# Patient Record
Sex: MALE | Race: WHITE | NOT HISPANIC OR LATINO | Employment: OTHER | URBAN - METROPOLITAN AREA
[De-identification: names, ages, dates, MRNs, and addresses within clinical notes are randomized per-mention and may not be internally consistent; named-entity substitution may affect disease eponyms.]

---

## 2021-11-12 ENCOUNTER — TRANSFERRED RECORDS (OUTPATIENT)
Dept: HEALTH INFORMATION MANAGEMENT | Facility: CLINIC | Age: 66
End: 2021-11-12

## 2022-10-31 ENCOUNTER — TRANSFERRED RECORDS (OUTPATIENT)
Dept: HEALTH INFORMATION MANAGEMENT | Facility: CLINIC | Age: 67
End: 2022-10-31

## 2022-11-02 ENCOUNTER — TELEPHONE (OUTPATIENT)
Dept: CARDIOLOGY | Facility: CLINIC | Age: 67
End: 2022-11-02

## 2022-11-02 NOTE — TELEPHONE ENCOUNTER
[facility] Call Center    Phone Message    May a detailed message be left on voicemail: yes     Reason for Call: Other: patients son wants to know what to expect for this appointment. The patient and his wife will be there as well as some of the family (Not at the appointment) but they would like to know if there will be any follow up testing done after the appointment or if the pt benedict will need to come back for a follow up a few weeks later.  Please reach out to discuss., Thank you.    Action Taken: Other: Cardio    Travel Screening: Not Applicable

## 2022-11-02 NOTE — TELEPHONE ENCOUNTER
No consent to communicate on file.  Did speak with son and let him know that without provider ever having seen his father it is hard to say what testing will be ordered or indicated.  Son did verbalize understanding, no other questions at this time.  Vanesa Gibson RN on 11/2/2022 at 4:25 PM

## 2022-11-08 ENCOUNTER — OFFICE VISIT (OUTPATIENT)
Dept: CARDIOLOGY | Facility: CLINIC | Age: 67
End: 2022-11-08
Payer: MEDICARE

## 2022-11-08 ENCOUNTER — HOSPITAL ENCOUNTER (OUTPATIENT)
Dept: GENERAL RADIOLOGY | Facility: CLINIC | Age: 67
Discharge: HOME | DRG: 286 | End: 2022-11-08
Attending: INTERNAL MEDICINE | Admitting: INTERNAL MEDICINE
Payer: MEDICARE

## 2022-11-08 ENCOUNTER — LAB (OUTPATIENT)
Dept: LAB | Facility: CLINIC | Age: 67
End: 2022-11-08
Payer: MEDICARE

## 2022-11-08 ENCOUNTER — CARE COORDINATION (OUTPATIENT)
Dept: CARDIOLOGY | Facility: CLINIC | Age: 67
End: 2022-11-08

## 2022-11-08 VITALS
HEART RATE: 80 BPM | OXYGEN SATURATION: 98 % | DIASTOLIC BLOOD PRESSURE: 73 MMHG | SYSTOLIC BLOOD PRESSURE: 101 MMHG | HEIGHT: 74 IN | WEIGHT: 210.9 LBS | BODY MASS INDEX: 27.07 KG/M2

## 2022-11-08 DIAGNOSIS — I42.9 SECONDARY CARDIOMYOPATHY (H): ICD-10-CM

## 2022-11-08 DIAGNOSIS — R53.83 FATIGUE, UNSPECIFIED TYPE: ICD-10-CM

## 2022-11-08 DIAGNOSIS — R06.02 SHORTNESS OF BREATH: Primary | ICD-10-CM

## 2022-11-08 DIAGNOSIS — Z95.810 BIVENTRICULAR AUTOMATIC IMPLANTABLE CARDIOVERTER DEFIBRILLATOR IN SITU: ICD-10-CM

## 2022-11-08 DIAGNOSIS — R06.02 SHORTNESS OF BREATH: ICD-10-CM

## 2022-11-08 PROBLEM — I10 ESSENTIAL HYPERTENSION: Status: ACTIVE | Noted: 2022-11-08

## 2022-11-08 PROBLEM — J96.01 ACUTE RESPIRATORY FAILURE WITH HYPOXIA (H): Status: ACTIVE | Noted: 2022-11-08

## 2022-11-08 PROBLEM — I47.29 NSVT (NONSUSTAINED VENTRICULAR TACHYCARDIA) (H): Status: ACTIVE | Noted: 2022-11-08

## 2022-11-08 PROBLEM — I25.10 CORONARY ARTERY DISEASE INVOLVING NATIVE CORONARY ARTERY OF NATIVE HEART WITHOUT ANGINA PECTORIS: Status: ACTIVE | Noted: 2022-11-08

## 2022-11-08 LAB
ANION GAP SERPL CALCULATED.3IONS-SCNC: 9 MMOL/L (ref 3–14)
AST SERPL W P-5'-P-CCNC: 34 U/L (ref 0–45)
BUN SERPL-MCNC: 35 MG/DL (ref 7–30)
CALCIUM SERPL-MCNC: 9.4 MG/DL (ref 8.5–10.1)
CHLORIDE BLD-SCNC: 103 MMOL/L (ref 94–109)
CO2 SERPL-SCNC: 26 MMOL/L (ref 20–32)
CREAT SERPL-MCNC: 1.51 MG/DL (ref 0.66–1.25)
GFR SERPL CREATININE-BSD FRML MDRD: 50 ML/MIN/1.73M2
GLUCOSE BLD-MCNC: 111 MG/DL (ref 70–99)
NT-PROBNP SERPL-MCNC: 4423 PG/ML (ref 0–900)
POTASSIUM BLD-SCNC: 3.9 MMOL/L (ref 3.4–5.3)
SODIUM SERPL-SCNC: 138 MMOL/L (ref 133–144)
TSH SERPL DL<=0.005 MIU/L-ACNC: 3.75 MU/L (ref 0.4–4)

## 2022-11-08 PROCEDURE — 83880 ASSAY OF NATRIURETIC PEPTIDE: CPT | Performed by: INTERNAL MEDICINE

## 2022-11-08 PROCEDURE — 84450 TRANSFERASE (AST) (SGOT): CPT | Performed by: INTERNAL MEDICINE

## 2022-11-08 PROCEDURE — 99205 OFFICE O/P NEW HI 60 MIN: CPT | Performed by: INTERNAL MEDICINE

## 2022-11-08 PROCEDURE — 36415 COLL VENOUS BLD VENIPUNCTURE: CPT | Performed by: INTERNAL MEDICINE

## 2022-11-08 PROCEDURE — 93000 ELECTROCARDIOGRAM COMPLETE: CPT | Performed by: INTERNAL MEDICINE

## 2022-11-08 PROCEDURE — 84443 ASSAY THYROID STIM HORMONE: CPT | Performed by: INTERNAL MEDICINE

## 2022-11-08 PROCEDURE — 80048 BASIC METABOLIC PNL TOTAL CA: CPT | Performed by: INTERNAL MEDICINE

## 2022-11-08 PROCEDURE — 71046 X-RAY EXAM CHEST 2 VIEWS: CPT

## 2022-11-08 RX ORDER — ALBUTEROL SULFATE 90 UG/1
2 AEROSOL, METERED RESPIRATORY (INHALATION) EVERY 6 HOURS PRN
COMMUNITY
End: 2023-01-19

## 2022-11-08 RX ORDER — FUROSEMIDE 20 MG
20 TABLET ORAL 2 TIMES DAILY
Status: ON HOLD | COMMUNITY
Start: 2022-11-06 | End: 2023-01-13

## 2022-11-08 RX ORDER — MYCOPHENOLATE MOFETIL 500 MG/1
1000 TABLET ORAL EVERY MORNING
Status: ON HOLD | COMMUNITY
Start: 2022-07-22 | End: 2023-01-13

## 2022-11-08 RX ORDER — TRAZODONE HYDROCHLORIDE 50 MG/1
TABLET, FILM COATED ORAL
Status: ON HOLD | COMMUNITY
Start: 2022-11-06 | End: 2023-01-13

## 2022-11-08 RX ORDER — METOPROLOL SUCCINATE 25 MG/1
1 TABLET, EXTENDED RELEASE ORAL DAILY
Status: ON HOLD | COMMUNITY
Start: 2022-06-23 | End: 2022-11-20

## 2022-11-08 RX ORDER — IVABRADINE 7.5 MG/1
7.5 TABLET, FILM COATED ORAL 2 TIMES DAILY
COMMUNITY
Start: 2022-11-06 | End: 2022-11-10

## 2022-11-08 RX ORDER — PRAVASTATIN SODIUM 20 MG
TABLET ORAL
Status: ON HOLD | COMMUNITY
Start: 2022-09-08 | End: 2023-01-13

## 2022-11-08 RX ORDER — EMPAGLIFLOZIN 10 MG/1
10 TABLET, FILM COATED ORAL DAILY
Status: ON HOLD | COMMUNITY
Start: 2022-08-29 | End: 2022-11-10

## 2022-11-08 RX ORDER — AMIODARONE HYDROCHLORIDE 200 MG/1
200 TABLET ORAL 2 TIMES DAILY
Status: ON HOLD | COMMUNITY
Start: 2022-11-06 | End: 2023-01-13

## 2022-11-08 RX ORDER — METOCLOPRAMIDE 10 MG/1
TABLET ORAL
Status: ON HOLD | COMMUNITY
Start: 2022-11-06 | End: 2022-12-08

## 2022-11-08 NOTE — PROGRESS NOTES
HPI and Plan:   See dictation    Today's clinic visit entailed:  Review of external notes as documented elsewhere in note  75 minutes spent on the date of the encounter doing chart review, history and exam, documentation and further activities per the note  Provider  Link to [facility] Help Grid     The level of medical decision making during this visit was of high complexity.      Orders Placed This Encounter   Procedures     XR Chest 2 Views     Basic metabolic panel     N terminal pro BNP outpatient     AST     TSH with free T4 reflex     Follow-Up with Cardiology     Follow-Up with Cardiology EP     EKG 12-lead complete w/read - Clinics (performed today)     EKG 12-lead complete w/read - Clinics (performed today)       Orders Placed This Encounter   Medications     furosemide (LASIX) 20 MG tablet     Sig: Take 20 mg by mouth daily     amiodarone (PACERONE) 200 MG tablet     Si times daily     CORLANOR 7.5 MG tablet     Sig: Take 7.5 mg by mouth 2 times daily     metoclopramide (REGLAN) 10 MG tablet     Sig: TAKE 1 TABLET (10 MG TOTAL) BY MOUTH 4 (FOUR) TIMES A DAY AS NEEDED (NAUSEA).     traZODone (DESYREL) 50 MG tablet     Sig: TAKE 1 TABLET (50 MG TOTAL) BY MOUTH ONCE NIGHTLY AS NEEDED FOR SLEEP.     JARDIANCE 10 MG TABS tablet     Sig: Take 10 mg by mouth daily     mycophenolate (GENERIC EQUIVALENT) 500 MG tablet     Sig: Take 1,000 mg by mouth 2 times daily     metoprolol succinate ER (TOPROL XL) 25 MG 24 hr tablet     Sig: Take 1 tablet by mouth daily     pravastatin (PRAVACHOL) 20 MG tablet     Sig: TAKE 1 TABLET BY MOUTH DAILY/ DUE FOR FASTING LABWORK     albuterol (PROAIR HFA/PROVENTIL HFA/VENTOLIN HFA) 108 (90 Base) MCG/ACT inhaler     Sig: Inhale 2 puffs into the lungs every 6 hours as needed     aspirin 81 MG EC tablet     Sig: Take 81 mg by mouth daily       There are no discontinued medications.      Encounter Diagnoses   Name Primary?     Shortness of breath Yes     Biventricular automatic  implantable cardioverter defibrillator in situ      Secondary cardiomyopathy (H)      Fatigue, unspecified type        CURRENT MEDICATIONS:  Current Outpatient Medications   Medication Sig Dispense Refill     albuterol (PROAIR HFA/PROVENTIL HFA/VENTOLIN HFA) 108 (90 Base) MCG/ACT inhaler Inhale 2 puffs into the lungs every 6 hours as needed       amiodarone (PACERONE) 200 MG tablet 2 times daily       aspirin 81 MG EC tablet Take 81 mg by mouth daily       CORLANOR 7.5 MG tablet Take 7.5 mg by mouth 2 times daily       furosemide (LASIX) 20 MG tablet Take 20 mg by mouth daily       JARDIANCE 10 MG TABS tablet Take 10 mg by mouth daily       metoclopramide (REGLAN) 10 MG tablet TAKE 1 TABLET (10 MG TOTAL) BY MOUTH 4 (FOUR) TIMES A DAY AS NEEDED (NAUSEA).       metoprolol succinate ER (TOPROL XL) 25 MG 24 hr tablet Take 1 tablet by mouth daily       mycophenolate (GENERIC EQUIVALENT) 500 MG tablet Take 1,000 mg by mouth 2 times daily       pravastatin (PRAVACHOL) 20 MG tablet TAKE 1 TABLET BY MOUTH DAILY/ DUE FOR FASTING LABWORK       traZODone (DESYREL) 50 MG tablet TAKE 1 TABLET (50 MG TOTAL) BY MOUTH ONCE NIGHTLY AS NEEDED FOR SLEEP.         ALLERGIES   No Known Allergies    PAST MEDICAL HISTORY:  No past medical history on file.    PAST SURGICAL HISTORY:  No past surgical history on file.    FAMILY HISTORY:  Family History   Problem Relation Age of Onset     Atrial fibrillation Father      Lung Cancer Brother        SOCIAL HISTORY:  Social History     Socioeconomic History     Marital status:      Spouse name: None     Number of children: None     Years of education: None     Highest education level: None   Tobacco Use     Smoking status: Never     Smokeless tobacco: Never   Substance and Sexual Activity     Alcohol use: Not Currently       Review of Systems:  Skin:  Negative       Eyes:  Positive for glasses    ENT:         Respiratory:  Positive for dyspnea on exertion;shortness of breath O2 continuous  "2LPM, sleep study scheduled   Cardiovascular:  palpitations;chest pain;Negative;syncope or near-syncope;cyanosis exercise intolerance;lightheadedness;dizziness;Positive for hx syncope, mild edema last few days  Gastroenterology: Positive for nausea;vomiting;poor appetite    Genitourinary:  Negative      Musculoskeletal:         Neurologic:  Positive for stroke;memory problems;numbness or tingling of feet    Psychiatric:         Heme/Lymph/Imm:         Endocrine:           Physical Exam:  Vitals: /73 (BP Location: Right arm, Cuff Size: Adult Large)   Pulse 80   Ht 1.88 m (6' 2\")   Wt 95.7 kg (210 lb 14.4 oz)   SpO2 98%   BMI 27.08 kg/m      Constitutional:  cooperative;in no acute distress frail      Skin:  warm and dry to the touch          Head:  normocephalic        Eyes:  pupils equal and round        Lymph:      ENT:  no pallor or cyanosis        Neck:  no carotid bruit        Respiratory:       crackles in left base otherwise clear    Cardiac: regular rhythm   S3              pulses below the femoral arteries are diminished                                      GI:  abdomen soft        Extremities and Muscular Skeletal:  no deformities, clubbing, cyanosis, erythema observed;no edema              Neurological:  no gross motor deficits   emotional, tearful at times    Psych:  Alert and Oriented x 3        CC  No referring provider defined for this encounter.              "

## 2022-11-08 NOTE — LETTER
2022    Quentin Odonnell  [facility] [address] [place] [place] [address]    RE: Paco Stein       Dear Colleague,     I had the pleasure of seeing Paco Stein in the [facility].  HPI and Plan:   See dictation    Today's clinic visit entailed:  Review of external notes as documented elsewhere in note  75 minutes spent on the date of the encounter doing chart review, history and exam, documentation and further activities per the note  Provider  Link to [facility] Help Grid     The level of medical decision making during this visit was of high complexity.      Orders Placed This Encounter   Procedures     XR Chest 2 Views     Basic metabolic panel     N terminal pro BNP outpatient     AST     TSH with free T4 reflex     Follow-Up with Cardiology     Follow-Up with Cardiology EP     EKG 12-lead complete w/read - Clinics (performed today)     EKG 12-lead complete w/read - Clinics (performed today)       Orders Placed This Encounter   Medications     furosemide (LASIX) 20 MG tablet     Sig: Take 20 mg by mouth daily     amiodarone (PACERONE) 200 MG tablet     Si times daily     CORLANOR 7.5 MG tablet     Sig: Take 7.5 mg by mouth 2 times daily     metoclopramide (REGLAN) 10 MG tablet     Sig: TAKE 1 TABLET (10 MG TOTAL) BY MOUTH 4 (FOUR) TIMES A DAY AS NEEDED (NAUSEA).     traZODone (DESYREL) 50 MG tablet     Sig: TAKE 1 TABLET (50 MG TOTAL) BY MOUTH ONCE NIGHTLY AS NEEDED FOR SLEEP.     JARDIANCE 10 MG TABS tablet     Sig: Take 10 mg by mouth daily     mycophenolate (GENERIC EQUIVALENT) 500 MG tablet     Sig: Take 1,000 mg by mouth 2 times daily     metoprolol succinate ER (TOPROL XL) 25 MG 24 hr tablet     Sig: Take 1 tablet by mouth daily     pravastatin (PRAVACHOL) 20 MG tablet     Sig: TAKE 1 TABLET BY MOUTH DAILY/ DUE FOR FASTING LABWORK     albuterol (PROAIR HFA/PROVENTIL HFA/VENTOLIN HFA) 108 (90 Base) MCG/ACT inhaler     Sig: Inhale 2 puffs into the lungs every 6 hours as  needed     aspirin 81 MG EC tablet     Sig: Take 81 mg by mouth daily       There are no discontinued medications.      Encounter Diagnoses   Name Primary?     Shortness of breath Yes     Biventricular automatic implantable cardioverter defibrillator in situ      Secondary cardiomyopathy (H)      Fatigue, unspecified type        CURRENT MEDICATIONS:  Current Outpatient Medications   Medication Sig Dispense Refill     albuterol (PROAIR HFA/PROVENTIL HFA/VENTOLIN HFA) 108 (90 Base) MCG/ACT inhaler Inhale 2 puffs into the lungs every 6 hours as needed       amiodarone (PACERONE) 200 MG tablet 2 times daily       aspirin 81 MG EC tablet Take 81 mg by mouth daily       CORLANOR 7.5 MG tablet Take 7.5 mg by mouth 2 times daily       furosemide (LASIX) 20 MG tablet Take 20 mg by mouth daily       JARDIANCE 10 MG TABS tablet Take 10 mg by mouth daily       metoclopramide (REGLAN) 10 MG tablet TAKE 1 TABLET (10 MG TOTAL) BY MOUTH 4 (FOUR) TIMES A DAY AS NEEDED (NAUSEA).       metoprolol succinate ER (TOPROL XL) 25 MG 24 hr tablet Take 1 tablet by mouth daily       mycophenolate (GENERIC EQUIVALENT) 500 MG tablet Take 1,000 mg by mouth 2 times daily       pravastatin (PRAVACHOL) 20 MG tablet TAKE 1 TABLET BY MOUTH DAILY/ DUE FOR FASTING LABWORK       traZODone (DESYREL) 50 MG tablet TAKE 1 TABLET (50 MG TOTAL) BY MOUTH ONCE NIGHTLY AS NEEDED FOR SLEEP.         ALLERGIES   No Known Allergies    PAST MEDICAL HISTORY:  No past medical history on file.    PAST SURGICAL HISTORY:  No past surgical history on file.    FAMILY HISTORY:  Family History   Problem Relation Age of Onset     Atrial fibrillation Father      Lung Cancer Brother        SOCIAL HISTORY:  Social History     Socioeconomic History     Marital status:      Spouse name: None     Number of children: None     Years of education: None     Highest education level: None   Tobacco Use     Smoking status: Never     Smokeless tobacco: Never   Substance and Sexual  "Activity     Alcohol use: Not Currently       Review of Systems:  Skin:  Negative       Eyes:  Positive for glasses    ENT:         Respiratory:  Positive for dyspnea on exertion;shortness of breath O2 continuous 2LPM, sleep study scheduled   Cardiovascular:  palpitations;chest pain;Negative;syncope or near-syncope;cyanosis exercise intolerance;lightheadedness;dizziness;Positive for hx syncope, mild edema last few days  Gastroenterology: Positive for nausea;vomiting;poor appetite    Genitourinary:  Negative      Musculoskeletal:         Neurologic:  Positive for stroke;memory problems;numbness or tingling of feet    Psychiatric:         Heme/Lymph/Imm:         Endocrine:           Physical Exam:  Vitals: /73 (BP Location: Right arm, Cuff Size: Adult Large)   Pulse 80   Ht 1.88 m (6' 2\")   Wt 95.7 kg (210 lb 14.4 oz)   SpO2 98%   BMI 27.08 kg/m      Constitutional:  cooperative;in no acute distress frail      Skin:  warm and dry to the touch          Head:  normocephalic        Eyes:  pupils equal and round        Lymph:      ENT:  no pallor or cyanosis        Neck:  no carotid bruit        Respiratory:       crackles in left base otherwise clear    Cardiac: regular rhythm   S3              pulses below the femoral arteries are diminished                                      GI:  abdomen soft        Extremities and Muscular Skeletal:  no deformities, clubbing, cyanosis, erythema observed;no edema              Neurological:  no gross motor deficits   emotional, tearful at times    Psych:  Alert and Oriented x 3        CC  No referring provider defined for this encounter.                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  He is actually from [place], [place].  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  In , he had a stroke and had a JARED and loop recorder for further evaluation of that; I believe both of those were fairly unremarkable for etiology of stroke.    In 2021, he was seen by Cardiology for recurrent syncopal event.  In that consults, he is referred to have a mild ischemic cardiomyopathy at baseline with an EF of around 45%.  I was unable to find any documentation of his baseline ejection fraction, prior to 2021 except for this consult note.    The patient was seen for recurrent syncope.  He ended up having a Holter monitor, which showed nonsustained ventricular tachycardia.  Given this and a family history of one brother with ARVD diagnosis and a second brother who  from sudden cardiac death, thought related to a viral cardiomyopathy, he was referred for cardiac MRI.      That MRI was available and demonstrated severe LV dysfunction, ejection fraction around 22%.  Late gadolinium enhancement suggested significant fibrosis in a nonischemic pattern.      He also had an angiogram around that time, I believe it was in 10/2021.  He was found to have a significant mid LAD stenosis, which was stented.  The stent in his left circumflex appeared patent.  The degree of LV dysfunction was thought to be out of proportion to the degree of coronary disease noted.      He was started on heart failure medications including Entresto, but according to records, he has not tolerated optimizing heart failure medications because of hypotension.    In 2022, he underwent a biventricular ICD placement.  Apparently, there was a problem with the initial RV lead, so he did have a subsequent revision of this lead.      His course has been complicated by  continued difficulty optimizing medical therapy secondary to hypotension with progressive functional status loss.  In fact, his sons feel he was able to walk independently even 3 months ago and currently he is requiring full support for his activities of daily living.  He continues to have shortness of breath and symptoms suggestive of orthopnea as well as profound weakness.    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      He has been on immunosuppressant therapy.  Dating back, it looks like until 12/2016, initially on cyclophosphamide, now on CellCept.  His wife interjects that he has been coxsackievirus positive for about 2 years.      In 09/2022, he had 3 weeks of cough.  He was tested for COVID and negative, tested for influenza and negative.  He was admitted to the hospital in [place] on 10/27 for acute on chronic systolic congestive heart failure.  During that admission, he underwent bilateral thoracentesis with approximately 1 liter of fluid removed from the left lung and about 800 mL from the right.  It was transudative.  He was treated for pneumonia with a 7-day course of Augmentin and doxycycline.  He was given IV diuretics.  Again had difficulty due to hypotension.  He did not tolerate afterload reduction.  He is maintained on Toprol 25 mg daily, ivabradine 7.5 mg twice daily and he was started on amiodarone for evidence of nonsustained ventricular tachycardia during that admission.  This was started without a loading dose, either IV or oral, 200 mg twice daily.      His family and him are not looking to establish care here.  They are looking for a second opinion in regards to advanced heart failure therapies.    PHYSICAL EXAMINATION:  His blood pressure is 101/73, pulse of 80, weight 210.  This is up 8 pounds from his  discharge weight.  Cardiovascular tones are regular and fast with a gallop.  Lung fields do appear clear posteriorly, suggesting no significant recurrence of pleural fluid.  There are scattered crackles in the left base.  He has no significant peripheral edema.    His last chemistry profile from [place] suggested normal electrolytes, but creatinine of 1.52.  CBC on 11/03/2022, white count normal, hemoglobin 17.2, platelets 165,000.      IMPRESSION:   1.  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  He is status post PCI of his mid LAD in 10/2021.    He appears to demonstrate some decompensation on exam today with New York Heart classification class III heart failure with reduced ejection fraction.  His last estimated ejection fraction was by echo performed on that last hospitalization.  I do not see record of.  The history of the LV function being unchanged is from his family.  Dr. Stein has had a significant decline in his overall functional capacity, poor oral intake due in part to dysgeusia, possibly medication related.  Despite poor oral intake, he has had about an 8-pound weight gain from his discharge.    I will recommend repeating some lab work today including a basic metabolic panel and NT-proBNP and thyroid studies as I do not see thyroid studies have been done throughout this process.  I will order a chest x-ray to look for recurrent pleural effusions given his ongoing breathing difficulties.    He came to the [facility] looking for an advanced heart failure specialist and what options might be available to him.  I am going to refer him to [facility] at [facility], hopefully getting an appointment in the next  few days since they have traveled here from [place].    2.  Nonsustained ventricular tachycardia.  I also note that his QT is measured long, although this EKG performed today has a lot of artifact.  I am concerned i reviewing his medication list that he is on metoprolol, amiodarone and ivabradine.  I am not sure how much the ivabradine is helping him at this point.  The last documentation I have of his Bi-V device was that it was functioning at 90%, but this was back in August.  His sons tell me that they were told that was functioning at 60%, but I do not see a recent interrogation.    I am going to recommend stopping of ivabradine at this time, continuing both metoprolol and amiodarone and following up with Electrophysiology.      I was able to get him an appointment with Dr. Myrtle Dominguez on Thursday at 11:45 a.m. to discuss management of his heart failure and cardiomyopathy.      The etiology of his sudden decline in LV function is still unclear.  Given his underlying CNS vasculitis with multiple strokes on immunotherapy, I do not believe he would be a candidate for any kind of mechanical intervention, but I will leave that for them to discuss with Dr. Dominguez at their appointment.      I will follow up with the lab tests and chest x-ray I ordered.  Those should be also available for Dr. Dominguez for her appointment as well.    Please feel free to contact me with any questions you have in regards to his care.    cc:   MD Kimberlee Young DO        D: 2022   T: 2022   MT:     Name:     SLOAN LAZAR  MRN:      [id number]        Account:      [id number]   :      1955           Service Date: 2022       Document: [id number]      Thank you for allowing me to participate in the care of your patient.      Sincerely,     Kimberlee Angulo DO     [facility]  cc:   No referring provider defined  for this encounter.

## 2022-11-08 NOTE — PROGRESS NOTES
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  He is actually from [place], [place].  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  In , he had a stroke and had a JARED and loop recorder for further evaluation of that; I believe both of those were fairly unremarkable for etiology of stroke.    In 2021, he was seen by Cardiology for recurrent syncopal event.  In that consults, he is referred to have a mild ischemic cardiomyopathy at baseline with an EF of around 45%.  I was unable to find any documentation of his baseline ejection fraction, prior to 2021 except for this consult note.    The patient was seen for recurrent syncope.  He ended up having a Holter monitor, which showed nonsustained ventricular tachycardia.  Given this and a family history of one brother with ARVD diagnosis and a second brother who  from sudden cardiac death, thought related to a viral cardiomyopathy, he was referred for cardiac MRI.      That MRI was available and demonstrated severe LV dysfunction, ejection fraction around 22%.  Late gadolinium enhancement suggested significant fibrosis in a nonischemic pattern.      He also had an angiogram around that time, I believe it was in 10/2021.  He was found to have a significant mid LAD stenosis, which was stented.  The stent in his left circumflex appeared patent.  The degree of LV dysfunction was thought to be out of proportion to the degree of coronary disease noted.      He was started on heart failure medications including Entresto,  but according to records, he has not tolerated optimizing heart failure medications because of hypotension.    In 01/2022, he underwent a biventricular ICD placement.  Apparently, there was a problem with the initial RV lead, so he did have a subsequent revision of this lead.      His course has been complicated by continued difficulty optimizing medical therapy secondary to hypotension with progressive functional status loss.  In fact, his sons feel he was able to walk independently even 3 months ago and currently he is requiring full support for his activities of daily living.  He continues to have shortness of breath and symptoms suggestive of orthopnea as well as profound weakness.    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      He has been on immunosuppressant therapy.  Dating back, it looks like until 12/2016, initially on cyclophosphamide, now on CellCept.  His wife interjects that he has been coxsackievirus positive for about 2 years.      In 09/2022, he had 3 weeks of cough.  He was tested for COVID and negative, tested for influenza and negative.  He was admitted to the hospital in [place] on 10/27 for acute on chronic systolic congestive heart failure.  During that admission, he underwent bilateral thoracentesis with approximately 1 liter of fluid removed from the left lung and about 800 mL from the right.  It was transudative.  He was treated for pneumonia with a 7-day course of Augmentin and doxycycline.  He was given IV diuretics.  Again had difficulty due to hypotension.  He did not tolerate afterload reduction.  He is maintained on Toprol 25 mg daily, ivabradine 7.5 mg twice daily and he was started on amiodarone for evidence of nonsustained ventricular tachycardia during that admission.  This was started without a  loading dose, either IV or oral, 200 mg twice daily.      His family and him are not looking to establish care here.  They are looking for a second opinion in regards to advanced heart failure therapies.    PHYSICAL EXAMINATION:  His blood pressure is 101/73, pulse of 80, weight 210.  This is up 8 pounds from his discharge weight.  Cardiovascular tones are regular and fast with a gallop.  Lung fields do appear clear posteriorly, suggesting no significant recurrence of pleural fluid.  There are scattered crackles in the left base.  He has no significant peripheral edema.    His last chemistry profile from [place] suggested normal electrolytes, but creatinine of 1.52.  CBC on 11/03/2022, white count normal, hemoglobin 17.2, platelets 165,000.      IMPRESSION:   1.  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  He is status post PCI of his mid LAD in 10/2021.    He appears to demonstrate some decompensation on exam today with New York Heart classification class III heart failure with reduced ejection fraction.  His last estimated ejection fraction was by echo performed on that last hospitalization.  I do not see record of.  The history of the LV function being unchanged is from his family.  Dr. Stein has had a significant decline in his overall functional capacity, poor oral intake due in part to dysgeusia, possibly medication related.  Despite poor oral intake, he has had about an 8-pound weight gain from his discharge.    I will recommend repeating some lab work today including a basic metabolic panel and NT-proBNP and thyroid studies as I do not see thyroid studies have been done throughout this process.  I will order a chest x-ray to  look for recurrent pleural effusions given his ongoing breathing difficulties.    He came to the [facility] looking for an advanced heart failure specialist and what options might be available to him.  I am going to refer him to [facility] at [facility], hopefully getting an appointment in the next few days since they have traveled here from [place].    2.  Nonsustained ventricular tachycardia.  I also note that his QT is measured long, although this EKG performed today has a lot of artifact.  I am concerned i reviewing his medication list that he is on metoprolol, amiodarone and ivabradine.  I am not sure how much the ivabradine is helping him at this point.  The last documentation I have of his Bi-V device was that it was functioning at 90%, but this was back in August.  His sons tell me that they were told that was functioning at 60%, but I do not see a recent interrogation.    I am going to recommend stopping of ivabradine at this time, continuing both metoprolol and amiodarone and following up with Electrophysiology.      I was able to get him an appointment with Dr. Myrtle Dominguez on Thursday at 11:45 a.m. to discuss management of his heart failure and cardiomyopathy.      The etiology of his sudden decline in LV function is still unclear.  Given his underlying CNS vasculitis with multiple strokes on immunotherapy, I do not believe he would be a candidate for any kind of mechanical intervention, but I will leave that for them to discuss with Dr. Dominguez at their appointment.      I will follow up with the lab tests and chest x-ray I ordered.  Those should be also available for Dr. Dominguez for her appointment as well.    Please feel free to contact me with any questions you have in regards to his care.    cc:   MD Kimberlee Young DO        D: 11/08/2022   T: 11/08/2022   MT: AZAM    Name:     SLOAN LAZAR  MRN:      [id number]        Account:      [id number]    :      1955           Service Date: 2022       Document: [id number]

## 2022-11-08 NOTE — PROGRESS NOTES
New Heart Failure Referral     Sit: Patient is referred by Dr. 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 Request was made to transfer but no beds available at that time. Patient was discharged on 11/6/2022. Dr. Peterson wife called and made appt with Dr. Angulo for 11/8 who subsequently referred patient to Adv .      Patient and family are staying at a hotel in [facility] through Friday and was hoping to get an appointment with us. Patients family was also inquiring about a RHC and a device check. Patient has a [employer]tronic Biventricular ICD with pacer implanted.         Rec: Sending to HF RN to review.       Plan:     Offer [facility] appointment with Dr. Dominguez on 11/10 over lunch.     Message Device team to see if he can have device check same day.

## 2022-11-10 ENCOUNTER — OFFICE VISIT (OUTPATIENT)
Dept: CARDIOLOGY | Facility: CLINIC | Age: 67
DRG: 286 | End: 2022-11-10
Attending: INTERNAL MEDICINE
Payer: MEDICARE

## 2022-11-10 ENCOUNTER — HOSPITAL ENCOUNTER (INPATIENT)
Facility: CLINIC | Age: 67
LOS: 11 days | Discharge: HOME | DRG: 286 | End: 2022-11-21
Attending: INTERNAL MEDICINE | Admitting: INTERNAL MEDICINE
Payer: MEDICARE

## 2022-11-10 ENCOUNTER — APPOINTMENT (OUTPATIENT)
Dept: GENERAL RADIOLOGY | Facility: CLINIC | Age: 67
DRG: 286 | End: 2022-11-10
Attending: INTERNAL MEDICINE
Payer: MEDICARE

## 2022-11-10 VITALS
OXYGEN SATURATION: 98 % | DIASTOLIC BLOOD PRESSURE: 73 MMHG | BODY MASS INDEX: 30.57 KG/M2 | WEIGHT: 238.1 LBS | HEART RATE: 81 BPM | SYSTOLIC BLOOD PRESSURE: 97 MMHG

## 2022-11-10 DIAGNOSIS — I50.9 CHF (CONGESTIVE HEART FAILURE) (H): ICD-10-CM

## 2022-11-10 DIAGNOSIS — Z45.2 PICC (PERIPHERALLY INSERTED CENTRAL CATHETER) IN PLACE: ICD-10-CM

## 2022-11-10 DIAGNOSIS — I25.10 CORONARY ARTERY DISEASE INVOLVING NATIVE CORONARY ARTERY OF NATIVE HEART WITHOUT ANGINA PECTORIS: ICD-10-CM

## 2022-11-10 DIAGNOSIS — I82.452 ACUTE DEEP VEIN THROMBOSIS (DVT) OF LEFT PERONEAL VEIN (H): ICD-10-CM

## 2022-11-10 DIAGNOSIS — I50.22 CHRONIC SYSTOLIC CONGESTIVE HEART FAILURE (H): Primary | ICD-10-CM

## 2022-11-10 DIAGNOSIS — E87.6 HYPOKALEMIA: ICD-10-CM

## 2022-11-10 DIAGNOSIS — R06.02 SOB (SHORTNESS OF BREATH): Primary | ICD-10-CM

## 2022-11-10 DIAGNOSIS — I42.9 CARDIOMYOPATHY, UNSPECIFIED TYPE (H): ICD-10-CM

## 2022-11-10 DIAGNOSIS — I42.9 CARDIOMYOPATHY (H): ICD-10-CM

## 2022-11-10 DIAGNOSIS — I42.9 CARDIOMYOPATHY (H): Primary | ICD-10-CM

## 2022-11-10 DIAGNOSIS — R57.0 CARDIOGENIC SHOCK (H): ICD-10-CM

## 2022-11-10 LAB
ALBUMIN SERPL BCG-MCNC: 3.7 G/DL (ref 3.5–5.2)
ALP SERPL-CCNC: 62 U/L (ref 40–129)
ALT SERPL W P-5'-P-CCNC: 36 U/L (ref 10–50)
ANION GAP SERPL CALCULATED.3IONS-SCNC: 12 MMOL/L (ref 7–15)
AST SERPL W P-5'-P-CCNC: 30 U/L (ref 10–50)
BASE EXCESS BLDV CALC-SCNC: 3.3 MMOL/L (ref -7.7–1.9)
BASE EXCESS BLDV CALC-SCNC: 4.6 MMOL/L (ref -7.7–1.9)
BILIRUB SERPL-MCNC: 1.5 MG/DL
BUN SERPL-MCNC: 28.2 MG/DL (ref 8–23)
CALCIUM SERPL-MCNC: 8.9 MG/DL (ref 8.8–10.2)
CHLORIDE SERPL-SCNC: 101 MMOL/L (ref 98–107)
CREAT SERPL-MCNC: 1.51 MG/DL (ref 0.67–1.17)
DEPRECATED HCO3 PLAS-SCNC: 25 MMOL/L (ref 22–29)
ERYTHROCYTE [DISTWIDTH] IN BLOOD BY AUTOMATED COUNT: 14.7 % (ref 10–15)
GFR SERPL CREATININE-BSD FRML MDRD: 50 ML/MIN/1.73M2
GLUCOSE SERPL-MCNC: 105 MG/DL (ref 70–99)
HCO3 BLDV-SCNC: 28 MMOL/L (ref 21–28)
HCO3 BLDV-SCNC: 31 MMOL/L (ref 21–28)
HCT VFR BLD AUTO: 50.9 % (ref 40–53)
HGB BLD-MCNC: 16.7 G/DL (ref 13.3–17.7)
LACTATE SERPL-SCNC: 2.1 MMOL/L (ref 0.7–2)
MAGNESIUM SERPL-MCNC: 2.1 MG/DL (ref 1.7–2.3)
MCH RBC QN AUTO: 29.1 PG (ref 26.5–33)
MCHC RBC AUTO-ENTMCNC: 32.8 G/DL (ref 31.5–36.5)
MCV RBC AUTO: 89 FL (ref 78–100)
NT-PROBNP SERPL-MCNC: 3449 PG/ML (ref 0–900)
O2/TOTAL GAS SETTING VFR VENT: 1 %
O2/TOTAL GAS SETTING VFR VENT: 2 %
OXYHGB MFR BLDV: 18 % (ref 70–75)
OXYHGB MFR BLDV: 61 % (ref 70–75)
PCO2 BLDV: 41 MM HG (ref 40–50)
PCO2 BLDV: 48 MM HG (ref 40–50)
PH BLDV: 7.41 [PH] (ref 7.32–7.43)
PH BLDV: 7.44 [PH] (ref 7.32–7.43)
PLATELET # BLD AUTO: 106 10E3/UL (ref 150–450)
PO2 BLDV: 17 MM HG (ref 25–47)
PO2 BLDV: 34 MM HG (ref 25–47)
POTASSIUM SERPL-SCNC: 3.8 MMOL/L (ref 3.4–5.3)
PROT SERPL-MCNC: 5.8 G/DL (ref 6.4–8.3)
RBC # BLD AUTO: 5.73 10E6/UL (ref 4.4–5.9)
SARS-COV-2 RNA RESP QL NAA+PROBE: NEGATIVE
SODIUM SERPL-SCNC: 138 MMOL/L (ref 136–145)
TROPONIN T SERPL HS-MCNC: 31 NG/L
TROPONIN T SERPL HS-MCNC: 34 NG/L
WBC # BLD AUTO: 7.1 10E3/UL (ref 4–11)

## 2022-11-10 PROCEDURE — 84484 ASSAY OF TROPONIN QUANT: CPT

## 2022-11-10 PROCEDURE — 99215 OFFICE O/P EST HI 40 MIN: CPT | Mod: 25 | Performed by: INTERNAL MEDICINE

## 2022-11-10 PROCEDURE — 250N000012 HC RX MED GY IP 250 OP 636 PS 637: Performed by: INTERNAL MEDICINE

## 2022-11-10 PROCEDURE — 99223 1ST HOSP IP/OBS HIGH 75: CPT | Mod: AI | Performed by: INTERNAL MEDICINE

## 2022-11-10 PROCEDURE — 93295 DEV INTERROG REMOTE 1/2/MLT: CPT | Performed by: INTERNAL MEDICINE

## 2022-11-10 PROCEDURE — U0003 INFECTIOUS AGENT DETECTION BY NUCLEIC ACID (DNA OR RNA); SEVERE ACUTE RESPIRATORY SYNDROME CORONAVIRUS 2 (SARS-COV-2) (CORONAVIRUS DISEASE [COVID-19]), AMPLIFIED PROBE TECHNIQUE, MAKING USE OF HIGH THROUGHPUT TECHNOLOGIES AS DESCRIBED BY CMS-2020-01-R: HCPCS | Performed by: INTERNAL MEDICINE

## 2022-11-10 PROCEDURE — 71045 X-RAY EXAM CHEST 1 VIEW: CPT | Mod: 26 | Performed by: RADIOLOGY

## 2022-11-10 PROCEDURE — 36592 COLLECT BLOOD FROM PICC: CPT | Performed by: STUDENT IN AN ORGANIZED HEALTH CARE EDUCATION/TRAINING PROGRAM

## 2022-11-10 PROCEDURE — 93005 ELECTROCARDIOGRAM TRACING: CPT

## 2022-11-10 PROCEDURE — 82805 BLOOD GASES W/O2 SATURATION: CPT | Performed by: STUDENT IN AN ORGANIZED HEALTH CARE EDUCATION/TRAINING PROGRAM

## 2022-11-10 PROCEDURE — 36415 COLL VENOUS BLD VENIPUNCTURE: CPT

## 2022-11-10 PROCEDURE — 272N000473 HC KIT, VPS RHYTHM STYLET

## 2022-11-10 PROCEDURE — 214N000001 HC R&B CCU UMMC

## 2022-11-10 PROCEDURE — 83605 ASSAY OF LACTIC ACID: CPT

## 2022-11-10 PROCEDURE — 85014 HEMATOCRIT: CPT

## 2022-11-10 PROCEDURE — 250N000013 HC RX MED GY IP 250 OP 250 PS 637

## 2022-11-10 PROCEDURE — 250N000011 HC RX IP 250 OP 636

## 2022-11-10 PROCEDURE — 36569 INSJ PICC 5 YR+ W/O IMAGING: CPT

## 2022-11-10 PROCEDURE — 999N000065 XR CHEST PORT 1 VIEW

## 2022-11-10 PROCEDURE — 93296 REM INTERROG EVL PM/IDS: CPT

## 2022-11-10 PROCEDURE — 83880 ASSAY OF NATRIURETIC PEPTIDE: CPT

## 2022-11-10 PROCEDURE — 83735 ASSAY OF MAGNESIUM: CPT | Performed by: STUDENT IN AN ORGANIZED HEALTH CARE EDUCATION/TRAINING PROGRAM

## 2022-11-10 PROCEDURE — 80053 COMPREHEN METABOLIC PANEL: CPT

## 2022-11-10 PROCEDURE — 93010 ELECTROCARDIOGRAM REPORT: CPT | Mod: XU | Performed by: INTERNAL MEDICINE

## 2022-11-10 PROCEDURE — 82805 BLOOD GASES W/O2 SATURATION: CPT

## 2022-11-10 PROCEDURE — 250N000011 HC RX IP 250 OP 636: Performed by: STUDENT IN AN ORGANIZED HEALTH CARE EDUCATION/TRAINING PROGRAM

## 2022-11-10 PROCEDURE — G0463 HOSPITAL OUTPT CLINIC VISIT: HCPCS

## 2022-11-10 PROCEDURE — 272N000451 HC KIT SHRLOCK 5FR POWER PICC DOUBLE LUMEN

## 2022-11-10 RX ORDER — AMOXICILLIN 250 MG
1 CAPSULE ORAL 2 TIMES DAILY
Status: DISCONTINUED | OUTPATIENT
Start: 2022-11-10 | End: 2022-11-14

## 2022-11-10 RX ORDER — TRAZODONE HYDROCHLORIDE 50 MG/1
50 TABLET, FILM COATED ORAL AT BEDTIME
Status: DISCONTINUED | OUTPATIENT
Start: 2022-11-10 | End: 2022-11-21 | Stop reason: HOSPADM

## 2022-11-10 RX ORDER — CLOTRIMAZOLE 1 %
CREAM (GRAM) TOPICAL 2 TIMES DAILY
Status: ON HOLD | COMMUNITY
End: 2022-12-08

## 2022-11-10 RX ORDER — BISACODYL 10 MG
10 SUPPOSITORY, RECTAL RECTAL DAILY PRN
Status: DISCONTINUED | OUTPATIENT
Start: 2022-11-10 | End: 2022-11-21 | Stop reason: HOSPADM

## 2022-11-10 RX ORDER — ASPIRIN 81 MG/1
81 TABLET, CHEWABLE ORAL DAILY
Status: DISCONTINUED | OUTPATIENT
Start: 2022-11-11 | End: 2022-11-21 | Stop reason: HOSPADM

## 2022-11-10 RX ORDER — AMOXICILLIN 250 MG
2 CAPSULE ORAL 2 TIMES DAILY
Status: DISCONTINUED | OUTPATIENT
Start: 2022-11-10 | End: 2022-11-14

## 2022-11-10 RX ORDER — POLYETHYLENE GLYCOL 3350 17 G/17G
17 POWDER, FOR SOLUTION ORAL DAILY
Status: DISCONTINUED | OUTPATIENT
Start: 2022-11-10 | End: 2022-11-14

## 2022-11-10 RX ORDER — MYCOPHENOLATE MOFETIL 500 MG/1
500 TABLET ORAL
Status: DISCONTINUED | OUTPATIENT
Start: 2022-11-10 | End: 2022-11-21 | Stop reason: HOSPADM

## 2022-11-10 RX ORDER — HEPARIN SODIUM,PORCINE 10 UNIT/ML
5-20 VIAL (ML) INTRAVENOUS EVERY 24 HOURS
Status: DISCONTINUED | OUTPATIENT
Start: 2022-11-10 | End: 2022-11-14

## 2022-11-10 RX ORDER — ACETAMINOPHEN 325 MG/1
650 TABLET ORAL EVERY 4 HOURS PRN
Status: DISCONTINUED | OUTPATIENT
Start: 2022-11-10 | End: 2022-11-21 | Stop reason: HOSPADM

## 2022-11-10 RX ORDER — LIDOCAINE 40 MG/G
CREAM TOPICAL
Status: DISCONTINUED | OUTPATIENT
Start: 2022-11-10 | End: 2022-11-21 | Stop reason: HOSPADM

## 2022-11-10 RX ORDER — MYCOPHENOLATE MOFETIL 500 MG/1
500 TABLET ORAL EVERY EVENING
Status: ON HOLD | COMMUNITY
End: 2023-01-13

## 2022-11-10 RX ORDER — MYCOPHENOLATE MOFETIL 500 MG/1
1000 TABLET ORAL EVERY MORNING
Status: DISCONTINUED | OUTPATIENT
Start: 2022-11-11 | End: 2022-11-21 | Stop reason: HOSPADM

## 2022-11-10 RX ORDER — DOBUTAMINE HYDROCHLORIDE 200 MG/100ML
2.5 INJECTION INTRAVENOUS CONTINUOUS
Status: DISCONTINUED | OUTPATIENT
Start: 2022-11-10 | End: 2022-11-21 | Stop reason: HOSPADM

## 2022-11-10 RX ORDER — ACETAMINOPHEN 650 MG/1
650 SUPPOSITORY RECTAL EVERY 4 HOURS PRN
Status: DISCONTINUED | OUTPATIENT
Start: 2022-11-10 | End: 2022-11-21 | Stop reason: HOSPADM

## 2022-11-10 RX ORDER — PRAVASTATIN SODIUM 20 MG
20 TABLET ORAL DAILY
Status: DISCONTINUED | OUTPATIENT
Start: 2022-11-11 | End: 2022-11-21 | Stop reason: HOSPADM

## 2022-11-10 RX ORDER — AMIODARONE HYDROCHLORIDE 200 MG/1
200 TABLET ORAL 2 TIMES DAILY
Status: DISCONTINUED | OUTPATIENT
Start: 2022-11-10 | End: 2022-11-21 | Stop reason: HOSPADM

## 2022-11-10 RX ORDER — HEPARIN SODIUM,PORCINE 10 UNIT/ML
5-20 VIAL (ML) INTRAVENOUS
Status: DISCONTINUED | OUTPATIENT
Start: 2022-11-10 | End: 2022-11-14

## 2022-11-10 RX ORDER — BUMETANIDE 0.25 MG/ML
2 INJECTION INTRAMUSCULAR; INTRAVENOUS ONCE
Status: COMPLETED | OUTPATIENT
Start: 2022-11-10 | End: 2022-11-10

## 2022-11-10 RX ORDER — LIDOCAINE 40 MG/G
CREAM TOPICAL
Status: ACTIVE | OUTPATIENT
Start: 2022-11-10 | End: 2022-11-13

## 2022-11-10 RX ADMIN — TRAZODONE HYDROCHLORIDE 50 MG: 50 TABLET ORAL at 22:23

## 2022-11-10 RX ADMIN — SENNOSIDES AND DOCUSATE SODIUM 1 TABLET: 50; 8.6 TABLET ORAL at 20:30

## 2022-11-10 RX ADMIN — MYCOPHENOLATE MOFETIL 500 MG: 500 TABLET, FILM COATED ORAL at 20:28

## 2022-11-10 RX ADMIN — Medication 5 ML: at 20:28

## 2022-11-10 RX ADMIN — DOBUTAMINE IN DEXTROSE 2.5 MCG/KG/MIN: 200 INJECTION, SOLUTION INTRAVENOUS at 20:26

## 2022-11-10 RX ADMIN — AMIODARONE HYDROCHLORIDE 200 MG: 200 TABLET ORAL at 20:29

## 2022-11-10 RX ADMIN — BUMETANIDE 2 MG: 0.25 INJECTION, SOLUTION INTRAMUSCULAR; INTRAVENOUS at 20:41

## 2022-11-10 ASSESSMENT — ACTIVITIES OF DAILY LIVING (ADL)
ADLS_ACUITY_SCORE: 33
ADLS_ACUITY_SCORE: 35
ADLS_ACUITY_SCORE: 35
ADLS_ACUITY_SCORE: 33
ADLS_ACUITY_SCORE: 33

## 2022-11-10 ASSESSMENT — PAIN SCALES - GENERAL: PAINLEVEL: NO PAIN (0)

## 2022-11-10 ASSESSMENT — PATIENT HEALTH QUESTIONNAIRE - PHQ9: SUM OF ALL RESPONSES TO PHQ QUESTIONS 1-9: 15

## 2022-11-10 NOTE — Clinical Note
RCA Cine(s)  injected and visualized utilizing power injector system.Rate (mL/sec) 3 Total Volume (mL) 6  Multiple views

## 2022-11-10 NOTE — LETTER
11/10/2022      RE: aPco Stein   [address]  [place] [address]       Dear Colleague,    Thank you for the opportunity to participate in the care of your patient, Paco Stein, at the [facility]. Please see a copy of my visit note below.    See above     Service Date: 11/10/2022    Burt Andrews MD   [address] [place] [address]  Fax [phone]    RE: Paco Stein  MRN: [id number]  : 1955    Dear Dr. Andrews:      I had the pleasure of meeting Paco Stein in the [facility] on 11/10/2022 for first time visit.    As you know, he is a 67-year-old man with a strong family history of cardiomyopathy (brother with a transplant at age 69, another brother with sudden cardiac death at 49, both with fatty metaplasia on catheterization), who has had a diagnosis of heart failure at least for several years.  He first had an angiogram in  and had a PCI to the LAD.  He had several episodes of syncope in  leading to his low ejection fraction diagnosis.  He has a biventricular pacemaker/ICD with no further episodes of syncope after that in 2022.    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  He has residual left sided weakness and some speech fluency and emotional lability, but overall was living a full quality of life, even after this diagnosis in 2016.  He had been on Cytoxan for several years and then eventually transitioned to CellCept with no further neurologic insult and he has MRIs every 6 months and the CellCept as prescribed by Rheumatology in [place].    With respect to his heart failure, he has generally been intolerant to GDMT with worsening kidney function, hypotension and dizziness.  The family notes a big change in 2022.  Prior to that,  he was golfing and gardening and after a hospitalization for a potential virus with a negative workup in September, he has had 2 subsequent admissions.  He was able to walk even a week ago and now he is 2 assist.  He has lost 20 pounds over the last year, unintentionally.  He feels nauseous when he eats.  He is short of breath even at rest and cannot lay flat.  He reports dizziness and lightheadedness and generally feeling extremely fatigued.  He feels his whole body is weak as well.    He and his 2 sons and a daughter and wife are here today and wondering what options are available.  Again, he was walking over the summer.    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    FAMILY HISTORY:  One older brother has a defibrillator.  A younger brother  suddenly at the age of 49 with fatty metaplasia on pathology.  Another brother had a cardiac transplant with fatty metaplasia on pathology at the age of 69 last year in [place], [place] and is recovering well.  Both sons have had echos and EKGs, which show no evidence of disease.  Daughter has not yet had any imaging.  A son had Hodgkin disease.      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  He denies any alcohol use or tobacco use.    CURRENT MEDICATIONS:    1.  Jardiance 10 mg daily.  2.  Corlanor 7.5 mg b.i.d.  3.  Lasix 20 mg daily.  4.  Metoprolol 25 mg daily.  5.  CellCept 1000 mg b.i.d.  6.  Pravastatin 20 mg daily.  7.  Trazodone as needed for sleep.    REVIEW OF SYSTEMS:    CONSTITUTIONAL:  No fever, chills or sweats.  Weight has been declining 20 pounds last year.  Positive for fatigue.  ENT:  No visual  disturbance, earache, epistaxis, sore throat.  ALLERGIES:  Negative.  RESPIRATORY:  No cough or hemoptysis.  CARDIOVASCULAR:  As per HPI.  GASTROINTESTINAL:  Positive for nausea and some emesis, very poor appetite.  No melena, no hematochezia.  GENITOURINARY:  No urinary frequency, dysuria, hematuria.  SKIN:  Negative.  PSYCHIATRIC:  Feeling down and dejected about his prognosis.  NEUROLOGIC:  Does have some residual left sided weakness and speech fluency problems, but overall no new deficits.  ENDOCRINE:  Negative.  MUSCULOSKELETAL:  Generally feeling weak.    PHYSICAL EXAMINATION:    VITAL SIGNS:  Today, blood pressure 97/73, pulse 81, oxygen saturation 98%, BMI 30.5.  GENERAL:  The patient appears alert, comfortable and articulate.  The patient appears dyspneic at rest.  Breathing is uncomfortable, trying to formulate his sentences.  HEENT:  Moist mucous membranes.  Lips are somewhat cyanotic in color. He has temporal wasting.  NECK:  No thyromegaly.  HEART:  PMI is diffuse.  JVP is to the angle of the jaw.  Trace systolic murmur at the left lower sternal border.  Regular rate and rhythm.  No rub.  LUNGS:  Decreased at the bases bilaterally.  He is on oxygen just over the last week.  ABDOMEN:  Soft, nontender, nondistended.  EXTREMITIES:  Tense lower extremity edema to the knees bilaterally.  Extremities somewhat cool.  NEUROLOGIC:  Able to form full sentences.  Alert and oriented x4.  Unable to get up from the wheelchair without assistance.    DIAGNOSTIC DATA:  Angiogram performed 10/05/2021 significant lesion of the mid LAD, PCI of the mid LAD with a 7.0 x 16 Synergy drug-eluting stent.  Widely patent proximal left circumflex stent from 2013, indeterminate disease of right coronary artery.  Plan for medical management.      Cardiac MRI from 10/04/2021.  Severely decreased left ventricular systolic function.  Findings of remote MI involving the left circumflex without viable myocardium.  Left ventricular septal  late gadolinium enhancement consistent with myocardial fibrosis, nonspecific nonischemic pattern, RV ejection fraction 35%.  Moderate RV dilation, moderate mitral regurgitation.      Brain MRI 2021 showed 2 new areas of acute restricted diffusion and T2 hyperintensity in the right cerebellar hemisphere.      EKG normal sinus rhythm, biventricular pacing, occasional PVCs.    LABORATORY DATA:  Performed in clinic show a creatinine of 1.51, sodium 138, potassium 3.9.  NT-proBNP 4423.  Trend of creatinine in outside records shows significant increase over the last several weeks.      Genetic testing of his brother, Gopal, showed a c.3735 >G; pAsp 1245 Glu heterozygous variant of unknown significance.      Surgical pathology from his brother, Gopal, who had a heart transplant date 2021 showed cardiomegaly with biventricular dilation and fibrofatty replacement of the myocardium right greater than left ventricle with marked thinning of the right ventricular wall, moderate to severe myocyte hypertrophy, mild coronary atherosclerosis.    Autopsy report from his brother who  at the age of 48, who  playing basketball, the left ventricular septum and right ventricular are 1 and 1.2 and 0.4 cm thickness respectively.  There was yellow gray mottled area in the posterior surface of the left ventricle.  Also seen in the posterior superior aspect of the left ventricle, a 2.3 cm hemorrhagic mottled focus.  Interventricular septum showed yellow gray focus, which is ill defined in the middle posterior aspect.  On pathology, there was posterior superior left ventricle showed fatty ingrowth, interstitial fibrosis as well as hemorrhage scattered throughout hypertrophic myocytes.       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  He also has coronary disease, but his degree of heart failure is out of proportion to his coronary disease.     It is very concerning that he has had the amount of deterioration that he did over the last 2-3 months.  He is in a wheelchair on an oxygen tank with temporal wasting, unintentional weight loss, nausea, worsening kidney function, 2 hospitalizations and is dramatically volume overloaded on exam.  He is almost definitively low output, based on my exam.    The plan is to bring him to the hospital today to place a PICC line to do aggressive IV diuresis as well as IV inotrope infusion and determine whether we can improve his appetite and his functional status over the next coming days.  I have spent some time touching on the possibility of transplant, left ventricular assist device and possible potential palliative inotrope/hospice.  We will have to see how he looks within the next several days to know whether or not we are too late to recover him at this point.    I do not believe his prior strokes and CNS vasculitis diagnosis and CellCept requirement would be prohibitive in my mind, especially since he was working with this diagnosis and golfing and gardening over the summer.  We obviously will have to talk to her Neurology colleagues, but my bigger concern is the stage of his disease and how deconditioned he is.  It does sound as though he was walking a week ago, so we will do our best to try to optimize him and at minimum get him feeling better.  We will be stopping his Dang, beta blocker and his ivabradine given the face of his disease and the need for inotropes.    About 40 minutes was spent discussing with the sons, the wife, the daughter and the patient the stage of his disease and the pathway forward over the next couple of days and what to expect.  We did touch on the possible pathways and candidacy and that the next few  days will be telling on trajectory.    Sudden cardiac death prevention:  He has a BiV ICD.    Elevated creatinine.  This is cardiorenal syndrome, suspect this will improve on inotropes within the coming days.    We will be in touch with you after further stabilization in the hospital.  Please free to contact me if you any further questions.    Sincerely,    Myrtle Dominguez MD        D: 11/10/2022   T: 11/10/2022   MT: AZAM    Name:     SLOAN LAZRA  MRN:      [id number]        Account:      [id number]   :      1955           Service Date: 11/10/2022       Document: [id number]

## 2022-11-10 NOTE — PROGRESS NOTES
Shift: 1500 - 1930    67-year-old man with a strong family history of cardiomyopathy (brother with a transplant at age 69, another brother with sudden cardiac death at 49, both with fatty metaplasia on catheterization), who has had a diagnosis of heart failure at least for several years.  He first had an angiogram in 2013 and had a PCI to the LAD.  He had several episodes of syncope in 2021 leading to his low ejection fraction diagnosis.  He has a biventricular pacemaker/ICD with no further episodes of syncope after that in 01/20/2022.    VS: Temp: 97.4  F (36.3  C) Temp src: Oral BP: 114/88 Pulse: 85     SpO2: 97 %         Pain: Denies pain.   Neuro: A&Ox4. Calls appropriately. Able to make needs known. Cooperative with care.   Cardiac:   100% V-paced. PVC's in bigeminy and trigeminy 1-20/minute.   Respiratory: Lung sounds clear. MELLO, occasional SOB at rest. 3L NC. Aspiration precautions, family reports coughing after drinking fluids. Takes meds with pudding.    GI/Diet/Appetite: LBM 11/8. Commode at bedside.   :  Urinal at bedside.   LDA's: PICC placement this shift.   Skin: 2RN skin check completed. Bruising to lower abdomen d/t lovenox injections at home. Yeast/fungal infection in groin/perineum, improving.   Activity: Ax2 w/GB and walker/WC.   Tests/Procedures: PICC placement.   Pertinent Labs/Lab Collection:      Plan: Work up for inotropes, possible VAD placement. Contact Cards 2 for change in condition.

## 2022-11-10 NOTE — PROGRESS NOTES
Service Date: 11/10/2022    Burt Andrews MD   [address] [place] [address]  Fax [phone]    RE: Paco Stein  MRN: [id number]  : 1955    Dear Dr. Andrews:      I had the pleasure of meeting Paco Stein in the [facility] on 11/10/2022 for first time visit.    As you know, he is a 67-year-old man with a strong family history of cardiomyopathy (brother with a transplant at age 69, another brother with sudden cardiac death at 49, both with fatty metaplasia on catheterization), who has had a diagnosis of heart failure at least for several years.  He first had an angiogram in  and had a PCI to the LAD.  He had several episodes of syncope in  leading to his low ejection fraction diagnosis.  He has a biventricular pacemaker/ICD with no further episodes of syncope after that in 2022.    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  He has residual left sided weakness and some speech fluency and emotional lability, but overall was living a full quality of life, even after this diagnosis in 2016.  He had been on Cytoxan for several years and then eventually transitioned to CellCept with no further neurologic insult and he has MRIs every 6 months and the CellCept as prescribed by Rheumatology in [place].    With respect to his heart failure, he has generally been intolerant to GDMT with worsening kidney function, hypotension and dizziness.  The family notes a big change in 2022.  Prior to that, he was golfing and gardening and after a hospitalization for a potential virus with a negative workup in September, he has had 2 subsequent admissions.  He was able to walk even a week ago and now he is 2 assist.  He has lost 20 pounds over the last year, unintentionally.  He feels nauseous when he eats.  He is short of breath even at rest and cannot lay  flat.  He reports dizziness and lightheadedness and generally feeling extremely fatigued.  He feels his whole body is weak as well.    He and his 2 sons and a daughter and wife are here today and wondering what options are available.  Again, he was walking over the summer.    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    FAMILY HISTORY:  One older brother has a defibrillator.  A younger brother  suddenly at the age of 49 with fatty metaplasia on pathology.  Another brother had a cardiac transplant with fatty metaplasia on pathology at the age of 69 last year in [place], [place] and is recovering well.  Both sons have had echos and EKGs, which show no evidence of disease.  Daughter has not yet had any imaging.  A son had Hodgkin disease.      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  He denies any alcohol use or tobacco use.    CURRENT MEDICATIONS:    1.  Jardiance 10 mg daily.  2.  Corlanor 7.5 mg b.i.d.  3.  Lasix 20 mg daily.  4.  Metoprolol 25 mg daily.  5.  CellCept 1000 mg b.i.d.  6.  Pravastatin 20 mg daily.  7.  Trazodone as needed for sleep.    REVIEW OF SYSTEMS:    CONSTITUTIONAL:  No fever, chills or sweats.  Weight has been declining 20 pounds last year.  Positive for fatigue.  ENT:  No visual disturbance, earache, epistaxis, sore throat.  ALLERGIES:  Negative.  RESPIRATORY:  No cough or hemoptysis.  CARDIOVASCULAR:  As per HPI.  GASTROINTESTINAL:  Positive for nausea and some emesis, very poor appetite.  No melena, no hematochezia.  GENITOURINARY:  No urinary frequency, dysuria, hematuria.  SKIN:  Negative.  PSYCHIATRIC:  Feeling down and  dejected about his prognosis.  NEUROLOGIC:  Does have some residual left sided weakness and speech fluency problems, but overall no new deficits.  ENDOCRINE:  Negative.  MUSCULOSKELETAL:  Generally feeling weak.    PHYSICAL EXAMINATION:    VITAL SIGNS:  Today, blood pressure 97/73, pulse 81, oxygen saturation 98%, BMI 30.5.  GENERAL:  The patient appears alert, comfortable and articulate.  The patient appears dyspneic at rest.  Breathing is uncomfortable, trying to formulate his sentences.  HEENT:  Moist mucous membranes.  Lips are somewhat cyanotic in color. He has temporal wasting.  NECK:  No thyromegaly.  HEART:  PMI is diffuse.  JVP is to the angle of the jaw.  Trace systolic murmur at the left lower sternal border.  Regular rate and rhythm.  No rub.  LUNGS:  Decreased at the bases bilaterally.  He is on oxygen just over the last week.  ABDOMEN:  Soft, nontender, nondistended.  EXTREMITIES:  Tense lower extremity edema to the knees bilaterally.  Extremities somewhat cool.  NEUROLOGIC:  Able to form full sentences.  Alert and oriented x4.  Unable to get up from the wheelchair without assistance.    DIAGNOSTIC DATA:  Angiogram performed 10/05/2021 significant lesion of the mid LAD, PCI of the mid LAD with a 7.0 x 16 Synergy drug-eluting stent.  Widely patent proximal left circumflex stent from 2013, indeterminate disease of right coronary artery.  Plan for medical management.      Cardiac MRI from 10/04/2021.  Severely decreased left ventricular systolic function.  Findings of remote MI involving the left circumflex without viable myocardium.  Left ventricular septal late gadolinium enhancement consistent with myocardial fibrosis, nonspecific nonischemic pattern, RV ejection fraction 35%.  Moderate RV dilation, moderate mitral regurgitation.      Brain MRI 12/21/2021 showed 2 new areas of acute restricted diffusion and T2 hyperintensity in the right cerebellar hemisphere.      EKG normal sinus rhythm,  biventricular pacing, occasional PVCs.    LABORATORY DATA:  Performed in clinic show a creatinine of 1.51, sodium 138, potassium 3.9.  NT-proBNP 4423.  Trend of creatinine in outside records shows significant increase over the last several weeks.      Genetic testing of his brother, Gopal, showed a c.3735 >G; pAsp 1245 Glu heterozygous variant of unknown significance.      Surgical pathology from his brother, Gopal, who had a heart transplant date 2021 showed cardiomegaly with biventricular dilation and fibrofatty replacement of the myocardium right greater than left ventricle with marked thinning of the right ventricular wall, moderate to severe myocyte hypertrophy, mild coronary atherosclerosis.    Autopsy report from his brother who  at the age of 48, who  playing basketball, the left ventricular septum and right ventricular are 1 and 1.2 and 0.4 cm thickness respectively.  There was yellow gray mottled area in the posterior surface of the left ventricle.  Also seen in the posterior superior aspect of the left ventricle, a 2.3 cm hemorrhagic mottled focus.  Interventricular septum showed yellow gray focus, which is ill defined in the middle posterior aspect.  On pathology, there was posterior superior left ventricle showed fatty ingrowth, interstitial fibrosis as well as hemorrhage scattered throughout hypertrophic myocytes.       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  He also has coronary disease, but his degree of heart failure is out of proportion to his coronary disease.     It is very concerning that he has had the amount of deterioration that he did over the last 2-3 months.  He is in a wheelchair on an oxygen tank with  temporal wasting, unintentional weight loss, nausea, worsening kidney function, 2 hospitalizations and is dramatically volume overloaded on exam.  He is almost definitively low output, based on my exam.    The plan is to bring him to the hospital today to place a PICC line to do aggressive IV diuresis as well as IV inotrope infusion and determine whether we can improve his appetite and his functional status over the next coming days.  I have spent some time touching on the possibility of transplant, left ventricular assist device and possible potential palliative inotrope/hospice.  We will have to see how he looks within the next several days to know whether or not we are too late to recover him at this point.    I do not believe his prior strokes and CNS vasculitis diagnosis and CellCept requirement would be prohibitive in my mind, especially since he was working with this diagnosis and golfing and gardening over the summer.  We obviously will have to talk to her Neurology colleagues, but my bigger concern is the stage of his disease and how deconditioned he is.  It does sound as though he was walking a week ago, so we will do our best to try to optimize him and at minimum get him feeling better.  We will be stopping his Dang, beta blocker and his ivabradine given the face of his disease and the need for inotropes.    About 40 minutes was spent discussing with the sons, the wife, the daughter and the patient the stage of his disease and the pathway forward over the next couple of days and what to expect.  We did touch on the possible pathways and candidacy and that the next few days will be telling on trajectory.    Sudden cardiac death prevention:  He has a BiV ICD.    Elevated creatinine.  This is cardiorenal syndrome, suspect this will improve on inotropes within the coming days.    We will be in touch with you after further stabilization in the hospital.  Please free to contact me if you any further  questions.    Sincerely,    Myrtle Dominguez MD        D: 11/10/2022   T: 11/10/2022   MT: AZAM    Name:     SLOAN LAZAR  MRN:      [id number]        Account:      [id number]   :      1955           Service Date: 11/10/2022       Document: [id number]

## 2022-11-10 NOTE — H&P
[facility]    History and Physical - Cardiology Service     Date of Admission:  11/10/2022    Assessment & Plan      Paco Stein is a 67 year old male with PMH s/f HFrEF 20-25% secondary to arrhythmogenic cardiomyopathy, CAD s/p PCI on LCx and LAD, CNS vasculitis and CKD who presents for shortness of breath.    #HFrEF  #Dyspnea  #Arrhythmogenic Cardiomyopathy  #History of VTs s/p Biventricular PPM/ICD  Patient coming from clinic with concern for decompensated low output heart failure secondary to NICM. Patient denying chest pain, EKG w/o ischemic changes, appears mildly hypervolemic on physical exam but has been having severe activity limiting shortness of breath of predominantly cardiac etiology. We will start diuresis and assess need for inotropic support/evaluation for advanced treatments while inpatient. Patient with recent episode of non-shocked VT, denies any recent history of ICD delivered defibrillations. Currently on atrial paced rhythm, hemodynamically stable.  - Hold PTA metoprolol  - s/p bumex 2mg IV  - strict I/Os  - LA ordered  - NT pro BNP ordered  - mixed VBG ordered, to start dobutamine pending results   - Trend troponins  - Biventricular pacemaker/ICD in place  - Continue PTA amiodarone  - Hold PTA empagliflozin     #CAD  Patient with pmh s/f two-vessel CAD s/p TILA on mLAD in 2021 and pLCx on aspirin. Patient denies chest pain, EKG without ischemic changes. Troponin mildly elevated, would favor decompensated heart failure as most likely etiology.  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 No episodes since. Mild sensory deficit over the right foot, rest of neurological exam unremarkable.  - Continue PTA cellcept       Diet:  Cardiac   DVT Prophylaxis: Mechanical  Davis Catheter: Not present  Fluids: None  Central Lines:  "None  Cardiac Monitoring: ACTIVE order. Indication: Acute decompensated heart failure (48 hours)  Code Status: Full Code      Clinically Significant Risk Factors Present on Admission                      # Overweight: Estimated body mass index is 26.8 kg/m  as calculated from the following:    Height as of this encounter: 1.88 m (6' 2\").    Weight as of this encounter: 94.7 kg (208 lb 11.2 oz).           Disposition Plan      Expected Discharge Date: 11/12/2022                The patient's care was discussed with the Attending Physician, Dr. Dorman.    Leoncio Tyson MD  Internal Medicine, PGY-2  [facility]  [phone]    Securely message with the Vocera Web Console (learn more here)  Text page via [facility] Paging/Directory       ______________________________________________________________________    Chief Complaint   Shortness of breath    History is obtained from the patient    History of Present Illness   Paco Stein is a 67 year old male with PMH s/f HFrEF 20-25% secondary to arrhythmogenic cardiomyopathy, CAD s/p PCI on LCx and LAD, CNS vasculitis and CKD who presents for shortness of breath. The patient is baseline able to ambulate about 50 ft w/o getting sob, however this has recently changed over the past few weeks. He is now able to make less than two steps with assistance before getting short of breath. He endorses s/f PND and orthopnea with difficulty falling asleep at night and awaking feeling dyspneic. He denies chest pain. He noted an episode of palpitations about 1 week ago that was symptomatic and was secondary to a 23-beat run of VT that was not defibrillated. He denies current fevers, chills or night sweats. A month ago he had an upper respiratory tract infection that has since resolved. He denies any difficulty in his bowel movements but reports having very low appetite.     The patient has a family history s/f NICM with two family members having sudden cardiac death " (brother with sudden cardiac death at 49, both with fatty metaplasia on catheterization) and a diagnosis of heart failure over the last several years. He has a binventricular pacemaker/ICD that was placed for primary prevention. History of syncopies prior to diagnosis but no recurrence ever since.    Review of Systems    The 10 point Review of Systems is negative other than noted in the HPI or here.     Past Medical History    I have reviewed this patient's medical history and updated it with pertinent information if needed.   No past medical history on file.     Past Surgical History   I have reviewed this patient's surgical history and updated it with pertinent information if needed.  No past surgical history on file.     Social History   I have reviewed this patient's social history and updated it with pertinent information if needed. Paco Stein  reports that he has never smoked. He has never used smokeless tobacco. He reports that he does not currently use alcohol.    Family History   I have reviewed this patient's family history and updated it with pertinent information if needed.  Family History   Problem Relation Age of Onset     Atrial fibrillation Father      Lung Cancer Brother        Prior to Admission Medications   Prior to Admission Medications   Prescriptions Last Dose Informant Patient Reported? Taking?   JARDIANCE 10 MG TABS tablet   Yes No   Sig: Take 10 mg by mouth daily   albuterol (PROAIR HFA/PROVENTIL HFA/VENTOLIN HFA) 108 (90 Base) MCG/ACT inhaler   Yes No   Sig: Inhale 2 puffs into the lungs every 6 hours as needed   amiodarone (PACERONE) 200 MG tablet   Yes No   Si times daily   aspirin (ASA) 81 MG EC tablet   Yes No   Sig: Take 81 mg by mouth daily   furosemide (LASIX) 20 MG tablet   Yes No   Sig: Take 20 mg by mouth daily   metoclopramide (REGLAN) 10 MG tablet   Yes No   Sig: TAKE 1 TABLET (10 MG TOTAL) BY MOUTH 4 (FOUR) TIMES A DAY AS NEEDED (NAUSEA).   metoprolol succinate ER  (TOPROL XL) 25 MG 24 hr tablet   Yes No   Sig: Take 1 tablet by mouth daily   mycophenolate (GENERIC EQUIVALENT) 500 MG tablet   Yes No   Sig: Take 1,000 mg by mouth 2 times daily   pravastatin (PRAVACHOL) 20 MG tablet   Yes No   Sig: TAKE 1 TABLET BY MOUTH DAILY/ DUE FOR FASTING LABWORK   traZODone (DESYREL) 50 MG tablet   Yes No   Sig: TAKE 1 TABLET (50 MG TOTAL) BY MOUTH ONCE NIGHTLY AS NEEDED FOR SLEEP.      Facility-Administered Medications: None     Allergies   No Known Allergies    Physical Exam   Vital Signs: Temp: 97.4  F (36.3  C) Temp src: Oral BP: 114/88 Pulse: 85     SpO2: 97 %      Weight: 208 lbs 11.2 oz    General Appearance: AAOx4, lying in bed, appears comfortable  Eyes: PERRLA, EOMI   HEENT: NCAT, NP, OP mucosae moist and clear, nc oxygen in place  Respiratory: CTAB, no crackles, rales or wheezing   Cardiovascular: S1, S2, rrr, no mrg, 1+ JOANNA, JVP at the base of the neck  GI: soft, ntd, bs (+)  Skin: No rash appreciated over the exposed skin  Neurologic: AAOx3, moves freely all extremities, CN II-XII intact, chronic baseline numbness over the bottom of the right foot    Data   Data reviewed today: I reviewed all medications, new labs and imaging results over the last 24 hours. I personally reviewed no images or EKG's today.    Recent Labs   Lab 11/08/22  1020      POTASSIUM 3.9   CHLORIDE 103   CO2 26   BUN 35*   CR 1.51*   ANIONGAP 9   TIM 9.4   *   AST 34

## 2022-11-10 NOTE — Clinical Note
dry, intact, no bleeding and no hematoma. 7fr RIJ sheath removed. Manual pressure applied. Hemostasis achieved bandage placed  6fr sheath RRA removed. TR band placed with 12 ml of air

## 2022-11-10 NOTE — PROGRESS NOTES
Admission          11/10/2022  2:48 PM  -----------------------------------------------------------  Diagnosis: Heart failure    Admitted from: home to clinic to [facility]  Report given from: Chart  Via:   Accompanied by: family/spouse/children  Family Aware of Admission: yes  Belongings: glasses at the bedside, phone  Admission Profile: Complete  Teaching: Orientation to unit, call don't fall, use of call light, meal times, visiting hours,  when to call for the RN (angina/sob/dizzyness, etc.), and enforced importance of safety.  Access: no access at this time, PICC placement pending  Telemetry: Placed on pt  Ht./Wt.: Complete  2 RN skin assessment: completed by Jeny RN and Oriana RN.    Temp:  [97.4  F (36.3  C)] 97.4  F (36.3  C)  Pulse:  [81-85] 85  BP: ()/(73-88) 114/88  SpO2:  [97 %-98 %] 97 %

## 2022-11-10 NOTE — Clinical Note
LCA Cine(s)  injected utilizing power injector system.Rate (mL/sec) 4 Total Volume (mL) 10  Multiple views

## 2022-11-10 NOTE — NURSING NOTE
Chief Complaint   Patient presents with     New Patient     Chart updated for 11/8/22 Dr Angulo   Reason for Visit: establish for CHF    Pt transfering care from [facility] in [place], [place]       Vitals were taken and medications reconciled.    Darci Coffey, EMT  11:55 AM

## 2022-11-10 NOTE — NURSING NOTE
Patient transferred from clinic to the hospital, bed available on 6C. COVID test is still in process, the floor is ready to take him. Family is comfortable doing transport on their own. No further questions.  Mayte Barlow RN on 11/10/2022 at 2:18 PM

## 2022-11-11 ENCOUNTER — APPOINTMENT (OUTPATIENT)
Dept: ULTRASOUND IMAGING | Facility: CLINIC | Age: 67
DRG: 286 | End: 2022-11-11
Attending: STUDENT IN AN ORGANIZED HEALTH CARE EDUCATION/TRAINING PROGRAM
Payer: MEDICARE

## 2022-11-11 ENCOUNTER — APPOINTMENT (OUTPATIENT)
Dept: CT IMAGING | Facility: CLINIC | Age: 67
DRG: 286 | End: 2022-11-11
Attending: STUDENT IN AN ORGANIZED HEALTH CARE EDUCATION/TRAINING PROGRAM
Payer: MEDICARE

## 2022-11-11 ENCOUNTER — APPOINTMENT (OUTPATIENT)
Dept: GENERAL RADIOLOGY | Facility: CLINIC | Age: 67
DRG: 286 | End: 2022-11-11
Attending: STUDENT IN AN ORGANIZED HEALTH CARE EDUCATION/TRAINING PROGRAM
Payer: MEDICARE

## 2022-11-11 ENCOUNTER — APPOINTMENT (OUTPATIENT)
Dept: CARDIOLOGY | Facility: CLINIC | Age: 67
DRG: 286 | End: 2022-11-11
Attending: STUDENT IN AN ORGANIZED HEALTH CARE EDUCATION/TRAINING PROGRAM
Payer: MEDICARE

## 2022-11-11 LAB
ALBUMIN SERPL BCG-MCNC: 3.1 G/DL (ref 3.5–5.2)
ALBUMIN UR-MCNC: NEGATIVE MG/DL
ALP SERPL-CCNC: 52 U/L (ref 40–129)
ALT SERPL W P-5'-P-CCNC: 31 U/L (ref 10–50)
AMPHETAMINES UR QL SCN: ABNORMAL
ANION GAP SERPL CALCULATED.3IONS-SCNC: 11 MMOL/L (ref 7–15)
APPEARANCE UR: CLEAR
AST SERPL W P-5'-P-CCNC: 24 U/L (ref 10–50)
ATRIAL RATE - MUSE: 81 BPM
BARBITURATES UR QL SCN: ABNORMAL
BASE EXCESS BLDV CALC-SCNC: 7.5 MMOL/L (ref -7.7–1.9)
BASE EXCESS BLDV CALC-SCNC: 8 MMOL/L (ref -7.7–1.9)
BENZODIAZ UR QL SCN: ABNORMAL
BILIRUB SERPL-MCNC: 1.5 MG/DL
BILIRUB UR QL STRIP: NEGATIVE
BUN SERPL-MCNC: 24.1 MG/DL (ref 8–23)
BZE UR QL SCN: ABNORMAL
CALCIUM SERPL-MCNC: 8.5 MG/DL (ref 8.8–10.2)
CANNABINOIDS UR QL SCN: ABNORMAL
CHLORIDE SERPL-SCNC: 102 MMOL/L (ref 98–107)
COLOR UR AUTO: YELLOW
CREAT SERPL-MCNC: 1.1 MG/DL (ref 0.67–1.17)
DEPRECATED HCO3 PLAS-SCNC: 26 MMOL/L (ref 22–29)
DIASTOLIC BLOOD PRESSURE - MUSE: NORMAL MMHG
EJECTION FRACTION: NORMAL %
ERYTHROCYTE [DISTWIDTH] IN BLOOD BY AUTOMATED COUNT: 14.4 % (ref 10–15)
ETHANOL UR QL SCN: ABNORMAL
GFR SERPL CREATININE-BSD FRML MDRD: 73 ML/MIN/1.73M2
GLUCOSE SERPL-MCNC: 127 MG/DL (ref 70–99)
GLUCOSE UR STRIP-MCNC: >=1000 MG/DL
HCO3 BLDV-SCNC: 32 MMOL/L (ref 21–28)
HCO3 BLDV-SCNC: 32 MMOL/L (ref 21–28)
HCT VFR BLD AUTO: 46.6 % (ref 40–53)
HGB BLD-MCNC: 15.2 G/DL (ref 13.3–17.7)
HGB BLD-MCNC: 16.3 G/DL (ref 13.3–17.7)
HGB BLD-MCNC: 16.3 G/DL (ref 13.3–17.7)
HGB UR QL STRIP: NEGATIVE
HYALINE CASTS: 5 /LPF
INTERPRETATION ECG - MUSE: NORMAL
KETONES UR STRIP-MCNC: NEGATIVE MG/DL
LACTATE SERPL-SCNC: 1.3 MMOL/L (ref 0.7–2)
LEUKOCYTE ESTERASE UR QL STRIP: NEGATIVE
LVEF ECHO: NORMAL
MAGNESIUM SERPL-MCNC: 1.9 MG/DL (ref 1.7–2.3)
MAGNESIUM SERPL-MCNC: 2 MG/DL (ref 1.7–2.3)
MCH RBC QN AUTO: 28.4 PG (ref 26.5–33)
MCHC RBC AUTO-ENTMCNC: 32.6 G/DL (ref 31.5–36.5)
MCV RBC AUTO: 87 FL (ref 78–100)
MUCOUS THREADS #/AREA URNS LPF: PRESENT /LPF
NITRATE UR QL: NEGATIVE
O2/TOTAL GAS SETTING VFR VENT: 2 %
O2/TOTAL GAS SETTING VFR VENT: 2 %
OPIATES UR QL SCN: ABNORMAL
OXYHGB MFR BLDA: 66 % (ref 92–100)
OXYHGB MFR BLDV: 55 % (ref 70–75)
OXYHGB MFR BLDV: 70 % (ref 70–75)
OXYHGB MFR BLDV: 73 % (ref 92–100)
P AXIS - MUSE: 7 DEGREES
PCO2 BLDV: 42 MM HG (ref 40–50)
PCO2 BLDV: 45 MM HG (ref 40–50)
PCP QUAL URINE (ROCHE): ABNORMAL
PH BLDV: 7.47 [PH] (ref 7.32–7.43)
PH BLDV: 7.49 [PH] (ref 7.32–7.43)
PH UR STRIP: 5 [PH] (ref 5–7)
PLATELET # BLD AUTO: 98 10E3/UL (ref 150–450)
PO2 BLDV: 30 MM HG (ref 25–47)
PO2 BLDV: 39 MM HG (ref 25–47)
POTASSIUM SERPL-SCNC: 3 MMOL/L (ref 3.4–5.3)
POTASSIUM SERPL-SCNC: 3.1 MMOL/L (ref 3.4–5.3)
POTASSIUM SERPL-SCNC: 3.9 MMOL/L (ref 3.4–5.3)
PR INTERVAL - MUSE: 186 MS
PROT SERPL-MCNC: 4.9 G/DL (ref 6.4–8.3)
QRS DURATION - MUSE: 140 MS
QT - MUSE: 442 MS
QTC - MUSE: 513 MS
R AXIS - MUSE: -18 DEGREES
RBC # BLD AUTO: 5.35 10E6/UL (ref 4.4–5.9)
RBC URINE: 0 /HPF
SODIUM SERPL-SCNC: 139 MMOL/L (ref 136–145)
SP GR UR STRIP: 1.03 (ref 1–1.03)
SYSTOLIC BLOOD PRESSURE - MUSE: NORMAL MMHG
T AXIS - MUSE: 88 DEGREES
UROBILINOGEN UR STRIP-MCNC: NORMAL MG/DL
VENTRICULAR RATE- MUSE: 81 BPM
WBC # BLD AUTO: 6 10E3/UL (ref 4–11)
WBC URINE: 3 /HPF

## 2022-11-11 PROCEDURE — 250N000011 HC RX IP 250 OP 636

## 2022-11-11 PROCEDURE — 93306 TTE W/DOPPLER COMPLETE: CPT | Mod: 26 | Performed by: INTERNAL MEDICINE

## 2022-11-11 PROCEDURE — 99222 1ST HOSP IP/OBS MODERATE 55: CPT | Performed by: STUDENT IN AN ORGANIZED HEALTH CARE EDUCATION/TRAINING PROGRAM

## 2022-11-11 PROCEDURE — 83605 ASSAY OF LACTIC ACID: CPT | Performed by: STUDENT IN AN ORGANIZED HEALTH CARE EDUCATION/TRAINING PROGRAM

## 2022-11-11 PROCEDURE — 70450 CT HEAD/BRAIN W/O DYE: CPT | Mod: 26 | Performed by: RADIOLOGY

## 2022-11-11 PROCEDURE — 93970 EXTREMITY STUDY: CPT

## 2022-11-11 PROCEDURE — 250N000009 HC RX 250: Performed by: INTERNAL MEDICINE

## 2022-11-11 PROCEDURE — 250N000011 HC RX IP 250 OP 636: Performed by: INTERNAL MEDICINE

## 2022-11-11 PROCEDURE — 93970 EXTREMITY STUDY: CPT | Mod: 26 | Performed by: RADIOLOGY

## 2022-11-11 PROCEDURE — 74176 CT ABD & PELVIS W/O CONTRAST: CPT | Mod: 26 | Performed by: RADIOLOGY

## 2022-11-11 PROCEDURE — 76700 US EXAM ABDOM COMPLETE: CPT

## 2022-11-11 PROCEDURE — 99233 SBSQ HOSP IP/OBS HIGH 50: CPT | Mod: 25 | Performed by: INTERNAL MEDICINE

## 2022-11-11 PROCEDURE — 214N000001 HC R&B CCU UMMC

## 2022-11-11 PROCEDURE — C1894 INTRO/SHEATH, NON-LASER: HCPCS | Performed by: INTERNAL MEDICINE

## 2022-11-11 PROCEDURE — 71046 X-RAY EXAM CHEST 2 VIEWS: CPT | Mod: 26 | Performed by: RADIOLOGY

## 2022-11-11 PROCEDURE — 272N000001 HC OR GENERAL SUPPLY STERILE: Performed by: INTERNAL MEDICINE

## 2022-11-11 PROCEDURE — 80307 DRUG TEST PRSMV CHEM ANLYZR: CPT | Performed by: STUDENT IN AN ORGANIZED HEALTH CARE EDUCATION/TRAINING PROGRAM

## 2022-11-11 PROCEDURE — 82805 BLOOD GASES W/O2 SATURATION: CPT | Performed by: STUDENT IN AN ORGANIZED HEALTH CARE EDUCATION/TRAINING PROGRAM

## 2022-11-11 PROCEDURE — 76700 US EXAM ABDOM COMPLETE: CPT | Mod: 26 | Performed by: STUDENT IN AN ORGANIZED HEALTH CARE EDUCATION/TRAINING PROGRAM

## 2022-11-11 PROCEDURE — 83735 ASSAY OF MAGNESIUM: CPT

## 2022-11-11 PROCEDURE — 258N000003 HC RX IP 258 OP 636: Performed by: STUDENT IN AN ORGANIZED HEALTH CARE EDUCATION/TRAINING PROGRAM

## 2022-11-11 PROCEDURE — 80053 COMPREHEN METABOLIC PANEL: CPT

## 2022-11-11 PROCEDURE — 93930 UPPER EXTREMITY STUDY: CPT

## 2022-11-11 PROCEDURE — 999N000157 HC STATISTIC RCP TIME EA 10 MIN

## 2022-11-11 PROCEDURE — 71250 CT THORAX DX C-: CPT | Mod: MG

## 2022-11-11 PROCEDURE — G1010 CDSM STANSON: HCPCS | Mod: GC | Performed by: RADIOLOGY

## 2022-11-11 PROCEDURE — 93925 LOWER EXTREMITY STUDY: CPT

## 2022-11-11 PROCEDURE — 82810 BLOOD GASES O2 SAT ONLY: CPT

## 2022-11-11 PROCEDURE — 250N000012 HC RX MED GY IP 250 OP 636 PS 637

## 2022-11-11 PROCEDURE — 93970 EXTREMITY STUDY: CPT | Mod: XS

## 2022-11-11 PROCEDURE — 4A023N6 MEASUREMENT OF CARDIAC SAMPLING AND PRESSURE, RIGHT HEART, PERCUTANEOUS APPROACH: ICD-10-PCS | Performed by: INTERNAL MEDICINE

## 2022-11-11 PROCEDURE — 93930 UPPER EXTREMITY STUDY: CPT | Mod: 26 | Performed by: RADIOLOGY

## 2022-11-11 PROCEDURE — 85018 HEMOGLOBIN: CPT

## 2022-11-11 PROCEDURE — 250N000011 HC RX IP 250 OP 636: Performed by: STUDENT IN AN ORGANIZED HEALTH CARE EDUCATION/TRAINING PROGRAM

## 2022-11-11 PROCEDURE — 84132 ASSAY OF SERUM POTASSIUM: CPT | Performed by: INTERNAL MEDICINE

## 2022-11-11 PROCEDURE — 85027 COMPLETE CBC AUTOMATED: CPT

## 2022-11-11 PROCEDURE — 99152 MOD SED SAME PHYS/QHP 5/>YRS: CPT | Mod: GC | Performed by: INTERNAL MEDICINE

## 2022-11-11 PROCEDURE — 99152 MOD SED SAME PHYS/QHP 5/>YRS: CPT | Performed by: INTERNAL MEDICINE

## 2022-11-11 PROCEDURE — C1887 CATHETER, GUIDING: HCPCS | Performed by: INTERNAL MEDICINE

## 2022-11-11 PROCEDURE — 93456 R HRT CORONARY ARTERY ANGIO: CPT | Performed by: INTERNAL MEDICINE

## 2022-11-11 PROCEDURE — 83735 ASSAY OF MAGNESIUM: CPT | Performed by: INTERNAL MEDICINE

## 2022-11-11 PROCEDURE — 99153 MOD SED SAME PHYS/QHP EA: CPT | Performed by: INTERNAL MEDICINE

## 2022-11-11 PROCEDURE — 93306 TTE W/DOPPLER COMPLETE: CPT

## 2022-11-11 PROCEDURE — 71250 CT THORAX DX C-: CPT | Mod: 26 | Performed by: RADIOLOGY

## 2022-11-11 PROCEDURE — G1010 CDSM STANSON: HCPCS

## 2022-11-11 PROCEDURE — 70486 CT MAXILLOFACIAL W/O DYE: CPT | Mod: 26 | Performed by: RADIOLOGY

## 2022-11-11 PROCEDURE — 93880 EXTRACRANIAL BILAT STUDY: CPT

## 2022-11-11 PROCEDURE — 93880 EXTRACRANIAL BILAT STUDY: CPT | Mod: 26 | Performed by: RADIOLOGY

## 2022-11-11 PROCEDURE — 93925 LOWER EXTREMITY STUDY: CPT | Mod: 26 | Performed by: RADIOLOGY

## 2022-11-11 PROCEDURE — 93456 R HRT CORONARY ARTERY ANGIO: CPT | Mod: 26 | Performed by: INTERNAL MEDICINE

## 2022-11-11 PROCEDURE — B2111ZZ FLUOROSCOPY OF MULTIPLE CORONARY ARTERIES USING LOW OSMOLAR CONTRAST: ICD-10-PCS | Performed by: INTERNAL MEDICINE

## 2022-11-11 PROCEDURE — 81001 URINALYSIS AUTO W/SCOPE: CPT | Performed by: STUDENT IN AN ORGANIZED HEALTH CARE EDUCATION/TRAINING PROGRAM

## 2022-11-11 PROCEDURE — 250N000012 HC RX MED GY IP 250 OP 636 PS 637: Performed by: INTERNAL MEDICINE

## 2022-11-11 PROCEDURE — 999N000208 ECHOCARDIOGRAM COMPLETE

## 2022-11-11 PROCEDURE — 71046 X-RAY EXAM CHEST 2 VIEWS: CPT

## 2022-11-11 PROCEDURE — 250N000013 HC RX MED GY IP 250 OP 250 PS 637

## 2022-11-11 PROCEDURE — 255N000002 HC RX 255 OP 636: Performed by: INTERNAL MEDICINE

## 2022-11-11 RX ORDER — FENTANYL CITRATE 50 UG/ML
25 INJECTION, SOLUTION INTRAMUSCULAR; INTRAVENOUS
Status: DISCONTINUED | OUTPATIENT
Start: 2022-11-11 | End: 2022-11-13

## 2022-11-11 RX ORDER — OXYCODONE HYDROCHLORIDE 5 MG/1
5 TABLET ORAL EVERY 4 HOURS PRN
Status: DISCONTINUED | OUTPATIENT
Start: 2022-11-11 | End: 2022-11-21 | Stop reason: HOSPADM

## 2022-11-11 RX ORDER — POTASSIUM CHLORIDE 29.8 MG/ML
20 INJECTION INTRAVENOUS
Status: COMPLETED | OUTPATIENT
Start: 2022-11-11 | End: 2022-11-12

## 2022-11-11 RX ORDER — NICARDIPINE HYDROCHLORIDE 2.5 MG/ML
INJECTION INTRAVENOUS
Status: DISCONTINUED | OUTPATIENT
Start: 2022-11-11 | End: 2022-11-11 | Stop reason: HOSPADM

## 2022-11-11 RX ORDER — NALOXONE HYDROCHLORIDE 0.4 MG/ML
0.2 INJECTION, SOLUTION INTRAMUSCULAR; INTRAVENOUS; SUBCUTANEOUS
Status: ACTIVE | OUTPATIENT
Start: 2022-11-11 | End: 2022-11-12

## 2022-11-11 RX ORDER — FLUMAZENIL 0.1 MG/ML
0.2 INJECTION, SOLUTION INTRAVENOUS
Status: ACTIVE | OUTPATIENT
Start: 2022-11-11 | End: 2022-11-12

## 2022-11-11 RX ORDER — OXYCODONE HYDROCHLORIDE 10 MG/1
10 TABLET ORAL EVERY 4 HOURS PRN
Status: DISCONTINUED | OUTPATIENT
Start: 2022-11-11 | End: 2022-11-21 | Stop reason: HOSPADM

## 2022-11-11 RX ORDER — POTASSIUM CHLORIDE 29.8 MG/ML
20 INJECTION INTRAVENOUS
Status: COMPLETED | OUTPATIENT
Start: 2022-11-11 | End: 2022-11-11

## 2022-11-11 RX ORDER — MAGNESIUM SULFATE HEPTAHYDRATE 40 MG/ML
2 INJECTION, SOLUTION INTRAVENOUS ONCE
Status: COMPLETED | OUTPATIENT
Start: 2022-11-11 | End: 2022-11-12

## 2022-11-11 RX ORDER — SODIUM CHLORIDE 9 MG/ML
75 INJECTION, SOLUTION INTRAVENOUS CONTINUOUS
Status: ACTIVE | OUTPATIENT
Start: 2022-11-11 | End: 2022-11-11

## 2022-11-11 RX ORDER — MAGNESIUM SULFATE HEPTAHYDRATE 40 MG/ML
2 INJECTION, SOLUTION INTRAVENOUS ONCE
Status: COMPLETED | OUTPATIENT
Start: 2022-11-11 | End: 2022-11-11

## 2022-11-11 RX ORDER — BUMETANIDE 0.25 MG/ML
2 INJECTION INTRAMUSCULAR; INTRAVENOUS ONCE
Status: COMPLETED | OUTPATIENT
Start: 2022-11-11 | End: 2022-11-11

## 2022-11-11 RX ORDER — FENTANYL CITRATE 50 UG/ML
INJECTION, SOLUTION INTRAMUSCULAR; INTRAVENOUS
Status: DISCONTINUED | OUTPATIENT
Start: 2022-11-11 | End: 2022-11-11 | Stop reason: HOSPADM

## 2022-11-11 RX ORDER — NALOXONE HYDROCHLORIDE 0.4 MG/ML
0.4 INJECTION, SOLUTION INTRAMUSCULAR; INTRAVENOUS; SUBCUTANEOUS
Status: ACTIVE | OUTPATIENT
Start: 2022-11-11 | End: 2022-11-12

## 2022-11-11 RX ORDER — NITROGLYCERIN 5 MG/ML
VIAL (ML) INTRAVENOUS
Status: DISCONTINUED | OUTPATIENT
Start: 2022-11-11 | End: 2022-11-11 | Stop reason: HOSPADM

## 2022-11-11 RX ORDER — ATROPINE SULFATE 0.1 MG/ML
0.5 INJECTION INTRAVENOUS
Status: ACTIVE | OUTPATIENT
Start: 2022-11-11 | End: 2022-11-12

## 2022-11-11 RX ORDER — HEPARIN SODIUM 1000 [USP'U]/ML
INJECTION, SOLUTION INTRAVENOUS; SUBCUTANEOUS
Status: DISCONTINUED | OUTPATIENT
Start: 2022-11-11 | End: 2022-11-11 | Stop reason: HOSPADM

## 2022-11-11 RX ADMIN — POTASSIUM CHLORIDE 20 MEQ: 29.8 INJECTION, SOLUTION INTRAVENOUS at 06:04

## 2022-11-11 RX ADMIN — MYCOPHENOLATE MOFETIL 500 MG: 500 TABLET, FILM COATED ORAL at 19:23

## 2022-11-11 RX ADMIN — MYCOPHENOLATE MOFETIL 1000 MG: 500 TABLET, FILM COATED ORAL at 08:39

## 2022-11-11 RX ADMIN — POTASSIUM CHLORIDE 20 MEQ: 29.8 INJECTION, SOLUTION INTRAVENOUS at 23:59

## 2022-11-11 RX ADMIN — MAGNESIUM SULFATE IN WATER 2 G: 40 INJECTION, SOLUTION INTRAVENOUS at 04:50

## 2022-11-11 RX ADMIN — PRAVASTATIN SODIUM 20 MG: 20 TABLET ORAL at 08:38

## 2022-11-11 RX ADMIN — SENNOSIDES AND DOCUSATE SODIUM 2 TABLET: 50; 8.6 TABLET ORAL at 19:22

## 2022-11-11 RX ADMIN — SODIUM CHLORIDE 75 ML/HR: 9 INJECTION, SOLUTION INTRAVENOUS at 19:31

## 2022-11-11 RX ADMIN — Medication 5 ML: at 02:33

## 2022-11-11 RX ADMIN — BUMETANIDE 2 MG: 0.25 INJECTION, SOLUTION INTRAMUSCULAR; INTRAVENOUS at 14:10

## 2022-11-11 RX ADMIN — POTASSIUM CHLORIDE 20 MEQ: 29.8 INJECTION, SOLUTION INTRAVENOUS at 22:46

## 2022-11-11 RX ADMIN — HUMAN ALBUMIN MICROSPHERES AND PERFLUTREN 6 ML: 10; .22 INJECTION, SOLUTION INTRAVENOUS at 14:42

## 2022-11-11 RX ADMIN — AMIODARONE HYDROCHLORIDE 200 MG: 200 TABLET ORAL at 19:23

## 2022-11-11 RX ADMIN — ASPIRIN 81 MG CHEWABLE TABLET 81 MG: 81 TABLET CHEWABLE at 08:38

## 2022-11-11 RX ADMIN — POLYETHYLENE GLYCOL 3350 17 G: 17 POWDER, FOR SOLUTION ORAL at 19:28

## 2022-11-11 RX ADMIN — AMIODARONE HYDROCHLORIDE 200 MG: 200 TABLET ORAL at 08:38

## 2022-11-11 RX ADMIN — Medication 5 ML: at 00:20

## 2022-11-11 RX ADMIN — POTASSIUM CHLORIDE 20 MEQ: 29.8 INJECTION, SOLUTION INTRAVENOUS at 04:56

## 2022-11-11 RX ADMIN — POTASSIUM CHLORIDE 20 MEQ: 29.8 INJECTION, SOLUTION INTRAVENOUS at 21:41

## 2022-11-11 ASSESSMENT — ACTIVITIES OF DAILY LIVING (ADL)
ADLS_ACUITY_SCORE: 26
ADLS_ACUITY_SCORE: 33
ADLS_ACUITY_SCORE: 26
ADLS_ACUITY_SCORE: 33
ADLS_ACUITY_SCORE: 28
ADLS_ACUITY_SCORE: 33
ADLS_ACUITY_SCORE: 28
ADLS_ACUITY_SCORE: 33
ADLS_ACUITY_SCORE: 33
ADLS_ACUITY_SCORE: 26
ADLS_ACUITY_SCORE: 26
ADLS_ACUITY_SCORE: 33

## 2022-11-11 NOTE — PROGRESS NOTES
[facility]    Cardiology Progress Note- Cardiology 2        Date of Admission:  11/10/2022     Assessment & Plan: SHOWARD Stein is a 67 year old male with PMH s/f HFrEF 20-25% secondary to arrhythmogenic cardiomyopathy, CAD s/p PCI on LCx and LAD, CNS vasculitis and CKD who presents for shortness of breath.     #HFrEF  #Dyspnea  #Arrhythmogenic Cardiomyopathy  #History of VTs s/p Biventricular PPM/ICD  Patient coming from clinic with concern for decompensated low output heart failure secondary to NICM. Patient denying chest pain, EKG w/o ischemic changes, appears mildly hypervolemic on physical exam but has been having severe activity limiting shortness of breath of predominantly cardiac etiology. We will start diuresis and assess need for inotropic support/evaluation for advanced treatments while inpatient. Patient with recent episode of non-shocked VT, denies any recent history of ICD delivered defibrillations. Currently on atrial paced rhythm, hemodynamically stable.  - on dobutamine  - Hold PTA metoprolol  - s/p bumex 2mg IV x2  - strict I/Os  - Biventricular pacemaker/ICD in place  - Continue PTA amiodarone  - Hold PTA empagliflozin   - RHC as part of LVAD/transplant workup    LVAD/Transplant Evaluation Checklist  [ ] labs  [ ] CPX  [x ] RHC  [x ] CVTS consult  [ ] Social work consult  [ ] Palliative care consult  [ ] Neuropsych consult  [ ] Nutrition consult  [ ] Dental  [ ] Abd US  [ ] extremity US and GASTON  [ ] carotid US  [x ] PFTs  [ ] 6 minute walk  [ ] CT head  [ ] CT c/a/p  [ ] colonoscopy (>51 yo)  [ ] PSA  [ ] Utox/nicotine and cotinine/PeTH      #CAD  Patient with pmh s/f two-vessel CAD s/p TILA on mLAD in 2021 and pLCx on aspirin. Patient denies chest pain, EKG without ischemic changes. Troponin mildly elevated, would favor decompensated heart failure as most likely etiology.  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 No episodes since. Mild sensory deficit over the right foot, rest of neurological exam unremarkable.  - Continue PTA cellcept  - neurology consulted; recommended MRI with and without contrast    Diet: cardiac diet  DVT Prophylaxis: mechanical   Davis Catheter: Not present  Code Status:  full      Disposition Plan   Expected discharge: 2 - 3 days, recommended to prior living arrangement once advanced heart failure work-up completed.    The patient's care was discussed with the Attending Physician, Dr. Metcalf.    Danika Christianson MD  [facility]    ______________________________________________________________________    Interval History   Patient has no new complaints this morning. He states that he is feeling better since being admitted - he does not endorse shortness of breath, cough, chest pain, abdominal pain, nausea, vomiting, lightheadedness.    Data reviewed today: I reviewed all medications, new labs and imaging results over the last 24 hours.       Physical Exam   Vital Signs: Temp: 97.5  F (36.4  C) Temp src: Oral BP: 100/82 Pulse: 87   Resp: 16 SpO2: 97 % O2 Device: Nasal cannula Oxygen Delivery: 2 LPM  Weight: 208 lbs 11.2 oz  General: appears younger than stated age, in no acute distress, resting comfortably  HEENT: normocephalic, atraumatic, MMM, EOMI, NC O2 in place  CV: RRR, no murmurs/rubs/gallops, 1+ JOANNA, JVD ~13 cm  Resp: CTAB, no crackles/rales/wheezing  GI: soft, nontender, nondistended, normoactive bowel sounds  Integ: no wounds/rashes/lesions, no jaundice  Neuro: AAOx3, no focal neuro deficits    Data   Recent Labs   Lab 11/11/22  0847 11/11/22  0231 11/10/22  1738 11/08/22  1020   WBC  --  6.0 7.1  --    HGB  --  15.2 16.7  --    MCV  --  87 89  --    PLT  --  98* 106*  --    NA  --  139 138 138   POTASSIUM 3.9 3.1*  3.8 3.9   CHLORIDE  --  102 101 103   CO2  --  26 25 26   BUN  --  24.1* 28.2* 35*   CR  --  1.10 1.51* 1.51*   ANIONGAP  --  11 12 9   TIM  --  8.5* 8.9 9.4   GLC  --  127* 105* 111*   ALBUMIN  --  3.1* 3.7  --    PROTTOTAL  --  4.9* 5.8*  --    BILITOTAL  --  1.5* 1.5*  --    ALKPHOS  --  52 62  --    ALT  --  31 36  --    AST  --  24 30 34     Recent Results (from the past 24 hour(s))   XR Chest Port 1 View    Narrative    EXAM: XR CHEST PORT 1 VIEW  11/10/2022 7:23 PM      HISTORY: Acutely decompensated heart failure, also confirm PICC  placement    COMPARISON: Chest x-ray 11/8/2022    FINDINGS: Portable semiupright AP radiograph of the chest. Left chest  wall ICD with leads projecting over the right atrium, right ventricle,  and coronary sinus. Right upper extremity PICC tip projects over the  mid SVC. The trachea is midline. The cardiac silhouette is enlarged.  Small right pleural effusion. Skin fold vertically over the lateral  right thorax suspected. No pneumothorax. Right greater than left  basilar interstitial opacities, slightly increased compared to prior.  Atherosclerotic calcification of the aortic arch. The visualized upper  abdomen is unremarkable.       Impression    IMPRESSION:   1. Right greater than left bibasilar interstitial opacities, increased  compared to 11/8/2022.  2. Cardiomegaly.  3. Right upper extremity PICC tip projects over the mid SVC. No  definite pneumothorax however there appears to be a skinfold laterally  in the right thorax. Attention on follow-up.  4. Small right pleural effusion.    I have personally reviewed the examination and initial interpretation  and I agree with the findings.    COLT SWANSON MD         SYSTEM ID:  [id number]   Cardiac Device Check - Remote   Result Value    Implantable Pulse Generator  Medtronic    Implantable Pulse Generator Model [id number] [employer] MRI Quad CRT-D    Implantable Pulse Generator Serial Number [id number]    Type  Interrogation Session Remote    Clinic Name [facility]    Implantable Pulse Generator Type Cardiac Resynchronization Therapy - Defibrillator    Implantable Pulse Generator Implant Date [id number]    Flaco Setting Mode (NBG Code) DDDR    Flaco Setting Lower Rate Limit 60    Flaco Setting Maximum Tracking Rate 140    Flaco Setting Maximum Sensor Rate 140    Flaco Setting FANY Delay Low 140    Flaco Setting PAV Delay Low 170    Flaco Setting AT Mode Switch Rate 171    Lead Channel Setting Sensing Polarity Bipolar    Lead Channel Setting Sensing Sensitivity 0.3    Lead Channel Setting Sensing Polarity Bipolar    Lead Channel Setting Sensing Sensitivity 0.3    Lead Channel Setting Pacing Polarity Bipolar    Lead Channel Setting Pacing Pulse Width 0.4    Lead Channel Setting Pacing Amplitude 1.5    Lead Channel Setting Pacing Capture Mode Adaptive    Lead Channel Setting Pacing Polarity Bipolar    Lead Channel Setting Pacing Pulse Width 0.4    Lead Channel Setting Pacing Amplitude 2    Lead Channel Setting Pacing Capture Mode Adaptive    Lead Channel Setting Pacing Pulse Width 1    Lead Channel Setting Pacing Amplitude 2.25    Lead Channel Setting Pacing Capture Mode Adaptive    Zone Setting Type Category VF    Zone Setting Detection Interval 319.15    Zone Setting Detection Beats Numerator 30    Zone Setting Detection Beats Denominator 40    Zone Setting Type Category VT    Zone Setting Detection Interval 240    Zone Setting Type Category VT    Zone Setting Detection Interval 419.58    Zone Setting Type Category VT    Zone Setting Type Category ATRIAL_FIBRILLATION    Zone Setting Detection Interval 350.88    Zone Setting Type Category AT/AF    Zone Setting Type Category BRADYCARDIA    Lead Channel Impedance Value 475    Lead Channel Sensing Intrinsic Amplitude 3    Lead Channel Pacing Threshold Amplitude 0.75    Lead Channel Pacing Threshold Pulse Width 0.4    Lead Channel Impedance Value 418    Lead  Channel Sensing Intrinsic Amplitude 6.4    Lead Channel Pacing Threshold Amplitude 0.625    Lead Channel Pacing Threshold Pulse Width 0.4    Lead Channel Impedance Value 304    Lead Channel Pacing Threshold Amplitude 0.75    Lead Channel Pacing Threshold Pulse Width 1    Battery Remaining Longevity 66    Battery Voltage 2.98    Capacitor Charge Type Shock    Capacitor Charge Type Reformation    Capacitor Charge Type FULL_ENERGY    Capacitor Charge Time 4    Flaco Statistic RA Percent Paced 3.4    Flaco Statistic RV Percent Paced 86.3    CRT Statistic LV Percent Paced 79.7    Atrial Tachy Statistic AT/AF Ernul Percent 0.1    Episode Statistic Recent Count 2    Episode Statistic Type Category AT/AF    Episode Statistic Recent Count 2    Episode Statistic Type Category VT    Episode Statistic Recent Count 0    Episode Statistic Type Category VF    Date Time Interrogation Session [id number]    Narrative    Medtronic Carelink Express remote ICD transmission received from  and reviewed. Device transmission sent per MD orders.     Device: Medtronic [id number] Claria MRI Quad CRT-D  Normal Device Function  Mode: DDDR  bpm  AP: 3.4%  : 86.3%  BVP: 79.7%  VSR Pace: 11.8%  Presenting EGM: AS- @ 83 bpm  Thoracic Impedance: Below the reference line suggesting possible intrathoracic fluid accumulation.  Short V-V intervals: 0  Lead Trends Appear Stable: yes  Estimated battery longevity to RRT = 5.5 years  Atrial Arrhythmia: Total AT/AF time = 4 seconds  AF Ernul: <0.1%  Ventricular Arrhythmia: 2 NSVT episodes recorded on 11/1 and 11/3/22 - 2-5 sec, 192-195 bpm  PVC Runs = 38.1/hour  PVC Singles = 80/hour  RN Caring for Patient on  Notified of Transmission Results: Yes    DAVID Monique RN    Remote ICD Transmission    I have reviewed and interpreted the device interrogation, settings, programming and nurse's summary. The device is functioning within normal device parameters. I agree with the current findings,  assessment and plan.   US carotid bilateral    Narrative    BILATERAL CAROTID DUPLEX DOPPLER ULTRASOUND 11/11/2022 12:40 PM    CLINICAL HISTORY: Heart Failure pre Ventricular Assist Device (VAD)  planning    COMPARISON: None available.    REFERRING PROVIDER: MARA SANCHEZ    TECHNIQUE: Grayscale (B-mode) and duplex and spectral Doppler  ultrasound of the common carotid, extracranial internal carotid,  external carotid, and vertebral artery origins. Velocity measurements  obtained with angle correction at or less than 60 degrees.    FINDINGS: Arrythmia    RIGHT SIDE:     Plaque Morphology: Minimal plaque       Proximal CCA: 77/16 cm/s     Mid CCA: 79/17 cm/s     Distal CCA: 73/15 cm/s           External CA: 75/13 cm/s       Proximal ICA: 40/12 cm/s     Mid ICA: 39/12 cm/s     Distal ICA: 50/13 cm/s       Vertebral artery: Antegrade: 28/7 cm/s     ICA/CCA ratio: 0.68     LEFT SIDE:     Plaque Morphology: Minimal plaque        Proximal CCA: 92/14 cm/s     Mid CCA: 90/20 cm/s     Distal CCA: 88/18 cm/s           External CA: 74/7 cm/s       Proximal ICA: 39/10 cm/s     Mid ICA: 43/11 cm/s     Distal ICA: 52/15 cm/s       Vertebral artery: Antegrade: 23/5 cm/s     ICA/CCA ratio: 0.59       Impression    IMPRESSION:    1. RIGHT ICA: Less than 50% diameter narrowing by grayscale imaging  and sonographic velocity criteria.    2. LEFT ICA:  Less than 50% diameter narrowing by grayscale imaging  and sonographic velocity criteria.    3. Arrythmia.    Intersocietal Accreditation Commission Updated Recommendations for  Carotid Stenosis Interpretation Criteria - October 2021.  https://intersocietal.org/wp-content/uploads/2021/10/IAC-Vascular-Test  fl-Ohydymeisravi_Mugxloy-Fucgxtxuhzlsqqp-for-Carotid-Stenosis-Interpre  ation-Criteria.pdf    Greater than 70% diameter stenosis criteria per - Journal of Vascular  Surgery Vol. 75Issue 1Supplement [id number] Published online: June 18, 2021          Normal            ICA PSV: < 180  cm/s            Plaque Estimate: None            ICA/CCA PSV Ratio: < 2.0            ICA EDV: < 40 cm/s         < 50%            ICA PSV: < 180 cm/s            Plaque Estimate: < 50%            ICA/CCA PSV Ratio: < 2.0            ICA EDV: < 40 cm/s         50 - 69%            ICA PSV: 180 - 230 cm/s            Plaque Estimate: > 50%            ICA/CCA PSV Ratio: 2.0 - 4.0            ICA EDV: 40 - 100 cm/s         > 70% but less than near occlusion            ICA PSV: > 230 cm/s            Plaque Estimate: > 50%            AND either            ICA EDV: > 100 cm/s            or            ICA/CCA PSV Ratio: > 4.0         Near occlusion            ICA PSV: > 230 cm/s            Plaque Estimate: Visible            ICA/CCA PSV Ratio: Variable            ICA EDV: Variable         Total occlusion            ICA PSV: Undetectable            Plaque Estimate: Visible, no detectable lumen            ICA/CCA PSV Ratio: N/A            ICA EDV: N/A                                          Additional criteria from vascular surgery     > 80%       EDV > 120 cm/s    JOANN RAMÍREZ MD         SYSTEM ID:  [id number]   US Lower Extremity Arterial Duplex Bilateral    [facility]    ULTRASOUND LOWER EXTREMITY ARTERIAL DUPLEX BILATERAL 11/11/2022 12:49  PM    CLINICAL HISTORY: Heart Failure pre Ventricular Assist Device (VAD)  planning.     COMPARISONS: None available.    REFERRING PROVIDER: MARA SANCHEZ    TECHNIQUE: Bilateral leg arteries evaluated with color Doppler and  spectral pulsed wave Doppler ultrasound.    FINDINGS:   RIGHT:  Common femoral artery: 62/0 cm/s, triphasic  Profundus femoral artery: 44/0 cm/s, triphasic    Superficial femoral artery, origin: 64/0 cm/s, triphasic  Superficial femoral artery, mid thigh: 60/0 cm/s, triphasic  Superficial femoral artery, distal thigh: 71/0 cm/s, triphasic    Popliteal artery: 39/0 cm/s, triphasic    Posterior tibial artery, ankle: 102/30 cm/s, triphasic  Anterior tibial artery, ankle:  54/0 cm/s, triphasic    LEFT:  Common femoral artery: 51/0 cm/s, triphasic  Profundus femoral artery: 37/0 cm/s, triphasic    Superficial femoral artery, origin: 83/0 cm/s, triphasic  Superficial femoral artery, mid thigh: 61/0 cm/s, triphasic  Superficial femoral artery, distal thigh: 76/0 cm/s, triphasic    Popliteal artery: 43/0 cm/s, triphasic    Posterior tibial artery, ankle: 72/30 cm/s, triphasic  Anterior tibial artery, ankle: 58/0 cm/s, triphasic      Impression    IMPRESSION: Negative study.    JOANN RAMÍREZ MD         SYSTEM ID:  [id number]   US Lower Extremity Venous Duplex Bilateral    Narrative    ULTRASOUND LOWER EXTREMITY VENOUS DUPLEX BILATERAL 11/11/2022 12:49 PM    CLINICAL HISTORY: Heart transplant evaluation      COMPARISONS: None available.    REFERRING PROVIDER: MARA SANCHEZ    TECHNIQUE: Grayscale, color Doppler, Doppler waveform ultrasound  evaluation was performed through the bilateral common femoral,  femoral, and popliteal veins. Bilateral posterior tibial and peroneal  veins were evaluated with grayscale imaging and compression.    FINDINGS: Right common femoral, femoral, and popliteal veins are fully  compressible, patent, and demonstrate normal phasic Doppler waveforms.    Right posterior tibial veins are fully compressible to the ankle.    Right peroneal veins are fully compressible to the distal calf.    Left common femoral, femoral, and popliteal veins are fully  compressible, patent, and demonstrate normal phasic Doppler waveforms.    Left posterior tibial veins are fully compressible to the ankle.    Left peroneal veins are fully compressible to the distal calf.      Impression    IMPRESSION: No deep venous thrombosis demonstrated in either leg.    JOANN RAMÍREZ MD         SYSTEM ID:  [id number]   US Upper Ext Arterial Duplex Bilateral    Narrative    ULTRASOUND UPPER EXTREMITY ARTERIAL DUPLEX BILATERAL 11/11/2022 12:50  PM    CLINICAL HISTORY: Assess vascular access for possible  subclavian IABP.      COMPARISONS: None available.    REFERRING PROVIDER: MARA SANCHEZ    TECHNIQUE: Bilateral subclavian and axillary arteries evaluated with  grayscale, color Doppler, and spectral pulsed wave Doppler ultrasound.    FINDINGS:   RIGHT:  Subclavian artery, proximal: 60/0 cm/s, triphasic, 9.8 mm  Subclavian artery, mid: 51/0 cm/s, triphasic, 9.8 mm  Subclavian artery, distal: 50/0 cm/s, triphasic, 11.0 mm    Axillary artery: 36/0 cm/s, triphasic, 10.2 mm    LEFT :  Subclavian artery, proximal: 61/0 cm/s, triphasic, 9.2 mm  Subclavian artery, mid: 62/0 cm/s, triphasic, 8.6 mm  Subclavian artery, distal: obscured by pacemaker leads    Axillary artery: 48/0 cm/s, triphasic, 9.1 mm      Impression    IMPRESSION:  1. Left subclavian artery is obscured in its distal segment by the  pacemaker leads.    2. Otherwise patent subclavian and axillary arteries with measurements  as in the report.    JOANN RAMÍREZ MD         SYSTEM ID:  [id number]    Upper Extremity Venous Duplex Bilat    [facility]    ULTRASOUND UPPER EXTREMITY VENOUS DUPLEX BILATERAL 11/11/2022 12:50 PM    CLINICAL HISTORY: Heart transplant evaluation.     COMPARISONS: None available.    REFERRING PROVIDER: MARA SANCHEZ    TECHNIQUE: Bilateral internal jugular veins evaluated with grayscale,  color Doppler, and spectral pulsed wave Doppler ultrasound. Bilateral  innominate, subclavian, and axillary veins were evaluated color  Doppler, and spectral pulsed wave Doppler ultrasound.    FINDINGS: Right internal jugular vein is fully compressible with a  phasic waveform.    Right innominate, subclavian, and axillary veins fill [id number] in  color Doppler images with phasic waveforms.    Left internal jugular vein is fully compressible with a phasic  waveform.    Left innominate, subclavian, and axillary veins fill [id number] in  color Doppler images with phasic waveforms.      Impression    IMPRESSION: No central deep venous thrombosis  demonstrated in either  arm.    JOANN RAMÍREZ MD         SYSTEM ID:  [id number]   XR Chest 2 Views    Narrative    XR CHEST 2 VIEWS  11/11/2022 1:28 PM      HISTORY: Heart transplant evaluation and/or Ventricular Assist Device  (VAD) planning    COMPARISON: 11/10/2022    FINDINGS: PA and lateral views. Stable left chest wall ICD leads.  Cardiac silhouette is stable. Stable right arm PICC. Improved right  basilar hazy opacities. No acute airspace opacities. Slightly decrease  trace right pleural effusion. No pneumothorax.      Impression    IMPRESSION: Slightly improved trace right pleural effusion and right  basilar opacities, likely atelectasis.    I have personally reviewed the examination and initial interpretation  and I agree with the findings.    ELSY RAMIREZ MD         SYSTEM ID:  [id number]   CT Dental wo Contrast    Narrative    EXAM: CT HEAD W/O CONTRAST, CT DENTAL WO CONTRAST  11/11/2022 1:30 PM     HISTORY:  VAD evaluation       COMPARISON:  None    TECHNIQUE: Using multidetector thin collimation helical acquisition  technique, axial, coronal and sagittal CT images from the skull base  to the vertex were obtained without intravenous contrast.   (topogram) image(s) also obtained and reviewed.    FINDINGS:  No acute intracranial hemorrhage, mass effect, or midline shift. No  acute loss of gray-white matter differentiation in the cerebral  hemispheres. Ventricles are proportionate to the cerebral sulci. Clear  basal cisterns. There is a moderate to large amount of periventricular  white matter hypoattenuation throughout the cerebral white matter.  Bilaterally near the anterior horns of the lateral ventricles there  are rounded hypodensities, which may represent prior lacunar infarcts  as well as chronic small vessel ischemic disease.    As pertains to the dentition, there is cortical lucency surrounding  the lateral root of the left maxillary second molar this tooth also  demonstrates root canal changes..  The visualized dentition is  partially obscured due to artifact from the dental amalgam. Evaluation  of the paranasal sinuses demonstrates bilateral peripherally  predominant mucosal thickening of the maxillary sinuses along the  inferior aspects without evidence for bony erosion or abscess  communicating with the dentition. The remainder of the paranasal  sinuses are unremarkable.    The bony calvaria and the bones of the skull base are normal. The  mastoid air cells are unremarkable. Grossly normal orbits.      Impression    IMPRESSION:  1. No acute intracranial pathology.   2. Chronic white matter changes, suggestive of sequela of chronic  small vessel ischemic disease throughout the cerebral hemispheres.  3. Periapical lucency surrounding the left maxillary second molar.  Mild mucosal thickening within bilateral maxillary sinuses, without  evidence for odontogenic etiology. No definite evidence for soft  tissue abscess.    I have personally reviewed the examination and initial interpretation  and I agree with the findings.    AMENA MITCHELL MD         SYSTEM ID:  [id number]   CT Chest Abdomen Pelvis w/o Contrast    Narrative    EXAM: CT CHEST ABDOMEN PELVIS W/O CONTRAST, 11/11/2022 1:30 PM    HISTORY: VAD evaluation    COMPARISON: Outside hospital CTA chest 10/4/2022, 10/27/2022 (no  images available, only interpretation). Outside hospital CT  abdomen/pelvis 1/3/2017 (no images available, only interpretation).   Chest radiographs 11/11/2022, 11/10/2022, 11/8/2022.    TECHNIQUE: Helical CT images from the thoracic inlet through the  symphysis pubis were obtained without  IV contrast.    FINDINGS:  Limited evaluation secondary to lack of intravenous contrast.     image(s) are noncontributory.    LINES/TUBES: Left chest wall cardiac device with intact lead tips  terminating in the right atrium, right ventricle, and coronary sinus.  Right upper extremity peripherally inserted central catheter  terminates at  the superior cavoatrial junction.    CHEST:  LOWER NECK: Unremarkable thyroid.     PULMONARY: No pneumothorax. Right basilar bronchial wall thickening.  Right greater than left small pleural effusions with associated  atelectasis. Mild bibasilar predominant patchy groundglass opacities  and interlobular septal thickening. Left lower lobe calcified  granuloma.    CARDIAC: Cardiomegaly. Trace pericardial effusion. Coronary artery  stents and atherosclerosis.    MEDIASTINUM: Normal size/configuration of the great vessels. No  suspicious mediastinal, hilar, or axillary lymph nodes.     ESOPHAGUS: Unremarkable.    ABDOMEN/PELVIS:  HEPATIC: No suspicious focal hepatic lesion.    BILIARY: Cholecystectomy. No intrahepatic or extrahepatic biliary  ductal dilation.    PANCREAS: No focal pancreatic lesion. The main pancreatic duct is not  dilated.    SPLEEN: No splenomegaly. No suspicious lesions or masses.    ADRENALS: Unremarkable.    RENAL: No hydronephrosis, calculi, or mass.    URINARY BLADDER: Unremarkable.    REPRODUCTIVE: Unremarkable.    GASTROINTESTINAL: Unremarkable stomach and duodenum. Normal caliber  large and small bowel. No pneumatosis or portal venous gas. Moderate  colonic stool burden.    MESENTERY/PERITONEUM: No ascites or pneumoperitoneum.    VASCULAR: Nonaneurysmal abdominal aorta. Mild aortic atherosclerotic  vascular calcifications.    LYMPH NODES: No lymphadenopathy.    MUSCULOSKELETAL: Degenerative changes in keeping with the patient's  age. No acute osseous abnormality. No aggressive osseous lesions. Left  shoulder lipoma. Left anterior abdominal wall lipoma measuring 2.5 cm.      Impression    IMPRESSION:   1.  Left chest wall cardiac device with intact lead tips in the right  atrium, right ventricle, and coronary sinus.  2.  Cardiomegaly with right greater than left pleural effusions and  mild pulmonary edema suggestive of congestive heart failure.    I have personally reviewed the examination and  initial interpretation  and I agree with the findings.    REG WHITTAKER MD         SYSTEM ID:  [id number]   CT Head w/o Contrast    Narrative    EXAM: CT HEAD W/O CONTRAST, CT DENTAL WO CONTRAST  11/11/2022 1:30 PM     HISTORY:  VAD evaluation       COMPARISON:  None    TECHNIQUE: Using multidetector thin collimation helical acquisition  technique, axial, coronal and sagittal CT images from the skull base  to the vertex were obtained without intravenous contrast.   (topogram) image(s) also obtained and reviewed.    FINDINGS:  No acute intracranial hemorrhage, mass effect, or midline shift. No  acute loss of gray-white matter differentiation in the cerebral  hemispheres. Ventricles are proportionate to the cerebral sulci. Clear  basal cisterns. There is a moderate to large amount of periventricular  white matter hypoattenuation throughout the cerebral white matter.  Bilaterally near the anterior horns of the lateral ventricles there  are rounded hypodensities, which may represent prior lacunar infarcts  as well as chronic small vessel ischemic disease.    As pertains to the dentition, there is cortical lucency surrounding  the lateral root of the left maxillary second molar this tooth also  demonstrates root canal changes.. The visualized dentition is  partially obscured due to artifact from the dental amalgam. Evaluation  of the paranasal sinuses demonstrates bilateral peripherally  predominant mucosal thickening of the maxillary sinuses along the  inferior aspects without evidence for bony erosion or abscess  communicating with the dentition. The remainder of the paranasal  sinuses are unremarkable.    The bony calvaria and the bones of the skull base are normal. The  mastoid air cells are unremarkable. Grossly normal orbits.      Impression    IMPRESSION:  1. No acute intracranial pathology.   2. Chronic white matter changes, suggestive of sequela of chronic  small vessel ischemic disease throughout the  cerebral hemispheres.  3. Periapical lucency surrounding the left maxillary second molar.  Mild mucosal thickening within bilateral maxillary sinuses, without  evidence for odontogenic etiology. No definite evidence for soft  tissue abscess.    I have personally reviewed the examination and initial interpretation  and I agree with the findings.    AMENA MITCHELL MD         SYSTEM ID:  [id number]   Echocardiogram Complete   Result Value    LVEF  15-20% (severely reduced)    Narrative    [id number]  [id number]  [id number]  [id number]^LAURA^MARA^DEE DEE     [facility],[place]  Echocardiography Laboratory  [address] [place] [id number]     Name: SLOAN LAZAR  MRN: [id number]  : 1955  Study Date: 2022 01:29 PM  Age: 67 yrs  Gender: Male  Patient Location: [facility] [place]  Reason For Study: Pre Ventricular Assist Device  Ordering Physician: MARA SANCHEZ  Referring Physician: YAQUELIN MIRAMONTES  Performed By: Toña Linares     BSA: 2.2 m2  Height: 74 in  Weight: 208 lb  ______________________________________________________________________________  Procedure  Complete Portable Echo Adult. Contrast Optison. Optison (NDC #8673-3978-47)  given intravenously. Patient was given 6 ml mixture of 3 ml Optison and 6 ml  saline. 3 ml wasted.  ______________________________________________________________________________  Interpretation Summary  Moderate left ventricular dilation is present. Left ventricular function is  decreased. The ejection fraction is 15-20% (severely reduced). Severe diffuse  hypokinesis is present.  Mild right ventricular dilation is present. Global right ventricular function  is moderately reduced.  Moderate mitral insufficiency is present. The cause of the mitral  insufficiency appears to be altered left ventricular size and geometry.  Previous study not available for  comparison.  ______________________________________________________________________________  Left Ventricle  Left ventricular wall thickness is normal. Moderate left ventricular dilation  is present. Left ventricular function is decreased. The ejection fraction is  15-20% (severely reduced). Grade II or moderate diastolic dysfunction. Severe  diffuse hypokinesis is present.     Right Ventricle  Mild right ventricular dilation is present. Global right ventricular function  is moderately reduced. A pacemaker lead is noted in the right ventricle.     Atria  The right atria appears normal. Moderate left atrial enlargement is present.  The atrial septum is intact as assessed by agitated saline bubble study . A  pacemaker lead is noted in the right atrium.     Mitral Valve  Moderate mitral insufficiency is present. The cause of the mitral  insufficiency appears to be altered left ventricular size and geometry.     Aortic Valve  Trileaflet aortic sclerosis without stenosis.     Tricuspid Valve  The tricuspid valve is normal. Trace tricuspid insufficiency is present. The  right ventricular systolic pressure is approximated at 27.9 mmHg plus the  right atrial pressure.     Pulmonic Valve  The pulmonic valve is normal.     Vessels  The aorta root is normal. Dilation of the inferior vena cava is present with  abnormal respiratory variation in diameter. IVC diameter >2.1 cm collapsing  <50% with sniff suggests a high RA pressure estimated at 15 mmHg or greater.     Pericardium  No pericardial effusion is present.     Compared to Previous Study  Previous study not available for comparison.  ______________________________________________________________________________  MMode/2D Measurements & Calculations  IVSd: 1.1 cm  LVIDd: 6.7 cm  LVIDs: 6.3 cm  LVPWd: 1.1 cm  FS: 7.1 %  LV mass(C)d: 340.3 grams  LV mass(C)dI: 154.0 grams/m2  Ao root diam: 2.9 cm  RWT: 0.33     Doppler Measurements & Calculations  MV E max jorden: 54.2  cm/sec  MV A max jorden: 29.1 cm/sec  MV E/A: 1.9  MV dec time: 0.11 sec  TR max jorden: 264.0 cm/sec  TR max P.9 mmHg  E/E' av.2  Lateral E/e': 3.2  Medial E/e': 11.1     ______________________________________________________________________________  Report approved by: Gabe Soriano 2022 03:19 PM           I have reviewed today's vital signs, notes, medications, labs and imaging.  I have also seen and examined the patient and agree with the findings and plan as outlined above.  Pt with family at bedside.  Pt is 66 yo old with hx of CAD, CNS vasculitis and CKD who presents with ambulatory cardiogenic shock.  Now on Dbx 2.5 mcg/kg/min.  Pt with no complaints and VSS with JVP elevated at 16 cm with ongoing diuresis.  +S3 and 1+ bipedal edema.  Plan is for OHT/LVAD workup with RHC and LHC today.  Pt understands risks.      Joselito Metcalf MD, PhD  Professor, Heart Failure and Cardiac Transplantation  [facility]

## 2022-11-11 NOTE — PROCEDURES
[facility]    Double Lumen PICC Placement    Date/Time: 11/10/2022 6:25 PM  Performed by: Candy Shepherd RN  Authorized by: Leoncio Tyson MD   Indications: vascular access      UNIVERSAL PROTOCOL   Site Marked: Yes  Prior Images Obtained and Reviewed:  Yes  Required items: Required blood products, implants, devices and special equipment available    Patient identity confirmed:  Verbally with patient, arm band, provided demographic data, hospital-assigned identification number and anonymous protocol, patient vented/unresponsive  Patient was reevaluated immediately before administering moderate or deep sedation or anesthesia  Confirmation Checklist:  Patient's identity using two indicators, relevant allergies and procedure was appropriate and matched the consent or emergent situation  Time out: Immediately prior to the procedure a time out was called    Universal Protocol: the [facility] Universal Protocol was followed    Preparation: Patient was prepped and draped in usual sterile fashion       ANESTHESIA    Anesthesia: Local infiltration  Local Anesthetic:  Lidocaine 1% without epinephrine  Anesthetic Total (mL):  3.5      SEDATION    Patient Sedated: No        Preparation: skin prepped with ChloraPrep  Skin prep agent: skin prep agent completely dried prior to procedure  Sterile barriers: maximum sterile barriers were used: cap, mask, sterile gown, sterile gloves, and large sterile sheet  Hand hygiene: hand hygiene performed prior to central venous catheter insertion  Type of line used: PICC  Catheter type: double lumen  Lumen type: non-valved and power PICC  Lumen Identification: Purple and Red  Catheter size: 5 Fr  Brand: Bard  Lot number: [id number]  Placement method: venipuncture, ultrasound, MST and tip navigation system  Number of attempts: 1  Difficulty threading catheter: no  Successful placement: yes  Orientation: right    Location: basilic  vein (0.50 cm vein diameter)  Tip Location: SVC  Site rationale: Left pacemaker  Arm circumference: adults 10 cm  Extremity circumference: 31  Visible catheter length: 1  Total catheter length: 43  Dressing and securement: adhesive securement device, alcohol impregnated caps, blood cleaned with CHG, chlorhexidine disc applied, glue, site cleansed, statlock, sterile dressing applied and transparent dressing  Post procedure assessment: blood return through all ports, free fluid flow and placement verified by 3CG technology

## 2022-11-11 NOTE — PHARMACY-ADMISSION MEDICATION HISTORY
Admission Medication History Completed by Pharmacy    See [facility] Admission Navigator for allergy information, preferred outpatient pharmacy, prior to admission medications and immunization status.     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    He reports he has not been able to take his mycophenolate for the past week or so due to stomach upset. He takes this medication for cerebral vasculitis.     Prior to Admission medications    Medication Sig Last Dose Taking? Auth Provider Long Term End Date   albuterol (PROAIR HFA/PROVENTIL HFA/VENTOLIN HFA) 108 (90 Base) MCG/ACT inhaler Inhale 2 puffs into the lungs every 6 hours as needed More than a month Yes Reported, Patient     amiodarone (PACERONE) 200 MG tablet Take 200 mg by mouth 2 times daily 11/10/2022 at AM Yes Reported, Patient     aspirin (ASA) 81 MG EC tablet Take 81 mg by mouth daily 11/10/2022 at AM Yes Reported, Patient     clotrimazole (LOTRIMIN) 1 % external cream Apply topically 2 times daily for groin/jock itch 11/10/2022 at AM Yes Unknown, Entered By History     empagliflozin (JARDIANCE) 10 MG TABS tablet Take 10 mg by mouth daily 11/10/2022 at AM Yes Unknown, Entered By History     furosemide (LASIX) 20 MG tablet Take 20 mg by mouth 2 times daily 11/10/2022 at AM Yes Reported, Patient     metoclopramide (REGLAN) 10 MG tablet TAKE 1 TABLET (10 MG TOTAL) BY MOUTH 4 (FOUR) TIMES A DAY AS NEEDED (NAUSEA). 11/8/2022 at unknown time Yes Reported, Patient     metoprolol succinate ER (TOPROL XL) 25 MG 24 hr tablet Take  1 tablet by mouth daily in the evening 11/9/2022 at PM Yes Reported, Patient     mycophenolate (GENERIC EQUIVALENT) 500 MG tablet Take 500 mg by mouth every evening Past Month at Has not taken for over a week Yes Unknown, Entered By History     mycophenolate (GENERIC EQUIVALENT) 500 MG tablet Take 1,000 mg by mouth every morning Past Month at Has not taken for over a week Yes Reported, Patient     pravastatin (PRAVACHOL) 20 MG tablet TAKE 1 TABLET BY MOUTH DAILY/ DUE FOR FASTING LABWORK 11/10/2022 at AM Yes Reported, Patient     traZODone (DESYREL) 50 MG tablet TAKE 1 TABLET (50 MG TOTAL) BY MOUTH ONCE NIGHTLY AS NEEDED FOR SLEEP. 11/9/2022 at HS Yes Reported, Patient         Date completed: 11/10/22    Medication history completed by: [facility]

## 2022-11-11 NOTE — PROGRESS NOTES
Patient YOB: 1955    Patient age: 67    Patient gender: Male    Patient height: 188cm          Predicted values:     FEV1: 2.37    FVC: 2.82    PEF: 9.03    FEV1/FVC: 0.84    Patient values:    FEV1: 3.59    FVC: 4.70    PEF: 8.73    FEV1/FVC: 0.76          Patient effort:  Good

## 2022-11-11 NOTE — CONSULTS
"Dental Service Consultation      Paco Stein MRN# [id number]  YOB: 1955 Age: 67 year old  Date of Admission: 11/10/2022    Reason for consult: I was asked by Unit [address]C care team to evaluate this patient for dental clearance for heart failure pre VAD planning     Chief Complaint:  \"I do not have any tooth pain or facial pain\"     Assessment and Plan:  Assessment:   Radiographic exam: Dental CT reveals partially edentulous adult dentition. Condyles seated in fossa bilaterally, maxillary sinuses clear bilaterally. No coronal radiolucencies. There is however periapical radiolucencies consistent with periapical history of infection on teeth #3,4,14. However, patient claimed that he recently has had apicoectomy done on these teeth so this periapical radiolucencies might be signs of healing from a previous infection. No osseous pathology seen.      Extraoral exam: NC/AT, EOMI, PERRL, No submandibular lymphadenopathy, no induration or erythema, no abnormal skin lesions, inferior border of mandible is palpable bilaterally, DANIELA >45mm. No TMJ discomfort bilaterally. FOM soft and non tender. No clicking of TMJ on opening and lateral movements.      Intraoral exam: No active decay or loosening teeth. Patient asymptomatic to palpation and percussion. Mallampati II. DANIELA ~45mm. No active infection, fistula or erythema or edema at the area of #3,4,14 or other areas intraorally. #8 recently has had bone grafted planning for future implant placement.      Plan:  Patient is dentally cleared for future VAD procedures. At this moment, no dental work needs to be done prior to the cardiovascular procedure. From the dental point of view regarding #3,4,14 monitoring the healing infection is recommended unless symptoms arise. (imaging every 6 months). Oral hygiene with frequent brushing and flossing is recommended to patient and patient's family due to heavy plaque.       The patient was discussed with:    Kareen Doyle "   PGY1  Pager: [phone]

## 2022-11-11 NOTE — PROGRESS NOTES
CLINICAL NUTRITION SERVICES - ASSESSMENT NOTE     Nutrition Prescription    RECOMMENDATIONS FOR MDs/PROVIDERS TO ORDER:  None at this time    Malnutrition Status:    Unable to determine at this time    Recommendations already ordered by Registered Dietitian (RD):  Ordered chocolate Ensure 1x per day    Future/Additional Recommendations:  -Once able to adv diet post-procedures, rec a 2-3 g sodium diet order. Consider fluid restriction if needed.   -If oral intake is inadequate, consider adding another supplement  -Monitor potential need to adjust scheduled bowel meds  -Monitor BG control. BG was 111 on 11/8.   -Order multivitamin with minerals if inadequate nutrition intake.  -Monitor potential need for thiamine if becomes warranted.   -If pt has an LVAD placed and if pt needs TFs, would rec place feeding tube (FT) via radiology, order XR Feeding Tube Placement, and initiate TFs, Osmolite 1.5 (Concentrated TF formula). Initiate TFs at 20 mL/hr, advancing rate by 15 mL Q 8 hrs (or per provider discretion, pending hemodynamic stability) to goal rate of 65 mL/hr. Osmolite 1.5 Maury @ goal of  65ml/hr  (1560mL/day) will provide: 2340 kcals, 97 g PRO, 1188 ml free H20, 317 g CHO, and 0 g fiber daily.                          If begin tube feeds:                          - Flush FT with 30 mL water Q 4 hrs for patency. Rec provider adjust free water flushes as needed, pending fluid status.                        - Ensure K+/Mg++/Phos labs are ordered daily until TFs advance to goal infusion to evaluate for sx of refeeding with nutrition received. If lytes trend low, aggressively replace lytes.                            - If not already ordered, order a multivitamin/mineral (certavite 15 mL/day via FT) to help ensure micronutrient needs being met with suspected hypermetabolic demands and potential interruptions to TF infusions.                        - Monitor TF and possible need to adjust nutrition support regimen if  "necessary, pending medical course and nutrition status.       REASON FOR ASSESSMENT  Paco Stein is a/an 67 year old male assessed by the dietitian for Provider Order - Heart Failure pre Ventricular Assist Device (VAD) planning, Frailty assessment. Presents with shortness of breath. Hx of dyspnea, HFrEF 20-25% secondary to arrhythmogenic cardiomyopathy, CKDIII, and CAD s/p PCI on LCx and LAD.    NUTRITION HISTORY  Unable to see patient today - not feeling well after having multiple procedures. Nurse said to come back at another time.    Per H&P, no difficulty with bowel movements but reports having very low appetite. Per nursing note yesterday, he is under aspiration precautions as his family reports him coughing after drinking fluids. No previous RD notes on nutrition history.    CURRENT NUTRITION ORDERS  Diet: NPO d/t test procedures for LVAD work up (started today at 10am)  Was previously on low saturated fat/Na <2400 mg prior to procedure today    Intake/Tolerance:   -Per flowsheet, 100% intake of breakfast today 11/11    LABS  Ca 8.5, Alb 3.1, Glu 127    MEDICATIONS  Bumex, Jardiance, Magnesium sulfate, Mycophenolate, Miralax, Potassium Chloride, Pravachol, Senna, Desyrel,      ANTHROPOMETRICS  Height: 188 cm (6' 2\")  Most Recent Weight: 94.7 kg (208 lb 11.2 oz)    IBW: 86.4 kg  %IBW: 110%  BMI: Overweight BMI 25-29.9    Weight History:   Wt Readings from Last 10 Encounters:   11/10/22 94.7 kg (208 lb 11.2 oz)   11/10/22 108 kg (238 lb 1.6 oz)   11/08/22 95.7 kg (210 lb 14.4 oz)   8/23/22 99.3 kg (~3 months ago) - Pt has lost 4.6% of total body wt but unknown if fluid status changes/diuresis since this time.   5/6/22 100.7 kg (~6 months ago)  10/20/21 101.6 kg (~1 year ago)    Dosing Weight: 94.7 kg (based on only wt of 94.7 kg on 11/10)    ASSESSED NUTRITION NEEDS  Estimated Energy Needs: 3428-1319 kcals/day (25 - 30 kcals/kg)  Justification: Maintenance  Estimated Protein Needs:  grams protein/day " (1 - 1.2 grams of pro/kg)  Justification: CKD but increased needs with HF  Estimated Fluid Needs: per provider pending fluid status    PHYSICAL FINDINGS  Per resident note 11/10, fluid overloaded  No BMs noted since admission (last noted on 11/9, PTA)    MALNUTRITION  % Intake: Unable to assess d/t pt condition  % Weight Loss: Difficult to assess with fluid status changes, diuresis. Pt currently diuresing and fluid overloaded.   Subcutaneous Fat Loss: Unable to assess d/t pt condition  Muscle Loss: Unable to assess d/t pt condition  Fluid Accumulation/Edema: None noted  Malnutrition Diagnosis: Unable to determine due to p/t condition    NUTRITION DIAGNOSIS  No nutrition diagnosis at this time.    INTERVENTIONS  Implementation  Medical food supplement therapy     Goals  Patient to consume % of nutritionally adequate meal trays TID, or the equivalent with supplements/snacks.     Monitoring/Evaluation  Progress toward goals will be monitored and evaluated per protocol.    Michelle Ralph  Dietetic Intern    I have read and agree with the above nutrition assessment, eval, and recs.   Moriah Garcia, MS, RD, LD, [facility]   6C Pgr: [phone]

## 2022-11-11 NOTE — PROGRESS NOTES
Care Coordinator  D/I: Pt may need Inotrope therapy.  P: I sent referral to [facility] to check home coverage and per response from kailey;  [10:40 AM] Glenna Elam, pt Paco Stein currently does not meet Medicare criteria for coverage based on cardiology note from 11/8. Needs to be ACC/AHA stage D, or NYHA class IV HF to meet criteria for coverage. Please let me know if team updates this note. Thanks!    I have informed cards 2 fellow Marcial--he will document better.    Will follow.

## 2022-11-11 NOTE — PROGRESS NOTES
"Met with patient, wife Arlette, sons Hector and Deny, and daughter Jenna to present the HM3 as a possible treatment option.    Discussed patient and caregiver responsibilities before and after VAD implant. Clarified the need for a caregiver to be present for education and to assist patient for at least first 30 days after a patient returns home. Patient's designated caregiver is Arlette but Hector, Burt, and/or Jenna may also take on the 30 day caregiver role.    Discussed basic overview of VAD equipment, on going management, need for anticoagulation, regular dressing change, grounded three-pronged outlet and functioning telephone. Also discussed frequency of follow-up clinic appointments and the need to stay locally for at least 4 weeks.  Reviewed restrictions of having a VAD such as no swimming, bathing, boats, MRI's, or arc welding.     Provided and discussed the VAD educational brochure, information regarding the VAD and transplant support groups, and \"VAD What You Should Know\" with additional details. Patient, Arlette, Burt, Hector, and Jenna signed \"VAD What You Should Know\" document (or agreed to have VAD Coordinator sign on their behalf). VAD coordinator contact information provided.  Encouraged patient and the family to call with questions. Learners verbalized understanding of the above information.    Arlette said she will ask Vince's dentist to fax a dental clearance form to the VAD office. Vince is due for a followup colonoscopy now but was not able to get it done due to current illness.    "

## 2022-11-12 ENCOUNTER — APPOINTMENT (OUTPATIENT)
Dept: SPEECH THERAPY | Facility: CLINIC | Age: 67
DRG: 286 | End: 2022-11-12
Attending: INTERNAL MEDICINE
Payer: MEDICARE

## 2022-11-12 LAB
ABO/RH(D): NORMAL
ALBUMIN SERPL BCG-MCNC: 3.4 G/DL (ref 3.5–5.2)
ALP SERPL-CCNC: 61 U/L (ref 40–129)
ALT SERPL W P-5'-P-CCNC: 27 U/L (ref 10–50)
ANION GAP SERPL CALCULATED.3IONS-SCNC: 13 MMOL/L (ref 7–15)
ANTIBODY SCREEN: NEGATIVE
APTT PPP: 29 SECONDS (ref 22–38)
AST SERPL W P-5'-P-CCNC: 24 U/L (ref 10–50)
BASOPHILS # BLD AUTO: 0 10E3/UL (ref 0–0.2)
BASOPHILS NFR BLD AUTO: 1 %
BILIRUB SERPL-MCNC: 1.5 MG/DL
BUN SERPL-MCNC: 19.9 MG/DL (ref 8–23)
CALCIUM SERPL-MCNC: 8.5 MG/DL (ref 8.8–10.2)
CHLORIDE SERPL-SCNC: 101 MMOL/L (ref 98–107)
CHOLEST SERPL-MCNC: 116 MG/DL
CREAT SERPL-MCNC: 1.09 MG/DL (ref 0.67–1.17)
DEPRECATED HCO3 PLAS-SCNC: 26 MMOL/L (ref 22–29)
EOSINOPHIL # BLD AUTO: 0.1 10E3/UL (ref 0–0.7)
EOSINOPHIL NFR BLD AUTO: 2 %
ERYTHROCYTE [DISTWIDTH] IN BLOOD BY AUTOMATED COUNT: 14.4 % (ref 10–15)
FERRITIN SERPL-MCNC: 226 NG/ML (ref 31–409)
GFR SERPL CREATININE-BSD FRML MDRD: 73 ML/MIN/1.73M2
GLUCOSE SERPL-MCNC: 105 MG/DL (ref 70–99)
HBA1C MFR BLD: 6.1 %
HCT VFR BLD AUTO: 46.2 % (ref 40–53)
HDLC SERPL-MCNC: 48 MG/DL
HEMOCCULT STL QL IA: NEGATIVE
HGB BLD-MCNC: 15.1 G/DL (ref 13.3–17.7)
IMM GRANULOCYTES # BLD: 0 10E3/UL
IMM GRANULOCYTES NFR BLD: 0 %
INR PPP: 1.2 (ref 0.85–1.15)
IRON BINDING CAPACITY (ROCHE): 221 UG/DL (ref 240–430)
IRON SATN MFR SERPL: 23 % (ref 15–46)
IRON SERPL-MCNC: 50 UG/DL (ref 61–157)
LDLC SERPL CALC-MCNC: 53 MG/DL
LYMPHOCYTES # BLD AUTO: 1.2 10E3/UL (ref 0.8–5.3)
LYMPHOCYTES NFR BLD AUTO: 19 %
MAGNESIUM SERPL-MCNC: 2.6 MG/DL (ref 1.7–2.3)
MAGNESIUM SERPL-MCNC: 2.7 MG/DL (ref 1.7–2.3)
MCH RBC QN AUTO: 28.7 PG (ref 26.5–33)
MCHC RBC AUTO-ENTMCNC: 32.7 G/DL (ref 31.5–36.5)
MCV RBC AUTO: 88 FL (ref 78–100)
MDC_IDC_MSMT_BATTERY_REMAINING_LONGEVITY: 66 MO
MDC_IDC_MSMT_BATTERY_VOLTAGE: 2.98 V
MDC_IDC_MSMT_CAP_CHARGE_TIME: 4 S
MDC_IDC_MSMT_CAP_CHARGE_TYPE: NORMAL
MDC_IDC_MSMT_LEADCHNL_LV_IMPEDANCE_VALUE: 304 OHM
MDC_IDC_MSMT_LEADCHNL_LV_PACING_THRESHOLD_AMPLITUDE: 0.75 V
MDC_IDC_MSMT_LEADCHNL_LV_PACING_THRESHOLD_PULSEWIDTH: 1 MS
MDC_IDC_MSMT_LEADCHNL_RA_IMPEDANCE_VALUE: 475 OHM
MDC_IDC_MSMT_LEADCHNL_RA_PACING_THRESHOLD_AMPLITUDE: 0.75 V
MDC_IDC_MSMT_LEADCHNL_RA_PACING_THRESHOLD_PULSEWIDTH: 0.4 MS
MDC_IDC_MSMT_LEADCHNL_RA_SENSING_INTR_AMPL: 3 MV
MDC_IDC_MSMT_LEADCHNL_RV_IMPEDANCE_VALUE: 418 OHM
MDC_IDC_MSMT_LEADCHNL_RV_PACING_THRESHOLD_AMPLITUDE: 0.62 V
MDC_IDC_MSMT_LEADCHNL_RV_PACING_THRESHOLD_PULSEWIDTH: 0.4 MS
MDC_IDC_MSMT_LEADCHNL_RV_SENSING_INTR_AMPL: 6.4 MV
MDC_IDC_PG_IMPLANT_DTM: NORMAL
MDC_IDC_PG_MFG: NORMAL
MDC_IDC_PG_MODEL: NORMAL
MDC_IDC_PG_SERIAL: NORMAL
MDC_IDC_PG_TYPE: NORMAL
MDC_IDC_SESS_CLINIC_NAME: NORMAL
MDC_IDC_SESS_DTM: NORMAL
MDC_IDC_SESS_TYPE: NORMAL
MDC_IDC_SET_BRADY_AT_MODE_SWITCH_RATE: 171 {BEATS}/MIN
MDC_IDC_SET_BRADY_LOWRATE: 60 {BEATS}/MIN
MDC_IDC_SET_BRADY_MAX_SENSOR_RATE: 140 {BEATS}/MIN
MDC_IDC_SET_BRADY_MAX_TRACKING_RATE: 140 {BEATS}/MIN
MDC_IDC_SET_BRADY_MODE: NORMAL
MDC_IDC_SET_BRADY_PAV_DELAY_LOW: 170 MS
MDC_IDC_SET_BRADY_SAV_DELAY_LOW: 140 MS
MDC_IDC_SET_LEADCHNL_LV_PACING_AMPLITUDE: 2.25 V
MDC_IDC_SET_LEADCHNL_LV_PACING_CAPTURE_MODE: NORMAL
MDC_IDC_SET_LEADCHNL_LV_PACING_PULSEWIDTH: 1 MS
MDC_IDC_SET_LEADCHNL_RA_PACING_AMPLITUDE: 1.5 V
MDC_IDC_SET_LEADCHNL_RA_PACING_CAPTURE_MODE: NORMAL
MDC_IDC_SET_LEADCHNL_RA_PACING_POLARITY: NORMAL
MDC_IDC_SET_LEADCHNL_RA_PACING_PULSEWIDTH: 0.4 MS
MDC_IDC_SET_LEADCHNL_RA_SENSING_POLARITY: NORMAL
MDC_IDC_SET_LEADCHNL_RA_SENSING_SENSITIVITY: 0.3 MV
MDC_IDC_SET_LEADCHNL_RV_PACING_AMPLITUDE: 2 V
MDC_IDC_SET_LEADCHNL_RV_PACING_CAPTURE_MODE: NORMAL
MDC_IDC_SET_LEADCHNL_RV_PACING_POLARITY: NORMAL
MDC_IDC_SET_LEADCHNL_RV_PACING_PULSEWIDTH: 0.4 MS
MDC_IDC_SET_LEADCHNL_RV_SENSING_POLARITY: NORMAL
MDC_IDC_SET_LEADCHNL_RV_SENSING_SENSITIVITY: 0.3 MV
MDC_IDC_SET_ZONE_DETECTION_BEATS_DENOMINATOR: 40 {BEATS}
MDC_IDC_SET_ZONE_DETECTION_BEATS_NUMERATOR: 30 {BEATS}
MDC_IDC_SET_ZONE_DETECTION_INTERVAL: 240 MS
MDC_IDC_SET_ZONE_DETECTION_INTERVAL: 319.15 MS
MDC_IDC_SET_ZONE_DETECTION_INTERVAL: 350.88 MS
MDC_IDC_SET_ZONE_DETECTION_INTERVAL: 419.58 MS
MDC_IDC_SET_ZONE_TYPE: NORMAL
MDC_IDC_STAT_AT_BURDEN_PERCENT: 0.1 %
MDC_IDC_STAT_BRADY_RA_PERCENT_PACED: 3.4 %
MDC_IDC_STAT_BRADY_RV_PERCENT_PACED: 86.3 %
MDC_IDC_STAT_CRT_LV_PERCENT_PACED: 79.7 %
MDC_IDC_STAT_EPISODE_RECENT_COUNT: 0
MDC_IDC_STAT_EPISODE_RECENT_COUNT: 2
MDC_IDC_STAT_EPISODE_RECENT_COUNT: 2
MDC_IDC_STAT_EPISODE_TYPE: NORMAL
MONOCYTES # BLD AUTO: 0.5 10E3/UL (ref 0–1.3)
MONOCYTES NFR BLD AUTO: 8 %
NEUTROPHILS # BLD AUTO: 4.7 10E3/UL (ref 1.6–8.3)
NEUTROPHILS NFR BLD AUTO: 70 %
NONHDLC SERPL-MCNC: 68 MG/DL
NRBC # BLD AUTO: 0 10E3/UL
NRBC BLD AUTO-RTO: 0 /100
NT-PROBNP SERPL-MCNC: 2503 PG/ML (ref 0–900)
NT-PROBNP SERPL-MCNC: 2612 PG/ML (ref 0–900)
PHOSPHATE SERPL-MCNC: 3.2 MG/DL (ref 2.5–4.5)
PLATELET # BLD AUTO: 91 10E3/UL (ref 150–450)
POTASSIUM SERPL-SCNC: 3.7 MMOL/L (ref 3.4–5.3)
POTASSIUM SERPL-SCNC: 3.8 MMOL/L (ref 3.4–5.3)
PROT SERPL-MCNC: 5.4 G/DL (ref 6.4–8.3)
PSA SERPL-MCNC: 1.63 NG/ML (ref 0–4.5)
RBC # BLD AUTO: 5.27 10E6/UL (ref 4.4–5.9)
SODIUM SERPL-SCNC: 140 MMOL/L (ref 136–145)
SPECIMEN EXPIRATION DATE: NORMAL
T PALLIDUM AB SER QL: NONREACTIVE
TRANSFERRIN SERPL-MCNC: 189 MG/DL (ref 200–360)
TRIGL SERPL-MCNC: 75 MG/DL
TSH SERPL DL<=0.005 MIU/L-ACNC: 5.18 UIU/ML (ref 0.3–4.2)
WBC # BLD AUTO: 6.6 10E3/UL (ref 4–11)

## 2022-11-12 PROCEDURE — 82728 ASSAY OF FERRITIN: CPT | Performed by: STUDENT IN AN ORGANIZED HEALTH CARE EDUCATION/TRAINING PROGRAM

## 2022-11-12 PROCEDURE — 83880 ASSAY OF NATRIURETIC PEPTIDE: CPT | Performed by: STUDENT IN AN ORGANIZED HEALTH CARE EDUCATION/TRAINING PROGRAM

## 2022-11-12 PROCEDURE — 87389 HIV-1 AG W/HIV-1&-2 AB AG IA: CPT | Performed by: STUDENT IN AN ORGANIZED HEALTH CARE EDUCATION/TRAINING PROGRAM

## 2022-11-12 PROCEDURE — 86777 TOXOPLASMA ANTIBODY: CPT | Performed by: STUDENT IN AN ORGANIZED HEALTH CARE EDUCATION/TRAINING PROGRAM

## 2022-11-12 PROCEDURE — 86644 CMV ANTIBODY: CPT | Performed by: STUDENT IN AN ORGANIZED HEALTH CARE EDUCATION/TRAINING PROGRAM

## 2022-11-12 PROCEDURE — 86708 HEPATITIS A ANTIBODY: CPT | Performed by: STUDENT IN AN ORGANIZED HEALTH CARE EDUCATION/TRAINING PROGRAM

## 2022-11-12 PROCEDURE — 214N000001 HC R&B CCU UMMC

## 2022-11-12 PROCEDURE — 86803 HEPATITIS C AB TEST: CPT | Performed by: STUDENT IN AN ORGANIZED HEALTH CARE EDUCATION/TRAINING PROGRAM

## 2022-11-12 PROCEDURE — 92526 ORAL FUNCTION THERAPY: CPT | Mod: GN

## 2022-11-12 PROCEDURE — 250N000012 HC RX MED GY IP 250 OP 636 PS 637

## 2022-11-12 PROCEDURE — 80061 LIPID PANEL: CPT | Performed by: STUDENT IN AN ORGANIZED HEALTH CARE EDUCATION/TRAINING PROGRAM

## 2022-11-12 PROCEDURE — 83880 ASSAY OF NATRIURETIC PEPTIDE: CPT

## 2022-11-12 PROCEDURE — 80359 METHYLENEDIOXYAMPHETAMINES: CPT

## 2022-11-12 PROCEDURE — 86696 HERPES SIMPLEX TYPE 2 TEST: CPT | Performed by: STUDENT IN AN ORGANIZED HEALTH CARE EDUCATION/TRAINING PROGRAM

## 2022-11-12 PROCEDURE — 86832 HLA CLASS I HIGH DEFIN QUAL: CPT | Performed by: INTERNAL MEDICINE

## 2022-11-12 PROCEDURE — G0103 PSA SCREENING: HCPCS | Performed by: STUDENT IN AN ORGANIZED HEALTH CARE EDUCATION/TRAINING PROGRAM

## 2022-11-12 PROCEDURE — 84443 ASSAY THYROID STIM HORMONE: CPT | Performed by: STUDENT IN AN ORGANIZED HEALTH CARE EDUCATION/TRAINING PROGRAM

## 2022-11-12 PROCEDURE — 250N000012 HC RX MED GY IP 250 OP 636 PS 637: Performed by: INTERNAL MEDICINE

## 2022-11-12 PROCEDURE — 80321 ALCOHOLS BIOMARKERS 1OR 2: CPT | Performed by: STUDENT IN AN ORGANIZED HEALTH CARE EDUCATION/TRAINING PROGRAM

## 2022-11-12 PROCEDURE — 86787 VARICELLA-ZOSTER ANTIBODY: CPT | Performed by: STUDENT IN AN ORGANIZED HEALTH CARE EDUCATION/TRAINING PROGRAM

## 2022-11-12 PROCEDURE — 83036 HEMOGLOBIN GLYCOSYLATED A1C: CPT | Performed by: STUDENT IN AN ORGANIZED HEALTH CARE EDUCATION/TRAINING PROGRAM

## 2022-11-12 PROCEDURE — 85730 THROMBOPLASTIN TIME PARTIAL: CPT | Performed by: STUDENT IN AN ORGANIZED HEALTH CARE EDUCATION/TRAINING PROGRAM

## 2022-11-12 PROCEDURE — 250N000011 HC RX IP 250 OP 636: Performed by: STUDENT IN AN ORGANIZED HEALTH CARE EDUCATION/TRAINING PROGRAM

## 2022-11-12 PROCEDURE — 87340 HEPATITIS B SURFACE AG IA: CPT | Performed by: STUDENT IN AN ORGANIZED HEALTH CARE EDUCATION/TRAINING PROGRAM

## 2022-11-12 PROCEDURE — 83550 IRON BINDING TEST: CPT | Performed by: STUDENT IN AN ORGANIZED HEALTH CARE EDUCATION/TRAINING PROGRAM

## 2022-11-12 PROCEDURE — 86780 TREPONEMA PALLIDUM: CPT | Performed by: STUDENT IN AN ORGANIZED HEALTH CARE EDUCATION/TRAINING PROGRAM

## 2022-11-12 PROCEDURE — 250N000013 HC RX MED GY IP 250 OP 250 PS 637

## 2022-11-12 PROCEDURE — 83735 ASSAY OF MAGNESIUM: CPT | Performed by: INTERNAL MEDICINE

## 2022-11-12 PROCEDURE — 86706 HEP B SURFACE ANTIBODY: CPT | Performed by: STUDENT IN AN ORGANIZED HEALTH CARE EDUCATION/TRAINING PROGRAM

## 2022-11-12 PROCEDURE — 84100 ASSAY OF PHOSPHORUS: CPT | Performed by: STUDENT IN AN ORGANIZED HEALTH CARE EDUCATION/TRAINING PROGRAM

## 2022-11-12 PROCEDURE — 86704 HEP B CORE ANTIBODY TOTAL: CPT | Performed by: STUDENT IN AN ORGANIZED HEALTH CARE EDUCATION/TRAINING PROGRAM

## 2022-11-12 PROCEDURE — 82274 ASSAY TEST FOR BLOOD FECAL: CPT | Performed by: INTERNAL MEDICINE

## 2022-11-12 PROCEDURE — 84134 ASSAY OF PREALBUMIN: CPT | Performed by: STUDENT IN AN ORGANIZED HEALTH CARE EDUCATION/TRAINING PROGRAM

## 2022-11-12 PROCEDURE — 80053 COMPREHEN METABOLIC PANEL: CPT | Performed by: STUDENT IN AN ORGANIZED HEALTH CARE EDUCATION/TRAINING PROGRAM

## 2022-11-12 PROCEDURE — 81378 HLA I & II TYPING HR: CPT | Performed by: STUDENT IN AN ORGANIZED HEALTH CARE EDUCATION/TRAINING PROGRAM

## 2022-11-12 PROCEDURE — 86665 EPSTEIN-BARR CAPSID VCA: CPT | Performed by: STUDENT IN AN ORGANIZED HEALTH CARE EDUCATION/TRAINING PROGRAM

## 2022-11-12 PROCEDURE — 86833 HLA CLASS II HIGH DEFIN QUAL: CPT | Performed by: INTERNAL MEDICINE

## 2022-11-12 PROCEDURE — 86901 BLOOD TYPING SEROLOGIC RH(D): CPT | Performed by: STUDENT IN AN ORGANIZED HEALTH CARE EDUCATION/TRAINING PROGRAM

## 2022-11-12 PROCEDURE — 99233 SBSQ HOSP IP/OBS HIGH 50: CPT | Mod: GC | Performed by: INTERNAL MEDICINE

## 2022-11-12 PROCEDURE — 80323 ALKALOIDS NOS: CPT | Performed by: STUDENT IN AN ORGANIZED HEALTH CARE EDUCATION/TRAINING PROGRAM

## 2022-11-12 PROCEDURE — 86481 TB AG RESPONSE T-CELL SUSP: CPT | Performed by: STUDENT IN AN ORGANIZED HEALTH CARE EDUCATION/TRAINING PROGRAM

## 2022-11-12 PROCEDURE — 85025 COMPLETE CBC W/AUTO DIFF WBC: CPT | Performed by: STUDENT IN AN ORGANIZED HEALTH CARE EDUCATION/TRAINING PROGRAM

## 2022-11-12 PROCEDURE — 92610 EVALUATE SWALLOWING FUNCTION: CPT | Mod: GN

## 2022-11-12 PROCEDURE — 250N000011 HC RX IP 250 OP 636: Performed by: INTERNAL MEDICINE

## 2022-11-12 PROCEDURE — 86825 HLA X-MATH NON-CYTOTOXIC: CPT | Performed by: INTERNAL MEDICINE

## 2022-11-12 PROCEDURE — 84466 ASSAY OF TRANSFERRIN: CPT | Performed by: STUDENT IN AN ORGANIZED HEALTH CARE EDUCATION/TRAINING PROGRAM

## 2022-11-12 PROCEDURE — 84132 ASSAY OF SERUM POTASSIUM: CPT | Performed by: INTERNAL MEDICINE

## 2022-11-12 PROCEDURE — 85610 PROTHROMBIN TIME: CPT | Performed by: STUDENT IN AN ORGANIZED HEALTH CARE EDUCATION/TRAINING PROGRAM

## 2022-11-12 RX ORDER — POTASSIUM CHLORIDE 29.8 MG/ML
20 INJECTION INTRAVENOUS ONCE
Status: COMPLETED | OUTPATIENT
Start: 2022-11-12 | End: 2022-11-12

## 2022-11-12 RX ORDER — FUROSEMIDE 40 MG
40 TABLET ORAL DAILY
Status: DISCONTINUED | OUTPATIENT
Start: 2022-11-12 | End: 2022-11-21 | Stop reason: HOSPADM

## 2022-11-12 RX ORDER — ENOXAPARIN SODIUM 100 MG/ML
70 INJECTION SUBCUTANEOUS EVERY 12 HOURS
Status: DISCONTINUED | OUTPATIENT
Start: 2022-11-12 | End: 2022-11-12

## 2022-11-12 RX ORDER — IOPAMIDOL 755 MG/ML
INJECTION, SOLUTION INTRAVASCULAR
Status: DISCONTINUED | OUTPATIENT
Start: 2022-11-11 | End: 2022-11-13

## 2022-11-12 RX ADMIN — FUROSEMIDE 40 MG: 40 TABLET ORAL at 13:18

## 2022-11-12 RX ADMIN — MYCOPHENOLATE MOFETIL 1000 MG: 500 TABLET, FILM COATED ORAL at 08:08

## 2022-11-12 RX ADMIN — MAGNESIUM SULFATE IN WATER 2 G: 40 INJECTION, SOLUTION INTRAVENOUS at 02:01

## 2022-11-12 RX ADMIN — AMIODARONE HYDROCHLORIDE 200 MG: 200 TABLET ORAL at 19:59

## 2022-11-12 RX ADMIN — DOBUTAMINE IN DEXTROSE 2.5 MCG/KG/MIN: 200 INJECTION, SOLUTION INTRAVENOUS at 02:04

## 2022-11-12 RX ADMIN — POTASSIUM CHLORIDE 20 MEQ: 29.8 INJECTION, SOLUTION INTRAVENOUS at 06:47

## 2022-11-12 RX ADMIN — AMIODARONE HYDROCHLORIDE 200 MG: 200 TABLET ORAL at 08:07

## 2022-11-12 RX ADMIN — PRAVASTATIN SODIUM 20 MG: 20 TABLET ORAL at 08:07

## 2022-11-12 RX ADMIN — ASPIRIN 81 MG CHEWABLE TABLET 81 MG: 81 TABLET CHEWABLE at 08:07

## 2022-11-12 RX ADMIN — MYCOPHENOLATE MOFETIL 500 MG: 500 TABLET, FILM COATED ORAL at 19:59

## 2022-11-12 RX ADMIN — TRAZODONE HYDROCHLORIDE 50 MG: 50 TABLET ORAL at 19:57

## 2022-11-12 ASSESSMENT — ACTIVITIES OF DAILY LIVING (ADL)
ADLS_ACUITY_SCORE: 28
ADLS_ACUITY_SCORE: 32
ADLS_ACUITY_SCORE: 28
ADLS_ACUITY_SCORE: 32

## 2022-11-12 NOTE — CONSULTS
[facility]  Neurology Consultation    Patient Name:  Paco Stein  MRN:  [id number]    :  1955  Date of Service:  2022  Primary care provider:  Quentin Odonnell      Neurology consultation service was asked to see Paco Stein by Dr. Dorman to evaluate for CNS vasculitis.    Chief Complaint: None- history of CNS vasculitis with stable exam, now with consideration of LVAD and possible heart transplant.     History of Present Illness:   Paco Stein is a 67 year old male with history of CNS vasculitis who presents with decompensated low output heart failure.  The neurology team was asked to see Dr. Stein to comment on the stability/progression of CNS vasculitis as he is being worked up for possible LVAD and heart transplant.    The documents from care everywhere are somewhat limited, though I am able to see that he had his first stroke in  with some left-sided deficits.  This was a cryptogenic stroke and had a work-up including with a loop recorder that showed no atrial fibrillation, negative echocardiogram, negative esophageal echocardiogram.  It appears he had another cerebrovascular event between  and , which led to a cerebral angiogram performed at the [facility].  Other notes refer to this as a normal cerebral angiogram, though I am not able to find the report itself in care everywhere.  Serum testing and lumbar puncture results are also reported as normal.  The patient himself remembers the discussion of brain biopsy to more definitively answer the question of CNS vasculitis, though the neurology team also described they would favor empiric treatment for CNS vasculitis since he had had recurrent cryptogenic strokes.  With that decision already made, the patient opted not to have a brain biopsy.    According to the patient as well as his family, they describe he has been stable over the past several years, roughly since 2018.  He has  "not had any new strokes, has not had any decline in ability to perform ADLs aside from his shortness of breath with exertion that led to his recent cardiac work-up.  He does describe some difficulty manipulating buttons only over the past several months.  He has always been active, prior to his worsening cardiac function was walking 3 miles a day.    ROS  A comprehensive ROS was performed and pertinent findings were included in HPI.     PMH  No past medical history on file.  Past Surgical History:   Procedure Laterality Date     PICC DOUBLE LUMEN PLACEMENT Right 11/10/2022    Right basilic, 43 cm, 1 cm external length       Medications   I have personally reviewed the patient's medication list.     Allergies  I have personally reviewed the patient's allergy list.     Social History  He does not smoke, is a social drinker, does not drink excessively.  Family History    Family History   Problem Relation Age of Onset     Atrial fibrillation Father      Lung Cancer Brother          Physical Examination   Vitals: BP (!) 81/53   Pulse 80   Temp (!) 96.2  F (35.7  C) (Axillary)   Resp 18   Ht 1.88 m (6' 2\")   Wt 94.7 kg (208 lb 11.2 oz)   SpO2 95%   BMI 26.80 kg/m    General: Lying in bed, NAD  Head: NC/AT  Eyes: no icterus, op pink and moist  Cardiac: RRR. Extremities warm, no edema.   Respiratory: non-labored on RA  GI: S/NT/ND  Skin: No rash or lesion on exposed skin  Psych: Mood pleasant, affect congruent  Neuro:  Mental status: Awake, alert, attentive, oriented to self, time, place, and circumstance. Language is fluent and coherent with intact comprehension of complex commands, naming and repetition.  He is easily able to describe his work history, how he decided to move to [place], and the events leading to his current hospitalization including potential plans.  Cranial nerves: VFF, PERRL, conjugate gaze, EOMI, facial sensation intact, face symmetric, shoulder shrug strong, tongue/uvula midline, no " dysarthria.   Motor: Normal bulk and tone. No abnormal movements.  No pronator drift.  5/5 strength bilaterally in deltoids, biceps, triceps, hand , hip flexors, hip extensors, knee flexion, knee extension, plantarflexion, dorsiflexion.   Reflexes: Normo-reflexic and symmetric biceps, brachioradialis, patellae.  Achilles are not elicited.  Negative Kim, no clonus, toes down-going.  Sensory: Intact to light touch, pin, vibration, and proprioception to the fingertips in the upper extremities.  Though decreased pinprick sensation at the feet.  Mildly reduced sensation of the left leg compared to the right.  Coordination: Some impairment with finger-nose-finger when eyes are closed, coordination of the bilateral finger tapping is reduced, with lower amplitude and less than fully coordinated movements.  Gait: Unable to test now-patient has recently been deconditioned and requires assistance.    Investigations   I have personally reviewed pertinent labs, tests, and radiological imaging. Discussion of notable findings is included under Impression.     Was patient transferred from outside hospital?   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  He carries a diagnosis of CNS vasculitis established on clinical grounds by the [facility], and has remained stable since being treated with immunomodulating therapy, per patient's chart.  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 We will repeat brain MRI as well as angiogram here to assess for any evidence of progression/ CNS vasculitis and will comment once imaging is available.    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    Recommendations  -MRI brain with and without contrast  - MRA [place] [facility] [place] and carotids (ordered for you)   - Please push images from [facility] (the 3 most recent MRI brain as well as the earliest MRI brain)  - Continue mycophenolate 1000/500 as prescribed by outpatient rheumatologist  - Neurology will compare current MRI brain to those performed prior    Thank you for involving Neurology in the care of Paco Stein.  Please do not hesitate to call with questions/concerns (consult pager [phone]).      Kevin Person MD

## 2022-11-12 NOTE — PLAN OF CARE
Major Shift Events:      Intermittently requiring O2; denies SOB when in bed. Returned from Cath lab around 1830 with Terumo band on R radial for the coronary angiogram. R internal jugular access removed prior to arrival w/ no issues. Abd US started upon return to room. Await further decision on LVAD or transplant.    For vital signs and complete assessments, please see documentation flowsheets.     Problem: Gas Exchange Impaired  Goal: Optimal Gas Exchange  Intervention: Optimize Oxygenation and Ventilation  Recent Flowsheet Documentation  Taken 11/11/2022 1600 by Wei Alvarez, RN  Head of Bed (HOB) Positioning: HOB at 30 degrees

## 2022-11-12 NOTE — PROGRESS NOTES
[id number] 11/12/2022    Patient is a 67 year old male admitted for CHF with a PMH of HFrEF 20-25% secondary to arrhythmogenic cardiomyopathy, CAD s/p PCI on LCx and LAD, CNS vasculitis and CKD who presents for shortness of breath. Team is cards 2.    Neuro: A&Ox4  Cardiac: V paced; ICD; pulses palpable; sinus rhythm; Mg and K+ protocols  Respiratory: MELLO; 2L/min via nasal cannula for comfort; diminished lungs; no cough  GI/: WDL; very large BM this morning; uses bedside urinal; bowel sounds active  Endocrine: WDL  Diet/Appetite: Regular diet; thin liquids; swallow study done today  Skin: Right groin access site; right radial access site  LDA: Right double lumen PICC  Activity: Stand by assist  Pain: Patient has no complaints of pain  Plan: MRI on Monday (check list is complete); work up for LVAD/heart transplant

## 2022-11-12 NOTE — PROGRESS NOTES
[facility]    Progress Note - Cardiology Service     Date of Admission:  11/10/2022    Assessment & Plan   Paco Stein is a 67 year old male with PMH s/f HFrEF 20-25% secondary to arrhythmogenic cardiomyopathy, CAD s/p PCI on LCx and LAD, CNS vasculitis and CKD who presents for shortness of breath.     Changes and major things today:  - Switch to maintenance diuresis lasix 40 mg daily  - GI consulted, appreciate recs  - Amphetamine screening positive, ordered confirmatory testing  - Brain MRI scheduled for Monday due to the need for ep supervision during the procedure    #HFrEF  #Dyspnea  #Arrhythmogenic Cardiomyopathy  #History of VTs s/p Biventricular PPM/ICD  Patient coming from clinic with concern for decompensated low output heart failure secondary to NICM. Patient denying chest pain, EKG w/o ischemic changes, appears mildly hypervolemic on physical exam but has been having severe activity limiting shortness of breath of predominantly cardiac etiology. We will start diuresis and assess need for inotropic support/evaluation for advanced treatments while inpatient. Patient with recent episode of non-shocked VT, denies any recent history of ICD delivered defibrillations. Currently on atrial paced rhythm, hemodynamically stable.  - on dobutamine  - Hold PTA metoprolol  - switch to maintenance diuresis with lasix 40 mg daily  - strict I/Os  - Biventricular pacemaker/ICD in place  - Continue PTA amiodarone  - Hold PTA empagliflozin   - RHC as part of LVAD/transplant workup     LVAD/Transplant Evaluation Checklist  [x] labs  [ ] CPX  [x ] RHC  [x ] CVTS consult  [ ] Social work consult  [ ] Palliative care consult  [ ] Neuropsych consult  [ ] Nutrition consult  [x] Dental  [x] Abd US  [x] extremity US and GASTON  [x] carotid US  [x ] PFTs  [ ] 6 minute walk  [x] CT head  [x] CT c/a/p  [ ] colonoscopy (>51 yo), GI consulted, appreciate recs  [x] PSA  [x] Utox/nicotine  and cotinine/PeTH, amphetamine screening positive, confirmatory test ordered     #CAD  Patient with pmh s/f two-vessel CAD s/p TILA on mLAD in 2021 and pLCx on aspirin. Patient denies chest pain, EKG without ischemic changes. Troponin mildly elevated, would favor decompensated heart failure as most likely etiology. Patient had a LHC on 11/11/22 that demonstrated non-obstructive CAD and widely patent stents.  - troponins peaked; no longer trending  - Continue PTA aspirin  - Continue PTA pravastatin     #CNS vasculitis  Patient with history of autoimmune CNS vasculitis with hx of strokes in 2013. No episodes since. Mild sensory deficit over the right foot, rest of neurological exam unremarkable.  - Continue PTA cellcept  - neurology consulted; recommended MRI with and without contrast, test ordered. Due to the patient's implanted pacemaker/defibrillator, will need electrophysiology monitoring during the study, scheduled tentatively for Monday.  - Spoke with [facility], unable to transfer prior MRIs to PACS, CDs with the studies to be faxed on Monday        Diet: Snacks/Supplements Adult: Ensure Enlive; Between Meals  Regular Diet Adult    DVT Prophylaxis: Enoxaparin (Lovenox) SQ  Davis Catheter: Not present  Fluids: None  Central Lines: PRESENT  PICC 11/10/22 Double Lumen Right Basilic Sarahi Shepherd RN-Site Assessment: WDL  Cardiac Monitoring: ACTIVE order. Indication: Post- PCI/Angiogram (24 hours)  Code Status: Full Code      Disposition Plan     Expected Discharge Date: 11/14/2022        Discharge Comments: HT/LVAD workup        The patient's care was discussed with the Attending Physician, Dr. Metcalf.    Leoncio Tyson MD  Internal Medicine, PGY-2  [facility]  [phone]    Securely message with the Vocera Web Console (learn more here)  Text page via [employer] Paging/Directory         Clinically Significant Risk Factors        # Hypokalemia: Lowest K = 3 mmol/L (Ref range: 3.4-5.3)  "in last 2 days, will replace as needed       # Hypoalbuminemia: Lowest albumin = 3.1 g/dL (Ref range: 3.5-5.2) at 11/11/2022  2:31 AM, will monitor as appropriate   # Thrombocytopenia: Lowest platelets = 91 (Ref range: 150-450) in last 2 days, will monitor for bleeding        # Overweight: Estimated body mass index is 25.55 kg/m  as calculated from the following:    Height as of this encounter: 1.88 m (6' 2\").    Weight as of this encounter: 90.3 kg (199 lb)., PRESENT ON ADMISSION         ______________________________________________________________________    Interval History   No acute events overnight, yesterday's notes reviewed AM. Patient together with family in the room, cooperative and pleasant. Reported sleeping well, having improved appetite today.     Data reviewed today: I reviewed all medications, new labs and imaging results over the last 24 hours. I personally reviewed no images or EKG's today.    Physical Exam   Vital Signs: Temp: 97.3  F (36.3  C) Temp src: Oral BP: 104/82 Pulse: 89   Resp: 18 SpO2: 98 % O2 Device: Nasal cannula Oxygen Delivery: 2 LPM  Weight: 199 lbs 0 oz  General: appears younger than stated age, in no acute distress, resting comfortably  HEENT: normocephalic, atraumatic, MMM, EOMI, NC O2 in place  CV: RRR, no murmurs/rubs/gallops, no JOANNA, JVP at the base of the neck  Resp: CTAB, no crackles/rales/wheezing  GI: soft, nontender, nondistended, normoactive bowel sounds  Integ: no wounds/rashes/lesions, no jaundice  Neuro: AAOx3, known asymmetric numbness over the bottom of the right foot    Data   Recent Labs   Lab 11/12/22  0458 11/11/22  1940 11/11/22  1747 11/11/22  1746 11/11/22  0847 11/11/22  0231 11/10/22  1738   WBC 6.6  --   --   --   --  6.0 7.1   HGB 15.1  --  16.3 16.3  --  15.2 16.7   MCV 88  --   --   --   --  87 89   PLT 91*  --   --   --   --  98* 106*   INR 1.20*  --   --   --   --   --   --      --   --   --   --  139 138   POTASSIUM 3.8  3.7 3.0*  --   --  " 3.9 3.1* 3.8   CHLORIDE 101  --   --   --   --  102 101   CO2 26  --   --   --   --  26 25   BUN 19.9  --   --   --   --  24.1* 28.2*   CR 1.09  --   --   --   --  1.10 1.51*   ANIONGAP 13  --   --   --   --  11 12   TIM 8.5*  --   --   --   --  8.5* 8.9   *  --   --   --   --  127* 105*   ALBUMIN 3.4*  --   --   --   --  3.1* 3.7   PROTTOTAL 5.4*  --   --   --   --  4.9* 5.8*   BILITOTAL 1.5*  --   --   --   --  1.5* 1.5*   ALKPHOS 61  --   --   --   --  52 62   ALT 27  --   --   --   --  31 36   AST 24  --   --   --   --  24 30     I have reviewed today's vital signs, notes, medications, labs and imaging.  I have also seen and examined the patient and agree with the findings and plan as outlined above.  Pt with spouse and sons at bedside.  VSS with S3 on exam and JVP 10 cm.  Labs as above with Cr1.0.  Assessment: Endstage heart failure with HFrEF on Dbx 2.5 mcg/kg/min now with LVAD and OHT workup underway.     Joselito Metcalf MD, PhD  Professor, Heart Failure and Cardiac Transplantation  [facility]

## 2022-11-12 NOTE — PROGRESS NOTES
11/12/22 1015   Appointment Info   Signing Clinician's Name / Credentials (SLP) Jodie Shultz MA CCC-SLP   General Information   Onset of Illness/Injury or Date of Surgery 11/10/22   Referring Physician Danika Christianson MD   Patient/Family Therapy Goal Statement (SLP) None stated   Pertinent History of Current Problem Pt is a 67 year old male with PMH s/f HFrEF 20-25% secondary to arrhythmogenic cardiomyopathy, CAD s/p PCI on LCx and LAD, CNS vasculitis and CKD who presents for shortness of breath and advanced heart failure workup. Per pt's wife, pt occasionally demonstrates aspiration when eating/drinking 2/2 hx of stroke. He has not had any recent PNA. Clinical swallow eval completed per MD order.   General Observations Alert   Type of Evaluation   Type of Evaluation Swallow Evaluation   Oral Motor   Oral Musculature generally intact   Structural Abnormalities none present   Mucosal Quality dry   Dentition (Oral Motor)   Dentition (Oral Motor) natural dentition   Facial Symmetry (Oral Motor)   Facial Symmetry (Oral Motor) WNL   Lip Function (Oral Motor)   Lip Range of Motion (Oral Motor) WNL   Tongue Function (Oral Motor)   Tongue ROM (Oral Motor) WNL   Jaw Function (Oral Motor)   Jaw Function (Oral Motor) WNL   Cough/Swallow/Gag Reflex (Oral Motor)   Comment, Cough/Swallow/Gag Reflex (Oral Motor) WNL   Vocal Quality/Secretion Management (Oral Motor)   Vocal Quality (Oral Motor) WNL   General Swallowing Observations   Past History of Dysphagia Pt did not receive formal dysphagia eval after strokes in 2016. Pt's spouse reports the pt sometimes coughs when eating/drinking but does not modify his diet. He received SLP for dysarthria.   Respiratory Support (General Swallowing Observations) none   Current Diet/Method of Nutritional Intake (General Swallowing Observations, NIS) regular diet;thin liquids (level 0)   Swallowing Evaluation Clinical swallow evaluation   Clinical Swallow Evaluation   Feeding  Assistance no assistance needed   Clinical Swallow Evaluation Textures Trialed thin liquids;pureed;solid foods   Clinical Swallow Eval: Thin Liquid Texture Trial   Mode of Presentation, Thin Liquids straw;self-fed   Volume of Liquid or Food Presented 4oz thin water   Oral Phase of Swallow WFL   Pharyngeal Phase of Swallow intact   Diagnostic Statement No overt s/sx of aspiration   Clinical Swallow Evaluation: Puree Solid Texture Trial   Mode of Presentation, Puree self-fed   Volume of Puree Presented 4oz pudding   Oral Phase, Puree WFL   Pharyngeal Phase, Puree intact   Diagnostic Statement No overt s/sx of aspiration   Clinical Swallow Evaluation: Solid Food Texture Trial   Mode of Presentation self-fed   Volume Presented 1 cracker   Oral Phase WFL   Pharyngeal Phase intact   Diagnostic Statement No overt s/sx of aspiration   Esophageal Phase of Swallow   Patient reports or presents with symptoms of esophageal dysphagia No   Swallowing Recommendations   Diet Consistency Recommendations regular diet;thin liquids (level 0)   Supervision Level for Intake patient independent   Mode of Delivery Recommendations bolus size, small;food moistened;slow rate of intake   Monitoring/Assistance Required (Eating/Swallowing) stop eating activities when fatigue is present;monitor for cough or change in vocal quality with intake   Recommended Feeding/Eating Techniques (Swallow Eval) maintain upright sitting position for eating   Medication Administration Recommendations, Swallowing (SLP) as tolerated   Instrumental Assessment Recommendations instrumental evaluation not recommended at this time   Clinical Impression   Criteria for Skilled Therapeutic Interventions Met (SLP Eval) No problems identified which require skilled intervention   SLP Diagnosis Swallow functional at this time   Risks & Benefits of therapy have been explained evaluation/treatment results reviewed;care plan/treatment goals reviewed;risks/benefits  reviewed;current/potential barriers reviewed;participants voiced agreement with care plan;participants included;patient;spouse/significant other;son   Clinical Impression Comments   Clinical swallow eval completed per MD order. Pt presents with a functional oropharyngeal swallow mechanism. Oral mech exam unremarkable, voice/speech WFL, no facial weakness. Assessed with thin liquids, puree, and cracker. Oral phase WFL. Pharyngeal phase WFL, no overt s/sx of aspiration. Recommend the pt continue a regular diet/thin liquids. Ensure the pt is upright and alert for all PO. No additional SLP services indicated, but please reconsult SLP with any c/f dysphagia in the future.     SLP Total Evaluation Time   Eval: oral/pharyngeal swallow function, clinical swallow Minutes (57344) 16   Total Session Time   Total Session Time (sum of timed and untimed services) 22

## 2022-11-12 NOTE — PROGRESS NOTES
SHIFT [id number]    Neuro: A&Ox4  Cardiac: V paced; ICD; pulses palpable; sinus rhythm; Mg and K+ protocols  Respiratory: room air. diminished lung sounds; no cough  GI/: WDL; LBM reported earlier today; uses bedside urinal; bowel sounds active  Endocrine: WDL  Diet/Appetite: Regular diet; thin liquids  Skin: Right groin access site CDI; right radial access site CDI  LDA: Right double lumen PICC with dobutamine gtt infusing per orders.  Activity: Stand by assist  Pain: Patient has no complaints of pain  Plan: MRI on Monday (check list is complete); work up for LVAD/heart transplant. Update team with changes.

## 2022-11-13 ENCOUNTER — APPOINTMENT (OUTPATIENT)
Dept: PHYSICAL THERAPY | Facility: CLINIC | Age: 67
DRG: 286 | End: 2022-11-13
Attending: STUDENT IN AN ORGANIZED HEALTH CARE EDUCATION/TRAINING PROGRAM
Payer: MEDICARE

## 2022-11-13 ENCOUNTER — APPOINTMENT (OUTPATIENT)
Dept: OCCUPATIONAL THERAPY | Facility: CLINIC | Age: 67
DRG: 286 | End: 2022-11-13
Attending: INTERNAL MEDICINE
Payer: MEDICARE

## 2022-11-13 LAB
ABO/RH(D): NORMAL
ANION GAP SERPL CALCULATED.3IONS-SCNC: 14 MMOL/L (ref 7–15)
BUN SERPL-MCNC: 20.8 MG/DL (ref 8–23)
CALCIUM SERPL-MCNC: 8.4 MG/DL (ref 8.8–10.2)
CHLORIDE SERPL-SCNC: 102 MMOL/L (ref 98–107)
CREAT SERPL-MCNC: 1.2 MG/DL (ref 0.67–1.17)
DEPRECATED HCO3 PLAS-SCNC: 24 MMOL/L (ref 22–29)
GFR SERPL CREATININE-BSD FRML MDRD: 65 ML/MIN/1.73M2
GLUCOSE BLDC GLUCOMTR-MCNC: 110 MG/DL (ref 70–99)
GLUCOSE SERPL-MCNC: 85 MG/DL (ref 70–99)
MAGNESIUM SERPL-MCNC: 2.1 MG/DL (ref 1.7–2.3)
POTASSIUM SERPL-SCNC: 3.5 MMOL/L (ref 3.4–5.3)
POTASSIUM SERPL-SCNC: 3.7 MMOL/L (ref 3.4–5.3)
SODIUM SERPL-SCNC: 140 MMOL/L (ref 136–145)
SPECIMEN EXPIRATION DATE: NORMAL

## 2022-11-13 PROCEDURE — 214N000001 HC R&B CCU UMMC

## 2022-11-13 PROCEDURE — 97535 SELF CARE MNGMENT TRAINING: CPT | Mod: GO | Performed by: OCCUPATIONAL THERAPIST

## 2022-11-13 PROCEDURE — 250N000013 HC RX MED GY IP 250 OP 250 PS 637: Performed by: INTERNAL MEDICINE

## 2022-11-13 PROCEDURE — 97165 OT EVAL LOW COMPLEX 30 MIN: CPT | Mod: GO | Performed by: OCCUPATIONAL THERAPIST

## 2022-11-13 PROCEDURE — 99233 SBSQ HOSP IP/OBS HIGH 50: CPT | Mod: GC | Performed by: INTERNAL MEDICINE

## 2022-11-13 PROCEDURE — 250N000013 HC RX MED GY IP 250 OP 250 PS 637

## 2022-11-13 PROCEDURE — 250N000012 HC RX MED GY IP 250 OP 636 PS 637

## 2022-11-13 PROCEDURE — 97530 THERAPEUTIC ACTIVITIES: CPT | Mod: GP

## 2022-11-13 PROCEDURE — 999N000044 HC STATISTIC CVC DRESSING CHANGE

## 2022-11-13 PROCEDURE — 80048 BASIC METABOLIC PNL TOTAL CA: CPT

## 2022-11-13 PROCEDURE — 97116 GAIT TRAINING THERAPY: CPT | Mod: GP

## 2022-11-13 PROCEDURE — 97162 PT EVAL MOD COMPLEX 30 MIN: CPT | Mod: GP

## 2022-11-13 PROCEDURE — 250N000012 HC RX MED GY IP 250 OP 636 PS 637: Performed by: INTERNAL MEDICINE

## 2022-11-13 PROCEDURE — 86901 BLOOD TYPING SEROLOGIC RH(D): CPT | Performed by: STUDENT IN AN ORGANIZED HEALTH CARE EDUCATION/TRAINING PROGRAM

## 2022-11-13 PROCEDURE — 83735 ASSAY OF MAGNESIUM: CPT | Performed by: INTERNAL MEDICINE

## 2022-11-13 RX ORDER — POTASSIUM CHLORIDE 750 MG/1
20 TABLET, EXTENDED RELEASE ORAL ONCE
Status: COMPLETED | OUTPATIENT
Start: 2022-11-13 | End: 2022-11-13

## 2022-11-13 RX ORDER — MICONAZOLE NITRATE 20 MG/G
CREAM TOPICAL 2 TIMES DAILY
Status: DISCONTINUED | OUTPATIENT
Start: 2022-11-13 | End: 2022-11-21 | Stop reason: HOSPADM

## 2022-11-13 RX ADMIN — ASPIRIN 81 MG CHEWABLE TABLET 81 MG: 81 TABLET CHEWABLE at 07:52

## 2022-11-13 RX ADMIN — MYCOPHENOLATE MOFETIL 500 MG: 500 TABLET, FILM COATED ORAL at 19:53

## 2022-11-13 RX ADMIN — AMIODARONE HYDROCHLORIDE 200 MG: 200 TABLET ORAL at 19:52

## 2022-11-13 RX ADMIN — PRAVASTATIN SODIUM 20 MG: 20 TABLET ORAL at 07:52

## 2022-11-13 RX ADMIN — MICONAZOLE NITRATE: 20 CREAM TOPICAL at 19:53

## 2022-11-13 RX ADMIN — AMIODARONE HYDROCHLORIDE 200 MG: 200 TABLET ORAL at 07:52

## 2022-11-13 RX ADMIN — FUROSEMIDE 40 MG: 40 TABLET ORAL at 07:52

## 2022-11-13 RX ADMIN — MYCOPHENOLATE MOFETIL 1000 MG: 500 TABLET, FILM COATED ORAL at 07:53

## 2022-11-13 RX ADMIN — POTASSIUM CHLORIDE 20 MEQ: 750 TABLET, EXTENDED RELEASE ORAL at 07:52

## 2022-11-13 ASSESSMENT — ACTIVITIES OF DAILY LIVING (ADL)
ADLS_ACUITY_SCORE: 32

## 2022-11-13 NOTE — PLAN OF CARE
D: Pt admitted from clinic with CHF, hypervolemia, SOB 11/10/22.  start inotrope, transplant/LVAD evaluation   PMH: family history for sudden death and need for transplant for arrhythmogenic cardiomyopathy, end stage NICM, PPM/ICD, CHF, thrombocytopenia, CNS vasculitis with prior strokes, Coronary artery disease involving native coronary artery of native heart without angina pectoris    I: Monitored vitals and assessed pt status.     A:  Pt rhythm was SR with PVCS/PACs. Bigeminy/tri    Neuro: A&Ox4  Cardiac: V paced; ICD; pulses palpable; sinus rhythm; Mg and K+ protocols  Respiratory: room air- clear lung sounds; no cough  GI/: WDL; LBM reported; uses bedside urinal; bowel sounds active  Endocrine: WDL  Diet/Appetite: Regular diet; thin liquids  Skin: Right groin access site CDI; R internal jugular site, right radial access site CDI, groin rash  LDA: Right double lumen PICC with dobutamine gtt infusing per orders.  Activity: Stand by assist  Pain: Patient has no complaints of pain  Plan: MRI on Monday (check list is complete); work up for LVAD/heart transplant.           I/O this shift:  In: 240 [P.O.:240]  Out: 300 [Urine:300]    Temp:  [97.3  F (36.3  C)-98.2  F (36.8  C)] 98  F (36.7  C)  Pulse:  [80-90] 87  Resp:  [18-20] 18  BP: ()/(63-82) 96/75  SpO2:  [92 %-99 %] 92 %      P: Continue to monitor Pt status and report changes to treatment team.

## 2022-11-13 NOTE — PROGRESS NOTES
[id number] 11/13/2022     Patient is a 67 year old male admitted for CHF with a PMH of HFrEF 20-25% secondary to arrhythmogenic cardiomyopathy, CAD s/p PCI on LCx and LAD, CNS vasculitis and CKD who presents for shortness of breath. Team is cards 2.     Neuro: A&Ox4; patient has trouble regulating emotions at times r/t past stroke   Cardiac: V paced; ICD; pulses palpable; sinus rhythm; Mg and K+ protocols (potassium replaced); dobutamine drip  Respiratory: MELLO; on room air; diminished lungs; no cough; may need a little oxygen over night  GI/: WDL; large BM today; uses bedside urinal and commode; bowel sounds active  Endocrine: WDL  Diet/Appetite: Regular diet; clear liquid diet at midnight  Skin: Right groin access site; right radial access site; right internal jugular    - Groin area and inner thighs quite red and irritated; team paged; anti fungal powder added  LDA: Right double lumen PICC  Activity: Stand by assist with walker and gaitbelt   Pain: Patient has no complaints of pain  Plan: MRI on Monday (check list is complete); colonoscopy 11/15; work up for LVAD/heart transplant     - Patient showered with OT today

## 2022-11-13 NOTE — CONSULTS
GASTROENTEROLOGY CONSULTATION      Date of Admission:  11/10/2022           ASSESSMENT AND RECOMMENDATIONS:   Assessment:  Paco Stein is a 67 year old male with a history of HFrEF(EF 20-25%) s/p BiV PPM, CAD s/p PCI to LCx and LAD, CNS vasculitis and CKD who was admitted for AHF exacerbation. GI consulted for colonscopy as part of LVAD/Transplant work-up/ Eval.       #Hx of HFrEF undergoing LVAD/Transplant Evaluation   #Hx of Tubular Adenomas   -From a cardiopulmomary perspective, patient poses some increased risk with undergoing a sedating procedure. Continues to require IV Diuresis and inotropic support.   -Although last CScope was apparently in 2015 per documentation in EHR, hx of TAs w/ unclear # polyps removed, it is appropriate for this patient to undergoing further surveillance of CRC in anticipation for potential transplant and long term IS.  Recommendations:   -Given underlying co-morbidities, plan for Colonoscopy with Anesthesia on 11/15   -Colonoscopy Prep:   - Clear liquids, no red coloring starting 11/14   - Colonoscopy prep  - Give 2L Go-Lytely 11/14 at 1600  - Give 2L Go-Lytely 11/15 at 0300  - If not clear by 0500, please give an additional 2L Go-Lytely  - NPO at midnight 11/14 except medications.  - Hold all anti-coagulant medications pre-procedurally    Gastroenterology follow up recommendations: TBD       Patient care plan discussed with Dr. Nino, GI staff physician. Thank you for involving us in this patient's care. Please do not hesitate to contact the GI service with any questions or concerns.       Ria Nelson M.D  GI fellow  Department of Gastroenterology   -------------------------------------------------------------------------------------------------------------------   History is obtained from the patient and the medical record.          History of Present Illness:   Paco Stein is a 67 year old male with a history of HFrEF(EF 20-25%) s/p BiV PPM, CAD s/p PCI to LCx and  "LAD, CNS vasculitis and CKD who was admitted for AHF exacerbation. GI consulted for colonscopy as part of LVAD/Transplant work-up.    With regards to CRC hx, patient reports previous colonoscopies done in past, last done ~10-years prior where polyps were removed. Personally, I was able to see documentation of colonoscopies done in 2004, 2009, & 2015 along with hx of \"Tubular Adenomas\"; no detailed report found in care everywhere. He denies any unexplained wt loss, BRBPR, early satiety. He denies any FHx of CRC. Denies any tobacco use hx or EtOH use.                 Past Medical History:   Reviewed and edited as appropriate  No past medical history on file.         Past Surgical History:   Reviewed and edited as appropriate   Past Surgical History:   Procedure Laterality Date     PICC DOUBLE LUMEN PLACEMENT Right 11/10/2022    Right basilic, 43 cm, 1 cm external length            Previous Endoscopy:   No results found for this or any previous visit.         Social History:   Reviewed and edited as appropriate  Social History     Socioeconomic History     Marital status:      Spouse name: Not on file     Number of children: Not on file     Years of education: Not on file     Highest education level: Not on file   Occupational History     Not on file   Tobacco Use     Smoking status: Never     Smokeless tobacco: Never   Substance and Sexual Activity     Alcohol use: Not Currently     Drug use: Not on file     Sexual activity: Not on file   Other Topics Concern     Not on file   Social History Narrative     Not on file     Social Determinants of Health     Financial Resource Strain: Not on file   Food Insecurity: Not on file   Transportation Needs: Not on file   Physical Activity: Not on file   Stress: Not on file   Social Connections: Not on file   Intimate Partner Violence: Not on file   Housing Stability: Not on file            Family History:   Reviewed and edited as appropriate  Family History   Problem " Relation Age of Onset     Atrial fibrillation Father      Lung Cancer Brother            Allergies:   Reviewed and edited as appropriate   No Known Allergies         Medications:     Current Facility-Administered Medications   Medication     acetaminophen (TYLENOL) Suppository 650 mg     acetaminophen (TYLENOL) tablet 650 mg     amiodarone (PACERONE) tablet 200 mg     aspirin (ASA) chewable tablet 81 mg     bisacodyl (DULCOLAX) suppository 10 mg     DOBUTamine 500 mg in dextrose 5% 250 mL (adult std conc) premix     [Held by provider] empagliflozin (JARDIANCE) tablet 10 mg     furosemide (LASIX) tablet 40 mg     heparin lock flush 10 UNIT/ML injection 5-20 mL     heparin lock flush 10 UNIT/ML injection 5-20 mL     HOLD:  Metformin and metformin containing medications if patient received IV contrast with acute kidney injury or severe chronic kidney disease (stage IV or stage V; i.e., eGFR less than 30)     lidocaine (LMX4) cream     lidocaine 1 % 0.1-1 mL     medication instruction     miconazole (MICATIN) 2 % cream     mycophenolate (GENERIC EQUIVALENT) tablet 1,000 mg     mycophenolate (GENERIC EQUIVALENT) tablet 500 mg     oxyCODONE (ROXICODONE) tablet 5 mg    Or     oxyCODONE IR (ROXICODONE) tablet 10 mg     polyethylene glycol (MIRALAX) Packet 17 g     pravastatin (PRAVACHOL) tablet 20 mg     senna-docusate (SENOKOT-S/PERICOLACE) 8.6-50 MG per tablet 1 tablet    Or     senna-docusate (SENOKOT-S/PERICOLACE) 8.6-50 MG per tablet 2 tablet     sodium chloride (PF) 0.9% PF flush 10-20 mL     sodium chloride (PF) 0.9% PF flush 10-20 mL     sodium chloride (PF) 0.9% PF flush 10-40 mL     sodium chloride (PF) 0.9% PF flush 10-40 mL     sodium chloride (PF) 0.9% PF flush 3 mL     sodium chloride (PF) 0.9% PF flush 3 mL     traZODone (DESYREL) tablet 50 mg             Review of Systems:     A complete review of systems was performed and is negative except as noted in the HPI           Physical Exam:   /85 (BP  "Location: Left arm)   Pulse 94   Temp 97.4  F (36.3  C) (Oral)   Resp 18   Ht 1.88 m (6' 2\")   Wt 88.6 kg (195 lb 6.4 oz)   SpO2 96%   BMI 25.09 kg/m    Wt:   Wt Readings from Last 2 Encounters:   11/13/22 88.6 kg (195 lb 6.4 oz)   11/10/22 108 kg (238 lb 1.6 oz)      Constitutional: cooperative, pleasant  Eyes: Sclera anicteric/injected  Ears/nose/mouth/throat: Normal oropharynx without ulcers or exudate, mucus membranes moist, hearing intact  Neck: supple, thyroid normal size  CV: RRR   Respiratory: Unlabored breathing  Abd:  Nondistended, no organomegaly appreciated, nontender  Skin: warm, perfused, no jaundice  Neuro: AAOPsych: Normal affect       Data:   Labs and imaging below were independently reviewed and interpreted    BMP  Recent Labs   Lab 11/13/22  1547 11/13/22  0456 11/12/22 0458 11/11/22  1940 11/11/22  0847 11/11/22  0231 11/10/22  1738   NA  --  140 140  --   --  139 138   POTASSIUM  --  3.7  3.5 3.8  3.7 3.0* 3.9 3.1* 3.8   CHLORIDE  --  102 101  --   --  102 101   TIM  --  8.4* 8.5*  --   --  8.5* 8.9   CO2  --  24 26  --   --  26 25   BUN  --  20.8 19.9  --   --  24.1* 28.2*   CR  --  1.20* 1.09  --   --  1.10 1.51*   * 85 105*  --   --  127* 105*     CBC  Recent Labs   Lab 11/12/22 0458 11/11/22  1746 11/11/22  0231 11/10/22  1738   WBC 6.6  --  6.0 7.1   RBC 5.27  --  5.35 5.73   HGB 15.1   < > 15.2 16.7   HCT 46.2  --  46.6 50.9   MCV 88  --  87 89   MCH 28.7  --  28.4 29.1   MCHC 32.7  --  32.6 32.8   RDW 14.4  --  14.4 14.7   PLT 91*  --  98* 106*    < > = values in this interval not displayed.     INR  Recent Labs   Lab 11/12/22 0458   INR 1.20*     LFTs  Recent Labs   Lab 11/12/22  0458 11/11/22  0231 11/10/22  1738 11/08/22  1020   ALKPHOS 61 52 62  --    AST 24 24 30 34   ALT 27 31 36  --    BILITOTAL 1.5* 1.5* 1.5*  --    PROTTOTAL 5.4* 4.9* 5.8*  --    ALBUMIN 3.4* 3.1* 3.7  --       PANCNo lab results found in last 7 days.      "

## 2022-11-13 NOTE — PROGRESS NOTES
11/13/22 1427   Appointment Info   Signing Clinician's Name / Credentials (OT) Monique Mcdaniel OTR/L   Living Environment   People in Home spouse   Current Living Arrangements house   Transportation Anticipated car, drives self;family or friend will provide   Living Environment Comments Pt lives in a house with his wife.  Prior to illness in September was driving and fully IND with no AE.   Self-Care   Usual Activity Tolerance excellent   Current Activity Tolerance fair   Regular Exercise Yes   Activity/Exercise Type walking   Exercise Amount/Frequency daily  (3 miles a day prior to September)   Equipment Currently Used at Home none  (wife purchased shower chair and wheelchair this week)   Fall history within last six months no   Activity/Exercise/Self-Care Comment Pt was IND prior, since September his heart failure has decreased his activity tolerance up to last week   Instrumental Activities of Daily Living (IADL)   Previous Responsibilities meal prep;laundry;shopping;housekeeping;yardwork;medication management;finances;driving  (retired)   IADL Comments IND IADL prior, shares with wife   General Information   Onset of Illness/Injury or Date of Surgery 11/10/22   Referring Physician Dr Dorman, V   Patient/Family Therapy Goal Statement (OT) regain IND   Additional Occupational Profile Info/Pertinent History of Current Problem 67 year old male admitted for CHF with a PMH of HFrEF 20-25% secondary to arrhythmogenic cardiomyopathy, CAD s/p PCI on LCx and LAD, CNS vasculitis and CKD who presents for shortness of breath.   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   Existing Precautions/Restrictions cardiac;fall   Limitations/Impairments safety/cognitive   General Observations and Info activity ambulate   Cognitive Status Examination   Cognitive Status Comments pt with cognitive deficits, noted deficits with initiation during ADLs and per wife  he is well below his baseline. Much improved from admit but still not IND.   Visual Perception   Visual Impairment/Limitations WFL   Sensory   Sensory Comments WFL   Pain Assessment   Patient Currently in Pain No   Posture   Posture not impaired   Range of Motion Comprehensive   General Range of Motion no range of motion deficits identified   Strength Comprehensive (MMT)   Comment, General Manual Muscle Testing (MMT) Assessment generalized deconditioning, was much more active prior to virus 9/2022   Coordination   Gross Motor Coordination below baseline   Fine Motor Coordination below baseline   Bed Mobility   Comment (Bed Mobility) close CGA, per wife he was so unsteady up until today he would fall off the bed if not supported   Transfers   Transfer Comments Close CGA/Min A w walker, below baseline   Balance   Balance Comments below baseline, high fall risk   Activities of Daily Living   BADL Assessment/Intervention bathing;upper body dressing;lower body dressing;grooming;toileting   Bathing Assessment/Intervention   [place] Level (Bathing) moderate assist (50% patient effort)   Comment, (Bathing) per wife he was Ax2-3 for transfer and task two days ago,  much improved today although needs cues to initiate task and fatigues quickly   Assistive Devices (Bathing) tub bench   Upper Body Dressing Assessment/Training   Comment, (Upper Body Dressing) assist due to lines and ties while seated   Bath Level (Upper Body Dressing) minimum assist (75% patient effort)   Lower Body Dressing Assessment/Training   Comment, (Lower Body Dressing) pt SOB leaning down, off balance reaching for feet   Bath Level (Lower Body Dressing) maximum assist (25% patient effort)   Grooming Assessment/Training   Bath Level (Grooming) minimum assist (75% patient effort)   Comment, (Grooming) pt needing cues to initiate task, fatigues and unable to complete fully in standing   Toileting   Assistive Devices (Toileting)  commode chair   Comment, (Toileting) pt currently using bedside commode or urinal with assist   [place] Level (Toileting) moderate assist (50% patient effort)   Clinical Impression   Criteria for Skilled Therapeutic Interventions Met (OT) Yes, treatment indicated   OT Diagnosis deconditioning, AMS, increased heart failure   Influenced by the following impairments weakness, balance deficit   OT Problem List-Impairments impacting ADL problems related to;activity tolerance impaired;balance;cognition;mobility;motor control;strength   Assessment of Occupational Performance 5 or more Performance Deficits   Identified Performance Deficits all ADLs and IADLs are affected   Planned Therapy Interventions (OT) ADL retraining;IADL retraining;balance training;bed mobility training;strengthening;transfer training   Clinical Decision Making Complexity (OT) low complexity   Anticipated Equipment Needs Upon Discharge (OT) walker, rolling   Risk & Benefits of therapy have been explained patient;spouse/significant other;participants included;participants voiced agreement with care plan;current/potential barriers reviewed;risks/benefits reviewed;care plan/treatment goals reviewed;evaluation/treatment results reviewed   OT Total Evaluation Time   OT Eval, Low Complexity Minutes (76220) 6   OT Goals   Therapy Frequency (OT) Daily   OT Predicted Duration/Target Date for Goal Attainment 12/01/22   OT Goals Hygiene/Grooming;Lower Body Dressing;Transfers;Toilet Transfer/Toileting;Home Management;Cognition   OT: Hygiene/Grooming independent   OT: Lower Body Dressing Independent   OT: Transfer Modified independent;with assistive device  (shower transfer w bench PRN)   OT: Toilet Transfer/Toileting Independent   OT: Home Management Modified independent;using adaptive equipment  (w walker)   OT: Cognitive Patient/caregiver will verbalize understanding of cognitive assessment results/recommendations as needed for safe discharge planning

## 2022-11-13 NOTE — PROGRESS NOTES
[facility]    Progress Note - Cardiology Service     Date of Admission:  11/10/2022    Assessment & Plan   Paco Stein is a 67 year old male with PMH s/f HFrEF 20-25% secondary to arrhythmogenic cardiomyopathy, CAD s/p PCI on LCx and LAD, CNS vasculitis and CKD who presents for shortness of breath.     Changes and major things today:  - Continue dobutamine drip 2.5 mcg/kg/min  - Continue maintenance diuresis  - PT/OT consulted  - Pending GI consultation for inpatient colonoscopy, to be scheduled for tomorrow     #HFrEF  #Dyspnea  #Arrhythmogenic Cardiomyopathy  #History of VTs s/p Biventricular PPM/ICD  Patient coming from clinic with concern for decompensated low output heart failure secondary to NICM. Patient denying chest pain, EKG w/o ischemic changes, appears mildly hypervolemic on physical exam but has been having severe activity limiting shortness of breath of predominantly cardiac etiology. We will start diuresis and assess need for inotropic support/evaluation for advanced treatments while inpatient. Patient with recent episode of non-shocked VT, denies any recent history of ICD delivered defibrillations. Currently on atrial paced rhythm, hemodynamically stable.  - on dobutamine  - Hold PTA metoprolol  - switch to maintenance diuresis with lasix 40 mg daily  - strict I/Os  - Biventricular pacemaker/ICD in place  - Continue PTA amiodarone  - Hold PTA empagliflozin   - RHC as part of LVAD/transplant workup     LVAD/Transplant Evaluation Checklist  [x] labs  [ ] CPX  [x] RHC  [ ] CVTS consult  [ ] Social work consult  [ ] Palliative care consult  [ ] Neuropsych consult  [ ] Nutrition consult  [x] Dental  [x] Abd US  [x] extremity US and GASTON  [x] carotid US  [x ] PFTs  [ ] 6 minute walk  [x] CT head  [x] CT c/a/p  [x] colonoscopy (>49 yo), GI consulted, appreciate recs  [x] PSA  [x] Utox/nicotine and cotinine/PeTH, amphetamine screening positive,  confirmatory test ordered     #CAD  Patient with pmh s/f two-vessel CAD s/p TILA on mLAD in 2021 and pLCx on aspirin. Patient denies chest pain, EKG without ischemic changes. Troponin mildly elevated, would favor decompensated heart failure as most likely etiology. Patient had a LHC on 11/11/22 that demonstrated non-obstructive CAD and widely patent stents.  - troponins peaked; no longer trending  - Continue PTA aspirin  - Continue PTA pravastatin     #CNS vasculitis  Patient with history of autoimmune CNS vasculitis with hx of strokes in 2013. No episodes since. Mild sensory deficit over the right foot, rest of neurological exam unremarkable.  - Continue PTA cellcept  - neurology consulted; recommended MRI with and without contrast, test ordered. Due to the patient's implanted pacemaker/defibrillator, will need electrophysiology monitoring during the study, scheduled tentatively for Monday.  - Spoke with [facility], unable to transfer prior MRIs to PACS, CDs with the studies to be faxed on Monday     Diet: Snacks/Supplements Adult: Ensure Enlive; Between Meals  Regular Diet Adult    DVT Prophylaxis: Pneumatic Compression Devices  Davis Catheter: Not present  Fluids: None  Central Lines: PRESENT  PICC 11/10/22 Double Lumen Right Basilic Sarahi Shepherd RN-Site Assessment: WDL  Cardiac Monitoring: ACTIVE order. Indication: Post- PCI/Angiogram (24 hours)  Code Status: Full Code      Disposition Plan      Expected Discharge Date: 11/16/2022        Discharge Comments: HT/LVAD workup        The patient's care was discussed with the Attending Physician, Dr. Metcalf.    Leoncio Tyson MD  Internal Medicine, PGY-2  [facility]  [phone]  Securely message with the Vocera Web Console (learn more here)  Text page via [employer] Paging/Directory         Clinically Significant Risk Factors        # Hypokalemia: Lowest K = 3 mmol/L (Ref range: 3.4-5.3) in last 2 days, will replace as needed   #  "Hypocalcemia: Lowest Ca = 8.4 mg/dL (Ref range: 8.5 - 10.1 mg/dL) in last 2 days, will monitor and replace as appropriate     # Hypoalbuminemia: Lowest albumin = 3.1 g/dL (Ref range: 3.5-5.2) at 11/11/2022  2:31 AM, will monitor as appropriate   # Thrombocytopenia: Lowest platelets = 91 (Ref range: 150-450) in last 2 days, will monitor for bleeding        # Overweight: Estimated body mass index is 25.09 kg/m  as calculated from the following:    Height as of this encounter: 1.88 m (6' 2\").    Weight as of this encounter: 88.6 kg (195 lb 6.4 oz)., PRESENT ON ADMISSION         ______________________________________________________________________    Interval History   No acute events overnight, yesterday's notes reviewed AM. Patient eating and sleeping well, cooperative and pleasant.        Data reviewed today: I reviewed all medications, new labs and imaging results over the last 24 hours. I personally reviewed no images or EKG's today.    Physical Exam   Vital Signs: Temp: 98.3  F (36.8  C) Temp src: Oral BP: 99/63 Pulse: 85   Resp: 18 SpO2: 96 % O2 Device: None (Room air)    Weight: 195 lbs 6.4 oz  General: appears younger than stated age, in no acute distress, resting comfortably  HEENT: normocephalic, atraumatic, MMM, EOMI, NC O2 in place  CV: RRR, no murmurs/rubs/gallops, no JOANNA, JVP at the base of the neck  Resp: CTAB, no crackles/rales/wheezing  GI: soft, nontender, nondistended, normoactive bowel sounds  Integ: no wounds/rashes/lesions, no jaundice  Neuro: AAOx3, known asymmetric numbness over the bottom of the right foot       Data   Recent Labs   Lab 11/13/22  0456 11/12/22  0458 11/11/22  1940 11/11/22  1747 11/11/22  1746 11/11/22  0847 11/11/22  0231 11/10/22  1738   WBC  --  6.6  --   --   --   --  6.0 7.1   HGB  --  15.1  --  16.3 16.3  --  15.2 16.7   MCV  --  88  --   --   --   --  87 89   PLT  --  91*  --   --   --   --  98* 106*   INR  --  1.20*  --   --   --   --   --   --     140  --   --   " --   --  139 138   POTASSIUM 3.7  3.5 3.8  3.7 3.0*  --   --    < > 3.1* 3.8   CHLORIDE 102 101  --   --   --   --  102 101   CO2 24 26  --   --   --   --  26 25   BUN 20.8 19.9  --   --   --   --  24.1* 28.2*   CR 1.20* 1.09  --   --   --   --  1.10 1.51*   ANIONGAP 14 13  --   --   --   --  11 12   TIM 8.4* 8.5*  --   --   --   --  8.5* 8.9   GLC 85 105*  --   --   --   --  127* 105*   ALBUMIN  --  3.4*  --   --   --   --  3.1* 3.7   PROTTOTAL  --  5.4*  --   --   --   --  4.9* 5.8*   BILITOTAL  --  1.5*  --   --   --   --  1.5* 1.5*   ALKPHOS  --  61  --   --   --   --  52 62   ALT  --  27  --   --   --   --  31 36   AST  --  24  --   --   --   --  24 30    < > = values in this interval not displayed.     I have reviewed today's vital signs, notes, medications, labs and imaging.  I have also seen and examined the patient and agree with the findings and plan as outlined above.  Pt with family at bedside.  VSS with HR 80s and /82 with 1L UO.  Labs as above.  Assessment: Pt with endstage DCM with hx of CAD now stable on Dbx 2.5 mcg/kg/min and workup for LVAD/OHT in progress.  Full code.     Joselito Metcalf MD, PhD  Professor, Heart Failure and Cardiac Transplantation  [facility]

## 2022-11-13 NOTE — PROGRESS NOTES
11/13/22 1500   Appointment Info   Signing Clinician's Name / Credentials (PT) Vangie Caldwell, PT   Living Environment   People in Home spouse   Current Living Arrangements house   Home Accessibility stairs to enter home;stairs within home   Number of Stairs, Main Entrance 2   Stair Railings, Main Entrance none   Number of Stairs, Within Home, Primary other (see comments)  (15)   Stair Railings, Within Home, Primary railing on right side (ascending)   Transportation Anticipated car, drives self;family or friend will provide   Living Environment Comments Pt lives in a house with his wife.  Prior to illness in September was driving and fully IND with no AE. 15 stairs to bedroom/bathroom(walk in shower) but able to modify main floor and use bedroom/bath on main floor if needed   Self-Care   Usual Activity Tolerance good   Current Activity Tolerance fair   Regular Exercise Yes   Activity/Exercise Type walking   Exercise Amount/Frequency daily  (until illness was walking 3 miles per day)   Equipment Currently Used at Home none  (wife purchased shower chair and wheelchair this week)   Fall history within last six months no   Activity/Exercise/Self-Care Comment Pt was IND prior, since September his heart failure has decreased his activity tolerance up to last week, prior to admission was Ax2.   General Information   Onset of Illness/Injury or Date of Surgery 11/10/22   Referring Physician Leoncio Tyson MD   Patient/Family Therapy Goals Statement (PT) Improve walking   Pertinent History of Current Problem (include personal factors and/or comorbidities that impact the POC) 67 year old male with PMH s/f HFrEF 20-25% secondary to arrhythmogenic cardiomyopathy, CAD s/p PCI on LCx and LAD, CNS vasculitis and CKD who presents for shortness of breath   Existing Precautions/Restrictions cardiac;fall   Heart Disease Risk Factors Gender;Age;Medical history   Cognition   Affect/Mental Status (Cognition) WFL   Orientation Status  (Cognition) oriented x 3  (not oriented to specific day but oriented to month/year/day of the week)   Follows Commands (Cognition) over 90% accuracy   Pain Assessment   Patient Currently in Pain No   Posture    Posture Forward head position;Protracted shoulders   Range of Motion (ROM)   Range of Motion ROM is WFL   ROM Comment grossly WFL   Strength (Manual Muscle Testing)   Strength (Manual Muscle Testing) Deficits observed during functional mobility   Strength Comments Generalized deconditioning and impaired strength   Bed Mobility   Comment, (Bed Mobility) Supine > sit with min A, HOB raised, use of railing   Transfers   Comment, (Transfers) Sit > stand CGA, FWW   Gait/Stairs (Locomotion)   Comment, (Gait/Stairs) Amb x 10' with FWW and CGA, unsteady with minor shakiness, forward flexed posture   Balance   Balance Comments Good static sitting balance, impaired standing and dynamic balance with gait, requires FWW for balance   Clinical Impression   Criteria for Skilled Therapeutic Intervention Yes, treatment indicated   PT Diagnosis (PT) impaired functional mobility   Influenced by the following impairments strength, activity tolerance, balance, cardiovascular status, posture   Functional limitations due to impairments gait, stairs, transfers, bed  mobility, functional endurance   Clinical Presentation (PT Evaluation Complexity) Evolving/Changing   Clinical Presentation Rationale clinical reasoning, heart failure work up and medication management   Clinical Decision Making (Complexity) moderate complexity   Planned Therapy Interventions (PT) balance training;bed mobility training;gait training;home exercise program;neuromuscular re-education;patient/family education;ROM (range of motion);stair training;strengthening;transfer training;progressive activity/exercise;risk factor education;home program guidelines   Risk & Benefits of therapy have been explained evaluation/treatment results reviewed;care plan/treatment  goals reviewed;risks/benefits reviewed;patient   PT Total Evaluation Time   PT Eval, Moderate Complexity Minutes (54415) 10   Physical Therapy Goals   PT Frequency 6x/week   PT Predicted Duration/Target Date for Goal Attainment 11/27/22   PT Goals Bed Mobility;Transfers;Gait;Stairs;Cardiac Phase 1   PT: Bed Mobility Independent;Supine to/from sit;Within precautions   PT: Transfers Independent;Sit to/from stand;Within precautions   PT: Gait Modified independent;Rolling walker;Greater than 200 feet;Within precautions   PT: Stairs Supervision/stand-by assist;2 stairs;Within precautions  (no railings)   PT: Understanding of cardiac education to maximize quality of life, condition management, and health outcomes Patient;Verbalize   PT: Perform aerobic activity with stable cardiovascular response continuous;15 minutes;NuStep   PT Discharge Planning   PT Plan 6MWT, stairs, progress gait, trial Nustep   PT Discharge Recommendation (DC Rec) home with assist;home with outpatient physical therapy;Acute Rehab Center-Motivated patient will benefit from intensive, interdisciplinary therapy.  Anticipate will be able to tolerate 3 hours of therapy per day   PT Rationale for DC Rec Anticipate pt will be appropriate for home with OP CR pending progress with stairs and gait, pt has made significant progress since admission. At this time would benefit from inpatient rehab to improve strength, activity tolerance and balance.   PT Brief overview of current status Ax1 with FWW, ambulate in hallways 3-4x/day

## 2022-11-14 ENCOUNTER — APPOINTMENT (OUTPATIENT)
Dept: PHYSICAL THERAPY | Facility: CLINIC | Age: 67
DRG: 286 | End: 2022-11-14
Attending: INTERNAL MEDICINE
Payer: MEDICARE

## 2022-11-14 ENCOUNTER — APPOINTMENT (OUTPATIENT)
Dept: MRI IMAGING | Facility: CLINIC | Age: 67
DRG: 286 | End: 2022-11-14
Attending: STUDENT IN AN ORGANIZED HEALTH CARE EDUCATION/TRAINING PROGRAM
Payer: MEDICARE

## 2022-11-14 ENCOUNTER — APPOINTMENT (OUTPATIENT)
Dept: ULTRASOUND IMAGING | Facility: CLINIC | Age: 67
DRG: 286 | End: 2022-11-14
Attending: STUDENT IN AN ORGANIZED HEALTH CARE EDUCATION/TRAINING PROGRAM
Payer: MEDICARE

## 2022-11-14 ENCOUNTER — ANCILLARY PROCEDURE (OUTPATIENT)
Dept: CARDIOLOGY | Facility: CLINIC | Age: 67
DRG: 286 | End: 2022-11-14
Attending: INTERNAL MEDICINE
Payer: MEDICARE

## 2022-11-14 ENCOUNTER — APPOINTMENT (OUTPATIENT)
Dept: MRI IMAGING | Facility: CLINIC | Age: 67
DRG: 286 | End: 2022-11-14
Attending: INTERNAL MEDICINE
Payer: MEDICARE

## 2022-11-14 DIAGNOSIS — I42.9 CARDIOMYOPATHY, UNSPECIFIED TYPE (H): ICD-10-CM

## 2022-11-14 DIAGNOSIS — I42.9 CARDIOMYOPATHY, UNSPECIFIED TYPE (H): Primary | ICD-10-CM

## 2022-11-14 DIAGNOSIS — Z45.02 ENCOUNTER FOR ADJUSTMENT AND MANAGEMENT OF AUTOMATIC IMPLANTABLE CARDIAC DEFIBRILLATOR: ICD-10-CM

## 2022-11-14 LAB
ANION GAP SERPL CALCULATED.3IONS-SCNC: 11 MMOL/L (ref 7–15)
BUN SERPL-MCNC: 21.2 MG/DL (ref 8–23)
CALCIUM SERPL-MCNC: 8.5 MG/DL (ref 8.8–10.2)
CHLORIDE SERPL-SCNC: 102 MMOL/L (ref 98–107)
CMV IGG SERPL IA-ACNC: 1.3 U/ML
CMV IGG SERPL IA-ACNC: ABNORMAL
COTININE SERPL-MCNC: <5 NG/ML
CREAT SERPL-MCNC: 1.09 MG/DL (ref 0.67–1.17)
DEPRECATED HCO3 PLAS-SCNC: 25 MMOL/L (ref 22–29)
DRVVT SCREEN RATIO: 0.74
EBV VCA IGG SER IA-ACNC: 268 U/ML
EBV VCA IGG SER IA-ACNC: POSITIVE
GAMMA INTERFERON BACKGROUND BLD IA-ACNC: 0.01 IU/ML
GFR SERPL CREATININE-BSD FRML MDRD: 73 ML/MIN/1.73M2
GLUCOSE SERPL-MCNC: 91 MG/DL (ref 70–99)
HAV IGG SER QL IA: NONREACTIVE
HBV CORE AB SERPL QL IA: NONREACTIVE
HBV SURFACE AB SERPL IA-ACNC: 30.51 M[IU]/ML
HBV SURFACE AB SERPL IA-ACNC: REACTIVE M[IU]/ML
HBV SURFACE AG SERPL QL IA: NONREACTIVE
HCV AB SERPL QL IA: NONREACTIVE
HSV1 IGG SERPL QL IA: 51.5 INDEX
HSV1 IGG SERPL QL IA: ABNORMAL
HSV2 IGG SERPL QL IA: 0.34 INDEX
HSV2 IGG SERPL QL IA: ABNORMAL
LA PPP-IMP: NEGATIVE
LUPUS INTERPRETATION: NORMAL
M TB IFN-G BLD-IMP: NEGATIVE
M TB IFN-G CD4+ BCKGRND COR BLD-ACNC: 9.99 IU/ML
MAGNESIUM SERPL-MCNC: 2 MG/DL (ref 1.7–2.3)
MDC_IDC_EPISODE_DTM: NORMAL
MDC_IDC_EPISODE_DURATION: 13 S
MDC_IDC_EPISODE_DURATION: 8 S
MDC_IDC_EPISODE_DURATION: 8 S
MDC_IDC_EPISODE_DURATION: 9 S
MDC_IDC_EPISODE_ID: 1263
MDC_IDC_EPISODE_ID: 1264
MDC_IDC_EPISODE_ID: 1265
MDC_IDC_EPISODE_ID: 1266
MDC_IDC_EPISODE_TYPE: NORMAL
MDC_IDC_LEAD_IMPLANT_DT: NORMAL
MDC_IDC_LEAD_LOCATION: NORMAL
MDC_IDC_LEAD_LOCATION_DETAIL_1: NORMAL
MDC_IDC_LEAD_LOCATION_DETAIL_1: NORMAL
MDC_IDC_LEAD_MFG: NORMAL
MDC_IDC_LEAD_MODEL: NORMAL
MDC_IDC_LEAD_POLARITY_TYPE: NORMAL
MDC_IDC_LEAD_SERIAL: NORMAL
MDC_IDC_LEAD_SPECIAL_FUNCTION: NORMAL
MDC_IDC_MSMT_BATTERY_DTM: NORMAL
MDC_IDC_MSMT_BATTERY_REMAINING_LONGEVITY: 52 MO
MDC_IDC_MSMT_BATTERY_RRT_TRIGGER: 2.73
MDC_IDC_MSMT_BATTERY_STATUS: NORMAL
MDC_IDC_MSMT_BATTERY_VOLTAGE: 2.95 V
MDC_IDC_MSMT_CAP_CHARGE_DTM: NORMAL
MDC_IDC_MSMT_CAP_CHARGE_ENERGY: 18 J
MDC_IDC_MSMT_CAP_CHARGE_TIME: 3.95
MDC_IDC_MSMT_CAP_CHARGE_TYPE: NORMAL
MDC_IDC_MSMT_LEADCHNL_LV_IMPEDANCE_VALUE: 147.1
MDC_IDC_MSMT_LEADCHNL_LV_IMPEDANCE_VALUE: 152 OHM
MDC_IDC_MSMT_LEADCHNL_LV_IMPEDANCE_VALUE: 159.26
MDC_IDC_MSMT_LEADCHNL_LV_IMPEDANCE_VALUE: 165.03
MDC_IDC_MSMT_LEADCHNL_LV_IMPEDANCE_VALUE: 165.03
MDC_IDC_MSMT_LEADCHNL_LV_IMPEDANCE_VALUE: 285 OHM
MDC_IDC_MSMT_LEADCHNL_LV_IMPEDANCE_VALUE: 304 OHM
MDC_IDC_MSMT_LEADCHNL_LV_IMPEDANCE_VALUE: 304 OHM
MDC_IDC_MSMT_LEADCHNL_LV_IMPEDANCE_VALUE: 361 OHM
MDC_IDC_MSMT_LEADCHNL_LV_IMPEDANCE_VALUE: 399 OHM
MDC_IDC_MSMT_LEADCHNL_LV_IMPEDANCE_VALUE: 456 OHM
MDC_IDC_MSMT_LEADCHNL_LV_IMPEDANCE_VALUE: 475 OHM
MDC_IDC_MSMT_LEADCHNL_LV_IMPEDANCE_VALUE: 513 OHM
MDC_IDC_MSMT_LEADCHNL_LV_IMPEDANCE_VALUE: 532 OHM
MDC_IDC_MSMT_LEADCHNL_LV_IMPEDANCE_VALUE: 532 OHM
MDC_IDC_MSMT_LEADCHNL_LV_PACING_THRESHOLD_AMPLITUDE: 1.5 V
MDC_IDC_MSMT_LEADCHNL_LV_PACING_THRESHOLD_PULSEWIDTH: 1 MS
MDC_IDC_MSMT_LEADCHNL_RA_IMPEDANCE_VALUE: 513 OHM
MDC_IDC_MSMT_LEADCHNL_RA_PACING_THRESHOLD_AMPLITUDE: 0.75 V
MDC_IDC_MSMT_LEADCHNL_RA_PACING_THRESHOLD_PULSEWIDTH: 0.4 MS
MDC_IDC_MSMT_LEADCHNL_RA_SENSING_INTR_AMPL: 3.5 MV
MDC_IDC_MSMT_LEADCHNL_RV_IMPEDANCE_VALUE: 342 OHM
MDC_IDC_MSMT_LEADCHNL_RV_IMPEDANCE_VALUE: 418 OHM
MDC_IDC_MSMT_LEADCHNL_RV_PACING_THRESHOLD_AMPLITUDE: 0.75 V
MDC_IDC_MSMT_LEADCHNL_RV_PACING_THRESHOLD_PULSEWIDTH: 0.4 MS
MDC_IDC_MSMT_LEADCHNL_RV_SENSING_INTR_AMPL: 7 MV
MDC_IDC_PG_IMPLANT_DTM: NORMAL
MDC_IDC_PG_MFG: NORMAL
MDC_IDC_PG_MODEL: NORMAL
MDC_IDC_PG_SERIAL: NORMAL
MDC_IDC_PG_TYPE: NORMAL
MDC_IDC_SESS_CLINIC_NAME: NORMAL
MDC_IDC_SESS_DTM: NORMAL
MDC_IDC_SESS_TYPE: NORMAL
MDC_IDC_SET_BRADY_AT_MODE_SWITCH_RATE: 171 {BEATS}/MIN
MDC_IDC_SET_BRADY_LOWRATE: 60 {BEATS}/MIN
MDC_IDC_SET_BRADY_MAX_SENSOR_RATE: 140 {BEATS}/MIN
MDC_IDC_SET_BRADY_MAX_TRACKING_RATE: 140 {BEATS}/MIN
MDC_IDC_SET_BRADY_MODE: NORMAL
MDC_IDC_SET_BRADY_PAV_DELAY_LOW: 170 MS
MDC_IDC_SET_BRADY_SAV_DELAY_LOW: 140 MS
MDC_IDC_SET_CRT_PACED_CHAMBERS: NORMAL
MDC_IDC_SET_LEADCHNL_LV_PACING_AMPLITUDE: 2.75 V
MDC_IDC_SET_LEADCHNL_LV_PACING_ANODE_ELECTRODE_1: NORMAL
MDC_IDC_SET_LEADCHNL_LV_PACING_ANODE_LOCATION_1: NORMAL
MDC_IDC_SET_LEADCHNL_LV_PACING_CAPTURE_MODE: NORMAL
MDC_IDC_SET_LEADCHNL_LV_PACING_CATHODE_ELECTRODE_1: NORMAL
MDC_IDC_SET_LEADCHNL_LV_PACING_CATHODE_LOCATION_1: NORMAL
MDC_IDC_SET_LEADCHNL_LV_PACING_POLARITY: NORMAL
MDC_IDC_SET_LEADCHNL_LV_PACING_PULSEWIDTH: 1 MS
MDC_IDC_SET_LEADCHNL_RA_PACING_AMPLITUDE: 1.5 V
MDC_IDC_SET_LEADCHNL_RA_PACING_ANODE_ELECTRODE_1: NORMAL
MDC_IDC_SET_LEADCHNL_RA_PACING_ANODE_LOCATION_1: NORMAL
MDC_IDC_SET_LEADCHNL_RA_PACING_CAPTURE_MODE: NORMAL
MDC_IDC_SET_LEADCHNL_RA_PACING_CATHODE_ELECTRODE_1: NORMAL
MDC_IDC_SET_LEADCHNL_RA_PACING_CATHODE_LOCATION_1: NORMAL
MDC_IDC_SET_LEADCHNL_RA_PACING_POLARITY: NORMAL
MDC_IDC_SET_LEADCHNL_RA_PACING_PULSEWIDTH: 0.4 MS
MDC_IDC_SET_LEADCHNL_RA_SENSING_ANODE_ELECTRODE_1: NORMAL
MDC_IDC_SET_LEADCHNL_RA_SENSING_ANODE_LOCATION_1: NORMAL
MDC_IDC_SET_LEADCHNL_RA_SENSING_CATHODE_ELECTRODE_1: NORMAL
MDC_IDC_SET_LEADCHNL_RA_SENSING_CATHODE_LOCATION_1: NORMAL
MDC_IDC_SET_LEADCHNL_RA_SENSING_POLARITY: NORMAL
MDC_IDC_SET_LEADCHNL_RA_SENSING_SENSITIVITY: 0.3 MV
MDC_IDC_SET_LEADCHNL_RV_PACING_AMPLITUDE: 2 V
MDC_IDC_SET_LEADCHNL_RV_PACING_ANODE_ELECTRODE_1: NORMAL
MDC_IDC_SET_LEADCHNL_RV_PACING_ANODE_LOCATION_1: NORMAL
MDC_IDC_SET_LEADCHNL_RV_PACING_CAPTURE_MODE: NORMAL
MDC_IDC_SET_LEADCHNL_RV_PACING_CATHODE_ELECTRODE_1: NORMAL
MDC_IDC_SET_LEADCHNL_RV_PACING_CATHODE_LOCATION_1: NORMAL
MDC_IDC_SET_LEADCHNL_RV_PACING_POLARITY: NORMAL
MDC_IDC_SET_LEADCHNL_RV_PACING_PULSEWIDTH: 0.4 MS
MDC_IDC_SET_LEADCHNL_RV_SENSING_ANODE_ELECTRODE_1: NORMAL
MDC_IDC_SET_LEADCHNL_RV_SENSING_ANODE_LOCATION_1: NORMAL
MDC_IDC_SET_LEADCHNL_RV_SENSING_CATHODE_ELECTRODE_1: NORMAL
MDC_IDC_SET_LEADCHNL_RV_SENSING_CATHODE_LOCATION_1: NORMAL
MDC_IDC_SET_LEADCHNL_RV_SENSING_POLARITY: NORMAL
MDC_IDC_SET_LEADCHNL_RV_SENSING_SENSITIVITY: 0.3 MV
MDC_IDC_SET_ZONE_DETECTION_BEATS_DENOMINATOR: 40 {BEATS}
MDC_IDC_SET_ZONE_DETECTION_BEATS_NUMERATOR: 30 {BEATS}
MDC_IDC_SET_ZONE_DETECTION_INTERVAL: 240 MS
MDC_IDC_SET_ZONE_DETECTION_INTERVAL: 320 MS
MDC_IDC_SET_ZONE_DETECTION_INTERVAL: 350 MS
MDC_IDC_SET_ZONE_DETECTION_INTERVAL: 360 MS
MDC_IDC_SET_ZONE_DETECTION_INTERVAL: 420 MS
MDC_IDC_SET_ZONE_TYPE: NORMAL
MDC_IDC_STAT_AT_BURDEN_PERCENT: 0 %
MDC_IDC_STAT_AT_DTM_END: NORMAL
MDC_IDC_STAT_AT_DTM_START: NORMAL
MDC_IDC_STAT_BRADY_AP_VP_PERCENT: 1.1 %
MDC_IDC_STAT_BRADY_AP_VS_PERCENT: 0.06 %
MDC_IDC_STAT_BRADY_AS_VP_PERCENT: 89.39 %
MDC_IDC_STAT_BRADY_AS_VS_PERCENT: 9.44 %
MDC_IDC_STAT_BRADY_DTM_END: NORMAL
MDC_IDC_STAT_BRADY_DTM_START: NORMAL
MDC_IDC_STAT_BRADY_RA_PERCENT_PACED: 1.16 %
MDC_IDC_STAT_BRADY_RV_PERCENT_PACED: 0.48 %
MDC_IDC_STAT_CRT_DTM_END: NORMAL
MDC_IDC_STAT_CRT_DTM_START: NORMAL
MDC_IDC_STAT_CRT_LV_PERCENT_PACED: 89.39 %
MDC_IDC_STAT_CRT_PERCENT_PACED: 89.39 %
MDC_IDC_STAT_EPISODE_RECENT_COUNT: 0
MDC_IDC_STAT_EPISODE_RECENT_COUNT_DTM_END: NORMAL
MDC_IDC_STAT_EPISODE_RECENT_COUNT_DTM_START: NORMAL
MDC_IDC_STAT_EPISODE_TOTAL_COUNT: 0
MDC_IDC_STAT_EPISODE_TOTAL_COUNT: 1
MDC_IDC_STAT_EPISODE_TOTAL_COUNT: 38
MDC_IDC_STAT_EPISODE_TOTAL_COUNT_DTM_END: NORMAL
MDC_IDC_STAT_EPISODE_TOTAL_COUNT_DTM_START: NORMAL
MDC_IDC_STAT_EPISODE_TYPE: NORMAL
MDC_IDC_STAT_TACHYTHERAPY_ATP_DELIVERED_RECENT: 0
MDC_IDC_STAT_TACHYTHERAPY_ATP_DELIVERED_TOTAL: 0
MDC_IDC_STAT_TACHYTHERAPY_RECENT_DTM_END: NORMAL
MDC_IDC_STAT_TACHYTHERAPY_RECENT_DTM_START: NORMAL
MDC_IDC_STAT_TACHYTHERAPY_SHOCKS_ABORTED_RECENT: 0
MDC_IDC_STAT_TACHYTHERAPY_SHOCKS_ABORTED_TOTAL: 0
MDC_IDC_STAT_TACHYTHERAPY_SHOCKS_DELIVERED_RECENT: 0
MDC_IDC_STAT_TACHYTHERAPY_SHOCKS_DELIVERED_TOTAL: 0
MDC_IDC_STAT_TACHYTHERAPY_TOTAL_DTM_END: NORMAL
MDC_IDC_STAT_TACHYTHERAPY_TOTAL_DTM_START: NORMAL
MITOGEN IGNF BCKGRD COR BLD-ACNC: 0 IU/ML
MITOGEN IGNF BCKGRD COR BLD-ACNC: 0 IU/ML
NICOTINE SERPL-MCNC: <5 NG/ML
NT-PROBNP SERPL-MCNC: 1650 PG/ML (ref 0–900)
PLPETH BLD-MCNC: <10 NG/ML
POPETH BLD-MCNC: <10 NG/ML
POTASSIUM SERPL-SCNC: 3.7 MMOL/L (ref 3.4–5.3)
PREALB SERPL IA-MCNC: 18 MG/DL (ref 15–45)
PTT RATIO: 1.05
QUANTIFERON MITOGEN: 10 IU/ML
QUANTIFERON NIL TUBE: 0.01 IU/ML
QUANTIFERON TB1 TUBE: 0.01 IU/ML
QUANTIFERON TB2 TUBE: 0.01
SODIUM SERPL-SCNC: 138 MMOL/L (ref 136–145)
T GONDII IGG SER QL: <3 IU/ML (ref 0–7.1)
THROMBIN TIME: 16.2 SECONDS (ref 13–19)
VZV IGG SER QL IA: 3637 INDEX
VZV IGG SER QL IA: POSITIVE

## 2022-11-14 PROCEDURE — 250N000013 HC RX MED GY IP 250 OP 250 PS 637

## 2022-11-14 PROCEDURE — G1010 CDSM STANSON: HCPCS

## 2022-11-14 PROCEDURE — 83880 ASSAY OF NATRIURETIC PEPTIDE: CPT

## 2022-11-14 PROCEDURE — 214N000001 HC R&B CCU UMMC

## 2022-11-14 PROCEDURE — 97530 THERAPEUTIC ACTIVITIES: CPT | Mod: GP | Performed by: PHYSICAL THERAPIST

## 2022-11-14 PROCEDURE — 93287 PERI-PX DEVICE EVAL & PRGR: CPT | Mod: 26 | Performed by: INTERNAL MEDICINE

## 2022-11-14 PROCEDURE — 250N000011 HC RX IP 250 OP 636

## 2022-11-14 PROCEDURE — 85390 FIBRINOLYSINS SCREEN I&R: CPT | Mod: 26 | Performed by: PATHOLOGY

## 2022-11-14 PROCEDURE — G1010 CDSM STANSON: HCPCS | Mod: GC | Performed by: STUDENT IN AN ORGANIZED HEALTH CARE EDUCATION/TRAINING PROGRAM

## 2022-11-14 PROCEDURE — 86022 PLATELET ANTIBODIES: CPT | Performed by: INTERNAL MEDICINE

## 2022-11-14 PROCEDURE — 99221 1ST HOSP IP/OBS SF/LOW 40: CPT | Performed by: SURGERY

## 2022-11-14 PROCEDURE — 80048 BASIC METABOLIC PNL TOTAL CA: CPT

## 2022-11-14 PROCEDURE — 99233 SBSQ HOSP IP/OBS HIGH 50: CPT | Mod: 25 | Performed by: INTERNAL MEDICINE

## 2022-11-14 PROCEDURE — 83735 ASSAY OF MAGNESIUM: CPT | Performed by: INTERNAL MEDICINE

## 2022-11-14 PROCEDURE — 93922 UPR/L XTREMITY ART 2 LEVELS: CPT

## 2022-11-14 PROCEDURE — 99232 SBSQ HOSP IP/OBS MODERATE 35: CPT | Performed by: PHYSICIAN ASSISTANT

## 2022-11-14 PROCEDURE — 70553 MRI BRAIN STEM W/O & W/DYE: CPT | Mod: 26 | Performed by: STUDENT IN AN ORGANIZED HEALTH CARE EDUCATION/TRAINING PROGRAM

## 2022-11-14 PROCEDURE — 70549 MR ANGIOGRAPH NECK W/O&W/DYE: CPT | Mod: 26 | Performed by: STUDENT IN AN ORGANIZED HEALTH CARE EDUCATION/TRAINING PROGRAM

## 2022-11-14 PROCEDURE — 93287 PERI-PX DEVICE EVAL & PRGR: CPT

## 2022-11-14 PROCEDURE — 250N000013 HC RX MED GY IP 250 OP 250 PS 637: Performed by: INTERNAL MEDICINE

## 2022-11-14 PROCEDURE — A9585 GADOBUTROL INJECTION: HCPCS | Performed by: INTERNAL MEDICINE

## 2022-11-14 PROCEDURE — 250N000012 HC RX MED GY IP 250 OP 636 PS 637

## 2022-11-14 PROCEDURE — 250N000011 HC RX IP 250 OP 636: Performed by: INTERNAL MEDICINE

## 2022-11-14 PROCEDURE — 93922 UPR/L XTREMITY ART 2 LEVELS: CPT | Mod: 26 | Performed by: RADIOLOGY

## 2022-11-14 PROCEDURE — 255N000002 HC RX 255 OP 636: Performed by: INTERNAL MEDICINE

## 2022-11-14 PROCEDURE — 250N000012 HC RX MED GY IP 250 OP 636 PS 637: Performed by: INTERNAL MEDICINE

## 2022-11-14 PROCEDURE — 250N000011 HC RX IP 250 OP 636: Performed by: STUDENT IN AN ORGANIZED HEALTH CARE EDUCATION/TRAINING PROGRAM

## 2022-11-14 RX ORDER — GADOBUTROL 604.72 MG/ML
10 INJECTION INTRAVENOUS ONCE
Status: COMPLETED | OUTPATIENT
Start: 2022-11-14 | End: 2022-11-14

## 2022-11-14 RX ORDER — NALOXONE HYDROCHLORIDE 0.4 MG/ML
0.4 INJECTION, SOLUTION INTRAMUSCULAR; INTRAVENOUS; SUBCUTANEOUS
Status: DISCONTINUED | OUTPATIENT
Start: 2022-11-14 | End: 2022-11-21 | Stop reason: HOSPADM

## 2022-11-14 RX ORDER — AMOXICILLIN 250 MG
1 CAPSULE ORAL 2 TIMES DAILY PRN
Status: DISCONTINUED | OUTPATIENT
Start: 2022-11-14 | End: 2022-11-21 | Stop reason: HOSPADM

## 2022-11-14 RX ORDER — POLYETHYLENE GLYCOL 3350 17 G/17G
17 POWDER, FOR SOLUTION ORAL DAILY PRN
Status: DISCONTINUED | OUTPATIENT
Start: 2022-11-14 | End: 2022-11-21 | Stop reason: HOSPADM

## 2022-11-14 RX ORDER — GADOBUTROL 604.72 MG/ML
9 INJECTION INTRAVENOUS ONCE
Status: COMPLETED | OUTPATIENT
Start: 2022-11-14 | End: 2022-11-14

## 2022-11-14 RX ORDER — NALOXONE HYDROCHLORIDE 0.4 MG/ML
0.2 INJECTION, SOLUTION INTRAMUSCULAR; INTRAVENOUS; SUBCUTANEOUS
Status: DISCONTINUED | OUTPATIENT
Start: 2022-11-14 | End: 2022-11-21 | Stop reason: HOSPADM

## 2022-11-14 RX ORDER — POTASSIUM CHLORIDE 750 MG/1
20 TABLET, EXTENDED RELEASE ORAL ONCE
Status: COMPLETED | OUTPATIENT
Start: 2022-11-14 | End: 2022-11-14

## 2022-11-14 RX ORDER — MAGNESIUM SULFATE HEPTAHYDRATE 40 MG/ML
2 INJECTION, SOLUTION INTRAVENOUS ONCE
Status: COMPLETED | OUTPATIENT
Start: 2022-11-14 | End: 2022-11-14

## 2022-11-14 RX ORDER — AMOXICILLIN 250 MG
2 CAPSULE ORAL 2 TIMES DAILY PRN
Status: DISCONTINUED | OUTPATIENT
Start: 2022-11-14 | End: 2022-11-21 | Stop reason: HOSPADM

## 2022-11-14 RX ADMIN — GADOBUTROL 9 ML: 604.72 INJECTION INTRAVENOUS at 12:08

## 2022-11-14 RX ADMIN — DOBUTAMINE IN DEXTROSE 2.5 MCG/KG/MIN: 200 INJECTION, SOLUTION INTRAVENOUS at 14:10

## 2022-11-14 RX ADMIN — MICONAZOLE NITRATE: 20 CREAM TOPICAL at 09:18

## 2022-11-14 RX ADMIN — Medication 5 ML: at 05:11

## 2022-11-14 RX ADMIN — PRAVASTATIN SODIUM 20 MG: 20 TABLET ORAL at 08:57

## 2022-11-14 RX ADMIN — MAGNESIUM SULFATE IN WATER 2 G: 40 INJECTION, SOLUTION INTRAVENOUS at 08:57

## 2022-11-14 RX ADMIN — MYCOPHENOLATE MOFETIL 500 MG: 500 TABLET, FILM COATED ORAL at 20:01

## 2022-11-14 RX ADMIN — POTASSIUM CHLORIDE 20 MEQ: 750 TABLET, EXTENDED RELEASE ORAL at 08:56

## 2022-11-14 RX ADMIN — TRAZODONE HYDROCHLORIDE 50 MG: 50 TABLET ORAL at 00:10

## 2022-11-14 RX ADMIN — ASPIRIN 81 MG CHEWABLE TABLET 81 MG: 81 TABLET CHEWABLE at 08:57

## 2022-11-14 RX ADMIN — GADOBUTROL 9 ML: 604.72 INJECTION INTRAVENOUS at 13:31

## 2022-11-14 RX ADMIN — Medication 5 ML: at 00:10

## 2022-11-14 RX ADMIN — FUROSEMIDE 40 MG: 40 TABLET ORAL at 08:57

## 2022-11-14 RX ADMIN — POLYETHYLENE GLYCOL 3350, SODIUM SULFATE ANHYDROUS, SODIUM BICARBONATE, SODIUM CHLORIDE, POTASSIUM CHLORIDE 2000 ML: 236; 22.74; 6.74; 5.86; 2.97 POWDER, FOR SOLUTION ORAL at 16:30

## 2022-11-14 RX ADMIN — AMIODARONE HYDROCHLORIDE 200 MG: 200 TABLET ORAL at 20:01

## 2022-11-14 RX ADMIN — MYCOPHENOLATE MOFETIL 1000 MG: 500 TABLET, FILM COATED ORAL at 09:00

## 2022-11-14 RX ADMIN — AMIODARONE HYDROCHLORIDE 200 MG: 200 TABLET ORAL at 08:57

## 2022-11-14 ASSESSMENT — ACTIVITIES OF DAILY LIVING (ADL)
ADLS_ACUITY_SCORE: 32

## 2022-11-14 NOTE — PLAN OF CARE
Addendum to daily progress note:    2:26 PM  Reviewed thrombocytopenia trend this hospital stay. Appears new compared to outside hospital records in the past few weeks. 4T score of 3-4, low to intermediate risk of HIT. Will go ahead and send HIT panel. Was on Lovenox at outside hospital; however, here his only active heparin product was heparin flushes for PICC patency. Will transition him to alteplase PICC flushes and keep him on mechanical VTE prophylaxis pending HIT testing. Discussed w/ [facility] pharmacy.    5:13 PM  Contacted by radiology. MRI and MRA of the brain performed, unfortunately did not have correct sequence performed for CNS vasculitis. Will have to return to MRI tomorrow to complete this, no new imaging orders needed. Will touch base with MRI department again in the am to coordinate timing w/ EP team given device. MRI also showing small area of restricted diffusion in the cerebellum concerning for possible acute embolic stroke per radiology. Not on systemic anticoagulation, currently evaluating for possible HIT as above. Will update neurology regarding MR findings as well as repeat sequence tomorrow, will also discuss possibility of anticoagulation given possible embolic stroke. JARED and US doppler all extremities (does have PICC) ordered for tomorrow.    Elliot Dhaliwal MD  Internal Medicine PGY-2  Cardiology II Service  ASCOM #[id number]

## 2022-11-14 NOTE — PROGRESS NOTES
[facility]  Cardiology II Service / Advanced Heart Failure  Daily Progress Note    Patient: Paco Stein      : 1955      MRN: [id number]    Assessment/Plan:   67 year old male w/ pertinent PMHx of HFrEF (EF 15-20%) presumably secondary to arrhythmogenic cardiomyopathy, CAD (prior PCI LCx and LAD), CNS vasculitis, and CKD admitted 11/10/2022 for decompensated heart failure. Remains admitted requiring inotropic support and undergoing advanced therapy evaluation.    Today's changes:   -- MRI/A brain w/wo contrast w/ EP supervision given device   -- CLD w/ bowel prep tonight for colonoscopy w/ anesthesia tomorrow   -- Pending additional non-urgent LVAD/transplant eval checklist items as below    Acute on chronic systolic heart failure w/ ambulatory cardiogenic shock I/s/o advanced non-ischemic cardiomyopathy (suspected arrhythmogenic), Class IV, Stage D  Clinical picture on admission: sent in from heart failure clinic due to 2 weeks of progressive exercise intolerance, MELLO, orthopnea, PND w/ evidence of volume overload. Last echo 22 showing reduction of EF to 15-20% from previous of 20-25% w/ diffuse hypokinesis and mild RV dilation w/ reduced RV function. Last RHC 22 w/ mildly elevated filling pressures bilaterally w/ a CI of 1.9. Started on IV diuresis, now transitioned to PO maintenance dosing. Remains on low-dose inotropic support while undergoing advanced therapy planning.  Plan:  - Diuretic: 40mg PO Lasix qday (PTA maintenance dosing)  - Inotrope: Dobutamine 2.5mcg/kg/min  - Afterload Reduction: no additional agents currently  - BB: Holding PTA Metoprolol Succ 25mg qday  - SGLT2i: Holding PTA Empag 10mg qday  - MRA: None  - Statin: PTA statin  - Devices: biventricular PPM/ICD  - Electrolytes: Daily monitoring with diuresis, on RN protocols for K and Mag  - Strict I/O monitoring  - Daily weights    Advanced therapy planning  Checklist as per  below. Current action items include:  -- MRI brain w/wo contrast this morning w/ EP supervision given device, per neurology recommendations given CNS vasculitis  -- Colonoscopy: CLD today (no red coloring), bowel prep starting this afternoon/evening, NPO @ [facility], plan for colo w/ anesthesia per GI tomorrow morning (11/15)    LVAD/transplant evaluation checklist  [] labs (CBC, CMP, PT/INR, TSH, iron studies, UA + micro done, prealbumin remains pending)  [] Infectious (Treponema negative - Hep A/B/C, HIV, HSV 1/2 IgG, CMV IgG, Toxo IgG, EBV IgG, varicella IgG, Quant gold all pending)  [] Tox (Utox presumptive pos for amphetamines - confirmatory serum test pending, nicotine and cotinine pending, PeTH pending)   [] Immunocompatibility (ABO done, HLA tissue typing pending)  [] CPX - holding off for now given functional status  [x] TTE - 11/11/22  [x] 6MWT - 11/14/22  [x] EKG - 11/10/22  [x] RHC - 11/11/22  [] CVTS consult - ordered, not yet seen  [] Social work consult - ordered, not yet seen  [] Palliative care consult - ordered, not yet seen  [] Neuropsych consult - ordered, not yet seen  [x] Nutrition consult - 11/14/22, starting calorie counts  [x] CT Dental + exam - 11/11/22, no further needs  [x] Abd US + doppler - done 11/11/22, negative  [x] Extremity US and ABIs - 11/11/22, negative  [x] Carotid US (if DM or ICM or >49yo) - 11/11/22, negative  [x] PFTs - 11/11/22  [] Dexa - outpatient  [x] CT head non-contrast - 11/10/22, needs MRI  [x] CT CAP non-contrast - 11/10/22, no further needs  [] colonoscopy (>51 yo) - GI consulted, performing 11/15/22  [x] PSA - 1.63 on 11/12/22  [x] Neuro consult - 11/10/22, needs MRI/MRA   [] MRI/MRA brain - ordered, to be performed 11/14/22     Multivessel coronary artery disease s/p PCI LCx and LAD  In context of the above presentation, no particularly concerning anginal symptoms. Had updated coronary angiogram this admission 11/11/22 showing patent LAD and LCx stents as well as  mild-mod non-obstructive disease in the RCA. Overall reassuring in context of working towards advanced therapy planning.  Plan:   -- ASA: Continuing PTA 81mg qday dosing   -- BB: Holding as per above   -- Statin: Cont PTA Pravastatin   -- Antianginal: N/A    History of VT s/p biventricular PPM/ICD  Interro performed 11/10/22 w/ two short (<5s) runs of NSVT on 11/1/22 and 11/3/22, no recent shocks. A-paced.  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 No episodes since. Mild sensory deficit over the right foot, rest of neurological exam unremarkable.  - Continue PTA cellcept 1000mg qam and 500mg qpm  - As above, neurology consulted; recommended MRI with and without contrast, test ordered. Due to the patient's implanted pacemaker/defibrillator, will need electrophysiology monitoring during the study, scheduled tentatively for 11/14/22  - Team spoke with [facility] this past weekend, unable to transfer prior MRIs to PACS, CDs with the studies to be faxed 11/14/22    #CKD2 - Baseline Cr in 1-1.2 range  #Insomnia - Trazodone 50mg QHS    Hospital Checklist:  DVT Prophylaxis: Pneumatic Compression Devices  Code Status: Full Code  Diet/Nutrition: CLD, NPO @ MN for colo tomorrow  Lines: Right-sided PICC    Disposition Planning:  Anticipated discharge TBD, remains on inotropic support, undergoing advanced therapy evaluation/planning.    Staffed w/ Dr. Metcalf.    Elliot Dhaliwal MD  Internal Medicine PGY-2  Cardiology II Service  ASCOM #[id number]  11/14/2022  ==================================    Subjective:   Nursing notes reviewed. No significant interval events. This morning in good spirits. Appreciative of care team thus far. No new dyspnea, chest pain, cough, palpitations, abdominal pain, n/v. Has good appetite, feels  disheartened that his appetite is improving though now restricted due to colonoscopy preparations.    Objective:     Vitals:    11/12/22 0800 11/13/22 0700 11/14/22 0516   Weight: 90.3 kg (199 lb) 88.6 kg (195 lb 6.4 oz) 89.5 kg (197 lb 4.8 oz)     Vital Signs with Ranges  Temp:  [97.4  F (36.3  C)-98.7  F (37.1  C)] 98.4  F (36.9  C)  Pulse:  [83-94] 83  Resp:  [16-18] 16  BP: ()/(70-86) 104/76  SpO2:  [93 %-96 %] 93 %  I/O last 3 completed shifts:  In: 297.38 [P.O.:120; I.V.:177.38]  Out: 1100 [Urine:1100]    Exam:  General: No acute distress, pleasantly conversational  Neck: No significant JVP visualized at 45 degrees, estimate ~5cm  CV: RRR, no new, 2+ radial pulses present b/l radials  Lungs: On room air, CTA b/l w/o wheezes or crackles, no increased WOB  Abd: Soft, non-tender, non-distended  Ext: Warm and well-perfused, no appreciable lower extremity edema  Neuro: Awake, alert, conversational, moving all extremities spontaneously throughout examination    Medications     DOBUTamine 2.5 mcg/kg/min (11/14/22 0750)     - MEDICATION INSTRUCTIONS -         amiodarone  200 mg Oral BID     aspirin  81 mg Oral Daily     [Held by provider] empagliflozin  10 mg Oral Daily     furosemide  40 mg Oral Daily     heparin lock flush  5-20 mL Intracatheter Q24H     miconazole   Topical BID     mycophenolate  1,000 mg Oral QAM     mycophenolate  500 mg Oral QPM     polyethylene glycol  2,000 mL Oral Once     [START ON 11/15/2022] polyethylene glycol  2,000 mL Oral Once     pravastatin  20 mg Oral Daily     sodium chloride (PF)  10-40 mL Intracatheter Q8H     sodium chloride (PF)  10-40 mL Intracatheter Q8H     sodium chloride (PF)  3 mL Intracatheter Q8H     traZODone  50 mg Oral At Bedtime       Data   Recent Labs   Lab 11/14/22  0510 11/13/22  1547 11/13/22  0456 11/12/22  0458 11/11/22  1940 11/11/22  1747 11/11/22  1746 11/11/22  0847 11/11/22  0231 11/10/22  1738   WBC  --   --   --  6.6  --   --   --   --  6.0 7.1    HGB  --   --   --  15.1  --  16.3 16.3  --  15.2 16.7   MCV  --   --   --  88  --   --   --   --  87 89   PLT  --   --   --  91*  --   --   --   --  98* 106*   INR  --   --   --  1.20*  --   --   --   --   --   --      --  140 140  --   --   --   --  139 138   POTASSIUM 3.7  --  3.7  3.5 3.8  3.7   < >  --   --    < > 3.1* 3.8   CHLORIDE 102  --  102 101  --   --   --   --  102 101   CO2 25  --  24 26  --   --   --   --  26 25   BUN 21.2  --  20.8 19.9  --   --   --   --  24.1* 28.2*   CR 1.09  --  1.20* 1.09  --   --   --   --  1.10 1.51*   ANIONGAP 11  --  14 13  --   --   --   --  11 12   TIM 8.5*  --  8.4* 8.5*  --   --   --   --  8.5* 8.9   GLC 91 110* 85 105*  --   --   --   --  127* 105*   ALBUMIN  --   --   --  3.4*  --   --   --   --  3.1* 3.7   PROTTOTAL  --   --   --  5.4*  --   --   --   --  4.9* 5.8*   BILITOTAL  --   --   --  1.5*  --   --   --   --  1.5* 1.5*   ALKPHOS  --   --   --  61  --   --   --   --  52 62   ALT  --   --   --  27  --   --   --   --  31 36   AST  --   --   --  24  --   --   --   --  24 30    < > = values in this interval not displayed.       No results found for this or any previous visit (from the past 24 hour(s)).     I have reviewed today's vital signs, notes, medications, labs and imaging.  I have also seen and examined the patient and agree with the findings and plan as outlined above.  Pt with endstage heart failure now with improved symptoms on Dbx 2.5 mcg/kg/min.  Plan for CMR today and colonoscopy tomorrow.  Plan is to continue VAD/OHT workup.     Joselito Metcalf MD, PhD  Professor, Heart Failure and Cardiac Transplantation  [facility]

## 2022-11-14 NOTE — CONSULTS
History of Present Illness     I was requested to evaluate patient for advanced heart failure options. Paco Stein is a 67 year old male with PMH s/f HFrEF 20-25% secondary to arrhythmogenic cardiomyopathy, CAD s/p PCI on LCx and LAD, CNS vasculitis and CKD who presents for shortness of breath. The patient is baseline able to ambulate about 50 ft w/o getting sob, however this has recently changed over the past few weeks. He is now able to make less than two steps with assistance before getting short of breath. He endorses s/f PND and orthopnea with difficulty falling asleep at night and awaking feeling dyspneic. He denies chest pain. He noted an episode of palpitations about 1 week ago that was symptomatic and was secondary to a 23-beat run of VT that was not defibrillated. He denies current fevers, chills or night sweats. A month ago he had an upper respiratory tract infection that has since resolved. He denies any difficulty in his bowel movements but reports having very low appetite.      The patient has a family history s/f NICM with two family members having sudden cardiac death (brother with sudden cardiac death at 49, both with fatty metaplasia on catheterization) and a diagnosis of heart failure over the last several years. He has a binventricular pacemaker/ICD that was placed for primary prevention. History of syncopies prior to diagnosis but no recurrence ever since.           Review of Systems       The 10 point Review of Systems is negative other than noted in the HPI or here.            Past Medical History       I have reviewed this patient's medical history and updated it with pertinent information if needed.   No past medical history on file.            Past Surgical History      I have reviewed this patient's surgical history and updated it with pertinent information if needed.  No past surgical history on file.            Social History      I have reviewed this patient's social history and  updated it with pertinent information if needed. Paco Stein  reports that he has never smoked. He has never used smokeless tobacco. He reports that he does not currently use alcohol.           Family History      I have reviewed this patient's family history and updated it with pertinent information if needed.        Family History   Problem Relation Age of Onset     Atrial fibrillation Father       Lung Cancer Brother                 Prior to Admission Medications []Expand by Default     Prior to Admission Medications   Prescriptions Last Dose Informant Patient Reported? Taking?   JARDIANCE 10 MG TABS tablet     Yes No   Sig: Take 10 mg by mouth daily   albuterol (PROAIR HFA/PROVENTIL HFA/VENTOLIN HFA) 108 (90 Base) MCG/ACT inhaler     Yes No   Sig: Inhale 2 puffs into the lungs every 6 hours as needed   amiodarone (PACERONE) 200 MG tablet     Yes No   Si times daily   aspirin (ASA) 81 MG EC tablet     Yes No   Sig: Take 81 mg by mouth daily   furosemide (LASIX) 20 MG tablet     Yes No   Sig: Take 20 mg by mouth daily   metoclopramide (REGLAN) 10 MG tablet     Yes No   Sig: TAKE 1 TABLET (10 MG TOTAL) BY MOUTH 4 (FOUR) TIMES A DAY AS NEEDED (NAUSEA).   metoprolol succinate ER (TOPROL XL) 25 MG 24 hr tablet     Yes No   Sig: Take 1 tablet by mouth daily   mycophenolate (GENERIC EQUIVALENT) 500 MG tablet     Yes No   Sig: Take 1,000 mg by mouth 2 times daily   pravastatin (PRAVACHOL) 20 MG tablet     Yes No   Sig: TAKE 1 TABLET BY MOUTH DAILY/ DUE FOR FASTING LABWORK   traZODone (DESYREL) 50 MG tablet     Yes No   Sig: TAKE 1 TABLET (50 MG TOTAL) BY MOUTH ONCE NIGHTLY AS NEEDED FOR SLEEP.      Facility-Administered Medications: None            Allergies []Expand by Default     No Known Allergies           Physical Exam     Vital Signs: Temp: 97.4  F (36.3  C) Temp src: Oral BP: 114/88 Pulse: 85     SpO2: 97 %      Weight: 208 lbs 11.2 oz     General Appearance: AAOx4, lying in bed, appears comfortable  Eyes:  PERRLA, EOMI              HEENT: NCAT, NP, OP mucosae moist and clear, nc oxygen in place  Respiratory: CTAB, no crackles, rales or wheezing   Cardiovascular: S1, S2, rrr, no mrg, 1+ JOANNA, JVP at the base of the neck  GI: soft, ntd, bs (+)  Skin: No rash appreciated over the exposed skin  Neurologic: AAOx3, moves freely all extremities, CN II-XII intact, chronic baseline numbness over the bottom of the right foot     ECHO  Interpretation Summary  Moderate left ventricular dilation is present. Left ventricular function is  decreased. The ejection fraction is 15-20% (severely reduced). Severe diffuse  hypokinesis is present.  Mild right ventricular dilation is present. Global right ventricular function  is moderately reduced.  Moderate mitral insufficiency is present. The cause of the mitral  insufficiency appears to be altered left ventricular size and geometry.  Previous study not available for comparison.  ______________________________________________________________________________  Left Ventricle  Left ventricular wall thickness is normal. Moderate left ventricular dilation  is present. Left ventricular function is decreased. The ejection fraction is  15-20% (severely reduced). Grade II or moderate diastolic dysfunction. Severe  diffuse hypokinesis is present.     Right Ventricle  Mild right ventricular dilation is present. Global right ventricular function  is moderately reduced. A pacemaker lead is noted in the right ventricle.     Atria  The right atria appears normal. Moderate left atrial enlargement is present.  The atrial septum is intact as assessed by agitated saline bubble study . A  pacemaker lead is noted in the right atrium.     Mitral Valve  Moderate mitral insufficiency is present. The cause of the mitral  insufficiency appears to be altered left ventricular size and geometry.     Aortic Valve  Trileaflet aortic sclerosis without stenosis.     Tricuspid Valve  The tricuspid valve is normal. Trace  tricuspid insufficiency is present. The  right ventricular systolic pressure is approximated at 27.9 mmHg plus the  right atrial pressure.     Pulmonic Valve  The pulmonic valve is normal.     Vessels  The aorta root is normal. Dilation of the inferior vena cava is present with  abnormal respiratory variation in diameter. IVC diameter >2.1 cm collapsing  <50% with sniff suggests a high RA pressure estimated at 15 mmHg or greater.     Pericardium  No pericardial effusion is present.     Compared to Previous Study  Previous study not available for comparison.  ______________________________________________________________________________  MMode/2D Measurements & Calculations  IVSd: 1.1 cm  LVIDd: 6.7 cm  LVIDs: 6.3 cm  LVPWd: 1.1 cm  FS: 7.1 %  LV mass(C)d: 340.3 grams  LV mass(C)dI: 154.0 grams/m2  Ao root diam: 2.9 cm  RWT: 0.33     Doppler Measurements & Calculations  MV E max jorden: 54.2 cm/sec  MV A max jorden: 29.1 cm/sec  MV E/A: 1.9  MV dec time: 0.11 sec  TR max jorden: 264.0 cm/sec  TR max P.9 mmHg  E/E' av.2  Lateral E/e': 3.2  Medial E/e': 11.1     CATH Conclusion         Right sided filling pressures are mildly elevated.    Mild elevated pulmonary hypertension.    Left sided filling pressures are mildly elevated.    Reduced cardiac output level.    Hemodynamic data has been modified in Epic per physician review.     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  Patent prior LAD and LCx stents with mild to moderate non-obstructive disease in the RCA. Grossly unchanged when compared to OSH report in 10/2021.      Assessment & Plan     Paco Stein is a 67 year old male with PMH s/f HFrEF 20-25% secondary to arrhythmogenic cardiomyopathy, CAD s/p PCI on LCx and LAD, CNS vasculitis and CKD who presents for shortness of breath. Patient coming from clinic with concern for decompensated low output heart failure secondary to NICM. Patient denying chest pain, EKG w/o ischemic changes, appears mildly  hypervolemic on physical exam but has been having severe activity limiting shortness of breath of predominantly cardiac etiology.  Patient with recent episode of non-shocked VT, denies any recent history of ICD delivered defibrillations. Currently on atrial paced rhythm, hemodynamically stable.  Patient is currently undergoing work up for advanced heart failure options including heart transplantation and ventricular assist device therapy. I discussed the risks and benefits of surgery with patient and family including the risks of death, bleeding, stroke, infection, renal failure, RV failure and arrhythmias. They understand and are willing to consider this. Will discuss further plan of management with Heart Failure team.

## 2022-11-14 NOTE — PROGRESS NOTES
"SIX MINUTE WALK TEST  Physical Therapy  2022    Paco Stein MRN# [id number]   YOB: 1955 Age: 67 year old     Height: 6' 2\"  Weight (lbs): 197 lbs 4.8 oz Weight (kg): 94.7 kg (dosing weight)    Supplemental oxygen during the test: No,     Oxygen Appliance: None    Oximetry: Finger Probe    Gait Aid: Walker     Pre-test Post-test   Time 830 845   Blood Pressure (mm Hg) 99/74 109/71   Heart rate (bpm) 88 97   Rated Perceived Dyspnea (Hari Scale) 0 -- Nothing at all  0 -- Nothing at all    Rated Perceived Exertion (Hari Scale) 0 -- (Nothing at all) 0 -- (Nothing at all)   SpO2 (%) 98 98     Total distance walked in 6 minutes: 745 feet, 227 meters    Paused during test?No  Number of pauses: 0  Total Time stopped: 0    Stopped test before 6 minutes? No  Time completed: 6:00  Distance:   Reason:     Did the patient experience any pain or discomfort during the test? No  Pain Ratin/10  Pain Description:   Comments:     Oxygen Titration Required: No      Performing Staff: Ari Mike, PT        "

## 2022-11-14 NOTE — PROGRESS NOTES
CLINICAL NUTRITION SERVICES - BRIEF NOTE    -Awaiting decision on LVAD or transplant  -Per patient and wife, drinking and tolerating Ensure clear supplements (2x per day). Only drank 1 yesterday as there was one left over on his desk this morning.  -Appetite good per patient - he wishes he can eat today but he is on a clear liquid diet d/t colonoscopy procedure tomorrow.    If pt has an LVAD placed and if pt needs TFs, would rec place feeding tube (FT) via radiology, order XR Feeding Tube Placement, and initiate TFs, Osmolite 1.5 (Concentrated TF formula). Initiate TFs at 20 mL/hr, advancing rate by 15 mL Q 8 hrs (or per provider discretion, pending hemodynamic stability) to goal rate of 65 mL/hr. Osmolite 1.5 Maury @ goal of 65ml/hr  (1560mL/day) will provide: 2340 kcals, 97 g PRO, 1188 ml free H20, 317 g CHO, and 0 g fiber daily.                          If begin tube feeds:                          - Flush FT with 30 mL water Q 4 hrs for patency. Rec provider adjust free water flushes as needed, pending fluid status.                        - Ensure K+/Mg++/Phos labs are ordered daily until TFs advance to goal infusion to evaluate for sx of refeeding with nutrition received. If lytes trend low, aggressively replace lytes.                            - If not already ordered, order a multivitamin/mineral (certavite 15 mL/day via FT) to help ensure micronutrient needs being met with suspected hypermetabolic demands and potential interruptions to TF infusions.                        - Monitor TF and possible need to adjust nutrition support regimen if necessary, pending medical course and nutrition status.      INTERVENTIONS  Recommendations / Nutrition Prescription  -Initiate calorie counts   -Encourage PO intake  -Consider MVI if pt continues to have poor PO intake    Implementation  -Enteral Nutrition - Initiate per MD  -Nutrition education for nutrition relationship to health/disease - LVAD  -Spoke with patient and  his wife about possible LVAD procedure - gave nutrition education and patient showed understanding. Education included adequate nutrition intake and increased protein needs. Use of oral supplements and potential need for FT placement.    Michelle Ralph  Dietetic Intern      I have read and agree with the above nutrition note.     Moriah Garcia, MS, RD, LD, [facility]   6C Pgr: [phone]

## 2022-11-14 NOTE — PROGRESS NOTES
GASTROENTEROLOGY PROGRESS NOTE    Date of Admission: 11/10/2022  Reason for Admission: heart failure exacerbation      ASSESSMENT:  Paco Stein is a 67 year old male with a history of HFrEF(EF 20-25%) s/p BiV PPM, CAD s/p PCI to LCx and LAD, CNS vasculitis and CKD who was admitted for acute decompensated heart failure. GI consulted for colonscopy for LVAD/transplant workup.     #Hx of HFrEF undergoing LVAD/Transplant Evaluation   #Hx of Tubular Adenomas   - Vitals stable, electrolytes WNL   - Last colonoscopy in 2015 with hx of tubular adenomas with unclear number of polyps removed. Will plan for colonoscopy tomorrow for further surveillance of colorectal cancer in anticipation for potential transplant/LVAD    RECOMMENDATIONS:  - Colonoscopy with MAC tomorrow; prep already ordered   Colonoscopy prep:  - Give 2L Go-Lytely 11/14 at 1600  - Give 2L Go-Lytely 11/15 at 0300   - If not clear by 0500, please give an additional 2L Go-Lytely  - CLD today  - NPO midnight  - Please obtain morning labs tomorrow with CBC, BMP and correct any electrolyte, hgb, plt abnormalities   - Hold all anticoagulation prior to procedure    GI will continue to follow     Thank you for involving us in this patient's care. Please do not hesitate to contact the GI service with any questions or concerns.     Pt care plan discussed with Dr. Nino, GI staff physician.    Amelia Maurice PA-C  GI Service  [facility]  Text Page  _______________________________________________________________      Subjective: Nursing notes and 24hr events reviewed. Patient reports no abdominal pain, nausea, vomiting, diarrhea. No black or bloody stools. Has been tolerating CLD. Patient is on board for colon prep later today.     ROS:   4 pt ROS negative unless noted in subjective.     Medications:  Current Facility-Administered Medications   Medication     acetaminophen (TYLENOL) Suppository 650 mg     acetaminophen (TYLENOL) tablet  "650 mg     amiodarone (PACERONE) tablet 200 mg     aspirin (ASA) chewable tablet 81 mg     bisacodyl (DULCOLAX) suppository 10 mg     DOBUTamine 500 mg in dextrose 5% 250 mL (adult std conc) premix     [Held by provider] empagliflozin (JARDIANCE) tablet 10 mg     furosemide (LASIX) tablet 40 mg     heparin lock flush 10 UNIT/ML injection 5-20 mL     heparin lock flush 10 UNIT/ML injection 5-20 mL     lidocaine (LMX4) cream     lidocaine 1 % 0.1-1 mL     magnesium sulfate 2 g in water intermittent infusion     medication instruction     miconazole (MICATIN) 2 % cream     mycophenolate (GENERIC EQUIVALENT) tablet 1,000 mg     mycophenolate (GENERIC EQUIVALENT) tablet 500 mg     naloxone (NARCAN) injection 0.2 mg    Or     naloxone (NARCAN) injection 0.4 mg    Or     naloxone (NARCAN) injection 0.2 mg    Or     naloxone (NARCAN) injection 0.4 mg     oxyCODONE (ROXICODONE) tablet 5 mg    Or     oxyCODONE IR (ROXICODONE) tablet 10 mg     polyethylene glycol (GoLYTELY) suspension 2,000 mL     [START ON 11/15/2022] polyethylene glycol (GoLYTELY) suspension 2,000 mL     [START ON 11/15/2022] polyethylene glycol (GoLYTELY) suspension 2,000 mL     polyethylene glycol (MIRALAX) Packet 17 g     potassium chloride ER (KLOR-CON M) CR tablet 20 mEq     pravastatin (PRAVACHOL) tablet 20 mg     senna-docusate (SENOKOT-S/PERICOLACE) 8.6-50 MG per tablet 1 tablet    Or     senna-docusate (SENOKOT-S/PERICOLACE) 8.6-50 MG per tablet 2 tablet     sodium chloride (PF) 0.9% PF flush 10-20 mL     sodium chloride (PF) 0.9% PF flush 10-20 mL     sodium chloride (PF) 0.9% PF flush 10-40 mL     sodium chloride (PF) 0.9% PF flush 10-40 mL     sodium chloride (PF) 0.9% PF flush 3 mL     sodium chloride (PF) 0.9% PF flush 3 mL     traZODone (DESYREL) tablet 50 mg       Objective:  Blood pressure 105/77, pulse 83, temperature 97.5  F (36.4  C), temperature source Oral, resp. rate 16, height 1.88 m (6' 2\"), weight 89.5 kg (197 lb 4.8 oz), SpO2 96 " %.  Gen: Sitting up in chair. Appears comfortable  HEENT: NCAT. Conjunctiva clear. Sclera anicteric   CV: Regular rate  Resp: Non-labored breathing  Abd: Soft, non tender, non distended, no guarding or rebound, +BS  Msk: no gross deformity  Skin: No jaundice  Ext: warm, well perfused    Neuro: grossly normal  Mental status/Psych: A&O. Asks/answers questions appropriately     Date 11/14/22 0700 - 11/15/22 0659   Shift 6918-0875 9503-7633 3009-3103 24 Hour Total   INTAKE   Shift Total(mL/kg)       OUTPUT   Urine 200   200   Shift Total(mL/kg) 200(2.23)   200(2.23)   Weight (kg) 89.49 89.49 89.49 89.49         PROCEDURES:  Reviewed in EMR    LABS:  BMP  Recent Labs   Lab 11/14/22  0510 11/13/22  1547 11/13/22  0456 11/12/22 0458 11/11/22  1940 11/11/22  0847 11/11/22 0231     --  140 140  --   --  139   POTASSIUM 3.7  --  3.7  3.5 3.8  3.7 3.0*   < > 3.1*   CHLORIDE 102  --  102 101  --   --  102   TIM 8.5*  --  8.4* 8.5*  --   --  8.5*   CO2 25  --  24 26  --   --  26   BUN 21.2  --  20.8 19.9  --   --  24.1*   CR 1.09  --  1.20* 1.09  --   --  1.10   GLC 91 110* 85 105*  --   --  127*    < > = values in this interval not displayed.     CBC  Recent Labs   Lab 11/12/22 0458 11/11/22  1746 11/11/22  0231 11/10/22  1738   WBC 6.6  --  6.0 7.1   RBC 5.27  --  5.35 5.73   HGB 15.1   < > 15.2 16.7   HCT 46.2  --  46.6 50.9   MCV 88  --  87 89   MCH 28.7  --  28.4 29.1   MCHC 32.7  --  32.6 32.8   RDW 14.4  --  14.4 14.7   PLT 91*  --  98* 106*    < > = values in this interval not displayed.     INR  Recent Labs   Lab 11/12/22  0458   INR 1.20*     LFTs  Recent Labs   Lab 11/12/22  0458 11/11/22  0231 11/10/22  1738 11/08/22  1020   ALKPHOS 61 52 62  --    AST 24 24 30 34   ALT 27 31 36  --    BILITOTAL 1.5* 1.5* 1.5*  --    PROTTOTAL 5.4* 4.9* 5.8*  --    ALBUMIN 3.4* 3.1* 3.7  --       PANCNo lab results found in last 7 days.    IMAGING:  CT Chest / Abd / Pelvis on 11/11/22  IMPRESSION:   1.  Left chest wall  cardiac device with intact lead tips in the right  atrium, right ventricle, and coronary sinus.  2.  Cardiomegaly with right greater than left pleural effusions and  mild pulmonary edema suggestive of congestive heart failure.

## 2022-11-14 NOTE — IR NOTE
Cardiac device RN changed pt's pacemaker to MRI safe mode.  This RN monitored pt's HR and SPO2 during scan.

## 2022-11-14 NOTE — PROGRESS NOTES
Heart Transplant Referral  Date:  11/11/22    Type of Evaluation:  Hybrid  Lead Coordinator for Evaluation:  Maria Ines Genao    Patient referred for Advanced Therapies by Dr. Dorman , as patient meets criteria for heart transplant evaluation. Pt established care with Dr. Dominguez in clinic and was sent to hospital.   Cardiac Diagnosis: arrhythmogenic cardiomyopathy   Age: 67 year old  ABO: A POS     Chart reviewed and the following barriers for transplant were noted in the chart:   BMI: 25   Substance Use History: pt denies. Amphetamine positive on drug screen; confirmatory serum test in process.  Other Known Barriers to Transplant: N/A at this time.  COVID Vaccination Status: Received COVID vaccine on 2/2/21 and 3/4/21 per care everywhere.    Plan: Transplant evaluation inpatient and coordinate further education on heart transplant.  Welcome Packet for Heart transplant packet sent via [employer].     Heart Transplant Intake Call  Date:  11/14/22    Pt referred for Heart Transplant evaluation. Called patient and introduced the Heart Transplant Program.  Spoke with patient to discuss the evaluation process for transplant and to make plan for further follow up and education.     Discussed need for caregiver support during evaluation process, and post transplant and/or VAD implant.     Pt is undergoing eval as an inpatient.    Discussed importance of avoiding nicotine, illegal drugs - including cannabis -, and using alcohol only minimally. Discussed age and BMI requirements for transplant listing.    Heart Transplant Packet and information for [facility] was sent to the patient via [facility]. Patient was encouraged to review and sign this information further prior to the Heart Transplant Education Class.  They were encouraged to bring a caregiver to this class.    Plan: Patient verbalized understanding and in agreement with the plan for evaluation. Was engaged and forthcoming with information. Asked  appropriate questions in regard to this process. Patient denied any further transplant related questions and/or concerned.  Patient given the contact information to the transplant office (sent in email) and understands to contact coordinator with any further questions or concerns.

## 2022-11-14 NOTE — PLAN OF CARE
Goal Outcome Evaluation:      Plan of Care Reviewed With: patient    Overall Patient Progress: no changeOverall Patient Progress: no change    Outcome Evaluation: dobutamine gtt remains infusing, no c/o MELLO    Neuro: A&Ox4, flat affect   Cardiac: v-paced, PVCs, HR 80s-90s  Respiratory: RA, no c/o SOB  GI/: voiding adequate amounts in urinal, LBM 11/13/22  Diet/Appetite: clear liquids at 0000 11/14, pills in pudding  Skin: R groin, R radial, and R jugular sites CDI. Redness noted in groin area, antifungal cream applied.  LDA: PICC line infusing dobutamine at 2.5 mcg/kg/min , other lumen heparin locked  Activity: SBA, WW/GB  Pain: none     Plan:   -coloscopy 11/15, bowel prep today  -MRI today, checklist completed   -Pt learning (PICC/IV med) 11/15 at 1300  -LVAD/heart transplant w/u

## 2022-11-15 ENCOUNTER — APPOINTMENT (OUTPATIENT)
Dept: ULTRASOUND IMAGING | Facility: CLINIC | Age: 67
DRG: 286 | End: 2022-11-15
Attending: INTERNAL MEDICINE
Payer: MEDICARE

## 2022-11-15 DIAGNOSIS — I42.9 CARDIOMYOPATHY, UNSPECIFIED TYPE (H): Primary | ICD-10-CM

## 2022-11-15 DIAGNOSIS — Z45.02 ENCOUNTER FOR ADJUSTMENT AND MANAGEMENT OF AUTOMATIC IMPLANTABLE CARDIAC DEFIBRILLATOR: ICD-10-CM

## 2022-11-15 LAB
ALBUMIN SERPL BCG-MCNC: 3.3 G/DL (ref 3.5–5.2)
ALP SERPL-CCNC: 63 U/L (ref 40–129)
ALT SERPL W P-5'-P-CCNC: 27 U/L (ref 10–50)
AMPHET SERPL-MCNC: <20 NG/ML
ANION GAP SERPL CALCULATED.3IONS-SCNC: 12 MMOL/L (ref 7–15)
APTT PPP: 29 SECONDS (ref 22–38)
APTT PPP: 66 SECONDS (ref 22–38)
APTT PPP: 86 SECONDS (ref 22–38)
AST SERPL W P-5'-P-CCNC: 30 U/L (ref 10–50)
BILIRUB DIRECT SERPL-MCNC: 0.42 MG/DL (ref 0–0.3)
BILIRUB SERPL-MCNC: 1.7 MG/DL
BUN SERPL-MCNC: 13.9 MG/DL (ref 8–23)
CALCIUM SERPL-MCNC: 8.7 MG/DL (ref 8.8–10.2)
CELL TYPE AUTO: NORMAL
CHANNELSHIFTAUTOB1: -30
CHANNELSHIFTAUTOT1: -12
CHLORIDE SERPL-SCNC: 97 MMOL/L (ref 98–107)
CREAT SERPL-MCNC: 1.01 MG/DL (ref 0.67–1.17)
CROSSMATCHDATEAUTO: NORMAL
DEPRECATED HCO3 PLAS-SCNC: 25 MMOL/L (ref 22–29)
DONOR AUTO: NORMAL
DONORCELLDATE AUTO: NORMAL
ERYTHROCYTE [DISTWIDTH] IN BLOOD BY AUTOMATED COUNT: 14.3 % (ref 10–15)
ERYTHROCYTE [DISTWIDTH] IN BLOOD BY AUTOMATED COUNT: 14.4 % (ref 10–15)
GFR SERPL CREATININE-BSD FRML MDRD: 81 ML/MIN/1.73M2
GLUCOSE SERPL-MCNC: 95 MG/DL (ref 70–99)
HCT VFR BLD AUTO: 45 % (ref 40–53)
HCT VFR BLD AUTO: 47.3 % (ref 40–53)
HGB BLD-MCNC: 14.9 G/DL (ref 13.3–17.7)
HGB BLD-MCNC: 15.4 G/DL (ref 13.3–17.7)
HIV 1+2 AB+HIV1 P24 AG SERPL QL IA: NONREACTIVE
HOLD SPECIMEN HIT: NORMAL
MAGNESIUM SERPL-MCNC: 2.1 MG/DL (ref 1.7–2.3)
MCH RBC QN AUTO: 28.7 PG (ref 26.5–33)
MCH RBC QN AUTO: 28.9 PG (ref 26.5–33)
MCHC RBC AUTO-ENTMCNC: 32.6 G/DL (ref 31.5–36.5)
MCHC RBC AUTO-ENTMCNC: 33.1 G/DL (ref 31.5–36.5)
MCV RBC AUTO: 87 FL (ref 78–100)
MCV RBC AUTO: 88 FL (ref 78–100)
MDA SERPL-MCNC: <20 NG/ML
MDC_IDC_LEAD_IMPLANT_DT: NORMAL
MDC_IDC_LEAD_LOCATION: NORMAL
MDC_IDC_LEAD_LOCATION_DETAIL_1: NORMAL
MDC_IDC_LEAD_LOCATION_DETAIL_1: NORMAL
MDC_IDC_LEAD_MFG: NORMAL
MDC_IDC_LEAD_MODEL: NORMAL
MDC_IDC_LEAD_POLARITY_TYPE: NORMAL
MDC_IDC_LEAD_SERIAL: NORMAL
MDC_IDC_LEAD_SPECIAL_FUNCTION: NORMAL
MDC_IDC_MSMT_BATTERY_DTM: NORMAL
MDC_IDC_MSMT_BATTERY_REMAINING_LONGEVITY: 48 MO
MDC_IDC_MSMT_BATTERY_RRT_TRIGGER: 2.73
MDC_IDC_MSMT_BATTERY_STATUS: NORMAL
MDC_IDC_MSMT_BATTERY_VOLTAGE: 2.95 V
MDC_IDC_MSMT_CAP_CHARGE_TYPE: NORMAL
MDC_IDC_MSMT_LEADCHNL_LV_IMPEDANCE_VALUE: 304 OHM
MDC_IDC_MSMT_LEADCHNL_LV_PACING_THRESHOLD_AMPLITUDE: 1.25 V
MDC_IDC_MSMT_LEADCHNL_LV_PACING_THRESHOLD_PULSEWIDTH: 1 MS
MDC_IDC_MSMT_LEADCHNL_RA_IMPEDANCE_VALUE: 475 OHM
MDC_IDC_MSMT_LEADCHNL_RA_PACING_THRESHOLD_AMPLITUDE: 0.5 V
MDC_IDC_MSMT_LEADCHNL_RA_PACING_THRESHOLD_PULSEWIDTH: 0.4 MS
MDC_IDC_MSMT_LEADCHNL_RA_SENSING_INTR_AMPL: 3.4 MV
MDC_IDC_MSMT_LEADCHNL_RV_IMPEDANCE_VALUE: 399 OHM
MDC_IDC_MSMT_LEADCHNL_RV_PACING_THRESHOLD_AMPLITUDE: 0.5 V
MDC_IDC_MSMT_LEADCHNL_RV_PACING_THRESHOLD_PULSEWIDTH: 0.4 MS
MDC_IDC_MSMT_LEADCHNL_RV_SENSING_INTR_AMPL: 4.9 MV
MDC_IDC_PG_IMPLANT_DTM: NORMAL
MDC_IDC_PG_MFG: NORMAL
MDC_IDC_PG_MODEL: NORMAL
MDC_IDC_PG_SERIAL: NORMAL
MDC_IDC_PG_TYPE: NORMAL
MDC_IDC_SESS_CLINIC_NAME: NORMAL
MDC_IDC_SESS_DTM: NORMAL
MDC_IDC_SESS_TYPE: NORMAL
MDC_IDC_SET_BRADY_AT_MODE_SWITCH_RATE: 171 {BEATS}/MIN
MDC_IDC_SET_BRADY_LOWRATE: 60 {BEATS}/MIN
MDC_IDC_SET_BRADY_MAX_SENSOR_RATE: 140 {BEATS}/MIN
MDC_IDC_SET_BRADY_MAX_TRACKING_RATE: 140 {BEATS}/MIN
MDC_IDC_SET_BRADY_MODE: NORMAL
MDC_IDC_SET_BRADY_PAV_DELAY_LOW: 170 MS
MDC_IDC_SET_BRADY_SAV_DELAY_LOW: 140 MS
MDC_IDC_SET_CRT_PACED_CHAMBERS: NORMAL
MDC_IDC_SET_LEADCHNL_LV_PACING_AMPLITUDE: 2.75 V
MDC_IDC_SET_LEADCHNL_LV_PACING_ANODE_ELECTRODE_1: NORMAL
MDC_IDC_SET_LEADCHNL_LV_PACING_ANODE_LOCATION_1: NORMAL
MDC_IDC_SET_LEADCHNL_LV_PACING_CAPTURE_MODE: NORMAL
MDC_IDC_SET_LEADCHNL_LV_PACING_CATHODE_ELECTRODE_1: NORMAL
MDC_IDC_SET_LEADCHNL_LV_PACING_CATHODE_LOCATION_1: NORMAL
MDC_IDC_SET_LEADCHNL_LV_PACING_POLARITY: NORMAL
MDC_IDC_SET_LEADCHNL_LV_PACING_PULSEWIDTH: 1 MS
MDC_IDC_SET_LEADCHNL_RA_PACING_AMPLITUDE: 1.5 V
MDC_IDC_SET_LEADCHNL_RA_PACING_ANODE_ELECTRODE_1: NORMAL
MDC_IDC_SET_LEADCHNL_RA_PACING_ANODE_LOCATION_1: NORMAL
MDC_IDC_SET_LEADCHNL_RA_PACING_CAPTURE_MODE: NORMAL
MDC_IDC_SET_LEADCHNL_RA_PACING_CATHODE_ELECTRODE_1: NORMAL
MDC_IDC_SET_LEADCHNL_RA_PACING_CATHODE_LOCATION_1: NORMAL
MDC_IDC_SET_LEADCHNL_RA_PACING_POLARITY: NORMAL
MDC_IDC_SET_LEADCHNL_RA_PACING_PULSEWIDTH: 0.4 MS
MDC_IDC_SET_LEADCHNL_RA_SENSING_ANODE_ELECTRODE_1: NORMAL
MDC_IDC_SET_LEADCHNL_RA_SENSING_ANODE_LOCATION_1: NORMAL
MDC_IDC_SET_LEADCHNL_RA_SENSING_CATHODE_ELECTRODE_1: NORMAL
MDC_IDC_SET_LEADCHNL_RA_SENSING_CATHODE_LOCATION_1: NORMAL
MDC_IDC_SET_LEADCHNL_RA_SENSING_POLARITY: NORMAL
MDC_IDC_SET_LEADCHNL_RA_SENSING_SENSITIVITY: 0.3 MV
MDC_IDC_SET_LEADCHNL_RV_PACING_AMPLITUDE: 2 V
MDC_IDC_SET_LEADCHNL_RV_PACING_ANODE_ELECTRODE_1: NORMAL
MDC_IDC_SET_LEADCHNL_RV_PACING_ANODE_LOCATION_1: NORMAL
MDC_IDC_SET_LEADCHNL_RV_PACING_CAPTURE_MODE: NORMAL
MDC_IDC_SET_LEADCHNL_RV_PACING_CATHODE_ELECTRODE_1: NORMAL
MDC_IDC_SET_LEADCHNL_RV_PACING_CATHODE_LOCATION_1: NORMAL
MDC_IDC_SET_LEADCHNL_RV_PACING_POLARITY: NORMAL
MDC_IDC_SET_LEADCHNL_RV_PACING_PULSEWIDTH: 0.4 MS
MDC_IDC_SET_LEADCHNL_RV_SENSING_ANODE_ELECTRODE_1: NORMAL
MDC_IDC_SET_LEADCHNL_RV_SENSING_ANODE_LOCATION_1: NORMAL
MDC_IDC_SET_LEADCHNL_RV_SENSING_CATHODE_ELECTRODE_1: NORMAL
MDC_IDC_SET_LEADCHNL_RV_SENSING_CATHODE_LOCATION_1: NORMAL
MDC_IDC_SET_LEADCHNL_RV_SENSING_POLARITY: NORMAL
MDC_IDC_SET_LEADCHNL_RV_SENSING_SENSITIVITY: 0.3 MV
MDC_IDC_SET_ZONE_DETECTION_BEATS_DENOMINATOR: 40 {BEATS}
MDC_IDC_SET_ZONE_DETECTION_BEATS_NUMERATOR: 30 {BEATS}
MDC_IDC_SET_ZONE_DETECTION_INTERVAL: 240 MS
MDC_IDC_SET_ZONE_DETECTION_INTERVAL: 320 MS
MDC_IDC_SET_ZONE_DETECTION_INTERVAL: 350 MS
MDC_IDC_SET_ZONE_DETECTION_INTERVAL: 360 MS
MDC_IDC_SET_ZONE_DETECTION_INTERVAL: 420 MS
MDC_IDC_SET_ZONE_TYPE: NORMAL
MDC_IDC_STAT_AT_BURDEN_PERCENT: 0 %
MDC_IDC_STAT_BRADY_AP_VP_PERCENT: 1.1 %
MDC_IDC_STAT_BRADY_AP_VS_PERCENT: 0.1 %
MDC_IDC_STAT_BRADY_AS_VP_PERCENT: 89.4 %
MDC_IDC_STAT_BRADY_AS_VS_PERCENT: 9.4 %
MDC_IDC_STAT_BRADY_DTM_END: NORMAL
MDC_IDC_STAT_BRADY_DTM_START: NORMAL
MDC_IDC_STAT_BRADY_RA_PERCENT_PACED: 1 %
MDC_IDC_STAT_BRADY_RV_PERCENT_PACED: 88.9 %
MDC_IDC_STAT_CRT_LV_PERCENT_PACED: 89.4 %
MDC_IDC_STAT_CRT_PERCENT_PACED: 89.4 %
MDC_IDC_STAT_EPISODE_RECENT_COUNT: 0
MDC_IDC_STAT_EPISODE_RECENT_COUNT_DTM_END: NORMAL
MDC_IDC_STAT_EPISODE_RECENT_COUNT_DTM_START: NORMAL
MDC_IDC_STAT_EPISODE_TYPE: NORMAL
MDEA SERPL-MCNC: <20 NG/ML
MDMA SERPL-MCNC: <20 NG/ML
METHAMPHET SERPL-MCNC: <20 NG/ML
PF4 HEPARIN CMPLX AB SER QL: NEGATIVE
PLATELET # BLD AUTO: 106 10E3/UL (ref 150–450)
PLATELET # BLD AUTO: 98 10E3/UL (ref 150–450)
POS CUT OFF AUTO B: >93
POS CUT OFF AUTO T: >79
POTASSIUM SERPL-SCNC: 3.8 MMOL/L (ref 3.4–5.3)
PROT SERPL-MCNC: 5.5 G/DL (ref 6.4–8.3)
RADIOLOGIST FLAGS: ABNORMAL
RADIOLOGIST FLAGS: ABNORMAL
RBC # BLD AUTO: 5.16 10E6/UL (ref 4.4–5.9)
RBC # BLD AUTO: 5.36 10E6/UL (ref 4.4–5.9)
RESULT AUTO B1: NORMAL
RESULT AUTO T1: NORMAL
SA 1 CELL: NORMAL
SA 1 TEST METHOD: NORMAL
SA 2 CELL: NORMAL
SA 2 TEST METHOD: NORMAL
SA1 HI RISK ABY: NORMAL
SA1 MOD RISK ABY: NORMAL
SA2 HI RISK ABY: NORMAL
SA2 MOD RISK ABY: NORMAL
SERUM DATE AUTO B1: NORMAL
SERUM DATE AUTO T1: NORMAL
SODIUM SERPL-SCNC: 134 MMOL/L (ref 136–145)
TESTMETHODAUTO: NORMAL
TREATMENT AUTO B1: NORMAL
TREATMENT AUTO T1: NORMAL
UNACCEPTABLE ANTIGENS: NORMAL
UNOS CPRA: 0
WBC # BLD AUTO: 4.9 10E3/UL (ref 4–11)
WBC # BLD AUTO: 5.6 10E3/UL (ref 4–11)
ZZZSA 1  COMMENTS: NORMAL
ZZZSA 2 COMMENTS: NORMAL

## 2022-11-15 PROCEDURE — 85027 COMPLETE CBC AUTOMATED: CPT

## 2022-11-15 PROCEDURE — 99233 SBSQ HOSP IP/OBS HIGH 50: CPT | Mod: 25 | Performed by: INTERNAL MEDICINE

## 2022-11-15 PROCEDURE — 80053 COMPREHEN METABOLIC PANEL: CPT

## 2022-11-15 PROCEDURE — 250N000013 HC RX MED GY IP 250 OP 250 PS 637

## 2022-11-15 PROCEDURE — 93970 EXTREMITY STUDY: CPT | Mod: 26 | Performed by: RADIOLOGY

## 2022-11-15 PROCEDURE — 250N000011 HC RX IP 250 OP 636: Performed by: STUDENT IN AN ORGANIZED HEALTH CARE EDUCATION/TRAINING PROGRAM

## 2022-11-15 PROCEDURE — 250N000012 HC RX MED GY IP 250 OP 636 PS 637

## 2022-11-15 PROCEDURE — 214N000001 HC R&B CCU UMMC

## 2022-11-15 PROCEDURE — 93970 EXTREMITY STUDY: CPT | Mod: XS

## 2022-11-15 PROCEDURE — 85730 THROMBOPLASTIN TIME PARTIAL: CPT

## 2022-11-15 PROCEDURE — 99232 SBSQ HOSP IP/OBS MODERATE 35: CPT | Performed by: PHYSICIAN ASSISTANT

## 2022-11-15 PROCEDURE — 82248 BILIRUBIN DIRECT: CPT

## 2022-11-15 PROCEDURE — 250N000013 HC RX MED GY IP 250 OP 250 PS 637: Performed by: INTERNAL MEDICINE

## 2022-11-15 PROCEDURE — 250N000012 HC RX MED GY IP 250 OP 636 PS 637: Performed by: INTERNAL MEDICINE

## 2022-11-15 PROCEDURE — 85730 THROMBOPLASTIN TIME PARTIAL: CPT | Performed by: INTERNAL MEDICINE

## 2022-11-15 PROCEDURE — 250N000011 HC RX IP 250 OP 636

## 2022-11-15 PROCEDURE — 93970 EXTREMITY STUDY: CPT

## 2022-11-15 PROCEDURE — 83735 ASSAY OF MAGNESIUM: CPT

## 2022-11-15 RX ORDER — LANOLIN ALCOHOL/MO/W.PET/CERES
3 CREAM (GRAM) TOPICAL
Status: DISCONTINUED | OUTPATIENT
Start: 2022-11-15 | End: 2022-11-21 | Stop reason: HOSPADM

## 2022-11-15 RX ORDER — POTASSIUM CHLORIDE 1.5 G/1.58G
20 POWDER, FOR SOLUTION ORAL ONCE
Status: COMPLETED | OUTPATIENT
Start: 2022-11-15 | End: 2022-11-15

## 2022-11-15 RX ORDER — HYDROXYZINE HYDROCHLORIDE 50 MG/1
50 TABLET, FILM COATED ORAL EVERY 6 HOURS PRN
Status: DISCONTINUED | OUTPATIENT
Start: 2022-11-15 | End: 2022-11-21 | Stop reason: HOSPADM

## 2022-11-15 RX ORDER — POTASSIUM CHLORIDE 750 MG/1
20 TABLET, EXTENDED RELEASE ORAL ONCE
Status: DISCONTINUED | OUTPATIENT
Start: 2022-11-15 | End: 2022-11-15

## 2022-11-15 RX ORDER — HYDROXYZINE HYDROCHLORIDE 25 MG/1
25 TABLET, FILM COATED ORAL EVERY 6 HOURS PRN
Status: DISCONTINUED | OUTPATIENT
Start: 2022-11-15 | End: 2022-11-21 | Stop reason: HOSPADM

## 2022-11-15 RX ADMIN — ARGATROBAN 1 MCG/KG/MIN: 50 INJECTION INTRAVENOUS at 10:22

## 2022-11-15 RX ADMIN — AMIODARONE HYDROCHLORIDE 200 MG: 200 TABLET ORAL at 20:44

## 2022-11-15 RX ADMIN — DOBUTAMINE IN DEXTROSE 2.5 MCG/KG/MIN: 200 INJECTION, SOLUTION INTRAVENOUS at 17:23

## 2022-11-15 RX ADMIN — MICONAZOLE NITRATE: 20 CREAM TOPICAL at 20:47

## 2022-11-15 RX ADMIN — POTASSIUM CHLORIDE 20 MEQ: 1.5 POWDER, FOR SOLUTION ORAL at 06:43

## 2022-11-15 RX ADMIN — MYCOPHENOLATE MOFETIL 500 MG: 500 TABLET, FILM COATED ORAL at 20:44

## 2022-11-15 RX ADMIN — HYDROXYZINE HYDROCHLORIDE 25 MG: 25 TABLET, FILM COATED ORAL at 20:50

## 2022-11-15 RX ADMIN — AMIODARONE HYDROCHLORIDE 200 MG: 200 TABLET ORAL at 08:02

## 2022-11-15 RX ADMIN — MYCOPHENOLATE MOFETIL 1000 MG: 500 TABLET, FILM COATED ORAL at 09:19

## 2022-11-15 RX ADMIN — FUROSEMIDE 40 MG: 40 TABLET ORAL at 09:19

## 2022-11-15 RX ADMIN — POLYETHYLENE GLYCOL 3350, SODIUM SULFATE ANHYDROUS, SODIUM BICARBONATE, SODIUM CHLORIDE, POTASSIUM CHLORIDE 2000 ML: 236; 22.74; 6.74; 5.86; 2.97 POWDER, FOR SOLUTION ORAL at 03:19

## 2022-11-15 ASSESSMENT — ACTIVITIES OF DAILY LIVING (ADL)
ADLS_ACUITY_SCORE: 32

## 2022-11-15 NOTE — PLAN OF CARE
[phone] 11/15/2022     Patient is a 67 year old male admitted for CHF with a PMH of HFrEF 20-25% secondary to arrhythmogenic cardiomyopathy, CAD s/p PCI on LCx and LAD, CNS vasculitis and CKD who presents for shortness of breath. Team is cards 2. Patient is a unit lab collect.     Neuro: A&Ox4; patient has trouble regulating emotions at times; atarax PRN added to MAR  Cardiac: V paced; ICD; pulses palpable; sinus rhythm; Mg and K+ protocols; dobutamine drip; started on Argatroban drip r/t new DVTs and possible new stroke, will change to Heparin if HIT workup is negative; PCD leg pumps on when in bed   - Last 2 PTT labs were in goal range, next check tomorrow AM  Respiratory: MELLO; on room air; diminished lungs; no cough; may need a little oxygen over night  GI/: WDL; uses bedside urinal and commode; bowel sounds active  Endocrine: WDL  Diet/Appetite: Regular diet; thin liquids; calorie counts 11/16-11/18; NPO at midnight (morning meds okay)  Skin: Right groin access site; right radial access site; right internal jugular; jesica area rash (anti fungal cream)  LDA: Right double lumen PICC  Activity: Stand by assist with walker and gaitbelt   Pain: Patient has no complaints of pain  Plan: Continue work up for LVAD/heart transplant; JARED tomorrow at 10:00am    To do list: Another MRI scan (artifact on the first MRI) and Colonoscopy     - Patient learning center: Thursday the 17th 3:30pm in the patients  room, PICC education  - iPad neuro psych meetings tomorrow 11/16 in the patients room: 10:00am and 12:45pm

## 2022-11-15 NOTE — PROGRESS NOTES
GASTROENTEROLOGY PROGRESS NOTE    Date of Admission: 11/10/2022  Reason for Admission: ADHF      ASSESSMENT:  Paco Stein is a 67 year old male with a history of HFrEF(EF 20-25%) s/p BiV PPM, CAD s/p PCI to LCx and LAD, CNS vasculitis and CKD who was admitted for acute decompensated heart failure. GI consulted for colonscopy for LVAD/transplant workup.     # Hx of HFrEF undergoing LVAD/Transplant Evaluation   # Hx of Tubular Adenomas   # Acute DVT started on anticoagulation   - Continues to require inotropic support   - Last colonoscopy in 2015 with hx of tubular adenomas with unclear number of polyps removed.   - Initial plan was to proceed with a colonoscopy today for further surveillance of colorectal cancer in anticipation for potential transplant/LVAD, however we were notified that the patient has acute DVTs and MRI findings concerning for right lateral cerebellar infarct ?embolic and was started on argatroban gtt given concern for HIT.   - Will plan to hold off on colonoscopy at this time given these acute events and risks of sedation in the setting of possible acute ischemia     RECOMMENDATIONS:  - Ok for low residue diet from GI perspective   - Will defer colonoscopy at this time given acute DVTs on anticoagulation / workup of possible embolic stroke / HIT workup     GI will sign off - please reach back out to use when patient is optimized and can safely proceed with a colonoscopy     Thank you for involving us in this patient's care. Please do not hesitate to contact the GI service with any questions or concerns.     Pt care plan discussed with Dr. Nino, GI staff physician.    Amelia Maurice PA-C  GI Service  [facility]  Text Page  _______________________________________________________________      Subjective: Nursing notes and 24hr events reviewed. Patient reports no new GI symptoms. He reports eating a hamburger and chips after he was told the colonoscopy would not  be happening today in light of the acute events, and he tolerated the diet just fine. Discussed timing of colonoscopy would be at a later date when safe to complete, which he understood.     ROS:   4 pt ROS negative unless noted in subjective.     Medications:  Current Facility-Administered Medications   Medication     acetaminophen (TYLENOL) Suppository 650 mg     acetaminophen (TYLENOL) tablet 650 mg     amiodarone (PACERONE) tablet 200 mg     argatroban (ACOVA) 1 mg/mL ANTICOAGULANT infusion     aspirin (ASA) chewable tablet 81 mg     bisacodyl (DULCOLAX) suppository 10 mg     DOBUTamine 500 mg in dextrose 5% 250 mL (adult std conc) premix     [Held by provider] empagliflozin (JARDIANCE) tablet 10 mg     furosemide (LASIX) tablet 40 mg     lidocaine (LMX4) cream     lidocaine 1 % 0.1-1 mL     medication instruction     miconazole (MICATIN) 2 % cream     mycophenolate (GENERIC EQUIVALENT) tablet 1,000 mg     mycophenolate (GENERIC EQUIVALENT) tablet 500 mg     naloxone (NARCAN) injection 0.2 mg    Or     naloxone (NARCAN) injection 0.4 mg    Or     naloxone (NARCAN) injection 0.2 mg    Or     naloxone (NARCAN) injection 0.4 mg     oxyCODONE (ROXICODONE) tablet 5 mg    Or     oxyCODONE IR (ROXICODONE) tablet 10 mg     polyethylene glycol (GoLYTELY) suspension 2,000 mL     polyethylene glycol (GoLYTELY) suspension 2,000 mL     polyethylene glycol (MIRALAX) Packet 17 g     pravastatin (PRAVACHOL) tablet 20 mg     senna-docusate (SENOKOT-S/PERICOLACE) 8.6-50 MG per tablet 1 tablet    Or     senna-docusate (SENOKOT-S/PERICOLACE) 8.6-50 MG per tablet 2 tablet     sodium chloride (PF) 0.9% PF flush 10-20 mL     sodium chloride (PF) 0.9% PF flush 10-20 mL     sodium chloride (PF) 0.9% PF flush 10-40 mL     sodium chloride (PF) 0.9% PF flush 3 mL     sodium chloride (PF) 0.9% PF flush 3 mL     traZODone (DESYREL) tablet 50 mg       Objective:  Blood pressure 112/89, pulse 82, temperature 98  F (36.7  C), temperature source  "Axillary, resp. rate 18, height 1.88 m (6' 2\"), weight 89.9 kg (198 lb 3.2 oz), SpO2 98 %.  Gen: Lying in bed. Appears comfortable  HEENT: NCAT. Conjunctiva clear. Sclera anicteric   CV: Regular rate  Resp: Non-labored breathing  Abd: Soft, non distended, no guarding or rebound, +BS   Msk: no gross deformity  Skin: No jaundice   Ext: warm, well perfused   Neuro: grossly normal  Mental status/Psych: A&O     Date 11/15/22 0700 - 11/16/22 0659   Shift 4109-5647 6559-9433 6124-2440 24 Hour Total   INTAKE   I.V. 49.82   49.82   Shift Total(mL/kg) 49.82(0.55)   49.82(0.55)   OUTPUT   Shift Total(mL/kg)       Weight (kg) 89.9 89.9 89.9 89.9         PROCEDURES:  Reviewed in EMR     LABS:  BMP  Recent Labs   Lab 11/15/22  0444 11/14/22  0510 11/13/22  1547 11/13/22  0456 11/12/22  0458   * 138  --  140 140   POTASSIUM 3.8 3.7  --  3.7  3.5 3.8  3.7   CHLORIDE 97* 102  --  102 101   TIM 8.7* 8.5*  --  8.4* 8.5*   CO2 25 25  --  24 26   BUN 13.9 21.2  --  20.8 19.9   CR 1.01 1.09  --  1.20* 1.09   GLC 95 91 110* 85 105*     CBC  Recent Labs   Lab 11/15/22  0952 11/15/22  0444 11/12/22  0458 11/11/22  1746 11/11/22  0231   WBC 4.9 5.6 6.6  --  6.0   RBC 5.16 5.36 5.27  --  5.35   HGB 14.9 15.4 15.1   < > 15.2   HCT 45.0 47.3 46.2  --  46.6   MCV 87 88 88  --  87   MCH 28.9 28.7 28.7  --  28.4   MCHC 33.1 32.6 32.7  --  32.6   RDW 14.3 14.4 14.4  --  14.4   PLT 98* 106* 91*  --  98*    < > = values in this interval not displayed.     INR  Recent Labs   Lab 11/12/22  0458   INR 1.20*     LFTs  Recent Labs   Lab 11/15/22  0952 11/12/22  0458 11/11/22  0231 11/10/22  1738   ALKPHOS 63 61 52 62   AST 30 24 24 30   ALT 27 27 31 36   BILITOTAL 1.7* 1.5* 1.5* 1.5*   PROTTOTAL 5.5* 5.4* 4.9* 5.8*   ALBUMIN 3.3* 3.4* 3.1* 3.7      PANCNo lab results found in last 7 days.     IMAGING:  Reviewed in EMR  "

## 2022-11-15 NOTE — PLAN OF CARE
67 year old male w/ pertinent PMHx of HFrEF (EF 15-20%) presumably secondary to arrhythmogenic cardiomyopathy, CAD (prior PCI LCx and LAD), CNS vasculitis, and CKD admitted 11/10/2022 for decompensated heart failure. Remains admitted requiring inotropic support and undergoing advanced therapy evaluation.    Temp: 97.5  F (36.4  C) Temp src: Axillary BP: 101/86 Pulse: 84   Resp: 20 SpO2: 99 % O2 Device: None (Room air)        Neuro: AOx4  Cardiac: SR, V-paced with occasional pvc's  Resp: RA  GI/: Adequate urine output, no nausea   LDA's: Double lumen PICC, Red running Dobutamine@2.5 mcg/kg/min= 7.1 ml/hr, purple is SL  Activity: Assist x1  Skin: Nothing of note  Pain: Denies  Diet: NPO for scheduled colonoscopy today. Will transition to calorie counts after colonoscopy.    Morning lab results available, K+ was 3.8 so I ordered replacement.    Pt is undergoing workup for LVAD/transplant.Scheduled colonoscopy @10 and MRI at 12:30 today.

## 2022-11-15 NOTE — PROGRESS NOTES
"SPIRITUAL HEALTH SERVICES  SPIRITUAL ASSESSMENT Progress Note  [facility] ([place]) 6C    REFERRAL SOURCE: RN referral    Pt and wife were present at time of visit.  Wife reported that pt \"had a big setback today and wants to sleep,\" and declined SHS support at this time.   informed them that we would continue to be available for any needs.     PLAN: SHS will remain available     Myrtle Crow  Pager: [phone]    "

## 2022-11-15 NOTE — CONSULTS
Patient of Dr. Myrtle Dominguez seen in the hospital on patient care unit 6C for psychosocial assessment. 60 minutes spent with patient. 100% of visit consisted of counseling related to issues surrounding Heart Transplant and LVAD implant.    Psychosocial Assessment   Name: Paco Stein     MRN:  [id number]        Patient Name / Age / Race: Paco Stein 67 year old    Source of Information: Patient and Family (Wife and Wife's Sister)   : Sophia Butler MSW   Interview Date: November 14, 2022   Reason for Referral: Heart Transplant and Mechanical Circulatory Support     Current Living Situation   Location ([facility]State): [address] [address]   With Whom: Living arrangements - the patient lives with their spouse.   Type of Home: single family home with multiple levels. 11-14 steps to upper level where the bedroom is, but all needs can be met on the main level   Working Phone? Yes    [employer] Pr[employer]ed Outlet? Yes    Distance from Hospital: 6 hours   Need to Relocate Post Surgery? Yes    Discussed? 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 No   Income: SSD, pension, savings and spouse's group home   Insurance: Medicare   Name of Private Insurance: Blue Cross Blue Shield supplemental insurnce   Medication Coverage: Part D-not familiar with donor hole-never been in coverage gap    In current situation, pt. can afford medication and supply costs:  Yes    Other Financial Concerns/Issues: None     Family/Social Support   Marital Status:    Partner Name: Arlette   Length of Time : 42 years   Partner s Employment: Retired Physical Therapist   Relationship: stable/supportive   Children: 3 adult Children: Son(physican) in [place], Another Son (also a physician) in  [place] and a Dtr (ICU RN) in [place].   Parents:    Siblings: Youngest of 9 siblings. 4 of them have passed away. Older Brother got heart transplant 1yr. Ago. Other siblings in [facility], and [place]   Other Family or Friends Close by: Wife's Sister-also retired and can provide assistance   Support System: available, helpful   Primary Support Person: Wife   Issues: None     Activities/Functional Ability   Current Level: ambulatory and independent with ADL's   Daily Activities: Prior to 1 month ago, was walking 3 miles daily, able to mow grass, drives, gardens, etc...   Transportation: self -but utilizing help from family last 3-4 weeks     Medical Status   Patient s understanding of need for surgical intervention: Good   Advanced Directive? Yes -But at the [facility] in [place]    Details: Wife is [facility]   Adherence History: No issues with non-compliance   Ability to Adhere to Complex Medical Regime: Yes     Behavioral   Chemical Dependency Issues: No   Smoker? No   Psychiatric impairment: No-but has CNS Vasculitis making it very difficult to assess mental health stability. Patient with difficulty regulating emotions. Per wife his actual feelings are not reflective of his facial expressions (crying)   Coping Style/Strategies: Patient and wife describe good coping skills. Enjoys walking, spending time with family, gardening, cooking. Last 3-4 weeks has not been able to engage in these activities   Recent Legal History: No   Teaching Completed During Assessment Related To Transplant and Mechanical Circulatory Support: 1. Housing and relocation needs post surgery.  2. Caregiver needs post surgery.  3. Financial issues related to surgery.  4. Risks of alcohol use post surgery.  5. Common psychosocial stressors pre/post surgery.  6. Support group availability.   Psychosocial Risks Reviewed Related To Transplant and Mechanical Circulatory Support: 1. Increased stress related to your emotional,  family, social, employment, or financial situation.  2. Affect on work and/or disability benefits.  3. Affect on future health and life insurance.  4. Outcome expectations may not be met.  5. Mental Health risks: anxiety, depression, PTSD, guilt, grief and chronic fatigue.     Observed Behavior   Well informed? Yes  Angry? No   Process info well? Yes  Hostile? No   Evasive? No Oriented X3? Yes    Cautious/Suspicious? No Motivated? Yes    Appropriate Behavior? Yes  Depressed? No   Appropriate Affect? Yes -but with outward expression of emotional lability Anxious? No   Interview Observations: Engaged in conversation. Very supportive family. Does rely on wife to explain things on his behalf     Selection Criteria Met   Plan for Support Yes    Chemical Dependence Yes    Smoking Yes    Mental Health Yes    Adequate Finances Yes      Risk Issues:    Increased difficulty in ascertaining true emotional state    Final Evaluation/Assessment:     Patient hospitalized. Wife and Wife's Sister present and very supportive. Patient would have adequate caregiver support from wife and wife's Sister, along with help from their children post-VAD or Transplant. Would be able to relocate temporarily to the [facility]. He would have 24/7 support for 30 days or longer as needed.    There does not appear to be any history of mental illness or chemical dependency issues. He has adequate medical insurance and finances to proceed with either VAD or transplant. There doesn't appear to be any issues with medical non-adherence. Patient is a non-smoker.    Patient does have a history of strokes and is unable to regulate his emotions. He cries often and does rely on his wife to answer questions at times, but patient and family report that prior to 1 month ago, he was independent with all ADL's, IADL's, was able to drive, mow his grass, walk 3 miles a day, garden and cook. He manages his own medications and was doing well until 3-4 weeks ago. With  worsening heart failure, family currently reports need to obtain a W/C, walker, shower chair, oxygen, in order for the family to provide care.     At this point, writer could not identify any psychosocial barriers that would preclude him from moving forward with VAD or transplant.    Frailty Score = (For last 4 weeks is 5) but prior to one month ago, would say 3    Patient understands risks and benefits of Heart Transplant and Mechanical Circulatory Support: Yes     Recommendation:    Good    Signature: TARIK Juares     Title: Licensed Independent Clinical                Interview Date: November 14, 2022

## 2022-11-15 NOTE — PROGRESS NOTES
[facility]  Cardiology II Service / Advanced Heart Failure  Daily Progress Note    Patient: Paco Stein      : 1955      MRN: [id number]    Assessment/Plan:   67 year old male w/ pertinent PMHx of HFrEF (EF 15-20%) presumably secondary to arrhythmogenic cardiomyopathy, CAD (prior PCI LCx and LAD), CNS vasculitis, and CKD admitted 11/10/2022 for decompensated heart failure. Remains admitted requiring inotropic support and undergoing advanced therapy evaluation.    Today's changes:  - Discussion w/ neurology, diffusion restriction in cerebellum seen on MR yesterday is more than likely reflective of embolic stroke rather than motion artifat  - Found to have occlusive left peroneal DVT and non-occlusive right axillary DVT  - Started on Argatroban gtt  - HIT panel pending  - JARED ordered for tomorrow, NPO at midnight  - colonoscopy tentatively postponed, will discuss with neurology and GI appropriate timing and risk of anesthesia given new stroke    Acute on chronic systolic heart failure w/ ambulatory cardiogenic shock I/s/o advanced non-ischemic cardiomyopathy (suspected arrhythmogenic), Class IV, Stage D  Clinical picture on admission: sent in from heart failure clinic due to 2 weeks of progressive exercise intolerance, MELLO, orthopnea, PND w/ evidence of volume overload. Last echo 22 showing reduction of EF to 15-20% from previous of 20-25% w/ diffuse hypokinesis and mild RV dilation w/ reduced RV function. Last RHC 22 w/ mildly elevated filling pressures bilaterally w/ a CI of 1.9. Started on IV diuresis, now transitioned to PO maintenance dosing. Remains on low-dose inotropic support while undergoing advanced therapy planning.  Plan:  - Diuretic: 40mg PO Lasix qday (PTA maintenance dosing)  - Inotrope: Dobutamine 2.5mcg/kg/min  - Afterload Reduction: no additional agents currently  - BB: Holding PTA Metoprolol Succ 25mg qday  - SGLT2i: Holding PTA  Empag 10mg qday  - MRA: None  - Statin: PTA statin  - Devices: biventricular PPM/ICD  - Electrolytes: Daily monitoring with diuresis, on RN protocols for K and Mag  - Strict I/O monitoring  - Daily weights    Advanced therapy planning  Checklist as per below. Notable action items include:  - Colonoscopy pending - on hold for now given new embolic stroke and determining anesthesia risk, has diet today, will be NPO at midnight for a JARED tomorrow, can determine timing of next prep  - MRI of the brain needs to repeat, performed 11/14/22 but still need a particular sequencing/protocol for vasculitis evaluation per neuro    LVAD/transplant evaluation checklist  [x] labs (CBC, CMP, PT/INR, TSH, iron studies, UA + micro, prealb)  [x] Infectious (Treponema, Hep A/B/C, HIV, HSV 1/2 IgG, CMV IgG, Toxo IgG, EBV IgG, varicella IgG, Quant gold)  [] Tox (Utox presumptive pos for amphetamines - confirmatory serum test pending, nicotine and cotinine pending, PeTH pending)   [] Immunocompatibility (ABO done, HLA tissue typing pending)  [] CPX - holding off for now given functional status  [x] TTE - 11/11/22   [x] 6MWT - 11/14/22  [x] EKG - 11/10/22  [x] RHC - 11/11/22  [x] CVTS consult - ordered, not yet seen  [x] Social work consult - ordered, not yet seen  [] Palliative care consult - ordered, not yet seen  [] Neuropsych consult - ordered, not yet seen  [x] Nutrition consult - 11/14/22, starting calorie counts  [x] CT Dental + exam - 11/11/22, no further needs  [x] Abd US + doppler - done 11/11/22, negative  [x] Extremity US and ABIs - 11/11/22, negative  [x] Carotid US (if DM or ICM or >51yo) - 11/11/22, negative  [x] PFTs - 11/11/22  [] Dexa - outpatient  [x] CT head non-contrast - 11/10/22, needs MRI  [x] CT CAP non-contrast - 11/10/22, no further needs  [] colonoscopy (>51 yo) - GI consulted, on hold figuring out anesthesia risk given acute embolic stroke  [x] PSA - 1.63 on 11/12/22  [x] Neuro consult - 11/10/22, needs MRI/MRA    [] MRI/MRA brain - 11/14/22, needs resequencing for vasculitis monitoring, timing TBD    Possible small right lateral cerebellar infarct, embolic?  Read on MR imaging 11/14/22 without new neuro deficits. Per radiology, appears acute, likely embolic. Does have newly-identified DVT's in the right arm and left leg, no known arterial clots history. Reviewed telemetry last 48hrs, no afib or other tachycarrhythmias. Reassuring prior TTE this admission including bubble study. Unclear source.  Plan:  - Started Argatroban gtt this morning, will await HIT panel for possible transition to Heparin  - JARED ordered for tomorrow, NPO @ [place]  - Neuro stroke is consulted, will evaluate today for further recommendations    Acute DVT - occlusive left peroneal, non-occlusive right axillary  Identified on doppler 11/15/22. No associated pain/symptoms.  Plan:  - Argatroban gtt started 11/15/22, HIT panel pending to review if able to transition to Heparin    Acute to subacute thrombocytopenia  Concern for HIT  Present on admission, nml platelets a week prior to admission. Was at OSH, did receive heparin products starting 11/3 w/ onset of thrombocytopenia documented by 11/10. 4T score of 3-4, low to intermediate risk of HIT. Other counts look alright. No s/s bleeding.  Plan:  - HIT panel sent 11/14/22  - No heparin products for now - mechanical VTE ppx, tpa for PICC patency, on Argatroban gtt  - Daily CBC    ================================  Chronic Problems:    Multivessel coronary artery disease s/p PCI LCx and LAD  In context of the above presentation, no particularly concerning anginal symptoms. Had updated coronary angiogram this admission 11/11/22 showing patent LAD and LCx stents as well as mild-mod non-obstructive disease in the RCA. Overall reassuring in context of working towards advanced therapy planning.  Plan:   -- ASA: Continuing PTA 81mg qday dosing   -- BB: Holding as per above   -- Statin: Cont PTA Pravastatin   --  Antianginal: N/A    History of VT s/p biventricular PPM/ICD  Interro performed 11/10/22 w/ two short (<5s) runs of NSVT on 11/1/22 and 11/3/22, no recent shocks. A-paced.  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 No episodes since. Mild sensory deficit over the right foot, rest of neurological exam unremarkable.  - Continue PTA cellcept 1000mg qam and 500mg qpm  - As above, neurology consulted; recommended MRI with and without contrast, test ordered. Due to the patient's implanted pacemaker/defibrillator, will need electrophysiology monitoring during the study, scheduled tentatively for 11/14/22  - Team spoke with [facility] this past weekend, unable to transfer prior MRIs to PACS, CDs with the studies to be faxed 11/14/22    #CKD2 - Baseline Cr in 1-1.2 range  #Insomnia - Trazodone 50mg QHS    Hospital Checklist:  DVT Prophylaxis: Pneumatic Compression Devices  Code Status: Full Code  Diet/Nutrition: CLD, NPO @ [place] for colo tomorrow  Lines: Right-sided PICC    Disposition Planning:  Anticipated discharge TBD, remains on inotropic support, undergoing advanced therapy evaluation/planning.    Staffed w/ Dr. Metcalf.    Elliot Dhaliwal MD  Internal Medicine PGY-2  Cardiology II Service  ASCOM #[id number]  11/15/2022  ==================================    Subjective:   Nursing notes reviewed. No significant interval events. Rested poorly due to undergoing bowel prep for colonoscopy initially scheduled for this morning. Had a poor experience in MRI yesterday, voiced frustrations about this. Ambulatory. Has an appetite, looking forward to being able to eat again. No new chest pain, dyspnea, chest pain, abdominal pain, n/v.    Objective:     Vitals:    11/13/22 0700 11/14/22 0516 11/15/22 0500   Weight: 88.6 kg (195 lb 6.4 oz) 89.5 kg (197 lb 4.8 oz) 89.9 kg (198 lb 3.2 oz)      Vital Signs with Ranges  Temp:  [97.4  F (36.3  C)-98.4  F (36.9  C)] 98  F (36.7  C)  Pulse:  [80-95] 82  Resp:  [16-20] 18  BP: (101-113)/(76-89) 112/89  SpO2:  [93 %-99 %] 98 %  I/O last 3 completed shifts:  In: 2573.7 [P.O.:2400; I.V.:173.7]  Out: 600 [Urine:600]    Exam:  General: No acute distress, pleasantly conversational  Neck: No significant JVP visualized at 45 degrees  CV: RRR, no new, 2+ radial pulses present b/l radials  Lungs: On room air, CTA b/l w/o wheezes or crackles, no increased WOB  Ext: Warm and well-perfused, no appreciable lower extremity edema  Neuro: Awake, alert, conversational, moving all extremities spontaneously throughout examination    Medications     DOBUTamine 2.5 mcg/kg/min (11/15/22 0753)     - MEDICATION INSTRUCTIONS -         amiodarone  200 mg Oral BID     aspirin  81 mg Oral Daily     [Held by provider] empagliflozin  10 mg Oral Daily     furosemide  40 mg Oral Daily     miconazole   Topical BID     mycophenolate  1,000 mg Oral QAM     mycophenolate  500 mg Oral QPM     pravastatin  20 mg Oral Daily     sodium chloride (PF)  10-40 mL Intracatheter Q8H     sodium chloride (PF)  3 mL Intracatheter Q8H     traZODone  50 mg Oral At Bedtime       Data   Recent Labs   Lab 11/15/22  0444 11/14/22  0510 11/13/22  1547 11/13/22  0456 11/12/22  0458 11/11/22  1940 11/11/22  1747 11/11/22  0847 11/11/22  0231   WBC 5.6  --   --   --  6.6  --   --   --  6.0   HGB 15.4  --   --   --  15.1  --  16.3   < > 15.2   MCV 88  --   --   --  88  --   --   --  87   *  --   --   --  91*  --   --   --  98*   INR  --   --   --   --  1.20*  --   --   --   --    * 138  --  140 140  --   --   --  139   POTASSIUM 3.8 3.7  --  3.7  3.5 3.8  3.7   < >  --    < > 3.1*   CHLORIDE 97* 102  --  102 101  --   --   --  102   CO2 25 25  --  24 26  --   --   --  26   BUN 13.9 21.2  --  20.8 19.9  --   --   --  24.1*   CR 1.01 1.09  --  1.20* 1.09  --   --   --  1.10   ANIONGAP 12 11  --  14 13   --   --   --  11   TIM 8.7* 8.5*  --  8.4* 8.5*  --   --   --  8.5*   GLC 95 91 110* 85 105*  --   --   --  127*   ALBUMIN  --   --   --   --  3.4*  --   --   --  3.1*   PROTTOTAL  --   --   --   --  5.4*  --   --   --  4.9*   BILITOTAL  --   --   --   --  1.5*  --   --   --  1.5*   ALKPHOS  --   --   --   --  61  --   --   --  52   ALT  --   --   --   --  27  --   --   --  31   AST  --   --   --   --  24  --   --   --  24    < > = values in this interval not displayed.       Recent Results (from the past 24 hour(s))   US GASTON Doppler No Exercise    Narrative    RESTING GASTON 11/14/2022    CLINICAL HISTORY: Heart Failure pre Ventricular Assist Device (VAD)  planning .    COMPARISONS: None available.    REFERRING PROVIDER: Evelin Watts MD    TECHNIQUE: Bilateral GASTON obtained.    FINDINGS:    RIGHT:       Brachial: N/A mmHg       Ankle (PT): 127 mmHg       Ankle (DP): 110 mmHg              GASTON: 1.20    LEFT:       Brachial: 106 mmHg       Ankle (PT): 125 mmHg       Ankle (DP): 102 mmHg              GASTON: 1.18           Impression    IMPRESSION:    1. RIGHT:       A. Resting GASTON 1.20 (Normal)    2. LEFT:       A. Resting GASTON 1.18 (Normal)    Guidelines:    GASTON Diagnostic Criteria (Based on criteria published in Circulation  2011; 124: 5968-0035):    > 1.4: Non compressible    1.00 - 1.40: Normal    0.91 - 0.99: Borderline    At or below 0.90: Abnormal    GASTON Diagnostic Criteria (Based on ACC/AHA guideline 2008):    >/=1.3 - non compressible vessels    1.00  -1.29 - Normal    0.91 - 0.99 - Borderline    0.41 - 0.90 - Mild to moderate PAD    0.00 - 0.40 - Severe PAD    I have personally reviewed the examination and initial interpretation  and I agree with the findings.    FRED ESPINO MD         SYSTEM ID:  [id number]   MRA Brain ([place]) wo Contrast    Narrative    EXAM MRA BRAIN ([place]) W/O CONTRAST, MRA NECK (CAROTIDS)  W/O & W CONTRAST 11/14/2022 12:59 PM    HISTORY: history of CNS  vasculitis    COMPARISON:  MRA [facility] 9/28/2016    TECHNIQUE:   MRA COW: Using a 3D time-of-flight image acquisition technique, MRA of  the major arteries at the base of the brain was obtained without  intravenous contrast. Three-dimensional reconstructions of the head  MRA were created, which were reviewed by the radiologist.    Neck MRA: Limited non contrast 2DTOF images were obtained of the  mid-cervical region. Following intravenous gadolinium-based contrast  administration, a contrast enhanced MRA of the neck/cervical vessels  was performed.  Three-dimensional reconstructions of the neck and head MRA were  created, which were reviewed by the radiologist.    FINDINGS:   MRA COW:  Normal variant left vertebral artery terminating in the left PICA.  Minimal atherosclerotic calcifications of the cavernous portions of  both internal carotid arteries, without significant stenosis. Anterior  communicating artery is patent. The bilateral anterior, middle, and  posterior cerebral arteries are patent and normal in caliber.  Posterior communicating arteries are not seen. No aneurysm or  stenosis.    MRA Neck:  Patent major cervical vasculature. No significant stenosis or  irregularity carotid bifurcations. Minimal irregularity left carotid  bifurcation without significant stenosis. Bilateral patent vertebral  arteries patent origins. The normal distal right internal carotid  artery measures 5 mm. The normal distal left internal carotid artery  measures 5 mm.      Impression    IMPRESSION:  No aneurysm or significant stenosis of the major cervical or  intracranial arteries.   Cardiac Device Check - Inpatient   Result Value    Date Time Interrogation Session [id number]    Implantable Pulse Generator  [employer]    Implantable Pulse Generator Model [id number] Angie's List Quad CRT-D    Implantable Pulse Generator Serial Number [id number]    Type Interrogation Session In Clinic    Clinic Name [facility]  [facility]    Implantable Pulse Generator Type Cardiac Resynchronization Therapy - Defibrillator    Implantable Pulse Generator Implant Date 20220120    Implantable Lead  Medtronic    Implantable Lead Model 6947M Sprint Quattro Secure MRI SureScan    Implantable Lead Serial Number [id number]    Implantable Lead Implant Date 20220511    Implantable Lead Polarity Type Quadripolar Lead    Implantable Lead Location Detail 1 UNKNOWN    Implantable Lead Special Function 62CMRV/SVC    Implantable Lead Location Right Ventricle    Implantable Lead  Medtronic    Implantable Lead Model 4298 Attain Performa MRI SureScan    Implantable Lead Serial Number [id number]    Implantable Lead Implant Date 20220120    Implantable Lead Polarity Type Quadripolar Lead    Implantable Lead Special Function 88CM    Implantable Lead Location Unknown    Implantable Lead  Medtronic    Implantable Lead Model 5076 CapSureFix Novus MRI SureScan    Implantable Lead Serial Number [id number]    Implantable Lead Implant Date 20220120    Implantable Lead Polarity Type Bipolar Lead    Implantable Lead Location Detail 1 UNKNOWN    Implantable Lead Special Function 52 CM    Implantable Lead Location Right Atrium    Flaco Setting Mode (NBG Code) DDDR    Flaco Setting Lower Rate Limit 60    Flaco Setting Maximum Tracking Rate 140    Flaco Setting Maximum Sensor Rate 140    Flaco Setting FANY Delay Low 140    Flaco Setting PAV Delay Low 170    Flaco Setting AT Mode Switch Rate 171    Lead Channel Setting Sensing Polarity Bipolar    Lead Channel Setting Sensing Anode Location Right Atrium    Lead Channel Setting Sensing Anode Terminal Ring    Lead Channel Setting Sensing Cathode Location Right Atrium    Lead Channel Setting Sensing Cathode Terminal Tip    Lead Channel Setting Sensing Sensitivity 0.3    Lead Channel Setting Sensing Polarity Bipolar    Lead Channel Setting Sensing Anode Location Right Ventricle    Lead  Channel Setting Sensing Anode Terminal Ring    Lead Channel Setting Sensing Cathode Location Right Ventricle    Lead Channel Setting Sensing Cathode Terminal Tip    Lead Channel Setting Sensing Sensitivity 0.3    Ventricular chambers paced during CRT pacing. LVOnly    Lead Channel Setting Pacing Polarity Bipolar    Lead Channel Setting Pacing Anode Location Right Atrium    Lead Channel Setting Pacing Anode Terminal Ring    Lead Channel Setting Sensing Cathode Location Right Atrium    Lead Channel Setting Sensing Cathode Terminal Tip    Lead Channel Setting Pacing Pulse Width 0.4    Lead Channel Setting Pacing Amplitude 1.5    Lead Channel Setting Pacing Capture Mode Adaptive    Lead Channel Setting Pacing Polarity Bipolar    Lead Channel Setting Pacing Anode Location Right Ventricle    Lead Channel Setting Pacing Anode Terminal Ring    Lead Channel Setting Sensing Cathode Location Right Ventricle    Lead Channel Setting Sensing Cathode Terminal Tip    Lead Channel Setting Pacing Pulse Width 0.4    Lead Channel Setting Pacing Amplitude 2    Lead Channel Setting Pacing Capture Mode Adaptive    Lead Channel Setting Pacing Polarity Bipolar    Lead Channel Setting Pacing Anode Location Right Ventricle    Lead Channel Setting Pacing Anode Terminal Coil    Lead Channel Setting Sensing Cathode Location Left Ventricle    Lead Channel Setting Sensing Cathode Terminal Tip    Lead Channel Setting Pacing Pulse Width 1    Lead Channel Setting Pacing Amplitude 2.75    Lead Channel Setting Pacing Capture Mode Adaptive    Zone Setting Type Category VF    Zone Setting Detection Interval 320    Zone Setting Detection Beats Numerator 30    Zone Setting Detection Beats Denominator 40    Zone Setting Type Category VT    Zone Setting Detection Interval 240    Zone Setting Type Category VT    Zone Setting Detection Interval 360    Zone Setting Type Category VT    Zone Setting Detection Interval 420    Zone Setting Type Category  ATRIAL_FIBRILLATION    Zone Setting Type Category AT/AF    Zone Setting Detection Interval 350    Lead Channel Impedance Value 513    Lead Channel Sensing Intrinsic Amplitude 3.5    Lead Channel Pacing Threshold Amplitude 0.75    Lead Channel Pacing Threshold Pulse Width 0.4    Lead Channel Impedance Value 418    Lead Channel Impedance Value 342    Lead Channel Sensing Intrinsic Amplitude 7    Lead Channel Pacing Threshold Amplitude 0.75    Lead Channel Pacing Threshold Pulse Width 0.4    Lead Channel Impedance Value 456    Lead Channel Impedance Value 532    Lead Channel Impedance Value 513    Lead Channel Impedance Value 399    Lead Channel Impedance Value 475    Lead Channel Impedance Value 532    Lead Channel Impedance Value 361    Lead Channel Impedance Value 285    Lead Channel Impedance Value 304    Lead Channel Impedance Value 304    Lead Channel Impedance Value 159.265    Lead Channel Impedance Value 165.029    Lead Channel Impedance Value 165.029    Lead Channel Impedance Value 147.097    Lead Channel Impedance Value 152    Lead Channel Pacing Threshold Amplitude 1.5    Lead Channel Pacing Threshold Pulse Width 1    Battery Date Time of Measurements 20221114132035    Battery Status Middle of Service    Battery RRT Trigger 2.727    Battery Remaining Longevity 52    Battery Voltage 2.95    Capacitor Charge Type Reformation    Capacitor Last Charge Date Time [id number]    Capacitor Charge Time 3.953    Capacitor Charge Energy 18    Flaco Statistic Date Time Start 20221110233350    Flaco Statistic Date Time End 20221114131850    Flaco Statistic RA Percent Paced 1.16    Flaco Statistic RV Percent Paced 0.48    CRT Statistic LV Percent Paced 89.39    Flaco Statistic AP  Percent 1.1    Flaco Statistic AS  Percent 89.39    Flaco Statistic AP VS Percent 0.06    Flaco Statistic AS VS Percent 9.44    CRT Statistic Date Time Start 01440623767048    CRT Statistic Date Time End 20221114131850    CRT Statistic  CRT Percent Paced 89.39    Atrial Tachy Statistic Date Time Start 61884414776080    Atrial Tachy Statistic Date Time End 20221114131850    Atrial Tachy Statistic AT/AF [place] Percent 0    Therapy Statistic Recent Shocks Delivered 0    Therapy Statistic Recent Shocks Aborted 0    Therapy Statistic Recent ATP Delivered 0    Therapy Statistic Recent Date Time Start 20221110233350    Therapy Statistic Recent Date Time End [id number]    Therapy Statistic Total Shocks Delivered 0    Therapy Statistic Total Shocks Aborted 0    Therapy Statistic Total ATP Delivered 0    Therapy Statistic Total  Date Time Start 24889569158609    Therapy Statistic Total  Date Time End 83160505022463    Episode Statistic Recent Count 0    Episode Statistic Type Category AT/AF    Episode Statistic Recent Count 0    Episode Statistic Type Category SVT    Episode Statistic Recent Count 0    Episode Statistic Type Category VT    Episode Statistic Recent Count 0    Episode Statistic Type Category VF    Episode Statistic Recent Count 0    Episode Statistic Type Category VT    Episode Statistic Recent Count 0    Episode Statistic Type Category VT    Episode Statistic Recent Count 0    Episode Statistic Type Category VT    Episode Statistic Recent Date Time Start 84723170475461    Episode Statistic Recent Date Time End 37026392810519    Episode Statistic Recent Date Time Start 45919971646316    Episode Statistic Recent Date Time End 02008515476957    Episode Statistic Recent Date Time Start 55200800468972    Episode Statistic Recent Date Time End 03453228574479    Episode Statistic Recent Date Time Start 74199999503401    Episode Statistic Recent Date Time End 91852915623535    Episode Statistic Recent Date Time Start 92813585133565    Episode Statistic Recent Date Time End 08488283297426    Episode Statistic Recent Date Time Start 83497594704496    Episode Statistic Recent Date Time End 54683937227704    Episode Statistic Recent Date Time Start  74793943031330    Episode Statistic Recent Date Time End 43079784051022    Episode Statistic Total Count 1    Episode Statistic Type Category AT/AF    Episode Statistic Total Count 0    Episode Statistic Type Category SVT    Episode Statistic Total Count 38    Episode Statistic Type Category VT    Episode Statistic Total Count 0    Episode Statistic Type Category VF    Episode Statistic Total Count 0    Episode Statistic Type Category VT    Episode Statistic Total Count 0    Episode Statistic Type Category VT    Episode Statistic Total Count 0    Episode Statistic Type Category VT    Episode Statistic Total Date Time Start 20220120150432    Episode Statistic Total Date Time End 20221114131850    Episode Statistic Total Date Time Start 00878912772919    Episode Statistic Total Date Time End 74673786163253    Episode Statistic Total Date Time Start 02232935040340    Episode Statistic Total Date Time End 20221114131850    Episode Statistic Total Date Time Start 20220120150432    Episode Statistic Total Date Time End 20221114131850    Episode Statistic Total Date Time Start 67928719955273    Episode Statistic Total Date Time End 13178909767544    Episode Statistic Total Date Time Start 20220120150432    Episode Statistic Total Date Time End 30966962754829    Episode Statistic Total Date Time Start 69788150666561    Episode Statistic Total Date Time End 30550710317185    Episode Identifier 1,266    Episode Type Category VSE    Episode Date Time [id number]    Episode Duration 8    Episode Identifier 1,265    Episode Type Category VSE    Episode Date Time 20221112222843    Episode Duration 9    Episode Identifier 1,264    Episode Type Category VSE    Episode Date Time [id number]    Episode Duration 13    Episode Identifier 1,263    Episode Type Category VSE    Episode Date Time [id number]    Episode Duration 8    Narrative    PURPOSE OF VISIT: CIED programming and device evaluation PRE-MRI of the   brain, per  MD orders. Complete MRI conditional pacing system verified. MRI   checklists completed.    Device: Medtronic [id number] [place] MRI Quad CRT-D  Normal Device Function.   Intrinsic Rhythm: As-Vs 86 bpm  AP= 1%  = 0.5%  BVP=89%  Arrhythmias/Episodes: None  Lead Trends Appear Stable.  Short V-V Intervals Recorded: 0  MRI Protection Mode Programmed ON  Pacing Programmed From DDDR  bpm to DOO 95 bpm.  ICD Tachy Therapies Programmed OFF  Estimated Battey Longevity to RRT= 3.5-4.5 years  Battery voltage = 2.95   V.  Pt to be monitored by RN during MRI scan.    Device RN: Soumya Sargent RN    Multi lead Medtronic ICD    I have reviewed and interpreted the device interrogation, settings,   programming and nurse's summary. The device is functioning within normal   device parameters. I agree with the current findings, assessment and plan.   MR Brain w/o & w Contrast    Narrative    EXAM: MR BRAIN W/O & W CONTRAST  11/14/2022 2:03 PM     HISTORY:  Patient with history of CNS vasculitis. Obtain images to  compare to prior scans       COMPARISON:  MRI 7/18/2022, 6/14/2021, 9/28/2016    TECHNIQUE: Axial FLAIR,  T1-weighted, and susceptibility images were  obtained without intravenous contrast. Following intravenous  gadolinium-based contrast administration, axial T2-weighted,  diffusion, FLAIR, and axial and coronal T1-weighted images were  obtained    CONTRAST: 9 mls Gadavist.    FINDINGS:  Punctate focus of restricted diffusion in the right lateral cerebellar  hemisphere, with subtle hyperintensity on FLAIR images.    No intracranial hemorrhage, mass, or mass effect. No hydrocephalus.  Normal flow voids. Mild diffuse parenchymal atrophy similar to prior.  Old lacunar infarcts in the left corona radiata, right corona radiata,  right lentiform nucleus are stable since recent prior exams. Lacunar  infarct in the right corona radiata occurred between the 2016 and 2021  exams. Multifocal and confluent FLAIR hyperintensity  predominantly  within the periventricular white matter of both cerebral hemispheres,  not significantly changed since 6/14/2021 and mildly increased since  2016. No abnormal intracranial enhancement.    Orbits are unremarkable. Minimal scattered mucosal thickening in the  ethmoid air cells and maxillary sinuses. Trace right mastoid effusion.      Impression    IMPRESSION:  1. Punctate focus of restricted diffusion in the lateral right  cerebellar hemisphere, which could represent a tiny embolic infarct  versus MR artifact.  2. Multifocal and confluent FLAIR hyperintensity in the  periventricular white matter of both cerebral hemispheres and lacunar  infarcts in the basal ganglia and periventricular white matter, not  significantly changed since 6/14/2021 and mildly increased since 2016.  Findings suggestive of chronic small vessel ischemic disease.  3. No abnormal intracranial enhancement.  4. We will obtain dedicated vessel wall imaging on 11/15/2022 as part  of same image accession.    Findings discussed with Dr. Dhaliwal by Dr. Waite via telephone.    MRA Neck (Carotids) wo & w Contrast    Narrative    EXAM MRA BRAIN ([facility]) W/O CONTRAST, MRA NECK (CAROTIDS)  W/O & W CONTRAST 11/14/2022 12:59 PM    HISTORY: history of CNS vasculitis    COMPARISON:  [facility] 9/28/2016    TECHNIQUE:   MRA COW: Using a 3D time-of-flight image acquisition technique, MRA of  the major arteries at the base of the brain was obtained without  intravenous contrast. Three-dimensional reconstructions of the head  MRA were created, which were reviewed by the radiologist.    Neck MRA: Limited non contrast 2DTOF images were obtained of the  mid-cervical region. Following intravenous gadolinium-based contrast  administration, a contrast enhanced MRA of the neck/cervical vessels  was performed.  Three-dimensional reconstructions of the neck and head MRA were  created, which were reviewed by the radiologist.    FINDINGS:    MRA COW:  Normal variant left vertebral artery terminating in the left PICA.  Minimal atherosclerotic calcifications of the cavernous portions of  both internal carotid arteries, without significant stenosis. Anterior  communicating artery is patent. The bilateral anterior, middle, and  posterior cerebral arteries are patent and normal in caliber.  Posterior communicating arteries are not seen. No aneurysm or  stenosis.    MRA Neck:  Patent major cervical vasculature. No significant stenosis or  irregularity carotid bifurcations. Minimal irregularity left carotid  bifurcation without significant stenosis. Bilateral patent vertebral  arteries patent origins. The normal distal right internal carotid  artery measures 5 mm. The normal distal left internal carotid artery  measures 5 mm.      Impression    IMPRESSION:  No aneurysm or significant stenosis of the major cervical or  intracranial arteries.   Cardiac Device Check - Inpatient   Result Value    Implantable Pulse Generator  Medtronic    Implantable Pulse Generator Model [id number] [facility] Quad CRT-D    Implantable Pulse Generator Serial Number [id number]    Type Interrogation Session In Clinic    Clinic Name [facility]    Implantable Pulse Generator Type Cardiac Resynchronization Therapy - Defibrillator    Implantable Pulse Generator Implant Date 20220120    Implantable Lead  Medtronic    Implantable Lead Model 6947M [employer] Quattro Secure MRI SureScan    Implantable Lead Serial Number [id number]    Implantable Lead Implant Date 20220511    Implantable Lead Polarity Type Quadripolar Lead    Implantable Lead Location Detail 1 UNKNOWN    Implantable Lead Special Function 62CMRV/SVC    Implantable Lead Location Right Ventricle    Implantable Lead  Medtronic    Implantable Lead Model 4298 Attain Performa MRI SureScan    Implantable Lead Serial Number [id number]    Implantable Lead Implant Date 20220120     Implantable Lead Polarity Type Quadripolar Lead    Implantable Lead Special Function 88CM    Implantable Lead Location Unknown    Implantable Lead  Medtronic    Implantable Lead Model 5076 CapSureFix Novus MRI SureScan    Implantable Lead Serial Number [id number]    Implantable Lead Implant Date 20220120    Implantable Lead Polarity Type Bipolar Lead    Implantable Lead Location Detail 1 UNKNOWN    Implantable Lead Special Function 52 CM    Implantable Lead Location Right Atrium    Flaco Setting Mode (NBG Code) DDDR    Flaco Setting Lower Rate Limit 60    Flaco Setting Maximum Tracking Rate 140    Flaco Setting Maximum Sensor Rate 140    Flaco Setting FANY Delay Low 140    Flaco Setting PAV Delay Low 170    Flaco Setting AT Mode Switch Rate 171    Lead Channel Setting Sensing Polarity Bipolar    Lead Channel Setting Sensing Anode Location Right Atrium    Lead Channel Setting Sensing Anode Terminal Ring    Lead Channel Setting Sensing Cathode Location Right Atrium    Lead Channel Setting Sensing Cathode Terminal Tip    Lead Channel Setting Sensing Sensitivity 0.3    Lead Channel Setting Sensing Polarity Bipolar    Lead Channel Setting Sensing Anode Location Right Ventricle    Lead Channel Setting Sensing Anode Terminal Ring    Lead Channel Setting Sensing Cathode Location Right Ventricle    Lead Channel Setting Sensing Cathode Terminal Tip    Lead Channel Setting Sensing Sensitivity 0.3    Ventricular chambers paced during CRT pacing. LVOnly    Lead Channel Setting Pacing Polarity Bipolar    Lead Channel Setting Pacing Anode Location Right Atrium    Lead Channel Setting Pacing Anode Terminal Ring    Lead Channel Setting Sensing Cathode Location Right Atrium    Lead Channel Setting Sensing Cathode Terminal Tip    Lead Channel Setting Pacing Pulse Width 0.4    Lead Channel Setting Pacing Amplitude 1.5    Lead Channel Setting Pacing Capture Mode Adaptive    Lead Channel Setting Pacing Polarity Bipolar    Lead  Channel Setting Pacing Anode Location Right Ventricle    Lead Channel Setting Pacing Anode Terminal Ring    Lead Channel Setting Sensing Cathode Location Right Ventricle    Lead Channel Setting Sensing Cathode Terminal Tip    Lead Channel Setting Pacing Pulse Width 0.4    Lead Channel Setting Pacing Amplitude 2    Lead Channel Setting Pacing Capture Mode Adaptive    Lead Channel Setting Pacing Polarity Bipolar    Lead Channel Setting Pacing Anode Location Right Ventricle    Lead Channel Setting Pacing Anode Terminal Coil    Lead Channel Setting Sensing Cathode Location Left Ventricle    Lead Channel Setting Sensing Cathode Terminal Tip    Lead Channel Setting Pacing Pulse Width 1    Lead Channel Setting Pacing Amplitude 2.75    Lead Channel Setting Pacing Capture Mode Adaptive    Zone Setting Type Category VF    Zone Setting Detection Interval 320    Zone Setting Detection Beats Numerator 30    Zone Setting Detection Beats Denominator 40    Zone Setting Type Category VT    Zone Setting Detection Interval 240    Zone Setting Type Category VT    Zone Setting Detection Interval 360    Zone Setting Type Category VT    Zone Setting Detection Interval 420    Zone Setting Type Category ATRIAL_FIBRILLATION    Zone Setting Type Category AT/AF    Zone Setting Detection Interval 350    Lead Channel Impedance Value 475    Lead Channel Sensing Intrinsic Amplitude 3.4    Lead Channel Pacing Threshold Amplitude 0.5    Lead Channel Pacing Threshold Pulse Width 0.4    Lead Channel Impedance Value 399    Lead Channel Sensing Intrinsic Amplitude 4.9    Lead Channel Pacing Threshold Amplitude 0.5    Lead Channel Pacing Threshold Pulse Width 0.4    Lead Channel Impedance Value 304    Lead Channel Pacing Threshold Amplitude 1.25    Lead Channel Pacing Threshold Pulse Width 1.00    Battery Date Time of Measurements [id number]    Battery Status Middle of Service    Battery RRT Trigger 2.73    Battery Remaining Longevity 48    Battery  Voltage 2.95    Capacitor Charge Type Shock    Capacitor Charge Type Reformation    Capacitor Charge Type FULL_ENERGY    Flaco Statistic Date Time Start 20221005000000    Flaco Statistic Date Time End 20221114140649    Flaco Statistic RA Percent Paced 1    Flaco Statistic RV Percent Paced 88.9    CRT Statistic LV Percent Paced 89.4    Flaco Statistic AP  Percent 1.1    Flaco Statistic AS  Percent 89.4    Flaco Statistic AP VS Percent 0.1    Flaco Statistic AS VS Percent 9.4    CRT Statistic CRT Percent Paced 89.4    Atrial Tachy Statistic AT/AF [place] Percent 0    Episode Statistic Recent Count 0    Episode Statistic Type Category VT    Episode Statistic Recent Count 0    Episode Statistic Type Category VF    Episode Statistic Recent Count 0    Episode Statistic Type Category VT    Episode Statistic Recent Count 0    Episode Statistic Type Category VT    Episode Statistic Recent Count 0    Episode Statistic Type Category VT    Episode Statistic Recent Date Time Start 20221005000000    Episode Statistic Recent Date Time End 20221114140649    Episode Statistic Recent Date Time Start 20221005000000    Episode Statistic Recent Date Time End 20221114140649    Episode Statistic Recent Date Time Start 20221005000000    Episode Statistic Recent Date Time End 20221114140649    Episode Statistic Recent Date Time Start 20221005000000    Episode Statistic Recent Date Time End 20221114140649    Episode Statistic Recent Date Time Start 20221005000000    Episode Statistic Recent Date Time End 20221114140649    Date Time Interrogation Session 20221114140649    Narrative    PURPOSE OF VISIT: CIED programming and device evaluation POST MRI, per MD orders.     Full Medtronic ICD interrogation performed after MRI complete.  Normal Device Function.   Intrinsic Rhythm: As-Vs 87 bpm  MRI Protection Mode Programmed OFF.  Pacing Programmed From DOO 95 bpm to DDDR  bpm.  ICD Tachy Therapies Programmed ON  Estimated Battery  Longevity to RRT= 3.5-4.5 years  Battery voltage = 2.95 V.  Permanent Programming Changes: None    Device RN: Soumya Sargent, RN    Multi lead Medtronic ICD    I have reviewed and interpreted the device interrogation, settings, programming and nurse's summary. The device is functioning within normal device parameters. I agree with the current findings, assessment and plan.      I have reviewed today's vital signs, notes, medications, labs and imaging.  I have also seen and examined the patient and agree with the findings and plan as outlined above.  Pt and family upset regarding communication issues and inability to stop MRI after more than a one hour period.  VSS with pt sitting in chair and upset regarding lack of communication with radiology department.  Pt with unchanged physical exam and labs stable.  Plan as follows:   1) Cont Dbx therapy   2) Cont TX/LVAD workup   3) Continue argatroban for DVTs  4) HIT assay pending   5) Awaiting Neurology input regarding possible acute cerebellar CVA  6) Encouraged pt to talk with patient relations    Joselito Metcalf MD, PhD  Professor, Heart Failure and Cardiac Transplantation  [facility]

## 2022-11-15 NOTE — PLAN OF CARE
Hours cared for: [id number]    Neuro: A&Ox4.   Cardiac: Afebrile, VSS. SR with intermittent Vpacing.   Respiratory: RA  GI/: Voiding spontaneously. Started Golytely prep this afternoon. Patient continent of bowel and bladder. By 2100 stools loose but still green/opaque.   Diet/appetite: Clear this shift. Denies nausea.  Activity: Up with A1-2, GB/Walker  Pain: Denies   Skin: No new deficits noted. Trace BLE edema.  Lines: R DL PICC. Dobutamine@2.5mcg/kg/min infusing in red port. Purple port saline locked.  Drains: None.  Replacements: Labs replaced on day shift. Unit draw in AM.  Plan: Continue with bowel prep until stools are clear. Plan for colonoscopy 11/15.    Goal Outcome Evaluation:      Plan of Care Reviewed With: patient, spouse

## 2022-11-16 ENCOUNTER — APPOINTMENT (OUTPATIENT)
Dept: CARDIOLOGY | Facility: CLINIC | Age: 67
DRG: 286 | End: 2022-11-16
Attending: STUDENT IN AN ORGANIZED HEALTH CARE EDUCATION/TRAINING PROGRAM
Payer: MEDICARE

## 2022-11-16 LAB
ANION GAP SERPL CALCULATED.3IONS-SCNC: 11 MMOL/L (ref 7–15)
APTT PPP: 51 SECONDS (ref 22–38)
BUN SERPL-MCNC: 15.4 MG/DL (ref 8–23)
CALCIUM SERPL-MCNC: 8.4 MG/DL (ref 8.8–10.2)
CHLORIDE SERPL-SCNC: 104 MMOL/L (ref 98–107)
CREAT SERPL-MCNC: 0.99 MG/DL (ref 0.67–1.17)
DEPRECATED HCO3 PLAS-SCNC: 24 MMOL/L (ref 22–29)
ERYTHROCYTE [DISTWIDTH] IN BLOOD BY AUTOMATED COUNT: 14.4 % (ref 10–15)
GFR SERPL CREATININE-BSD FRML MDRD: 82 ML/MIN/1.73M2
GLUCOSE SERPL-MCNC: 86 MG/DL (ref 70–99)
HCT VFR BLD AUTO: 43.3 % (ref 40–53)
HGB BLD-MCNC: 14.3 G/DL (ref 13.3–17.7)
HOLD SPECIMEN: NORMAL
LVEF ECHO: NORMAL
MAGNESIUM SERPL-MCNC: 2 MG/DL (ref 1.7–2.3)
MCH RBC QN AUTO: 28.8 PG (ref 26.5–33)
MCHC RBC AUTO-ENTMCNC: 33 G/DL (ref 31.5–36.5)
MCV RBC AUTO: 87 FL (ref 78–100)
NT-PROBNP SERPL-MCNC: 1825 PG/ML (ref 0–900)
PLATELET # BLD AUTO: 113 10E3/UL (ref 150–450)
POTASSIUM SERPL-SCNC: 3.5 MMOL/L (ref 3.4–5.3)
RBC # BLD AUTO: 4.97 10E6/UL (ref 4.4–5.9)
SODIUM SERPL-SCNC: 139 MMOL/L (ref 136–145)
UFH PPP CHRO-ACNC: 0.75 IU/ML
WBC # BLD AUTO: 5.7 10E3/UL (ref 4–11)

## 2022-11-16 PROCEDURE — 999N000007 HC SITE CHECK

## 2022-11-16 PROCEDURE — 250N000009 HC RX 250: Performed by: INTERNAL MEDICINE

## 2022-11-16 PROCEDURE — 93320 DOPPLER ECHO COMPLETE: CPT | Mod: 26 | Performed by: INTERNAL MEDICINE

## 2022-11-16 PROCEDURE — 999N000128 HC STATISTIC PERIPHERAL IV START W/O US GUIDANCE

## 2022-11-16 PROCEDURE — 93325 DOPPLER ECHO COLOR FLOW MAPG: CPT

## 2022-11-16 PROCEDURE — 258N000001 HC RX 258: Performed by: INTERNAL MEDICINE

## 2022-11-16 PROCEDURE — 36415 COLL VENOUS BLD VENIPUNCTURE: CPT

## 2022-11-16 PROCEDURE — 250N000012 HC RX MED GY IP 250 OP 636 PS 637: Performed by: INTERNAL MEDICINE

## 2022-11-16 PROCEDURE — 250N000011 HC RX IP 250 OP 636

## 2022-11-16 PROCEDURE — 93312 ECHO TRANSESOPHAGEAL: CPT | Mod: 26 | Performed by: INTERNAL MEDICINE

## 2022-11-16 PROCEDURE — 250N000013 HC RX MED GY IP 250 OP 250 PS 637

## 2022-11-16 PROCEDURE — 99233 SBSQ HOSP IP/OBS HIGH 50: CPT | Mod: 25 | Performed by: INTERNAL MEDICINE

## 2022-11-16 PROCEDURE — 83880 ASSAY OF NATRIURETIC PEPTIDE: CPT

## 2022-11-16 PROCEDURE — 76376 3D RENDER W/INTRP POSTPROCES: CPT | Mod: 26 | Performed by: INTERNAL MEDICINE

## 2022-11-16 PROCEDURE — 83735 ASSAY OF MAGNESIUM: CPT | Performed by: INTERNAL MEDICINE

## 2022-11-16 PROCEDURE — 85730 THROMBOPLASTIN TIME PARTIAL: CPT

## 2022-11-16 PROCEDURE — 250N000011 HC RX IP 250 OP 636: Performed by: INTERNAL MEDICINE

## 2022-11-16 PROCEDURE — 85027 COMPLETE CBC AUTOMATED: CPT

## 2022-11-16 PROCEDURE — 80048 BASIC METABOLIC PNL TOTAL CA: CPT | Performed by: INTERNAL MEDICINE

## 2022-11-16 PROCEDURE — 85520 HEPARIN ASSAY: CPT | Performed by: INTERNAL MEDICINE

## 2022-11-16 PROCEDURE — 76376 3D RENDER W/INTRP POSTPROCES: CPT

## 2022-11-16 PROCEDURE — 214N000001 HC R&B CCU UMMC

## 2022-11-16 PROCEDURE — 250N000013 HC RX MED GY IP 250 OP 250 PS 637: Performed by: INTERNAL MEDICINE

## 2022-11-16 PROCEDURE — 93325 DOPPLER ECHO COLOR FLOW MAPG: CPT | Mod: 26 | Performed by: INTERNAL MEDICINE

## 2022-11-16 PROCEDURE — 99152 MOD SED SAME PHYS/QHP 5/>YRS: CPT | Performed by: INTERNAL MEDICINE

## 2022-11-16 PROCEDURE — 250N000012 HC RX MED GY IP 250 OP 636 PS 637

## 2022-11-16 RX ORDER — PROCHLORPERAZINE 25 MG
12.5 SUPPOSITORY, RECTAL RECTAL EVERY 12 HOURS PRN
Status: DISCONTINUED | OUTPATIENT
Start: 2022-11-16 | End: 2022-11-21 | Stop reason: HOSPADM

## 2022-11-16 RX ORDER — HEPARIN SODIUM 10000 [USP'U]/100ML
0-5000 INJECTION, SOLUTION INTRAVENOUS CONTINUOUS
Status: DISCONTINUED | OUTPATIENT
Start: 2022-11-16 | End: 2022-11-20

## 2022-11-16 RX ORDER — PROCHLORPERAZINE MALEATE 5 MG
5 TABLET ORAL EVERY 6 HOURS PRN
Status: DISCONTINUED | OUTPATIENT
Start: 2022-11-16 | End: 2022-11-21 | Stop reason: HOSPADM

## 2022-11-16 RX ORDER — LIDOCAINE HYDROCHLORIDE 20 MG/ML
15 SOLUTION OROPHARYNGEAL ONCE
Status: COMPLETED | OUTPATIENT
Start: 2022-11-16 | End: 2022-11-16

## 2022-11-16 RX ORDER — LIDOCAINE 40 MG/G
CREAM TOPICAL
Status: DISCONTINUED | OUTPATIENT
Start: 2022-11-16 | End: 2022-11-17 | Stop reason: HOSPADM

## 2022-11-16 RX ORDER — ACYCLOVIR 200 MG/1
9.5 CAPSULE ORAL
Status: DISCONTINUED | OUTPATIENT
Start: 2022-11-16 | End: 2022-11-17 | Stop reason: HOSPADM

## 2022-11-16 RX ORDER — ONDANSETRON 2 MG/ML
4 INJECTION INTRAMUSCULAR; INTRAVENOUS EVERY 6 HOURS PRN
Status: DISCONTINUED | OUTPATIENT
Start: 2022-11-16 | End: 2022-11-21 | Stop reason: HOSPADM

## 2022-11-16 RX ORDER — NALOXONE HYDROCHLORIDE 0.4 MG/ML
0.4 INJECTION, SOLUTION INTRAMUSCULAR; INTRAVENOUS; SUBCUTANEOUS
Status: DISCONTINUED | OUTPATIENT
Start: 2022-11-16 | End: 2022-11-16

## 2022-11-16 RX ORDER — FENTANYL CITRATE 50 UG/ML
25 INJECTION, SOLUTION INTRAMUSCULAR; INTRAVENOUS
Status: DISCONTINUED | OUTPATIENT
Start: 2022-11-16 | End: 2022-11-17 | Stop reason: HOSPADM

## 2022-11-16 RX ORDER — POTASSIUM CHLORIDE 750 MG/1
20 TABLET, EXTENDED RELEASE ORAL ONCE
Status: COMPLETED | OUTPATIENT
Start: 2022-11-16 | End: 2022-11-16

## 2022-11-16 RX ORDER — FLUMAZENIL 0.1 MG/ML
0.2 INJECTION, SOLUTION INTRAVENOUS
Status: DISCONTINUED | OUTPATIENT
Start: 2022-11-16 | End: 2022-11-17 | Stop reason: HOSPADM

## 2022-11-16 RX ORDER — NALOXONE HYDROCHLORIDE 0.4 MG/ML
0.2 INJECTION, SOLUTION INTRAMUSCULAR; INTRAVENOUS; SUBCUTANEOUS
Status: DISCONTINUED | OUTPATIENT
Start: 2022-11-16 | End: 2022-11-16

## 2022-11-16 RX ORDER — ONDANSETRON 4 MG/1
4 TABLET, ORALLY DISINTEGRATING ORAL EVERY 6 HOURS PRN
Status: DISCONTINUED | OUTPATIENT
Start: 2022-11-16 | End: 2022-11-21 | Stop reason: HOSPADM

## 2022-11-16 RX ORDER — SODIUM CHLORIDE 9 MG/ML
INJECTION, SOLUTION INTRAVENOUS CONTINUOUS PRN
Status: DISCONTINUED | OUTPATIENT
Start: 2022-11-16 | End: 2022-11-16 | Stop reason: CLARIF

## 2022-11-16 RX ADMIN — ASPIRIN 81 MG CHEWABLE TABLET 81 MG: 81 TABLET CHEWABLE at 10:37

## 2022-11-16 RX ADMIN — AMIODARONE HYDROCHLORIDE 200 MG: 200 TABLET ORAL at 10:38

## 2022-11-16 RX ADMIN — SODIUM CHLORIDE 9.5 ML: 9 INJECTION, SOLUTION INTRAMUSCULAR; INTRAVENOUS; SUBCUTANEOUS at 09:06

## 2022-11-16 RX ADMIN — POLYETHYLENE GLYCOL 3350, SODIUM SULFATE ANHYDROUS, SODIUM BICARBONATE, SODIUM CHLORIDE, POTASSIUM CHLORIDE 4000 ML: 236; 22.74; 6.74; 5.86; 2.97 POWDER, FOR SOLUTION ORAL at 16:20

## 2022-11-16 RX ADMIN — TRAZODONE HYDROCHLORIDE 50 MG: 50 TABLET ORAL at 02:16

## 2022-11-16 RX ADMIN — FENTANYL CITRATE 25 MCG: 50 INJECTION INTRAMUSCULAR; INTRAVENOUS at 08:54

## 2022-11-16 RX ADMIN — PRAVASTATIN SODIUM 20 MG: 20 TABLET ORAL at 10:38

## 2022-11-16 RX ADMIN — MYCOPHENOLATE MOFETIL 1000 MG: 500 TABLET, FILM COATED ORAL at 10:37

## 2022-11-16 RX ADMIN — LIDOCAINE HYDROCHLORIDE 15 ML: 20 SOLUTION ORAL; TOPICAL at 08:45

## 2022-11-16 RX ADMIN — MIDAZOLAM HYDROCHLORIDE 1 MG: 1 INJECTION, SOLUTION INTRAMUSCULAR; INTRAVENOUS at 08:54

## 2022-11-16 RX ADMIN — POTASSIUM CHLORIDE 20 MEQ: 750 TABLET, EXTENDED RELEASE ORAL at 22:35

## 2022-11-16 RX ADMIN — AMIODARONE HYDROCHLORIDE 200 MG: 200 TABLET ORAL at 20:27

## 2022-11-16 RX ADMIN — FUROSEMIDE 40 MG: 40 TABLET ORAL at 10:37

## 2022-11-16 RX ADMIN — HEPARIN SODIUM 1600 UNITS/HR: 10000 INJECTION, SOLUTION INTRAVENOUS at 11:16

## 2022-11-16 RX ADMIN — ARGATROBAN 1 MCG/KG/MIN: 50 INJECTION INTRAVENOUS at 02:34

## 2022-11-16 RX ADMIN — MYCOPHENOLATE MOFETIL 500 MG: 500 TABLET, FILM COATED ORAL at 20:27

## 2022-11-16 RX ADMIN — MIDAZOLAM HYDROCHLORIDE 1 MG: 1 INJECTION, SOLUTION INTRAMUSCULAR; INTRAVENOUS at 08:51

## 2022-11-16 RX ADMIN — MICONAZOLE NITRATE: 20 CREAM TOPICAL at 10:38

## 2022-11-16 RX ADMIN — FENTANYL CITRATE 25 MCG: 50 INJECTION INTRAMUSCULAR; INTRAVENOUS at 08:51

## 2022-11-16 RX ADMIN — TOPICAL ANESTHETIC 0.5 ML: 200 SPRAY DENTAL; PERIODONTAL at 08:45

## 2022-11-16 ASSESSMENT — ACTIVITIES OF DAILY LIVING (ADL)
ADLS_ACUITY_SCORE: 32
ADLS_ACUITY_SCORE: 30
ADLS_ACUITY_SCORE: 32
ADLS_ACUITY_SCORE: 30
ADLS_ACUITY_SCORE: 32
ADLS_ACUITY_SCORE: 32
ADLS_ACUITY_SCORE: 30
ADLS_ACUITY_SCORE: 30

## 2022-11-16 NOTE — PROGRESS NOTES
Palliative provider attempted to complete advanced care planning consult for LVAD/Cardiac transplant work-up. Spent 20 minutes with wife Arlette and Dr. Stein who stated part way through that he did NOT want an LVAD or heart transplant but implied he is going through this evaluation for his family. Wife Arlette notes that he does not have full mental capacity due to recovering from his cardiogenic shock and having conscious sedation from his JARED earlier today. Will try and stop by tomorrow instead with hopes he has more mental clarity to complete consult.    Reached out to primary cardiology team to notify them of plan to see Dr. Stein tomorrow.    This is a non-billable note.    Deny Schulz DO / Palliative Medicine / Text me via [facility].     Team Consult Pager [phone] (answered 8am-430pm M-F) - ok to text page via [facility] / After-Hours Answering Service [phone] / Palliative Clinic in the [facility] [place] [facility] [place] - [phone] (scheduling); [phone] (triage).

## 2022-11-16 NOTE — PROGRESS NOTES
Calorie Count  Intake recorded for: 11/15  Total Kcals: 0 Total Protein: 0g  Kcals from Hospital Food: 0   Protein: 0g  Kcals from Outside Food (average):0 Protein: 0g  # Meals Ordered from Kitchen: 2 meals   # Meals Recorded: no intake recorded.   # Supplements Recorded: no intake recorded.

## 2022-11-16 NOTE — PROGRESS NOTES
Pt arrived in ECHO department  for JARED.   Procedure explained, questions answered and consent signed.   Pt's throat sprayed at 0845, therefore pt will not be able to eat or drink until 2 hours after at 1045.  Informed pt of this time and encouraged to start with warm fluids and soft foods.    Pt tolerated procedure well, and was given a total of 50 mcg IV fentanyl and 2 mg IV versed for conscious sedation.  Pt denied throat or chest pain after JARED complete.   JARED probe 64 used for procedure.  Patient monitored closely until fully awake and VSS. Patient transported back to  in [facility] via transport.   Report given to RADHA Prescott RN.

## 2022-11-16 NOTE — PROGRESS NOTES
BRIEF GI CHART REVIEW NOTE:    11/16/22    Note: patient not seen today, this note is the product of chart review    Update: GI asked to consider colonoscopy tomorrow, cleared by neurology, with plans to be presented at transplant conference on Friday     Assessment:  Paco Stein is a 67 year old male with a history of HFrEF(EF 20-25%) s/p BiV PPM, CAD s/p PCI to LCx and LAD, CNS vasculitis and CKD who was admitted for acute decompensated heart failure. GI consulted for colonscopy for LVAD/transplant workup.    Colonoscopy was planned for 11/15 for further surveillance of colorectal cancer in anticipation for potential transplant/LVAD, however we were notified that the patient has acute DVTs and MRI findings concerning for right lateral cerebellar infarct ?embolic and was started on argatroban gtt given concern for HIT. General neurology and stroke neurology consulted, have cleared patient for anesthesia from an ischemia/stroke standpoint. HIT panel negative so switched to heparin gtt for anticoagulation. Had been NPO since midnight for JARED, now on CLD.     Change in Recommendations:  - Plan for colonoscopy tomorrow, to prepare please ensure the following:   -CLD now until midnight   -4 L Golytely at 1600 over 4-5 hours  -If stools not clear liquid (like Gatorade/mountain Dew) by 0400, administer another 2 L Golytely over 2 hours  -Please ensure multiple antiemetic options are available during prep  - NPO at midnight besides prep  - Hold heparin gtt 6 hours prior to procedure (procedure is scheduled for 10am)        Patient discussed with on call GI staff Dr. Trung Maurice, PA-C  GI Service  [facility]  Text Page

## 2022-11-16 NOTE — PROGRESS NOTES
[facility]  Cardiology II Service / Advanced Heart Failure  Daily Progress Note    Patient: Paco Stein      : 1955      MRN: [id number]    Assessment/Plan:   67 year old male w/ pertinent PMHx of HFrEF (EF 15-20%) presumably secondary to arrhythmogenic cardiomyopathy, CAD (prior PCI LCx and LAD), CNS vasculitis, and CKD admitted 11/10/2022 for decompensated heart failure. Remains admitted requiring inotropic support and undergoing advanced therapy evaluation. Course complicated by small incidental embolic cerebellar stroke as well as acute DVT.    Today's changes:  - JARED this morning, reassuring bubble study, no thrombus  - Will see palliative and neuropsych today  - Planning for colonoscopy - tomorrow vs Friday pending GI availability    Acute on chronic systolic heart failure w/ ambulatory cardiogenic shock I/s/o advanced non-ischemic cardiomyopathy (suspected arrhythmogenic), Class IV, Stage D  Clinical picture on admission: sent in from heart failure clinic due to 2 weeks of progressive exercise intolerance, MELLO, orthopnea, PND w/ evidence of volume overload. Last echo 22 showing reduction of EF to 15-20% from previous of 20-25% w/ diffuse hypokinesis and mild RV dilation w/ reduced RV function. Last RHC 22 w/ mildly elevated filling pressures bilaterally w/ a CI of 1.9. Started on IV diuresis, now transitioned to PO maintenance dosing. Remains on low-dose inotropic support while undergoing advanced therapy planning.  Plan:  - Diuretic: 40mg PO Lasix qday (PTA maintenance dosing)  - Inotrope: Dobutamine 2.5mcg/kg/min  - Afterload Reduction: no additional agents currently  - BB: Holding PTA Metoprolol Succ 25mg qday  - SGLT2i: Holding PTA Empag 10mg qday  - MRA: None  - Statin: PTA statin  - Devices: biventricular PPM/ICD  - Electrolytes: Daily monitoring with diuresis, on RN protocols for K and Mag  - Strict I/O monitoring  - Daily  weights    Advanced therapy planning  Checklist as per below. Notable action items include:  - Colonoscopy pending - was postponed earlier this week while determining possibility of embolic stroke - given very small incidental nature, ok to proceed w/ anesthesia, will reach out to GI to coordinate colonoscopy timing (ideally tomorrow)  - Palliative to see, held off earlier this week due to evolving clinical picture  - Neuropsych scheduled for today    LVAD/transplant evaluation checklist  [x] labs (CBC, CMP, PT/INR, TSH, iron studies, UA + micro, prealb)  [x] Infectious (Treponema, Hep A/B/C, HIV, HSV 1/2 IgG, CMV IgG, Toxo IgG, EBV IgG, varicella IgG, Quant gold)   [x] Tox (Utox, nicotine and cotinine, PeTH)  [x] Immunocompatibility (ABO done, HLA)  [] CPX - holding off for now given functional status  [x] TTE - 11/11/22   [x] 6MWT - 11/14/22  [x] EKG - 11/10/22  [x] RHC - 11/11/22  [x] CVTS consult - ordered, not yet seen  [x] Social work consult - ordered, not yet seen  [] Palliative care consult - ordered, not yet seen, will alert to readiness today  [] Neuropsych consult - ordered, scheduled for today  [x] Nutrition consult - 11/14/22, starting calorie counts  [x] CT Dental + exam - 11/11/22, no further needs  [x] Abd US + doppler - done 11/11/22, negative  [x] Extremity US and ABIs - 11/11/22, negative  [x] Carotid US (if DM or ICM or >51yo) - 11/11/22, negative  [x] PFTs - 11/11/22  [] Dexa - outpatient  [x] CT head non-contrast - 11/10/22, needs MRI  [x] CT CAP non-contrast - 11/10/22, no further needs  [] Colonoscopy (>49 yo) - GI consulted, previously postponed, will look into rescheduling  [x] PSA - 1.63 on 11/12/22  [x] Neuro consult - 11/10/22, needs MRI/MRA   [x] MRI/MRA brain - 11/14/22    Incidental punctate cerebellar infarct, likely embolic  Read on MR imaging 11/14/22 without new neuro deficits. Per radiology, appears acute, likely embolic. Does have newly-identified DVT's in the right arm and  left leg, no known arterial clots history. Reviewed telemetry over admission, no afib or other tachycarrhythmias. Reassuring prior TTE this admission including bubble study, repeat JARED w/o septal defects or thrombus. Unclear source.  Plan:  - On Heparin gtt  - JARED today    - Neuro stroke has evaluated, will leave note with formal recs    Acute DVT - occlusive left peroneal, non-occlusive right axillary  Identified on doppler 11/15/22. No associated pain/symptoms.  Plan:  - Heparin gtt, high-intensity    Acute to subacute thrombocytopenia  Concern for HIT  Present on admission, nml platelets a week prior to admission. Was at OSH, did receive heparin products starting 11/3 w/ onset of thrombocytopenia documented by 11/10. 4T score of 3-4, low to intermediate risk of HIT - sent HIT panel, negative fortunately. Other counts look alright. No s/s bleeding. Platelets coming back up as of 11/15.  Plan:  - Daily CBC  - Ok for Heparin products (previously on Argatroban, switched back to Heparin gtt)    ================================  Chronic Problems:    Multivessel coronary artery disease s/p PCI LCx and LAD  In context of the above presentation, no particularly concerning anginal symptoms. Had updated coronary angiogram this admission 11/11/22 showing patent LAD and LCx stents as well as mild-mod non-obstructive disease in the RCA. Overall reassuring in context of working towards advanced therapy planning.  Plan:   -- ASA: Continuing PTA 81mg qday dosing   -- BB: Holding as per above   -- Statin: Cont PTA Pravastatin   -- Antianginal: N/A    History of VT s/p biventricular PPM/ICD  Interro performed 11/10/22 w/ two short (<5s) runs of NSVT on 11/1/22 and 11/3/22, no recent shocks. A-paced.  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 No episodes since. Mild sensory deficit over the  right foot, rest of neurological exam unremarkable. Neurology consulted as part of vad/txp eval. MRI/MRA brain performed this admission w/o concerning progression of suspected vasculitic disease.  - Continue PTA cellcept 1000mg qam and 500mg qpm  - Was some concern initially about MRI/MRA performed 11/14/22 missing a sequence/protocol pertinent for vasculitis per radiology, spoke w/ neurology on 11/15, imaging is appropriate and no further mri needed    #CKD2 - Baseline Cr in 1-1.2 range    #Insomnia - Trazodone 50mg QHS    Hospital Checklist:  DVT Prophylaxis: Pneumatic Compression Devices  Code Status: Full Code  Diet/Nutrition: CLD for now pending colonoscopy timing  Lines: Right-sided PICC    Disposition Planning:  Anticipated discharge TBD, remains on inotropic support, undergoing advanced therapy evaluation/planning.    Staffed w/ Dr. Metcalf.    Elliot Dhaliwal MD  Internal Medicine PGY-2  Cardiology II Service  ASCOM #[id number]  11/16/2022  ==================================    Subjective:   Nursing notes reviewed. No significant overnight events. Reports he slept hte best that he has in a few nights. Feels good after having a meal yesterday. No new symptoms of concern.    Objective:     Vitals:    11/14/22 0516 11/15/22 0500 11/16/22 0337   Weight: 89.5 kg (197 lb 4.8 oz) 89.9 kg (198 lb 3.2 oz) 90.1 kg (198 lb 9.6 oz)     Vital Signs with Ranges  Temp:  [97.6  F (36.4  C)-98.2  F (36.8  C)] 97.7  F (36.5  C)  Pulse:  [64-87] 78  Resp:  [14-20] 18  BP: ()/(63-93) 98/70  Cuff Mean (mmHg):  [94] 94  SpO2:  [92 %-99 %] 97 %  I/O last 3 completed shifts:  In: 247.75 [I.V.:247.75]  Out: 1050 [Urine:1050]    Exam:  General: No acute distress, pleasantly conversational  Lungs: No increased WOB  Ext: Warm and well-perfused, no appreciable lower extremity edema  Neuro: Awake, alert, conversational, moving all extremities spontaneously throughout examination    Medications     DOBUTamine 2.5 mcg/kg/min (11/16/22  0805)     heparin 1,600 Units/hr (11/16/22 1116)     - MEDICATION INSTRUCTIONS -       sodium chloride         amiodarone  200 mg Oral BID     aspirin  81 mg Oral Daily     [Held by provider] empagliflozin  10 mg Oral Daily     furosemide  40 mg Oral Daily     miconazole   Topical BID     mycophenolate  1,000 mg Oral QAM     mycophenolate  500 mg Oral QPM     polyethylene glycol  2,000 mL Oral Once     pravastatin  20 mg Oral Daily     sodium chloride (PF)  10-40 mL Intracatheter Q8H     sodium chloride (PF)  3 mL Intracatheter Q8H     sodium chloride (PF)  3 mL Intracatheter Q8H     traZODone  50 mg Oral At Bedtime       Data   Recent Labs   Lab 11/16/22  0706 11/16/22  0538 11/15/22  0952 11/15/22  0444 11/14/22  0510 11/13/22  0456 11/12/22 0458   WBC  --  5.7 4.9 5.6  --   --  6.6   HGB  --  14.3 14.9 15.4  --   --  15.1   MCV  --  87 87 88  --   --  88   PLT  --  113* 98* 106*  --   --  91*   INR  --   --   --   --   --   --  1.20*     --   --  134* 138   < > 140   POTASSIUM 3.5  --   --  3.8 3.7   < > 3.8  3.7   CHLORIDE 104  --   --  97* 102   < > 101   CO2 24  --   --  25 25   < > 26   BUN 15.4  --   --  13.9 21.2   < > 19.9   CR 0.99  --   --  1.01 1.09   < > 1.09   ANIONGAP 11  --   --  12 11   < > 13   TIM 8.4*  --   --  8.7* 8.5*   < > 8.5*   GLC 86  --   --  95 91   < > 105*   ALBUMIN  --   --  3.3*  --   --   --  3.4*   PROTTOTAL  --   --  5.5*  --   --   --  5.4*   BILITOTAL  --   --  1.7*  --   --   --  1.5*   ALKPHOS  --   --  63  --   --   --  61   ALT  --   --  27  --   --   --  27   AST  --   --  30  --   --   --  24    < > = values in this interval not displayed.       Recent Results (from the past 24 hour(s))   Transesophageal Echocardiogram   Result Value    LVEF  20-25% (severely reduced)    [facility]    [id number]  [id number]  [id number]  [id number]^LAURA^MARA^DEE DEE                                                                       Version 2     [facility]  [facility]  Echocardiography Laboratory  [address] [place] [id number]     Name: SLOAN LAZAR  MRN: [id number]  : 1955  Study Date: 2022 08:50 AM  Age: 67 yrs  Gender: Male  [place] [facility] [place] For Study: CVA  Ordering Physician: MARA SANCHEZ  Referring Physician: YAQUELIN MIRAMONTES  Performed By: Fior Ibqal MD     HR: 83  BP: 113/83 mmHg  ______________________________________________________________________________  Interpretation Summary     Mild to moderate left ventricular dilation is present. Left ventricular  function is decreased. The ejection fraction is 20-25% (severely reduced).  Severe diffuse hypokinesis is present.     Global right ventricular function is mildly reduced.     Moderate to severe functional mitral insufficiency is present.     The left atrial appendage is free of thrombus.     The atrial septum is intact as assessed by color Doppler and agitated saline  bubble study .     No pericardial effusion present.     ______________________________________________________________________________  Procedure  Transesophageal Echocardiogram with color and spectral Doppler performed. 3D  image acquisition, reconstruction, and real-time interpretation was performed.  Procedure location Echo Lab. Informed consent for Transesophegeal echo  obtained. JARED Probe #64 was used during the procedure. Patient was sedated  using Fentanyl 50 mcg. Patient was sedated using Versed 2 mg. The heart rate,  respiratory rate, oxygen saturations, blood pressure, and response to care  were monitored throughout the procedure with the assistance of the nurse. I  determined this patient to be an appropriate candidate for the planned  sedation and procedure and have reassessed the patient immediately prior to  sedation and procedure. Total sedation time: 23 minutes of continuous bedside  1:1 monitoring. The Transducer was inserted without difficulty . The  patient  tolerated the procedure well. Complications None.     Left Ventricle  Mild to moderate left ventricular dilation is present. Left ventricular  function is decreased. The ejection fraction is 20-25% (severely reduced).  Severe diffuse hypokinesis is present. There is no LV apical thrombus.     Right Ventricle  The right ventricle is normal size. Global right ventricular function is  mildly reduced.     Atria  The left atrial appendage is normal. It is free of spontaneous echo contrast  and thrombus. The right atria appears normal. Mild left atrial enlargement is  present. The atrial septum is intact as assessed by color Doppler and agitated  saline bubble study .     Mitral Valve  Moderate to severe mitral insufficiency is present. EROA = 0.5 cm2. There is  no flow reversal in the pulmonary veins. The cause of the mitral insufficiency  appears to be altered left ventricular size and geometry.     Aortic Valve  Aortic valve is normal in structure and function.     Tricuspid Valve  Mild tricuspid insufficiency is present.     Pulmonic Valve  The pulmonic valve is normal.     Vessels  The aorta root is normal. Normal pulmonary vein velocity.     Pericardium  No pericardial effusion is present.     ______________________________________________________________________________  Report approved by: Gabe Azul 11/16/2022 11:58 AM     ______________________________________________________________________________           I have reviewed today's vital signs, notes, medications, labs and imaging.  I have also seen and examined the patient and agree with the findings and plan as outlined above.  Pt with no new complaints. JARED results noted and appreciate input of Neurology Service.  Plan for colonoscopy tomorrow.  Continue LVAD/OHT eval.      Joselito Metcalf MD, PhD  Professor, Heart Failure and Cardiac Transplantation  [facility]

## 2022-11-16 NOTE — PROGRESS NOTES
Please see consult note dated 11/11 for further details    The neurology team was consulted in the case of Dr. Stein to assess the stability of his CNS vasculitis in preparation for LVAD and cardiac transplantation.  We were able to obtain his prior MRIs, most recently from 07/2022, and those from 2016 to compare those to an MRI obtained this week.  There is no significant change in the bilateral white matter hyperintensities noted since 2016, which demonstrates stability of his presumed CNS vasculitis.  Additionally, there was 1 tiny focus of restricted diffusion in the lateral right cerebellum.  This finding was shared with the stroke neurology team and they agreed that this was more likely a punctate stroke rather than an imaging artifact.  However, this is small, incidental, and unlikely to represent progression of his presumed CNS vasculitis.  Further, small strokes in the cerebellum can often be asymptomatic.    I again discussed with Dr. Stein and his wife, Arlette, the CNS vasculitis diagnosis was presumed, and not confirmed by a tissue sample.  In discussion with the stroke neurology team, we have offered to revisit this diagnosis at outpatient which would probably require another angiogram, and could, ultimately result in brain biopsy.  While less likely, a genetic disorder may be responsible for white matter disease and early strokes therefore a genetic consultation may also be indicated to assess for diseases such as CADASIL or CARASIL.  This is thought to be less likely due to the absence of white matter change in the anterior temporal horns, and the stability of the MRI brain since being treated on immunomodulating agents.    Impression:  #CNS vasculitis, prior diagnosis without tissue diagnosis, radiographically stable  - Continue mycophenolate per outpatient rheumatologist  - Continue care with outpatient neurologist  - Continue care per cardiology team  - No current contraindications for further  cardiac investigation from a neurologic perspective    Kevin Person MD

## 2022-11-16 NOTE — PLAN OF CARE
67 year old male w/ pertinent PMHx of HFrEF (EF 15-20%) presumably secondary to arrhythmogenic cardiomyopathy, CAD (prior PCI LCx and LAD), CNS vasculitis, and CKD admitted 11/10/2022 for decompensated heart failure. Remains admitted requiring inotropic support and undergoing advanced therapy evaluation.    Neuro: AOx4  Cardiac: regular  Rhythm, V-paced with occasional PVC's  Resp: RA  GI/: Adequate output, no bm this shift  Diet: Currently NPO for JARED procedure today  LDA's: Double lumen PICC in right arm   Skin: Nothing to note   Mobility: SBA  Pain: Denies  Running: Argatraban@ 1mcg/kg/min=5.68mlhr and Dobutamine @ 7.1ml/hr        Continue to monitor and contact Cards 2 with any changes/concerns

## 2022-11-17 ENCOUNTER — APPOINTMENT (OUTPATIENT)
Dept: EDUCATION SERVICES | Facility: CLINIC | Age: 67
DRG: 286 | End: 2022-11-17
Attending: INTERNAL MEDICINE
Payer: MEDICARE

## 2022-11-17 LAB
A*: NORMAL
A*LOCUS SEROLOGIC EQUIVALENT: 3
A*LOCUS: NORMAL
A*SEROLOGIC EQUIVALENT: 68
ABTEST METHOD: NORMAL
ANION GAP SERPL CALCULATED.3IONS-SCNC: 9 MMOL/L (ref 7–15)
B*: NORMAL
B*LOCUS SEROLOGIC EQUIVALENT: 65
B*LOCUS: NORMAL
B*SEROLOGIC EQUIVALENT: 35
BUN SERPL-MCNC: 11 MG/DL (ref 8–23)
BW-1: NORMAL
C*: NORMAL
C*LOCUS SEROLOGIC EQUIVALENT: 4
C*LOCUS: NORMAL
C*SEROLOGIC EQUIVALENT: 8
CALCIUM SERPL-MCNC: 8.5 MG/DL (ref 8.8–10.2)
CHLORIDE SERPL-SCNC: 103 MMOL/L (ref 98–107)
CREAT SERPL-MCNC: 0.82 MG/DL (ref 0.67–1.17)
DEPRECATED HCO3 PLAS-SCNC: 26 MMOL/L (ref 22–29)
DPA1*: NORMAL
DPB1*: NORMAL
DPB1*LOCUS: NORMAL
DQA1*LOCUS: NORMAL
DQB1*LOCUS NMDP: NORMAL
DQB1*LOCUS SEROLOGIC EQUIVALENT: 7
DQB1*LOCUS: NORMAL
DRB1*: NORMAL
DRB1*LOCUS SEROLOGIC EQUIVALENT: 11
DRB1*LOCUS: NORMAL
DRB1*SEROLOGIC EQUIVALENT: 13
DRB3*: NORMAL
DRB3*LOCUS NMDP: NORMAL
DRB3*LOCUS SEROLOGIC EQUIVALENT: 52
DRB3*LOCUS: NORMAL
DRB3*NMDP: NORMAL
DRB3*SEROLOGIC EQUIVALENT: 52
DRSSO TEST METHOD: NORMAL
ERYTHROCYTE [DISTWIDTH] IN BLOOD BY AUTOMATED COUNT: 14.4 % (ref 10–15)
GFR SERPL CREATININE-BSD FRML MDRD: >90 ML/MIN/1.73M2
GLUCOSE SERPL-MCNC: 140 MG/DL (ref 70–99)
HCT VFR BLD AUTO: 44.7 % (ref 40–53)
HGB BLD-MCNC: 14.3 G/DL (ref 13.3–17.7)
MAGNESIUM SERPL-MCNC: 1.9 MG/DL (ref 1.7–2.3)
MCH RBC QN AUTO: 28.6 PG (ref 26.5–33)
MCHC RBC AUTO-ENTMCNC: 32 G/DL (ref 31.5–36.5)
MCV RBC AUTO: 89 FL (ref 78–100)
PLATELET # BLD AUTO: 119 10E3/UL (ref 150–450)
POTASSIUM SERPL-SCNC: 3.4 MMOL/L (ref 3.4–5.3)
POTASSIUM SERPL-SCNC: 3.7 MMOL/L (ref 3.4–5.3)
RBC # BLD AUTO: 5 10E6/UL (ref 4.4–5.9)
SODIUM SERPL-SCNC: 138 MMOL/L (ref 136–145)
UFH PPP CHRO-ACNC: 0.74 IU/ML
UFH PPP CHRO-ACNC: 0.85 IU/ML
UFH PPP CHRO-ACNC: <0.1 IU/ML
WBC # BLD AUTO: 4.5 10E3/UL (ref 4–11)

## 2022-11-17 PROCEDURE — 45378 DIAGNOSTIC COLONOSCOPY: CPT | Performed by: INTERNAL MEDICINE

## 2022-11-17 PROCEDURE — 85520 HEPARIN ASSAY: CPT | Performed by: INTERNAL MEDICINE

## 2022-11-17 PROCEDURE — 83735 ASSAY OF MAGNESIUM: CPT

## 2022-11-17 PROCEDURE — 0DJD8ZZ INSPECTION OF LOWER INTESTINAL TRACT, VIA NATURAL OR ARTIFICIAL OPENING ENDOSCOPIC: ICD-10-PCS | Performed by: INTERNAL MEDICINE

## 2022-11-17 PROCEDURE — 999N000044 HC STATISTIC CVC DRESSING CHANGE

## 2022-11-17 PROCEDURE — 84132 ASSAY OF SERUM POTASSIUM: CPT | Performed by: INTERNAL MEDICINE

## 2022-11-17 PROCEDURE — 99222 1ST HOSP IP/OBS MODERATE 55: CPT | Performed by: STUDENT IN AN ORGANIZED HEALTH CARE EDUCATION/TRAINING PROGRAM

## 2022-11-17 PROCEDURE — 250N000011 HC RX IP 250 OP 636

## 2022-11-17 PROCEDURE — 82374 ASSAY BLOOD CARBON DIOXIDE: CPT

## 2022-11-17 PROCEDURE — 250N000012 HC RX MED GY IP 250 OP 636 PS 637

## 2022-11-17 PROCEDURE — 99233 SBSQ HOSP IP/OBS HIGH 50: CPT | Mod: GC | Performed by: INTERNAL MEDICINE

## 2022-11-17 PROCEDURE — 250N000011 HC RX IP 250 OP 636: Performed by: STUDENT IN AN ORGANIZED HEALTH CARE EDUCATION/TRAINING PROGRAM

## 2022-11-17 PROCEDURE — 85027 COMPLETE CBC AUTOMATED: CPT

## 2022-11-17 PROCEDURE — 250N000013 HC RX MED GY IP 250 OP 250 PS 637

## 2022-11-17 PROCEDURE — 250N000011 HC RX IP 250 OP 636: Performed by: INTERNAL MEDICINE

## 2022-11-17 PROCEDURE — 214N000001 HC R&B CCU UMMC

## 2022-11-17 PROCEDURE — G0500 MOD SEDAT ENDO SERVICE >5YRS: HCPCS | Performed by: INTERNAL MEDICINE

## 2022-11-17 PROCEDURE — 82310 ASSAY OF CALCIUM: CPT

## 2022-11-17 PROCEDURE — 250N000012 HC RX MED GY IP 250 OP 636 PS 637: Performed by: INTERNAL MEDICINE

## 2022-11-17 PROCEDURE — 250N000013 HC RX MED GY IP 250 OP 250 PS 637: Performed by: INTERNAL MEDICINE

## 2022-11-17 PROCEDURE — 36415 COLL VENOUS BLD VENIPUNCTURE: CPT | Performed by: INTERNAL MEDICINE

## 2022-11-17 RX ORDER — POTASSIUM CHLORIDE 750 MG/1
40 TABLET, EXTENDED RELEASE ORAL ONCE
Status: COMPLETED | OUTPATIENT
Start: 2022-11-17 | End: 2022-11-17

## 2022-11-17 RX ORDER — POTASSIUM CHLORIDE 750 MG/1
20 TABLET, EXTENDED RELEASE ORAL ONCE
Status: COMPLETED | OUTPATIENT
Start: 2022-11-17 | End: 2022-11-17

## 2022-11-17 RX ORDER — MAGNESIUM OXIDE 400 MG/1
400 TABLET ORAL EVERY 4 HOURS
Status: COMPLETED | OUTPATIENT
Start: 2022-11-17 | End: 2022-11-17

## 2022-11-17 RX ORDER — FENTANYL CITRATE 50 UG/ML
INJECTION, SOLUTION INTRAMUSCULAR; INTRAVENOUS PRN
Status: DISCONTINUED | OUTPATIENT
Start: 2022-11-17 | End: 2022-11-21 | Stop reason: HOSPADM

## 2022-11-17 RX ADMIN — HYDROXYZINE HYDROCHLORIDE 25 MG: 25 TABLET, FILM COATED ORAL at 02:39

## 2022-11-17 RX ADMIN — POTASSIUM CHLORIDE 40 MEQ: 750 TABLET, EXTENDED RELEASE ORAL at 09:01

## 2022-11-17 RX ADMIN — ASPIRIN 81 MG CHEWABLE TABLET 81 MG: 81 TABLET CHEWABLE at 09:01

## 2022-11-17 RX ADMIN — AMIODARONE HYDROCHLORIDE 200 MG: 200 TABLET ORAL at 09:01

## 2022-11-17 RX ADMIN — MAGNESIUM OXIDE TAB 400 MG (241.3 MG ELEMENTAL MG) 400 MG: 400 (241.3 MG) TAB at 14:25

## 2022-11-17 RX ADMIN — MAGNESIUM OXIDE TAB 400 MG (241.3 MG ELEMENTAL MG) 400 MG: 400 (241.3 MG) TAB at 09:01

## 2022-11-17 RX ADMIN — MYCOPHENOLATE MOFETIL 1000 MG: 500 TABLET, FILM COATED ORAL at 09:01

## 2022-11-17 RX ADMIN — HEPARIN SODIUM 1400 UNITS/HR: 10000 INJECTION, SOLUTION INTRAVENOUS at 03:57

## 2022-11-17 RX ADMIN — POLYETHYLENE GLYCOL 3350, SODIUM SULFATE ANHYDROUS, SODIUM BICARBONATE, SODIUM CHLORIDE, POTASSIUM CHLORIDE 2000 ML: 236; 22.74; 6.74; 5.86; 2.97 POWDER, FOR SOLUTION ORAL at 00:20

## 2022-11-17 RX ADMIN — MYCOPHENOLATE MOFETIL 500 MG: 500 TABLET, FILM COATED ORAL at 19:55

## 2022-11-17 RX ADMIN — DOBUTAMINE IN DEXTROSE 2.5 MCG/KG/MIN: 200 INJECTION, SOLUTION INTRAVENOUS at 04:51

## 2022-11-17 RX ADMIN — FUROSEMIDE 40 MG: 40 TABLET ORAL at 09:01

## 2022-11-17 RX ADMIN — AMIODARONE HYDROCHLORIDE 200 MG: 200 TABLET ORAL at 19:56

## 2022-11-17 RX ADMIN — MICONAZOLE NITRATE: 20 CREAM TOPICAL at 19:53

## 2022-11-17 RX ADMIN — TRAZODONE HYDROCHLORIDE 50 MG: 50 TABLET ORAL at 22:17

## 2022-11-17 RX ADMIN — POTASSIUM CHLORIDE 20 MEQ: 750 TABLET, EXTENDED RELEASE ORAL at 14:25

## 2022-11-17 RX ADMIN — MICONAZOLE NITRATE: 20 CREAM TOPICAL at 09:01

## 2022-11-17 RX ADMIN — POTASSIUM CHLORIDE 20 MEQ: 750 TABLET, EXTENDED RELEASE ORAL at 18:30

## 2022-11-17 RX ADMIN — PRAVASTATIN SODIUM 20 MG: 20 TABLET ORAL at 09:01

## 2022-11-17 ASSESSMENT — ACTIVITIES OF DAILY LIVING (ADL)
ADLS_ACUITY_SCORE: 30

## 2022-11-17 NOTE — PROGRESS NOTES
Shift summary: Colonoscopy done. Heparin restarted back at 1400 units/hr. Back on regular diet. Good UOP. 1BM today. Fall precautions in place. Vitals stable, afebrile. Will continue to monitor.    Ruddy Hadley RN on 11/17/2022 at 2:08 PM

## 2022-11-17 NOTE — PROGRESS NOTES
[facility]  Cardiology II Service / Advanced Heart Failure  Daily Progress Note    Patient: Paco Stein      : 1955      MRN: [id number]    Assessment/Plan:   67 year old male w/ pertinent PMHx of HFrEF (EF 15-20%) presumably secondary to arrhythmogenic cardiomyopathy, CAD (prior PCI LCx and LAD), CNS vasculitis, and CKD admitted 11/10/2022 for decompensated heart failure. Remains admitted requiring inotropic support and undergoing advanced therapy evaluation. Course complicated by small incidental embolic cerebellar stroke as well as acute DVT.    Today's changes:  - Colonoscopy today, NPO  - Clarify desire for advanced therapies. Will reengage patient and family.     Acute on chronic systolic heart failure w/ ambulatory cardiogenic shock I/s/o advanced non-ischemic cardiomyopathy (suspected arrhythmogenic), Class IV, Stage D  Clinical picture on admission: sent in from heart failure clinic due to 2 weeks of progressive exercise intolerance, MELLO, orthopnea, PND w/ evidence of volume overload. Last echo 22 showing reduction of EF to 15-20% from previous of 20-25% w/ diffuse hypokinesis and mild RV dilation w/ reduced RV function. Last RHC 22 w/ mildly elevated filling pressures bilaterally w/ a CI of 1.9. Started on IV diuresis, now transitioned to PO maintenance dosing. Remains on low-dose inotropic support while undergoing advanced therapy planning.  Plan:  - Diuretic: 40mg PO Lasix qday (PTA maintenance dosing)  - Inotrope: Dobutamine 2.5mcg/kg/min  - Afterload Reduction: no additional agents currently  - BB: Holding PTA Metoprolol Succ 25mg qday  - SGLT2i: Holding PTA Empag 10mg qday  - MRA: None  - Statin: PTA statin  - Devices: biventricular PPM/ICD  - Electrolytes: Daily monitoring with diuresis, on RN protocols for K and Mag  - Strict I/O monitoring  - Daily weights    Advanced therapy planning  Checklist as per below. Notable action items  include:  - Colonoscopy pending - was postponed earlier this week while determining possibility of embolic stroke - given very small incidental nature, ok to proceed w/ anesthesia, will reach out to GI to coordinate colonoscopy timing (ideally tomorrow)  - Palliative to see, held off earlier this week due to evolving clinical picture  - Neuropsych scheduled for today    LVAD/transplant evaluation checklist  [x] labs (CBC, CMP, PT/INR, TSH, iron studies, UA + micro, prealb)  [x] Infectious (Treponema, Hep A/B/C, HIV, HSV 1/2 IgG, CMV IgG, Toxo IgG, EBV IgG, varicella IgG, Quant gold)   [x] Tox (Utox, nicotine and cotinine, PeTH)  [x] Immunocompatibility (ABO done, HLA)  [] CPX - holding off for now given functional status  [x] TTE - 11/11/22   [x] 6MWT - 11/14/22  [x] EKG - 11/10/22  [x] RHC - 11/11/22  [x] CVTS consult - ordered, not yet seen  [x] Social work consult - ordered, not yet seen  [] Palliative care consult - ordered, not yet seen, will alert to readiness today  [] Neuropsych consult - ordered, scheduled for today  [x] Nutrition consult - 11/14/22, starting calorie counts  [x] CT Dental + exam - 11/11/22, no further needs  [x] Abd US + doppler - done 11/11/22, negative  [x] Extremity US and ABIs - 11/11/22, negative  [x] Carotid US (if DM or ICM or >49yo) - 11/11/22, negative  [x] PFTs - 11/11/22  [] Dexa - outpatient  [x] CT head non-contrast - 11/10/22, needs MRI  [x] CT CAP non-contrast - 11/10/22, no further needs  [] Colonoscopy (>51 yo) - GI consulted, previously postponed, will look into rescheduling  [x] PSA - 1.63 on 11/12/22  [x] Neuro consult - 11/10/22, needs MRI/MRA   [x] MRI/MRA brain - 11/14/22    Incidental punctate cerebellar infarct, likely embolic  Read on MR imaging 11/14/22 without new neuro deficits. Per radiology, appears acute, likely embolic. Does have newly-identified DVT's in the right arm and left leg, no known arterial clots history. Reviewed telemetry over admission, no afib  or other tachycarrhythmias. Reassuring prior TTE this admission including bubble study, repeat JARED w/o septal defects or thrombus.   Plan:  - On Heparin gtt  - Neuro stroke has evaluated, pending final recommendations    Acute DVT - occlusive left peroneal, non-occlusive right axillary  Identified on doppler 11/15/22. No associated pain/symptoms.  Plan:  - Heparin gtt, high-intensity     Acute to subacute thrombocytopenia  Concern for HIT - screen negative  Present on admission, nml platelets a week prior to admission. Was at OSH, did receive heparin products starting 11/3 w/ onset of thrombocytopenia documented by 11/10. 4T score of 3-4, low to intermediate risk of HIT - sent HIT panel, negative fortunately. Other counts look alright. No s/s bleeding. Platelets coming back up as of 11/15.  Plan:  - Daily CBC  - Continue hep gtt    ================================  Chronic Problems:    Multivessel coronary artery disease s/p PCI LCx and LAD  In context of the above presentation, no particularly concerning anginal symptoms. Had updated coronary angiogram this admission 11/11/22 showing patent LAD and LCx stents as well as mild-mod non-obstructive disease in the RCA. Overall reassuring in context of working towards advanced therapy planning.  Plan:   -- ASA: Continuing PTA 81mg qday dosing   -- BB: Holding as per above   -- Statin: Cont PTA Pravastatin   -- Antianginal: N/A    History of VT s/p biventricular PPM/ICD  Interro performed 11/10/22 w/ two short (<5s) runs of NSVT on 11/1/22 and 11/3/22, no recent shocks. A-paced.  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 No episodes since. Mild sensory deficit over the right foot, rest of neurological exam unremarkable. Neurology consulted as part of vad/txp eval. MRI/MRA brain performed this admission w/o concerning progression of  suspected vasculitic disease.  - Continue PTA cellcept 1000mg qam and 500mg qpm  - Was some concern initially about MRI/MRA performed 11/14/22 missing a sequence/protocol pertinent for vasculitis per radiology, spoke w/ neurology on 11/15, imaging is appropriate and no further mri needed    #CKD2 - Baseline Cr in 1-1.2 range    #Insomnia - Trazodone 50mg QHS    Hospital Checklist:  DVT Prophylaxis: Pneumatic Compression Devices  Code Status: Full Code  Diet/Nutrition: ADAT  Lines: Right-sided PICC    Disposition Planning:  Anticipated discharge TBD, remains on inotropic support, undergoing advanced therapy evaluation/planning.    Staffed w/ Dr. Metcalf.    Marcial Reyes, PGY-4  Cardiovascular Disease Fellow  11/17/2022  ==================================    Subjective:   No acute events overnight. Denies any cardiopulmonary complaints.    Objective:     Vitals:    11/15/22 0500 11/16/22 0337 11/17/22 0600   Weight: 89.9 kg (198 lb 3.2 oz) 90.1 kg (198 lb 9.6 oz) 90.2 kg (198 lb 12.8 oz)     Vital Signs with Ranges  Temp:  [97.4  F (36.3  C)-97.7  F (36.5  C)] 97.7  F (36.5  C)  Pulse:  [64-87] 87  Resp:  [14-22] 20  BP: ()/(63-93) 115/82  Cuff Mean (mmHg):  [94] 94  SpO2:  [92 %-98 %] 97 %  I/O last 3 completed shifts:  In: 504.8 [I.V.:504.8]  Out: -     Exam:  General: No acute distress, pleasantly conversational  Lungs: No increased WOB  Ext: Warm and well-perfused, no appreciable lower extremity edema  Neuro: Awake, alert, conversational, moving all extremities spontaneously throughout examination    Medications     DOBUTamine 2.5 mcg/kg/min (11/17/22 0451)     heparin Stopped (11/17/22 0401)     - MEDICATION INSTRUCTIONS -         amiodarone  200 mg Oral BID     aspirin  81 mg Oral Daily     [Held by provider] empagliflozin  10 mg Oral Daily     furosemide  40 mg Oral Daily     miconazole   Topical BID     mycophenolate  1,000 mg Oral QAM     mycophenolate  500 mg Oral QPM     pravastatin  20 mg Oral Daily      sodium chloride (PF)  10-40 mL Intracatheter Q8H     sodium chloride (PF)  3 mL Intracatheter Q8H     sodium chloride (PF)  3 mL Intracatheter Q8H     traZODone  50 mg Oral At Bedtime       Data   Recent Labs   Lab 22  0510 22  0706 22  0538 11/15/22  0952 11/15/22  0444 22  0456 22  0458   WBC 4.5  --  5.7 4.9 5.6  --  6.6   HGB 14.3  --  14.3 14.9 15.4  --  15.1   MCV 89  --  87 87 88  --  88   *  --  113* 98* 106*  --  91*   INR  --   --   --   --   --   --  1.20*    139  --   --  134*   < > 140   POTASSIUM 3.4 3.5  --   --  3.8   < > 3.8  3.7   CHLORIDE 103 104  --   --  97*   < > 101   CO2 26 24  --   --  25   < > 26   BUN 11.0 15.4  --   --  13.9   < > 19.9   CR 0.82 0.99  --   --  1.01   < > 1.09   ANIONGAP 9 11  --   --  12   < > 13   TIM 8.5* 8.4*  --   --  8.7*   < > 8.5*   * 86  --   --  95   < > 105*   ALBUMIN  --   --   --  3.3*  --   --  3.4*   PROTTOTAL  --   --   --  5.5*  --   --  5.4*   BILITOTAL  --   --   --  1.7*  --   --  1.5*   ALKPHOS  --   --   --  63  --   --  61   ALT  --   --   --  27  --   --  27   AST  --   --   --  30  --   --  24    < > = values in this interval not displayed.       Recent Results (from the past 24 hour(s))   Transesophageal Echocardiogram   Result Value    LVEF  20-25% (severely reduced)    [facility]    [id number]  [id number]  [id number]  [id number]^LAURA^MARA^DEE DEE                                                                       Version 2     [facility],[place]  Echocardiography Laboratory  [address] [place] [id number]     Name: SLOAN LAZAR  MRN: [id number]  : 1955  Study Date: 2022 08:50 AM  Age: 67 yrs  Gender: Male  [place] [facility] [place] For Study: CVA  Ordering Physician: MARA SANCHEZ  Referring Physician: YAQUELIN MIRAMONTES  Performed By: Fior Iqbal MD     HR: 83  BP: 113/83  mmHg  ______________________________________________________________________________  Interpretation Summary     Mild to moderate left ventricular dilation is present. Left ventricular  function is decreased. The ejection fraction is 20-25% (severely reduced).  Severe diffuse hypokinesis is present.     Global right ventricular function is mildly reduced.     Moderate to severe functional mitral insufficiency is present.     The left atrial appendage is free of thrombus.     The atrial septum is intact as assessed by color Doppler and agitated saline  bubble study .     No pericardial effusion present.     ______________________________________________________________________________  Procedure  Transesophageal Echocardiogram with color and spectral Doppler performed. 3D  image acquisition, reconstruction, and real-time interpretation was performed.  Procedure location Echo Lab. Informed consent for Transesophegeal echo  obtained. JARED Probe #64 was used during the procedure. Patient was sedated  using Fentanyl 50 mcg. Patient was sedated using Versed 2 mg. The heart rate,  respiratory rate, oxygen saturations, blood pressure, and response to care  were monitored throughout the procedure with the assistance of the nurse. I  determined this patient to be an appropriate candidate for the planned  sedation and procedure and have reassessed the patient immediately prior to  sedation and procedure. Total sedation time: 23 minutes of continuous bedside  1:1 monitoring. The Transducer was inserted without difficulty . The patient  tolerated the procedure well. Complications None.     Left Ventricle  Mild to moderate left ventricular dilation is present. Left ventricular  function is decreased. The ejection fraction is 20-25% (severely reduced).  Severe diffuse hypokinesis is present. There is no LV apical thrombus.     Right Ventricle  The right ventricle is normal size. Global right ventricular function is  mildly  reduced.     Atria  The left atrial appendage is normal. It is free of spontaneous echo contrast  and thrombus. The right atria appears normal. Mild left atrial enlargement is  present. The atrial septum is intact as assessed by color Doppler and agitated  saline bubble study .     Mitral Valve  Moderate to severe mitral insufficiency is present. EROA = 0.5 cm2. There is  no flow reversal in the pulmonary veins. The cause of the mitral insufficiency  appears to be altered left ventricular size and geometry.     Aortic Valve  Aortic valve is normal in structure and function.     Tricuspid Valve  Mild tricuspid insufficiency is present.     Pulmonic Valve  The pulmonic valve is normal.     Vessels  The aorta root is normal. Normal pulmonary vein velocity.     Pericardium  No pericardial effusion is present.     ______________________________________________________________________________  Report approved by: Gabe Azul 11/16/2022 11:58 AM     ______________________________________________________________________________             I have reviewed today's vital signs, notes, medications, labs and imaging.  I have also seen and examined the patient and agree with the findings and plan as outlined above.  Pt with colonoscopy.  VSS with exam as above.  Labs as above.  Assessment: Pt with endstage heart failure and with LVAD/OHT workup.  Will continue Dbx therapy and discuss pt tomorrow at conference.     Joselito Metcalf MD, PhD  Professor, Heart Failure and Cardiac Transplantation  [facility]

## 2022-11-17 NOTE — PLAN OF CARE
67 year old male w/ pertinent PMHx of HFrEF (EF 15-20%) presumably secondary to arrhythmogenic cardiomyopathy, CAD (prior PCI LCx and LAD), CNS vasculitis, and CKD admitted 11/10/2022 for decompensated heart failure. Remains admitted requiring inotropic support and undergoing advanced therapy evaluation.    Temp: 97.7  F (36.5  C) Temp src: Oral BP: 115/82 Pulse: 87   Resp: 20 SpO2: 97 % O2 Device: None (Room air) Oxygen Delivery: 2 LPM     Neuro: AOx4  Cardiac: V paced  Resp: RA  GI/: adequate urine output, Bowel prep for colonoscopy  Diet: NPO for colonoscopy today and then Calorie count  LDA's: Double lumen PICC, R PIV  Skin: No new deficits  Mobility: SBA  Pain: Denies    PT has scheduled colonoscopy at 10 am    PICC is bloody and dressing was changed Infusing well    Running: Dobutamine @7.1 ml/hr and resart Heparin @ 1400 unit(s)/hr after colonoscopy    Continue to monitor and contact Cards 2 with any changes concerns

## 2022-11-17 NOTE — CONSULTS
Met with patient and spouse, Arlette at the bedside for PICC education and IV med administration teaching. Discussed basic PICC cares/safety. Talked about when to call the care team, when to call 911.     Arlette correctly taught back prepping syringe, flushing demo PICC line with heparin after demonstration by writer. Talked about reason for only flushing lumen not running dobutamine and never flushing dobutamine line. Taught Arlette how to do an end cap change, if needed at home.    Showed Arlette how to change the dobutamine bag and operate the CADD pump to start the infusion. Arlette practiced a bag change on demo PICC line and did well with sterile technique and remembering the sequence of steps.     Literature given: Handwashing and Skin Care, Caring for Your PICC at Home, Changing the End Cap, Flushing the Line with Heparin, Saline or Citrate, How to Change Your Medicine Bag.

## 2022-11-17 NOTE — BRIEF OP NOTE
[facility]    Brief Operative Note    Pre-operative diagnosis: Screening for colon cancer [Z12.11]  Post-operative diagnosis Normal colonoscopy     Procedure: Procedure(s):  COLONOSCOPY  Surgeon: Surgeon(s) and Role:     * Roverto Nino MD - Primary  Anesthesia: Moderate Sedation   Estimated Blood Loss: None    Drains: None  Specimens: * No specimens in log *  Findings:   Negative for polyps, masses, inflammation.  Complications: None.  Implants: * No implants in log *  Recommendations:  - Ok to continue previous diet  - Colonoscopy in 10 years for colon cancer screening   - GI will sign off       Omar Mclaughlin MD  GI fellow

## 2022-11-17 NOTE — CONSULTS
Paco Stein is a 67-year-old patient who is being evaluated via a billable video visit. Telemedicine services are necessary because of the COVID-19 pandemic.   Patient has given verbal consent for Video visit? Yes   Will anyone else be joining your video visit? No  Interview:  Start Time: 4:00 PM 22  End Time: 4:24 PM 22  Formal Testing:    Start Time: 8:27 AM 22    End Time: 9:33 AM 22  Originating Location (pt. 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 He is receiving inpatient care related to arrhythmogenic cardiomyopathy, heart failure with reduced ejection fraction, coronary artery disease, chronic kidney disease, and CNS vasculitis with recurrent cryptogenic stroke. Family history is notable for a brother s sudden cardiac death at age 49 and another brother who received a heart transplant at age 69. He is being considered for treatment here with LVAD or transplant. His care has been overseen by Dr. Cherelle Dorman, who ordered this neuropsychological evaluation of brain functioning as part of the standard pre-procedure protocol, to better understand cognitive and psychosocial factors that affect LVAD/transplant candidacy.    In the interview, Dr. Stein demonstrates an appropriate understanding of his medical history and current reasons for hospitalization. He is not yet sure of the urgency of obtaining advanced therapies. With dobutamine, he feels much better now than he has for the last month. His hope for treatment outcome is  to remain at the same cognitive level and to have improved physical functioning. He is aware of risks including death, bleeding problems, and organ rejection. He is aware of the need for immunosuppressant medications to manage rejection.     His wife, Arlette, is with him in the hospital. They have been  42 years. She is a physical therapist. They have three children, two are physicians and one is an RN. They live in [place], [place], and their sons are nearby. A sister-in-law has flown here from [place] to help out, and they have received offers of support from numerous other family and friends. His wife will serve as his primary caregiver. A sister-in-law will be secondary.     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 There have been cryptogenic strokes observed on surveillance imaging over the years (e.g., reports from 12/10/2020 & 12/21/2021 viewable in Care Everywhere), also affecting the right hemisphere. Imaging in this admission has revealed a new cerebellar infarct and generalized increase in chronic infarcts compared to scans from 2016.     There is no history of academic delays or early cognitive issues. He does not feel there have been serious cognitive changes since the first stroke event. He does see a little bit of a change. Subtle issues with organizing his thoughts and figuring out what he wants to say are observable in conversation, along with slight droop on the left side of his mouth. His wife took over managing most of the household finances since heart failure was diagnosed between 1-2 years ago. Prior to this admission, he was independent with medications and did not think he had any problems. He has limited his driving at various times since the heart failure diagnosis. He was told by one provider to avoid situations that require quick decision-making and reaction times.  He reports no change in his ability to do general household tasks and no needs for assistance in self-care like bathing and dressing.     He reports no history of TBI, seizure, migraine, tremor, or balance/gait/coordination troubles. A recent exam with Dr. Colindres showed reduced sensation in the feet and some reduced fine-motor coordination. He says that his senses of taste and smell have been low for about 5 years and essentially absent for the last month, but they apparently normalized with the treatments received here. He has not had any COVID-19 infections. He reports no abnormal visual changes.     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 He denies any history of primary mood or anxiety disorders. He says he has never been treated with psychiatric medications, had a psychiatric hospitalization, engaged in psychotherapy, or had suicidal ideation. He reports no experiences with hallucinations.     Prior to the last two months, his typical alcohol habit was 1-2 drinks a couple of times per month and never 6+ in a single sitting (AUDIT-C = 2, lower risk). PEth testing in this admission was negative. He reports no tobacco use, and nicotine and cotinine testing were negative. He had a positive result for amphetamine screening, but confirmation testing was negative. Screens for other drugs of abuse were negative. He reports no history of legal troubles, gambling problems, or other addictive/compulsive behaviors.     BEHAVIORAL OBSERVATIONS    Dr. Stein was polite and cooperative with the evaluation. In interview, he demonstrated occasionally scattered thoughts or difficulty figuring out what he wanted to say. There was subtle speech hesitancy. There was subtle left-sided facial droop. He occasionally received help from his wife to clarify what he wanted to say. He was open and candid and appeared to be an appropriate historian. Strong affective lability was not observed. He was  good-humored in the interview. He was appropriately attentive and engaged in the testing session. His effort and persistence were appropriate. The test results may provide reasonable reflections of his cognitive status. However, the conditions of the assessment are not standard as the visit was conducted by videoconference instead of in person, which may render inaccurate any comparisons to standard normative data.    MEASURES ADMINISTERED    The following measures were administered by a trained psychometrist, under my supervision:    Orientation: Time, Place, Basic Personal Information, Recent [place] Presidents; Wide Range Achievement Test 4: Word Reading; Wechsler Adult Intelligence Scales-IV: Similarities, Matrix Reasoning; [place] Naming Test; Controlled Oral Word Association Test; Maximo-Osterrieth Complex Figure Test; Clock Drawing; Test of Sustained Attention & Tracking; Recio Verbal Learning Test-Revised; ILS Health and Safety Questionnaire; PHQ-9; SUSANNA-7.    RESULTS AND INTERPRETATION    Orientation was normal for time, place, basic personal information, and basic cultural information. Performance on a reading and pronunciation test that is validated for estimating premorbid intelligence was high average. Abstract verbal analogical reasoning was average. Practical reasoning for a variety of health and safety scenarios was normal. Visual reasoning through pattern identification was average. Clock drawing was normal. Copying a complex geometric figure was exceptionally low. Performances were average across a variety of brief verbal tasks requiring executive management of attention and concentration. Confrontation naming was exceptionally high. Letter-based verbal fluency was below average. Learning a word list over repeated readings was low average. Delayed free recall of the list was average, and delayed recognition of the list was perfect.     On brief self-report inventories, he endorsed moderately to severely  elevated symptoms related to depression (PHQ-9 = 19) and moderately elevated symptoms related to anxiety (SUSANNA-7 = 12). Some of his endorsements can be explained by his currently ill state, such as severe problems with fatigue and sleep disruption. However, he also endorsed issues with feeling nervous, trouble controlling worry about various things, trouble relaxing, feeling restless, being more irritable, feeling afraid as if something awful might happen, reduced interest and pleasure in things, feeling down, appetite changes, and concentration problems.    IMPRESSIONS AND RECOMMENDATIONS    In the context of nonstandard and limited assessment via video conference instead of in person (as necessitated by the current COVID-19 situation), the cognitive test results are abnormal. The findings are consistent with Dr. Stein s history of right-hemisphere strokes from CNS vasculitis, with primary deficits in complex visuoconstructional abilities and mild weakness in certain aspects of executive functioning.     He is not in a state of dementia. I am not concerned about his ability to make medical decisions.     I would not expect him to require direct assistance in all daily activities, but certainly at times when treatment plans are changing, he should have oversight in medication adherence and other critical aspects of daily care. He would likely be able to maintain a well-practiced routine.     Aside from considerations of LVAD/transplant choices, his cognitive profile does raise concern about driving safety, and prior recommendations that he should be avoiding situations requiring quick decision-making and reaction times is appropriate.     There are no indications of substance use issues. In interview, he denies clinically significant emotional concerns, but on brief questionnaires he endorses moderately elevated depression and anxiety symptoms. This may represent an adjustment disorder relative to his current ill  health and uncertain prognosis. While post-stroke pseudobulbar affect has been an issue, it does not explain his current symptom endorsements. I wonder if there has been a misattribution of primary mood and anxiety issues as PBA.     Leaving these matters untreated or unexamined would be a risk for postsurgical maladjustment.     I encourage placing a consult with Psychiatry about medication options, whether aimed at treating PBA (e.g., Nuedexta or TCAs), depression/anxiety (e.g., SSRI/SNRI), or a combination.     Consultation with Health Psychology would also be appropriate.     Overall, there are cognitive and psychological matters to keep attending to, but there are not direct contraindications to LVAD/transplant from a neuropsychological perspective.     Roverto Lantigua, PhD, LP, ABPP-CN  Board Certified in Clinical Neuropsychology  Licensed Psychologist [id number]      Time spent: One unit psychiatric evaluation including records review, interview, and clinical assessment licensed and board-certified neuropsychologist (CPT 74145). 94 minutes neuropsychological testing evaluation by licensed and board-certified neuropsychologist, including integration of patient data, interpretation of standardized test results and clinical data, clinical decision-making, treatment planning, report, and interactive feedback to the patient (CPT 38356, 21777). 105 minutes of psychological and neuropsychological test administration and scoring by technician (CPT 12787, 22772). Diagnoses: I69.312, I69.314, F48.2, F43.23

## 2022-11-17 NOTE — PROGRESS NOTES
Calorie Count  Intake recorded for: 11/16  Total Kcals: 0 Total Protein: 0g  Kcals from Hospital Food: 0   Protein: 0g  Kcals from Outside Food (average):0 Protein: 0g  # Meals Ordered from Kitchen: 0 meals  # Meals Recorded: No food intake recorded  # Supplements Recorded: No food intake recorded

## 2022-11-17 NOTE — OR NURSING
Colonoscopy completed and pt tolerated well with conscious sedation and on 2-4 L NC oxygen.  Transport here to take pt back to dept.  Pt awake and conversing post procedure.

## 2022-11-17 NOTE — CONSULTS
"[facility]  Palliative Care Consultation Note    Patient: Paco Stein  Date of Admission:  11/10/2022    Requesting Clinician / Team: Evelin Watts MD  Reason for consult: Goals of care > \"Ventricular Assist Device (VAD) Assessment; Heart Failure pre Ventricular Assist Device (VAD) planning\"    Recommendations:  Pre- LVAD/Heart transplantation preparedness plan    Patient s legally designated health care agent:     Arlette (wife), alternate: Hector (son)    Patient s description of how they make decisions (by themselves, in consultation with certain close loved ones, based on advice from medical professionals, etc):      By self with support/discussion with loved ones after weighing medical advice from teams    Patient s personal goals/hopes for receiving the transplant:     Would ideally like to be back to normal where he could go about doing his ADLs without much issue. He knows someone who had a heart transplant and though this person had to wait 2 years on a milrinone drip prior to cardiac transplant, he is doing well and traveling.    What does a typical day look like?:     Wakes up, cooks breakfast, enjoys grocery shopping and cooking especially types of meat (Steak, chicken, pork chops, meatloaf, etc). Enjoys spending time with grandchildren (all girls - 6 yo, 3 yo, 2 yo)    Patient s worries/concerns when considering receiving the transplant:     Associated complications with procedure    How does the patient envision or anticipate his/her future with the transplant?:     Hopeful that it will be a process without many complications    Patient s thoughts about what constitutes a  good  quality of life:     Values being able to interact with family verbally     Patient s thoughts about what health conditions would be unacceptable (situations the patient would want her/his doctors and loved ones to know that she/he would not want life prolonged)?      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 He is okay with being physically bed bound as long as he is able to do the aforementioned activities.    Heart transplantation carries a small risk of perioperative stroke/brain injury, which however can be devastating. After a stroke, what sort of functional outcomes would be acceptable vs unacceptable to the patient?     As above. When asked about if he was physically fine but aphasic, he states he would have to think about what he would want in that type of situation.     Chronic mechanical ventilation via a tracheostomy tube is a risk. What are the circumstances the patient would want her/his physicians and family to keep them alive with long-term mechanical ventilation vs allow them to die?      He states he has to ponder this situation and speak with his family more about this matter. He does reiterate that he values verbal communication. So, if he on the vent for a prolonged time period and only able to physically communicate via writing or texting, he would likely not want that.    There are risks for kidney failure in patients with advancing heart disease who need LVAD support.  The places/locations that offer dialysis and accept patients with LVADs may be limited in your community.  What do you know about dialysis? How would your possible need for dialysis influence your quality of life?     He states he has to ponder this situation and speak with his family more about this matter.     An LVAD carries a risk of stroke which, for some people, may limit their ability to communicate their wishes to others.  After a stroke, what sort of outcomes would be unacceptable to you (ie needing to live in nursing facility, loss of independence.. Etc.)     He states he has to ponder this situation and speak with his  "family more about this matter. Again, he re-emphasized his value for verbal communication and mental clarity.     LVAD s are often placed with future heart transplant consideration. Some of our patients are determined not to be heart transplant candidates, or choose to forego heart transplant surgery for personal reasons.  If you had an LVAD placed inside of you for the remainder of your life, what would be your concerns?    He states he has to ponder this situation and speak with his family more about this matter.     What circumstances or events would lead you to decide or ask your health care agent to decide that you would want the LVAD turned off and be allowed to die a natural death?    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    Spoke with Dr. Stein and wife Arlette and described his likely options including going home now without dobutamine, going home with dobutamine (Arlette reports he was told he would have less than 6 month with this option), LVAD for bridging to heart transplant, or LVAD for destination therapy.    Dr. Stein and Arlette would like to set up a care conference/call when cardiac LVAD/transplant evaluation is completed. Dr. Stein and wife Arlette would like to include their children so that they can get a \"lay of the land\" and then discuss amongst themselves how they would like to proceed    Primary team updated. To speak with unit social work to coordinate care conference once they have a more definitive timeline of when they would speak with Dr. Dickson.    Palliative will follow peripherally and would be happy to participate with any future care conferences    Total time spent was >55 minutes (15 minutes on chart review, 35 minutes with patient and family, and 10 minutes " updating primary team),  >50% of time was spent counseling and/or coordination of care regarding goals of care and decisional support.    Deny Schulz DO / Palliative Medicine / Text me via [employer].     Team Consult Pager [phone] (answered 8am-430pm M-F) - ok to text page via [facility] / After-Hours Answering Service [phone] / Palliative Clinic in the [facility] at the [facility] [place] - [phone] (scheduling); [phone] (triage).        Assessments:  Paco Stein is a 67 year old male w/ a medical history significant for HFrEF (EF 15-20%) 2/2 arrhythmogenic cardiomyopathy, CAD (s/p PCI to LCx and LAD), CNS vasculitis, and CKD who was admitted 11/10/2022 for decompensated heart failure. He is requiring inotropic support with dobutamine drip and is undergoing advanced therapy evaluation. His hospital course has been complicated by a small incidental embolic cerebellar stroke as well as acute DVT for which he is on a heparin drip. Palliative medicine was consulted for LVAD/heart transplant planning.    Today, the patient was seen for:  Goals of care  Support  Encounter for palliative care    Prognosis, Goals, & Planning:      Functional Status just prior to hospitalization: 1 (Restricted in physically strenuous activity but ambulatory and able to carry out work of a light or sedentary nature)      Prognosis, Goals, and/or Advance Care Planning were addressed today: Yes        Summary/Comments: as above      Patient's decision making preferences: independently also with support from loved ones          Patient has decision-making capacity today for complex decisions: Yes            I have concerns about the patient/family's health literacy today: No           Patient has a completed Health Care Directive: Reportedly yes, but not available to us currently.      Code status: Full Code    Coping, Meaning, & Spirituality:   Mood, coping, and/or meaning in the context of serious illness were addressed today:  "Yes  Summary/Comments: Yazidi crissy, states that if it comes down to it, he \"is ready to meet the Lord\"    Social:     Living situation: with wife Arlette    [place] family / caregivers: Arlette (wife, worked as PT), sons x2 one of which is [employer] (both physicians), daughter (RN)    Occupational history: Physician    History of Present Illness:  History gathered today from: patient, family/loved ones, medical chart    Paco Stein is a 67 year old male w/ a medical history significant for HFrEF (EF 15-20%) 2/2 arrhythmogenic cardiomyopathy, CAD (s/p PCI to LCx and LAD), CNS vasculitis, and CKD who was admitted 11/10/2022 for decompensated heart failure. He is requiring inotropic support with dobutamine drip and is undergoing advanced therapy evaluation. His hospital course has been complicated by a small incidental embolic cerebellar stroke as well as acute DVT for which he is on a heparin drip. Palliative medicine was consulted for LVAD/heart transplant planning.    Seen and examined at bedside. Conversations and plans detailed above. Symptoms noted include fatigue/dyspnea on exertion.    Key Palliative Symptom Data:  We are not managing pain in this patient  We are not managing dyspnea in this patient  We are not managing nausea in this patient  We are not managing anxiety in this patient  We are not helping to manage these symptoms currently in this patient.    Patient is on opioids: assessed and bowels ok/no needed changes to plan of care today.    ROS:  A complete 14 point ROS was negative unless stated otherwise above in HPI     Past Medical History:  HFrEF (EF 15-20%) 2/2 arrhythmogenic cardiomyopathy  CAD (s/p PCI to LCx and LAD)  CNS vasculitis  CKD      Past Surgical History:  Past Surgical History:   Procedure Laterality Date     CV CORONARY ANGIOGRAM N/A 11/11/2022    Procedure: Coronary Angiogram;  Surgeon: Justo Bush MD;  Location:  HEART CARDIAC CATH LAB     CV RIGHT HEART CATH " MEASUREMENTS RECORDED N/A 11/11/2022    Procedure: Right Heart Catheterization;  Surgeon: Justo Bush MD;  Location:  HEART CARDIAC CATH LAB     PICC DOUBLE LUMEN PLACEMENT Right 11/10/2022    Right basilic, 43 cm, 1 cm external length         Family History:  Family History   Problem Relation Age of Onset     Atrial fibrillation Father      Lung Cancer Brother          Allergies:  No Known Allergies     Medications:  I have reviewed this patient's medication profile and medications from this hospitalization.   Noted:  Lasix 40 mg po daily  Mycophenolate 1000 mg/500 mg po qAM/PM  Trazodone 50 mg po at bedtime  Dobutamine drip  Heparin drip  APAP 650 mg po /supp q4h prn  Dulcolax IA daily prn  Hydroxyzine 25-50 mg po q6h prn  Melatonin 3 mg po at bedtime prn  Zofran prn  Oxycodone 5-10 mg po q4h prn  Miralax po daily prn  Compazine prn  Senna-docusate 1-2 tabs po BID prn      Physical Exam:  Vital Signs: Temp: 97.7  F (36.5  C) Temp src: Oral BP: (!) 115/94 Pulse: 86   Resp: 20 SpO2: 95 % O2 Device: None (Room air)    Weight: 198 lbs 12.8 oz  General: Not in acute distress.  Head: Atraumatic. Normocephalic.   Eyes: Anicteric without injection. Eyes conjugate. Extraocular movements intact.  Ear, Nose, and Throat: Mouth pink and moist without lesions. Neck without overt masses.  Pulmonary: Unlabored. Speaking in full sentences  Cardiovascular: No overt jugular venous distension. Non-edematous.  Abdomen: Non-distended. Tolerating po intake  Skin: Warm. Dry. No bruises or rashes noted.  Musculoskeletal: Joints of hand normal. Muscle bulk somewhat decreased but tone normal in UE and LE.  Neuro: Speech fluent. Face symmetric. No focal neurologic deficit noted. Alert and oriented x4.  Psych: Normal mood and affect. For the most part seems to have capacity though at times mentally does waver a bit      Data reviewed:  Recent imaging reviewed, my comments on pertinents:   Transesophageal Echocardiogram  [[id number]] Collected: 11/16/22 0850   Interpretation Summary   -Mild to moderate left ventricular dilation is present. Left ventricular   function is decreased. The ejection fraction is 20-25% (severely reduced).   Severe diffuse hypokinesis is present.   -Global right ventricular function is mildly reduced.   -Moderate to severe functional mitral insufficiency is present.   -The left atrial appendage is free of thrombus.   -The atrial septum is intact as assessed by color Doppler and agitated saline bubble study .   -No pericardial effusion present.     MR Brain w/o & w Contrast [[id number]] Resulted: 11/15/22 1415   IMPRESSION:   1. Punctate focus of restricted diffusion in the lateral right   cerebellar hemisphere, which could represent a tiny embolic infarct   versus MR artifact.   2. Multifocal and confluent FLAIR hyperintensity in the   periventricular white matter of both cerebral hemispheres and lacunar   infarcts in the basal ganglia and periventricular white matter, not   significantly changed since 6/14/2021 and mildly increased since 2016.   Findings suggestive of chronic small vessel ischemic disease.   3. No abnormal intracranial enhancement.     MRA Brain ([place]) wo Contrast [[id number]] Resulted: 11/15/22 1415   IMPRESSION:   No aneurysm or significant stenosis of the major cervical or   intracranial arteries. No evidence of vasculitis.     MRA Neck ([place]) wo & w Contrast [[id number]] Resulted: 11/15/22 1415   IMPRESSION:   No aneurysm or significant stenosis of the major cervical or   intracranial arteries. No evidence of vasculitis.     CT Chest Abdomen Pelvis w/o Contrast [[id number]] Resulted: 11/11/22 1607   IMPRESSION:   1.  Left chest wall cardiac device with intact lead tips in the right   atrium, right ventricle, and coronary sinus.   2.  Cardiomegaly with right greater than left pleural effusions and   mild pulmonary edema suggestive of congestive heart failure.         Recent  lab data reviewed, my comments on pertinents:   [facility]OUTINE ICU LABS ([facility] four results)  [facility]   [facility] 11/17/22  0510 11/16/22  0706 11/15/22  0952 11/15/22  0444 11/14/22  0510 11/13/22  0456 11/12/22  0458 11/11/22  0847 11/11/22  0231 11/10/22  1738    139  --  134* 138   < > 140  --  139 138   POTASSIUM 3.4 3.5  --  3.8 3.7   < > 3.8  3.7   < > 3.1* 3.8   CHLORIDE 103 104  --  97* 102   < > 101  --  102 101   CO2 26 24  --  25 25   < > 26  --  26 25   ANIONGAP 9 11  --  12 11   < > 13  --  11 12   * 86  --  95 91   < > 105*  --  127* 105*   BUN 11.0 15.4  --  13.9 21.2   < > 19.9  --  24.1* 28.2*   CR 0.82 0.99  --  1.01 1.09   < > 1.09  --  1.10 1.51*   GFRESTIMATED >90 82  --  81 73   < > 73  --  73 50*   TIM 8.5* 8.4*  --  8.7* 8.5*   < > 8.5*  --  8.5* 8.9   MAG 1.9 2.0  --  2.1 2.0   < > 2.6*  2.7*   < > 1.9 2.1   PHOS  --   --   --   --   --   --  3.2  --   --   --    PROTTOTAL  --   --  5.5*  --   --   --  5.4*  --  4.9* 5.8*   ALBUMIN  --   --  3.3*  --   --   --  3.4*  --  3.1* 3.7   BILITOTAL  --   --  1.7*  --   --   --  1.5*  --  1.5* 1.5*   ALKPHOS  --   --  63  --   --   --  61  --  52 62   AST  --   --  30  --   --   --  24  --  24 30   ALT  --   --  27  --   --   --  27  --  31 36    < > = values in this interval not displayed.     CBC  Recent Labs   Lab 11/17/22  0510 11/16/22  0538 11/15/22  0952 11/15/22  0444   WBC 4.5 5.7 4.9 5.6   RBC 5.00 4.97 5.16 5.36   HGB 14.3 14.3 14.9 15.4   HCT 44.7 43.3 45.0 47.3   MCV 89 87 87 88   MCH 28.6 28.8 28.9 28.7   MCHC 32.0 33.0 33.1 32.6   RDW 14.4 14.4 14.3 14.4   * 113* 98* 106*     INR  Recent Labs   Lab 11/12/22  0458   INR 1.20*     Arterial Blood Gas  Recent Labs   Lab 11/11/22  0231 11/11/22  0043 11/10/22  1928 11/10/22  1738   O2PER 2 2 1 2

## 2022-11-18 ENCOUNTER — APPOINTMENT (OUTPATIENT)
Dept: PHYSICAL THERAPY | Facility: CLINIC | Age: 67
DRG: 286 | End: 2022-11-18
Attending: INTERNAL MEDICINE
Payer: MEDICARE

## 2022-11-18 ENCOUNTER — HOSPITAL (OUTPATIENT)
Dept: NEUROLOGY | Facility: CLINIC | Age: 67
End: 2022-11-18

## 2022-11-18 LAB
ANION GAP SERPL CALCULATED.3IONS-SCNC: 11 MMOL/L (ref 7–15)
BUN SERPL-MCNC: 16.2 MG/DL (ref 8–23)
CALCIUM SERPL-MCNC: 8.7 MG/DL (ref 8.8–10.2)
CHLORIDE SERPL-SCNC: 106 MMOL/L (ref 98–107)
CREAT SERPL-MCNC: 1.12 MG/DL (ref 0.67–1.17)
DEPRECATED HCO3 PLAS-SCNC: 21 MMOL/L (ref 22–29)
ERYTHROCYTE [DISTWIDTH] IN BLOOD BY AUTOMATED COUNT: 14.5 % (ref 10–15)
GFR SERPL CREATININE-BSD FRML MDRD: 71 ML/MIN/1.73M2
GLUCOSE SERPL-MCNC: 108 MG/DL (ref 70–99)
HCT VFR BLD AUTO: 45.4 % (ref 40–53)
HGB BLD-MCNC: 14.5 G/DL (ref 13.3–17.7)
INR PPP: 1.13 (ref 0.85–1.15)
MAGNESIUM SERPL-MCNC: 2 MG/DL (ref 1.7–2.3)
MCH RBC QN AUTO: 28.6 PG (ref 26.5–33)
MCHC RBC AUTO-ENTMCNC: 31.9 G/DL (ref 31.5–36.5)
MCV RBC AUTO: 90 FL (ref 78–100)
NT-PROBNP SERPL-MCNC: 2169 PG/ML (ref 0–900)
PLATELET # BLD AUTO: 117 10E3/UL (ref 150–450)
POTASSIUM SERPL-SCNC: 4.2 MMOL/L (ref 3.4–5.3)
RBC # BLD AUTO: 5.07 10E6/UL (ref 4.4–5.9)
SODIUM SERPL-SCNC: 138 MMOL/L (ref 136–145)
UFH PPP CHRO-ACNC: 0.6 IU/ML
UFH PPP CHRO-ACNC: 0.69 IU/ML
WBC # BLD AUTO: 6.1 10E3/UL (ref 4–11)

## 2022-11-18 PROCEDURE — 97116 GAIT TRAINING THERAPY: CPT | Mod: GP | Performed by: PHYSICAL THERAPIST

## 2022-11-18 PROCEDURE — 96133 NRPSYC TST EVAL PHYS/QHP EA: CPT | Mod: 95 | Performed by: CLINICAL NEUROPSYCHOLOGIST

## 2022-11-18 PROCEDURE — 96132 NRPSYC TST EVAL PHYS/QHP 1ST: CPT | Mod: 95 | Performed by: CLINICAL NEUROPSYCHOLOGIST

## 2022-11-18 PROCEDURE — 85520 HEPARIN ASSAY: CPT

## 2022-11-18 PROCEDURE — 97530 THERAPEUTIC ACTIVITIES: CPT | Mod: GP | Performed by: PHYSICAL THERAPIST

## 2022-11-18 PROCEDURE — 83735 ASSAY OF MAGNESIUM: CPT

## 2022-11-18 PROCEDURE — 99232 SBSQ HOSP IP/OBS MODERATE 35: CPT | Mod: GC | Performed by: INTERNAL MEDICINE

## 2022-11-18 PROCEDURE — 97110 THERAPEUTIC EXERCISES: CPT | Mod: GP | Performed by: PHYSICAL THERAPIST

## 2022-11-18 PROCEDURE — 250N000012 HC RX MED GY IP 250 OP 636 PS 637: Performed by: INTERNAL MEDICINE

## 2022-11-18 PROCEDURE — 250N000011 HC RX IP 250 OP 636

## 2022-11-18 PROCEDURE — 90791 PSYCH DIAGNOSTIC EVALUATION: CPT | Mod: 95 | Performed by: CLINICAL NEUROPSYCHOLOGIST

## 2022-11-18 PROCEDURE — 250N000013 HC RX MED GY IP 250 OP 250 PS 637

## 2022-11-18 PROCEDURE — 999N000007 HC SITE CHECK

## 2022-11-18 PROCEDURE — 80048 BASIC METABOLIC PNL TOTAL CA: CPT

## 2022-11-18 PROCEDURE — 85027 COMPLETE CBC AUTOMATED: CPT

## 2022-11-18 PROCEDURE — 250N000011 HC RX IP 250 OP 636: Performed by: STUDENT IN AN ORGANIZED HEALTH CARE EDUCATION/TRAINING PROGRAM

## 2022-11-18 PROCEDURE — 999N000128 HC STATISTIC PERIPHERAL IV START W/O US GUIDANCE

## 2022-11-18 PROCEDURE — 83880 ASSAY OF NATRIURETIC PEPTIDE: CPT

## 2022-11-18 PROCEDURE — 214N000001 HC R&B CCU UMMC

## 2022-11-18 PROCEDURE — 85610 PROTHROMBIN TIME: CPT | Performed by: INTERNAL MEDICINE

## 2022-11-18 PROCEDURE — 250N000012 HC RX MED GY IP 250 OP 636 PS 637

## 2022-11-18 PROCEDURE — 250N000013 HC RX MED GY IP 250 OP 250 PS 637: Performed by: INTERNAL MEDICINE

## 2022-11-18 RX ORDER — WARFARIN SODIUM 5 MG/1
5 TABLET ORAL
Status: COMPLETED | OUTPATIENT
Start: 2022-11-18 | End: 2022-11-18

## 2022-11-18 RX ADMIN — HEPARIN SODIUM 1300 UNITS/HR: 10000 INJECTION, SOLUTION INTRAVENOUS at 04:50

## 2022-11-18 RX ADMIN — WARFARIN SODIUM 5 MG: 5 TABLET ORAL at 20:33

## 2022-11-18 RX ADMIN — AMIODARONE HYDROCHLORIDE 200 MG: 200 TABLET ORAL at 20:33

## 2022-11-18 RX ADMIN — PRAVASTATIN SODIUM 20 MG: 20 TABLET ORAL at 07:57

## 2022-11-18 RX ADMIN — MYCOPHENOLATE MOFETIL 1000 MG: 500 TABLET, FILM COATED ORAL at 07:57

## 2022-11-18 RX ADMIN — MYCOPHENOLATE MOFETIL 500 MG: 500 TABLET, FILM COATED ORAL at 20:33

## 2022-11-18 RX ADMIN — TRAZODONE HYDROCHLORIDE 50 MG: 50 TABLET ORAL at 23:49

## 2022-11-18 RX ADMIN — MICONAZOLE NITRATE: 20 CREAM TOPICAL at 20:35

## 2022-11-18 RX ADMIN — HEPARIN SODIUM 1300 UNITS/HR: 10000 INJECTION, SOLUTION INTRAVENOUS at 21:56

## 2022-11-18 RX ADMIN — FUROSEMIDE 40 MG: 40 TABLET ORAL at 07:57

## 2022-11-18 RX ADMIN — DOBUTAMINE IN DEXTROSE 2.5 MCG/KG/MIN: 200 INJECTION, SOLUTION INTRAVENOUS at 14:41

## 2022-11-18 RX ADMIN — ASPIRIN 81 MG CHEWABLE TABLET 81 MG: 81 TABLET CHEWABLE at 07:57

## 2022-11-18 RX ADMIN — HYDROXYZINE HYDROCHLORIDE 25 MG: 25 TABLET, FILM COATED ORAL at 01:09

## 2022-11-18 RX ADMIN — MICONAZOLE NITRATE: 20 CREAM TOPICAL at 10:30

## 2022-11-18 RX ADMIN — AMIODARONE HYDROCHLORIDE 200 MG: 200 TABLET ORAL at 07:57

## 2022-11-18 ASSESSMENT — ACTIVITIES OF DAILY LIVING (ADL)
ADLS_ACUITY_SCORE: 30

## 2022-11-18 NOTE — PHARMACY-ANTICOAGULATION SERVICE
Clinical Pharmacy - Warfarin Dosing Consult     Pharmacy has been consulted to manage this patient s warfarin therapy.  Indication: DVT/ PE Treatment  Therapy Goal: INR 2-3  Provider/Team: Dr. Dominique Dorman  Warfarin Prior to Admission: No  Significant drug interactions: aspirin, amiodarone increase risk of bleeds  Recent documented change in oral intake/nutrition: No    INR   Date Value Ref Range Status   11/18/2022 1.13 0.85 - 1.15 Final   11/12/2022 1.20 (H) 0.85 - 1.15 Final       Recommend warfarin 5 mg today.  Pharmacy will monitor Tahoma Roller daily and order warfarin doses to achieve specified goal.      Please contact pharmacy as soon as possible if the warfarin needs to be held for a procedure or if the warfarin goals change.      Tata aT, FionaD

## 2022-11-18 NOTE — PROGRESS NOTES
General neuology was contacted to provide a 5 and 10 year prognosis for CNS vasculitis for this patient.  I have discussed this matter with the stroke neurology attending Fred Lopez who is familiar with his case.  We are unable to provide the requested information as the diagnosis is unknown.  CNS vasculitis was the presumed diagnosis based upon recurrent cryptogenic strokes but cerebral angiogram, serum testing, and lumbar puncture were reportedly normal.  His disease has been stable since about 2018.  Questions of prognosis must be directed to his treating neurologist and rheumatologist.  As previously stated, we would need another angiogram and possibly brain biopsy to be able to provide any further information.

## 2022-11-18 NOTE — PROGRESS NOTES
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 Due to functional decline, plan is to discharge him home with PT/OT (patient would not have wanted to go to facility anyway and cannot with Dobutamine drip) for therapy for a few weeks prior to formal listing. To ensure home services are set up, will likely stay until Monday 11/21/2022.    As goals are clear and Dr. Stein has minimal symptom burden which can be addressed via medications from palliative point of view, we will sign-off. Thank you for this consult.    This is a non-billable note    Deny Schulz DO / Palliative Medicine / Text me via [facility].     Team Consult Pager [phone] (answered 8am-430pm M-F) - ok to text page via [employer] / After-Hours Answering Service [phone] / Palliative Clinic in the [facility] [place] [facility] [phone] (scheduling); [phone] (triage).

## 2022-11-18 NOTE — PROGRESS NOTES
[facility]  Cardiology II Service / Advanced Heart Failure  Daily Progress Note    Patient: Paco Stein      : 1955      MRN: [id number]      Faculty Attestation  Oneal Garcia M.D.    I personally saw and examined this patient, reviewed recent laboratories and imaging studies, discussed the case with the housestaff, and conveyed plan to the patient.  I answered all questions from patient and/or family. I agree with the examination, assessment and plan outlined here.  I held a 30 minute discussion with patient and his wife regarding going home on dobutamine     General:      Assessment/Plan:   67 year old male w/ pertinent PMHx of HFrEF (EF 15-20%) presumably secondary to arrhythmogenic cardiomyopathy, CAD (prior PCI LCx and LAD), CNS vasculitis, and CKD admitted 11/10/2022 for decompensated heart failure. Remains admitted requiring inotropic support and undergoing advanced therapy evaluation. Course complicated by small incidental embolic cerebellar stroke as well as acute DVT.    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 Last echo 22 showing reduction of EF to 15-20% from previous of 20-25% w/ diffuse hypokinesis and mild RV dilation w/ reduced RV function. Last RHC 22 w/ mildly elevated filling pressures bilaterally w/ a CI of 1.9. Started on IV diuresis, now  transitioned to PO maintenance dosing. Remains on low-dose inotropic support while undergoing advanced therapy planning.  Plan:  - Diuretic: 40mg PO Lasix qday (PTA maintenance dosing)  - Inotrope: Dobutamine 2.5mcg/kg/min, completed home PICC education 11/17/22  - Afterload Reduction: no additional agents currently  - BB: Holding PTA Metoprolol Succ 25mg qday  - SGLT2i: Holding PTA Empag 10mg qday  - MRA: None  - Statin: PTA statin  - Devices: biventricular PPM/ICD  - Electrolytes: Daily monitoring with diuresis, on RN protocols for K and Mag  - Strict I/O monitoring  - Daily weights    LVAD/transplant evaluation checklist  [x] labs (CBC, CMP, PT/INR, TSH, iron studies, UA + micro, prealb)  [x] Infectious (Treponema, Hep A/B/C, HIV, HSV 1/2 IgG, CMV IgG, Toxo IgG, EBV IgG, varicella IgG, Quant gold)   [x] Tox (Utox, nicotine and cotinine, PeTH)  [x] Immunocompatibility (ABO done, HLA)  [] CPX - holding off for now given functional status  [x] TTE - 11/11/22   [x] 6MWT - 11/14/22  [x] EKG - 11/10/22  [x] RHC - 11/11/22  [x] CVTS consult  [x] Social work consult  [x] Palliative care consult  [x] Neuropsych consult - 11/18/22  [x] Nutrition consult - 11/14/22, starting calorie counts  [x] CT Dental + exam - 11/11/22, no further needs  [x] Abd US + doppler - done 11/11/22, negative  [x] Extremity US and ABIs - 11/11/22, negative  [x] Carotid US (if DM or ICM or >49yo) - 11/11/22, negative  [x] PFTs - 11/11/22  [] Dexa - outpatient  [x] CT head non-contrast - 11/10/22, needs MRI  [x] CT CAP non-contrast - 11/10/22, no further needs  [x] Colonoscopy (>51 yo) - 11/17/22  [x] PSA - 1.63 on 11/12/22  [x] Neuro consult - 11/10/22, needs MRI/MRA   [x] MRI/MRA brain - 11/14/22    Incidental punctate cerebellar infarct, likely embolic  Read on MR imaging 11/14/22 without new neuro deficits. Per radiology, appears acute, likely embolic. Does have newly-identified DVT's in the right arm and left leg, no known arterial clots  history. Reviewed telemetry over admission, no afib or other tachycarrhythmias. Reassuring prior TTE this admission including bubble study, repeat JARED w/o septal defects or thrombus.   Plan:  - On Heparin gtt, will transition to alternate agent prior to discharge  - Neuro stroke has evaluated, pending final recommendations    Acute DVT - occlusive left peroneal, non-occlusive right axillary  Identified on doppler 11/15/22. No associated pain/symptoms.  Plan:  - Heparin gtt, high-intensity , will transition to alternate agent prior to discharge    Acute to subacute thrombocytopenia  Concern for HIT - screen negative  Present on admission, nml platelets a week prior to admission. Was at OSH, did receive heparin products starting 11/3 w/ onset of thrombocytopenia documented by 11/10. 4T score of 3-4, low to intermediate risk of HIT - sent HIT panel, negative fortunately. Other counts look alright. No s/s bleeding. Platelets coming back up as of 11/15.  Plan:  - Daily CBC    ================================  Chronic Problems:    Multivessel coronary artery disease s/p PCI LCx and LAD  In context of the above presentation, no particularly concerning anginal symptoms. Had updated coronary angiogram this admission 11/11/22 showing patent LAD and LCx stents as well as mild-mod non-obstructive disease in the RCA. Overall reassuring in context of working towards advanced therapy planning.  Plan:   -- ASA: Continuing PTA 81mg qday dosing   -- BB: Holding as per above   -- Statin: Cont PTA Pravastatin   -- Antianginal: N/A    History of VT s/p biventricular PPM/ICD  Interro performed 11/10/22 w/ two short (<5s) runs of NSVT on 11/1/22 and 11/3/22, no recent shocks. A-paced.  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 No episodes since. Mild sensory deficit over the right foot, rest of  neurological exam unremarkable. Neurology consulted as part of vad/txp eval. MRI/MRA brain performed this admission w/o concerning progression of suspected vasculitic disease.  - Continue PTA cellcept 1000mg qam and 500mg qpm  - Was some concern initially about MRI/MRA performed 11/14/22 missing a sequence/protocol pertinent for vasculitis per radiology, spoke w/ neurology on 11/15, imaging is appropriate and no further mri needed    #CKD2 - Baseline Cr in 1-1.2 range    #Insomnia - Trazodone 50mg QHS    Hospital Checklist:  DVT Prophylaxis: Heparin gtt  Code Status: Full Code  Diet/Nutrition: ADAT  Lines: Right-sided PICC    Disposition Planning:  Anticipated discharge on Monday 11/21/22 with inotropes. To do cardiac rehab in [place] for two weeks before coordination to return for RHC and admit for transplant listing.    Staffed w/ Dr. Garcia.    Elliot Dhaliwal MD  Internal Medicine PGY-2  Cardiology II Service  ASCOM #[id number]  11/18/2022  ==================================    Subjective:   Nursing notes reviewed. No significant interval events. This morning in good spirits, awaiting transplant conference to review next steps for listing. No new symptoms of concern.    Objective:     Vitals:    11/16/22 0337 11/17/22 0600 11/18/22 0314   Weight: 90.1 kg (198 lb 9.6 oz) 90.2 kg (198 lb 12.8 oz) 91.4 kg (201 lb 9.6 oz)     Vital Signs with Ranges  Temp:  [98  F (36.7  C)-98.8  F (37.1  C)] 98.1  F (36.7  C)  Pulse:  [87-93] 87  Resp:  [14-18] 18  BP: ()/(62-95) 95/62  Cuff Mean (mmHg):  [79] 79  SpO2:  [88 %-98 %] 95 %  I/O last 3 completed shifts:  In: 501.45 [I.V.:501.45]  Out: 900 [Urine:900]    Exam:  General: No acute distress, pleasantly conversational  CV: RRR, no new murmurs, no significant JVD  Lungs: No increased WOB  Ext: Warm and well-perfused, no appreciable lower extremity edema  Neuro: Awake, alert, conversational, moving all extremities spontaneously throughout examination    Medications      DOBUTamine 2.5 mcg/kg/min (11/18/22 0920)     heparin 1,300 Units/hr (11/18/22 1048)     - MEDICATION INSTRUCTIONS -         amiodarone  200 mg Oral BID     aspirin  81 mg Oral Daily     [Held by provider] empagliflozin  10 mg Oral Daily     furosemide  40 mg Oral Daily     miconazole   Topical BID     mycophenolate  1,000 mg Oral QAM     mycophenolate  500 mg Oral QPM     pravastatin  20 mg Oral Daily     sodium chloride (PF)  10-40 mL Intracatheter Q8H     sodium chloride (PF)  3 mL Intracatheter Q8H     traZODone  50 mg Oral At Bedtime       Data   Recent Labs   Lab 11/18/22  0338 11/17/22  1209 11/17/22  0510 11/16/22  0706 11/16/22  0538 11/15/22  0952 11/13/22  0456 11/12/22  0458   WBC 6.1  --  4.5  --  5.7 4.9   < > 6.6   HGB 14.5  --  14.3  --  14.3 14.9   < > 15.1   MCV 90  --  89  --  87 87   < > 88   *  --  119*  --  113* 98*   < > 91*   INR  --   --   --   --   --   --   --  1.20*     --  138 139  --   --    < > 140   POTASSIUM 4.2 3.7 3.4 3.5  --   --    < > 3.8  3.7   CHLORIDE 106  --  103 104  --   --    < > 101   CO2 21*  --  26 24  --   --    < > 26   BUN 16.2  --  11.0 15.4  --   --    < > 19.9   CR 1.12  --  0.82 0.99  --   --    < > 1.09   ANIONGAP 11  --  9 11  --   --    < > 13   TIM 8.7*  --  8.5* 8.4*  --   --    < > 8.5*   *  --  140* 86  --   --    < > 105*   ALBUMIN  --   --   --   --   --  3.3*  --  3.4*   PROTTOTAL  --   --   --   --   --  5.5*  --  5.4*   BILITOTAL  --   --   --   --   --  1.7*  --  1.5*   ALKPHOS  --   --   --   --   --  63  --  61   ALT  --   --   --   --   --  27  --  27   AST  --   --   --   --   --  30  --  24    < > = values in this interval not displayed.       No results found for this or any previous visit (from the past 24 hour(s)).

## 2022-11-18 NOTE — PLAN OF CARE
"/88 (BP Location: Left arm)   Pulse 93   Temp 98.8  F (37.1  C) (Oral)   Resp 18   Ht 1.88 m (6' 2\")   Wt 91.4 kg (201 lb 9.6 oz)   SpO2 95%   BMI 25.88 kg/m    Pt A&O x 4, AVSS with normal saturations on room air. Pt denies SOB reports pain around PICC site 4/10. Accepted warm pack with relief of pain. Pt denies SOB, chest pain, Pt is on tele with a sinus rhythm w/ BBB. New PIV placed on left lower arm. Pt is a one person assist with a walker and gait belt.   Pt is on a continuous dobutamine drip running at 7.1ml/ hr and Heparin drip running at 1300 units/ hr. Xa values are drawn and are within range. This is first therapeautic levels obtained.   Continue current plan of care and notify provider for any changes.   Goal Outcome Evaluation:      Plan of Care Reviewed With: patient    Overall Patient Progress: no changeOverall Patient Progress: no change           "

## 2022-11-18 NOTE — PROGRESS NOTES
D-Admitted on 11/10/22 for decompensated heart failure, undergoing advanced therapy evaluation. PMH includes CNS vasculitis and CKD. Coarse complicated by embolic cerebellar stroke and DVT.  I-Heparin infusing at 1300u/hr. Dobutamine infusing at 2.5mcg/kg/min. Bedside picc teaching this afternoon with pt and spouse by [facility] staff.  Potassium replaced per protocol.  A-Telemetry shows paced rhythm.  P-Set up IPAD in pt's room at 0830 tomorrow for neuropsychology evaluation. Next heparin Xa at 0315. Continue with current poc. Notify Cards 2 provider of any concerns/changes.

## 2022-11-18 NOTE — PROGRESS NOTES
Calorie Count  Intake recorded for: 11/17  Total Kcals: 433 Total Protein: 14g  Kcals from Hospital Food: 433   Protein: 14g  Kcals from Outside Food (average):0 Protein: 0g  # Meals Ordered from Kitchen: 1 meal  # Meals Recorded: 1 meal (100% sausage nerissa, 2 pudding)  # Supplements Recorded: 0    Per RN, Pt also consumed a sandwich and milkshake from [employer] and pan fried noodles w/ steak from a Japanese restaurant. Could not calculate calories/protein.

## 2022-11-18 NOTE — PROGRESS NOTES
Pt declines vs and assessments at this time. Pt and spouse just received a phone call from their daughter with devastating news about their unborn grandchild. Requesting a  and communion. Consult placed.

## 2022-11-18 NOTE — PLAN OF CARE
Finished transplant/VAD evaluation this morning after neuropsych ipad meeting. Up in chair; worked well with therapy and tolerated having a shower. Denies pain but MELLO.  Continues on dobutamine @ 2.5mcg/kg/min & heparin @ 1300u/hr. Next 10a recheck in a.m. Awaiting plan from Cards 2 regarding any transition to oral anticoagulation.  Continues with oral lasix with good urine output. Cr up to 1.12/K 4.2.  Plan for likely discharge to home in [place] on Monday 11/21. Care coordinator setting up home care and home IV infusion.

## 2022-11-18 NOTE — PROGRESS NOTES
The patient was seen for a video neuropsychological evaluation at the request of Dr. Marcial Reyes, for the purposes of diagnostic clarification and treatment planning. 105 minutes of test administration and scoring were provided by this writer, Stephen Vieira. Please see Dr. Roverto Lantigua's report for a full interpretation of the findings.

## 2022-11-18 NOTE — PROGRESS NOTES
Name  Paco Stein    MRN  [id number]      [birth year]     Age  67     Sex  Male     Education  20     MELLO  22     Provider  ALLEGRA     Tech  KB     Station  6C     Visit Type  IP     Platform  Dimitry            ORIENTATION      Time  4     Personal Information     Place      Presidents             WRAT-4 READING      Raw  67     SS  117     %ile  87     Grade Equivalence >12.9            WAIS-IV         Raw SS RDS   Matrix Reasoning 16 11    Similarities  26 11           BOSTON NAMING TEST      Raw 59      %ile 98      MAS 16      Total Stim. Correct 0     Total Phon. Correct 1            COWAT       Form CFL      Raw 22      MAS 6      %ile 6-10             ALMA-O COMPLEX FIGURE       Raw %ile T   Copy  26.5 <1    Time to Copy 416 6-10 %    Immediate  7.5 5 34                 CLOCK DRAWING      Command  Normal            TSAT         Raw z    Total Time  116 -0.59    Total Errors  1 0.52           HVLT         Raw T    Trial 1  5     Trial 2  6     Trial 3  8     Learning  3     Total Recall  19 41    Delayed Recall 7 44    Percent Retention 88 50    True Positives 12     False Positives 0     Discrimination Index 12 59           ILS HEALTH & SAFETY QUESTIONNAIRE    Total  36     Classification High            PHQ-9       Total  19     Interpretation Moderate            SUSANNA-7       Total  12     Interpretation Moderate

## 2022-11-18 NOTE — PROGRESS NOTES
Pre-Transplant Social Work Services Progress Note      Date of Initial Social Work Evaluation: 11/14/2022  Collaborated with: Cards II and patient's wife-Arlette    Data: Vince has been undergoing Transplant/VAD workup all week. Probably going home over the weekend/early next week with inotropes. The medical team is considering listing for transplant, but patient would still go home. Due to patient's significant physical decline, the medical team talked about options for patient to get rehab prior to heart transplant. He and family live in [place]. Patient/family aware of rehab options in their hometown. With intotropes, this rules out any kind of inpatient rehab option. Therefore, he would need either home PT/OT or outpatient cardiac rehab of some sort.  Intervention: Met with wife while patient slept. Offered support and inquired into questions/concerns pertaining to workup this week. Talked about rehab options and differences along with barriers when on inotropes.  Assessment: Wife very familiar with rehab options. Capable of helping patient at home. Understands staffing shortage with home care and verbalized understanding toward any kind of inpatient rehab option with IV Inotropes. States patient wouldn't have wanted inpatient ARU/TCU anyway. Aware of how to reach this writer if she has any further questions about the caregiver requirements or lodging options pertaining to VAD or transplant. They are also aware of VAD/Tx support group and have th information.  Education provided by NEAL: Availability of VAD/TX SWer  Plan:    Discharge Plans in Progress: Home    Barriers to d/c plan: Medical condition    Follow up Plan: SW will continue to remain available for ongoing psychosocial support in preparation for VAD or transplant.

## 2022-11-18 NOTE — PROGRESS NOTES
Addendum  11/21/22  Paco  is discharging today and going home on continuous dobutamine therapy.   He requires a hook up of home medication and pump prior to discharge..  Met with Paco and his wife Arlette at bedside once supplies arrived.  Provided assistance with hook up of home dobutamine after reviewing pump settings and verifying with orders.  Arlette did most of the hands on and did very well.  Reminded her to never flush dobutamine lumen.   Reminded them about plan for SNV, supplies and ongoing supply deliveries, storage of medication, back up pumps,  [facility] 24/7 support and how to contact as needed while on home IV therapy.    Arlette verbalized understanding of information given.   Pt will be seen at home tomorrow by [facility]  for first bag change.  They feel comfortable discharging and managing the IV therapy at home once teaching is completed.    Paco's home dobutamine infusion is running and he is ready for discharge from [facility] perspective.    Home Infusion  Paco is expected to discharge on Mon and will be going home on continuous IV dobutamine.   He has never done home IV therapy before however he and his wife Bibi have had some initial teaching by a [facility] nurse.   Benefits have been checked and pt will have 100% coverage between his medicare and secondary BCBS supplement.    Met with Arlette at bedside (Paco was sleeping) to relay benefits and offer choice of agency.  Arlette stated they would like to keep services within /Done[facility]ealth so will use [facility].  Provided her with information about inotrope therapy with [facility] services.  Explained about administration method, the pump, medication bag and korina pack used for mobility.    I informed her that I will assist with hook up of his home medication here at the hospital on day of discharge (M-F or another [facility] nurse if discharge on the w/e) and they will have a nurse visit at home the following day to continue teaching the daily bag changes.  Informed her about  supplies and delivery of supplies, storage of medication, continuous infusion requirements,back up pump, plan for SNV and 24/7 availability of [facility] staff while on IV therapy.   Paco will receive home nursing through [facility] .  Arlette verbalized understanding of all information given.   She is willing and able to learn and manage home IV therapy.  Questions answered.  Will continue to follow and update pt as needed.    Humaira Garcia RN CRNI  Nurse Liaison  [email or web address]  [address] [place] [address]  [email or web address]  [phone] M-F I [facility] main: [phone]

## 2022-11-18 NOTE — PROGRESS NOTES
CLINICAL NUTRITION SERVICES     Checking kcal counts    Diet: Regular. Has an order for chocolate Ensure Enlive at 14:00.  Kcal counts:  11/15   Two meals ordered, no data recorded - NPO in the morning  11/16   No meals ordered, no meals recorded - NPO or clear liquids  11/17   Total Kcals: 433       Total Protein: 14g (one meal recorded). Per RN, Pt also consumed a sandwich and milkshake from [email or web address] and pan fried noodles w/ steak from a Japanese restaurant. Could not calculate calories/protein.  - Clear liquids for half the day  * Difficult to assess kcal counts due to lack of data and often NPO/clear liquids for tests/procedures.     INTERVENTIONS:  Implementation:  Medical food supplement therapy: Attempted to check in regarding oral intake and kcal counts but pt/wife were preoccupied on a phone call    Follow up/Monitoring:  Will continue to follow pt.    Moriah Garcia, MS, RD, LD, [facility]   6C Pgr: [phone]

## 2022-11-18 NOTE — PROGRESS NOTES
SPIRITUAL HEALTH SERVICES Progress Note  [facility] 4E    Spoke with pt's nurse Samia about consult for communion from a / emotional support regarding the situation with their daughter/granddaughter.     Made referral to , Father Cecilia.     Plan: Father Cecilia will come tonight to administer communion, per their request.    Sophia Simpson, [facility]  Chaplain Resident  Pager [phone]      * [facility] remains available 24/7 for emergent requests/referrals, either by having the switchboard page the on-call  or by entering an ASAP/STAT consult in Epic (this will also page the on-call ). Routine Epic consults receive an initial response within 24 hours.*

## 2022-11-19 ENCOUNTER — APPOINTMENT (OUTPATIENT)
Dept: OCCUPATIONAL THERAPY | Facility: CLINIC | Age: 67
DRG: 286 | End: 2022-11-19
Attending: INTERNAL MEDICINE
Payer: MEDICARE

## 2022-11-19 LAB
ANION GAP SERPL CALCULATED.3IONS-SCNC: 11 MMOL/L (ref 7–15)
BUN SERPL-MCNC: 16.7 MG/DL (ref 8–23)
CALCIUM SERPL-MCNC: 8.8 MG/DL (ref 8.8–10.2)
CHLORIDE SERPL-SCNC: 105 MMOL/L (ref 98–107)
CREAT SERPL-MCNC: 1.12 MG/DL (ref 0.67–1.17)
DEPRECATED HCO3 PLAS-SCNC: 23 MMOL/L (ref 22–29)
ERYTHROCYTE [DISTWIDTH] IN BLOOD BY AUTOMATED COUNT: 14.3 % (ref 10–15)
GFR SERPL CREATININE-BSD FRML MDRD: 71 ML/MIN/1.73M2
GLUCOSE SERPL-MCNC: 89 MG/DL (ref 70–99)
HCT VFR BLD AUTO: 43.8 % (ref 40–53)
HGB BLD-MCNC: 14.5 G/DL (ref 13.3–17.7)
INR PPP: 1.1 (ref 0.85–1.15)
MAGNESIUM SERPL-MCNC: 1.9 MG/DL (ref 1.7–2.3)
MCH RBC QN AUTO: 29.2 PG (ref 26.5–33)
MCHC RBC AUTO-ENTMCNC: 33.1 G/DL (ref 31.5–36.5)
MCV RBC AUTO: 88 FL (ref 78–100)
PLATELET # BLD AUTO: 129 10E3/UL (ref 150–450)
POTASSIUM SERPL-SCNC: 3.8 MMOL/L (ref 3.4–5.3)
RBC # BLD AUTO: 4.96 10E6/UL (ref 4.4–5.9)
SODIUM SERPL-SCNC: 139 MMOL/L (ref 136–145)
UFH PPP CHRO-ACNC: 0.62 IU/ML
WBC # BLD AUTO: 5.2 10E3/UL (ref 4–11)

## 2022-11-19 PROCEDURE — 214N000001 HC R&B CCU UMMC

## 2022-11-19 PROCEDURE — 250N000011 HC RX IP 250 OP 636: Performed by: STUDENT IN AN ORGANIZED HEALTH CARE EDUCATION/TRAINING PROGRAM

## 2022-11-19 PROCEDURE — 250N000011 HC RX IP 250 OP 636

## 2022-11-19 PROCEDURE — 250N000012 HC RX MED GY IP 250 OP 636 PS 637

## 2022-11-19 PROCEDURE — 250N000013 HC RX MED GY IP 250 OP 250 PS 637

## 2022-11-19 PROCEDURE — 85520 HEPARIN ASSAY: CPT | Performed by: INTERNAL MEDICINE

## 2022-11-19 PROCEDURE — 83735 ASSAY OF MAGNESIUM: CPT

## 2022-11-19 PROCEDURE — 80048 BASIC METABOLIC PNL TOTAL CA: CPT

## 2022-11-19 PROCEDURE — 97110 THERAPEUTIC EXERCISES: CPT | Mod: GO | Performed by: OCCUPATIONAL THERAPIST

## 2022-11-19 PROCEDURE — 250N000011 HC RX IP 250 OP 636: Performed by: INTERNAL MEDICINE

## 2022-11-19 PROCEDURE — 250N000012 HC RX MED GY IP 250 OP 636 PS 637: Performed by: INTERNAL MEDICINE

## 2022-11-19 PROCEDURE — 250N000013 HC RX MED GY IP 250 OP 250 PS 637: Performed by: INTERNAL MEDICINE

## 2022-11-19 PROCEDURE — 85610 PROTHROMBIN TIME: CPT

## 2022-11-19 PROCEDURE — 85027 COMPLETE CBC AUTOMATED: CPT

## 2022-11-19 PROCEDURE — 99233 SBSQ HOSP IP/OBS HIGH 50: CPT | Mod: GC | Performed by: INTERNAL MEDICINE

## 2022-11-19 RX ORDER — WARFARIN SODIUM 5 MG/1
5 TABLET ORAL
Status: DISCONTINUED | OUTPATIENT
Start: 2022-11-19 | End: 2022-11-19

## 2022-11-19 RX ORDER — MAGNESIUM SULFATE HEPTAHYDRATE 40 MG/ML
2 INJECTION, SOLUTION INTRAVENOUS ONCE
Status: COMPLETED | OUTPATIENT
Start: 2022-11-19 | End: 2022-11-19

## 2022-11-19 RX ORDER — POTASSIUM CHLORIDE 750 MG/1
20 TABLET, EXTENDED RELEASE ORAL ONCE
Status: COMPLETED | OUTPATIENT
Start: 2022-11-19 | End: 2022-11-19

## 2022-11-19 RX ADMIN — FUROSEMIDE 40 MG: 40 TABLET ORAL at 07:54

## 2022-11-19 RX ADMIN — POTASSIUM CHLORIDE 20 MEQ: 750 TABLET, EXTENDED RELEASE ORAL at 09:33

## 2022-11-19 RX ADMIN — DOBUTAMINE IN DEXTROSE 2.5 MCG/KG/MIN: 200 INJECTION, SOLUTION INTRAVENOUS at 19:02

## 2022-11-19 RX ADMIN — AMIODARONE HYDROCHLORIDE 200 MG: 200 TABLET ORAL at 07:54

## 2022-11-19 RX ADMIN — MAGNESIUM SULFATE HEPTAHYDRATE 2 G: 40 INJECTION, SOLUTION INTRAVENOUS at 10:04

## 2022-11-19 RX ADMIN — ASPIRIN 81 MG CHEWABLE TABLET 81 MG: 81 TABLET CHEWABLE at 07:54

## 2022-11-19 RX ADMIN — PRAVASTATIN SODIUM 20 MG: 20 TABLET ORAL at 07:54

## 2022-11-19 RX ADMIN — AMIODARONE HYDROCHLORIDE 200 MG: 200 TABLET ORAL at 20:08

## 2022-11-19 RX ADMIN — HEPARIN SODIUM 1300 UNITS/HR: 10000 INJECTION, SOLUTION INTRAVENOUS at 15:34

## 2022-11-19 RX ADMIN — MYCOPHENOLATE MOFETIL 1000 MG: 500 TABLET, FILM COATED ORAL at 07:54

## 2022-11-19 RX ADMIN — MYCOPHENOLATE MOFETIL 500 MG: 500 TABLET, FILM COATED ORAL at 20:08

## 2022-11-19 RX ADMIN — TRAZODONE HYDROCHLORIDE 50 MG: 50 TABLET ORAL at 23:52

## 2022-11-19 ASSESSMENT — ACTIVITIES OF DAILY LIVING (ADL)
ADLS_ACUITY_SCORE: 30

## 2022-11-19 NOTE — PLAN OF CARE
No significant change in status today. Continues on dobutamine and heparin gtts. Next 10a check in a.m. Was initially started on coumadin but plan now to use xarelto or eliquis instead.   Up in chair often; walked halls with wife. Denies pain. Good appetite.  Replaced K/Mg via protocols.   Planning for discharge back to [place] Monday after [facility] connects pt to home dobutamine CADD pump that day.

## 2022-11-19 NOTE — PROGRESS NOTES
Pt declining coumadin until he discusses with cardiologist. Team is aware and will come to discuss with pt.

## 2022-11-19 NOTE — PROGRESS NOTES
D-Admitted on 11/10/22 with decompensated HF and evaluation for advanced therapies work up. Spouse at bedside, supportive. INR 1.13.  I-Heparin infusing at 1300u/hr. Dobutamine infusing at 2.5mcg/kg/min. Coumadin initiated after pt was able to get his questions answered by provider.  A-Telemetry shows paced rhythm.  P-Continue with current poc. Notify provider of any concerns/changes. Anticipate discharge to home with outpatient cardiac rehab on Monday.

## 2022-11-19 NOTE — PLAN OF CARE
D: PMH s/f HFrEF 20-25% secondary to arrhythmogenic cardiomyopathy, CAD s/p PCI on LCx and LAD, CNS vasculitis and CKD who presents for shortness of breath - CHF exacerbation. On the heart transplant list - cat 3. MRI showed embolic infarct in brain on 11/14 stroke during his current stay here as well as a current DVTs in R arm and L leg      A: Neuro: A/O x4.  Call light appropriate.  Able to make needs known.  Respiratory:  On room air sating in 90s. Denies shortness of breath at rest.  Cardiac: VSS. 100% V-paced.  HR 80-90s  GI: No report of nausea or vomiting.  : Voiding smaller amounts of clear lisset urine.  Skin: small groin rash - scheduled miconazole cream  LDA:  LPIV - Heparin gtt @ 1300 units/hr & RDL PICC - dobutamine @ 2.5mcg/kg/min in red port. Purple is SL  Pain: Denies  Diet: Regular - no more calorie count  Mobility: x1     P: Dx Monday on Dobutamine & go to outpatient rehab Monday as well. Continue to monitor Pt status and report changes to Cards 2.      Goal Outcome Evaluation:      Plan of Care Reviewed With: patient    Overall Patient Progress: no change    Outcome Evaluation: Dobutamine & heparin gtt infusing. Pt slept most of shift.

## 2022-11-19 NOTE — PLAN OF CARE
Anticoagulation plan reviewed in terms of timeline for hopeful transplant listing in a couple of weeks.    Will transition from Heparin gtt to a DOAC prior to discharge. Pharmacy liaison order placed for insurance approval for which agent. No need for bridging at that time. No longer planning towards Warfarin bridge. Discontinued pharmacy consult for Warfarin adjustments.      Elliot Dhaliwal MD  Internal Medicine PGY-2  Cards 2 - [facility] [id number]

## 2022-11-19 NOTE — PROGRESS NOTES
SPIRITUAL HEALTH SERVICES Progress Note  [facility] 6C    Per Father Fehn:    Visited pt [place] and wife per consult for emotional support. Provided spiritual and emotional support. Prayed with pt and wife and administered Holy Communion to both.    Patient also requested  visits for communion for the duration of his stay.    Plan: Will defer to unit  and Father Jovany for follow up.        Sophia Simpson, [facility]  Chaplain Resident  Pager [phone]      * [facility] remains available 24/7 for emergent requests/referrals, either by having the switchboard page the on-call  or by entering an ASAP/STAT consult in Epic (this will also page the on-call ). Routine Epic consults receive an initial response within 24 hours.*

## 2022-11-19 NOTE — PROGRESS NOTES
[facility]  Cardiology II Service / Advanced Heart Failure  Daily Progress Note    Patient: Paco Stein      : 1955      MRN: [id number]      Faculty Attestation  Oneal Garcia M.D.    I personally saw and examined this patient, reviewed recent laboratories and imaging studies, discussed the case with the housestaff, and conveyed plan to the patient.  I answered all questions from patient and/or family. I agree with the examination, assessment and plan outlined here.          Assessment/Plan:   67 year old male w/ pertinent PMHx of HFrEF (EF 15-20%) presumably secondary to arrhythmogenic cardiomyopathy, CAD (prior PCI LCx and LAD), CNS vasculitis, and CKD admitted 11/10/2022 for decompensated heart failure. Remains admitted requiring inotropic support and undergoing advanced therapy evaluation. Course complicated by small incidental embolic cerebellar stroke as well as acute DVT.    Today's changes:  - Bridging to Warfarin this weekend  - Dispo plan: to return home for cardiac rehab ([facility]) on low-dose Dobutamine, will stay likely until 22 in order to have this set up and bridge to Warfarin    Acute on chronic systolic heart failure w/ ambulatory cardiogenic shock I/s/o advanced non-ischemic cardiomyopathy (suspected arrhythmogenic), Class IV, Stage D  Clinical picture on admission: sent in from heart failure clinic due to 2 weeks of progressive exercise intolerance, MELLO, orthopnea, PND w/ evidence of volume overload. Last echo 22 showing reduction of EF to 15-20% from previous of 20-25% w/ diffuse hypokinesis and mild RV dilation w/ reduced RV function. Last RHC 22 w/ mildly elevated filling pressures bilaterally w/ a CI of 1.9. Started on IV diuresis, now transitioned to PO maintenance dosing. Remains on low-dose inotropic support while undergoing advanced therapy planning.  Plan:  - Diuretic: 40mg PO Lasix qday (PTA  maintenance dosing)  - Inotrope: Dobutamine 2.5mcg/kg/min, completed home PICC education 11/17/22  - Afterload Reduction: no additional agents currently  - BB: Holding PTA Metoprolol Succ 25mg qday  - SGLT2i: Holding PTA Empag 10mg qday  - MRA: None  - Statin: PTA statin  - Devices: biventricular PPM/ICD  - Electrolytes: Daily monitoring with diuresis, on RN protocols for K and Mag  - Strict I/O monitoring  - Daily weights    LVAD/transplant evaluation checklist  Presented at transplant conference 11/18. Disposition plan to return home to [place] for two weeks of cardiac rehab and functional optimization. Will coordinate likely repeat RHC to follow and likely admission for formal listing subsequently.    [x] labs (CBC, CMP, PT/INR, TSH, iron studies, UA + micro, prealb)  [x] Infectious (Treponema, Hep A/B/C, HIV, HSV 1/2 IgG, CMV IgG, Toxo IgG, EBV IgG, varicella IgG, Quant gold)   [x] Tox (Utox, nicotine and cotinine, PeTH)  [x] Immunocompatibility (ABO done, HLA)  [] CPX - holding off for now given functional status  [x] TTE - 11/11/22   [x] 6MWT - 11/14/22  [x] EKG - 11/10/22  [x] RHC - 11/11/22  [x] CVTS consult  [x] Social work consult  [x] Palliative care consult  [x] Neuropsych consult - 11/18/22  [x] Nutrition consult - 11/14/22, starting calorie counts  [x] CT Dental + exam - 11/11/22, no further needs  [x] Abd US + doppler - done 11/11/22, negative  [x] Extremity US and ABIs - 11/11/22, negative  [x] Carotid US (if DM or ICM or >51yo) - 11/11/22, negative  [x] PFTs - 11/11/22  [] Dexa - outpatient  [x] CT head non-contrast - 11/10/22, needs MRI  [x] CT CAP non-contrast - 11/10/22, no further needs  [x] Colonoscopy (>51 yo) - 11/17/22  [x] PSA - 1.63 on 11/12/22  [x] Neuro consult - 11/10/22, needs MRI/MRA   [x] MRI/MRA brain - 11/14/22    Incidental punctate cerebellar infarct, likely embolic  Read on MR imaging 11/14/22 without new neuro deficits. Per radiology, appears acute, likely embolic. Does have  newly-identified DVT's in the right arm and left leg, no known arterial clots history. Reviewed telemetry over admission, no afib or other tachycarrhythmias. Reassuring prior TTE this admission including bubble study, repeat JARED w/o septal defects or thrombus.   Plan:  - On Heparin gtt, bridging to Warfarin  - Neuro stroke has evaluated, pending final recommendations    Acute DVT - occlusive left peroneal, non-occlusive right axillary  Identified on doppler 11/15/22. No associated pain/symptoms.  Plan:  - Heparin gtt, high-intensity, bridging to Warfarin    Acute to subacute thrombocytopenia  Concern for HIT - screen negative  Present on admission, nml platelets a week prior to admission. Was at OSH, did receive heparin products starting 11/3 w/ onset of thrombocytopenia documented by 11/10. 4T score of 3-4, low to intermediate risk of HIT - sent HIT panel, negative fortunately. Other counts look alright. No s/s bleeding. Platelets coming back up as of 11/15.  Plan:  - Daily CBC    ================================  Chronic Problems:    Multivessel coronary artery disease s/p PCI LCx and LAD  In context of the above presentation, no particularly concerning anginal symptoms. Had updated coronary angiogram this admission 11/11/22 showing patent LAD and LCx stents as well as mild-mod non-obstructive disease in the RCA. Overall reassuring in context of working towards advanced therapy planning.  Plan:   -- ASA: Continuing PTA 81mg qday dosing   -- BB: Holding as per above   -- Statin: Cont PTA Pravastatin   -- Antianginal: N/A    History of VT s/p biventricular PPM/ICD  Interro performed 11/10/22 w/ two short (<5s) runs of NSVT on 11/1/22 and 11/3/22, no recent shocks. A-paced.  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 No episodes since. Mild sensory deficit over the right foot,  rest of neurological exam unremarkable. Neurology consulted as part of vad/txp eval. MRI/MRA brain performed this admission w/o concerning progression of suspected vasculitic disease.  - Continue PTA cellcept 1000mg qam and 500mg qpm  - Was some concern initially about MRI/MRA performed 11/14/22 missing a sequence/protocol pertinent for vasculitis per radiology, spoke w/ neurology on 11/15, imaging is appropriate and no further mri needed    #CKD2 - Baseline Cr in 1-1.2 range    #Insomnia - Trazodone 50mg QHS    Hospital Checklist:  DVT Prophylaxis: Heparin gtt  Code Status: Full Code  Diet/Nutrition: ADAT  Lines: Right-sided PICC    Disposition Planning:  Anticipated discharge on Monday 11/21/22 with inotropes. To do cardiac rehab in [place] for two weeks before coordination to return for RHC and admit for transplant listing.    Staffed w/ Dr. Garcia.    Elliot Dhaliwal MD  Internal Medicine PGY-2  Cardiology II Service  ASCOM #[id number]  11/19/2022  ==================================    Subjective:   Nursing notes reviewed. No significant interval events. Slept well last night. No new symptoms of concern. Seen later ambulating about the unit with spouse.    Objective:     Vitals:    11/17/22 0600 11/18/22 0314 11/19/22 0500   Weight: 90.2 kg (198 lb 12.8 oz) 91.4 kg (201 lb 9.6 oz) 91.4 kg (201 lb 9.6 oz)     Vital Signs with Ranges  Temp:  [97.5  F (36.4  C)-98.6  F (37  C)] 97.5  F (36.4  C)  Pulse:  [88-92] 91  Resp:  [16-18] 18  BP: ()/(70-86) 110/85  SpO2:  [91 %-97 %] 96 %  I/O last 3 completed shifts:  In: 1101.26 [P.O.:780; I.V.:321.26]  Out: 1775 [Urine:1775]    Exam:  General: No acute distress, pleasantly conversational  Lungs: No increased WOB  Ext: Warm and well-perfused, no appreciable lower extremity edema  Neuro: Awake, alert, conversational, ambulatory w/ assistance    Medications     DOBUTamine 2.5 mcg/kg/min (11/19/22 0900)     heparin 1,300 Units/hr (11/19/22 0900)     - MEDICATION  INSTRUCTIONS -         alteplase  2 mg Intravenous Q2H     amiodarone  200 mg Oral BID     aspirin  81 mg Oral Daily     furosemide  40 mg Oral Daily     miconazole   Topical BID     mycophenolate  1,000 mg Oral QAM     mycophenolate  500 mg Oral QPM     pravastatin  20 mg Oral Daily     sodium chloride (PF)  10-40 mL Intracatheter Q8H     sodium chloride (PF)  3 mL Intracatheter Q8H     traZODone  50 mg Oral At Bedtime     warfarin ANTICOAGULANT  5 mg Oral ONCE at 18:00     Warfarin Therapy Reminder  1 each Oral See Admin Instructions       Data   Recent Labs   Lab 11/19/22  0529 11/18/22  1014 11/18/22  0338 11/17/22  1209 11/17/22  0510 11/16/22  0538 11/15/22  0952   WBC 5.2  --  6.1  --  4.5   < > 4.9   HGB 14.5  --  14.5  --  14.3   < > 14.9   MCV 88  --  90  --  89   < > 87   *  --  117*  --  119*   < > 98*   INR 1.10 1.13  --   --   --   --   --      --  138  --  138   < >  --    POTASSIUM 3.8  --  4.2 3.7 3.4   < >  --    CHLORIDE 105  --  106  --  103   < >  --    CO2 23  --  21*  --  26   < >  --    BUN 16.7  --  16.2  --  11.0   < >  --    CR 1.12  --  1.12  --  0.82   < >  --    ANIONGAP 11  --  11  --  9   < >  --    TIM 8.8  --  8.7*  --  8.5*   < >  --    GLC 89  --  108*  --  140*   < >  --    ALBUMIN  --   --   --   --   --   --  3.3*   PROTTOTAL  --   --   --   --   --   --  5.5*   BILITOTAL  --   --   --   --   --   --  1.7*   ALKPHOS  --   --   --   --   --   --  63   ALT  --   --   --   --   --   --  27   AST  --   --   --   --   --   --  30    < > = values in this interval not displayed.       No results found for this or any previous visit (from the past 24 hour(s)).

## 2022-11-19 NOTE — PROGRESS NOTES
Calorie Count  Intake recorded for: 11/18  Total Kcals: 1167 Total Protein: 49g  Kcals from Hospital Food: 1167   Protein: 49g  Kcals from Outside Food (average): 0  Protein: 0g  # Meals Ordered from Kitchen: 2 meals  # Meals Recorded: 1 meal (1 - 100% hamburger w/ white bun & vegetables, hard boiled egg, vanilla pudding, chocolate ice cream, toasted bagel w/ jelly, 8oz whole milk)  # Supplements Recorded: No intake recorded

## 2022-11-20 ENCOUNTER — APPOINTMENT (OUTPATIENT)
Dept: PHYSICAL THERAPY | Facility: CLINIC | Age: 67
DRG: 286 | End: 2022-11-20
Attending: INTERNAL MEDICINE
Payer: MEDICARE

## 2022-11-20 ENCOUNTER — APPOINTMENT (OUTPATIENT)
Dept: OCCUPATIONAL THERAPY | Facility: CLINIC | Age: 67
DRG: 286 | End: 2022-11-20
Attending: INTERNAL MEDICINE
Payer: MEDICARE

## 2022-11-20 LAB
ANION GAP SERPL CALCULATED.3IONS-SCNC: 9 MMOL/L (ref 7–15)
BUN SERPL-MCNC: 15.8 MG/DL (ref 8–23)
CALCIUM SERPL-MCNC: 8.7 MG/DL (ref 8.8–10.2)
CHLORIDE SERPL-SCNC: 104 MMOL/L (ref 98–107)
CREAT SERPL-MCNC: 1.07 MG/DL (ref 0.67–1.17)
DEPRECATED HCO3 PLAS-SCNC: 23 MMOL/L (ref 22–29)
ERYTHROCYTE [DISTWIDTH] IN BLOOD BY AUTOMATED COUNT: 14.2 % (ref 10–15)
GFR SERPL CREATININE-BSD FRML MDRD: 75 ML/MIN/1.73M2
GLUCOSE SERPL-MCNC: 99 MG/DL (ref 70–99)
HCT VFR BLD AUTO: 41.7 % (ref 40–53)
HGB BLD-MCNC: 13.6 G/DL (ref 13.3–17.7)
INR PPP: 1.12 (ref 0.85–1.15)
MAGNESIUM SERPL-MCNC: 2.1 MG/DL (ref 1.7–2.3)
MCH RBC QN AUTO: 28.8 PG (ref 26.5–33)
MCHC RBC AUTO-ENTMCNC: 32.6 G/DL (ref 31.5–36.5)
MCV RBC AUTO: 88 FL (ref 78–100)
NT-PROBNP SERPL-MCNC: 1947 PG/ML (ref 0–900)
PLATELET # BLD AUTO: 126 10E3/UL (ref 150–450)
POTASSIUM SERPL-SCNC: 3.8 MMOL/L (ref 3.4–5.3)
RBC # BLD AUTO: 4.72 10E6/UL (ref 4.4–5.9)
SODIUM SERPL-SCNC: 136 MMOL/L (ref 136–145)
UFH PPP CHRO-ACNC: 0.53 IU/ML
WBC # BLD AUTO: 4.6 10E3/UL (ref 4–11)

## 2022-11-20 PROCEDURE — 85610 PROTHROMBIN TIME: CPT

## 2022-11-20 PROCEDURE — 85027 COMPLETE CBC AUTOMATED: CPT

## 2022-11-20 PROCEDURE — 97130 THER IVNTJ EA ADDL 15 MIN: CPT | Mod: GO | Performed by: OCCUPATIONAL THERAPIST

## 2022-11-20 PROCEDURE — 250N000012 HC RX MED GY IP 250 OP 636 PS 637: Performed by: INTERNAL MEDICINE

## 2022-11-20 PROCEDURE — 250N000013 HC RX MED GY IP 250 OP 250 PS 637

## 2022-11-20 PROCEDURE — 85520 HEPARIN ASSAY: CPT | Performed by: INTERNAL MEDICINE

## 2022-11-20 PROCEDURE — 97530 THERAPEUTIC ACTIVITIES: CPT | Mod: GP

## 2022-11-20 PROCEDURE — 250N000012 HC RX MED GY IP 250 OP 636 PS 637

## 2022-11-20 PROCEDURE — 83735 ASSAY OF MAGNESIUM: CPT

## 2022-11-20 PROCEDURE — 214N000001 HC R&B CCU UMMC

## 2022-11-20 PROCEDURE — 97116 GAIT TRAINING THERAPY: CPT | Mod: GP

## 2022-11-20 PROCEDURE — 83880 ASSAY OF NATRIURETIC PEPTIDE: CPT

## 2022-11-20 PROCEDURE — 250N000013 HC RX MED GY IP 250 OP 250 PS 637: Performed by: INTERNAL MEDICINE

## 2022-11-20 PROCEDURE — 80048 BASIC METABOLIC PNL TOTAL CA: CPT

## 2022-11-20 PROCEDURE — 97129 THER IVNTJ 1ST 15 MIN: CPT | Mod: GO | Performed by: OCCUPATIONAL THERAPIST

## 2022-11-20 PROCEDURE — 99233 SBSQ HOSP IP/OBS HIGH 50: CPT | Mod: GC | Performed by: INTERNAL MEDICINE

## 2022-11-20 PROCEDURE — 97110 THERAPEUTIC EXERCISES: CPT | Mod: GP

## 2022-11-20 RX ORDER — DOBUTAMINE HYDROCHLORIDE 200 MG/100ML
2.5 INJECTION INTRAVENOUS CONTINUOUS
Qty: 500 ML | Refills: 6 | Status: ON HOLD | OUTPATIENT
Start: 2022-11-20 | End: 2023-01-13

## 2022-11-20 RX ORDER — POTASSIUM CHLORIDE 1500 MG/1
20 TABLET, EXTENDED RELEASE ORAL DAILY
Qty: 90 TABLET | Refills: 3 | Status: ON HOLD | OUTPATIENT
Start: 2022-11-21 | End: 2023-01-13

## 2022-11-20 RX ORDER — POTASSIUM CHLORIDE 750 MG/1
20 TABLET, EXTENDED RELEASE ORAL DAILY
Status: DISCONTINUED | OUTPATIENT
Start: 2022-11-21 | End: 2022-11-21 | Stop reason: HOSPADM

## 2022-11-20 RX ORDER — POTASSIUM CHLORIDE 750 MG/1
20 TABLET, EXTENDED RELEASE ORAL ONCE
Status: COMPLETED | OUTPATIENT
Start: 2022-11-20 | End: 2022-11-20

## 2022-11-20 RX ADMIN — AMIODARONE HYDROCHLORIDE 200 MG: 200 TABLET ORAL at 19:36

## 2022-11-20 RX ADMIN — AMIODARONE HYDROCHLORIDE 200 MG: 200 TABLET ORAL at 08:01

## 2022-11-20 RX ADMIN — APIXABAN 10 MG: 5 TABLET, FILM COATED ORAL at 10:35

## 2022-11-20 RX ADMIN — ASPIRIN 81 MG CHEWABLE TABLET 81 MG: 81 TABLET CHEWABLE at 08:00

## 2022-11-20 RX ADMIN — FUROSEMIDE 40 MG: 40 TABLET ORAL at 08:00

## 2022-11-20 RX ADMIN — APIXABAN 10 MG: 5 TABLET, FILM COATED ORAL at 19:35

## 2022-11-20 RX ADMIN — POTASSIUM CHLORIDE 20 MEQ: 1500 TABLET, EXTENDED RELEASE ORAL at 08:00

## 2022-11-20 RX ADMIN — PRAVASTATIN SODIUM 20 MG: 20 TABLET ORAL at 08:01

## 2022-11-20 RX ADMIN — MYCOPHENOLATE MOFETIL 500 MG: 500 TABLET, FILM COATED ORAL at 19:36

## 2022-11-20 RX ADMIN — MYCOPHENOLATE MOFETIL 1000 MG: 500 TABLET, FILM COATED ORAL at 08:00

## 2022-11-20 RX ADMIN — TRAZODONE HYDROCHLORIDE 50 MG: 50 TABLET ORAL at 23:15

## 2022-11-20 ASSESSMENT — ACTIVITIES OF DAILY LIVING (ADL)
ADLS_ACUITY_SCORE: 30
ADLS_ACUITY_SCORE: 32
ADLS_ACUITY_SCORE: 30
ADLS_ACUITY_SCORE: 32
ADLS_ACUITY_SCORE: 30
ADLS_ACUITY_SCORE: 30
ADLS_ACUITY_SCORE: 32
ADLS_ACUITY_SCORE: 30

## 2022-11-20 NOTE — PLAN OF CARE
Pt started on eliquis today and heparin gtt discontinued. Continues on dobutamine @ 2.5mcg.  Increase in activity-tolerated shower and has been walking in halls with family; often up in chair. Denies pain. Groin rash significantly improved.  Good appetite. Continues on po lasix. Swelling in feet improved since last night after keeping them elevated more often. K 3.8-replaced per protocol. Cr 1.07. BM today.  Planning for discharge to home in [place] tomorrow.

## 2022-11-20 NOTE — PROGRESS NOTES
[facility]  Cardiology II Service / Advanced Heart Failure  Daily Progress Note    Patient: Paco Stein      : 1955      MRN: [id number]      Faculty Attestation  Oneal Garcia M.D.    I personally saw and examined this patient with fellow, reviewed recent laboratories and imaging studies, discussed the case with the housestaff, and conveyed plan to the patient.  I answered all questions from patient and/or family. I agree with the examination, assessment and plan outlined here.        Assessment/Plan:   67 year old male w/ pertinent PMHx of HFrEF (EF 15-20%) presumably secondary to arrhythmogenic cardiomyopathy, CAD (prior PCI LCx and LAD), CNS vasculitis, and CKD admitted 11/10/2022 for decompensated heart failure. Remains admitted requiring inotropic support and undergoing advanced therapy evaluation. Course complicated by small incidental embolic cerebellar stroke as well as acute DVT.    Today's changes:  - Stopped Heparin gtt, starting Eliquis  - Dobutamine prescription sent to [facility] home infusion pharmacy  - Dispo plan: to return home for cardiac rehab ([facility]) on low-dose Dobutamine, will stay likely until 22 when family able to coordinate transportation together back home, family flying in today    Acute on chronic systolic heart failure w/ ambulatory cardiogenic shock I/s/o advanced non-ischemic cardiomyopathy (suspected arrhythmogenic), Class IV, Stage D  Clinical picture on admission: sent in from heart failure clinic due to 2 weeks of progressive exercise intolerance, MELLO, orthopnea, PND w/ evidence of volume overload. Last echo 22 showing reduction of EF to 15-20% from previous of 20-25% w/ diffuse hypokinesis and mild RV dilation w/ reduced RV function. Last RHC 22 w/ mildly elevated filling pressures bilaterally w/ a CI of 1.9. Started on IV diuresis, now transitioned to PO maintenance dosing. Remains on  low-dose inotropic support. Advance therapy evaluation completed. See listing plan as per below.  Plan:  - Diuretic: 40mg PO Lasix qday (PTA maintenance dosing)  - Inotrope: Dobutamine 2.5mcg/kg/min, completed home PICC education 11/17/22, script sent to infusion pharmacy today 11/20/22  - Afterload Reduction: no additional agents currently  - BB: Holding PTA Metoprolol Succ 25mg qday  - SGLT2i: Holding PTA Empag 10mg qday  - MRA: None  - Statin: PTA statin  - Devices: biventricular PPM/ICD  - Electrolytes: Daily monitoring with diuresis, on RN protocols for K and Mag  - Strict I/O monitoring  - Daily weights    LVAD/transplant evaluation checklist  Presented at transplant conference 11/18. Disposition plan to return home to [place] for two weeks of cardiac rehab and functional optimization. Will coordinate likely repeat RHC to follow and likely admission for formal listing subsequently.    [x] labs (CBC, CMP, PT/INR, TSH, iron studies, UA + micro, prealb)  [x] Infectious (Treponema, Hep A/B/C, HIV, HSV 1/2 IgG, CMV IgG, Toxo IgG, EBV IgG, varicella IgG, Quant gold)   [x] Tox (Utox, nicotine and cotinine, PeTH)  [x] Immunocompatibility (ABO done, HLA)  [] CPX - holding off for now given functional status  [x] TTE - 11/11/22   [x] 6MWT - 11/14/22  [x] EKG - 11/10/22  [x] RHC - 11/11/22  [x] CVTS consult  [x] Social work consult  [x] Palliative care consult  [x] Neuropsych consult - 11/18/22  [x] Nutrition consult - 11/14/22, starting calorie counts  [x] CT Dental + exam - 11/11/22, no further needs  [x] Abd US + doppler - done 11/11/22, negative  [x] Extremity US and ABIs - 11/11/22, negative  [x] Carotid US (if DM or ICM or >49yo) - 11/11/22, negative  [x] PFTs - 11/11/22  [] Dexa - outpatient  [x] CT head non-contrast - 11/10/22, needs MRI  [x] CT CAP non-contrast - 11/10/22, no further needs  [x] Colonoscopy (>49 yo) - 11/17/22  [x] PSA - 1.63 on 11/12/22  [x] Neuro consult - 11/10/22, needs MRI/MRA   [x] MRI/MRA  brain - 11/14/22    Incidental punctate cerebellar infarct, likely embolic  Read on MR imaging 11/14/22 without new neuro deficits. Per radiology, appears acute, likely embolic. Does have newly-identified DVT's in the right arm and left leg, no known arterial clots history. Reviewed telemetry over admission, no afib or other tachycarrhythmias. Reassuring prior TTE this admission including bubble study, repeat JARED w/o septal defects or thrombus.   Plan:  - On Heparin gtt, will transition to Apixaban (best insurance coverage, free voucher for first month and then $100/month following but hopefully will be admitted on active transplant listing by that time), no bridging needed    Acute DVT - occlusive left peroneal, non-occlusive right axillary  Identified on doppler 11/15/22. No associated pain/symptoms.  Plan:  - Heparin gtt, high-intensity, transitioning to DOAC (as above)    Acute to subacute thrombocytopenia  Concern for HIT - screen negative  Present on admission, nml platelets a week prior to admission. Was at OSH, did receive heparin products starting 11/3 w/ onset of thrombocytopenia documented by 11/10. 4T score of 3-4, low to intermediate risk of HIT - sent HIT panel, negative fortunately. Other counts look alright. No s/s bleeding. Platelets coming back up as of 11/15.    ================================  Chronic Problems:    Multivessel coronary artery disease s/p PCI LCx and LAD  In context of the above presentation, no particularly concerning anginal symptoms. Had updated coronary angiogram this admission 11/11/22 showing patent LAD and LCx stents as well as mild-mod non-obstructive disease in the RCA. Overall reassuring in context of working towards advanced therapy planning.  Plan:   -- ASA: Continuing PTA 81mg qday dosing   -- BB: Holding as per above   -- Statin: Cont PTA Pravastatin   -- Antianginal: N/A    History of VT s/p biventricular PPM/ICD  Interro performed 11/10/22 w/ two short (<5s) runs  of NSVT on 11/1/22 and 11/3/22, no recent shocks. A-paced.  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 No episodes since. Mild sensory deficit over the right foot, rest of neurological exam unremarkable. Neurology consulted as part of vad/txp eval. MRI/MRA brain performed this admission w/o concerning progression of suspected vasculitic disease.  - Continue PTA cellcept 1000mg qam and 500mg qpm  - Was some concern initially about MRI/MRA performed 11/14/22 missing a sequence/protocol pertinent for vasculitis per radiology, spoke w/ neurology on 11/15, imaging is appropriate and no further mri needed. Did discuss this is a presumptive diagnosis without a tissue biopsy, which has been deferred per discussion w/ pt and neuro.    #CKD2 - Baseline Cr in 0.8-1.2 range    #Insomnia - Trazodone 50mg QHS    Hospital Checklist:  DVT Prophylaxis: Heparin gtt  Code Status: Full Code  Diet/Nutrition: ADAT  Lines: Right-sided PICC    Disposition Planning:  Anticipated discharge on Monday 11/21/22 with inotropes. To do cardiac rehab in [place] for two weeks before coordination to return for RHC and admit for transplant listing. PICC teaching completed 11/17. Dobutamine home infusion script sent 11/20.    Staffed w/ Dr. Garcia.    Elliot Dhaliwal MD  Internal Medicine PGY-2  Cardiology II Service  ASCOM #[id number]  11/20/2022  ==================================    Subjective:   Nursing notes reviewed. No significant interval events. Received some unfortunate news about a family member yesterday, still processing that. Does note some swelling to his feet that started last night, mild, no associated pain. Thinks it is because he spent much more time in the chair and walking around yesterday than he had previously while preparing for discharge tomorrow. No new dyspnea.    Objective:     Vitals:     11/18/22 0314 11/19/22 0500 11/20/22 0400   Weight: 91.4 kg (201 lb 9.6 oz) 91.4 kg (201 lb 9.6 oz) 92 kg (202 lb 12.8 oz)     Vital Signs with Ranges  Temp:  [97.6  F (36.4  C)-98.2  F (36.8  C)] 97.7  F (36.5  C)  Pulse:  [] 85  Resp:  [18-20] 18  BP: (108-132)/() 108/78  SpO2:  [92 %-99 %] 97 %  I/O last 3 completed shifts:  In: 2182.4 [P.O.:1650; I.V.:532.4]  Out: 925 [Urine:925]    Exam:  General: No acute distress, pleasantly conversational  Lungs: No increased WOB  Ext: Warm and well-perfused, Trace pitting lower extremity edema most notable to bilateral feet (moreso than prior examinations)  Neuro: Awake, alert, conversational, ambulatory w/ assistance    Medications     DOBUTamine 2.5 mcg/kg/min (11/20/22 0836)     - MEDICATION INSTRUCTIONS -         amiodarone  200 mg Oral BID     apixaban ANTICOAGULANT  10 mg Oral BID    Followed by     [START ON 11/27/2022] apixaban ANTICOAGULANT  5 mg Oral BID     aspirin  81 mg Oral Daily     furosemide  40 mg Oral Daily     miconazole   Topical BID     mycophenolate  1,000 mg Oral QAM     mycophenolate  500 mg Oral QPM     [START ON 11/21/2022] potassium chloride  20 mEq Oral Daily     pravastatin  20 mg Oral Daily     sodium chloride (PF)  10-40 mL Intracatheter Q8H     sodium chloride (PF)  3 mL Intracatheter Q8H     traZODone  50 mg Oral At Bedtime       Data   Recent Labs   Lab 11/20/22  0448 11/19/22  0529 11/18/22  1014 11/18/22  0338 11/16/22  0538 11/15/22  0952   WBC 4.6 5.2  --  6.1   < > 4.9   HGB 13.6 14.5  --  14.5   < > 14.9   MCV 88 88  --  90   < > 87   * 129*  --  117*   < > 98*   INR 1.12 1.10 1.13  --   --   --     139  --  138   < >  --    POTASSIUM 3.8 3.8  --  4.2   < >  --    CHLORIDE 104 105  --  106   < >  --    CO2 23 23  --  21*   < >  --    BUN 15.8 16.7  --  16.2   < >  --    CR 1.07 1.12  --  1.12   < >  --    ANIONGAP 9 11  --  11   < >  --    TIM 8.7* 8.8  --  8.7*   < >  --    GLC 99 89  --  108*   < >  --     ALBUMIN  --   --   --   --   --  3.3*   PROTTOTAL  --   --   --   --   --  5.5*   BILITOTAL  --   --   --   --   --  1.7*   ALKPHOS  --   --   --   --   --  63   ALT  --   --   --   --   --  27   AST  --   --   --   --   --  30    < > = values in this interval not displayed.       No results found for this or any previous visit (from the past 24 hour(s)).

## 2022-11-20 NOTE — PLAN OF CARE
D: PMH s/f HFrEF 20-25% secondary to arrhythmogenic cardiomyopathy, CAD s/p PCI on LCx and LAD, CNS vasculitis and CKD who presents for shortness of breath - CHF exacerbation. On the heart transplant list - cat 3. 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  Call light appropriate.  Able to make needs known.  Respiratory:  On room air sating in 90s. Denies shortness of breath at rest.  Cardiac: VSS. 100% V-paced.  HR 80-90s  GI: No report of nausea or vomiting.  : Voiding clear lisset/yellow urine.  Skin: small groin rash - refused cream  LDA:  LPIV - Heparin gtt @ 1300 units/hr & RDL PICC - dobutamine @ 2.5mcg/kg/min in red port. Purple is SL  Pain: Denies  Diet: Regula  Mobility: x1    Changes: Had 16-18 beat run of SVT - vitals okay, sleeping/not symptomatic. Reporting more edematous feet and ankles last night.     P: Dx Monday on Dobutamine & go to outpatient rehab Monday as well. Continue to monitor Pt status and report changes to Cards 2.       Goal Outcome Evaluation:      Plan of Care Reviewed With: patient    Overall Patient Progress: no change

## 2022-11-20 NOTE — PROGRESS NOTES
Care Management Follow Up    Length of Stay (days): 10    Expected Discharge Date: 11/21/2022     Concerns to be Addressed:  Discharge planning    Anticipated Discharge Disposition: Home     Anticipated Discharge Services:  Home infusion  Anticipated Discharge DME:      Additional Information:  Asked by Dahiana Beverly, RNCC, to follow-up with  Home Infusion on Sunday, 11-20.   Spoke with Dr Dhaliwal and confirmed plan is discharge tomorrow; he already entered the Dobutamine script. Called AL Beaver at [facility] and notified him plan is discharge tomorrow. [facility] will process the IV Dobutamine order.       Marielos Hall RNCC  Weekend RNCC coverage   Units: 6C & 6D   Pager: [phone]  _____________________________________    Social Work and Care Management Department       SE[facility]ABLE in [facility] - search CARE COORDINATOR       [place] ([phone]) Saturday & Sunday; ([phone])  Recognized Holidays     Units: 4A, 4C, 4E, 5A & 5B   Pager: [phone]    Units: 6A & 6B    Pager: [phone]    Units: 6C & 6D   Pager: [phone]    Units: 7A, 7B, 7C, 7D & 5C    Pager: [phone]    Units: [facility] ED, 5 Ortho, 5 Med/Surg, 6 Med/Surg, 8A, 10 ICU, & [facility]    Pager: [phone]

## 2022-11-20 NOTE — DISCHARGE SUMMARY
I personally formulated discharge plan, conveyed to patient and wife, and call his local cardiologist  Patient will continue to evaluate his response to dobutamine as well as options for transplant or LVAD.       [facility]  Cardiology II Service / Advanced Heart Failure  Discharge Summary       Date of Admission:  11/10/2022   Date of Discharge:  2022  Discharging Provider: Oneal Garcia MD  Discharge Service: Cardiology 2 Inpatient Service    Discharge Diagnoses:   Primary:  Acute on chronic systolic heart failure w/ ambulatory cardiogenic shock  Advanced non-ischemic cardiomyopathy (suspected arrhythmogenic), Class IV, Stage D    Secondary:  Incidental punctate cerebellar infarct, likely embolic  Acute DVT - occlusive left peroneal, non-occlusive right axillary  Acute to subacute thrombocytopenia  Multivessel coronary artery disease s/p PCI LCx and LAD, present on admission  History of VT s/p biventricular PPM/ICD, present on admission  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 Medication changes:   a. Stopped PTA Metoprolol Succinate and Empagliflozin  b. Going home on 2.5mcg/kg/min Dobutamine IV via PICC  c. Started Apixaban pack (10mg BID x7 d then 5mg BID), one month script provided per free voucher  d. Started 20meq oral potassium daily  2. Follow-up appointments:   a. Staff message sent to advanced heart failure pool for post-discharge care coordination. Follows w/ both [facility] cardiology as well as has a cardiologist in [place] through [facility]. Will need further coordination regarding timeline of eventual transplant listing in coming weeks.  3. Pending diagnostics: N/A    Follow-up Appointments     Follow Up and recommended labs and tests      As noted elsewhere, follow-up to be coordinated through cardiology            Unresulted Labs  Ordered in the Past 30 Days of this Admission     No orders found from 10/11/2022 to 11/11/2022.        Hospital Course   Paco Stein was admitted for decompensated heart failure and ambulatory cardiogenic shock.  Please see H&P from 11/10/2022 for full details of presentation. The following problems were addressed during hospitalization:    Wt Readings from Last 24 Encounters:   11/21/22 90.9 kg (200 lb 6.4 oz)   11/10/22 108 kg (238 lb 1.6 oz)   11/08/22 95.7 kg (210 lb 14.4 oz)       Acute on chronic systolic heart failure w/ ambulatory cardiogenic shock I/s/o advanced non-ischemic cardiomyopathy (suspected arrhythmogenic), Class IV, Stage D  Clinical picture on admission: sent in from heart failure clinic due to 2 weeks of progressive exercise intolerance, MELLO, orthopnea, PND w/ evidence of volume overload. Last echo 11/11/22 showing reduction of EF to 15-20% from previous of 20-25% w/ diffuse hypokinesis and mild RV dilation w/ reduced RV function. Last RHC 11/11/22 w/ mildly elevated filling pressures bilaterally w/ a CI of 1.9. Started on IV diuresis, now transitioned to PO maintenance dosing. Remains on low-dose inotropic support. Advance therapy evaluation completed. See listing plan as per below.  Discharge plan:  - Diuretic: 40mg PO Lasix qday (PTA maintenance dosing)  - Inotrope: Dobutamine 2.5mcg/kg/min, completed home PICC education 11/17/22, script sent to infusion pharmacy 11/20/22  - Afterload Reduction: None  - BB: Holding PTA Metoprolol Succ 25mg qday  - SGLT2i: Holding PTA Empag 10mg qday  - MRA: None  - Statin: PTA statin  - Electrolytes: Started on 20meq PO potassium supplementation  - Transplant evaluation: See committee note dated 11/18/22. Completed checklist while inpatient. Disposition plan to return home to [place] for two weeks of cardiac rehab and functional/nutritional optimization. Will coordinate likely admission to follow for formal listing subsequently.    Incidental punctate  cerebellar infarct, likely embolic  Read on MR imaging 11/14/22 without new neuro deficits. Per radiology, appears acute, likely embolic. Does have newly-identified DVT's in the right arm and left leg, no known arterial clots history. Reviewed telemetry over admission, no afib or other tachycarrhythmias. Reassuring prior TTE this admission including bubble study, repeat JARED w/o septal defects or thrombus. Treated w/ heparin gtt and transitioned to Eliquis.     Acute DVT - occlusive left peroneal, non-occlusive right axillary  Identified on doppler 11/15/22. No associated pain/symptoms. As above, treated w/ Heparin gtt and transitioned to DOAC as above.     Acute to subacute thrombocytopenia  Concern for HIT - screen negative  Present on admission, nml platelets a week prior to admission. Was at OSH, did receive heparin products starting 11/3 w/ onset of thrombocytopenia documented by 11/10. 4T score of 3-4, low to intermediate risk of HIT - sent HIT panel, negative fortunately. Other counts look alright. No s/s bleeding. Platelets trending back up as of 11/15.    ================================  Chronic Problems:     Multivessel coronary artery disease s/p PCI LCx and LAD  In context of the above presentation, no particularly concerning anginal symptoms. Had updated coronary angiogram this admission 11/11/22 showing patent LAD and LCx stents as well as mild-mod non-obstructive disease in the RCA. Overall reassuring in context of working towards advanced therapy planning.  Plan:   -- ASA: Continuing PTA 81mg qday dosing   -- BB: Holding as per above   -- Statin: Cont PTA Pravastatin   -- Antianginal: N/A     History of VT s/p biventricular PPM/ICD  Interro performed 11/10/22 w/ two short (<5s) runs of NSVT on 11/1/22 and 11/3/22, no recent shocks. A-paced.  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 No episodes since. Mild sensory deficit over the right foot, rest of neurological exam unremarkable. Neurology consulted as part of vad/txp eval. MRI/MRA brain performed this admission w/o concerning progression of suspected vasculitic disease.  - Continue PTA cellcept 1000mg qam and 500mg qpm  - Was some concern initially about MRI/MRA performed 11/14/22 missing a sequence/protocol pertinent for vasculitis per radiology, spoke w/ neurology on 11/15, imaging is appropriate and no further mri needed. Did discuss this is a presumptive diagnosis without a tissue biopsy, which has been deferred per discussion w/ pt and neuro.     #CKD2 - Baseline Cr in 0.8-1.2 range throughout admission including on discharge     #Insomnia - Trazodone 50mg QHS    Discharge Disposition   Discharged to home  Condition at discharge: Stable    Code Status on Discharge   Full Code      The patient was discussed on day of discharge w/ Dr. Garcia.    Elliot Dhaliwal MD  Internal Medicine PGY-2  Cardiology II Service  ASCOM #[id number]  ==================================    Discharge Physical Exam   Vital Signs: /76 (BP Location: Left arm)   Pulse 92   Temp 98  F (36.7  C) (Oral)   Resp 18   Ht 1.88 m (6' 2\")   Wt 90.9 kg (200 lb 6.4 oz)   SpO2 98%   BMI 25.73 kg/m    Weight: 200 lbs 6.4 oz  General: No acute distress, pleasantly conversational  Lungs: No increased WOB  Ext: Warm and well-perfused, Trace pitting lower extremity edema most notable to bilateral feet (moreso than prior examinations)  Neuro: Awake, alert, conversational, ambulatory w/ assistance    Significant Results and Procedures     Results for orders placed or performed during the hospital encounter of 11/10/22   XR Chest Port 1 View    Narrative    EXAM: XR CHEST PORT 1 VIEW  11/10/2022 7:23 PM      HISTORY: Acutely decompensated heart failure, also confirm PICC  placement    COMPARISON: Chest x-ray 11/8/2022    FINDINGS: Portable semiupright " AP radiograph of the chest. Left chest  wall ICD with leads projecting over the right atrium, right ventricle,  and coronary sinus. Right upper extremity PICC tip projects over the  mid SVC. The trachea is midline. The cardiac silhouette is enlarged.  Small right pleural effusion. Skin fold vertically over the lateral  right thorax suspected. No pneumothorax. Right greater than left  basilar interstitial opacities, slightly increased compared to prior.  Atherosclerotic calcification of the aortic arch. The visualized upper  abdomen is unremarkable.       Impression    IMPRESSION:   1. Right greater than left bibasilar interstitial opacities, increased  compared to 11/8/2022.  2. Cardiomegaly.  3. Right upper extremity PICC tip projects over the mid SVC. No  definite pneumothorax however there appears to be a skinfold laterally  in the right thorax. Attention on follow-up.  4. Small right pleural effusion.    I have personally reviewed the examination and initial interpretation  and I agree with the findings.    COLT SWANSON MD         SYSTEM ID:  [id number]   US GASTON Doppler No Exercise    Narrative    RESTING GASTON 11/14/2022    CLINICAL HISTORY: Heart Failure pre Ventricular Assist Device (VAD)  planning .    COMPARISONS: None available.    REFERRING PROVIDER: Evelin Watts MD    TECHNIQUE: Bilateral GASTON obtained.    FINDINGS:    RIGHT:       Brachial: N/A mmHg       Ankle (PT): 127 mmHg       Ankle (DP): 110 mmHg              GASTON: 1.20    LEFT:       Brachial: 106 mmHg       Ankle (PT): 125 mmHg       Ankle (DP): 102 mmHg              GASTON: 1.18           Impression    IMPRESSION:    1. RIGHT:       A. Resting GASTON 1.20 (Normal)    2. LEFT:       A. Resting GASTON 1.18 (Normal)    Guidelines:    GASTON Diagnostic Criteria (Based on criteria published in Circulation  2011; 124: 8077-8694):    > 1.4: Non compressible    1.00 - 1.40: Normal    0.91 - 0.99: Borderline    At or below 0.90: Abnormal    GASTON Diagnostic  Criteria (Based on ACC/AHA guideline 2008):    >/=1.3 - non compressible vessels    1.00  -1.29 - Normal    0.91 - 0.99 - Borderline    0.41 - 0.90 - Mild to moderate PAD    0.00 - 0.40 - Severe PAD    I have personally reviewed the examination and initial interpretation  and I agree with the findings.    FRED ESPINO MD         SYSTEM ID:  [id number]   US Lower Extremity Arterial Duplex Bilateral    Narrative    ULTRASOUND LOWER EXTREMITY ARTERIAL DUPLEX BILATERAL 11/11/2022 12:49  PM    CLINICAL HISTORY: Heart Failure pre Ventricular Assist Device (VAD)  planning.     COMPARISONS: None available.    REFERRING PROVIDER: MARA SANCHEZ    TECHNIQUE: Bilateral leg arteries evaluated with color Doppler and  spectral pulsed wave Doppler ultrasound.    FINDINGS:   RIGHT:  Common femoral artery: 62/0 cm/s, triphasic  Profundus femoral artery: 44/0 cm/s, triphasic    Superficial femoral artery, origin: 64/0 cm/s, triphasic  Superficial femoral artery, mid thigh: 60/0 cm/s, triphasic  Superficial femoral artery, distal thigh: 71/0 cm/s, triphasic    Popliteal artery: 39/0 cm/s, triphasic    Posterior tibial artery, ankle: 102/30 cm/s, triphasic  Anterior tibial artery, ankle: 54/0 cm/s, triphasic    LEFT:  Common femoral artery: 51/0 cm/s, triphasic  Profundus femoral artery: 37/0 cm/s, triphasic    Superficial femoral artery, origin: 83/0 cm/s, triphasic  Superficial femoral artery, mid thigh: 61/0 cm/s, triphasic  Superficial femoral artery, distal thigh: 76/0 cm/s, triphasic    Popliteal artery: 43/0 cm/s, triphasic    Posterior tibial artery, ankle: 72/30 cm/s, triphasic  Anterior tibial artery, ankle: 58/0 cm/s, triphasic      Impression    IMPRESSION: Negative study.    JOANN RAMÍREZ MD         SYSTEM ID:  [id number]   CT Head w/o Contrast    Narrative    EXAM: CT HEAD W/O CONTRAST, CT DENTAL WO CONTRAST  11/11/2022 1:30 PM     HISTORY:  VAD evaluation       COMPARISON:  None    TECHNIQUE: Using multidetector thin  collimation helical acquisition  technique, axial, coronal and sagittal CT images from the skull base  to the vertex were obtained without intravenous contrast.   (topogram) image(s) also obtained and reviewed.    FINDINGS:  No acute intracranial hemorrhage, mass effect, or midline shift. No  acute loss of gray-white matter differentiation in the cerebral  hemispheres. Ventricles are proportionate to the cerebral sulci. Clear  basal cisterns. There is a moderate to large amount of periventricular  white matter hypoattenuation throughout the cerebral white matter.  Bilaterally near the anterior horns of the lateral ventricles there  are rounded hypodensities, which may represent prior lacunar infarcts  as well as chronic small vessel ischemic disease.    As pertains to the dentition, there is cortical lucency surrounding  the lateral root of the left maxillary second molar this tooth also  demonstrates root canal changes.. The visualized dentition is  partially obscured due to artifact from the dental amalgam. Evaluation  of the paranasal sinuses demonstrates bilateral peripherally  predominant mucosal thickening of the maxillary sinuses along the  inferior aspects without evidence for bony erosion or abscess  communicating with the dentition. The remainder of the paranasal  sinuses are unremarkable.    The bony calvaria and the bones of the skull base are normal. The  mastoid air cells are unremarkable. Grossly normal orbits.      Impression    IMPRESSION:  1. No acute intracranial pathology.   2. Chronic white matter changes, suggestive of sequela of chronic  small vessel ischemic disease throughout the cerebral hemispheres.  3. Periapical lucency surrounding the left maxillary second molar.  Mild mucosal thickening within bilateral maxillary sinuses, without  evidence for odontogenic etiology. No definite evidence for soft  tissue abscess.    I have personally reviewed the examination and initial  interpretation  and I agree with the findings.    AMENA MITCHELL MD         SYSTEM ID:  [id number]   CT Chest Abdomen Pelvis w/o Contrast    Narrative    EXAM: CT CHEST ABDOMEN PELVIS W/O CONTRAST, 11/11/2022 1:30 PM    HISTORY: VAD evaluation    COMPARISON: Outside hospital CTA chest 10/4/2022, 10/27/2022 (no  images available, only interpretation). Outside hospital CT  abdomen/pelvis 1/3/2017 (no images available, only interpretation).   Chest radiographs 11/11/2022, 11/10/2022, 11/8/2022.    TECHNIQUE: Helical CT images from the thoracic inlet through the  symphysis pubis were obtained without  IV contrast.    FINDINGS:  Limited evaluation secondary to lack of intravenous contrast.     image(s) are noncontributory.    LINES/TUBES: Left chest wall cardiac device with intact lead tips  terminating in the right atrium, right ventricle, and coronary sinus.  Right upper extremity peripherally inserted central catheter  terminates at the superior cavoatrial junction.    CHEST:  LOWER NECK: Unremarkable thyroid.     PULMONARY: No pneumothorax. Right basilar bronchial wall thickening.  Right greater than left small pleural effusions with associated  atelectasis. Mild bibasilar predominant patchy groundglass opacities  and interlobular septal thickening. Left lower lobe calcified  granuloma.    CARDIAC: Cardiomegaly. Trace pericardial effusion. Coronary artery  stents and atherosclerosis.    MEDIASTINUM: Normal size/configuration of the great vessels. No  suspicious mediastinal, hilar, or axillary lymph nodes.     ESOPHAGUS: Unremarkable.    ABDOMEN/PELVIS:  HEPATIC: No suspicious focal hepatic lesion.    BILIARY: Cholecystectomy. No intrahepatic or extrahepatic biliary  ductal dilation.    PANCREAS: No focal pancreatic lesion. The main pancreatic duct is not  dilated.    SPLEEN: No splenomegaly. No suspicious lesions or masses.    ADRENALS: Unremarkable.    RENAL: No hydronephrosis, calculi, or mass.    URINARY  BLADDER: Unremarkable.    REPRODUCTIVE: Unremarkable.    GASTROINTESTINAL: Unremarkable stomach and duodenum. Normal caliber  large and small bowel. No pneumatosis or portal venous gas. Moderate  colonic stool burden.    MESENTERY/PERITONEUM: No ascites or pneumoperitoneum.    VASCULAR: Nonaneurysmal abdominal aorta. Mild aortic atherosclerotic  vascular calcifications.    LYMPH NODES: No lymphadenopathy.    MUSCULOSKELETAL: Degenerative changes in keeping with the patient's  age. No acute osseous abnormality. No aggressive osseous lesions. Left  shoulder lipoma. Left anterior abdominal wall lipoma measuring 2.5 cm.      Impression    IMPRESSION:   1.  Left chest wall cardiac device with intact lead tips in the right  atrium, right ventricle, and coronary sinus.  2.  Cardiomegaly with right greater than left pleural effusions and  mild pulmonary edema suggestive of congestive heart failure.    I have personally reviewed the examination and initial interpretation  and I agree with the findings.    REG WHITTAKER MD         SYSTEM ID:  [id number]   US carotid bilateral    Narrative    BILATERAL CAROTID DUPLEX DOPPLER ULTRASOUND 11/11/2022 12:40 PM    CLINICAL HISTORY: Heart Failure pre Ventricular Assist Device (VAD)  planning    COMPARISON: None available.    REFERRING PROVIDER: MARA SANCHEZ    TECHNIQUE: Grayscale (B-mode) and duplex and spectral Doppler  ultrasound of the common carotid, extracranial internal carotid,  external carotid, and vertebral artery origins. Velocity measurements  obtained with angle correction at or less than 60 degrees.    FINDINGS: Arrythmia    RIGHT SIDE:     Plaque Morphology: Minimal plaque       Proximal CCA: 77/16 cm/s     Mid CCA: 79/17 cm/s     Distal CCA: 73/15 cm/s           External CA: 75/13 cm/s       Proximal ICA: 40/12 cm/s     Mid ICA: 39/12 cm/s     Distal ICA: 50/13 cm/s       Vertebral artery: Antegrade: 28/7 cm/s     ICA/CCA ratio: 0.68     LEFT SIDE:     Plaque  Morphology: Minimal plaque        Proximal CCA: 92/14 cm/s     Mid CCA: 90/20 cm/s     Distal CCA: 88/18 cm/s           External CA: 74/7 cm/s       Proximal ICA: 39/10 cm/s     Mid ICA: 43/11 cm/s     Distal ICA: 52/15 cm/s       Vertebral artery: Antegrade: 23/5 cm/s     ICA/CCA ratio: 0.59       Impression    IMPRESSION:    1. RIGHT ICA: Less than 50% diameter narrowing by grayscale imaging  and sonographic velocity criteria.    2. LEFT ICA:  Less than 50% diameter narrowing by grayscale imaging  and sonographic velocity criteria.    3. Arrythmia.    Intersocietal Accreditation Commission Updated Recommendations for  Carotid Stenosis Interpretation Criteria - October 2021.  https://intersocietal.org/wp-content/uploads/2021/10/IAC-Vascular-Test  cs-Vulhduwwympoq_Qvtgfvk-Xupaqespsxxpryr-for-Carotid-Stenosis-Interpre  ation-Criteria.pdf    Greater than 70% diameter stenosis criteria per - Journal of Vascular  Surgery Vol. 75Issue 1Supplement [id number] Published online: June 18, 2021          Normal            ICA PSV: < 180 cm/s            Plaque Estimate: None            ICA/CCA PSV Ratio: < 2.0            ICA EDV: < 40 cm/s         < 50%            ICA PSV: < 180 cm/s            Plaque Estimate: < 50%            ICA/CCA PSV Ratio: < 2.0            ICA EDV: < 40 cm/s         50 - 69%            ICA PSV: 180 - 230 cm/s            Plaque Estimate: > 50%            ICA/CCA PSV Ratio: 2.0 - 4.0            ICA EDV: 40 - 100 cm/s         > 70% but less than near occlusion            ICA PSV: > 230 cm/s            Plaque Estimate: > 50%            AND either            ICA EDV: > 100 cm/s            or            ICA/CCA PSV Ratio: > 4.0         Near occlusion            ICA PSV: > 230 cm/s            Plaque Estimate: Visible            ICA/CCA PSV Ratio: Variable            ICA EDV: Variable         Total occlusion            ICA PSV: Undetectable            Plaque Estimate: Visible, no detectable lumen             ICA/CCA PSV Ratio: N/A            ICA EDV: N/A                                          Additional criteria from vascular surgery     > 80%       EDV > 120 cm/s    JOANN RAMÍREZ MD         SYSTEM ID:  [id number]   XR Chest 2 Views    Narrative    XR CHEST 2 VIEWS  11/11/2022 1:28 PM      HISTORY: Heart transplant evaluation and/or Ventricular Assist Device  (VAD) planning    COMPARISON: 11/10/2022    FINDINGS: PA and lateral views. Stable left chest wall ICD leads.  Cardiac silhouette is stable. Stable right arm PICC. Improved right  basilar hazy opacities. No acute airspace opacities. Slightly decrease  trace right pleural effusion. No pneumothorax.      Impression    IMPRESSION: Slightly improved trace right pleural effusion and right  basilar opacities, likely atelectasis.    I have personally reviewed the examination and initial interpretation  and I agree with the findings.    ELSY RAMIREZ MD         SYSTEM ID:  [id number]   US Upper Ext Arterial Duplex Bilateral    Narrative    ULTRASOUND UPPER EXTREMITY ARTERIAL DUPLEX BILATERAL 11/11/2022 12:50  PM    CLINICAL HISTORY: Assess vascular access for possible subclavian IABP.      COMPARISONS: None available.    REFERRING PROVIDER: MARA SANCHEZ    TECHNIQUE: Bilateral subclavian and axillary arteries evaluated with  grayscale, color Doppler, and spectral pulsed wave Doppler ultrasound.    FINDINGS:   RIGHT:  Subclavian artery, proximal: 60/0 cm/s, triphasic, 9.8 mm  Subclavian artery, mid: 51/0 cm/s, triphasic, 9.8 mm  Subclavian artery, distal: 50/0 cm/s, triphasic, 11.0 mm    Axillary artery: 36/0 cm/s, triphasic, 10.2 mm    LEFT :  Subclavian artery, proximal: 61/0 cm/s, triphasic, 9.2 mm  Subclavian artery, mid: 62/0 cm/s, triphasic, 8.6 mm  Subclavian artery, distal: obscured by pacemaker leads    Axillary artery: 48/0 cm/s, triphasic, 9.1 mm      Impression    IMPRESSION:  1. Left subclavian artery is obscured in its distal segment by the  pacemaker  leads.    2. Otherwise patent subclavian and axillary arteries with measurements  as in the report.    JOANN RAMÍREZ MD         SYSTEM ID:  [id number]   US Upper Extremity Venous Duplex Bilat    Narrative    ULTRASOUND UPPER EXTREMITY VENOUS DUPLEX BILATERAL 11/11/2022 12:50 PM    CLINICAL HISTORY: Heart transplant evaluation.     COMPARISONS: None available.    REFERRING PROVIDER: MARA SANCHEZ    TECHNIQUE: Bilateral internal jugular veins evaluated with grayscale,  color Doppler, and spectral pulsed wave Doppler ultrasound. Bilateral  innominate, subclavian, and axillary veins were evaluated color  Doppler, and spectral pulsed wave Doppler ultrasound.    FINDINGS: Right internal jugular vein is fully compressible with a  phasic waveform.    Right innominate, subclavian, and axillary veins fill [id number] in  color Doppler images with phasic waveforms.    Left internal jugular vein is fully compressible with a phasic  waveform.    Left innominate, subclavian, and axillary veins fill [id number] in  color Doppler images with phasic waveforms.      Impression    IMPRESSION: No central deep venous thrombosis demonstrated in either  arm.    JOANN RAMÍREZ MD         SYSTEM ID:  [id number]   US Lower Extremity Venous Duplex Bilateral    Narrative    ULTRASOUND LOWER EXTREMITY VENOUS DUPLEX BILATERAL 11/11/2022 12:49 PM    CLINICAL HISTORY: Heart transplant evaluation      COMPARISONS: None available.    REFERRING PROVIDER: MARA SANCHEZ    TECHNIQUE: Grayscale, color Doppler, Doppler waveform ultrasound  evaluation was performed through the bilateral common femoral,  femoral, and popliteal veins. Bilateral posterior tibial and peroneal  veins were evaluated with grayscale imaging and compression.    FINDINGS: Right common femoral, femoral, and popliteal veins are fully  compressible, patent, and demonstrate normal phasic Doppler waveforms.    Right posterior tibial veins are fully compressible to the  ankle.    Right peroneal veins are fully compressible to the distal calf.    Left common femoral, femoral, and popliteal veins are fully  compressible, patent, and demonstrate normal phasic Doppler waveforms.    Left posterior tibial veins are fully compressible to the ankle.    Left peroneal veins are fully compressible to the distal calf.      Impression    IMPRESSION: No deep venous thrombosis demonstrated in either leg.    JOANN RAMÍREZ MD         SYSTEM ID:  [id number]   CT Dental wo Contrast    Narrative    EXAM: CT HEAD W/O CONTRAST, CT DENTAL WO CONTRAST  11/11/2022 1:30 PM     HISTORY:  VAD evaluation       COMPARISON:  None    TECHNIQUE: Using multidetector thin collimation helical acquisition  technique, axial, coronal and sagittal CT images from the skull base  to the vertex were obtained without intravenous contrast.   (topogram) image(s) also obtained and reviewed.    FINDINGS:  No acute intracranial hemorrhage, mass effect, or midline shift. No  acute loss of gray-white matter differentiation in the cerebral  hemispheres. Ventricles are proportionate to the cerebral sulci. Clear  basal cisterns. There is a moderate to large amount of periventricular  white matter hypoattenuation throughout the cerebral white matter.  Bilaterally near the anterior horns of the lateral ventricles there  are rounded hypodensities, which may represent prior lacunar infarcts  as well as chronic small vessel ischemic disease.    As pertains to the dentition, there is cortical lucency surrounding  the lateral root of the left maxillary second molar this tooth also  demonstrates root canal changes.. The visualized dentition is  partially obscured due to artifact from the dental amalgam. Evaluation  of the paranasal sinuses demonstrates bilateral peripherally  predominant mucosal thickening of the maxillary sinuses along the  inferior aspects without evidence for bony erosion or abscess  communicating with the dentition. The  remainder of the paranasal  sinuses are unremarkable.    The bony calvaria and the bones of the skull base are normal. The  mastoid air cells are unremarkable. Grossly normal orbits.      Impression    IMPRESSION:  1. No acute intracranial pathology.   2. Chronic white matter changes, suggestive of sequela of chronic  small vessel ischemic disease throughout the cerebral hemispheres.  3. Periapical lucency surrounding the left maxillary second molar.  Mild mucosal thickening within bilateral maxillary sinuses, without  evidence for odontogenic etiology. No definite evidence for soft  tissue abscess.    I have personally reviewed the examination and initial interpretation  and I agree with the findings.    AMENA MITCHELL MD         SYSTEM ID:  [id number]   US Abdomen Complete    Narrative    EXAMINATION: US ABDOMEN COMPLETE,  11/11/2022 6:57 PM     COMPARISON: 11/11/2022 CT was available for correlation    HISTORY: Heart failure, ventricular assist device planning    TECHNIQUE: The abdomen was scanned in standard fashion with  specialized ultrasound transducer(s) using both gray-scale and limited  color Doppler techniques.    FINDINGS:  Liver: The liver demonstrates normal homogeneous echotexture measuring  14.8 cm in craniocaudal dimension. No evidence of a focal hepatic  mass. The main portal vein is patent with antegrade flow.    Gallbladder: Surgically absent.    Bile Ducts: Both the intra- and extrahepatic biliary system are of  normal caliber.  The common bile duct measures 4 mm in diameter.    Pancreas: Visualized portions of the head and body of the pancreas are  unremarkable.     Kidneys: Both kidneys are of normal echotexture, without mass or  hydronephrosis.   The craniocaudal dimensions are: right- 11.7 cm,  left- 12.5 cm.    Spleen: The spleen is normal in size,  measuring 10.8 cm in sagittal  dimension.    Aorta and IVC: The visualized portions of the aorta and IVC are  unremarkable. The proximal  aorta measures 2.5 cm in diameter and the  IVC measures 2.7 cm in diameter.    Fluid: No ascites. Partially visualized bilateral pleural effusions.      Impression    IMPRESSION:   1.  Cholecystectomy and partially visualized bilateral pleural  effusions.  2.  Otherwise negative abdominal ultrasound.    AYO HYATT MD         SYSTEM ID:  [id number]   MR Brain w/o & w Contrast    Narrative    EXAM: MR BRAIN W/O & W CONTRAST  11/14/2022 2:03 PM     HISTORY:  Patient with history of CNS vasculitis. Obtain images to  compare to prior scans       COMPARISON:  MRI 7/18/2022, 6/14/2021, 9/28/2016    TECHNIQUE: Axial FLAIR,  T1-weighted, and susceptibility images were  obtained without intravenous contrast. Following intravenous  gadolinium-based contrast administration, axial T2-weighted,  diffusion, FLAIR, and axial and coronal T1-weighted images were  obtained    CONTRAST: 9 mls Gadavist.    FINDINGS:  Punctate focus of restricted diffusion in the right lateral cerebellar  hemisphere, with subtle hyperintensity on FLAIR images.    No intracranial hemorrhage, mass, or mass effect. No hydrocephalus.  Normal flow voids. Mild diffuse parenchymal atrophy similar to prior.  Old lacunar infarcts in the left corona radiata, right corona radiata,  right lentiform nucleus are stable since recent prior exams. Lacunar  infarct in the right corona radiata occurred between the 2016 and 2021  exams. Multifocal and confluent FLAIR hyperintensity predominantly  within the periventricular white matter of both cerebral hemispheres,  not significantly changed since 6/14/2021 and mildly increased since  2016. No abnormal intracranial enhancement.    Orbits are unremarkable. Minimal scattered mucosal thickening in the  ethmoid air cells and maxillary sinuses. Trace right mastoid effusion.      Impression    IMPRESSION:  1. Punctate focus of restricted diffusion in the lateral right  cerebellar hemisphere, which could represent a tiny embolic  infarct  versus MR artifact.  2. Multifocal and confluent FLAIR hyperintensity in the  periventricular white matter of both cerebral hemispheres and lacunar  infarcts in the basal ganglia and periventricular white matter, not  significantly changed since 6/14/2021 and mildly increased since 2016.  Findings suggestive of chronic small vessel ischemic disease.  3. No abnormal intracranial enhancement.    Findings discussed with Dr. Dhaliwal by Dr. Waite via telephone.     I have personally reviewed the examination and initial interpretation  and I agree with the findings.    LIV PORTER MD         SYSTEM ID:  [id number]   MRA Brain ([place] [facility] [place]) wo Contrast    Narrative    EXAM MRA BRAIN ([place] [facility]) W/O CONTRAST, MRA NECK (CAROTIDS)  W/O & W CONTRAST 11/14/2022 12:59 PM    HISTORY: history of CNS vasculitis    COMPARISON:  MRA [place] [facility] [place] 9/28/2016    TECHNIQUE:   MRA COW: Using a 3D time-of-flight image acquisition technique, MRA of  the major arteries at the base of the brain was obtained without  intravenous contrast. Three-dimensional reconstructions of the head  MRA were created, which were reviewed by the radiologist.    Neck MRA: Limited non contrast 2DTOF images were obtained of the  mid-cervical region. Following intravenous gadolinium-based contrast  administration, a contrast enhanced MRA of the neck/cervical vessels  was performed.  Three-dimensional reconstructions of the neck and head MRA were  created, which were reviewed by the radiologist.    FINDINGS:   MRA COW:  Normal variant left vertebral artery terminating in the left PICA.  Minimal atherosclerotic calcifications of the cavernous portions of  both internal carotid arteries, without significant stenosis. Anterior  communicating artery is patent. The bilateral anterior, middle, and  posterior cerebral arteries are patent and normal in caliber.  Posterior communicating arteries are not seen. No aneurysm or  stenosis.    MRA  Neck:  Patent major cervical vasculature. No significant stenosis or  irregularity carotid bifurcations. Minimal irregularity left carotid  bifurcation without significant stenosis. Bilateral patent vertebral  arteries patent origins. The normal distal right internal carotid  artery measures 5 mm. The normal distal left internal carotid artery  measures 5 mm.      Impression    IMPRESSION:  No aneurysm or significant stenosis of the major cervical or  intracranial arteries. No evidence of vasculitis.    I have personally reviewed the examination and initial interpretation  and I agree with the findings.    LIV PORTER MD         SYSTEM ID:  [id number]   MRA Neck (Carotids) wo & w Contrast    Narrative    EXAM MRA BRAIN ([place] [facility]) W/O CONTRAST, MRA NECK (CAROTIDS)  W/O & W CONTRAST 11/14/2022 12:59 PM    HISTORY: history of CNS vasculitis    COMPARISON:  MRA [place] [facility] [place] 9/28/2016    TECHNIQUE:   MRA COW: Using a 3D time-of-flight image acquisition technique, MRA of  the major arteries at the base of the brain was obtained without  intravenous contrast. Three-dimensional reconstructions of the head  MRA were created, which were reviewed by the radiologist.    Neck MRA: Limited non contrast 2DTOF images were obtained of the  mid-cervical region. Following intravenous gadolinium-based contrast  administration, a contrast enhanced MRA of the neck/cervical vessels  was performed.  Three-dimensional reconstructions of the neck and head MRA were  created, which were reviewed by the radiologist.    FINDINGS:   MRA COW:  Normal variant left vertebral artery terminating in the left PICA.  Minimal atherosclerotic calcifications of the cavernous portions of  both internal carotid arteries, without significant stenosis. Anterior  communicating artery is patent. The bilateral anterior, middle, and  posterior cerebral arteries are patent and normal in caliber.  Posterior communicating arteries are not seen. No aneurysm  or  stenosis.    MRA Neck:  Patent major cervical vasculature. No significant stenosis or  irregularity carotid bifurcations. Minimal irregularity left carotid  bifurcation without significant stenosis. Bilateral patent vertebral  arteries patent origins. The normal distal right internal carotid  artery measures 5 mm. The normal distal left internal carotid artery  measures 5 mm.      Impression    IMPRESSION:  No aneurysm or significant stenosis of the major cervical or  intracranial arteries. No evidence of vasculitis.    I have personally reviewed the examination and initial interpretation  and I agree with the findings.    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  Velocity measurements obtained with angle correct at or less than 60  degrees.     Findings:     Right upper extremity:    Internal jugular vein: Thrombus: No, Phasic: Yes    Innominate vein: Thrombus: No, Phasic: Yes    Subclavian vein proximal: Thrombus: No, Phasic: Yes  Subclavian vein mid: Thrombus: No, Phasic: Yes    Axillary vein: Thrombus: Yes, nonocclusive and partially compressible     Brachial vein: Thrombus: No    Cephalic vein: Thrombus: No  Basilic vein: Thrombus: No    Right upper extremity veins demonstrate normal compressibility,  phasicity, and pulsatility.    Left upper extremity:    Internal jugular vein: Thrombus: No, Phasic: Yes    Innominate vein: Thrombus: No, Phasic: Yes    Subclavian vein proximal: Thrombus: No, Phasic:  Yes  Subclavian vein mid: Thrombus: No, Phasic: Yes    Axillary vein: Thrombus: No, Phasic: Yes    Brachial vein: Thrombus: No    Cephalic vein: Thrombus: No  Basilic vein: Thrombus: No    Left upper extremity veins demonstrate normal compressibility,  phasicity, and pulsatility.      Impression    Impression:    1. Right upper extremity: Nonocclusive thrombus in the right axillary  vein.    2. Left upper extremity: No evidence of deep venous thrombosis.      [Urgent Result: Deep venous thromboses]    Finding was identified on 11/15/2022 8:56 AM.     Dr. Dhaliwal was contacted by Dr. Sid Andrews at 11/15/2022 9:07 AM  and verbalized understanding of the urgent finding.     I have personally reviewed the examination and initial interpretation  and I agree with the findings.    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 Color flow images  obtained as needed. Gray-scale images with compression of the great  saphenous vein obtained as needed.    Findings:    Right leg:    CFV: Thrombus: No, Phasic: Yes  Femoral vein, proximal: Thrombus: No, Phasic: Yes  Femoral vein, mid: Thrombus: No, Phasic: Yes  Femoral vein, distal: Thrombus: No, Phasic: Yes  Popliteal vein: Thrombus: No, Phasic: Yes  PTV: Thrombus: No  Peroneal vein: Thrombus: No    Left leg:    CFV: Thrombus: No, Phasic: Yes  Femoral vein, proximal: Thrombus: No, Phasic: Yes  Femoral vein, mid: Thrombus: No, Phasic: Yes  Femoral vein,  "distal: Thrombus: No, Phasic: Yes  Popliteal vein: Thrombus: No, Phasic: Yes  PTV: Thrombus: Thrombus: No  Peroneal vein: Thrombus: Yes, occlusive.      Impression    Impression:    Right leg: No deep venous thrombosis.     Left leg: Occlusive thrombus in the peroneal vein.    Reference: \"Duplex Ultrasound in the Diagnosis of Lower-Extremity Deep  Venous Thrombosis\"- Manjula Ahuja MD, S; Darrick Zhao MD  (Circulation. 2014;129:917-921. http://circ.ahajournals.org )    [Urgent Result: Deep venous thrombosis]    Finding was identified on 11/15/2022 9:14 AM.     Dr. Dhaliwal was contacted by Dr. Sid Andrews at 11/15/2022 9:17 AM  and verbalized understanding of the urgent finding.     I have personally reviewed the examination and initial interpretation  and I agree with the findings.    JOANN RAMÍREZ MD         SYSTEM ID:  [id number]   Echocardiogram Complete     Value    LVEF  15-20% (severely reduced)    Narrative    [id number]  [facility]  [id number]  [id number]^LAURA^MARA^DEE DEE     [facility]  Echocardiography Laboratory  [address] [place] [address]     Name: SLOAN LAZAR  MRN: [id number]  : 1955  Study Date: 2022 01:29 PM  Age: 67 yrs  Gender: Male  Patient Location: [facility]  Reason For Study: Pre Ventricular Assist Device  Ordering Physician: MARA SANCHEZ  Referring Physician: YAQUELIN MIRAMONTES  Performed By: Toña Linares     BSA: 2.2 m2  Height: 74 in  Weight: 208 lb  ______________________________________________________________________________  Procedure  Complete Portable Echo Adult. Contrast Optison. Optison (NDC #9160-3501-64)  given intravenously. Patient was given 6 ml mixture of 3 ml Optison and 6 ml  saline. 3 ml wasted.  ______________________________________________________________________________  Interpretation Summary  Moderate left ventricular dilation is present. Left ventricular function is  decreased. The ejection " fraction is 15-20% (severely reduced). Severe diffuse  hypokinesis is present.  Mild right ventricular dilation is present. Global right ventricular function  is moderately reduced.  Moderate mitral insufficiency is present. The cause of the mitral  insufficiency appears to be altered left ventricular size and geometry.  Previous study not available for comparison.  ______________________________________________________________________________  Left Ventricle  Left ventricular wall thickness is normal. Moderate left ventricular dilation  is present. Left ventricular function is decreased. The ejection fraction is  15-20% (severely reduced). Grade II or moderate diastolic dysfunction. Severe  diffuse hypokinesis is present.     Right Ventricle  Mild right ventricular dilation is present. Global right ventricular function  is moderately reduced. A pacemaker lead is noted in the right ventricle.     Atria  The right atria appears normal. Moderate left atrial enlargement is present.  The atrial septum is intact as assessed by agitated saline bubble study . A  pacemaker lead is noted in the right atrium.     Mitral Valve  Moderate mitral insufficiency is present. The cause of the mitral  insufficiency appears to be altered left ventricular size and geometry.     Aortic Valve  Trileaflet aortic sclerosis without stenosis.     Tricuspid Valve  The tricuspid valve is normal. Trace tricuspid insufficiency is present. The  right ventricular systolic pressure is approximated at 27.9 mmHg plus the  right atrial pressure.     Pulmonic Valve  The pulmonic valve is normal.     Vessels  The aorta root is normal. Dilation of the inferior vena cava is present with  abnormal respiratory variation in diameter. IVC diameter >2.1 cm collapsing  <50% with sniff suggests a high RA pressure estimated at 15 mmHg or greater.     Pericardium  No pericardial effusion is present.     Compared to Previous Study  Previous study not available for  comparison.  ______________________________________________________________________________  MMode/2D Measurements & Calculations  IVSd: 1.1 cm  LVIDd: 6.7 cm  LVIDs: 6.3 cm  LVPWd: 1.1 cm  FS: 7.1 %  LV mass(C)d: 340.3 grams  LV mass(C)dI: 154.0 grams/m2  Ao root diam: 2.9 cm  RWT: 0.33     Doppler Measurements & Calculations  MV E max jorden: 54.2 cm/sec  MV A max jorden: 29.1 cm/sec  MV E/A: 1.9  MV dec time: 0.11 sec  TR max jorden: 264.0 cm/sec  TR max P.9 mmHg  E/E' av.2  Lateral E/e': 3.2  Medial E/e': 11.1     ______________________________________________________________________________  Report approved by: Gabe Soriano 2022 03:19 PM         Transesophageal Echocardiogram     Value    LVEF  20-25% (severely reduced)    [facility]    [id number]  [id number]  [id number]  [id number]^LAURA^MARA^DEE DEE                                                                       Version 2     [facility]  Echocardiography Laboratory  [address] [place] [id number]     Name: SLOAN LAZAR  MRN: [id number]  : 1955  Study Date: 2022 08:50 AM  Age: 67 yrs  Gender: Male  [place] [facility] For Study: CVA  Ordering Physician: MARA SANCHEZ  Referring Physician: YAQUELIN MIRAMONTES  Performed By: Fior Iqbal MD     HR: 83  BP: 113/83 mmHg  ______________________________________________________________________________  Interpretation Summary     Mild to moderate left ventricular dilation is present. Left ventricular  function is decreased. The ejection fraction is 20-25% (severely reduced).  Severe diffuse hypokinesis is present.     Global right ventricular function is mildly reduced.     Moderate to severe functional mitral insufficiency is present.     The left atrial appendage is free of thrombus.     The atrial septum is intact as assessed by color Doppler and agitated saline  bubble study .     No pericardial effusion present.      ______________________________________________________________________________  Procedure  Transesophageal Echocardiogram with color and spectral Doppler performed. 3D  image acquisition, reconstruction, and real-time interpretation was performed.  Procedure location Echo Lab. Informed consent for Transesophegeal echo  obtained. JARED Probe #64 was used during the procedure. Patient was sedated  using Fentanyl 50 mcg. Patient was sedated using Versed 2 mg. The heart rate,  respiratory rate, oxygen saturations, blood pressure, and response to care  were monitored throughout the procedure with the assistance of the nurse. I  determined this patient to be an appropriate candidate for the planned  sedation and procedure and have reassessed the patient immediately prior to  sedation and procedure. Total sedation time: 23 minutes of continuous bedside  1:1 monitoring. The Transducer was inserted without difficulty . The patient  tolerated the procedure well. Complications None.     Left Ventricle  Mild to moderate left ventricular dilation is present. Left ventricular  function is decreased. The ejection fraction is 20-25% (severely reduced).  Severe diffuse hypokinesis is present. There is no LV apical thrombus.     Right Ventricle  The right ventricle is normal size. Global right ventricular function is  mildly reduced.     Atria  The left atrial appendage is normal. It is free of spontaneous echo contrast  and thrombus. The right atria appears normal. Mild left atrial enlargement is  present. The atrial septum is intact as assessed by color Doppler and agitated  saline bubble study .     Mitral Valve  Moderate to severe mitral insufficiency is present. EROA = 0.5 cm2. There is  no flow reversal in the pulmonary veins. The cause of the mitral insufficiency  appears to be altered left ventricular size and geometry.     Aortic Valve  Aortic valve is normal in structure and function.     Tricuspid Valve  Mild tricuspid  insufficiency is present.     Pulmonic Valve  The pulmonic valve is normal.     Vessels  The aorta root is normal. Normal pulmonary vein velocity.     Pericardium  No pericardial effusion is present.     ______________________________________________________________________________  Report approved by: Gabe Azul 11/16/2022 11:58 AM     ______________________________________________________________________________      Cardiac Catheterization    Narrative      Right sided filling pressures are mildly elevated.    Mild elevated pulmonary hypertension.    Left sided filling pressures are mildly elevated.    Reduced cardiac output level.    Hemodynamic data has been modified in Epic per physician review.     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  Patent prior LAD and LCx stents with mild to moderate non-obstructive   disease in the RCA. Grossly unchanged when compared to OSH report in   10/2021.           Consultations this Admission   VASCULAR ACCESS FOR PICC PLACEMENT ADULT IP CONSULT  VASCULAR ACCESS FOR PICC PLACEMENT ADULT IP CONSULT  CARDIOTHORACIC SURGERY ADULT IP CONSULT  PALLIATIVE CARE ADULT IP CONSULT  NEUROPSYCHOLOGY IP CONSULT  NUTRITION SERVICES ADULT IP CONSULT  CORE CLINIC EVALUATION IP CONSULT  SOCIAL WORK IP CONSULT  DENTISTRY ADULT IP CONSULT  NEUROLOGY GENERAL ADULT IP CONSULT  SPEECH LANGUAGE PATH ADULT IP CONSULT  PHARMACY IP CONSULT  PHARMACY IP CONSULT  GI LUMINAL ADULT IP CONSULT  NURSING TO CONSULT FOR VASCULAR ACCESS CARE IP CONSULT  PHYSICAL THERAPY ADULT IP CONSULT  OCCUPATIONAL THERAPY ADULT IP CONSULT  NURSING TO CONSULT FOR VASCULAR ACCESS CARE IP CONSULT  PHARMACY IP CONSULT  PHARMACY IP CONSULT  CARE MANAGEMENT / SOCIAL WORK IP CONSULT  NEUROLOGY STROKE ADULT IP CONSULT  NEUROLOGY STROKE ADULT IP CONSULT  PHARMACY IP CONSULT  PHARMACY IP CONSULT  NEUROPSYCHOLOGY IP CONSULT  NURSING TO CONSULT FOR VASCULAR ACCESS CARE IP CONSULT  NEUROLOGY STROKE ADULT IP  CONSULT  NURSING TO CONSULT FOR VASCULAR ACCESS CARE IP CONSULT  PHARMACY TO DOSE WARFARIN  SPIRITUAL HEALTH SERVICES IP CONSULT  PHARMACY LIAISON FOR MEDICATION COVERAGE CONSULT  SMOKING CESSATION PROGRAM IP CONSULT  SMOKING CESSATION PROGRAM IP CONSULT    Discharge Orders        Follow-Up with Cardiology MARGARET      Home Care Referral      Home Infusion Referral      Medication Instructions - Anticoagulants    Do NOT stop your aspirin or platelet inhibitor unless directed by your Cardiologist.  These medications help to prevent platelets in your blood from sticking together and forming a clot.  Examples of these medications are:  Ticagrelor (Brilinta), Clopidigrel (Plavix), Prasugrel (Effient)     When to call - Contact the Heart Clinic    You may experience symptoms that require follow-up before your scheduled appointment. Contact the Heart Clinic if you develop: Fever over 100.4o Fahrenheit, that lasts more than one day; Redness, heat, or pus at the puncture site; Change in color or temperature in your hand or arm.     When to call - Reasons to Call 911    If your wrist puncture site starts bleeding after discharge, sit down and apply firm pressure with your thumb against the puncture site and fingers against the back of the wrist for 10 minutes. If the bleeding stops, continue to rest, keeping your wrist still for 2 hours. Notify your doctor as soon as possible.  IF BLEEDING DOES NOT STOP OR THERE IS A LARGER AMOUNT OF BLEEDING OR SPURTING CALL 9-1-1 immediately.DO NOT drive yourself to the hospital.     Precautions - Lifting    DO NOT lift more than 5 pounds with affected arm for 48 hours     Precautions - Household Activities    Avoid excessive bending or movement of your wrist for 72 hours.  Do not subject hand/arm to any forceful movements for 24 hours, such as supporting weight when rising from a chair or bed.     Remove the band-aid on the puncture site after 24 hours and leave open to air. If minor oozing,  you may apply a band-aid and remove after 12 hours.     Precautions - Active Sports Activities    DO NOT engage in vigorous exercise using your affected arm for 3 days after discharge.  This includes golf, tennis or swimming.     Precautions - Operating yard equipment or vehicles    Do not operate a chainsaw, lawnmower, motorcycle, or all-terrain vehicle for 48 hours after the procedure.     Precautions - Elective Dental Work    NO elective dental work for 6 weeks after receiving a stent.     Comfort and Pain Management - Bruising after Surgery    Expect mild tingling of hand and tenderness at the wrist puncture site for up to 3 days. You may take Tylenol or a pain medicine recommended by your doctor.     Activity - Cardiac Rehab    You are encouraged to enroll in an Outpatient Cardiac Rehab program after discharge from the hospital.  Our Cardiac Rehab staff may visit briefly with you while you're in the hospital.  If they miss you, someone will contact you after you are home.     Return to Driving    Driving is NOT permitted for 24 hours after surgery     Return to work    You may return to work after 72 hours if you are feeling well and your job does not involve heavy lifting.     Shower / Bathing    You may shower on the day after your procedure.  DO NOT soak of wrist with the puncture site in water for 3 days to prevent infection. DO NOT take a tub bath or wash dishes for 3 days after the procedure     Dressing Removal    Remove the band-aid on the puncture site after 24 hours and leave open to air. If minor oozing, you may apply a band-aid and remove after 12 hours     Reason for your hospital stay    You were admitted to the hospital for your heart failure. While here, we started you on an inotrope including Dobutamine. We also had you on IV diuretics before transitioning you back to your home diuretic dosing. We completed your evaluation for transplant listing. Now all that is left is to return home to  strengthen up for a couple of weeks prior to your formal listing. You have been provided with quite a bit of instructions and resources here in your discharge paperwork from the multidisciplinary transplant/cardiology team. Below are some additional highlights:    MEDICATIONS:  1. STOP your previous Jardiance and Toprol. You have not been receiving these in the hospital.  2. CONTINUE your Dobutamine through your PICC as you were in-hospital and as shown in teaching.  3. START your new oral blood thinner. The final choice of which one it is (eg. Xarelto, Eliquis, etc) will depend on your insurance coverage, which will be determined just before you leave the hospital.    FOLLOW-UP:  1. The cardiology/transplant team will reach out to you to schedule next steps once you leave the hospital in terms of follow-up appointments and/or admission in a couple of weeks after completing physical rehabilitation.    If you experience any new or worsening chest pain, abnormal heart beat, trouble breathing, swelling, or passing out, you should return to an ER for further reevaluation and give your cardiology team a call with an update.     Activity    Your activity upon discharge: activity as tolerated with restrictions noted elsewhere     Follow Up and recommended labs and tests    As noted elsewhere, follow-up to be coordinated through cardiology     Diet    Follow this diet upon discharge: Orders Placed This Encounter      Snacks/Supplements Adult: Ensure Enlive; Between Meals      Calorie Counts      Regular Diet Adult       Discharge Medications     Current Discharge Medication List      START taking these medications    Details   apixaban ANTICOAGULANT (ELIQUIS) 5 MG tablet Take 2 tablets (10 mg) by mouth 2 times daily for 6 days, THEN 1 tablet (5 mg) 2 times daily for 24 days. Take 2 tablets (total of 10mg) twice daily up through Saturday 11/26. Starting Sunday 11/27, start taking 1 tablet (5mg) twice daily from then  on.  Qty: 72 tablet, Refills: 0    Associated Diagnoses: Acute deep vein thrombosis (DVT) of left peroneal vein (H)      DOBUTamine 500 mg in dextrose 5% 250 mL (adult std conc) premix Inject 236.75 mcg/min into the vein continuous  Qty: 500 mL, Refills: 6    Associated Diagnoses: Chronic systolic congestive heart failure (H); Cardiogenic shock (H)      potassium chloride ER (KLOR-CON M) 20 MEQ CR tablet Take 1 tablet (20 mEq) by mouth daily  Qty: 90 tablet, Refills: 3    Associated Diagnoses: Hypokalemia         CONTINUE these medications which have NOT CHANGED    Details   albuterol (PROAIR HFA/PROVENTIL HFA/VENTOLIN HFA) 108 (90 Base) MCG/ACT inhaler Inhale 2 puffs into the lungs every 6 hours as needed      amiodarone (PACERONE) 200 MG tablet Take 200 mg by mouth 2 times daily      aspirin (ASA) 81 MG EC tablet Take 81 mg by mouth daily      clotrimazole (LOTRIMIN) 1 % external cream Apply topically 2 times daily for groin/jock itch      furosemide (LASIX) 20 MG tablet Take 20 mg by mouth 2 times daily      metoclopramide (REGLAN) 10 MG tablet TAKE 1 TABLET (10 MG TOTAL) BY MOUTH 4 (FOUR) TIMES A DAY AS NEEDED (NAUSEA).      !! mycophenolate (GENERIC EQUIVALENT) 500 MG tablet Take 500 mg by mouth every evening      !! mycophenolate (GENERIC EQUIVALENT) 500 MG tablet Take 1,000 mg by mouth every morning      pravastatin (PRAVACHOL) 20 MG tablet TAKE 1 TABLET BY MOUTH DAILY/ DUE FOR FASTING LABWORK      traZODone (DESYREL) 50 MG tablet TAKE 1 TABLET (50 MG TOTAL) BY MOUTH ONCE NIGHTLY AS NEEDED FOR SLEEP.       !! - Potential duplicate medications found. Please discuss with provider.      STOP taking these medications       empagliflozin (JARDIANCE) 10 MG TABS tablet Comments:   Reason for Stopping:         metoprolol succinate ER (TOPROL XL) 25 MG 24 hr tablet Comments:   Reason for Stopping:                Primary Care Physician   Quentin Odonnell

## 2022-11-21 VITALS
TEMPERATURE: 98 F | BODY MASS INDEX: 25.72 KG/M2 | OXYGEN SATURATION: 98 % | SYSTOLIC BLOOD PRESSURE: 108 MMHG | HEART RATE: 92 BPM | WEIGHT: 200.4 LBS | RESPIRATION RATE: 18 BRPM | HEIGHT: 74 IN | DIASTOLIC BLOOD PRESSURE: 76 MMHG

## 2022-11-21 LAB
ANION GAP SERPL CALCULATED.3IONS-SCNC: 11 MMOL/L (ref 7–15)
BUN SERPL-MCNC: 16.4 MG/DL (ref 8–23)
CALCIUM SERPL-MCNC: 9 MG/DL (ref 8.8–10.2)
CHLORIDE SERPL-SCNC: 104 MMOL/L (ref 98–107)
CREAT SERPL-MCNC: 1.14 MG/DL (ref 0.67–1.17)
DEPRECATED HCO3 PLAS-SCNC: 23 MMOL/L (ref 22–29)
ERYTHROCYTE [DISTWIDTH] IN BLOOD BY AUTOMATED COUNT: 14.3 % (ref 10–15)
GFR SERPL CREATININE-BSD FRML MDRD: 70 ML/MIN/1.73M2
GLUCOSE SERPL-MCNC: 102 MG/DL (ref 70–99)
HCT VFR BLD AUTO: 43.9 % (ref 40–53)
HGB BLD-MCNC: 14.1 G/DL (ref 13.3–17.7)
INR PPP: 1.3 (ref 0.85–1.15)
MAGNESIUM SERPL-MCNC: 1.9 MG/DL (ref 1.7–2.3)
MCH RBC QN AUTO: 28.4 PG (ref 26.5–33)
MCHC RBC AUTO-ENTMCNC: 32.1 G/DL (ref 31.5–36.5)
MCV RBC AUTO: 89 FL (ref 78–100)
PLATELET # BLD AUTO: 132 10E3/UL (ref 150–450)
POTASSIUM SERPL-SCNC: 3.9 MMOL/L (ref 3.4–5.3)
RBC # BLD AUTO: 4.96 10E6/UL (ref 4.4–5.9)
SODIUM SERPL-SCNC: 138 MMOL/L (ref 136–145)
UFH PPP CHRO-ACNC: >1.1 IU/ML
WBC # BLD AUTO: 4.7 10E3/UL (ref 4–11)

## 2022-11-21 PROCEDURE — 250N000013 HC RX MED GY IP 250 OP 250 PS 637

## 2022-11-21 PROCEDURE — 85610 PROTHROMBIN TIME: CPT

## 2022-11-21 PROCEDURE — 80048 BASIC METABOLIC PNL TOTAL CA: CPT

## 2022-11-21 PROCEDURE — 83735 ASSAY OF MAGNESIUM: CPT

## 2022-11-21 PROCEDURE — 85014 HEMATOCRIT: CPT

## 2022-11-21 PROCEDURE — 85520 HEPARIN ASSAY: CPT | Performed by: INTERNAL MEDICINE

## 2022-11-21 PROCEDURE — 250N000012 HC RX MED GY IP 250 OP 636 PS 637

## 2022-11-21 RX ADMIN — APIXABAN 10 MG: 5 TABLET, FILM COATED ORAL at 08:30

## 2022-11-21 RX ADMIN — ASPIRIN 81 MG CHEWABLE TABLET 81 MG: 81 TABLET CHEWABLE at 08:30

## 2022-11-21 RX ADMIN — MYCOPHENOLATE MOFETIL 1000 MG: 500 TABLET, FILM COATED ORAL at 08:28

## 2022-11-21 RX ADMIN — PRAVASTATIN SODIUM 20 MG: 20 TABLET ORAL at 08:29

## 2022-11-21 RX ADMIN — AMIODARONE HYDROCHLORIDE 200 MG: 200 TABLET ORAL at 08:30

## 2022-11-21 RX ADMIN — HYDROXYZINE HYDROCHLORIDE 25 MG: 25 TABLET, FILM COATED ORAL at 04:23

## 2022-11-21 RX ADMIN — FUROSEMIDE 40 MG: 40 TABLET ORAL at 08:29

## 2022-11-21 RX ADMIN — POTASSIUM CHLORIDE 20 MEQ: 750 TABLET, EXTENDED RELEASE ORAL at 08:30

## 2022-11-21 ASSESSMENT — ACTIVITIES OF DAILY LIVING (ADL)
ADLS_ACUITY_SCORE: 32

## 2022-11-21 NOTE — CONSULTS
Discharge Pharmacy Test Claim    Eliquis and xarelto are covered through patient's [facility] Medicare Part D plan with monthly copays of $145.12 and $141.73 respectively. Eliquis starter pack is currently ready at the discharge pharmacy with a free trial voucher applied.    Test Claim Copay   eliquis 145.12   xarelto 141.73       Kim Fuentes  [facility] Pharmacy Liaison  Ph: [phone] Pager: [phone]   Securely message with the Vocera Web Console (learn more here)

## 2022-11-21 NOTE — PROGRESS NOTES
Care Management Discharge Note    Discharge Date: 11/21/2022     Discharge Disposition: Home    Discharge Services: Home Infusion, OP CR    Discharge DME: 4WW    Discharge Transportation: family or friend will provide    Private pay costs discussed: Not applicable    Education Provided on the Discharge Plan: Yes  Persons Notified of Discharge Plans: Pt, wife, MD, RN  Patient/Family in Agreement with the Plan: Yes    Additional Information:  Per MD, pt medically ready for discharge home today. [facility] was updated yesterday on plan for discharge today. Confirmed with [facility] liason that dobutamine is mixed and will be delivered to the hospital this morning for hookup to home pump.     This writer spoke with pt's wife, Arlette who gave the following contact information to send orders:    [facility]  Fax: [phone]  Faxed referral and clinicals.     [facility]  Ph: [phone]  Fax: [phone]  Faxed script for 4WW and facesheet    The following discharge home services were previously arranged:  [facility] (Home IV dobutamine)  Ph: [phone]    [facility]  For PICC line cares.     Licha Montes RN BSN  6C Unit Care Coordinator  Phone number: [phone]  Pager: [phone]

## 2022-11-21 NOTE — PLAN OF CARE
Physical Therapy Discharge Summary    Reason for therapy discharge:    Discharged to home with outpatient therapy.    Progress towards therapy goal(s). See goals on Care Plan in [facility] electronic health record for goal details.  Goals partially met.  Barriers to achieving goals:   discharge from facility.    Therapy recommendation(s):    Continued therapy is recommended.  Rationale/Recommendations:  for continued strengthening and mobility training.

## 2022-11-21 NOTE — PLAN OF CARE
Occupational Therapy Discharge Summary    Reason for therapy discharge:    Discharged to home with outpatient therapy.    Progress towards therapy goal(s). See goals on Care Plan in [facility] electronic health record for goal details.  Goals partially met.  Barriers to achieving goals:   discharge from facility.    Therapy recommendation(s):    Continued therapy is recommended.  Rationale/Recommendations:  Pt would benefit from OP CR to continue to address progress with cardiopulmonary health.

## 2022-11-21 NOTE — PLAN OF CARE
D: PMH s/f HFrEF 20-25% secondary to arrhythmogenic cardiomyopathy, CAD s/p PCI on LCx and LAD, CNS vasculitis and CKD who presents for shortness of breath - CHF exacerbation. On the heart transplant list - cat 3. 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  Call light appropriate.  Able to make needs known.  Respiratory:  On room air sating in 90s. Said he was a little SOB this morning and would like the pulse ox on - sating in 90s.  Cardiac: VSS. 100% V-paced.  HR 80-90s  GI: No report of nausea or vomiting.  : Voiding clear lisset/yellow urine.  Skin: small groin rash - refused cream  LDA:  LPIV - SL. RDL PICC - dobutamine @ 2.5mcg/kg/min in red port. Purple is SL  Pain: Denies  Diet: Regular  Mobility: x1    Anti Xa was >1.1 this morning. Provider was notified, but heparin gtt already stopped.     P: Dx Monday on Dobutamine & go to outpatient rehab Monday as well. Continue to monitor Pt status and report changes to Cards 2.     Goal Outcome Evaluation:      Plan of Care Reviewed With: patient    Overall Patient Progress: no change

## 2022-11-21 NOTE — PLAN OF CARE
DISCHARGE                        November 21, 2022  ----------------------------------------------------------------------------  Discharged to: Home  Via: private transportation  Accompanied by: Family (wife)  Discharge Instructions: diet, activity, medications, follow up appointments, when to call the MD, aftercare instructions were discussed. Pt and wife verbalize understanding of discharge instructions  Prescriptions: To be filled by discharge pharmacy; medication list reviewed & sent with pt  Follow Up Appointments: arranged; information given  Belongings: All sent with pt  IV: d/c'd  Telemetry: d/c'd  Pt exhibits understanding of above discharge instructions; all questions answered.    Discharge Paperwork: Signed, copied, and sent home with patient.

## 2022-11-22 ENCOUNTER — TELEPHONE (OUTPATIENT)
Dept: CARDIOLOGY | Facility: CLINIC | Age: 67
End: 2022-11-22

## 2022-11-22 ENCOUNTER — CARE COORDINATION (OUTPATIENT)
Dept: TRANSPLANT | Facility: CLINIC | Age: 67
End: 2022-11-22

## 2022-11-22 DIAGNOSIS — I42.9 CARDIOMYOPATHY (H): Primary | ICD-10-CM

## 2022-11-22 NOTE — TELEPHONE ENCOUNTER
Health Call Center    Phone Message    May a detailed message be left on voicemail: yes     Reason for Call: Appointment Intake    Referring Provider Name: Dr Justa Macias  Diagnosis and/or Symptoms: Patient needs to be seen for Heart Failure, not sure who patient should see and no MARGARET listed under Heart Failure. Please review and reach out to patient to coordinate. Per patient, waiting to hear from the clinic to get re-admitted to the hospital and doesn't want to come for a follow up and coming back to the hospital again due to too far of a drive.     Action Taken: Message routed to:  [facility] Cardiology    Travel Screening: Not Applicable

## 2022-11-22 NOTE — PROGRESS NOTES
Transplant Update 11/22/22:  Topic:  Follow up    Spoke to Dr. Dominguez and made a plan for follow up.  Discussed follow up plan with patient's wife as follows:    RTC in 2 weeks (appointment made for 12-7-22) with labs, Dexa and transplant teaching.    Labs next week with home health care, discussed with HF team as they will manage labs.    Wife verbalized understanding of the plan and agrees to call Transplant Coordinator with any further questions or concerns.

## 2022-11-22 NOTE — PLAN OF CARE
Further discharge needs:  Pt requires a four-wheel walker to ensure he is able to safely participate in his physical/cardiac rehabilitation as part of transplant evaluation. Equipment order placed on discharge.    Elliot Dhaliwal MD

## 2022-11-23 DIAGNOSIS — I50.22 CHRONIC SYSTOLIC HEART FAILURE (H): Primary | ICD-10-CM

## 2022-11-23 LAB — COLONOSCOPY: NORMAL

## 2022-11-23 NOTE — TELEPHONE ENCOUNTER
Per discussion with pretransplant group sent lab orders to home care for checking on 11/29.  Spoke with HC RN as well who is aware and will fax results

## 2022-11-23 NOTE — PROGRESS NOTES
Date: 11/23/2022    Time of Call: 9:59 AM     Diagnosis:  Heart failure     [ VORB ] Ordering provider: Dr Dominguez    Order: BMP 11/29     Order received by: Arlene Sanderson RN       Follow-up/additional notes: Spoke with [facility] (health care)  Will fax orders.  Left direct line for HC RN.  Per communication, HC RN is scheduled to see patient on 11/29.

## 2022-11-25 ENCOUNTER — TELEPHONE (OUTPATIENT)
Dept: TRANSPLANT | Facility: CLINIC | Age: 67
End: 2022-11-25

## 2022-11-25 NOTE — TELEPHONE ENCOUNTER
Arlette reached Dr Garcia directly who was able to help with Ativan prescription.      Reviewed plan with Arlette.     1.  11/28 will see local cardiologist and get labs  2.  Home Care RN to work with dobutamine on 11/29- possibly draw labs  3.  Return visit on 12/7

## 2022-11-25 NOTE — TELEPHONE ENCOUNTER
Arlette Patients wife calling to inform RNCC that Paco is having very bad anxiety, very  Anxious, and agitated wife will call Dr. Garcia to see if can give him something for it. But wanted to keep you in the loop

## 2022-11-28 ENCOUNTER — TRANSFERRED RECORDS (OUTPATIENT)
Dept: HEALTH INFORMATION MANAGEMENT | Facility: CLINIC | Age: 67
End: 2022-11-28

## 2022-12-02 NOTE — PROGRESS NOTES
Post Discharge: CA[facility] Coordinator  D/I: pt discharged 11/21/22  P: There is a form for a folding wheeled walker on my desk signed by the MD. [facility]  Ph: [phone]  Fax: [phone]

## 2022-12-05 ENCOUNTER — TELEPHONE (OUTPATIENT)
Dept: TRANSPLANT | Facility: CLINIC | Age: 67
End: 2022-12-05

## 2022-12-05 DIAGNOSIS — I42.9 CARDIOMYOPATHY (H): Primary | ICD-10-CM

## 2022-12-05 NOTE — TELEPHONE ENCOUNTER
Transplant coordinator called wife back. Wife stated that they are in town now and wanted to make sure there wasn't anything that pt should be doing for transplant/VAD eval. Pt's eval is complete, other than txp teaching which is scheduled for 12/7/22 and cystatin C lab which has been ordered. Pt has appt with Dr. Dominguez on 12/7/22. Encouraged to call transplant coordinator with any further questions.

## 2022-12-05 NOTE — CONFIDENTIAL NOTE
Transplant coordinator called wife back. Wife stated that they are in town now and wanted to make sure there wasn't anything that pt should be doing for transplant eval. Pt's eval is complete, other than txp teaching which is scheduled for 12/7/22. Pt has appt with Dr. Dominguez on 12/7/22.

## 2022-12-06 ENCOUNTER — HOSPITAL ENCOUNTER (INPATIENT)
Facility: CLINIC | Age: 67
LOS: 38 days | Discharge: HOME | DRG: 001 | End: 2023-01-13
Attending: EMERGENCY MEDICINE | Admitting: INTERNAL MEDICINE
Payer: MEDICARE

## 2022-12-06 ENCOUNTER — ANCILLARY PROCEDURE (OUTPATIENT)
Dept: CARDIOLOGY | Facility: CLINIC | Age: 67
DRG: 001 | End: 2022-12-06
Attending: EMERGENCY MEDICINE
Payer: MEDICARE

## 2022-12-06 ENCOUNTER — APPOINTMENT (OUTPATIENT)
Dept: GENERAL RADIOLOGY | Facility: CLINIC | Age: 67
DRG: 001 | End: 2022-12-06
Attending: EMERGENCY MEDICINE
Payer: MEDICARE

## 2022-12-06 DIAGNOSIS — R78.81 STAPHYLOCOCCUS EPIDERMIDIS BACTEREMIA: ICD-10-CM

## 2022-12-06 DIAGNOSIS — I50.43 ACUTE ON CHRONIC COMBINED SYSTOLIC (CONGESTIVE) AND DIASTOLIC (CONGESTIVE) HEART FAILURE (H): ICD-10-CM

## 2022-12-06 DIAGNOSIS — I20.89 ANGINAL EQUIVALENT (H): ICD-10-CM

## 2022-12-06 DIAGNOSIS — Z20.822 LAB TEST NEGATIVE FOR COVID-19 VIRUS: ICD-10-CM

## 2022-12-06 DIAGNOSIS — I50.20 SYSTOLIC CONGESTIVE HEART FAILURE, UNSPECIFIED HF CHRONICITY (H): ICD-10-CM

## 2022-12-06 DIAGNOSIS — B95.7 STAPHYLOCOCCUS EPIDERMIDIS BACTEREMIA: ICD-10-CM

## 2022-12-06 DIAGNOSIS — I50.22 CHRONIC SYSTOLIC CONGESTIVE HEART FAILURE (H): ICD-10-CM

## 2022-12-06 DIAGNOSIS — I50.9 HEART FAILURE, UNSPECIFIED HF CHRONICITY, UNSPECIFIED HEART FAILURE TYPE (H): ICD-10-CM

## 2022-12-06 DIAGNOSIS — R79.89 TROPONIN LEVEL ELEVATED: ICD-10-CM

## 2022-12-06 DIAGNOSIS — Z94.1 HEART REPLACED BY TRANSPLANT (H): Primary | ICD-10-CM

## 2022-12-06 LAB
ALBUMIN SERPL BCG-MCNC: 3.5 G/DL (ref 3.5–5.2)
ALP SERPL-CCNC: 60 U/L (ref 40–129)
ALT SERPL W P-5'-P-CCNC: 15 U/L (ref 10–50)
ANION GAP SERPL CALCULATED.3IONS-SCNC: 13 MMOL/L (ref 7–15)
AST SERPL W P-5'-P-CCNC: 20 U/L (ref 10–50)
ATRIAL RATE - MUSE: 84 BPM
BASOPHILS # BLD AUTO: 0.1 10E3/UL (ref 0–0.2)
BASOPHILS NFR BLD AUTO: 1 %
BILIRUB SERPL-MCNC: 1.4 MG/DL
BUN SERPL-MCNC: 16.7 MG/DL (ref 8–23)
CALCIUM SERPL-MCNC: 8.9 MG/DL (ref 8.8–10.2)
CHLORIDE SERPL-SCNC: 105 MMOL/L (ref 98–107)
CREAT SERPL-MCNC: 1.03 MG/DL (ref 0.67–1.17)
DEPRECATED HCO3 PLAS-SCNC: 23 MMOL/L (ref 22–29)
DIASTOLIC BLOOD PRESSURE - MUSE: NORMAL MMHG
EOSINOPHIL # BLD AUTO: 0.1 10E3/UL (ref 0–0.7)
EOSINOPHIL NFR BLD AUTO: 2 %
ERYTHROCYTE [DISTWIDTH] IN BLOOD BY AUTOMATED COUNT: 14.3 % (ref 10–15)
FLUAV RNA SPEC QL NAA+PROBE: NEGATIVE
FLUBV RNA RESP QL NAA+PROBE: NEGATIVE
GFR SERPL CREATININE-BSD FRML MDRD: 79 ML/MIN/1.73M2
GLUCOSE SERPL-MCNC: 113 MG/DL (ref 70–99)
HCT VFR BLD AUTO: 42.7 % (ref 40–53)
HGB BLD-MCNC: 14.1 G/DL (ref 13.3–17.7)
HOLD SPECIMEN: NORMAL
IMM GRANULOCYTES # BLD: 0 10E3/UL
IMM GRANULOCYTES NFR BLD: 0 %
INTERPRETATION ECG - MUSE: NORMAL
LYMPHOCYTES # BLD AUTO: 1.1 10E3/UL (ref 0.8–5.3)
LYMPHOCYTES NFR BLD AUTO: 18 %
MAGNESIUM SERPL-MCNC: 1.8 MG/DL (ref 1.7–2.3)
MCH RBC QN AUTO: 29 PG (ref 26.5–33)
MCHC RBC AUTO-ENTMCNC: 33 G/DL (ref 31.5–36.5)
MCV RBC AUTO: 88 FL (ref 78–100)
MONOCYTES # BLD AUTO: 0.5 10E3/UL (ref 0–1.3)
MONOCYTES NFR BLD AUTO: 8 %
NEUTROPHILS # BLD AUTO: 4.4 10E3/UL (ref 1.6–8.3)
NEUTROPHILS NFR BLD AUTO: 71 %
NRBC # BLD AUTO: 0 10E3/UL
NRBC BLD AUTO-RTO: 0 /100
NT-PROBNP SERPL-MCNC: 2750 PG/ML (ref 0–900)
P AXIS - MUSE: 23 DEGREES
PLATELET # BLD AUTO: 164 10E3/UL (ref 150–450)
POTASSIUM SERPL-SCNC: 3.5 MMOL/L (ref 3.4–5.3)
PR INTERVAL - MUSE: 176 MS
PROT SERPL-MCNC: 5.8 G/DL (ref 6.4–8.3)
QRS DURATION - MUSE: 144 MS
QT - MUSE: 484 MS
QTC - MUSE: 571 MS
R AXIS - MUSE: -64 DEGREES
RBC # BLD AUTO: 4.86 10E6/UL (ref 4.4–5.9)
RSV RNA SPEC NAA+PROBE: NEGATIVE
SARS-COV-2 RNA RESP QL NAA+PROBE: NEGATIVE
SODIUM SERPL-SCNC: 141 MMOL/L (ref 136–145)
SYSTOLIC BLOOD PRESSURE - MUSE: NORMAL MMHG
T AXIS - MUSE: 75 DEGREES
TROPONIN T SERPL HS-MCNC: 36 NG/L
TROPONIN T SERPL HS-MCNC: 46 NG/L
VENTRICULAR RATE- MUSE: 84 BPM
WBC # BLD AUTO: 6.1 10E3/UL (ref 4–11)

## 2022-12-06 PROCEDURE — 250N000011 HC RX IP 250 OP 636: Performed by: EMERGENCY MEDICINE

## 2022-12-06 PROCEDURE — 99222 1ST HOSP IP/OBS MODERATE 55: CPT | Mod: 25 | Performed by: INTERNAL MEDICINE

## 2022-12-06 PROCEDURE — 84484 ASSAY OF TROPONIN QUANT: CPT | Performed by: EMERGENCY MEDICINE

## 2022-12-06 PROCEDURE — 80053 COMPREHEN METABOLIC PANEL: CPT | Performed by: EMERGENCY MEDICINE

## 2022-12-06 PROCEDURE — 93005 ELECTROCARDIOGRAM TRACING: CPT | Performed by: EMERGENCY MEDICINE

## 2022-12-06 PROCEDURE — 250N000012 HC RX MED GY IP 250 OP 636 PS 637: Performed by: STUDENT IN AN ORGANIZED HEALTH CARE EDUCATION/TRAINING PROGRAM

## 2022-12-06 PROCEDURE — 250N000013 HC RX MED GY IP 250 OP 250 PS 637: Performed by: STUDENT IN AN ORGANIZED HEALTH CARE EDUCATION/TRAINING PROGRAM

## 2022-12-06 PROCEDURE — 96366 THER/PROPH/DIAG IV INF ADDON: CPT | Performed by: EMERGENCY MEDICINE

## 2022-12-06 PROCEDURE — 93284 PRGRMG EVAL IMPLANTABLE DFB: CPT | Mod: 26 | Performed by: INTERNAL MEDICINE

## 2022-12-06 PROCEDURE — 83735 ASSAY OF MAGNESIUM: CPT | Performed by: EMERGENCY MEDICINE

## 2022-12-06 PROCEDURE — 83880 ASSAY OF NATRIURETIC PEPTIDE: CPT | Performed by: EMERGENCY MEDICINE

## 2022-12-06 PROCEDURE — 99285 EMERGENCY DEPT VISIT HI MDM: CPT | Mod: 25 | Performed by: EMERGENCY MEDICINE

## 2022-12-06 PROCEDURE — 214N000001 HC R&B CCU UMMC

## 2022-12-06 PROCEDURE — 85025 COMPLETE CBC W/AUTO DIFF WBC: CPT | Performed by: EMERGENCY MEDICINE

## 2022-12-06 PROCEDURE — 96365 THER/PROPH/DIAG IV INF INIT: CPT | Performed by: EMERGENCY MEDICINE

## 2022-12-06 PROCEDURE — 250N000013 HC RX MED GY IP 250 OP 250 PS 637: Performed by: EMERGENCY MEDICINE

## 2022-12-06 PROCEDURE — 87637 SARSCOV2&INF A&B&RSV AMP PRB: CPT | Performed by: EMERGENCY MEDICINE

## 2022-12-06 PROCEDURE — 71045 X-RAY EXAM CHEST 1 VIEW: CPT

## 2022-12-06 PROCEDURE — 93284 PRGRMG EVAL IMPLANTABLE DFB: CPT

## 2022-12-06 PROCEDURE — C9803 HOPD COVID-19 SPEC COLLECT: HCPCS | Performed by: EMERGENCY MEDICINE

## 2022-12-06 PROCEDURE — 36415 COLL VENOUS BLD VENIPUNCTURE: CPT | Performed by: EMERGENCY MEDICINE

## 2022-12-06 PROCEDURE — 93010 ELECTROCARDIOGRAM REPORT: CPT | Performed by: EMERGENCY MEDICINE

## 2022-12-06 PROCEDURE — 71045 X-RAY EXAM CHEST 1 VIEW: CPT | Mod: 26 | Performed by: RADIOLOGY

## 2022-12-06 RX ORDER — MYCOPHENOLATE MOFETIL 500 MG/1
1000 TABLET ORAL EVERY MORNING
Status: DISCONTINUED | OUTPATIENT
Start: 2022-12-06 | End: 2022-12-25

## 2022-12-06 RX ORDER — TRAZODONE HYDROCHLORIDE 50 MG/1
50 TABLET, FILM COATED ORAL
Status: DISCONTINUED | OUTPATIENT
Start: 2022-12-06 | End: 2022-12-07

## 2022-12-06 RX ORDER — AMIODARONE HYDROCHLORIDE 200 MG/1
200 TABLET ORAL 2 TIMES DAILY
Status: DISCONTINUED | OUTPATIENT
Start: 2022-12-06 | End: 2022-12-16

## 2022-12-06 RX ORDER — PRAVASTATIN SODIUM 10 MG
20 TABLET ORAL DAILY
Status: DISCONTINUED | OUTPATIENT
Start: 2022-12-07 | End: 2022-12-26

## 2022-12-06 RX ORDER — ACETAMINOPHEN 650 MG/1
650 SUPPOSITORY RECTAL EVERY 4 HOURS PRN
Status: DISCONTINUED | OUTPATIENT
Start: 2022-12-06 | End: 2022-12-26

## 2022-12-06 RX ORDER — ACETAMINOPHEN 325 MG/1
650 TABLET ORAL EVERY 4 HOURS PRN
Status: DISCONTINUED | OUTPATIENT
Start: 2022-12-06 | End: 2022-12-26

## 2022-12-06 RX ORDER — LORAZEPAM 0.5 MG/1
0.25 TABLET ORAL 3 TIMES DAILY PRN
Status: ON HOLD | COMMUNITY
End: 2023-01-13

## 2022-12-06 RX ORDER — FUROSEMIDE 20 MG
20 TABLET ORAL 2 TIMES DAILY
Status: DISCONTINUED | OUTPATIENT
Start: 2022-12-06 | End: 2022-12-08

## 2022-12-06 RX ORDER — DOBUTAMINE HYDROCHLORIDE 200 MG/100ML
5 INJECTION INTRAVENOUS CONTINUOUS
Status: DISCONTINUED | OUTPATIENT
Start: 2022-12-06 | End: 2022-12-25

## 2022-12-06 RX ORDER — POTASSIUM CHLORIDE 750 MG/1
20 TABLET, EXTENDED RELEASE ORAL DAILY
Status: DISCONTINUED | OUTPATIENT
Start: 2022-12-07 | End: 2022-12-20

## 2022-12-06 RX ORDER — ASPIRIN 81 MG/1
81 TABLET ORAL DAILY
Status: DISCONTINUED | OUTPATIENT
Start: 2022-12-07 | End: 2022-12-26

## 2022-12-06 RX ORDER — LORAZEPAM 0.5 MG/1
0.25 TABLET ORAL 3 TIMES DAILY PRN
Status: DISCONTINUED | OUTPATIENT
Start: 2022-12-06 | End: 2022-12-07

## 2022-12-06 RX ORDER — LIDOCAINE 40 MG/G
CREAM TOPICAL
Status: DISCONTINUED | OUTPATIENT
Start: 2022-12-06 | End: 2022-12-25

## 2022-12-06 RX ORDER — MYCOPHENOLATE MOFETIL 500 MG/1
500 TABLET ORAL EVERY EVENING
Status: DISCONTINUED | OUTPATIENT
Start: 2022-12-06 | End: 2022-12-25

## 2022-12-06 RX ADMIN — APIXABAN 5 MG: 5 TABLET, FILM COATED ORAL at 19:45

## 2022-12-06 RX ADMIN — TRAZODONE HYDROCHLORIDE 50 MG: 50 TABLET ORAL at 18:09

## 2022-12-06 RX ADMIN — Medication 0.25 MG: at 23:02

## 2022-12-06 RX ADMIN — AMIODARONE HYDROCHLORIDE 200 MG: 200 TABLET ORAL at 19:45

## 2022-12-06 RX ADMIN — Medication 0.25 MG: at 17:58

## 2022-12-06 RX ADMIN — MYCOPHENOLATE MOFETIL 500 MG: 500 TABLET, FILM COATED ORAL at 19:46

## 2022-12-06 RX ADMIN — DOBUTAMINE HYDROCHLORIDE 2.5 MCG/KG/MIN: 200 INJECTION INTRAVENOUS at 09:30

## 2022-12-06 ASSESSMENT — ENCOUNTER SYMPTOMS
EYE REDNESS: 0
VOMITING: 0
SORE THROAT: 0
HEADACHES: 0
DYSPHORIC MOOD: 0
COUGH: 1
WEAKNESS: 0
ABDOMINAL PAIN: 0
NECK PAIN: 0
BACK PAIN: 0
DIFFICULTY URINATING: 0
SHORTNESS OF BREATH: 1
NAUSEA: 0
SLEEP DISTURBANCE: 0
FEVER: 0

## 2022-12-06 ASSESSMENT — ACTIVITIES OF DAILY LIVING (ADL)
ADLS_ACUITY_SCORE: 35
ADLS_ACUITY_SCORE: 34
ADLS_ACUITY_SCORE: 35
ADLS_ACUITY_SCORE: 34
ADLS_ACUITY_SCORE: 35

## 2022-12-06 NOTE — Clinical Note
dry, intact, no bleeding and no hematoma. 7 Fr sheath removed from the right internal jugular vein. Manual pressure held. Hemostasis obtained. Band aid applied.

## 2022-12-06 NOTE — Clinical Note
Potential access sites were evaluated for patency using ultrasound.   The left jugular vein was selected. Access was obtained under with Sonosite guidance using a standard 18 guage needle with direct visualization of needle entry.

## 2022-12-06 NOTE — ED PROVIDER NOTES
ED Provider Note  [facility]      History     Chief Complaint   Patient presents with     Chest Pain     HPI  Paco Stein is a 67 year old male with history of heart failure with reduced EF secondary to arrhythmogenic cardiomyopathy, coronary disease status post PCI, CNS vasculitis, and chronic kidney disease who presents to the emergency department from his hotel room for evaluation of bilateral arm tightness.  His symptoms began around 630 this morning and lasted for approximately 30 minutes.  During a period of time, he also felt mildly dyspneic.  He has had 1 similar episode previously during cardiac rehab and was in [place].  The patient states his symptoms have now resolved.  He did not have any chest pain.  He apparently told his wife that he also had some pain between his shoulder blades.  Patient denies any abdominal pain.  No nausea or vomiting.  No diaphoresis.  Patient is on Eliquis and has not missed any doses.  He denies any leg pain or swelling.  His weight has been stable.  He has undergone evaluation for heart transplant/VAD.  Patient also endorses a nonproductive cough for the past 4 days.  He denies any fever.  No sore throat.  No rhinorrhea.    Past Medical History  Past Medical History:   Diagnosis Date     Congestive heart failure (H)      Past Surgical History:   Procedure Laterality Date     COLONOSCOPY N/A 11/17/2022    Procedure: COLONOSCOPY;  Surgeon: Roverto Nino MD;  Location:  GI     CV CORONARY ANGIOGRAM N/A 11/11/2022    Procedure: Coronary Angiogram;  Surgeon: Justo Bush MD;  Location:  HEART CARDIAC CATH LAB     CV RIGHT HEART CATH MEASUREMENTS RECORDED N/A 11/11/2022    Procedure: Right Heart Catheterization;  Surgeon: Justo Bush MD;  Location:  HEART CARDIAC CATH LAB     PICC DOUBLE LUMEN PLACEMENT Right 11/10/2022    Right basilic, 43 cm, 1 cm external length     albuterol (PROAIR HFA/PROVENTIL  "HFA/VENTOLIN HFA) 108 (90 Base) MCG/ACT inhaler  amiodarone (PACERONE) 200 MG tablet  apixaban ANTICOAGULANT (ELIQUIS) 5 MG tablet  aspirin (ASA) 81 MG EC tablet  clotrimazole (LOTRIMIN) 1 % external cream  DOBUTamine 500 mg in dextrose 5% 250 mL (adult std conc) premix  furosemide (LASIX) 20 MG tablet  metoclopramide (REGLAN) 10 MG tablet  mycophenolate (GENERIC EQUIVALENT) 500 MG tablet  mycophenolate (GENERIC EQUIVALENT) 500 MG tablet  potassium chloride ER (KLOR-CON M) 20 MEQ CR tablet  pravastatin (PRAVACHOL) 20 MG tablet  traZODone (DESYREL) 50 MG tablet      No Known Allergies  Family History  Family History   Problem Relation Age of Onset     Atrial fibrillation Father      Lung Cancer Brother      Social History   Social History     Tobacco Use     Smoking status: Never     Smokeless tobacco: Never   Substance Use Topics     Alcohol use: Not Currently      Past medical history, past surgical history, medications, allergies, family history, and social history were reviewed with the patient. No additional pertinent items.       Review of Systems   Constitutional: Negative for fever.   HENT: Negative for congestion and sore throat.    Eyes: Negative for redness.   Respiratory: Positive for cough and shortness of breath.    Cardiovascular: Negative for chest pain.   Gastrointestinal: Negative for abdominal pain, nausea and vomiting.   Genitourinary: Negative for difficulty urinating.   Musculoskeletal: Negative for back pain and neck pain.        See HPI   Skin: Negative for rash.   Neurological: Negative for weakness and headaches.   Psychiatric/Behavioral: Negative for dysphoric mood, sleep disturbance and suicidal ideas.   All other systems reviewed and are negative.    A complete review of systems was performed with pertinent positives and negatives noted in the HPI, and all other systems negative.    Physical Exam   BP: 113/85  Pulse: 84  Temp: 98.7  F (37.1  C)  Resp: 18  Height: 188 cm (6' 2\")  Weight: " 90.7 kg (200 lb)  SpO2: 95 %  Physical Exam  Vitals and nursing note reviewed.   Constitutional:       General: He is not in acute distress.     Appearance: He is well-developed. He is not diaphoretic.   HENT:      Head: Atraumatic.      Mouth/Throat:      Mouth: Oropharynx is clear and moist.   Eyes:      General: No scleral icterus.     Pupils: Pupils are equal, round, and reactive to light.   Cardiovascular:      Rate and Rhythm: Normal rate and regular rhythm.      Pulses: Intact distal pulses.      Heart sounds: Murmur heard.    Systolic murmur is present.  Pulmonary:      Effort: Pulmonary effort is normal. No respiratory distress.      Breath sounds: Normal breath sounds.   Abdominal:      General: Bowel sounds are normal.      Palpations: Abdomen is soft.      Tenderness: There is no abdominal tenderness.   Musculoskeletal:         General: No tenderness or edema.      Cervical back: Normal range of motion.   Skin:     General: Skin is warm and dry.      Findings: No rash.   Neurological:      General: No focal deficit present.      Mental Status: He is alert.      Cranial Nerves: No cranial nerve deficit.      Motor: No weakness.         ED Course      Procedures            EKG Interpretation:      Interpreted by SIMEON ARNETT MD, MD  Time reviewed: 0731  Symptoms at time of EKG: None   Rhythm: paced  Rate: 84  Axis: Left Axis Deviation  Ectopy: none  Conduction: normal  ST Segments/ T Waves: No acute ischemic changes  Q Waves: none  Comparison to prior: Unchanged    Clinical Impression: paced rhythm    Results for orders placed or performed during the hospital encounter of 12/06/22   XR Chest Port 1 View     Status: None    Narrative    XR CHEST PORT 1 VIEW  12/6/2022 8:02 AM      HISTORY: cough    COMPARISON: Chest x-ray 11/11/2022.    FINDINGS:   Portable AP 60 degree upright chest x-ray. Left chest wall ICD with  leads terminating over the right atrium, and bilateral ventricles.  Right arm PICC terminates  over the lower SVC.    Trachea is midline. Cardiac silhouette is enlarged. Pulmonary  vasculature is distinct. Increased perihilar interstitial opacities.  No pleural effusion or pneumothorax.    Osseous structures are intact. Visualized soft tissues and upper  abdomen are unremarkable.      Impression    IMPRESSION:   Cardiomegaly with increased perihilar interstitial opacities as seen  with pulmonary edema.    I have personally reviewed the examination and initial interpretation  and I agree with the findings.    ANUEL MOSLEY MD         SYSTEM ID:  [id number]   [place] Draw     Status: None    Narrative    The following orders were created for panel order [place] Draw.  Procedure                               Abnormality         Status                     ---------                               -----------         ------                     Extra Blue Top Tube[[id number]]                              Final result               Extra Red Top Tube[[id number]]                               Final result               Extra Green Top (Lithium...[[id number]]                      Final result               Extra Purple Top Tube[[id number]]                            Final result                 Please view results for these tests on the individual orders.   Extra Blue Top Tube     Status: None   Result Value Ref Range    Hold Specimen JIC    Extra Red Top Tube     Status: None   Result Value Ref Range    Hold Specimen JIC    Extra Green Top (Lithium Heparin) Tube     Status: None   Result Value Ref Range    Hold Specimen JIC    Extra Purple Top Tube     Status: None   Result Value Ref Range    Hold Specimen JIC    Comprehensive metabolic panel     Status: Abnormal   Result Value Ref Range    Sodium 141 136 - 145 mmol/L    Potassium 3.5 3.4 - 5.3 mmol/L    Chloride 105 98 - 107 mmol/L    Carbon Dioxide (CO2) 23 22 - 29 mmol/L    Anion Gap 13 7 - 15 mmol/L    Urea Nitrogen 16.7 8.0 - 23.0 mg/dL    Creatinine 1.03 0.67 - 1.17  mg/dL    Calcium 8.9 8.8 - 10.2 mg/dL    Glucose 113 (H) 70 - 99 mg/dL    Alkaline Phosphatase 60 40 - 129 U/L    AST 20 10 - 50 U/L    ALT 15 10 - 50 U/L    Protein Total 5.8 (L) 6.4 - 8.3 g/dL    Albumin 3.5 3.5 - 5.2 g/dL    Bilirubin Total 1.4 (H) <=1.2 mg/dL    GFR Estimate 79 >60 mL/min/1.73m2   Magnesium     Status: Normal   Result Value Ref Range    Magnesium 1.8 1.7 - 2.3 mg/dL   Troponin T, High Sensitivity     Status: Abnormal   Result Value Ref Range    Troponin T, High Sensitivity 46 (H) <=22 ng/L   Nt probnp inpatient (BNP)     Status: Abnormal   Result Value Ref Range    N terminal Pro BNP Inpatient 2,750 (H) 0 - 900 pg/mL   Symptomatic Influenza A/B & SARS-CoV2 (COVID-19) Virus PCR Multiplex Nasopharyngeal     Status: Normal    Specimen: Nasopharyngeal; Swab   Result Value Ref Range    Influenza A PCR Negative Negative    Influenza B PCR Negative Negative    RSV PCR Negative Negative    SARS CoV2 PCR Negative Negative    Narrative    Testing was performed using the Xpert Xpress CoV2/Flu/RSV Assay on the [employer] GeneXpert Instrument. This test should be ordered for the detection of SARS-CoV-2 and influenza viruses in individuals who meet clinical and/or epidemiological criteria. Test performance is unknown in asymptomatic patients. This test is for in vitro diagnostic use under the FDA EUA for laboratories certified under CLIA to perform high or moderate complexity testing. This test has not been FDA cleared or approved. A negative result does not rule out the presence of PCR inhibitors in the specimen or target RNA in concentration below the limit of detection for the assay. If only one viral target is positive but coinfection with multiple targets is suspected, the sample should be re-tested with another FDA cleared, approved, or authorized test, if coinfection would change clinical management. This test was validated by the [facility] [employer]. These laboratories are certified under the  Clinical Laboratory Improvement Amendments of 1988 (CLIA-88) as qualified to perform high complexity laboratory testing.   CBC with platelets and differential     Status: None   Result Value Ref Range    WBC Count 6.1 4.0 - 11.0 10e3/uL    RBC Count 4.86 4.40 - 5.90 10e6/uL    Hemoglobin 14.1 13.3 - 17.7 g/dL    Hematocrit 42.7 40.0 - 53.0 %    MCV 88 78 - 100 fL    MCH 29.0 26.5 - 33.0 pg    MCHC 33.0 31.5 - 36.5 g/dL    RDW 14.3 10.0 - 15.0 %    Platelet Count 164 150 - 450 10e3/uL    % Neutrophils 71 %    % Lymphocytes 18 %    % Monocytes 8 %    % Eosinophils 2 %    % Basophils 1 %    % Immature Granulocytes 0 %    NRBCs per 100 WBC 0 <1 /100    Absolute Neutrophils 4.4 1.6 - 8.3 10e3/uL    Absolute Lymphocytes 1.1 0.8 - 5.3 10e3/uL    Absolute Monocytes 0.5 0.0 - 1.3 10e3/uL    Absolute Eosinophils 0.1 0.0 - 0.7 10e3/uL    Absolute Basophils 0.1 0.0 - 0.2 10e3/uL    Absolute Immature Granulocytes 0.0 <=0.4 10e3/uL    Absolute NRBCs 0.0 10e3/uL   Troponin T, High Sensitivity     Status: Abnormal   Result Value Ref Range    Troponin T, High Sensitivity 36 (H) <=22 ng/L   EKG 12-lead, tracing only     Status: None (Preliminary result)   Result Value Ref Range    Systolic Blood Pressure  mmHg    Diastolic Blood Pressure  mmHg    Ventricular Rate 84 BPM    Atrial Rate 84 BPM    DE Interval 176 ms    QRS Duration 144 ms     ms    QTc 571 ms    P Axis 23 degrees    R AXIS -64 degrees    T Axis 75 degrees    Interpretation ECG       Atrial-sensed ventricular-paced rhythm  Abnormal ECG     Cardiac Device Check - Inpatient     Status: None (In process)   Result Value Ref Range    Date Time Interrogation Session [id number]     Implantable Pulse Generator  Medtronic     Implantable Pulse Generator Model [id number] Miyaobabeiia MRI Quad CRT-D     Implantable Pulse Generator Serial Number [id number]     Type Interrogation Session In Clinic     Clinic Name [facility]     Implantable Pulse  Generator Type Cardiac Resynchronization Therapy - Defibrillator     Implantable Pulse Generator Implant Date 20220120     Implantable Lead  Medtronic     Implantable Lead Model 6947M Sprint Quattro Secure MRI SureScan     Implantable Lead Serial Number [id number]     Implantable Lead Implant Date 20220511     Implantable Lead Polarity Type Quadripolar Lead     Implantable Lead Location Detail 1 UNKNOWN     Implantable Lead Special Function 62CMRV/SVC     Implantable Lead Location Right Ventricle     Implantable Lead  Medtronic     Implantable Lead Model 4298 Attain Performa MRI SureScan     Implantable Lead Serial Number [id number]     Implantable Lead Implant Date 20220120     Implantable Lead Polarity Type Quadripolar Lead     Implantable Lead Location Detail 1 UNKNOWN     Implantable Lead Special Function 88CM     Implantable Lead Location Left Ventricle     Implantable Lead  Medtronic     Implantable Lead Model 5076 CapSureFix Novus MRI SureScan     Implantable Lead Serial Number [id number]     Implantable Lead Implant Date 20220120     Implantable Lead Polarity Type Bipolar Lead     Implantable Lead Location Detail 1 UNKNOWN     Implantable Lead Special Function 52 CM     Implantable Lead Location Right Atrium     Flaco Setting Mode (NBG Code) DDDR     Flaco Setting Lower Rate Limit 60 [beats]/min    Flaco Setting Maximum Tracking Rate 140 [beats]/min    Flaco Setting Maximum Sensor Rate 140 [beats]/min    Flaco Setting FANY Delay Low 140 ms    Flaco Setting PAV Delay Low 170 ms    Flaco Setting AT Mode Switch Rate 171 [beats]/min    Lead Channel Setting Sensing Polarity Bipolar     Lead Channel Setting Sensing Anode Location Right Atrium     Lead Channel Setting Sensing Anode Terminal Ring     Lead Channel Setting Sensing Cathode Location Right Atrium     Lead Channel Setting Sensing Cathode Terminal Tip     Lead Channel Setting Sensing Sensitivity 0.3 mV    Lead Channel  Setting Sensing Polarity Bipolar     Lead Channel Setting Sensing Anode Location Right Ventricle     Lead Channel Setting Sensing Anode Terminal Ring     Lead Channel Setting Sensing Cathode Location Right Ventricle     Lead Channel Setting Sensing Cathode Terminal Tip     Lead Channel Setting Sensing Sensitivity 0.3 mV    Ventricular chambers paced during CRT pacing. LVOnly     Lead Channel Setting Pacing Polarity Bipolar     Lead Channel Setting Pacing Anode Location Right Atrium     Lead Channel Setting Pacing Anode Terminal Ring     Lead Channel Setting Sensing Cathode Location Right Atrium     Lead Channel Setting Sensing Cathode Terminal Tip     Lead Channel Setting Pacing Pulse Width 0.4 ms    Lead Channel Setting Pacing Amplitude 1.5 V    Lead Channel Setting Pacing Capture Mode Adaptive     Lead Channel Setting Pacing Polarity Bipolar     Lead Channel Setting Pacing Anode Location Right Ventricle     Lead Channel Setting Pacing Anode Terminal Ring     Lead Channel Setting Sensing Cathode Location Right Ventricle     Lead Channel Setting Sensing Cathode Terminal Tip     Lead Channel Setting Pacing Pulse Width 0.4 ms    Lead Channel Setting Pacing Amplitude 2 V    Lead Channel Setting Pacing Capture Mode Adaptive     Lead Channel Setting Pacing Polarity Bipolar     Lead Channel Setting Pacing Anode Location Right Ventricle     Lead Channel Setting Pacing Anode Terminal Coil     Lead Channel Setting Sensing Cathode Location Left Ventricle     Lead Channel Setting Sensing Cathode Terminal Tip     Lead Channel Setting Pacing Pulse Width 1 ms    Lead Channel Setting Pacing Amplitude 2 V    Lead Channel Setting Pacing Capture Mode Adaptive     Zone Setting Type Category VF     Zone Setting Detection Interval 320 ms    Zone Setting Detection Beats Numerator 30 [beats]    Zone Setting Detection Beats Denominator 40 [beats]    Zone Setting Type Category [place]     Zone Setting Detection Interval 240 ms    Zone Setting  Type Category [place]     Zone Setting Detection Interval 360 ms    Zone Setting Type Category [place]     Zone Setting Detection Interval 420 ms    Zone Setting Type Category ATRIAL_FIBRILLATION     Zone Setting Type Category AT/AF     Zone Setting Detection Interval 350 ms    Lead Channel Impedance Value 418 ohm    Lead Channel Sensing Intrinsic Amplitude 2.3 mV    Lead Channel Pacing Threshold Amplitude 0.5 V    Lead Channel Pacing Threshold Pulse Width 0.4 ms    Lead Channel Impedance Value 399 ohm    Lead Channel Sensing Intrinsic Amplitude 6.0 mV    Lead Channel Pacing Threshold Amplitude 0.75 V    Lead Channel Pacing Threshold Pulse Width 0.4 ms    Lead Channel Impedance Value 475 ohm    Lead Channel Impedance Value 513 ohm    Lead Channel Impedance Value 475 ohm    Lead Channel Impedance Value 399 ohm    Lead Channel Impedance Value 475 ohm    Lead Channel Impedance Value 513 ohm    Lead Channel Impedance Value 285 ohm    Lead Channel Impedance Value 285 ohm    Lead Channel Impedance Value 304 ohm    Lead Channel Impedance Value 285 ohm    Lead Channel Impedance Value 142.5 ohm    Lead Channel Impedance Value 147.097     Lead Channel Impedance Value 142.5 ohm    Lead Channel Impedance Value 142.5 ohm    Lead Channel Impedance Value 147.097     Lead Channel Pacing Threshold Amplitude 0.75 V    Lead Channel Pacing Threshold Pulse Width 1.0 ms    Battery Date Time of Measurements [id number]     Battery Status Middle of Service     Battery RRT Trigger 2.727     Battery Remaining Longevity 68 mo    Battery Voltage 2.97 V    Capacitor Charge Type Reformation     Capacitor Last Charge Date Time [id number]     Capacitor Charge Time 3.953     Capacitor Charge Energy 18 J    Flaco Statistic Date Time Start 79075880994037     Flaco Statistic Date Time End 85101032673850     Flaco Statistic RA Percent Paced 1.33 %    Flaco Statistic RV Percent Paced 5.56 %    CRT Statistic LV Percent Paced 89.92 %    Flaco Statistic AP   Percent 1.29 %    Flaco Statistic AS  Percent 89.34 %    Flaco Statistic AP VS Percent 0.07 %    Flaco Statistic AS VS Percent 9.31 %    CRT Statistic Date Time Start 20221114143133     CRT Statistic Date Time End 20221206105909     CRT Statistic CRT Percent Paced 89.92 %    Atrial Tachy Statistic Date Time Start 20221114143133     Atrial Tachy Statistic Date Time End 20221206105909     Atrial Tachy Statistic AT/AF [place] Percent 4.1 %    Therapy Statistic Recent Shocks Delivered 0     Therapy Statistic Recent Shocks Aborted 0     Therapy Statistic Recent ATP Delivered 0     Therapy Statistic Recent Date Time Start 20221114143133     Therapy Statistic Recent Date Time End 20221206105909     Therapy Statistic Total Shocks Delivered 0     Therapy Statistic Total Shocks Aborted 0     Therapy Statistic Total ATP Delivered 0     Therapy Statistic Total  Date Time Start 68785835640013     Therapy Statistic Total  Date Time End 20221206105909     Episode Statistic Recent Count 1     Episode Statistic Type Category AT/AF     Episode Statistic Recent Count 0     Episode Statistic Type Category SVT     Episode Statistic Recent Count 0     Episode Statistic Type Category [place]     Episode Statistic Recent Count 0     Episode Statistic Type Category VF     Episode Statistic Recent Count 0     Episode Statistic Type Category [place]     Episode Statistic Recent Count 0     Episode Statistic Type Category VT     Episode Statistic Recent Count 0     Episode Statistic Type Category [place]     Episode Statistic Recent Date Time Start 66603982250935     Episode Statistic Recent Date Time End 13059973181400     Episode Statistic Recent Date Time Start 20221114143133     Episode Statistic Recent Date Time End 32278974488259     Episode Statistic Recent Date Time Start 32026893679079     Episode Statistic Recent Date Time End 26804619050349     Episode Statistic Recent Date Time Start 83645867157486     Episode Statistic Recent Date Time End  74779001952738     Episode Statistic Recent Date Time Start 78742880814659     Episode Statistic Recent Date Time End 48699977260828     Episode Statistic Recent Date Time Start 67766981762843     Episode Statistic Recent Date Time End 51890727623087     Episode Statistic Recent Date Time Start 69594676208445     Episode Statistic Recent Date Time End 46951764028856     Episode Statistic Total Count 2     Episode Statistic Type Category AT/AF     Episode Statistic Total Count 0     Episode Statistic Type Category SVT     Episode Statistic Total Count 38     Episode Statistic Type Category VT     Episode Statistic Total Count 0     Episode Statistic Type Category VF     Episode Statistic Total Count 0     Episode Statistic Type Category VT     Episode Statistic Total Count 0     Episode Statistic Type Category VT     Episode Statistic Total Count 0     Episode Statistic Type Category [place]     Episode Statistic Total Date Time Start 99446613663800     Episode Statistic Total Date Time End 05438588904427     Episode Statistic Total Date Time Start 93723361486374     Episode Statistic Total Date Time End 16612188119325     Episode Statistic Total Date Time Start 63772001982963     Episode Statistic Total Date Time End 61001827983528     Episode Statistic Total Date Time Start 77029387743233     Episode Statistic Total Date Time End 41368778644486     Episode Statistic Total Date Time Start 00229528501463     Episode Statistic Total Date Time End 05061054269991     Episode Statistic Total Date Time Start 07263161769158     Episode Statistic Total Date Time End 57496489686105     Episode Statistic Total Date Time Start 00300191201188     Episode Statistic Total Date Time End 85924077932071     Episode Identifier 42     Episode Type Category SVT     Episode Date Time [id number]     Episode Duration 28 s    Episode Identifier 41     Episode Type Category Monitor     Episode Date Time [id number]     Episode Duration  77,133 s    Episode Identifier 1,491     Episode Type Category VSE     Episode Date Time 80125825169985     Episode Duration 5 s    Episode Identifier 1,490     Episode Type Category VSE     Episode Date Time [id number]     Episode Duration 18 s    Episode Identifier 1,489     Episode Type Category VSE     Episode Date Time [id number]     Episode Duration 5 s    Episode Identifier 1,488     Episode Type Category VSE     Episode Date Time 32106369510419     Episode Duration 6 s    Episode Identifier 1,487     Episode Type Category VSE     Episode Date Time [id number]     Episode Duration 12 s    Episode Identifier 1,486     Episode Type Category VSE     Episode Date Time [id number]     Episode Duration 4 s    Episode Identifier 1,485     Episode Type Category VSE     Episode Date Time 20221125224013     Episode Duration 17 s    Narrative    Patient seen in ED-[facility] for evaluation and iterative programming of their ICD per MD orders.     Device: Medtronic [id number] [place] MRI Quad CRT-D  Normal device function   Mode: DDDR  bpm  AP: 1.3%  BVP: 90%  VSR Pace: 8.1%  Intrinsic rhythm: SR @ 80 bpm  Thoracic Impedance: Below reference line, suggesting possible intrathoracic fluid accumulation.  Short V-V intervals: 0  Lead Trends Appear Stable.   Estimated battery longevity to RRT = 5.9 years. Battery voltage = 2.98 V.   Atrial Arrhythmia: Device records 1 AT/AF episode occurring on 11/25/22. Per HR Histograms, total time in AT/AF = 21 hrs. Stored marker channels reveal AT/AF with vent rates @ 90-110s bpm.    AF [place]: 4.1%  Anticoagulant: apixaban  Ventricular Arrhythmia: Device detected 225 ventricular sensing episodes. Available EGMs of 7 most recently stored episodes EGMs reveal slow VT-NS @  bpm, runs lasting ~5 sec.    Setting Changes: None   Plan: See as needed while inpatient status. Routine follow-up with primary device clinic upon DANIELA Oliveira RN    Multi lead ICD    I have  reviewed and interpreted the device interrogation, settings, programming and nurse's summary. The device is functioning within normal device parameters. I agree with the current findings, assessment and plan.   CBC with platelets differential     Status: None    Narrative    The following orders were created for panel order CBC with platelets differential.  Procedure                               Abnormality         Status                     ---------                               -----------         ------                     CBC with platelets and d...[[id number]]                      Final result                 Please view results for these tests on the individual orders.      Medications   DOBUTamine (DOBUTREX) 500 mg in D5W 250 mL infusion (adult std conc) (2.5 mcg/kg/min × 94.7 kg (Dosing Weight) Intravenous Rate/Dose Verify 12/6/22 1246)   amiodarone (PACERONE) tablet 200 mg (200 mg Oral Not Given 12/6/22 1117)   furosemide (LASIX) tablet 20 mg (20 mg Oral Not Given 12/6/22 1118)   mycophenolate (GENERIC EQUIVALENT) tablet 1,000 mg (1,000 mg Oral Not Given 12/6/22 1118)        Assessments & Plan (with Medical Decision Making)   67 year old male with dobutamine dependent heart failure to the emergency department after episode of upper arm and perhaps upper back tightness today that lasted 30 minutes associate with some mild dyspnea.  He has had similar symptoms once before when he was in [place].  The patient's EKG is paced.  His initial troponin resulted at 46 with a delta troponin of 36.  His COVID and influenza testing are negative.  Chest radiograph was unrevealing.  Patient does have a mild BNP elevation above his baseline but no ellie pulmonary edema on his chest radiograph.  His weight is also been stable.  He had no ongoing symptoms here in the emergency department.  Discussed with cardiology to staff.  Would like the patient to be admitted for further diagnostic evaluation and treatment.    I have  reviewed the nursing notes. I have reviewed the findings, diagnosis, plan and need for follow up with the patient.    New Prescriptions    No medications on file       Final diagnoses:   Anginal equivalent (H)   Troponin level elevated   Heart failure, unspecified HF chronicity, unspecified heart failure type (H)     Chart documentation was completed with Dragon voice-recognition software. Even though reviewed, this chart may still contain some grammatical, spelling, and word errors.     --  Jeffrey Grande Md  [facility] EMERGENCY DEPARTMENT  12/6/2022     Jeffrey Grande MD  12/06/22 0537

## 2022-12-06 NOTE — ED TRIAGE NOTES
Pt BIBA for cardiac workup. 0630 started having bilateral arm pain but no chest pressure. Pt has been being worked up for CHF and transplant list. On continuous dobutamine drip and has pacemaker. 324 mg of aspirin given by EMS.

## 2022-12-06 NOTE — Clinical Note
Called to Giselle MELENDEZ on 6C. All questions answered. All belongings sent with patient. Patient transported to 6C via wheelchair with EMEKA RN.

## 2022-12-06 NOTE — H&P
Cardiology Inpatient H&P  December 6, 2022    Assessment and Plan:     Paco Stein is a 67 year old male with a history of chronic systolic heart failure (suspected predominantly nonischemic cardiomyopathy, possibly arrhythmogenic), coronary artery disease (s/p PCI with TILA to LCx 4/2013 and to the LAD in 10/2021), hyperlipidemia, CNS vasculitis and chronic kidney disease who presents with transient bilateral hand tightness with unrevealing workup, now admitted for continued advanced therapies evaluation.      # Bilateral hand tightness, resolved  Experienced two episodes prior to admission, once while performing PT exercises and again when he woke up the following morning. Each episode lasted for about 15 minutes and had no other associated symptoms. Etiology not clear at this time. No focal motor or sensory deficits on exam. Electrolytes within normal limits. Troponin only mildly elevated at 46 and down-trending. EKG with A sensed V paced rhythm with no concerning ST/T changes so low suspicion for ischemia. ICD interrogation detected 225 ventricular sensed episodes, with the most recently stored episodes showing slow VT-NS at  bpm, runs lasting about 5 seconds. Unclear if these short runs would explain his 15-minute episodes. He is on dobutamine which has known arrhythmogenic effects so certainly worth monitoring closely, especially given his history of NSVT.   - Monitor for recurrence while on telemetry   - Trend electrolytes and replete as needed     # Chronic systolic heart failure, EF 15-20%, NYHA class IV, ACC/AHA Stage D  # Ambulatory cardiogenic shock, SCAI stage C  Recently admitted from 11/10/2022 - 11/21/2022 for acute on chronic heart failure, treated with IV diuresis and initiated on dobutamine infusion. Patient completed advanced therapy evaluation during that admission and and is now re-admitted for continued evaluation and possible transplant listing. No signs of acute heart failure  decompensation at this time. He reports improvement in his activity tolerance and appetite since starting dobutamine.   - Etiology: Predominantly non-ischemic cardiomyopathy, suspected arrhythmogenic   - Volume: 20mg PO Lasix BID (home dose)  - Inotrope: Dobutamine 2.5 mcg/kg/min  - BB: held in the setting of shock  - ACE/ARB/ARNi: previous intolerance due to hypotension and lightheadedness   - SGLT2i: Holding PTA Empagliflozin 10mg qday  - MRA: None  - Statin: PTA pravastatin  - Electrolytes: PTA 20 meq KCl daily   - Transplant evaluation: See committee note dated 11/18/22. Completed checklist during previous admission. Now admitted for possible transplant listing  - NPO at midnight for [facility] 12/07    Multivessel coronary artery disease (s/p PCI with TILA to LCx 4/2013 and to the LAD in 10/2021)  As discussed above, low suspicion for ACS with bilateral arm tightness. 11/11/22 LHC showed patent LAD and LCx stents as well as mild-mod non-obstructive disease in the RCA.    - Continue PTA 81mg qday dosing  - BB: Holding as per above  - Statin: Cont PTA pravastatin  - Antianginal: N/A    History of NSVT s/p biventricular PPM/ICD  Interrogation performed 12/10/22 w/ two short (<5s) runs of NSVT on 11/1/22 and 11/3/22, no recent shocks. A-paced.  - Cont PTA PO Amiodarone 200mg BID    # Occlusive left peroneal DVT  # Non-occlusive right axillary DVT  Identified on doppler 11/15/22 during previous admission.   - PTA apixaban     # Incidental punctate cerebellar infarct, likely embolic  Read on MR imaging 11/14/22 without new neuro deficits. Per radiology, appears acute, likely embolic. DNo known arterial clot history. Reviewed telemetry over admission, no afib or other tachycarrhythmias. Reassuring prior TTE this admission including bubble study, repeat JARED w/o septal defects or thrombus. Treated w/ heparin gtt and transitioned to Eliquis.    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 No episodes since. Neurological exam unremarkable. Neurology consulted during recent admission as part of VAD/transplant evaluation. MRI/MRA brain was also performed which showed no concern for progression of disease  - Continue PTA cellcept 1000mg qam and 500mg qpm     # CKD stage 2  Creatinine at baseline.   - trend BMP     # Insomnia/anxiety  - PTA Trazodone 50mg at bedtime PRN   - PTA lorazepam TID PRN       FEN: Low salt diet, replete lytes as needed   DVT PPx: Apixaban   Code: Full code     Disposition: Inpatient admission    Discussed with Dr. Ruslan Poole MD  Cardiology Fellow   [phone]     CC:   Bilateral arm tightness      HPI:   Paco Stein is a 67 year old male with a history of chronic systolic heart failure (suspected predominantly nonischemic cardiomyopathy, possibly arrhythmogenic), coronary artery disease (s/p PCI with Elijah to LCx 4/2013 and to the LAD in 10/2021), hyperlipidemia, CNS vasculitis and chronic kidney disease who presents with bilateral hand tightness.    For context, patient was recently admitted here from 11/10/2022 to 11/21/2022 for acute on chronic heart failure, treated with IV diuresis and initiated on dobutamine infusion which she was discharged on.  Patient also completed advanced therapy evaluation and had an upcoming appointment tomorrow (12/7/2022) with Dr. Dominguez, with plan for possible admission for formal transplant listing.  He was seen by his local cardiologist (Dr. Andrews [place], [place]) for routine follow up on 11/28/2022 and appeared to be doing well in terms of appetite, energy level and activity tolerance. Patient continued to do well until yesterday evening when he experienced sudden onset bilateral arm tightness while doing PT exercises. No substernal chest pain, palpitations or dizziness. Symptoms lasted for about 15-20 minutes. He had a similar episode early this morning of bilateral arm tightness with no additional  symptoms, also lasting about 15 minutes. He proceeded to call EMS and presented to ED for evaluation. He's had a cough for the last 2 weeks or so, no fever or chills. He reports improvement in his breathing, activity tolerance and appetite since discharging on dobutamine.     Review of Systems:    Complete review of systems was performed and negative except per HPI.      Past Medical History:     Past Medical History:   Diagnosis Date     Congestive heart failure (H)         Past Surgical History:     Past Surgical History:   Procedure Laterality Date     COLONOSCOPY N/A 11/17/2022    Procedure: COLONOSCOPY;  Surgeon: Roverto Nino MD;  Location:  GI     CV CORONARY ANGIOGRAM N/A 11/11/2022    Procedure: Coronary Angiogram;  Surgeon: Justo Bush MD;  Location:  HEART CARDIAC CATH LAB     CV RIGHT HEART CATH MEASUREMENTS RECORDED N/A 11/11/2022    Procedure: Right Heart Catheterization;  Surgeon: Justo Bush MD;  Location:  HEART CARDIAC CATH LAB     PICC DOUBLE LUMEN PLACEMENT Right 11/10/2022    Right basilic, 43 cm, 1 cm external length        Social History:     Social History     Tobacco Use     Smoking status: Never     Smokeless tobacco: Never   Substance Use Topics     Alcohol use: Not Currently         Family History:     Family History   Problem Relation Age of Onset     Atrial fibrillation Father      Lung Cancer Brother      Family history of NICM with two family members having sudden cardiac death (brother with sudden cardiac death at 49, both with fatty metaplasia on catheterization) and a diagnosis of heart failure over the last several years.      Medications:       amiodarone  200 mg Oral BID     furosemide  20 mg Oral BID     mycophenolate  1,000 mg Oral QAM            Allergies:   No Known Allergies       Physical Exam:   Temp:  [98.7  F (37.1  C)] 98.7  F (37.1  C)  Pulse:  [76-93] 93  Resp:  [18] 18  BP: ()/() 100/78  SpO2:  [91 %-97 %] 96  %  GEN: pleasant, no acute distress  HEENT: no icterus, EOMI  CV: RRR, normal s1/s2, soft systolic murmur best heard at LLSB, JVP 8.   CHEST: clear to ausculation bilaterally, no rales or wheezing  ABD: soft, non-tender, normal active bowel sounds  EXTR: pulses 2+. No LE edema.   NEURO: alert oriented, speech fluent/appropriate, Non-focal motor exam      Data:   CBC  Recent Labs   Lab 12/06/22  0734   WBC 6.1   RBC 4.86   HGB 14.1   HCT 42.7   MCV 88   MCH 29.0   MCHC 33.0   RDW 14.3        CMP  Recent Labs   Lab 12/06/22  0734      POTASSIUM 3.5   CHLORIDE 105   CO2 23   ANIONGAP 13   *   BUN 16.7   CR 1.03   GFRESTIMATED 79   TIM 8.9   MAG 1.8   PROTTOTAL 5.8*   ALBUMIN 3.5   BILITOTAL 1.4*   ALKPHOS 60   AST 20   ALT 15     Troponin 46-->36    12/6/2022 EKG: A sensed, V paced. No acute ST/T changes       12/06/2022 ICD interrogation:   Device: [employer] [id number] Claria MRI Quad CRT-D  Normal device function   Mode: DDDR  bpm  AP: 1.3%  BVP: 90%  VSR Pace: 8.1%  Intrinsic rhythm: SR @ 80 bpm  Thoracic Impedance: Below reference line, suggesting possible intrathoracic fluid accumulation.  Short V-V intervals: 0  Lead Trends Appear Stable.   Estimated battery longevity to RRT = 5.9 years. Battery voltage = 2.98 V.   Atrial Arrhythmia: Device records 1 AT/AF episode occurring on 11/25/22. Per HR Histograms, total time in AT/AF = 21 hrs. Stored marker channels reveal AT/AF with vent rates @ 90-110s bpm.    AF Erwin: 4.1%  Anticoagulant: apixaban  Ventricular Arrhythmia: Device detected 225 ventricular sensing episodes. Available EGMs of 7 most recently stored episodes EGMs reveal slow VT-NS @  bpm, runs lasting ~5 sec.    Setting Changes: None     11/16/2022 Transesophageal echocardiogram:  Interpretation Summary  Mild to moderate left ventricular dilation is present. Left ventricular  function is decreased. The ejection fraction is 20-25% (severely reduced).  Severe diffuse hypokinesis  is present.  Global right ventricular function is mildly reduced.  Moderate to severe functional mitral insufficiency is present.  The left atrial appendage is free of thrombus.  The atrial septum is intact as assessed by color Doppler and agitated saline  bubble study .  No pericardial effusion present.    11/11/2022 Transthoracic echocardiogram:   Interpretation Summary  Moderate left ventricular dilation is present. Left ventricular function is  decreased. The ejection fraction is 15-20% (severely reduced). Severe diffuse  hypokinesis is present.  Mild right ventricular dilation is present. Global right ventricular function  is moderately reduced.  Moderate mitral insufficiency is present. The cause of the mitral  insufficiency appears to be altered left ventricular size and geometry.  Previous study not available for comparison.    11/11/2022 LHC/RHC:  RA --/-12/6  RV 32/9  PA 32/15/22  PCWP --/--/15  PA Sat 66%  Aisha CO/CI 4.3/1.9  PVR 1.6  UREÑA      Right sided filling pressures are mildly elevated.    Mild elevated pulmonary hypertension.    Left sided filling pressures are mildly elevated.    Reduced cardiac output level.    Hemodynamic data has been modified in Epic per physician review.    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    Patent prior LAD and LCx stents with mild to moderate non-obstructive disease in the RCA. Grossly unchanged when compared to OSH report in 10/2021.

## 2022-12-06 NOTE — Clinical Note
Potential access sites were evaluated for patency using ultrasound.   The right femoral vein was selected. Access was obtained under with Sonosite guidance using a standard 18 guage needle with direct visualization of needle entry.

## 2022-12-06 NOTE — ED NOTES
Bed: ED14  Expected date: 12/6/22  Expected time: 7:15 AM  Means of arrival: Ambulance  Comments:  68y/o male - cardiac workup

## 2022-12-06 NOTE — LETTER
2022    Flushinglinda Stein  [address]  [place] [address]      Dear Mr. Stein,    This letter is sent to confirm that you have completed your transplant work-up and you are a candidate in the heart transplant program at the [facility].  You were placed on the heart active waitlist on 22.      When you are active on the waitlist and an organ becomes available, a coordinator will need to speak to you immediately.  You could be contacted at any time during the day or night as an organ could become available at any time.  Please make certain our office always has your current telephone numbers and address.      Items we will need from you:      We have received approval from your insurance company for the transplant procedure.  It is critical that you notify us if there is any change in your insurance.  It is also important that you familiarize yourself with the details of your specific insurance policy.  Our patient  is available to assist you if you should have any questions regarding your coverage.      An ALA or PRA blood sample may need to be sent here every 3 to 6 months to match you with  donors or any potential living donors.  If you need this testing, special mailing boxes (called mailers) will be sent to you directly from the Outreach Department.  You should take the physician order form and the  to your home laboratory when it is time to for this testing to be done.  Additional mailers can be obtained by calling the Transplant Office and asking to speak to a heart .      During this waiting period, we may request additional periodic laboratory tests with your primary physician.  It will be your responsibility to remind your physician to forward your results to the Transplant Office.      We need to be kept informed of any changes in your medical condition such as:    o changes  in your medications,   o significant changes in your health  o significant infections (such as pneumonia or abscesses)  o blood transfusions  o any condition which requires hospitalization  o any surgery      Remember to complete any routine cancer screening tests required before your transplant.  This includes colonoscopy; prostate screening for men, and mammogram and gynecologic testing for women, as well as dental work.  Your primary care clinic can assist you with arranging for these exams.  Remind your caregivers to forward copies of the records and final reports.      We want you to know that our program has physician and surgeon coverage 24 hours a day, 365 days a year. In addition, our transplant surgeons and physicians will not be on call for two or more transplant programs more than 30 miles apart unless the circumstances have been reviewed and approved by the [employer] (UNOS) Membership and Professional Standards Committee (MPSC). Finally, our primary physician and primary surgeons are not designated as the primary surgeon or primary physician at more than 1 transplant hospital. If this coverage changes or there are substantial program changes, you will be notified in writing by letter.     Attached is a letter from UN[employer] that describes the services and information offered to patients by UNOS and the Organ Procurement and Transplantation Network (OPTN).    We appreciate having had the opportunity to participate in your care.  If you have questions, please feel free to call the Transplant Office at [phone] or [phone].      Sincerely,      Solid Organ Transplant  [facility], [facility]      Enclosures: OS Letter  cc: Care Team                          The Organ Procurement and Transplantation Network  Toll-free patient services line:     Your resource for organ transplant  information    If you have a question regarding your own medical care, you always should call your transplant hospital first. However, for general organ transplant-related information, you can call the Organ Procurement and Transplantation Network (OPTN) toll-free patient services line at [phone]. Anyone, including potential transplant candidates, candidates, recipients, family members, friends, living donors, and donor family members, can call this number to:          Talk about organ donation, living donation, the transplant process, the donation process, and transplant policies.    Get a free patient information kit with helpful booklets, waiting list and transplant information, and a list of all transplant hospitals.    Ask questions about the OPTN website (https://optn.transplant.hrsa.gov/), the [employer] for Organ [employer]aring s (UNOS) website (https://unos.org/), or the UNOS website for living donors and transplant recipients. (https://www.transplantliving.org/).    Learn how the OPTN can help you.    Talk about any concerns that you may have with a transplant hospital.    The [facility] transplant system, the OPTN, is managed under federal contract by the [employer]aring (UNOS), which is a non-profit charitable organization. The OPTN helps create and define organ sharing policies that make the best use of donated organs. This process continuously evaluating new advances and discoveries so policies can be adapted to best serve patients waiting for transplants. To do so, the OPTN works closely with transplant professionals, transplant patients, transplant candidates, donor families, living donors, and the public. All transplant programs and organ procurement organizations throughout the country are OPTN members and are obligated to follow the policies the OPTN creates for allocating organs.    The OPTN also is responsible for:      Providing educational material for patients, the public,  and professionals.    Raising awareness of the need for donated organs and tissue.    Coordinating organ procurement, matching, and placement.    Collecting information about every organ transplant and donation that occurs in the [place].    Remember, you should contact your transplant hospital directly if you have questions or concerns about your own medical care including medical records, work-up progress, and test results.    We are not your transplant hospital, and our staff will not be able to answer questions about your case, so please keep your transplant hospital s phone number handy.    However, while you research your transplant needs and learn as much as you can about transplantation and donation, we welcome your call to our toll-free patient services line at [phone].          Updated 4/1/2019

## 2022-12-07 ENCOUNTER — APPOINTMENT (OUTPATIENT)
Dept: GENERAL RADIOLOGY | Facility: CLINIC | Age: 67
DRG: 001 | End: 2022-12-07
Attending: STUDENT IN AN ORGANIZED HEALTH CARE EDUCATION/TRAINING PROGRAM
Payer: MEDICARE

## 2022-12-07 LAB
ALBUMIN SERPL BCG-MCNC: 3.6 G/DL (ref 3.5–5.2)
ALP SERPL-CCNC: 64 U/L (ref 40–129)
ALT SERPL W P-5'-P-CCNC: 15 U/L (ref 10–50)
ANION GAP SERPL CALCULATED.3IONS-SCNC: 13 MMOL/L (ref 7–15)
AST SERPL W P-5'-P-CCNC: 18 U/L (ref 10–50)
BILIRUB SERPL-MCNC: 1.4 MG/DL
BUN SERPL-MCNC: 15.6 MG/DL (ref 8–23)
CALCIUM SERPL-MCNC: 8.8 MG/DL (ref 8.8–10.2)
CHLORIDE SERPL-SCNC: 103 MMOL/L (ref 98–107)
CREAT SERPL-MCNC: 1.08 MG/DL (ref 0.67–1.17)
CYSTATIN C (ROCHE): 1.2 MG/L (ref 0.6–1)
DEPRECATED HCO3 PLAS-SCNC: 23 MMOL/L (ref 22–29)
ERYTHROCYTE [DISTWIDTH] IN BLOOD BY AUTOMATED COUNT: 14.3 % (ref 10–15)
GFR SERPL CREATININE-BSD FRML MDRD: 59 ML/MIN/1.73M2
GFR SERPL CREATININE-BSD FRML MDRD: 74 ML/MIN/1.73M2
GLUCOSE SERPL-MCNC: 112 MG/DL (ref 70–99)
HCT VFR BLD AUTO: 42.5 % (ref 40–53)
HGB BLD-MCNC: 13.9 G/DL (ref 13.3–17.7)
LACTATE SERPL-SCNC: 1.4 MMOL/L (ref 0.7–2)
MAGNESIUM SERPL-MCNC: 1.8 MG/DL (ref 1.7–2.3)
MCH RBC QN AUTO: 28.5 PG (ref 26.5–33)
MCHC RBC AUTO-ENTMCNC: 32.7 G/DL (ref 31.5–36.5)
MCV RBC AUTO: 87 FL (ref 78–100)
PLATELET # BLD AUTO: 169 10E3/UL (ref 150–450)
POTASSIUM SERPL-SCNC: 3.3 MMOL/L (ref 3.4–5.3)
PROT SERPL-MCNC: 5.8 G/DL (ref 6.4–8.3)
RBC # BLD AUTO: 4.87 10E6/UL (ref 4.4–5.9)
SODIUM SERPL-SCNC: 139 MMOL/L (ref 136–145)
WBC # BLD AUTO: 7.6 10E3/UL (ref 4–11)

## 2022-12-07 PROCEDURE — 82610 CYSTATIN C: CPT | Performed by: INTERNAL MEDICINE

## 2022-12-07 PROCEDURE — 250N000013 HC RX MED GY IP 250 OP 250 PS 637: Performed by: STUDENT IN AN ORGANIZED HEALTH CARE EDUCATION/TRAINING PROGRAM

## 2022-12-07 PROCEDURE — 80053 COMPREHEN METABOLIC PANEL: CPT | Performed by: STUDENT IN AN ORGANIZED HEALTH CARE EDUCATION/TRAINING PROGRAM

## 2022-12-07 PROCEDURE — 71045 X-RAY EXAM CHEST 1 VIEW: CPT | Mod: 26 | Performed by: RADIOLOGY

## 2022-12-07 PROCEDURE — 83735 ASSAY OF MAGNESIUM: CPT | Performed by: STUDENT IN AN ORGANIZED HEALTH CARE EDUCATION/TRAINING PROGRAM

## 2022-12-07 PROCEDURE — 250N000013 HC RX MED GY IP 250 OP 250 PS 637: Performed by: EMERGENCY MEDICINE

## 2022-12-07 PROCEDURE — 99232 SBSQ HOSP IP/OBS MODERATE 35: CPT | Mod: GC | Performed by: INTERNAL MEDICINE

## 2022-12-07 PROCEDURE — 214N000001 HC R&B CCU UMMC

## 2022-12-07 PROCEDURE — 999N000044 HC STATISTIC CVC DRESSING CHANGE

## 2022-12-07 PROCEDURE — 85014 HEMATOCRIT: CPT | Performed by: STUDENT IN AN ORGANIZED HEALTH CARE EDUCATION/TRAINING PROGRAM

## 2022-12-07 PROCEDURE — 250N000012 HC RX MED GY IP 250 OP 636 PS 637: Performed by: EMERGENCY MEDICINE

## 2022-12-07 PROCEDURE — 83605 ASSAY OF LACTIC ACID: CPT | Performed by: STUDENT IN AN ORGANIZED HEALTH CARE EDUCATION/TRAINING PROGRAM

## 2022-12-07 PROCEDURE — 250N000011 HC RX IP 250 OP 636: Performed by: EMERGENCY MEDICINE

## 2022-12-07 PROCEDURE — 250N000012 HC RX MED GY IP 250 OP 636 PS 637: Performed by: STUDENT IN AN ORGANIZED HEALTH CARE EDUCATION/TRAINING PROGRAM

## 2022-12-07 PROCEDURE — 71045 X-RAY EXAM CHEST 1 VIEW: CPT

## 2022-12-07 RX ORDER — POTASSIUM CHLORIDE 1500 MG/1
60 TABLET, EXTENDED RELEASE ORAL ONCE
Status: COMPLETED | OUTPATIENT
Start: 2022-12-07 | End: 2022-12-07

## 2022-12-07 RX ORDER — TRAZODONE HYDROCHLORIDE 50 MG/1
50 TABLET, FILM COATED ORAL 2 TIMES DAILY PRN
Status: DISCONTINUED | OUTPATIENT
Start: 2022-12-07 | End: 2022-12-26

## 2022-12-07 RX ORDER — LORAZEPAM 0.5 MG/1
0.5 TABLET ORAL 3 TIMES DAILY PRN
Status: DISCONTINUED | OUTPATIENT
Start: 2022-12-07 | End: 2022-12-26

## 2022-12-07 RX ADMIN — POTASSIUM CHLORIDE 20 MEQ: 750 TABLET, EXTENDED RELEASE ORAL at 08:12

## 2022-12-07 RX ADMIN — ASPIRIN 81 MG: 81 TABLET, COATED ORAL at 08:12

## 2022-12-07 RX ADMIN — MYCOPHENOLATE MOFETIL 1000 MG: 500 TABLET, FILM COATED ORAL at 08:12

## 2022-12-07 RX ADMIN — LORAZEPAM 0.5 MG: 0.5 TABLET ORAL at 12:19

## 2022-12-07 RX ADMIN — AMIODARONE HYDROCHLORIDE 200 MG: 200 TABLET ORAL at 08:12

## 2022-12-07 RX ADMIN — LORAZEPAM 0.5 MG: 0.5 TABLET ORAL at 21:10

## 2022-12-07 RX ADMIN — FUROSEMIDE 20 MG: 20 TABLET ORAL at 08:12

## 2022-12-07 RX ADMIN — PRAVASTATIN SODIUM 20 MG: 20 TABLET ORAL at 08:12

## 2022-12-07 RX ADMIN — FUROSEMIDE 20 MG: 20 TABLET ORAL at 15:24

## 2022-12-07 RX ADMIN — DOBUTAMINE HYDROCHLORIDE 2.5 MCG/KG/MIN: 200 INJECTION INTRAVENOUS at 20:18

## 2022-12-07 RX ADMIN — AMIODARONE HYDROCHLORIDE 200 MG: 200 TABLET ORAL at 20:05

## 2022-12-07 RX ADMIN — MYCOPHENOLATE MOFETIL 500 MG: 500 TABLET, FILM COATED ORAL at 20:05

## 2022-12-07 RX ADMIN — POTASSIUM CHLORIDE 60 MEQ: 1500 TABLET, EXTENDED RELEASE ORAL at 10:51

## 2022-12-07 ASSESSMENT — ACTIVITIES OF DAILY LIVING (ADL)
ADLS_ACUITY_SCORE: 36
ADLS_ACUITY_SCORE: 34
ADLS_ACUITY_SCORE: 36
ADLS_ACUITY_SCORE: 42
ADLS_ACUITY_SCORE: 34
ADLS_ACUITY_SCORE: 34
ADLS_ACUITY_SCORE: 42
ADLS_ACUITY_SCORE: 42
ADLS_ACUITY_SCORE: 34
ADLS_ACUITY_SCORE: 42
ADLS_ACUITY_SCORE: 34
ADLS_ACUITY_SCORE: 42

## 2022-12-07 NOTE — PROGRESS NOTES
ADMISSION NOTE:    Admitted From: ED  Via: bed  Report Received From: AL Gupta on 6C. Pt transported right at shift change.  Family:  Wife & son present  Belongings: Clothes, glasses, phone, , ear plugs, 2 pillows, blanket.   Orientation to Unit: yes.  2 RN Skin Assessment: Candy BARNEY RN & Hola DEMARCO RN.

## 2022-12-07 NOTE — PROGRESS NOTES
[facility] [place]    Cards II Daily Progress Note    Assessment and Plan:     Paco Stein is a 67 year old male with a history of chronic systolic heart failure (suspected predominantly nonischemic cardiomyopathy, possibly arrhythmogenic), coronary artery disease (s/p PCI with TILA to LCx 4/2013 and to the LAD in 10/2021), hyperlipidemia, CNS vasculitis and chronic kidney disease who presents with transient bilateral hand tightness with unrevealing workup, now admitted for continued advanced therapies evaluation.      Interval History:  Slept poorly last night, mainly because of how his PRN lorazepam and trazodone. No other acute events overnight. This morning, denies fever, chills chest pain or shortness of breath.     Plan Today:  - NPO at midnight for RHC tomorrow. Holding apixaban   - Will complete 6-minute walk test today    =======================================================================  # Bilateral hand tightness, resolved  Experienced two episodes prior to admission, once while performing PT exercises and again when he woke up the following morning. Each episode lasted for about 15 minutes and had no other associated symptoms. Etiology not clear at this time. No focal motor or sensory deficits on exam. Electrolytes within normal limits. Troponin only mildly elevated at 46 and down-trending. EKG with A sensed V paced rhythm with no concerning ST/T changes so low suspicion for ischemia. ICD interrogation detected 225 ventricular sensed episodes, with the most recently stored episodes showing slow VT-NS at  bpm, runs lasting about 5 seconds. Unclear if these short runs would explain his 15-minute episodes. He is on dobutamine which has known arrhythmogenic effects so certainly worth monitoring closely, especially given his history of NSVT.   - Monitoring for recurrence while on telemetry   - Trend electrolytes and replete as needed      # Advanced chronic  systolic heart failure, EF 15-20%, NYHA class IV, ACC/AHA Stage D  # Ambulatory cardiogenic shock, on dobutamine drip as bride to candidacy   Recently admitted from 11/10/2022 - 11/21/2022 for acute on chronic heart failure, treated with IV diuresis and initiated on dobutamine infusion. Patient completed advanced therapy evaluation during that admission and and is now re-admitted for continued evaluation and possible transplant listing. No signs of acute heart failure decompensation at this time. He reports improvement in his activity tolerance and appetite since starting dobutamine.   - Etiology: Predominantly non-ischemic cardiomyopathy, suspected arrhythmogenic   - Volume: 20mg PO Lasix BID (PTA dose)  - Inotrope: Dobutamine 2.5 mcg/kg/min  - BB: held in the setting of shock  - ACE/ARB/ARNi: previous intolerance due to hypotension and lightheadedness   - SGLT2i: Holding PTA Empagliflozin 10mg qday  - MRA: None  - Statin: PTA pravastatin  - Electrolytes: PTA 20 meq KCl daily   - Transplant evaluation: Completed checklist during previous admission. Now admitted for possible transplant listing  - NPO at midnight for RHC 12/08. Holding PTA apixaban      # Multivessel coronary artery disease (s/p PCI with TILA to LCx 4/2013 and to the LAD in 10/2021)  As discussed above, low suspicion for ACS with bilateral arm tightness. 11/11/22 LHC showed patent LAD and LCx stents as well as mild-mod non-obstructive disease in the RCA.   - Continue PTA 81mg qday dosing  - BB: Holding as per above  - Statin: Cont PTA pravastatin  - Antianginal: N/A     # History of NSVT s/p biventricular PPM/ICD  Interrogation performed 12/10/22 w/ two short (<5s) runs of NSVT on 11/1/22 and 11/3/22, no recent shocks. A-paced.  - Cont PTA PO Amiodarone 200mg BID     # Occlusive left peroneal DVT  # Non-occlusive right axillary DVT  Identified on doppler 11/15/22 during previous admission.   - Holding PTA apixaban prior to RHC      # Incidental  "punctate cerebellar infarct, likely embolic  Read on MR imaging 22 without new neuro deficits. Per radiology, appears acute, likely embolic. DNo known arterial clot history. Reviewed telemetry over admission, no afib or other tachycarrhythmias. Reassuring prior TTE this admission including bubble study, repeat JARED w/o septal defects or thrombus. Treated w/ heparin gtt and transitioned to Eliquis.     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 No episodes since. Neurological exam unremarkable. Neurology consulted during recent admission as part of VAD/transplant evaluation. MRI/MRA brain was also performed which showed no concern for progression of disease  - Continue PTA cellcept 1000mg qam and 500mg qpm     # CKD stage 2  Creatinine at baseline.   - trend BMP     # Insomnia/anxiety  - PRN trazodone and lorazepam        FEN: Low salt diet, replete lytes as needed   DVT PPx: Apixaban   Code: Full code      Disposition: Inpatient admission, pending transplant evaluation/listing      Discussed with Dr. Ruslan Poole MD  Cardiology Fellow   [phone]     Objective:   /87   Pulse 93   Temp 97.6  F (36.4  C) (Oral)   Resp 18   Ht 1.88 m (6' 2\")   Wt 89 kg (196 lb 3.2 oz)   SpO2 96%   BMI 25.19 kg/m    Temp (24hrs), Av  F (36.7  C), Min:97.6  F (36.4  C), Max:98.6  F (37  C)    Wt:   Wt Readings from Last 5 Encounters:   22 89 kg (196 lb 3.2 oz)   22 90.9 kg (200 lb 6.4 oz)   11/10/22 108 kg (238 lb 1.6 oz)   22 95.7 kg (210 lb 14.4 oz)     Physical Exam   GEN: pleasant, no acute distress  HEENT: no icterus, EOMI  CV: RRR, normal s1/s2, soft systolic murmur best heard at LLSB, JVP 8  CHEST: clear to ausculation bilaterally, no rales or wheezing  ABD: soft, non-tender, normal active bowel sounds  EXTR: pulses 2+. No LE edema  NEURO: alert oriented, speech fluent/appropriate, Non-focal motor exam     DATA: "   Labs, EKGs and imaging reviewed. Pertinent results discussed in assessment and plan above.
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
## 2022-12-07 NOTE — PLAN OF CARE
D: Paco Stein is a 67 year old male with a history of chronic systolic heart failure (suspected predominantly nonischemic cardiomyopathy, possibly arrhythmogenic), coronary artery disease (s/p PCI with TILA to LCx 4/2013 and to the LAD in 10/2021), hyperlipidemia, CNS vasculitis and chronic kidney disease who presents with dyspnea & shoulder pain with transient bilateral hand tightness with unrevealing workup, now admitted for continued advanced therapies evaluation.      I: Monitored vitals and assessed pt status.      PRN: Ativan  Tele: Paced  O2: RA  Mobility: x1     A: Neuro: A/O x4.  Call light appropriate.  Able to make needs known.  Respiratory:  On room air.  Lung sounds clear.  Denies shortness of breath at rest.  Cardiac: VSS. Good Bps. V paced  GI: No report of nausea or vomiting.  : Voiding adequate clear, yellow urine using urinal  Skin:  No skin deficits noted  LDA:  RDL PICC  Pain: Denies  Diet: NPO since midnight otherwise - Regular  Drips: Dobutamine gtt 2.5mcg/kg/min    Did not sleep hardly at all last night.     P: Plan for R heart cath this morning. Continue to monitor Pt status and report changes to Cards 2.      Goal Outcome Evaluation:      Plan of Care Reviewed With: patient

## 2022-12-07 NOTE — PROGRESS NOTES
Transplant Update 12/07/22:  Topic:  Transplant Teaching    Called wife this am to confirm session for teaching at 930am.  Wife requested to reschedule session as patient did not sleep well last night and they were both fatigued from being in the ER all day yesterday.  Made a plan to meet tomorrow with patient and wife.  Will call in the am around 0800 and make a plan, tentatively will meet at 1000.    Of note, patient and wife have received transplant education materials and have signed the Receipt of Information for Transplant.  Patient verbalized understanding of the plan and agrees to call Transplant Coordinator with any further questions or concerns.

## 2022-12-07 NOTE — PLAN OF CARE
D: On 12/6 pt presented with transient bilateral hand tightness with unrevealing workup, now admitted for continued advanced therapies evaluation.  PMH: chronic systolic heart failure (suspected predominantly nonischemic cardiomyopathy, possibly arrhythmogenic), coronary artery disease (s/p PCI with TILA to LCx 4/2013 and to the LAD in 10/2021), hyperlipidemia, CNS vasculitis and chronic kidney disease.     I: Monitored vitals and assessed pt status.   Changed: [facility] moved to tomorrow (12/8) due to pt receiving dose of Eliquis last night. Ativan dose increased to 0.5 mg.    Running: Dobutamine @2.5 mcg/kg/min  PRN: Ativan x1  Tele: V Paced, HR 80-90's    Mobility: SBA    A: AOx4. VSS. Afebrile. Urinating adequately. Denies any pain. Ambulated in [place] with family many times today. NPO at midnight for [facility] tomorrow.      P: Continue to monitor Pt status and report changes to Cards 2.

## 2022-12-07 NOTE — PROGRESS NOTES
SPIRITUAL HEALTH SERVICES Progress Note  [facility] ([place]) 6c     spoke with Pt and Pt's wife and son who were in the room.    Pt is requesting communion from a , son and pt's wife would also partake of communion if able.      has triaged the need to the      Da Colón MDiv  Chaplain Resident  Pager [phone]    * [facility] remains available 24/7 for emergent requests/referrals, either by having the switchboard page the on-call  or by entering an ASAP/STAT consult in Epic (this will also page the on-call ). Routine Epic consults receive an initial response within 24 hours.*

## 2022-12-08 ENCOUNTER — APPOINTMENT (OUTPATIENT)
Dept: PHYSICAL THERAPY | Facility: CLINIC | Age: 67
DRG: 001 | End: 2022-12-08
Attending: STUDENT IN AN ORGANIZED HEALTH CARE EDUCATION/TRAINING PROGRAM
Payer: MEDICARE

## 2022-12-08 LAB
ALBUMIN SERPL BCG-MCNC: 3.5 G/DL (ref 3.5–5.2)
ALP SERPL-CCNC: 68 U/L (ref 40–129)
ALT SERPL W P-5'-P-CCNC: 19 U/L (ref 10–50)
ANION GAP SERPL CALCULATED.3IONS-SCNC: 11 MMOL/L (ref 7–15)
AST SERPL W P-5'-P-CCNC: 19 U/L (ref 10–50)
BILIRUB SERPL-MCNC: 1.4 MG/DL
BUN SERPL-MCNC: 14.8 MG/DL (ref 8–23)
CALCIUM SERPL-MCNC: 8.9 MG/DL (ref 8.8–10.2)
CHLORIDE SERPL-SCNC: 104 MMOL/L (ref 98–107)
CREAT SERPL-MCNC: 1.07 MG/DL (ref 0.67–1.17)
DEPRECATED HCO3 PLAS-SCNC: 24 MMOL/L (ref 22–29)
GFR SERPL CREATININE-BSD FRML MDRD: 75 ML/MIN/1.73M2
GLUCOSE SERPL-MCNC: 100 MG/DL (ref 70–99)
HGB BLD-MCNC: 14.4 G/DL (ref 13.3–17.7)
LACTATE SERPL-SCNC: 1.2 MMOL/L (ref 0.7–2)
MAGNESIUM SERPL-MCNC: 1.9 MG/DL (ref 1.7–2.3)
OXYHGB MFR BLDV: 52 % (ref 92–100)
POTASSIUM SERPL-SCNC: 3.6 MMOL/L (ref 3.4–5.3)
PROT SERPL-MCNC: 5.8 G/DL (ref 6.4–8.3)
SODIUM SERPL-SCNC: 139 MMOL/L (ref 136–145)

## 2022-12-08 PROCEDURE — 84132 ASSAY OF SERUM POTASSIUM: CPT

## 2022-12-08 PROCEDURE — 250N000009 HC RX 250: Performed by: STUDENT IN AN ORGANIZED HEALTH CARE EDUCATION/TRAINING PROGRAM

## 2022-12-08 PROCEDURE — 83735 ASSAY OF MAGNESIUM: CPT | Performed by: STUDENT IN AN ORGANIZED HEALTH CARE EDUCATION/TRAINING PROGRAM

## 2022-12-08 PROCEDURE — 4A023N6 MEASUREMENT OF CARDIAC SAMPLING AND PRESSURE, RIGHT HEART, PERCUTANEOUS APPROACH: ICD-10-PCS | Performed by: INTERNAL MEDICINE

## 2022-12-08 PROCEDURE — 82810 BLOOD GASES O2 SAT ONLY: CPT

## 2022-12-08 PROCEDURE — 250N000012 HC RX MED GY IP 250 OP 636 PS 637: Performed by: EMERGENCY MEDICINE

## 2022-12-08 PROCEDURE — 250N000009 HC RX 250: Performed by: INTERNAL MEDICINE

## 2022-12-08 PROCEDURE — 272N000001 HC OR GENERAL SUPPLY STERILE: Performed by: INTERNAL MEDICINE

## 2022-12-08 PROCEDURE — 99232 SBSQ HOSP IP/OBS MODERATE 35: CPT | Mod: 25 | Performed by: INTERNAL MEDICINE

## 2022-12-08 PROCEDURE — 250N000013 HC RX MED GY IP 250 OP 250 PS 637: Performed by: STUDENT IN AN ORGANIZED HEALTH CARE EDUCATION/TRAINING PROGRAM

## 2022-12-08 PROCEDURE — 93451 RIGHT HEART CATH: CPT | Performed by: INTERNAL MEDICINE

## 2022-12-08 PROCEDURE — 250N000012 HC RX MED GY IP 250 OP 636 PS 637: Performed by: STUDENT IN AN ORGANIZED HEALTH CARE EDUCATION/TRAINING PROGRAM

## 2022-12-08 PROCEDURE — 83735 ASSAY OF MAGNESIUM: CPT

## 2022-12-08 PROCEDURE — 83605 ASSAY OF LACTIC ACID: CPT | Performed by: STUDENT IN AN ORGANIZED HEALTH CARE EDUCATION/TRAINING PROGRAM

## 2022-12-08 PROCEDURE — 214N000001 HC R&B CCU UMMC

## 2022-12-08 PROCEDURE — 80053 COMPREHEN METABOLIC PANEL: CPT | Performed by: STUDENT IN AN ORGANIZED HEALTH CARE EDUCATION/TRAINING PROGRAM

## 2022-12-08 PROCEDURE — 97750 PHYSICAL PERFORMANCE TEST: CPT | Mod: GP | Performed by: PHYSICAL THERAPIST

## 2022-12-08 PROCEDURE — 250N000013 HC RX MED GY IP 250 OP 250 PS 637: Performed by: EMERGENCY MEDICINE

## 2022-12-08 PROCEDURE — 85018 HEMOGLOBIN: CPT

## 2022-12-08 PROCEDURE — 93451 RIGHT HEART CATH: CPT | Mod: 26 | Performed by: INTERNAL MEDICINE

## 2022-12-08 PROCEDURE — 250N000011 HC RX IP 250 OP 636: Performed by: STUDENT IN AN ORGANIZED HEALTH CARE EDUCATION/TRAINING PROGRAM

## 2022-12-08 RX ORDER — FUROSEMIDE 10 MG/ML
40 INJECTION INTRAMUSCULAR; INTRAVENOUS ONCE
Status: DISCONTINUED | OUTPATIENT
Start: 2022-12-08 | End: 2022-12-08

## 2022-12-08 RX ORDER — FUROSEMIDE 10 MG/ML
40 INJECTION INTRAMUSCULAR; INTRAVENOUS ONCE
Status: COMPLETED | OUTPATIENT
Start: 2022-12-08 | End: 2022-12-08

## 2022-12-08 RX ADMIN — TRAZODONE HYDROCHLORIDE 50 MG: 50 TABLET ORAL at 02:19

## 2022-12-08 RX ADMIN — AMIODARONE HYDROCHLORIDE 200 MG: 200 TABLET ORAL at 21:07

## 2022-12-08 RX ADMIN — AMIODARONE HYDROCHLORIDE 200 MG: 200 TABLET ORAL at 10:01

## 2022-12-08 RX ADMIN — ASPIRIN 81 MG: 81 TABLET, COATED ORAL at 10:01

## 2022-12-08 RX ADMIN — POTASSIUM CHLORIDE 20 MEQ: 750 TABLET, EXTENDED RELEASE ORAL at 10:01

## 2022-12-08 RX ADMIN — LORAZEPAM 0.5 MG: 0.5 TABLET ORAL at 21:07

## 2022-12-08 RX ADMIN — MYCOPHENOLATE MOFETIL 1000 MG: 500 TABLET, FILM COATED ORAL at 10:01

## 2022-12-08 RX ADMIN — FUROSEMIDE 20 MG: 20 TABLET ORAL at 10:01

## 2022-12-08 RX ADMIN — FUROSEMIDE 40 MG: 10 INJECTION, SOLUTION INTRAVENOUS at 14:07

## 2022-12-08 RX ADMIN — FUROSEMIDE 10 MG/HR: 10 INJECTION, SOLUTION INTRAVENOUS at 16:17

## 2022-12-08 RX ADMIN — MYCOPHENOLATE MOFETIL 500 MG: 500 TABLET, FILM COATED ORAL at 21:07

## 2022-12-08 RX ADMIN — PRAVASTATIN SODIUM 20 MG: 20 TABLET ORAL at 10:01

## 2022-12-08 ASSESSMENT — ACTIVITIES OF DAILY LIVING (ADL)
ADLS_ACUITY_SCORE: 42

## 2022-12-08 NOTE — PROGRESS NOTES
Transplant Teaching 12/08/2022    Writer met with patient and wife in-person to complete heart transplant teaching. We reviewed the candidate selection process, insurance prior authorization, UNOS priority statuses, the waiting period, donor issues, and the pre-, jesica-, and post-op periods. We also reviewed the major risks of transplantation including rejection, infection and transplant vasculopathy. We  discussed patient and caregiver responsibilities before and after transplant including the need for patient to identify a caregiver to be present for education and to assist patient post discharge. The patient was informed of the weight and chemical cessation policy and agrees to these requirements.      Throughout the session, the patient and wife were attentive, eager to learn, and asked appropriate questions.  Patient  was given a copy of the UNOS brochure on Multiple Waitlisting, the  Heart Transplant brochure and current program statistics via docusign/email.  Patient was also encouraged to visit the informational transplant education website for further information and video classes ([email or web address]).      Pt/wife confirmed they have transplant office number and encouraged to call with future questions or concerns.    In follow up, the patient was instructed on completing the following items for his/her transplant evaluation:    Pt's case will be re-presented at tomorrow's meeting.

## 2022-12-08 NOTE — PROGRESS NOTES
[facility] [place]    Cards II Daily Progress Note    Assessment and Plan:     Paco Stein is a 67 year old male with a history of chronic systolic heart failure (suspected predominantly nonischemic cardiomyopathy, possibly arrhythmogenic), coronary artery disease (s/p PCI with TILA to LCx 4/2013 and to the LAD in 10/2021), hyperlipidemia, CNS vasculitis and chronic kidney disease who presents with transient bilateral hand tightness with unrevealing workup, now admitted for continued advanced therapies evaluation.      Interval History:  Nursing notes reviewed, no acute events overnight. Slept much better last night with changes to bedtime medications. Denies shortness of breath at rest. No chest pain.   [facility] completed this AM; RA 16, PA 50/26/35, PCWP 26, Aisha CO/CI 3.1/1.44.     Plan Today:  - Given IV lasix 40 mg x 1 this afternoon and started on lasix drip at 10 mg/hr. Will trend I/Os and adjust dose as appropriate   - Increase dose of dobutamine from 2.5 mcg/kg/min to 5 mcg/kg/min  - Will complete 6-minute walk test later this afternoon    =======================================================================  # Bilateral hand tightness, resolved  Experienced two episodes prior to admission, once while performing PT exercises and again when he woke up the following morning. Each episode lasted for about 15 minutes and had no other associated symptoms. Etiology not clear at this time. No focal motor or sensory deficits on exam. Electrolytes within normal limits. Troponin only mildly elevated at 46 and down-trending. EKG with A sensed V paced rhythm with no concerning ST/T changes so low suspicion for ischemia. ICD interrogation detected 225 ventricular sensed episodes, with the most recently stored episodes showing slow VT-NS at  bpm, runs lasting about 5 seconds. Unclear if these short runs would explain his 15-minute episodes. He is on dobutamine which has known  arrhythmogenic effects so certainly worth monitoring closely, especially given his history of NSVT.   - Monitoring for recurrence while on telemetry   - Trend electrolytes and replete as needed      # Advanced chronic systolic heart failure, EF 15-20%, NYHA class IV, ACC/AHA Stage D  # Ambulatory cardiogenic shock, on dobutamine drip as bride to candidacy   Recently admitted from 11/10/2022 - 11/21/2022 for acute on chronic heart failure, treated with IV diuresis and initiated on dobutamine infusion. Patient completed advanced therapy evaluation during that admission and and is now re-admitted for continued evaluation and possible transplant listing. No signs of acute decompensation on adimssion. He reports improvement in his activity tolerance and appetite since starting dobutamine, although he seemed to have regressed a bit in the days prior to admission.   - Etiology: Predominantly non-ischemic cardiomyopathy, suspected arrhythmogenic   - Volume: Lasix drip 10 mg/hr, trend I/Os and adjust dose as appropriate   - Inotrope: Increased dose of dobutamine from 2.5 mcg/kg/min to 5 mcg/kg/min  - BB: held in the setting of shock  - ACE/ARB/ARNi: previous intolerance due to hypotension and lightheadedness   - SGLT2i: Holding PTA Empagliflozin 10mg qday  - MRA: None  - Statin: PTA pravastatin  - Electrolytes: PTA 20 meq KCl daily   - Transplant evaluation: Completed checklist during previous admission. Now admitted for possible transplant listing     # Multivessel coronary artery disease (s/p PCI with TILA to LCx 4/2013 and to the LAD in 10/2021)  As discussed above, low suspicion for ACS with bilateral arm tightness. 11/11/22 LHC showed patent LAD and LCx stents as well as mild-mod non-obstructive disease in the RCA.   - Continue PTA ASA 81mg qday  - BB: Holding as per above  - Statin: Cont PTA pravastatin     # History of NSVT s/p biventricular PPM/ICD  Interrogation performed 12/10/22 w/ two short (<5s) runs of NSVT on  "22 and 11/3/22, no recent shocks. A-paced.  - Cont PTA PO Amiodarone 200mg BID     # Occlusive left peroneal DVT  # Non-occlusive right axillary DVT  Identified on doppler 11/15/22 during previous admission.   - Holding PTA apixaban pending transplant discussion on      # Incidental punctate cerebellar infarct, likely embolic  Read on MR imaging 22 without new neuro deficits. Per radiology, appears acute, likely embolic. DNo known arterial clot history. Reviewed telemetry over admission, no afib or other tachycarrhythmias. Reassuring prior TTE this admission including bubble study, repeat JARED w/o septal defects or thrombus. Treated w/ heparin gtt and transitioned to Eliquis.     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 No episodes since. Neurological exam unremarkable. Neurology consulted during recent admission as part of VAD/transplant evaluation. MRI/MRA brain was also performed which showed no concern for progression of disease  - Continue PTA cellcept 1000mg qam and 500mg qpm     # CKD stage 2  Creatinine at baseline.   - trend BMP     # Insomnia/anxiety  - PRN trazodone and lorazepam        FEN: Low salt diet, replete lytes as needed   DVT PPx: Holding PTA apixaban pending transplant discussion on    Code: Full code      Disposition: Inpatient admission, pending transplant evaluation/listing      Discussed with Dr. Ruslan Poole MD  Cardiology Fellow   [phone]     Objective:   /84 (BP Location: Left arm)   Pulse 85   Temp 97.2  F (36.2  C) (Oral)   Resp 18   Ht 1.88 m (6' 2\")   Wt 89 kg (196 lb 4.8 oz)   SpO2 99%   BMI 25.20 kg/m    Temp (24hrs), Av  F (36.7  C), Min:97.6  F (36.4  C), Max:98.6  F (37  C)    Wt:   Wt Readings from Last 5 Encounters:   22 89 kg (196 lb 4.8 oz)   22 90.9 kg (200 lb 6.4 oz)   11/10/22 108 kg (238 lb 1.6 oz)   22 95.7 kg (210 lb 14.4 oz) "     Physical Exam   GEN: pleasant, no acute distress  HEENT: no icterus, EOMI  CV: RRR, normal s1/s2, soft systolic murmur best heard at LLSB, JVP 14  CHEST: clear to ausculation bilaterally, no rales or wheezing  ABD: soft, non-tender, normal active bowel sounds  EXTR: pulses 2+. No LE edema  NEURO: alert oriented, speech fluent/appropriate, Non-focal motor exam     DATA:   Labs, EKGs and imaging reviewed. Pertinent results discussed in assessment and plan above.

## 2022-12-08 NOTE — PROGRESS NOTES
"SIX MINUTE WALK TEST  Physical Therapy  2022    Paco Stein MRN# [id number]   YOB: 1955 Age: 67 year old     Height: 6' 2\"  Weight (lbs): 196 lbs 4.8 oz Weight (kg): 94.7 kg (dosing weight)    Supplemental oxygen during the test: No    Oxygen Appliance: None    Oximetry: Finger Probe    Gait Aid: None     Pre-test Post-test   Time 1515 1530   Blood Pressure (mm Hg) 106/76 117/89   Heart rate (bpm) 87 98   Rated Perceived Dyspnea (Hari Scale) 0 -- Nothing at all  0 -- Nothing at all    Rated Perceived Exertion (Hari Scale) 0 -- (Nothing at all) 2 -- Weak (light)    SpO2 (%) 95 95     Total distance walked in 6 minutes: 1055 feet, 321.5 meters    Paused during test?No    Stopped test before 6 minutes? No    Did the patient experience any pain or discomfort during the test? No  Pain Ratin/10    Oxygen Titration Required: No    Performing Staff: Juan Antonio Lake, PT        "

## 2022-12-08 NOTE — PLAN OF CARE
D: Paco Stein is a 67 year old male with a history of chronic systolic heart failure (suspected predominantly nonischemic cardiomyopathy, possibly arrhythmogenic), coronary artery disease (s/p PCI with TILA to LCx 4/2013 and to the LAD in 10/2021), hyperlipidemia, CNS vasculitis and chronic kidney disease who presents with dyspnea & shoulder pain with transient bilateral hand tightness with unrevealing workup, now admitted for continued advanced therapies evaluation.       I: Monitored vitals and assessed pt status.      PRN: Ativan x1 & Trazadone x1  Tele: Paced  O2: RA  Mobility: x1     A: Neuro: A/O x4.  Call light appropriate.  Able to make needs known.  Respiratory:  On room air.  Lung sounds clear.  Denies shortness of breath at rest.  Cardiac: VSS. Good Bps. V paced  GI: No report of nausea or vomiting.  : Voiding adequate clear, yellow urine using urinal  Skin:  No skin deficits noted  LDA:  RDL PICC  Pain: Denies  Diet: NPO since midnight otherwise - Regular  Drips: Dobutamine gtt 2.5mcg/kg/min     P: Plan for R heart cath this morning. Continue to monitor Pt status and report changes to Cards 2.        Goal Outcome Evaluation:      Plan of Care Reviewed With: patient    Overall Patient Progress: no change

## 2022-12-08 NOTE — SIGNIFICANT EVENT
SPIRITUAL HEALTH SERVICES Significant Event  [employer] Sac[employer] of ANOINTING  [facility] ([place]) 6c    Pt anointed by Father Jovany Borrero   Pager [phone]

## 2022-12-09 ENCOUNTER — COMMITTEE REVIEW (OUTPATIENT)
Dept: TRANSPLANT | Facility: CLINIC | Age: 67
End: 2022-12-09

## 2022-12-09 ENCOUNTER — DOCUMENTATION ONLY (OUTPATIENT)
Dept: OTHER | Facility: CLINIC | Age: 67
End: 2022-12-09

## 2022-12-09 LAB
ALBUMIN SERPL BCG-MCNC: 4 G/DL (ref 3.5–5.2)
ALP SERPL-CCNC: 77 U/L (ref 40–129)
ALT SERPL W P-5'-P-CCNC: 19 U/L (ref 10–50)
ANION GAP SERPL CALCULATED.3IONS-SCNC: 13 MMOL/L (ref 7–15)
ANION GAP SERPL CALCULATED.3IONS-SCNC: 15 MMOL/L (ref 7–15)
AST SERPL W P-5'-P-CCNC: 20 U/L (ref 10–50)
BASE EXCESS BLDV CALC-SCNC: 8.7 MMOL/L (ref -7.7–1.9)
BILIRUB DIRECT SERPL-MCNC: <0.2 MG/DL (ref 0–0.3)
BILIRUB SERPL-MCNC: 1.7 MG/DL
BUN SERPL-MCNC: 12.8 MG/DL (ref 8–23)
BUN SERPL-MCNC: 15.5 MG/DL (ref 8–23)
CALCIUM SERPL-MCNC: 9.2 MG/DL (ref 8.8–10.2)
CALCIUM SERPL-MCNC: 9.4 MG/DL (ref 8.8–10.2)
CHLORIDE SERPL-SCNC: 100 MMOL/L (ref 98–107)
CHLORIDE SERPL-SCNC: 98 MMOL/L (ref 98–107)
CREAT SERPL-MCNC: 1.09 MG/DL (ref 0.67–1.17)
CREAT SERPL-MCNC: 1.14 MG/DL (ref 0.67–1.17)
DEPRECATED HCO3 PLAS-SCNC: 26 MMOL/L (ref 22–29)
DEPRECATED HCO3 PLAS-SCNC: 27 MMOL/L (ref 22–29)
GFR SERPL CREATININE-BSD FRML MDRD: 70 ML/MIN/1.73M2
GFR SERPL CREATININE-BSD FRML MDRD: 73 ML/MIN/1.73M2
GLUCOSE BLDC GLUCOMTR-MCNC: 102 MG/DL (ref 70–99)
GLUCOSE BLDC GLUCOMTR-MCNC: 108 MG/DL (ref 70–99)
GLUCOSE SERPL-MCNC: 92 MG/DL (ref 70–99)
GLUCOSE SERPL-MCNC: 98 MG/DL (ref 70–99)
HCO3 BLDV-SCNC: 33 MMOL/L (ref 21–28)
HGB BLD-MCNC: 15.3 G/DL (ref 13.3–17.7)
LACTATE SERPL-SCNC: 1 MMOL/L (ref 0.7–2)
MAGNESIUM SERPL-MCNC: 2 MG/DL (ref 1.7–2.3)
MAGNESIUM SERPL-MCNC: 2 MG/DL (ref 1.7–2.3)
O2/TOTAL GAS SETTING VFR VENT: 21 %
OXYHGB MFR BLDV: 53 % (ref 92–100)
OXYHGB MFR BLDV: 61 % (ref 70–75)
PCO2 BLDV: 43 MM HG (ref 40–50)
PH BLDV: 7.5 [PH] (ref 7.32–7.43)
PO2 BLDV: 33 MM HG (ref 25–47)
POTASSIUM SERPL-SCNC: 3.1 MMOL/L (ref 3.4–5.3)
POTASSIUM SERPL-SCNC: 3.4 MMOL/L (ref 3.4–5.3)
POTASSIUM SERPL-SCNC: 3.6 MMOL/L (ref 3.4–5.3)
POTASSIUM SERPL-SCNC: 3.6 MMOL/L (ref 3.4–5.3)
PROT SERPL-MCNC: 6.7 G/DL (ref 6.4–8.3)
SODIUM SERPL-SCNC: 138 MMOL/L (ref 136–145)
SODIUM SERPL-SCNC: 141 MMOL/L (ref 136–145)

## 2022-12-09 PROCEDURE — 4A023N6 MEASUREMENT OF CARDIAC SAMPLING AND PRESSURE, RIGHT HEART, PERCUTANEOUS APPROACH: ICD-10-PCS | Performed by: INTERNAL MEDICINE

## 2022-12-09 PROCEDURE — 250N000011 HC RX IP 250 OP 636: Performed by: STUDENT IN AN ORGANIZED HEALTH CARE EDUCATION/TRAINING PROGRAM

## 2022-12-09 PROCEDURE — 80053 COMPREHEN METABOLIC PANEL: CPT

## 2022-12-09 PROCEDURE — 250N000013 HC RX MED GY IP 250 OP 250 PS 637: Performed by: EMERGENCY MEDICINE

## 2022-12-09 PROCEDURE — 84132 ASSAY OF SERUM POTASSIUM: CPT

## 2022-12-09 PROCEDURE — 82805 BLOOD GASES W/O2 SATURATION: CPT | Performed by: STUDENT IN AN ORGANIZED HEALTH CARE EDUCATION/TRAINING PROGRAM

## 2022-12-09 PROCEDURE — 4A1239Z MONITORING OF CARDIAC OUTPUT, PERCUTANEOUS APPROACH: ICD-10-PCS | Performed by: INTERNAL MEDICINE

## 2022-12-09 PROCEDURE — 250N000013 HC RX MED GY IP 250 OP 250 PS 637: Performed by: INTERNAL MEDICINE

## 2022-12-09 PROCEDURE — 250N000009 HC RX 250: Performed by: STUDENT IN AN ORGANIZED HEALTH CARE EDUCATION/TRAINING PROGRAM

## 2022-12-09 PROCEDURE — 93451 RIGHT HEART CATH: CPT | Performed by: INTERNAL MEDICINE

## 2022-12-09 PROCEDURE — 82810 BLOOD GASES O2 SAT ONLY: CPT

## 2022-12-09 PROCEDURE — 99152 MOD SED SAME PHYS/QHP 5/>YRS: CPT | Performed by: INTERNAL MEDICINE

## 2022-12-09 PROCEDURE — 250N000011 HC RX IP 250 OP 636: Performed by: INTERNAL MEDICINE

## 2022-12-09 PROCEDURE — 99152 MOD SED SAME PHYS/QHP 5/>YRS: CPT | Mod: GC | Performed by: INTERNAL MEDICINE

## 2022-12-09 PROCEDURE — 4A133B3 MONITORING OF ARTERIAL PRESSURE, PULMONARY, PERCUTANEOUS APPROACH: ICD-10-PCS | Performed by: INTERNAL MEDICINE

## 2022-12-09 PROCEDURE — 272N000001 HC OR GENERAL SUPPLY STERILE: Performed by: INTERNAL MEDICINE

## 2022-12-09 PROCEDURE — 250N000009 HC RX 250: Performed by: INTERNAL MEDICINE

## 2022-12-09 PROCEDURE — 250N000013 HC RX MED GY IP 250 OP 250 PS 637

## 2022-12-09 PROCEDURE — 250N000013 HC RX MED GY IP 250 OP 250 PS 637: Performed by: STUDENT IN AN ORGANIZED HEALTH CARE EDUCATION/TRAINING PROGRAM

## 2022-12-09 PROCEDURE — 93451 RIGHT HEART CATH: CPT | Mod: 26 | Performed by: INTERNAL MEDICINE

## 2022-12-09 PROCEDURE — 02HP32Z INSERTION OF MONITORING DEVICE INTO PULMONARY TRUNK, PERCUTANEOUS APPROACH: ICD-10-PCS | Performed by: INTERNAL MEDICINE

## 2022-12-09 PROCEDURE — 99233 SBSQ HOSP IP/OBS HIGH 50: CPT | Mod: 25 | Performed by: INTERNAL MEDICINE

## 2022-12-09 PROCEDURE — 250N000012 HC RX MED GY IP 250 OP 636 PS 637: Performed by: STUDENT IN AN ORGANIZED HEALTH CARE EDUCATION/TRAINING PROGRAM

## 2022-12-09 PROCEDURE — 200N000002 HC R&B ICU UMMC

## 2022-12-09 PROCEDURE — 85018 HEMOGLOBIN: CPT

## 2022-12-09 PROCEDURE — 83605 ASSAY OF LACTIC ACID: CPT | Performed by: STUDENT IN AN ORGANIZED HEALTH CARE EDUCATION/TRAINING PROGRAM

## 2022-12-09 PROCEDURE — 82248 BILIRUBIN DIRECT: CPT

## 2022-12-09 PROCEDURE — 83735 ASSAY OF MAGNESIUM: CPT | Performed by: STUDENT IN AN ORGANIZED HEALTH CARE EDUCATION/TRAINING PROGRAM

## 2022-12-09 RX ORDER — POTASSIUM CHLORIDE 1500 MG/1
60 TABLET, EXTENDED RELEASE ORAL ONCE
Status: COMPLETED | OUTPATIENT
Start: 2022-12-09 | End: 2022-12-09

## 2022-12-09 RX ORDER — POTASSIUM CHLORIDE 750 MG/1
40 TABLET, EXTENDED RELEASE ORAL ONCE
Status: COMPLETED | OUTPATIENT
Start: 2022-12-09 | End: 2022-12-09

## 2022-12-09 RX ORDER — MAGNESIUM OXIDE 400 MG/1
400 TABLET ORAL EVERY 4 HOURS
Status: COMPLETED | OUTPATIENT
Start: 2022-12-09 | End: 2022-12-09

## 2022-12-09 RX ADMIN — DOBUTAMINE HYDROCHLORIDE 5 MCG/KG/MIN: 200 INJECTION INTRAVENOUS at 19:42

## 2022-12-09 RX ADMIN — TRAZODONE HYDROCHLORIDE 50 MG: 50 TABLET ORAL at 00:50

## 2022-12-09 RX ADMIN — Medication 400 MG: at 07:00

## 2022-12-09 RX ADMIN — POTASSIUM CHLORIDE 40 MEQ: 750 TABLET, EXTENDED RELEASE ORAL at 07:00

## 2022-12-09 RX ADMIN — AMIODARONE HYDROCHLORIDE 200 MG: 200 TABLET ORAL at 19:55

## 2022-12-09 RX ADMIN — ACETAMINOPHEN 650 MG: 325 TABLET, FILM COATED ORAL at 19:55

## 2022-12-09 RX ADMIN — AMIODARONE HYDROCHLORIDE 200 MG: 200 TABLET ORAL at 11:15

## 2022-12-09 RX ADMIN — Medication 400 MG: at 02:30

## 2022-12-09 RX ADMIN — POTASSIUM CHLORIDE 60 MEQ: 1500 TABLET, EXTENDED RELEASE ORAL at 02:30

## 2022-12-09 RX ADMIN — ASPIRIN 81 MG: 81 TABLET, COATED ORAL at 11:16

## 2022-12-09 RX ADMIN — DOBUTAMINE HYDROCHLORIDE 5 MCG/KG/MIN: 200 INJECTION INTRAVENOUS at 00:11

## 2022-12-09 RX ADMIN — FUROSEMIDE 10 MG/HR: 10 INJECTION, SOLUTION INTRAVENOUS at 19:42

## 2022-12-09 RX ADMIN — MYCOPHENOLATE MOFETIL 500 MG: 500 TABLET, FILM COATED ORAL at 19:55

## 2022-12-09 ASSESSMENT — ACTIVITIES OF DAILY LIVING (ADL)
ADLS_ACUITY_SCORE: 42

## 2022-12-09 NOTE — CONSULTS
I was unable to complete the consult since he was called to the cath lab, but I did have enough time to make an assessment.   I see that his PHQ-9 was 19 and SUSANNA-7 of 12, but he doesn't feel depressed, is not interested in an antidepressant (his wife also not interested).   I don't see a problem of continuing Ativan PRN, but at home he was taking 0.25 mg rather than 0.5 mg that he is receiving here.   If you are concerned about the QTc you could change from PRN trazodone to Seroquel 25-50 mg HS (less effect on QTc).     I will check on him next week if he is still here to complete the consult.     Jorge Spring M.D.   Consult liaison psychiatry \  [facility]   Contact information available via [facility] Paging/Directory

## 2022-12-09 NOTE — PLAN OF CARE
Goal Outcome Evaluation:      Plan of Care Reviewed With: patient, spouse    Overall Patient Progress: improvingOverall Patient Progress: improving    Outcome Evaluation: Encourage high protein/kcal options and intake of oral supplements. Pt with severe malnutrition.

## 2022-12-09 NOTE — PROGRESS NOTES
CLINICAL NUTRITION SERVICES - ASSESSMENT NOTE     Nutrition Prescription    RECOMMENDATIONS FOR MDs/PROVIDERS TO ORDER:  1. Pt with severe malnutrition. Rec liberalize to a 3 g sodium diet, if able. Pt/family are aware of need to limit sodium.   2. MVI/minerals due to inadequate oral intake and to help meet micronutrient needs with heart failure    Malnutrition Status:    Severe malnutrition in the context of acute illness/injury on chronic illness    Recommendations already ordered by Registered Dietitian (RD):  Oral supplements    Future/Additional Recommendations:  1. Encourage high protein options at meals and intake of oral supplements.   2. Monitor fluid status and potential need for a fluid restriction, if warranted.   3. Consider biotene prn.   4. Monitor BG. Hgb A1c of 6.1 on 11/12. BG of 98 on 12/9.   5. Monitor potential need for iron supplementation.   6. Monitor potential need for thiamine supplementation.   7. Monitor lytes (Phos, Mg++, and K+) for refeeding syndrome. If lytes trend low, aggressively replace.    8. If TF becomes nutrition plan of care, would rec place feeding tube (FT) and initiate TFs, Osmolite 1.5 (concentrated, maintenance TF formula) and order 1 pkt Prosource TF modular BID. Initiate TFs at 15 mL/hr, advancing rate by 10 mL Q 8 hrs (or per provider discretion, pending hemodynamic stability) to goal rate of 70 mL/hr. Osmolite 1.5 Maury @ goal of  70ml/hr (1680ml/day) + 1 pkt Prosource TF modular BID  will provide: 2680 kcals, 145 g PRO, 1280 ml free H20, 342 g CHO, and 0 g fiber daily.     If begin tube feeds:    - Flush FT with 30 mL water Q 4 hrs for patency. Rec provider adjust free water flushes as needed, pending fluid status.   - Ensure K+/Mg++/Phos labs are ordered daily until TFs advance to goal infusion to evaluate for sx of refeeding with nutrition received. If lytes trend low, aggressively replace lytes. Do not advance TF greater than 30 mL/hr unless K+ is = or > 3, Mg++ is  "= or > 1.5, and Phos is = or > 1.9.   - If not already ordered, order a multivitamin/mineral (certavite or liquid multivitamin/minerals 15 mL/day via FT) to help ensure micronutrient needs being met with suspected hypermetabolic demands and potential interruptions to TF infusions.   - Monitor TF and possible need to adjust nutrition support regimen if necessary, pending medical course and nutrition status.       - Monitor need to check a metabolic cart study.    - Send a nutrition consult for \"Registered Dietitian to Order TF per Medical Nutrition Therapy Guidelines\" if desire RD to order TFs.       REASON FOR ASSESSMENT  Paco Stein is a/an 67 year old male assessed by the dietitian for Admission Nutrition Risk Screen for malnutrition score of two or greater [Have you recently lost weight without trying?: yes. If yes, how much weight have you lost?: lost 14 - 23 pounds.Have you been eating poorly because of a decreased appetite?: yes].     NUTRITION/ADDITIONAL HISTORY  Pt known from prior nutrition visits as received consult for Heart Failure pre Ventricular Assist Device (VAD) planning, Frailty assessment (assessment done 11/11/2022 and education/additional recs 11/14/2022). Per nutrition note 11/14/2022, \"Per patient and wife, drinking and tolerating Ensure clear supplements (2x per day). Only drank 1 yesterday as there was one left over on his desk this morning. Appetite good per patient - he wishes he can eat today but he is on a clear liquid diet d/t colonoscopy procedure tomorrow.\" Nutrition education provided.    Per H & P, \"History of chronic systolic heart failure (suspected predominantly nonischemic cardiomyopathy, possibly arrhythmogenic), coronary artery disease (s/p PCI with TILA to LCx 4/2013 and to the LAD in 10/2021), hyperlipidemia, CNS vasculitis and chronic kidney disease who presents with transient bilateral hand tightness with unrevealing workup, now admitted for continued advanced therapies " "evaluation.\" Pt was ordered to take, noting, amiodarone, dobutamine, lasix, and potassium PTA    Per discussion with pt and wife (Arlette) at bedside, decreased appetite PTA. 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 Pt having nausea and was fluid up PTA which decreased his appetite. Per pt, he especially was eating less while he was in the hospital around late Oct-early Nov 2022 in [place], [place] (due to receiving two antibiotics which upset his stomach and caused GI side effects). Has tried and likes Ensure, especially chocolate.     CURRENT NUTRITION ORDERS  Diet: NPO for possible tests/procedures but now just advanced to cardiac. Pt was admitted on 12/6 and has been on both cardiac and 2 g sodium diet as well as NPO.   Intake/Tolerance: Tolerating diet. Per nursing flowsheets (% intake), pt consuming 100% on 12/7 and 100% with a good appetite 12/8. Previous diet orders have been limiting the amount of food he is allowed to order and eat. Pt with increasing appetite now that he has been diuresing.     LABS  Labs reviewed    MEDICATIONS  Medications reviewed    ANTHROPOMETRICS  Height: 188 cm (6' 2\")  Most Recent Weight: 82.6 kg (182 lb 1.6 oz)    IBW: 86.4 kg   BMI: Normal BMI  Weight History: 102.3 kg (12/21/2021), 99.9 kg (5/11/2022), 98.7 kg (7/18/2022), 99.3 kg (8/23/2022), 95.7 kg (11/8/2022), 108 kg (11/10/2022) - Pt has lost at least 14% of his body wt over the last month. Suspect actual and fluid loss.   Dosing Weight: 83 kg (based on lowest wt so far this admission of 82.6 kg on 12/9)    ASSESSED NUTRITION NEEDS (for inpatient hospital stay)  Estimated Energy Needs: 3279-1391 kcals/day (25 - 30 kcals/kg)  Justification: Maintenance needs although rec the highest end of this range  Estimated Protein Needs:  grams protein/day (1.1 - 1.4 grams of pro/kg)  Justification: Increased needs pending renal function  Estimated Fluid Needs: ~4623-9875 mL/day (20-25 mL/kg)   Justification: " Estimated needs with heart failure or per provider, pending fluid status    PHYSICAL FINDINGS/OTHER FINDINGS  See malnutrition section below.  General: Mouth appears dry  GI: No stools noted so far this admission. Last stool 12/7.      MALNUTRITION  % Intake: </= 50% for >/= 5 days (severe)  % Weight Loss: > 7.5% in 3 months (severe)  Subcutaneous Fat Loss: Facial region:  Mild and Upper arm:  Mild-Moderate  Muscle Loss: Temporal:  Moderate, Thoracic region (clavicle, acromium bone, deltoid, trapezius, pectoral):  Moderate, Interosseous:  Moderate and Posterior calf: Moderate - Arlette notices muscle loss everywhere, pt noted in his calves.  Fluid Accumulation/Edema: None noted  Malnutrition Diagnosis: Severe malnutrition in the context of acute illness/injury on chronic illness    NUTRITION DIAGNOSIS  Inadequate oral intake related to nausea PTA and decreased appetite as evidenced by pt has lost 14% of his body wt over the last month and pt/wife report of inadequate intake.     INTERVENTIONS  Implementation  Nutrition Education: Explained room service allocations. Gave sodium/saturated fat room service menu. Family may consider bringing in some low-sodium food options.   Medical food supplement therapy: Discussed oral supplement options and gave handout. Ordered chocolate Ensure Enlive at breakfast and at 14:00 as per discussion with pt.   Collaboration with other providers: Paged team regarding rec #1 for MDs/providers to order.     Goals  Patient to consume % of nutritionally adequate meal trays TID, or the equivalent with supplements/snacks.     Monitoring/Evaluation  Progress toward goals will be monitored and evaluated per protocol.       Nutrition will continue to follow.      Moriah Garcia, MS, RD, LD, [facility]   6C Pgr: [phone]

## 2022-12-09 NOTE — PROGRESS NOTES
Faculty Attestation  Oneal Garcia M.D.    I personally saw and examined this patient, reviewed recent laboratories and imaging studies, discussed the case with the housestaff, and conveyed plan to the patient.  I answered all questions from patient and/or family. I agree with the examination, assessment and plan outlined here.  Admitted ambulatory for continuing evaluation of acute and chronic congestive heart failure.  Need psychiatry evaluation for depression/anxiety.  Patient not ideas VAD candidate.  Hemodynamics as reflected by recent numbers are still poor with elevated PCP and low CI.          [facility]    Cards II Daily Progress Note    Assessment and Plan:     Paco Stein is a 67 year old male with a history of chronic systolic heart failure (suspected predominantly nonischemic cardiomyopathy, possibly arrhythmogenic), coronary artery disease (s/p PCI with TILA to LCx 4/2013 and to the LAD in 10/2021), hyperlipidemia, CNS vasculitis and chronic kidney disease who presents with transient bilateral hand tightness with unrevealing workup, now admitted for continued advanced therapies evaluation.      Interval History:  Nursing notes reviewed, no acute events overnight. Slept much better last night with changes to bedtime medications. Denies shortness of breath at rest. No chest pain.       Plan Today:  - Plan to go to the cath lab for  leave-in [place] and transfer to ICU, pending bed availability   - Continue dobutamine 5 mcg/kg/min  - Continue lasix drip at 10 mg/hr. Will follow up I/Os and adjust dose as appropriate     =======================================================================  # Bilateral hand tightness, resolved  Experienced two episodes prior to admission, once while performing PT exercises and again when he woke up the following morning. Each episode lasted for about 15 minutes and had no other associated symptoms. Etiology not clear at this  time. No focal motor or sensory deficits on exam. Electrolytes within normal limits. Troponin only mildly elevated at 46 and down-trending. EKG with A sensed V paced rhythm with no concerning ST/T changes so low suspicion for ischemia. ICD interrogation detected 225 ventricular sensed episodes, with the most recently stored episodes showing slow VT-NS at  bpm, runs lasting about 5 seconds. Unclear if these short runs would explain his 15-minute episodes. He is on dobutamine which has known arrhythmogenic effects so certainly worth monitoring closely, especially given his history of NSVT.   - Monitoring for recurrence while on telemetry   - Trend electrolytes and replete as needed      # Advanced chronic systolic heart failure, EF 15-20%, NYHA class IV, ACC/AHA Stage D  # Ambulatory cardiogenic shock, on dobutamine drip as bride to candidacy   Recently admitted from 11/10/2022 - 11/21/2022 for acute on chronic heart failure, treated with IV diuresis and initiated on dobutamine infusion. Patient completed advanced therapy evaluation during that admission and and is now re-admitted for continued evaluation and possible transplant listing. No signs of acute decompensation on adimssion. He reports improvement in his activity tolerance and appetite since starting dobutamine, although he seemed to have regressed a bit in the days prior to admission. [facility] completed this AM; RA 16, PA 50/26/35, PCWP 26, Aisha CO/CI 3.1/1.44.   - Etiology: Predominantly non-ischemic cardiomyopathy, suspected arrhythmogenic   - Volume: Lasix drip 10 mg/hr, trend I/Os and adjust dose as appropriate   - Inotrope: dobutamine to 5 mcg/kg/min  - BB: held in the setting of shock  - ACE/ARB/ARNi: previous intolerance due to hypotension and lightheadedness   - SGLT2i: Holding PTA Empagliflozin 10mg qday  - MRA: None  - Statin: PTA pravastatin  - Electrolytes: PTA 20 meq KCl daily   - Transplant evaluation: Completed checklist during previous  admission. Now pending transplant listing.     # Severe Malnutrition  - Appreciate assistance from RD, Will follow up note and recs      # Multivessel coronary artery disease (s/p PCI with TILA to LCx 4/2013 and to the LAD in 10/2021)  As discussed above, low suspicion for ACS with bilateral arm tightness. 11/11/22 LHC showed patent LAD and LCx stents as well as mild-mod non-obstructive disease in the RCA.   - Continue PTA ASA 81mg qday  - BB: Holding as per above  - Statin: Cont PTA pravastatin     # History of NSVT s/p biventricular PPM/ICD  Interrogation performed 12/10/22 w/ two short (<5s) runs of NSVT on 11/1/22 and 11/3/22, no recent shocks. A-paced.  - Cont PTA PO Amiodarone 200mg BID     # Occlusive left peroneal DVT  # Non-occlusive right axillary DVT  Identified on doppler 11/15/22 during previous admission.   - Holding PTA apixaban pending swan placement      # Incidental punctate cerebellar infarct, likely embolic  Read on MR imaging 11/14/22 without new neuro deficits. Per radiology, appears acute, likely embolic. DNo known arterial clot history. Reviewed telemetry over admission, no afib or other tachycarrhythmias. Reassuring prior TTE this admission including bubble study, repeat JARED w/o septal defects or thrombus. Treated w/ heparin gtt and transitioned to Eliquis.     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 No episodes since. Neurological exam unremarkable. Neurology consulted during recent admission as part of VAD/transplant evaluation. MRI/MRA brain was also performed which showed no concern for progression of disease  - Continue PTA cellcept 1000mg qam and 500mg qpm     # CKD stage 2  Creatinine at baseline.   - trend BMP     # Insomnia/anxiety  - PRN trazodone and lorazepam        FEN: Low salt diet, replete lytes as needed   DVT PPx: Holding PTA apixaban pending swan placement   Code: Full code      Disposition: Inpatient  "admission, pending transplant evaluation/listing      Discussed with Dr. Jose Poole MD  Cardiology Fellow   [phone]     Objective:   BP 99/81 (BP Location: Left arm)   Pulse 96   Temp 97.5  F (36.4  C) (Axillary)   Resp 16   Ht 1.88 m (6' 2\")   Wt 82.6 kg (182 lb 1.6 oz)   SpO2 98%   BMI 23.38 kg/m    Temp (24hrs), Av  F (36.7  C), Min:97.6  F (36.4  C), Max:98.6  F (37  C)    Wt:   Wt Readings from Last 5 Encounters:   22 82.6 kg (182 lb 1.6 oz)   22 90.9 kg (200 lb 6.4 oz)   11/10/22 108 kg (238 lb 1.6 oz)   22 95.7 kg (210 lb 14.4 oz)     Physical Exam   GEN: pleasant, no acute distress  HEENT: no icterus, EOMI  CV: RRR, normal s1/s2, soft systolic murmur best heard at LLSB, JVP 12  CHEST: Non-labored breathing, much improved compared to day prior. Normal O2 sats on room air   ABD: soft, non-tender, non-distended   EXTR: warm extremities. No LE edema  NEURO: awake, alert, non-focal motor exam     DATA:   Labs, EKGs and imaging reviewed. Pertinent results discussed in assessment and plan above.   "

## 2022-12-09 NOTE — CONSULTS
Care Management Initial Consult    General Information  Assessment completed with: Patient, and Spouse-Arlette. Transplant evaluation completed with patient/wife 11/14/2022.       Primary Care Provider verified and updated as needed: Yes   Quentin Odonnell [phone] [id number] [facility] [place] [place] [address]       Readmission within the last 30 days: Patient discharged home to [place], [place] a couple of weeks ago with IV inotropes in order to work on rehabilitation and strengthening so he can better prepare for heart transplant. Readmit for worsening heart failure-getting RHC today and then transfer to ICU.       Advance Care Planning: Advance Care Planning Reviewed: verified with patient and wife (Brought copy in-scanned into EMR today)  Wife listed as HCA        Communication Assessment  Patient's communication style: spoken language (English or Bilingual)    Hearing Difficulty or Deaf: no   Wear Glasses or Blind: yes    Cognitive  Cognitive/Neuro/Behavioral: WDL                      Living Environment:   People in home: Lives with Wife  Current living Arrangements:  Single family home with multiple levels. 11-14 steps to upper level where the bedroom is, but all needs can be met on the main level     Able to return to prior arrangements: yes-but may remain hospitalized if found appropriate to list for heart transplant.       Family/Social Support:  Care provided by: Spouse  Provides care for: no one, unable/limited ability to care for self  Marital Status:       Description of Support System: Supportive, Involved-Great family support from wife and 3 adult children, siblings, and friends  Support Assessment: Adequate family and caregiver support, Adequate social supports    Current Resources:   Patient receiving home care services: Yes  Skilled Home Care Services: Skilled Nursing  Community Resources: Home Infusion-IV inotropes  Equipment currently used at home: walker, standard  Supplies currently  used at home:      Employment/Financial:  Employment Status: disabled        Financial Concerns:  No           Lifestyle & Psychosocial Needs:  Social Determinants of Health     Tobacco Use: Low Risk      Smoking Tobacco Use: Never     Smokeless Tobacco Use: Never     Passive Exposure: Not on file   Alcohol Use: Not on file   Financial Resource Strain: Not on file   Food Insecurity: Not on file   Transportation Needs: Not on file   Physical Activity: Not on file   Stress: Not on file   Social Connections: Not on file   Intimate Partner Violence: Not on file   Depression: At risk     PHQ-2 Score: 3   Housing Stability: Not on file       Functional Status:  Prior to admission patient needed assistance: 3 months ago, patient was walking 3 miles daily, driving, managing all of his medications, mowing the grass, gardening, etc... In October/November, has needed a lot more assistance with all ADLs. Wife has been providing most of this assistance at home.     Mental Health Status:  Mental Health Status:   Diagnosed with CNS vasculitis around 2013, leading to strokes. Since his stroke event, he has dealt with a pseudobulbar affect and often cries or becomes emotional when talking about his situation. Patient and wife deny any history of mental health issues prior to his CNS vasculitis diagnosis. Denies hospitalizations or need for medications for mood.    Chemical Dependency Status:  Chemical Dependency Status: No Current Concerns             Values/Beliefs:  Spiritual, Cultural Beliefs, Buddhist Practices, Values that affect care:    Description of Beliefs that Will Affect Care: Roman Catholic            Additional Information:  Writer saw patient and wife 11/14 for VAD/heart transplant eval. Found to be good psychosocial candidate. Re-admitted for worsening heart failure-getting RHC today and will transfer to ICU. Team evaluating whether patient will be listed for heart transplant soon. Will most likely stay inpatient. SW will  continue to follow for ongoing psychosocial support during this hospital stay.    Sophia Butler, Dorothe[facility] Dix Psychiatric CenterSW

## 2022-12-09 NOTE — COMMITTEE REVIEW
Thoracic Committee Review Note     Evaluation Date: 11/12/2022  Committee Review Date: 12/9/2022    Organ being evaluated for: Heart    Transplant Phase: Evaluation  Transplant Status: Active    Transplant Coordinator: Maria Ines Genao  Transplant MD:  Dr. Dominguez    Primary Diagnosis: NICM, familial    Committee Review Members:  Cardiovascular Disease Andrea Sheets, RN, KALLI KHAN, RN, Chance Nelson, RN, Edyta Gonzales, RN, Fay Newberry, RN, Gwen Sanderson, RN   NURSE PRACTITIONER - ACUTE CARE Mae Larson, APRN CNP   Neuropsychology Roverto Lantigua, PhD   Nurse Practitioner - [facility] Sherin Prasad PA-C   Nutrition Kirsty Fitch, CASIMIRO   Pharmacist Teddy Colon, [facility]    - Clinical Sophia Butler, [facility], Arabella Batista, [facility]   Transplant Dilcia Hayden RN, Andrea Mckee MD, Maricruz Mercer MD, Jessica Ibanez MD, Paulette Ko, [place], Oneal Garcia MD, Mary Lou Luke, [place], Asa Dallas MD, Myrtle Dominguez MD, Cyrus Bailey MD, Khari Mcneill MD, Ana Perez MD, Dominique Dorman MD       Transplant Eligibility: Disabling heart disease on maximal medical therapy    Committee Review Decision: Approved    Relative Contraindications: None    Absolute Contraindications: None    Committee Chair Myrtle Dominguez MD verbally attested to the committee's decision.    Committee Discussion Details:      66 YO, A, BMI 25.5, cPRA 0%. Familial CMP. Brother transplanted last year. Muscle mass loss. Discharged on inotrope a couple of weeks ago to get stronger. Presented with CP and admitted a couple of days ago. History of neurologic vasculitis, followed by Juan on low dose cellcept, stable without deficits that limit ADLs. RHC repeated RA 16, wedge 26, CI 1.44, PVR 2.91 on 2.5 dobutamine, weight stable. Meets status 2 criteria on RHC but no SBP < 90. Rehabing while at home walking 10 laps in the home, 6MWT increased from  750ft to 1050 ft this admission. Creatinine in normal range. Has anxiety, can he handle LVAD care? Status 2E meets hemodynamic parameters but not SBP. The team feels IABP would limit his ambulation and mobility.     Neuropsych: CNS vasculitis, other stroke history found in 2020, 2021, and 2022. Has been stable for years despite stroke history, don t limit him functionally. History of anxiety that can be limiting. Recommending psychiatry referral to avoid rescue medications like Ativan, could he take something regularly to avoid severe episodes?     Plan:  Place swan with increased diuresis and monitor SBP. The team would prefer transplant given his neuro disorder. Dr. Dominguez to circulate 2E statement to the team if hemodynamics do not qualify patient for status 2.

## 2022-12-09 NOTE — PLAN OF CARE
D: On 12/6 pt presented with transient bilateral hand tightness with unrevealing workup, now admitted for continued advanced therapies evaluation.  PMH: chronic systolic heart failure (suspected predominantly nonischemic cardiomyopathy, possibly arrhythmogenic), coronary artery disease (s/p PCI with TILA to LCx 4/2013 and to the LAD in 10/2021), hyperlipidemia, CNS vasculitis and chronic kidney disease.     I: Monitored vitals and assessed pt status.   Running: Dobutamine @5 mcg/kg/min, Lasix @10 mg/hr  PRN: Ativan   Tele: V paced, HR 80-90's   Mobility: SBA    A: AOx4. VSS. Afebrile. Urinating adequately, on new lasix gtt. Denies any pain. RHC completed today. 6 minute walk test completed by PT. Transplant coordinator met with pt and wife today for education.       P: Continue to monitor Pt status and report changes to Cards 2.

## 2022-12-09 NOTE — PHARMACY-ADMISSION MEDICATION HISTORY
Admission Medication History Completed by Pharmacy    See [facility] Admission Navigator for allergy information, preferred outpatient pharmacy, prior to admission medications and immunization status.     Medication History Sources:     Patient's wife    Dispense report     Changes made to PTA medication list (reason):    Added: None    Deleted:    o Clotrimazole 1% external cream- not taking per patient's wife   o Metoclopramide 10 mg tablet- not taking per patient's wife     Changed: None    Additional Information:    Prior to admission, patient was on 1 tablet of apixaban 5 mg BID    Prior to Admission medications    Medication Sig Last Dose Taking? Auth Provider Long Term End Date   albuterol (PROAIR HFA/PROVENTIL HFA/VENTOLIN HFA) 108 (90 Base) MCG/ACT inhaler Inhale 2 puffs into the lungs every 6 hours as needed Past Month Yes Reported, Patient     amiodarone (PACERONE) 200 MG tablet Take 200 mg by mouth 2 times daily Past Week Yes Reported, Patient     apixaban ANTICOAGULANT (ELIQUIS) 5 MG tablet Take 2 tablets (10 mg) by mouth 2 times daily for 6 days, THEN 1 tablet (5 mg) 2 times daily for 24 days. Take 2 tablets (total of 10mg) twice daily up through Saturday 11/26. Starting Sunday 11/27, start taking 1 tablet (5mg) twice daily from then on. Past Week Yes Elliot Dhaliwal MD  12/20/22   aspirin (ASA) 81 MG EC tablet Take 81 mg by mouth daily Past Week Yes Reported, Patient     DOBUTamine 500 mg in dextrose 5% 250 mL (adult std conc) premix Inject 236.75 mcg/min into the vein continuous Unknown Yes Elliot Dhaliwal MD     furosemide (LASIX) 20 MG tablet Take 20 mg by mouth 2 times daily Past Week Yes Reported, Patient     LORazepam (ATIVAN) 0.25 mg TABS half-tab Take 0.25 mg by mouth 3 times daily as needed Past Week Yes Reported, Patient     mycophenolate (GENERIC EQUIVALENT) 500 MG tablet Take 500 mg by mouth every evening Past Week Yes Unknown, Entered By History     mycophenolate (GENERIC EQUIVALENT) 500 MG  tablet Take 1,000 mg by mouth every morning Past Week Yes Reported, Patient     potassium chloride ER (KLOR-CON M) 20 MEQ CR tablet Take 1 tablet (20 mEq) by mouth daily Past Week Yes Elliot Dhaliwal MD     pravastatin (PRAVACHOL) 20 MG tablet TAKE 1 TABLET BY MOUTH DAILY/ DUE FOR FASTING LABWORK Past Week Yes Reported, Patient     traZODone (DESYREL) 50 MG tablet TAKE 1 TABLET (50 MG TOTAL) BY MOUTH ONCE NIGHTLY AS NEEDED FOR SLEEP. Past Week Yes Reported, Patient           Date completed: 12/08/22    Medication history completed by: Marina WARD Chi, PD3 Student Pharmacist

## 2022-12-09 NOTE — PLAN OF CARE
"Status [id number]:    D: Admitted 12/6/22 with bilateral hand tightness now being worked up for advanced therapies.     /87 (BP Location: Left arm)   Pulse 87   Temp 98.1  F (36.7  C) (Oral)   Resp 16   Ht 1.88 m (6' 2\")   Wt 82.6 kg (182 lb 1.6 oz)   SpO2 96%   BMI 23.38 kg/m      A/I: A&O x4, denies pain. Continues on dobutamine at 5mcg/kg/min and lasix at 10mg/hr. Voiding large amounts of clear, straw urine. Rhythm is V-paced. Paged Cards resident regarding electrolytes as pt was not ordered any protocols despite being on lasix. K+ and mag protocols ordered, labs drawn. K+ replaced per Cards order and mag being replaced per protocol. VSS. Trazodone given x1 per pt request. Rested quietly in bed between nursing cares.     P: Continue plan of care. Report any changes to Cards 2.   "

## 2022-12-10 LAB
ALBUMIN SERPL BCG-MCNC: 4 G/DL (ref 3.5–5.2)
ALP SERPL-CCNC: 82 U/L (ref 40–129)
ALT SERPL W P-5'-P-CCNC: 19 U/L (ref 10–50)
ANION GAP SERPL CALCULATED.3IONS-SCNC: 13 MMOL/L (ref 7–15)
ANION GAP SERPL CALCULATED.3IONS-SCNC: 15 MMOL/L (ref 7–15)
AST SERPL W P-5'-P-CCNC: 23 U/L (ref 10–50)
BASE EXCESS BLDV CALC-SCNC: 6 MMOL/L (ref -7.7–1.9)
BASE EXCESS BLDV CALC-SCNC: 6.6 MMOL/L (ref -7.7–1.9)
BASE EXCESS BLDV CALC-SCNC: 8.2 MMOL/L (ref -7.7–1.9)
BASE EXCESS BLDV CALC-SCNC: 9 MMOL/L (ref -7.7–1.9)
BILIRUB DIRECT SERPL-MCNC: 0.37 MG/DL (ref 0–0.3)
BILIRUB SERPL-MCNC: 1.7 MG/DL
BUN SERPL-MCNC: 16 MG/DL (ref 8–23)
BUN SERPL-MCNC: 26.8 MG/DL (ref 8–23)
CALCIUM SERPL-MCNC: 9.3 MG/DL (ref 8.8–10.2)
CALCIUM SERPL-MCNC: 9.5 MG/DL (ref 8.8–10.2)
CHLORIDE SERPL-SCNC: 97 MMOL/L (ref 98–107)
CHLORIDE SERPL-SCNC: 99 MMOL/L (ref 98–107)
CREAT SERPL-MCNC: 1.07 MG/DL (ref 0.67–1.17)
CREAT SERPL-MCNC: 1.14 MG/DL (ref 0.67–1.17)
DEPRECATED HCO3 PLAS-SCNC: 26 MMOL/L (ref 22–29)
DEPRECATED HCO3 PLAS-SCNC: 28 MMOL/L (ref 22–29)
GFR SERPL CREATININE-BSD FRML MDRD: 70 ML/MIN/1.73M2
GFR SERPL CREATININE-BSD FRML MDRD: 75 ML/MIN/1.73M2
GLUCOSE BLDC GLUCOMTR-MCNC: 101 MG/DL (ref 70–99)
GLUCOSE BLDC GLUCOMTR-MCNC: 127 MG/DL (ref 70–99)
GLUCOSE BLDC GLUCOMTR-MCNC: 140 MG/DL (ref 70–99)
GLUCOSE BLDC GLUCOMTR-MCNC: 146 MG/DL (ref 70–99)
GLUCOSE SERPL-MCNC: 106 MG/DL (ref 70–99)
GLUCOSE SERPL-MCNC: 144 MG/DL (ref 70–99)
HCO3 BLDV-SCNC: 30 MMOL/L (ref 21–28)
HCO3 BLDV-SCNC: 30 MMOL/L (ref 21–28)
HCO3 BLDV-SCNC: 33 MMOL/L (ref 21–28)
HCO3 BLDV-SCNC: 34 MMOL/L (ref 21–28)
LACTATE SERPL-SCNC: 1.4 MMOL/L (ref 0.7–2)
MAGNESIUM SERPL-MCNC: 2.1 MG/DL (ref 1.7–2.3)
O2/TOTAL GAS SETTING VFR VENT: 0 %
O2/TOTAL GAS SETTING VFR VENT: 21 %
OXYHGB MFR BLDV: 58 % (ref 70–75)
OXYHGB MFR BLDV: 62 % (ref 70–75)
OXYHGB MFR BLDV: 63 % (ref 70–75)
OXYHGB MFR BLDV: 68 % (ref 70–75)
PCO2 BLDV: 39 MM HG (ref 40–50)
PCO2 BLDV: 41 MM HG (ref 40–50)
PCO2 BLDV: 43 MM HG (ref 40–50)
PCO2 BLDV: 44 MM HG (ref 40–50)
PH BLDV: 7.48 [PH] (ref 7.32–7.43)
PH BLDV: 7.48 [PH] (ref 7.32–7.43)
PH BLDV: 7.5 [PH] (ref 7.32–7.43)
PH BLDV: 7.5 [PH] (ref 7.32–7.43)
PHOSPHATE SERPL-MCNC: 3.8 MG/DL (ref 2.5–4.5)
PO2 BLDV: 31 MM HG (ref 25–47)
PO2 BLDV: 33 MM HG (ref 25–47)
PO2 BLDV: 34 MM HG (ref 25–47)
PO2 BLDV: 37 MM HG (ref 25–47)
POTASSIUM SERPL-SCNC: 3.3 MMOL/L (ref 3.4–5.3)
POTASSIUM SERPL-SCNC: 3.9 MMOL/L (ref 3.4–5.3)
POTASSIUM SERPL-SCNC: 4.2 MMOL/L (ref 3.4–5.3)
PROT SERPL-MCNC: 6.8 G/DL (ref 6.4–8.3)
SODIUM SERPL-SCNC: 138 MMOL/L (ref 136–145)
SODIUM SERPL-SCNC: 140 MMOL/L (ref 136–145)

## 2022-12-10 PROCEDURE — 250N000011 HC RX IP 250 OP 636: Performed by: STUDENT IN AN ORGANIZED HEALTH CARE EDUCATION/TRAINING PROGRAM

## 2022-12-10 PROCEDURE — 250N000013 HC RX MED GY IP 250 OP 250 PS 637: Performed by: INTERNAL MEDICINE

## 2022-12-10 PROCEDURE — 250N000013 HC RX MED GY IP 250 OP 250 PS 637: Performed by: STUDENT IN AN ORGANIZED HEALTH CARE EDUCATION/TRAINING PROGRAM

## 2022-12-10 PROCEDURE — 83735 ASSAY OF MAGNESIUM: CPT | Performed by: INTERNAL MEDICINE

## 2022-12-10 PROCEDURE — 82805 BLOOD GASES W/O2 SATURATION: CPT | Performed by: STUDENT IN AN ORGANIZED HEALTH CARE EDUCATION/TRAINING PROGRAM

## 2022-12-10 PROCEDURE — 84132 ASSAY OF SERUM POTASSIUM: CPT | Performed by: INTERNAL MEDICINE

## 2022-12-10 PROCEDURE — 82248 BILIRUBIN DIRECT: CPT

## 2022-12-10 PROCEDURE — 200N000002 HC R&B ICU UMMC

## 2022-12-10 PROCEDURE — 250N000012 HC RX MED GY IP 250 OP 636 PS 637: Performed by: EMERGENCY MEDICINE

## 2022-12-10 PROCEDURE — 84132 ASSAY OF SERUM POTASSIUM: CPT

## 2022-12-10 PROCEDURE — 250N000013 HC RX MED GY IP 250 OP 250 PS 637: Performed by: EMERGENCY MEDICINE

## 2022-12-10 PROCEDURE — 250N000012 HC RX MED GY IP 250 OP 636 PS 637: Performed by: STUDENT IN AN ORGANIZED HEALTH CARE EDUCATION/TRAINING PROGRAM

## 2022-12-10 PROCEDURE — 99291 CRITICAL CARE FIRST HOUR: CPT | Mod: GC | Performed by: INTERNAL MEDICINE

## 2022-12-10 PROCEDURE — 250N000011 HC RX IP 250 OP 636: Performed by: INTERNAL MEDICINE

## 2022-12-10 PROCEDURE — 84100 ASSAY OF PHOSPHORUS: CPT | Performed by: INTERNAL MEDICINE

## 2022-12-10 PROCEDURE — 83605 ASSAY OF LACTIC ACID: CPT | Performed by: STUDENT IN AN ORGANIZED HEALTH CARE EDUCATION/TRAINING PROGRAM

## 2022-12-10 RX ORDER — POTASSIUM CHLORIDE 7.45 MG/ML
10 INJECTION INTRAVENOUS
Status: COMPLETED | OUTPATIENT
Start: 2022-12-10 | End: 2022-12-10

## 2022-12-10 RX ORDER — SENNOSIDES 8.6 MG
8.6 TABLET ORAL 2 TIMES DAILY PRN
Status: DISCONTINUED | OUTPATIENT
Start: 2022-12-10 | End: 2022-12-26

## 2022-12-10 RX ORDER — POLYETHYLENE GLYCOL 3350 17 G/17G
17 POWDER, FOR SOLUTION ORAL 2 TIMES DAILY PRN
Status: DISCONTINUED | OUTPATIENT
Start: 2022-12-10 | End: 2022-12-26

## 2022-12-10 RX ORDER — POTASSIUM CHLORIDE 750 MG/1
40 TABLET, EXTENDED RELEASE ORAL ONCE
Status: COMPLETED | OUTPATIENT
Start: 2022-12-10 | End: 2022-12-10

## 2022-12-10 RX ORDER — POLYETHYLENE GLYCOL 3350 17 G/17G
17 POWDER, FOR SOLUTION ORAL DAILY PRN
Status: DISCONTINUED | OUTPATIENT
Start: 2022-12-10 | End: 2022-12-10

## 2022-12-10 RX ORDER — FUROSEMIDE 10 MG/ML
40 INJECTION INTRAMUSCULAR; INTRAVENOUS
Status: DISCONTINUED | OUTPATIENT
Start: 2022-12-10 | End: 2022-12-11

## 2022-12-10 RX ORDER — ENOXAPARIN SODIUM 100 MG/ML
40 INJECTION SUBCUTANEOUS EVERY 24 HOURS
Status: DISCONTINUED | OUTPATIENT
Start: 2022-12-10 | End: 2022-12-15

## 2022-12-10 RX ADMIN — SENNOSIDES 8.6 MG: 8.6 TABLET, FILM COATED ORAL at 19:47

## 2022-12-10 RX ADMIN — ACETAMINOPHEN 650 MG: 325 TABLET, FILM COATED ORAL at 19:47

## 2022-12-10 RX ADMIN — PRAVASTATIN SODIUM 20 MG: 20 TABLET ORAL at 08:08

## 2022-12-10 RX ADMIN — POTASSIUM CHLORIDE 10 MEQ: 7.46 INJECTION, SOLUTION INTRAVENOUS at 00:29

## 2022-12-10 RX ADMIN — ASPIRIN 81 MG: 81 TABLET, COATED ORAL at 08:07

## 2022-12-10 RX ADMIN — AMIODARONE HYDROCHLORIDE 200 MG: 200 TABLET ORAL at 08:08

## 2022-12-10 RX ADMIN — FUROSEMIDE 40 MG: 10 INJECTION, SOLUTION INTRAVENOUS at 17:42

## 2022-12-10 RX ADMIN — MYCOPHENOLATE MOFETIL 1000 MG: 500 TABLET, FILM COATED ORAL at 08:08

## 2022-12-10 RX ADMIN — AMIODARONE HYDROCHLORIDE 200 MG: 200 TABLET ORAL at 19:48

## 2022-12-10 RX ADMIN — ENOXAPARIN SODIUM 40 MG: 40 INJECTION SUBCUTANEOUS at 14:37

## 2022-12-10 RX ADMIN — POTASSIUM CHLORIDE 10 MEQ: 7.46 INJECTION, SOLUTION INTRAVENOUS at 02:13

## 2022-12-10 RX ADMIN — DOBUTAMINE HYDROCHLORIDE 5 MCG/KG/MIN: 200 INJECTION INTRAVENOUS at 12:58

## 2022-12-10 RX ADMIN — POLYETHYLENE GLYCOL 3350 17 G: 17 POWDER, FOR SOLUTION ORAL at 19:47

## 2022-12-10 RX ADMIN — MYCOPHENOLATE MOFETIL 500 MG: 500 TABLET, FILM COATED ORAL at 19:48

## 2022-12-10 RX ADMIN — POTASSIUM CHLORIDE 40 MEQ: 750 TABLET, EXTENDED RELEASE ORAL at 08:07

## 2022-12-10 RX ADMIN — POLYETHYLENE GLYCOL 3350 17 G: 17 POWDER, FOR SOLUTION ORAL at 09:03

## 2022-12-10 ASSESSMENT — ACTIVITIES OF DAILY LIVING (ADL)
ADLS_ACUITY_SCORE: 42
ADLS_ACUITY_SCORE: 39
ADLS_ACUITY_SCORE: 42
ADLS_ACUITY_SCORE: 38
ADLS_ACUITY_SCORE: 42
ADLS_ACUITY_SCORE: 38
ADLS_ACUITY_SCORE: 39
ADLS_ACUITY_SCORE: 38
ADLS_ACUITY_SCORE: 38
ADLS_ACUITY_SCORE: 39
ADLS_ACUITY_SCORE: 38
ADLS_ACUITY_SCORE: 39

## 2022-12-10 NOTE — PROGRESS NOTES
Cardiology Brief Progress Note    PM [place] numbers:    RA: 10 <--4  PA: 35/15/22 <--26/10/16  CWP: 14 <--10  MAP: 86    - Lasix 40 mg IV this evening, then continue 40 IV BID. Will continue to trend swan numbers and adjust dose as appropriate      Nancy Poole MD  Cardiology Fellow

## 2022-12-10 NOTE — PLAN OF CARE
Time: 1900 - 0730    Goal Outcome Evaluation:      Plan of Care Reviewed With: patient, spouse    Overall Patient Progress: improving    Major Shift Events: Vitally stable on RA overnight, afebrile. [place] numbers improved overnight; CO 4.5, CI 2.2 at 0400. Permanent pacemaker V-paced, intermittent PVC's. Lasix gtt at 10 mg/hr, dobutamine gtt at 5 mcg/kg/min. No BM, but good clear yellow UOP via [place]. Pt reports that he slept well.     Plan: Continue to monitor [place] numbers, electrolytes. Continue heart transplant workup. Encourage activity and PO intake as tolerated. Follow plan of care.     For vital signs and complete assessments, please see documentation flowsheets.

## 2022-12-10 NOTE — PROGRESS NOTES
[facility]    Advanced Heart Failure Progress Note   Critical Care x 45 minutes    Critical care was necessary to continue to stabilize, treat, and maintain critical illnesses including heart failure with cardiogenic shock,pharmacologic methods.  My time was personally spent with review of patients previous history and outside charts (minutes)  development and or modification of treatment plans, discussion with patient and family, coordination of care and discussion with consultants, ordering and review of imaging and laboratory studies, supervision of trainees including residents and fellows, and conveyance of care plan to bedside ICU nurse.  I have reviewed their notes, confirmed their observations by my own personal chart review and performance of my own physical examination and agree with their notes of this day.  I spent a total of  minutes carrying out these activities.      The patient remains in the ICU requiring 1-2 parenteral cardiac medications to stabilize blood pressure and cardiac output.  The patient still has high SVR.  Functional status improving with walking.    Plan as outlined below to include addition of sodium nitroprusside.      Assessment and Plan:     Paco Stein is a 67 year old male with a history of chronic systolic heart failure (suspected predominantly nonischemic cardiomyopathy, possibly arrhythmogenic), coronary artery disease (s/p PCI with TILA to LCx 4/2013 and to the LAD in 10/2021), hyperlipidemia, CNS vasculitis and chronic kidney disease who presents with transient bilateral hand tightness with unrevealing workup, now admitted for continued advanced therapies evaluation.       Interval History:  Went to the cath lab yesterday for RHC with leave-in [place], then transferred to the ICU. No acute events overnight. Denies shortness of breath or chest pain this morning. [place] numbers as follows: CVP 4, PA 26/10/16, PCWP 10, CO 3.7, CI 1.8.       Plan Today:  Discontinue lasix drip given low CVP. Will repeat hemodynamic measurements this afternoon, CVP goal 8. Continue dobutamine drip at 5 mcg/kg/min. 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 No episodes since. Neurological exam unremarkable. Neurology consulted during recent admission as part of VAD/transplant evaluation. MRI/MRA brain was also performed which showed no concern for progression of disease  - Continue PTA cellcept 1000mg qam and 500mg qpm    # Incidental punctate cerebellar infarct, likely embolic  Read on MR imaging 11/14/22 without new neuro deficits. Per radiology, appears acute, likely embolic. DNo known arterial clot history. Reviewed telemetry over admission, no afib or other tachycarrhythmias. Reassuring prior TTE this admission including bubble study, repeat JARED w/o septal defects or thrombus. Treated w/ heparin gtt and transitioned to Eliquis.    # Insomnia/anxiety  - PRN trazodone and lorazepam      ===CARDIOVASCULAR===  # Advanced chronic systolic heart failure, EF 15-20%, NYHA class IV, ACC/AHA Stage D  # Ambulatory cardiogenic shock, on dobutamine drip as bride to candidacy   Recently admitted from 11/10/2022 - 11/21/2022 for acute on chronic heart failure, treated with IV diuresis and initiated on dobutamine infusion. Patient completed advanced therapy evaluation during that admission and and is now re-admitted for continued evaluation and possible transplant listing. No signs of acute decompensation on adimssion. He reports improvement in his activity tolerance and appetite since starting dobutamine, although he seemed to have regressed a bit in the days prior to admission  - 12/08/2022 RHC: RA 16, PA 50/26/35, PCWP 26, Aisha CO/CI 3.1/1.44.   - Etiology: Predominantly  non-ischemic cardiomyopathy, suspected arrhythmogenic   - Volume: Discontinued lasix drip 12/10 given low CVP  - Inotrope: dobutamine to 5 mcg/kg/min  - BB: held in the setting of shock  - ACE/ARB/ARNi: previous intolerance due to hypotension and lightheadedness   - SGLT2i: Holding PTA Empagliflozin 10mg qday  - MRA: None  - Statin: PTA pravastatin  - Electrolytes: PTA 20 meq KCl daily, trending and replacing as needed as well   - Transplant evaluation: Completed checklist during previous admission. Now pending transplant listing.   - [place] Aniceto hemodynamics:   Date RA PA PCWP SvO2  CO/CI SVR PVR Therapy   12/9 (cath lab) 5 32/10/19 15 53 2.8/1.3 2228 1.4 Dobutamine 5 mcg/kg/min  Furosemide 10 mg/hr   12/10 AM 4 26/10/16 10 62 3.7/1.8 1600 1.6 Dobutamine 5 mcg/kg/min  Furosemide 10 mg/hr     # Multivessel coronary artery disease (s/p PCI with TILA to LCx 4/2013 and to the LAD in 10/2021)  As discussed above, low suspicion for ACS with bilateral arm tightness. 11/11/22 LHC showed patent LAD and LCx stents as well as mild-mod non-obstructive disease in the RCA.   - Continue PTA ASA 81 mg qday  - BB: Holding as per above  - Statin: Cont PTA pravastatin     # History of NSVT s/p biventricular PPM/ICD  Interrogation performed 12/10/22 w/ two short (<5s) runs of NSVT on 11/1/22 and 11/3/22, no recent shocks. A-paced.  - Cont PTA PO Amiodarone 200mg BID      ===PULMONARY===  # Non-productive cough, acute on chronic  - PRN cough lozenges       ===GI===  # Severe malnutrition in the context of acute illness/injury on chronic illness  - Appreciate assistance from RD        ===RENAL===  # CKD stage 2  Creatinine at baseline.   - trend BMP      ===HEME/ONC===  # Occlusive left peroneal DVT  # Non-occlusive right axillary DVT  Identified on doppler 11/15/22 during previous admission.   - Holding PTA apixaban      ===ENDOCRINE===  No active concerns       INFECTIOUS DISEASE  No active concerns       ===SKIN/MSK===  # Bilateral hand  "tightness, resolved  Experienced two episodes prior to admission, once while performing PT exercises and again when he woke up the following morning. Each episode lasted for about 15 minutes and had no other associated symptoms. Etiology not clear at this time. No focal motor or sensory deficits on exam. Electrolytes within normal limits. Troponin only mildly elevated at 46 and down-trending. EKG with A sensed V paced rhythm with no concerning ST/T changes so low suspicion for ischemia. ICD interrogation detected 225 ventricular sensed episodes, with the most recently stored episodes showing slow VT-NS at  bpm, runs lasting about 5 seconds. Unclear if these short runs would explain his 15-minute episodes. He is on dobutamine which has known arrhythmogenic effects so certainly worth monitoring closely, especially given his history of NSVT.   - Monitoring for recurrence while on telemetry   - Trend electrolytes and replete as needed       FEN: Low salt diet, replete lytes as needed   DVT PPx: Holding PTA apixaban   Code: Full code      Disposition: Inpatient admission, pending transplant evaluation/listing      Discussed with Dr. Jose Poole MD  Cardiology Fellow   [phone]     Objective:   /82   Pulse 86   Temp 98  F (36.7  C) (Oral)   Resp 16   Ht 1.88 m (6' 2\")   Wt 79.6 kg (175 lb 7.8 oz)   SpO2 96%   BMI 22.53 kg/m    Temp (24hrs), Av.8  F (36.6  C), Min:97.5  F (36.4  C), Max:98  F (36.7  C)    Wt:   Wt Readings from Last 5 Encounters:   12/10/22 79.6 kg (175 lb 7.8 oz)   22 90.9 kg (200 lb 6.4 oz)   11/10/22 108 kg (238 lb 1.6 oz)   22 95.7 kg (210 lb 14.4 oz)     Physical Exam   GEN: pleasant, no acute distress  HEENT: no icterus, EOMI  CV: RRR, normal s1/s2, soft systolic murmur best heard at LLSB, JVP 6  CHEST: Non-labored breathing, much improved compared to day prior. Normal O2 sats on room air   ABD: soft, non-tender, non-distended   EXTR: warm " extremities. No LE edema  NEURO: awake, alert, non-focal motor exam     DATA:   Labs, EKGs and imaging reviewed. Pertinent results discussed in assessment and plan above.

## 2022-12-11 ENCOUNTER — APPOINTMENT (OUTPATIENT)
Dept: GENERAL RADIOLOGY | Facility: CLINIC | Age: 67
DRG: 001 | End: 2022-12-11
Attending: STUDENT IN AN ORGANIZED HEALTH CARE EDUCATION/TRAINING PROGRAM
Payer: MEDICARE

## 2022-12-11 LAB
ALBUMIN SERPL BCG-MCNC: 3.8 G/DL (ref 3.5–5.2)
ALP SERPL-CCNC: 83 U/L (ref 40–129)
ALT SERPL W P-5'-P-CCNC: 20 U/L (ref 10–50)
ANION GAP SERPL CALCULATED.3IONS-SCNC: 12 MMOL/L (ref 7–15)
ANION GAP SERPL CALCULATED.3IONS-SCNC: 12 MMOL/L (ref 7–15)
ANION GAP SERPL CALCULATED.3IONS-SCNC: 9 MMOL/L (ref 7–15)
AST SERPL W P-5'-P-CCNC: 23 U/L (ref 10–50)
BASE EXCESS BLDV CALC-SCNC: 4.6 MMOL/L (ref -7.7–1.9)
BASE EXCESS BLDV CALC-SCNC: 6.4 MMOL/L (ref -7.7–1.9)
BASE EXCESS BLDV CALC-SCNC: 7.3 MMOL/L (ref -7.7–1.9)
BASE EXCESS BLDV CALC-SCNC: 9 MMOL/L (ref -7.7–1.9)
BILIRUB DIRECT SERPL-MCNC: 0.31 MG/DL (ref 0–0.3)
BILIRUB SERPL-MCNC: 1.3 MG/DL
BUN SERPL-MCNC: 28.5 MG/DL (ref 8–23)
BUN SERPL-MCNC: 28.5 MG/DL (ref 8–23)
BUN SERPL-MCNC: 28.8 MG/DL (ref 8–23)
CALCIUM SERPL-MCNC: 9.3 MG/DL (ref 8.8–10.2)
CHLORIDE SERPL-SCNC: 99 MMOL/L (ref 98–107)
CREAT SERPL-MCNC: 1.05 MG/DL (ref 0.67–1.17)
CREAT SERPL-MCNC: 1.07 MG/DL (ref 0.67–1.17)
CREAT SERPL-MCNC: 1.07 MG/DL (ref 0.67–1.17)
DEPRECATED HCO3 PLAS-SCNC: 26 MMOL/L (ref 22–29)
DEPRECATED HCO3 PLAS-SCNC: 27 MMOL/L (ref 22–29)
DEPRECATED HCO3 PLAS-SCNC: 27 MMOL/L (ref 22–29)
ERYTHROCYTE [DISTWIDTH] IN BLOOD BY AUTOMATED COUNT: 13.8 % (ref 10–15)
GFR SERPL CREATININE-BSD FRML MDRD: 75 ML/MIN/1.73M2
GFR SERPL CREATININE-BSD FRML MDRD: 75 ML/MIN/1.73M2
GFR SERPL CREATININE-BSD FRML MDRD: 77 ML/MIN/1.73M2
GLUCOSE SERPL-MCNC: 102 MG/DL (ref 70–99)
GLUCOSE SERPL-MCNC: 102 MG/DL (ref 70–99)
GLUCOSE SERPL-MCNC: 129 MG/DL (ref 70–99)
HCO3 BLDV-SCNC: 29 MMOL/L (ref 21–28)
HCO3 BLDV-SCNC: 31 MMOL/L (ref 21–28)
HCO3 BLDV-SCNC: 32 MMOL/L (ref 21–28)
HCO3 BLDV-SCNC: 34 MMOL/L (ref 21–28)
HCT VFR BLD AUTO: 48.4 % (ref 40–53)
HGB BLD-MCNC: 15.8 G/DL (ref 13.3–17.7)
LACTATE SERPL-SCNC: 0.9 MMOL/L (ref 0.7–2)
MAGNESIUM SERPL-MCNC: 2.3 MG/DL (ref 1.7–2.3)
MCH RBC QN AUTO: 28.2 PG (ref 26.5–33)
MCHC RBC AUTO-ENTMCNC: 32.6 G/DL (ref 31.5–36.5)
MCV RBC AUTO: 86 FL (ref 78–100)
O2/TOTAL GAS SETTING VFR VENT: 0 %
O2/TOTAL GAS SETTING VFR VENT: 21 %
OXYHGB MFR BLDV: 61 % (ref 70–75)
OXYHGB MFR BLDV: 62 % (ref 70–75)
OXYHGB MFR BLDV: 64 % (ref 70–75)
OXYHGB MFR BLDV: 67 % (ref 70–75)
PCO2 BLDV: 40 MM HG (ref 40–50)
PCO2 BLDV: 43 MM HG (ref 40–50)
PCO2 BLDV: 44 MM HG (ref 40–50)
PCO2 BLDV: 45 MM HG (ref 40–50)
PH BLDV: 7.46 [PH] (ref 7.32–7.43)
PH BLDV: 7.47 [PH] (ref 7.32–7.43)
PH BLDV: 7.48 [PH] (ref 7.32–7.43)
PH BLDV: 7.49 [PH] (ref 7.32–7.43)
PLATELET # BLD AUTO: 168 10E3/UL (ref 150–450)
PO2 BLDV: 33 MM HG (ref 25–47)
PO2 BLDV: 34 MM HG (ref 25–47)
PO2 BLDV: 34 MM HG (ref 25–47)
PO2 BLDV: 36 MM HG (ref 25–47)
POTASSIUM SERPL-SCNC: 3.6 MMOL/L (ref 3.4–5.3)
POTASSIUM SERPL-SCNC: 3.6 MMOL/L (ref 3.4–5.3)
POTASSIUM SERPL-SCNC: 4.2 MMOL/L (ref 3.4–5.3)
PROT SERPL-MCNC: 6.6 G/DL (ref 6.4–8.3)
RBC # BLD AUTO: 5.61 10E6/UL (ref 4.4–5.9)
SODIUM SERPL-SCNC: 134 MMOL/L (ref 136–145)
SODIUM SERPL-SCNC: 138 MMOL/L (ref 136–145)
SODIUM SERPL-SCNC: 138 MMOL/L (ref 136–145)
WBC # BLD AUTO: 9.1 10E3/UL (ref 4–11)

## 2022-12-11 PROCEDURE — 82248 BILIRUBIN DIRECT: CPT

## 2022-12-11 PROCEDURE — 250N000013 HC RX MED GY IP 250 OP 250 PS 637: Performed by: EMERGENCY MEDICINE

## 2022-12-11 PROCEDURE — 258N000003 HC RX IP 258 OP 636: Performed by: STUDENT IN AN ORGANIZED HEALTH CARE EDUCATION/TRAINING PROGRAM

## 2022-12-11 PROCEDURE — 83605 ASSAY OF LACTIC ACID: CPT | Performed by: STUDENT IN AN ORGANIZED HEALTH CARE EDUCATION/TRAINING PROGRAM

## 2022-12-11 PROCEDURE — 250N000012 HC RX MED GY IP 250 OP 636 PS 637: Performed by: INTERNAL MEDICINE

## 2022-12-11 PROCEDURE — 83735 ASSAY OF MAGNESIUM: CPT | Performed by: STUDENT IN AN ORGANIZED HEALTH CARE EDUCATION/TRAINING PROGRAM

## 2022-12-11 PROCEDURE — 71045 X-RAY EXAM CHEST 1 VIEW: CPT | Mod: 26 | Performed by: RADIOLOGY

## 2022-12-11 PROCEDURE — 82805 BLOOD GASES W/O2 SATURATION: CPT | Performed by: STUDENT IN AN ORGANIZED HEALTH CARE EDUCATION/TRAINING PROGRAM

## 2022-12-11 PROCEDURE — 250N000011 HC RX IP 250 OP 636: Performed by: STUDENT IN AN ORGANIZED HEALTH CARE EDUCATION/TRAINING PROGRAM

## 2022-12-11 PROCEDURE — 80053 COMPREHEN METABOLIC PANEL: CPT

## 2022-12-11 PROCEDURE — 99233 SBSQ HOSP IP/OBS HIGH 50: CPT | Mod: GC | Performed by: INTERNAL MEDICINE

## 2022-12-11 PROCEDURE — 250N000013 HC RX MED GY IP 250 OP 250 PS 637: Performed by: STUDENT IN AN ORGANIZED HEALTH CARE EDUCATION/TRAINING PROGRAM

## 2022-12-11 PROCEDURE — 80048 BASIC METABOLIC PNL TOTAL CA: CPT

## 2022-12-11 PROCEDURE — 85014 HEMATOCRIT: CPT | Performed by: INTERNAL MEDICINE

## 2022-12-11 PROCEDURE — 82805 BLOOD GASES W/O2 SATURATION: CPT | Performed by: INTERNAL MEDICINE

## 2022-12-11 PROCEDURE — 200N000002 HC R&B ICU UMMC

## 2022-12-11 PROCEDURE — 250N000011 HC RX IP 250 OP 636: Performed by: INTERNAL MEDICINE

## 2022-12-11 PROCEDURE — 250N000009 HC RX 250: Performed by: STUDENT IN AN ORGANIZED HEALTH CARE EDUCATION/TRAINING PROGRAM

## 2022-12-11 PROCEDURE — 999N000065 XR CHEST PORT 1 VIEW

## 2022-12-11 RX ORDER — MYCOPHENOLATE MOFETIL 200 MG/ML
500 POWDER, FOR SUSPENSION ORAL EVERY EVENING
Status: DISCONTINUED | OUTPATIENT
Start: 2022-12-11 | End: 2022-12-12

## 2022-12-11 RX ORDER — MYCOPHENOLATE MOFETIL 200 MG/ML
1000 POWDER, FOR SUSPENSION ORAL DAILY
Status: DISCONTINUED | OUTPATIENT
Start: 2022-12-11 | End: 2022-12-12

## 2022-12-11 RX ORDER — POTASSIUM CHLORIDE 7.45 MG/ML
10 INJECTION INTRAVENOUS
Status: COMPLETED | OUTPATIENT
Start: 2022-12-11 | End: 2022-12-11

## 2022-12-11 RX ADMIN — SODIUM NITROPRUSSIDE 0.25 MCG/KG/MIN: 25 INJECTION, SOLUTION, CONCENTRATE INTRAVENOUS at 11:16

## 2022-12-11 RX ADMIN — POTASSIUM CHLORIDE 10 MEQ: 7.46 INJECTION, SOLUTION INTRAVENOUS at 08:18

## 2022-12-11 RX ADMIN — FUROSEMIDE 40 MG: 10 INJECTION, SOLUTION INTRAVENOUS at 08:19

## 2022-12-11 RX ADMIN — TRAZODONE HYDROCHLORIDE 50 MG: 50 TABLET ORAL at 22:44

## 2022-12-11 RX ADMIN — DOBUTAMINE HYDROCHLORIDE 5 MCG/KG/MIN: 200 INJECTION INTRAVENOUS at 05:59

## 2022-12-11 RX ADMIN — SODIUM NITROPRUSSIDE 0.25 MCG/KG/MIN: 25 INJECTION, SOLUTION, CONCENTRATE INTRAVENOUS at 16:34

## 2022-12-11 RX ADMIN — ENOXAPARIN SODIUM 40 MG: 40 INJECTION SUBCUTANEOUS at 15:05

## 2022-12-11 RX ADMIN — AMIODARONE HYDROCHLORIDE 200 MG: 200 TABLET ORAL at 20:10

## 2022-12-11 RX ADMIN — ASPIRIN 81 MG: 81 TABLET, COATED ORAL at 08:19

## 2022-12-11 RX ADMIN — MYCOPHENOLATE MOFETIL 500 MG: 200 POWDER, FOR SUSPENSION ORAL at 20:10

## 2022-12-11 RX ADMIN — PRAVASTATIN SODIUM 20 MG: 20 TABLET ORAL at 08:19

## 2022-12-11 RX ADMIN — MYCOPHENOLATE MOFETIL 1000 MG: 200 POWDER, FOR SUSPENSION ORAL at 09:15

## 2022-12-11 RX ADMIN — DOBUTAMINE HYDROCHLORIDE 5 MCG/KG/MIN: 200 INJECTION INTRAVENOUS at 23:23

## 2022-12-11 RX ADMIN — AMIODARONE HYDROCHLORIDE 200 MG: 200 TABLET ORAL at 08:19

## 2022-12-11 RX ADMIN — POTASSIUM CHLORIDE 10 MEQ: 7.46 INJECTION, SOLUTION INTRAVENOUS at 06:41

## 2022-12-11 RX ADMIN — POTASSIUM CHLORIDE 20 MEQ: 750 TABLET, EXTENDED RELEASE ORAL at 08:19

## 2022-12-11 ASSESSMENT — ACTIVITIES OF DAILY LIVING (ADL)
ADLS_ACUITY_SCORE: 38

## 2022-12-11 NOTE — PROGRESS NOTES
[facility]    Advanced Heart Failure Progress Note     Critical care was necessary to continue to stabilize, treat, and maintain critical illnesses including heart failure with cardiogenic shock, rand cardiac support with mechanical and/or pharmacologic methods.  My time was personally spent with review of patients previous history  development and or modification of treatment plans, discussion with patient and family, coordination of care and discussion with consultants, ordering and review of imaging and laboratory studies, supervision of trainees including residents and fellows, and conveyance of care plan to bedside ICU nurse.  I have reviewed their notes, confirmed their observations by my own personal chart review and performance of my own physical examination and agree with their notes of this day.  I spent a total of  minutes carrying out these activities. The patient continues to demonstrate critically low cardiac index and elevated SVR.  I would suggest addition of low dose nitroprusside to facilitate SVR reduction and     Assessment and Plan:     Paco Stein is a 67 year old male with a history of chronic systolic heart failure (suspected predominantly nonischemic cardiomyopathy, possibly arrhythmogenic), coronary artery disease (s/p PCI with TILA to LCx 4/2013 and to the LAD in 10/2021), hyperlipidemia, CNS vasculitis and chronic kidney disease who presents with transient bilateral hand tightness with unrevealing workup, now admitted for continued advanced therapies evaluation.       Interval History:  Lasix drip discontinued yesterday morning due to CVP of 4. CVP increased to 10 yesterday PM so given lasix 40 mg IV x1. He was net negative 640 ml in the last 24 hours. No acute events overnight. Denies shortness of breath, chest pain or abdominal pain.     [place] numbers: CVP 6, PA 40/15/23, PCWP 13, SVO2 64%, MAP 97, CO 3.6, CI 1.8, SVR 1960, PVR 2.7.        Plan Today:  - Already received lasix 40 mg IV this AM. 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 No episodes since. Neurological exam unremarkable. Neurology consulted during recent admission as part of VAD/transplant evaluation. MRI/MRA brain was also performed which showed no concern for progression of disease  - Continue PTA cellcept 1000mg qam and 500mg qpm     # Incidental punctate cerebellar infarct, likely embolic  Read on MR imaging 11/14/22 without new neuro deficits. Per radiology, appears acute, likely embolic. DNo known arterial clot history. Reviewed telemetry over admission, no afib or other tachycarrhythmias. Reassuring prior TTE this admission including bubble study, repeat JARED w/o septal defects or thrombus. Treated w/ heparin gtt and transitioned to Eliquis.    # Insomnia/anxiety  - PRN trazodone and lorazepam      ===CARDIOVASCULAR===  # Advanced chronic systolic heart failure, EF 15-20%, NYHA class IV, ACC/AHA Stage D  # Ambulatory cardiogenic shock, on dobutamine drip as bride to candidacy   Recently admitted from 11/10/2022 - 11/21/2022 for acute on chronic heart failure, treated with IV diuresis and initiated on dobutamine infusion. Patient completed advanced therapy evaluation during that admission and and is now re-admitted for continued evaluation and possible transplant listing. No signs of acute decompensation on adimssion. He reports improvement in his activity tolerance and appetite since starting dobutamine, although he seemed to have regressed a bit in the days prior to admission. 12/08/2022 RHC: RA 16, PA 50/26/35, PCWP 26, Aisha CO/CI  3.1/1.44.   - Etiology: Predominantly non-ischemic cardiomyopathy, suspected arrhythmogenic   - Volume: Intermittent IV lasix, guided by [place] numbers   - Inotrope: dobutamine to 5 mcg/kg/min  - Afterload: Start nitroprusside drip on 12/11 AM   - BB: held in the setting of shock  - ACE/ARB/ARNi: previous intolerance due to hypotension and lightheadedness   - SGLT2i: Holding PTA Empagliflozin 10mg qday  - MRA: None  - Statin: PTA pravastatin  - Electrolytes: PTA 20 meq KCl daily, trending and replacing as needed as well   - Transplant evaluation: Completed checklist during previous admission. Now pending transplant listing.   - [place] hemodynamics:  Date RA PA PCWP SvO2  CO/CI SVR PVR Therapy   12/9 (cath lab) 5 32/10/19 15 53 2.8/1.3 2228 1.4 Dobutamine 5 mcg/kg/min  Furosemide 10 mg/hr   12/10 AM 4 26/10/16 10 62 3.7/1.8 1600 1.6 Dobutamine 5 mcg/kg/min  Furosemide 10 mg/hr   12/10 PM   10 35/15/22 14     Dobutamine 5 mcg/kg/min   12/11 AM 6 40/15/23 13 64 3.7/1.8 1960 2.7 Dobutamine 5 mcg/kg/min     # Multivessel coronary artery disease (s/p PCI with TILA to LCx 4/2013 and to the LAD in 10/2021)  As discussed above, low suspicion for ACS with bilateral arm tightness. 11/11/22 LHC showed patent LAD and LCx stents as well as mild-mod non-obstructive disease in the RCA.   - Continue PTA ASA 81 mg qday  - BB: Holding as per above  - Statin: Cont PTA pravastatin     # History of NSVT s/p biventricular PPM/ICD  Interrogation performed 12/10/22 w/ two short (<5s) runs of NSVT on 11/1/22 and 11/3/22, no recent shocks. A-paced.  - Cont PTA PO Amiodarone 200mg BID      ===PULMONARY===  # Non-productive cough, acute on chronic, improved   - PRN cough lozenges       ===GI===  # Severe malnutrition in the context of acute illness/injury on chronic illness  - Appreciate assistance from RD        ===RENAL===  # CKD stage 2  Creatinine at baseline.   - trend BMP      ===HEME/ONC===  # Occlusive left peroneal DVT  # Non-occlusive  "right axillary DVT  Identified on doppler 11/15/22 during previous admission.   - Holding PTA apixaban       ===ENDOCRINE===  No active concerns       INFECTIOUS DISEASE  No active concerns       ===SKIN/MSK===  # Bilateral hand tightness, resolved  Experienced two episodes prior to admission, once while performing PT exercises and again when he woke up the following morning. Each episode lasted for about 15 minutes and had no other associated symptoms. Etiology not clear at this time. No focal motor or sensory deficits on exam. Electrolytes within normal limits. Troponin only mildly elevated at 46 and down-trending. EKG with A sensed V paced rhythm with no concerning ST/T changes so low suspicion for ischemia. ICD interrogation detected 225 ventricular sensed episodes, with the most recently stored episodes showing slow VT-NS at  bpm, runs lasting about 5 seconds. Unclear if these short runs would explain his 15-minute episodes. He is on dobutamine which has known arrhythmogenic effects so certainly worth monitoring closely, especially given his history of NSVT.   - Monitoring for recurrence while on telemetry   - Trend electrolytes and replete as needed       FEN: Low salt diet, replete lytes as needed   DVT PPx: subcutaneous enoxaparin   Code: Full code      Disposition: Inpatient admission, pending transplant evaluation/listing      Discussed with Dr. Jose Poole MD  Cardiology Fellow   [phone]     Objective:   /80   Pulse 81   Temp 98  F (36.7  C) (Oral)   Resp 18   Ht 1.88 m (6' 2\")   Wt 79.6 kg (175 lb 7.8 oz)   SpO2 97%   BMI 22.53 kg/m    Temp (24hrs), Av.8  F (36.6  C), Min:97.5  F (36.4  C), Max:98  F (36.7  C)    Wt:   Wt Readings from Last 5 Encounters:   12/10/22 79.6 kg (175 lb 7.8 oz)   22 90.9 kg (200 lb 6.4 oz)   11/10/22 108 kg (238 lb 1.6 oz)   22 95.7 kg (210 lb 14.4 oz)     Physical Exam   GEN: pleasant, no acute distress  HEENT: no " icterus, EOMI  CV: RRR, normal s1/s2, soft systolic murmur best heard at LLSB, JVP 8  CHEST: Non-labored breathing, good air movement bilaterally, no wheezes or crackles   ABD: soft, non-tender, non-distended   EXTR: warm extremities. No LE edema  NEURO: awake, alert, non-focal motor exam     DATA:   Labs, EKGs and imaging reviewed. Pertinent results discussed in assessment and plan above.

## 2022-12-11 NOTE — PROGRESS NOTES
Overnight events:    On Dobutamine 5 mcg/kg/min.    Net negative 640 ml in the last 24 hours.    SVO2: 64    Lactic acid: 0.9    BP stable.

## 2022-12-11 NOTE — PLAN OF CARE
"  Problem: Plan of Care - These are the overarching goals to be used throughout the patient stay.    Goal: Plan of Care Review  Description: The Plan of Care Review/Shift note should be completed every shift.  The Outcome Evaluation is a brief statement about your assessment that the patient is improving, declining, or no change.  This information will be displayed automatically on your shift note.  Outcome: Not Progressing  Goal: Patient-Specific Goal (Individualized)  Description: You can add care plan individualizations to a care plan. Examples of Individualization might be:  \"Parent requests to be called daily at 9am for status\", \"I have a hard time hearing out of my right ear\", or \"Do not touch me to wake me up as it startles me\".  Outcome: Not Progressing  Flowsheets (Taken 12/10/2022 0800)  Anxieties, Fears or Concerns: heart transplant workup, new lifestyle  Goal: Absence of Hospital-Acquired Illness or Injury  Outcome: Not Progressing  Intervention: Identify and Manage Fall Risk  Recent Flowsheet Documentation  Taken 12/10/2022 1600 by Mikaela Sloan RN  Safety Promotion/Fall Prevention:    activity supervised    fall prevention program maintained    increased rounding and observation    increase visualization of patient    lighting adjusted    mobility aid in reach    nonskid shoes/slippers when out of bed    patient and family education    room door open    room near nurse's station    room organization consistent    safety round/check completed    supervised activity    treat reversible contributory factors    treat underlying cause    clutter free environment maintained  Taken 12/10/2022 1200 by Mikaela Sloan RN  Safety Promotion/Fall Prevention:    activity supervised    fall prevention program maintained    increased rounding and observation    increase visualization of patient    lighting adjusted    mobility aid in reach    nonskid shoes/slippers when out of bed    patient and " family education    room door open    room near nurse's station    room organization consistent    safety round/check completed    supervised activity    treat reversible contributory factors    treat underlying cause    clutter free environment maintained  Taken 12/10/2022 0800 by Mikaela Sloan RN  Safety Promotion/Fall Prevention:    activity supervised    fall prevention program maintained    increased rounding and observation    increase visualization of patient    lighting adjusted    mobility aid in reach    nonskid shoes/slippers when out of bed    patient and family education    room door open    room near nurse's station    room organization consistent    safety round/check completed    supervised activity    treat reversible contributory factors    treat underlying cause    clutter free environment maintained  Intervention: Prevent Skin Injury  Recent Flowsheet Documentation  Taken 12/10/2022 1800 by Mikaela Sloan RN  Body Position: position changed independently  Taken 12/10/2022 1600 by Mikaela Sloan RN  Body Position: position changed independently  Taken 12/10/2022 1200 by Mikaela Sloan RN  Body Position:    side-lying    right  Taken 12/10/2022 1000 by Mikaela Sloan RN  Body Position:    position changed independently    weight shifting  Taken 12/10/2022 0800 by Mikaela Sloan RN  Body Position:    position changed independently    weight shifting  Intervention: Prevent and Manage VTE (Venous Thromboembolism) Risk  Recent Flowsheet Documentation  Taken 12/10/2022 1600 by Mikaela Sloan RN  VTE Prevention/Management: SCDs (sequential compression devices) off  Taken 12/10/2022 1200 by Mikaela Sloan RN  VTE Prevention/Management: SCDs (sequential compression devices) off  Taken 12/10/2022 0800 by Mikaela Sloan RN  VTE Prevention/Management: SCDs (sequential compression devices) off  Goal: Optimal Comfort and  Wellbeing  Outcome: Not Progressing  Intervention: Provide Person-Centered Care  Recent Flowsheet Documentation  Taken 12/10/2022 1600 by Mikaela Sloan RN  Trust Relationship/Rapport:    care explained    choices provided    emotional support provided    empathic listening provided    questions answered    questions encouraged    reassurance provided    thoughts/feelings acknowledged  Taken 12/10/2022 1200 by Mikaela Sloan RN  Trust Relationship/Rapport:    care explained    choices provided    emotional support provided    empathic listening provided    questions answered    questions encouraged    reassurance provided    thoughts/feelings acknowledged  Taken 12/10/2022 0800 by Mikaela Sloan RN  Trust Relationship/Rapport:    care explained    choices provided    emotional support provided    empathic listening provided    questions answered    questions encouraged    reassurance provided    thoughts/feelings acknowledged  Goal: Readiness for Transition of Care  Outcome: Not Progressing     Problem: Dysrhythmia  Goal: Normalized Cardiac Rhythm  Outcome: Not Progressing  Intervention: Monitor and Manage Cardiac Rhythm Effect  Recent Flowsheet Documentation  Taken 12/10/2022 1600 by Mikaela Sloan RN  VTE Prevention/Management: SCDs (sequential compression devices) off  Taken 12/10/2022 1200 by Mikaela Sloan RN  VTE Prevention/Management: SCDs (sequential compression devices) off  Taken 12/10/2022 0800 by Mikaela Sloan RN  VTE Prevention/Management: SCDs (sequential compression devices) off     Problem: Anxiety Signs/Symptoms  Goal: Optimized Energy Level (Anxiety Signs/Symptoms)  Outcome: Not Progressing  Goal: Optimized Cognitive Function (Anxiety Signs/Symptoms)  Outcome: Not Progressing  Goal: Improved Mood Symptoms (Anxiety Signs/Symptoms)  Outcome: Not Progressing  Goal: Improved Sleep (Anxiety Signs/Symptoms)  Outcome: Not Progressing  Goal: Enhanced  Social, Occupational or Functional Skills (Anxiety Signs/Symptoms)  Outcome: Not Progressing  Intervention: Promote Social, Occupational and Functional Ability  Recent Flowsheet Documentation  Taken 12/10/2022 1600 by Mikaela Sloan RN  Trust Relationship/Rapport:    care explained    choices provided    emotional support provided    empathic listening provided    questions answered    questions encouraged    reassurance provided    thoughts/feelings acknowledged  Taken 12/10/2022 1200 by Mikaela Sloan RN  Trust Relationship/Rapport:    care explained    choices provided    emotional support provided    empathic listening provided    questions answered    questions encouraged    reassurance provided    thoughts/feelings acknowledged  Taken 12/10/2022 0800 by Mikaela Sloan RN  Trust Relationship/Rapport:    care explained    choices provided    emotional support provided    empathic listening provided    questions answered    questions encouraged    reassurance provided    thoughts/feelings acknowledged  Goal: Improved Somatic Symptoms (Anxiety Signs/Symptoms)  Outcome: Not Progressing     Problem: Cardiac Rhythm Management Device  Goal: Optimal Adjustment to Device  Outcome: Not Progressing  Intervention: Optimize Psychosocial Response to Device Implantation  Recent Flowsheet Documentation  Taken 12/10/2022 1600 by Mikaela Sloan RN  Family/Support System Care:    caregiver stress acknowledged    involvement promoted    presence promoted    self-care encouraged    support provided  Taken 12/10/2022 1200 by Mikaela Sloan RN  Family/Support System Care:    caregiver stress acknowledged    involvement promoted    presence promoted    self-care encouraged    support provided  Taken 12/10/2022 0800 by Mikaela Sloan RN  Family/Support System Care:    caregiver stress acknowledged    involvement promoted    presence promoted    self-care encouraged    support  provided  Goal: Absence of Bleeding  Outcome: Not Progressing  Goal: Effective Device Function  Outcome: Not Progressing  Intervention: Optimize Device Care and Function  Recent Flowsheet Documentation  Taken 12/10/2022 1600 by Mikaela Sloan RN  Pacer Function:    capturing    sensing  Taken 12/10/2022 1200 by Mikaela Sloan RN  Pacer Function:    capturing    sensing  Taken 12/10/2022 0800 by Mikaela Sloan RN  Pacer Function:    capturing    sensing  Goal: Absence of Infection Signs and Symptoms  Outcome: Not Progressing  Goal: Acceptable Pain Level  Outcome: Not Progressing  Goal: Effective Oxygenation and Ventilation  Outcome: Not Progressing  Intervention: Optimize Oxygenation and Ventilation  Recent Flowsheet Documentation  Taken 12/10/2022 1600 by Mikaela Sloan RN  Cough And Deep Breathing: done independently per patient  Taken 12/10/2022 1200 by Mikaela Sloan RN  Cough And Deep Breathing: done independently per patient  Taken 12/10/2022 0800 by Mikaela Sloan RN  Cough And Deep Breathing: done independently per patient     Problem: Oral Intake Inadequate  Goal: Improved Oral Intake  Outcome: Not Progressing     Problem: Risk for Delirium  Goal: Optimal Coping  Outcome: Not Progressing  Intervention: Optimize Psychosocial Adjustment to Delirium  Recent Flowsheet Documentation  Taken 12/10/2022 1600 by Mikaela Sloan RN  Family/Support System Care:    caregiver stress acknowledged    involvement promoted    presence promoted    self-care encouraged    support provided  Taken 12/10/2022 1200 by Mikaela Sloan RN  Family/Support System Care:    caregiver stress acknowledged    involvement promoted    presence promoted    self-care encouraged    support provided  Taken 12/10/2022 0800 by Mikaela Sloan RN  Family/Support System Care:    caregiver stress acknowledged    involvement promoted    presence promoted    self-care  encouraged    support provided  Goal: Improved Behavioral Control  Outcome: Not Progressing  Intervention: Minimize Safety Risk  Recent Flowsheet Documentation  Taken 12/10/2022 1600 by Mikaela Sloan RN  Enhanced Safety Measures: room near unit station  Trust Relationship/Rapport:    care explained    choices provided    emotional support provided    empathic listening provided    questions answered    questions encouraged    reassurance provided    thoughts/feelings acknowledged  Taken 12/10/2022 1200 by Mikaela Sloan RN  Enhanced Safety Measures: room near unit station  Trust Relationship/Rapport:    care explained    choices provided    emotional support provided    empathic listening provided    questions answered    questions encouraged    reassurance provided    thoughts/feelings acknowledged  Taken 12/10/2022 0800 by Mikaela Sloan RN  Enhanced Safety Measures: room near unit station  Trust Relationship/Rapport:    care explained    choices provided    emotional support provided    empathic listening provided    questions answered    questions encouraged    reassurance provided    thoughts/feelings acknowledged  Goal: Improved Attention and Thought Clarity  Outcome: Not Progressing  Goal: Improved Sleep  Outcome: Not Progressing   Goal Outcome Evaluation:      ICU End of Shift Summary. See flowsheets for vital signs and detailed assessment.    Changes this shift:     Cardiac: Hemodynamically stable on 5 of dobutamine.  FICKS  6H see epic for results.   Resp: R.A. MELLO.  GI: No BM. Recent order to give more bowel meds.   : Net -520.15 today. I dose of furosemide given after drip discontinued today.   MSK: Walked the hallway x3. Up to the chair x2.   Neuro: Short term memory issues at times.  Nutrition: Patient eating all meals and drinking supplemental shakes between meals.   Family: Wife is a PT and encourages walking in the hallway, very helpful and supportive.     Plan:       Continue to encourage nutrition and activity.

## 2022-12-11 NOTE — PLAN OF CARE
ICU End of Shift Summary. See flowsheets for vital signs and detailed assessment.    Changes this shift:  0400 CAROLYN, CVP-6, CO-3.8, CI-1.9. Occasional cough with PO intake, wife/patient confirm passing speech eval in past.     Plan: Encourage ambulation and oral intake. Evaluate need for further constipation intervention.     Goal Outcome Evaluation:      Plan of Care Reviewed With: patient, spouse    Overall Patient Progress: no changeOverall Patient Progress: no change    Outcome Evaluation: good sleep, maintained dobutamine at 5

## 2022-12-12 PROBLEM — I77.6: Status: ACTIVE | Noted: 2017-11-30

## 2022-12-12 PROBLEM — I82.452: Status: ACTIVE | Noted: 2022-11-22

## 2022-12-12 PROBLEM — R55 SYNCOPE AND COLLAPSE: Status: ACTIVE | Noted: 2021-10-07

## 2022-12-12 PROBLEM — I50.43 ACUTE ON CHRONIC COMBINED SYSTOLIC (CONGESTIVE) AND DIASTOLIC (CONGESTIVE) HEART FAILURE (H): Status: ACTIVE | Noted: 2022-11-14

## 2022-12-12 PROBLEM — D12.6 BENIGN NEOPLASM OF COLON: Status: ACTIVE | Noted: 2022-12-12

## 2022-12-12 LAB
ALBUMIN SERPL BCG-MCNC: 3.7 G/DL (ref 3.5–5.2)
ALP SERPL-CCNC: 85 U/L (ref 40–129)
ALT SERPL W P-5'-P-CCNC: 18 U/L (ref 10–50)
ANION GAP SERPL CALCULATED.3IONS-SCNC: 12 MMOL/L (ref 7–15)
ANION GAP SERPL CALCULATED.3IONS-SCNC: 12 MMOL/L (ref 7–15)
AST SERPL W P-5'-P-CCNC: 19 U/L (ref 10–50)
BASE EXCESS BLDV CALC-SCNC: 6 MMOL/L (ref -7.7–1.9)
BASE EXCESS BLDV CALC-SCNC: 6 MMOL/L (ref -7.7–1.9)
BASE EXCESS BLDV CALC-SCNC: 6.1 MMOL/L (ref -7.7–1.9)
BASE EXCESS BLDV CALC-SCNC: 7.2 MMOL/L (ref -7.7–1.9)
BILIRUB DIRECT SERPL-MCNC: 0.29 MG/DL (ref 0–0.3)
BILIRUB SERPL-MCNC: 1.1 MG/DL
BUN SERPL-MCNC: 23.7 MG/DL (ref 8–23)
BUN SERPL-MCNC: 24.1 MG/DL (ref 8–23)
CALCIUM SERPL-MCNC: 8.4 MG/DL (ref 8.8–10.2)
CALCIUM SERPL-MCNC: 9.2 MG/DL (ref 8.8–10.2)
CHLORIDE SERPL-SCNC: 103 MMOL/L (ref 98–107)
CHLORIDE SERPL-SCNC: 99 MMOL/L (ref 98–107)
CREAT SERPL-MCNC: 0.95 MG/DL (ref 0.67–1.17)
CREAT SERPL-MCNC: 0.96 MG/DL (ref 0.67–1.17)
DEPRECATED HCO3 PLAS-SCNC: 23 MMOL/L (ref 22–29)
DEPRECATED HCO3 PLAS-SCNC: 25 MMOL/L (ref 22–29)
ERYTHROCYTE [DISTWIDTH] IN BLOOD BY AUTOMATED COUNT: 13.7 % (ref 10–15)
GFR SERPL CREATININE-BSD FRML MDRD: 86 ML/MIN/1.73M2
GFR SERPL CREATININE-BSD FRML MDRD: 87 ML/MIN/1.73M2
GLUCOSE SERPL-MCNC: 108 MG/DL (ref 70–99)
GLUCOSE SERPL-MCNC: 127 MG/DL (ref 70–99)
HCO3 BLDV-SCNC: 30 MMOL/L (ref 21–28)
HCO3 BLDV-SCNC: 31 MMOL/L (ref 21–28)
HCO3 BLDV-SCNC: 31 MMOL/L (ref 21–28)
HCO3 BLDV-SCNC: 32 MMOL/L (ref 21–28)
HCT VFR BLD AUTO: 44.7 % (ref 40–53)
HGB BLD-MCNC: 14.6 G/DL (ref 13.3–17.7)
LACTATE SERPL-SCNC: 1.5 MMOL/L (ref 0.7–2)
MAGNESIUM SERPL-MCNC: 2.2 MG/DL (ref 1.7–2.3)
MCH RBC QN AUTO: 28.6 PG (ref 26.5–33)
MCHC RBC AUTO-ENTMCNC: 32.7 G/DL (ref 31.5–36.5)
MCV RBC AUTO: 88 FL (ref 78–100)
O2/TOTAL GAS SETTING VFR VENT: 21 %
OXYHGB MFR BLDV: 51 % (ref 70–75)
OXYHGB MFR BLDV: 52 % (ref 70–75)
OXYHGB MFR BLDV: 71 % (ref 70–75)
OXYHGB MFR BLDV: 74 % (ref 70–75)
PCO2 BLDV: 42 MM HG (ref 40–50)
PCO2 BLDV: 42 MM HG (ref 40–50)
PCO2 BLDV: 45 MM HG (ref 40–50)
PCO2 BLDV: 46 MM HG (ref 40–50)
PH BLDV: 7.44 [PH] (ref 7.32–7.43)
PH BLDV: 7.45 [PH] (ref 7.32–7.43)
PH BLDV: 7.47 [PH] (ref 7.32–7.43)
PH BLDV: 7.48 [PH] (ref 7.32–7.43)
PLATELET # BLD AUTO: 156 10E3/UL (ref 150–450)
PO2 BLDV: 29 MM HG (ref 25–47)
PO2 BLDV: 30 MM HG (ref 25–47)
PO2 BLDV: 38 MM HG (ref 25–47)
PO2 BLDV: 39 MM HG (ref 25–47)
POTASSIUM SERPL-SCNC: 3.6 MMOL/L (ref 3.4–5.3)
POTASSIUM SERPL-SCNC: 3.8 MMOL/L (ref 3.4–5.3)
PROT SERPL-MCNC: 6.1 G/DL (ref 6.4–8.3)
RBC # BLD AUTO: 5.11 10E6/UL (ref 4.4–5.9)
SODIUM SERPL-SCNC: 136 MMOL/L (ref 136–145)
SODIUM SERPL-SCNC: 138 MMOL/L (ref 136–145)
WBC # BLD AUTO: 7.9 10E3/UL (ref 4–11)

## 2022-12-12 PROCEDURE — 82805 BLOOD GASES W/O2 SATURATION: CPT | Performed by: STUDENT IN AN ORGANIZED HEALTH CARE EDUCATION/TRAINING PROGRAM

## 2022-12-12 PROCEDURE — 250N000011 HC RX IP 250 OP 636: Performed by: INTERNAL MEDICINE

## 2022-12-12 PROCEDURE — 250N000012 HC RX MED GY IP 250 OP 636 PS 637: Performed by: STUDENT IN AN ORGANIZED HEALTH CARE EDUCATION/TRAINING PROGRAM

## 2022-12-12 PROCEDURE — 82248 BILIRUBIN DIRECT: CPT

## 2022-12-12 PROCEDURE — 250N000013 HC RX MED GY IP 250 OP 250 PS 637: Performed by: STUDENT IN AN ORGANIZED HEALTH CARE EDUCATION/TRAINING PROGRAM

## 2022-12-12 PROCEDURE — 250N000011 HC RX IP 250 OP 636: Performed by: STUDENT IN AN ORGANIZED HEALTH CARE EDUCATION/TRAINING PROGRAM

## 2022-12-12 PROCEDURE — 85027 COMPLETE CBC AUTOMATED: CPT | Performed by: STUDENT IN AN ORGANIZED HEALTH CARE EDUCATION/TRAINING PROGRAM

## 2022-12-12 PROCEDURE — 250N000013 HC RX MED GY IP 250 OP 250 PS 637: Performed by: EMERGENCY MEDICINE

## 2022-12-12 PROCEDURE — 250N000012 HC RX MED GY IP 250 OP 636 PS 637: Performed by: INTERNAL MEDICINE

## 2022-12-12 PROCEDURE — 83605 ASSAY OF LACTIC ACID: CPT | Performed by: STUDENT IN AN ORGANIZED HEALTH CARE EDUCATION/TRAINING PROGRAM

## 2022-12-12 PROCEDURE — 85041 AUTOMATED RBC COUNT: CPT | Performed by: STUDENT IN AN ORGANIZED HEALTH CARE EDUCATION/TRAINING PROGRAM

## 2022-12-12 PROCEDURE — 200N000002 HC R&B ICU UMMC

## 2022-12-12 PROCEDURE — 80053 COMPREHEN METABOLIC PANEL: CPT

## 2022-12-12 PROCEDURE — 83735 ASSAY OF MAGNESIUM: CPT | Performed by: INTERNAL MEDICINE

## 2022-12-12 PROCEDURE — 99291 CRITICAL CARE FIRST HOUR: CPT | Mod: GC | Performed by: INTERNAL MEDICINE

## 2022-12-12 RX ORDER — POTASSIUM CHLORIDE 7.45 MG/ML
10 INJECTION INTRAVENOUS
Status: DISPENSED | OUTPATIENT
Start: 2022-12-12 | End: 2022-12-12

## 2022-12-12 RX ORDER — POTASSIUM CHLORIDE 7.45 MG/ML
10 INJECTION INTRAVENOUS ONCE
Status: COMPLETED | OUTPATIENT
Start: 2022-12-12 | End: 2022-12-12

## 2022-12-12 RX ORDER — HYDRALAZINE HYDROCHLORIDE 25 MG/1
25 TABLET, FILM COATED ORAL EVERY 8 HOURS SCHEDULED
Status: DISCONTINUED | OUTPATIENT
Start: 2022-12-12 | End: 2022-12-16

## 2022-12-12 RX ADMIN — DOBUTAMINE HYDROCHLORIDE 5 MCG/KG/MIN: 200 INJECTION INTRAVENOUS at 17:25

## 2022-12-12 RX ADMIN — ASPIRIN 81 MG: 81 TABLET, COATED ORAL at 09:06

## 2022-12-12 RX ADMIN — HYDRALAZINE HYDROCHLORIDE 25 MG: 25 TABLET, FILM COATED ORAL at 15:11

## 2022-12-12 RX ADMIN — POTASSIUM CHLORIDE 10 MEQ: 7.46 INJECTION, SOLUTION INTRAVENOUS at 06:25

## 2022-12-12 RX ADMIN — POTASSIUM CHLORIDE 20 MEQ: 750 TABLET, EXTENDED RELEASE ORAL at 09:06

## 2022-12-12 RX ADMIN — AMIODARONE HYDROCHLORIDE 200 MG: 200 TABLET ORAL at 20:12

## 2022-12-12 RX ADMIN — POTASSIUM CHLORIDE 10 MEQ: 7.46 INJECTION, SOLUTION INTRAVENOUS at 10:36

## 2022-12-12 RX ADMIN — ENOXAPARIN SODIUM 40 MG: 40 INJECTION SUBCUTANEOUS at 15:11

## 2022-12-12 RX ADMIN — MYCOPHENOLATE MOFETIL 500 MG: 500 TABLET, FILM COATED ORAL at 20:12

## 2022-12-12 RX ADMIN — POLYETHYLENE GLYCOL 3350 17 G: 17 POWDER, FOR SOLUTION ORAL at 09:43

## 2022-12-12 RX ADMIN — TRAZODONE HYDROCHLORIDE 50 MG: 50 TABLET ORAL at 21:24

## 2022-12-12 RX ADMIN — HYDRALAZINE HYDROCHLORIDE 25 MG: 25 TABLET, FILM COATED ORAL at 21:24

## 2022-12-12 RX ADMIN — AMIODARONE HYDROCHLORIDE 200 MG: 200 TABLET ORAL at 09:07

## 2022-12-12 RX ADMIN — MYCOPHENOLATE MOFETIL 1000 MG: 200 POWDER, FOR SUSPENSION ORAL at 09:08

## 2022-12-12 RX ADMIN — PRAVASTATIN SODIUM 20 MG: 20 TABLET ORAL at 09:07

## 2022-12-12 ASSESSMENT — ACTIVITIES OF DAILY LIVING (ADL)
ADLS_ACUITY_SCORE: 42
ADLS_ACUITY_SCORE: 42
ADLS_ACUITY_SCORE: 39
ADLS_ACUITY_SCORE: 47
ADLS_ACUITY_SCORE: 38
ADLS_ACUITY_SCORE: 38
ADLS_ACUITY_SCORE: 39
ADLS_ACUITY_SCORE: 42
ADLS_ACUITY_SCORE: 42
ADLS_ACUITY_SCORE: 47
ADLS_ACUITY_SCORE: 42
ADLS_ACUITY_SCORE: 40

## 2022-12-12 NOTE — PLAN OF CARE
ICU End of Shift Summary. See flowsheets for vital signs and detailed assessment.    Changes this shift: A+O, no pain. MAP goal now 65-75 per team, nipride up titrated. K+ replaced this AM.    Plan:   Goal Outcome Evaluation:                 Outcome Evaluation: Nipride titrated for new MAP goal <75, CO/CI improved

## 2022-12-12 NOTE — PROGRESS NOTES
Overnight events:    Patient continues to be on Dobutamine 5 mcg/kg/min and Nitroprusside.  MAP goal was decreased from 90 mmhg to <75 mmHg, with significant improvement of SVR.    Plan:  -Continue Dobutamine 5 mcg/kg/min.  -Consider transitioning to oral afterload agents.

## 2022-12-12 NOTE — PROGRESS NOTES
Status 2 Heart Listing 12/12/2022    Paco Stein was approved for activation on the heart transplant waitlist by the Heart Transplant Committee.      This patient qualifies for Status 2 Listing based on the following criteria: Jenae Stein was approved for activation on the heart transplant waitlist by the Heart Transplant Committee.      Dr. Dominguez and Dr. Mcneill approved the following statement prior to the submission of Status 2 Exception form in [facility]:    Our patient is a 68 y/o male whom we are requesting Status 2 urgency listing by exception. While he currently meets hemodynamic criteria, given the need for immunosuppression, frailty, and vascular/infection risks associated with IABP, as well as our goal of maintaining maximum mobility until transplant, we hope to hold off on instrumentation as long as possible.  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 After optimization in the ICU with leave-in [place], he continues to have the following hemodynamics on 12/12/2022: BP: 77/52mmHg, RA 10, PCWP 18mmHg, cardiac index 1.4 L/min/m2 on 5 mcg/kg/min of dobutamine, all within a 24 hours period. He had significant frailty on first presentation to our group with limited ability to ambulate while undergoing successful rehabilitation prior to the present admission. 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 However, the need for immunosuppressive therapy makes LVAD as well as instrumentation with temporary support devices in the hospital high risk.  For these reasons (goal to maintain mobility, immunosuppression need, frailty) we ask for an exception to IABP use and status 2 given level of hemodynamic compromise on inotropes.      Coordinator reviewed listing criteria with Dr. Mcneill prior to submission of Status 2 form in [facility]OS.     Insurance Approval:  YES (verify prior to listing  with PFR)  ABO verification complete:  YES  2nd ABO verified by:  Itzel Fleming    On-call transplant coordinators, transplant LPN, immunology lab and PFR notified of listing.  Patient's primary cardiologist and/or attending physician is in agreement with this plan.      Status 2 Extension due 12/27/2022

## 2022-12-12 NOTE — PROGRESS NOTES
[facility]    Advanced Heart Failure Progress Note     I personally saw and examined this patient with the fellow.  I personally reviewed the patient's hemodynamics and medications to optimize hemodynamics for waiting for heart transplant advanced status.  The patient was noted to meet transplant criteria for advanced status and letter was witting by advanced heart failure leadership.  I also discussed with the patient the potential for expanding donor pool through DCD and Hepatitis + donors.  I discussed experience with DCD, risks and benefits with patient and his wife.  At this time they wish to do DCD but not hepatitis.       I spent 45 minutes critical care time adjusting hemodynamics and 60 minutes coordinating care for alternative donor choices, discussing transplant risks with patient   Oneal Garcia    Assessment and Plan:     Paco Stein is a 67 year old male with a history of chronic systolic heart failure (suspected predominantly nonischemic cardiomyopathy, possibly arrhythmogenic), coronary artery disease (s/p PCI with TILA to LCx 4/2013 and to the LAD in 10/2021), hyperlipidemia, CNS vasculitis and chronic kidney disease who presents with transient bilateral hand tightness with unrevealing workup, now admitted for continued advanced therapies evaluation.       Interval History:  Started on nitroprusside drip yesterday for afterload reduction. Dose increased overnight from 0.25 mcg/kg/min to 0.75 mcg/kg/min. Also continued dobutamine drip. Cough is slightly worse this morning. Otherwise denies any change in his level of his activity tolerance. No chest pain, lightheadedness, nausea, vomiting or abdominal pain.     AM [place] numbers: CVP 6, PA 26/10/15, PCWP 10, SVO2 71%, MAP 78, CO 5.3, CI 2.6, SVR 1100, PVR 0.9.       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 No episodes since. Neurological exam unremarkable. Neurology consulted during recent admission as part of VAD/transplant evaluation. MRI/MRA brain was also performed which showed no concern for progression of disease  - Continue PTA cellcept 1000mg qam and 500mg qpm     # Incidental punctate cerebellar infarct, likely embolic  Read on MR imaging 11/14/22 without new neuro deficits. Per radiology, appears acute, likely embolic. DNo known arterial clot history. Reviewed telemetry over admission, no afib or other tachycarrhythmias. Reassuring prior TTE this admission including bubble study, repeat JARED w/o septal defects or thrombus. Treated w/ heparin gtt and transitioned to Eliquis.    # Insomnia/anxiety  - PRN trazodone and lorazepam      ===CARDIOVASCULAR===  # Advanced chronic systolic heart failure, EF 15-20%, NYHA class IV, ACC/AHA Stage D  # Ambulatory cardiogenic shock, on dobutamine drip as bride to candidacy   Recently admitted from 11/10/2022 - 11/21/2022 for acute on chronic heart failure, treated with IV diuresis and initiated on dobutamine infusion. Patient completed advanced therapy evaluation during that admission and and is now re-admitted for continued evaluation and possible transplant listing. No signs of acute decompensation on adimssion. He reports improvement in his activity tolerance and appetite since starting dobutamine, although he seemed to have regressed a bit in the days prior to admission. 12/08/2022 RHC: RA 16, PA 50/26/35, PCWP 26, Aisha CO/CI 3.1/1.44.   - Etiology: Predominantly non-ischemic cardiomyopathy, suspected arrhythmogenic   - Volume: Intermittent IV lasix,  guided by swan numbers  - Inotrope: dobutamine to 5 mcg/kg/min  - Afterload: Discontinued nitroprusside drip 12/12 AM, with repeat swan numbers as follows; CVP 10, PA 45/20/28, PCWP 18. Started PO hydralazine 25 mg every 8 hours    - BB: held in the setting of shock  - ACE/ARB/ARNi: previous intolerance due to hypotension and lightheadedness   - SGLT2i: Holding PTA Empagliflozin 10mg qday  - MRA: None  - Statin: PTA pravastatin  - Electrolytes: PTA 20 meq KCl daily, trending and replacing as needed as well   - Transplant evaluation: Completed checklist during previous admission. Now pending transplant listing.   - [place] hemodynamics:  Date RA PA PCWP MAP SvO2  CO/CI SVR PVR Therapy   12/9 (cath lab) 5 32/10/19 15 102 53 2.8/1.3 2228 1.4 Dobutamine 5 mcg/kg/min  Furosemide 10 mg/hr   12/10 AM 4 26/10/16 10 86 62 3.7/1.8 1600 1.6 Dobutamine 5 mcg/kg/min  Furosemide 10 mg/hr   12/10 PM   10 35/15/22 14      Dobutamine 5 mcg/kg/min   12/11 AM 6 40/15/23 13 97 64 3.7/1.8 1960 2.7 Dobutamine 5 mcg/kg/min   12/11 PM 6 30/15/20 10 83 61 3.4/1.7 1900  Dobutamine 5 mcg/kg/min  Nitroprusside 0.25 mcg/kg/min   12/12 AM 6 26/10/15 10 78 71 5.3/2.6 1100 0.9 Dobutamine 5 mcg/kg/min  Nitroprusside 0.75 mcg/kg/min   12/12 PM 6 45/20/28 18 83 51 2.9/1.4 2100 3.4 Dobutamine 5 mcg/kg/min     # Multivessel coronary artery disease (s/p PCI with TILA to LCx 4/2013 and to the LAD in 10/2021)  As discussed above, low suspicion for ACS with bilateral arm tightness. 11/11/22 LHC showed patent LAD and LCx stents as well as mild-mod non-obstructive disease in the RCA.   - Continue PTA ASA 81 mg qday  - BB: Holding as per above  - Statin: Cont PTA pravastatin     # History of NSVT s/p biventricular PPM/ICD  Interrogation performed 12/10/22 w/ two short (<5s) runs of NSVT on 11/1/22 and 11/3/22, no recent shocks. A-paced.  - Cont PTA PO Amiodarone 200mg BID      ===PULMONARY===  # Non-productive cough, acute on chronic, improved   - PRN cough  "lozenges      ===GI===  # Severe malnutrition in the context of acute illness/injury on chronic illness  - Appreciate assistance from RD        ===RENAL===  # CKD stage 2  Creatinine at baseline  - trend BMP      ===HEME/ONC===  # Occlusive left peroneal DVT  # Non-occlusive right axillary DVT  Identified on doppler 11/15/22 during previous admission.   - Holding PTA apixaban      ===ENDOCRINE===  No active concerns       INFECTIOUS DISEASE  No active concerns       ===SKIN/MSK===  # Bilateral hand tightness, resolved  Experienced two episodes prior to admission, once while performing PT exercises and again when he woke up the following morning. Each episode lasted for about 15 minutes and had no other associated symptoms. Etiology not clear at this time. No focal motor or sensory deficits on exam. Electrolytes within normal limits. Troponin only mildly elevated at 46 and down-trending. EKG with A sensed V paced rhythm with no concerning ST/T changes so low suspicion for ischemia. ICD interrogation detected 225 ventricular sensed episodes, with the most recently stored episodes showing slow VT-NS at  bpm, runs lasting about 5 seconds. Unclear if these short runs would explain his 15-minute episodes. He is on dobutamine which has known arrhythmogenic effects so certainly worth monitoring closely, especially given his history of NSVT.   - Monitoring for recurrence while on telemetry   - Trend electrolytes and replete as needed       FEN: Low salt diet, replete lytes as needed   DVT PPx: subcutaneous enoxaparin   Code: Full code      Disposition: Inpatient admission, pending transplant evaluation/listing      Discussed with Dr. Jose Poole MD  Cardiology Fellow   [phone]     Objective:   BP 94/65   Pulse 82   Temp 98  F (36.7  C) (Oral)   Resp 18   Ht 1.88 m (6' 2\")   Wt 79.6 kg (175 lb 7.8 oz)   SpO2 96%   BMI 22.53 kg/m    Temp (24hrs), Av.8  F (36.6  C), Min:97.5  F (36.4  C), " Max:98  F (36.7  C)    Wt:   Wt Readings from Last 5 Encounters:   12/10/22 79.6 kg (175 lb 7.8 oz)   11/21/22 90.9 kg (200 lb 6.4 oz)   11/10/22 108 kg (238 lb 1.6 oz)   11/08/22 95.7 kg (210 lb 14.4 oz)     Physical Exam   GEN: pleasant, no acute distress  HEENT: no icterus, EOMI  CV: RRR, normal s1/s2, soft systolic murmur best heard at LLSB, JVP 6-8  CHEST: Non-labored breathing, good air movement bilaterally, no wheezes or crackles   ABD: soft, non-tender, non-distended  EXTR: warm extremities. No LE edema  NEURO: awake, alert, non-focal motor exam    DATA:   Labs, EKGs and imaging reviewed. Pertinent results discussed in assessment and plan above.

## 2022-12-12 NOTE — PLAN OF CARE
"  Problem: Plan of Care - These are the overarching goals to be used throughout the patient stay.    Goal: Plan of Care Review  Description: The Plan of Care Review/Shift note should be completed every shift.  The Outcome Evaluation is a brief statement about your assessment that the patient is improving, declining, or no change.  This information will be displayed automatically on your shift note.  Outcome: Not Progressing  Goal: Patient-Specific Goal (Individualized)  Description: You can add care plan individualizations to a care plan. Examples of Individualization might be:  \"Parent requests to be called daily at 9am for status\", \"I have a hard time hearing out of my right ear\", or \"Do not touch me to wake me up as it startles me\".  Outcome: Not Progressing  Goal: Absence of Hospital-Acquired Illness or Injury  Outcome: Not Progressing  Intervention: Identify and Manage Fall Risk  Recent Flowsheet Documentation  Taken 12/11/2022 1200 by Mikaela Sloan RN  Safety Promotion/Fall Prevention:   activity supervised   fall prevention program maintained   increased rounding and observation   increase visualization of patient   lighting adjusted   mobility aid in reach   nonskid shoes/slippers when out of bed   patient and family education   room door open   room near nurse's station   room organization consistent   safety round/check completed   supervised activity   treat reversible contributory factors   treat underlying cause   clutter free environment maintained  Taken 12/11/2022 0800 by Mikaela Sloan, RN  Safety Promotion/Fall Prevention:   activity supervised   fall prevention program maintained   increased rounding and observation   increase visualization of patient   lighting adjusted   mobility aid in reach   nonskid shoes/slippers when out of bed   patient and family education   room door open   room near nurse's station   room organization consistent   safety round/check completed   " Order Request  Lab:  Blood and Urine    Message / reason: Every 6 month follow up                Preferred Delivery Method   Call when ready for pickup - phone number to notify: [phone]     Caller Information       Type Contact Phone    12/07/2018 10:14 AM Phone (Incoming) Neftaly King (Self) [phone] (W)          Alternative phone number: [phone]    Turnaround time given to caller:   \"This message will be sent to [state Provider's name]. The clinical team will fulfill your request as soon as they review your message.\"   supervised activity   treat reversible contributory factors   treat underlying cause   clutter free environment maintained  Intervention: Prevent Skin Injury  Recent Flowsheet Documentation  Taken 12/11/2022 1800 by Mikaela Sloan RN  Body Position: position changed independently  Taken 12/11/2022 1600 by Mikaela Sloan RN  Body Position:   position changed independently   side-lying   right  Taken 12/11/2022 1200 by Mikaela Sloan RN  Body Position:   position changed independently   side-lying   right  Intervention: Prevent and Manage VTE (Venous Thromboembolism) Risk  Recent Flowsheet Documentation  Taken 12/11/2022 1200 by Mikaela Sloan RN  VTE Prevention/Management: SCDs (sequential compression devices) off  Taken 12/11/2022 0800 by Mikaela Sloan RN  VTE Prevention/Management: SCDs (sequential compression devices) off  Goal: Optimal Comfort and Wellbeing  Outcome: Not Progressing  Intervention: Provide Person-Centered Care  Recent Flowsheet Documentation  Taken 12/11/2022 1200 by Mikaela Sloan RN  Trust Relationship/Rapport:   care explained   choices provided   emotional support provided   empathic listening provided   questions answered   questions encouraged   reassurance provided   thoughts/feelings acknowledged  Taken 12/11/2022 0800 by Mikaela Sloan RN  Trust Relationship/Rapport:   care explained   choices provided   emotional support provided   empathic listening provided   questions answered   questions encouraged   reassurance provided   thoughts/feelings acknowledged  Goal: Readiness for Transition of Care  Outcome: Not Progressing     Problem: Anxiety Signs/Symptoms  Goal: Optimized Energy Level (Anxiety Signs/Symptoms)  Outcome: Not Progressing  Goal: Optimized Cognitive Function (Anxiety Signs/Symptoms)  Outcome: Not Progressing  Goal: Improved Mood Symptoms (Anxiety Signs/Symptoms)  Outcome: Not Progressing  Goal: Improved Sleep  (Anxiety Signs/Symptoms)  Outcome: Not Progressing  Goal: Enhanced Social, Occupational or Functional Skills (Anxiety Signs/Symptoms)  Outcome: Not Progressing  Intervention: Promote Social, Occupational and Functional Ability  Recent Flowsheet Documentation  Taken 12/11/2022 1200 by Mikaela Sloan RN  Trust Relationship/Rapport:   care explained   choices provided   emotional support provided   empathic listening provided   questions answered   questions encouraged   reassurance provided   thoughts/feelings acknowledged  Taken 12/11/2022 0800 by Mikaela Sloan RN  Trust Relationship/Rapport:   care explained   choices provided   emotional support provided   empathic listening provided   questions answered   questions encouraged   reassurance provided   thoughts/feelings acknowledged  Goal: Improved Somatic Symptoms (Anxiety Signs/Symptoms)  Outcome: Not Progressing     Problem: Oral Intake Inadequate  Goal: Improved Oral Intake  Outcome: Not Progressing   Goal Outcome Evaluation:             ICU End of Shift Summary. See flowsheets for vital signs and detailed assessment.    Changes this shift:   Cardiac: Nipride started today. CAROLYN scores in Epic.   GI: Large BM today. Eating well. 3 supplemental drinks today.   : +1748 for the day.  MSK: Walked the hallway x3 today.     Plan:    Continue to encourage nutrition and ambulation.           negative

## 2022-12-13 LAB
ALBUMIN SERPL BCG-MCNC: 3.5 G/DL (ref 3.5–5.2)
ALP SERPL-CCNC: 77 U/L (ref 40–129)
ALT SERPL W P-5'-P-CCNC: 17 U/L (ref 10–50)
ANION GAP SERPL CALCULATED.3IONS-SCNC: 11 MMOL/L (ref 7–15)
ANION GAP SERPL CALCULATED.3IONS-SCNC: 13 MMOL/L (ref 7–15)
AST SERPL W P-5'-P-CCNC: 23 U/L (ref 10–50)
BASE EXCESS BLDV CALC-SCNC: 3.8 MMOL/L (ref -7.7–1.9)
BASE EXCESS BLDV CALC-SCNC: 4.1 MMOL/L (ref -7.7–1.9)
BASE EXCESS BLDV CALC-SCNC: 4.6 MMOL/L (ref -7.7–1.9)
BILIRUB DIRECT SERPL-MCNC: 0.29 MG/DL (ref 0–0.3)
BILIRUB SERPL-MCNC: 1 MG/DL
BUN SERPL-MCNC: 19.9 MG/DL (ref 8–23)
BUN SERPL-MCNC: 23.4 MG/DL (ref 8–23)
CALCIUM SERPL-MCNC: 9 MG/DL (ref 8.8–10.2)
CALCIUM SERPL-MCNC: 9.1 MG/DL (ref 8.8–10.2)
CHLORIDE SERPL-SCNC: 102 MMOL/L (ref 98–107)
CHLORIDE SERPL-SCNC: 102 MMOL/L (ref 98–107)
CREAT SERPL-MCNC: 0.97 MG/DL (ref 0.67–1.17)
CREAT SERPL-MCNC: 1.01 MG/DL (ref 0.67–1.17)
DEPRECATED HCO3 PLAS-SCNC: 22 MMOL/L (ref 22–29)
DEPRECATED HCO3 PLAS-SCNC: 25 MMOL/L (ref 22–29)
ERYTHROCYTE [DISTWIDTH] IN BLOOD BY AUTOMATED COUNT: 13.9 % (ref 10–15)
GFR SERPL CREATININE-BSD FRML MDRD: 81 ML/MIN/1.73M2
GFR SERPL CREATININE-BSD FRML MDRD: 85 ML/MIN/1.73M2
GLUCOSE SERPL-MCNC: 114 MG/DL (ref 70–99)
GLUCOSE SERPL-MCNC: 95 MG/DL (ref 70–99)
HCO3 BLDV-SCNC: 28 MMOL/L (ref 21–28)
HCO3 BLDV-SCNC: 29 MMOL/L (ref 21–28)
HCO3 BLDV-SCNC: 29 MMOL/L (ref 21–28)
HCT VFR BLD AUTO: 44.6 % (ref 40–53)
HGB BLD-MCNC: 14.4 G/DL (ref 13.3–17.7)
LACTATE SERPL-SCNC: 0.9 MMOL/L (ref 0.7–2)
MAGNESIUM SERPL-MCNC: 2.2 MG/DL (ref 1.7–2.3)
MCH RBC QN AUTO: 28.5 PG (ref 26.5–33)
MCHC RBC AUTO-ENTMCNC: 32.3 G/DL (ref 31.5–36.5)
MCV RBC AUTO: 88 FL (ref 78–100)
O2/TOTAL GAS SETTING VFR VENT: 21 %
OXYHGB MFR BLDV: 61 % (ref 70–75)
OXYHGB MFR BLDV: 63 % (ref 70–75)
OXYHGB MFR BLDV: 68 % (ref 70–75)
PCO2 BLDV: 40 MM HG (ref 40–50)
PCO2 BLDV: 41 MM HG (ref 40–50)
PCO2 BLDV: 41 MM HG (ref 40–50)
PH BLDV: 7.44 [PH] (ref 7.32–7.43)
PH BLDV: 7.45 [PH] (ref 7.32–7.43)
PH BLDV: 7.47 [PH] (ref 7.32–7.43)
PHOSPHATE SERPL-MCNC: 2.6 MG/DL (ref 2.5–4.5)
PLATELET # BLD AUTO: 132 10E3/UL (ref 150–450)
PO2 BLDV: 32 MM HG (ref 25–47)
PO2 BLDV: 34 MM HG (ref 25–47)
PO2 BLDV: 36 MM HG (ref 25–47)
POTASSIUM SERPL-SCNC: 3.9 MMOL/L (ref 3.4–5.3)
POTASSIUM SERPL-SCNC: 4.2 MMOL/L (ref 3.4–5.3)
PROT SERPL-MCNC: 6.2 G/DL (ref 6.4–8.3)
RBC # BLD AUTO: 5.06 10E6/UL (ref 4.4–5.9)
SODIUM SERPL-SCNC: 137 MMOL/L (ref 136–145)
SODIUM SERPL-SCNC: 138 MMOL/L (ref 136–145)
WBC # BLD AUTO: 7.8 10E3/UL (ref 4–11)

## 2022-12-13 PROCEDURE — 250N000013 HC RX MED GY IP 250 OP 250 PS 637: Performed by: INTERNAL MEDICINE

## 2022-12-13 PROCEDURE — 85027 COMPLETE CBC AUTOMATED: CPT | Performed by: STUDENT IN AN ORGANIZED HEALTH CARE EDUCATION/TRAINING PROGRAM

## 2022-12-13 PROCEDURE — 250N000013 HC RX MED GY IP 250 OP 250 PS 637: Performed by: EMERGENCY MEDICINE

## 2022-12-13 PROCEDURE — 250N000012 HC RX MED GY IP 250 OP 636 PS 637: Performed by: EMERGENCY MEDICINE

## 2022-12-13 PROCEDURE — 250N000011 HC RX IP 250 OP 636: Performed by: STUDENT IN AN ORGANIZED HEALTH CARE EDUCATION/TRAINING PROGRAM

## 2022-12-13 PROCEDURE — 82805 BLOOD GASES W/O2 SATURATION: CPT | Performed by: STUDENT IN AN ORGANIZED HEALTH CARE EDUCATION/TRAINING PROGRAM

## 2022-12-13 PROCEDURE — 80053 COMPREHEN METABOLIC PANEL: CPT

## 2022-12-13 PROCEDURE — 99232 SBSQ HOSP IP/OBS MODERATE 35: CPT | Mod: GC | Performed by: INTERNAL MEDICINE

## 2022-12-13 PROCEDURE — 83605 ASSAY OF LACTIC ACID: CPT | Performed by: STUDENT IN AN ORGANIZED HEALTH CARE EDUCATION/TRAINING PROGRAM

## 2022-12-13 PROCEDURE — 200N000002 HC R&B ICU UMMC

## 2022-12-13 PROCEDURE — 250N000011 HC RX IP 250 OP 636: Performed by: INTERNAL MEDICINE

## 2022-12-13 PROCEDURE — 84100 ASSAY OF PHOSPHORUS: CPT | Performed by: INTERNAL MEDICINE

## 2022-12-13 PROCEDURE — 250N000012 HC RX MED GY IP 250 OP 636 PS 637: Performed by: STUDENT IN AN ORGANIZED HEALTH CARE EDUCATION/TRAINING PROGRAM

## 2022-12-13 PROCEDURE — 83735 ASSAY OF MAGNESIUM: CPT | Performed by: INTERNAL MEDICINE

## 2022-12-13 PROCEDURE — 82248 BILIRUBIN DIRECT: CPT

## 2022-12-13 PROCEDURE — 250N000013 HC RX MED GY IP 250 OP 250 PS 637: Performed by: STUDENT IN AN ORGANIZED HEALTH CARE EDUCATION/TRAINING PROGRAM

## 2022-12-13 RX ADMIN — POLYETHYLENE GLYCOL 3350 17 G: 17 POWDER, FOR SOLUTION ORAL at 09:45

## 2022-12-13 RX ADMIN — HYDRALAZINE HYDROCHLORIDE 25 MG: 25 TABLET, FILM COATED ORAL at 21:01

## 2022-12-13 RX ADMIN — ENOXAPARIN SODIUM 40 MG: 40 INJECTION SUBCUTANEOUS at 15:31

## 2022-12-13 RX ADMIN — DOBUTAMINE HYDROCHLORIDE 5 MCG/KG/MIN: 200 INJECTION INTRAVENOUS at 10:51

## 2022-12-13 RX ADMIN — HYDRALAZINE HYDROCHLORIDE 25 MG: 25 TABLET, FILM COATED ORAL at 06:03

## 2022-12-13 RX ADMIN — HYDRALAZINE HYDROCHLORIDE 25 MG: 25 TABLET, FILM COATED ORAL at 15:32

## 2022-12-13 RX ADMIN — PRAVASTATIN SODIUM 20 MG: 20 TABLET ORAL at 09:37

## 2022-12-13 RX ADMIN — POTASSIUM CHLORIDE 20 MEQ: 750 TABLET, EXTENDED RELEASE ORAL at 09:44

## 2022-12-13 RX ADMIN — MYCOPHENOLATE MOFETIL 1000 MG: 500 TABLET, FILM COATED ORAL at 09:39

## 2022-12-13 RX ADMIN — MYCOPHENOLATE MOFETIL 500 MG: 500 TABLET, FILM COATED ORAL at 19:22

## 2022-12-13 RX ADMIN — ALTEPLASE 2 MG: 2.2 INJECTION, POWDER, LYOPHILIZED, FOR SOLUTION INTRAVENOUS at 22:16

## 2022-12-13 RX ADMIN — POTASSIUM & SODIUM PHOSPHATES POWDER PACK 280-160-250 MG 1 PACKET: 280-160-250 PACK at 09:37

## 2022-12-13 RX ADMIN — AMIODARONE HYDROCHLORIDE 200 MG: 200 TABLET ORAL at 19:22

## 2022-12-13 RX ADMIN — TRAZODONE HYDROCHLORIDE 50 MG: 50 TABLET ORAL at 21:02

## 2022-12-13 RX ADMIN — ASPIRIN 81 MG: 81 TABLET, COATED ORAL at 09:39

## 2022-12-13 RX ADMIN — AMIODARONE HYDROCHLORIDE 200 MG: 200 TABLET ORAL at 09:39

## 2022-12-13 RX ADMIN — POTASSIUM & SODIUM PHOSPHATES POWDER PACK 280-160-250 MG 1 PACKET: 280-160-250 PACK at 11:12

## 2022-12-13 ASSESSMENT — ACTIVITIES OF DAILY LIVING (ADL)
ADLS_ACUITY_SCORE: 43
ADLS_ACUITY_SCORE: 43
ADLS_ACUITY_SCORE: 42
ADLS_ACUITY_SCORE: 43
ADLS_ACUITY_SCORE: 42
ADLS_ACUITY_SCORE: 42
ADLS_ACUITY_SCORE: 39
ADLS_ACUITY_SCORE: 42

## 2022-12-13 NOTE — PROGRESS NOTES
ICU End of Shift Summary. See flowsheets for vital signs and detailed assessment.    Changes this shift: Alert and oriented x4.  OOB and walking in hallway x2.  In the chair majority of shift and tolerated well.  See charting for CAROLYN numbers.  Team aware that CI falling from previous numbers.      Plan: Monitor CAROLYN numbers. Continue dobut @5. Replete lytes as ordered. Continue to get OOB to chair and walk.

## 2022-12-13 NOTE — PLAN OF CARE
ICU End of Shift Summary. See flowsheets for vital signs and detailed assessment.    Changes this shift: A&Ox4, RASS of 0, Pupils are equal. 100% Paced. Maintaining MAP >65 mmHg. Dobutamine drip at 5 mcg/kg/min. Maintaining sats >90% on RA. Adequate UO via urinal. No BM this shift. On Regular diet. Ambulated and walked around the floor with gait belt and walker. Both PICC lumens have no blood return, but able to flush with NS - CSI Team updated thru call with no further orders. Patient's spouse is at bedside at start of shift and is up to date on plan of care.     Plan: Continue with POC and Notify MD with changes.    Goal Outcome Evaluation:    Plan of Care Reviewed With: patient, spouse    Overall Patient Progress: no change    Overall Patient Progress: no change

## 2022-12-13 NOTE — PLAN OF CARE
Goal Outcome Evaluation:      Plan of Care Reviewed With: patient, spouse    Overall Patient Progress: no changeOverall Patient Progress: no change    Outcome Evaluation: Nipride off. Oral hydralazine started.    ICU End of Shift Summary. See flowsheets for vital signs and detailed assessment.    Changes this shift: Potassium replaced after morning labs. Two walks around the unit with stable vital signs. Nipride turned off in favor of oral hydralazine. Patient's oxygen saturation is normal, however due to a persistent cough, humidified nasal canula was started after approval from cardiologist. Appetite remains poor with limited intake after breakfast.     Plan: One last walk around the unit. Continue to assess BP and Aisha numbers on blood pressure regiment.

## 2022-12-13 NOTE — PROGRESS NOTES
[facility]    Advanced Heart Failure Progress Note     Faculty Attestation  Oneal Garcia M.D.    I personally saw and examined this patien with fellow t, reviewed recent laboratories and imaging studies, discussed the case with the housestaff, and conveyed plan to the patient.  I answered all questions from patient and/or family. I agree with the examination, assessment and plan outlined here.          Assessment and Plan:     Paco Stein is a 67 year old male with a history of chronic systolic heart failure (suspected predominantly nonischemic cardiomyopathy, possibly arrhythmogenic), coronary artery disease (s/p PCI with TILA to LCx 4/2013 and to the LAD in 10/2021), hyperlipidemia, CNS vasculitis and chronic kidney disease who presents with transient bilateral hand tightness with unrevealing workup, now admitted for continued advanced therapies evaluation.       Interval History:  Approved for activation on the heart transplant waitlist (status 2), which he's quit pleased about. His cough is improved today. Continues to walk around the unit every few hours, no significant shortness of breath.      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 No episodes since. Neurological exam unremarkable. Neurology consulted during recent admission as part of VAD/transplant evaluation. MRI/MRA brain was also performed which showed no concern for progression of disease  - Continue PTA cellcept 1000mg qam and 500mg qpm     # Incidental punctate cerebellar infarct, likely embolic  Read on MR imaging 11/14/22 without new neuro deficits.  Per radiology, appears acute, likely embolic. DNo known arterial clot history. Reviewed telemetry over admission, no afib or other tachycarrhythmias. Reassuring prior TTE this admission including bubble study, repeat JARED w/o septal defects or thrombus. Treated w/ heparin gtt and transitioned to Eliquis.    # Insomnia/anxiety  - PRN trazodone and lorazepam      ===CARDIOVASCULAR===  # Advanced chronic systolic heart failure, EF 15-20%, NYHA class IV, ACC/AHA Stage D  # Ambulatory cardiogenic shock, on dobutamine drip as bride to candidacy   Recently admitted from 11/10/2022 - 11/21/2022 for acute on chronic heart failure, treated with IV diuresis and initiated on dobutamine infusion. Patient completed advanced therapy evaluation during that admission and and is now re-admitted for continued evaluation and possible transplant listing. No signs of acute decompensation on adimssion. He reports improvement in his activity tolerance and appetite since starting dobutamine, although he seemed to have regressed a bit in the days prior to admission. 12/08/2022 RHC: RA 16, PA 50/26/35, PCWP 26, Aisha CO/CI 3.1/1.44.   - Etiology: Predominantly non-ischemic cardiomyopathy, suspected arrhythmogenic   - Volume: Intermittent IV lasix, guided by swan numbers  - Inotrope: dobutamine to 5 mcg/kg/min  - Afterload: Continue PO hydralazine 25 mg every 8 hours    - BB: held in the setting of shock  - ACE/ARB/ARNi: Previous intolerance due to hypotension and lightheadedness   - SGLT2i: Holding PTA Empagliflozin 10mg qday  - MRA: None  - Statin: PTA pravastatin  - Electrolytes: PTA 20 meq KCl daily, trending and replacing as needed as well   - Transplant evaluation: Completed checklist during previous admission. Now pending transplant listing.   - [place] [facility] hemodynamics:  Date RA PA PCWP MAP SvO2  CO/CI SVR PVR Therapy   12/9 (cath lab) 5 32/10/19 15 102 53 2.8/1.3 2228 1.4 Dobutamine 5 mcg/kg/min  Furosemide 10 mg/hr   12/10 AM 4 26/10/16  10 86 62 3.7/1.8 1600 1.6 Dobutamine 5 mcg/kg/min  Furosemide 10 mg/hr   12/10 PM   10 35/15/22 14      Dobutamine 5 mcg/kg/min   12/11 AM 6 40/15/23 13 97 64 3.7/1.8 1960 2.7 Dobutamine 5 mcg/kg/min   12/11 PM 6 30/15/20 10 83 61 3.4/1.7 1900  Dobutamine 5 mcg/kg/min  Nitroprusside 0.25 mcg/kg/min   12/12 AM 6 26/10/15 10 78 71 5.3/2.6 1100 0.9 Dobutamine 5 mcg/kg/min  Nitroprusside 0.75 mcg/kg/min   12/12 PM 10 45/20/28 18 83 51 2.9/1.4 2100 3.4 Dobutamine 5 mcg/kg/min   12/13 PM 8 40/16/24 15 92 61 3.5/1.7 1860 2.6 Dobutamine 5 mcg/kg/min  Hydralazine 25 mg Q8H     # Multivessel coronary artery disease (s/p PCI with TILA to LCx 4/2013 and to the LAD in 10/2021)  As discussed above, low suspicion for ACS with bilateral arm tightness. 11/11/22 LHC showed patent LAD and LCx stents as well as mild-mod non-obstructive disease in the RCA.   - Continue PTA ASA 81 mg qday  - BB: Holding as per above  - Statin: Cont PTA pravastatin     # History of NSVT s/p biventricular PPM/ICD  Interrogation performed 12/10/22 w/ two short (<5s) runs of NSVT on 11/1/22 and 11/3/22, no recent shocks. A-paced.  - Cont PTA PO Amiodarone 200mg BID      ===PULMONARY===  # Non-productive cough, acute on chronic, improved   - PRN cough lozenges      ===GI===  # Severe malnutrition in the context of acute illness/injury on chronic illness  - Appreciate assistance from RD        ===RENAL===  # CKD stage 2  Creatinine at baseline  - trend BMP      ===HEME/ONC===  # Occlusive left peroneal DVT  # Non-occlusive right axillary DVT  Identified on doppler 11/15/22 during previous admission.   - Holding PTA apixaban      ===ENDOCRINE===  No active concerns       INFECTIOUS DISEASE  No active concerns       ===SKIN/MSK===  # Bilateral hand tightness, resolved  Experienced two episodes prior to admission, once while performing PT exercises and again when he woke up the following morning. Each episode lasted for about 15 minutes and had no other associated  "symptoms. Etiology not clear at this time. No focal motor or sensory deficits on exam. Electrolytes within normal limits. Troponin only mildly elevated at 46 and down-trending. EKG with A sensed V paced rhythm with no concerning ST/T changes so low suspicion for ischemia. ICD interrogation detected 225 ventricular sensed episodes, with the most recently stored episodes showing slow VT-NS at  bpm, runs lasting about 5 seconds. Unclear if these short runs would explain his 15-minute episodes. He is on dobutamine which has known arrhythmogenic effects so certainly worth monitoring closely, especially given his history of NSVT.   - Monitoring for recurrence while on telemetry   - Trend electrolytes and replete as needed       FEN: Low salt diet, replete lytes as needed   DVT PPx: subcutaneous enoxaparin   Code: Full code      Disposition: Inpatient admission, listed for transplant on      Discussed with Dr. Jose Poole MD  Cardiology Fellow   [phone]     Objective:   /64   Pulse 78   Temp 97.7  F (36.5  C) (Axillary)   Resp 17   Ht 1.88 m (6' 2\")   Wt 84.8 kg (187 lb)   SpO2 96%   BMI 24.01 kg/m    Temp (24hrs), Av.8  F (36.6  C), Min:97.5  F (36.4  C), Max:98  F (36.7  C)    Wt:   Wt Readings from Last 5 Encounters:   22 84.8 kg (187 lb)   22 90.9 kg (200 lb 6.4 oz)   11/10/22 108 kg (238 lb 1.6 oz)   22 95.7 kg (210 lb 14.4 oz)     Physical Exam   GEN: pleasant, no acute distress  HEENT: no icterus, EOMI  CV: RRR, normal s1/s2, soft systolic murmur best heard at LLSB, JVP 8  CHEST: Non-labored breathing, good air movement bilaterally, no wheezes or crackles   ABD: soft, non-tender, non-distended   EXTR: warm extremities. No LE edema  NEURO: awake, alert, non-focal motor exam    DATA:   Labs, EKGs and imaging reviewed. Pertinent results discussed in assessment and plan above.   "

## 2022-12-14 LAB
ALBUMIN SERPL BCG-MCNC: 3.3 G/DL (ref 3.5–5.2)
ALP SERPL-CCNC: 73 U/L (ref 40–129)
ALT SERPL W P-5'-P-CCNC: 24 U/L (ref 10–50)
ANION GAP SERPL CALCULATED.3IONS-SCNC: 8 MMOL/L (ref 7–15)
AST SERPL W P-5'-P-CCNC: 28 U/L (ref 10–50)
BASE EXCESS BLDV CALC-SCNC: -0.3 MMOL/L (ref -7.7–1.9)
BASE EXCESS BLDV CALC-SCNC: 2.9 MMOL/L (ref -7.7–1.9)
BASE EXCESS BLDV CALC-SCNC: 3.1 MMOL/L (ref -7.7–1.9)
BILIRUB DIRECT SERPL-MCNC: 0.26 MG/DL (ref 0–0.3)
BILIRUB SERPL-MCNC: 0.8 MG/DL
BUN SERPL-MCNC: 22.2 MG/DL (ref 8–23)
CALCIUM SERPL-MCNC: 8.5 MG/DL (ref 8.8–10.2)
CHLORIDE SERPL-SCNC: 104 MMOL/L (ref 98–107)
CREAT SERPL-MCNC: 0.97 MG/DL (ref 0.67–1.17)
DEPRECATED HCO3 PLAS-SCNC: 25 MMOL/L (ref 22–29)
ERYTHROCYTE [DISTWIDTH] IN BLOOD BY AUTOMATED COUNT: 13.7 % (ref 10–15)
GFR SERPL CREATININE-BSD FRML MDRD: 85 ML/MIN/1.73M2
GLUCOSE BLDC GLUCOMTR-MCNC: 112 MG/DL (ref 70–99)
GLUCOSE SERPL-MCNC: 111 MG/DL (ref 70–99)
HCO3 BLDV-SCNC: 24 MMOL/L (ref 21–28)
HCO3 BLDV-SCNC: 27 MMOL/L (ref 21–28)
HCO3 BLDV-SCNC: 27 MMOL/L (ref 21–28)
HCT VFR BLD AUTO: 42.8 % (ref 40–53)
HGB BLD-MCNC: 14.1 G/DL (ref 13.3–17.7)
LACTATE SERPL-SCNC: 1.3 MMOL/L (ref 0.7–2)
MAGNESIUM SERPL-MCNC: 2 MG/DL (ref 1.7–2.3)
MCH RBC QN AUTO: 28.8 PG (ref 26.5–33)
MCHC RBC AUTO-ENTMCNC: 32.9 G/DL (ref 31.5–36.5)
MCV RBC AUTO: 88 FL (ref 78–100)
O2/TOTAL GAS SETTING VFR VENT: 0 %
O2/TOTAL GAS SETTING VFR VENT: 21 %
O2/TOTAL GAS SETTING VFR VENT: 21 %
OXYHGB MFR BLDV: 68 % (ref 70–75)
OXYHGB MFR BLDV: 71 % (ref 70–75)
OXYHGB MFR BLDV: 75 % (ref 70–75)
PCO2 BLDV: 37 MM HG (ref 40–50)
PCO2 BLDV: 38 MM HG (ref 40–50)
PCO2 BLDV: 39 MM HG (ref 40–50)
PH BLDV: 7.42 [PH] (ref 7.32–7.43)
PH BLDV: 7.45 [PH] (ref 7.32–7.43)
PH BLDV: 7.46 [PH] (ref 7.32–7.43)
PHOSPHATE SERPL-MCNC: 2.7 MG/DL (ref 2.5–4.5)
PLATELET # BLD AUTO: 129 10E3/UL (ref 150–450)
PO2 BLDV: 36 MM HG (ref 25–47)
PO2 BLDV: 38 MM HG (ref 25–47)
PO2 BLDV: 42 MM HG (ref 25–47)
POTASSIUM SERPL-SCNC: 3.6 MMOL/L (ref 3.4–5.3)
PROT SERPL-MCNC: 5.7 G/DL (ref 6.4–8.3)
RBC # BLD AUTO: 4.89 10E6/UL (ref 4.4–5.9)
SODIUM SERPL-SCNC: 137 MMOL/L (ref 136–145)
WBC # BLD AUTO: 6.3 10E3/UL (ref 4–11)

## 2022-12-14 PROCEDURE — 85027 COMPLETE CBC AUTOMATED: CPT | Performed by: STUDENT IN AN ORGANIZED HEALTH CARE EDUCATION/TRAINING PROGRAM

## 2022-12-14 PROCEDURE — 250N000011 HC RX IP 250 OP 636: Performed by: INTERNAL MEDICINE

## 2022-12-14 PROCEDURE — 200N000002 HC R&B ICU UMMC

## 2022-12-14 PROCEDURE — 250N000013 HC RX MED GY IP 250 OP 250 PS 637: Performed by: EMERGENCY MEDICINE

## 2022-12-14 PROCEDURE — 250N000011 HC RX IP 250 OP 636: Performed by: STUDENT IN AN ORGANIZED HEALTH CARE EDUCATION/TRAINING PROGRAM

## 2022-12-14 PROCEDURE — 250N000013 HC RX MED GY IP 250 OP 250 PS 637: Performed by: INTERNAL MEDICINE

## 2022-12-14 PROCEDURE — 84100 ASSAY OF PHOSPHORUS: CPT | Performed by: INTERNAL MEDICINE

## 2022-12-14 PROCEDURE — 82310 ASSAY OF CALCIUM: CPT

## 2022-12-14 PROCEDURE — 99232 SBSQ HOSP IP/OBS MODERATE 35: CPT | Mod: GC | Performed by: INTERNAL MEDICINE

## 2022-12-14 PROCEDURE — 83605 ASSAY OF LACTIC ACID: CPT | Performed by: STUDENT IN AN ORGANIZED HEALTH CARE EDUCATION/TRAINING PROGRAM

## 2022-12-14 PROCEDURE — 83735 ASSAY OF MAGNESIUM: CPT | Performed by: INTERNAL MEDICINE

## 2022-12-14 PROCEDURE — 82248 BILIRUBIN DIRECT: CPT

## 2022-12-14 PROCEDURE — 82805 BLOOD GASES W/O2 SATURATION: CPT | Performed by: STUDENT IN AN ORGANIZED HEALTH CARE EDUCATION/TRAINING PROGRAM

## 2022-12-14 PROCEDURE — 250N000012 HC RX MED GY IP 250 OP 636 PS 637: Performed by: STUDENT IN AN ORGANIZED HEALTH CARE EDUCATION/TRAINING PROGRAM

## 2022-12-14 PROCEDURE — 250N000013 HC RX MED GY IP 250 OP 250 PS 637: Performed by: STUDENT IN AN ORGANIZED HEALTH CARE EDUCATION/TRAINING PROGRAM

## 2022-12-14 PROCEDURE — 250N000012 HC RX MED GY IP 250 OP 636 PS 637: Performed by: EMERGENCY MEDICINE

## 2022-12-14 RX ORDER — POTASSIUM CHLORIDE 1.5 G/1.58G
20 POWDER, FOR SOLUTION ORAL ONCE
Status: COMPLETED | OUTPATIENT
Start: 2022-12-14 | End: 2022-12-14

## 2022-12-14 RX ORDER — MAGNESIUM OXIDE 400 MG/1
400 TABLET ORAL EVERY 4 HOURS
Status: COMPLETED | OUTPATIENT
Start: 2022-12-14 | End: 2022-12-14

## 2022-12-14 RX ADMIN — POTASSIUM CHLORIDE 20 MEQ: 750 TABLET, EXTENDED RELEASE ORAL at 09:05

## 2022-12-14 RX ADMIN — MYCOPHENOLATE MOFETIL 500 MG: 500 TABLET, FILM COATED ORAL at 20:08

## 2022-12-14 RX ADMIN — ALTEPLASE 2 MG: 2.2 INJECTION, POWDER, LYOPHILIZED, FOR SOLUTION INTRAVENOUS at 05:32

## 2022-12-14 RX ADMIN — ENOXAPARIN SODIUM 40 MG: 40 INJECTION SUBCUTANEOUS at 15:20

## 2022-12-14 RX ADMIN — AMIODARONE HYDROCHLORIDE 200 MG: 200 TABLET ORAL at 09:06

## 2022-12-14 RX ADMIN — MYCOPHENOLATE MOFETIL 1000 MG: 500 TABLET, FILM COATED ORAL at 09:07

## 2022-12-14 RX ADMIN — POTASSIUM & SODIUM PHOSPHATES POWDER PACK 280-160-250 MG 1 PACKET: 280-160-250 PACK at 09:06

## 2022-12-14 RX ADMIN — MAGNESIUM OXIDE TAB 400 MG (241.3 MG ELEMENTAL MG) 400 MG: 400 (241.3 MG) TAB at 09:09

## 2022-12-14 RX ADMIN — DOBUTAMINE HYDROCHLORIDE 5 MCG/KG/MIN: 200 INJECTION INTRAVENOUS at 22:19

## 2022-12-14 RX ADMIN — ASPIRIN 81 MG: 81 TABLET, COATED ORAL at 09:05

## 2022-12-14 RX ADMIN — HYDRALAZINE HYDROCHLORIDE 25 MG: 25 TABLET, FILM COATED ORAL at 05:30

## 2022-12-14 RX ADMIN — MAGNESIUM OXIDE TAB 400 MG (241.3 MG ELEMENTAL MG) 400 MG: 400 (241.3 MG) TAB at 11:53

## 2022-12-14 RX ADMIN — HYDRALAZINE HYDROCHLORIDE 25 MG: 25 TABLET, FILM COATED ORAL at 15:20

## 2022-12-14 RX ADMIN — HYDRALAZINE HYDROCHLORIDE 25 MG: 25 TABLET, FILM COATED ORAL at 21:40

## 2022-12-14 RX ADMIN — POTASSIUM & SODIUM PHOSPHATES POWDER PACK 280-160-250 MG 1 PACKET: 280-160-250 PACK at 11:52

## 2022-12-14 RX ADMIN — POTASSIUM CHLORIDE 20 MEQ: 1.5 POWDER, FOR SOLUTION ORAL at 09:06

## 2022-12-14 RX ADMIN — AMIODARONE HYDROCHLORIDE 200 MG: 200 TABLET ORAL at 20:08

## 2022-12-14 RX ADMIN — PRAVASTATIN SODIUM 20 MG: 20 TABLET ORAL at 09:06

## 2022-12-14 RX ADMIN — DOBUTAMINE HYDROCHLORIDE 5 MCG/KG/MIN: 200 INJECTION INTRAVENOUS at 04:38

## 2022-12-14 ASSESSMENT — ACTIVITIES OF DAILY LIVING (ADL)
ADLS_ACUITY_SCORE: 42
ADLS_ACUITY_SCORE: 43
ADLS_ACUITY_SCORE: 42

## 2022-12-14 NOTE — PLAN OF CARE
ICU End of Shift Summary. See flowsheets for vital signs and detailed assessment.    Changes this shift: Patient remains on straight rate dobutamine drip @5. [place] @52. Q12h FICKs, last C.I. at 0800 was 2.4, C.O. 5, CVP 8. Vitally stable on room air. Urine output adequate. Appetite improved today per patient. 1 BM today. Persistent dry cough present, Cards 2 aware. Up to chair and ambulated in [place] today with standby assist twice today. Potassium, Mag, and Phos replaced. Patient and wife updated on plan of care.     Plan: Listed as status 2 for heart transplant. Continue ambulating and getting up to chair daily. Q12h FICKs. Please use pediatric lab tubes.           Goal Outcome Evaluation:      Plan of Care Reviewed With: patient, spouse    Overall Patient Progress: no changeOverall Patient Progress: no change    Outcome Evaluation: Remains on Dobutamine straight rate. Last C.I. 2.4.

## 2022-12-14 NOTE — PLAN OF CARE
ICU End of Shift Summary. See flowsheets for vital signs and detailed assessment.    Changes this shift: A&Ox4, Pupils are equal. 100% V-Paced. Maintaining MAP >65 mmHg. Dobutamine drip at 5 mcg/kg/min. Maintaining sats >90% on RA. Refer to flowsheets for CAROLYN numbers. Adequate UO via urinal. No BM this shift. On Regular diet with 3 gm NA. Patient's spouse is at bedside at start of shift and is up to date on plan of care.    Plan: Continue with POC and Notify MD with changes.     Goal Outcome Evaluation:

## 2022-12-14 NOTE — PROGRESS NOTES
[facility]    Advanced Heart Failure Progress Note      Attending;    Patient Is clinically stable in hospital in awaiting cardiac donor.  I saw patient with fellow agree with plan outlined here.    Assessment and Plan:     Paco Stein is a 67 year old male with a history of chronic systolic heart failure (suspected predominantly nonischemic cardiomyopathy, possibly arrhythmogenic), coronary artery disease (s/p PCI with TILA to LCx 4/2013 and to the LAD in 10/2021), hyperlipidemia, CNS vasculitis and chronic kidney disease who presents with transient bilateral hand tightness with unrevealing workup, now admitted for continued advanced therapies evaluation.      Interval History:   No acute events overnight. Keeping up with regular walks around the unit. No new concerns today.      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 No episodes since. Neurological exam unremarkable. Neurology consulted during recent admission as part of VAD/transplant evaluation. MRI/MRA brain was also performed which showed no concern for progression of disease  - Continue PTA cellcept 1000mg qam and 500mg qpm    # Incidental punctate cerebellar infarct, likely embolic  Read on MR imaging 11/14/22 without new neuro deficits. Per radiology, appears acute, likely embolic. DNo known arterial clot history. Reviewed telemetry over admission, no afib or other tachycarrhythmias. Reassuring prior TTE this admission including bubble study, repeat JARED w/o septal defects or thrombus. Treated w/ heparin gtt and transitioned to Eliquis.    # Insomnia/anxiety  - PRN trazodone and lorazepam       ===CARDIOVASCULAR===  # Advanced chronic systolic heart  failure, EF 15-20%, NYHA class IV, ACC/AHA Stage D  # Ambulatory cardiogenic shock, on dobutamine drip as bride to candidacy   Recently admitted from 11/10/2022 - 11/21/2022 for acute on chronic heart failure, treated with IV diuresis and initiated on dobutamine infusion. Patient completed advanced therapy evaluation during that admission and and is now re-admitted for continued evaluation and possible transplant listing. No signs of acute decompensation on adimssion. He reports improvement in his activity tolerance and appetite since starting dobutamine, although he seemed to have regressed a bit in the days prior to admission. 12/08/2022 RHC: RA 16, PA 50/26/35, PCWP 26, Aisha CO/CI 3.1/1.44.   - Etiology: Predominantly non-ischemic cardiomyopathy, suspected arrhythmogenic   - Volume: Intermittent IV lasix, guided by swan numbers    - Inotrope: dobutamine to 5 mcg/kg/min   - Afterload: Continue PO hydralazine 25 mg every 8 hours   - BB: held in the setting of shock   - ACE/ARB/ARNi: Previous intolerance due to hypotension and lightheadedness   - SGLT2i: Holding PTA Empagliflozin 10mg qday  - MRA: None  - Statin: PTA pravastatin  - Electrolytes: PTA 20 meq KCl daily, trending and replacing as needed as well   - Transplant evaluation: Completed checklist during previous admission. Now pending transplant listing.   - [place] [facility] hemodynamics:  Date RA PA PCWP MAP SvO2  CO/CI SVR PVR Therapy   12/9 (cath lab) 5 32/10/19 15 102 53 2.8/1.3 2228 1.4 Dobutamine 5 mcg/kg/min  Furosemide 10 mg/hr   12/10 AM 4 26/10/16 10 86 62 3.7/1.8 1600 1.6 Dobutamine 5 mcg/kg/min  Furosemide 10 mg/hr   12/10 PM   10 35/15/22 14      Dobutamine 5 mcg/kg/min   12/11 AM 6 40/15/23 13 97 64 3.7/1.8 1960 2.7 Dobutamine 5 mcg/kg/min   12/11 PM 6 30/15/20 10 83 61 3.4/1.7 1900  Dobutamine 5 mcg/kg/min  Nitroprusside 0.25 mcg/kg/min   12/12 AM 6 26/10/15 10 78 71 5.3/2.6 1100 0.9 Dobutamine 5 mcg/kg/min  Nitroprusside 0.75 mcg/kg/min   12/12 PM 10  45/20/28 18 83 51 2.9/1.4 2100 3.4 Dobutamine 5 mcg/kg/min   12/13 PM 8 40/16/24 15 92 61 3.5/1.7 1860 2.6 Dobutamine 5 mcg/kg/min  Hydralazine 25 mg Q8H   12/14 AM 8 40/18/25 16 90 68 5.1/2.4 1300 1.8 Dobutamine 5 mcg/kg/min  Hydralazine 25 mg Q8H     # Multivessel coronary artery disease (s/p PCI with TILA to LCx 4/2013 and to the LAD in 10/2021)  As discussed above, low suspicion for ACS with bilateral arm tightness. 11/11/22 LHC showed patent LAD and LCx stents as well as mild-mod non-obstructive disease in the RCA.   - Continue PTA ASA 81 mg qday  - BB: Holding as per above  - Statin: Cont PTA pravastatin     # History of NSVT s/p biventricular PPM/ICD  Interrogation performed 12/10/22 w/ two short (<5s) runs of NSVT on 11/1/22 and 11/3/22, no recent shocks. A-paced.  - Cont PTA PO Amiodarone 200mg BID      ===PULMONARY===  # Non-productive cough, acute on chronic, improved   - PRN cough lozenges      ===GI===  # Severe malnutrition in the context of acute illness/injury on chronic illness  - Appreciate assistance from RD        ===RENAL===  # CKD stage 2  Creatinine at baseline  - trend BMP      ===HEME/ONC===  # Occlusive left peroneal DVT  # Non-occlusive right axillary DVT  Identified on doppler 11/15/22 during previous admission.   - Holding PTA apixaban      ===ENDOCRINE===  No active concerns       INFECTIOUS DISEASE  No active concerns       ===SKIN/MSK===  # Bilateral hand tightness, resolved  Experienced two episodes prior to admission, once while performing PT exercises and again when he woke up the following morning. Each episode lasted for about 15 minutes and had no other associated symptoms. Etiology not clear at this time. No focal motor or sensory deficits on exam. Electrolytes within normal limits. Troponin only mildly elevated at 46 and down-trending. EKG with A sensed V paced rhythm with no concerning ST/T changes so low suspicion for ischemia. ICD interrogation detected 225 ventricular  "sensed episodes, with the most recently stored episodes showing slow VT-NS at  bpm, runs lasting about 5 seconds. Unclear if these short runs would explain his 15-minute episodes. He is on dobutamine which has known arrhythmogenic effects so certainly worth monitoring closely, especially given his history of NSVT.   - Monitoring for recurrence while on telemetry   - Trend electrolytes and replete as needed       FEN: Low salt diet, replete lytes as needed   DVT PPx: subcutaneous enoxaparin   Code: Full code      Disposition: Inpatient admission, listed for transplant on      Discussed with Dr. Jose Poole MD  Cardiology Fellow   [phone]     Objective:   /63   Pulse 77   Temp 97.5  F (36.4  C) (Axillary)   Resp 16   Ht 1.88 m (6' 2\")   Wt 84.8 kg (186 lb 14.4 oz)   SpO2 94%   BMI 24.00 kg/m    Temp (24hrs), Av.8  F (36.6  C), Min:97.5  F (36.4  C), Max:98  F (36.7  C)    Wt:   Wt Readings from Last 5 Encounters:   22 84.8 kg (186 lb 14.4 oz)   22 90.9 kg (200 lb 6.4 oz)   11/10/22 108 kg (238 lb 1.6 oz)   22 95.7 kg (210 lb 14.4 oz)     Physical Exam   GEN: pleasant, no acute distress  HEENT: no icterus, EOMI  CV: RRR, normal s1/s2, soft systolic murmur best heard at LLSB, JVP 8  CHEST: Non-labored breathing, good air movement bilaterally, no wheezes or crackles   ABD: soft, non-tender, non-distended   EXTR: warm extremities. No LE edema  NEURO: awake, alert, non-focal motor exam    DATA:   Labs, EKGs and imaging reviewed. Pertinent results discussed in assessment and plan above.   "

## 2022-12-14 NOTE — PROGRESS NOTES
Pre-Transplant Social Work Services Progress Note      Date of Initial Social Work Evaluation: 11/14/2022 with admission assessment completed 12/9/2022  Collaborated with: Patient and Wife-Arlette    Data: Patient admitted in November for transplant education. Discharged home, but readmitted 12/6 for worsening heart failure. Listed for heart transplant 12/12/2022. In ICU waiting for heart transplant. Wife here for ongoing support. [facility]. Does not need a furnished apartment. Patient/wife aware of virtual support group on Thursdays and aware of transplant patient volunteers for peer support. Verbalized interest in both support group and volunteer visits.  Intervention: Met with patient and wife. Offered support and explained role of SW while inpatient. Reminded them of availability of support group and volunteers. Assessed coping and answered questions about inpatient stay while inpatient waiting for transplant.  Assessment: Patient and wife display good coping skills. Seems aware of what the inpatient wait will entail. Wife happy with hotel arrangements at the [facility].   Education provided by SW: Nothing new today  Plan:    Discharge Plans in Progress: TBD    Barriers to d/c plan: Unknown transplant date    Follow up Plan: SW will continue to follow for ongoing psychosocial support pre and post-heart transplant.

## 2022-12-15 LAB
ALBUMIN SERPL BCG-MCNC: 3.2 G/DL (ref 3.5–5.2)
ALP SERPL-CCNC: 65 U/L (ref 40–129)
ALT SERPL W P-5'-P-CCNC: 31 U/L (ref 10–50)
ANION GAP SERPL CALCULATED.3IONS-SCNC: 11 MMOL/L (ref 7–15)
AST SERPL W P-5'-P-CCNC: 29 U/L (ref 10–50)
BASE EXCESS BLDV CALC-SCNC: 1.5 MMOL/L (ref -7.7–1.9)
BASE EXCESS BLDV CALC-SCNC: 1.8 MMOL/L (ref -7.7–1.9)
BASE EXCESS BLDV CALC-SCNC: 2.4 MMOL/L (ref -7.7–1.9)
BILIRUB DIRECT SERPL-MCNC: <0.2 MG/DL (ref 0–0.3)
BILIRUB SERPL-MCNC: 0.6 MG/DL
BUN SERPL-MCNC: 20.2 MG/DL (ref 8–23)
CALCIUM SERPL-MCNC: 8.6 MG/DL (ref 8.8–10.2)
CHLORIDE SERPL-SCNC: 106 MMOL/L (ref 98–107)
CREAT SERPL-MCNC: 0.9 MG/DL (ref 0.67–1.17)
DEPRECATED HCO3 PLAS-SCNC: 22 MMOL/L (ref 22–29)
ERYTHROCYTE [DISTWIDTH] IN BLOOD BY AUTOMATED COUNT: 13.7 % (ref 10–15)
GFR SERPL CREATININE-BSD FRML MDRD: >90 ML/MIN/1.73M2
GLUCOSE SERPL-MCNC: 86 MG/DL (ref 70–99)
HCO3 BLDV-SCNC: 26 MMOL/L (ref 21–28)
HCT VFR BLD AUTO: 42.1 % (ref 40–53)
HGB BLD-MCNC: 13.5 G/DL (ref 13.3–17.7)
LACTATE SERPL-SCNC: 1.2 MMOL/L (ref 0.7–2)
MAGNESIUM SERPL-MCNC: 2 MG/DL (ref 1.7–2.3)
MCH RBC QN AUTO: 28.5 PG (ref 26.5–33)
MCHC RBC AUTO-ENTMCNC: 32.1 G/DL (ref 31.5–36.5)
MCV RBC AUTO: 89 FL (ref 78–100)
O2/TOTAL GAS SETTING VFR VENT: 0 %
O2/TOTAL GAS SETTING VFR VENT: 21 %
O2/TOTAL GAS SETTING VFR VENT: 21 %
OXYHGB MFR BLDV: 66 % (ref 70–75)
OXYHGB MFR BLDV: 67 % (ref 70–75)
OXYHGB MFR BLDV: 70 % (ref 70–75)
PCO2 BLDV: 38 MM HG (ref 40–50)
PCO2 BLDV: 38 MM HG (ref 40–50)
PCO2 BLDV: 39 MM HG (ref 40–50)
PH BLDV: 7.44 [PH] (ref 7.32–7.43)
PH BLDV: 7.44 [PH] (ref 7.32–7.43)
PH BLDV: 7.45 [PH] (ref 7.32–7.43)
PHOSPHATE SERPL-MCNC: 2.8 MG/DL (ref 2.5–4.5)
PLATELET # BLD AUTO: 127 10E3/UL (ref 150–450)
PO2 BLDV: 36 MM HG (ref 25–47)
PO2 BLDV: 36 MM HG (ref 25–47)
PO2 BLDV: 37 MM HG (ref 25–47)
POTASSIUM SERPL-SCNC: 4.1 MMOL/L (ref 3.4–5.3)
PROT SERPL-MCNC: 5.5 G/DL (ref 6.4–8.3)
RBC # BLD AUTO: 4.73 10E6/UL (ref 4.4–5.9)
SODIUM SERPL-SCNC: 139 MMOL/L (ref 136–145)
UFH PPP CHRO-ACNC: 1.03 IU/ML
WBC # BLD AUTO: 6.3 10E3/UL (ref 4–11)

## 2022-12-15 PROCEDURE — 250N000013 HC RX MED GY IP 250 OP 250 PS 637: Performed by: EMERGENCY MEDICINE

## 2022-12-15 PROCEDURE — 200N000002 HC R&B ICU UMMC

## 2022-12-15 PROCEDURE — 85520 HEPARIN ASSAY: CPT | Performed by: STUDENT IN AN ORGANIZED HEALTH CARE EDUCATION/TRAINING PROGRAM

## 2022-12-15 PROCEDURE — 82805 BLOOD GASES W/O2 SATURATION: CPT | Performed by: STUDENT IN AN ORGANIZED HEALTH CARE EDUCATION/TRAINING PROGRAM

## 2022-12-15 PROCEDURE — 250N000013 HC RX MED GY IP 250 OP 250 PS 637: Performed by: INTERNAL MEDICINE

## 2022-12-15 PROCEDURE — 83605 ASSAY OF LACTIC ACID: CPT | Performed by: STUDENT IN AN ORGANIZED HEALTH CARE EDUCATION/TRAINING PROGRAM

## 2022-12-15 PROCEDURE — 99232 SBSQ HOSP IP/OBS MODERATE 35: CPT | Mod: GC | Performed by: INTERNAL MEDICINE

## 2022-12-15 PROCEDURE — 85027 COMPLETE CBC AUTOMATED: CPT | Performed by: STUDENT IN AN ORGANIZED HEALTH CARE EDUCATION/TRAINING PROGRAM

## 2022-12-15 PROCEDURE — 250N000011 HC RX IP 250 OP 636: Performed by: STUDENT IN AN ORGANIZED HEALTH CARE EDUCATION/TRAINING PROGRAM

## 2022-12-15 PROCEDURE — 84100 ASSAY OF PHOSPHORUS: CPT | Performed by: INTERNAL MEDICINE

## 2022-12-15 PROCEDURE — 250N000012 HC RX MED GY IP 250 OP 636 PS 637: Performed by: STUDENT IN AN ORGANIZED HEALTH CARE EDUCATION/TRAINING PROGRAM

## 2022-12-15 PROCEDURE — 82248 BILIRUBIN DIRECT: CPT

## 2022-12-15 PROCEDURE — 250N000013 HC RX MED GY IP 250 OP 250 PS 637: Performed by: STUDENT IN AN ORGANIZED HEALTH CARE EDUCATION/TRAINING PROGRAM

## 2022-12-15 PROCEDURE — 250N000012 HC RX MED GY IP 250 OP 636 PS 637: Performed by: EMERGENCY MEDICINE

## 2022-12-15 PROCEDURE — 83735 ASSAY OF MAGNESIUM: CPT | Performed by: INTERNAL MEDICINE

## 2022-12-15 RX ORDER — HEPARIN SODIUM 10000 [USP'U]/100ML
0-5000 INJECTION, SOLUTION INTRAVENOUS CONTINUOUS
Status: DISPENSED | OUTPATIENT
Start: 2022-12-15 | End: 2022-12-25

## 2022-12-15 RX ORDER — MAGNESIUM OXIDE 400 MG/1
400 TABLET ORAL EVERY 4 HOURS
Status: COMPLETED | OUTPATIENT
Start: 2022-12-15 | End: 2022-12-15

## 2022-12-15 RX ORDER — FUROSEMIDE 20 MG
40 TABLET ORAL DAILY
Status: DISCONTINUED | OUTPATIENT
Start: 2022-12-15 | End: 2022-12-17

## 2022-12-15 RX ADMIN — HYDRALAZINE HYDROCHLORIDE 25 MG: 25 TABLET, FILM COATED ORAL at 06:26

## 2022-12-15 RX ADMIN — MAGNESIUM OXIDE TAB 400 MG (241.3 MG ELEMENTAL MG) 400 MG: 400 (241.3 MG) TAB at 08:46

## 2022-12-15 RX ADMIN — MAGNESIUM OXIDE TAB 400 MG (241.3 MG ELEMENTAL MG) 400 MG: 400 (241.3 MG) TAB at 12:06

## 2022-12-15 RX ADMIN — EMPAGLIFLOZIN 10 MG: 10 TABLET, FILM COATED ORAL at 14:30

## 2022-12-15 RX ADMIN — MYCOPHENOLATE MOFETIL 1000 MG: 500 TABLET, FILM COATED ORAL at 08:46

## 2022-12-15 RX ADMIN — AMIODARONE HYDROCHLORIDE 200 MG: 200 TABLET ORAL at 20:29

## 2022-12-15 RX ADMIN — HEPARIN SODIUM 1600 UNITS/HR: 10000 INJECTION, SOLUTION INTRAVENOUS at 12:49

## 2022-12-15 RX ADMIN — HYDRALAZINE HYDROCHLORIDE 25 MG: 25 TABLET, FILM COATED ORAL at 22:08

## 2022-12-15 RX ADMIN — POTASSIUM CHLORIDE 20 MEQ: 750 TABLET, EXTENDED RELEASE ORAL at 08:45

## 2022-12-15 RX ADMIN — FUROSEMIDE 40 MG: 20 TABLET ORAL at 08:46

## 2022-12-15 RX ADMIN — PRAVASTATIN SODIUM 20 MG: 20 TABLET ORAL at 08:46

## 2022-12-15 RX ADMIN — DOBUTAMINE HYDROCHLORIDE 5 MCG/KG/MIN: 200 INJECTION INTRAVENOUS at 16:10

## 2022-12-15 RX ADMIN — AMIODARONE HYDROCHLORIDE 200 MG: 200 TABLET ORAL at 08:46

## 2022-12-15 RX ADMIN — HYDRALAZINE HYDROCHLORIDE 25 MG: 25 TABLET, FILM COATED ORAL at 14:29

## 2022-12-15 RX ADMIN — POLYETHYLENE GLYCOL 3350 17 G: 17 POWDER, FOR SOLUTION ORAL at 12:05

## 2022-12-15 RX ADMIN — MYCOPHENOLATE MOFETIL 500 MG: 500 TABLET, FILM COATED ORAL at 20:29

## 2022-12-15 RX ADMIN — ASPIRIN 81 MG: 81 TABLET, COATED ORAL at 08:46

## 2022-12-15 ASSESSMENT — ACTIVITIES OF DAILY LIVING (ADL)
ADLS_ACUITY_SCORE: 43
ADLS_ACUITY_SCORE: 42
ADLS_ACUITY_SCORE: 43
ADLS_ACUITY_SCORE: 42
ADLS_ACUITY_SCORE: 43
ADLS_ACUITY_SCORE: 43
ADLS_ACUITY_SCORE: 42

## 2022-12-15 NOTE — PROGRESS NOTES
[facility]    Advanced Heart Failure Progress Note    I personally saw and examined this patient who remains hospitalized in ICU per transplant status requirements.  The patient is hemodynamically stable on parental medications.  Patient has history of DVT and will add protective heparinization in lieu of chronic NWOAC.   I saw patient with resident.  I agree with observations and plan    Assessment and Plan:     Paco Stein is a 67 year old male with a history of chronic systolic heart failure (suspected predominantly nonischemic cardiomyopathy, possibly arrhythmogenic), coronary artery disease (s/p PCI with TILA to LCx 4/2013 and to the LAD in 10/2021), hyperlipidemia, CNS vasculitis and chronic kidney disease who presents with transient bilateral hand tightness with unrevealing workup, now admitted for continued advanced therapies evaluation.      Interval History:  Awaiting heart transplant. I/O net positive 600 ml in the last 24 hours.  Weight is also up to 198 lbs, from 186 lbs yesterday. No new symptoms today; no change work of breathing or activity tolerance. Appetite has improved significantly.     Plan:   - Start lasix PO 40 mg daily today to keep net even. Trending swan numbers.   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 No episodes since. Neurological exam unremarkable. Neurology consulted during recent admission as part of VAD/transplant evaluation. MRI/MRA brain was also performed which showed no concern for progression of disease  - Continue PTA cellcept 1000mg qam and 500mg qpm    # Incidental punctate cerebellar infarct,  likely embolic  Read on MR imaging 11/14/22 without new neuro deficits. Per radiology, appears acute, likely embolic. DNo known arterial clot history. Reviewed telemetry over admission, no afib or other tachycarrhythmias. Reassuring prior TTE this admission including bubble study, repeat JARED w/o septal defects or thrombus. Treated w/ heparin gtt and transitioned to Eliquis.    # Insomnia/anxiety  - PRN trazodone and lorazepam       ===CARDIOVASCULAR===  # Advanced chronic systolic heart failure, EF 15-20%, NYHA class IV, ACC/AHA Stage D  # Ambulatory cardiogenic shock, on dobutamine drip as bride to candidacy   Recently admitted from 11/10/2022 - 11/21/2022 for acute on chronic heart failure, treated with IV diuresis and initiated on dobutamine infusion. Patient completed advanced therapy evaluation during that admission and and is now re-admitted for continued evaluation and possible transplant listing. No signs of acute decompensation on adimssion. He reports improvement in his activity tolerance and appetite since starting dobutamine, although he seemed to have regressed a bit in the days prior to admission. 12/08/2022 RHC: RA 16, PA 50/26/35, PCWP 26, Aisha CO/CI 3.1/1.44.   - Etiology: Predominantly non-ischemic cardiomyopathy, suspected arrhythmogenic   - Volume: Intermittent PO/IV lasix, guided by swan numbers    - Inotrope: dobutamine to 5 mcg/kg/min   - Afterload: Continue PO hydralazine 25 mg every 8 hours   - BB: held in the setting of shock  - ACE/ARB/ARNi: Previous intolerance due to hypotension and lightheadedness   - SGLT2i: Resume PTA Empagliflozin 10 mg qday  - MRA: None  - Statin: PTA pravastatin  - Electrolytes: PTA 20 meq KCl daily, trending and replacing as needed as well   - Transplant evaluation: Completed checklist during previous admission. Now pending transplant listing.   - [place] Anic[facility] hemodynamics:  Date RA PA PCWP MAP SvO2  CO/CI SVR PVR Therapy   12/9 (cath lab) 5 32/10/19 15 102 53  2.8/1.3 2228 1.4 Dobutamine 5 mcg/kg/min  Furosemide 10 mg/hr   12/10 AM 4 26/10/16 10 86 62 3.7/1.8 1600 1.6 Dobutamine 5 mcg/kg/min  Furosemide 10 mg/hr   12/10 PM   10 35/15/22 14      Dobutamine 5 mcg/kg/min   12/11 AM 6 40/15/23 13 97 64 3.7/1.8 1960 2.7 Dobutamine 5 mcg/kg/min   12/11 PM 6 30/15/20 10 83 61 3.4/1.7 1900  Dobutamine 5 mcg/kg/min  Nitroprusside 0.25 mcg/kg/min   12/12 AM 6 26/10/15 10 78 71 5.3/2.6 1100 0.9 Dobutamine 5 mcg/kg/min  Nitroprusside 0.75 mcg/kg/min   12/12 PM 10 45/20/28 18 83 51 2.9/1.4 2100 3.4 Dobutamine 5 mcg/kg/min   12/13 PM 8 40/16/24 15 92 61 3.5/1.7 1860 2.6 Dobutamine 5 mcg/kg/min  Hydralazine 25 mg Q8H   12/14 AM 8 40/18/25 16 90 68 5.2/2.4 1300 1.8 Dobutamine 5 mcg/kg/min  Hydralazine 25 mg Q8H   12/15/ AM 10 46/18/27 18 90 67 5.1/2.3 1300 1.8 Dobutamine 5 mcg/kg/min  Hydralazine 25 mg Q8H     # Multivessel coronary artery disease (s/p PCI with TILA to LCx 4/2013 and to the LAD in 10/2021)  As discussed above, low suspicion for ACS with bilateral arm tightness. 11/11/22 LHC showed patent LAD and LCx stents as well as mild-mod non-obstructive disease in the RCA.   - Continue PTA ASA 81 mg qday  - BB: Holding as per above  - Statin: Cont PTA pravastatin     # History of NSVT s/p biventricular PPM/ICD  Interrogation performed 12/10/22 w/ two short (<5s) runs of NSVT on 11/1/22 and 11/3/22, no recent shocks. A-paced.  - Cont PTA PO Amiodarone 200mg BID      ===PULMONARY===  # Non-productive cough, acute on chronic, improved   - PRN cough lozenges      ===GI===  # Severe malnutrition in the context of acute illness/injury on chronic illness  - Appreciate assistance from RD        ===RENAL===  # CKD stage 2  Creatinine at baseline  - trend BMP      ===HEME/ONC===  # Occlusive left peroneal DVT  # Non-occlusive right axillary DVT  Identified on doppler 11/15/22 during previous admission.   - Start heparin drip in place of PTA apixaban       ===ENDOCRINE===  No active concerns  "      INFECTIOUS DISEASE  No active concerns       ===SKIN/MSK===  # Bilateral hand tightness, resolved  Experienced two episodes prior to admission, once while performing PT exercises and again when he woke up the following morning. Each episode lasted for about 15 minutes and had no other associated symptoms. Etiology not clear at this time. No focal motor or sensory deficits on exam. Electrolytes within normal limits. Troponin only mildly elevated at 46 and down-trending. EKG with A sensed V paced rhythm with no concerning ST/T changes so low suspicion for ischemia. ICD interrogation detected 225 ventricular sensed episodes, with the most recently stored episodes showing slow VT-NS at  bpm, runs lasting about 5 seconds. Unclear if these short runs would explain his 15-minute episodes. He is on dobutamine which has known arrhythmogenic effects so certainly worth monitoring closely, especially given his history of NSVT.   - Monitoring for recurrence while on telemetry   - Trend electrolytes and replete as needed       FEN: Low salt diet, replete lytes as needed   DVT PPx: heparin drip   Code: Full code      Disposition: Inpatient admission, listed for transplant on      Discussed with Dr. Jose Poole MD  Cardiology Fellow   [phone]     Objective:   BP (!) 123/92   Pulse 78   Temp 97.5  F (36.4  C) (Axillary)   Resp 18   Ht 1.88 m (6' 2\")   Wt 89.9 kg (198 lb 3.2 oz)   SpO2 95%   BMI 25.45 kg/m    Temp (24hrs), Av.8  F (36.6  C), Min:97.5  F (36.4  C), Max:98  F (36.7  C)    Wt:   Wt Readings from Last 5 Encounters:   12/15/22 89.9 kg (198 lb 3.2 oz)   22 90.9 kg (200 lb 6.4 oz)   11/10/22 108 kg (238 lb 1.6 oz)   22 95.7 kg (210 lb 14.4 oz)     Physical Exam   GEN: pleasant, no acute distress  HEENT: no icterus, EOMI  CV: RRR, normal s1/s2, soft systolic murmur best heard at LLSB, JVP 8-10  CHEST: Non-labored breathing, good air movement bilaterally, no wheezes " or crackles   ABD: soft, non-tender, non-distended   EXTR: warm extremities. No LE edema  NEURO: awake, alert, non-focal motor exam    DATA:   Labs, EKGs and imaging reviewed. Pertinent results discussed in assessment and plan above.

## 2022-12-15 NOTE — PROGRESS NOTES
ICU End of Shift Summary. See flowsheets for vital signs and detailed assessment.    Changes this shift: Remains A&Ox4. Follows commands. Remains 100% V-paced with rate in 70s. BP WNL. 2000  CAROLYN: CVP 8, PA 38/14, CO 7.2, CI 3.4. See flowsheet for remaining values. Afebrile.O2 sats appropriate on RA. Adequate UOP with one incontinent episode. Dobutamine @ 5. K+ 4.1. Phos 2.8. Mag 2 - replacement ordered per protocol. Wife updated at bedside.     Plan: Continue FICKs q12h. Continue plan of care.

## 2022-12-16 ENCOUNTER — APPOINTMENT (OUTPATIENT)
Dept: PHYSICAL THERAPY | Facility: CLINIC | Age: 67
DRG: 001 | End: 2022-12-16
Attending: STUDENT IN AN ORGANIZED HEALTH CARE EDUCATION/TRAINING PROGRAM
Payer: MEDICARE

## 2022-12-16 ENCOUNTER — APPOINTMENT (OUTPATIENT)
Dept: GENERAL RADIOLOGY | Facility: CLINIC | Age: 67
DRG: 001 | End: 2022-12-16
Attending: STUDENT IN AN ORGANIZED HEALTH CARE EDUCATION/TRAINING PROGRAM
Payer: MEDICARE

## 2022-12-16 LAB
ALBUMIN SERPL BCG-MCNC: 3.3 G/DL (ref 3.5–5.2)
ALP SERPL-CCNC: 70 U/L (ref 40–129)
ALT SERPL W P-5'-P-CCNC: 34 U/L (ref 10–50)
ANION GAP SERPL CALCULATED.3IONS-SCNC: 7 MMOL/L (ref 7–15)
AST SERPL W P-5'-P-CCNC: 32 U/L (ref 10–50)
BASE EXCESS BLDV CALC-SCNC: 1.5 MMOL/L (ref -7.7–1.9)
BASE EXCESS BLDV CALC-SCNC: 3.7 MMOL/L (ref -7.7–1.9)
BILIRUB DIRECT SERPL-MCNC: <0.2 MG/DL (ref 0–0.3)
BILIRUB SERPL-MCNC: 0.6 MG/DL
BUN SERPL-MCNC: 19.3 MG/DL (ref 8–23)
CALCIUM SERPL-MCNC: 8.3 MG/DL (ref 8.8–10.2)
CHLORIDE SERPL-SCNC: 107 MMOL/L (ref 98–107)
CREAT SERPL-MCNC: 0.89 MG/DL (ref 0.67–1.17)
DEPRECATED HCO3 PLAS-SCNC: 25 MMOL/L (ref 22–29)
ERYTHROCYTE [DISTWIDTH] IN BLOOD BY AUTOMATED COUNT: 13.8 % (ref 10–15)
GFR SERPL CREATININE-BSD FRML MDRD: >90 ML/MIN/1.73M2
GLUCOSE SERPL-MCNC: 90 MG/DL (ref 70–99)
HCO3 BLDV-SCNC: 26 MMOL/L (ref 21–28)
HCO3 BLDV-SCNC: 28 MMOL/L (ref 21–28)
HCT VFR BLD AUTO: 43.4 % (ref 40–53)
HGB BLD-MCNC: 14.1 G/DL (ref 13.3–17.7)
LACTATE SERPL-SCNC: 0.9 MMOL/L (ref 0.7–2)
MAGNESIUM SERPL-MCNC: 2 MG/DL (ref 1.7–2.3)
MCH RBC QN AUTO: 28.7 PG (ref 26.5–33)
MCHC RBC AUTO-ENTMCNC: 32.5 G/DL (ref 31.5–36.5)
MCV RBC AUTO: 88 FL (ref 78–100)
O2/TOTAL GAS SETTING VFR VENT: 21 %
O2/TOTAL GAS SETTING VFR VENT: 21 %
OXYHGB MFR BLDV: 54 % (ref 70–75)
OXYHGB MFR BLDV: 59 % (ref 70–75)
PCO2 BLDV: 39 MM HG (ref 40–50)
PCO2 BLDV: 41 MM HG (ref 40–50)
PH BLDV: 7.43 [PH] (ref 7.32–7.43)
PH BLDV: 7.45 [PH] (ref 7.32–7.43)
PLATELET # BLD AUTO: 134 10E3/UL (ref 150–450)
PO2 BLDV: 30 MM HG (ref 25–47)
PO2 BLDV: 32 MM HG (ref 25–47)
POTASSIUM SERPL-SCNC: 4 MMOL/L (ref 3.4–5.3)
PROT SERPL-MCNC: 5.7 G/DL (ref 6.4–8.3)
RBC # BLD AUTO: 4.92 10E6/UL (ref 4.4–5.9)
SODIUM SERPL-SCNC: 139 MMOL/L (ref 136–145)
UFH PPP CHRO-ACNC: 0.48 IU/ML
UFH PPP CHRO-ACNC: 0.55 IU/ML
UFH PPP CHRO-ACNC: 0.72 IU/ML
WBC # BLD AUTO: 6.4 10E3/UL (ref 4–11)

## 2022-12-16 PROCEDURE — 200N000002 HC R&B ICU UMMC

## 2022-12-16 PROCEDURE — 85027 COMPLETE CBC AUTOMATED: CPT | Performed by: STUDENT IN AN ORGANIZED HEALTH CARE EDUCATION/TRAINING PROGRAM

## 2022-12-16 PROCEDURE — 250N000012 HC RX MED GY IP 250 OP 636 PS 637: Performed by: STUDENT IN AN ORGANIZED HEALTH CARE EDUCATION/TRAINING PROGRAM

## 2022-12-16 PROCEDURE — 97110 THERAPEUTIC EXERCISES: CPT | Mod: GP

## 2022-12-16 PROCEDURE — 83605 ASSAY OF LACTIC ACID: CPT | Performed by: STUDENT IN AN ORGANIZED HEALTH CARE EDUCATION/TRAINING PROGRAM

## 2022-12-16 PROCEDURE — 71045 X-RAY EXAM CHEST 1 VIEW: CPT | Mod: 26 | Performed by: RADIOLOGY

## 2022-12-16 PROCEDURE — 85520 HEPARIN ASSAY: CPT | Performed by: INTERNAL MEDICINE

## 2022-12-16 PROCEDURE — 97162 PT EVAL MOD COMPLEX 30 MIN: CPT | Mod: GP

## 2022-12-16 PROCEDURE — 80053 COMPREHEN METABOLIC PANEL: CPT | Performed by: STUDENT IN AN ORGANIZED HEALTH CARE EDUCATION/TRAINING PROGRAM

## 2022-12-16 PROCEDURE — 250N000011 HC RX IP 250 OP 636: Performed by: INTERNAL MEDICINE

## 2022-12-16 PROCEDURE — 82805 BLOOD GASES W/O2 SATURATION: CPT | Performed by: STUDENT IN AN ORGANIZED HEALTH CARE EDUCATION/TRAINING PROGRAM

## 2022-12-16 PROCEDURE — 250N000013 HC RX MED GY IP 250 OP 250 PS 637: Performed by: EMERGENCY MEDICINE

## 2022-12-16 PROCEDURE — 250N000012 HC RX MED GY IP 250 OP 636 PS 637: Performed by: EMERGENCY MEDICINE

## 2022-12-16 PROCEDURE — 250N000011 HC RX IP 250 OP 636: Performed by: STUDENT IN AN ORGANIZED HEALTH CARE EDUCATION/TRAINING PROGRAM

## 2022-12-16 PROCEDURE — 83735 ASSAY OF MAGNESIUM: CPT | Performed by: INTERNAL MEDICINE

## 2022-12-16 PROCEDURE — 250N000013 HC RX MED GY IP 250 OP 250 PS 637: Performed by: STUDENT IN AN ORGANIZED HEALTH CARE EDUCATION/TRAINING PROGRAM

## 2022-12-16 PROCEDURE — 999N000111 HC STATISTIC OT IP EVAL DEFER: Performed by: OCCUPATIONAL THERAPIST

## 2022-12-16 PROCEDURE — 97530 THERAPEUTIC ACTIVITIES: CPT | Mod: GP

## 2022-12-16 PROCEDURE — 99291 CRITICAL CARE FIRST HOUR: CPT | Mod: GC | Performed by: STUDENT IN AN ORGANIZED HEALTH CARE EDUCATION/TRAINING PROGRAM

## 2022-12-16 PROCEDURE — 71045 X-RAY EXAM CHEST 1 VIEW: CPT

## 2022-12-16 PROCEDURE — 82248 BILIRUBIN DIRECT: CPT

## 2022-12-16 RX ORDER — ISOSORBIDE DINITRATE 10 MG/1
10 TABLET ORAL
Status: DISCONTINUED | OUTPATIENT
Start: 2022-12-16 | End: 2022-12-17

## 2022-12-16 RX ORDER — MAGNESIUM SULFATE HEPTAHYDRATE 40 MG/ML
2 INJECTION, SOLUTION INTRAVENOUS ONCE
Status: COMPLETED | OUTPATIENT
Start: 2022-12-16 | End: 2022-12-16

## 2022-12-16 RX ORDER — FUROSEMIDE 10 MG/ML
20 INJECTION INTRAMUSCULAR; INTRAVENOUS ONCE
Status: COMPLETED | OUTPATIENT
Start: 2022-12-16 | End: 2022-12-16

## 2022-12-16 RX ORDER — HYDRALAZINE HYDROCHLORIDE 50 MG/1
50 TABLET, FILM COATED ORAL EVERY 8 HOURS SCHEDULED
Status: DISCONTINUED | OUTPATIENT
Start: 2022-12-16 | End: 2022-12-17

## 2022-12-16 RX ORDER — AMIODARONE HYDROCHLORIDE 200 MG/1
200 TABLET ORAL DAILY
Status: DISCONTINUED | OUTPATIENT
Start: 2022-12-17 | End: 2022-12-26

## 2022-12-16 RX ADMIN — HEPARIN SODIUM 1400 UNITS/HR: 10000 INJECTION, SOLUTION INTRAVENOUS at 00:24

## 2022-12-16 RX ADMIN — FUROSEMIDE 40 MG: 20 TABLET ORAL at 08:17

## 2022-12-16 RX ADMIN — EMPAGLIFLOZIN 10 MG: 10 TABLET, FILM COATED ORAL at 08:20

## 2022-12-16 RX ADMIN — ISOSORBIDE DINITRATE 10 MG: 10 TABLET ORAL at 20:41

## 2022-12-16 RX ADMIN — FUROSEMIDE 20 MG: 10 INJECTION, SOLUTION INTRAVENOUS at 15:32

## 2022-12-16 RX ADMIN — AMIODARONE HYDROCHLORIDE 200 MG: 200 TABLET ORAL at 08:16

## 2022-12-16 RX ADMIN — PRAVASTATIN SODIUM 20 MG: 20 TABLET ORAL at 08:25

## 2022-12-16 RX ADMIN — POTASSIUM CHLORIDE 20 MEQ: 750 TABLET, EXTENDED RELEASE ORAL at 08:17

## 2022-12-16 RX ADMIN — MAGNESIUM SULFATE IN WATER 2 G: 40 INJECTION, SOLUTION INTRAVENOUS at 06:46

## 2022-12-16 RX ADMIN — HYDRALAZINE HYDROCHLORIDE 50 MG: 50 TABLET, FILM COATED ORAL at 22:05

## 2022-12-16 RX ADMIN — ASPIRIN 81 MG: 81 TABLET, COATED ORAL at 08:16

## 2022-12-16 RX ADMIN — MYCOPHENOLATE MOFETIL 1000 MG: 500 TABLET, FILM COATED ORAL at 08:17

## 2022-12-16 RX ADMIN — MYCOPHENOLATE MOFETIL 500 MG: 500 TABLET, FILM COATED ORAL at 20:42

## 2022-12-16 RX ADMIN — DOBUTAMINE HYDROCHLORIDE 5 MCG/KG/MIN: 200 INJECTION INTRAVENOUS at 09:28

## 2022-12-16 RX ADMIN — HYDRALAZINE HYDROCHLORIDE 25 MG: 25 TABLET, FILM COATED ORAL at 05:19

## 2022-12-16 RX ADMIN — HEPARIN SODIUM 1300 UNITS/HR: 10000 INJECTION, SOLUTION INTRAVENOUS at 20:44

## 2022-12-16 RX ADMIN — TRAZODONE HYDROCHLORIDE 50 MG: 50 TABLET ORAL at 22:05

## 2022-12-16 RX ADMIN — HYDRALAZINE HYDROCHLORIDE 25 MG: 25 TABLET, FILM COATED ORAL at 15:25

## 2022-12-16 ASSESSMENT — ACTIVITIES OF DAILY LIVING (ADL)
ADLS_ACUITY_SCORE: 43
ADLS_ACUITY_SCORE: 42
ADLS_ACUITY_SCORE: 43

## 2022-12-16 NOTE — PLAN OF CARE
ICU End of Shift Summary. See flowsheets for vital signs and detailed assessment.    Changes this shift: 40mg P.O. lasix administered and Heparin gtt initiated today. Remains on Dobutamine drip straight rate at 5. Q12h FICKs. Last C.O. was 5.1 and C.I. 2.4 @ 0800. Appetite improving. Up to chair and ambulated in ugalde x3 with standby assist and walker. Vitally stable on room air.     Plan: Q12h FICKs. Status 2 heart tx.         Goal Outcome Evaluation:      Plan of Care Reviewed With: patient, spouse    Overall Patient Progress: no changeOverall Patient Progress: no change    Outcome Evaluation: Remains on Dobutamine drip at 5. Cardiac index 2.3.

## 2022-12-16 NOTE — PLAN OF CARE
OT: per chart review and discussion with interdisciplinary team, pt./pt.s S.O. Pt. is near baseline with BADLs and amb. 3X/day with nursing.  No acute IP OT needs identified at this time.  OT orders completed.

## 2022-12-16 NOTE — PROGRESS NOTES
[facility]    Advanced Heart Failure Progress Note    Assessment and Plan:     Paco Stein is a 67 year old male with a history of chronic systolic heart failure (suspected predominantly nonischemic cardiomyopathy, possibly arrhythmogenic), coronary artery disease (s/p PCI with TILA to LCx 4/2013 and to the LAD in 10/2021), hyperlipidemia, CNS vasculitis and chronic kidney disease who presents with transient bilateral hand tightness with unrevealing workup, now admitted for continued advanced therapies evaluation. Patient was listed for heart transplant (status 2) on 12/12/2022.       Interval History:  No acute events overnight. Patient has no concerns. He reports increased activity tolerance, able to walk longer distances around the unit.     AM [place] numbers: CVP 12, PA 40/18/25, PCWP 16, CO 3.6, CI 1.7, SVR 1715, PVR 2.5.     Plan:   - Continue lasix PO 40 mg daily. 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 No episodes since. Neurological exam unremarkable. Neurology consulted during recent admission as part of VAD/transplant evaluation. MRI/MRA brain was also performed which showed no concern for progression of disease  - Continue PTA cellcept 1000mg qam and 500mg qpm    # Incidental punctate cerebellar infarct, likely embolic  Read on MR imaging 11/14/22 without new neuro deficits. Per radiology, appears acute, likely embolic. DNo known arterial clot history. Reviewed telemetry over admission, no afib  or other tachycarrhythmias. Reassuring prior TTE this admission including bubble study, repeat JARED w/o septal defects or thrombus. Treated w/ heparin gtt and transitioned to Eliquis.  - Continue heparin drip while awaiting transplant      # Insomnia/anxiety  - PRN trazodone and lorazepam       ===CARDIOVASCULAR===  # Advanced chronic systolic heart failure, EF 15-20%, NYHA class IV, ACC/AHA Stage D  # Ambulatory cardiogenic shock, on dobutamine drip as bride to transplant  Recently admitted from 11/10/2022 - 11/21/2022 for acute on chronic heart failure, treated with IV diuresis and initiated on dobutamine infusion. Patient completed advanced therapy evaluation during that admission and and is now re-admitted for continued evaluation and possible transplant listing. No signs of acute decompensation on adimssion. He reports improvement in his activity tolerance and appetite since starting dobutamine, although he seemed to have regressed a bit in the days prior to admission. 12/08/2022 RHC: RA 16, PA 50/26/35, PCWP 26, Aisha CO/CI 3.1/1.44.   - Etiology: Predominantly non-ischemic cardiomyopathy, suspected arrhythmogenic   - Volume: Intermittent PO/IV lasix, guided by swan numbers    - Inotrope: dobutamine to 5 mcg/kg/min   - Afterload: Continue PO hydralazine 25 mg every 8 hours   - BB: held in the setting of shock  - ACE/ARB/ARNi: Previous intolerance due to hypotension and lightheadedness   - SGLT2i: PTA Empagliflozin 10 mg qday  - MRA: None  - Statin: PTA pravastatin  - Electrolytes: PTA 20 meq KCl daily, trending and replacing as needed as well   - Transplant evaluation: listed for transplant on 12/12 (status 2)  - [place] Aniceto hemodynamics:  Date RA PA PCWP MAP SvO2  CO/CI SVR PVR Therapy   12/9 (cath lab) 5 32/10/19 15 102 53 2.8/1.3 2228 1.4 Dobutamine 5 mcg/kg/min  Furosemide 10 mg/hr   12/10 AM 4 26/10/16 10 86 62 3.7/1.8 1600 1.6 Dobutamine 5 mcg/kg/min  Furosemide 10 mg/hr   12/10 PM   10 35/15/22 14       Dobutamine 5 mcg/kg/min   12/11 AM 6 40/15/23 13 97 64 3.7/1.8 1960 2.7 Dobutamine 5 mcg/kg/min   12/11 PM 6 30/15/20 10 83 61 3.4/1.7 1900  Dobutamine 5 mcg/kg/min  Nitroprusside 0.25 mcg/kg/min   12/12 AM 6 26/10/15 10 78 71 5.3/2.6 1100 0.9 Dobutamine 5 mcg/kg/min  Nitroprusside 0.75 mcg/kg/min   12/12 PM 10 45/20/28 18 83 51 2.9/1.4 2100 3.4 Dobutamine 5 mcg/kg/min   12/13 PM 8 40/16/24 15 92 61 3.5/1.7 1860 2.6 Dobutamine 5 mcg/kg/min  Hydralazine 25 mg Q8H   12/14 AM 8 40/18/25 16 90 68 5.2/2.4 1300 1.8 Dobutamine 5 mcg/kg/min  Hydralazine 25 mg Q8H   12/15/ AM 10 46/18/27 18 90 67 5.1/2.3 1300 1.8 Dobutamine 5 mcg/kg/min  Hydralazine 25 mg Q8H   12/16 AM 12 40/18/25 16 93 59 3.6/1.7 1700 2.5 Dobutamine 5 mcg/kg/min  Hydralazine 25 mg Q8H     # Multivessel coronary artery disease (s/p PCI with TILA to LCx 4/2013 and to the LAD in 10/2021)  As discussed above, low suspicion for ACS with bilateral arm tightness. 11/11/22 [facility] showed patent LAD and LCx stents as well as mild-mod non-obstructive disease in the RCA.   - Continue PTA ASA 81 mg qday  - BB: Holding as per above  - Statin: Cont PTA pravastatin     # History of NSVT s/p biventricular PPM/ICD  Interrogation performed 12/10/22 w/ two short (<5s) runs of NSVT on 11/1/22 and 11/3/22, no recent shocks. A-paced.   - Decrease dose of amiodarone from 200 mg BID to 200 mg daily on 12/16 as pre-transplant amiodarone use is associated with increased risk of graft dysfunction      ===PULMONARY===  # Non-productive cough, acute on chronic, improved   - PRN cough lozenges      ===GI===  # Severe malnutrition in the context of acute illness/injury on chronic illness  - Appreciate assistance from RD        ===RENAL===  # CKD stage 2  Creatinine at baseline  - trend BMP      ===HEME/ONC===  # Occlusive left peroneal DVT  # Non-occlusive right axillary DVT  Identified on doppler 11/15/22 during previous admission.   - Continue heparin drip in place of PTA apixaban while  "awaiting transplant       ===ENDOCRINE===  No active concerns       INFECTIOUS DISEASE  No active concerns       ===SKIN/MSK===  # Bilateral hand tightness, resolved  Experienced two episodes prior to admission, once while performing PT exercises and again when he woke up the following morning. Each episode lasted for about 15 minutes and had no other associated symptoms. Etiology not clear at this time. No focal motor or sensory deficits on exam. Electrolytes within normal limits. Troponin only mildly elevated at 46 and down-trending. EKG with A sensed V paced rhythm with no concerning ST/T changes so low suspicion for ischemia. ICD interrogation detected 225 ventricular sensed episodes, with the most recently stored episodes showing slow VT-NS at  bpm, runs lasting about 5 seconds. Unclear if these short runs would explain his 15-minute episodes. He is on dobutamine which has known arrhythmogenic effects so certainly worth monitoring closely, especially given his history of NSVT.   - Monitoring for recurrence while on telemetry   - Trend electrolytes and replete as needed       FEN: Low salt diet, replete lytes as needed   DVT PPx: heparin drip   Code: Full code      Disposition: Inpatient admission, listed for transplant on      Discussed with Dr. Marcelle Poole MD  Cardiology Fellow   [phone]     Objective:   /83 (BP Location: Left arm)   Pulse 80   Temp 98  F (36.7  C) (Oral)   Resp 21   Ht 1.88 m (6' 2\")   Wt 86.4 kg (190 lb 6.4 oz)   SpO2 97%   BMI 24.45 kg/m    Temp (24hrs), Av.8  F (36.6  C), Min:97.5  F (36.4  C), Max:98  F (36.7  C)    Wt:   Wt Readings from Last 5 Encounters:   22 86.4 kg (190 lb 6.4 oz)   22 90.9 kg (200 lb 6.4 oz)   11/10/22 108 kg (238 lb 1.6 oz)   22 95.7 kg (210 lb 14.4 oz)     Physical Exam   GEN: pleasant, no acute distress  HEENT: no icterus, EOMI  CV: RRR, normal s1/s2, soft systolic murmur best heard at LLSB, " 10  CHEST: Non-labored breathing, good air movement bilaterally, no wheezes or crackles   ABD: soft, non-tender, non-distended   EXTR: warm extremities. No LE edema  NEURO: awake, alert, non-focal motor exam    DATA:   Labs, EKGs and imaging reviewed. Pertinent results discussed in assessment and plan above.

## 2022-12-16 NOTE — PLAN OF CARE
Goal Outcome Evaluation:      Plan of Care Reviewed With: patient, spouse    Overall Patient Progress: improvingOverall Patient Progress: improving    Outcome Evaluation: Pt's appetite greatly improved. Now eating % meals. Continuing Ensure chocolate drink per pt preference.

## 2022-12-16 NOTE — PROGRESS NOTES
ICU End of Shift Summary. See flowsheets for vital signs and detailed assessment.    Changes this shift: Remains A&O x4 and following commands. 100% V-paced with rate 70s-80s and occasional PVCs. VSS on RA. Continuing q12h FICKs. CO 4.8, CI 2.2, CVP 9, PAP 42/16 @ 2000. Dobutamine @ 5. Heparin @ 1400. Adequate urine output. Mag 2.0 - replaced per protocol.    Plan: Continue FICKs q12h. Hep Xa re-check @ 1230. Trend lytes.

## 2022-12-16 NOTE — PROGRESS NOTES
PM [place] numbers: CVP 7, PA 34/14/21, PCWP 13, , SVO2 54, CO 3.2, CI 1.5, SVR 2400, PVR 2.5.     - Increase dose of hydralazine from 25 to 50 mg Q8H  - Start isordil 10 mg TID  - Trend Aisha numbers overnight    Nancy Poole MD  Cardiology Fellow

## 2022-12-16 NOTE — PROGRESS NOTES
CLINICAL NUTRITION SERVICES - REASSESSMENT NOTE   Nutrition Prescription    RECOMMENDATIONS FOR MDs/PROVIDERS TO ORDER:  Consider 100 mg thiamine daily given EF <30% to support cardiac contractility.      Malnutrition Status:    Moderate malnutrition in the context of acute on chronic illness    Recommendations already ordered by Registered Dietitian (RD):  Supplements: Continue as ordered (chocolate Ensure Enlive @ breakfast & 2pm)    Future/Additional Recommendations:  Monitor PO adequacy and wt trends.     EVALUATION OF THE PROGRESS TOWARD GOALS   Diet: Regular + chocolate Ensure Enlive @ breakfast & 2pm    Oral Intake: Consuming % meals per RN flowsheets. Pt and pt's spouse report his appetite has greatly improved this admit, especially since diet liberalized to regular - pt's wife shares that the low sodium diet was too restrictive and preventing him from ordering enough kcals. Pt still enjoys Ensure chocolate drinks and would like to continue receiving BID. Pt enjoying an omelet with sides during writer's visit.      NEW FINDINGS   -Wt trends: Wt fluctuations this admit likely d/t fluid shifts  12/16/22 0600 86.4 kg (190 lb 6.4 oz) Standing scale   12/15/22 0600 89.9 kg (198 lb 3.2 oz) Standing scale   12/13/22 1400 84.8 kg (186 lb 14.4 oz) Standing scale   12/12/22 1200 84.8 kg (187 lb) Standing scale   12/10/22 0400 79.6 kg (175 lb 7.8 oz) - lowest wt this admit Bed scale   12/09/22 1600 82 kg (180 lb 12.4 oz) Bed scale   12/09/22 0700 82.6 kg (182 lb 1.6 oz) Standing scale   12/08/22 0510 89 kg (196 lb 4.8 oz) Standing scale   12/07/22 0300 89 kg (196 lb 3.2 oz) Standing scale   12/06/22 0724 90.7 kg (200 lb) --     -Labs: Reviewed    -Cardio: Advanced chronic systolic heart failure, EF 15-20%, NYHA class IV, ACC/AHA Stage D - awaiting heart transplant    -GI: 0-1 BMs daily over past week per I/Os, last BM 12/14    -Meds & Vitamin/Mineral Supplementation: Reviewed, notable for:      Lasix    Jardiance    KCl    MALNUTRITION  % Intake: Decreased intake does not meet criteria  % Weight Loss: > 7.5% in 3 months (severe) - from PTA  Subcutaneous Fat Loss: Facial region:  Mild and Upper arm:  Mild  Muscle Loss: Temporal:  Mild, Thoracic region (clavicle, acromium bone, deltoid, trapezius, pectoral):  Moderate, and Posterior calf:  Moderate  Fluid Accumulation/Edema: None noted  Malnutrition Diagnosis: Moderate malnutrition in the context of acute on chronic illness    Previous Goals   Patient to consume % of nutritionally adequate meal trays TID, or the equivalent with supplements/snacks.  Evaluation: Met    Previous Nutrition Diagnosis  Inadequate oral intake related to nausea PTA and decreased appetite as evidenced by pt has lost 14% of his body wt over the last month and pt/wife report of inadequate intake.   Evaluation: Improving    CURRENT NUTRITION DIAGNOSIS  Predicted inadequate nutrient intake (pro/kcal) related to pt now eating % meals per RN flowsheets, appetite greatly improved but potential for appetite/PO to decline pending medical course.       INTERVENTIONS  Implementation  Medical food supplement therapy    Goals  Patient to consume % of nutritionally adequate meal trays TID, or the equivalent with supplements/snacks.    Monitoring/Evaluation  Progress toward goals will be monitored and evaluated per protocol.     Claritza Masterson RD, LD  Pager: [phone]

## 2022-12-17 LAB
ALBUMIN SERPL BCG-MCNC: 3.3 G/DL (ref 3.5–5.2)
ALP SERPL-CCNC: 72 U/L (ref 40–129)
ALT SERPL W P-5'-P-CCNC: 35 U/L (ref 10–50)
ANION GAP SERPL CALCULATED.3IONS-SCNC: 10 MMOL/L (ref 7–15)
AST SERPL W P-5'-P-CCNC: 32 U/L (ref 10–50)
BASE EXCESS BLDV CALC-SCNC: 3.2 MMOL/L (ref -7.7–1.9)
BASE EXCESS BLDV CALC-SCNC: 4.5 MMOL/L (ref -7.7–1.9)
BASE EXCESS BLDV CALC-SCNC: 4.7 MMOL/L (ref -7.7–1.9)
BILIRUB DIRECT SERPL-MCNC: <0.2 MG/DL (ref 0–0.3)
BILIRUB SERPL-MCNC: 0.6 MG/DL
BUN SERPL-MCNC: 20.5 MG/DL (ref 8–23)
CALCIUM SERPL-MCNC: 8.9 MG/DL (ref 8.8–10.2)
CHLORIDE SERPL-SCNC: 105 MMOL/L (ref 98–107)
CREAT SERPL-MCNC: 0.9 MG/DL (ref 0.67–1.17)
DEPRECATED HCO3 PLAS-SCNC: 25 MMOL/L (ref 22–29)
ERYTHROCYTE [DISTWIDTH] IN BLOOD BY AUTOMATED COUNT: 13.6 % (ref 10–15)
GFR SERPL CREATININE-BSD FRML MDRD: >90 ML/MIN/1.73M2
GLUCOSE SERPL-MCNC: 100 MG/DL (ref 70–99)
HCO3 BLDV-SCNC: 27 MMOL/L (ref 21–28)
HCO3 BLDV-SCNC: 29 MMOL/L (ref 21–28)
HCO3 BLDV-SCNC: 29 MMOL/L (ref 21–28)
HCT VFR BLD AUTO: 42.4 % (ref 40–53)
HGB BLD-MCNC: 13.5 G/DL (ref 13.3–17.7)
LACTATE SERPL-SCNC: 0.9 MMOL/L (ref 0.7–2)
MAGNESIUM SERPL-MCNC: 2.1 MG/DL (ref 1.7–2.3)
MCH RBC QN AUTO: 28.4 PG (ref 26.5–33)
MCHC RBC AUTO-ENTMCNC: 31.8 G/DL (ref 31.5–36.5)
MCV RBC AUTO: 89 FL (ref 78–100)
O2/TOTAL GAS SETTING VFR VENT: 21 %
OXYHGB MFR BLDV: 59 % (ref 70–75)
OXYHGB MFR BLDV: 62 % (ref 70–75)
OXYHGB MFR BLDV: 63 % (ref 70–75)
PCO2 BLDV: 38 MM HG (ref 40–50)
PCO2 BLDV: 39 MM HG (ref 40–50)
PCO2 BLDV: 41 MM HG (ref 40–50)
PH BLDV: 7.46 [PH] (ref 7.32–7.43)
PH BLDV: 7.46 [PH] (ref 7.32–7.43)
PH BLDV: 7.47 [PH] (ref 7.32–7.43)
PLATELET # BLD AUTO: 143 10E3/UL (ref 150–450)
PO2 BLDV: 32 MM HG (ref 25–47)
PO2 BLDV: 33 MM HG (ref 25–47)
PO2 BLDV: 34 MM HG (ref 25–47)
POTASSIUM SERPL-SCNC: 3.7 MMOL/L (ref 3.4–5.3)
PROT SERPL-MCNC: 5.8 G/DL (ref 6.4–8.3)
RBC # BLD AUTO: 4.75 10E6/UL (ref 4.4–5.9)
SODIUM SERPL-SCNC: 140 MMOL/L (ref 136–145)
UFH PPP CHRO-ACNC: 0.52 IU/ML
WBC # BLD AUTO: 5.9 10E3/UL (ref 4–11)

## 2022-12-17 PROCEDURE — 85027 COMPLETE CBC AUTOMATED: CPT | Performed by: STUDENT IN AN ORGANIZED HEALTH CARE EDUCATION/TRAINING PROGRAM

## 2022-12-17 PROCEDURE — 99291 CRITICAL CARE FIRST HOUR: CPT | Mod: GC | Performed by: STUDENT IN AN ORGANIZED HEALTH CARE EDUCATION/TRAINING PROGRAM

## 2022-12-17 PROCEDURE — 83735 ASSAY OF MAGNESIUM: CPT | Performed by: INTERNAL MEDICINE

## 2022-12-17 PROCEDURE — 82310 ASSAY OF CALCIUM: CPT | Performed by: STUDENT IN AN ORGANIZED HEALTH CARE EDUCATION/TRAINING PROGRAM

## 2022-12-17 PROCEDURE — 82248 BILIRUBIN DIRECT: CPT

## 2022-12-17 PROCEDURE — 250N000011 HC RX IP 250 OP 636: Performed by: STUDENT IN AN ORGANIZED HEALTH CARE EDUCATION/TRAINING PROGRAM

## 2022-12-17 PROCEDURE — 250N000012 HC RX MED GY IP 250 OP 636 PS 637: Performed by: EMERGENCY MEDICINE

## 2022-12-17 PROCEDURE — 85520 HEPARIN ASSAY: CPT | Performed by: INTERNAL MEDICINE

## 2022-12-17 PROCEDURE — 82805 BLOOD GASES W/O2 SATURATION: CPT | Performed by: STUDENT IN AN ORGANIZED HEALTH CARE EDUCATION/TRAINING PROGRAM

## 2022-12-17 PROCEDURE — 83605 ASSAY OF LACTIC ACID: CPT | Performed by: STUDENT IN AN ORGANIZED HEALTH CARE EDUCATION/TRAINING PROGRAM

## 2022-12-17 PROCEDURE — 250N000012 HC RX MED GY IP 250 OP 636 PS 637: Performed by: STUDENT IN AN ORGANIZED HEALTH CARE EDUCATION/TRAINING PROGRAM

## 2022-12-17 PROCEDURE — 250N000013 HC RX MED GY IP 250 OP 250 PS 637: Performed by: STUDENT IN AN ORGANIZED HEALTH CARE EDUCATION/TRAINING PROGRAM

## 2022-12-17 PROCEDURE — 200N000002 HC R&B ICU UMMC

## 2022-12-17 RX ORDER — BENZONATATE 100 MG/1
100 CAPSULE ORAL 3 TIMES DAILY PRN
Status: DISCONTINUED | OUTPATIENT
Start: 2022-12-17 | End: 2022-12-26

## 2022-12-17 RX ORDER — FUROSEMIDE 20 MG
20 TABLET ORAL DAILY
Status: DISCONTINUED | OUTPATIENT
Start: 2022-12-18 | End: 2022-12-20

## 2022-12-17 RX ORDER — ISOSORBIDE DINITRATE 20 MG/1
20 TABLET ORAL
Status: DISCONTINUED | OUTPATIENT
Start: 2022-12-17 | End: 2022-12-26

## 2022-12-17 RX ADMIN — TRAZODONE HYDROCHLORIDE 50 MG: 50 TABLET ORAL at 22:18

## 2022-12-17 RX ADMIN — ACETAMINOPHEN 650 MG: 325 TABLET, FILM COATED ORAL at 18:01

## 2022-12-17 RX ADMIN — POTASSIUM CHLORIDE 20 MEQ: 750 TABLET, EXTENDED RELEASE ORAL at 08:58

## 2022-12-17 RX ADMIN — HYDRALAZINE HYDROCHLORIDE 75 MG: 50 TABLET, FILM COATED ORAL at 22:18

## 2022-12-17 RX ADMIN — ISOSORBIDE DINITRATE 20 MG: 20 TABLET ORAL at 17:54

## 2022-12-17 RX ADMIN — ISOSORBIDE DINITRATE 20 MG: 20 TABLET ORAL at 13:09

## 2022-12-17 RX ADMIN — ISOSORBIDE DINITRATE 10 MG: 10 TABLET ORAL at 08:57

## 2022-12-17 RX ADMIN — BENZONATATE 100 MG: 100 CAPSULE ORAL at 15:57

## 2022-12-17 RX ADMIN — HEPARIN SODIUM 1300 UNITS/HR: 10000 INJECTION, SOLUTION INTRAVENOUS at 17:55

## 2022-12-17 RX ADMIN — MYCOPHENOLATE MOFETIL 500 MG: 500 TABLET, FILM COATED ORAL at 20:37

## 2022-12-17 RX ADMIN — DOBUTAMINE HYDROCHLORIDE 5 MCG/KG/MIN: 200 INJECTION INTRAVENOUS at 02:54

## 2022-12-17 RX ADMIN — DOBUTAMINE HYDROCHLORIDE 5 MCG/KG/MIN: 200 INJECTION INTRAVENOUS at 17:51

## 2022-12-17 RX ADMIN — HYDRALAZINE HYDROCHLORIDE 50 MG: 50 TABLET, FILM COATED ORAL at 05:14

## 2022-12-17 RX ADMIN — MYCOPHENOLATE MOFETIL 1000 MG: 500 TABLET, FILM COATED ORAL at 08:58

## 2022-12-17 RX ADMIN — ASPIRIN 81 MG: 81 TABLET, COATED ORAL at 08:57

## 2022-12-17 RX ADMIN — HYDRALAZINE HYDROCHLORIDE 75 MG: 50 TABLET, FILM COATED ORAL at 15:09

## 2022-12-17 RX ADMIN — PRAVASTATIN SODIUM 20 MG: 20 TABLET ORAL at 08:58

## 2022-12-17 RX ADMIN — AMIODARONE HYDROCHLORIDE 200 MG: 200 TABLET ORAL at 08:57

## 2022-12-17 ASSESSMENT — ACTIVITIES OF DAILY LIVING (ADL)
ADLS_ACUITY_SCORE: 42

## 2022-12-17 NOTE — PLAN OF CARE
ICU End of Shift Summary. See flowsheets for vital signs and detailed assessment.    Changes this shift: No acute events overnight. Ambulated halls x1 with gaitbelt and walker. Therapeutic Xa x2.     Plan: CAROLYN BID.   Goal Outcome Evaluation: Progresing    Overall Patient Progress: improving

## 2022-12-17 NOTE — PROGRESS NOTES
"SPIRITUAL HEALTH SERVICES Progress Note  [facility] EB, 4C MICU  On-Call    Provided chaplaincy care with Vince and his wife (Arlette) per consult order concerning desire for holy communion.    Patient/Family Understanding of Illness and Goals of Care - Vince and Arlette offered a brief overview of the circumstances leading to his admission and need for a heart transplant. Arlette offered, \"There are no more therapies left. We're ultimately aware that he has two outcomes, death or this surgery.\"     Distress and Loss - They affirmed the stressors experienced with Vince's precarious situation and how it makes them ever more mindful of their lives.    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 They also mentioned a daughter,  as a young girl due to cancer and that they are ever mindful of her.  As health care professionals themselves (MD Vince; Arlette, PT), they bring a depth to their personal integration of Vince's/their situation.    Meaning, Beliefs, and Spirituality -  The importance of their Druze crissy as a positive source of coping was affirmed. They also indicated, per the chaplaincy consult order, that a friend of the family, Fr. Everett Blair, was coming to the hospital today to share holy communion/Eucharist with them. Prayer was shared.    Plan of Care - I have no plan for follow up today.    Jeffrey Philip, MPH, MDiv, [facility]  Staff   Pager: [phone]    "

## 2022-12-17 NOTE — PROGRESS NOTES
[facility]    Advanced Heart Failure Progress Note    Assessment and Plan:     Paco Stein is a 67 year old male with a history of chronic systolic heart failure (suspected predominantly nonischemic cardiomyopathy, possibly arrhythmogenic), coronary artery disease (s/p PCI with TILA to LCx 4/2013 and to the LAD in 10/2021), hyperlipidemia, CNS vasculitis and chronic kidney disease who presents with transient bilateral hand tightness with unrevealing workup, now admitted for continued advanced therapies evaluation. Patient was listed for heart transplant (status 2) on 12/12/2022.       Interval History:  Patient doing well this morning. Was able to sleep all night. No chest pain, palpitations or SOB. Has some intermittent cough.     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 No episodes since. Neurological exam unremarkable. Neurology consulted during recent admission as part of VAD/transplant evaluation. MRI/MRA brain was also performed which showed no concern for progression of disease  - Continue PTA cellcept 1000mg qam and 500mg qpm    # Incidental punctate cerebellar infarct, likely embolic  Read on MR imaging 11/14/22 without new neuro deficits. Per radiology, appears acute, likely embolic. No known arterial clot history. Reviewed telemetry over admission, no afib or other tachycarrhythmias. Reassuring prior TTE this admission including bubble study, repeat JARED w/o septal defects or thrombus.  Treated w/ heparin gtt and transitioned to Eliquis.  - Continue heparin drip while awaiting transplant      # Insomnia/anxiety  - PRN trazodone and lorazepam       ===CARDIOVASCULAR===  # Advanced chronic systolic heart failure, EF 15-20%, NYHA class IV, ACC/AHA Stage D  # Ambulatory cardiogenic shock, on dobutamine drip as bride to transplant  Recently admitted from 11/10/2022 - 11/21/2022 for acute on chronic heart failure, treated with IV diuresis and initiated on dobutamine infusion. Patient completed advanced therapy evaluation during that admission and and is now re-admitted for continued evaluation. Patient was listed for heart transplant (status 2) on 12/12/2022. 12/08/2022 RHC: RA 16, PA 50/26/35, PCWP 26, Aisha CO/CI 3.1/1.44.   - Etiology: Predominantly non-ischemic cardiomyopathy, suspected arrhythmogenic   - Volume: Intermittent PO/IV lasix, guided by swan numbers    - Inotrope: dobutamine to 5 mcg/kg/min   - Afterload: Increase PO hydralazine from 50 to 75 mg and isordil 10 to 20 mg every 8 hours   - BB: held in the setting of shock  - ACE/ARB/ARNi: Previous intolerance due to hypotension and lightheadedness   - SGLT2i: stop PTA Empagliflozin 10 mg qday (to minimize UTI risk while waiting for transplant)  - MRA: None  - Statin: PTA pravastatin  - Electrolytes: PTA 20 meq KCl daily, trending and replacing as needed as well   - Transplant evaluation: listed for transplant on 12/12 (status 2)  - [place] Aniceto hemodynamics:  Date RA PA PCWP MAP SvO2  CO/CI SVR PVR Therapy   12/9 (cath lab) 5 32/10/19 15 102 53 2.8/1.3 2228 1.4 Dobutamine 5 mcg/kg/min  Furosemide 10 mg/hr   12/10 AM 4 26/10/16 10 86 62 3.7/1.8 1600 1.6 Dobutamine 5 mcg/kg/min  Furosemide 10 mg/hr   12/10 PM   10 35/15/22 14      Dobutamine 5 mcg/kg/min   12/11 AM 6 40/15/23 13 97 64 3.7/1.8 1960 2.7 Dobutamine 5 mcg/kg/min   12/11 PM 6 30/15/20 10 83 61 3.4/1.7 1900  Dobutamine 5 mcg/kg/min  Nitroprusside 0.25 mcg/kg/min   12/12 AM 6 26/10/15 10  78 71 5.3/2.6 1100 0.9 Dobutamine 5 mcg/kg/min  Nitroprusside 0.75 mcg/kg/min   12/12 PM 10 45/20/28 18 83 51 2.9/1.4 2100 3.4 Dobutamine 5 mcg/kg/min   12/13 PM 8 40/16/24 15 92 61 3.5/1.7 1860 2.6 Dobutamine 5 mcg/kg/min  Hydralazine 25 mg Q8H   12/14 AM 8 40/18/25 16 90 68 5.2/2.4 1300 1.8 Dobutamine 5 mcg/kg/min  Hydralazine 25 mg Q8H   12/15/ AM 10 46/18/27 18 90 67 5.1/2.3 1300 1.8 Dobutamine 5 mcg/kg/min  Hydralazine 25 mg Q8H               12/16 AM 12 40/18/25 16 93 59 3.6/1.7 1700 2.5 Dobutamine 5 mcg/kg/min  Hydralazine 25 mg Q8H   12/17 4 28/16/14 12 92 62 4.1/1.9 1610 2.4 Dobutamine 5 mcg/kg/min  Hydralazine 50 mg Q8H       # Multivessel coronary artery disease (s/p PCI with TILA to LCx 4/2013 and to the LAD in 10/2021)  11/11/22 [facility] showed patent LAD and LCx stents as well as mild-mod non-obstructive disease in the RCA.   - Continue PTA ASA 81 mg qday  - BB: Holding as per above  - Statin: Cont PTA pravastatin     # History of NSVT s/p biventricular PPM/ICD  Interrogation performed 12/10/22 w/ two short (<5s) runs of NSVT on 11/1/22 and 11/3/22, no recent shocks. A-paced.   - Decrease dose of amiodarone from 200 mg BID to 200 mg daily on 12/16 as pre-transplant amiodarone use is associated with increased risk of graft dysfunction      ===PULMONARY===  # Non-productive cough, acute on chronic, improved   - PRN cough lozenges  - add tessalon pearls      ===GI===  # Severe malnutrition in the context of acute illness/injury on chronic illness  - Appreciate assistance from RD        ===RENAL===  # CKD stage 2  Creatinine at baseline  - trend BMP      ===HEME/ONC===  # Occlusive left peroneal DVT  # Non-occlusive right axillary DVT  Identified on doppler 11/15/22 during previous admission.   - Continue heparin drip in place of PTA apixaban while awaiting transplant       ===ENDOCRINE===  No active concerns       INFECTIOUS DISEASE  No active concerns       ===SKIN/MSK===  # Bilateral hand tightness,  "resolved  Experienced two episodes prior to admission, once while performing PT exercises and again when he woke up the following morning. Each episode lasted for about 15 minutes and had no other associated symptoms. Etiology not clear at this time. No focal motor or sensory deficits on exam. Electrolytes within normal limits. Troponin only mildly elevated at 46 and down-trending. EKG with A sensed V paced rhythm with no concerning ST/T changes so low suspicion for ischemia. ICD interrogation detected 225 ventricular sensed episodes, with the most recently stored episodes showing slow VT-NS at  bpm, runs lasting about 5 seconds. Unclear if these short runs would explain his 15-minute episodes. He is on dobutamine which has known arrhythmogenic effects so certainly worth monitoring closely, especially given his history of NSVT.   - Monitoring for recurrence while on telemetry   - Trend electrolytes and replete as needed       FEN: Low salt diet, replete lytes as needed   DVT PPx: heparin drip   Code: Full code      Disposition: Inpatient admission, listed for transplant on      Discussed with Dr. Schultz Jorge Reyes Castro, MD, MS  Cardiovascular disease fellow     Objective:   /65   Pulse 71   Temp 97.6  F (36.4  C) (Oral)   Resp 22   Ht 1.88 m (6' 2\")   Wt 86 kg (189 lb 9.6 oz)   SpO2 95%   BMI 24.34 kg/m    Temp (24hrs), Av.8  F (36.6  C), Min:97.5  F (36.4  C), Max:98  F (36.7  C)    Wt:   Wt Readings from Last 5 Encounters:   22 86 kg (189 lb 9.6 oz)   22 90.9 kg (200 lb 6.4 oz)   11/10/22 108 kg (238 lb 1.6 oz)   22 95.7 kg (210 lb 14.4 oz)     Physical Exam   GEN: pleasant, no acute distress, in good spirits  HEENT: no icterus, EOMI  CV: RRR, normal s1/s2, soft systolic murmur best heard at LLSB. CVP below the clavicle at 45 degrees. Oozing at the site of swan elsie catheter insertion.   CHEST: Non-labored breathing, good air movement bilaterally, no wheezes " or crackles   ABD: soft, non-tender, non-distended   EXTR: warm extremities. No LE edema  NEURO: awake, alert, non-focal motor exam    DATA:   Labs, EKGs and imaging reviewed. Pertinent results discussed in assessment and plan above.

## 2022-12-17 NOTE — PROGRESS NOTES
12/16/22 1600   Appointment Info   Signing Clinician's Name / Credentials (PT) Anusha Jean, PT   Rehab Comments (PT) Luis Alfredo, admission likely until transplant. Pt's wife is retired PT and walks regularly with pt and RN 3+x/day.   Living Environment   People in Home spouse   Current Living Arrangements house   Home Accessibility stairs to enter home;stairs within home   Number of Stairs, Main Entrance 2   Stair Railings, Main Entrance none   Number of Stairs, Within Home, Primary other (see comments)   Stair Railings, Within Home, Primary railing on right side (ascending)   Transportation Anticipated car, drives self;family or friend will provide   Living Environment Comments Pt lives in a house with his wife.  Prior to illness in September was driving and fully IND with no AE, during recent return home needed increased time with decreased activity tolerance.   Self-Care   Usual Activity Tolerance good   Current Activity Tolerance fair   Regular Exercise Yes   Activity/Exercise Type walking   Equipment Currently Used at Home walker, rolling   General Information   Onset of Illness/Injury or Date of Surgery 12/06/22   Referring Physician Maricruz Mercer MD   Pertinent History of Current Problem (include personal factors and/or comorbidities that impact the POC) 67 year old male with a history of chronic systolic heart failure (suspected predominantly nonischemic cardiomyopathy, possibly arrhythmogenic), coronary artery disease (s/p PCI with TILA to LCx 4/2013 and to the LAD in 10/2021), hyperlipidemia, CNS vasculitis and chronic kidney disease who presents with transient bilateral hand tightness with unrevealing workup, now admitted for continued advanced therapies evaluation. Patient was listed for heart transplant (status 2) on 12/12/2022.   Existing Precautions/Restrictions cardiac;fall   Heart Disease Risk Factors Medical history;Age   Cognition   Affect/Mental Status (Cognition) flat/blunted affect    Orientation Status (Cognition) oriented x 3   Follows Commands (Cognition) 75-90% accuracy;increased processing time needed;physical/tactile prompts required;repetition of directions required   Posture    Posture Forward head position;Protracted shoulders   Range of Motion (ROM)   ROM Comment WFL   Strength (Manual Muscle Testing)   Strength Comments Grossly intact with decreased functional activity tolerance.   Bed Mobility   Comment, (Bed Mobility) Bed mobility CGA, no OOB as MD needed to take numbers with pt flat supine and meal arrived after.   Gait/Stairs (Locomotion)   Comment, (Gait/Stairs) PT observed pt ambulating 150'+ earlier in day with wife CGA, wh walker and RN managing monitoring.   Clinical Impression   Criteria for Skilled Therapeutic Intervention Yes, treatment indicated   PT Diagnosis (PT) impaired functional mobility   Influenced by the following impairments strength, activity tolerance, balance, cardiovascular status, posture   Functional limitations due to impairments decreased I gait, stairs, transfers, bed  mobility, functional endurance   Clinical Presentation (PT Evaluation Complexity) Evolving/Changing   Clinical Presentation Rationale clinical judgement   Clinical Decision Making (Complexity) moderate complexity   Planned Therapy Interventions (PT) balance training;bed mobility training;gait training;home exercise program;neuromuscular re-education;patient/family education;ROM (range of motion);stair training;strengthening;transfer training;progressive activity/exercise;risk factor education;home program guidelines   Risk & Benefits of therapy have been explained evaluation/treatment results reviewed;care plan/treatment goals reviewed;risks/benefits reviewed;current/potential barriers reviewed;participants voiced agreement with care plan;participants included;patient;spouse/significant other   PT Total Evaluation Time   PT Eval, Moderate Complexity Minutes (60933) 8   Physical Therapy  Goals   PT Frequency 4x/week   PT Predicted Duration/Target Date for Goal Attainment 01/12/23   PT Goals Bed Mobility;Transfers;Gait;Stairs;Cardiac Phase 1   PT: Bed Mobility Independent;Supine to/from sit;Within precautions   PT: Transfers Independent;Sit to/from stand;Within precautions   PT: Gait Rolling walker;Greater than 200 feet;Within precautions;Supervision/stand-by assist   PT: Stairs Supervision/stand-by assist;2 stairs;Within precautions   PT: Understanding of cardiac education to maximize quality of life, condition management, and health outcomes Patient;Verbalize   Total Session Time   Total Session Time (sum of timed and untimed services) 8

## 2022-12-17 NOTE — PLAN OF CARE
ICU End of Shift Summary. See flowsheets for vital signs and detailed assessment.    Changes this shift: Ambulated 4 times in hallway today, tolerated well. Dobutamine gtt, straight rate. Heparin gtt, rate decreased. Next Hep 10A at 22:30. Lasix 60 mg given today. Pt voided 2200 ml, and is currently net negative 950 ml for the day. Appetite improving, but pt only ate 2 meals (no lunch). Protein supplements between meals.  Medium stool x 1 today.    Plan:  Continue current plan of care.         Problem: Plan of Care - These are the overarching goals to be used throughout the patient stay.    Goal: Absence of Hospital-Acquired Illness or Injury  Intervention: Identify and Manage Fall Risk  Recent Flowsheet Documentation  Taken 12/16/2022 1600 by Nemo Santos RN  Safety Promotion/Fall Prevention: activity supervised  Taken 12/16/2022 1200 by Nemo Santos RN  Safety Promotion/Fall Prevention: activity supervised  Taken 12/16/2022 0800 by Nemo Santos RN  Safety Promotion/Fall Prevention: activity supervised  Intervention: Prevent Skin Injury  Recent Flowsheet Documentation  Taken 12/16/2022 1800 by Nemo Santos RN  Body Position: position changed independently  Taken 12/16/2022 1600 by Nemo Santos RN  Body Position: position changed independently  Taken 12/16/2022 1400 by Nemo Santos RN  Body Position: position changed independently  Taken 12/16/2022 1200 by Nemo Santos RN  Body Position: position changed independently  Taken 12/16/2022 1000 by Nemo Santos RN  Body Position: position changed independently  Taken 12/16/2022 0800 by Nemo Santos RN  Body Position: position changed independently  Intervention: Prevent and Manage VTE (Venous Thromboembolism) Risk  Recent Flowsheet Documentation  Taken 12/16/2022 1600 by Nemo Santos RN  VTE Prevention/Management: SCDs (sequential compression devices) off  Taken 12/16/2022 1200 by Nemo Santos  RN  VTE Prevention/Management: SCDs (sequential compression devices) off  Taken 12/16/2022 0800 by Nemo Santos RN  VTE Prevention/Management: SCDs (sequential compression devices) off  Goal: Optimal Comfort and Wellbeing  Intervention: Provide Person-Centered Care  Recent Flowsheet Documentation  Taken 12/16/2022 1600 by Nemo Santos RN  Trust Relationship/Rapport:   care explained   choices provided   emotional support provided   empathic listening provided   questions answered   questions encouraged   reassurance provided   thoughts/feelings acknowledged  Taken 12/16/2022 1200 by Nemo Santos RN  Trust Relationship/Rapport:   care explained   choices provided   emotional support provided   empathic listening provided   questions answered   questions encouraged   reassurance provided   thoughts/feelings acknowledged  Taken 12/16/2022 0800 by Nemo Santos RN  Trust Relationship/Rapport:   care explained   choices provided   emotional support provided   empathic listening provided   questions answered   questions encouraged   reassurance provided   thoughts/feelings acknowledged   Goal Outcome Evaluation:

## 2022-12-18 ENCOUNTER — APPOINTMENT (OUTPATIENT)
Dept: GENERAL RADIOLOGY | Facility: CLINIC | Age: 67
DRG: 001 | End: 2022-12-18
Attending: STUDENT IN AN ORGANIZED HEALTH CARE EDUCATION/TRAINING PROGRAM
Payer: MEDICARE

## 2022-12-18 LAB
ALBUMIN SERPL BCG-MCNC: 3.3 G/DL (ref 3.5–5.2)
ALP SERPL-CCNC: 68 U/L (ref 40–129)
ALT SERPL W P-5'-P-CCNC: 33 U/L (ref 10–50)
ANION GAP SERPL CALCULATED.3IONS-SCNC: 11 MMOL/L (ref 7–15)
AST SERPL W P-5'-P-CCNC: 29 U/L (ref 10–50)
BASE EXCESS BLDV CALC-SCNC: -0.5 MMOL/L (ref -7.7–1.9)
BASE EXCESS BLDV CALC-SCNC: 0.9 MMOL/L (ref -7.7–1.9)
BASE EXCESS BLDV CALC-SCNC: 2.1 MMOL/L (ref -7.7–1.9)
BILIRUB DIRECT SERPL-MCNC: <0.2 MG/DL (ref 0–0.3)
BILIRUB SERPL-MCNC: 0.5 MG/DL
BUN SERPL-MCNC: 17.9 MG/DL (ref 8–23)
CALCIUM SERPL-MCNC: 8.9 MG/DL (ref 8.8–10.2)
CHLORIDE SERPL-SCNC: 106 MMOL/L (ref 98–107)
CREAT SERPL-MCNC: 0.92 MG/DL (ref 0.67–1.17)
DEPRECATED HCO3 PLAS-SCNC: 22 MMOL/L (ref 22–29)
ERYTHROCYTE [DISTWIDTH] IN BLOOD BY AUTOMATED COUNT: 13.7 % (ref 10–15)
GFR SERPL CREATININE-BSD FRML MDRD: 90 ML/MIN/1.73M2
GLUCOSE SERPL-MCNC: 97 MG/DL (ref 70–99)
HCO3 BLDV-SCNC: 23 MMOL/L (ref 21–28)
HCO3 BLDV-SCNC: 25 MMOL/L (ref 21–28)
HCO3 BLDV-SCNC: 26 MMOL/L (ref 21–28)
HCT VFR BLD AUTO: 41.6 % (ref 40–53)
HGB BLD-MCNC: 13.3 G/DL (ref 13.3–17.7)
LACTATE SERPL-SCNC: 1 MMOL/L (ref 0.7–2)
MAGNESIUM SERPL-MCNC: 2 MG/DL (ref 1.7–2.3)
MCH RBC QN AUTO: 28.8 PG (ref 26.5–33)
MCHC RBC AUTO-ENTMCNC: 32 G/DL (ref 31.5–36.5)
MCV RBC AUTO: 90 FL (ref 78–100)
O2/TOTAL GAS SETTING VFR VENT: 21 %
OXYHGB MFR BLDV: 68 % (ref 70–75)
OXYHGB MFR BLDV: 70 % (ref 70–75)
OXYHGB MFR BLDV: 74 % (ref 70–75)
PCO2 BLDV: 35 MM HG (ref 40–50)
PCO2 BLDV: 37 MM HG (ref 40–50)
PCO2 BLDV: 39 MM HG (ref 40–50)
PH BLDV: 7.42 [PH] (ref 7.32–7.43)
PH BLDV: 7.44 [PH] (ref 7.32–7.43)
PH BLDV: 7.45 [PH] (ref 7.32–7.43)
PHOSPHATE SERPL-MCNC: 3.3 MG/DL (ref 2.5–4.5)
PLATELET # BLD AUTO: 140 10E3/UL (ref 150–450)
PO2 BLDV: 36 MM HG (ref 25–47)
PO2 BLDV: 37 MM HG (ref 25–47)
PO2 BLDV: 40 MM HG (ref 25–47)
POTASSIUM SERPL-SCNC: 3.9 MMOL/L (ref 3.4–5.3)
PROT SERPL-MCNC: 5.7 G/DL (ref 6.4–8.3)
RBC # BLD AUTO: 4.62 10E6/UL (ref 4.4–5.9)
SODIUM SERPL-SCNC: 139 MMOL/L (ref 136–145)
UFH PPP CHRO-ACNC: 0.55 IU/ML
WBC # BLD AUTO: 5.1 10E3/UL (ref 4–11)

## 2022-12-18 PROCEDURE — 71045 X-RAY EXAM CHEST 1 VIEW: CPT

## 2022-12-18 PROCEDURE — 83605 ASSAY OF LACTIC ACID: CPT | Performed by: STUDENT IN AN ORGANIZED HEALTH CARE EDUCATION/TRAINING PROGRAM

## 2022-12-18 PROCEDURE — 82310 ASSAY OF CALCIUM: CPT | Performed by: STUDENT IN AN ORGANIZED HEALTH CARE EDUCATION/TRAINING PROGRAM

## 2022-12-18 PROCEDURE — 250N000013 HC RX MED GY IP 250 OP 250 PS 637: Performed by: STUDENT IN AN ORGANIZED HEALTH CARE EDUCATION/TRAINING PROGRAM

## 2022-12-18 PROCEDURE — 84100 ASSAY OF PHOSPHORUS: CPT | Performed by: INTERNAL MEDICINE

## 2022-12-18 PROCEDURE — 82248 BILIRUBIN DIRECT: CPT

## 2022-12-18 PROCEDURE — 99291 CRITICAL CARE FIRST HOUR: CPT | Mod: GC | Performed by: STUDENT IN AN ORGANIZED HEALTH CARE EDUCATION/TRAINING PROGRAM

## 2022-12-18 PROCEDURE — 250N000011 HC RX IP 250 OP 636: Performed by: STUDENT IN AN ORGANIZED HEALTH CARE EDUCATION/TRAINING PROGRAM

## 2022-12-18 PROCEDURE — 250N000012 HC RX MED GY IP 250 OP 636 PS 637: Performed by: STUDENT IN AN ORGANIZED HEALTH CARE EDUCATION/TRAINING PROGRAM

## 2022-12-18 PROCEDURE — 85027 COMPLETE CBC AUTOMATED: CPT | Performed by: STUDENT IN AN ORGANIZED HEALTH CARE EDUCATION/TRAINING PROGRAM

## 2022-12-18 PROCEDURE — 200N000002 HC R&B ICU UMMC

## 2022-12-18 PROCEDURE — 82805 BLOOD GASES W/O2 SATURATION: CPT | Performed by: STUDENT IN AN ORGANIZED HEALTH CARE EDUCATION/TRAINING PROGRAM

## 2022-12-18 PROCEDURE — 250N000012 HC RX MED GY IP 250 OP 636 PS 637: Performed by: EMERGENCY MEDICINE

## 2022-12-18 PROCEDURE — 999N000226 HC STATISTIC SLP IP EVAL DEFER

## 2022-12-18 PROCEDURE — 71045 X-RAY EXAM CHEST 1 VIEW: CPT | Mod: 26 | Performed by: RADIOLOGY

## 2022-12-18 PROCEDURE — 83735 ASSAY OF MAGNESIUM: CPT | Performed by: STUDENT IN AN ORGANIZED HEALTH CARE EDUCATION/TRAINING PROGRAM

## 2022-12-18 PROCEDURE — 250N000011 HC RX IP 250 OP 636: Performed by: INTERNAL MEDICINE

## 2022-12-18 PROCEDURE — 85520 HEPARIN ASSAY: CPT | Performed by: INTERNAL MEDICINE

## 2022-12-18 RX ORDER — MAGNESIUM SULFATE HEPTAHYDRATE 40 MG/ML
2 INJECTION, SOLUTION INTRAVENOUS ONCE
Status: COMPLETED | OUTPATIENT
Start: 2022-12-18 | End: 2022-12-18

## 2022-12-18 RX ADMIN — MYCOPHENOLATE MOFETIL 500 MG: 500 TABLET, FILM COATED ORAL at 19:51

## 2022-12-18 RX ADMIN — ISOSORBIDE DINITRATE 20 MG: 20 TABLET ORAL at 17:44

## 2022-12-18 RX ADMIN — MAGNESIUM SULFATE IN WATER 2 G: 40 INJECTION, SOLUTION INTRAVENOUS at 05:56

## 2022-12-18 RX ADMIN — ASPIRIN 81 MG: 81 TABLET, COATED ORAL at 08:05

## 2022-12-18 RX ADMIN — ISOSORBIDE DINITRATE 20 MG: 20 TABLET ORAL at 12:17

## 2022-12-18 RX ADMIN — POTASSIUM CHLORIDE 20 MEQ: 750 TABLET, EXTENDED RELEASE ORAL at 08:06

## 2022-12-18 RX ADMIN — HYDRALAZINE HYDROCHLORIDE 75 MG: 50 TABLET, FILM COATED ORAL at 21:41

## 2022-12-18 RX ADMIN — HYDRALAZINE HYDROCHLORIDE 75 MG: 50 TABLET, FILM COATED ORAL at 05:53

## 2022-12-18 RX ADMIN — HEPARIN SODIUM 1300 UNITS/HR: 10000 INJECTION, SOLUTION INTRAVENOUS at 12:56

## 2022-12-18 RX ADMIN — PRAVASTATIN SODIUM 20 MG: 20 TABLET ORAL at 08:06

## 2022-12-18 RX ADMIN — AMIODARONE HYDROCHLORIDE 200 MG: 200 TABLET ORAL at 08:06

## 2022-12-18 RX ADMIN — FUROSEMIDE 20 MG: 20 TABLET ORAL at 08:05

## 2022-12-18 RX ADMIN — MYCOPHENOLATE MOFETIL 1000 MG: 500 TABLET, FILM COATED ORAL at 08:06

## 2022-12-18 RX ADMIN — DOBUTAMINE HYDROCHLORIDE 5 MCG/KG/MIN: 200 INJECTION INTRAVENOUS at 11:36

## 2022-12-18 RX ADMIN — ISOSORBIDE DINITRATE 20 MG: 20 TABLET ORAL at 08:06

## 2022-12-18 RX ADMIN — TRAZODONE HYDROCHLORIDE 50 MG: 50 TABLET ORAL at 22:12

## 2022-12-18 RX ADMIN — DOBUTAMINE HYDROCHLORIDE 5 MCG/KG/MIN: 200 INJECTION INTRAVENOUS at 06:01

## 2022-12-18 RX ADMIN — HYDRALAZINE HYDROCHLORIDE 75 MG: 50 TABLET, FILM COATED ORAL at 14:13

## 2022-12-18 RX ADMIN — POLYETHYLENE GLYCOL 3350 17 G: 17 POWDER, FOR SOLUTION ORAL at 08:07

## 2022-12-18 ASSESSMENT — ACTIVITIES OF DAILY LIVING (ADL)
ADLS_ACUITY_SCORE: 37
ADLS_ACUITY_SCORE: 42
ADLS_ACUITY_SCORE: 37
ADLS_ACUITY_SCORE: 37
ADLS_ACUITY_SCORE: 39
ADLS_ACUITY_SCORE: 39
ADLS_ACUITY_SCORE: 42
ADLS_ACUITY_SCORE: 39

## 2022-12-18 NOTE — PLAN OF CARE
Deferral - SLP orders received for swallow eval; pt has been demonstrating excessive coughing, questioning if related to swallow function. Chart reviewed and discussed with RN, pt, and pt's spouse. Pt is familiar to this service, seen for evaluation in November. Per pt and his spouse, swallow function is c/w previous evaluation, they feel coughing is unrelated to swallowing. Per RN, pt is tolerating regular diet/thin liquids, cough appears more c/w cardiac cough vs cough 2/2 aspiration. Pt observed sipping water; no immediate s/sx of aspiration. SLP will defer evaluation at this time and complete orders, will page MD. Please reconsult SLP with changes in swallow function or communication skills.

## 2022-12-18 NOTE — PLAN OF CARE
ICU End of Shift Summary. See flowsheets for vital signs and detailed assessment.    Changes this shift:  Dobutamine gtt at 5 mg/kg/min, and Heparin gtt at 1300 units/hr. Pt continues to have strong, dry, non productive cough with increasing frequency. Tesslon PRN was given x 1 today with no relief. Right internal jugular CVC was oozing blood, applied new dressing with Quick Clot guaze. Good appetite, also has protein supplements. Voiding to urinal. Last stool was 12/16/22. PRN Miralax given today.   Pt was up to the chair x 3 today, and ambulated in hallway x 3 today. Tolerated well, except for frequent coughing.    Plan:  Monitor CAROLYN calculations every 12 hours, per Cards Team.  Monitor I/O.       Problem: Plan of Care - These are the overarching goals to be used throughout the patient stay.    Goal: Absence of Hospital-Acquired Illness or Injury  Intervention: Identify and Manage Fall Risk  Recent Flowsheet Documentation  Taken 12/17/2022 1600 by Nemo Santos RN  Safety Promotion/Fall Prevention:   activity supervised   clutter free environment maintained   lighting adjusted   patient and family education   room near nurse's station   room organization consistent  Taken 12/17/2022 1200 by Nemo Santos RN  Safety Promotion/Fall Prevention:   activity supervised   clutter free environment maintained   lighting adjusted   patient and family education   room near nurse's station   room organization consistent  Taken 12/17/2022 0800 by Nemo Santos RN  Safety Promotion/Fall Prevention:   activity supervised   clutter free environment maintained   lighting adjusted   patient and family education   room near nurse's station   room organization consistent  Intervention: Prevent Skin Injury  Recent Flowsheet Documentation  Taken 12/17/2022 1600 by Nemo Santos RN  Body Position: position changed independently  Taken 12/17/2022 1200 by Nemo Santos RN  Body Position: position changed  independently  Taken 12/17/2022 1130 by Nemo Santos RN  Body Position: position changed independently  Taken 12/17/2022 0800 by Nemo Santos RN  Body Position: position changed independently  Intervention: Prevent and Manage VTE (Venous Thromboembolism) Risk  Recent Flowsheet Documentation  Taken 12/17/2022 1600 by Nemo Santos RN  VTE Prevention/Management:   SCDs (sequential compression devices) off   patient refused intervention  Taken 12/17/2022 1200 by Nemo Santos RN  VTE Prevention/Management:   SCDs (sequential compression devices) off   patient refused intervention  Taken 12/17/2022 0800 by Nemo Santos RN  VTE Prevention/Management:   SCDs (sequential compression devices) off   patient refused intervention  Goal: Optimal Comfort and Wellbeing  Intervention: Provide Person-Centered Care  Recent Flowsheet Documentation  Taken 12/17/2022 1600 by eNmo Santos RN  Trust Relationship/Rapport:   care explained   choices provided   emotional support provided   empathic listening provided   questions answered   questions encouraged   reassurance provided   thoughts/feelings acknowledged  Taken 12/17/2022 1200 by Nemo Santos RN  Trust Relationship/Rapport:   care explained   choices provided   emotional support provided   empathic listening provided   questions answered   questions encouraged   reassurance provided   thoughts/feelings acknowledged  Taken 12/17/2022 0800 by Nemo Santos RN  Trust Relationship/Rapport:   care explained   choices provided   emotional support provided   empathic listening provided   questions answered   questions encouraged   reassurance provided   thoughts/feelings acknowledged   Goal Outcome Evaluation:

## 2022-12-18 NOTE — CONSULTS
SPIRITUAL HEALTH SERVICES Progress Note  [facility] 4C    I met with Paco and Jenna per consult request for communion. I informed them that I have notified [facility] of that request. They shared briefly about Paco's condition and his waiting for a heart transplant. Paco requested prayer which I provided along with supportive presence and normative conversation.    Joaquin Richardson  Chaplain Resident  Pager [phone]    * [facility] remains available 24/7 for emergent requests/referrals, either by having the switchboard page the on-call  or by entering an ASAP/STAT consult in Epic (this will also page the on-call ). Routine Epic consults receive an initial response within 24 hours.*

## 2022-12-18 NOTE — PROGRESS NOTES
[facility]    Advanced Heart Failure Progress Note    Assessment and Plan:     Paco Stein is a 67 year old male with a history of chronic systolic heart failure (suspected predominantly nonischemic cardiomyopathy, possibly arrhythmogenic), coronary artery disease (s/p PCI with TILA to LCx 4/2013 and to the LAD in 10/2021), hyperlipidemia, CNS vasculitis and chronic kidney disease who presents with transient bilateral hand tightness with unrevealing workup, now admitted for continued advanced therapies evaluation. Patient was listed for heart transplant (status 2) on 12/12/2022.       Interval History:  - No acute events overnight  - Continues to have intermittent cough. Patient says that is getting better.  - No chest pain, palpitations or SOB.     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 No episodes since. Neurological exam unremarkable. Neurology consulted during recent admission as part of VAD/transplant evaluation. MRI/MRA brain was also performed which showed no concern for progression of disease  - Continue PTA cellcept 1000mg qam and 500mg qpm    # Incidental punctate cerebellar infarct, likely embolic  Read on MR imaging 11/14/22 without new neuro deficits. Per radiology, appears acute, likely embolic. No known arterial clot history. Reviewed telemetry over admission, no afib or other tachycarrhythmias. Reassuring prior TTE this admission including bubble study, repeat JARED w/o  septal defects or thrombus. Treated w/ heparin gtt and transitioned to Eliquis.  - Continue heparin drip while awaiting transplant      # Insomnia/anxiety  - PRN trazodone and lorazepam       ===CARDIOVASCULAR===  # Advanced chronic systolic heart failure, EF 15-20%, NYHA class IV, ACC/AHA Stage D  # Ambulatory cardiogenic shock, on dobutamine drip as bride to transplant  Recently admitted from 11/10/2022 - 11/21/2022 for acute on chronic heart failure, treated with IV diuresis and initiated on dobutamine infusion. Patient completed advanced therapy evaluation during that admission and and is now re-admitted for continued evaluation. Patient was listed for heart transplant (status 2) on 12/12/2022. 12/08/2022 RHC: RA 16, PA 50/26/35, PCWP 26, Aisha CO/CI 3.1/1.44.     - Etiology: Predominantly non-ischemic cardiomyopathy, suspected arrhythmogenic   - Volume: Intermittent PO/IV lasix, guided by swan numbers    - Inotrope: dobutamine to 5 mcg/kg/min   - Afterload: continue PO hydralazine from 50 to 75 mg and isordil 10 to 20 mg every 8 hours   - BB: held in the setting of shock  - ACE/ARB/ARNi: Previous intolerance due to hypotension and lightheadedness   - SGLT2i: stop PTA Empagliflozin 10 mg qday (12/17) (to minimize UTI risk while waiting for transplant)  - MRA: None  - Statin: PTA pravastatin  - Electrolytes: PTA 20 meq KCl daily, trending and replacing as needed as well   - Transplant evaluation: listed for transplant on 12/12 (status 2)  - Ensign Aniceto hemodynamics:  Date RA PA PCWP MAP SvO2  CO/CI SVR PVR Therapy   12/9 (cath lab) 5 32/10/19 15 102 53 2.8/1.3 2228 1.4 Dobutamine 5 mcg/kg/min  Furosemide 10 mg/hr   12/10 AM 4 26/10/16 10 86 62 3.7/1.8 1600 1.6 Dobutamine 5 mcg/kg/min  Furosemide 10 mg/hr   12/10 PM   10 35/15/22 14      Dobutamine 5 mcg/kg/min   12/11 AM 6 40/15/23 13 97 64 3.7/1.8 1960 2.7 Dobutamine 5 mcg/kg/min   12/11 PM 6 30/15/20 10 83 61 3.4/1.7 1900  Dobutamine 5 mcg/kg/min  Nitroprusside  0.25 mcg/kg/min   12/12 AM 6 26/10/15 10 78 71 5.3/2.6 1100 0.9 Dobutamine 5 mcg/kg/min  Nitroprusside 0.75 mcg/kg/min   12/12 PM 10 45/20/28 18 83 51 2.9/1.4 2100 3.4 Dobutamine 5 mcg/kg/min   12/13 PM 8 40/16/24 15 92 61 3.5/1.7 1860 2.6 Dobutamine 5 mcg/kg/min  Hydralazine 25 mg Q8H   12/14 AM 8 40/18/25 16 90 68 5.2/2.4 1300 1.8 Dobutamine 5 mcg/kg/min  Hydralazine 25 mg Q8H   12/15/ AM 10 46/18/27 18 90 67 5.1/2.3 1300 1.8 Dobutamine 5 mcg/kg/min  Hydralazine 25 mg Q8H               12/16 AM 12 40/18/25 16 93 59 3.6/1.7 1700 2.5 Dobutamine 5 mcg/kg/min  Hydralazine 25 mg Q8H   12/17 4 28/16/14 12 92 62 4.1/1.9 1610 2.4 Dobutamine 5 mcg/kg/min  Hydralazine 50 mg Q8H   12/18 5 34/16/23 14 86 68 5.3/2.5 1339 1.7 Dobutamine 5 5 mcg/kg/min  Hydralazine 75 mg Q8H       # Multivessel coronary artery disease (s/p PCI with TILA to LCx 4/2013 and to the LAD in 10/2021)  11/11/22 [facility] showed patent LAD and LCx stents as well as mild-mod non-obstructive disease in the RCA.   - Continue PTA: ASA 81 mg qday  - BB: Holding as per above  - Statin: Cont PTA pravastatin     # History of NSVT s/p biventricular PPM/ICD  Interrogation performed 12/10/22 w/ two short (<5s) runs of NSVT on 11/1/22 and 11/3/22, no recent shocks. A-paced.   - Decrease dose of amiodarone from 200 mg BID to 200 mg daily on 12/16 as pre-transplant amiodarone use is associated with increased risk of graft dysfunction    ===PULMONARY===  # Non-productive cough, acute on chronic, improved   - PRN cough lozenges  - continue tessalon pearls  - consult SLP to evaluate dysphagia     ===GI===  # Severe malnutrition in the context of acute illness/injury on chronic illness  - Appreciate assistance from RD        ===RENAL===  # CKD stage 2  Creatinine at baseline  - trend BMP      ===HEME/ONC===  # Occlusive left peroneal DVT  # Non-occlusive right axillary DVT  Identified on doppler 11/15/22 during previous admission.   - Continue heparin drip in place of PTA  "apixaban while awaiting transplant       ===ENDOCRINE===  No active concerns       INFECTIOUS DISEASE  No active concerns       ===SKIN/MSK===  # Bilateral hand tightness, resolved  Experienced two episodes prior to admission, once while performing PT exercises and again when he woke up the following morning. Each episode lasted for about 15 minutes and had no other associated symptoms. Etiology not clear at this time. No focal motor or sensory deficits on exam. Electrolytes within normal limits. Troponin only mildly elevated at 46 and down-trending. EKG with A sensed V paced rhythm with no concerning ST/T changes so low suspicion for ischemia. ICD interrogation detected 225 ventricular sensed episodes, with the most recently stored episodes showing slow VT-NS at  bpm, runs lasting about 5 seconds. Unclear if these short runs would explain his 15-minute episodes. He is on dobutamine which has known arrhythmogenic effects so certainly worth monitoring closely, especially given his history of NSVT.   - Monitoring for recurrence while on telemetry   - Trend electrolytes and replete as needed       FEN: Low salt diet, replete lytes as needed   DVT PPx: heparin drip   Code: Full code      Disposition: Inpatient admission, listed for transplant on 12/12     Discussed with Dr. Schultz Jorge Reyes Castro, MD, MS  Cardiovascular disease fellow     Objective:   /79 (BP Location: Left arm, Cuff Size: Adult Regular)   Pulse 80   Temp 98  F (36.7  C) (Oral)   Resp 17   Ht 1.88 m (6' 2\")   Wt 86.5 kg (190 lb 9.6 oz)   SpO2 96%   BMI 24.47 kg/m        Wt:   Wt Readings from Last 5 Encounters:   12/18/22 86.5 kg (190 lb 9.6 oz)   11/21/22 90.9 kg (200 lb 6.4 oz)   11/10/22 108 kg (238 lb 1.6 oz)   11/08/22 95.7 kg (210 lb 14.4 oz)     Physical Exam   GEN: Patient laying in bed. Looks well.   HEENT: no icterus, EOMI  CV: RRR, normal s1/s2, soft systolic murmur best heard at LLSB. CVP below the clavicle at 45 " degrees. No longer oozing at the site of swan elsie catheter insertion.   CHEST: Non-labored breathing, good air movement bilaterally, no wheezes or crackles   ABD: soft, non-tender, non-distended   EXTR: warm extremities. No LE edema  NEURO: awake, alert, non-focal motor exam    DATA:   Labs, EKGs and imaging reviewed. Pertinent results discussed in assessment and plan above.

## 2022-12-19 ENCOUNTER — APPOINTMENT (OUTPATIENT)
Dept: GENERAL RADIOLOGY | Facility: CLINIC | Age: 67
DRG: 001 | End: 2022-12-19
Attending: STUDENT IN AN ORGANIZED HEALTH CARE EDUCATION/TRAINING PROGRAM
Payer: MEDICARE

## 2022-12-19 LAB
ALBUMIN SERPL BCG-MCNC: 3.4 G/DL (ref 3.5–5.2)
ALP SERPL-CCNC: 68 U/L (ref 40–129)
ALT SERPL W P-5'-P-CCNC: 35 U/L (ref 10–50)
ANION GAP SERPL CALCULATED.3IONS-SCNC: 10 MMOL/L (ref 7–15)
AST SERPL W P-5'-P-CCNC: 32 U/L (ref 10–50)
BASE EXCESS BLDV CALC-SCNC: -0.3 MMOL/L (ref -7.7–1.9)
BASE EXCESS BLDV CALC-SCNC: 0.6 MMOL/L (ref -7.7–1.9)
BASE EXCESS BLDV CALC-SCNC: 1.1 MMOL/L (ref -7.7–1.9)
BILIRUB DIRECT SERPL-MCNC: <0.2 MG/DL (ref 0–0.3)
BILIRUB SERPL-MCNC: 0.5 MG/DL
BUN SERPL-MCNC: 17.4 MG/DL (ref 8–23)
CALCIUM SERPL-MCNC: 9 MG/DL (ref 8.8–10.2)
CHLORIDE SERPL-SCNC: 103 MMOL/L (ref 98–107)
CREAT SERPL-MCNC: 0.87 MG/DL (ref 0.67–1.17)
DEPRECATED HCO3 PLAS-SCNC: 23 MMOL/L (ref 22–29)
ERYTHROCYTE [DISTWIDTH] IN BLOOD BY AUTOMATED COUNT: 13.9 % (ref 10–15)
GFR SERPL CREATININE-BSD FRML MDRD: >90 ML/MIN/1.73M2
GLUCOSE SERPL-MCNC: 90 MG/DL (ref 70–99)
HCO3 BLDV-SCNC: 23 MMOL/L (ref 21–28)
HCO3 BLDV-SCNC: 24 MMOL/L (ref 21–28)
HCO3 BLDV-SCNC: 25 MMOL/L (ref 21–28)
HCT VFR BLD AUTO: 41.9 % (ref 40–53)
HGB BLD-MCNC: 13.5 G/DL (ref 13.3–17.7)
LACTATE SERPL-SCNC: 1 MMOL/L (ref 0.7–2)
MAGNESIUM SERPL-MCNC: 2 MG/DL (ref 1.7–2.3)
MCH RBC QN AUTO: 28.8 PG (ref 26.5–33)
MCHC RBC AUTO-ENTMCNC: 32.2 G/DL (ref 31.5–36.5)
MCV RBC AUTO: 89 FL (ref 78–100)
MDC_IDC_EPISODE_DTM: NORMAL
MDC_IDC_EPISODE_DURATION: 12 S
MDC_IDC_EPISODE_DURATION: 17 S
MDC_IDC_EPISODE_DURATION: 18 S
MDC_IDC_EPISODE_DURATION: 28 S
MDC_IDC_EPISODE_DURATION: 4 S
MDC_IDC_EPISODE_DURATION: 5 S
MDC_IDC_EPISODE_DURATION: 5 S
MDC_IDC_EPISODE_DURATION: 6 S
MDC_IDC_EPISODE_DURATION: NORMAL S
MDC_IDC_EPISODE_ID: 1485
MDC_IDC_EPISODE_ID: 1486
MDC_IDC_EPISODE_ID: 1487
MDC_IDC_EPISODE_ID: 1488
MDC_IDC_EPISODE_ID: 1489
MDC_IDC_EPISODE_ID: 1490
MDC_IDC_EPISODE_ID: 1491
MDC_IDC_EPISODE_ID: 41
MDC_IDC_EPISODE_ID: 42
MDC_IDC_EPISODE_TYPE: NORMAL
MDC_IDC_LEAD_IMPLANT_DT: NORMAL
MDC_IDC_LEAD_LOCATION: NORMAL
MDC_IDC_LEAD_LOCATION_DETAIL_1: NORMAL
MDC_IDC_LEAD_MFG: NORMAL
MDC_IDC_LEAD_MODEL: NORMAL
MDC_IDC_LEAD_POLARITY_TYPE: NORMAL
MDC_IDC_LEAD_SERIAL: NORMAL
MDC_IDC_LEAD_SPECIAL_FUNCTION: NORMAL
MDC_IDC_MSMT_BATTERY_DTM: NORMAL
MDC_IDC_MSMT_BATTERY_REMAINING_LONGEVITY: 68 MO
MDC_IDC_MSMT_BATTERY_RRT_TRIGGER: 2.73
MDC_IDC_MSMT_BATTERY_STATUS: NORMAL
MDC_IDC_MSMT_BATTERY_VOLTAGE: 2.97 V
MDC_IDC_MSMT_CAP_CHARGE_DTM: NORMAL
MDC_IDC_MSMT_CAP_CHARGE_ENERGY: 18 J
MDC_IDC_MSMT_CAP_CHARGE_TIME: 3.95
MDC_IDC_MSMT_CAP_CHARGE_TYPE: NORMAL
MDC_IDC_MSMT_LEADCHNL_LV_IMPEDANCE_VALUE: 142.5 OHM
MDC_IDC_MSMT_LEADCHNL_LV_IMPEDANCE_VALUE: 147.1
MDC_IDC_MSMT_LEADCHNL_LV_IMPEDANCE_VALUE: 147.1
MDC_IDC_MSMT_LEADCHNL_LV_IMPEDANCE_VALUE: 285 OHM
MDC_IDC_MSMT_LEADCHNL_LV_IMPEDANCE_VALUE: 304 OHM
MDC_IDC_MSMT_LEADCHNL_LV_IMPEDANCE_VALUE: 399 OHM
MDC_IDC_MSMT_LEADCHNL_LV_IMPEDANCE_VALUE: 475 OHM
MDC_IDC_MSMT_LEADCHNL_LV_IMPEDANCE_VALUE: 513 OHM
MDC_IDC_MSMT_LEADCHNL_LV_IMPEDANCE_VALUE: 513 OHM
MDC_IDC_MSMT_LEADCHNL_LV_PACING_THRESHOLD_AMPLITUDE: 0.75 V
MDC_IDC_MSMT_LEADCHNL_LV_PACING_THRESHOLD_PULSEWIDTH: 1 MS
MDC_IDC_MSMT_LEADCHNL_RA_IMPEDANCE_VALUE: 418 OHM
MDC_IDC_MSMT_LEADCHNL_RA_PACING_THRESHOLD_AMPLITUDE: 0.5 V
MDC_IDC_MSMT_LEADCHNL_RA_PACING_THRESHOLD_PULSEWIDTH: 0.4 MS
MDC_IDC_MSMT_LEADCHNL_RA_SENSING_INTR_AMPL: 2.3 MV
MDC_IDC_MSMT_LEADCHNL_RV_IMPEDANCE_VALUE: 399 OHM
MDC_IDC_MSMT_LEADCHNL_RV_PACING_THRESHOLD_AMPLITUDE: 0.75 V
MDC_IDC_MSMT_LEADCHNL_RV_PACING_THRESHOLD_PULSEWIDTH: 0.4 MS
MDC_IDC_MSMT_LEADCHNL_RV_SENSING_INTR_AMPL: 6 MV
MDC_IDC_PG_IMPLANT_DTM: NORMAL
MDC_IDC_PG_MFG: NORMAL
MDC_IDC_PG_MODEL: NORMAL
MDC_IDC_PG_SERIAL: NORMAL
MDC_IDC_PG_TYPE: NORMAL
MDC_IDC_SESS_CLINIC_NAME: NORMAL
MDC_IDC_SESS_DTM: NORMAL
MDC_IDC_SESS_TYPE: NORMAL
MDC_IDC_SET_BRADY_AT_MODE_SWITCH_RATE: 171 {BEATS}/MIN
MDC_IDC_SET_BRADY_LOWRATE: 60 {BEATS}/MIN
MDC_IDC_SET_BRADY_MAX_SENSOR_RATE: 140 {BEATS}/MIN
MDC_IDC_SET_BRADY_MAX_TRACKING_RATE: 140 {BEATS}/MIN
MDC_IDC_SET_BRADY_MODE: NORMAL
MDC_IDC_SET_BRADY_PAV_DELAY_LOW: 170 MS
MDC_IDC_SET_BRADY_SAV_DELAY_LOW: 140 MS
MDC_IDC_SET_CRT_PACED_CHAMBERS: NORMAL
MDC_IDC_SET_LEADCHNL_LV_PACING_AMPLITUDE: 2 V
MDC_IDC_SET_LEADCHNL_LV_PACING_ANODE_ELECTRODE_1: NORMAL
MDC_IDC_SET_LEADCHNL_LV_PACING_ANODE_LOCATION_1: NORMAL
MDC_IDC_SET_LEADCHNL_LV_PACING_CAPTURE_MODE: NORMAL
MDC_IDC_SET_LEADCHNL_LV_PACING_CATHODE_ELECTRODE_1: NORMAL
MDC_IDC_SET_LEADCHNL_LV_PACING_CATHODE_LOCATION_1: NORMAL
MDC_IDC_SET_LEADCHNL_LV_PACING_POLARITY: NORMAL
MDC_IDC_SET_LEADCHNL_LV_PACING_PULSEWIDTH: 1 MS
MDC_IDC_SET_LEADCHNL_RA_PACING_AMPLITUDE: 1.5 V
MDC_IDC_SET_LEADCHNL_RA_PACING_ANODE_ELECTRODE_1: NORMAL
MDC_IDC_SET_LEADCHNL_RA_PACING_ANODE_LOCATION_1: NORMAL
MDC_IDC_SET_LEADCHNL_RA_PACING_CAPTURE_MODE: NORMAL
MDC_IDC_SET_LEADCHNL_RA_PACING_CATHODE_ELECTRODE_1: NORMAL
MDC_IDC_SET_LEADCHNL_RA_PACING_CATHODE_LOCATION_1: NORMAL
MDC_IDC_SET_LEADCHNL_RA_PACING_POLARITY: NORMAL
MDC_IDC_SET_LEADCHNL_RA_PACING_PULSEWIDTH: 0.4 MS
MDC_IDC_SET_LEADCHNL_RA_SENSING_ANODE_ELECTRODE_1: NORMAL
MDC_IDC_SET_LEADCHNL_RA_SENSING_ANODE_LOCATION_1: NORMAL
MDC_IDC_SET_LEADCHNL_RA_SENSING_CATHODE_ELECTRODE_1: NORMAL
MDC_IDC_SET_LEADCHNL_RA_SENSING_CATHODE_LOCATION_1: NORMAL
MDC_IDC_SET_LEADCHNL_RA_SENSING_POLARITY: NORMAL
MDC_IDC_SET_LEADCHNL_RA_SENSING_SENSITIVITY: 0.3 MV
MDC_IDC_SET_LEADCHNL_RV_PACING_AMPLITUDE: 2 V
MDC_IDC_SET_LEADCHNL_RV_PACING_ANODE_ELECTRODE_1: NORMAL
MDC_IDC_SET_LEADCHNL_RV_PACING_ANODE_LOCATION_1: NORMAL
MDC_IDC_SET_LEADCHNL_RV_PACING_CAPTURE_MODE: NORMAL
MDC_IDC_SET_LEADCHNL_RV_PACING_CATHODE_ELECTRODE_1: NORMAL
MDC_IDC_SET_LEADCHNL_RV_PACING_CATHODE_LOCATION_1: NORMAL
MDC_IDC_SET_LEADCHNL_RV_PACING_POLARITY: NORMAL
MDC_IDC_SET_LEADCHNL_RV_PACING_PULSEWIDTH: 0.4 MS
MDC_IDC_SET_LEADCHNL_RV_SENSING_ANODE_ELECTRODE_1: NORMAL
MDC_IDC_SET_LEADCHNL_RV_SENSING_ANODE_LOCATION_1: NORMAL
MDC_IDC_SET_LEADCHNL_RV_SENSING_CATHODE_ELECTRODE_1: NORMAL
MDC_IDC_SET_LEADCHNL_RV_SENSING_CATHODE_LOCATION_1: NORMAL
MDC_IDC_SET_LEADCHNL_RV_SENSING_POLARITY: NORMAL
MDC_IDC_SET_LEADCHNL_RV_SENSING_SENSITIVITY: 0.3 MV
MDC_IDC_SET_ZONE_DETECTION_BEATS_DENOMINATOR: 40 {BEATS}
MDC_IDC_SET_ZONE_DETECTION_BEATS_NUMERATOR: 30 {BEATS}
MDC_IDC_SET_ZONE_DETECTION_INTERVAL: 240 MS
MDC_IDC_SET_ZONE_DETECTION_INTERVAL: 320 MS
MDC_IDC_SET_ZONE_DETECTION_INTERVAL: 350 MS
MDC_IDC_SET_ZONE_DETECTION_INTERVAL: 360 MS
MDC_IDC_SET_ZONE_DETECTION_INTERVAL: 420 MS
MDC_IDC_SET_ZONE_TYPE: NORMAL
MDC_IDC_STAT_AT_BURDEN_PERCENT: 4.1 %
MDC_IDC_STAT_AT_DTM_END: NORMAL
MDC_IDC_STAT_AT_DTM_START: NORMAL
MDC_IDC_STAT_BRADY_AP_VP_PERCENT: 1.29 %
MDC_IDC_STAT_BRADY_AP_VS_PERCENT: 0.07 %
MDC_IDC_STAT_BRADY_AS_VP_PERCENT: 89.34 %
MDC_IDC_STAT_BRADY_AS_VS_PERCENT: 9.31 %
MDC_IDC_STAT_BRADY_DTM_END: NORMAL
MDC_IDC_STAT_BRADY_DTM_START: NORMAL
MDC_IDC_STAT_BRADY_RA_PERCENT_PACED: 1.33 %
MDC_IDC_STAT_BRADY_RV_PERCENT_PACED: 5.56 %
MDC_IDC_STAT_CRT_DTM_END: NORMAL
MDC_IDC_STAT_CRT_DTM_START: NORMAL
MDC_IDC_STAT_CRT_LV_PERCENT_PACED: 89.92 %
MDC_IDC_STAT_CRT_PERCENT_PACED: 89.92 %
MDC_IDC_STAT_EPISODE_RECENT_COUNT: 0
MDC_IDC_STAT_EPISODE_RECENT_COUNT: 1
MDC_IDC_STAT_EPISODE_RECENT_COUNT_DTM_END: NORMAL
MDC_IDC_STAT_EPISODE_RECENT_COUNT_DTM_START: NORMAL
MDC_IDC_STAT_EPISODE_TOTAL_COUNT: 0
MDC_IDC_STAT_EPISODE_TOTAL_COUNT: 2
MDC_IDC_STAT_EPISODE_TOTAL_COUNT: 38
MDC_IDC_STAT_EPISODE_TOTAL_COUNT_DTM_END: NORMAL
MDC_IDC_STAT_EPISODE_TOTAL_COUNT_DTM_START: NORMAL
MDC_IDC_STAT_EPISODE_TYPE: NORMAL
MDC_IDC_STAT_TACHYTHERAPY_ATP_DELIVERED_RECENT: 0
MDC_IDC_STAT_TACHYTHERAPY_ATP_DELIVERED_TOTAL: 0
MDC_IDC_STAT_TACHYTHERAPY_RECENT_DTM_END: NORMAL
MDC_IDC_STAT_TACHYTHERAPY_RECENT_DTM_START: NORMAL
MDC_IDC_STAT_TACHYTHERAPY_SHOCKS_ABORTED_RECENT: 0
MDC_IDC_STAT_TACHYTHERAPY_SHOCKS_ABORTED_TOTAL: 0
MDC_IDC_STAT_TACHYTHERAPY_SHOCKS_DELIVERED_RECENT: 0
MDC_IDC_STAT_TACHYTHERAPY_SHOCKS_DELIVERED_TOTAL: 0
MDC_IDC_STAT_TACHYTHERAPY_TOTAL_DTM_END: NORMAL
MDC_IDC_STAT_TACHYTHERAPY_TOTAL_DTM_START: NORMAL
O2/TOTAL GAS SETTING VFR VENT: 21 %
OXYHGB MFR BLDV: 69 % (ref 70–75)
OXYHGB MFR BLDV: 72 % (ref 70–75)
OXYHGB MFR BLDV: 72 % (ref 70–75)
PCO2 BLDV: 34 MM HG (ref 40–50)
PCO2 BLDV: 34 MM HG (ref 40–50)
PCO2 BLDV: 37 MM HG (ref 40–50)
PH BLDV: 7.44 [PH] (ref 7.32–7.43)
PH BLDV: 7.44 [PH] (ref 7.32–7.43)
PH BLDV: 7.46 [PH] (ref 7.32–7.43)
PLATELET # BLD AUTO: 160 10E3/UL (ref 150–450)
PO2 BLDV: 36 MM HG (ref 25–47)
PO2 BLDV: 37 MM HG (ref 25–47)
PO2 BLDV: 38 MM HG (ref 25–47)
POTASSIUM SERPL-SCNC: 4.1 MMOL/L (ref 3.4–5.3)
PROT SERPL-MCNC: 5.9 G/DL (ref 6.4–8.3)
RBC # BLD AUTO: 4.69 10E6/UL (ref 4.4–5.9)
SODIUM SERPL-SCNC: 136 MMOL/L (ref 136–145)
UFH PPP CHRO-ACNC: 0.41 IU/ML
WBC # BLD AUTO: 5.6 10E3/UL (ref 4–11)

## 2022-12-19 PROCEDURE — 71045 X-RAY EXAM CHEST 1 VIEW: CPT

## 2022-12-19 PROCEDURE — 80053 COMPREHEN METABOLIC PANEL: CPT | Performed by: STUDENT IN AN ORGANIZED HEALTH CARE EDUCATION/TRAINING PROGRAM

## 2022-12-19 PROCEDURE — 85027 COMPLETE CBC AUTOMATED: CPT | Performed by: STUDENT IN AN ORGANIZED HEALTH CARE EDUCATION/TRAINING PROGRAM

## 2022-12-19 PROCEDURE — 250N000013 HC RX MED GY IP 250 OP 250 PS 637

## 2022-12-19 PROCEDURE — 250N000012 HC RX MED GY IP 250 OP 636 PS 637: Performed by: STUDENT IN AN ORGANIZED HEALTH CARE EDUCATION/TRAINING PROGRAM

## 2022-12-19 PROCEDURE — 83605 ASSAY OF LACTIC ACID: CPT | Performed by: STUDENT IN AN ORGANIZED HEALTH CARE EDUCATION/TRAINING PROGRAM

## 2022-12-19 PROCEDURE — 250N000011 HC RX IP 250 OP 636: Performed by: STUDENT IN AN ORGANIZED HEALTH CARE EDUCATION/TRAINING PROGRAM

## 2022-12-19 PROCEDURE — 250N000013 HC RX MED GY IP 250 OP 250 PS 637: Performed by: STUDENT IN AN ORGANIZED HEALTH CARE EDUCATION/TRAINING PROGRAM

## 2022-12-19 PROCEDURE — 82248 BILIRUBIN DIRECT: CPT

## 2022-12-19 PROCEDURE — 83735 ASSAY OF MAGNESIUM: CPT | Performed by: INTERNAL MEDICINE

## 2022-12-19 PROCEDURE — 250N000012 HC RX MED GY IP 250 OP 636 PS 637: Performed by: EMERGENCY MEDICINE

## 2022-12-19 PROCEDURE — 85520 HEPARIN ASSAY: CPT | Performed by: INTERNAL MEDICINE

## 2022-12-19 PROCEDURE — 99291 CRITICAL CARE FIRST HOUR: CPT | Mod: GC | Performed by: STUDENT IN AN ORGANIZED HEALTH CARE EDUCATION/TRAINING PROGRAM

## 2022-12-19 PROCEDURE — 82805 BLOOD GASES W/O2 SATURATION: CPT | Performed by: STUDENT IN AN ORGANIZED HEALTH CARE EDUCATION/TRAINING PROGRAM

## 2022-12-19 PROCEDURE — 200N000002 HC R&B ICU UMMC

## 2022-12-19 PROCEDURE — 71045 X-RAY EXAM CHEST 1 VIEW: CPT | Mod: 26

## 2022-12-19 RX ORDER — MAGNESIUM OXIDE 400 MG/1
400 TABLET ORAL EVERY 4 HOURS
Status: COMPLETED | OUTPATIENT
Start: 2022-12-19 | End: 2022-12-19

## 2022-12-19 RX ORDER — HYDROMORPHONE HYDROCHLORIDE 1 MG/ML
0.3 INJECTION, SOLUTION INTRAMUSCULAR; INTRAVENOUS; SUBCUTANEOUS ONCE
Status: COMPLETED | OUTPATIENT
Start: 2022-12-19 | End: 2022-12-19

## 2022-12-19 RX ADMIN — MYCOPHENOLATE MOFETIL 500 MG: 500 TABLET, FILM COATED ORAL at 19:56

## 2022-12-19 RX ADMIN — DOBUTAMINE HYDROCHLORIDE 5 MCG/KG/MIN: 200 INJECTION INTRAVENOUS at 15:44

## 2022-12-19 RX ADMIN — FUROSEMIDE 20 MG: 20 TABLET ORAL at 07:55

## 2022-12-19 RX ADMIN — HYDRALAZINE HYDROCHLORIDE 75 MG: 50 TABLET, FILM COATED ORAL at 05:04

## 2022-12-19 RX ADMIN — HYDROMORPHONE HYDROCHLORIDE 0.3 MG: 1 INJECTION, SOLUTION INTRAMUSCULAR; INTRAVENOUS; SUBCUTANEOUS at 09:05

## 2022-12-19 RX ADMIN — ISOSORBIDE DINITRATE 20 MG: 20 TABLET ORAL at 18:06

## 2022-12-19 RX ADMIN — DOBUTAMINE HYDROCHLORIDE 5 MCG/KG/MIN: 200 INJECTION INTRAVENOUS at 01:12

## 2022-12-19 RX ADMIN — HEPARIN SODIUM 1300 UNITS/HR: 10000 INJECTION, SOLUTION INTRAVENOUS at 08:34

## 2022-12-19 RX ADMIN — AMIODARONE HYDROCHLORIDE 200 MG: 200 TABLET ORAL at 07:55

## 2022-12-19 RX ADMIN — ISOSORBIDE DINITRATE 20 MG: 20 TABLET ORAL at 11:07

## 2022-12-19 RX ADMIN — MAGNESIUM OXIDE TAB 400 MG (241.3 MG ELEMENTAL MG) 400 MG: 400 (241.3 MG) TAB at 11:08

## 2022-12-19 RX ADMIN — ASPIRIN 81 MG: 81 TABLET, COATED ORAL at 07:55

## 2022-12-19 RX ADMIN — MAGNESIUM OXIDE TAB 400 MG (241.3 MG ELEMENTAL MG) 400 MG: 400 (241.3 MG) TAB at 14:51

## 2022-12-19 RX ADMIN — ISOSORBIDE DINITRATE 20 MG: 20 TABLET ORAL at 07:55

## 2022-12-19 RX ADMIN — HYDRALAZINE HYDROCHLORIDE 75 MG: 50 TABLET, FILM COATED ORAL at 21:56

## 2022-12-19 RX ADMIN — MYCOPHENOLATE MOFETIL 1000 MG: 500 TABLET, FILM COATED ORAL at 07:55

## 2022-12-19 RX ADMIN — POTASSIUM CHLORIDE 20 MEQ: 750 TABLET, EXTENDED RELEASE ORAL at 07:55

## 2022-12-19 RX ADMIN — POLYETHYLENE GLYCOL 3350 17 G: 17 POWDER, FOR SOLUTION ORAL at 15:44

## 2022-12-19 RX ADMIN — PRAVASTATIN SODIUM 20 MG: 20 TABLET ORAL at 07:55

## 2022-12-19 RX ADMIN — HYDRALAZINE HYDROCHLORIDE 75 MG: 50 TABLET, FILM COATED ORAL at 14:51

## 2022-12-19 RX ADMIN — TRAZODONE HYDROCHLORIDE 50 MG: 50 TABLET ORAL at 21:56

## 2022-12-19 ASSESSMENT — ACTIVITIES OF DAILY LIVING (ADL)
ADLS_ACUITY_SCORE: 42
ADLS_ACUITY_SCORE: 39
ADLS_ACUITY_SCORE: 42
ADLS_ACUITY_SCORE: 39
ADLS_ACUITY_SCORE: 42

## 2022-12-19 NOTE — PROGRESS NOTES
[facility]    Advanced Heart Failure Progress Note    Assessment and Plan:     Pcao Stein is a 67 year old male with a history of chronic systolic heart failure (suspected predominantly nonischemic cardiomyopathy, possibly arrhythmogenic), coronary artery disease (s/p PCI with TILA to LCx 4/2013 and to the LAD in 10/2021), hyperlipidemia, CNS vasculitis and chronic kidney disease who presents with transient bilateral hand tightness with unrevealing workup, now admitted for continued advanced therapies evaluation. Patient was listed for heart transplant (status 2) on 12/12/2022.       Interval History:  - No acute events overnight  - Intermittent cough has improved.   - No chest pain, palpitations or SOB.     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 No episodes since. Neurological exam unremarkable. Neurology consulted during recent admission as part of VAD/transplant evaluation. MRI/MRA brain was also performed which showed no concern for progression of disease  - Continue PTA cellcept 1000mg qam and 500mg qpm    # Incidental punctate cerebellar infarct, likely embolic  Read on MR imaging 11/14/22 without new neuro deficits. Per radiology, appears acute, likely embolic. No known arterial clot history. Reviewed telemetry over admission, no afib or other tachycarrhythmias. Reassuring prior TTE this admission including bubble study, repeat JARED w/o septal defects or thrombus. Treated w/ heparin gtt and transitioned to Eliquis.  - Continue heparin drip while awaiting transplant      # Insomnia/anxiety  - PRN trazodone and lorazepam        ===CARDIOVASCULAR===  # Advanced chronic systolic heart failure, EF 15-20%, NYHA class IV, ACC/AHA Stage D  # Ambulatory cardiogenic shock, on dobutamine drip as bride to transplant  Recently admitted from 11/10/2022 - 11/21/2022 for acute on chronic heart failure, treated with IV diuresis and initiated on dobutamine infusion. Patient completed advanced therapy evaluation during that admission and and is now re-admitted for continued evaluation. Patient was listed for heart transplant (status 2) on 12/12/2022. 12/08/2022 RHC: RA 16, PA 50/26/35, PCWP 26, Aisha CO/CI 3.1/1.44.     - Etiology: Predominantly non-ischemic cardiomyopathy, suspected arrhythmogenic   - Volume: Intermittent PO/IV lasix, guided by swan numbers    - Inotrope: dobutamine to 5 mcg/kg/min   - Afterload: continue PO hydralazine from 50 to 75 mg and isordil 10 to 20 mg every 8 hours   - BB: held in the setting of shock  - ACE/ARB/ARNi: Previous intolerance due to hypotension and lightheadedness   - SGLT2i: stop PTA Empagliflozin 10 mg qday (12/17) (to minimize UTI risk while waiting for transplant)  - MRA: None  - Statin: PTA pravastatin  - Electrolytes: PTA 20 meq KCl daily, trending and replacing as needed as well   - Transplant evaluation: listed for transplant on 12/12 (status 2)  - [place] Aniceto hemodynamics:  Date RA PA PCWP MAP SvO2  CO/CI SVR PVR Therapy   12/9 (cath lab) 5 32/10/19 15 102 53 2.8/1.3 2228 1.4 Dobutamine 5 mcg/kg/min  Furosemide 10 mg/hr   12/10 AM 4 26/10/16 10 86 62 3.7/1.8 1600 1.6 Dobutamine 5 mcg/kg/min  Furosemide 10 mg/hr   12/10 PM   10 35/15/22 14      Dobutamine 5 mcg/kg/min   12/11 AM 6 40/15/23 13 97 64 3.7/1.8 1960 2.7 Dobutamine 5 mcg/kg/min   12/11 PM 6 30/15/20 10 83 61 3.4/1.7 1900  Dobutamine 5 mcg/kg/min  Nitroprusside 0.25 mcg/kg/min   12/12 AM 6 26/10/15 10 78 71 5.3/2.6 1100 0.9 Dobutamine 5 mcg/kg/min  Nitroprusside 0.75 mcg/kg/min   12/12 PM 10 45/20/28 18 83 51 2.9/1.4 2100 3.4 Dobutamine 5  mcg/kg/min   12/13 PM 8 40/16/24 15 92 61 3.5/1.7 1860 2.6 Dobutamine 5 mcg/kg/min  Hydralazine 25 mg Q8H   12/14 AM 8 40/18/25 16 90 68 5.2/2.4 1300 1.8 Dobutamine 5 mcg/kg/min  Hydralazine 25 mg Q8H   12/15/ AM 10 46/18/27 18 90 67 5.1/2.3 1300 1.8 Dobutamine 5 mcg/kg/min  Hydralazine 25 mg Q8H               12/16 AM 12 40/18/25 16 93 59 3.6/1.7 1700 2.5 Dobutamine 5 mcg/kg/min  Hydralazine 25 mg Q8H   12/17 4 28/16/14 12 92 62 4.1/1.9 1610 2.4 Dobutamine 5 mcg/kg/min  Hydralazine 50 mg Q8H   12/18 5 34/16/23 14 86 68 5.3/2.5 1339 1.7 Dobutamine 5 5 mcg/kg/min  Hydralazine 75 mg Q8H   12/19 4 38/20/26 18 72 72 5.8/2.7 1317 1.4 Dobutamine 5 5 mcg/kg/min  Hydralazine 75 mg Q8H       # Multivessel coronary artery disease (s/p PCI with TILA to LCx 4/2013 and to the LAD in 10/2021)  11/11/22 [facility] showed patent LAD and LCx stents as well as mild-mod non-obstructive disease in the RCA.   - Continue PTA: ASA 81 mg qday  - BB: Holding as per above  - Statin: Cont PTA pravastatin     # History of NSVT s/p biventricular PPM/ICD  Interrogation performed 12/10/22 w/ two short (<5s) runs of NSVT on 11/1/22 and 11/3/22, no recent shocks. A-paced.   - Decrease dose of amiodarone from 200 mg BID to 200 mg daily on 12/16 as pre-transplant amiodarone use is associated with increased risk of graft dysfunction    ===PULMONARY===  # Non-productive cough, acute on chronic, improved   - PRN cough lozenges  - continue tessalon pearls  - consult SLP; no signs of aspiration    ===GI===  # Severe malnutrition in the context of acute illness/injury on chronic illness  - Appreciate assistance from RD        ===RENAL===  # CKD stage 2  Creatinine at baseline  - trend BMP      ===HEME/ONC===  # Occlusive left peroneal DVT  # Non-occlusive right axillary DVT  Identified on doppler 11/15/22 during previous admission.   - Continue heparin drip in place of PTA apixaban while awaiting transplant       ===ENDOCRINE===  No active concerns  "      INFECTIOUS DISEASE  No active concerns       ===SKIN/MSK===  # Bilateral hand tightness, resolved  Experienced two episodes prior to admission, once while performing PT exercises and again when he woke up the following morning. Each episode lasted for about 15 minutes and had no other associated symptoms. Etiology not clear at this time. No focal motor or sensory deficits on exam. Electrolytes within normal limits. Troponin only mildly elevated at 46 and down-trending. EKG with A sensed V paced rhythm with no concerning ST/T changes so low suspicion for ischemia. ICD interrogation detected 225 ventricular sensed episodes, with the most recently stored episodes showing slow VT-NS at  bpm, runs lasting about 5 seconds. Unclear if these short runs would explain his 15-minute episodes. He is on dobutamine which has known arrhythmogenic effects so certainly worth monitoring closely, especially given his history of NSVT.   - Monitoring for recurrence while on telemetry   - Trend electrolytes and replete as needed       FEN: Low salt diet, replete lytes as needed   DVT PPx: heparin drip   Code: Full code      Disposition: Inpatient admission, listed for transplant on 12/12     Discussed with Dr. Schultz Jorge Reyes Castro, MD, MS  Cardiovascular disease fellow     Objective:   /63   Pulse 79   Temp 98.2  F (36.8  C) (Axillary)   Resp 18   Ht 1.88 m (6' 2\")   Wt 87.4 kg (192 lb 11.2 oz)   SpO2 95%   BMI 24.74 kg/m        Wt:   Wt Readings from Last 5 Encounters:   12/19/22 87.4 kg (192 lb 11.2 oz)   11/21/22 90.9 kg (200 lb 6.4 oz)   11/10/22 108 kg (238 lb 1.6 oz)   11/08/22 95.7 kg (210 lb 14.4 oz)     Physical Exam   GEN: Patient laying in bed. Eating breakfast comfortably. Looks well rested  HEENT: no icterus, EOMI  CV: RRR, normal s1/s2, soft systolic murmur best heard at LLSB. CVP below the clavicle at 45 degrees. No longer oozing at the site of swan elsie catheter insertion.   CHEST: " Non-labored breathing, good air movement bilaterally, no wheezes or crackles   ABD: soft, non-tender, non-distended   EXTR: warm extremities. No LE edema  NEURO: awake, alert, non-focal motor exam    DATA:   Labs, EKGs and imaging reviewed. Pertinent results discussed in assessment and plan above.

## 2022-12-19 NOTE — PLAN OF CARE
Major Shift Events:  Remains neurologically intact with no deficits. Offers no complaints of pain. Ventricular paced 100% w/ rate 70-80s; pacer settings DDDR . Met MAP goal >65 w/o complication. Afebrile. CAROLYN numbers at 0400 stable- CO 5.8 and CI 2.7 w/ PAP 36/8 (22) and CVP 10. RA w/ clear LS. Continues to have dry cough, but improving. Utilizing urinal w/ good UOP. No BM overnight. Dobutamine @5mcg/kg/min and heparin @1300units.      Plan: Continue to monitor CAROLYN numbers and hemodynamic status. Awaiting heart transplant. Continue with current POC and notify team of changes.     For vital signs and complete assessments, please see documentation flowsheets.

## 2022-12-19 NOTE — PLAN OF CARE
ICU End of Shift Summary. See flowsheets for vital signs and detailed assessment.    Changes this shift: VSS on room air.  Trending CAROLYN numbers.  CO at 1600 5.2 and CI at 1600 is 2.4.  Alert and oriented. Went on multiple walks in the hallway today.  Good appetite, ate all 3 meals.  Dobutamine remains at straight rate of 5.  Heparin gtt therapeutic at 1300.    Plan: continue to trend CAROLYN numbers. Patient is on heart transplant list.       Goal Outcome Evaluation: ongoing

## 2022-12-19 NOTE — PLAN OF CARE
ICU End of Shift Summary. See flowsheets for vital signs and detailed assessment.    Changes this shift: Hemodynamic numbers improved today. Dobutamine at 5 (straight rate) and Heparin gtt at 1300 units/hr. Next Heparin 10A in the morning. Dry cough has spontaneously resolved. VS stable, pt on room air. Denies any pain.  Pt received lasix 20 mg today, urine output 975 ml since midnight. However, pt is net positive 1 liter for the day. Ambulated in the hallway x 3 today, tolerated well. Appetite seems to be improved as well. [facility] consult per pt's request to see clergy regularly for Communion.  Pt's wife at bedside and is involved with pt's cares.    Plan:  Continue current plan of care. Listed for heart transplant.    Goal Outcome Evaluation:    Problem: Plan of Care - These are the overarching goals to be used throughout the patient stay.    Goal: Absence of Hospital-Acquired Illness or Injury  Intervention: Identify and Manage Fall Risk  Recent Flowsheet Documentation  Taken 12/18/2022 1600 by Nemo Santos RN  Safety Promotion/Fall Prevention:   activity supervised   clutter free environment maintained   lighting adjusted   patient and family education   room near nurse's station   room organization consistent  Taken 12/18/2022 1200 by Nemo Santos RN  Safety Promotion/Fall Prevention:   activity supervised   clutter free environment maintained   lighting adjusted   patient and family education   room near nurse's station   room organization consistent  Taken 12/18/2022 0800 by Nemo Santos RN  Safety Promotion/Fall Prevention:   activity supervised   clutter free environment maintained   lighting adjusted   patient and family education   room near nurse's station   room organization consistent  Intervention: Prevent Skin Injury  Recent Flowsheet Documentation  Taken 12/18/2022 1800 by Nemo Santos RN  Body Position:   position changed independently   foot of bed  elevated   heels elevated   legs elevated   lower extremity elevated   upper extremity elevated   weight shifting  Taken 12/18/2022 1700 by Nemo Santos RN  Body Position:   position changed independently   foot of bed elevated   heels elevated   legs elevated   lower extremity elevated   upper extremity elevated   weight shifting  Taken 12/18/2022 1600 by Nemo Santos RN  Body Position:   position changed independently   foot of bed elevated   heels elevated   legs elevated   lower extremity elevated   upper extremity elevated   weight shifting  Taken 12/18/2022 1200 by Nemo Santos RN  Body Position:   position changed independently   foot of bed elevated   heels elevated   legs elevated   lower extremity elevated   upper extremity elevated   weight shifting  Taken 12/18/2022 0800 by Nemo Santos RN  Body Position:   position changed independently   foot of bed elevated   heels elevated   legs elevated   lower extremity elevated   upper extremity elevated   weight shifting  Intervention: Prevent and Manage VTE (Venous Thromboembolism) Risk  Recent Flowsheet Documentation  Taken 12/18/2022 1600 by Nemo Santos RN  VTE Prevention/Management:   SCDs (sequential compression devices) off   patient refused intervention  Taken 12/18/2022 1200 by Nemo Santos RN  VTE Prevention/Management:   SCDs (sequential compression devices) off   patient refused intervention  Taken 12/18/2022 0800 by Nemo Santos RN  VTE Prevention/Management:   SCDs (sequential compression devices) off   patient refused intervention  Goal: Optimal Comfort and Wellbeing  Intervention: Provide Person-Centered Care  Recent Flowsheet Documentation  Taken 12/18/2022 1600 by Nemo Santos RN  Trust Relationship/Rapport:   care explained   choices provided   emotional support provided   empathic listening provided   questions answered   questions encouraged   reassurance provided   thoughts/feelings  acknowledged  Taken 12/18/2022 1200 by Nemo Santos, RN  Trust Relationship/Rapport:   care explained   choices provided   emotional support provided   empathic listening provided   questions answered   questions encouraged   reassurance provided   thoughts/feelings acknowledged  Taken 12/18/2022 0800 by Nemo Santos, RN  Trust Relationship/Rapport:   care explained   choices provided   emotional support provided   empathic listening provided   questions answered   questions encouraged   reassurance provided   thoughts/feelings acknowledged

## 2022-12-20 ENCOUNTER — APPOINTMENT (OUTPATIENT)
Dept: GENERAL RADIOLOGY | Facility: CLINIC | Age: 67
DRG: 001 | End: 2022-12-20
Attending: STUDENT IN AN ORGANIZED HEALTH CARE EDUCATION/TRAINING PROGRAM
Payer: MEDICARE

## 2022-12-20 ENCOUNTER — APPOINTMENT (OUTPATIENT)
Dept: PHYSICAL THERAPY | Facility: CLINIC | Age: 67
DRG: 001 | End: 2022-12-20
Attending: STUDENT IN AN ORGANIZED HEALTH CARE EDUCATION/TRAINING PROGRAM
Payer: MEDICARE

## 2022-12-20 LAB
ALBUMIN SERPL BCG-MCNC: 3.4 G/DL (ref 3.5–5.2)
ALP SERPL-CCNC: 69 U/L (ref 40–129)
ALT SERPL W P-5'-P-CCNC: 46 U/L (ref 10–50)
ANION GAP SERPL CALCULATED.3IONS-SCNC: 12 MMOL/L (ref 7–15)
AST SERPL W P-5'-P-CCNC: 47 U/L (ref 10–50)
BASE EXCESS BLDV CALC-SCNC: -3.9 MMOL/L (ref -7.7–1.9)
BASE EXCESS BLDV CALC-SCNC: 0.6 MMOL/L (ref -7.7–1.9)
BASE EXCESS BLDV CALC-SCNC: 2.6 MMOL/L (ref -7.7–1.9)
BILIRUB DIRECT SERPL-MCNC: <0.2 MG/DL (ref 0–0.3)
BILIRUB SERPL-MCNC: 0.4 MG/DL
BUN SERPL-MCNC: 15.7 MG/DL (ref 8–23)
CALCIUM SERPL-MCNC: 9 MG/DL (ref 8.8–10.2)
CHLORIDE SERPL-SCNC: 104 MMOL/L (ref 98–107)
CREAT SERPL-MCNC: 0.8 MG/DL (ref 0.67–1.17)
DEPRECATED HCO3 PLAS-SCNC: 22 MMOL/L (ref 22–29)
ERYTHROCYTE [DISTWIDTH] IN BLOOD BY AUTOMATED COUNT: 14 % (ref 10–15)
GFR SERPL CREATININE-BSD FRML MDRD: >90 ML/MIN/1.73M2
GLUCOSE SERPL-MCNC: 88 MG/DL (ref 70–99)
HCO3 BLDV-SCNC: 20 MMOL/L (ref 21–28)
HCO3 BLDV-SCNC: 25 MMOL/L (ref 21–28)
HCO3 BLDV-SCNC: 27 MMOL/L (ref 21–28)
HCT VFR BLD AUTO: 41.6 % (ref 40–53)
HGB BLD-MCNC: 13.4 G/DL (ref 13.3–17.7)
LACTATE SERPL-SCNC: 1 MMOL/L (ref 0.7–2)
MAGNESIUM SERPL-MCNC: 1.9 MG/DL (ref 1.7–2.3)
MCH RBC QN AUTO: 28.4 PG (ref 26.5–33)
MCHC RBC AUTO-ENTMCNC: 32.2 G/DL (ref 31.5–36.5)
MCV RBC AUTO: 88 FL (ref 78–100)
O2/TOTAL GAS SETTING VFR VENT: 21 %
OXYHGB MFR BLDV: 68 % (ref 70–75)
OXYHGB MFR BLDV: 70 % (ref 70–75)
OXYHGB MFR BLDV: 71 % (ref 70–75)
PCO2 BLDV: 30 MM HG (ref 40–50)
PCO2 BLDV: 38 MM HG (ref 40–50)
PCO2 BLDV: 39 MM HG (ref 40–50)
PH BLDV: 7.42 [PH] (ref 7.32–7.43)
PH BLDV: 7.42 [PH] (ref 7.32–7.43)
PH BLDV: 7.45 [PH] (ref 7.32–7.43)
PHOSPHATE SERPL-MCNC: 3.2 MG/DL (ref 2.5–4.5)
PLATELET # BLD AUTO: 155 10E3/UL (ref 150–450)
PO2 BLDV: 36 MM HG (ref 25–47)
PO2 BLDV: 37 MM HG (ref 25–47)
PO2 BLDV: 38 MM HG (ref 25–47)
POTASSIUM SERPL-SCNC: 4.1 MMOL/L (ref 3.4–5.3)
PROT SERPL-MCNC: 5.9 G/DL (ref 6.4–8.3)
RBC # BLD AUTO: 4.72 10E6/UL (ref 4.4–5.9)
SODIUM SERPL-SCNC: 138 MMOL/L (ref 136–145)
UFH PPP CHRO-ACNC: 0.45 IU/ML
WBC # BLD AUTO: 5.6 10E3/UL (ref 4–11)

## 2022-12-20 PROCEDURE — 250N000011 HC RX IP 250 OP 636: Performed by: STUDENT IN AN ORGANIZED HEALTH CARE EDUCATION/TRAINING PROGRAM

## 2022-12-20 PROCEDURE — 250N000012 HC RX MED GY IP 250 OP 636 PS 637: Performed by: EMERGENCY MEDICINE

## 2022-12-20 PROCEDURE — 71045 X-RAY EXAM CHEST 1 VIEW: CPT | Mod: 26 | Performed by: RADIOLOGY

## 2022-12-20 PROCEDURE — 83735 ASSAY OF MAGNESIUM: CPT

## 2022-12-20 PROCEDURE — 85027 COMPLETE CBC AUTOMATED: CPT | Performed by: STUDENT IN AN ORGANIZED HEALTH CARE EDUCATION/TRAINING PROGRAM

## 2022-12-20 PROCEDURE — 200N000002 HC R&B ICU UMMC

## 2022-12-20 PROCEDURE — 97530 THERAPEUTIC ACTIVITIES: CPT | Mod: GP

## 2022-12-20 PROCEDURE — 85520 HEPARIN ASSAY: CPT | Performed by: INTERNAL MEDICINE

## 2022-12-20 PROCEDURE — 250N000012 HC RX MED GY IP 250 OP 636 PS 637: Performed by: STUDENT IN AN ORGANIZED HEALTH CARE EDUCATION/TRAINING PROGRAM

## 2022-12-20 PROCEDURE — 99291 CRITICAL CARE FIRST HOUR: CPT | Mod: GC | Performed by: STUDENT IN AN ORGANIZED HEALTH CARE EDUCATION/TRAINING PROGRAM

## 2022-12-20 PROCEDURE — 80053 COMPREHEN METABOLIC PANEL: CPT | Performed by: STUDENT IN AN ORGANIZED HEALTH CARE EDUCATION/TRAINING PROGRAM

## 2022-12-20 PROCEDURE — 97110 THERAPEUTIC EXERCISES: CPT | Mod: GP

## 2022-12-20 PROCEDURE — 84100 ASSAY OF PHOSPHORUS: CPT | Performed by: INTERNAL MEDICINE

## 2022-12-20 PROCEDURE — 83605 ASSAY OF LACTIC ACID: CPT | Performed by: STUDENT IN AN ORGANIZED HEALTH CARE EDUCATION/TRAINING PROGRAM

## 2022-12-20 PROCEDURE — 71045 X-RAY EXAM CHEST 1 VIEW: CPT

## 2022-12-20 PROCEDURE — 250N000013 HC RX MED GY IP 250 OP 250 PS 637: Performed by: STUDENT IN AN ORGANIZED HEALTH CARE EDUCATION/TRAINING PROGRAM

## 2022-12-20 PROCEDURE — 82805 BLOOD GASES W/O2 SATURATION: CPT | Performed by: STUDENT IN AN ORGANIZED HEALTH CARE EDUCATION/TRAINING PROGRAM

## 2022-12-20 PROCEDURE — 82248 BILIRUBIN DIRECT: CPT

## 2022-12-20 PROCEDURE — 999N000065 XR CHEST PORT 1 VIEW

## 2022-12-20 PROCEDURE — 250N000013 HC RX MED GY IP 250 OP 250 PS 637: Performed by: INTERNAL MEDICINE

## 2022-12-20 RX ORDER — HYDROMORPHONE HYDROCHLORIDE 1 MG/ML
0.5 INJECTION, SOLUTION INTRAMUSCULAR; INTRAVENOUS; SUBCUTANEOUS
Status: COMPLETED | OUTPATIENT
Start: 2022-12-20 | End: 2022-12-20

## 2022-12-20 RX ORDER — MAGNESIUM OXIDE 400 MG/1
400 TABLET ORAL EVERY 4 HOURS
Status: COMPLETED | OUTPATIENT
Start: 2022-12-20 | End: 2022-12-20

## 2022-12-20 RX ADMIN — PRAVASTATIN SODIUM 20 MG: 20 TABLET ORAL at 08:01

## 2022-12-20 RX ADMIN — ASPIRIN 81 MG: 81 TABLET, COATED ORAL at 08:01

## 2022-12-20 RX ADMIN — MAGNESIUM OXIDE TAB 400 MG (241.3 MG ELEMENTAL MG) 400 MG: 400 (241.3 MG) TAB at 06:00

## 2022-12-20 RX ADMIN — HYDRALAZINE HYDROCHLORIDE 75 MG: 50 TABLET, FILM COATED ORAL at 22:34

## 2022-12-20 RX ADMIN — MYCOPHENOLATE MOFETIL 500 MG: 500 TABLET, FILM COATED ORAL at 19:43

## 2022-12-20 RX ADMIN — ISOSORBIDE DINITRATE 20 MG: 20 TABLET ORAL at 11:56

## 2022-12-20 RX ADMIN — MYCOPHENOLATE MOFETIL 1000 MG: 500 TABLET, FILM COATED ORAL at 08:01

## 2022-12-20 RX ADMIN — ISOSORBIDE DINITRATE 20 MG: 20 TABLET ORAL at 08:01

## 2022-12-20 RX ADMIN — AMIODARONE HYDROCHLORIDE 200 MG: 200 TABLET ORAL at 08:01

## 2022-12-20 RX ADMIN — ISOSORBIDE DINITRATE 20 MG: 20 TABLET ORAL at 17:33

## 2022-12-20 RX ADMIN — HEPARIN SODIUM 1300 UNITS/HR: 10000 INJECTION, SOLUTION INTRAVENOUS at 22:32

## 2022-12-20 RX ADMIN — DOBUTAMINE HYDROCHLORIDE 5 MCG/KG/MIN: 200 INJECTION INTRAVENOUS at 09:10

## 2022-12-20 RX ADMIN — MAGNESIUM OXIDE TAB 400 MG (241.3 MG ELEMENTAL MG) 400 MG: 400 (241.3 MG) TAB at 09:11

## 2022-12-20 RX ADMIN — HEPARIN SODIUM 1300 UNITS/HR: 10000 INJECTION, SOLUTION INTRAVENOUS at 03:16

## 2022-12-20 RX ADMIN — POTASSIUM CHLORIDE 20 MEQ: 750 TABLET, EXTENDED RELEASE ORAL at 08:01

## 2022-12-20 RX ADMIN — HYDRALAZINE HYDROCHLORIDE 75 MG: 50 TABLET, FILM COATED ORAL at 06:00

## 2022-12-20 RX ADMIN — HYDROMORPHONE HYDROCHLORIDE 0.5 MG: 1 INJECTION, SOLUTION INTRAMUSCULAR; INTRAVENOUS; SUBCUTANEOUS at 09:29

## 2022-12-20 RX ADMIN — HYDRALAZINE HYDROCHLORIDE 75 MG: 50 TABLET, FILM COATED ORAL at 15:15

## 2022-12-20 ASSESSMENT — ACTIVITIES OF DAILY LIVING (ADL)
ADLS_ACUITY_SCORE: 39

## 2022-12-20 NOTE — PROGRESS NOTES
[facility]    Advanced Heart Failure Progress Note    Assessment and Plan:     Paco Stein is a 67 year old male with a history of chronic systolic heart failure (suspected predominantly nonischemic cardiomyopathy, possibly arrhythmogenic), coronary artery disease (s/p PCI with TILA to LCx 4/2013 and to the LAD in 10/2021), hyperlipidemia, CNS vasculitis and chronic kidney disease who presents with transient bilateral hand tightness with unrevealing workup, now admitted for continued advanced therapies evaluation. Patient was listed for heart transplant (status 2) on 12/12/2022.       Interval History:  - No acute events overnight  - cough has resolved  - No chest pain, palpitation or SOB. Ambulating in the mornings around the unit.     AM [place] numbers: CVP 4, PA 38/20/26, CO/CI: 5.8/2.7, Mix venous gas 72    Plan:   - continue holding diuretics.   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 No episodes since. Neurological exam unremarkable. Neurology consulted during recent admission as part of VAD/transplant evaluation. MRI/MRA brain was also performed which showed no concern for progression of disease  - Continue PTA cellcept 1000mg qam and 500mg qpm    # Incidental punctate cerebellar infarct, likely embolic  Read on MR imaging 11/14/22 without new neuro deficits. Per radiology, appears acute, likely embolic. No known arterial clot history. Reviewed telemetry over admission, no afib or other tachycarrhythmias. Reassuring prior TTE this admission including bubble study, repeat JARED w/o septal defects or thrombus. Treated w/ heparin gtt and transitioned to Eliquis.  -  Continue heparin drip while awaiting transplant      # Insomnia/anxiety  - PRN trazodone and lorazepam       ===CARDIOVASCULAR===  # Advanced chronic systolic heart failure, EF 15-20%, NYHA class IV, ACC/AHA Stage D  # Ambulatory cardiogenic shock, on dobutamine drip as bride to transplant  Recently admitted from 11/10/2022 - 11/21/2022 for acute on chronic heart failure, treated with IV diuresis and initiated on dobutamine infusion. Patient completed advanced therapy evaluation during that admission and and is now re-admitted for continued evaluation. Patient was listed for heart transplant (status 2) on 12/12/2022. 12/08/2022 RHC: RA 16, PA 50/26/35, PCWP 26, Aisha CO/CI 3.1/1.44.     - Etiology: Predominantly non-ischemic cardiomyopathy, suspected arrhythmogenic   - Volume: Intermittent PO/IV lasix, guided by swan numbers    - Inotrope: continue dobutamine to 5 mcg/kg/min   - Afterload: continue PO hydralazine from 50 to 75 mg and isordil 10 to 20 mg every 8 hours   - BB: held in the setting of shock  - ACE/ARB/ARNi: Previous intolerance due to hypotension and lightheadedness   - SGLT2i: stop PTA Empagliflozin 10 mg qday (12/17) (to minimize UTI risk while waiting for transplant)  - MRA: None  - Statin: PTA pravastatin  - Electrolytes: PTA 20 meq KCl daily, trending and replacing as needed as well   - Transplant evaluation: listed for transplant on 12/12 (status 2)  - [place] Aniceto hemodynamics:  Date RA PA PCWP MAP SvO2  CO/CI SVR PVR Therapy   12/9 (cath lab) 5 32/10/19 15 102 53 2.8/1.3 2228 1.4 Dobutamine 5 mcg/kg/min  Furosemide 10 mg/hr   12/10 AM 4 26/10/16 10 86 62 3.7/1.8 1600 1.6 Dobutamine 5 mcg/kg/min  Furosemide 10 mg/hr   12/10 PM   10 35/15/22 14      Dobutamine 5 mcg/kg/min   12/11 AM 6 40/15/23 13 97 64 3.7/1.8 1960 2.7 Dobutamine 5 mcg/kg/min   12/11 PM 6 30/15/20 10 83 61 3.4/1.7 1900  Dobutamine 5 mcg/kg/min  Nitroprusside 0.25 mcg/kg/min   12/12 AM 6 26/10/15 10 78 71 5.3/2.6 1100 0.9 Dobutamine 5  mcg/kg/min  Nitroprusside 0.75 mcg/kg/min   12/12 PM 10 45/20/28 18 83 51 2.9/1.4 2100 3.4 Dobutamine 5 mcg/kg/min   12/13 PM 8 40/16/24 15 92 61 3.5/1.7 1860 2.6 Dobutamine 5 mcg/kg/min  Hydralazine 25 mg Q8H   12/14 AM 8 40/18/25 16 90 68 5.2/2.4 1300 1.8 Dobutamine 5 mcg/kg/min  Hydralazine 25 mg Q8H   12/15/ AM 10 46/18/27 18 90 67 5.1/2.3 1300 1.8 Dobutamine 5 mcg/kg/min  Hydralazine 25 mg Q8H               12/16 AM 12 40/18/25 16 93 59 3.6/1.7 1700 2.5 Dobutamine 5 mcg/kg/min  Hydralazine 25 mg Q8H   12/17 4 28/16/14 12 92 62 4.1/1.9 1610 2.4 Dobutamine 5 mcg/kg/min  Hydralazine 50 mg Q8H   12/18 5 34/16/23 14 86 68 5.3/2.5 1339 1.7 Dobutamine 5 5 mcg/kg/min  Hydralazine 75 mg Q8H   12/19 4 38/20/26 18 72 72 5.8/2.7 1317 1.4 Dobutamine 5 5 mcg/kg/min  Hydralazine 75 mg Q8H   12/20 5 40/20/26 18 80 68 4.3/2.3 1347 1.8 Dobutamine 5 5 mcg/kg/min  Hydralazine 75 mg Q8H       # Multivessel coronary artery disease (s/p PCI with TILA to LCx 4/2013 and to the LAD in 10/2021)  11/11/22 [facility] showed patent LAD and LCx stents as well as mild-mod non-obstructive disease in the RCA.   - Continue PTA: ASA 81 mg qday  - BB: Holding as per above  - Statin: Cont PTA pravastatin     # History of NSVT s/p biventricular PPM/ICD  Interrogation performed 12/10/22 w/ two short (<5s) runs of NSVT on 11/1/22 and 11/3/22, no recent shocks. A-paced.   - Decrease dose of amiodarone from 200 mg BID to 200 mg daily on 12/16 as pre-transplant amiodarone use is associated with increased risk of graft dysfunction    ===PULMONARY===  # Non-productive cough, acute on chronic, resolved  - PRN cough lozenges  - continue tessalon pearls  - consult SLP; no signs of aspiration    ===GI===  # Severe malnutrition in the context of acute illness/injury on chronic illness  - Appreciate assistance from RD        ===RENAL===  # CKD stage 2  Creatinine at baseline  - trend BMP      ===HEME/ONC===  # Occlusive left peroneal DVT  # Non-occlusive right axillary  "DVT  Identified on doppler 11/15/22 during previous admission.   - Continue heparin drip in place of PTA apixaban while awaiting transplant     ===ENDOCRINE===  No active concerns     INFECTIOUS DISEASE  No active concerns       ===SKIN/MSK===  # Bilateral hand tightness, resolved  Experienced two episodes prior to admission, once while performing PT exercises and again when he woke up the following morning. Each episode lasted for about 15 minutes and had no other associated symptoms. Etiology not clear at this time. No focal motor or sensory deficits on exam. Electrolytes within normal limits. Troponin only mildly elevated at 46 and down-trending. EKG with A sensed V paced rhythm with no concerning ST/T changes so low suspicion for ischemia. ICD interrogation detected 225 ventricular sensed episodes, with the most recently stored episodes showing slow VT-NS at  bpm, runs lasting about 5 seconds. Unclear if these short runs would explain his 15-minute episodes. He is on dobutamine which has known arrhythmogenic effects so certainly worth monitoring closely, especially given his history of NSVT.   - Monitoring for recurrence while on telemetry   - Trend electrolytes and replete as needed       FEN: Low salt diet, replete lytes as needed   DVT PPx: heparin drip   Code: Full code      Disposition: Inpatient admission, listed for transplant on 12/12     Discussed with Dr. Schultz Jorge Reyes Castro, MD, MS  Cardiovascular disease fellow     Objective:   /79   Pulse 86   Temp 97.9  F (36.6  C) (Oral)   Resp 20   Ht 1.88 m (6' 2\")   Wt 87.2 kg (192 lb 3.2 oz)   SpO2 97%   BMI 24.68 kg/m        Wt:   Wt Readings from Last 5 Encounters:   12/20/22 87.2 kg (192 lb 3.2 oz)   11/21/22 90.9 kg (200 lb 6.4 oz)   11/10/22 108 kg (238 lb 1.6 oz)   11/08/22 95.7 kg (210 lb 14.4 oz)     Physical Exam   GEN: Patient sitting in bed. Unchanged physical exam   HEENT: no icterus, EOMI  CV: RRR, normal s1/s2, soft " systolic murmur best heard at LLSB. CVP below the clavicle at 45 degrees. No longer oozing at the site of swan elsie catheter insertion.   CHEST: Non-labored breathing, good air movement bilaterally, no wheezes or crackles   ABD: soft, non-tender, non-distended   EXTR: warm extremities. No LE edema  NEURO: awake, alert, non-focal motor exam    DATA:   Labs, EKGs and imaging reviewed. Pertinent results discussed in assessment and plan above.

## 2022-12-20 NOTE — PLAN OF CARE
Neuro: A+Ox4. Denies pain. Calls appropriately, makes needs known. Ambulates w/ gait belt and walker.  CV: Afebrile. DDDR permanent pacer- % ventriclar paced w/ sinus rhythm (70s). @0400: CVP 10, PAP 35/10, CO 5.2, CI 2.4. Maintained MAP and SBP goals w/o interventions. Heparin@1300.   Pulm: RA. LS clear. No secretions. Dry cough.  GI/: Voids spontaneously w/ adequate UOP using urinal. Regular diet with good appetite. No BM.   Skin: No overt concerns  Lines: R [place] @52.   Gtts: Dobutamine @5. Heparin @1300 units (therapeutic Xa 0.45).   Labs: Electrolyte protocol run: Mag 1.9 (R).  Plan: Continue to hemodynamically monitor cardiac function.     For complete assessments and vital signs, please refer to flowsheets.     Goal Outcome Evaluation:      Plan of Care Reviewed With: patient, spouse    Overall Patient Progress: improvingOverall Patient Progress: improving    Parisa CAO RN

## 2022-12-21 ENCOUNTER — APPOINTMENT (OUTPATIENT)
Dept: GENERAL RADIOLOGY | Facility: CLINIC | Age: 67
DRG: 001 | End: 2022-12-21
Attending: STUDENT IN AN ORGANIZED HEALTH CARE EDUCATION/TRAINING PROGRAM
Payer: MEDICARE

## 2022-12-21 LAB
ALBUMIN SERPL BCG-MCNC: 3.4 G/DL (ref 3.5–5.2)
ALP SERPL-CCNC: 72 U/L (ref 40–129)
ALT SERPL W P-5'-P-CCNC: 60 U/L (ref 10–50)
ANION GAP SERPL CALCULATED.3IONS-SCNC: 11 MMOL/L (ref 7–15)
AST SERPL W P-5'-P-CCNC: 51 U/L (ref 10–50)
BASE EXCESS BLDV CALC-SCNC: 0.5 MMOL/L (ref -7.7–1.9)
BASE EXCESS BLDV CALC-SCNC: 0.9 MMOL/L (ref -7.7–1.9)
BILIRUB DIRECT SERPL-MCNC: <0.2 MG/DL (ref 0–0.3)
BILIRUB SERPL-MCNC: 0.4 MG/DL
BUN SERPL-MCNC: 17.8 MG/DL (ref 8–23)
CALCIUM SERPL-MCNC: 9.2 MG/DL (ref 8.8–10.2)
CHLORIDE SERPL-SCNC: 102 MMOL/L (ref 98–107)
CREAT SERPL-MCNC: 0.69 MG/DL (ref 0.67–1.17)
DEPRECATED HCO3 PLAS-SCNC: 23 MMOL/L (ref 22–29)
ERYTHROCYTE [DISTWIDTH] IN BLOOD BY AUTOMATED COUNT: 14 % (ref 10–15)
GFR SERPL CREATININE-BSD FRML MDRD: >90 ML/MIN/1.73M2
GLUCOSE SERPL-MCNC: 129 MG/DL (ref 70–99)
HCO3 BLDV-SCNC: 25 MMOL/L (ref 21–28)
HCO3 BLDV-SCNC: 25 MMOL/L (ref 21–28)
HCT VFR BLD AUTO: 40.6 % (ref 40–53)
HGB BLD-MCNC: 13 G/DL (ref 13.3–17.7)
LACTATE SERPL-SCNC: 1.2 MMOL/L (ref 0.7–2)
MAGNESIUM SERPL-MCNC: 1.9 MG/DL (ref 1.7–2.3)
MCH RBC QN AUTO: 28.4 PG (ref 26.5–33)
MCHC RBC AUTO-ENTMCNC: 32 G/DL (ref 31.5–36.5)
MCV RBC AUTO: 89 FL (ref 78–100)
O2/TOTAL GAS SETTING VFR VENT: 21 %
O2/TOTAL GAS SETTING VFR VENT: 21 %
OXYHGB MFR BLDV: 68 % (ref 70–75)
OXYHGB MFR BLDV: 73 % (ref 70–75)
PCO2 BLDV: 35 MM HG (ref 40–50)
PCO2 BLDV: 37 MM HG (ref 40–50)
PH BLDV: 7.43 [PH] (ref 7.32–7.43)
PH BLDV: 7.45 [PH] (ref 7.32–7.43)
PHOSPHATE SERPL-MCNC: 3.2 MG/DL (ref 2.5–4.5)
PLATELET # BLD AUTO: 180 10E3/UL (ref 150–450)
PO2 BLDV: 36 MM HG (ref 25–47)
PO2 BLDV: 38 MM HG (ref 25–47)
POTASSIUM SERPL-SCNC: 4.2 MMOL/L (ref 3.4–5.3)
PROT SERPL-MCNC: 5.9 G/DL (ref 6.4–8.3)
RBC # BLD AUTO: 4.57 10E6/UL (ref 4.4–5.9)
SODIUM SERPL-SCNC: 136 MMOL/L (ref 136–145)
UFH PPP CHRO-ACNC: 0.33 IU/ML
WBC # BLD AUTO: 6.9 10E3/UL (ref 4–11)

## 2022-12-21 PROCEDURE — 71045 X-RAY EXAM CHEST 1 VIEW: CPT | Mod: 26 | Performed by: RADIOLOGY

## 2022-12-21 PROCEDURE — 250N000012 HC RX MED GY IP 250 OP 636 PS 637: Performed by: EMERGENCY MEDICINE

## 2022-12-21 PROCEDURE — 82248 BILIRUBIN DIRECT: CPT

## 2022-12-21 PROCEDURE — 99291 CRITICAL CARE FIRST HOUR: CPT | Mod: GC | Performed by: STUDENT IN AN ORGANIZED HEALTH CARE EDUCATION/TRAINING PROGRAM

## 2022-12-21 PROCEDURE — 85520 HEPARIN ASSAY: CPT | Performed by: INTERNAL MEDICINE

## 2022-12-21 PROCEDURE — 250N000013 HC RX MED GY IP 250 OP 250 PS 637: Performed by: STUDENT IN AN ORGANIZED HEALTH CARE EDUCATION/TRAINING PROGRAM

## 2022-12-21 PROCEDURE — 250N000011 HC RX IP 250 OP 636: Performed by: STUDENT IN AN ORGANIZED HEALTH CARE EDUCATION/TRAINING PROGRAM

## 2022-12-21 PROCEDURE — 250N000011 HC RX IP 250 OP 636: Performed by: INTERNAL MEDICINE

## 2022-12-21 PROCEDURE — 82310 ASSAY OF CALCIUM: CPT | Performed by: STUDENT IN AN ORGANIZED HEALTH CARE EDUCATION/TRAINING PROGRAM

## 2022-12-21 PROCEDURE — 250N000012 HC RX MED GY IP 250 OP 636 PS 637: Performed by: STUDENT IN AN ORGANIZED HEALTH CARE EDUCATION/TRAINING PROGRAM

## 2022-12-21 PROCEDURE — 200N000002 HC R&B ICU UMMC

## 2022-12-21 PROCEDURE — 84100 ASSAY OF PHOSPHORUS: CPT

## 2022-12-21 PROCEDURE — 83735 ASSAY OF MAGNESIUM: CPT | Performed by: INTERNAL MEDICINE

## 2022-12-21 PROCEDURE — 71045 X-RAY EXAM CHEST 1 VIEW: CPT

## 2022-12-21 PROCEDURE — 85027 COMPLETE CBC AUTOMATED: CPT | Performed by: STUDENT IN AN ORGANIZED HEALTH CARE EDUCATION/TRAINING PROGRAM

## 2022-12-21 PROCEDURE — 83605 ASSAY OF LACTIC ACID: CPT | Performed by: STUDENT IN AN ORGANIZED HEALTH CARE EDUCATION/TRAINING PROGRAM

## 2022-12-21 PROCEDURE — 82805 BLOOD GASES W/O2 SATURATION: CPT | Performed by: STUDENT IN AN ORGANIZED HEALTH CARE EDUCATION/TRAINING PROGRAM

## 2022-12-21 RX ORDER — MAGNESIUM SULFATE HEPTAHYDRATE 40 MG/ML
2 INJECTION, SOLUTION INTRAVENOUS ONCE
Status: COMPLETED | OUTPATIENT
Start: 2022-12-21 | End: 2022-12-21

## 2022-12-21 RX ADMIN — ASPIRIN 81 MG: 81 TABLET, COATED ORAL at 08:22

## 2022-12-21 RX ADMIN — ISOSORBIDE DINITRATE 20 MG: 20 TABLET ORAL at 08:22

## 2022-12-21 RX ADMIN — HEPARIN SODIUM 1300 UNITS/HR: 10000 INJECTION, SOLUTION INTRAVENOUS at 16:00

## 2022-12-21 RX ADMIN — AMIODARONE HYDROCHLORIDE 200 MG: 200 TABLET ORAL at 08:22

## 2022-12-21 RX ADMIN — MYCOPHENOLATE MOFETIL 1000 MG: 500 TABLET, FILM COATED ORAL at 08:22

## 2022-12-21 RX ADMIN — POLYETHYLENE GLYCOL 3350 17 G: 17 POWDER, FOR SOLUTION ORAL at 11:33

## 2022-12-21 RX ADMIN — HYDRALAZINE HYDROCHLORIDE 75 MG: 50 TABLET, FILM COATED ORAL at 14:17

## 2022-12-21 RX ADMIN — MAGNESIUM SULFATE IN WATER 2 G: 40 INJECTION, SOLUTION INTRAVENOUS at 05:04

## 2022-12-21 RX ADMIN — TRAZODONE HYDROCHLORIDE 50 MG: 50 TABLET ORAL at 21:57

## 2022-12-21 RX ADMIN — ISOSORBIDE DINITRATE 20 MG: 20 TABLET ORAL at 11:33

## 2022-12-21 RX ADMIN — HYDRALAZINE HYDROCHLORIDE 75 MG: 50 TABLET, FILM COATED ORAL at 21:57

## 2022-12-21 RX ADMIN — ISOSORBIDE DINITRATE 20 MG: 20 TABLET ORAL at 17:58

## 2022-12-21 RX ADMIN — HYDRALAZINE HYDROCHLORIDE 75 MG: 50 TABLET, FILM COATED ORAL at 06:07

## 2022-12-21 RX ADMIN — PRAVASTATIN SODIUM 20 MG: 20 TABLET ORAL at 08:22

## 2022-12-21 RX ADMIN — DOBUTAMINE HYDROCHLORIDE 5 MCG/KG/MIN: 200 INJECTION INTRAVENOUS at 02:02

## 2022-12-21 RX ADMIN — MYCOPHENOLATE MOFETIL 500 MG: 500 TABLET, FILM COATED ORAL at 20:30

## 2022-12-21 RX ADMIN — DOBUTAMINE HYDROCHLORIDE 5 MCG/KG/MIN: 200 INJECTION INTRAVENOUS at 20:30

## 2022-12-21 ASSESSMENT — ACTIVITIES OF DAILY LIVING (ADL)
ADLS_ACUITY_SCORE: 40
ADLS_ACUITY_SCORE: 40
ADLS_ACUITY_SCORE: 39
ADLS_ACUITY_SCORE: 39
ADLS_ACUITY_SCORE: 40
ADLS_ACUITY_SCORE: 39
ADLS_ACUITY_SCORE: 40
ADLS_ACUITY_SCORE: 39
ADLS_ACUITY_SCORE: 39

## 2022-12-21 NOTE — PLAN OF CARE
ICU End of Shift Summary. See flowsheets for vital signs and detailed assessment.    Changes this shift: VSS on room air. Remains frequently V paced, occasionally A and V paced.  Multiple walks through the hallway and tolerated well. CAROLYN numbers done at 1000. C.O. is 4.9 and C.I. is 2.3.  [place] dressing changed.  0.5 of dilaudid given for dressing change (now added as a PRN for when dressing change is needed). Eating 3 meals a day.  Dobutamine gtt remains at straight rate. [place] to remain per cards team.     Plan: on heart transplant list      Goal Outcome Evaluation: ongoing      Plan of Care Reviewed With: patient, spouse

## 2022-12-21 NOTE — PLAN OF CARE
ICU End of Shift Summary. See flowsheets for vital signs and detailed assessment.    Changes this shift: A&Ox4, VSS. Continues on heparin and dobutamine gtts. [place] at 54cm. Ambulated x3 in hallway. Voiding well. Miralax given.     Plan: Continue with plan of care.

## 2022-12-21 NOTE — PROGRESS NOTES
[facility]    Advanced Heart Failure Progress Note    Assessment and Plan:     Paco Stein is a 67 year old male with a history of chronic systolic heart failure (suspected predominantly nonischemic cardiomyopathy, possibly arrhythmogenic), coronary artery disease (s/p PCI with TILA to LCx 4/2013 and to the LAD in 10/2021), hyperlipidemia, CNS vasculitis and chronic kidney disease who presents with transient bilateral hand tightness with unrevealing workup, now admitted for continued advanced therapies evaluation. Patient was listed for heart transplant (status 2) on 12/12/2022.       Interval History:  - Patient doing well this morning.  - No chest pain, palpitations or SOB.     Plan:   -No medications changes  -Hemodynamics stable.     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 No episodes since. Neurological exam unremarkable. Neurology consulted during recent admission as part of VAD/transplant evaluation. MRI/MRA brain was also performed which showed no concern for progression of disease  - Continue PTA cellcept 1000mg qam and 500mg qpm    # Incidental punctate cerebellar infarct, likely embolic  Read on MR imaging 11/14/22 without new neuro deficits. Per radiology, appears acute, likely embolic. No known arterial clot history. Reviewed telemetry over admission, no afib or other tachycarrhythmias. Reassuring prior TTE this admission including bubble study, repeat JARED w/o septal defects or thrombus. Treated w/ heparin gtt and transitioned to Eliquis.  - Continue heparin drip while awaiting transplant      # Insomnia/anxiety  - PRN trazodone and lorazepam       ===CARDIOVASCULAR===  # Advanced chronic systolic heart failure, EF 15-20%, NYHA class IV, ACC/AHA Stage D  # Ambulatory cardiogenic shock, on dobutamine  drip as bride to transplant  Recently admitted from 11/10/2022 - 11/21/2022 for acute on chronic heart failure, treated with IV diuresis and initiated on dobutamine infusion. Patient completed advanced therapy evaluation during that admission and and is now re-admitted for continued evaluation. Patient was listed for heart transplant (status 2) on 12/12/2022. 12/08/2022 RHC: RA 16, PA 50/26/35, PCWP 26, Aisha CO/CI 3.1/1.44.     - Etiology: Predominantly non-ischemic cardiomyopathy, suspected arrhythmogenic   - Volume: Intermittent PO/IV lasix, guided by swan numbers    - Inotrope: continue dobutamine to 5 mcg/kg/min   - Afterload: continue PO hydralazine from 50 to 75 mg and isordil 10 to 20 mg every 8 hours   - BB: held in the setting of shock  - ACE/ARB/ARNi: Previous intolerance due to hypotension and lightheadedness   - SGLT2i: stop PTA Empagliflozin 10 mg qday (12/17) (to minimize UTI risk while waiting for transplant)  - MRA: None  - Statin: PTA pravastatin  - Electrolytes: PTA 20 meq KCl daily, trending and replacing as needed as well   - Transplant evaluation: listed for transplant on 12/12 (status 2)  - [place] [facility] hemodynamics:  Date RA PA PCWP MAP SvO2  CO/CI SVR PVR Therapy   12/9 (cath lab) 5 32/10/19 15 102 53 2.8/1.3 2228 1.4 Dobutamine 5 mcg/kg/min  Furosemide 10 mg/hr   12/10 AM 4 26/10/16 10 86 62 3.7/1.8 1600 1.6 Dobutamine 5 mcg/kg/min  Furosemide 10 mg/hr   12/10 PM   10 35/15/22 14      Dobutamine 5 mcg/kg/min   12/11 AM 6 40/15/23 13 97 64 3.7/1.8 1960 2.7 Dobutamine 5 mcg/kg/min   12/11 PM 6 30/15/20 10 83 61 3.4/1.7 1900  Dobutamine 5 mcg/kg/min  Nitroprusside 0.25 mcg/kg/min   12/12 AM 6 26/10/15 10 78 71 5.3/2.6 1100 0.9 Dobutamine 5 mcg/kg/min  Nitroprusside 0.75 mcg/kg/min   12/12 PM 10 45/20/28 18 83 51 2.9/1.4 2100 3.4 Dobutamine 5 mcg/kg/min   12/13 PM 8 40/16/24 15 92 61 3.5/1.7 1860 2.6 Dobutamine 5 mcg/kg/min  Hydralazine 25 mg Q8H   12/14 AM 8 40/18/25 16 90 68 5.2/2.4 1300 1.8  Dobutamine 5 mcg/kg/min  Hydralazine 25 mg Q8H   12/15/ AM 10 46/18/27 18 90 67 5.1/2.3 1300 1.8 Dobutamine 5 mcg/kg/min  Hydralazine 25 mg Q8H               12/16 AM 12 40/18/25 16 93 59 3.6/1.7 1700 2.5 Dobutamine 5 mcg/kg/min  Hydralazine 25 mg Q8H   12/17 4 28/16/14 12 92 62 4.1/1.9 1610 2.4 Dobutamine 5 mcg/kg/min  Hydralazine 50 mg Q8H   12/18 5 34/16/23 14 86 68 5.3/2.5 1339 1.7 Dobutamine 5 5 mcg/kg/min  Hydralazine 75 mg Q8H   12/19 4 38/20/26 18 72 72 5.8/2.7 1317 1.4 Dobutamine 5 5 mcg/kg/min  Hydralazine 75 mg Q8H   12/20 5 40/20/26 18 80 68 4.3/2.3 1347 1.8 Dobutamine 5 5 mcg/kg/min  Hydralazine 75 mg Q8H   12/21 7 36/16/23 14 70 73 6.3/2.9 1236 1.42 Dobutamine 5 5 mcg/kg/min  Hydralazine 75 mg Q8H       # Multivessel coronary artery disease (s/p PCI with TILA to LCx 4/2013 and to the LAD in 10/2021)  11/11/22 [facility] showed patent LAD and LCx stents as well as mild-mod non-obstructive disease in the RCA.   - Continue PTA: ASA 81 mg qday  - BB: Holding as per above  - Statin: Cont PTA pravastatin     # History of NSVT s/p biventricular PPM/ICD  Interrogation performed 12/10/22 w/ two short (<5s) runs of NSVT on 11/1/22 and 11/3/22, no recent shocks. A-paced.   - Decrease dose of amiodarone from 200 mg BID to 200 mg daily on 12/16 as pre-transplant amiodarone use is associated with increased risk of graft dysfunction    ===PULMONARY===  # Non-productive cough, acute on chronic, resolved  - PRN cough lozenges  - continue tessalon pearls  - consult SLP; no signs of aspiration    ===GI===  # Severe malnutrition in the context of acute illness/injury on chronic illness  - Appreciate assistance from RD        ===RENAL===  # CKD stage 2  Creatinine at baseline  - trend BMP      ===HEME/ONC===  # Occlusive left peroneal DVT  # Non-occlusive right axillary DVT  Identified on doppler 11/15/22 during previous admission.   - Continue heparin drip in place of PTA apixaban while awaiting transplant     ===ENDOCRINE===  No  "active concerns     INFECTIOUS DISEASE  No active concerns       ===SKIN/MSK===  # Bilateral hand tightness, resolved  Experienced two episodes prior to admission, once while performing PT exercises and again when he woke up the following morning. Each episode lasted for about 15 minutes and had no other associated symptoms. Etiology not clear at this time. No focal motor or sensory deficits on exam. Electrolytes within normal limits. Troponin only mildly elevated at 46 and down-trending. EKG with A sensed V paced rhythm with no concerning ST/T changes so low suspicion for ischemia. ICD interrogation detected 225 ventricular sensed episodes, with the most recently stored episodes showing slow VT-NS at  bpm, runs lasting about 5 seconds. Unclear if these short runs would explain his 15-minute episodes. He is on dobutamine which has known arrhythmogenic effects so certainly worth monitoring closely, especially given his history of NSVT.   - Monitoring for recurrence while on telemetry   - Trend electrolytes and replete as needed       FEN: Low salt diet, replete lytes as needed   DVT PPx: heparin drip   Code: Full code      Disposition: Inpatient admission, listed for transplant on 12/12     Discussed with Dr. Schultz Jorge Reyes Castro, MD, MS  Cardiovascular disease fellow     Objective:   BP (!) 126/92 (BP Location: Left arm)   Pulse 87   Temp 98.2  F (36.8  C) (Oral)   Resp 18   Ht 1.88 m (6' 2\")   Wt 87 kg (191 lb 11.2 oz)   SpO2 97%   BMI 24.61 kg/m        Wt:   Wt Readings from Last 5 Encounters:   12/21/22 87 kg (191 lb 11.2 oz)   11/21/22 90.9 kg (200 lb 6.4 oz)   11/10/22 108 kg (238 lb 1.6 oz)   11/08/22 95.7 kg (210 lb 14.4 oz)     Physical Exam   GEN: Patient sitting in bed. Accompanied by wife. Unchanged physical exam   HEENT: no icterus, EOMI  CV: RRR, normal s1/s2, soft systolic murmur best heard at LLSB. CVP below the clavicle at 45 degrees. No longer oozing at the site of swan elsie " catheter insertion.   CHEST: Non-labored breathing, good air movement bilaterally, no wheezes or crackles   ABD: soft, non-tender, non-distended   EXTR: warm extremities. No LE edema  NEURO: awake, alert, non-focal motor exam    DATA:   Labs, EKGs and imaging reviewed. Pertinent results discussed in assessment and plan above.

## 2022-12-21 NOTE — PLAN OF CARE
Shift Events: A&Ox4, follows commands, Ax1 with walker and gait belt. Permanent pacemaker 75% paced, MAP > maintained without intervention, SWAN @ 54, CAROLYN number done once overnight, afebrile. RA, lung sounds clear, nonproductive frequent cough. No BM overnight, low sodium diet, takes meds with pudding. Mag replaced this AM. Heparin AM draw therapeutic.    Plan: Continue monitoring CAROLYN numbers and increase activity as tolerated.    For complete assessments and vital signs, please refer to flowsheets.      Goal Outcome Evaluation:    Plan of Care Reviewed With: patient    Overall Patient Progress: improving

## 2022-12-21 NOTE — PROGRESS NOTES
SPIRITUAL HEALTH SERVICES   [facility] Sacrament of Holy Communion  [facility] 4c    Pt received Holy Communion from Father Jovany per request of Patient.    Fr. Jovany Borrero   Pager [phone]

## 2022-12-22 ENCOUNTER — APPOINTMENT (OUTPATIENT)
Dept: GENERAL RADIOLOGY | Facility: CLINIC | Age: 67
DRG: 001 | End: 2022-12-22
Attending: STUDENT IN AN ORGANIZED HEALTH CARE EDUCATION/TRAINING PROGRAM
Payer: MEDICARE

## 2022-12-22 LAB
ALBUMIN SERPL BCG-MCNC: 3.1 G/DL (ref 3.5–5.2)
ALP SERPL-CCNC: 64 U/L (ref 40–129)
ALT SERPL W P-5'-P-CCNC: 55 U/L (ref 10–50)
ANION GAP SERPL CALCULATED.3IONS-SCNC: 13 MMOL/L (ref 7–15)
AST SERPL W P-5'-P-CCNC: 42 U/L (ref 10–50)
BASE EXCESS BLDV CALC-SCNC: 0.3 MMOL/L (ref -7.7–1.9)
BILIRUB DIRECT SERPL-MCNC: <0.2 MG/DL (ref 0–0.3)
BILIRUB SERPL-MCNC: 0.4 MG/DL
BUN SERPL-MCNC: 11.7 MG/DL (ref 8–23)
CALCIUM SERPL-MCNC: 8.4 MG/DL (ref 8.8–10.2)
CHLORIDE SERPL-SCNC: 109 MMOL/L (ref 98–107)
CREAT SERPL-MCNC: 0.81 MG/DL (ref 0.67–1.17)
DEPRECATED HCO3 PLAS-SCNC: 18 MMOL/L (ref 22–29)
ERYTHROCYTE [DISTWIDTH] IN BLOOD BY AUTOMATED COUNT: 14.3 % (ref 10–15)
GFR SERPL CREATININE-BSD FRML MDRD: >90 ML/MIN/1.73M2
GLUCOSE SERPL-MCNC: 85 MG/DL (ref 70–99)
HCO3 BLDV-SCNC: 24 MMOL/L (ref 21–28)
HCT VFR BLD AUTO: 41.7 % (ref 40–53)
HGB BLD-MCNC: 13.2 G/DL (ref 13.3–17.7)
LACTATE SERPL-SCNC: 1.5 MMOL/L (ref 0.7–2)
MAGNESIUM SERPL-MCNC: 1.8 MG/DL (ref 1.7–2.3)
MCH RBC QN AUTO: 28.4 PG (ref 26.5–33)
MCHC RBC AUTO-ENTMCNC: 31.7 G/DL (ref 31.5–36.5)
MCV RBC AUTO: 90 FL (ref 78–100)
O2/TOTAL GAS SETTING VFR VENT: 21 %
OXYHGB MFR BLDV: 74 % (ref 70–75)
PCO2 BLDV: 37 MM HG (ref 40–50)
PH BLDV: 7.43 [PH] (ref 7.32–7.43)
PHOSPHATE SERPL-MCNC: 2.8 MG/DL (ref 2.5–4.5)
PLATELET # BLD AUTO: 197 10E3/UL (ref 150–450)
PO2 BLDV: 40 MM HG (ref 25–47)
POTASSIUM SERPL-SCNC: 3.7 MMOL/L (ref 3.4–5.3)
PROT SERPL-MCNC: 5.5 G/DL (ref 6.4–8.3)
RBC # BLD AUTO: 4.65 10E6/UL (ref 4.4–5.9)
SODIUM SERPL-SCNC: 140 MMOL/L (ref 136–145)
UFH PPP CHRO-ACNC: 0.44 IU/ML
WBC # BLD AUTO: 5.6 10E3/UL (ref 4–11)

## 2022-12-22 PROCEDURE — 99291 CRITICAL CARE FIRST HOUR: CPT | Mod: GC | Performed by: STUDENT IN AN ORGANIZED HEALTH CARE EDUCATION/TRAINING PROGRAM

## 2022-12-22 PROCEDURE — 85520 HEPARIN ASSAY: CPT | Performed by: INTERNAL MEDICINE

## 2022-12-22 PROCEDURE — 200N000002 HC R&B ICU UMMC

## 2022-12-22 PROCEDURE — 250N000011 HC RX IP 250 OP 636: Performed by: INTERNAL MEDICINE

## 2022-12-22 PROCEDURE — 82805 BLOOD GASES W/O2 SATURATION: CPT | Performed by: STUDENT IN AN ORGANIZED HEALTH CARE EDUCATION/TRAINING PROGRAM

## 2022-12-22 PROCEDURE — 82248 BILIRUBIN DIRECT: CPT

## 2022-12-22 PROCEDURE — 83605 ASSAY OF LACTIC ACID: CPT | Performed by: STUDENT IN AN ORGANIZED HEALTH CARE EDUCATION/TRAINING PROGRAM

## 2022-12-22 PROCEDURE — 250N000013 HC RX MED GY IP 250 OP 250 PS 637: Performed by: STUDENT IN AN ORGANIZED HEALTH CARE EDUCATION/TRAINING PROGRAM

## 2022-12-22 PROCEDURE — 71045 X-RAY EXAM CHEST 1 VIEW: CPT

## 2022-12-22 PROCEDURE — 83735 ASSAY OF MAGNESIUM: CPT | Performed by: INTERNAL MEDICINE

## 2022-12-22 PROCEDURE — 85027 COMPLETE CBC AUTOMATED: CPT | Performed by: STUDENT IN AN ORGANIZED HEALTH CARE EDUCATION/TRAINING PROGRAM

## 2022-12-22 PROCEDURE — 84100 ASSAY OF PHOSPHORUS: CPT | Performed by: INTERNAL MEDICINE

## 2022-12-22 PROCEDURE — 250N000011 HC RX IP 250 OP 636: Performed by: STUDENT IN AN ORGANIZED HEALTH CARE EDUCATION/TRAINING PROGRAM

## 2022-12-22 PROCEDURE — 250N000012 HC RX MED GY IP 250 OP 636 PS 637: Performed by: STUDENT IN AN ORGANIZED HEALTH CARE EDUCATION/TRAINING PROGRAM

## 2022-12-22 PROCEDURE — 71045 X-RAY EXAM CHEST 1 VIEW: CPT | Mod: 26 | Performed by: RADIOLOGY

## 2022-12-22 PROCEDURE — 250N000012 HC RX MED GY IP 250 OP 636 PS 637: Performed by: EMERGENCY MEDICINE

## 2022-12-22 RX ORDER — MAGNESIUM SULFATE HEPTAHYDRATE 40 MG/ML
2 INJECTION, SOLUTION INTRAVENOUS ONCE
Status: COMPLETED | OUTPATIENT
Start: 2022-12-22 | End: 2022-12-22

## 2022-12-22 RX ORDER — POTASSIUM CHLORIDE 29.8 MG/ML
20 INJECTION INTRAVENOUS ONCE
Status: COMPLETED | OUTPATIENT
Start: 2022-12-22 | End: 2022-12-22

## 2022-12-22 RX ADMIN — HYDRALAZINE HYDROCHLORIDE 75 MG: 50 TABLET, FILM COATED ORAL at 06:02

## 2022-12-22 RX ADMIN — ISOSORBIDE DINITRATE 20 MG: 20 TABLET ORAL at 07:32

## 2022-12-22 RX ADMIN — POLYETHYLENE GLYCOL 3350 17 G: 17 POWDER, FOR SOLUTION ORAL at 07:32

## 2022-12-22 RX ADMIN — AMIODARONE HYDROCHLORIDE 200 MG: 200 TABLET ORAL at 07:32

## 2022-12-22 RX ADMIN — ISOSORBIDE DINITRATE 20 MG: 20 TABLET ORAL at 17:07

## 2022-12-22 RX ADMIN — MYCOPHENOLATE MOFETIL 500 MG: 500 TABLET, FILM COATED ORAL at 20:04

## 2022-12-22 RX ADMIN — POTASSIUM CHLORIDE 20 MEQ: 29.8 INJECTION, SOLUTION INTRAVENOUS at 07:02

## 2022-12-22 RX ADMIN — MYCOPHENOLATE MOFETIL 1000 MG: 500 TABLET, FILM COATED ORAL at 07:32

## 2022-12-22 RX ADMIN — ASPIRIN 81 MG: 81 TABLET, COATED ORAL at 07:32

## 2022-12-22 RX ADMIN — MAGNESIUM SULFATE IN WATER 2 G: 40 INJECTION, SOLUTION INTRAVENOUS at 06:02

## 2022-12-22 RX ADMIN — TRAZODONE HYDROCHLORIDE 50 MG: 50 TABLET ORAL at 21:34

## 2022-12-22 RX ADMIN — HEPARIN SODIUM 1300 UNITS/HR: 10000 INJECTION, SOLUTION INTRAVENOUS at 10:37

## 2022-12-22 RX ADMIN — DOBUTAMINE HYDROCHLORIDE 5 MCG/KG/MIN: 200 INJECTION INTRAVENOUS at 14:22

## 2022-12-22 RX ADMIN — HYDRALAZINE HYDROCHLORIDE 75 MG: 50 TABLET, FILM COATED ORAL at 13:05

## 2022-12-22 RX ADMIN — ISOSORBIDE DINITRATE 20 MG: 20 TABLET ORAL at 13:05

## 2022-12-22 RX ADMIN — PRAVASTATIN SODIUM 20 MG: 20 TABLET ORAL at 07:32

## 2022-12-22 RX ADMIN — HYDRALAZINE HYDROCHLORIDE 75 MG: 50 TABLET, FILM COATED ORAL at 21:34

## 2022-12-22 ASSESSMENT — ACTIVITIES OF DAILY LIVING (ADL)
ADLS_ACUITY_SCORE: 42
ADLS_ACUITY_SCORE: 40
ADLS_ACUITY_SCORE: 42
ADLS_ACUITY_SCORE: 40
ADLS_ACUITY_SCORE: 42

## 2022-12-22 NOTE — PLAN OF CARE
ICU End of Shift Summary. See flowsheets for vital signs and detailed assessment.    Changes this shift: Pt ambulated in [place] x4.  Good appetite, voiding adequately, BM x1.    Plan: Continue to monitor per POC.      Goal Outcome Evaluation:                 Outcome Evaluation: VSS, A/Ox4, up ad ivan with SBAx1

## 2022-12-22 NOTE — PROGRESS NOTES
[facility]    Advanced Heart Failure Progress Note    Assessment and Plan:     Paco Stein is a 67 year old male with a history of chronic systolic heart failure (suspected predominantly nonischemic cardiomyopathy, possibly arrhythmogenic), coronary artery disease (s/p PCI with TILA to LCx 4/2013 and to the LAD in 10/2021), hyperlipidemia, CNS vasculitis and chronic kidney disease who presents with transient bilateral hand tightness with unrevealing workup, now admitted for continued advanced therapies evaluation. Patient was listed for heart transplant (status 2) on 12/12/2022.       Interval History:  -No acute events overnight    Plan:   -No medications changes  -Hemodynamics stable  - Awaiting for heart transplant.     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 No episodes since. Neurological exam unremarkable. Neurology consulted during recent admission as part of VAD/transplant evaluation. MRI/MRA brain was also performed which showed no concern for progression of disease  - Continue PTA cellcept 1000mg qam and 500mg qpm    # Incidental punctate cerebellar infarct, likely embolic  Read on MR imaging 11/14/22 without new neuro deficits. Per radiology, appears acute, likely embolic. No known arterial clot history. Reviewed telemetry over admission, no afib or other tachycarrhythmias. Reassuring prior TTE this admission including bubble study, repeat JARED w/o septal defects or thrombus. Treated w/ heparin gtt and transitioned to Eliquis.  - Continue heparin drip while awaiting transplant      # Insomnia/anxiety  - PRN trazodone and lorazepam       ===CARDIOVASCULAR===  # Advanced chronic systolic heart failure, EF 15-20%, NYHA class IV, ACC/AHA Stage D  # Ambulatory cardiogenic shock, on dobutamine drip as bride  to transplant  Recently admitted from 11/10/2022 - 11/21/2022 for acute on chronic heart failure, treated with IV diuresis and initiated on dobutamine infusion. Patient completed advanced therapy evaluation during that admission and and is now re-admitted for continued evaluation. Patient was listed for heart transplant (status 2) on 12/12/2022. 12/08/2022 RHC: RA 16, PA 50/26/35, PCWP 26, Aisha CO/CI 3.1/1.44.     - Etiology: Predominantly non-ischemic cardiomyopathy, suspected arrhythmogenic   - Volume: Intermittent PO/IV lasix, guided by swan numbers    - Inotrope: continue dobutamine to 5 mcg/kg/min   - Afterload: continue PO hydralazine 75 mg and isordil 20 mg every 8 hours   - BB: held in the setting of shock  - ACE/ARB/ARNi: Previous intolerance due to hypotension and lightheadedness   - SGLT2i: stop PTA Empagliflozin 10 mg qday (12/17) (to minimize UTI risk while waiting for transplant)  - MRA: None  - Statin: PTA pravastatin  - Electrolytes: PTA 20 meq KCl daily, trending and replacing as needed as well   - Transplant evaluation: listed for transplant on 12/12 (status 2)  - [place] [facility] hemodynamics:  Date RA PA PCWP MAP SvO2  CO/CI SVR PVR Therapy   12/9 (cath lab) 5 32/10/19 15 102 53 2.8/1.3 2228 1.4 Dobutamine 5 mcg/kg/min  Furosemide 10 mg/hr   12/10 AM 4 26/10/16 10 86 62 3.7/1.8 1600 1.6 Dobutamine 5 mcg/kg/min  Furosemide 10 mg/hr   12/10 PM   10 35/15/22 14      Dobutamine 5 mcg/kg/min   12/11 AM 6 40/15/23 13 97 64 3.7/1.8 1960 2.7 Dobutamine 5 mcg/kg/min   12/11 PM 6 30/15/20 10 83 61 3.4/1.7 1900  Dobutamine 5 mcg/kg/min  Nitroprusside 0.25 mcg/kg/min   12/12 AM 6 26/10/15 10 78 71 5.3/2.6 1100 0.9 Dobutamine 5 mcg/kg/min  Nitroprusside 0.75 mcg/kg/min   12/12 PM 10 45/20/28 18 83 51 2.9/1.4 2100 3.4 Dobutamine 5 mcg/kg/min   12/13 PM 8 40/16/24 15 92 61 3.5/1.7 1860 2.6 Dobutamine 5 mcg/kg/min  Hydralazine 25 mg Q8H   12/14 AM 8 40/18/25 16 90 68 5.2/2.4 1300 1.8 Dobutamine 5  mcg/kg/min  Hydralazine 25 mg Q8H   12/15/ AM 10 46/18/27 18 90 67 5.1/2.3 1300 1.8 Dobutamine 5 mcg/kg/min  Hydralazine 25 mg Q8H               12/16 AM 12 40/18/25 16 93 59 3.6/1.7 1700 2.5 Dobutamine 5 mcg/kg/min  Hydralazine 25 mg Q8H   12/17 4 28/16/14 12 92 62 4.1/1.9 1610 2.4 Dobutamine 5 mcg/kg/min  Hydralazine 50 mg Q8H   12/18 5 34/16/23 14 86 68 5.3/2.5 1339 1.7 Dobutamine 5 5 mcg/kg/min  Hydralazine 75 mg Q8H   12/19 4 38/20/26 18 72 72 5.8/2.7 1317 1.4 Dobutamine 5 5 mcg/kg/min  Hydralazine 75 mg Q8H   12/20 5 40/20/26 18 80 68 4.3/2.3 1347 1.8 Dobutamine 5 5 mcg/kg/min  Hydralazine 75 mg Q8H   12/21 7 36/16/23 14 70 73 6.3/2.9 1236 1.42 Dobutamine 5 5 mcg/kg/min  Hydralazine 75 mg Q8H   12/22 6 28/12/17 10 76 74 3.7/3.2 834 1 Dobutamine 5 5 mcg/kg/min  Hydralazine 75 mg Q8H       # Multivessel coronary artery disease (s/p PCI with TILA to LCx 4/2013 and to the LAD in 10/2021)  11/11/22 [facility] showed patent LAD and LCx stents as well as mild-mod non-obstructive disease in the RCA.   - Continue PTA: ASA 81 mg qday  - BB: Holding as per above  - Statin: Cont PTA pravastatin     # History of NSVT s/p biventricular PPM/ICD  Interrogation performed 12/10/22 w/ two short (<5s) runs of NSVT on 11/1/22 and 11/3/22, no recent shocks. A-paced.   - Decrease dose of amiodarone from 200 mg BID to 200 mg daily on 12/16 as pre-transplant amiodarone use is associated with increased risk of graft dysfunction    ===PULMONARY===  # Non-productive cough, acute on chronic, resolved  - PRN cough lozenges  - continue tessalon pearls  - consult SLP; no signs of aspiration    ===GI===  # Severe malnutrition in the context of acute illness/injury on chronic illness  - Appreciate assistance from RD      ===RENAL===  # CKD stage 2  Creatinine at baseline  - trend BMP    ===HEME/ONC===  # Occlusive left peroneal DVT  # Non-occlusive right axillary DVT  Identified on doppler 11/15/22 during previous admission.   - Continue heparin drip  "in place of PTA apixaban while awaiting transplant     ===ENDOCRINE===  No active concerns     INFECTIOUS DISEASE  No active concerns       ===SKIN/MSK===  # Bilateral hand tightness, resolved  Experienced two episodes prior to admission, once while performing PT exercises and again when he woke up the following morning. Each episode lasted for about 15 minutes and had no other associated symptoms. Etiology not clear at this time. No focal motor or sensory deficits on exam. Electrolytes within normal limits. Troponin only mildly elevated at 46 and down-trending. EKG with A sensed V paced rhythm with no concerning ST/T changes so low suspicion for ischemia. ICD interrogation detected 225 ventricular sensed episodes, with the most recently stored episodes showing slow VT-NS at  bpm, runs lasting about 5 seconds. Unclear if these short runs would explain his 15-minute episodes. He is on dobutamine which has known arrhythmogenic effects so certainly worth monitoring closely, especially given his history of NSVT.   - Monitoring for recurrence while on telemetry   - Trend electrolytes and replete as needed       FEN: Low salt diet, replete lytes as needed   DVT PPx: heparin drip   Code: Full code      Disposition: Inpatient admission, listed for transplant on 12/12     Discussed with Dr. Schultz Jorge Reyes Castro, MD, MS  Cardiovascular disease fellow     Objective:   /63 (BP Location: Left arm)   Pulse 82   Temp 97.7  F (36.5  C) (Axillary)   Resp 16   Ht 1.88 m (6' 2\")   Wt 86.4 kg (190 lb 8 oz)   SpO2 97%   BMI 24.46 kg/m        Wt:   Wt Readings from Last 5 Encounters:   12/22/22 86.4 kg (190 lb 8 oz)   11/21/22 90.9 kg (200 lb 6.4 oz)   11/10/22 108 kg (238 lb 1.6 oz)   11/08/22 95.7 kg (210 lb 14.4 oz)     Physical Exam   GEN: Patient sitting in bed. In good spirits this morning  HEENT: no icterus, EOMI  CV: RRR, normal s1/s2, soft systolic murmur best heard at LLSB. CVP below the clavicle " at 45 degrees. Clean dressing covering swan-elsie catheter  CHEST: Non-labored breathing, good air movement bilaterally, no wheezes or crackles   ABD: soft, non-tender, non-distended   EXTR: warm extremities. No LE edema  NEURO: awake, alert, non-focal motor exam    DATA:   Labs, EKGs and imaging reviewed. Pertinent results discussed in assessment and plan above.

## 2022-12-22 NOTE — PROGRESS NOTES
ICU End of Shift Summary. See flowsheets for vital signs and detailed assessment.    Changes this shift: Remains A&O x4 and following commands. Requested PRN trazodone and reports effectiveness. Remains in SR and appx 75% V-paced. [place] @ 54 cm. Dobutamine @ 5. Heparin @ 1300. CO 5.4, CI 2.5 with 2000 CAROLYN. VSS on RA. Adequate UOP with one large incontinent episode. Mag and K+ replaced per protocol.     Plan: Continue q12h FICKs. Continue plan of care.

## 2022-12-23 ENCOUNTER — APPOINTMENT (OUTPATIENT)
Dept: GENERAL RADIOLOGY | Facility: CLINIC | Age: 67
DRG: 001 | End: 2022-12-23
Attending: STUDENT IN AN ORGANIZED HEALTH CARE EDUCATION/TRAINING PROGRAM
Payer: MEDICARE

## 2022-12-23 LAB
ALBUMIN SERPL BCG-MCNC: 3.5 G/DL (ref 3.5–5.2)
ALP SERPL-CCNC: 71 U/L (ref 40–129)
ALT SERPL W P-5'-P-CCNC: 62 U/L (ref 10–50)
ANION GAP SERPL CALCULATED.3IONS-SCNC: 12 MMOL/L (ref 7–15)
AST SERPL W P-5'-P-CCNC: 40 U/L (ref 10–50)
BASE EXCESS BLDV CALC-SCNC: 3.1 MMOL/L (ref -7.7–1.9)
BILIRUB DIRECT SERPL-MCNC: <0.2 MG/DL (ref 0–0.3)
BILIRUB SERPL-MCNC: 0.4 MG/DL
BUN SERPL-MCNC: 16.4 MG/DL (ref 8–23)
CALCIUM SERPL-MCNC: 9.4 MG/DL (ref 8.8–10.2)
CHLORIDE SERPL-SCNC: 106 MMOL/L (ref 98–107)
CREAT SERPL-MCNC: 0.86 MG/DL (ref 0.67–1.17)
DEPRECATED HCO3 PLAS-SCNC: 22 MMOL/L (ref 22–29)
ERYTHROCYTE [DISTWIDTH] IN BLOOD BY AUTOMATED COUNT: 14.2 % (ref 10–15)
GFR SERPL CREATININE-BSD FRML MDRD: >90 ML/MIN/1.73M2
GLUCOSE SERPL-MCNC: 90 MG/DL (ref 70–99)
HCO3 BLDV-SCNC: 27 MMOL/L (ref 21–28)
HCT VFR BLD AUTO: 41.1 % (ref 40–53)
HGB BLD-MCNC: 13.3 G/DL (ref 13.3–17.7)
LACTATE SERPL-SCNC: 1.2 MMOL/L (ref 0.7–2)
MAGNESIUM SERPL-MCNC: 1.9 MG/DL (ref 1.7–2.3)
MCH RBC QN AUTO: 28.7 PG (ref 26.5–33)
MCHC RBC AUTO-ENTMCNC: 32.4 G/DL (ref 31.5–36.5)
MCV RBC AUTO: 89 FL (ref 78–100)
O2/TOTAL GAS SETTING VFR VENT: 21 %
OXYHGB MFR BLDV: 70 % (ref 70–75)
PCO2 BLDV: 40 MM HG (ref 40–50)
PH BLDV: 7.44 [PH] (ref 7.32–7.43)
PLATELET # BLD AUTO: 203 10E3/UL (ref 150–450)
PO2 BLDV: 37 MM HG (ref 25–47)
POTASSIUM SERPL-SCNC: 4.1 MMOL/L (ref 3.4–5.3)
PROT SERPL-MCNC: 6 G/DL (ref 6.4–8.3)
RBC # BLD AUTO: 4.64 10E6/UL (ref 4.4–5.9)
SODIUM SERPL-SCNC: 140 MMOL/L (ref 136–145)
UFH PPP CHRO-ACNC: 0.42 IU/ML
WBC # BLD AUTO: 5.8 10E3/UL (ref 4–11)

## 2022-12-23 PROCEDURE — 250N000011 HC RX IP 250 OP 636: Performed by: STUDENT IN AN ORGANIZED HEALTH CARE EDUCATION/TRAINING PROGRAM

## 2022-12-23 PROCEDURE — 250N000011 HC RX IP 250 OP 636: Performed by: INTERNAL MEDICINE

## 2022-12-23 PROCEDURE — 250N000012 HC RX MED GY IP 250 OP 636 PS 637: Performed by: EMERGENCY MEDICINE

## 2022-12-23 PROCEDURE — 71045 X-RAY EXAM CHEST 1 VIEW: CPT

## 2022-12-23 PROCEDURE — 99291 CRITICAL CARE FIRST HOUR: CPT | Mod: GC | Performed by: INTERNAL MEDICINE

## 2022-12-23 PROCEDURE — 71045 X-RAY EXAM CHEST 1 VIEW: CPT | Mod: 26 | Performed by: RADIOLOGY

## 2022-12-23 PROCEDURE — 85520 HEPARIN ASSAY: CPT | Performed by: INTERNAL MEDICINE

## 2022-12-23 PROCEDURE — 82248 BILIRUBIN DIRECT: CPT

## 2022-12-23 PROCEDURE — 80053 COMPREHEN METABOLIC PANEL: CPT | Performed by: STUDENT IN AN ORGANIZED HEALTH CARE EDUCATION/TRAINING PROGRAM

## 2022-12-23 PROCEDURE — 250N000012 HC RX MED GY IP 250 OP 636 PS 637: Performed by: STUDENT IN AN ORGANIZED HEALTH CARE EDUCATION/TRAINING PROGRAM

## 2022-12-23 PROCEDURE — 85027 COMPLETE CBC AUTOMATED: CPT | Performed by: STUDENT IN AN ORGANIZED HEALTH CARE EDUCATION/TRAINING PROGRAM

## 2022-12-23 PROCEDURE — 83605 ASSAY OF LACTIC ACID: CPT | Performed by: STUDENT IN AN ORGANIZED HEALTH CARE EDUCATION/TRAINING PROGRAM

## 2022-12-23 PROCEDURE — 200N000002 HC R&B ICU UMMC

## 2022-12-23 PROCEDURE — 250N000013 HC RX MED GY IP 250 OP 250 PS 637: Performed by: STUDENT IN AN ORGANIZED HEALTH CARE EDUCATION/TRAINING PROGRAM

## 2022-12-23 PROCEDURE — 82805 BLOOD GASES W/O2 SATURATION: CPT | Performed by: STUDENT IN AN ORGANIZED HEALTH CARE EDUCATION/TRAINING PROGRAM

## 2022-12-23 PROCEDURE — 83735 ASSAY OF MAGNESIUM: CPT | Performed by: INTERNAL MEDICINE

## 2022-12-23 RX ORDER — MAGNESIUM SULFATE HEPTAHYDRATE 40 MG/ML
2 INJECTION, SOLUTION INTRAVENOUS ONCE
Status: COMPLETED | OUTPATIENT
Start: 2022-12-23 | End: 2022-12-23

## 2022-12-23 RX ADMIN — PRAVASTATIN SODIUM 20 MG: 20 TABLET ORAL at 08:55

## 2022-12-23 RX ADMIN — ISOSORBIDE DINITRATE 20 MG: 20 TABLET ORAL at 08:55

## 2022-12-23 RX ADMIN — HYDRALAZINE HYDROCHLORIDE 75 MG: 50 TABLET, FILM COATED ORAL at 21:35

## 2022-12-23 RX ADMIN — MYCOPHENOLATE MOFETIL 500 MG: 500 TABLET, FILM COATED ORAL at 20:27

## 2022-12-23 RX ADMIN — HEPARIN SODIUM 1300 UNITS/HR: 10000 INJECTION, SOLUTION INTRAVENOUS at 23:26

## 2022-12-23 RX ADMIN — DOBUTAMINE HYDROCHLORIDE 5 MCG/KG/MIN: 200 INJECTION INTRAVENOUS at 04:50

## 2022-12-23 RX ADMIN — MAGNESIUM SULFATE IN WATER 2 G: 40 INJECTION, SOLUTION INTRAVENOUS at 13:36

## 2022-12-23 RX ADMIN — MYCOPHENOLATE MOFETIL 1000 MG: 500 TABLET, FILM COATED ORAL at 08:55

## 2022-12-23 RX ADMIN — AMIODARONE HYDROCHLORIDE 200 MG: 200 TABLET ORAL at 08:55

## 2022-12-23 RX ADMIN — ISOSORBIDE DINITRATE 20 MG: 20 TABLET ORAL at 17:16

## 2022-12-23 RX ADMIN — HYDRALAZINE HYDROCHLORIDE 75 MG: 50 TABLET, FILM COATED ORAL at 05:39

## 2022-12-23 RX ADMIN — DOBUTAMINE HYDROCHLORIDE 5 MCG/KG/MIN: 200 INJECTION INTRAVENOUS at 21:37

## 2022-12-23 RX ADMIN — ASPIRIN 81 MG: 81 TABLET, COATED ORAL at 08:55

## 2022-12-23 RX ADMIN — HEPARIN SODIUM 1300 UNITS/HR: 10000 INJECTION, SOLUTION INTRAVENOUS at 04:50

## 2022-12-23 RX ADMIN — ISOSORBIDE DINITRATE 20 MG: 20 TABLET ORAL at 11:22

## 2022-12-23 RX ADMIN — HYDRALAZINE HYDROCHLORIDE 75 MG: 50 TABLET, FILM COATED ORAL at 13:36

## 2022-12-23 ASSESSMENT — ACTIVITIES OF DAILY LIVING (ADL)
ADLS_ACUITY_SCORE: 39
ADLS_ACUITY_SCORE: 42
ADLS_ACUITY_SCORE: 39
ADLS_ACUITY_SCORE: 42

## 2022-12-23 NOTE — PROGRESS NOTES
[facility]    Advanced Heart Failure Progress Note    Assessment and Plan:     Paco Stein is a 67 year old male with a history of chronic systolic heart failure (suspected predominantly nonischemic cardiomyopathy, possibly arrhythmogenic), coronary artery disease (s/p PCI with TILA to LCx 4/2013 and to the LAD in 10/2021), hyperlipidemia, CNS vasculitis and chronic kidney disease who presents with transient bilateral hand tightness with unrevealing workup, now admitted for continued advanced therapies evaluation. Patient was listed for heart transplant (status 2) on 12/12/2022.       Interval History:  -No acute events overnight    Plan:   -No medications changes  -Hemodynamics stable  -Awaiting for heart transplant.     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 No episodes since. Neurological exam unremarkable. Neurology consulted during recent admission as part of VAD/transplant evaluation. MRI/MRA brain was also performed which showed no concern for progression of disease  - Continue PTA cellcept 1000mg qam and 500mg qpm    # Incidental punctate cerebellar infarct, likely embolic  Read on MR imaging 11/14/22 without new neuro deficits. Per radiology, appears acute, likely embolic. No known arterial clot history. Reviewed telemetry over admission, no afib or other tachycarrhythmias. Reassuring prior TTE this admission including bubble study, repeat JARED w/o septal defects or thrombus. Treated w/ heparin gtt and transitioned to Eliquis.  - Continue heparin drip while awaiting transplant      # Insomnia/anxiety  - PRN trazodone and lorazepam     ===CARDIOVASCULAR===  # Advanced chronic systolic heart failure, EF 15-20%, NYHA class IV, ACC/AHA Stage D  # Ambulatory cardiogenic shock, on dobutamine drip as bride to  transplant  Recently admitted from 11/10/2022 - 11/21/2022 for acute on chronic heart failure, treated with IV diuresis and initiated on dobutamine infusion. Patient completed advanced therapy evaluation during that admission and and is now re-admitted for continued evaluation. Patient was listed for heart transplant (status 2) on 12/12/2022. 12/08/2022 RHC: RA 16, PA 50/26/35, PCWP 26, Aisha CO/CI 3.1/1.44.     - Etiology: Predominantly non-ischemic cardiomyopathy, suspected arrhythmogenic   - Volume: Intermittent PO/IV lasix, guided by swan numbers    - Inotrope: continue dobutamine to 5 mcg/kg/min   - Afterload: continue PO hydralazine 75 mg and isordil 20 mg every 8 hours   - BB: held in the setting of shock  - ACE/ARB/ARNi: Previous intolerance due to hypotension and lightheadedness   - SGLT2i: stop PTA Empagliflozin 10 mg qday (12/17) (to minimize UTI risk while waiting for transplant)  - MRA: None  - Statin: PTA pravastatin  - Electrolytes: PTA 20 meq KCl daily, trending and replacing as needed as well   - Transplant evaluation: listed for transplant on 12/12 (status 2)  - [place] [facility] hemodynamics:  Date RA PA PCWP MAP SvO2  CO/CI SVR PVR Therapy   12/9 (cath lab) 5 32/10/19 15 102 53 2.8/1.3 2228 1.4 Dobutamine 5 mcg/kg/min  Furosemide 10 mg/hr   12/10 AM 4 26/10/16 10 86 62 3.7/1.8 1600 1.6 Dobutamine 5 mcg/kg/min  Furosemide 10 mg/hr   12/10 PM   10 35/15/22 14      Dobutamine 5 mcg/kg/min   12/11 AM 6 40/15/23 13 97 64 3.7/1.8 1960 2.7 Dobutamine 5 mcg/kg/min   12/11 PM 6 30/15/20 10 83 61 3.4/1.7 1900  Dobutamine 5 mcg/kg/min  Nitroprusside 0.25 mcg/kg/min   12/12 AM 6 26/10/15 10 78 71 5.3/2.6 1100 0.9 Dobutamine 5 mcg/kg/min  Nitroprusside 0.75 mcg/kg/min   12/12 PM 10 45/20/28 18 83 51 2.9/1.4 2100 3.4 Dobutamine 5 mcg/kg/min   12/13 PM 8 40/16/24 15 92 61 3.5/1.7 1860 2.6 Dobutamine 5 mcg/kg/min  Hydralazine 25 mg Q8H   12/14 AM 8 40/18/25 16 90 68 5.2/2.4 1300 1.8 Dobutamine 5 mcg/kg/min  Hydralazine  25 mg Q8H   12/15/ AM 10 46/18/27 18 90 67 5.1/2.3 1300 1.8 Dobutamine 5 mcg/kg/min  Hydralazine 25 mg Q8H               12/16 AM 12 40/18/25 16 93 59 3.6/1.7 1700 2.5 Dobutamine 5 mcg/kg/min  Hydralazine 25 mg Q8H   12/17 4 28/16/14 12 92 62 4.1/1.9 1610 2.4 Dobutamine 5 mcg/kg/min  Hydralazine 50 mg Q8H   12/18 5 34/16/23 14 86 68 5.3/2.5 1339 1.7 Dobutamine 5 5 mcg/kg/min  Hydralazine 75 mg Q8H   12/19 4 38/20/26 18 72 72 5.8/2.7 1317 1.4 Dobutamine 5 5 mcg/kg/min  Hydralazine 75 mg Q8H   12/20 5 40/20/26 18 80 68 4.3/2.3 1347 1.8 Dobutamine 5 5 mcg/kg/min  Hydralazine 75 mg Q8H   12/21 7 36/16/23 14 70 73 6.3/2.9 1236 1.42 Dobutamine 5 5 mcg/kg/min  Hydralazine 75 mg Q8H   12/22 6 28/12/17 10 76 74 3.7/3.2 834 1 Dobutamine 5 5 mcg/kg/min  Hydralazine 75 mg Q8H   12/23 3 30/20/23 20 99 70 5.5/2.6 1149 0.6 V=Dobutamine5 mcg/kg/min  Hydralazine 75 mg Q8H       # Multivessel coronary artery disease (s/p PCI with TILA to LCx 4/2013 and to the LAD in 10/2021)  11/11/22 [facility] showed patent LAD and LCx stents as well as mild-mod non-obstructive disease in the RCA.   - Continue PTA: ASA 81 mg qday  - BB: Holding as per above  - Statin: Cont PTA pravastatin     # History of NSVT s/p biventricular PPM/ICD  Interrogation performed 12/10/22 w/ two short (<5s) runs of NSVT on 11/1/22 and 11/3/22, no recent shocks. A-paced.   - Decrease dose of amiodarone from 200 mg BID to 200 mg daily on 12/16 as pre-transplant amiodarone use is associated with increased risk of graft dysfunction    ===PULMONARY===  # Non-productive cough, acute on chronic, resolved  - PRN cough lozenges  - continue tessalon pearls  - consult SLP; no signs of aspiration    ===GI===  # Severe malnutrition in the context of acute illness/injury on chronic illness  - Appreciate assistance from RD      ===RENAL===  # CKD stage 2  Creatinine at baseline  - trend BMP    ===HEME/ONC===  # Occlusive left peroneal DVT  # Non-occlusive right axillary DVT  Identified on  "doppler 11/15/22 during previous admission.   - Continue heparin drip in place of PTA apixaban while awaiting transplant     ===ENDOCRINE===  No active concerns     INFECTIOUS DISEASE  No active concerns       ===SKIN/MSK===  # Bilateral hand tightness, resolved  Experienced two episodes prior to admission, once while performing PT exercises and again when he woke up the following morning. Each episode lasted for about 15 minutes and had no other associated symptoms. Etiology not clear at this time. No focal motor or sensory deficits on exam. Electrolytes within normal limits. Troponin only mildly elevated at 46 and down-trending. EKG with A sensed V paced rhythm with no concerning ST/T changes so low suspicion for ischemia. ICD interrogation detected 225 ventricular sensed episodes, with the most recently stored episodes showing slow VT-NS at  bpm, runs lasting about 5 seconds. Unclear if these short runs would explain his 15-minute episodes. He is on dobutamine which has known arrhythmogenic effects so certainly worth monitoring closely, especially given his history of NSVT.   - Monitoring for recurrence while on telemetry   - Trend electrolytes and replete as needed       FEN: Low salt diet, replete lytes as needed   DVT PPx: heparin drip   Code: Full code      Disposition: Inpatient admission, listed for transplant on 12/12   joni   Discussed with Dr. Thenappan Jorge Reyes Castro, MD, MS  Cardiovascular disease fellow     Objective:   /69 (BP Location: Left arm, Cuff Size: Adult Regular)   Pulse 81   Temp 97.1  F (36.2  C) (Oral)   Resp 14   Ht 1.88 m (6' 2\")   Wt 87.5 kg (192 lb 12.8 oz)   SpO2 94%   BMI 24.75 kg/m        Wt:   Wt Readings from Last 5 Encounters:   12/23/22 87.5 kg (192 lb 12.8 oz)   11/21/22 90.9 kg (200 lb 6.4 oz)   11/10/22 108 kg (238 lb 1.6 oz)   11/08/22 95.7 kg (210 lb 14.4 oz)     Physical Exam   GEN: Patient sitting in bed. Conversant and in good mood. "   HEENT: no icterus, EOMI  CV: RRR, normal s1/s2, soft systolic murmur best heard at LLSB. CVP below the clavicle at 45 degrees. Clean dressing covering swan-elsie catheter  CHEST: Non-labored breathing, good air movement bilaterally, no wheezes or crackles   ABD: soft, non-tender, non-distended   EXTR: warm extremities. No LE edema  NEURO: awake, alert, non-focal motor exam    DATA:   Labs, EKGs and imaging reviewed. Pertinent results discussed in assessment and plan above.

## 2022-12-23 NOTE — PROGRESS NOTES
ICU End of Shift Summary. See flowsheets for vital signs and detailed assessment.    Changes this shift: Patient vitally stable. Neurologically intact. Heparin @1300, Dobutamine @5.  See hemodynamics flowsheets for details. No changes to meds or plan per Cards 2. Up walking x3, up to the chair x3.  Good urine output. BMx1.  Good appetite today. Mag replaced. Recheck in am.     Plan: Awaiting heart transplant. Continue to follow hemodynamics.

## 2022-12-23 NOTE — PROGRESS NOTES
[id number]    ICU End of Shift Summary. See flowsheets for vital signs and detailed assessment.     Changes this shift: Heparin remains therapeutic @ 1300u/hr, dobutamine parked @ 5. UOP 1050cc. V-paced via PPM, lots of PVC's.  Denies chest pain. Lactic WNL.      Plan: Continue with POC. Notify primary team with any changes.

## 2022-12-23 NOTE — PROGRESS NOTES
Pre-Transplant Social Work Services Progress Note      Date of Initial Social Work Evaluation: 11/14/2022 with admission assessment completed 12/9/2022  Collaborated with: Pt., Wife and Dtr.    Data: Vince remains in ICU status 2 awaiting heart transplant. Wife and Dtr present today. Waiting for Son to arrive as well. They plan to spend [place] together in patient's room if all can be present with each other for the Holiday. Vince was eating and had just been up walking. Family reports 4 laps. Doing well.  Intervention: Supportive visit. Inquired into questions and concerns. Verified stable lodging in place. Assessed coping.  Assessment: Everyone seems to display good coping skills. No concerns or questions identified. Good family support. Wife still secure at the [employer]. They are aware that writer will not be here next week.  Education provided by SW: Ongoing SW availability  Plan:    Discharge Plans in Progress: TBD    Barriers to d/c plan: Unknown transplant date    Follow up Plan: SW will continue to follow for ongoing psychosocial support pre and post-heart transplant

## 2022-12-24 ENCOUNTER — APPOINTMENT (OUTPATIENT)
Dept: GENERAL RADIOLOGY | Facility: CLINIC | Age: 67
DRG: 001 | End: 2022-12-24
Attending: STUDENT IN AN ORGANIZED HEALTH CARE EDUCATION/TRAINING PROGRAM
Payer: MEDICARE

## 2022-12-24 LAB
ALBUMIN SERPL BCG-MCNC: 3.5 G/DL (ref 3.5–5.2)
ALP SERPL-CCNC: 68 U/L (ref 40–129)
ALT SERPL W P-5'-P-CCNC: 55 U/L (ref 10–50)
ANION GAP SERPL CALCULATED.3IONS-SCNC: 11 MMOL/L (ref 7–15)
AST SERPL W P-5'-P-CCNC: 33 U/L (ref 10–50)
BASE EXCESS BLDV CALC-SCNC: 0.9 MMOL/L (ref -7.7–1.9)
BILIRUB DIRECT SERPL-MCNC: <0.2 MG/DL (ref 0–0.3)
BILIRUB SERPL-MCNC: 0.4 MG/DL
BUN SERPL-MCNC: 14.5 MG/DL (ref 8–23)
CALCIUM SERPL-MCNC: 9.3 MG/DL (ref 8.8–10.2)
CHLORIDE SERPL-SCNC: 104 MMOL/L (ref 98–107)
CREAT SERPL-MCNC: 0.85 MG/DL (ref 0.67–1.17)
DEPRECATED HCO3 PLAS-SCNC: 22 MMOL/L (ref 22–29)
ERYTHROCYTE [DISTWIDTH] IN BLOOD BY AUTOMATED COUNT: 14.3 % (ref 10–15)
GFR SERPL CREATININE-BSD FRML MDRD: >90 ML/MIN/1.73M2
GLUCOSE SERPL-MCNC: 95 MG/DL (ref 70–99)
HCO3 BLDV-SCNC: 26 MMOL/L (ref 21–28)
HCT VFR BLD AUTO: 41.4 % (ref 40–53)
HGB BLD-MCNC: 13.5 G/DL (ref 13.3–17.7)
LACTATE SERPL-SCNC: 1.6 MMOL/L (ref 0.7–2)
MAGNESIUM SERPL-MCNC: 1.9 MG/DL (ref 1.7–2.3)
MCH RBC QN AUTO: 29 PG (ref 26.5–33)
MCHC RBC AUTO-ENTMCNC: 32.6 G/DL (ref 31.5–36.5)
MCV RBC AUTO: 89 FL (ref 78–100)
O2/TOTAL GAS SETTING VFR VENT: 21 %
OXYHGB MFR BLDV: 74 % (ref 70–75)
PCO2 BLDV: 40 MM HG (ref 40–50)
PH BLDV: 7.42 [PH] (ref 7.32–7.43)
PHOSPHATE SERPL-MCNC: 3.5 MG/DL (ref 2.5–4.5)
PLATELET # BLD AUTO: 202 10E3/UL (ref 150–450)
PO2 BLDV: 41 MM HG (ref 25–47)
POTASSIUM SERPL-SCNC: 3.9 MMOL/L (ref 3.4–5.3)
PROT SERPL-MCNC: 6 G/DL (ref 6.4–8.3)
RBC # BLD AUTO: 4.66 10E6/UL (ref 4.4–5.9)
SODIUM SERPL-SCNC: 137 MMOL/L (ref 136–145)
UFH PPP CHRO-ACNC: 0.46 IU/ML
WBC # BLD AUTO: 5.9 10E3/UL (ref 4–11)

## 2022-12-24 PROCEDURE — 250N000013 HC RX MED GY IP 250 OP 250 PS 637: Performed by: STUDENT IN AN ORGANIZED HEALTH CARE EDUCATION/TRAINING PROGRAM

## 2022-12-24 PROCEDURE — 83735 ASSAY OF MAGNESIUM: CPT | Performed by: INTERNAL MEDICINE

## 2022-12-24 PROCEDURE — 82248 BILIRUBIN DIRECT: CPT

## 2022-12-24 PROCEDURE — 84100 ASSAY OF PHOSPHORUS: CPT | Performed by: INTERNAL MEDICINE

## 2022-12-24 PROCEDURE — 85520 HEPARIN ASSAY: CPT | Performed by: INTERNAL MEDICINE

## 2022-12-24 PROCEDURE — 250N000011 HC RX IP 250 OP 636: Performed by: STUDENT IN AN ORGANIZED HEALTH CARE EDUCATION/TRAINING PROGRAM

## 2022-12-24 PROCEDURE — 250N000012 HC RX MED GY IP 250 OP 636 PS 637: Performed by: STUDENT IN AN ORGANIZED HEALTH CARE EDUCATION/TRAINING PROGRAM

## 2022-12-24 PROCEDURE — 250N000011 HC RX IP 250 OP 636: Performed by: INTERNAL MEDICINE

## 2022-12-24 PROCEDURE — 200N000002 HC R&B ICU UMMC

## 2022-12-24 PROCEDURE — 82805 BLOOD GASES W/O2 SATURATION: CPT | Performed by: STUDENT IN AN ORGANIZED HEALTH CARE EDUCATION/TRAINING PROGRAM

## 2022-12-24 PROCEDURE — 71045 X-RAY EXAM CHEST 1 VIEW: CPT | Mod: 26 | Performed by: RADIOLOGY

## 2022-12-24 PROCEDURE — 82310 ASSAY OF CALCIUM: CPT | Performed by: STUDENT IN AN ORGANIZED HEALTH CARE EDUCATION/TRAINING PROGRAM

## 2022-12-24 PROCEDURE — 71045 X-RAY EXAM CHEST 1 VIEW: CPT

## 2022-12-24 PROCEDURE — 83605 ASSAY OF LACTIC ACID: CPT | Performed by: STUDENT IN AN ORGANIZED HEALTH CARE EDUCATION/TRAINING PROGRAM

## 2022-12-24 PROCEDURE — 999N000065 XR CHEST PORT 1 VIEW

## 2022-12-24 PROCEDURE — 250N000012 HC RX MED GY IP 250 OP 636 PS 637: Performed by: EMERGENCY MEDICINE

## 2022-12-24 PROCEDURE — 99291 CRITICAL CARE FIRST HOUR: CPT | Mod: GC | Performed by: INTERNAL MEDICINE

## 2022-12-24 PROCEDURE — 85027 COMPLETE CBC AUTOMATED: CPT | Performed by: STUDENT IN AN ORGANIZED HEALTH CARE EDUCATION/TRAINING PROGRAM

## 2022-12-24 RX ORDER — MAGNESIUM SULFATE HEPTAHYDRATE 40 MG/ML
2 INJECTION, SOLUTION INTRAVENOUS ONCE
Status: COMPLETED | OUTPATIENT
Start: 2022-12-24 | End: 2022-12-24

## 2022-12-24 RX ORDER — ACYCLOVIR 50 MG/G
OINTMENT TOPICAL
Status: DISCONTINUED | OUTPATIENT
Start: 2022-12-24 | End: 2022-12-26

## 2022-12-24 RX ADMIN — MYCOPHENOLATE MOFETIL 500 MG: 500 TABLET, FILM COATED ORAL at 21:00

## 2022-12-24 RX ADMIN — HYDRALAZINE HYDROCHLORIDE 75 MG: 50 TABLET, FILM COATED ORAL at 21:02

## 2022-12-24 RX ADMIN — POLYETHYLENE GLYCOL 3350 17 G: 17 POWDER, FOR SOLUTION ORAL at 16:07

## 2022-12-24 RX ADMIN — ISOSORBIDE DINITRATE 20 MG: 20 TABLET ORAL at 12:40

## 2022-12-24 RX ADMIN — ACYCLOVIR: 50 OINTMENT TOPICAL at 21:02

## 2022-12-24 RX ADMIN — MYCOPHENOLATE MOFETIL 1000 MG: 500 TABLET, FILM COATED ORAL at 08:11

## 2022-12-24 RX ADMIN — ISOSORBIDE DINITRATE 20 MG: 20 TABLET ORAL at 17:40

## 2022-12-24 RX ADMIN — HEPARIN SODIUM 1300 UNITS/HR: 10000 INJECTION, SOLUTION INTRAVENOUS at 17:39

## 2022-12-24 RX ADMIN — ACYCLOVIR: 50 OINTMENT TOPICAL at 17:42

## 2022-12-24 RX ADMIN — ACYCLOVIR: 50 OINTMENT TOPICAL at 12:40

## 2022-12-24 RX ADMIN — PRAVASTATIN SODIUM 20 MG: 20 TABLET ORAL at 08:11

## 2022-12-24 RX ADMIN — ISOSORBIDE DINITRATE 20 MG: 20 TABLET ORAL at 08:11

## 2022-12-24 RX ADMIN — HYDRALAZINE HYDROCHLORIDE 75 MG: 50 TABLET, FILM COATED ORAL at 06:41

## 2022-12-24 RX ADMIN — DOBUTAMINE HYDROCHLORIDE 5 MCG/KG/MIN: 200 INJECTION INTRAVENOUS at 14:45

## 2022-12-24 RX ADMIN — MAGNESIUM SULFATE IN WATER 2 G: 40 INJECTION, SOLUTION INTRAVENOUS at 08:10

## 2022-12-24 RX ADMIN — ASPIRIN 81 MG: 81 TABLET, COATED ORAL at 08:11

## 2022-12-24 RX ADMIN — AMIODARONE HYDROCHLORIDE 200 MG: 200 TABLET ORAL at 08:11

## 2022-12-24 RX ADMIN — HYDRALAZINE HYDROCHLORIDE 75 MG: 50 TABLET, FILM COATED ORAL at 14:44

## 2022-12-24 ASSESSMENT — ACTIVITIES OF DAILY LIVING (ADL)
ADLS_ACUITY_SCORE: 39

## 2022-12-24 NOTE — PROGRESS NOTES
ICU End of Shift Summary. See flowsheets for vital signs and detailed assessment.    Changes this shift: Patient with no acute events.  Vitally stable. See flowsheets for swan numbers.Fer advanced this morning with cardiac fellow to 56 and placement confirmed with CXR. Weight stable. Dobutamine @5, heparin @ 1300.  Mag replaced x1- redraw in the am. Walk with RN x3.  Up to the chair x3. Patient visiting with family much of the shift.     Plan: Awaiting heart transplant. Monitor fer numbers. Replace lytc.

## 2022-12-24 NOTE — PROGRESS NOTES
[facility]    Advanced Heart Failure Progress Note    Assessment and Plan:     Paco Stein is a 67 year old male with a history of chronic systolic heart failure (suspected predominantly nonischemic cardiomyopathy, possibly arrhythmogenic), coronary artery disease (s/p PCI with TILA to LCx 4/2013 and to the LAD in 10/2021), hyperlipidemia, CNS vasculitis and chronic kidney disease who presents with transient bilateral hand tightness with unrevealing workup, now admitted for continued advanced therapies evaluation. Patient was listed for heart transplant (status 2) on 12/12/2022.       Interval History:  -No acute events overnight    Plan:   -No medications changes  -Hemodynamics stable  -Awaiting for heart transplant.     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 No episodes since. Neurological exam unremarkable. Neurology consulted during recent admission as part of VAD/transplant evaluation. MRI/MRA brain was also performed which showed no concern for progression of disease  - Continue PTA cellcept 1000mg qam and 500mg qpm    # Incidental punctate cerebellar infarct, likely embolic  Read on MR imaging 11/14/22 without new neuro deficits. Per radiology, appears acute, likely embolic. No known arterial clot history. Reviewed telemetry over admission, no afib or other tachycarrhythmias. Reassuring prior TTE this admission including bubble study, repeat JARED w/o septal defects or thrombus. Treated w/ heparin gtt and transitioned to Eliquis.  - Continue heparin drip while awaiting transplant      # Insomnia/anxiety  - PRN trazodone and lorazepam     ===CARDIOVASCULAR===  # Advanced chronic systolic heart failure, EF 15-20%, NYHA class IV, ACC/AHA Stage D  # Ambulatory cardiogenic shock, on dobutamine drip as bride to  transplant  Recently admitted from 11/10/2022 - 11/21/2022 for acute on chronic heart failure, treated with IV diuresis and initiated on dobutamine infusion. Patient completed advanced therapy evaluation during that admission and and is now re-admitted for continued evaluation. Patient was listed for heart transplant (status 2) on 12/12/2022. 12/08/2022 RHC: RA 16, PA 50/26/35, PCWP 26, Aisha CO/CI 3.1/1.44.     - Etiology: Predominantly non-ischemic cardiomyopathy, suspected arrhythmogenic   - Volume: Intermittent PO/IV lasix, guided by swan numbers    - Inotrope: continue dobutamine to 5 mcg/kg/min   - Afterload: continue PO hydralazine 75 mg and isordil 20 mg every 8 hours   - BB: held in the setting of shock  - ACE/ARB/ARNi: Previous intolerance due to hypotension and lightheadedness   - SGLT2i: stop PTA Empagliflozin 10 mg qday (12/17) (to minimize UTI risk while waiting for transplant)  - MRA: None  - Statin: PTA pravastatin  - Electrolytes: PTA 20 meq KCl daily, trending and replacing as needed as well   - Transplant evaluation: listed for transplant on 12/12 (status 2)  - [place] [facility] hemodynamics:  Date RA PA PCWP MAP SvO2  CO/CI SVR PVR Therapy   12/9 (cath lab) 5 32/10/19 15 102 53 2.8/1.3 2228 1.4 Dobutamine 5 mcg/kg/min  Furosemide 10 mg/hr   12/10 AM 4 26/10/16 10 86 62 3.7/1.8 1600 1.6 Dobutamine 5 mcg/kg/min  Furosemide 10 mg/hr   12/10 PM   10 35/15/22 14      Dobutamine 5 mcg/kg/min   12/11 AM 6 40/15/23 13 97 64 3.7/1.8 1960 2.7 Dobutamine 5 mcg/kg/min   12/11 PM 6 30/15/20 10 83 61 3.4/1.7 1900  Dobutamine 5 mcg/kg/min  Nitroprusside 0.25 mcg/kg/min   12/12 AM 6 26/10/15 10 78 71 5.3/2.6 1100 0.9 Dobutamine 5 mcg/kg/min  Nitroprusside 0.75 mcg/kg/min   12/12 PM 10 45/20/28 18 83 51 2.9/1.4 2100 3.4 Dobutamine 5 mcg/kg/min   12/13 PM 8 40/16/24 15 92 61 3.5/1.7 1860 2.6 Dobutamine 5 mcg/kg/min  Hydralazine 25 mg Q8H   12/14 AM 8 40/18/25 16 90 68 5.2/2.4 1300 1.8 Dobutamine 5 mcg/kg/min  Hydralazine  25 mg Q8H   12/15/ AM 10 46/18/27 18 90 67 5.1/2.3 1300 1.8 Dobutamine 5 mcg/kg/min  Hydralazine 25 mg Q8H   12/16 AM 12 40/18/25 16 93 59 3.6/1.7 1700 2.5 Dobutamine 5 mcg/kg/min  Hydralazine 25 mg Q8H   12/17 4 28/16/14 12 92 62 4.1/1.9 1610 2.4 Dobutamine 5 mcg/kg/min  Hydralazine 50 mg Q8H   12/18 5 34/16/23 14 86 68 5.3/2.5 1339 1.7 Dobutamine 5 5 mcg/kg/min  Hydralazine 75 mg Q8H   12/19 4 38/20/26 18 72 72 5.8/2.7 1317 1.4 Dobutamine 5 5 mcg/kg/min  Hydralazine 75 mg Q8H   12/20 5 40/20/26 18 80 68 4.3/2.3 1347 1.8 Dobutamine 5 5 mcg/kg/min  Hydralazine 75 mg Q8H   12/21 7 36/16/23 14 70 73 6.3/2.9 1236 1.42 Dobutamine 5 5 mcg/kg/min  Hydralazine 75 mg Q8H   12/22 6 28/12/17 10 76 74 3.7/3.2 834 1 Dobutamine 5 5 mcg/kg/min  Hydralazine 75 mg Q8H   12/23 3 30/20/23 20 99 70 5.5/2.6 1149 0.6 V=Dobutamine5 mcg/kg/min  Hydralazine 75 mg Q8H     12/24 7 26/17/20 16 65 74 5.5/2.7 846 0.72 V=Dobutamine5 mcg/kg/min  Hydralazine 75 mg Q8H       # Multivessel coronary artery disease (s/p PCI with TILA to LCx 4/2013 and to the LAD in 10/2021)  11/11/22 [facility] showed patent LAD and LCx stents as well as mild-mod non-obstructive disease in the RCA.   - Continue PTA: ASA 81 mg qday  - BB: Holding as per above  - Statin: Cont PTA pravastatin     # History of NSVT s/p biventricular PPM/ICD  Interrogation performed 12/10/22 w/ two short (<5s) runs of NSVT on 11/1/22 and 11/3/22, no recent shocks. A-paced.   - Decrease dose of amiodarone from 200 mg BID to 200 mg daily on 12/16 as pre-transplant amiodarone use is associated with increased risk of graft dysfunction    ===PULMONARY===  # Non-productive cough, acute on chronic, resolved  - PRN cough lozenges  - continue tessalon pearls  - consult SLP; no signs of aspiration    ===GI===  # Severe malnutrition in the context of acute illness/injury on chronic illness  - Appreciate assistance from RD      ===RENAL===  # CKD stage 2  Creatinine at baseline  - trend  "BMP    ===HEME/ONC===  # Occlusive left peroneal DVT  # Non-occlusive right axillary DVT  Identified on doppler 11/15/22 during previous admission.   - Continue heparin drip in place of PTA apixaban while awaiting transplant     ===ENDOCRINE===  No active concerns     INFECTIOUS DISEASE  No active concerns       ===SKIN/MSK===  # Bilateral hand tightness, resolved  Experienced two episodes prior to admission, once while performing PT exercises and again when he woke up the following morning. Each episode lasted for about 15 minutes and had no other associated symptoms. Etiology not clear at this time. No focal motor or sensory deficits on exam. Electrolytes within normal limits. Troponin only mildly elevated at 46 and down-trending. EKG with A sensed V paced rhythm with no concerning ST/T changes so low suspicion for ischemia. ICD interrogation detected 225 ventricular sensed episodes, with the most recently stored episodes showing slow VT-NS at  bpm, runs lasting about 5 seconds. Unclear if these short runs would explain his 15-minute episodes. He is on dobutamine which has known arrhythmogenic effects so certainly worth monitoring closely, especially given his history of NSVT.   - Monitoring for recurrence while on telemetry   - Trend electrolytes and replete as needed     FEN: Low salt diet, replete lytes as needed   DVT PPx: heparin drip   Code: Full code      Disposition: Inpatient admission, listed for transplant on 12/12   joni   Discussed with Dr. Ana Jackson   Cardiology Fellow  This note was created using Dragon dictation software, so please excuse any mistakes and incorrect syntax and semantics.       Objective:   /83   Pulse 86   Temp 98  F (36.7  C) (Oral)   Resp 16   Ht 1.88 m (6' 2\")   Wt 87.9 kg (193 lb 11.2 oz)   SpO2 97%   BMI 24.87 kg/m        Wt:   Wt Readings from Last 5 Encounters:   12/24/22 87.9 kg (193 lb 11.2 oz)   11/21/22 90.9 kg (200 lb 6.4 oz) "   11/10/22 108 kg (238 lb 1.6 oz)   11/08/22 95.7 kg (210 lb 14.4 oz)     Physical Exam   GEN: Patient sitting in bed. Conversant and in good mood.   HEENT: no icterus, EOMI  CV: RRR, normal s1/s2, soft systolic murmur best heard at LLSB. CVP below the clavicle at 45 degrees. Clean dressing covering swan-elsie catheter  CHEST: Non-labored breathing, good air movement bilaterally, no wheezes or crackles   ABD: soft, non-tender, non-distended   EXTR: warm extremities. No LE edema  NEURO: awake, alert, non-focal motor exam    DATA:   Labs, EKGs and imaging reviewed. Pertinent results discussed in assessment and plan above.

## 2022-12-24 NOTE — PLAN OF CARE
ICU End of Shift Summary. See flowsheets for vital signs and detailed assessment.    Changes this shift: Pt A&Ox4, follows commands, makes needs known. Up w SBA. Afebrile and normotensive. V paced via premanent pacemaker. On RA. x1 BM overnight. Adequate urine output, uses bedside urinal independently. Goldvein in place. Provider called to bedside to assess swam waveforms, no problems identified. Mag needing replacement.     Plan: Continue with plan of care.     Goal Outcome Evaluation:      Plan of Care Reviewed With: patient    Overall Patient Progress: no changeOverall Patient Progress: no change    Outcome Evaluation: VSS, up ad ivan, Do not disturb orders [id number]. Pt reported sleeping well overnight

## 2022-12-25 ENCOUNTER — ORGAN (OUTPATIENT)
Dept: TRANSPLANT | Facility: CLINIC | Age: 67
End: 2022-12-25

## 2022-12-25 ENCOUNTER — ANESTHESIA EVENT (OUTPATIENT)
Dept: SURGERY | Facility: CLINIC | Age: 67
DRG: 001 | End: 2022-12-25
Payer: MEDICARE

## 2022-12-25 ENCOUNTER — APPOINTMENT (OUTPATIENT)
Dept: GENERAL RADIOLOGY | Facility: CLINIC | Age: 67
DRG: 001 | End: 2022-12-25
Attending: STUDENT IN AN ORGANIZED HEALTH CARE EDUCATION/TRAINING PROGRAM
Payer: MEDICARE

## 2022-12-25 ENCOUNTER — ANESTHESIA (OUTPATIENT)
Dept: SURGERY | Facility: CLINIC | Age: 67
DRG: 001 | End: 2022-12-25
Payer: MEDICARE

## 2022-12-25 ENCOUNTER — APPOINTMENT (OUTPATIENT)
Dept: GENERAL RADIOLOGY | Facility: CLINIC | Age: 67
DRG: 001 | End: 2022-12-25
Attending: SURGERY
Payer: MEDICARE

## 2022-12-25 DIAGNOSIS — Z76.82 AWAITING ORGAN TRANSPLANT: Primary | ICD-10-CM

## 2022-12-25 LAB
ABO/RH(D): NORMAL
ALBUMIN SERPL BCG-MCNC: 3.6 G/DL (ref 3.5–5.2)
ALBUMIN UR-MCNC: NEGATIVE MG/DL
ALLEN'S TEST: ABNORMAL
ALLEN'S TEST: ABNORMAL
ALP SERPL-CCNC: 68 U/L (ref 40–129)
ALT SERPL W P-5'-P-CCNC: 54 U/L (ref 10–50)
AMYLASE SERPL-CCNC: 77 U/L (ref 28–100)
ANION GAP SERPL CALCULATED.3IONS-SCNC: 11 MMOL/L (ref 7–15)
ANTIBODY SCREEN: NEGATIVE
APPEARANCE UR: CLEAR
APTT PPP: 30 SECONDS (ref 22–38)
APTT PPP: 46 SECONDS (ref 22–38)
APTT PPP: 84 SECONDS (ref 22–38)
AST SERPL W P-5'-P-CCNC: 33 U/L (ref 10–50)
BASE EXCESS BLDA CALC-SCNC: -1.3 MMOL/L (ref -9.6–2)
BASE EXCESS BLDA CALC-SCNC: -1.5 MMOL/L (ref -9.6–2)
BASE EXCESS BLDA CALC-SCNC: -1.7 MMOL/L (ref -9.6–2)
BASE EXCESS BLDA CALC-SCNC: -1.8 MMOL/L (ref -9.6–2)
BASE EXCESS BLDA CALC-SCNC: -2.1 MMOL/L (ref -9.6–2)
BASE EXCESS BLDA CALC-SCNC: -4.8 MMOL/L (ref -9.6–2)
BASE EXCESS BLDA CALC-SCNC: -6.5 MMOL/L (ref -9.6–2)
BASE EXCESS BLDA CALC-SCNC: -9.7 MMOL/L (ref -9–1.8)
BASE EXCESS BLDA CALC-SCNC: -9.7 MMOL/L (ref -9–1.8)
BASE EXCESS BLDV CALC-SCNC: -0.4 MMOL/L (ref -8.1–1.9)
BASE EXCESS BLDV CALC-SCNC: -9.2 MMOL/L (ref -7.7–1.9)
BASE EXCESS BLDV CALC-SCNC: 1.6 MMOL/L (ref -7.7–1.9)
BASOPHILS # BLD AUTO: 0 10E3/UL (ref 0–0.2)
BASOPHILS NFR BLD AUTO: 1 %
BILIRUB DIRECT SERPL-MCNC: <0.2 MG/DL (ref 0–0.3)
BILIRUB SERPL-MCNC: 0.5 MG/DL
BILIRUB UR QL STRIP: NEGATIVE
BLD PROD TYP BPU: NORMAL
BLOOD COMPONENT TYPE: NORMAL
BUN SERPL-MCNC: 13.1 MG/DL (ref 8–23)
CA-I BLD-MCNC: 4.4 MG/DL (ref 4.4–5.2)
CA-I BLD-MCNC: 4.4 MG/DL (ref 4.4–5.2)
CA-I BLD-MCNC: 4.5 MG/DL (ref 4.4–5.2)
CA-I BLD-MCNC: 4.6 MG/DL (ref 4.4–5.2)
CA-I BLD-MCNC: 5.1 MG/DL (ref 4.4–5.2)
CA-I BLD-MCNC: 5.4 MG/DL (ref 4.4–5.2)
CA-I BLD-MCNC: 5.8 MG/DL (ref 4.4–5.2)
CA-I BLD-MCNC: 5.8 MG/DL (ref 4.4–5.2)
CALCIUM SERPL-MCNC: 9.4 MG/DL (ref 8.8–10.2)
CHLORIDE SERPL-SCNC: 105 MMOL/L (ref 98–107)
CLOT INIT KAOL IND TO POST HEP NEUT TRTO: 1.1 {RATIO}
CLOT INIT KAOL IND TO POST HEP NEUT TRTO: 1.1 {RATIO}
CLOT INIT KAOL IND TO POST HEP NEUT TRTO: NORMAL {RATIO}
CLOT INIT KAOLIN IND BLD US: 121 SEC (ref 113–166)
CLOT INIT KAOLIN IND BLD US: 137 SEC (ref 113–166)
CLOT INIT KAOLIN IND BLD US: NORMAL S
CLOT INIT KAOLIN IND P HEP NEUT BLD US: 110 SEC (ref 103–153)
CLOT INIT KAOLIN IND P HEP NEUT BLD US: 122 SEC (ref 103–153)
CLOT INIT KAOLIN IND P HEP NEUT BLD US: 142 SEC (ref 103–153)
CLOT STIFF PLT CONT BLD CALC: 20.1 HPA (ref 11.9–29.8)
CLOT STIFF PLT CONT BLD CALC: 21.5 HPA (ref 11.9–29.8)
CLOT STIFF PLT CONT BLD CALC: 22.7 HPA (ref 11.9–29.8)
CLOT STIFF TF IND P HEP NEUT BLD US: 23.1 HPA (ref 13–33.2)
CLOT STIFF TF IND P HEP NEUT BLD US: 25 HPA (ref 13–33.2)
CLOT STIFF TF IND P HEP NEUT BLD US: 26.3 HPA (ref 13–33.2)
CLOT STIFF TF IND+IIB-IIIA INH P HEP NEU: 3 HPA (ref 1–3.7)
CLOT STIFF TF IND+IIB-IIIA INH P HEP NEU: 3.5 HPA (ref 1–3.7)
CLOT STIFF TF IND+IIB-IIIA INH P HEP NEU: 3.6 HPA (ref 1–3.7)
CODING SYSTEM: NORMAL
COLOR UR AUTO: ABNORMAL
CREAT SERPL-MCNC: 0.84 MG/DL (ref 0.67–1.17)
CROSSMATCH: NORMAL
DEPRECATED HCO3 PLAS-SCNC: 22 MMOL/L (ref 22–29)
EOSINOPHIL # BLD AUTO: 0.1 10E3/UL (ref 0–0.7)
EOSINOPHIL NFR BLD AUTO: 2 %
ERYTHROCYTE [DISTWIDTH] IN BLOOD BY AUTOMATED COUNT: 14.2 % (ref 10–15)
ERYTHROCYTE [DISTWIDTH] IN BLOOD BY AUTOMATED COUNT: 14.4 % (ref 10–15)
ERYTHROCYTE [DISTWIDTH] IN BLOOD BY AUTOMATED COUNT: 14.5 % (ref 10–15)
FIBRINOGEN PPP-MCNC: 345 MG/DL (ref 170–490)
GFR SERPL CREATININE-BSD FRML MDRD: >90 ML/MIN/1.73M2
GLUCOSE BLD-MCNC: 151 MG/DL (ref 70–99)
GLUCOSE BLD-MCNC: 165 MG/DL (ref 70–99)
GLUCOSE BLD-MCNC: 167 MG/DL (ref 70–99)
GLUCOSE BLD-MCNC: 193 MG/DL (ref 70–99)
GLUCOSE BLD-MCNC: 199 MG/DL (ref 70–99)
GLUCOSE BLD-MCNC: 202 MG/DL (ref 70–99)
GLUCOSE BLD-MCNC: 207 MG/DL (ref 70–99)
GLUCOSE BLD-MCNC: 211 MG/DL (ref 70–99)
GLUCOSE BLDC GLUCOMTR-MCNC: 222 MG/DL (ref 70–99)
GLUCOSE SERPL-MCNC: 104 MG/DL (ref 70–99)
GLUCOSE UR STRIP-MCNC: NEGATIVE MG/DL
HBA1C MFR BLD: 6.2 %
HCO3 BLD-SCNC: 17 MMOL/L (ref 21–28)
HCO3 BLD-SCNC: 17 MMOL/L (ref 21–28)
HCO3 BLDA-SCNC: 21 MMOL/L (ref 21–28)
HCO3 BLDA-SCNC: 22 MMOL/L (ref 21–28)
HCO3 BLDA-SCNC: 23 MMOL/L (ref 21–28)
HCO3 BLDA-SCNC: 24 MMOL/L (ref 21–28)
HCO3 BLDA-SCNC: 24 MMOL/L (ref 21–28)
HCO3 BLDV-SCNC: 19 MMOL/L (ref 21–28)
HCO3 BLDV-SCNC: 25 MMOL/L (ref 21–28)
HCO3 BLDV-SCNC: 26 MMOL/L (ref 21–28)
HCT VFR BLD AUTO: 42.6 % (ref 40–53)
HCT VFR BLD AUTO: 43 % (ref 40–53)
HCT VFR BLD AUTO: 46.9 % (ref 40–53)
HGB BLD-MCNC: 12.5 G/DL (ref 13.3–17.7)
HGB BLD-MCNC: 12.6 G/DL (ref 13.3–17.7)
HGB BLD-MCNC: 12.9 G/DL (ref 13.3–17.7)
HGB BLD-MCNC: 13.1 G/DL (ref 13.3–17.7)
HGB BLD-MCNC: 13.6 G/DL (ref 13.3–17.7)
HGB BLD-MCNC: 13.7 G/DL (ref 13.3–17.7)
HGB BLD-MCNC: 13.9 G/DL (ref 13.3–17.7)
HGB BLD-MCNC: 14.9 G/DL (ref 13.3–17.7)
HGB BLD-MCNC: 15.2 G/DL (ref 13.3–17.7)
HGB UR QL STRIP: NEGATIVE
IMM GRANULOCYTES # BLD: 0 10E3/UL
IMM GRANULOCYTES NFR BLD: 0 %
INR PPP: 1.04 (ref 0.85–1.15)
INR PPP: 1.27 (ref 0.85–1.15)
INR PPP: 1.55 (ref 0.85–1.15)
ISSUE DATE AND TIME: NORMAL
KETONES UR STRIP-MCNC: NEGATIVE MG/DL
LACTATE BLD-SCNC: 1.2 MMOL/L
LACTATE BLD-SCNC: 1.5 MMOL/L
LACTATE BLD-SCNC: 1.5 MMOL/L
LACTATE BLD-SCNC: 2 MMOL/L
LACTATE BLD-SCNC: 2.2 MMOL/L
LACTATE BLD-SCNC: 3 MMOL/L
LACTATE BLD-SCNC: 4.2 MMOL/L
LACTATE BLD-SCNC: 4.4 MMOL/L
LACTATE SERPL-SCNC: 1.2 MMOL/L (ref 0.7–2)
LACTATE SERPL-SCNC: 7 MMOL/L (ref 0.7–2)
LEUKOCYTE ESTERASE UR QL STRIP: NEGATIVE
LYMPHOCYTES # BLD AUTO: 1.1 10E3/UL (ref 0.8–5.3)
LYMPHOCYTES NFR BLD AUTO: 15 %
MAGNESIUM SERPL-MCNC: 1.8 MG/DL (ref 1.7–2.3)
MCH RBC QN AUTO: 28.3 PG (ref 26.5–33)
MCH RBC QN AUTO: 28.7 PG (ref 26.5–33)
MCH RBC QN AUTO: 29 PG (ref 26.5–33)
MCHC RBC AUTO-ENTMCNC: 30.8 G/DL (ref 31.5–36.5)
MCHC RBC AUTO-ENTMCNC: 32.3 G/DL (ref 31.5–36.5)
MCHC RBC AUTO-ENTMCNC: 32.4 G/DL (ref 31.5–36.5)
MCV RBC AUTO: 89 FL (ref 78–100)
MCV RBC AUTO: 89 FL (ref 78–100)
MCV RBC AUTO: 92 FL (ref 78–100)
MONOCYTES # BLD AUTO: 0.5 10E3/UL (ref 0–1.3)
MONOCYTES NFR BLD AUTO: 8 %
MUCOUS THREADS #/AREA URNS LPF: PRESENT /LPF
NEUTROPHILS # BLD AUTO: 5.2 10E3/UL (ref 1.6–8.3)
NEUTROPHILS NFR BLD AUTO: 74 %
NITRATE UR QL: NEGATIVE
NRBC # BLD AUTO: 0 10E3/UL
NRBC BLD AUTO-RTO: 0 /100
O2/TOTAL GAS SETTING VFR VENT: 21 %
O2/TOTAL GAS SETTING VFR VENT: 50 %
O2/TOTAL GAS SETTING VFR VENT: 80 %
O2/TOTAL GAS SETTING VFR VENT: 85 %
O2/TOTAL GAS SETTING VFR VENT: 90 %
OXYHGB MFR BLD: 98 % (ref 92–100)
OXYHGB MFR BLDA: 98 % (ref 92–100)
OXYHGB MFR BLDA: 99 % (ref 92–100)
OXYHGB MFR BLDV: 75 % (ref 70–75)
OXYHGB MFR BLDV: 81 % (ref 92–100)
OXYHGB MFR BLDV: 83 % (ref 70–75)
PCO2 BLD: 41 MM HG (ref 35–45)
PCO2 BLD: 41 MM HG (ref 35–45)
PCO2 BLDA: 36 MM HG (ref 35–45)
PCO2 BLDA: 37 MM HG (ref 35–45)
PCO2 BLDA: 38 MM HG (ref 35–45)
PCO2 BLDA: 44 MM HG (ref 35–45)
PCO2 BLDA: 44 MM HG (ref 35–45)
PCO2 BLDA: 46 MM HG (ref 35–45)
PCO2 BLDA: 50 MM HG (ref 35–45)
PCO2 BLDV: 35 MM HG (ref 40–50)
PCO2 BLDV: 49 MM HG (ref 40–50)
PCO2 BLDV: 50 MM HG (ref 40–50)
PH BLD: 7.24 [PH] (ref 7.35–7.45)
PH BLD: 7.24 [PH] (ref 7.35–7.45)
PH BLDA: 7.26 [PH] (ref 7.35–7.45)
PH BLDA: 7.27 [PH] (ref 7.35–7.45)
PH BLDA: 7.35 [PH] (ref 7.35–7.45)
PH BLDA: 7.35 [PH] (ref 7.35–7.45)
PH BLDA: 7.39 [PH] (ref 7.35–7.45)
PH BLDA: 7.4 [PH] (ref 7.35–7.45)
PH BLDA: 7.4 [PH] (ref 7.35–7.45)
PH BLDV: 7.2 [PH] (ref 7.32–7.43)
PH BLDV: 7.33 [PH] (ref 7.32–7.43)
PH BLDV: 7.46 [PH] (ref 7.32–7.43)
PH UR STRIP: 7 [PH] (ref 5–7)
PHOSPHATE SERPL-MCNC: 3.5 MG/DL (ref 2.5–4.5)
PLATELET # BLD AUTO: 201 10E3/UL (ref 150–450)
PLATELET # BLD AUTO: 205 10E3/UL (ref 150–450)
PLATELET # BLD AUTO: 229 10E3/UL (ref 150–450)
PO2 BLD: 222 MM HG (ref 80–105)
PO2 BLD: 222 MM HG (ref 80–105)
PO2 BLDA: 343 MM HG (ref 80–105)
PO2 BLDA: 383 MM HG (ref 80–105)
PO2 BLDA: 389 MM HG (ref 80–105)
PO2 BLDA: 391 MM HG (ref 80–105)
PO2 BLDA: 394 MM HG (ref 80–105)
PO2 BLDA: 402 MM HG (ref 80–105)
PO2 BLDA: 485 MM HG (ref 80–105)
PO2 BLDV: 40 MM HG (ref 25–47)
PO2 BLDV: 52 MM HG (ref 25–47)
PO2 BLDV: 61 MM HG (ref 25–47)
POTASSIUM BLD-SCNC: 3.7 MMOL/L (ref 3.5–5)
POTASSIUM BLD-SCNC: 4.3 MMOL/L (ref 3.5–5)
POTASSIUM BLD-SCNC: 4.5 MMOL/L (ref 3.5–5)
POTASSIUM BLD-SCNC: 4.6 MMOL/L (ref 3.5–5)
POTASSIUM BLD-SCNC: 4.7 MMOL/L (ref 3.5–5)
POTASSIUM BLD-SCNC: 5 MMOL/L (ref 3.5–5)
POTASSIUM BLD-SCNC: 5.1 MMOL/L (ref 3.5–5)
POTASSIUM BLD-SCNC: 5.3 MMOL/L (ref 3.5–5)
POTASSIUM SERPL-SCNC: 4 MMOL/L (ref 3.4–5.3)
PROT SERPL-MCNC: 6.1 G/DL (ref 6.4–8.3)
RBC # BLD AUTO: 4.63 10E6/UL (ref 4.4–5.9)
RBC # BLD AUTO: 4.84 10E6/UL (ref 4.4–5.9)
RBC # BLD AUTO: 5.25 10E6/UL (ref 4.4–5.9)
RBC URINE: <1 /HPF
SODIUM BLD-SCNC: 137 MMOL/L (ref 133–144)
SODIUM BLD-SCNC: 138 MMOL/L (ref 133–144)
SODIUM BLD-SCNC: 139 MMOL/L (ref 133–144)
SODIUM BLD-SCNC: 139 MMOL/L (ref 133–144)
SODIUM BLD-SCNC: 141 MMOL/L (ref 133–144)
SODIUM BLD-SCNC: 141 MMOL/L (ref 133–144)
SODIUM SERPL-SCNC: 138 MMOL/L (ref 136–145)
SP GR UR STRIP: 1.01 (ref 1–1.03)
SPECIMEN EXPIRATION DATE: NORMAL
UFH PPP CHRO-ACNC: 0.21 IU/ML
UFH PPP CHRO-ACNC: 0.5 IU/ML
UNIT ABO/RH: NORMAL
UNIT NUMBER: NORMAL
UNIT STATUS: NORMAL
UNIT TYPE ISBT: 6200
UROBILINOGEN UR STRIP-MCNC: NORMAL MG/DL
WBC # BLD AUTO: 26.1 10E3/UL (ref 4–11)
WBC # BLD AUTO: 38.9 10E3/UL (ref 4–11)
WBC # BLD AUTO: 7 10E3/UL (ref 4–11)
WBC URINE: 0 /HPF

## 2022-12-25 PROCEDURE — 84100 ASSAY OF PHOSPHORUS: CPT | Performed by: STUDENT IN AN ORGANIZED HEALTH CARE EDUCATION/TRAINING PROGRAM

## 2022-12-25 PROCEDURE — 410N000004: Performed by: SURGERY

## 2022-12-25 PROCEDURE — 85384 FIBRINOGEN ACTIVITY: CPT | Performed by: INTERNAL MEDICINE

## 2022-12-25 PROCEDURE — 86923 COMPATIBILITY TEST ELECTRIC: CPT | Performed by: STUDENT IN AN ORGANIZED HEALTH CARE EDUCATION/TRAINING PROGRAM

## 2022-12-25 PROCEDURE — 250N000013 HC RX MED GY IP 250 OP 250 PS 637: Performed by: STUDENT IN AN ORGANIZED HEALTH CARE EDUCATION/TRAINING PROGRAM

## 2022-12-25 PROCEDURE — 84295 ASSAY OF SERUM SODIUM: CPT

## 2022-12-25 PROCEDURE — 83036 HEMOGLOBIN GLYCOSYLATED A1C: CPT | Performed by: STUDENT IN AN ORGANIZED HEALTH CARE EDUCATION/TRAINING PROGRAM

## 2022-12-25 PROCEDURE — 87389 HIV-1 AG W/HIV-1&-2 AB AG IA: CPT | Performed by: STUDENT IN AN ORGANIZED HEALTH CARE EDUCATION/TRAINING PROGRAM

## 2022-12-25 PROCEDURE — 94002 VENT MGMT INPAT INIT DAY: CPT

## 2022-12-25 PROCEDURE — 82803 BLOOD GASES ANY COMBINATION: CPT

## 2022-12-25 PROCEDURE — 71045 X-RAY EXAM CHEST 1 VIEW: CPT | Mod: 26 | Performed by: RADIOLOGY

## 2022-12-25 PROCEDURE — 84450 TRANSFERASE (AST) (SGOT): CPT | Performed by: STUDENT IN AN ORGANIZED HEALTH CARE EDUCATION/TRAINING PROGRAM

## 2022-12-25 PROCEDURE — 99291 CRITICAL CARE FIRST HOUR: CPT | Mod: GC | Performed by: INTERNAL MEDICINE

## 2022-12-25 PROCEDURE — 85730 THROMBOPLASTIN TIME PARTIAL: CPT | Performed by: INTERNAL MEDICINE

## 2022-12-25 PROCEDURE — 5A1221Z PERFORMANCE OF CARDIAC OUTPUT, CONTINUOUS: ICD-10-PCS | Performed by: SURGERY

## 2022-12-25 PROCEDURE — 74018 RADEX ABDOMEN 1 VIEW: CPT | Mod: 26 | Performed by: RADIOLOGY

## 2022-12-25 PROCEDURE — 02YA0Z0 TRANSPLANTATION OF HEART, ALLOGENEIC, OPEN APPROACH: ICD-10-PCS | Performed by: SURGERY

## 2022-12-25 PROCEDURE — 84295 ASSAY OF SERUM SODIUM: CPT | Performed by: SURGERY

## 2022-12-25 PROCEDURE — 85025 COMPLETE CBC W/AUTO DIFF WBC: CPT | Performed by: STUDENT IN AN ORGANIZED HEALTH CARE EDUCATION/TRAINING PROGRAM

## 2022-12-25 PROCEDURE — 812N000010 HC ACQUISITION HEART CADAVER

## 2022-12-25 PROCEDURE — 82435 ASSAY OF BLOOD CHLORIDE: CPT | Performed by: STUDENT IN AN ORGANIZED HEALTH CARE EDUCATION/TRAINING PROGRAM

## 2022-12-25 PROCEDURE — 86832 HLA CLASS I HIGH DEFIN QUAL: CPT | Performed by: STUDENT IN AN ORGANIZED HEALTH CARE EDUCATION/TRAINING PROGRAM

## 2022-12-25 PROCEDURE — 82805 BLOOD GASES W/O2 SATURATION: CPT | Performed by: SURGERY

## 2022-12-25 PROCEDURE — 86825 HLA X-MATH NON-CYTOTOXIC: CPT | Performed by: STUDENT IN AN ORGANIZED HEALTH CARE EDUCATION/TRAINING PROGRAM

## 2022-12-25 PROCEDURE — 85396 CLOTTING ASSAY WHOLE BLOOD: CPT

## 2022-12-25 PROCEDURE — 83735 ASSAY OF MAGNESIUM: CPT | Performed by: STUDENT IN AN ORGANIZED HEALTH CARE EDUCATION/TRAINING PROGRAM

## 2022-12-25 PROCEDURE — 82805 BLOOD GASES W/O2 SATURATION: CPT

## 2022-12-25 PROCEDURE — 250N000024 HC ISOFLURANE, PER MIN: Performed by: SURGERY

## 2022-12-25 PROCEDURE — 86665 EPSTEIN-BARR CAPSID VCA: CPT | Performed by: STUDENT IN AN ORGANIZED HEALTH CARE EDUCATION/TRAINING PROGRAM

## 2022-12-25 PROCEDURE — 250N000011 HC RX IP 250 OP 636: Performed by: STUDENT IN AN ORGANIZED HEALTH CARE EDUCATION/TRAINING PROGRAM

## 2022-12-25 PROCEDURE — 86833 HLA CLASS II HIGH DEFIN QUAL: CPT | Performed by: STUDENT IN AN ORGANIZED HEALTH CARE EDUCATION/TRAINING PROGRAM

## 2022-12-25 PROCEDURE — 3E033GR INTRODUCTION OF OTHER THERAPEUTIC MONOCLONAL ANTIBODY INTO PERIPHERAL VEIN, PERCUTANEOUS APPROACH: ICD-10-PCS | Performed by: INTERNAL MEDICINE

## 2022-12-25 PROCEDURE — 82330 ASSAY OF CALCIUM: CPT

## 2022-12-25 PROCEDURE — 85027 COMPLETE CBC AUTOMATED: CPT | Performed by: SURGERY

## 2022-12-25 PROCEDURE — 86803 HEPATITIS C AB TEST: CPT | Performed by: STUDENT IN AN ORGANIZED HEALTH CARE EDUCATION/TRAINING PROGRAM

## 2022-12-25 PROCEDURE — 999N000157 HC STATISTIC RCP TIME EA 10 MIN

## 2022-12-25 PROCEDURE — 999N000065 XR CHEST PORT 1 VIEW

## 2022-12-25 PROCEDURE — 0JPT0PZ REMOVAL OF CARDIAC RHYTHM RELATED DEVICE FROM TRUNK SUBCUTANEOUS TISSUE AND FASCIA, OPEN APPROACH: ICD-10-PCS | Performed by: SURGERY

## 2022-12-25 PROCEDURE — 88311 DECALCIFY TISSUE: CPT | Mod: TC | Performed by: SURGERY

## 2022-12-25 PROCEDURE — 258N000003 HC RX IP 258 OP 636: Performed by: STUDENT IN AN ORGANIZED HEALTH CARE EDUCATION/TRAINING PROGRAM

## 2022-12-25 PROCEDURE — 71045 X-RAY EXAM CHEST 1 VIEW: CPT

## 2022-12-25 PROCEDURE — 85610 PROTHROMBIN TIME: CPT | Performed by: STUDENT IN AN ORGANIZED HEALTH CARE EDUCATION/TRAINING PROGRAM

## 2022-12-25 PROCEDURE — 02PA3MZ REMOVAL OF CARDIAC LEAD FROM HEART, PERCUTANEOUS APPROACH: ICD-10-PCS | Performed by: SURGERY

## 2022-12-25 PROCEDURE — 410N000003 HC PER-PERFUSION 1ST 30 MIN: Performed by: SURGERY

## 2022-12-25 PROCEDURE — 82805 BLOOD GASES W/O2 SATURATION: CPT | Performed by: STUDENT IN AN ORGANIZED HEALTH CARE EDUCATION/TRAINING PROGRAM

## 2022-12-25 PROCEDURE — 250N000009 HC RX 250: Performed by: STUDENT IN AN ORGANIZED HEALTH CARE EDUCATION/TRAINING PROGRAM

## 2022-12-25 PROCEDURE — 82803 BLOOD GASES ANY COMBINATION: CPT | Performed by: STUDENT IN AN ORGANIZED HEALTH CARE EDUCATION/TRAINING PROGRAM

## 2022-12-25 PROCEDURE — 33945 TRANSPLANTATION OF HEART: CPT | Mod: GC | Performed by: SURGERY

## 2022-12-25 PROCEDURE — 85730 THROMBOPLASTIN TIME PARTIAL: CPT | Performed by: STUDENT IN AN ORGANIZED HEALTH CARE EDUCATION/TRAINING PROGRAM

## 2022-12-25 PROCEDURE — 82810 BLOOD GASES O2 SAT ONLY: CPT

## 2022-12-25 PROCEDURE — 86901 BLOOD TYPING SEROLOGIC RH(D): CPT | Performed by: STUDENT IN AN ORGANIZED HEALTH CARE EDUCATION/TRAINING PROGRAM

## 2022-12-25 PROCEDURE — 250N000009 HC RX 250: Performed by: SURGERY

## 2022-12-25 PROCEDURE — 85610 PROTHROMBIN TIME: CPT | Performed by: SURGERY

## 2022-12-25 PROCEDURE — 71045 X-RAY EXAM CHEST 1 VIEW: CPT | Mod: 26 | Performed by: STUDENT IN AN ORGANIZED HEALTH CARE EDUCATION/TRAINING PROGRAM

## 2022-12-25 PROCEDURE — 87521 HEPATITIS C PROBE&RVRS TRNSC: CPT | Performed by: STUDENT IN AN ORGANIZED HEALTH CARE EDUCATION/TRAINING PROGRAM

## 2022-12-25 PROCEDURE — 272N000085 HC PACK CELL SAVER CSP: Performed by: SURGERY

## 2022-12-25 PROCEDURE — 250N000011 HC RX IP 250 OP 636: Performed by: SURGERY

## 2022-12-25 PROCEDURE — 85520 HEPARIN ASSAY: CPT | Performed by: INTERNAL MEDICINE

## 2022-12-25 PROCEDURE — 999N000065 XR ABDOMEN PORT 1 VIEW

## 2022-12-25 PROCEDURE — 272N000088 HC PUMP APP ADULT PERFUSION: Performed by: SURGERY

## 2022-12-25 PROCEDURE — 250N000012 HC RX MED GY IP 250 OP 636 PS 637: Performed by: STUDENT IN AN ORGANIZED HEALTH CARE EDUCATION/TRAINING PROGRAM

## 2022-12-25 PROCEDURE — 87340 HEPATITIS B SURFACE AG IA: CPT | Performed by: STUDENT IN AN ORGANIZED HEALTH CARE EDUCATION/TRAINING PROGRAM

## 2022-12-25 PROCEDURE — 82150 ASSAY OF AMYLASE: CPT | Performed by: STUDENT IN AN ORGANIZED HEALTH CARE EDUCATION/TRAINING PROGRAM

## 2022-12-25 PROCEDURE — 83605 ASSAY OF LACTIC ACID: CPT | Performed by: STUDENT IN AN ORGANIZED HEALTH CARE EDUCATION/TRAINING PROGRAM

## 2022-12-25 PROCEDURE — 84132 ASSAY OF SERUM POTASSIUM: CPT

## 2022-12-25 PROCEDURE — 3E043XZ INTRODUCTION OF VASOPRESSOR INTO CENTRAL VEIN, PERCUTANEOUS APPROACH: ICD-10-PCS | Performed by: SURGERY

## 2022-12-25 PROCEDURE — 85027 COMPLETE CBC AUTOMATED: CPT

## 2022-12-25 PROCEDURE — 86923 COMPATIBILITY TEST ELECTRIC: CPT | Performed by: ANESTHESIOLOGY

## 2022-12-25 PROCEDURE — 86850 RBC ANTIBODY SCREEN: CPT | Performed by: STUDENT IN AN ORGANIZED HEALTH CARE EDUCATION/TRAINING PROGRAM

## 2022-12-25 PROCEDURE — 370N000017 HC ANESTHESIA TECHNICAL FEE, PER MIN: Performed by: SURGERY

## 2022-12-25 PROCEDURE — 86644 CMV ANTIBODY: CPT | Performed by: STUDENT IN AN ORGANIZED HEALTH CARE EDUCATION/TRAINING PROGRAM

## 2022-12-25 PROCEDURE — 33244 REMOVE ELCTRD TRANSVENOUSLY: CPT | Mod: GC | Performed by: SURGERY

## 2022-12-25 PROCEDURE — 83605 ASSAY OF LACTIC ACID: CPT

## 2022-12-25 PROCEDURE — 93010 ELECTROCARDIOGRAM REPORT: CPT | Mod: 76 | Performed by: INTERNAL MEDICINE

## 2022-12-25 PROCEDURE — 272N000001 HC OR GENERAL SUPPLY STERILE: Performed by: SURGERY

## 2022-12-25 PROCEDURE — 33241 REMOVE PULSE GENERATOR: CPT | Mod: GC | Performed by: SURGERY

## 2022-12-25 PROCEDURE — 200N000002 HC R&B ICU UMMC

## 2022-12-25 PROCEDURE — 360N000079 HC SURGERY LEVEL 6, PER MIN: Performed by: SURGERY

## 2022-12-25 PROCEDURE — 87641 MR-STAPH DNA AMP PROBE: CPT | Performed by: STUDENT IN AN ORGANIZED HEALTH CARE EDUCATION/TRAINING PROGRAM

## 2022-12-25 PROCEDURE — 84155 ASSAY OF PROTEIN SERUM: CPT | Performed by: STUDENT IN AN ORGANIZED HEALTH CARE EDUCATION/TRAINING PROGRAM

## 2022-12-25 PROCEDURE — C1898 LEAD, PMKR, OTHER THAN TRANS: HCPCS | Performed by: SURGERY

## 2022-12-25 PROCEDURE — 86706 HEP B SURFACE ANTIBODY: CPT | Performed by: STUDENT IN AN ORGANIZED HEALTH CARE EDUCATION/TRAINING PROGRAM

## 2022-12-25 PROCEDURE — 85610 PROTHROMBIN TIME: CPT | Performed by: INTERNAL MEDICINE

## 2022-12-25 PROCEDURE — 86704 HEP B CORE ANTIBODY TOTAL: CPT | Performed by: STUDENT IN AN ORGANIZED HEALTH CARE EDUCATION/TRAINING PROGRAM

## 2022-12-25 PROCEDURE — 85018 HEMOGLOBIN: CPT | Performed by: INTERNAL MEDICINE

## 2022-12-25 PROCEDURE — 82248 BILIRUBIN DIRECT: CPT

## 2022-12-25 PROCEDURE — 81001 URINALYSIS AUTO W/SCOPE: CPT | Performed by: STUDENT IN AN ORGANIZED HEALTH CARE EDUCATION/TRAINING PROGRAM

## 2022-12-25 RX ORDER — LIDOCAINE HYDROCHLORIDE 20 MG/ML
INJECTION, SOLUTION INFILTRATION; PERINEURAL PRN
Status: DISCONTINUED | OUTPATIENT
Start: 2022-12-25 | End: 2022-12-25

## 2022-12-25 RX ORDER — VALGANCICLOVIR 450 MG/1
900 TABLET, FILM COATED ORAL DAILY
Status: CANCELLED | OUTPATIENT
Start: 2022-12-30

## 2022-12-25 RX ORDER — OXYCODONE HYDROCHLORIDE 5 MG/1
5 TABLET ORAL EVERY 4 HOURS PRN
Status: DISCONTINUED | OUTPATIENT
Start: 2022-12-25 | End: 2022-12-31

## 2022-12-25 RX ORDER — ACETAMINOPHEN 325 MG/1
975 TABLET ORAL EVERY 8 HOURS
Status: COMPLETED | OUTPATIENT
Start: 2022-12-25 | End: 2022-12-28

## 2022-12-25 RX ORDER — CEFTRIAXONE 2 G/1
2 INJECTION, POWDER, FOR SOLUTION INTRAMUSCULAR; INTRAVENOUS
Status: COMPLETED | OUTPATIENT
Start: 2022-12-25 | End: 2022-12-25

## 2022-12-25 RX ORDER — SULFAMETHOXAZOLE AND TRIMETHOPRIM 400; 80 MG/1; MG/1
1 TABLET ORAL DAILY
Status: CANCELLED | OUTPATIENT
Start: 2022-12-30

## 2022-12-25 RX ORDER — METHOCARBAMOL 500 MG/1
500 TABLET, FILM COATED ORAL EVERY 6 HOURS PRN
Status: DISCONTINUED | OUTPATIENT
Start: 2022-12-25 | End: 2023-01-13 | Stop reason: HOSPADM

## 2022-12-25 RX ORDER — LIDOCAINE 40 MG/G
CREAM TOPICAL
Status: CANCELLED | OUTPATIENT
Start: 2022-12-25

## 2022-12-25 RX ORDER — NICOTINE POLACRILEX 4 MG
15-30 LOZENGE BUCCAL
Status: DISCONTINUED | OUTPATIENT
Start: 2022-12-25 | End: 2023-01-04

## 2022-12-25 RX ORDER — SULFAMETHOXAZOLE AND TRIMETHOPRIM 200; 40 MG/5ML; MG/5ML
10 SUSPENSION ORAL DAILY
Status: DISCONTINUED | OUTPATIENT
Start: 2022-12-30 | End: 2022-12-29

## 2022-12-25 RX ORDER — NALOXONE HYDROCHLORIDE 0.4 MG/ML
0.2 INJECTION, SOLUTION INTRAMUSCULAR; INTRAVENOUS; SUBCUTANEOUS
Status: DISCONTINUED | OUTPATIENT
Start: 2022-12-25 | End: 2023-01-13 | Stop reason: HOSPADM

## 2022-12-25 RX ORDER — ONDANSETRON 4 MG/1
4 TABLET, ORALLY DISINTEGRATING ORAL EVERY 6 HOURS PRN
Status: DISCONTINUED | OUTPATIENT
Start: 2022-12-25 | End: 2023-01-13 | Stop reason: HOSPADM

## 2022-12-25 RX ORDER — BISACODYL 10 MG
10 SUPPOSITORY, RECTAL RECTAL DAILY PRN
Status: DISCONTINUED | OUTPATIENT
Start: 2022-12-25 | End: 2023-01-13 | Stop reason: HOSPADM

## 2022-12-25 RX ORDER — NALOXONE HYDROCHLORIDE 0.4 MG/ML
0.4 INJECTION, SOLUTION INTRAMUSCULAR; INTRAVENOUS; SUBCUTANEOUS
Status: DISCONTINUED | OUTPATIENT
Start: 2022-12-25 | End: 2023-01-13 | Stop reason: HOSPADM

## 2022-12-25 RX ORDER — CALCIUM CHLORIDE 100 MG/ML
INJECTION INTRAVENOUS; INTRAVENTRICULAR PRN
Status: DISCONTINUED | OUTPATIENT
Start: 2022-12-25 | End: 2022-12-25

## 2022-12-25 RX ORDER — VALGANCICLOVIR HYDROCHLORIDE 50 MG/ML
900 POWDER, FOR SOLUTION ORAL DAILY
Status: DISCONTINUED | OUTPATIENT
Start: 2022-12-30 | End: 2023-01-02

## 2022-12-25 RX ORDER — MYCOPHENOLATE MOFETIL 500 MG/1
1500 TABLET ORAL EVERY 12 HOURS
Status: DISCONTINUED | OUTPATIENT
Start: 2022-12-25 | End: 2022-12-25 | Stop reason: HOSPADM

## 2022-12-25 RX ORDER — DEXTROSE MONOHYDRATE 100 MG/ML
INJECTION, SOLUTION INTRAVENOUS CONTINUOUS PRN
Status: DISCONTINUED | OUTPATIENT
Start: 2022-12-25 | End: 2022-12-30

## 2022-12-25 RX ORDER — ACETAMINOPHEN 325 MG/1
650 TABLET ORAL EVERY 4 HOURS PRN
Status: DISCONTINUED | OUTPATIENT
Start: 2022-12-28 | End: 2022-12-26

## 2022-12-25 RX ORDER — METHYLPREDNISOLONE SODIUM SUCCINATE 125 MG/2ML
125 INJECTION, POWDER, LYOPHILIZED, FOR SOLUTION INTRAMUSCULAR; INTRAVENOUS EVERY 8 HOURS
Status: CANCELLED | OUTPATIENT
Start: 2022-12-25 | End: 2022-12-26

## 2022-12-25 RX ORDER — NYSTATIN 100000/ML
1000000 SUSPENSION, ORAL (FINAL DOSE FORM) ORAL 4 TIMES DAILY
Status: DISCONTINUED | OUTPATIENT
Start: 2022-12-30 | End: 2023-01-09

## 2022-12-25 RX ORDER — PROPOFOL 10 MG/ML
5-75 INJECTION, EMULSION INTRAVENOUS CONTINUOUS
Status: DISCONTINUED | OUTPATIENT
Start: 2022-12-25 | End: 2022-12-28

## 2022-12-25 RX ORDER — OXYCODONE HYDROCHLORIDE 10 MG/1
10 TABLET ORAL EVERY 4 HOURS PRN
Status: DISCONTINUED | OUTPATIENT
Start: 2022-12-25 | End: 2022-12-31

## 2022-12-25 RX ORDER — PROCHLORPERAZINE MALEATE 5 MG
5 TABLET ORAL EVERY 6 HOURS PRN
Status: DISCONTINUED | OUTPATIENT
Start: 2022-12-25 | End: 2023-01-13 | Stop reason: HOSPADM

## 2022-12-25 RX ORDER — SULFAMETHOXAZOLE AND TRIMETHOPRIM 200; 40 MG/5ML; MG/5ML
10 SUSPENSION ORAL DAILY
Status: CANCELLED | OUTPATIENT
Start: 2022-12-30

## 2022-12-25 RX ORDER — PROPOFOL 10 MG/ML
INJECTION, EMULSION INTRAVENOUS CONTINUOUS PRN
Status: DISCONTINUED | OUTPATIENT
Start: 2022-12-25 | End: 2022-12-25

## 2022-12-25 RX ORDER — NOREPINEPHRINE BITARTRATE 0.06 MG/ML
.03-.1 INJECTION, SOLUTION INTRAVENOUS CONTINUOUS
Status: DISCONTINUED | OUTPATIENT
Start: 2022-12-25 | End: 2022-12-29

## 2022-12-25 RX ORDER — MAGNESIUM SULFATE HEPTAHYDRATE 500 MG/ML
INJECTION, SOLUTION INTRAMUSCULAR; INTRAVENOUS PRN
Status: DISCONTINUED | OUTPATIENT
Start: 2022-12-25 | End: 2022-12-25

## 2022-12-25 RX ORDER — GABAPENTIN 100 MG/1
100 CAPSULE ORAL AT BEDTIME
Status: DISCONTINUED | OUTPATIENT
Start: 2022-12-25 | End: 2022-12-28

## 2022-12-25 RX ORDER — VALGANCICLOVIR HYDROCHLORIDE 50 MG/ML
900 POWDER, FOR SOLUTION ORAL DAILY
Status: CANCELLED | OUTPATIENT
Start: 2022-12-30

## 2022-12-25 RX ORDER — DEXMEDETOMIDINE HYDROCHLORIDE 4 UG/ML
.1-1.2 INJECTION, SOLUTION INTRAVENOUS CONTINUOUS
Status: DISCONTINUED | OUTPATIENT
Start: 2022-12-25 | End: 2022-12-26

## 2022-12-25 RX ORDER — POLYETHYLENE GLYCOL 3350 17 G/17G
17 POWDER, FOR SOLUTION ORAL DAILY
Status: DISCONTINUED | OUTPATIENT
Start: 2022-12-26 | End: 2022-12-28

## 2022-12-25 RX ORDER — FENTANYL CITRATE 50 UG/ML
INJECTION, SOLUTION INTRAMUSCULAR; INTRAVENOUS PRN
Status: DISCONTINUED | OUTPATIENT
Start: 2022-12-25 | End: 2022-12-25

## 2022-12-25 RX ORDER — VALGANCICLOVIR 450 MG/1
900 TABLET, FILM COATED ORAL DAILY
Status: DISCONTINUED | OUTPATIENT
Start: 2022-12-30 | End: 2023-01-02

## 2022-12-25 RX ORDER — MYCOPHENOLATE MOFETIL 250 MG/1
1500 CAPSULE ORAL EVERY 12 HOURS
Status: DISCONTINUED | OUTPATIENT
Start: 2022-12-25 | End: 2022-12-25

## 2022-12-25 RX ORDER — ONDANSETRON 2 MG/ML
4 INJECTION INTRAMUSCULAR; INTRAVENOUS EVERY 6 HOURS PRN
Status: DISCONTINUED | OUTPATIENT
Start: 2022-12-25 | End: 2023-01-13 | Stop reason: HOSPADM

## 2022-12-25 RX ORDER — LIDOCAINE HYDROCHLORIDE 10 MG/ML
INJECTION, SOLUTION INFILTRATION; PERINEURAL
Status: COMPLETED | OUTPATIENT
Start: 2022-12-25 | End: 2022-12-25

## 2022-12-25 RX ORDER — HYDROMORPHONE HCL IN WATER/PF 6 MG/30 ML
0.4 PATIENT CONTROLLED ANALGESIA SYRINGE INTRAVENOUS
Status: DISCONTINUED | OUTPATIENT
Start: 2022-12-25 | End: 2022-12-28

## 2022-12-25 RX ORDER — DEXTROSE MONOHYDRATE 25 G/50ML
25-50 INJECTION, SOLUTION INTRAVENOUS
Status: DISCONTINUED | OUTPATIENT
Start: 2022-12-25 | End: 2023-01-04

## 2022-12-25 RX ORDER — HYDROMORPHONE HCL IN WATER/PF 6 MG/30 ML
0.2 PATIENT CONTROLLED ANALGESIA SYRINGE INTRAVENOUS
Status: DISCONTINUED | OUTPATIENT
Start: 2022-12-25 | End: 2022-12-28

## 2022-12-25 RX ORDER — METHYLPREDNISOLONE SODIUM SUCCINATE 125 MG/2ML
125 INJECTION, POWDER, LYOPHILIZED, FOR SOLUTION INTRAMUSCULAR; INTRAVENOUS EVERY 8 HOURS
Status: COMPLETED | OUTPATIENT
Start: 2022-12-25 | End: 2022-12-26

## 2022-12-25 RX ORDER — SODIUM CHLORIDE, SODIUM GLUCONATE, SODIUM ACETATE, POTASSIUM CHLORIDE AND MAGNESIUM CHLORIDE 526; 502; 368; 37; 30 MG/100ML; MG/100ML; MG/100ML; MG/100ML; MG/100ML
INJECTION, SOLUTION INTRAVENOUS CONTINUOUS PRN
Status: DISCONTINUED | OUTPATIENT
Start: 2022-12-25 | End: 2022-12-25

## 2022-12-25 RX ORDER — HEPARIN SODIUM 1000 [USP'U]/ML
INJECTION, SOLUTION INTRAVENOUS; SUBCUTANEOUS PRN
Status: DISCONTINUED | OUTPATIENT
Start: 2022-12-25 | End: 2022-12-25

## 2022-12-25 RX ORDER — NYSTATIN 100000/ML
1000000 SUSPENSION, ORAL (FINAL DOSE FORM) ORAL 4 TIMES DAILY
Status: CANCELLED | OUTPATIENT
Start: 2022-12-30

## 2022-12-25 RX ORDER — SODIUM CHLORIDE, SODIUM LACTATE, POTASSIUM CHLORIDE, CALCIUM CHLORIDE 600; 310; 30; 20 MG/100ML; MG/100ML; MG/100ML; MG/100ML
INJECTION, SOLUTION INTRAVENOUS CONTINUOUS
Status: CANCELLED | OUTPATIENT
Start: 2022-12-25

## 2022-12-25 RX ORDER — PROTAMINE SULFATE 10 MG/ML
INJECTION, SOLUTION INTRAVENOUS PRN
Status: DISCONTINUED | OUTPATIENT
Start: 2022-12-25 | End: 2022-12-25

## 2022-12-25 RX ORDER — PROPOFOL 10 MG/ML
INJECTION, EMULSION INTRAVENOUS PRN
Status: DISCONTINUED | OUTPATIENT
Start: 2022-12-25 | End: 2022-12-25

## 2022-12-25 RX ORDER — LIDOCAINE 40 MG/G
CREAM TOPICAL
Status: DISCONTINUED | OUTPATIENT
Start: 2022-12-25 | End: 2022-12-25 | Stop reason: HOSPADM

## 2022-12-25 RX ORDER — LIDOCAINE 40 MG/G
CREAM TOPICAL
Status: DISCONTINUED | OUTPATIENT
Start: 2022-12-25 | End: 2023-01-08

## 2022-12-25 RX ORDER — SULFAMETHOXAZOLE AND TRIMETHOPRIM 400; 80 MG/1; MG/1
1 TABLET ORAL DAILY
Status: DISCONTINUED | OUTPATIENT
Start: 2022-12-30 | End: 2022-12-29

## 2022-12-25 RX ORDER — AMOXICILLIN 250 MG
1 CAPSULE ORAL 2 TIMES DAILY
Status: DISCONTINUED | OUTPATIENT
Start: 2022-12-25 | End: 2022-12-28

## 2022-12-25 RX ADMIN — SODIUM CHLORIDE, SODIUM GLUCONATE, SODIUM ACETATE, POTASSIUM CHLORIDE AND MAGNESIUM CHLORIDE: 526; 502; 368; 37; 30 INJECTION, SOLUTION INTRAVENOUS at 17:51

## 2022-12-25 RX ADMIN — DOBUTAMINE HYDROCHLORIDE 5 MCG/KG/MIN: 200 INJECTION INTRAVENOUS at 04:01

## 2022-12-25 RX ADMIN — AMINOCAPROIC ACID 1 G/HR: 250 INJECTION, SOLUTION INTRAVENOUS at 19:49

## 2022-12-25 RX ADMIN — VANCOMYCIN HYDROCHLORIDE 1500 MG: 10 INJECTION, POWDER, LYOPHILIZED, FOR SOLUTION INTRAVENOUS at 18:32

## 2022-12-25 RX ADMIN — LIDOCAINE HYDROCHLORIDE 2 ML: 10 INJECTION, SOLUTION INFILTRATION; PERINEURAL at 18:15

## 2022-12-25 RX ADMIN — LIDOCAINE HYDROCHLORIDE 200 MG: 20 INJECTION, SOLUTION INFILTRATION; PERINEURAL at 21:11

## 2022-12-25 RX ADMIN — SODIUM BICARBONATE 50 MEQ: 84 INJECTION INTRAVENOUS at 23:49

## 2022-12-25 RX ADMIN — PROTAMINE SULFATE 250 MG: 10 INJECTION, SOLUTION INTRAVENOUS at 21:36

## 2022-12-25 RX ADMIN — Medication 50 MG: at 19:13

## 2022-12-25 RX ADMIN — SUGAMMADEX 200 MG: 100 INJECTION, SOLUTION INTRAVENOUS at 22:42

## 2022-12-25 RX ADMIN — SODIUM CHLORIDE 20 MG: 9 INJECTION, SOLUTION INTRAVENOUS at 19:21

## 2022-12-25 RX ADMIN — FENTANYL CITRATE 500 MCG: 50 INJECTION, SOLUTION INTRAMUSCULAR; INTRAVENOUS at 18:18

## 2022-12-25 RX ADMIN — MYCOPHENOLATE MOFETIL 1500 MG: 500 TABLET, FILM COATED ORAL at 06:46

## 2022-12-25 RX ADMIN — ISOSORBIDE DINITRATE 20 MG: 20 TABLET ORAL at 07:57

## 2022-12-25 RX ADMIN — SODIUM CHLORIDE, SODIUM GLUCONATE, SODIUM ACETATE, POTASSIUM CHLORIDE AND MAGNESIUM CHLORIDE: 526; 502; 368; 37; 30 INJECTION, SOLUTION INTRAVENOUS at 18:50

## 2022-12-25 RX ADMIN — HEPARIN SODIUM 50000 UNITS: 1000 INJECTION INTRAVENOUS; SUBCUTANEOUS at 19:21

## 2022-12-25 RX ADMIN — CALCIUM CHLORIDE 1 G: 100 INJECTION INTRAVENOUS; INTRAVENTRICULAR at 21:11

## 2022-12-25 RX ADMIN — AMINOCAPROIC ACID 5 G/HR: 250 INJECTION, SOLUTION INTRAVENOUS at 18:50

## 2022-12-25 RX ADMIN — NOREPINEPHRINE BITARTRATE 0.03 MCG/KG/MIN: 1 INJECTION, SOLUTION, CONCENTRATE INTRAVENOUS at 19:51

## 2022-12-25 RX ADMIN — CEFTRIAXONE SODIUM 2 G: 2 INJECTION, POWDER, FOR SOLUTION INTRAMUSCULAR; INTRAVENOUS at 18:37

## 2022-12-25 RX ADMIN — ACYCLOVIR: 50 OINTMENT TOPICAL at 04:01

## 2022-12-25 RX ADMIN — LIDOCAINE HYDROCHLORIDE 100 MG: 20 INJECTION, SOLUTION INFILTRATION; PERINEURAL at 18:18

## 2022-12-25 RX ADMIN — MAGNESIUM SULFATE HEPTAHYDRATE 1 G: 500 INJECTION, SOLUTION INTRAMUSCULAR; INTRAVENOUS at 21:14

## 2022-12-25 RX ADMIN — PROPOFOL 30 MG: 10 INJECTION, EMULSION INTRAVENOUS at 18:18

## 2022-12-25 RX ADMIN — ACYCLOVIR: 50 OINTMENT TOPICAL at 15:59

## 2022-12-25 RX ADMIN — ASPIRIN 81 MG: 81 TABLET, COATED ORAL at 07:57

## 2022-12-25 RX ADMIN — PROPOFOL 30 MCG/KG/MIN: 10 INJECTION, EMULSION INTRAVENOUS at 22:19

## 2022-12-25 RX ADMIN — FENTANYL CITRATE 500 MCG: 50 INJECTION, SOLUTION INTRAMUSCULAR; INTRAVENOUS at 19:12

## 2022-12-25 RX ADMIN — Medication 50 MG: at 20:44

## 2022-12-25 RX ADMIN — EPINEPHRINE 0.06 MCG/KG/MIN: 1 INJECTION INTRAMUSCULAR; INTRAVENOUS; SUBCUTANEOUS at 21:10

## 2022-12-25 RX ADMIN — HUMAN INSULIN 1 UNITS/HR: 100 INJECTION, SOLUTION SUBCUTANEOUS at 20:32

## 2022-12-25 RX ADMIN — Medication 100 MG: at 18:18

## 2022-12-25 RX ADMIN — ACYCLOVIR: 50 OINTMENT TOPICAL at 12:09

## 2022-12-25 RX ADMIN — SODIUM CHLORIDE 1000 MG: 9 INJECTION, SOLUTION INTRAVENOUS at 06:35

## 2022-12-25 RX ADMIN — HYDRALAZINE HYDROCHLORIDE 75 MG: 50 TABLET, FILM COATED ORAL at 05:57

## 2022-12-25 RX ADMIN — FENTANYL CITRATE 250 MCG: 50 INJECTION, SOLUTION INTRAMUSCULAR; INTRAVENOUS at 21:56

## 2022-12-25 RX ADMIN — PRAVASTATIN SODIUM 20 MG: 20 TABLET ORAL at 07:57

## 2022-12-25 ASSESSMENT — ACTIVITIES OF DAILY LIVING (ADL)
ADLS_ACUITY_SCORE: 39

## 2022-12-25 ASSESSMENT — ENCOUNTER SYMPTOMS: DYSRHYTHMIAS: 1

## 2022-12-25 ASSESSMENT — NEW YORK HEART ASSOCIATION (NYHA) CLASSIFICATION: NYHA FUNCTIONAL CLASS: IV

## 2022-12-25 NOTE — PLAN OF CARE
Major Shift Events:  VSS on RA, V-paced, and afebrile. Dobutamine and heparin infusing. Pt to receive heart transplant this afternoon. AM labs sent, solumedrol and mycophenolate given. Pt NPO since 03:00.  Plan: Transplant this afternoon, monitor labs and hemodynamic status.    For vital signs and complete assessments, please see documentation flowsheets.     Goal Outcome Evaluation:      Plan of Care Reviewed With: patient    Overall Patient Progress: no changeOverall Patient Progress: no change

## 2022-12-25 NOTE — TELEPHONE ENCOUNTER
HEART donor was identified by Coy Hahn and reviewed with Dr. Mcneill and Dr. Rodriguez. The organ was accepted and pt was called in for transplant.    Donor UNOS ID [id number] and Match ID [id number] confirmed with Dr. Mcneill and Dr. Rodriguez.   Donor and recipient blood type reviewed and found to be IDENTICAL.  Recipient with HLA antibodies: YES  Crossmatch required   Prospective: NO   Virtual: YES  Crossmatch reviewed with Dr. Alva, immunology staff on call and deemed negative based on organ specific protocol.     Donor specific antibodies absent, notified Dr. Mcneill and Dr. Rodriguez.  Pt was contacted IN UNIT 4C..  Verified pt has not had any blood transfusions since their last PRA sample on 11/12/2022   Donor DOES meet criteria to be classified as PHS INCREASED RISK.    Pt instructed to remain NPO for pre-op prep.  Instructed to bring medications, oxygen, or equipment: N/A  Pt instructed to come to the [facility] ER: N/A   Pt on Coumadin: NO, but he is on Heparin   Intervention: Per floor staff  Pt has had a positive COVID test: NO              Date of last positive COVID test: N/A  HEART RECIPIENT: renal function reviewed, patient IS preselected for CNI delaying protocol. Dr Mcneill notified. Creatinine: 0.85 on 12/24/2022 at 0511.     ABO/CMV/EBV status note:   UNOS donor ID [id number]  Donor blood type is A1: Verified by donor records   Recipient blood type is A: Verified by blood bank [facility].   Donor CMV status is negative. Verified by donor records.   Recipient CMV status is positive. Verified in [facility] lab results.   Donor EBV status is PENDING. Verified by donor records.   Recipient EBV status is positive. Verified in [facility] lab results.   Recipient VZV status is positive. Verified in [facility] lab results.   Donor Toxoplasma status is PENDING. Verified by donor records.  Recipient Toxoplasma status is negative. Verified in [facility] lab results.

## 2022-12-25 NOTE — ANESTHESIA PREPROCEDURE EVALUATION
Anesthesia Pre-Procedure Evaluation    Patient: Paco Stein   MRN: [id number] : 1955        Procedure : Procedure(s):  TRANSPLANT, HEART, RECIPIENT          Past Medical History:   Diagnosis Date     Congestive heart failure (H)       Past Surgical History:   Procedure Laterality Date     COLONOSCOPY N/A 2022    Procedure: COLONOSCOPY;  Surgeon: Roverto Nino MD;  Location: [facility]     CV CORONARY ANGIOGRAM N/A 2022    Procedure: Coronary Angiogram;  Surgeon: Justo Bush MD;  Location: [facility]     CV RIGHT HEART CATH MEASUREMENTS RECORDED N/A 2022    Procedure: Right Heart Catheterization;  Surgeon: Justo Bush MD;  Location: [facility]     CV RIGHT HEART CATH MEASUREMENTS RECORDED N/A 2022    Procedure: Right Heart Catheterization;  Surgeon: Wayne Xavier MD;  Location: [facility]     CV RIGHT HEART CATH MEASUREMENTS RECORDED N/A 2022    Procedure: Right Heart Catheterization with leave-in [place];  Surgeon: Khari Mcneill MD;  [facility]     PICC DOUBLE LUMEN PLACEMENT Right 11/10/2022    Right basilic, 43 cm, 1 cm external length      No Known Allergies   Social History     Tobacco Use     Smoking status: Never     Smokeless tobacco: Never   Substance Use Topics     Alcohol use: Not Currently      Wt Readings from Last 1 Encounters:   22 87.1 kg (192 lb 1.6 oz)        Anesthesia Evaluation   Pt has had prior anesthetic.     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 CHF etiology: arrythmogenic HFrEF Last EF: 15-20 NYHA classification: IV. pacemaker, ICD dysrhythmias (NSVT), Other,  Previous cardiac testing   Echo: Date: 11/16/22 Results:  Left Ventricle  Mild to moderate left ventricular dilation is present. Left ventricular  function is decreased. The ejection fraction is 20-25% (severely reduced).  Severe diffuse hypokinesis is present. There is no LV apical thrombus.     Right Ventricle  The right ventricle is normal size. Global right ventricular function is  mildly reduced.     Atria  The left atrial appendage is normal. It is free of spontaneous echo contrast  and thrombus. The right atria appears normal. Mild left atrial enlargement is  present. The atrial septum is intact as assessed by color Doppler and agitated  saline bubble study .     Mitral Valve  Moderate to severe mitral insufficiency is present. EROA = 0.5 cm2. There is  no flow reversal in the pulmonary veins. The cause of the mitral insufficiency  appears to be altered left ventricular size and geometry.     Aortic Valve  Aortic valve is normal in structure and function.     Tricuspid Valve  Mild tricuspid insufficiency is present.     Pulmonic Valve  The pulmonic valve is normal.     Vessels  The aorta root is normal. Normal pulmonary vein velocity.     Pericardium  No pericardial effusion is present.     Stress Test: Date: Results:    ECG Reviewed: Date: 12/25/22 Results:  Atrial-sensed ventricular-paced rhythm with occasional Premature ventricular complexes   Cath:  Date: 12/9/22 Results:  RHC (12/9/22)  ? Right sided filling pressures are normal.  ? Mild pulmonary hypertension.  ? Left sided filling pressures are moderately elevated.  ? Reduced cardiac output level.  ? Hemodynamic data has been modified in Epic per physician review.     Normal right-sided filling pressures and pulmonary pressures  Mild to moderately elevated left-sided filling pressures  Reduced cardiac output while on 5mg/kg/min dobutamine consistent with cardiogenic shock despite inotrope use    RA 5  RV 35/5  PA 32/17/22  PCWP 16  CO/CI 3.9/1.8 by TD;  2.8/1.3 by Aisha  PVR 1.53 chamberlain by TD and 2.14 chamberlain by Aisha    METS/Exercise Tolerance:     Hematologic:  - neg hematologic  ROS   (+) History of blood clots (Rigth axillary DVT, left peroneal DVT), pt is anticoagulated,  (-) anemia   Musculoskeletal:   (+) arthritis,     GI/Hepatic: Comment: Severe malnutrition in context of chronic illness      Renal/Genitourinary:     (+) renal disease, type: CRI, Pt does not require dialysis,     Endo:  - neg endo ROS     Psychiatric/Substance Use:     (+) psychiatric history anxiety     Infectious Disease:  - neg infectious disease ROS     Malignancy:       Other:            Physical Exam    Airway        Mallampati: II   TM distance: > 3 FB   Neck ROM: full   Mouth opening: > 3 cm    Respiratory Devices and Support         Dental  no notable dental history         Cardiovascular          Rhythm and rate: regular and normal   (+) murmur       Pulmonary   pulmonary exam normal        breath sounds clear to auscultation           OUTSIDE LABS:  CBC:   Lab Results   Component Value Date    WBC 7.0 12/25/2022    WBC 5.9 12/24/2022    HGB 13.9 12/25/2022    HGB 13.5 12/24/2022    HCT 43.0 12/25/2022    HCT 41.4 12/24/2022     12/25/2022     12/24/2022     BMP:   Lab Results   Component Value Date     12/25/2022     12/24/2022    POTASSIUM 4.0 12/25/2022    POTASSIUM 3.9 12/24/2022    CHLORIDE 105 12/25/2022    CHLORIDE 104 12/24/2022    CO2 22 12/25/2022    CO2 22 12/24/2022    BUN 13.1 12/25/2022    BUN 14.5 12/24/2022    CR 0.84 12/25/2022    CR 0.85 12/24/2022     (H) 12/25/2022    GLC 95 12/24/2022     COAGS:   Lab Results   Component Value Date    PTT 84 (H) 12/25/2022    INR 1.04 12/25/2022     POC: No results found for: BGM, HCG, HCGS  HEPATIC:   Lab Results   Component Value Date    ALBUMIN 3.6 12/25/2022    PROTTOTAL 6.1 (L) 12/25/2022    ALT 54 (H) 12/25/2022    AST 33 12/25/2022    ALKPHOS 68 12/25/2022    BILITOTAL 0.5 12/25/2022     OTHER:    Lab Results   Component Value Date    LACT 1.2 12/25/2022    A1C 6.2 (H) 12/25/2022    TIM 9.4 12/25/2022    PHOS 3.5 12/25/2022    MAG 1.8 12/25/2022    AMYLASE 77 12/25/2022    TSH 5.18 (H) 11/12/2022       Anesthesia Plan    ASA Status:  4   NPO Status:  Will be NPO Appropriate at ...    Anesthesia Type: General.     - Airway: ETT   Induction: Intravenous.   Maintenance: Balanced.   Techniques and Equipment:     - Lines/Monitors: Arterial Line, Central Line, PAC, CVP, BIS, NIRS, JARED            JARED Absolute Contra-indication: NONE            JARED Relative Contra-indication: NONE     - Blood: PRBC, T&S     - Drips/Meds: Norepi, Epinephrine, Vasopressin     Consents    Anesthesia Plan(s) and associated risks, benefits, and realistic alternatives discussed. Questions answered and patient/representative(s) expressed understanding.    - Discussed:     - Discussed with:  Patient      - Extended Intubation/Ventilatory Support Discussed: Yes.      - Patient is DNR/DNI Status: No    Use of blood products discussed: Yes.     - Discussed with: Patient.     - Consented: consented to blood products            Reason for refusal: other.     Postoperative Care    Pain management: Multi-modal analgesia.   PONV prophylaxis: Ondansetron (or other 5HT-3), Dexamethasone or Solumedrol     Comments:    Other Comments:  67 year old male with a history of chronic systolic heart failure (suspected predominantly nonischemic cardiomyopathy, possibly arrhythmogenic), coronary artery disease (s/p PCI with TILA to LCx 4/2013 and to the LAD in 10/2021), hyperlipidemia, CNS vasculitis and chronic kidney disease presents to the OR for OHT.    Plan for GETA w/ arterial line, JARED, CVC and PAC.            Da Perdomo MD

## 2022-12-25 NOTE — PROGRESS NOTES
[facility]    Advanced Heart Failure Progress Note    Assessment and Plan:     Paco Stein is a 67 year old male with a history of chronic systolic heart failure (suspected predominantly nonischemic cardiomyopathy, possibly arrhythmogenic), coronary artery disease (s/p PCI with TILA to LCx 4/2013 and to the LAD in 10/2021), hyperlipidemia, CNS vasculitis and chronic kidney disease who presents with transient bilateral hand tightness with unrevealing workup, now admitted for continued advanced therapies evaluation. Patient was listed for heart transplant (status 2) on 12/12/2022.       Interval History:  heart accepted overnight.  Hemodynamic stable this morning    Plan:   -OR this afternoon  -Amio held    ---------------------------------------------------------------------------  ===NEURO/PSYCH===  # History of CNS vasculitis  Patient with history of autoimmune CNS vasculitis with hx of strokes in 2013. No episodes since. Neurological exam unremarkable. Neurology consulted during recent admission as part of VAD/transplant evaluation. MRI/MRA brain was also performed which showed no concern for progression of disease  - Continue PTA cellcept 1000mg qam and 500mg qpm    # Incidental punctate cerebellar infarct, likely embolic  Read on MR imaging 11/14/22 without new neuro deficits. Per radiology, appears acute, likely embolic. No known arterial clot history. Reviewed telemetry over admission, no afib or other tachycarrhythmias. Reassuring prior TTE this admission including bubble study, repeat JARED w/o septal defects or thrombus. Treated w/ heparin gtt and transitioned to Eliquis.  - Continue heparin drip while awaiting transplant      # Insomnia/anxiety  - PRN trazodone and lorazepam     ===CARDIOVASCULAR===  # Advanced chronic systolic heart failure, EF 15-20%, NYHA class IV, ACC/AHA Stage D  # Ambulatory cardiogenic shock, on dobutamine drip as bride to  transplant  Recently admitted from 11/10/2022 - 11/21/2022 for acute on chronic heart failure, treated with IV diuresis and initiated on dobutamine infusion. Patient completed advanced therapy evaluation during that admission and and is now re-admitted for continued evaluation. Patient was listed for heart transplant (status 2) on 12/12/2022. 12/08/2022 RHC: RA 16, PA 50/26/35, PCWP 26, Aisha CO/CI 3.1/1.44.     - Etiology: Predominantly non-ischemic cardiomyopathy, suspected arrhythmogenic   - Volume: Intermittent PO/IV lasix, guided by swan numbers    - Inotrope: continue dobutamine to 5 mcg/kg/min   - Afterload: HOLD afternoon PO hydralazine 75 mg and isordil 20 mg every 8 hours   - BB: held in the setting of shock  - ACE/ARB/ARNi: Previous intolerance due to hypotension and lightheadedness   - SGLT2i: stop PTA Empagliflozin 10 mg qday (12/17) (to minimize UTI risk while waiting for transplant)  - MRA: None  - Statin: PTA pravastatin  - Electrolytes: PTA 20 meq KCl daily, trending and replacing as needed as well   - Transplant evaluation: listed for transplant on 12/12 (status 2)  - [place] [place] [employer] hemodynamics:  Date RA PA PCWP MAP SvO2  CO/CI SVR PVR Therapy   12/9 (cath lab) 5 32/10/19 15 102 53 2.8/1.3 2228 1.4 Dobutamine 5 mcg/kg/min  Furosemide 10 mg/hr   12/10 AM 4 26/10/16 10 86 62 3.7/1.8 1600 1.6 Dobutamine 5 mcg/kg/min  Furosemide 10 mg/hr   12/10 PM   10 35/15/22 14      Dobutamine 5 mcg/kg/min   12/11 AM 6 40/15/23 13 97 64 3.7/1.8 1960 2.7 Dobutamine 5 mcg/kg/min   12/11 PM 6 30/15/20 10 83 61 3.4/1.7 1900  Dobutamine 5 mcg/kg/min  Nitroprusside 0.25 mcg/kg/min   12/12 AM 6 26/10/15 10 78 71 5.3/2.6 1100 0.9 Dobutamine 5 mcg/kg/min  Nitroprusside 0.75 mcg/kg/min   12/12 PM 10 45/20/28 18 83 51 2.9/1.4 2100 3.4 Dobutamine 5 mcg/kg/min   12/13 PM 8 40/16/24 15 92 61 3.5/1.7 1860 2.6 Dobutamine 5 mcg/kg/min  Hydralazine 25 mg Q8H   12/14 AM 8 40/18/25 16 90 68 5.2/2.4 1300 1.8 Dobutamine 5  mcg/kg/min  Hydralazine 25 mg Q8H   12/15/ AM 10 46/18/27 18 90 67 5.1/2.3 1300 1.8 Dobutamine 5 mcg/kg/min  Hydralazine 25 mg Q8H   12/16 AM 12 40/18/25 16 93 59 3.6/1.7 1700 2.5 Dobutamine 5 mcg/kg/min  Hydralazine 25 mg Q8H   12/17 4 28/16/14 12 92 62 4.1/1.9 1610 2.4 Dobutamine 5 mcg/kg/min  Hydralazine 50 mg Q8H   12/18 5 34/16/23 14 86 68 5.3/2.5 1339 1.7 Dobutamine 5 5 mcg/kg/min  Hydralazine 75 mg Q8H   12/19 4 38/20/26 18 72 72 5.8/2.7 1317 1.4 Dobutamine 5 5 mcg/kg/min  Hydralazine 75 mg Q8H   12/20 5 40/20/26 18 80 68 4.3/2.3 1347 1.8 Dobutamine 5 5 mcg/kg/min  Hydralazine 75 mg Q8H   12/21 7 36/16/23 14 70 73 6.3/2.9 1236 1.42 Dobutamine 5 5 mcg/kg/min  Hydralazine 75 mg Q8H   12/22 6 28/12/17 10 76 74 3.7/3.2 834 1 Dobutamine 5 5 mcg/kg/min  Hydralazine 75 mg Q8H   12/23 3 30/20/23 20 99 70 5.5/2.6 1149 0.6 V=Dobutamine5 mcg/kg/min  Hydralazine 75 mg Q8H     12/24 7 26/17/20 16 65 74 5.5/2.7 846 0.72 V=Dobutamine5 mcg/kg/min  Hydralazine 75 mg Q8H   12/25 4 36/14/21 14 87 75 6/2.8 973 1.2 V=Dobutamine5 mcg/kg/min  Hydralazine 75 mg Q8H       # Multivessel coronary artery disease (s/p PCI with TILA to LCx 4/2013 and to the LAD in 10/2021)  11/11/22 C showed patent LAD and LCx stents as well as mild-mod non-obstructive disease in the RCA.   - Continue PTA: ASA 81 mg qday  - BB: Holding as per above  - Statin: Cont PTA pravastatin     # History of NSVT s/p biventricular PPM/ICD  Interrogation performed 12/10/22 w/ two short (<5s) runs of NSVT on 11/1/22 and 11/3/22, no recent shocks. A-paced.   - Decrease dose of amiodarone from 200 mg BID to 200 mg daily on 12/16 as pre-transplant amiodarone use is associated with increased risk of graft dysfunction. Hold future amio.     ===PULMONARY===  # Non-productive cough, acute on chronic, resolved  - PRN cough lozenges  - continue tessalon pearls  - consult SLP; no signs of aspiration    ===GI===  # Severe malnutrition in the context of acute illness/injury on  "chronic illness  - Appreciate assistance from RD      ===RENAL===  # CKD stage 2  Creatinine at baseline  - trend BMP    ===HEME/ONC===  # Occlusive left peroneal DVT  # Non-occlusive right axillary DVT  Identified on doppler 11/15/22 during previous admission.   - Continue heparin drip in place of PTA apixaban while awaiting transplant     ===ENDOCRINE===  No active concerns     INFECTIOUS DISEASE  No active concerns       ===SKIN/MSK===  # Bilateral hand tightness, resolved  Experienced two episodes prior to admission, once while performing PT exercises and again when he woke up the following morning. Each episode lasted for about 15 minutes and had no other associated symptoms. Etiology not clear at this time. No focal motor or sensory deficits on exam. Electrolytes within normal limits. Troponin only mildly elevated at 46 and down-trending. EKG with A sensed V paced rhythm with no concerning ST/T changes so low suspicion for ischemia. ICD interrogation detected 225 ventricular sensed episodes, with the most recently stored episodes showing slow VT-NS at  bpm, runs lasting about 5 seconds. Unclear if these short runs would explain his 15-minute episodes. He is on dobutamine which has known arrhythmogenic effects so certainly worth monitoring closely, especially given his history of NSVT.   - Monitoring for recurrence while on telemetry   - Trend electrolytes and replete as needed     FEN: Low salt diet, replete lytes as needed   DVT PPx: heparin drip   Code: Full code      Disposition: Inpatient admission, listed for transplant on 12/12  Discussed with Dr. Ana Jackson   Cardiology Fellow  This note was created using Dragon dictation software, so please excuse any mistakes and incorrect syntax and semantics.       Objective:   /87   Pulse 82   Temp 98.8  F (37.1  C) (Oral)   Resp 16   Ht 1.88 m (6' 2\")   Wt 87.1 kg (192 lb 1.6 oz)   SpO2 99%   BMI 24.66 kg/m        Wt:   Wt Readings " from Last 5 Encounters:   12/25/22 87.1 kg (192 lb 1.6 oz)   11/21/22 90.9 kg (200 lb 6.4 oz)   11/10/22 108 kg (238 lb 1.6 oz)   11/08/22 95.7 kg (210 lb 14.4 oz)     Physical Exam   GEN: Patient sitting in bed. Conversant and in good mood.   HEENT: no icterus, EOMI  CV: RRR, normal s1/s2, soft systolic murmur best heard at LLSB. CVP below the clavicle at 45 degrees. Clean dressing covering swan-elsie catheter  CHEST: Non-labored breathing, good air movement bilaterally, no wheezes or crackles   ABD: soft, non-tender, non-distended   EXTR: warm extremities. No LE edema  NEURO: awake, alert, non-focal motor exam    DATA:   Labs, EKGs and imaging reviewed. Pertinent results discussed in assessment and plan above.

## 2022-12-25 NOTE — SIGNIFICANT EVENT
SPIRITUAL HEALTH SERVICES Significant Event  [employer] of [employer]OINTING  [facility] 4c    Pt anointed by his Parish      Fr. Jovany Borrero   Pager [phone]

## 2022-12-26 DIAGNOSIS — Z94.1 HEART TRANSPLANT RECIPIENT (H): Primary | ICD-10-CM

## 2022-12-26 DIAGNOSIS — Z76.82 ORGAN TRANSPLANT CANDIDATE: ICD-10-CM

## 2022-12-26 LAB
ALBUMIN SERPL BCG-MCNC: 3.2 G/DL (ref 3.5–5.2)
ALBUMIN SERPL BCG-MCNC: 3.2 G/DL (ref 3.5–5.2)
ALBUMIN SERPL BCG-MCNC: 3.8 G/DL (ref 3.5–5.2)
ALLEN'S TEST: ABNORMAL
ALLEN'S TEST: NORMAL
ALLEN'S TEST: YES
ALP SERPL-CCNC: 45 U/L (ref 40–129)
ALP SERPL-CCNC: 60 U/L (ref 40–129)
ALP SERPL-CCNC: 71 U/L (ref 40–129)
ALT SERPL W P-5'-P-CCNC: 49 U/L (ref 10–50)
ALT SERPL W P-5'-P-CCNC: 63 U/L (ref 10–50)
ALT SERPL W P-5'-P-CCNC: 68 U/L (ref 10–50)
ANION GAP SERPL CALCULATED.3IONS-SCNC: 14 MMOL/L (ref 7–15)
ANION GAP SERPL CALCULATED.3IONS-SCNC: 15 MMOL/L (ref 7–15)
ANION GAP SERPL CALCULATED.3IONS-SCNC: 15 MMOL/L (ref 7–15)
ANION GAP SERPL CALCULATED.3IONS-SCNC: 25 MMOL/L (ref 7–15)
ANION GAP SERPL CALCULATED.3IONS-SCNC: 25 MMOL/L (ref 7–15)
ANION GAP SERPL CALCULATED.3IONS-SCNC: 27 MMOL/L (ref 7–15)
AST SERPL W P-5'-P-CCNC: 127 U/L (ref 10–50)
AST SERPL W P-5'-P-CCNC: 137 U/L (ref 10–50)
AST SERPL W P-5'-P-CCNC: 88 U/L (ref 10–50)
ATRIAL RATE - MUSE: 100 BPM
ATRIAL RATE - MUSE: 84 BPM
BASE EXCESS BLDA CALC-SCNC: -12.9 MMOL/L (ref -9–1.8)
BASE EXCESS BLDA CALC-SCNC: -4.1 MMOL/L (ref -9–1.8)
BASE EXCESS BLDA CALC-SCNC: -9.8 MMOL/L (ref -9–1.8)
BASE EXCESS BLDA CALC-SCNC: 0.4 MMOL/L (ref -9–1.8)
BASE EXCESS BLDA CALC-SCNC: 1.6 MMOL/L (ref -9–1.8)
BASE EXCESS BLDA CALC-SCNC: 1.8 MMOL/L (ref -9–1.8)
BASE EXCESS BLDV CALC-SCNC: -12.3 MMOL/L (ref -7.7–1.9)
BASE EXCESS BLDV CALC-SCNC: -9.1 MMOL/L (ref -7.7–1.9)
BASE EXCESS BLDV CALC-SCNC: 1.1 MMOL/L (ref -7.7–1.9)
BASE EXCESS BLDV CALC-SCNC: 1.7 MMOL/L (ref -7.7–1.9)
BASE EXCESS BLDV CALC-SCNC: 2 MMOL/L (ref -7.7–1.9)
BASE EXCESS BLDV CALC-SCNC: 2.9 MMOL/L (ref -7.7–1.9)
BILIRUB DIRECT SERPL-MCNC: 0.22 MG/DL (ref 0–0.3)
BILIRUB DIRECT SERPL-MCNC: 0.44 MG/DL (ref 0–0.3)
BILIRUB SERPL-MCNC: 0.6 MG/DL
BILIRUB SERPL-MCNC: 0.9 MG/DL
BILIRUB SERPL-MCNC: 1.1 MG/DL
BUN SERPL-MCNC: 20.5 MG/DL (ref 8–23)
BUN SERPL-MCNC: 20.5 MG/DL (ref 8–23)
BUN SERPL-MCNC: 25.7 MG/DL (ref 8–23)
BUN SERPL-MCNC: 32.1 MG/DL (ref 8–23)
BUN SERPL-MCNC: 38.6 MG/DL (ref 8–23)
BUN SERPL-MCNC: 46.9 MG/DL (ref 8–23)
CA-I BLD-MCNC: 4.7 MG/DL (ref 4.4–5.2)
CA-I BLD-MCNC: 4.9 MG/DL (ref 4.4–5.2)
CA-I BLD-MCNC: 5.1 MG/DL (ref 4.4–5.2)
CALCIUM SERPL-MCNC: 10.1 MG/DL (ref 8.8–10.2)
CALCIUM SERPL-MCNC: 10.1 MG/DL (ref 8.8–10.2)
CALCIUM SERPL-MCNC: 8.8 MG/DL (ref 8.8–10.2)
CALCIUM SERPL-MCNC: 9.1 MG/DL (ref 8.8–10.2)
CALCIUM SERPL-MCNC: 9.2 MG/DL (ref 8.8–10.2)
CALCIUM SERPL-MCNC: 9.6 MG/DL (ref 8.8–10.2)
CHLORIDE SERPL-SCNC: 102 MMOL/L (ref 98–107)
CHLORIDE SERPL-SCNC: 107 MMOL/L (ref 98–107)
CHLORIDE SERPL-SCNC: 108 MMOL/L (ref 98–107)
CHLORIDE SERPL-SCNC: 109 MMOL/L (ref 98–107)
CMV IGG SERPL IA-ACNC: 1.5 U/ML
CMV IGG SERPL IA-ACNC: ABNORMAL
CREAT SERPL-MCNC: 1.24 MG/DL (ref 0.67–1.17)
CREAT SERPL-MCNC: 1.24 MG/DL (ref 0.67–1.17)
CREAT SERPL-MCNC: 1.56 MG/DL (ref 0.67–1.17)
CREAT SERPL-MCNC: 1.67 MG/DL (ref 0.67–1.17)
CREAT SERPL-MCNC: 1.89 MG/DL (ref 0.67–1.17)
CREAT SERPL-MCNC: 2.15 MG/DL (ref 0.67–1.17)
DEPRECATED HCO3 PLAS-SCNC: 13 MMOL/L (ref 22–29)
DEPRECATED HCO3 PLAS-SCNC: 14 MMOL/L (ref 22–29)
DEPRECATED HCO3 PLAS-SCNC: 14 MMOL/L (ref 22–29)
DEPRECATED HCO3 PLAS-SCNC: 21 MMOL/L (ref 22–29)
DEPRECATED HCO3 PLAS-SCNC: 21 MMOL/L (ref 22–29)
DEPRECATED HCO3 PLAS-SCNC: 22 MMOL/L (ref 22–29)
DIASTOLIC BLOOD PRESSURE - MUSE: NORMAL MMHG
DIASTOLIC BLOOD PRESSURE - MUSE: NORMAL MMHG
EBV VCA IGG SER IA-ACNC: 328 U/ML
EBV VCA IGG SER IA-ACNC: POSITIVE
ERYTHROCYTE [DISTWIDTH] IN BLOOD BY AUTOMATED COUNT: 14.6 % (ref 10–15)
GFR SERPL CREATININE-BSD FRML MDRD: 33 ML/MIN/1.73M2
GFR SERPL CREATININE-BSD FRML MDRD: 38 ML/MIN/1.73M2
GFR SERPL CREATININE-BSD FRML MDRD: 44 ML/MIN/1.73M2
GFR SERPL CREATININE-BSD FRML MDRD: 48 ML/MIN/1.73M2
GFR SERPL CREATININE-BSD FRML MDRD: 63 ML/MIN/1.73M2
GFR SERPL CREATININE-BSD FRML MDRD: 63 ML/MIN/1.73M2
GLUCOSE BLDC GLUCOMTR-MCNC: 113 MG/DL (ref 70–99)
GLUCOSE BLDC GLUCOMTR-MCNC: 113 MG/DL (ref 70–99)
GLUCOSE BLDC GLUCOMTR-MCNC: 122 MG/DL (ref 70–99)
GLUCOSE BLDC GLUCOMTR-MCNC: 129 MG/DL (ref 70–99)
GLUCOSE BLDC GLUCOMTR-MCNC: 132 MG/DL (ref 70–99)
GLUCOSE BLDC GLUCOMTR-MCNC: 145 MG/DL (ref 70–99)
GLUCOSE BLDC GLUCOMTR-MCNC: 146 MG/DL (ref 70–99)
GLUCOSE BLDC GLUCOMTR-MCNC: 153 MG/DL (ref 70–99)
GLUCOSE BLDC GLUCOMTR-MCNC: 160 MG/DL (ref 70–99)
GLUCOSE BLDC GLUCOMTR-MCNC: 163 MG/DL (ref 70–99)
GLUCOSE BLDC GLUCOMTR-MCNC: 164 MG/DL (ref 70–99)
GLUCOSE BLDC GLUCOMTR-MCNC: 167 MG/DL (ref 70–99)
GLUCOSE BLDC GLUCOMTR-MCNC: 176 MG/DL (ref 70–99)
GLUCOSE BLDC GLUCOMTR-MCNC: 183 MG/DL (ref 70–99)
GLUCOSE BLDC GLUCOMTR-MCNC: 186 MG/DL (ref 70–99)
GLUCOSE BLDC GLUCOMTR-MCNC: 187 MG/DL (ref 70–99)
GLUCOSE BLDC GLUCOMTR-MCNC: 204 MG/DL (ref 70–99)
GLUCOSE BLDC GLUCOMTR-MCNC: 205 MG/DL (ref 70–99)
GLUCOSE BLDC GLUCOMTR-MCNC: 217 MG/DL (ref 70–99)
GLUCOSE BLDC GLUCOMTR-MCNC: 221 MG/DL (ref 70–99)
GLUCOSE BLDC GLUCOMTR-MCNC: 252 MG/DL (ref 70–99)
GLUCOSE BLDC GLUCOMTR-MCNC: 253 MG/DL (ref 70–99)
GLUCOSE BLDC GLUCOMTR-MCNC: 260 MG/DL (ref 70–99)
GLUCOSE BLDC GLUCOMTR-MCNC: 264 MG/DL (ref 70–99)
GLUCOSE BLDC GLUCOMTR-MCNC: 278 MG/DL (ref 70–99)
GLUCOSE SERPL-MCNC: 127 MG/DL (ref 70–99)
GLUCOSE SERPL-MCNC: 149 MG/DL (ref 70–99)
GLUCOSE SERPL-MCNC: 176 MG/DL (ref 70–99)
GLUCOSE SERPL-MCNC: 235 MG/DL (ref 70–99)
GLUCOSE SERPL-MCNC: 235 MG/DL (ref 70–99)
GLUCOSE SERPL-MCNC: 305 MG/DL (ref 70–99)
HBV CORE AB SERPL QL IA: NONREACTIVE
HBV SURFACE AB SERPL IA-ACNC: 37.74 M[IU]/ML
HBV SURFACE AB SERPL IA-ACNC: REACTIVE M[IU]/ML
HBV SURFACE AG SERPL QL IA: NONREACTIVE
HCO3 BLD-SCNC: 14 MMOL/L (ref 21–28)
HCO3 BLD-SCNC: 16 MMOL/L (ref 21–28)
HCO3 BLD-SCNC: 20 MMOL/L (ref 21–28)
HCO3 BLD-SCNC: 25 MMOL/L (ref 21–28)
HCO3 BLD-SCNC: 25 MMOL/L (ref 21–28)
HCO3 BLD-SCNC: 26 MMOL/L (ref 21–28)
HCO3 BLDV-SCNC: 16 MMOL/L (ref 21–28)
HCO3 BLDV-SCNC: 18 MMOL/L (ref 21–28)
HCO3 BLDV-SCNC: 27 MMOL/L (ref 21–28)
HCO3 BLDV-SCNC: 28 MMOL/L (ref 21–28)
HCT VFR BLD AUTO: 24.7 % (ref 40–53)
HCT VFR BLD AUTO: 29.6 % (ref 40–53)
HCT VFR BLD AUTO: 32.5 % (ref 40–53)
HCT VFR BLD AUTO: 41.1 % (ref 40–53)
HCV AB SERPL QL IA: NONREACTIVE
HGB BLD-MCNC: 10.6 G/DL (ref 13.3–17.7)
HGB BLD-MCNC: 12.9 G/DL (ref 13.3–17.7)
HGB BLD-MCNC: 8.1 G/DL (ref 13.3–17.7)
HGB BLD-MCNC: 9.6 G/DL (ref 13.3–17.7)
HIV 1+2 AB+HIV1 P24 AG SERPL QL IA: NONREACTIVE
INTERPRETATION ECG - MUSE: NORMAL
INTERPRETATION ECG - MUSE: NORMAL
LACTATE SERPL-SCNC: 1.6 MMOL/L (ref 0.7–2)
LACTATE SERPL-SCNC: 1.9 MMOL/L (ref 0.7–2)
LACTATE SERPL-SCNC: 11.2 MMOL/L (ref 0.7–2)
LACTATE SERPL-SCNC: 11.4 MMOL/L (ref 0.7–2)
LACTATE SERPL-SCNC: 11.7 MMOL/L (ref 0.7–2)
LACTATE SERPL-SCNC: 2.2 MMOL/L (ref 0.7–2)
LACTATE SERPL-SCNC: 2.6 MMOL/L (ref 0.7–2)
LACTATE SERPL-SCNC: 2.8 MMOL/L (ref 0.7–2)
LACTATE SERPL-SCNC: 3.7 MMOL/L (ref 0.7–2)
LACTATE SERPL-SCNC: 7.4 MMOL/L (ref 0.7–2)
MAGNESIUM SERPL-MCNC: 2.3 MG/DL (ref 1.7–2.3)
MAGNESIUM SERPL-MCNC: 2.6 MG/DL (ref 1.7–2.3)
MCH RBC QN AUTO: 28.8 PG (ref 26.5–33)
MCH RBC QN AUTO: 28.9 PG (ref 26.5–33)
MCH RBC QN AUTO: 29 PG (ref 26.5–33)
MCH RBC QN AUTO: 29.2 PG (ref 26.5–33)
MCHC RBC AUTO-ENTMCNC: 31.4 G/DL (ref 31.5–36.5)
MCHC RBC AUTO-ENTMCNC: 32.4 G/DL (ref 31.5–36.5)
MCHC RBC AUTO-ENTMCNC: 32.6 G/DL (ref 31.5–36.5)
MCHC RBC AUTO-ENTMCNC: 32.8 G/DL (ref 31.5–36.5)
MCV RBC AUTO: 88 FL (ref 78–100)
MCV RBC AUTO: 89 FL (ref 78–100)
MCV RBC AUTO: 90 FL (ref 78–100)
MCV RBC AUTO: 92 FL (ref 78–100)
MRSA DNA SPEC QL NAA+PROBE: NEGATIVE
O2/TOTAL GAS SETTING VFR VENT: 30 %
O2/TOTAL GAS SETTING VFR VENT: 40 %
OXYHGB MFR BLD: 97 % (ref 92–100)
OXYHGB MFR BLD: 98 % (ref 92–100)
OXYHGB MFR BLDV: 52 % (ref 70–75)
OXYHGB MFR BLDV: 53 % (ref 70–75)
OXYHGB MFR BLDV: 63 % (ref 70–75)
OXYHGB MFR BLDV: 63 % (ref 70–75)
OXYHGB MFR BLDV: 75 % (ref 70–75)
OXYHGB MFR BLDV: 81 % (ref 70–75)
P AXIS - MUSE: 14 DEGREES
P AXIS - MUSE: 87 DEGREES
PCO2 BLD: 33 MM HG (ref 35–45)
PCO2 BLD: 34 MM HG (ref 35–45)
PCO2 BLD: 35 MM HG (ref 35–45)
PCO2 BLD: 37 MM HG (ref 35–45)
PCO2 BLD: 41 MM HG (ref 35–45)
PCO2 BLD: 42 MM HG (ref 35–45)
PCO2 BLDV: 41 MM HG (ref 40–50)
PCO2 BLDV: 43 MM HG (ref 40–50)
PCO2 BLDV: 44 MM HG (ref 40–50)
PCO2 BLDV: 46 MM HG (ref 40–50)
PCO2 BLDV: 47 MM HG (ref 40–50)
PCO2 BLDV: 50 MM HG (ref 40–50)
PH BLD: 7.2 [PH] (ref 7.35–7.45)
PH BLD: 7.28 [PH] (ref 7.35–7.45)
PH BLD: 7.39 [PH] (ref 7.35–7.45)
PH BLD: 7.39 [PH] (ref 7.35–7.45)
PH BLD: 7.42 [PH] (ref 7.35–7.45)
PH BLD: 7.48 [PH] (ref 7.35–7.45)
PH BLDV: 7.17 [PH] (ref 7.32–7.43)
PH BLDV: 7.25 [PH] (ref 7.32–7.43)
PH BLDV: 7.35 [PH] (ref 7.32–7.43)
PH BLDV: 7.37 [PH] (ref 7.32–7.43)
PH BLDV: 7.38 [PH] (ref 7.32–7.43)
PH BLDV: 7.42 [PH] (ref 7.32–7.43)
PHOSPHATE SERPL-MCNC: 5.3 MG/DL (ref 2.5–4.5)
PHOSPHATE SERPL-MCNC: 6.1 MG/DL (ref 2.5–4.5)
PHOSPHATE SERPL-MCNC: 6.3 MG/DL (ref 2.5–4.5)
PLATELET # BLD AUTO: 134 10E3/UL (ref 150–450)
PLATELET # BLD AUTO: 151 10E3/UL (ref 150–450)
PLATELET # BLD AUTO: 171 10E3/UL (ref 150–450)
PLATELET # BLD AUTO: 209 10E3/UL (ref 150–450)
PO2 BLD: 125 MM HG (ref 80–105)
PO2 BLD: 131 MM HG (ref 80–105)
PO2 BLD: 174 MM HG (ref 80–105)
PO2 BLD: 81 MM HG (ref 80–105)
PO2 BLD: 89 MM HG (ref 80–105)
PO2 BLD: 95 MM HG (ref 80–105)
PO2 BLDV: 29 MM HG (ref 25–47)
PO2 BLDV: 32 MM HG (ref 25–47)
PO2 BLDV: 35 MM HG (ref 25–47)
PO2 BLDV: 36 MM HG (ref 25–47)
PO2 BLDV: 48 MM HG (ref 25–47)
PO2 BLDV: 58 MM HG (ref 25–47)
POTASSIUM SERPL-SCNC: 3.6 MMOL/L (ref 3.4–5.3)
POTASSIUM SERPL-SCNC: 3.8 MMOL/L (ref 3.4–5.3)
POTASSIUM SERPL-SCNC: 3.8 MMOL/L (ref 3.4–5.3)
POTASSIUM SERPL-SCNC: 3.9 MMOL/L (ref 3.4–5.3)
POTASSIUM SERPL-SCNC: 4 MMOL/L (ref 3.4–5.3)
POTASSIUM SERPL-SCNC: 4.9 MMOL/L (ref 3.4–5.3)
PR INTERVAL - MUSE: 128 MS
PR INTERVAL - MUSE: 194 MS
PROT SERPL-MCNC: 4.7 G/DL (ref 6.4–8.3)
PROT SERPL-MCNC: 5.3 G/DL (ref 6.4–8.3)
PROT SERPL-MCNC: 6.2 G/DL (ref 6.4–8.3)
QRS DURATION - MUSE: 180 MS
QRS DURATION - MUSE: 94 MS
QT - MUSE: 362 MS
QT - MUSE: 470 MS
QTC - MUSE: 466 MS
QTC - MUSE: 555 MS
R AXIS - MUSE: 111 DEGREES
R AXIS - MUSE: 73 DEGREES
RBC # BLD AUTO: 2.81 10E6/UL (ref 4.4–5.9)
RBC # BLD AUTO: 3.29 10E6/UL (ref 4.4–5.9)
RBC # BLD AUTO: 3.65 10E6/UL (ref 4.4–5.9)
RBC # BLD AUTO: 4.47 10E6/UL (ref 4.4–5.9)
SA TARGET DNA: NEGATIVE
SODIUM SERPL-SCNC: 141 MMOL/L (ref 136–145)
SODIUM SERPL-SCNC: 141 MMOL/L (ref 136–145)
SODIUM SERPL-SCNC: 142 MMOL/L (ref 136–145)
SODIUM SERPL-SCNC: 142 MMOL/L (ref 136–145)
SODIUM SERPL-SCNC: 145 MMOL/L (ref 136–145)
SODIUM SERPL-SCNC: 145 MMOL/L (ref 136–145)
SYSTOLIC BLOOD PRESSURE - MUSE: NORMAL MMHG
SYSTOLIC BLOOD PRESSURE - MUSE: NORMAL MMHG
T AXIS - MUSE: 56 DEGREES
T AXIS - MUSE: 67 DEGREES
VENTRICULAR RATE- MUSE: 100 BPM
VENTRICULAR RATE- MUSE: 84 BPM
WBC # BLD AUTO: 16.8 10E3/UL (ref 4–11)
WBC # BLD AUTO: 18.9 10E3/UL (ref 4–11)
WBC # BLD AUTO: 22.1 10E3/UL (ref 4–11)
WBC # BLD AUTO: 27.4 10E3/UL (ref 4–11)

## 2022-12-26 PROCEDURE — 999N000015 HC STATISTIC ARTERIAL MONITORING DAILY

## 2022-12-26 PROCEDURE — 82330 ASSAY OF CALCIUM: CPT | Performed by: SURGERY

## 2022-12-26 PROCEDURE — 99291 CRITICAL CARE FIRST HOUR: CPT | Mod: GC | Performed by: INTERNAL MEDICINE

## 2022-12-26 PROCEDURE — 85027 COMPLETE CBC AUTOMATED: CPT | Performed by: SURGERY

## 2022-12-26 PROCEDURE — 258N000003 HC RX IP 258 OP 636: Performed by: STUDENT IN AN ORGANIZED HEALTH CARE EDUCATION/TRAINING PROGRAM

## 2022-12-26 PROCEDURE — 93005 ELECTROCARDIOGRAM TRACING: CPT

## 2022-12-26 PROCEDURE — 250N000012 HC RX MED GY IP 250 OP 636 PS 637: Performed by: STUDENT IN AN ORGANIZED HEALTH CARE EDUCATION/TRAINING PROGRAM

## 2022-12-26 PROCEDURE — 250N000011 HC RX IP 250 OP 636: Performed by: STUDENT IN AN ORGANIZED HEALTH CARE EDUCATION/TRAINING PROGRAM

## 2022-12-26 PROCEDURE — 82330 ASSAY OF CALCIUM: CPT | Performed by: STUDENT IN AN ORGANIZED HEALTH CARE EDUCATION/TRAINING PROGRAM

## 2022-12-26 PROCEDURE — 83605 ASSAY OF LACTIC ACID: CPT | Performed by: SURGERY

## 2022-12-26 PROCEDURE — 84450 TRANSFERASE (AST) (SGOT): CPT | Performed by: STUDENT IN AN ORGANIZED HEALTH CARE EDUCATION/TRAINING PROGRAM

## 2022-12-26 PROCEDURE — 94003 VENT MGMT INPAT SUBQ DAY: CPT

## 2022-12-26 PROCEDURE — 258N000003 HC RX IP 258 OP 636

## 2022-12-26 PROCEDURE — 258N000003 HC RX IP 258 OP 636: Performed by: SURGERY

## 2022-12-26 PROCEDURE — 82805 BLOOD GASES W/O2 SATURATION: CPT | Performed by: SURGERY

## 2022-12-26 PROCEDURE — 200N000002 HC R&B ICU UMMC

## 2022-12-26 PROCEDURE — 250N000009 HC RX 250: Performed by: SURGERY

## 2022-12-26 PROCEDURE — 83735 ASSAY OF MAGNESIUM: CPT | Performed by: STUDENT IN AN ORGANIZED HEALTH CARE EDUCATION/TRAINING PROGRAM

## 2022-12-26 PROCEDURE — 250N000009 HC RX 250: Performed by: STUDENT IN AN ORGANIZED HEALTH CARE EDUCATION/TRAINING PROGRAM

## 2022-12-26 PROCEDURE — 250N000013 HC RX MED GY IP 250 OP 250 PS 637: Performed by: STUDENT IN AN ORGANIZED HEALTH CARE EDUCATION/TRAINING PROGRAM

## 2022-12-26 PROCEDURE — P9045 ALBUMIN (HUMAN), 5%, 250 ML: HCPCS | Performed by: STUDENT IN AN ORGANIZED HEALTH CARE EDUCATION/TRAINING PROGRAM

## 2022-12-26 PROCEDURE — 250N000011 HC RX IP 250 OP 636: Performed by: SURGERY

## 2022-12-26 PROCEDURE — 82248 BILIRUBIN DIRECT: CPT

## 2022-12-26 PROCEDURE — 82805 BLOOD GASES W/O2 SATURATION: CPT | Performed by: STUDENT IN AN ORGANIZED HEALTH CARE EDUCATION/TRAINING PROGRAM

## 2022-12-26 PROCEDURE — 83605 ASSAY OF LACTIC ACID: CPT | Performed by: STUDENT IN AN ORGANIZED HEALTH CARE EDUCATION/TRAINING PROGRAM

## 2022-12-26 PROCEDURE — C9113 INJ PANTOPRAZOLE SODIUM, VIA: HCPCS | Performed by: STUDENT IN AN ORGANIZED HEALTH CARE EDUCATION/TRAINING PROGRAM

## 2022-12-26 PROCEDURE — 84100 ASSAY OF PHOSPHORUS: CPT | Performed by: SURGERY

## 2022-12-26 PROCEDURE — 84100 ASSAY OF PHOSPHORUS: CPT | Performed by: STUDENT IN AN ORGANIZED HEALTH CARE EDUCATION/TRAINING PROGRAM

## 2022-12-26 PROCEDURE — 999N000155 HC STATISTIC RAPCV CVP MONITORING

## 2022-12-26 PROCEDURE — 250N000009 HC RX 250

## 2022-12-26 PROCEDURE — 999N000157 HC STATISTIC RCP TIME EA 10 MIN

## 2022-12-26 PROCEDURE — 999N000248 HC STATISTIC IV INSERT WITH US BY RN

## 2022-12-26 PROCEDURE — 82803 BLOOD GASES ANY COMBINATION: CPT

## 2022-12-26 PROCEDURE — 80048 BASIC METABOLIC PNL TOTAL CA: CPT | Performed by: SURGERY

## 2022-12-26 PROCEDURE — 93010 ELECTROCARDIOGRAM REPORT: CPT | Performed by: INTERNAL MEDICINE

## 2022-12-26 PROCEDURE — 84155 ASSAY OF PROTEIN SERUM: CPT | Performed by: STUDENT IN AN ORGANIZED HEALTH CARE EDUCATION/TRAINING PROGRAM

## 2022-12-26 RX ORDER — POTASSIUM CHLORIDE 29.8 MG/ML
20 INJECTION INTRAVENOUS ONCE
Status: COMPLETED | OUTPATIENT
Start: 2022-12-26 | End: 2022-12-26

## 2022-12-26 RX ORDER — ACYCLOVIR 50 MG/G
OINTMENT TOPICAL
Status: DISPENSED | OUTPATIENT
Start: 2022-12-26 | End: 2023-01-01

## 2022-12-26 RX ORDER — PREDNISONE 20 MG/1
40 TABLET ORAL 2 TIMES DAILY
Status: COMPLETED | OUTPATIENT
Start: 2022-12-27 | End: 2022-12-29

## 2022-12-26 RX ORDER — OXYCODONE HYDROCHLORIDE 5 MG/1
5 TABLET ORAL ONCE
Status: COMPLETED | OUTPATIENT
Start: 2022-12-27 | End: 2022-12-27

## 2022-12-26 RX ORDER — PREDNISONE 20 MG/1
20 TABLET ORAL 2 TIMES DAILY
Status: DISCONTINUED | OUTPATIENT
Start: 2023-01-04 | End: 2023-01-10

## 2022-12-26 RX ORDER — HEPARIN SODIUM 5000 [USP'U]/.5ML
5000 INJECTION, SOLUTION INTRAVENOUS; SUBCUTANEOUS EVERY 8 HOURS SCHEDULED
Status: DISCONTINUED | OUTPATIENT
Start: 2022-12-26 | End: 2022-12-28

## 2022-12-26 RX ORDER — POTASSIUM CHLORIDE 29.8 MG/ML
20 INJECTION INTRAVENOUS ONCE
Status: DISCONTINUED | OUTPATIENT
Start: 2022-12-26 | End: 2022-12-26

## 2022-12-26 RX ADMIN — SODIUM CHLORIDE, POTASSIUM CHLORIDE, SODIUM LACTATE AND CALCIUM CHLORIDE 500 ML: 600; 310; 30; 20 INJECTION, SOLUTION INTRAVENOUS at 12:28

## 2022-12-26 RX ADMIN — SODIUM CHLORIDE, POTASSIUM CHLORIDE, SODIUM LACTATE AND CALCIUM CHLORIDE 1000 ML: 600; 310; 30; 20 INJECTION, SOLUTION INTRAVENOUS at 00:08

## 2022-12-26 RX ADMIN — ACETAMINOPHEN 975 MG: 325 TABLET, FILM COATED ORAL at 15:35

## 2022-12-26 RX ADMIN — GABAPENTIN 100 MG: 100 CAPSULE ORAL at 00:50

## 2022-12-26 RX ADMIN — OXYCODONE HYDROCHLORIDE 10 MG: 10 TABLET ORAL at 11:12

## 2022-12-26 RX ADMIN — ACETAMINOPHEN 975 MG: 325 TABLET, FILM COATED ORAL at 23:11

## 2022-12-26 RX ADMIN — MYCOPHENOLATE MOFETIL 1500 MG: 500 INJECTION, POWDER, LYOPHILIZED, FOR SOLUTION INTRAVENOUS at 23:08

## 2022-12-26 RX ADMIN — HUMAN INSULIN 3 UNITS/HR: 100 INJECTION, SOLUTION SUBCUTANEOUS at 10:58

## 2022-12-26 RX ADMIN — ACYCLOVIR: 50 OINTMENT TOPICAL at 08:15

## 2022-12-26 RX ADMIN — ACYCLOVIR: 50 OINTMENT TOPICAL at 11:55

## 2022-12-26 RX ADMIN — SODIUM CHLORIDE, POTASSIUM CHLORIDE, SODIUM LACTATE AND CALCIUM CHLORIDE 500 ML: 600; 310; 30; 20 INJECTION, SOLUTION INTRAVENOUS at 09:51

## 2022-12-26 RX ADMIN — SODIUM BICARBONATE 50 MEQ: 84 INJECTION INTRAVENOUS at 02:42

## 2022-12-26 RX ADMIN — SENNOSIDES AND DOCUSATE SODIUM 1 TABLET: 8.6; 5 TABLET ORAL at 07:43

## 2022-12-26 RX ADMIN — SODIUM CHLORIDE, POTASSIUM CHLORIDE, SODIUM LACTATE AND CALCIUM CHLORIDE 1000 ML: 600; 310; 30; 20 INJECTION, SOLUTION INTRAVENOUS at 02:49

## 2022-12-26 RX ADMIN — EPINEPHRINE 0.03 MCG/KG/MIN: 1 INJECTION INTRAMUSCULAR; INTRAVENOUS; SUBCUTANEOUS at 13:03

## 2022-12-26 RX ADMIN — ACYCLOVIR: 50 OINTMENT TOPICAL at 20:04

## 2022-12-26 RX ADMIN — POLYETHYLENE GLYCOL 3350 17 G: 17 POWDER, FOR SOLUTION ORAL at 07:43

## 2022-12-26 RX ADMIN — SODIUM CHLORIDE, POTASSIUM CHLORIDE, SODIUM LACTATE AND CALCIUM CHLORIDE 500 ML: 600; 310; 30; 20 INJECTION, SOLUTION INTRAVENOUS at 10:53

## 2022-12-26 RX ADMIN — ACYCLOVIR: 50 OINTMENT TOPICAL at 01:46

## 2022-12-26 RX ADMIN — CEFEPIME HYDROCHLORIDE 2 G: 2 INJECTION, POWDER, FOR SOLUTION INTRAVENOUS at 14:05

## 2022-12-26 RX ADMIN — ALBUMIN HUMAN 25 G: 0.05 INJECTION, SOLUTION INTRAVENOUS at 05:11

## 2022-12-26 RX ADMIN — HUMAN INSULIN 2 UNITS/HR: 100 INJECTION, SOLUTION SUBCUTANEOUS at 00:00

## 2022-12-26 RX ADMIN — PROPOFOL 30 MCG/KG/MIN: 10 INJECTION, EMULSION INTRAVENOUS at 00:04

## 2022-12-26 RX ADMIN — VANCOMYCIN HYDROCHLORIDE 1500 MG: 10 INJECTION, POWDER, LYOPHILIZED, FOR SOLUTION INTRAVENOUS at 17:15

## 2022-12-26 RX ADMIN — SODIUM CHLORIDE, POTASSIUM CHLORIDE, SODIUM LACTATE AND CALCIUM CHLORIDE 1000 ML: 600; 310; 30; 20 INJECTION, SOLUTION INTRAVENOUS at 02:48

## 2022-12-26 RX ADMIN — SODIUM CHLORIDE, POTASSIUM CHLORIDE, SODIUM LACTATE AND CALCIUM CHLORIDE 500 ML: 600; 310; 30; 20 INJECTION, SOLUTION INTRAVENOUS at 17:39

## 2022-12-26 RX ADMIN — ACETAMINOPHEN 975 MG: 325 TABLET, FILM COATED ORAL at 07:43

## 2022-12-26 RX ADMIN — METHYLPREDNISOLONE SODIUM SUCCINATE 125 MG: 125 INJECTION, POWDER, FOR SOLUTION INTRAMUSCULAR; INTRAVENOUS at 00:49

## 2022-12-26 RX ADMIN — SODIUM CHLORIDE, POTASSIUM CHLORIDE, SODIUM LACTATE AND CALCIUM CHLORIDE 500 ML: 600; 310; 30; 20 INJECTION, SOLUTION INTRAVENOUS at 16:44

## 2022-12-26 RX ADMIN — Medication 0.06 MCG/KG/MIN: at 00:05

## 2022-12-26 RX ADMIN — ISOPROTERENOL HYDROCHLORIDE 0.03 MCG/KG/MIN: 0.2 INJECTION, SOLUTION INTRAMUSCULAR; INTRAVENOUS at 00:19

## 2022-12-26 RX ADMIN — SODIUM CHLORIDE, POTASSIUM CHLORIDE, SODIUM LACTATE AND CALCIUM CHLORIDE 500 ML: 600; 310; 30; 20 INJECTION, SOLUTION INTRAVENOUS at 05:05

## 2022-12-26 RX ADMIN — Medication 4 UNITS/HR: at 06:06

## 2022-12-26 RX ADMIN — PANTOPRAZOLE SODIUM 40 MG: 40 INJECTION, POWDER, FOR SOLUTION INTRAVENOUS at 07:46

## 2022-12-26 RX ADMIN — ISOPROTERENOL HYDROCHLORIDE 0.01 MCG/KG/MIN: 0.2 INJECTION, SOLUTION INTRAMUSCULAR; INTRAVENOUS at 18:35

## 2022-12-26 RX ADMIN — GABAPENTIN 100 MG: 100 CAPSULE ORAL at 21:46

## 2022-12-26 RX ADMIN — HEPARIN SODIUM 5000 UNITS: 5000 INJECTION, SOLUTION INTRAVENOUS; SUBCUTANEOUS at 13:06

## 2022-12-26 RX ADMIN — METHYLPREDNISOLONE SODIUM SUCCINATE 125 MG: 125 INJECTION, POWDER, FOR SOLUTION INTRAMUSCULAR; INTRAVENOUS at 07:43

## 2022-12-26 RX ADMIN — Medication 50 MCG/HR: at 00:02

## 2022-12-26 RX ADMIN — ACYCLOVIR: 50 OINTMENT TOPICAL at 23:12

## 2022-12-26 RX ADMIN — CEFEPIME HYDROCHLORIDE 2 G: 2 INJECTION, POWDER, FOR SOLUTION INTRAVENOUS at 00:47

## 2022-12-26 RX ADMIN — SENNOSIDES AND DOCUSATE SODIUM 1 TABLET: 8.6; 5 TABLET ORAL at 19:44

## 2022-12-26 RX ADMIN — EPINEPHRINE 0.06 MCG/KG/MIN: 1 INJECTION INTRAMUSCULAR; INTRAVENOUS; SUBCUTANEOUS at 00:07

## 2022-12-26 RX ADMIN — ACYCLOVIR: 50 OINTMENT TOPICAL at 17:39

## 2022-12-26 RX ADMIN — ONDANSETRON 4 MG: 2 INJECTION INTRAMUSCULAR; INTRAVENOUS at 15:54

## 2022-12-26 RX ADMIN — MYCOPHENOLATE MOFETIL 1500 MG: 500 INJECTION, POWDER, LYOPHILIZED, FOR SOLUTION INTRAVENOUS at 11:15

## 2022-12-26 RX ADMIN — ACETAMINOPHEN 975 MG: 325 TABLET, FILM COATED ORAL at 00:50

## 2022-12-26 RX ADMIN — Medication 2 UNITS/HR: at 03:45

## 2022-12-26 RX ADMIN — SENNOSIDES AND DOCUSATE SODIUM 1 TABLET: 8.6; 5 TABLET ORAL at 00:50

## 2022-12-26 RX ADMIN — SODIUM BICARBONATE 50 MEQ: 84 INJECTION INTRAVENOUS at 05:19

## 2022-12-26 RX ADMIN — ACYCLOVIR: 50 OINTMENT TOPICAL at 14:39

## 2022-12-26 RX ADMIN — POTASSIUM CHLORIDE 20 MEQ: 29.8 INJECTION, SOLUTION INTRAVENOUS at 02:47

## 2022-12-26 RX ADMIN — SODIUM CHLORIDE, POTASSIUM CHLORIDE, SODIUM LACTATE AND CALCIUM CHLORIDE 500 ML: 600; 310; 30; 20 INJECTION, SOLUTION INTRAVENOUS at 23:58

## 2022-12-26 RX ADMIN — METHYLPREDNISOLONE SODIUM SUCCINATE 125 MG: 125 INJECTION, POWDER, FOR SOLUTION INTRAMUSCULAR; INTRAVENOUS at 15:35

## 2022-12-26 RX ADMIN — HEPARIN SODIUM 5000 UNITS: 5000 INJECTION, SOLUTION INTRAVENOUS; SUBCUTANEOUS at 21:47

## 2022-12-26 RX ADMIN — ACYCLOVIR: 50 OINTMENT TOPICAL at 04:08

## 2022-12-26 RX ADMIN — PROPOFOL 5 MCG/KG/MIN: 10 INJECTION, EMULSION INTRAVENOUS at 14:39

## 2022-12-26 RX ADMIN — PROPOFOL 30 MCG/KG/MIN: 10 INJECTION, EMULSION INTRAVENOUS at 02:20

## 2022-12-26 RX ADMIN — PREDNISONE 45 MG: 20 TABLET ORAL at 21:46

## 2022-12-26 RX ADMIN — Medication 50 MEQ: at 02:42

## 2022-12-26 RX ADMIN — POTASSIUM CHLORIDE 20 MEQ: 29.8 INJECTION, SOLUTION INTRAVENOUS at 05:08

## 2022-12-26 RX ADMIN — SODIUM CHLORIDE, POTASSIUM CHLORIDE, SODIUM LACTATE AND CALCIUM CHLORIDE 500 ML: 600; 310; 30; 20 INJECTION, SOLUTION INTRAVENOUS at 20:21

## 2022-12-26 RX ADMIN — MYCOPHENOLATE MOFETIL 1500 MG: 500 INJECTION, POWDER, LYOPHILIZED, FOR SOLUTION INTRAVENOUS at 01:30

## 2022-12-26 RX ADMIN — ACYCLOVIR: 50 OINTMENT TOPICAL at 05:40

## 2022-12-26 ASSESSMENT — ACTIVITIES OF DAILY LIVING (ADL)
ADLS_ACUITY_SCORE: 42
ADLS_ACUITY_SCORE: 39

## 2022-12-26 NOTE — PROGRESS NOTES
CLINICAL NUTRITION SERVICES - REASSESSMENT NOTE     Nutrition Prescription    RECOMMENDATIONS FOR MDs/PROVIDERS TO ORDER:  If unable to extubate within 48 hours, recommend initiating EN    Malnutrition Status:    Moderate malnutrition in the context of acute on chronic illness    Recommendations already ordered by Registered Dietitian (RD):  None at this time     Future/Additional Recommendations:  If unable to extubate pt within 48 hours of intubation, recommend initiating EN:  GOAL TF: Osmolite 1.5 Maury @ goal of 70ml/hr (1680ml/day) + 1 pkt Prosource TF modular BID  will provide: 2680 kcals, 145 g PRO, 1280 ml free H20, 342 g CHO, and 0 g fiber daily.  - Initiate @ 20 ml/hr and advance by 15 ml q8hr as tolerated  - Do not start or advance until lytes (Mg++,K+) WNL and phos>2.0  - Recommend 30 ml q4hr fluid flushes for tube patency. Additional fluids and/or adjustments per MD.    - Order multivitamin/mineral (15 ml/day via FT) to help ensure micronutrient needs being met with suspected hypermetabolic demands and potential interruptions to TF infusions.  - Elevated HOB with gastric feeds      EVALUATION OF THE PROGRESS TOWARD GOALS   Diet: NPO    Intake: Pt currently intubated. Pt was eating well prior to surgery; 100% PO intake per RN documentation.        NEW FINDINGS   Pt s/p heart transplant on 12/25. Remains intubated, however pt pressure supporting with possible extubation today.     GI: LBM 12/24    Labs: Reviewed - hyperphosphatemia, elevated BUN/Cr, elevated lactic acid (trendingn down)    Medications: Reviewed    Weights: No weight loss over the past week.   12/25/22 0400 87.1 kg (192 lb 1.6 oz)   12/24/22 0400 87.9 kg (193 lb 11.2 oz)   12/23/22 0600 87.5 kg (192 lb 12.8 oz)   12/22/22 0600 86.4 kg (190 lb 8 oz)   12/21/22 0600 87 kg (191 lb 11.2 oz)   12/20/22 0600 87.2 kg (192 lb 3.2 oz)   12/19/22 0600 87.4 kg (192 lb 11.2 oz)   12/18/22 0700 86.5 kg (190 lb 9.6 oz)     MALNUTRITION  % Intake: No  decreased intake noted  % Weight Loss: > 7.5% in 3 months (severe)  Subcutaneous Fat Loss: Facial region:  mild and Upper arm:  mild  Muscle Loss: Temporal:  mild, Thoracic region (clavicle, acromium bone, deltoid, trapezius, pectoral):  moderate and Posterior calf:  moderate  Fluid Accumulation/Edema: None noted  Malnutrition Diagnosis: Moderate malnutrition in the context of acute on chronic illness    Previous Goals   Patient to consume % of nutritionally adequate meal trays TID, or the equivalent with supplements/snacks.  Evaluation: Met    Previous Nutrition Diagnosis  Predicted inadequate nutrient intake (pro/kcal) related to pt now eating % meals per RN flowsheets, appetite greatly improved but potential for appetite/PO to decline pending medical course.  Evaluation: No longer applicable, nutrition diagnosis changed below    CURRENT NUTRITION DIAGNOSIS  Inadequate oral intake related to intubation as evidenced by NPO status.       INTERVENTIONS  Implementation  Enteral Nutrition - Initiate if unable to extubate    Goals  Diet adv v nutrition support within 1-2 days if unable to extubate.     Monitoring/Evaluation  Progress toward goals will be monitored and evaluated per protocol.    Felicita Sanderson, MS, RD, LD  4E (CVICU) RD pager: [phone]  Ascom: [phone]  Weekend/Holiday RD pager: [phone]

## 2022-12-26 NOTE — PLAN OF CARE
Major Shift Events:    Neuro: Intact. A&Ox4. PERRLA. Denies pain. 5/5 strength in all extremities and ambulated in halls via SBA.   CV: V paced via permanent PM. VS stable. Dobutamine running at straight rate of 5. Heparin stopped at noon per CVTS.   RR: sating well on RA. LS clear.   GI/: voiding spontaneously. NPO for procedure this evening. No BM.   LDA: R internal jugular with swan. R PICC. No drains.     Plan: Heart transplant this evening and transfer to  for hemodynamic monitoring and stability.   For vital signs and complete assessments, please see documentation flowsheets.

## 2022-12-26 NOTE — PHARMACY-TRANSPLANT NOTE
Adult Heart Transplant Post Operative Note    67 year old male s/p heart transplant on 12/25/2022 for mixed nonischemic & ischemic cardiomyopathy.      Planned immunosuppression regimen to include basiliximab POD#0 & #4, tacrolimus (goal 10-12 mcg/L), mycophenolate and prednisone.      Opportunistic pathogen prophylaxis includes: nystatin, trimethoprim/sulfamethoxazole and valganciclovir.  Patient is not enrolled in medication study.    Patient with planned immunosuppression and prophylaxis as above.  Pharmacy will monitor for medication interactions and immunosuppression levels in conjunction with the team. Medication therapy needs for discharge planning will continue to be addressed throughout the current admission via multidisciplinary rounds and order review. Pharmacy will make recommendations as appropriate.    Paulette Sanderson, Jin  CVICU and Advanced Heart Failure Pharmacist  Pager [phone]

## 2022-12-26 NOTE — OP NOTE
Procedure Date: 12/25/2022    REFERRING CARDIOLOGIST:  Khari Mcneill MD, Myrtle Dominguez MD and Ana Perez MD    PREOPERATIVE DIAGNOSIS:  End-stage heart failure.    POSTOPERATIVE DIAGNOSIS:  End-stage heart failure.    SURGEON:  Brendan Rodriguez MD    ASSISTANT:  Lloyd Cornejo MD and Landon Mariscal MD    NAME OF OPERATION:  Orthotopic heart transplantation, removal of AICD generator and leads, intraoperative JARED.    ANESTHESIA:  General endotracheal.    INDICATIONS FOR PROCEDURE:  Mr. Stein is a very pleasant 67-year-old gentleman with end-stage heart failure, who was on the heart transplant list.  A suitable donor became available and after informed consent was obtained, the patient was consented for orthotopic heart transplantation.    DESCRIPTION OF PROCEDURE:  After informed consent was obtained, the patient was brought down to the operating room and was placed in the OR table in a supine position.  Intravenous and intra-arterial lines were begun.  While monitoring his blood pressure and EKG tracing, he was anesthetized and intubated using a single lumen endotracheal tube.  His entire chest, abdomen, both groins and legs were prepped down to the knees using multiple layers of DuraPrep.  He was draped in a sterile field.  A median sternotomy was performed and the sternal edges were retracted laterally.  The pericardium was opened to suspend the heart in a pericardial cradle.  The patient was fully heparinized.  The ascending aorta and the SVC and IVC were cannulated. After appropriate ACT level was achieved, cardiopulmonary bypass was established and the patient was kept normothermic during the entire operation.  The recipient cardiectomy was then performed after placing the aortic crossclamp and transecting the aorta, the PA, SVC, IVC, and creating a left atrial cuff.  The donor heart arrived and we inspected it briefly.  There was no PFO.  There was a small injury to the back wall of the SVC that  had already been repaired with a pericardial patch and it looked good. Prior to sewing the donor heart and we confirmed the ABO type, UNOS number and it was of good match.  We began by performing the left atrial anastomosis using a running 3-0 Prolene.  Following this, the IVC anastomosis was performed using running 4-0 Prolene.  The PA anastomosis was performed next using running 5-0 Prolene.  The aortic anastomosis was subsequently performed using running 4-0 Prolene.  Finally, the SVC anastomosis was performed using running 4-0 Prolene.  The left ventricle was deaired, and the aortic crossclamp was removed.  Of note, a root vent was placed in ascending aorta for de-airing purposes.  The heart was reperfused and the patient was started on epinephrine, Levophed and also isoproterenol drips.  The patient was eventually weaned off cardiopulmonary bypass without difficulty.  There was good RV and LV function.  A 32-British angled tube was placed in each pleural space and another 32-British angled tube in the posterior pericardial space and a 32-British straight chest tube in the anterior mediastinum.  These were all brought out percutaneously below the sternotomy incision and secured to the skin using 2-0 Ethibond.  Once the patient remained stable off bypass, the venous cannulae were removed and protamine was given.  The aortic cannula was subsequently removed as well.  Both pleural spaces were slightly opened.  Mediastinal hemostasis was confirmed after irrigated it with antibiotic saline and the sternum was reapproximated using multiple interrupted single and double wires.  The incision was closed in layers of running Vicryl suture.  The skin was closed using 3-0 Vicryl and was sealed using Dermabond. An incision was made right on top of the previous AICD generator, and the generator was removed as well as the generator leads in its entirety.  The generator incision was irrigated out and was closed in layers of  running Vicryl suture. Cold ischemia time was 228 minutes and total cardiopulmonary bypass time was 99 minutes.    There were no intraoperative complications and the patient tolerated the operation well.  No blood products were given intraoperatively.  All sponge counts, needle counts, instrument counts were correct x 2 at the end of the operation.    ESTIMATED BLOOD LOSS:  Unknown.    SPECIMEN REMOVED: Recipient heart and AICD leads and generator.    The patient was brought to the ICU in hemodynamically stable condition and remained intubated.    Brendan Rodriguez MD        D: 2022   T: 2022   MT: [email or web address]    Name:     SLOAN LAZAR  MRN:      [id number]        Account:        [id number]   :      1955           Procedure Date: 2022     Document: [id number]

## 2022-12-26 NOTE — ANESTHESIA PROCEDURE NOTES
Airway       Patient location during procedure: OR       Procedure Start/Stop Times: 12/25/2022 6:22 PM  Staff -        Anesthesiologist:  Patric Howard MD       Resident/Fellow: Da Perdomo MD       Performed By: resident  Consent for Airway        Urgency: elective  Indications and Patient Condition       Indications for airway management: jesica-procedural       Induction type:intravenous       Mask difficulty assessment: 1 - vent by mask    Final Airway Details       Final airway type: endotracheal airway       Successful airway: ETT - single and Oral  Endotracheal Airway Details        ETT size (mm): 8.0       Cuffed: yes       Successful intubation technique: direct laryngoscopy       DL Blade Type: MAC 4       Grade View of Cords: 1       Adjucts: stylet       Position: Right       Measured from: gums/teeth       Secured at (cm): 23       Bite block used: None    Post intubation assessment        Placement verified by: capnometry, equal breath sounds and chest rise        Number of attempts at approach: 1       Number of other approaches attempted: 0       Secured with: pink tape       Ease of procedure: easy       Dentition: Intact and Unchanged    Medication(s) Administered   Medication Administration Time: 12/25/2022 6:22 PM

## 2022-12-26 NOTE — ANESTHESIA CARE TRANSFER NOTE
Patient: Paco Stein    Procedure: Procedure(s):  TRANSPLANT, HEART, RECIPIENT; Median Sternotomy, Cardiopulmonary Bypass, Removal of Pacemaker       Diagnosis: Dilated cardiomyopathy (H) [I42.0]  Diagnosis Additional Information: No value filed.    Anesthesia Type:   General     Note:    Oropharynx: endotracheal tube in place  Level of Consciousness: iatrogenic sedation    Level of Supplemental Oxygen (L/min / FiO2): 4  Independent Airway: airway patency not satisfactory and stable  Dentition: dentition unchanged  Vital Signs Stable: post-procedure vital signs reviewed and stable  Report to RN Given: handoff report given  Patient transferred to: ICU    ICU Handoff: Call for PAUSE to initiate/utilize ICU HANDOFF, Identified Patient, Identified Responsible Provider, Reviewed the Pertinent Medical History, Discussed Surgical Course, Reviewed Intra-OP Anesthesia Management and Issues during Anesthesia, Set Expectations for Post Procedure Period and Allowed Opportunity for Questions and Acknowledgement of Understanding      Vitals:  Vitals Value Taken Time   BP     Temp 36.9  C (98.42  F) 12/25/22 2335   Pulse 100 12/25/22 2335   Resp 19 12/25/22 2335   SpO2 97 % 12/25/22 2335   Vitals shown include unvalidated device data.    Electronically Signed By: Da Perdomo MD  December 25, 2022  11:37 PM

## 2022-12-26 NOTE — PLAN OF CARE
Major Shift Events:      Pt admitted @ 2300 post heart transplant.     2300 Lactic 7.0 & ABG 7.24/41/222/17. CVTS notified. 1 amp bicarb given & 1L of LR.   0130 - Cvp 5. Urine output 30mL for 2 hours. 1L of LR given per CVTS  0200 - Lactic 11.7. ABG 7.20/37/171/14. CVP 7 PA 44/14. 1 L LR given @ 2 amps bicarb per CVTS  0400- Lactic 11.4 ABG 7.28/34/131/16. CVP 7 PA 41/16. 500mL LR, 500 albumin & 1 amp of bicarb given per CVTS. Levo & vaso titrated up to wean down on epi down to a rate of 0.03 in effort reduce lactic.   0600 - Lactic 7.4 . ABG  7.39/33/95/20. CVP 7. CVTS updated    -Total of 3.5L LR 500mL Albumin given  - potassium replaced x2.      Neuro/Pain: Neuro not assessed. Pt on high does pressors & vagal to MAPS in the low 50's. Fent & prop infusing per order.  Cardiac: -110's. V wires disconnected shortly after arrival to unit. pts intrinsic rhythm 100's. Ppm set to 80bpm & was pacing on T waves.  Iso titrated to HR goal of 100. Levo, Epi & vaso titrated to MAP goal of 65. CVP 5-9. PA 44-46/14-16. Svo2 75-87. CI 3.1-4.5. -750.  Respiratory: vent settings CMV/30%/14/540/5. thin creamy secretions from ETT. Lung sounds clear/dim. 2 meds & 2 pleural chest tubes. Output 80-90/hr. Did dump 280 in the first hour. CVTS updated.   GI: Hypoactive bowel sounds. No Bm. OG in place for meds.   : urine output 10-30mL/hr see fluid administration above.   Endocrine/Diet: insulin gtt infusing per MAR    Plan: wean pressors as able. Trend lactic acid  For vital signs and complete assessments, please see documentation flowsheets.

## 2022-12-26 NOTE — ANESTHESIA PROCEDURE NOTES
Perioperative JARED Procedure Note    Staff -        Anesthesiologist:  Patric Howard MD       Performed By: anesthesiologist  Preanesthesia Checklist:  Patient identified, IV assessed, risks and benefits discussed, monitors and equipment assessed, procedure being performed at surgeon's request and anesthesia consent obtained.    JARED Probe Insertion    Probe Status PRE Insertion: NO obvious damage  Probe type:  Adult 3D  Bite block used:   Oral Airway  Insertion Technique: Jaw Lift  Insertion complications: None obvious  Billing Report:JARED report by Anesthesiologist (See Separate Report note)  Probe Status POST Removal: NO obvious damage    JARED Report  General Procedure Information  Images for this study have been archived.  Modalities: 2D, 3D, CW Doppler, PW Doppler and Color flow mapping  Diagnostic indications for JARED:   Heart Transplant  Echocardiographic and Doppler Measurements  Right Ventricle:  Cavity size dilated.    Hypertrophy not present.   Thrombus not present.    Global function mildly impaired.     Left Ventricle:  Cavity size dilated.    Hypertrophy present.   Thrombus not present.   Global Function severely impaired.   Ejection Fraction 15%.      Ventricular Regional Function:  1- Basal Anteroseptal:  hypokinetic  2- Basal Anterior:  hypokinetic  3- Basal Anterolateral:  akinetic  4- Basal Inferolateral:  akinetic  5- Basal Inferior:  akinetic  6- Basal Inferoseptal:  akinetic  7- Mid Anteroseptal:  hypokinetic  8- Mid Anterior:  hypokinetic  9- Mid Anterolateral:  akinetic  10- Mid Inferolateral:  akinetic  11- Mid Inferior:  akinetic  12- Mid Inferoseptal:  akinetic  13- Apical Anterior:  akinetic  14- Apical Lateral:  akinetic  15- Apical Inferior:  akinetic  16- Apical Septal:  akinetic  17- McClure:  akinetic    Valves  Aortic Valve: Annulus calcified.  Stenosis not present.  Area: 2.97 cm .  Regurgitation absent.  Leaflets normal.  Leaflet motions normal.    Mitral Valve: Annulus normal.   Stenosis not present.  Regurgitation +4.  Leaflets normal.  Leaflet motions normal.    Tricuspid Valve: Regurgitation +1.  Leaflets normal.  Leaflet motions normal.    Pulmonic Valve: Annulus normal.  Stenosis not present.  Regurgitation +1.      Aorta: Ascending Aorta: Size normal.  Diameter 3.43 cm.  Dissection not present.  Plaque thickness less than 3 mm.  Mobile plaque not present.    Aortic Arch: Size normal.    Dissection not present.   Plaque thickness less than 3 mm.   Mobile plaque not present.    Descending Aorta: Size normal.    Dissection not present.   Plaque thickness less than 3 mm.   Mobile plaque not present.      Right Atrium:  Size normal.   Spontaneous echo contrast not present.   Thrombus not present.   Tumor not present.   Device present.   Left Atrium: Size dilated.  Spontaneous echo contrast not present.  Thrombus not present.  Tumor not present.  Device not present.    Left atrial appendage normal.   Other Atria Findings:  TERA velocity > 40 cm/s      Other Findings:   Pericardium:  pericardial effusion. Pleural Effusion:  none. Pulmonary Arteries:  normal. Pulmonary Venous Flow:  systolic flow reversal. No coronary sinus catheter present.    Post Intervention Findings  Procedure(s) performed:  Heart or Lung Transplant. Global function:  Improved. Regional wall motion: Improved. Surgeon(s) notified of all postintervention findings: Yes.       Heart Transplant:  Transplant heart function normal.          Echocardiogram Comments  Echocardiogram comments:   PRE-CPB:  Severely reduced LV systolic function w/ LVEF 15%. Severe hypokinesis of the anterior and anteroseptal walls. All other walls akinetic. Mildly reduced RV systolic function. No AS or AI. Severe MR w/ PVF reversal. Small pericardial effusion. No aortic dissection. All findings communicated with surgical team.    POST-CPB:  S/p OHT. Global mild myocardial stunning w/ LVEF 45%. No RWMAs. RV systolic function is normal. Diastology  indeterminate 2/2 tachycardia. No AS or AI. Trace MR. Mild TR. Trace UT. Pericardial effusion no longer present. No aortic dissection. All findings communicated with surgical team. .

## 2022-12-26 NOTE — ANESTHESIA POSTPROCEDURE EVALUATION
Patient: Paco Stein    Procedure: Procedure(s):  TRANSPLANT, HEART, RECIPIENT; Median Sternotomy, Cardiopulmonary Bypass, Removal of Pacemaker       Anesthesia Type:  General    Note:  Disposition: ICU            ICU Sign Out: Anesthesiologist/ICU physician sign out WAS performed   Postop Pain Control: Uneventful            Sign Out: Well controlled pain   PONV: No   Neuro/Psych: Uneventful            Sign Out: PLANNED postop sedation   Airway/Respiratory: Uneventful            Sign Out: AIRWAY IN SITU/Resp. Support               Airway in situ/Resp. Support: ETT                 Reason: Planned Pre-op   CV/Hemodynamics: Uneventful            Sign Out: Acceptable CV status; No obvious hypovolemia; No obvious fluid overload   Other NRE: NONE   DID A NON-ROUTINE EVENT OCCUR? No    Event details/Postop Comments:  Patient transported from OR to ICU w/ O2, ambu, infusions, emergency medications and emergency airway equipment. VSS throughout transport and handoff.           Last vitals:  Vitals:    12/25/22 1500 12/25/22 1600 12/25/22 1700   BP: 120/79 (!) 115/95 (!) 105/94   Pulse: 86 98 98   Resp:  16    Temp:  36.6  C (97.9  F)    SpO2: 97% 96% 96%       Electronically Signed By: Patric Howard MD  December 25, 2022  11:09 PM

## 2022-12-26 NOTE — PLAN OF CARE
Major Shift Events: Pt wakes up, follows commands, nods appropriately. ST on telemetry. MAP >65, weaning pressors per MAR. Titrated off vaso today. Epi at straight rate, Isuprel for HR >100. CAROLYN q4h with CVP more frequently today. CVP trending 5-7, PA 30/10. CI 2.2-2.7. 2.5L LR given total for low CVP and high lactic acid. Lactic now 2.2. CMV vent settings. Pt on PS for about 4 hours today. Tolerated fairly well, low rates and high tidal volumes. Ctx4 with minimal output. Davis in place, UOP dropping throughout shift. OG to LIS, pt had one episode of emesis this afternoon. No BM today. Family at bedside throughout shift.     Plan: Monitor hemodynamics. Wean pressors as able. PS and extubate in AM.     For vital signs and complete assessments, please see documentation flowsheets.
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
## 2022-12-26 NOTE — PROGRESS NOTES
CV ICU Progress Note  12/26/2022        CO-MORBIDITIES:   Patient Active Problem List   Diagnosis    Acute respiratory failure with hypoxia (H)    Cardiomyopathy (H)    Stroke (H)    Coronary artery disease involving native coronary artery of native heart without angina pectoris    Essential hypertension    NSVT (nonsustained ventricular tachycardia)    SOB (shortness of breath)    CHF (congestive heart failure) (H)    Troponin level elevated    Anginal equivalent (H)    Heart failure, unspecified HF chronicity, unspecified heart failure type (H)    Benign neoplasm of colon    Acute on chronic combined systolic (congestive) and diastolic (congestive) heart failure (H)    Hyperlipidemia    Syncope and collapse    Primary central nervous system vasculitis (H)    Vasculitis, CNS (H)    Acute embolism and thrombosis of left peroneal vein (H)       ASSESSMENT: Paco Stein is a 67 year old male with a history of chronic systolic heart failure (suspected predominantly nonischemic cardiomyopathy, possibly arrhythmogenic), coronary artery disease (s/p PCI with TILA to LCx 4/2013 and to the LAD in 10/2021), hyperlipidemia, CNS vasculitis and chronic kidney disease who presents with transient bilateral hand tightness with unrevealing workup. He underwent DDHT on 12/25/22 with Dr. Rodriguez.    Attending Addendum Start:    Neuro - pain in afternoon, will keep sedated and increase opiates to keep comfortable overnight. Sedation stop at 6 am .  Pulm - plan for extubation at 8:30 AM  tenatively if hemodynamics continue to improve.   CV - Pt has been fluid responsive the entire day as judged by pressor requirement, arterial lactate, MV, CVP, UOP, CI. Continue to give fluid if fluid responsive.   Renal - MILENA. Cre 1.56  G - watch LFTs, mild transaminitis  E - prednisone wean  H - dilutional hgb drop  ID - cefapime/ledy Stein    Attending Addendum Finish:     PLAN:  Neuro/ pain/ sedation:  Acute Postoperative  pain  Primary central nervous system vasculitis  - Monitor neurological status. Notify the MD for any acute changes in exam.  - Pain: fentanyl gtt. Scheduled tylenol. PRN tylenol, oxycodone, dilaudid.  - Sedation: propofol gtt  - PTA ativan and trazodone, hold. Have not been using much  - wean for PST    Pulmonary care:   Postoperative ventilation management  - Intubated, ventilated  - Titrate supplemental oxygen to maintain saturation above 92%.  - PTA albuterol   - PST today but will not extubate this AM    Cardiovascular:    S/p heart tx with Dr. Rodriguez on 12/25/22  History of CAD  HFrEF  ICD  Dyslipidemia  HTN  Elevated troponin  Recent echo on 11/16 with LVEF of 20-25%  - Monitor hemodynamic status.   - Goal MAP 65, SBP<120  - Statin start POD5  - BB hold  - ASA: start POD5  - Norepi, vaso gtt; wean as tolerated  - Isopro if HR drops below 100  - PTA amio 200 BID, ASA 81, lasix 20 BID PO, pravastatin 20 daily, hold  -  ml bolus and reassess for another bolus   - leave epi at 0.03 and isoproterenol 0.005    GI care/ Nutrition:   malnutrition  - NPO   - PPI  - Continue bowel regimen: miralax, senna  - NJ and TF if not extubated  - CTM LFT    Renal/ Fluid Balance/ Electrolytes:  CRI not on dialysis  BL creat appears to be ~ 0.8  - Strict I/O, daily weights  - Avoid/limit nephrotoxins as able  - Replete lytes PRN per protocol  - delay tacro     Endocrine:    Stress induced hyperglycemia  Preop A1c 6.2  - Insulin gtt  - Goal BG <180 for optimal healing  - on methylpred, mycophnolate     ID/ Antibiotics:  Stress induced leukocytosis  - To complete perioperative regimen  - Continue to monitor fever curve, WBC and inflammatory markers as appropriate  - WBC trending down  - on vanc and cefepime   - valcyte and bactrim on POD5  - topical acyclovir     Heme:     Stress induced leukocytosis  Acute blood loss anemia  Acute blood loss thrombocytopenia  H/o R axillary and L peroneal DVT  No s/sx active bleeding  -  Continue to monitor  - CBC   - PTA apixaban, hold    MSK/ Skin:  Sternotomy  Surgical Incision  - Sternal precautions  - Postoperative incision management per protocol  - PT/OT/CR    Prophylaxis:    - Mechanical prophylaxis for DVT  - Chemical DVT prophylaxis - SQH 5000 q8  - PPI    Lines/ tubes/ drains:  - Arterial Line, ETT, CTs, R MAC line, R PICC, donovan, NJ    Disposition:  - CVICU    ICU Checklist  F - Feeding:   A - Analgesia:   S - Sedation:   T - Thromboembolic prophylaxis:      H - Head of bed elevated  U - Ulcer prophylaxis:  G - Glycemic control  S - SBT     B - Bowel regimen  I - Indwelling catheter  D - De-escalation of antibiotics    Patient seen, findings and plan discussed CVICU attending    Patricia Currie MD  Anesthesiology, [facility]      ====================================    Interval history:  Received 3L fluid and 500 ml 5% albumin for increased lactate.     PAST MEDICAL HISTORY:   Past Medical History:   Diagnosis Date    Congestive heart failure (H)        PAST SURGICAL HISTORY:   Past Surgical History:   Procedure Laterality Date    COLONOSCOPY N/A 11/17/2022    Procedure: COLONOSCOPY;  Surgeon: Roverto Nino MD;  Location: [facility]    CV CORONARY ANGIOGRAM N/A 11/11/2022    Procedure: Coronary Angiogram;  Surgeon: Justo Bush MD;  Location:  HEART CARDIAC CATH LAB    CV RIGHT HEART CATH MEASUREMENTS RECORDED N/A 11/11/2022    Procedure: Right Heart Catheterization;  Surgeon: Justo Bush MD;  Location:  HEART CARDIAC CATH LAB    CV RIGHT HEART CATH MEASUREMENTS RECORDED N/A 12/8/2022    Procedure: Right Heart Catheterization;  Surgeon: Wayne Xavier MD;  Location:  HEART CARDIAC CATH LAB    CV RIGHT HEART CATH MEASUREMENTS RECORDED N/A 12/9/2022    Procedure: Right Heart Catheterization with [place]in [place];  Surgeon: Khari Mcneill MD;  Location:  HEART CARDIAC CATH LAB    PICC DOUBLE LUMEN PLACEMENT Right 11/10/2022    Right basilic, 43 cm, 1 cm  "external length       FAMILY HISTORY:   Family History   Problem Relation Age of Onset    Atrial fibrillation Father     Lung Cancer Brother        SOCIAL HISTORY:   Social History     Tobacco Use    Smoking status: Never    Smokeless tobacco: Never   Substance Use Topics    Alcohol use: Not Currently         OBJECTIVE:   1. VITAL SIGNS:    Vital signs:  Temp: 98.8  F (37.1  C) Temp src: Bladder BP: (!) 105/94 Pulse: 103   Resp: 14 SpO2: 97 % O2 Device: Mechanical Ventilator Oxygen Delivery: 1 LPM Height: 188 cm (6' 2\") Weight: 87.1 kg (192 lb 1.6 oz)  Estimated body mass index is 24.66 kg/m  as calculated from the following:    Height as of this encounter: 1.88 m (6' 2\").    Weight as of this encounter: 87.1 kg (192 lb 1.6 oz).          2. INTAKE/ OUTPUT:    I/O last 3 completed shifts:  In: 9193.47 [I.V.:3570.47; Other:1200; NG/GT:423; IV Piggyback:3500]  Out: 2520 [Urine:1500; Emesis/NG output:300; Chest Tube:720]      3. PHYSICAL EXAMINATION:     General: sedated  Neuro: sedated  Resp: intubated, ventilated  CV: S1, S2, sinus tach, no m/r/g   Abdomen: Soft, non-distended, non-tender  Incisions: c/d/i  Extremities: warm and well perfused, no edema  CT: To suction, serosang output, no airleak, crepitus    4. INVESTIGATIONS:   Arterial Blood Gases   Recent Labs   Lab 12/26/22  0558 12/26/22  0359 12/26/22  0152 12/25/22  2302   PH 7.39 7.28* 7.20* 7.24*  7.24*   PCO2 33* 34* 37 41  41   PO2 95 131* 174* 222*  222*   HCO3 20* 16* 14* 17*  17*     Complete Blood Count   Recent Labs   Lab 12/26/22  0359 12/25/22  2303 12/25/22  2150 12/25/22  2147 12/25/22  1856 12/25/22  0604   WBC 27.4* 38.9* 26.1*  --   --  7.0   HGB 12.9* 15.2 13.1* 12.9*   < > 13.9    205 201  --   --  229    < > = values in this interval not displayed.     Basic Metabolic Panel  Recent Labs   Lab 12/26/22  0653 12/26/22  0601 12/26/22  0516 12/26/22  0407 12/26/22  0359 12/26/22  0001 12/25/22  2303 12/25/22  2302 12/25/22  2147 " 12/25/22 2143 12/25/22  1856 12/25/22  0604 12/24/22  0511   NA  --   --   --   --  142  --  141  141  --  141 141   < > 138 137   POTASSIUM  --   --   --   --  3.6  --  3.8  3.8  --  3.7 4.5   < > 4.0 3.9   CHLORIDE  --   --   --   --  102  --  102  102  --   --   --   --  105 104   CO2  --   --   --   --  13*  --  14*  14*  --   --   --   --  22 22   BUN  --   --   --   --  25.7*  --  20.5  20.5  --   --   --   --  13.1 14.5   CR  --   --   --   --  1.56*  --  1.24*  1.24*  --   --   --   --  0.84 0.85   * 253* 252* 264* 305*   < > 235*  235*   < > 202* 211*   < > 104* 95    < > = values in this interval not displayed.     Liver Function Tests  Recent Labs   Lab 12/26/22  0359 12/25/22  2303 12/25/22  2150 12/25/22  0604 12/24/22  0511   * 137*  --  33 33   ALT 63* 68*  --  54* 55*   ALKPHOS 60 71  --  68 68   BILITOTAL 1.1 0.9  --  0.5 0.4   ALBUMIN 3.2* 3.8  --  3.6 3.5   INR  --  1.27* 1.55* 1.04  --      Pancreatic Enzymes  Recent Labs   Lab 12/25/22  0604   AMYLASE 77     Coagulation Profile  Recent Labs   Lab 12/25/22  2303 12/25/22  2150 12/25/22  0604   INR 1.27* 1.55* 1.04   PTT 46* 30 84*         5. RADIOLOGY:   Recent Results (from the past 24 hour(s))   XR Chest Port 1 View    Impression    RESIDENT PRELIMINARY INTERPRETATION  IMPRESSION:  1. Post surgical changes of heart transplant with mediastinal surgical  drains, right IJ [place] catheter, and intact-appearing median  sternotomy wires.  2. Mild perihilar/retrocardiac opacification, likely relating to  postsurgical edema/atelectasis. Continued follow-up to exclude  infectious component. Otherwise, no airspace consolidation.  3. No significant pleural effusion is incompletely visualized  costophrenic angles. No pneumothorax.   XR Abdomen Port 1 View    Narrative    EXAM: XR ABDOMEN PORT 1 VIEW  12/25/2022 11:37 PM     HISTORY:  ngt placement       TECHNIQUE: Single frontal radiograph of the abdomen    COMPARISON:  CT chest  abdomen and pelvis 11/11/2022 and chest x-ray  12/25/2022 at 0607.    FINDINGS:   AP supine portable abdomen. Enteric tube course inferior to the  diaphragm with tip sidehole projecting over the fundus of the stomach.  Prior cholecystectomy. Diffuse gaseous distention of multiple loops of  bowel. Felt to be mild distention of the colon. No evidence of free  intraperitoneal air on this supine radiograph. Lung bases are  unremarkable. Well-defined heart border. Lower visualized sternotomy  wires are intact.      Impression    IMPRESSION:  1. Enteric tube course inferior to the diaphragm with tip and sidehole  overlying the proximal stomach fundus.  2. Mild gaseous distention of the colon. Ileus in the differential.  Recommend serial radiographs if there are abdominal symptoms.    I have personally reviewed the examination and initial interpretation  and I agree with the findings.    TANISHA MIRELES MD         SYSTEM ID:  [id number]       =========================================

## 2022-12-26 NOTE — OR NURSING
Internal Cardiac Device explanted 12/25/2022 at 2155 for gross. Chain of Custody form filled out and sent with device.

## 2022-12-26 NOTE — ANESTHESIA PROCEDURE NOTES
Arterial Line Procedure Note    Pre-Procedure   Staff -        Anesthesiologist:  Patric Howard MD       Resident/Fellow: Geovani Henderson MD       Performed By: resident       Location: OR       Pre-Anesthestic Checklist: patient identified, IV checked, risks and benefits discussed, informed consent, monitors and equipment checked, pre-op evaluation and at physician/surgeon's request  Timeout:       Correct Patient: Yes        Correct Procedure: Yes        Correct Site: Yes        Correct Position: Yes   Line Placement:   This line was placed Pre Induction starting at 12/25/2022 6:16 PM and ending at 12/25/2022 6:20 PM  Procedure   Procedure: arterial line       Laterality: right       Insertion Site: radial.  Sterile Prep        Standard elements of sterile barrier followed       Skin prep: Chloraprep  Insertion/Injection        Technique: Seldinger Technique        Catheter Type/Size: 20 G, 12 cm  Narrative         Secured by: other       Tegaderm dressing used.       Complications: None apparent,        Arterial waveform: Yes        IBP within 10% of NIBP: Yes

## 2022-12-26 NOTE — PROGRESS NOTES
Pt received Heart transplant on 12.25.22, donor ID [id number], removed from [facility] transplant waitlist.

## 2022-12-26 NOTE — PLAN OF CARE
Goal Outcome Evaluation:      Plan of Care Reviewed With: other (see comments) (unable to discuss wtih pt at this time)    Overall Patient Progress: no changeOverall Patient Progress: no change    Outcome Evaluation: Will monitor for extubation and initiation of PO diet

## 2022-12-26 NOTE — PROGRESS NOTES
Care Management Follow Up    Length of Stay (days): 20    Expected Discharge Date:  TBD         Patient plan of care discussed at interdisciplinary rounds: Yes    Anticipated Discharge Disposition: TBD     Anticipated Discharge Services:  TBD  Anticipated Discharge DME:  TBD    Additional Information:  Pt had Heart transplant on 12/25/22 and transferred to 4E ICU.  Pt is vented and sedated.  RNCC will cont to follow plan of care.    Kimmy Renteria RN, PHN, BSN  4A and 4E/ ICU  Care Coordinator  Phone: [phone]  Pager: [phone]    To contact the weekend RNCC  [place] (0800 - 1630) Saturday and Sunday   Units: 4A, 4C, 4E, 5A and 5B- Pager 1: [phone]   Units: 6A, 6B, 6C, 6D- Pager 2: [phone]   Units: 7A, 7B, 7C, 7D, and 5C-Pager 3: [phone]

## 2022-12-26 NOTE — PROGRESS NOTES
[facility]    Advanced Heart Failure Consult Progress Note    Assessment and Plan:     Paco Stein is a 67 year old male with a history of chronic systolic heart failure (suspected predominantly nonischemic cardiomyopathy, possibly arrhythmogenic), coronary artery disease (s/p PCI with TILA to LCx 4/2013 and to the LAD in 10/2021), hyperlipidemia, CNS vasculitis and chronic kidney disease who presents with transient bilateral hand tightness with unrevealing workup, who was admitted for cardiogenic shock and evaluation of advance heart therapies on 12/7. Underwent heart transplant on 12/25 without complication so far.     Interval History:    We evaluated patient this morning, and he seems recovering well from his post operative state. Minimal mechanical ventilatory settings and able to do PST.     From cardiology perspective, overall he continues to have stable MAP above 65 and we have been able to come down on pressors, now on low dose of both epinephrine and NE. Hemodynamics with CVP 4, PA 33/12, CO/CI: 4.8/2.8, SVR 1113. Given low CVP we recommended given a bolus of IV fluids and this has helped to further reduce pressors requirement, most recent CVP 5. Will continue CVP monitoring goal 6-8. Will try to come off pressors except for epinephrine at  0.03 in the first 24 hours. If his hemodynamics continue to be stable. Will plan to stop epinephrine tomorrow.     Along the lines, he is on a low dose of isoproterenol, HR in the . No signs of arrhythmia. Temporary pacer in place at a back up rate of 80 bmp. Will continue for now isoproterenol. Plan to initiate terbutaline tomorrow am.     As far as immunosuppression. Tonight we will initiate PO steroids taper as detailed below. Will hold tacro given MILENA.     Plan:   -attempt to wean off pressors; NE and vasopressin. Continue epinephrine at same dose 0.03 until tomorrow. Will touch base on rounds  -continue  isoproterenol  -CVP goal 6-8. Intermittent IV bolus fluids if needed  -will start steroids taper tonight (ordered for you)  -hemodynamics Q4h--> q6h tomorrow morning.     ---------------------------------------------------------------------------  ===CARDIOVASCULAR===  Mr. Stein is a 67 y.o male with hx of Advanced chronic systolic heart failure, EF 15-20%, NYHA class IV, ACC/AHA Stage D who was admitted for ambulatory cardiogenic shock and evaluation of advance therapies. Underwent heart transplant on 12/25 without complications.     Immediate post operative course without complications so far    Graft Function:     Hemodynamics:     Date & Time CVP PA WP PA Sat CO/CI (Indirect Aisha) PVR Current Therapies (Vasoactive meds/MCS)   12/26/22 4 32/13/19 12 63 4.6/2.2 1.5 Epinephrine 0.03, NE 0.06, Vasopressin 4                                               Volume status: CVP at 4 this morning; Hypovolemic   o Diuresis: LR--> 1 litter     Cardiac/Graft function:   o Pressors: Going down on pressors, now on epinephrine and NE  o Inotrope: Will continue epinephrine at fixed dose 0.03 (mainly inotrope at this rate)  o     Rejection surveillance:   o Final crossmatch: pending   o Week 1 biopsy, RHC, TTE, and DSAs due: pending  o Echo, DSAs week 1 and week 4, RHC + EMBx done weeks 1-4,      Immunosuppression:    Induction: Yes, CNI sparing protocol with Simulect given POD 0 intraop (given), 2nd dose 20mg IV due POD #4 (on 12/29):    Maintenance:  o Steroids:   - 1000mg IV preop + 500mg IV if still awaiting transplant 24h after initial dose  - IntraOp: 500mg IV at release of cross clamp  - PostOp: 125mg IV q8hrs x 3 doses  - Then, prednisone 1mg/kg, given in 2 divided doses (started at 10 pm on 12/26). Taper by a total of 5mg/day until reach total daily dose of 0.5mg/kg/day dosed BID or 20mg bid.    - Plan for taper by 5mg after each normal biopsy to 5mg daily.  - If unable to give pills need to convert to IV    df  o Cell  Cycle Inhibitor: MMF  - Preop: MMF 1500mg po q12h preoperatiely   - Postop: Continue MMF 1500mg po q12h (1000mg po q12h for heart-kidney). Will transition to oral when able  o CNI: Tacrolimus (goal 10-12) started/to start POD#  pending normal renal function.  Obtain daily troughs.     Prophylaxis:  CMV D-/R+; EBV D pending /R + ; Toxo D pending /R negative     Post-op antibiotics:  Cefepime  , Vancomycin   o (**if admitted from home, 72h post transplant, if hospitalized Vanc for 72h post transplant, Cefepime for 5 days post transplant.  If chest left open postop: Vanc/Cef + Micafungin for 72h post chest closure with Cefepime continued for total 5 days. If hx of prior infections, consult with ID. If H/K TP Vanc for 5 days post kidney, Cefepime + Micafungin for 7 days post kidney**)     CMV: (D- /R+).   o CMV prophylaxis: with Valgancylovir, 6 months for D+/R-, 3 months for D+/R+ and D-/R-, no prophylaxis if D-/R-.    Fungal:  Fluconazole/ voriconazole, Nysatin swish and swallow while on steroids.     PJP: bactrim while on steroids    Toxo: (D pending/R negative):  If toxo IgG seropositive for either D or R, lifelong Bactrim    Stress ulcer:  IV pantoprazole (Protonix while on steroids)     VTE prophylaxis:  subQ heparin per surgeon approval on 12/26    (HCV: If donor was HCV+/INES+. 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 No episodes since. Neurological exam unremarkable. Neurology consulted during recent admission as part of  VAD/transplant evaluation. MRI/MRA brain was also performed which showed no concern for progression of disease  -Hold PTA cellcept 1000mg qam and 500mg qpm for now    # Incidental punctate cerebellar infarct, likely embolic  Read on MR imaging 11/14/22 without new neuro deficits. Per radiology, appears acute, likely embolic. No known arterial clot history. Reviewed telemetry over admission, no afib or other tachycarrhythmias. Reassuring prior TTE this admission including bubble study, repeat JARED w/o septal defects or thrombus. Treated w/ heparin gtt and transitioned to Eliquis.  - Hold heparin drip post heart transplant    # Insomnia/anxiety  - Hold PTA trazodone and lorazepam       ===PULMONARY===  #Intubated   Vent Mode: CMV/AC  (Continuous Mandatory Ventilation/ Assist Control)  FiO2 (%): 30 %  Resp Rate (Set): 14 breaths/min  Tidal Volume (Set, mL): 540 mL  PEEP (cm H2O): 5 cmH2O  Resp: 14    -doing PS, plan to extubate tomorrow if hemodynamics are stable.     # Non-productive cough, acute on chronic, resolved  - PRN cough lozenges  - PRN  tessalon pearls    ===GI===  # Shock liver, improving  -daily LFT    # Severe malnutrition in the context of acute illness/injury on chronic illness  - Appreciate assistance from RD      ===RENAL===  # Acute kidney injury  Creatinine at baseline 0.8. Now 1.56 post surgery  - Good urine output  - trend BMP    ===HEME/ONC===  # Occlusive left peroneal DVT  # Non-occlusive right axillary DVT  Identified on doppler 11/15/22 during previous admission.   -Will need plan for anticoagulation in the future    ===ENDOCRINE===  # Hyperglycemia  No active concerns     INFECTIOUS DISEASE  No active concerns     ===SKIN/MSK===  # Bilateral hand tightness, resolved    FEN: NPO, intubated  DVT PPx: SQH  Code: Full code      Disposition: Pending   Discussed with Dr. Thenappan Jorge Reyes Castro, MD, MS   Cardiovascular disease fellow       Objective:   BP (!) 105/94   Pulse 103   Temp  "98.1  F (36.7  C)   Resp 13   Ht 1.88 m (6' 2\")   Wt 87.1 kg (192 lb 1.6 oz)   SpO2 96%   BMI 24.66 kg/m        Wt:   Wt Readings from Last 5 Encounters:   12/25/22 87.1 kg (192 lb 1.6 oz)   11/21/22 90.9 kg (200 lb 6.4 oz)   11/10/22 108 kg (238 lb 1.6 oz)   11/08/22 95.7 kg (210 lb 14.4 oz)     Physical Exam   GEN: Patient intubated, opening eyes, squeezing finger and wiggling toes.   HEENT: no icterus, EOMI  CV: heart sounds regular and rhythmic. No evident murmur. 2 mediastinal tubes and 2 chest tubes (L and R). Clean dressing covering sternotomy site.   CHEST: Passive movement of air.  ABD: soft, non-tender, non-distended   EXTR: warm extremities. No LE edema      DATA:   Labs, EKGs and imaging reviewed. Pertinent results discussed in assessment and plan above.   "

## 2022-12-26 NOTE — PROGRESS NOTES
Interval summary    Patient arrived on two pressors and initial lactic acid level was 7. Given an amp of bicarb and 1 liter of fluid. Subsequently, a third pressor was added, UOP marginal, CVP 4, and repeat lactic was 11. Aisha w  and SvO2 80. Two additional liters of fluid and two amps of bicarb were ordered. Discussed with cards who agreed with fluid resuscitation. Recheck lactic 11, gave 500 ml LR and 500 ml colloid, one amp bicarb, and reduced epi gtt rate and preferentially increased vasopressin. Hemoglobin 12's throughout and chest tubes with thin serosanguinous output. CVP still on the low side after these interventions. Repeat lactic at 0600. Suspect there is ongoing myocardial stunning and/or vasoplegia contributing in addition to lower volume status.     The above was discussed with the fellow.    Jonah Perez MD  [facility]

## 2022-12-26 NOTE — H&P
CV ICU H&P  12/25/2022      CO-MORBIDITIES:   Patient Active Problem List   Diagnosis     Acute respiratory failure with hypoxia (H)     Cardiomyopathy (H)     Stroke (H)     Coronary artery disease involving native coronary artery of native heart without angina pectoris     Essential hypertension     NSVT (nonsustained ventricular tachycardia)     SOB (shortness of breath)     CHF (congestive heart failure) (H)     Troponin level elevated     Anginal equivalent (H)     Heart failure, unspecified HF chronicity, unspecified heart failure type (H)     Benign neoplasm of colon     Acute on chronic combined systolic (congestive) and diastolic (congestive) heart failure (H)     Hyperlipidemia     Syncope and collapse     Primary central nervous system vasculitis (H)     Vasculitis, CNS (H)     Acute embolism and thrombosis of left peroneal vein (H)       ASSESSMENT: Paco Stein is a 67 year old male with a history of chronic systolic heart failure (suspected predominantly nonischemic cardiomyopathy, possibly arrhythmogenic), coronary artery disease (s/p PCI with TILA to LCx 4/2013 and to the LAD in 10/2021), hyperlipidemia, CNS vasculitis and chronic kidney disease who presents with transient bilateral hand tightness with unrevealing workup. He underwent DDHT on 12/25/22 with Dr. Rodriguez.    PLAN:  Neuro/ pain/ sedation:  Acute Postoperative pain  Primary central nervous system vasculitis  - Monitor neurological status. Notify the MD for any acute changes in exam.  - Pain: fentanyl gtt. Scheduled tylenol. PRN tylenol, oxycodone, dilaudid.  - Sedation: propofol gtt  - PTA ativan and trazodone    Pulmonary care:   Postoperative ventilation management  - Intubated, ventilated  - Titrate supplemental oxygen to maintain saturation above 92%.  - PTA albuterol     Cardiovascular:    S/p heart tx with Dr. Rodriguez on 12/25/22  History of CAD  HFrEF  ICD  Dyslipidemia  HTN  Elevated troponin  Recent echo on 11/16 with LVEF of  20-25%  - Monitor hemodynamic status.   - Goal MAP 65, SBP<120  - Statin hold  - BB hold  - ASA: start tomorrow  - Epi, norepi, vaso gtt; wean as tolerated  - Isuprel if HR drops below 100  - PTA amio 200 BID, ASA 81, lasix 20 BID PO, pravastatin 20 daily    GI care/ Nutrition:   malnutrition  - NPO   - PPI  - Continue bowel regimen: miralax, senna    Renal/ Fluid Balance/ Electrolytes:  CRI not on dialysis  BL creat appears to be ~ 0.8  - Strict I/O, daily weights  - Avoid/limit nephrotoxins as able  - Replete lytes PRN per protocol    Endocrine:    Stress induced hyperglycemia  Preop A1c 6.2  - Insulin gtt  - Goal BG <180 for optimal healing    ID/ Antibiotics:  Stress induced leukocytosis  - To complete perioperative regimen  - Continue to monitor fever curve, WBC and inflammatory markers as appropriate    Heme:     Stress induced leukocytosis  Acute blood loss anemia  Acute blood loss thrombocytopenia  H/o R axillary and L peroneal DVT  No s/sx active bleeding  - Continue to monitor  - CBC   - PTA apixaban, hold    MSK/ Skin:  Sternotomy  Surgical Incision  - Sternal precautions  - Postoperative incision management per protocol  - PT/OT/CR    Prophylaxis:    - Mechanical prophylaxis for DVT  - Chemical DVT prophylaxis - start subcutaneous heparin tomorrow  - PPI    Lines/ tubes/ drains:  - Arterial Line, ETT, CTs, R MAC line, R PICC, venus, [place]    Disposition:  - CVICU    ICU Checklist  F - Feeding:   A - Analgesia:   S - Sedation:   T - Thromboembolic prophylaxis:      H - Head of bed elevated  U - Ulcer prophylaxis:  G - Glycemic control  S - SBT     B - Bowel regimen  I - Indwelling catheter  D - De-escalation of antibiotics    Patient seen, findings and plan discussed CV fellow    Jonah Perez MD    ====================================    HPI:   Paco Stein is a 67 year old male with a history of chronic systolic heart failure (suspected predominantly nonischemic cardiomyopathy, possibly  "arrhythmogenic), coronary artery disease (s/p PCI with TILA to LCx 4/2013 and to the LAD in 10/2021), hyperlipidemia, CNS vasculitis and chronic kidney disease who presents with transient bilateral hand tightness with unrevealing workup. He underwent DDHT on 12/25/22 with Dr. Rodriguez    PAST MEDICAL HISTORY:   Past Medical History:   Diagnosis Date     Congestive heart failure (H)        PAST SURGICAL HISTORY:   Past Surgical History:   Procedure Laterality Date     COLONOSCOPY N/A 11/17/2022    Procedure: COLONOSCOPY;  Surgeon: Roverto Nino MD;  Location:  GI     CV CORONARY ANGIOGRAM N/A 11/11/2022    Procedure: Coronary Angiogram;  Surgeon: Justo Bush MD;  [facility]     CV RIGHT HEART CATH MEASUREMENTS RECORDED N/A 11/11/2022    Procedure: Right Heart Catheterization;  Surgeon: Justo Bush MD;  [facility]     CV RIGHT HEART CATH MEASUREMENTS RECORDED N/A 12/8/2022    Procedure: Right Heart Catheterization;  Surgeon: Wayne Xavier MD;  Location:  [facility] CARDIAC CATH LAB     CV RIGHT HEART CATH MEASUREMENTS RECORDED N/A 12/9/2022    Procedure: Right Heart Catheterization with leave-in Cubero;  Surgeon: Khari Mcneill MD;  Location:  HEART CARDIAC CATH LAB     PICC DOUBLE LUMEN PLACEMENT Right 11/10/2022    Right basilic, 43 cm, 1 cm external length       FAMILY HISTORY:   Family History   Problem Relation Age of Onset     Atrial fibrillation Father      Lung Cancer Brother        SOCIAL HISTORY:   Social History     Tobacco Use     Smoking status: Never     Smokeless tobacco: Never   Substance Use Topics     Alcohol use: Not Currently         OBJECTIVE:   1. VITAL SIGNS:    Vital signs:  Temp: 97.9  F (36.6  C) Temp src: Axillary BP: (!) 105/94 Pulse: 98   Resp: 16 SpO2: 96 %   Oxygen Delivery: 1 LPM Height: 188 cm (6' 2\") Weight: 87.1 kg (192 lb 1.6 oz)  Estimated body mass index is 24.66 kg/m  as calculated from " "the following:    Height as of this encounter: 1.88 m (6' 2\").    Weight as of this encounter: 87.1 kg (192 lb 1.6 oz).          2. INTAKE/ OUTPUT:    I/O last 3 completed shifts:  In: 4106.6 [I.V.:2906.6; Other:1200]  Out: 2675 [Urine:2675]      3. PHYSICAL EXAMINATION:     General: sedated  Neuro: sedated  Resp: intubated, ventilated  CV: S1, S2, sinus tach, no m/r/g   Abdomen: Soft, non-distended, non-tender  Incisions: c/d/i  Extremities: warm and well perfused, no edema  CT: To suction, serosang output, no airleak, crepitus    4. INVESTIGATIONS:   Arterial Blood Gases   Recent Labs   Lab 12/25/22 2302 12/25/22 2147 12/25/22 2143 12/25/22 2115   PH 7.24*  7.24* 7.26* 7.27* 7.40   PCO2 41  41 46* 50* 37   PO2 222*  222* 391* 394* 383*   HCO3 17*  17* 21 23 23     Complete Blood Count   Recent Labs   Lab 12/25/22 2303 12/25/22 2150 12/25/22 2147 12/25/22 2143 12/25/22  1856 12/25/22  0604 12/24/22  0511   WBC 38.9* 26.1*  --   --   --  7.0 5.9   HGB 15.2 13.1* 12.9* 13.6   < > 13.9 13.5    201  --   --   --  229 202    < > = values in this interval not displayed.     Basic Metabolic Panel  Recent Labs   Lab 12/25/22 2302 12/25/22 2147 12/25/22 2143 12/25/22  2115 12/25/22  2058 12/25/22  1856 12/25/22  0604 12/24/22  0511 12/23/22  0435 12/22/22  0322   NA  --  141 141 139 138   < > 138 137 140 140   POTASSIUM  --  3.7 4.5 5.1* 5.3*   < > 4.0 3.9 4.1 3.7   CHLORIDE  --   --   --   --   --   --  105 104 106 109*   CO2  --   --   --   --   --   --  22 22 22 18*   BUN  --   --   --   --   --   --  13.1 14.5 16.4 11.7   CR  --   --   --   --   --   --  0.84 0.85 0.86 0.81   * 202* 211* 199* 207*   < > 104* 95 90 85    < > = values in this interval not displayed.     Liver Function Tests  Recent Labs   Lab 12/25/22  2303 12/25/22  2150 12/25/22  0604 12/24/22  0511 12/23/22  0435 12/22/22  0322   AST  --   --  33 33 40 42   ALT  --   --  54* 55* 62* 55*   ALKPHOS  --   --  68 68 71 64 "   BILITOTAL  --   --  0.5 0.4 0.4 0.4   ALBUMIN  --   --  3.6 3.5 3.5 3.1*   INR 1.27* 1.55* 1.04  --   --   --      Pancreatic Enzymes  Recent Labs   Lab 12/25/22  0604   AMYLASE 77     Coagulation Profile  Recent Labs   Lab 12/25/22  2303 12/25/22  2150 12/25/22  0604   INR 1.27* 1.55* 1.04   PTT 46* 30 84*         5. RADIOLOGY:   Recent Results (from the past 24 hour(s))   XR Chest Port 1 View    Narrative    EXAM: XR CHEST PORT 1 VIEW  12/25/2022 6:15 AM     HISTORY:  Ravenden positioning       COMPARISON:  12/24/2022    FINDINGS:   The left chest wall ICD with intracardiac leads, right IJ Ravenden-Aniceto  catheter, and right upper extremity PICC are stable in position. The  cardiac silhouette is enlarged. No pneumothorax. No pleural effusion.  Decreased basilar predominant mixed interstitial and airspace  opacities.      Impression    IMPRESSION:   1. Stable support devices.  2. Decreased basilar predominant mixed interstitial and airspace  opacities.    I have personally reviewed the examination and initial interpretation  and I agree with the findings.    MARVIN CAUSEY MD         SYSTEM ID:  [id number]       =========================================

## 2022-12-26 NOTE — ANESTHESIA PROCEDURE NOTES
Central Line/PA Catheter Placement    Pre-Procedure   Staff -        Anesthesiologist:  Patric Howard MD       Resident/Fellow: Da Perdomo MD       Performed By: resident       Location: OR       Pre-Anesthestic Checklist: patient identified, IV checked, site marked, risks and benefits discussed, informed consent, monitors and equipment checked, pre-op evaluation and at physician/surgeon's request  Timeout:       Correct Patient: Yes        Correct Procedure: Yes        Correct Site: Yes        Correct Position: Yes        Correct Laterality: Yes   Line Placement:   This line was placed Post Induction    Procedure   Procedure: central line       Laterality: right       Insertion Site: internal jugular.       Patient Position: Trendelenburg  Sterile Prep        All elements of maximal sterile barrier technique followed       Patient Prep/Sterile Barriers: draped, hand hygiene, gloves , hat , mask , draped, gown, sterile gel and probe cover       Skin prep: Chloraprep  Insertion/Injection        Technique: ultrasound guided and Seldinger Technique        1. Ultrasound was used to evaluate the access site.       2. Vein evaluated via ultrasound for patency/adequacy.       3. Using real-time ultrasound the needle/catheter was observed entering the artery/vein.       5. The visualized structures were anatomically normal.       6. There were no apparent abnormal pathologic findings.       Introducer Type: 9 Fr, 2-lumen MAC        Type: PA/CVC with Introducer       Number of Lumens: double lumen       PA Catheter Type: CCO         Appropriate RV, RA and PA waveforms noted:  Yes            Balloon down at end of the procedure:   Narrative         Secured by: suture       Tegaderm and Biopatch dressing used.       Complications: None apparent,        blood aspirated from all lumens,        All lumens flushed: Yes       Verification method: JARED

## 2022-12-27 ENCOUNTER — APPOINTMENT (OUTPATIENT)
Dept: GENERAL RADIOLOGY | Facility: CLINIC | Age: 67
DRG: 001 | End: 2022-12-27
Attending: STUDENT IN AN ORGANIZED HEALTH CARE EDUCATION/TRAINING PROGRAM
Payer: MEDICARE

## 2022-12-27 ENCOUNTER — APPOINTMENT (OUTPATIENT)
Dept: GENERAL RADIOLOGY | Facility: CLINIC | Age: 67
DRG: 001 | End: 2022-12-27
Attending: SURGERY
Payer: MEDICARE

## 2022-12-27 ENCOUNTER — APPOINTMENT (OUTPATIENT)
Dept: CARDIOLOGY | Facility: CLINIC | Age: 67
DRG: 001 | End: 2022-12-27
Attending: SURGERY
Payer: MEDICARE

## 2022-12-27 LAB
ALBUMIN SERPL BCG-MCNC: 3 G/DL (ref 3.5–5.2)
ALBUMIN SERPL BCG-MCNC: 3.1 G/DL (ref 3.5–5.2)
ALLEN'S TEST: ABNORMAL
ALLEN'S TEST: NORMAL
ALP SERPL-CCNC: 52 U/L (ref 40–129)
ALP SERPL-CCNC: 58 U/L (ref 40–129)
ALT SERPL W P-5'-P-CCNC: 42 U/L (ref 10–50)
ALT SERPL W P-5'-P-CCNC: 45 U/L (ref 10–50)
ANION GAP SERPL CALCULATED.3IONS-SCNC: 14 MMOL/L (ref 7–15)
ANION GAP SERPL CALCULATED.3IONS-SCNC: 14 MMOL/L (ref 7–15)
ANION GAP SERPL CALCULATED.3IONS-SCNC: 15 MMOL/L (ref 7–15)
ANION GAP SERPL CALCULATED.3IONS-SCNC: 22 MMOL/L (ref 7–15)
ANION GAP SERPL CALCULATED.3IONS-SCNC: 23 MMOL/L (ref 7–15)
AST SERPL W P-5'-P-CCNC: 48 U/L (ref 10–50)
AST SERPL W P-5'-P-CCNC: 66 U/L (ref 10–50)
ATRIAL RATE - MUSE: 104 BPM
ATRIAL RATE - MUSE: 111 BPM
BASE EXCESS BLDA CALC-SCNC: -0.1 MMOL/L (ref -9–1.8)
BASE EXCESS BLDA CALC-SCNC: -1 MMOL/L (ref -9–1.8)
BASE EXCESS BLDA CALC-SCNC: -5.8 MMOL/L (ref -9–1.8)
BASE EXCESS BLDA CALC-SCNC: -5.8 MMOL/L (ref -9–1.8)
BASE EXCESS BLDA CALC-SCNC: -6.9 MMOL/L (ref -9–1.8)
BASE EXCESS BLDA CALC-SCNC: 0.5 MMOL/L (ref -9–1.8)
BASE EXCESS BLDA CALC-SCNC: 1.1 MMOL/L (ref -9–1.8)
BASE EXCESS BLDV CALC-SCNC: -0.3 MMOL/L (ref -7.7–1.9)
BASE EXCESS BLDV CALC-SCNC: -0.8 MMOL/L (ref -7.7–1.9)
BASE EXCESS BLDV CALC-SCNC: -1.8 MMOL/L (ref -7.7–1.9)
BASE EXCESS BLDV CALC-SCNC: -7.6 MMOL/L (ref -7.7–1.9)
BASE EXCESS BLDV CALC-SCNC: 1 MMOL/L (ref -7.7–1.9)
BASE EXCESS BLDV CALC-SCNC: 2.1 MMOL/L (ref -7.7–1.9)
BASOPHILS # BLD AUTO: 0 10E3/UL (ref 0–0.2)
BASOPHILS # BLD AUTO: 0 10E3/UL (ref 0–0.2)
BASOPHILS NFR BLD AUTO: 0 %
BASOPHILS NFR BLD AUTO: 0 %
BILIRUB DIRECT SERPL-MCNC: <0.2 MG/DL (ref 0–0.3)
BILIRUB SERPL-MCNC: 0.4 MG/DL
BILIRUB SERPL-MCNC: 0.4 MG/DL
BLD PROD TYP BPU: NORMAL
BLOOD COMPONENT TYPE: NORMAL
BUN SERPL-MCNC: 52.7 MG/DL (ref 8–23)
BUN SERPL-MCNC: 59.7 MG/DL (ref 8–23)
BUN SERPL-MCNC: 67.7 MG/DL (ref 8–23)
BUN SERPL-MCNC: 69.2 MG/DL (ref 8–23)
BUN SERPL-MCNC: 78.8 MG/DL (ref 8–23)
CA-I BLD-MCNC: 4.6 MG/DL (ref 4.4–5.2)
CA-I BLD-MCNC: 4.8 MG/DL (ref 4.4–5.2)
CALCIUM SERPL-MCNC: 8.4 MG/DL (ref 8.8–10.2)
CALCIUM SERPL-MCNC: 8.5 MG/DL (ref 8.8–10.2)
CALCIUM SERPL-MCNC: 8.5 MG/DL (ref 8.8–10.2)
CALCIUM SERPL-MCNC: 8.9 MG/DL (ref 8.8–10.2)
CALCIUM SERPL-MCNC: 9.2 MG/DL (ref 8.8–10.2)
CHLORIDE SERPL-SCNC: 102 MMOL/L (ref 98–107)
CHLORIDE SERPL-SCNC: 105 MMOL/L (ref 98–107)
CHLORIDE SERPL-SCNC: 106 MMOL/L (ref 98–107)
CHLORIDE SERPL-SCNC: 107 MMOL/L (ref 98–107)
CHLORIDE SERPL-SCNC: 107 MMOL/L (ref 98–107)
CODING SYSTEM: NORMAL
CREAT SERPL-MCNC: 2.64 MG/DL (ref 0.67–1.17)
CREAT SERPL-MCNC: 3.02 MG/DL (ref 0.67–1.17)
CREAT SERPL-MCNC: 3.02 MG/DL (ref 0.67–1.17)
CREAT SERPL-MCNC: 3.23 MG/DL (ref 0.67–1.17)
CREAT SERPL-MCNC: 3.38 MG/DL (ref 0.67–1.17)
CREAT UR-MCNC: 179 MG/DL
CROSSMATCH: NORMAL
DEPRECATED HCO3 PLAS-SCNC: 15 MMOL/L (ref 22–29)
DEPRECATED HCO3 PLAS-SCNC: 15 MMOL/L (ref 22–29)
DEPRECATED HCO3 PLAS-SCNC: 20 MMOL/L (ref 22–29)
DEPRECATED HCO3 PLAS-SCNC: 21 MMOL/L (ref 22–29)
DEPRECATED HCO3 PLAS-SCNC: 21 MMOL/L (ref 22–29)
DIASTOLIC BLOOD PRESSURE - MUSE: NORMAL MMHG
DIASTOLIC BLOOD PRESSURE - MUSE: NORMAL MMHG
EOSINOPHIL # BLD AUTO: 0 10E3/UL (ref 0–0.7)
EOSINOPHIL # BLD AUTO: 0 10E3/UL (ref 0–0.7)
EOSINOPHIL NFR BLD AUTO: 0 %
EOSINOPHIL NFR BLD AUTO: 0 %
ERYTHROCYTE [DISTWIDTH] IN BLOOD BY AUTOMATED COUNT: 14.8 % (ref 10–15)
ERYTHROCYTE [DISTWIDTH] IN BLOOD BY AUTOMATED COUNT: 15 % (ref 10–15)
ERYTHROCYTE [DISTWIDTH] IN BLOOD BY AUTOMATED COUNT: 15.1 % (ref 10–15)
ERYTHROCYTE [DISTWIDTH] IN BLOOD BY AUTOMATED COUNT: 15.3 % (ref 10–15)
FRACT EXCRET NA UR+SERPL-RTO: NORMAL %
GFR SERPL CREATININE-BSD FRML MDRD: 19 ML/MIN/1.73M2
GFR SERPL CREATININE-BSD FRML MDRD: 20 ML/MIN/1.73M2
GFR SERPL CREATININE-BSD FRML MDRD: 22 ML/MIN/1.73M2
GFR SERPL CREATININE-BSD FRML MDRD: 22 ML/MIN/1.73M2
GFR SERPL CREATININE-BSD FRML MDRD: 25 ML/MIN/1.73M2
GLUCOSE BLDC GLUCOMTR-MCNC: 103 MG/DL (ref 70–99)
GLUCOSE BLDC GLUCOMTR-MCNC: 114 MG/DL (ref 70–99)
GLUCOSE BLDC GLUCOMTR-MCNC: 115 MG/DL (ref 70–99)
GLUCOSE BLDC GLUCOMTR-MCNC: 119 MG/DL (ref 70–99)
GLUCOSE BLDC GLUCOMTR-MCNC: 120 MG/DL (ref 70–99)
GLUCOSE BLDC GLUCOMTR-MCNC: 121 MG/DL (ref 70–99)
GLUCOSE BLDC GLUCOMTR-MCNC: 121 MG/DL (ref 70–99)
GLUCOSE BLDC GLUCOMTR-MCNC: 122 MG/DL (ref 70–99)
GLUCOSE BLDC GLUCOMTR-MCNC: 126 MG/DL (ref 70–99)
GLUCOSE BLDC GLUCOMTR-MCNC: 128 MG/DL (ref 70–99)
GLUCOSE BLDC GLUCOMTR-MCNC: 135 MG/DL (ref 70–99)
GLUCOSE BLDC GLUCOMTR-MCNC: 148 MG/DL (ref 70–99)
GLUCOSE BLDC GLUCOMTR-MCNC: 162 MG/DL (ref 70–99)
GLUCOSE BLDC GLUCOMTR-MCNC: 175 MG/DL (ref 70–99)
GLUCOSE BLDC GLUCOMTR-MCNC: 180 MG/DL (ref 70–99)
GLUCOSE SERPL-MCNC: 103 MG/DL (ref 70–99)
GLUCOSE SERPL-MCNC: 119 MG/DL (ref 70–99)
GLUCOSE SERPL-MCNC: 122 MG/DL (ref 70–99)
GLUCOSE SERPL-MCNC: 179 MG/DL (ref 70–99)
GLUCOSE SERPL-MCNC: 185 MG/DL (ref 70–99)
HCO3 BLD-SCNC: 18 MMOL/L (ref 21–28)
HCO3 BLD-SCNC: 19 MMOL/L (ref 21–28)
HCO3 BLD-SCNC: 19 MMOL/L (ref 21–28)
HCO3 BLD-SCNC: 23 MMOL/L (ref 21–28)
HCO3 BLD-SCNC: 24 MMOL/L (ref 21–28)
HCO3 BLD-SCNC: 25 MMOL/L (ref 21–28)
HCO3 BLD-SCNC: 25 MMOL/L (ref 21–28)
HCO3 BLDV-SCNC: 18 MMOL/L (ref 21–28)
HCO3 BLDV-SCNC: 23 MMOL/L (ref 21–28)
HCO3 BLDV-SCNC: 24 MMOL/L (ref 21–28)
HCO3 BLDV-SCNC: 25 MMOL/L (ref 21–28)
HCO3 BLDV-SCNC: 26 MMOL/L (ref 21–28)
HCO3 BLDV-SCNC: 27 MMOL/L (ref 21–28)
HCT VFR BLD AUTO: 21.6 % (ref 40–53)
HCT VFR BLD AUTO: 22 % (ref 40–53)
HCT VFR BLD AUTO: 24.5 % (ref 40–53)
HCT VFR BLD AUTO: 25.9 % (ref 40–53)
HGB BLD-MCNC: 6.9 G/DL (ref 13.3–17.7)
HGB BLD-MCNC: 7.1 G/DL (ref 13.3–17.7)
HGB BLD-MCNC: 8.1 G/DL (ref 13.3–17.7)
HGB BLD-MCNC: 8.5 G/DL (ref 13.3–17.7)
HGB BLD-MCNC: 8.7 G/DL (ref 13.3–17.7)
IMM GRANULOCYTES # BLD: 0.1 10E3/UL
IMM GRANULOCYTES # BLD: 0.2 10E3/UL
IMM GRANULOCYTES NFR BLD: 1 %
IMM GRANULOCYTES NFR BLD: 1 %
INTERPRETATION ECG - MUSE: NORMAL
INTERPRETATION ECG - MUSE: NORMAL
ISSUE DATE AND TIME: NORMAL
LACTATE SERPL-SCNC: 1.3 MMOL/L (ref 0.7–2)
LACTATE SERPL-SCNC: 1.5 MMOL/L (ref 0.7–2)
LACTATE SERPL-SCNC: 3.2 MMOL/L (ref 0.7–2)
LACTATE SERPL-SCNC: 4.7 MMOL/L (ref 0.7–2)
LACTATE SERPL-SCNC: 5 MMOL/L (ref 0.7–2)
LACTATE SERPL-SCNC: 5.3 MMOL/L (ref 0.7–2)
LACTATE SERPL-SCNC: 5.6 MMOL/L (ref 0.7–2)
LVEF ECHO: NORMAL
LYMPHOCYTES # BLD AUTO: 0.8 10E3/UL (ref 0.8–5.3)
LYMPHOCYTES # BLD AUTO: 0.9 10E3/UL (ref 0.8–5.3)
LYMPHOCYTES NFR BLD AUTO: 3 %
LYMPHOCYTES NFR BLD AUTO: 4 %
MAGNESIUM SERPL-MCNC: 2.1 MG/DL (ref 1.7–2.3)
MCH RBC QN AUTO: 29 PG (ref 26.5–33)
MCH RBC QN AUTO: 29.1 PG (ref 26.5–33)
MCH RBC QN AUTO: 29.2 PG (ref 26.5–33)
MCH RBC QN AUTO: 29.5 PG (ref 26.5–33)
MCHC RBC AUTO-ENTMCNC: 31.9 G/DL (ref 31.5–36.5)
MCHC RBC AUTO-ENTMCNC: 32.3 G/DL (ref 31.5–36.5)
MCHC RBC AUTO-ENTMCNC: 33.1 G/DL (ref 31.5–36.5)
MCHC RBC AUTO-ENTMCNC: 33.6 G/DL (ref 31.5–36.5)
MCV RBC AUTO: 87 FL (ref 78–100)
MCV RBC AUTO: 89 FL (ref 78–100)
MCV RBC AUTO: 91 FL (ref 78–100)
MCV RBC AUTO: 91 FL (ref 78–100)
MONOCYTES # BLD AUTO: 1.7 10E3/UL (ref 0–1.3)
MONOCYTES # BLD AUTO: 1.9 10E3/UL (ref 0–1.3)
MONOCYTES NFR BLD AUTO: 7 %
MONOCYTES NFR BLD AUTO: 9 %
NEUTROPHILS # BLD AUTO: 19 10E3/UL (ref 1.6–8.3)
NEUTROPHILS # BLD AUTO: 23.5 10E3/UL (ref 1.6–8.3)
NEUTROPHILS NFR BLD AUTO: 86 %
NEUTROPHILS NFR BLD AUTO: 89 %
NRBC # BLD AUTO: 0 10E3/UL
NRBC # BLD AUTO: 0 10E3/UL
NRBC BLD AUTO-RTO: 0 /100
NRBC BLD AUTO-RTO: 0 /100
O2/TOTAL GAS SETTING VFR VENT: 3 %
O2/TOTAL GAS SETTING VFR VENT: 3 %
O2/TOTAL GAS SETTING VFR VENT: 30 %
O2/TOTAL GAS SETTING VFR VENT: 4 %
O2/TOTAL GAS SETTING VFR VENT: 4 %
OXYHGB MFR BLD: 96 % (ref 92–100)
OXYHGB MFR BLD: 97 % (ref 92–100)
OXYHGB MFR BLD: 99 % (ref 92–100)
OXYHGB MFR BLDV: 50 % (ref 70–75)
OXYHGB MFR BLDV: 53 % (ref 70–75)
OXYHGB MFR BLDV: 54 % (ref 70–75)
OXYHGB MFR BLDV: 57 % (ref 70–75)
OXYHGB MFR BLDV: 63 % (ref 70–75)
OXYHGB MFR BLDV: 64 % (ref 70–75)
P AXIS - MUSE: 59 DEGREES
P AXIS - MUSE: 61 DEGREES
PCO2 BLD: 31 MM HG (ref 35–45)
PCO2 BLD: 33 MM HG (ref 35–45)
PCO2 BLD: 33 MM HG (ref 35–45)
PCO2 BLD: 34 MM HG (ref 35–45)
PCO2 BLD: 35 MM HG (ref 35–45)
PCO2 BLD: 37 MM HG (ref 35–45)
PCO2 BLD: 38 MM HG (ref 35–45)
PCO2 BLDV: 34 MM HG (ref 40–50)
PCO2 BLDV: 38 MM HG (ref 40–50)
PCO2 BLDV: 40 MM HG (ref 40–50)
PCO2 BLDV: 41 MM HG (ref 40–50)
PCO2 BLDV: 43 MM HG (ref 40–50)
PCO2 BLDV: 43 MM HG (ref 40–50)
PH BLD: 7.35 [PH] (ref 7.35–7.45)
PH BLD: 7.37 [PH] (ref 7.35–7.45)
PH BLD: 7.37 [PH] (ref 7.35–7.45)
PH BLD: 7.41 [PH] (ref 7.35–7.45)
PH BLD: 7.43 [PH] (ref 7.35–7.45)
PH BLD: 7.45 [PH] (ref 7.35–7.45)
PH BLD: 7.47 [PH] (ref 7.35–7.45)
PH BLDV: 7.33 [PH] (ref 7.32–7.43)
PH BLDV: 7.39 [PH] (ref 7.32–7.43)
PH BLDV: 7.4 [PH] (ref 7.32–7.43)
PH BLDV: 7.41 [PH] (ref 7.32–7.43)
PHOSPHATE SERPL-MCNC: 6.5 MG/DL (ref 2.5–4.5)
PHOSPHATE SERPL-MCNC: 6.7 MG/DL (ref 2.5–4.5)
PLATELET # BLD AUTO: 105 10E3/UL (ref 150–450)
PLATELET # BLD AUTO: 134 10E3/UL (ref 150–450)
PLATELET # BLD AUTO: 137 10E3/UL (ref 150–450)
PLATELET # BLD AUTO: 145 10E3/UL (ref 150–450)
PO2 BLD: 109 MM HG (ref 80–105)
PO2 BLD: 147 MM HG (ref 80–105)
PO2 BLD: 68 MM HG (ref 80–105)
PO2 BLD: 83 MM HG (ref 80–105)
PO2 BLD: 90 MM HG (ref 80–105)
PO2 BLD: 94 MM HG (ref 80–105)
PO2 BLD: 95 MM HG (ref 80–105)
PO2 BLDV: 30 MM HG (ref 25–47)
PO2 BLDV: 32 MM HG (ref 25–47)
PO2 BLDV: 32 MM HG (ref 25–47)
PO2 BLDV: 33 MM HG (ref 25–47)
PO2 BLDV: 34 MM HG (ref 25–47)
PO2 BLDV: 39 MM HG (ref 25–47)
POTASSIUM SERPL-SCNC: 3.3 MMOL/L (ref 3.4–5.3)
POTASSIUM SERPL-SCNC: 3.7 MMOL/L (ref 3.4–5.3)
POTASSIUM SERPL-SCNC: 3.8 MMOL/L (ref 3.4–5.3)
POTASSIUM SERPL-SCNC: 4.8 MMOL/L (ref 3.4–5.3)
POTASSIUM SERPL-SCNC: 5.2 MMOL/L (ref 3.4–5.3)
PR INTERVAL - MUSE: 122 MS
PR INTERVAL - MUSE: 132 MS
PROT SERPL-MCNC: 4.6 G/DL (ref 6.4–8.3)
PROT SERPL-MCNC: 4.8 G/DL (ref 6.4–8.3)
QRS DURATION - MUSE: 92 MS
QRS DURATION - MUSE: 96 MS
QT - MUSE: 322 MS
QT - MUSE: 336 MS
QTC - MUSE: 423 MS
QTC - MUSE: 456 MS
R AXIS - MUSE: 60 DEGREES
R AXIS - MUSE: 63 DEGREES
RBC # BLD AUTO: 2.38 10E6/UL (ref 4.4–5.9)
RBC # BLD AUTO: 2.43 10E6/UL (ref 4.4–5.9)
RBC # BLD AUTO: 2.75 10E6/UL (ref 4.4–5.9)
RBC # BLD AUTO: 2.99 10E6/UL (ref 4.4–5.9)
SODIUM SERPL-SCNC: 140 MMOL/L (ref 136–145)
SODIUM SERPL-SCNC: 141 MMOL/L (ref 136–145)
SODIUM SERPL-SCNC: 142 MMOL/L (ref 136–145)
SODIUM UR-SCNC: <20 MMOL/L
SYSTOLIC BLOOD PRESSURE - MUSE: NORMAL MMHG
SYSTOLIC BLOOD PRESSURE - MUSE: NORMAL MMHG
T AXIS - MUSE: 36 DEGREES
T AXIS - MUSE: 42 DEGREES
UNIT ABO/RH: NORMAL
UNIT NUMBER: NORMAL
UNIT STATUS: NORMAL
UNIT TYPE ISBT: 600
UNIT TYPE ISBT: 6200
VANCOMYCIN SERPL-MCNC: 17.8 UG/ML
VENTRICULAR RATE- MUSE: 104 BPM
VENTRICULAR RATE- MUSE: 111 BPM
WBC # BLD AUTO: 21.9 10E3/UL (ref 4–11)
WBC # BLD AUTO: 24.5 10E3/UL (ref 4–11)
WBC # BLD AUTO: 25.7 10E3/UL (ref 4–11)
WBC # BLD AUTO: 26.2 10E3/UL (ref 4–11)

## 2022-12-27 PROCEDURE — 71045 X-RAY EXAM CHEST 1 VIEW: CPT | Mod: 26 | Performed by: RADIOLOGY

## 2022-12-27 PROCEDURE — 83735 ASSAY OF MAGNESIUM: CPT | Performed by: SURGERY

## 2022-12-27 PROCEDURE — 82805 BLOOD GASES W/O2 SATURATION: CPT | Performed by: STUDENT IN AN ORGANIZED HEALTH CARE EDUCATION/TRAINING PROGRAM

## 2022-12-27 PROCEDURE — 250N000012 HC RX MED GY IP 250 OP 636 PS 637: Performed by: STUDENT IN AN ORGANIZED HEALTH CARE EDUCATION/TRAINING PROGRAM

## 2022-12-27 PROCEDURE — 258N000003 HC RX IP 258 OP 636: Performed by: SURGERY

## 2022-12-27 PROCEDURE — 93325 DOPPLER ECHO COLOR FLOW MAPG: CPT | Mod: 26 | Performed by: STUDENT IN AN ORGANIZED HEALTH CARE EDUCATION/TRAINING PROGRAM

## 2022-12-27 PROCEDURE — 93308 TTE F-UP OR LMTD: CPT | Mod: 26 | Performed by: STUDENT IN AN ORGANIZED HEALTH CARE EDUCATION/TRAINING PROGRAM

## 2022-12-27 PROCEDURE — 250N000013 HC RX MED GY IP 250 OP 250 PS 637: Performed by: STUDENT IN AN ORGANIZED HEALTH CARE EDUCATION/TRAINING PROGRAM

## 2022-12-27 PROCEDURE — 999N000015 HC STATISTIC ARTERIAL MONITORING DAILY

## 2022-12-27 PROCEDURE — P9016 RBC LEUKOCYTES REDUCED: HCPCS | Performed by: STUDENT IN AN ORGANIZED HEALTH CARE EDUCATION/TRAINING PROGRAM

## 2022-12-27 PROCEDURE — 999N000155 HC STATISTIC RAPCV CVP MONITORING

## 2022-12-27 PROCEDURE — 258N000003 HC RX IP 258 OP 636: Performed by: STUDENT IN AN ORGANIZED HEALTH CARE EDUCATION/TRAINING PROGRAM

## 2022-12-27 PROCEDURE — 84155 ASSAY OF PROTEIN SERUM: CPT | Performed by: SURGERY

## 2022-12-27 PROCEDURE — 84300 ASSAY OF URINE SODIUM: CPT

## 2022-12-27 PROCEDURE — 250N000011 HC RX IP 250 OP 636: Performed by: SURGERY

## 2022-12-27 PROCEDURE — 999N000065 XR CHEST PORT 1 VIEW

## 2022-12-27 PROCEDURE — 250N000013 HC RX MED GY IP 250 OP 250 PS 637: Performed by: SURGERY

## 2022-12-27 PROCEDURE — 82803 BLOOD GASES ANY COMBINATION: CPT

## 2022-12-27 PROCEDURE — 93325 DOPPLER ECHO COLOR FLOW MAPG: CPT

## 2022-12-27 PROCEDURE — 80053 COMPREHEN METABOLIC PANEL: CPT | Performed by: SURGERY

## 2022-12-27 PROCEDURE — 93010 ELECTROCARDIOGRAM REPORT: CPT | Performed by: INTERNAL MEDICINE

## 2022-12-27 PROCEDURE — 84450 TRANSFERASE (AST) (SGOT): CPT | Performed by: SURGERY

## 2022-12-27 PROCEDURE — 250N000009 HC RX 250: Performed by: SURGERY

## 2022-12-27 PROCEDURE — 84100 ASSAY OF PHOSPHORUS: CPT | Performed by: SURGERY

## 2022-12-27 PROCEDURE — 250N000011 HC RX IP 250 OP 636: Performed by: STUDENT IN AN ORGANIZED HEALTH CARE EDUCATION/TRAINING PROGRAM

## 2022-12-27 PROCEDURE — 999N000157 HC STATISTIC RCP TIME EA 10 MIN

## 2022-12-27 PROCEDURE — 82310 ASSAY OF CALCIUM: CPT | Performed by: SURGERY

## 2022-12-27 PROCEDURE — 0W9B30Z DRAINAGE OF LEFT PLEURAL CAVITY WITH DRAINAGE DEVICE, PERCUTANEOUS APPROACH: ICD-10-PCS | Performed by: INTERNAL MEDICINE

## 2022-12-27 PROCEDURE — 71045 X-RAY EXAM CHEST 1 VIEW: CPT | Mod: 77

## 2022-12-27 PROCEDURE — 93005 ELECTROCARDIOGRAM TRACING: CPT

## 2022-12-27 PROCEDURE — 999N000045 HC STATISTIC DAILY SWAN MONITORING

## 2022-12-27 PROCEDURE — 250N000009 HC RX 250: Performed by: STUDENT IN AN ORGANIZED HEALTH CARE EDUCATION/TRAINING PROGRAM

## 2022-12-27 PROCEDURE — 83605 ASSAY OF LACTIC ACID: CPT | Performed by: STUDENT IN AN ORGANIZED HEALTH CARE EDUCATION/TRAINING PROGRAM

## 2022-12-27 PROCEDURE — 83605 ASSAY OF LACTIC ACID: CPT

## 2022-12-27 PROCEDURE — 83605 ASSAY OF LACTIC ACID: CPT | Performed by: SURGERY

## 2022-12-27 PROCEDURE — 80202 ASSAY OF VANCOMYCIN: CPT | Performed by: SURGERY

## 2022-12-27 PROCEDURE — 82248 BILIRUBIN DIRECT: CPT | Performed by: STUDENT IN AN ORGANIZED HEALTH CARE EDUCATION/TRAINING PROGRAM

## 2022-12-27 PROCEDURE — 200N000002 HC R&B ICU UMMC

## 2022-12-27 PROCEDURE — 82330 ASSAY OF CALCIUM: CPT | Performed by: STUDENT IN AN ORGANIZED HEALTH CARE EDUCATION/TRAINING PROGRAM

## 2022-12-27 PROCEDURE — 93321 DOPPLER ECHO F-UP/LMTD STD: CPT | Mod: 26 | Performed by: STUDENT IN AN ORGANIZED HEALTH CARE EDUCATION/TRAINING PROGRAM

## 2022-12-27 PROCEDURE — C9113 INJ PANTOPRAZOLE SODIUM, VIA: HCPCS | Performed by: STUDENT IN AN ORGANIZED HEALTH CARE EDUCATION/TRAINING PROGRAM

## 2022-12-27 PROCEDURE — 71045 X-RAY EXAM CHEST 1 VIEW: CPT

## 2022-12-27 PROCEDURE — 250N000009 HC RX 250

## 2022-12-27 PROCEDURE — 84100 ASSAY OF PHOSPHORUS: CPT | Performed by: STUDENT IN AN ORGANIZED HEALTH CARE EDUCATION/TRAINING PROGRAM

## 2022-12-27 PROCEDURE — 83735 ASSAY OF MAGNESIUM: CPT | Performed by: STUDENT IN AN ORGANIZED HEALTH CARE EDUCATION/TRAINING PROGRAM

## 2022-12-27 PROCEDURE — 85027 COMPLETE CBC AUTOMATED: CPT | Performed by: SURGERY

## 2022-12-27 PROCEDURE — 85027 COMPLETE CBC AUTOMATED: CPT | Performed by: STUDENT IN AN ORGANIZED HEALTH CARE EDUCATION/TRAINING PROGRAM

## 2022-12-27 PROCEDURE — 94003 VENT MGMT INPAT SUBQ DAY: CPT

## 2022-12-27 PROCEDURE — 99223 1ST HOSP IP/OBS HIGH 75: CPT | Mod: FS | Performed by: CLINICAL NURSE SPECIALIST

## 2022-12-27 PROCEDURE — 99291 CRITICAL CARE FIRST HOUR: CPT | Mod: 25 | Performed by: INTERNAL MEDICINE

## 2022-12-27 PROCEDURE — 85018 HEMOGLOBIN: CPT | Performed by: STUDENT IN AN ORGANIZED HEALTH CARE EDUCATION/TRAINING PROGRAM

## 2022-12-27 PROCEDURE — 93308 TTE F-UP OR LMTD: CPT

## 2022-12-27 PROCEDURE — 999N000253 HC STATISTIC WEANING TRIALS

## 2022-12-27 RX ORDER — FUROSEMIDE 10 MG/ML
80 INJECTION INTRAMUSCULAR; INTRAVENOUS ONCE
Status: COMPLETED | OUTPATIENT
Start: 2022-12-27 | End: 2022-12-27

## 2022-12-27 RX ORDER — TERBUTALINE SULFATE 5 MG/1
10 TABLET ORAL EVERY 8 HOURS SCHEDULED
Status: DISCONTINUED | OUTPATIENT
Start: 2022-12-27 | End: 2022-12-27

## 2022-12-27 RX ORDER — LIDOCAINE HYDROCHLORIDE 10 MG/ML
INJECTION, SOLUTION EPIDURAL; INFILTRATION; INTRACAUDAL; PERINEURAL
Status: COMPLETED
Start: 2022-12-27 | End: 2022-12-27

## 2022-12-27 RX ORDER — TERBUTALINE SULFATE 5 MG/1
10 TABLET ORAL EVERY 8 HOURS SCHEDULED
Status: DISCONTINUED | OUTPATIENT
Start: 2022-12-27 | End: 2022-12-31

## 2022-12-27 RX ORDER — TERBUTALINE SULFATE 5 MG/1
10 TABLET ORAL EVERY 8 HOURS
Status: DISCONTINUED | OUTPATIENT
Start: 2022-12-27 | End: 2022-12-27

## 2022-12-27 RX ORDER — POTASSIUM CHLORIDE 29.8 MG/ML
20 INJECTION INTRAVENOUS ONCE
Status: COMPLETED | OUTPATIENT
Start: 2022-12-27 | End: 2022-12-28

## 2022-12-27 RX ORDER — BUMETANIDE 0.25 MG/ML
1 INJECTION INTRAMUSCULAR; INTRAVENOUS ONCE
Status: COMPLETED | OUTPATIENT
Start: 2022-12-27 | End: 2022-12-27

## 2022-12-27 RX ADMIN — HUMAN INSULIN 2.5 UNITS/HR: 100 INJECTION, SOLUTION SUBCUTANEOUS at 03:54

## 2022-12-27 RX ADMIN — OXYCODONE HYDROCHLORIDE 5 MG: 5 TABLET ORAL at 05:19

## 2022-12-27 RX ADMIN — OXYCODONE HYDROCHLORIDE 10 MG: 10 TABLET ORAL at 08:14

## 2022-12-27 RX ADMIN — CEFEPIME HYDROCHLORIDE 2 G: 2 INJECTION, POWDER, FOR SOLUTION INTRAVENOUS at 14:36

## 2022-12-27 RX ADMIN — PROPOFOL 10 MCG/KG/MIN: 10 INJECTION, EMULSION INTRAVENOUS at 03:54

## 2022-12-27 RX ADMIN — ACYCLOVIR: 50 OINTMENT TOPICAL at 08:20

## 2022-12-27 RX ADMIN — TERBUTALINE SULFATE 10 MG: 5 TABLET ORAL at 12:20

## 2022-12-27 RX ADMIN — ACYCLOVIR: 50 OINTMENT TOPICAL at 02:02

## 2022-12-27 RX ADMIN — METHOCARBAMOL 500 MG: 500 TABLET ORAL at 20:19

## 2022-12-27 RX ADMIN — BUMETANIDE 1 MG/HR: 0.25 INJECTION INTRAMUSCULAR; INTRAVENOUS at 15:07

## 2022-12-27 RX ADMIN — ACETAMINOPHEN 975 MG: 325 TABLET, FILM COATED ORAL at 07:57

## 2022-12-27 RX ADMIN — MYCOPHENOLATE MOFETIL 1500 MG: 500 INJECTION, POWDER, LYOPHILIZED, FOR SOLUTION INTRAVENOUS at 12:18

## 2022-12-27 RX ADMIN — PREDNISONE 45 MG: 20 TABLET ORAL at 07:58

## 2022-12-27 RX ADMIN — SENNOSIDES AND DOCUSATE SODIUM 1 TABLET: 8.6; 5 TABLET ORAL at 07:57

## 2022-12-27 RX ADMIN — HEPARIN SODIUM 5000 UNITS: 5000 INJECTION, SOLUTION INTRAVENOUS; SUBCUTANEOUS at 05:19

## 2022-12-27 RX ADMIN — ACYCLOVIR: 50 OINTMENT TOPICAL at 12:21

## 2022-12-27 RX ADMIN — ACETAMINOPHEN 975 MG: 325 TABLET, FILM COATED ORAL at 15:26

## 2022-12-27 RX ADMIN — EPINEPHRINE 0.03 MCG/KG/MIN: 1 INJECTION INTRAMUSCULAR; INTRAVENOUS; SUBCUTANEOUS at 17:15

## 2022-12-27 RX ADMIN — PANTOPRAZOLE SODIUM 40 MG: 40 INJECTION, POWDER, FOR SOLUTION INTRAVENOUS at 08:17

## 2022-12-27 RX ADMIN — MYCOPHENOLATE MOFETIL 1500 MG: 500 INJECTION, POWDER, LYOPHILIZED, FOR SOLUTION INTRAVENOUS at 22:53

## 2022-12-27 RX ADMIN — CEFEPIME HYDROCHLORIDE 2 G: 2 INJECTION, POWDER, FOR SOLUTION INTRAVENOUS at 02:00

## 2022-12-27 RX ADMIN — HEPARIN SODIUM 5000 UNITS: 5000 INJECTION, SOLUTION INTRAVENOUS; SUBCUTANEOUS at 14:09

## 2022-12-27 RX ADMIN — SENNOSIDES AND DOCUSATE SODIUM 1 TABLET: 8.6; 5 TABLET ORAL at 20:19

## 2022-12-27 RX ADMIN — OXYCODONE HYDROCHLORIDE 5 MG: 5 TABLET ORAL at 20:18

## 2022-12-27 RX ADMIN — PREDNISONE 40 MG: 20 TABLET ORAL at 20:19

## 2022-12-27 RX ADMIN — HEPARIN SODIUM 5000 UNITS: 5000 INJECTION, SOLUTION INTRAVENOUS; SUBCUTANEOUS at 22:08

## 2022-12-27 RX ADMIN — VANCOMYCIN HYDROCHLORIDE 1250 MG: 10 INJECTION, POWDER, LYOPHILIZED, FOR SOLUTION INTRAVENOUS at 18:16

## 2022-12-27 RX ADMIN — HYDROMORPHONE HYDROCHLORIDE 0.2 MG: 0.2 INJECTION, SOLUTION INTRAMUSCULAR; INTRAVENOUS; SUBCUTANEOUS at 18:36

## 2022-12-27 RX ADMIN — HUMAN INSULIN 2 UNITS/HR: 100 INJECTION, SOLUTION SUBCUTANEOUS at 20:05

## 2022-12-27 RX ADMIN — ACYCLOVIR: 50 OINTMENT TOPICAL at 21:06

## 2022-12-27 RX ADMIN — ACYCLOVIR: 50 OINTMENT TOPICAL at 15:27

## 2022-12-27 RX ADMIN — LIDOCAINE HYDROCHLORIDE 1 ML: 10 INJECTION, SOLUTION EPIDURAL; INFILTRATION; INTRACAUDAL; PERINEURAL at 18:44

## 2022-12-27 RX ADMIN — HYDROMORPHONE HYDROCHLORIDE 0.4 MG: 0.2 INJECTION, SOLUTION INTRAMUSCULAR; INTRAVENOUS; SUBCUTANEOUS at 23:52

## 2022-12-27 RX ADMIN — ACYCLOVIR: 50 OINTMENT TOPICAL at 05:19

## 2022-12-27 RX ADMIN — METHOCARBAMOL 500 MG: 500 TABLET ORAL at 08:13

## 2022-12-27 RX ADMIN — BUMETANIDE 1 MG: 0.25 INJECTION INTRAMUSCULAR; INTRAVENOUS at 14:25

## 2022-12-27 RX ADMIN — ACYCLOVIR: 50 OINTMENT TOPICAL at 23:52

## 2022-12-27 RX ADMIN — HYDROMORPHONE HYDROCHLORIDE 0.2 MG: 0.2 INJECTION, SOLUTION INTRAMUSCULAR; INTRAVENOUS; SUBCUTANEOUS at 19:03

## 2022-12-27 RX ADMIN — POTASSIUM CHLORIDE 20 MEQ: 29.8 INJECTION, SOLUTION INTRAVENOUS at 23:42

## 2022-12-27 RX ADMIN — ACYCLOVIR: 50 OINTMENT TOPICAL at 17:08

## 2022-12-27 RX ADMIN — POLYETHYLENE GLYCOL 3350 17 G: 17 POWDER, FOR SOLUTION ORAL at 07:57

## 2022-12-27 RX ADMIN — FUROSEMIDE 80 MG: 10 INJECTION, SOLUTION INTRAVENOUS at 10:27

## 2022-12-27 ASSESSMENT — ACTIVITIES OF DAILY LIVING (ADL)
ADLS_ACUITY_SCORE: 42

## 2022-12-27 NOTE — PROGRESS NOTES
[facility], Procedure Note          Extubation:       Paco Stein  MRN# [id number]   December 27, 2022, 3:47 PM         Patient extubated at: December 27, 2022, 3:40 PM   Supplemental Oxygen: Via nasal cannula at 4 liters per minute   Cough: The cough is good   Secretion Mode: PRN suction with assistance   Secretion Amount: Moderate amount, thick and white / yellow in color   Respiratory Exam:: Breath sounds: good aeration   Skin Exam:: Patient color: natural   Patient Status: Currently appears comfortable   Arterial Blood Gasses: pH Arterial (no units)   Date Value   12/27/2022 7.37     pO2 Arterial (mm Hg)   Date Value   12/27/2022 94     pCO2 Arterial (mm Hg)   Date Value   12/27/2022 31 (L)     Bicarbonate Arterial (mmol/L)   Date Value   12/27/2022 18 (L)            Recorded by Adina Riddle, RT

## 2022-12-27 NOTE — PHARMACY-VANCOMYCIN DOSING SERVICE
Pharmacy Vancomycin Note  Date of Service 2022  Patient's  1955   67 year old, male    Indication: Perioperative Pharmacoprophylaxis  Day of Therapy: 3  Current vancomycin regimen: 1500 mg IV q24h switched to intermittent dosing  Current vancomycin monitoring method: Trough (Method 2 = manual dose calculation)  Current vancomycin therapeutic monitoring goal: 15-20 mg/L    Current estimated CrCl = Estimated Creatinine Clearance: 26.5 mL/min (A) (based on SCr of 3.38 mg/dL (H)).    Creatinine for last 3 days  2022:  6:04 AM Creatinine 0.84 mg/dL; 11:03 PM Creatinine 1.24 mg/dL; 11:03 PM Creatinine 1.24 mg/dL  2022:  3:59 AM Creatinine 1.56 mg/dL; 10:28 AM Creatinine 1.67 mg/dL;  4:20 PM Creatinine 1.89 mg/dL;  9:54 PM Creatinine 2.15 mg/dL  2022:  3:46 AM Creatinine 2.64 mg/dL; 10:14 AM Creatinine 3.02 mg/dL;  3:18 PM Creatinine 3.23 mg/dL;  4:17 PM Creatinine 3.38 mg/dL    Recent Vancomycin Levels (past 3 days)  2022:  4:39 PM Vancomycin 17.8 ug/mL    Vancomycin IV Administrations (past 72 hours)                   vancomycin (VANCOCIN) 1,500 mg in 0.9% NaCl 250 mL intermittent infusion (mg) 1,500 mg New Bag 22 1715    vancomycin (VANCOCIN) 1,500 mg in 0.9% NaCl 250 mL intermittent infusion (mg) 1,500 mg Given 22 1832                Nephrotoxins and other renal medications (From now, onward)    Start     Dose/Rate Route Frequency Ordered Stop    22 1830  vancomycin (VANCOCIN) 1,250 mg in 0.9% NaCl 250 mL intermittent infusion         1,250 mg  over 90 Minutes Intravenous ONCE 22 1754      22 1015  vancomycin place acevedo - receiving intermittent dosing         1 each Intravenous SEE ADMIN INSTRUCTIONS 22 1016      22 0330  vasopressin 1 unit/mL MAX Conc (PITRESSIN) infusion         0.5-4 Units/hr  0.5-4 mL/hr  Intravenous CONTINUOUS 22 0312      22 1480  norepinephrine (LEVOPHED) 16 mg in  mL infusion MAX CONC  CENTRAL LINE         0.03-0.1 mcg/kg/min × 94.7 kg (Dosing Weight)  2.7-8.9 mL/hr  Intravenous CONTINUOUS 12/25/22 2858               Contrast Orders - past 72 hours (72h ago, onward)    None        Interpretation of levels and current regimen:  Vancomycin level is reflective of therapeutic level (after 2 doses)    Has serum creatinine changed greater than 50% in last 72 hours: Yes  Urine output: diminished urine output  Renal Function: Worsening    Plan:  1. Give 1250 mg IV once now. Continue intermittent dosing based on renal function/level assessment.  2. Vancomycin monitoring method: Trough (Method 2 = manual dose calculation)  3. Vancomycin therapeutic monitoring goal: 15-20 mg/L  4. Pharmacy will check vancomycin levels as appropriate in 1-3 Days.  5. Serum creatinine levels will be ordered daily for the first week of therapy and at least twice weekly for subsequent weeks.    Bridgette Jamison [facility]

## 2022-12-27 NOTE — PLAN OF CARE
Major Shift Events:      Neuro/Pain: PENA. Follows commands. Pupils equil & reactive. Sedation stopped @ 0530 for pressure supporting.   Cardiac: -110. Isupril titrated to HR goal of 100. Levo titrated to MAP goal of 65. Epi at straight rate of 0.03. Aisha checked Q4h. See charting for numbers.   Respiratory: began pressure supporting @ 0600. Plan to extubate @ 0830 if able. LS clear diminished. Minimal secretions. Minimal chest tube output.   GI: no BM.   : Urine output 5-25mL per hour. Dropping to 5-7 this morning. CVTS consulted. 1L of LR given @ 7994-5136 for low urine output & Labile BP with no effect on urine output.  Endocrine/Diet: insulin gtt infusing     Plan: wean pressors and extubate @ 0830 as able.   For vital signs and complete assessments, please see documentation flowsheets.

## 2022-12-27 NOTE — PLAN OF CARE
Goal Outcome Evaluation:      Plan of Care Reviewed With: patient, spouse, child    Overall Patient Progress: improvingOverall Patient Progress: improving    Outcome Evaluation: Extubated this afternoon. Getting 2 units PRBCs. Given diuretics, urine output increasing.    Major Shift Events: Lethargic this am and becoming more alert this afternoon. Extubated at 1540 to 4L NC. A&Ox4 post-extubation. Afebrile. Epi gtt remains at 0.03 per Cards 2. Required levo off and on throughout the day. This afternoon, required up to 0.08 of levo, now off. Hgb checked 7.1 this afternoon, drop from 8.1 this am. Transfusing 2 units PRBCs. Will recheck Hgb after 2 units finish. Noticed more dark red blood output in pleural chest tubes. Chest X-ray this afternoon due to new air leak in pleural chest tubes, air leak now gone. Left pleural chest tube placed by CT surgery fellow this evening. Put out 1250mL dark red blood. Urine output decreased this am. Got 80mg lasix and output increased. Got 1 gram of bumex this afternoon and was started on a bumex gtt at 1mg/hr. Bumex gtt now stopped. Urine output now 45-80mL/hr.     Plan: Wean pressors as tolerated. Monitor for bleeding. Monitor hemodynamics. Monitor kidney function.     For vital signs and complete assessments, please see documentation flowsheets.

## 2022-12-27 NOTE — CONSULTS
Nephrology Initial Consult  December 27, 2022      Paco Stein MRN:[id number] YOB: 1955  Date of Admission:12/6/2022  Primary care provider: Quentin Odonnell  Requesting physician: Brendan Rodriguez,*    ASSESSMENT AND RECOMMENDATIONS:   MILENA-Listed as having CKD but had baseline Cr of 0.8 prior to surgery, normal kidneys on preop CT and no suspicion of obstruction, has donovan. Cr up to 3.0 in ~36h since surgery representing very low gfr likely due to hypotension requiring 3 pressors POD#1, S-G shows low gfr despite pressors suggesting vasoplegia.  BP's much improved today, UOP low but with improving hemodynamics it certainly could  in the short term.  Planning diuretics with goal of being even today.  Low threshold to start RRT but with normal filling pressures currently and no major chemistry issue we can hold off for now.  I did discuss dialysis with pt's son (who is an oncology physician) and obtained consent should an acute issue arise.     -MILENA, likely hemodynamic with surgery and need for 3 pressors yesterday.  Overall hemodynamics improving so will give some time to recover.     -Checking chemistries q6h   -Shashank still <20 suggesting hypoperfusion this am but has improving hemodynamics.    -Dialysis consent signed and scanned in under media 12/27    Volume-Volume expanded with ~9L yesterday, hemodynamics much improved today as he is nearly weaned off of pressors.  Filling pressures stable with CVP of ~10 and PA 36/14, can diurese with goal of being net even for now, discussed with surgery and Cards 2.      Electrolytes-K 4.8, bicarb 20, Na 141, following q6h.      BMD-Ca 8.5, Mg 2.1 and Phos 6.5.      Cardiac-Hx of NICM, OHT on 12/25, 1.5h bypass time.      Anemia-Hgb 8.1, acute management per team, baseline ~15.      Nutrition-Deferred    Time spent: 40 minutes on this date of encounter for chart review, physical exam, medical decision making and co-ordination of care.     Seen  and discussed with Dr Hernandze    Recommendations were communicated to primary team via verbal communication.     Nazario Manrique, SHAKIR CNS  Clinical Nurse Specialist  [phone]    REASON FOR CONSULT: Requested to evaluate 67 yom for management of MILENA post OHT on 12/25    HISTORY OF PRESENT ILLNESS:  Paco Stein is a 67 yom with hx of NICM and CAD w/stenting to Circ (2013) and LAD (2021), HLP who had OHT on 12/25/2022 and now has post op MILENA.  Had ~1.5h of bypass time but required 3 pressors on return to ICU, S-G suggests vasoplegia with low SVR.  Cr baseline 0.8, up to 3.0 in ~48h prompting a nephrology consult for assessment/managment of MILENA.      PAST MEDICAL HISTORY:  Past Medical History:   Diagnosis Date     Congestive heart failure (H)        Past Surgical History:   Procedure Laterality Date     COLONOSCOPY N/A 11/17/2022    Procedure: COLONOSCOPY;  Surgeon: Roverto Nino MD;  Location:  GI     CV CORONARY ANGIOGRAM N/A 11/11/2022    Procedure: Coronary Angiogram;  Surgeon: Justo Bush MD;  Location:  HEART CARDIAC CATH LAB     CV RIGHT HEART CATH MEASUREMENTS RECORDED N/A 11/11/2022    Procedure: Right Heart Catheterization;  Surgeon: Justo Bush MD;  Location:  HEART CARDIAC CATH LAB     CV RIGHT HEART CATH MEASUREMENTS RECORDED N/A 12/8/2022    Procedure: Right Heart Catheterization;  Surgeon: Wayne Xavier MD;  Location:  HEART CARDIAC CATH LAB     CV RIGHT HEART CATH MEASUREMENTS RECORDED N/A 12/9/2022    Procedure: Right Heart Catheterization with leave-in [place];  Surgeon: Khari Mcneill MD;  Location:  HEART CARDIAC CATH LAB     PICC DOUBLE LUMEN PLACEMENT Right 11/10/2022    Right basilic, 43 cm, 1 cm external length     TRANSPLANT HEART RECIPIENT N/A 12/25/2022    Procedure: TRANSPLANT, HEART, RECIPIENT; Median Sternotomy, Cardiopulmonary Bypass, Removal of Pacemaker;  Surgeon: Brendan Rodriguez MD;  Location:  OR         MEDICATIONS:  PTA Meds  Prior to Admission medications    Medication Sig Last Dose Taking? Auth Provider Long Term End Date   albuterol (PROAIR HFA/PROVENTIL HFA/VENTOLIN HFA) 108 (90 Base) MCG/ACT inhaler Inhale 2 puffs into the lungs every 6 hours as needed Past Month Yes Reported, Patient     amiodarone (PACERONE) 200 MG tablet Take 200 mg by mouth 2 times daily Past Week Yes Reported, Patient     aspirin (ASA) 81 MG EC tablet Take 81 mg by mouth daily Past Week Yes Reported, Patient     DOBUTamine 500 mg in dextrose 5% 250 mL (adult std conc) premix Inject 236.75 mcg/min into the vein continuous Unknown Yes Elliot Dhaliwal MD     furosemide (LASIX) 20 MG tablet Take 20 mg by mouth 2 times daily Past Week Yes Reported, Patient     LORazepam (ATIVAN) 0.25 mg TABS half-tab Take 0.25 mg by mouth 3 times daily as needed Past Week Yes Reported, Patient     mycophenolate (GENERIC EQUIVALENT) 500 MG tablet Take 500 mg by mouth every evening Past Week Yes Unknown, Entered By History     mycophenolate (GENERIC EQUIVALENT) 500 MG tablet Take 1,000 mg by mouth every morning Past Week Yes Reported, Patient     potassium chloride ER (KLOR-CON M) 20 MEQ CR tablet Take 1 tablet (20 mEq) by mouth daily Past Week Yes Elliot Dhaliwal MD     pravastatin (PRAVACHOL) 20 MG tablet TAKE 1 TABLET BY MOUTH DAILY/ DUE FOR FASTING LABWORK Past Week Yes Reported, Patient     traZODone (DESYREL) 50 MG tablet TAKE 1 TABLET (50 MG TOTAL) BY MOUTH ONCE NIGHTLY AS NEEDED FOR SLEEP. Past Week Yes Reported, Patient        Current Meds    acetaminophen  975 mg Oral Q8H     acyclovir   Topical Q3H     ceFEPIme  2 g Intravenous Q12H     gabapentin  100 mg Oral At Bedtime     heparin ANTICOAGULANT  5,000 Units Subcutaneous Q8H [facility]     mycophenolate mofetil  1,500 mg Intravenous Q12H     [START ON 12/30/2022] nystatin  1,000,000 Units Oral 4x Daily     pantoprazole  40 mg Intravenous Daily     polyethylene glycol  17 g Oral Daily     predniSONE  40  mg Oral BID    Followed by     [START ON 12/29/2022] predniSONE  35 mg Oral BID    Followed by     [START ON 12/31/2022] predniSONE  30 mg Oral BID    Followed by     [START ON 1/2/2023] predniSONE  25 mg Oral BID    Followed by     [START ON 1/4/2023] predniSONE  20 mg Oral BID     senna-docusate  1 tablet Oral BID     sodium chloride (PF)  3 mL Intracatheter Q8H     [START ON 12/30/2022] sulfamethoxazole-trimethoprim  1 tablet Oral Daily    Or     [START ON 12/30/2022] sulfamethoxazole-trimethoprim  10 mL Oral or NG Tube Daily     terbutaline  10 mg Oral Q8H NINFA     [START ON 12/30/2022] valGANciclovir  900 mg Oral Daily    Or     [START ON 12/30/2022] valGANciclovir  900 mg Oral or NG Tube Daily     vancomycin place acevedo - receiving intermittent dosing  1 each Intravenous See Admin Instructions     Infusion Meds    dextrose       EPINEPHrine 0.03 mcg/kg/min (12/27/22 1100)     fentaNYL Stopped (12/27/22 0600)     insulin regular 2 Units/hr (12/27/22 1000)     isoproterenol (ISUPREL) infusion ADULT 0.005 mcg/kg/min (12/27/22 1100)     norepinephrine Stopped (12/27/22 0930)     propofol 30 mcg/kg/min (12/27/22 1125)     vasopressin Stopped (12/26/22 1100)       ALLERGIES:    No Known Allergies    REVIEW OF SYSTEMS:  A 10 point review of systems was negative except as noted above.    SOCIAL HISTORY:   Social History     Socioeconomic History     Marital status:      Spouse name: Not on file     Number of children: Not on file     Years of education: Not on file     Highest education level: Not on file   Occupational History     Not on file   Tobacco Use     Smoking status: Never     Smokeless tobacco: Never   Substance and Sexual Activity     Alcohol use: Not Currently     Drug use: Not on file     Sexual activity: Not on file   Other Topics Concern     Not on file   Social History Narrative     Not on file     Social Determinants of Health     Financial Resource Strain: Not on file   Food Insecurity: Not  "on file   Transportation Needs: Not on file   Physical Activity: Not on file   Stress: Not on file   Social Connections: Not on file   Intimate Partner Violence: Not on file   Housing Stability: Not on file     Reviewed with patient, noncontributory social hx.     FAMILY MEDICAL HISTORY:   Family History   Problem Relation Age of Onset     Atrial fibrillation Father      Lung Cancer Brother      Reviewed with patient, noncontributory family hx.     PHYSICAL EXAM:   Temp  Av.9  F (36.6  C)  Min: 96.2  F (35.7  C)  Max: 100.4  F (38  C)  Arterial Line BP  Min: 66/48  Max: 106/63  Arterial Line MAP (mmHg)  Av.4 mmHg  Min: 56 mmHg  Max: 224 mmHg      Pulse  Av  Min: 32  Max: 125 Resp  Av.5  Min: 0  Max: 34  FiO2 (%)  Av.3 %  Min: 30 %  Max: 50 %  SpO2  Av.9 %  Min: 87 %  Max: 100 %    CVP (mmHg): 10 mmHg  /60   Pulse 103   Temp 99.5  F (37.5  C) (Pulmonary Artery)   Resp 10   Ht 1.88 m (6' 2\")   Wt 104.3 kg (229 lb 15 oz)   SpO2 96%   BMI 29.52 kg/m     Date 22 0700 - 22 0659   Shift 8427-1293 5473-4355 4976-4642 24 Hour Total   INTAKE   I.V. 129.37   129.37   NG/   250   Shift Total(mL/kg) 379.37(3.64)   379.37(3.64)   OUTPUT   Urine 93   93   Chest Tube(mL/kg) 90(0.86)   90(0.86)   Shift Total(mL/kg) 183(1.75)   183(1.75)   Weight (kg) 104.3 104.3 104.3 104.3      Admit Weight: 90.7 kg (200 lb)     GENERAL APPEARANCE: Intubated and sedated, in no distress.   EYES: No scleral icterus  Pulmonary: lungs clear to auscultation with equal breath sounds bilaterally, no clubbing or cyanosis  CV: Regular rhythm, normal rate, no rub   - Edema +1 generalized.   GI: soft, non distended   MS: no evidence of inflammation in joints, no muscle tenderness  : + Davis  SKIN: no rash, warm, dry  NEURO: mentation intact and speech normal    LABS:   CMP  Recent Labs   Lab 22  1108 22  1014 22  0918 22  0828 22  0352 22  0346 22  2157 " 12/26/22  2154 12/26/22  1659 12/26/22  1620 12/26/22  1056 12/26/22  1028 12/26/22  0407 12/26/22  0359 12/26/22  0001 12/25/22  2303   NA  --  141  --   --   --  142  --  142  --  145  --  145  --  142  --  141  141   POTASSIUM  --  4.8  --   --   --  5.2  --  4.9  --  4.0  --  3.9  --  3.6  --  3.8  3.8   CHLORIDE  --  107  --   --   --  107  --  107  --  109*  --  108*  --  102  --  102  102   CO2  --  20*  --   --   --  21*  --  21*  --  21*  --  22  --  13*  --  14*  14*   ANIONGAP  --  14  --   --   --  14  --  14  --  15  --  15  --  27*  --  25*  25*   * 119* 115* 120*   < > 122*   < > 149*   < > 132*  127*   < > 176*   < > 305*   < > 235*  235*   BUN  --  59.7*  --   --   --  52.7*  --  46.9*  --  38.6*  --  32.1*  --  25.7*  --  20.5  20.5   CR  --  3.02*  --   --   --  2.64*  --  2.15*  --  1.89*  --  1.67*  --  1.56*  --  1.24*  1.24*   GFRESTIMATED  --  22*  --   --   --  25*  --  33*  --  38*  --  44*  --  48*  --  63  63   TIM  --  8.5*  --   --   --  9.2  --  9.1  --  8.8  --  9.2  --  9.6  --  10.1  10.1   MAG  --   --   --   --   --  2.1  --  2.1  --   --   --   --   --  2.3  --  2.6*   PHOS  --   --   --   --   --  6.5*  --  6.3*  --   --   --   --   --  5.3*  --  6.1*   PROTTOTAL  --   --   --   --   --  4.8*  --   --   --   --   --  4.7*  --  5.3*  --  6.2*   ALBUMIN  --   --   --   --   --  3.1*  --   --   --   --   --  3.2*  --  3.2*  --  3.8   BILITOTAL  --   --   --   --   --  0.4  --   --   --   --   --  0.6  --  1.1  --  0.9   ALKPHOS  --   --   --   --   --  58  --   --   --   --   --  45  --  60  --  71   AST  --   --   --   --   --  66*  --   --   --   --   --  88*  --  127*  --  137*   ALT  --   --   --   --   --  45  --   --   --   --   --  49  --  63*  --  68*    < > = values in this interval not displayed.     CBC  Recent Labs   Lab 12/27/22  0346 12/26/22  2154 12/26/22  1620 12/26/22  1028   HGB 8.1* 8.1* 9.6* 10.6*   WBC 24.5* 22.1* 18.9* 16.8*   RBC 2.75*  2.81* 3.29* 3.65*   HCT 24.5* 24.7* 29.6* 32.5*   MCV 89 88 90 89   MCH 29.5 28.8 29.2 29.0   MCHC 33.1 32.8 32.4 32.6   RDW 15.0 14.6 14.6 14.6   * 134* 151 171     INR  Recent Labs   Lab 12/25/22  2303 12/25/22  2150 12/25/22  0604   INR 1.27* 1.55* 1.04   PTT 46* 30 84*     ABG  Recent Labs   Lab 12/27/22  1014 12/27/22  0743 12/27/22  0347 12/27/22  0346 12/26/22  2339 12/26/22  2154   PH 7.41 7.45 7.47*  --   --  7.48*   PCO2 38 33* 34*  --   --  35   PO2 90 83 95  --   --  89   HCO3 24 23 25  --   --  25   O2PER 30 30  30 30 30   < > 30    < > = values in this interval not displayed.      URINE STUDIES  Recent Labs   Lab Test 12/25/22  0746 11/11/22  1155   COLOR Light Yellow Yellow   APPEARANCE Clear Clear   URINEGLC Negative >=1000*   URINEBILI Negative Negative   URINEKETONE Negative Negative   SG 1.009 1.026   UBLD Negative Negative   URINEPH 7.0 5.0   PROTEIN Negative Negative   NITRITE Negative Negative   LEUKEST Negative Negative   RBCU <1 0   WBCU 0 3     No lab results found.  PTH  No lab results found.  IRON STUDIES  Recent Labs   Lab Test 11/12/22  0458   IRON 50*   *   IRONSAT 23   RONALDO 226

## 2022-12-27 NOTE — PROGRESS NOTES
[facility]    Advanced Heart Failure Consult Progress Note    Assessment and Plan:     Paco Stein is a 67 year old male with a history of chronic systolic heart failure (suspected predominantly nonischemic cardiomyopathy, possibly arrhythmogenic), coronary artery disease (s/p PCI with TILA to LCx 4/2013 and to the LAD in 10/2021), hyperlipidemia, CNS vasculitis and chronic kidney disease who presents with transient bilateral hand tightness with unrevealing workup, who was admitted for cardiogenic shock and evaluation of advance heart therapies on 12/7. Underwent orthopedic heart transplant on 12/25. Hospital course complicated with renal failure.    Interval History:  Overnight it was noted decreasing urine output. Received at least 2-3 L of IV fluids without good urine response.  I/O at least positive net 6 L in the last 24 hours. All of these suggest renal failure    From cardiology perspective, he continues to have stable MAPs 62-82 on a fixed dose of epinephrine 0.03 and isoproterenol 0.005. Hemodynamics with CVP 11, PA 36/14/21, mix venous gas 47, CO/CI 5.7.  Will stop isoproterenol drip and star terbutaline 10 mg 3 times daily with heart rate goal more than 100.  We will continue epinephrine until patient is extubated.     For his immunosuppression, he is on PO steroids, IV MMF and valacyclovir as detailed below     Given renal failure post heart transplant surgery, we will challenge with IV Lasix 80 mg 1 time. CVP goal 10-12. In parallel we will consult renal team for possible CRRT if patient continues to be oliguric    Patient is tolerating well PST. Possible extubation later in the day.     Plan:   - Terbutaline 10 TID  - Wean off isoproterenol   - CVP goal 10-12  - Lasix 80mg once  - Nephrology consulted for consideration of early dialysis if no response to diuretic challenge  - Possible extubation  today    ---------------------------------------------------------------------------  ===CARDIOVASCULAR===  Mr. Stein is a 67 y.o male with hx of Advanced chronic systolic heart failure, EF 15-20%, NYHA class IV, ACC/AHA Stage D who was admitted for ambulatory cardiogenic shock and evaluation of advance therapies. Underwent heart transplant on 12/25 without complications.     Immediate post operative course without complications so far    Graft Function:     Hemodynamics:     Date & Time CVP PA WP PA Sat CO/CI (Indirect Aisha) PVR Current Therapies (Vasoactive meds/MCS)   12/26/22 4 32/13/19 12 63 4.6/2.2 1.5 Epinephrine 0.03, NE 0.06, Vasopressin 4   12/27/22 11 36/14/21 14 48 5.7/2.7 1.2 Epinephrine                                     Volume status: CVP at 11 this morning; CVP goal 10-12  o Diuresis: IV lasix 80 mg one time given oliguric renal failure     Cardiac/Graft function:   o Pressors: none  o Inotrope: Will continue epinephrine at fixed dose 0.03 (mainly inotrope at this rate)  o Stop isoproterenol and start terbutaline 10 mg TID. HR gaol > 100    Rejection surveillance:   o Final crossmatch: pending   o Week 1 biopsy, RHC, TTE, and DSAs due: pending  o Echo, DSAs week 1 and week 4, RHC + EMBx done weeks 1-4,      Immunosuppression:    Induction: Yes, CNI sparing protocol with Simulect given POD 0 intraop (given), 2nd dose 20mg IV due POD #4 (on 12/29):    Maintenance:  o Steroids:   - 1000mg IV preop + 500mg IV if still awaiting transplant 24h after initial dose  - IntraOp: 500mg IV at release of cross clamp  - PostOp: 125mg IV q8hrs x 3 doses  - Then, prednisone 1mg/kg, given in 2 divided doses (started at 10 pm on 12/26). Taper by a total of 5mg/day until reach total daily dose of 0.5mg/kg/day dosed BID or 20mg bid.    - Plan for taper by 5mg after each normal biopsy to 5mg daily.  - If unable to give pills need to convert to IV    df  o Cell Cycle Inhibitor: MMF  - Preop: MMF 1500mg po q12h  preoperatiely   - Postop: Continue MMF 1500mg po q12h (1000mg po q12h for heart-kidney). Will transition to oral when able  o CNI: Tacrolimus (goal 10-12) started/to start POD#  pending normal renal function.  Obtain daily troughs.     Prophylaxis:  CMV D-/R+; EBV D pending /R + ; Toxo D pending /R negative     Post-op antibiotics:  Cefepime end date 12/30 , Vancomycin end date 12/28  o (**if admitted from home, 72h post transplant, if hospitalized Vanc for 72h post transplant, Cefepime for 5 days post transplant.  If chest left open postop: Vanc/Cef + Micafungin for 72h post chest closure with Cefepime continued for total 5 days. If hx of prior infections, consult with ID. If H/K TP Vanc for 5 days post kidney, Cefepime + Micafungin for 7 days post kidney**)     CMV: (D- /R+).   o CMV prophylaxis: with Valgancylovir, 6 months for D+/R-, 3 months for D+/R+ and D-/R-, no prophylaxis if D-/R-.    Fungal:  Fluconazole/ voriconazole, Nysatin swish and swallow while on steroids.     PJP: bactrim while on steroids. Will need to be adjusted pending kidney function    Toxo: (D pending/R negative):  If toxo IgG seropositive for either D or R, lifelong Bactrim    Stress ulcer:  IV pantoprazole (Protonix while on steroids)     VTE prophylaxis:  subQ heparin per surgeon approval on 12/26    (HCV: If donor was HCV+/INES+. 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 No episodes since. Neurological exam unremarkable. Neurology consulted during  recent admission as part of VAD/transplant evaluation. MRI/MRA brain was also performed which showed no concern for progression of disease  -Hold PTA cellcept 1000mg qam and 500mg qpm for now    # Incidental punctate cerebellar infarct, likely embolic  Read on MR imaging 11/14/22 without new neuro deficits. Per radiology, appears acute, likely embolic. No known arterial clot history. Reviewed telemetry over admission, no afib or other tachycarrhythmias. Reassuring prior TTE this admission including bubble study, repeat JARED w/o septal defects or thrombus. Treated w/ heparin gtt and transitioned to Eliquis.  - Hold heparin drip post heart transplant    # Insomnia/anxiety  - Hold PTA trazodone and lorazepam       ===PULMONARY===  #Intubated   Vent Mode: (S) CPAP/PS  (Continuous positive airway pressure with Pressure Support)  FiO2 (%): 30 %  Resp Rate (Set): 14 breaths/min  Tidal Volume (Set, mL): 540 mL  PEEP (cm H2O): 5 cmH2O  Pressure Support (cm H2O): 7 cmH2O  Resp: 17    -doing PS, plan to extubate later today    # Non-productive cough, acute on chronic, resolved  - PRN cough lozenges  - PRN  tessalon pearls    ===GI===  # Shock liver, improving  -daily LFT    # Severe malnutrition in the context of acute illness/injury on chronic illness  - Appreciate assistance from RD      ===RENAL===  # Acute kidney injury  # renal failure  Worsening Cr up to 2.6. Will challenge with IV lasix as detailed above. Plan to consult nephrology for possible CRRT.       ===HEME/ONC===  # Occlusive left peroneal DVT  # Non-occlusive right axillary DVT  Identified on doppler 11/15/22 during previous admission.   -Will need plan for anticoagulation in the future    ===ENDOCRINE===  # Hyperglycemia  No active concerns     INFECTIOUS DISEASE  No active concerns     ===SKIN/MSK===  # Bilateral hand tightness, resolved    FEN: NPO, intubated  DVT PPx: SQH  Code: Full code      Disposition: Pending   Discussed with Dr. Ana Thornton  "Reyes Castro, MD, MS   Cardiovascular disease fellow       Objective:   BP (!) 105/94   Pulse 112   Temp 100.4  F (38  C) (Pulmonary Artery)   Resp 17   Ht 1.88 m (6' 2\")   Wt 104.3 kg (229 lb 15 oz)   SpO2 94%   BMI 29.52 kg/m        Wt:   Wt Readings from Last 5 Encounters:   12/27/22 104.3 kg (229 lb 15 oz)   11/21/22 90.9 kg (200 lb 6.4 oz)   11/10/22 108 kg (238 lb 1.6 oz)   11/08/22 95.7 kg (210 lb 14.4 oz)     Physical Exam   GEN: Patient intubated, opening eyes, squeezing finger and wiggling toes. No acute distress  HEENT: no icterus, EOMI  CV: heart sounds regular and rhythmic. No evident murmur. 2 mediastinal tubes and 2 chest tubes (L and R). Clean dressing covering sternotomy site.   CHEST: Passive movement of air.  ABD: soft, non-tender, non-distended   EXTR: warm extremities. No LE edema      DATA:   Labs, EKGs and imaging reviewed. Pertinent results discussed in assessment and plan above.   "

## 2022-12-27 NOTE — PROGRESS NOTES
CV ICU Progress Note  12/27/2022        CO-MORBIDITIES:   Patient Active Problem List   Diagnosis     Acute respiratory failure with hypoxia (H)     Cardiomyopathy (H)     Stroke (H)     Coronary artery disease involving native coronary artery of native heart without angina pectoris     Essential hypertension     NSVT (nonsustained ventricular tachycardia)     SOB (shortness of breath)     CHF (congestive heart failure) (H)     Troponin level elevated     Anginal equivalent (H)     Heart failure, unspecified HF chronicity, unspecified heart failure type (H)     Benign neoplasm of colon     Acute on chronic combined systolic (congestive) and diastolic (congestive) heart failure (H)     Hyperlipidemia     Syncope and collapse     Primary central nervous system vasculitis (H)     Vasculitis, CNS (H)     Acute embolism and thrombosis of left peroneal vein (H)       ASSESSMENT: Paco Stein is a 67 year old male with a history of chronic systolic heart failure (suspected predominantly nonischemic cardiomyopathy, possibly arrhythmogenic), coronary artery disease (s/p PCI with TILA to LCx 4/2013 and to the LAD in 10/2021), hyperlipidemia, CNS vasculitis and chronic kidney disease who presents with transient bilateral hand tightness with unrevealing workup. He underwent DDHT on 12/25/22 with Dr. Rodriguez.      Attending Addendum Finish:     PLAN:  Neuro/ pain/ sedation:  Acute Postoperative pain  Primary central nervous system vasculitis  - Monitor neurological status. Notify the MD for any acute changes in exam.  - Pain: fentanyl gtt. Scheduled tylenol. PRN tylenol, oxycodone, dilaudid. Lido patch   - Sedation: propofol gtt  - PTA ativan and trazodone, hold. Have not been using much  - wean for PST    Pulmonary care:   Postoperative ventilation management  - Intubated, ventilated  - Titrate supplemental oxygen to maintain saturation above 92%.  - PTA albuterol   - PST today but will not extubate this  AM    Cardiovascular:    S/p heart tx with Dr. Rodriguez on 12/25/22  History of CAD  HFrEF  ICD  Dyslipidemia  HTN  Elevated troponin  Recent echo on 11/16 with LVEF of 20-25%  - Monitor hemodynamic status.   - Goal MAP 65, SBP<120  - Statin start POD5  - BB hold  - ASA: start POD5  - Norepi, vaso gtt; wean as tolerated  - Isopro if HR drops below 100  - PTA amio 200 BID, ASA 81, lasix 20 BID PO, pravastatin 20 daily, hold   - leave epi at 0.03 and isoproterenol 0.005  - terbutaline 10 TID for goal of coming off of isuprel  - stop LR boluses    GI care/ Nutrition:   malnutrition  - NPO   - PPI  - Continue bowel regimen: miralax, senna  - NJ and TF this PM if not extubated  - CTM LFT    Renal/ Fluid Balance/ Electrolytes:  CRI not on dialysis  BL creat appears to be ~ 0.8  - Strict I/O, daily weights  - Avoid/limit nephrotoxins as able  - Replete lytes PRN per protocol  - Continue to delay tacro   - FeNa  - lasix 80 and consider CRRT    - minimal response to lasix, will place [facility] dialysis line  - nephro consult  - CVP 10-12    Endocrine:    Stress induced hyperglycemia  Preop A1c 6.2  - Insulin gtt  - Goal BG <180 for optimal healing  - on methylpred, mycophnolate     ID/ Antibiotics:  Stress induced leukocytosis  - To complete perioperative regimen  - Continue to monitor fever curve, WBC and inflammatory markers as appropriate  - WBC trending down overall (on steroid)  - on vanc and cefepime   - valcyte and bactrim on POD5, dapsone if MILENA does not improve  - topical acyclovir   - differential 12/28 AM    Heme:     Stress induced leukocytosis  Acute blood loss anemia  Acute blood loss thrombocytopenia  H/o R axillary and L peroneal DVT  No s/sx active bleeding  - Continue to monitor  - CBC   - PTA apixaban, hold    MSK/ Skin:  Sternotomy  Surgical Incision  - Sternal precautions  - Postoperative incision management per protocol  - PT/OT/CR    Prophylaxis:    - Mechanical prophylaxis for DVT  - Chemical DVT prophylaxis  - SQH 5000 q8  - PPI    Lines/ tubes/ drains:  - Arterial Line, ETT, CTs, R MAC line, R PICC, donovan, NJ  - dialysis line if no response to lasix    Disposition:  - CVICU    ICU Checklist  F - Feeding:   A - Analgesia:   S - Sedation:   T - Thromboembolic prophylaxis:      H - Head of bed elevated  U - Ulcer prophylaxis:  G - Glycemic control  S - SBT     B - Bowel regimen  I - Indwelling catheter  D - De-escalation of antibiotics    Patient seen, findings and plan discussed CVICU attending    Patricia Currie MD  Anesthesiology, [facility]      ====================================    Interval history:  500 LR bolus x2 ON. Decreased UOP and increased Cr.     PAST MEDICAL HISTORY:   Past Medical History:   Diagnosis Date     Congestive heart failure (H)        PAST SURGICAL HISTORY:   Past Surgical History:   Procedure Laterality Date     COLONOSCOPY N/A 11/17/2022    Procedure: COLONOSCOPY;  Surgeon: Roverto Nino MD;  Location: [facility]     CV CORONARY ANGIOGRAM N/A 11/11/2022    Procedure: Coronary Angiogram;  Surgeon: Justo Bush MD;  Location:  HEART CARDIAC CATH LAB     CV RIGHT HEART CATH MEASUREMENTS RECORDED N/A 11/11/2022    Procedure: Right Heart Catheterization;  Surgeon: Justo Bush MD;  Location:  HEART CARDIAC CATH LAB     CV RIGHT HEART CATH MEASUREMENTS RECORDED N/A 12/8/2022    Procedure: Right Heart Catheterization;  Surgeon: Wayne Xavier MD;  Location:  HEART CARDIAC CATH LAB     CV RIGHT HEART CATH MEASUREMENTS RECORDED N/A 12/9/2022    Procedure: Right Heart Catheterization with leave-in Hospers;  Surgeon: Khari Mcneill MD;  Location:  HEART CARDIAC CATH LAB     PICC DOUBLE LUMEN PLACEMENT Right 11/10/2022    Right basilic, 43 cm, 1 cm external length       FAMILY HISTORY:   Family History   Problem Relation Age of Onset     Atrial fibrillation Father      Lung Cancer Brother        SOCIAL HISTORY:   Social History     Tobacco Use     Smoking status: Never  "    Smokeless tobacco: Never   Substance Use Topics     Alcohol use: Not Currently         OBJECTIVE:   1. VITAL SIGNS:    Vital signs:  Temp: 100.4  F (38  C) Temp src: Pulmonary Artery  Pulse: 111   Resp: 10 SpO2: 95 % O2 Device: Mechanical Ventilator Oxygen Delivery: 1 LPM Height: 188 cm (6' 2\") Weight: 104.3 kg (229 lb 15 oz)  Estimated body mass index is 29.52 kg/m  as calculated from the following:    Height as of this encounter: 1.88 m (6' 2\").    Weight as of this encounter: 104.3 kg (229 lb 15 oz).          2. INTAKE/ OUTPUT:    I/O last 3 completed shifts:  In: 5712.89 [I.V.:1942.89; NG/GT:270; IV Piggyback:3500]  Out: 1652 [Urine:672; Emesis/NG output:400; Chest Tube:580]      3. PHYSICAL EXAMINATION:     General: sedated  Neuro: sedated, follows commands  Resp: intubated, ventilated  CV: S1, S2, sinus tach, no m/r/g   Abdomen: Soft, non-distended, non-tender  Incisions: c/d/i  Extremities: warm and well perfused, no edema  CT: To suction, serosang output, no airleak, crepitus    4. INVESTIGATIONS:   Arterial Blood Gases   Recent Labs   Lab 12/27/22  0347 12/26/22 2154 12/26/22  1621 12/26/22  1028   PH 7.47* 7.48* 7.39 7.42   PCO2 34* 35 42 41   PO2 95 89 81 125*   HCO3 25 25 25 26     Complete Blood Count   Recent Labs   Lab 12/27/22  0346 12/26/22 2154 12/26/22  1620 12/26/22  1028   WBC 24.5* 22.1* 18.9* 16.8*   HGB 8.1* 8.1* 9.6* 10.6*   * 134* 151 171     Basic Metabolic Panel  Recent Labs   Lab 12/27/22  0652 12/27/22  0545 12/27/22  0352 12/27/22  0346 12/26/22  2157 12/26/22  2154 12/26/22  1659 12/26/22  1620 12/26/22  1056 12/26/22  1028   NA  --   --   --  142  --  142  --  145  --  145   POTASSIUM  --   --   --  5.2  --  4.9  --  4.0  --  3.9   CHLORIDE  --   --   --  107  --  107  --  109*  --  108*   CO2  --   --   --  21*  --  21*  --  21*  --  22   BUN  --   --   --  52.7*  --  46.9*  --  38.6*  --  32.1*   CR  --   --   --  2.64*  --  2.15*  --  1.89*  --  1.67*   * 135* " 119* 122*   < > 149*   < > 132*  127*   < > 176*    < > = values in this interval not displayed.     Liver Function Tests  Recent Labs   Lab 12/27/22  0346 12/26/22  1028 12/26/22  0359 12/25/22  2303 12/25/22  2150 12/25/22  0604   AST 66* 88* 127* 137*  --  33   ALT 45 49 63* 68*  --  54*   ALKPHOS 58 45 60 71  --  68   BILITOTAL 0.4 0.6 1.1 0.9  --  0.5   ALBUMIN 3.1* 3.2* 3.2* 3.8  --  3.6   INR  --   --   --  1.27* 1.55* 1.04     Pancreatic Enzymes  Recent Labs   Lab 12/25/22  0604   AMYLASE 77     Coagulation Profile  Recent Labs   Lab 12/25/22  2303 12/25/22  2150 12/25/22  0604   INR 1.27* 1.55* 1.04   PTT 46* 30 84*         5. RADIOLOGY:   Recent Results (from the past 24 hour(s))   XR Chest Port 1 View    Impression    RESIDENT PRELIMINARY INTERPRETATION  IMPRESSION:  1. Postsurgical changes of heart transplant with support devices being  in stable position.  2. Mild increase in the perihilar/retrocardiac opacification, likely  representing increased post surgical edema/atelectasis.  3. Small interval left pleural effusion.       =========================================

## 2022-12-28 ENCOUNTER — APPOINTMENT (OUTPATIENT)
Dept: SPEECH THERAPY | Facility: CLINIC | Age: 67
DRG: 001 | End: 2022-12-28
Attending: STUDENT IN AN ORGANIZED HEALTH CARE EDUCATION/TRAINING PROGRAM
Payer: MEDICARE

## 2022-12-28 ENCOUNTER — APPOINTMENT (OUTPATIENT)
Dept: GENERAL RADIOLOGY | Facility: CLINIC | Age: 67
DRG: 001 | End: 2022-12-28
Attending: STUDENT IN AN ORGANIZED HEALTH CARE EDUCATION/TRAINING PROGRAM
Payer: MEDICARE

## 2022-12-28 ENCOUNTER — APPOINTMENT (OUTPATIENT)
Dept: OCCUPATIONAL THERAPY | Facility: CLINIC | Age: 67
DRG: 001 | End: 2022-12-28
Attending: STUDENT IN AN ORGANIZED HEALTH CARE EDUCATION/TRAINING PROGRAM
Payer: MEDICARE

## 2022-12-28 DIAGNOSIS — Z94.1 HEART TRANSPLANT RECIPIENT (H): Primary | ICD-10-CM

## 2022-12-28 LAB
ALBUMIN SERPL BCG-MCNC: 3 G/DL (ref 3.5–5.2)
ALLEN'S TEST: ABNORMAL
ALLEN'S TEST: NORMAL
ALP SERPL-CCNC: 48 U/L (ref 40–129)
ALT SERPL W P-5'-P-CCNC: 36 U/L (ref 10–50)
ANION GAP SERPL CALCULATED.3IONS-SCNC: 10 MMOL/L (ref 7–15)
ANION GAP SERPL CALCULATED.3IONS-SCNC: 13 MMOL/L (ref 7–15)
ANION GAP SERPL CALCULATED.3IONS-SCNC: 14 MMOL/L (ref 7–15)
ANION GAP SERPL CALCULATED.3IONS-SCNC: 15 MMOL/L (ref 7–15)
AST SERPL W P-5'-P-CCNC: 42 U/L (ref 10–50)
ATRIAL RATE - MUSE: 105 BPM
BASE EXCESS BLDA CALC-SCNC: -0.3 MMOL/L (ref -9–1.8)
BASE EXCESS BLDA CALC-SCNC: 0.8 MMOL/L (ref -9–1.8)
BASE EXCESS BLDA CALC-SCNC: 1.2 MMOL/L (ref -9–1.8)
BASE EXCESS BLDA CALC-SCNC: 1.7 MMOL/L (ref -9–1.8)
BASE EXCESS BLDV CALC-SCNC: -0.6 MMOL/L (ref -7.7–1.9)
BASE EXCESS BLDV CALC-SCNC: -1.2 MMOL/L (ref -7.7–1.9)
BASE EXCESS BLDV CALC-SCNC: -1.6 MMOL/L (ref -7.7–1.9)
BASE EXCESS BLDV CALC-SCNC: 0.8 MMOL/L (ref -7.7–1.9)
BASE EXCESS BLDV CALC-SCNC: 1.5 MMOL/L (ref -7.7–1.9)
BASE EXCESS BLDV CALC-SCNC: 2.4 MMOL/L (ref -7.7–1.9)
BASOPHILS # BLD AUTO: 0 10E3/UL (ref 0–0.2)
BASOPHILS NFR BLD AUTO: 0 %
BILIRUB DIRECT SERPL-MCNC: <0.2 MG/DL (ref 0–0.3)
BILIRUB SERPL-MCNC: 0.5 MG/DL
BUN SERPL-MCNC: 61.7 MG/DL (ref 8–23)
BUN SERPL-MCNC: 67.7 MG/DL (ref 8–23)
BUN SERPL-MCNC: 69 MG/DL (ref 8–23)
BUN SERPL-MCNC: 71.5 MG/DL (ref 8–23)
CALCIUM SERPL-MCNC: 8.4 MG/DL (ref 8.8–10.2)
CALCIUM SERPL-MCNC: 8.6 MG/DL (ref 8.8–10.2)
CALCIUM SERPL-MCNC: 8.6 MG/DL (ref 8.8–10.2)
CALCIUM SERPL-MCNC: 8.8 MG/DL (ref 8.8–10.2)
CHLORIDE SERPL-SCNC: 107 MMOL/L (ref 98–107)
CHLORIDE SERPL-SCNC: 107 MMOL/L (ref 98–107)
CHLORIDE SERPL-SCNC: 110 MMOL/L (ref 98–107)
CHLORIDE SERPL-SCNC: 111 MMOL/L (ref 98–107)
CREAT SERPL-MCNC: 1.43 MG/DL (ref 0.67–1.17)
CREAT SERPL-MCNC: 1.77 MG/DL (ref 0.67–1.17)
CREAT SERPL-MCNC: 2.12 MG/DL (ref 0.67–1.17)
CREAT SERPL-MCNC: 2.69 MG/DL (ref 0.67–1.17)
DEPRECATED HCO3 PLAS-SCNC: 19 MMOL/L (ref 22–29)
DEPRECATED HCO3 PLAS-SCNC: 21 MMOL/L (ref 22–29)
DEPRECATED HCO3 PLAS-SCNC: 21 MMOL/L (ref 22–29)
DEPRECATED HCO3 PLAS-SCNC: 23 MMOL/L (ref 22–29)
DIASTOLIC BLOOD PRESSURE - MUSE: NORMAL MMHG
DONOR IDENTIFICATION: NORMAL
DSA COMMENTS: NORMAL
DSA PRESENT: NO
DSA TEST METHOD: NORMAL
EOSINOPHIL # BLD AUTO: 0 10E3/UL (ref 0–0.7)
EOSINOPHIL NFR BLD AUTO: 0 %
ERYTHROCYTE [DISTWIDTH] IN BLOOD BY AUTOMATED COUNT: 15 % (ref 10–15)
ERYTHROCYTE [DISTWIDTH] IN BLOOD BY AUTOMATED COUNT: 15.2 % (ref 10–15)
ERYTHROCYTE [DISTWIDTH] IN BLOOD BY AUTOMATED COUNT: 15.4 % (ref 10–15)
ERYTHROCYTE [DISTWIDTH] IN BLOOD BY AUTOMATED COUNT: 15.5 % (ref 10–15)
GFR SERPL CREATININE-BSD FRML MDRD: 25 ML/MIN/1.73M2
GFR SERPL CREATININE-BSD FRML MDRD: 33 ML/MIN/1.73M2
GFR SERPL CREATININE-BSD FRML MDRD: 41 ML/MIN/1.73M2
GFR SERPL CREATININE-BSD FRML MDRD: 53 ML/MIN/1.73M2
GLUCOSE BLDC GLUCOMTR-MCNC: 101 MG/DL (ref 70–99)
GLUCOSE BLDC GLUCOMTR-MCNC: 102 MG/DL (ref 70–99)
GLUCOSE BLDC GLUCOMTR-MCNC: 112 MG/DL (ref 70–99)
GLUCOSE BLDC GLUCOMTR-MCNC: 122 MG/DL (ref 70–99)
GLUCOSE BLDC GLUCOMTR-MCNC: 128 MG/DL (ref 70–99)
GLUCOSE BLDC GLUCOMTR-MCNC: 131 MG/DL (ref 70–99)
GLUCOSE BLDC GLUCOMTR-MCNC: 132 MG/DL (ref 70–99)
GLUCOSE BLDC GLUCOMTR-MCNC: 149 MG/DL (ref 70–99)
GLUCOSE BLDC GLUCOMTR-MCNC: 94 MG/DL (ref 70–99)
GLUCOSE SERPL-MCNC: 106 MG/DL (ref 70–99)
GLUCOSE SERPL-MCNC: 117 MG/DL (ref 70–99)
GLUCOSE SERPL-MCNC: 141 MG/DL (ref 70–99)
GLUCOSE SERPL-MCNC: 162 MG/DL (ref 70–99)
HCO3 BLD-SCNC: 23 MMOL/L (ref 21–28)
HCO3 BLD-SCNC: 25 MMOL/L (ref 21–28)
HCO3 BLD-SCNC: 25 MMOL/L (ref 21–28)
HCO3 BLD-SCNC: 26 MMOL/L (ref 21–28)
HCO3 BLDV-SCNC: 23 MMOL/L (ref 21–28)
HCO3 BLDV-SCNC: 26 MMOL/L (ref 21–28)
HCO3 BLDV-SCNC: 26 MMOL/L (ref 21–28)
HCO3 BLDV-SCNC: 27 MMOL/L (ref 21–28)
HCT VFR BLD AUTO: 23.9 % (ref 40–53)
HCT VFR BLD AUTO: 24.5 % (ref 40–53)
HCT VFR BLD AUTO: 25 % (ref 40–53)
HCT VFR BLD AUTO: 25 % (ref 40–53)
HGB BLD-MCNC: 7.7 G/DL (ref 13.3–17.7)
HGB BLD-MCNC: 8 G/DL (ref 13.3–17.7)
HGB BLD-MCNC: 8.1 G/DL (ref 13.3–17.7)
HGB BLD-MCNC: 8.3 G/DL (ref 13.3–17.7)
IMM GRANULOCYTES # BLD: 0.1 10E3/UL
IMM GRANULOCYTES # BLD: 0.2 10E3/UL
IMM GRANULOCYTES NFR BLD: 1 %
INTERPRETATION ECG - MUSE: NORMAL
LACTATE SERPL-SCNC: 0.7 MMOL/L (ref 0.7–2)
LACTATE SERPL-SCNC: 0.7 MMOL/L (ref 0.7–2)
LACTATE SERPL-SCNC: 0.8 MMOL/L (ref 0.7–2)
LACTATE SERPL-SCNC: 0.9 MMOL/L (ref 0.7–2)
LYMPHOCYTES # BLD AUTO: 0.4 10E3/UL (ref 0.8–5.3)
LYMPHOCYTES # BLD AUTO: 0.4 10E3/UL (ref 0.8–5.3)
LYMPHOCYTES # BLD AUTO: 1 10E3/UL (ref 0.8–5.3)
LYMPHOCYTES # BLD AUTO: 1 10E3/UL (ref 0.8–5.3)
LYMPHOCYTES NFR BLD AUTO: 3 %
LYMPHOCYTES NFR BLD AUTO: 3 %
LYMPHOCYTES NFR BLD AUTO: 5 %
LYMPHOCYTES NFR BLD AUTO: 5 %
MAGNESIUM SERPL-MCNC: 2.1 MG/DL (ref 1.7–2.3)
MAGNESIUM SERPL-MCNC: 2.4 MG/DL (ref 1.7–2.3)
MCH RBC QN AUTO: 28.4 PG (ref 26.5–33)
MCH RBC QN AUTO: 28.7 PG (ref 26.5–33)
MCH RBC QN AUTO: 28.9 PG (ref 26.5–33)
MCH RBC QN AUTO: 29.2 PG (ref 26.5–33)
MCHC RBC AUTO-ENTMCNC: 31.4 G/DL (ref 31.5–36.5)
MCHC RBC AUTO-ENTMCNC: 32.4 G/DL (ref 31.5–36.5)
MCHC RBC AUTO-ENTMCNC: 33.2 G/DL (ref 31.5–36.5)
MCHC RBC AUTO-ENTMCNC: 33.5 G/DL (ref 31.5–36.5)
MCV RBC AUTO: 87 FL (ref 78–100)
MCV RBC AUTO: 87 FL (ref 78–100)
MCV RBC AUTO: 89 FL (ref 78–100)
MCV RBC AUTO: 90 FL (ref 78–100)
MONOCYTES # BLD AUTO: 0.7 10E3/UL (ref 0–1.3)
MONOCYTES # BLD AUTO: 0.8 10E3/UL (ref 0–1.3)
MONOCYTES # BLD AUTO: 1.6 10E3/UL (ref 0–1.3)
MONOCYTES # BLD AUTO: 1.6 10E3/UL (ref 0–1.3)
MONOCYTES NFR BLD AUTO: 5 %
MONOCYTES NFR BLD AUTO: 6 %
MONOCYTES NFR BLD AUTO: 8 %
MONOCYTES NFR BLD AUTO: 8 %
NEUTROPHILS # BLD AUTO: 11.8 10E3/UL (ref 1.6–8.3)
NEUTROPHILS # BLD AUTO: 13.3 10E3/UL (ref 1.6–8.3)
NEUTROPHILS # BLD AUTO: 17.4 10E3/UL (ref 1.6–8.3)
NEUTROPHILS # BLD AUTO: 17.6 10E3/UL (ref 1.6–8.3)
NEUTROPHILS NFR BLD AUTO: 86 %
NEUTROPHILS NFR BLD AUTO: 86 %
NEUTROPHILS NFR BLD AUTO: 90 %
NEUTROPHILS NFR BLD AUTO: 91 %
NRBC # BLD AUTO: 0 10E3/UL
NRBC # BLD AUTO: 0 10E3/UL
NRBC # BLD AUTO: 0.1 10E3/UL
NRBC # BLD AUTO: 0.1 10E3/UL
NRBC BLD AUTO-RTO: 0 /100
O2/TOTAL GAS SETTING VFR VENT: 35 %
O2/TOTAL GAS SETTING VFR VENT: 35 %
O2/TOTAL GAS SETTING VFR VENT: 4 %
ORGAN: NORMAL
OXYHGB MFR BLDV: 61 % (ref 70–75)
OXYHGB MFR BLDV: 65 % (ref 70–75)
OXYHGB MFR BLDV: 65 % (ref 70–75)
OXYHGB MFR BLDV: 67 % (ref 70–75)
OXYHGB MFR BLDV: 72 % (ref 70–75)
OXYHGB MFR BLDV: 76 % (ref 70–75)
P AXIS - MUSE: 57 DEGREES
PCO2 BLD: 33 MM HG (ref 35–45)
PCO2 BLD: 34 MM HG (ref 35–45)
PCO2 BLD: 37 MM HG (ref 35–45)
PCO2 BLD: 38 MM HG (ref 35–45)
PCO2 BLDV: 35 MM HG (ref 40–50)
PCO2 BLDV: 36 MM HG (ref 40–50)
PCO2 BLDV: 36 MM HG (ref 40–50)
PCO2 BLDV: 41 MM HG (ref 40–50)
PCO2 BLDV: 42 MM HG (ref 40–50)
PCO2 BLDV: 43 MM HG (ref 40–50)
PH BLD: 7.43 [PH] (ref 7.35–7.45)
PH BLD: 7.45 [PH] (ref 7.35–7.45)
PH BLD: 7.46 [PH] (ref 7.35–7.45)
PH BLD: 7.47 [PH] (ref 7.35–7.45)
PH BLDV: 7.4 [PH] (ref 7.32–7.43)
PH BLDV: 7.41 [PH] (ref 7.32–7.43)
PH BLDV: 7.41 [PH] (ref 7.32–7.43)
PH BLDV: 7.42 [PH] (ref 7.32–7.43)
PH BLDV: 7.42 [PH] (ref 7.32–7.43)
PH BLDV: 7.44 [PH] (ref 7.32–7.43)
PHOSPHATE SERPL-MCNC: 3.4 MG/DL (ref 2.5–4.5)
PHOSPHATE SERPL-MCNC: 6 MG/DL (ref 2.5–4.5)
PLATELET # BLD AUTO: 101 10E3/UL (ref 150–450)
PLATELET # BLD AUTO: 79 10E3/UL (ref 150–450)
PLATELET # BLD AUTO: 80 10E3/UL (ref 150–450)
PLATELET # BLD AUTO: 97 10E3/UL (ref 150–450)
PO2 BLD: 112 MM HG (ref 80–105)
PO2 BLD: 130 MM HG (ref 80–105)
PO2 BLD: 69 MM HG (ref 80–105)
PO2 BLD: 99 MM HG (ref 80–105)
PO2 BLDV: 35 MM HG (ref 25–47)
PO2 BLDV: 37 MM HG (ref 25–47)
PO2 BLDV: 42 MM HG (ref 25–47)
PO2 BLDV: 44 MM HG (ref 25–47)
POTASSIUM SERPL-SCNC: 3.3 MMOL/L (ref 3.4–5.3)
POTASSIUM SERPL-SCNC: 3.7 MMOL/L (ref 3.4–5.3)
POTASSIUM SERPL-SCNC: 3.8 MMOL/L (ref 3.4–5.3)
POTASSIUM SERPL-SCNC: 4.1 MMOL/L (ref 3.4–5.3)
PR INTERVAL - MUSE: 128 MS
PROT SERPL-MCNC: 4.8 G/DL (ref 6.4–8.3)
QRS DURATION - MUSE: 98 MS
QT - MUSE: 372 MS
QTC - MUSE: 491 MS
R AXIS - MUSE: 37 DEGREES
RBC # BLD AUTO: 2.71 10E6/UL (ref 4.4–5.9)
RBC # BLD AUTO: 2.74 10E6/UL (ref 4.4–5.9)
RBC # BLD AUTO: 2.82 10E6/UL (ref 4.4–5.9)
RBC # BLD AUTO: 2.87 10E6/UL (ref 4.4–5.9)
SA 1 CELL: NORMAL
SA 1 TEST METHOD: NORMAL
SA 2 CELL: NORMAL
SA 2 TEST METHOD: NORMAL
SA1 HI RISK ABY: NORMAL
SA1 MOD RISK ABY: NORMAL
SA2 HI RISK ABY: NORMAL
SA2 MOD RISK ABY: NORMAL
SODIUM SERPL-SCNC: 141 MMOL/L (ref 136–145)
SODIUM SERPL-SCNC: 142 MMOL/L (ref 136–145)
SODIUM SERPL-SCNC: 144 MMOL/L (ref 136–145)
SODIUM SERPL-SCNC: 144 MMOL/L (ref 136–145)
SYSTOLIC BLOOD PRESSURE - MUSE: NORMAL MMHG
T AXIS - MUSE: 37 DEGREES
UNACCEPTABLE ANTIGENS: NORMAL
UNOS CPRA: 0
VENTRICULAR RATE- MUSE: 105 BPM
WBC # BLD AUTO: 13 10E3/UL (ref 4–11)
WBC # BLD AUTO: 14.5 10E3/UL (ref 4–11)
WBC # BLD AUTO: 20.1 10E3/UL (ref 4–11)
WBC # BLD AUTO: 20.3 10E3/UL (ref 4–11)
ZZZSA 1  COMMENTS: NORMAL
ZZZSA 2 COMMENTS: NORMAL

## 2022-12-28 PROCEDURE — 82805 BLOOD GASES W/O2 SATURATION: CPT | Performed by: STUDENT IN AN ORGANIZED HEALTH CARE EDUCATION/TRAINING PROGRAM

## 2022-12-28 PROCEDURE — 999N000065 XR ABDOMEN PORT 1 VIEW

## 2022-12-28 PROCEDURE — 250N000013 HC RX MED GY IP 250 OP 250 PS 637: Performed by: STUDENT IN AN ORGANIZED HEALTH CARE EDUCATION/TRAINING PROGRAM

## 2022-12-28 PROCEDURE — 250N000011 HC RX IP 250 OP 636

## 2022-12-28 PROCEDURE — 92526 ORAL FUNCTION THERAPY: CPT | Mod: GN

## 2022-12-28 PROCEDURE — 84100 ASSAY OF PHOSPHORUS: CPT | Performed by: SURGERY

## 2022-12-28 PROCEDURE — 999N000015 HC STATISTIC ARTERIAL MONITORING DAILY

## 2022-12-28 PROCEDURE — 250N000013 HC RX MED GY IP 250 OP 250 PS 637

## 2022-12-28 PROCEDURE — 85025 COMPLETE CBC W/AUTO DIFF WBC: CPT | Performed by: STUDENT IN AN ORGANIZED HEALTH CARE EDUCATION/TRAINING PROGRAM

## 2022-12-28 PROCEDURE — 250N000011 HC RX IP 250 OP 636: Performed by: STUDENT IN AN ORGANIZED HEALTH CARE EDUCATION/TRAINING PROGRAM

## 2022-12-28 PROCEDURE — C9113 INJ PANTOPRAZOLE SODIUM, VIA: HCPCS | Performed by: STUDENT IN AN ORGANIZED HEALTH CARE EDUCATION/TRAINING PROGRAM

## 2022-12-28 PROCEDURE — 250N000009 HC RX 250: Performed by: STUDENT IN AN ORGANIZED HEALTH CARE EDUCATION/TRAINING PROGRAM

## 2022-12-28 PROCEDURE — 82803 BLOOD GASES ANY COMBINATION: CPT

## 2022-12-28 PROCEDURE — 71045 X-RAY EXAM CHEST 1 VIEW: CPT

## 2022-12-28 PROCEDURE — 83735 ASSAY OF MAGNESIUM: CPT | Performed by: STUDENT IN AN ORGANIZED HEALTH CARE EDUCATION/TRAINING PROGRAM

## 2022-12-28 PROCEDURE — 258N000003 HC RX IP 258 OP 636: Performed by: SURGERY

## 2022-12-28 PROCEDURE — 250N000012 HC RX MED GY IP 250 OP 636 PS 637: Performed by: STUDENT IN AN ORGANIZED HEALTH CARE EDUCATION/TRAINING PROGRAM

## 2022-12-28 PROCEDURE — 80053 COMPREHEN METABOLIC PANEL: CPT | Performed by: SURGERY

## 2022-12-28 PROCEDURE — 258N000003 HC RX IP 258 OP 636: Performed by: STUDENT IN AN ORGANIZED HEALTH CARE EDUCATION/TRAINING PROGRAM

## 2022-12-28 PROCEDURE — 250N000011 HC RX IP 250 OP 636: Performed by: SURGERY

## 2022-12-28 PROCEDURE — 97530 THERAPEUTIC ACTIVITIES: CPT | Mod: GO

## 2022-12-28 PROCEDURE — 999N000157 HC STATISTIC RCP TIME EA 10 MIN

## 2022-12-28 PROCEDURE — 44500 INTRO GASTROINTESTINAL TUBE: CPT

## 2022-12-28 PROCEDURE — 82310 ASSAY OF CALCIUM: CPT | Performed by: SURGERY

## 2022-12-28 PROCEDURE — 74018 RADEX ABDOMEN 1 VIEW: CPT | Mod: 26 | Performed by: RADIOLOGY

## 2022-12-28 PROCEDURE — 250N000012 HC RX MED GY IP 250 OP 636 PS 637: Performed by: SURGERY

## 2022-12-28 PROCEDURE — 999N000155 HC STATISTIC RAPCV CVP MONITORING

## 2022-12-28 PROCEDURE — 82805 BLOOD GASES W/O2 SATURATION: CPT | Performed by: SURGERY

## 2022-12-28 PROCEDURE — 93010 ELECTROCARDIOGRAM REPORT: CPT | Performed by: INTERNAL MEDICINE

## 2022-12-28 PROCEDURE — 92610 EVALUATE SWALLOWING FUNCTION: CPT | Mod: GN

## 2022-12-28 PROCEDURE — 250N000009 HC RX 250: Performed by: SURGERY

## 2022-12-28 PROCEDURE — 250N000013 HC RX MED GY IP 250 OP 250 PS 637: Performed by: SURGERY

## 2022-12-28 PROCEDURE — 83605 ASSAY OF LACTIC ACID: CPT | Performed by: SURGERY

## 2022-12-28 PROCEDURE — 93005 ELECTROCARDIOGRAM TRACING: CPT

## 2022-12-28 PROCEDURE — 999N000045 HC STATISTIC DAILY SWAN MONITORING

## 2022-12-28 PROCEDURE — 99291 CRITICAL CARE FIRST HOUR: CPT | Mod: 25 | Performed by: INTERNAL MEDICINE

## 2022-12-28 PROCEDURE — 83735 ASSAY OF MAGNESIUM: CPT | Performed by: SURGERY

## 2022-12-28 PROCEDURE — 71045 X-RAY EXAM CHEST 1 VIEW: CPT | Mod: 26 | Performed by: STUDENT IN AN ORGANIZED HEALTH CARE EDUCATION/TRAINING PROGRAM

## 2022-12-28 PROCEDURE — 36620 INSERTION CATHETER ARTERY: CPT

## 2022-12-28 PROCEDURE — 82248 BILIRUBIN DIRECT: CPT | Performed by: STUDENT IN AN ORGANIZED HEALTH CARE EDUCATION/TRAINING PROGRAM

## 2022-12-28 PROCEDURE — 97165 OT EVAL LOW COMPLEX 30 MIN: CPT | Mod: GO

## 2022-12-28 PROCEDURE — 200N000002 HC R&B ICU UMMC

## 2022-12-28 PROCEDURE — 84100 ASSAY OF PHOSPHORUS: CPT | Performed by: STUDENT IN AN ORGANIZED HEALTH CARE EDUCATION/TRAINING PROGRAM

## 2022-12-28 RX ORDER — POTASSIUM CHLORIDE 29.8 MG/ML
20 INJECTION INTRAVENOUS
Status: COMPLETED | OUTPATIENT
Start: 2022-12-28 | End: 2022-12-28

## 2022-12-28 RX ORDER — METHYLPREDNISOLONE SODIUM SUCCINATE 40 MG/ML
32 INJECTION, POWDER, LYOPHILIZED, FOR SOLUTION INTRAMUSCULAR; INTRAVENOUS ONCE
Status: COMPLETED | OUTPATIENT
Start: 2022-12-28 | End: 2022-12-28

## 2022-12-28 RX ORDER — MYCOPHENOLATE MOFETIL 200 MG/ML
1500 POWDER, FOR SUSPENSION ORAL 2 TIMES DAILY
Status: DISCONTINUED | OUTPATIENT
Start: 2022-12-28 | End: 2023-01-04

## 2022-12-28 RX ORDER — HYDROMORPHONE HCL IN WATER/PF 6 MG/30 ML
0.2 PATIENT CONTROLLED ANALGESIA SYRINGE INTRAVENOUS EVERY 4 HOURS PRN
Status: DISCONTINUED | OUTPATIENT
Start: 2022-12-28 | End: 2022-12-31

## 2022-12-28 RX ORDER — GUAIFENESIN 600 MG/1
15 TABLET, EXTENDED RELEASE ORAL DAILY
Status: DISCONTINUED | OUTPATIENT
Start: 2022-12-28 | End: 2023-01-04

## 2022-12-28 RX ORDER — AMOXICILLIN 250 MG
2 CAPSULE ORAL 2 TIMES DAILY
Status: DISCONTINUED | OUTPATIENT
Start: 2022-12-28 | End: 2023-01-03

## 2022-12-28 RX ORDER — HYDRALAZINE HYDROCHLORIDE 20 MG/ML
10 INJECTION INTRAMUSCULAR; INTRAVENOUS ONCE
Status: COMPLETED | OUTPATIENT
Start: 2022-12-28 | End: 2022-12-28

## 2022-12-28 RX ORDER — POTASSIUM CHLORIDE 29.8 MG/ML
20 INJECTION INTRAVENOUS ONCE
Status: COMPLETED | OUTPATIENT
Start: 2022-12-28 | End: 2022-12-28

## 2022-12-28 RX ORDER — LIDOCAINE HYDROCHLORIDE 20 MG/ML
5 SOLUTION OROPHARYNGEAL ONCE
Status: COMPLETED | OUTPATIENT
Start: 2022-12-28 | End: 2022-12-28

## 2022-12-28 RX ORDER — POLYETHYLENE GLYCOL 3350 17 G/17G
17 POWDER, FOR SOLUTION ORAL 2 TIMES DAILY
Status: DISCONTINUED | OUTPATIENT
Start: 2022-12-28 | End: 2022-12-30

## 2022-12-28 RX ORDER — DEXTROSE MONOHYDRATE 100 MG/ML
INJECTION, SOLUTION INTRAVENOUS CONTINUOUS PRN
Status: DISCONTINUED | OUTPATIENT
Start: 2022-12-28 | End: 2023-01-13 | Stop reason: HOSPADM

## 2022-12-28 RX ORDER — HYDROMORPHONE HCL IN WATER/PF 6 MG/30 ML
0.4 PATIENT CONTROLLED ANALGESIA SYRINGE INTRAVENOUS EVERY 4 HOURS PRN
Status: DISCONTINUED | OUTPATIENT
Start: 2022-12-28 | End: 2022-12-31

## 2022-12-28 RX ORDER — HEPARIN SODIUM 5000 [USP'U]/.5ML
5000 INJECTION, SOLUTION INTRAVENOUS; SUBCUTANEOUS EVERY 8 HOURS SCHEDULED
Status: DISCONTINUED | OUTPATIENT
Start: 2022-12-29 | End: 2022-12-29

## 2022-12-28 RX ORDER — POTASSIUM CHLORIDE 1.5 G/1.58G
20 POWDER, FOR SOLUTION ORAL ONCE
Status: COMPLETED | OUTPATIENT
Start: 2022-12-28 | End: 2022-12-29

## 2022-12-28 RX ADMIN — ONDANSETRON 4 MG: 4 TABLET, ORALLY DISINTEGRATING ORAL at 20:45

## 2022-12-28 RX ADMIN — HYDROMORPHONE HYDROCHLORIDE 0.4 MG: 0.2 INJECTION, SOLUTION INTRAMUSCULAR; INTRAVENOUS; SUBCUTANEOUS at 06:06

## 2022-12-28 RX ADMIN — Medication 5 MG: at 20:28

## 2022-12-28 RX ADMIN — POTASSIUM CHLORIDE 20 MEQ: 29.8 INJECTION, SOLUTION INTRAVENOUS at 17:17

## 2022-12-28 RX ADMIN — PANTOPRAZOLE SODIUM 40 MG: 40 INJECTION, POWDER, FOR SOLUTION INTRAVENOUS at 07:37

## 2022-12-28 RX ADMIN — HYDROMORPHONE HYDROCHLORIDE 0.4 MG: 0.2 INJECTION, SOLUTION INTRAMUSCULAR; INTRAVENOUS; SUBCUTANEOUS at 20:45

## 2022-12-28 RX ADMIN — MYCOPHENOLATE MOFETIL 1500 MG: 500 INJECTION, POWDER, LYOPHILIZED, FOR SOLUTION INTRAVENOUS at 11:06

## 2022-12-28 RX ADMIN — CEFEPIME HYDROCHLORIDE 2 G: 2 INJECTION, POWDER, FOR SOLUTION INTRAVENOUS at 21:40

## 2022-12-28 RX ADMIN — LIDOCAINE HYDROCHLORIDE 5 ML: 20 SOLUTION OROPHARYNGEAL at 13:22

## 2022-12-28 RX ADMIN — MULTIVITAMIN 15 ML: LIQUID ORAL at 15:19

## 2022-12-28 RX ADMIN — ACYCLOVIR: 50 OINTMENT TOPICAL at 11:07

## 2022-12-28 RX ADMIN — OXYCODONE HYDROCHLORIDE 5 MG: 5 TABLET ORAL at 18:35

## 2022-12-28 RX ADMIN — ACYCLOVIR: 50 OINTMENT TOPICAL at 03:41

## 2022-12-28 RX ADMIN — ACYCLOVIR: 50 OINTMENT TOPICAL at 20:26

## 2022-12-28 RX ADMIN — TERBUTALINE SULFATE 10 MG: 5 TABLET ORAL at 21:38

## 2022-12-28 RX ADMIN — METHOCARBAMOL 500 MG: 500 TABLET ORAL at 17:25

## 2022-12-28 RX ADMIN — HYDRALAZINE HYDROCHLORIDE 10 MG: 20 INJECTION INTRAMUSCULAR; INTRAVENOUS at 07:35

## 2022-12-28 RX ADMIN — ACYCLOVIR: 50 OINTMENT TOPICAL at 15:16

## 2022-12-28 RX ADMIN — ACYCLOVIR: 50 OINTMENT TOPICAL at 08:52

## 2022-12-28 RX ADMIN — MYCOPHENOLATE MOFETIL 1500 MG: 200 POWDER, FOR SUSPENSION ORAL at 21:38

## 2022-12-28 RX ADMIN — POTASSIUM CHLORIDE 20 MEQ: 29.8 INJECTION, SOLUTION INTRAVENOUS at 05:49

## 2022-12-28 RX ADMIN — ACYCLOVIR: 50 OINTMENT TOPICAL at 05:49

## 2022-12-28 RX ADMIN — METHYLPREDNISOLONE SODIUM SUCCINATE 32 MG: 40 INJECTION, POWDER, FOR SOLUTION INTRAMUSCULAR; INTRAVENOUS at 10:58

## 2022-12-28 RX ADMIN — ACETAMINOPHEN 975 MG: 325 TABLET, FILM COATED ORAL at 15:19

## 2022-12-28 RX ADMIN — HEPARIN SODIUM 5000 UNITS: 5000 INJECTION, SOLUTION INTRAVENOUS; SUBCUTANEOUS at 05:49

## 2022-12-28 RX ADMIN — POLYETHYLENE GLYCOL 3350 17 G: 17 POWDER, FOR SOLUTION ORAL at 20:29

## 2022-12-28 RX ADMIN — ACYCLOVIR: 50 OINTMENT TOPICAL at 17:30

## 2022-12-28 RX ADMIN — SENNOSIDES AND DOCUSATE SODIUM 2 TABLET: 8.6; 5 TABLET ORAL at 20:27

## 2022-12-28 RX ADMIN — POTASSIUM CHLORIDE 20 MEQ: 29.8 INJECTION, SOLUTION INTRAVENOUS at 04:50

## 2022-12-28 RX ADMIN — HYDROMORPHONE HYDROCHLORIDE 0.4 MG: 0.2 INJECTION, SOLUTION INTRAMUSCULAR; INTRAVENOUS; SUBCUTANEOUS at 03:35

## 2022-12-28 RX ADMIN — ISOPROTERENOL HYDROCHLORIDE 0.01 MCG/KG/MIN: 0.2 INJECTION, SOLUTION INTRAMUSCULAR; INTRAVENOUS at 10:06

## 2022-12-28 RX ADMIN — TERBUTALINE SULFATE 10 MG: 5 TABLET ORAL at 15:15

## 2022-12-28 RX ADMIN — CEFEPIME HYDROCHLORIDE 2 G: 2 INJECTION, POWDER, FOR SOLUTION INTRAVENOUS at 10:03

## 2022-12-28 RX ADMIN — PREDNISONE 40 MG: 20 TABLET ORAL at 20:28

## 2022-12-28 ASSESSMENT — ACTIVITIES OF DAILY LIVING (ADL)
ADLS_ACUITY_SCORE: 42

## 2022-12-28 NOTE — PROGRESS NOTES
CV ICU Progress Note  12/28/2022        CO-MORBIDITIES:   Patient Active Problem List   Diagnosis     Acute respiratory failure with hypoxia (H)     Cardiomyopathy (H)     Stroke (H)     Coronary artery disease involving native coronary artery of native heart without angina pectoris     Essential hypertension     NSVT (nonsustained ventricular tachycardia)     SOB (shortness of breath)     CHF (congestive heart failure) (H)     Troponin level elevated     Anginal equivalent (H)     Heart failure, unspecified HF chronicity, unspecified heart failure type (H)     Benign neoplasm of colon     Acute on chronic combined systolic (congestive) and diastolic (congestive) heart failure (H)     Hyperlipidemia     Syncope and collapse     Primary central nervous system vasculitis (H)     Vasculitis, CNS (H)     Acute embolism and thrombosis of left peroneal vein (H)       ASSESSMENT: Paco Stein is a 67 year old male with a history of chronic systolic heart failure (suspected predominantly nonischemic cardiomyopathy, possibly arrhythmogenic), coronary artery disease (s/p PCI with TILA to LCx 4/2013 and to the LAD in 10/2021), hyperlipidemia, CNS vasculitis and stage 2 CKD who presents with transient bilateral hand tightness with unrevealing workup. He underwent DDHT on 12/25/22 with Dr. Rodriguez.      PLAN:  Neuro/ pain/ sedation:  Acute Postoperative pain  Primary central nervous system vasculitis  - Monitor neurological status. Notify the MD for any acute changes in exam.  - Pain: Scheduled tylenol. PRN oxycodone, dilaudid (space out). Lido patch   - PTA ativan and trazodone, hold. Have not been using much    Pulmonary care:   Postoperative ventilation management  - extubated 12/27  - on 4L NC  - Titrate supplemental oxygen to maintain saturation above 92%.  - PTA albuterol   - CT midaxillary added, CXR with improvement    Cardiovascular:    S/p heart tx with Dr. Rodriguez on 12/25/22  History of  CAD  HFrEF  ICD  Dyslipidemia  HTN  Elevated troponin  Recent echo on 11/16 with LVEF of 20-25%  - Monitor hemodynamic status.   - Goal MAP 65, SBP<120  - Statin start POD5  - BB hold  - ASA: start POD5  - Norepi, vaso gtt; wean as tolerated  - Isopro if HR drops below 100  - PTA amio 200 BID, ASA 81, lasix 20 BID PO, pravastatin 20 daily, hold   - epi wean   - isoproterenol off when terbutaline takes effect  - terbutaline 10 TID for goal of coming off of isuprel  - if CT, non con for the kidneys     GI care/ Nutrition:   malnutrition  - SLP eval for swallow  - PPI  - Continue bowel regimen: miralax, senna  - CTM LFT  - TF placement per RD     Renal/ Fluid Balance/ Electrolytes:  CRI not on dialysis  BL creat appears to be ~ 0.8  - Strict I/O, daily weights  - Avoid/limit nephrotoxins as able  - Replete lytes PRN per protocol  - Continue to delay tacro   - FeNa, Josette <20  - CVP 10-12  - follow CVP and other parameters for consideration of diuresis    Endocrine:    Stress induced hyperglycemia  Preop A1c 6.2  - Insulin gtt  - Goal BG <180 for optimal healing  - on methylpred, mycophnolate, will switch to IV for now    ID/ Antibiotics:  Stress induced leukocytosis  - To complete perioperative regimen  - Continue to monitor fever curve, WBC and inflammatory markers as appropriate  - WBC trending down overall (on steroid)  - on vanc and cefepime   - valcyte and bactrim on POD5, dapsone if MILENA does not improve  - topical acyclovir   - differential 12/28 AM - increased granulocytes and neutrophils, consistent with steroid administration     Heme:     Stress induced leukocytosis  Acute blood loss anemia  Acute blood loss thrombocytopenia  H/o R axillary and L peroneal DVT  No s/sx active bleeding  - hgb goal 8<  - Continue to monitor  - CBC   - PTA apixaban, hold    MSK/ Skin:  Sternotomy  Surgical Incision  - Sternal precautions  - Postoperative incision management per protocol  - PT/OT/CR    Prophylaxis:    - Mechanical  prophylaxis for DVT  - Chemical DVT prophylaxis - SQH 5000 q8, hold for today and restart tomorrow at 1200 if no signs of bleeding  - PPI    Lines/ tubes/ drains:  - Arterial Line, CTs, R MAC line, R PICC, donovan  - NJ/NG to be placed  - dialysis line if no response to lasix    Disposition:  - CVICU    ICU Checklist  F - Feeding:   A - Analgesia:   S - Sedation:   T - Thromboembolic prophylaxis:      H - Head of bed elevated  U - Ulcer prophylaxis:  G - Glycemic control  S - SBT     B - Bowel regimen  I - Indwelling catheter  D - De-escalation of antibiotics    Patient seen, findings and plan discussed CVICU attending    Patricia Currie MD  Anesthesiology, CA-2      ====================================    Interval history:  SLP consult for failed bedside swallow. Did not get terbutaline, isuprel restarted. A line was not correlating.     PAST MEDICAL HISTORY:   Past Medical History:   Diagnosis Date     Congestive heart failure (H)        PAST SURGICAL HISTORY:   Past Surgical History:   Procedure Laterality Date     COLONOSCOPY N/A 11/17/2022    Procedure: COLONOSCOPY;  Surgeon: Roverto Nino MD;  Location:  GI     CV CORONARY ANGIOGRAM N/A 11/11/2022    Procedure: Coronary Angiogram;  Surgeon: Justo Bush MD;  Location:  HEART CARDIAC CATH LAB     CV RIGHT HEART CATH MEASUREMENTS RECORDED N/A 11/11/2022    Procedure: Right Heart Catheterization;  Surgeon: Justo Bush MD;  Location:  HEART CARDIAC CATH LAB     CV RIGHT HEART CATH MEASUREMENTS RECORDED N/A 12/8/2022    Procedure: Right Heart Catheterization;  Surgeon: Wayne Xavier MD;  Location:  HEART CARDIAC CATH LAB     CV RIGHT HEART CATH MEASUREMENTS RECORDED N/A 12/9/2022    Procedure: Right Heart Catheterization with leave-in [place];  Surgeon: Khari Mcneill MD;  Location:  HEART CARDIAC CATH LAB     PICC DOUBLE LUMEN PLACEMENT Right 11/10/2022    Right basilic, 43 cm, 1 cm external length     TRANSPLANT  "HEART RECIPIENT N/A 12/25/2022    Procedure: TRANSPLANT, HEART, RECIPIENT; Median Sternotomy, Cardiopulmonary Bypass, Removal of Pacemaker;  Surgeon: Brendan Rodriguez MD;  Location:  OR       FAMILY HISTORY:   Family History   Problem Relation Age of Onset     Atrial fibrillation Father      Lung Cancer Brother        SOCIAL HISTORY:   Social History     Tobacco Use     Smoking status: Never     Smokeless tobacco: Never   Substance Use Topics     Alcohol use: Not Currently         OBJECTIVE:   1. VITAL SIGNS:    Vital signs:  Temp: 99.3  F (37.4  C) Temp src: Pulmonary Artery BP: 134/73 Pulse: 105   Resp: 18 SpO2: 96 % O2 Device: Nasal cannula Oxygen Delivery: 4 LPM Height: 188 cm (6' 2\") Weight: 104.3 kg (229 lb 15 oz)  Estimated body mass index is 29.52 kg/m  as calculated from the following:    Height as of this encounter: 1.88 m (6' 2\").    Weight as of this encounter: 104.3 kg (229 lb 15 oz).          2. INTAKE/ OUTPUT:    I/O last 3 completed shifts:  In: 2903.69 [I.V.:1633.69; NG/GT:370]  Out: 4018 [Urine:1948; Chest Tube:2070]      3. PHYSICAL EXAMINATION:     General: NAD  Neuro: AAOx3, follows commands  Resp: NLB on 4L NC  CV: S1, S2, sinus tach, no m/r/g   Abdomen: Soft, non-distended, non-tender  Incisions: c/d/i  Extremities: warm and well perfused, no edema  CT: To suction, serosang output, no airleak, crepitus    4. INVESTIGATIONS:   Arterial Blood Gases   Recent Labs   Lab 12/28/22  0344 12/27/22 2211 12/27/22  1618 12/27/22  1431   PH 7.43 7.43 7.35 7.37   PCO2 38 37 35 31*   PO2 99 68* 109* 94   HCO3 25 25 19* 18*     Complete Blood Count   Recent Labs   Lab 12/28/22  0343 12/27/22  2210 12/27/22  1852 12/27/22  1617 12/27/22  1518   WBC 20.1* 21.9*  --  26.2* 25.7*   HGB 8.3* 8.7* 8.5* 6.9* 7.1*   PLT 97* 105*  --  137* 134*     Basic Metabolic Panel  Recent Labs   Lab 12/28/22  0654 12/28/22  0554 12/28/22  0349 12/28/22  0343 12/27/22  2214 12/27/22  2210 12/27/22  1621 12/27/22  1617 " 12/27/22  1518   NA  --   --   --  142  --  142  --  140 142   POTASSIUM  --   --   --  3.3*  --  3.3*  --  3.8 3.7   CHLORIDE  --   --   --  107  --  106  --  102 105   CO2  --   --   --  21*  --  21*  --  15* 15*   BUN  --   --   --  71.5*  --  69.2*  --  78.8* 67.7*   CR  --   --   --  2.69*  --  3.02*  --  3.38* 3.23*   GLC 94 102* 112* 106*   < > 103*   < > 179* 185*    < > = values in this interval not displayed.     Liver Function Tests  Recent Labs   Lab 12/28/22  0343 12/27/22  1518 12/27/22  0346 12/26/22  1028 12/26/22  0359 12/25/22  2303 12/25/22  2150 12/25/22  0604   AST 42 48 66* 88*   < > 137*  --  33   ALT 36 42 45 49   < > 68*  --  54*   ALKPHOS 48 52 58 45   < > 71  --  68   BILITOTAL 0.5 0.4 0.4 0.6   < > 0.9  --  0.5   ALBUMIN 3.0* 3.0* 3.1* 3.2*   < > 3.8  --  3.6   INR  --   --   --   --   --  1.27* 1.55* 1.04    < > = values in this interval not displayed.     Pancreatic Enzymes  Recent Labs   Lab 12/25/22  0604   AMYLASE 77     Coagulation Profile  Recent Labs   Lab 12/25/22  2303 12/25/22  2150 12/25/22  0604   INR 1.27* 1.55* 1.04   PTT 46* 30 84*         5. RADIOLOGY:   Recent Results (from the past 24 hour(s))   XR Chest Port 1 View    Narrative    Portable chest 12/27/2022 at 0855 hours    INDICATION: Confirm position of Tiff following repositioning    COMPARISON: 0057 hours earlier today    FINDINGS: No significant change in aeration of the lungs from  previous. This includes probable left pleural effusion and  retrocardiac atelectasis/consolidation. Left perihilar vague  opacification is also prominent. Endotracheal tube, mediastinal drain  and left basilar thoracostomy tube noted along with right basilar  thoracostomy tube. Right PICC tip in the SVC. Endotracheal tube tip  approximately 4.6 cm above the binh. NG/OG tube beyond the inferior  margin of the image. The right IJ [place]-Aniceto catheter tip is in the  right main branch pulmonary artery.      Impression    IMPRESSION: No  significant change in aeration of the lungs from  earlier this morning. Tip of [place]-Aniceto catheter now in the right main  branch pulmonary artery.    DEIDRE HINTON MD         SYSTEM ID:  [id number]   XR Chest Port 1 View    Narrative    Portable chest 2022 at 1327 hours    INDICATION: New air leak in chest tube after transport    COMPARISON: 0855 hours earlier today    FINDINGS: Heart size normal. Haziness over the left lower lung field  with silhouetting left hemidiaphragm and loss of the left costophrenic  angle are again noted. No discrete pneumothorax. No significant  subcutaneous emphysema. Right IJ [place]-Aniceto catheter tip in the  proximal right main branch pulmonary artery. Mediastinal drain.  Endotracheal tube tip approximately 6.7 cm above the binh. NG/OG  tube progressing beyond the inferior margin of the image. Right upper  extremity PICC line tip in the SVC. Bibasilar thoracostomy tubes.      Impression    IMPRESSION: No evidence of pneumothorax. Continued left pleural  effusion and lung base atelectasis. Numerous support devices as  described.    DEIDRE HINTON MD         SYSTEM ID:  [id number]   Echo Limited   Result Value    LVEF  55-60%    Narrative    [id number]  [id number]  [id number]  [id number]^THAD^KAYLA     [facility],[place]  Echocardiography Laboratory  [address] [place] [address]     Name: SLOAN LAZAR  MRN: [id number]  : 1955  Study Date: 2022 03:13 PM  Age: 67 yrs  Gender: Male  Patient Location: [facility]  Reason For Study: Shock  Ordering Physician: KAYLA ONEIL  Performed By: Caridad Mustafa     BSA: 2.3 m2  Height: 74 in  Weight: 229 lb  HR: 114  BP: 100/60 mmHg  ______________________________________________________________________________  Procedure  Limited Portable Echo Adult. Technically difficult study. Poor acoustic  windows. Limited information was obtained during  study.  ______________________________________________________________________________  Interpretation Summary  Technically difficult study. Poor acoustic windows.  Global and regional left ventricular function is normal with an EF of 55-60%.  Left ventricular size is normal.  The right ventricle is normal size. Global right ventricular function is  normal.  Trivial pericardial effusion is present.  ______________________________________________________________________________  Left Ventricle  Global and regional left ventricular function is normal with an EF of 55-60%.  Left ventricular size is normal.     Right Ventricle  The right ventricle is normal size. Global right ventricular function is  normal.     Atria  The atria cannot be assessed.     Vessels  IVC diameter and respiratory changes fall into an intermediate range  suggesting an RA pressure of 8 mmHg.     Pericardium  Trivial pericardial effusion is present.     Miscellaneous  A left pleural effusion is present.     Compared to Previous Study  This study was compared with the study from 11/6/2022 . S/p OHT.  ______________________________________________________________________________  MMode/2D Measurements & Calculations  IVSd: 1.3 cm  LVIDd: 3.6 cm  LVIDs: 2.5 cm  LVPWd: 1.1 cm  FS: 30.4 %  LV mass(C)d: 144.5 grams  LV mass(C)dI: 62.8 grams/m2  RWT: 0.62     ______________________________________________________________________________  Report approved by: MD Shailesh Adam 12/27/2022 03:58 PM         XR Chest Port 1 View    Narrative    Exam: XR CHEST PORT 1 VIEW, 12/27/2022 6:56 PM    Indication: chest tube placement    Comparison: 12/27/2022 at 1327 hours    Findings:   Portable semiupright frontal view of the chest. Postoperative changes  of the chest with intact median sternotomy wires. Endotracheal tube  has been removed. A right IJ [place]-Aniceto catheter with tip in the  proximal right pulmonary artery. Right upper extremity  PICC tip near  the cavoatrial junction. Bibasilar chest tubes and mediastinal drains  similar to previous. Interval placement of left apically oriented  chest tube.    Trachea is midline. Heart size is normal. Improved aeration of the  left lung with decrease in hazy opacities in the left base. Ongoing  left perihilar and retrocardiac opacities. Small left apical  pneumothorax. No appreciable pneumothorax on the right.      Impression    Impression:   1. Postoperative changes of the chest with interval placement of left  apically oriented chest tube. Small left apical pneumothorax.  2. Decreased left pleural effusion.  3. Ongoing left perihilar and retrocardiac mixed interstitial and  airspace opacities, possibly representing atelectasis and/or edema.  Infection cannot be excluded.     I have personally reviewed the examination and initial interpretation  and I agree with the findings.    ELSY RAMIREZ MD         SYSTEM ID:  [id number]   XR Chest Port 1 View    Narrative    EXAM: XR CHEST PORT 1 VIEW  12/27/2022 9:48 PM     HISTORY:  hemothorax monitoring       COMPARISON:  12/27/2022    FINDINGS:   Stable postsurgical changes of the chest. The right IJ [place]-Aniceto  catheter, bilateral chest tubes, mediastinal drain, and right upper  extremity PICC are stable in position. Intact median sternotomy. The  cardiac silhouette is unchanged. Small left pleural effusion.  Decreased left apical pneumothorax. Decreased left perihilar mixed  interstitial and airspace opacities.       Impression    IMPRESSION:   1. Decreased left apical pneumothorax. Unchanged left pleural  effusion.  2. Decreased left perihilar mixed interstitial and airspace opacities,  likely improving atelectasis/edema.  3. Stable support devices.    I have personally reviewed the examination and initial interpretation  and I agree with the findings.    ELSY RAMIREZ MD         SYSTEM ID:  [id number]       =========================================

## 2022-12-28 NOTE — PROCEDURES
Procedure Note - Chest Tube     Preoperative Diagnosis: Pleural Effusion, Left    Postoperative Diagnosis: same    Operation Performed: Left Chest Tube Thoracostomy    Surgeon: Eyad Oglesby MD    Indications: Paco Stein is a 67 year old male requiring chest tube thoracostomy for pleural effusion.    Anesthesia: 1% Lidocaine with epinephrine    Findings: 1.5 L of dark thin blood evacuated.    Procedure Details:    The patient was in the supine position and the arm was abducted above the level of head for exposure of the axillary space. A scalpel was used to incise the skin through the dermis in a line that followed the rib laterally just inferior to the inframammary crease at roughly the 5th intercostal space. A Nikolski Peon was use to bluntly dissect down to the level of the parietal pleura, and was pushed through to enter the thoracic cavity, taking care not to injure the mediastinal structures or lung parenchyma. A finger then digitized the inside of the chest wall to ensure no significant adhesions were present. Once this was verified, a 28 [place]ag[place] chest tube was advanced towards the apex. The distal end remained clamped until attached to a pleur-e-vac suction device. The tube was fixated to the skin with 0-silk sutures and later dressed with an occlusive dressing. The patient tolerated the procedure well.    Signed:    Eyad Oglesby MD 12/27/2022 at 8:33 PM  Cardiothoracic Surgery Fellow  Pager: [phone]

## 2022-12-28 NOTE — PLAN OF CARE
Major Shift Events:      Neuro/Pain: PENA. Follows commands. Pupils equil & reactive. A&Ox4 beginning of shift. Pt becoming less aware of place by end of shift.    Cardiac: -110.  Terbutaline held due to choking in 2000 meds. Isoprel restarted per CVTS @ 0600 for heart rates dropping to 97.  Epi decreased to 0.02 for hypertension per CVTS. . Aisha checked Q4h. See charting for numbers.    Art line BP increasing to 140-150 systolic this AM. hydralazine ordered Cuff pressure checked. systolic's on cuff 115-125. Discussed with CVTS & instructed to hold Hydralazine for now. Cuff pressures will be used to base interventions off of.     Respiratory: Lung sounds clear diminished. Productive cough. On 4L O2 via NC. Capnography monitored throughout the night.  GI: no BM.   : Urine output 80-160mL per hour.   Endocrine/Diet: insulin gtt infusing. Coughed and choked on 2000 meds in pudding. liquids & food held until a speech evaluation.  Pt had about half of his 2000 meds. Which included prednisone. CVTS aware and okay not replacing this does with IV.     20 Meq of potassium given x3. Recheck scheduled for this AM.     Plan: progress care as able.   For vital signs and complete assessments, please see documentation flowsheets.

## 2022-12-28 NOTE — PROGRESS NOTES
12/28/22 7430   Appointment Info   Signing Clinician's Name / Credentials (SLP) Jodie Shultz MA CCC-SLP   General Information   Onset of Illness/Injury or Date of Surgery 12/06/22   Referring Physician Patricia Currie MD   Patient/Family Therapy Goal Statement (SLP) None stated   Pertinent History of Current Problem Pt is a 67 year old male with a history of chronic systolic heart failure (suspected predominantly nonischemic cardiomyopathy, possibly arrhythmogenic), coronary artery disease (s/p PCI with TILA to LCx 4/2013 and to the LAD in 10/2021), hyperlipidemia, CNS vasculitis and stage 2 CKD who presents with transient bilateral hand tightness with unrevealing workup. He underwent DDHT on 12/25/22 with Dr. Rodriguez. Pt failed RN swallow screen s/p extubation. Clinical swallow eval completed per MD order.   General Observations Alert, upright in chair   Type of Evaluation   Type of Evaluation Swallow Evaluation   Oral Motor   Oral Musculature generally intact   Structural Abnormalities none present   Mucosal Quality dry   Dentition (Oral Motor)   Dentition (Oral Motor) natural dentition   Facial Symmetry (Oral Motor)   Facial Symmetry (Oral Motor) WNL   Lip Function (Oral Motor)   Lip Range of Motion (Oral Motor) WNL   Tongue Function (Oral Motor)   Tongue ROM (Oral Motor) WNL   Jaw Function (Oral Motor)   Jaw Function (Oral Motor) WNL   Cough/Swallow/Gag Reflex (Oral Motor)   Comment, Cough/Swallow/Gag Reflex (Oral Motor) WFL   Vocal Quality/Secretion Management (Oral Motor)   Vocal Quality (Oral Motor) WNL   Comment, Vocal Quality/Secretion Management (Oral Motor) vocal quality has improved since yesterday per RN and family, near baseline today   General Swallowing Observations   Current Diet/Method of Nutritional Intake (General Swallowing Observations, NIS) NPO   Respiratory Support (General Swallowing Observations) nasal cannula   Past History of Dysphagia Pt familiar to this service; regular diet/thin  liquids recommended during previous evaluations.   Swallowing Evaluation Clinical swallow evaluation   Clinical Swallow Evaluation   Feeding Assistance set up only required   Clinical Swallow Evaluation Textures Trialed thin liquids;pureed   Clinical Swallow Eval: Thin Liquid Texture Trial   Mode of Presentation, Thin Liquids spoon;fed by clinician   Volume of Liquid or Food Presented ice chip x1, tsps thin water x2   Oral Phase of Swallow WFL   Pharyngeal Phase of Swallow impaired;throat clearing;repeated swallows   Diagnostic Statement Pt noted to swallow repeatedly for each bolus, followed by frequent throat clearing. Pt endorsed feeling that something was stuck in his throat.   Clinical Swallow Evaluation: Puree Solid Texture Trial   Mode of Presentation, Puree spoon;fed by clinician   Volume of Puree Presented 1/4 tsp pudding   Oral Phase, Puree WFL   Pharyngeal Phase, Puree impaired;coughing/choking;repeated swallows   Diagnostic Statement Pt noted to have prolonged coughing/choking with small bite of pudding, as well as repeated swallows.   Esophageal Phase of Swallow   Patient reports or presents with symptoms of esophageal dysphagia No   Swallowing Recommendations   Diet Consistency Recommendations NPO;consider alternative means of nutrition;free water protocol   Medication Administration Recommendations, Swallowing (SLP) no oral meds   Instrumental Assessment Recommendations pending progress, both studies discussed with pt and family today   General Therapy Interventions   Planned Therapy Interventions Dysphagia Treatment   Dysphagia treatment Oropharyngeal exercise training;Modified diet education;Instruction of safe swallow strategies;Compensatory strategies for swallowing   Intervention Comments instrumental evaluation as needed   Clinical Impression   Criteria for Skilled Therapeutic Interventions Met (SLP Eval) Yes, treatment indicated   SLP Diagnosis Dysphagia s/p heart transplant   Risks & Benefits  of therapy have been explained evaluation/treatment results reviewed;care plan/treatment goals reviewed;risks/benefits reviewed;current/potential barriers reviewed;participants voiced agreement with care plan;participants included;patient;spouse/significant other;daughter;son   Clinical Impression Comments   Clinical swallow eval completed per MD order. Pt presents with dysphagia today s/p heart transplant and extubation. Oral mech exam unremarkable, pt's vocal quality near baseline. Pt assessed with thin liquids (ice chip and tsps thin water) as well as 1/4 tsp pudding. Overt s/sx of aspiration appreciated with both consistencies (overt coughing/choking, throat clearing, repeated swallows). Recommend the pt remain NPO with consideration for alternative means of hydration/nutrition/med administration. Pt OK for ice chips/sips of water after oral care is completed. SLP will follow closely to determine need for further instrumental evaluation and to advance diet when appropriate.     SLP Total Evaluation Time   Eval: oral/pharyngeal swallow function, clinical swallow Minutes (26916) 17   Total Session Time   Total Session Time (sum of timed and untimed services) 27

## 2022-12-28 NOTE — PROGRESS NOTES
OT/CR Evaluation 12/28/22 3990   Appointment Info   Signing Clinician's Name / Credentials (OT) Shayy Casanova, OTR/L   Living Environment   People in Home spouse   Current Living Arrangements house   Home Accessibility stairs to enter home;stairs within home   Number of Stairs, Main Entrance 2   Stair Railings, Main Entrance none   Number of Stairs, Within Home, Primary   (one flight)   Stair Railings, Within Home, Primary railing on right side (ascending)   Transportation Anticipated car, drives self;family or friend will provide   Living Environment Comments Pt lives in a house with his wife. There is a tub shower on the main floor where pt can stay if needed. Pt bedroom is on the upper level where there is a walk-in shower   Self-Care   Usual Activity Tolerance good   Current Activity Tolerance fair   Regular Exercise Yes   Activity/Exercise Type walking   Equipment Currently Used at Home walker, rolling;shower chair   Fall history within last six months no   Activity/Exercise/Self-Care Comment Pt IND prior to september. Pt most recently requiring A x 2 for mobility per wife.   Instrumental Activities of Daily Living (IADL)   Previous Responsibilities meal prep;laundry;shopping;housekeeping;yardwork;medication management;finances;driving   IADL Comments Retired, wife can A   General Information   Onset of Illness/Injury or Date of Surgery 12/25/22   Referring Physician Landon Mariscal MD   Additional Occupational Profile Info/Pertinent History of Current Problem Paco Stein is a 67 year old male with a history of chronic systolic heart failure (suspected predominantly nonischemic cardiomyopathy, possibly arrhythmogenic), coronary artery disease (s/p PCI with TILA to LCx 4/2013 and to the LAD in 10/2021), hyperlipidemia, CNS vasculitis and chronic kidney disease who presents with transient bilateral hand tightness with unrevealing workup. He underwent DDHT on 12/25/22 with Dr. Rodriguez.   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   Existing Precautions/Restrictions cardiac;fall;sternal   Left Upper Extremity (Weight-bearing Status) partial weight-bearing (PWB)   Right Upper Extremity (Weight-bearing Status) partial weight-bearing (PWB)   Cognitive Status Examination   Orientation Status person   Affect/Mental Status (Cognitive) flat/blunted affect;low arousal/lethargic   Follows Commands follows one-step commands;75-90% accuracy   Cognitive Status Comments Pt was very lethargic during session requiring cues to keep eyes open.   Sensory   Sensory Quick Adds sensation intact   Posture   Posture protracted shoulders;forward head position   Range of Motion Comprehensive   General Range of Motion no range of motion deficits identified   Strength Comprehensive (MMT)   Comment, General Manual Muscle Testing (MMT) Assessment Not formally tested d/t sternal precautions   Coordination   Coordination Comments Not formally tested   Bed Mobility   Bed Mobility supine-sit;sit-supine   Supine-Sit [place] (Bed Mobility) maximum assist (25% patient effort);2 person assist   Sit-Supine [place] (Bed Mobility) maximum assist (25% patient effort);2 person assist   Assistive Device (Bed Mobility) draw sheet   Transfers   Transfers sit-stand transfer   Sit-Stand Transfer   Sit-Stand [place] (Transfers) minimum assist (75% patient effort);2 person assist   Activities of Daily Living   BADL Assessment/Intervention bathing;upper body dressing;lower body dressing;grooming;toileting   Bathing Assessment/Intervention   [place] Level (Bathing) not tested   Comment, (Bathing) Per clinical judgement, anticipate max A   Upper Body Dressing Assessment/Training   [place] Level (Upper Body Dressing) not tested   Comment, (Upper Body Dressing) Per clinical judgement, anticipate max A   Lower Body Dressing Assessment/Training   [place] Level (Lower Body  Dressing) dependent (less than 25% patient effort)   Grooming Assessment/Training   [place] Level (Grooming) not tested   Comment, (Grooming) Per clinical judgement, anticipate max A   Toileting   [place] Level (Toileting) dependent (less than 25% patient effort)   Clinical Impression   Criteria for Skilled Therapeutic Interventions Met (OT) Yes, treatment indicated   OT Diagnosis Decreased I/ADL IND and functional mobility   OT Problem List-Impairments impacting ADL problems related to;activity tolerance impaired;balance;cognition;communication;coordination;eating/swallowing;mobility;strength;pain;post-surgical precautions   Assessment of Occupational Performance 5 or more Performance Deficits   Identified Performance Deficits all ADLs and IADLs are affected   Planned Therapy Interventions (OT) ADL retraining;IADL retraining;balance training;bed mobility training;cognition;fine motor coordination training;strengthening;transfer training;home program guidelines;progressive activity/exercise;risk factor education   Clinical Decision Making Complexity (OT) low complexity   Anticipated Equipment Needs Upon Discharge (OT)   (tbd)   Risk & Benefits of therapy have been explained patient;spouse/significant other;daughter;son   OT Total Evaluation Time   OT Eval, Low Complexity Minutes (68852) 7   OT Goals   Therapy Frequency (OT) Daily   OT Predicted Duration/Target Date for Goal Attainment 01/13/23   OT Goals Hygiene/Grooming;Upper Body Dressing;Lower Body Dressing;Lower Body Bathing;Transfers;Toilet Transfer/Toileting;Cardiac Phase 1   OT: Hygiene/Grooming modified independent;within precautions;while standing   OT: Upper Body Dressing Modified independent;within precautions   OT: Lower Body Dressing Modified independent;within precautions   OT: Lower Body Bathing Modified independent;with precautions   OT: Transfer Modified independent;within precautions  (tub/shower transfer)   OT: Toilet Transfer/Toileting  Modified independent;toilet transfer;cleaning and garment management;within precautions   OT: Understanding of cardiac education to maximize quality of life, condition management, and health outcomes Patient;Caregiver   OT: Perform aerobic activity with stable cardiovascular response continuous;10 minutes   OT: Functional/aerobic ambulation tolerance with stable cardiovascular response in order to return to home and community environment Independent;Greater than 300 feet   OT: Navigation of stairs simulating home set up with stable cardiovascular response in order to return to home and community environment   (Defer to PT)   OT Discharge Planning   OT Discharge Recommendation (DC Rec) [facility]-Motivated patient will benefit from intensive, interdisciplinary therapy.  Anticipate will be able to tolerate 3 hours of therapy per day   OT Rationale for DC Rec Pt presents far below functional baseline w/ wife reporting sig decline in func prior to surgery. At this time, rec ARU stay to progress I/ADL IND, strength, endurance, and functional mobility. Pt motivated and w/ good family support   OT Brief overview of current status OH lift   Total Session Time   Total Session Time (sum of timed and untimed services) 7

## 2022-12-28 NOTE — PLAN OF CARE
Goal Outcome Evaluation:      Plan of Care Reviewed With: patient, spouse, child    Overall Patient Progress: improvingOverall Patient Progress: improving    Outcome Evaluation: Feeding tube placed and enteral nutrition started.    Major Shift Events:  A&Ox4. CAM negative. Afebrile. Lifted up to chair today x2. Stood with OT. CAROLYN numbers stable. CI 3.6-5.5. CVP 11-13. PA 40/10s. See flowsheets for complete set of numbers. Isuprel @ 0.005 to maintain HR>100. Now off. Epi weaned off. Did require some levo when standing with therapy to maintain MAP>65. All pressors now off. SBP goal <150 per Cards 2. On 4L O2 NC. CT output minimal. Hgb stable. No BM. NJ placed today by nutrition. TF started at 15mL/hr. Will advance towards goal throughout the night. Okay for ice chips per SLP. Urine output 80-120mL/hr.     Plan: Work with therapies. Encourage IS. Monitor Hgb and hemodynamics.     For vital signs and complete assessments, please see documentation flowsheets.

## 2022-12-28 NOTE — PHARMACY-CONSULT NOTE
Pharmacy Tube Feeding Consult    Medication reviewed for administration by feeding tube and for potential food/drug interactions.    Recommendation: No changes are needed at this time.     Pharmacy will continue to follow as new medications are ordered.    Elisabeth Ragland, FionaD

## 2022-12-28 NOTE — PROGRESS NOTES
[facility]    Advanced Heart Failure Consult Progress Note    Assessment and Plan:     Paco Stein is a 67 year old male with a history of chronic systolic heart failure (suspected predominantly nonischemic cardiomyopathy, possibly arrhythmogenic), coronary artery disease (s/p PCI with TILA to LCx 4/2013 and to the LAD in 10/2021), hyperlipidemia, CNS vasculitis and chronic kidney disease who presents with transient bilateral hand tightness with unrevealing workup, who was admitted for cardiogenic shock and evaluation of advance heart therapies on 12/7. Underwent orthopedic heart transplant on 12/25.     Interval History:  Yesterday afternoon, patient had raising lactate in the context of Hb trending down to 7.1 (from ~8) and increasing pressor requirement. Chest x-ray with increasing pleural effusion on the left side. Decision was made to place a new chest tube on the side of the pleural effusion which resulted to be a small hemothorax. In addition 2 units of RBC were given. All of these interventions, resulted in overall improvement of hemodynamics as well as resolution of lactate.     This morning, patient appears to be a little bit more sleepy than yesterday. We have decided to cut off pain medications to minimize unintended sedation.     From cardiovascular perspective; MAPs on the (74-84), -108 (Had to be reinitiated on isoproterenol as patient couldn't swallow pills thi morning, RA 11, PA 49/17, PCWP 17, Mix venous gas 77, CO/CI 5.6/11. Labs with normal lactate and Hb still slowly down drifting from 8.7 (after 2 PRBC) to 8.0 (most recent one). For pre-load we will keep a CVP goal of 10-12. No on IV diuretics and making good urine so far. Will transfuse if Hb goes below 8. If that happens we will keep close communication with surgeons to see if further imaging/interventionns are needed. Will also hold SQH until tomorrow. For inotropic support, his CO/CI is  excellent, we will start weaning off epinephrine. OK SBP below 150. For his HR, will wean off isoproterenol, once PO terbutaline is started     For his immunosuppression, he is on PO steroids but IV steroids were given this morning as he couldn't swallow. SLP was consulted and patient did not pass swallow test. Feeding tube will get placed today. And we will continue PO steroids. Still on IV MMF and valacyclovir as detailed below     Developed oliguric kidney injury post surgery. Now having good urine output and Cr improving.     Plan:   - CVP goal 10-12  - Transfuse if Hgb < 8  - Wean epinephrine  - Feeding tube placement pending speech therapy eval  - Resume terbutaline  - Try to stop isoproterenol  - 1 dose IV methylpred  - Toxo and EBV donor status pending  - If Hgb continues to decrease, discuss with surgeon and obtain CT chest w/o contrast  - Similac dose tomorrow (we will have to order it tomorrow)    ---------------------------------------------------------------------------  ===CARDIOVASCULAR===  Mr. Stein is a 67 y.o male with hx of Advanced chronic systolic heart failure, EF 15-20%, NYHA class IV, ACC/AHA Stage D who was admitted for ambulatory cardiogenic shock and evaluation of advance therapies. Underwent heart transplant on 12/25 without complications.     Immediate post operative course without complications so far    Graft Function:     Hemodynamics:     Date & Time CVP PA WP PA Sat CO/CI (Indirect Aisha) PVR Current Therapies (Vasoactive meds/MCS)   12/26/22 4 32/13/19 12 63 4.6/2.2 1.5 Epinephrine 0.03, NE 0.06, Vasopressin 4   12/27/22 11 36/14/21 14 48 5.7/2.7 1.2 Epinephrine   12/28/22 11 49/17/27 17 77 11/5.6 0.9 Epinephrine 0.03                           Volume status: CVP at 11 this morning; CVP goal 10-12  o Diuresis: none  o     Cardiac/Graft function:   o Pressors: none  o Inotrope: Will start weaning off epinephrine as detailed above  o Plan to stop isoproterenol and restart terbutaline  10 mg TID once patient has TF. HR gaol > 100    Rejection surveillance:   o Final crossmatch: pending   o Week 1 biopsy, RHC, TTE, and DSAs due: pending  o Echo, DSAs week 1 and week 4, RHC + EMBx done weeks 1-4,      Immunosuppression:    Induction: Yes, CNI sparing protocol with Simulect given POD 0 intraop (given), 2nd dose 20mg IV due POD #4 (on 12/29):    Maintenance:  o Steroids:   - 1000mg IV preop + 500mg IV if still awaiting transplant 24h after initial dose  - IntraOp: 500mg IV at release of cross clamp  - PostOp: 125mg IV q8hrs x 3 doses  - Then, prednisone 1mg/kg, given in 2 divided doses (started at 10 pm on 12/26). Taper by a total of 5mg/day until reach total daily dose of 0.5mg/kg/day dosed BID or 20mg bid.    - Plan for taper by 5mg after each normal biopsy to 5mg daily.  - If unable to give pills need to convert to IV    df  o Cell Cycle Inhibitor: MMF  - Preop: MMF 1500mg po q12h preoperatiely   - Postop: Continue MMF 1500mg po q12h (1000mg po q12h for heart-kidney). Will transition to oral when able  o CNI: Tacrolimus (goal 10-12) started/to start POD#  pending normal renal function.  Obtain daily troughs.     Prophylaxis:  CMV D-/R+; EBV D pending /R + ; Toxo D pending /R negative     Post-op antibiotics:  Cefepime end date 12/30 , Vancomycin end date 12/28  o (**if admitted from home, 72h post transplant, if hospitalized Vanc for 72h post transplant, Cefepime for 5 days post transplant.  If chest left open postop: Vanc/Cef + Micafungin for 72h post chest closure with Cefepime continued for total 5 days. If hx of prior infections, consult with ID. If H/K TP Vanc for 5 days post kidney, Cefepime + Micafungin for 7 days post kidney**)     CMV: (D- /R+).   o CMV prophylaxis: with Valgancylovir, 6 months for D+/R-, 3 months for D+/R+ and D-/R-, no prophylaxis if D-/R-.    Fungal:  Fluconazole/ voriconazole, Nysatin swish and swallow while on steroids.     PJP: bactrim while on steroids. Will need to  be adjusted pending kidney function    Toxo: (D pending/R negative):  If toxo IgG seropositive for either D or R, lifelong Bactrim    Stress ulcer:  IV pantoprazole (Protonix while on steroids)     VTE prophylaxis:  subQ heparin per surgeon approval. Held on 12/28    (HCV: If donor was HCV+/INES+. Will need to send HCV PCR viral load by end of next week to monitor HCV seroconversion.)    # Hx of Multivessel coronary artery disease (s/p PCI with TILA to LCx 4/2013 and to the LAD in 10/2021)  # History of NSVT s/p biventricular PPM/ICD removed during OHT surgery 12/25      ===NEURO/PSYCH===  # Acute toxic metabolic encephalopathy post surgery, improving  -following commands  -as detailed above. 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 No episodes since. Neurological exam unremarkable. Neurology consulted during recent admission as part of VAD/transplant evaluation. MRI/MRA brain was also performed which showed no concern for progression of disease  -Hold PTA cellcept 1000mg qam and 500mg qpm for now    # Incidental punctate cerebellar infarct, likely embolic  Read on MR imaging 11/14/22 without new neuro deficits. Per radiology, appears acute, likely embolic. No known arterial clot history. Reviewed telemetry over admission, no afib or other tachycarrhythmias. Reassuring prior TTE this admission including bubble study, repeat JARED w/o septal defects or thrombus. Treated w/ heparin gtt and transitioned to Eliquis.  - Hold heparin drip post heart transplant    # Insomnia/anxiety  - Hold PTA trazodone and lorazepam       ===PULMONARY===  #Intubated, extubated on 12/28    # Non-productive cough, acute on chronic, resolved  - PRN cough lozenges  - PRN  tessalon pearls    ===GI===  # Shock liver, improving  -daily LFT    # Severe malnutrition in the context of acute  "illness/injury on chronic illness  -TF will be placed today  - Appreciate assistance from RD      ===RENAL===  # Acute kidney injury, improving  # renal failure, improing  Worsening Cr up to 2.6 post op course. Now Cr is improving and patient is having good urine output. CVP goal as detailed above    ===HEME/ONC===  # Occlusive left peroneal DVT  # Non-occlusive right axillary DVT  Identified on doppler 11/15/22 during previous admission.   -Will need plan for anticoagulation in the future    ===ENDOCRINE===  # Hyperglycemia  Managed per CVTS    INFECTIOUS DISEASE  -No active concerns   -Received abx therapy as detailed above    ===SKIN/MSK===  # Bilateral hand tightness, resolved    FEN: NPO, intubated  DVT PPx: SQH was held on 12/28  Code: Full code      Disposition: Pending   Discussed with Dr. Thenappan Jorge Reyes Castro, MD, MS   Cardiovascular disease fellow       Objective:   BP (!) 141/65   Pulse 108   Temp 99.1  F (37.3  C) (Pulmonary Artery)   Resp 25   Ht 1.88 m (6' 2\")   Wt 104.3 kg (229 lb 15 oz)   SpO2 95%   BMI 29.52 kg/m        Wt:   Wt Readings from Last 5 Encounters:   12/27/22 104.3 kg (229 lb 15 oz)   11/21/22 90.9 kg (200 lb 6.4 oz)   11/10/22 108 kg (238 lb 1.6 oz)   11/08/22 95.7 kg (210 lb 14.4 oz)     Physical Exam   GEN: Patient laying in bed. Answering to questions. Looks sleepy.   HEENT: no icterus, EOMI  CV: heart sounds regular and rhythmic. No evident murmur. 2 mediastinal tubes and 2 chest tubes (L and R). Clean dressing covering sternotomy site.   CHEST: Passive movement of air.  ABD: soft, non-tender, non-distended   EXTR: warm extremities. No LE edema      DATA:   Labs, EKGs and imaging reviewed. Pertinent results discussed in assessment and plan above.   "

## 2022-12-28 NOTE — PROGRESS NOTES
Care Management Follow Up    Length of Stay (days): 22    Expected Discharge Date:       Concerns to be Addressed:       Patient plan of care discussed at interdisciplinary rounds: Yes    Anticipated Discharge Disposition: [place] [address]     Anticipated Discharge Services:    Anticipated Discharge DME:      Patient/family educated on Medicare website which has current facility and service quality ratings:    Education Provided on the Discharge Plan:    Patient/Family in Agreement with the Plan:      Referrals Placed by CM/SW:  [facility]   Private pay costs discussed: Not applicable    Additional Information:  Social Work continuing to follow. Pt is s/p heart transplant on 12/25. Pt extubated on 12/27. There was concern for need for CRRT but today nephrology is indicating pt does not need CRRT. Pt's wife left message to discuss local accommodations.     Writer met w/ pt's wife, Arlette, at bedside. Pt's two adult children also present. Pt's wife is currently stay at [facility] and has a contract through them until ~1/13/2023. Wife is interested in getting into the [place] apartment list. Writer has placed her on the list. Currently plenty of apartments available, but will keep her updated if we expect the available apartments to be filled in between now and when her contract is up.     Family is feeling well updated on plan of care and pleased with pt's progress. Social Work to continue to follow for support and discharge planning. Writer has proactively asked [facility] to start following pt's progress.       SAIGE RamirezSW

## 2022-12-28 NOTE — PROGRESS NOTES
Brief Nephrology Note        Mr Emil's Cr peaked at ~3.4 yesterday afternoon and is now down sharply to 2.1 this am.  Given this along with robust UOP he is rapidly trending away from needing RRT.  Will sign off but if we can be of assistance please do not hesitate to contact me at the number below.       Nazario Manrique, SHAKIR CNS  Clinical Nurse Specialist  [phone]

## 2022-12-28 NOTE — PROGRESS NOTES
"CLINICAL NUTRITION SERVICES - BRIEF NOTE     Reason for RD note: Provider order - \"Registered Dietitian to Assess and Order TF per Medical Nutrition Therapy Recommendations\"    New Findings/Chart Review:  -Pt s/p heart transplant on 12/25  -Pt extubated 12/27  -Failed SLP evaluation on 12/28    Interventions:  Updated nutrition needs:  Estimated Energy Needs: 1537-1461 kcals/day (30 - 35 kcals/kg)   Estimated Protein Needs: 110-125 grams protein/day (1.3 - 1.5 grams of pro/kg)     -Once FT placement verified by AXR, recommend initiating EN:  GOAL TF: Novasource Renal @ 55 ml/hr (1320 ml) provides 2640 kcal (32 kcal/kg), 120 g pro (1.4 g/kg), 242 g CHO, 946 ml free water, and 0 g fiber daily.   - Initiate @ 15 ml/hr and advance by 20 ml q8hr as tolerated  - Do not start or advance until lytes (Mg++,K+) WNL and phos>2.0  - Recommend 30 ml q4hr fluid flushes for tube patency. Additional fluids and/or adjustments per MD.    - Order multivitamin/mineral (15 ml/day via FT) to help ensure micronutrient needs being met with suspected hypermetabolic demands and potential interruptions to TF infusions.  - Elevated HOB with gastric feeds     Nutrition will follow per protocol or sooner if consulted.    Felicita Sanderson, MS, RD, LD  4E (CVICU) RD pager: [phone]  Ascom: [phone]  Weekend/Holiday RD pager: [phone]  "

## 2022-12-28 NOTE — PROCEDURES
Small Bowel Feeding Tube Placement Assessment  Reason for Feeding Tube Placement: administration of nutrition and medication  Cortrak Start Time: 1315   Cortrak End Time: 1335  Medicine Delivered During Procedure: lidocaine gel  Placement Successful: yes, presumed post-pyloric (pending AXR)    Procedure Complications: none  Final Placement Sid at exit of nare: 94 cm  Face to Face time with patient: 20 minutes    Bridle Placement:   Reason for bridle placement: securement of FT   Medicine delivered during procedure: lubricating jelly    Procedure: Successful  Location of top of clip on FT: @ 95 cm marker   Condition of nose/skin at time of bridle placement: Unremarkable   Face to Face time with patient: <5 minutes.    Felicita Sanderson, MS, RD, LD  4E (CVICU) RD pager: [phone]  Ascom: [phone]  Weekend/Holiday RD pager: [phone]

## 2022-12-29 ENCOUNTER — APPOINTMENT (OUTPATIENT)
Dept: ULTRASOUND IMAGING | Facility: CLINIC | Age: 67
DRG: 001 | End: 2022-12-29
Attending: SURGERY
Payer: MEDICARE

## 2022-12-29 ENCOUNTER — APPOINTMENT (OUTPATIENT)
Dept: SPEECH THERAPY | Facility: CLINIC | Age: 67
DRG: 001 | End: 2022-12-29
Attending: SURGERY
Payer: MEDICARE

## 2022-12-29 ENCOUNTER — APPOINTMENT (OUTPATIENT)
Dept: GENERAL RADIOLOGY | Facility: CLINIC | Age: 67
DRG: 001 | End: 2022-12-29
Attending: SURGERY
Payer: MEDICARE

## 2022-12-29 ENCOUNTER — APPOINTMENT (OUTPATIENT)
Dept: CT IMAGING | Facility: CLINIC | Age: 67
DRG: 001 | End: 2022-12-29
Attending: SURGERY
Payer: MEDICARE

## 2022-12-29 ENCOUNTER — APPOINTMENT (OUTPATIENT)
Dept: OCCUPATIONAL THERAPY | Facility: CLINIC | Age: 67
DRG: 001 | End: 2022-12-29
Attending: SURGERY
Payer: MEDICARE

## 2022-12-29 LAB
ABO/RH(D): NORMAL
ALBUMIN SERPL BCG-MCNC: 3.6 G/DL (ref 3.5–5.2)
ALLEN'S TEST: ABNORMAL
ALP SERPL-CCNC: 55 U/L (ref 40–129)
ALT SERPL W P-5'-P-CCNC: 33 U/L (ref 10–50)
ANION GAP SERPL CALCULATED.3IONS-SCNC: 10 MMOL/L (ref 7–15)
ANION GAP SERPL CALCULATED.3IONS-SCNC: 10 MMOL/L (ref 7–15)
ANION GAP SERPL CALCULATED.3IONS-SCNC: 8 MMOL/L (ref 7–15)
ANION GAP SERPL CALCULATED.3IONS-SCNC: 9 MMOL/L (ref 7–15)
ANTIBODY SCREEN: NEGATIVE
APTT PPP: 26 SECONDS (ref 22–38)
AST SERPL W P-5'-P-CCNC: 34 U/L (ref 10–50)
BASE EXCESS BLDA CALC-SCNC: 2.6 MMOL/L (ref -9–1.8)
BASE EXCESS BLDA CALC-SCNC: 2.6 MMOL/L (ref -9–1.8)
BASE EXCESS BLDA CALC-SCNC: 2.8 MMOL/L (ref -9–1.8)
BASE EXCESS BLDV CALC-SCNC: -0.2 MMOL/L (ref -7.7–1.9)
BASE EXCESS BLDV CALC-SCNC: 3 MMOL/L (ref -7.7–1.9)
BASOPHILS # BLD AUTO: 0 10E3/UL (ref 0–0.2)
BASOPHILS NFR BLD AUTO: 0 %
BILIRUB DIRECT SERPL-MCNC: <0.2 MG/DL (ref 0–0.3)
BILIRUB SERPL-MCNC: 0.5 MG/DL
BLD PROD TYP BPU: NORMAL
BLOOD COMPONENT TYPE: NORMAL
BUN SERPL-MCNC: 38.8 MG/DL (ref 8–23)
BUN SERPL-MCNC: 42.9 MG/DL (ref 8–23)
BUN SERPL-MCNC: 50.7 MG/DL (ref 8–23)
BUN SERPL-MCNC: 55.3 MG/DL (ref 8–23)
CA-I BLD-MCNC: 4.8 MG/DL (ref 4.4–5.2)
CALCIUM SERPL-MCNC: 8.3 MG/DL (ref 8.8–10.2)
CALCIUM SERPL-MCNC: 8.6 MG/DL (ref 8.8–10.2)
CALCIUM SERPL-MCNC: 8.6 MG/DL (ref 8.8–10.2)
CALCIUM SERPL-MCNC: 8.8 MG/DL (ref 8.8–10.2)
CELL TYPE ALLO: NORMAL
CHANNELSHIFTALLOB1: -24
CHANNELSHIFTALLOB2: -7
CHANNELSHIFTALLOT1: -45
CHANNELSHIFTALLOT2: -30
CHLORIDE SERPL-SCNC: 112 MMOL/L (ref 98–107)
CHLORIDE SERPL-SCNC: 115 MMOL/L (ref 98–107)
CHLORIDE SERPL-SCNC: 116 MMOL/L (ref 98–107)
CHLORIDE SERPL-SCNC: 116 MMOL/L (ref 98–107)
CODING SYSTEM: NORMAL
CREAT SERPL-MCNC: 0.88 MG/DL (ref 0.67–1.17)
CREAT SERPL-MCNC: 0.93 MG/DL (ref 0.67–1.17)
CREAT SERPL-MCNC: 1.07 MG/DL (ref 0.67–1.17)
CREAT SERPL-MCNC: 1.19 MG/DL (ref 0.67–1.17)
CROSSMATCH: NORMAL
CROSSMATCHDATEALLO: NORMAL
DEPRECATED HCO3 PLAS-SCNC: 23 MMOL/L (ref 22–29)
DEPRECATED HCO3 PLAS-SCNC: 23 MMOL/L (ref 22–29)
DEPRECATED HCO3 PLAS-SCNC: 24 MMOL/L (ref 22–29)
DEPRECATED HCO3 PLAS-SCNC: 24 MMOL/L (ref 22–29)
DONOR ALLO: NORMAL
DONORCELLDATE ALLO: NORMAL
EOSINOPHIL # BLD AUTO: 0 10E3/UL (ref 0–0.7)
EOSINOPHIL NFR BLD AUTO: 0 %
ERYTHROCYTE [DISTWIDTH] IN BLOOD BY AUTOMATED COUNT: 15.2 % (ref 10–15)
ERYTHROCYTE [DISTWIDTH] IN BLOOD BY AUTOMATED COUNT: 15.4 % (ref 10–15)
ERYTHROCYTE [DISTWIDTH] IN BLOOD BY AUTOMATED COUNT: 15.5 % (ref 10–15)
FIBRINOGEN PPP-MCNC: 301 MG/DL (ref 170–490)
GFR SERPL CREATININE-BSD FRML MDRD: 66 ML/MIN/1.73M2
GFR SERPL CREATININE-BSD FRML MDRD: 75 ML/MIN/1.73M2
GFR SERPL CREATININE-BSD FRML MDRD: 89 ML/MIN/1.73M2
GFR SERPL CREATININE-BSD FRML MDRD: >90 ML/MIN/1.73M2
GLUCOSE BLDC GLUCOMTR-MCNC: 136 MG/DL (ref 70–99)
GLUCOSE BLDC GLUCOMTR-MCNC: 140 MG/DL (ref 70–99)
GLUCOSE BLDC GLUCOMTR-MCNC: 141 MG/DL (ref 70–99)
GLUCOSE BLDC GLUCOMTR-MCNC: 143 MG/DL (ref 70–99)
GLUCOSE BLDC GLUCOMTR-MCNC: 144 MG/DL (ref 70–99)
GLUCOSE BLDC GLUCOMTR-MCNC: 153 MG/DL (ref 70–99)
GLUCOSE BLDC GLUCOMTR-MCNC: 154 MG/DL (ref 70–99)
GLUCOSE BLDC GLUCOMTR-MCNC: 159 MG/DL (ref 70–99)
GLUCOSE BLDC GLUCOMTR-MCNC: 165 MG/DL (ref 70–99)
GLUCOSE BLDC GLUCOMTR-MCNC: 181 MG/DL (ref 70–99)
GLUCOSE SERPL-MCNC: 133 MG/DL (ref 70–99)
GLUCOSE SERPL-MCNC: 141 MG/DL (ref 70–99)
GLUCOSE SERPL-MCNC: 150 MG/DL (ref 70–99)
GLUCOSE SERPL-MCNC: 153 MG/DL (ref 70–99)
HBV DNA SERPL QL NAA+PROBE: NORMAL
HCO3 BLD-SCNC: 27 MMOL/L (ref 21–28)
HCO3 BLDV-SCNC: 24 MMOL/L (ref 21–28)
HCO3 BLDV-SCNC: 28 MMOL/L (ref 21–28)
HCT VFR BLD AUTO: 25.9 % (ref 40–53)
HCT VFR BLD AUTO: 26.1 % (ref 40–53)
HCT VFR BLD AUTO: 27.4 % (ref 40–53)
HCV RNA SERPL QL NAA+PROBE: NORMAL
HGB BLD-MCNC: 8.4 G/DL (ref 13.3–17.7)
HGB BLD-MCNC: 8.6 G/DL (ref 13.3–17.7)
HGB BLD-MCNC: 8.7 G/DL (ref 13.3–17.7)
HIV1+2 RNA SERPL QL NAA+PROBE: NORMAL
IMM GRANULOCYTES # BLD: 0.1 10E3/UL
IMM GRANULOCYTES NFR BLD: 1 %
INR PPP: 1.15 (ref 0.85–1.15)
ISSUE DATE AND TIME: NORMAL
LACTATE SERPL-SCNC: 0.8 MMOL/L (ref 0.7–2)
LACTATE SERPL-SCNC: 0.8 MMOL/L (ref 0.7–2)
LACTATE SERPL-SCNC: 0.9 MMOL/L (ref 0.7–2)
LACTATE SERPL-SCNC: 1 MMOL/L (ref 0.7–2)
LYMPHOCYTES # BLD AUTO: 0.3 10E3/UL (ref 0.8–5.3)
LYMPHOCYTES # BLD AUTO: 0.3 10E3/UL (ref 0.8–5.3)
LYMPHOCYTES # BLD AUTO: 0.4 10E3/UL (ref 0.8–5.3)
LYMPHOCYTES NFR BLD AUTO: 2 %
LYMPHOCYTES NFR BLD AUTO: 2 %
LYMPHOCYTES NFR BLD AUTO: 3 %
MAGNESIUM SERPL-MCNC: 2.4 MG/DL (ref 1.7–2.3)
MCH RBC QN AUTO: 29.2 PG (ref 26.5–33)
MCH RBC QN AUTO: 29.3 PG (ref 26.5–33)
MCH RBC QN AUTO: 29.4 PG (ref 26.5–33)
MCHC RBC AUTO-ENTMCNC: 31.8 G/DL (ref 31.5–36.5)
MCHC RBC AUTO-ENTMCNC: 32.2 G/DL (ref 31.5–36.5)
MCHC RBC AUTO-ENTMCNC: 33.2 G/DL (ref 31.5–36.5)
MCV RBC AUTO: 88 FL (ref 78–100)
MCV RBC AUTO: 91 FL (ref 78–100)
MCV RBC AUTO: 92 FL (ref 78–100)
MONOCYTES # BLD AUTO: 0.8 10E3/UL (ref 0–1.3)
MONOCYTES # BLD AUTO: 0.9 10E3/UL (ref 0–1.3)
MONOCYTES # BLD AUTO: 1.1 10E3/UL (ref 0–1.3)
MONOCYTES NFR BLD AUTO: 6 %
MONOCYTES NFR BLD AUTO: 7 %
MONOCYTES NFR BLD AUTO: 8 %
NEUTROPHILS # BLD AUTO: 11.6 10E3/UL (ref 1.6–8.3)
NEUTROPHILS # BLD AUTO: 12.5 10E3/UL (ref 1.6–8.3)
NEUTROPHILS # BLD AUTO: 12.7 10E3/UL (ref 1.6–8.3)
NEUTROPHILS NFR BLD AUTO: 88 %
NEUTROPHILS NFR BLD AUTO: 90 %
NEUTROPHILS NFR BLD AUTO: 91 %
NRBC # BLD AUTO: 0 10E3/UL
NRBC BLD AUTO-RTO: 0 /100
O2/TOTAL GAS SETTING VFR VENT: 3 %
O2/TOTAL GAS SETTING VFR VENT: 35 %
O2/TOTAL GAS SETTING VFR VENT: 35 %
O2/TOTAL GAS SETTING VFR VENT: 4 %
O2/TOTAL GAS SETTING VFR VENT: 4 %
OXYHGB MFR BLD: 98 % (ref 92–100)
OXYHGB MFR BLDV: 67 % (ref 70–75)
OXYHGB MFR BLDV: 71 % (ref 70–75)
PCO2 BLD: 37 MM HG (ref 35–45)
PCO2 BLDV: 37 MM HG (ref 40–50)
PCO2 BLDV: 42 MM HG (ref 40–50)
PH BLD: 7.46 [PH] (ref 7.35–7.45)
PH BLD: 7.46 [PH] (ref 7.35–7.45)
PH BLD: 7.47 [PH] (ref 7.35–7.45)
PH BLDV: 7.42 [PH] (ref 7.32–7.43)
PH BLDV: 7.43 [PH] (ref 7.32–7.43)
PHOSPHATE SERPL-MCNC: 1.9 MG/DL (ref 2.5–4.5)
PHOSPHATE SERPL-MCNC: 2.4 MG/DL (ref 2.5–4.5)
PLATELET # BLD AUTO: 79 10E3/UL (ref 150–450)
PLATELET # BLD AUTO: 94 10E3/UL (ref 150–450)
PLATELET # BLD AUTO: 97 10E3/UL (ref 150–450)
PO2 BLD: 102 MM HG (ref 80–105)
PO2 BLD: 103 MM HG (ref 80–105)
PO2 BLD: 136 MM HG (ref 80–105)
PO2 BLDV: 37 MM HG (ref 25–47)
PO2 BLDV: 39 MM HG (ref 25–47)
POS CUT OFF ALLO B: >93
POS CUT OFF ALLO T: >79
POTASSIUM SERPL-SCNC: 3.7 MMOL/L (ref 3.4–5.3)
POTASSIUM SERPL-SCNC: 4 MMOL/L (ref 3.4–5.3)
POTASSIUM SERPL-SCNC: 4 MMOL/L (ref 3.4–5.3)
POTASSIUM SERPL-SCNC: 4.3 MMOL/L (ref 3.4–5.3)
PROT SERPL-MCNC: 5.1 G/DL (ref 6.4–8.3)
RBC # BLD AUTO: 2.87 10E6/UL (ref 4.4–5.9)
RBC # BLD AUTO: 2.93 10E6/UL (ref 4.4–5.9)
RBC # BLD AUTO: 2.98 10E6/UL (ref 4.4–5.9)
RESULT ALLO B1: NORMAL
RESULT ALLO B2: NORMAL
RESULT ALLO T1: NORMAL
RESULT ALLO T2: NORMAL
SERUM DATE ALLO B1: NORMAL
SERUM DATE ALLO B2: NORMAL
SERUM DATE ALLO T1: NORMAL
SERUM DATE ALLO T2: NORMAL
SODIUM SERPL-SCNC: 145 MMOL/L (ref 136–145)
SODIUM SERPL-SCNC: 148 MMOL/L (ref 136–145)
SODIUM SERPL-SCNC: 148 MMOL/L (ref 136–145)
SODIUM SERPL-SCNC: 149 MMOL/L (ref 136–145)
SPECIMEN EXPIRATION DATE: NORMAL
TESTMETHODALLO: NORMAL
TREATMENT ALLO B1: NORMAL
TREATMENT ALLO B2: NORMAL
TREATMENT ALLO T1: NORMAL
TREATMENT ALLO T2: NORMAL
UNIT ABO/RH: NORMAL
UNIT NUMBER: NORMAL
UNIT STATUS: NORMAL
UNIT TYPE ISBT: 6200
WBC # BLD AUTO: 12.9 10E3/UL (ref 4–11)
WBC # BLD AUTO: 13.7 10E3/UL (ref 4–11)
WBC # BLD AUTO: 14.3 10E3/UL (ref 4–11)
ZZZCOMMENT ALLOB2: NORMAL

## 2022-12-29 PROCEDURE — 84100 ASSAY OF PHOSPHORUS: CPT

## 2022-12-29 PROCEDURE — 71045 X-RAY EXAM CHEST 1 VIEW: CPT | Mod: 26 | Performed by: RADIOLOGY

## 2022-12-29 PROCEDURE — 250N000012 HC RX MED GY IP 250 OP 636 PS 637: Performed by: SURGERY

## 2022-12-29 PROCEDURE — 86850 RBC ANTIBODY SCREEN: CPT | Performed by: SURGERY

## 2022-12-29 PROCEDURE — 250N000013 HC RX MED GY IP 250 OP 250 PS 637

## 2022-12-29 PROCEDURE — 86901 BLOOD TYPING SEROLOGIC RH(D): CPT | Performed by: SURGERY

## 2022-12-29 PROCEDURE — 86022 PLATELET ANTIBODIES: CPT | Performed by: SURGERY

## 2022-12-29 PROCEDURE — 82805 BLOOD GASES W/O2 SATURATION: CPT | Performed by: SURGERY

## 2022-12-29 PROCEDURE — 82803 BLOOD GASES ANY COMBINATION: CPT

## 2022-12-29 PROCEDURE — 86923 COMPATIBILITY TEST ELECTRIC: CPT | Performed by: STUDENT IN AN ORGANIZED HEALTH CARE EDUCATION/TRAINING PROGRAM

## 2022-12-29 PROCEDURE — 93010 ELECTROCARDIOGRAM REPORT: CPT | Mod: 59 | Performed by: INTERNAL MEDICINE

## 2022-12-29 PROCEDURE — 85004 AUTOMATED DIFF WBC COUNT: CPT | Performed by: STUDENT IN AN ORGANIZED HEALTH CARE EDUCATION/TRAINING PROGRAM

## 2022-12-29 PROCEDURE — 82805 BLOOD GASES W/O2 SATURATION: CPT | Performed by: STUDENT IN AN ORGANIZED HEALTH CARE EDUCATION/TRAINING PROGRAM

## 2022-12-29 PROCEDURE — 83735 ASSAY OF MAGNESIUM: CPT | Performed by: SURGERY

## 2022-12-29 PROCEDURE — 92526 ORAL FUNCTION THERAPY: CPT | Mod: GN

## 2022-12-29 PROCEDURE — 250N000013 HC RX MED GY IP 250 OP 250 PS 637: Performed by: STUDENT IN AN ORGANIZED HEALTH CARE EDUCATION/TRAINING PROGRAM

## 2022-12-29 PROCEDURE — 82330 ASSAY OF CALCIUM: CPT | Performed by: SURGERY

## 2022-12-29 PROCEDURE — 82310 ASSAY OF CALCIUM: CPT | Performed by: SURGERY

## 2022-12-29 PROCEDURE — 82248 BILIRUBIN DIRECT: CPT | Performed by: STUDENT IN AN ORGANIZED HEALTH CARE EDUCATION/TRAINING PROGRAM

## 2022-12-29 PROCEDURE — 99291 CRITICAL CARE FIRST HOUR: CPT | Mod: GC | Performed by: INTERNAL MEDICINE

## 2022-12-29 PROCEDURE — 250N000012 HC RX MED GY IP 250 OP 636 PS 637: Performed by: STUDENT IN AN ORGANIZED HEALTH CARE EDUCATION/TRAINING PROGRAM

## 2022-12-29 PROCEDURE — 71250 CT THORAX DX C-: CPT | Mod: 26 | Performed by: RADIOLOGY

## 2022-12-29 PROCEDURE — P9016 RBC LEUKOCYTES REDUCED: HCPCS | Performed by: STUDENT IN AN ORGANIZED HEALTH CARE EDUCATION/TRAINING PROGRAM

## 2022-12-29 PROCEDURE — 94667 MNPJ CHEST WALL 1ST: CPT

## 2022-12-29 PROCEDURE — 84100 ASSAY OF PHOSPHORUS: CPT | Performed by: SURGERY

## 2022-12-29 PROCEDURE — 83605 ASSAY OF LACTIC ACID: CPT | Performed by: SURGERY

## 2022-12-29 PROCEDURE — 93971 EXTREMITY STUDY: CPT | Mod: LT

## 2022-12-29 PROCEDURE — 250N000009 HC RX 250: Performed by: STUDENT IN AN ORGANIZED HEALTH CARE EDUCATION/TRAINING PROGRAM

## 2022-12-29 PROCEDURE — 85384 FIBRINOGEN ACTIVITY: CPT | Performed by: STUDENT IN AN ORGANIZED HEALTH CARE EDUCATION/TRAINING PROGRAM

## 2022-12-29 PROCEDURE — 258N000003 HC RX IP 258 OP 636: Performed by: STUDENT IN AN ORGANIZED HEALTH CARE EDUCATION/TRAINING PROGRAM

## 2022-12-29 PROCEDURE — 97530 THERAPEUTIC ACTIVITIES: CPT | Mod: GO

## 2022-12-29 PROCEDURE — 250N000011 HC RX IP 250 OP 636

## 2022-12-29 PROCEDURE — 85730 THROMBOPLASTIN TIME PARTIAL: CPT | Performed by: STUDENT IN AN ORGANIZED HEALTH CARE EDUCATION/TRAINING PROGRAM

## 2022-12-29 PROCEDURE — 85025 COMPLETE CBC W/AUTO DIFF WBC: CPT | Performed by: SURGERY

## 2022-12-29 PROCEDURE — C1751 CATH, INF, PER/CENT/MIDLINE: HCPCS

## 2022-12-29 PROCEDURE — 250N000011 HC RX IP 250 OP 636: Performed by: STUDENT IN AN ORGANIZED HEALTH CARE EDUCATION/TRAINING PROGRAM

## 2022-12-29 PROCEDURE — 71250 CT THORAX DX C-: CPT | Mod: MG

## 2022-12-29 PROCEDURE — 250N000013 HC RX MED GY IP 250 OP 250 PS 637: Performed by: SURGERY

## 2022-12-29 PROCEDURE — 999N000157 HC STATISTIC RCP TIME EA 10 MIN

## 2022-12-29 PROCEDURE — 200N000002 HC R&B ICU UMMC

## 2022-12-29 PROCEDURE — 71045 X-RAY EXAM CHEST 1 VIEW: CPT

## 2022-12-29 PROCEDURE — 85610 PROTHROMBIN TIME: CPT | Performed by: STUDENT IN AN ORGANIZED HEALTH CARE EDUCATION/TRAINING PROGRAM

## 2022-12-29 PROCEDURE — 250N000011 HC RX IP 250 OP 636: Performed by: SURGERY

## 2022-12-29 PROCEDURE — G1010 CDSM STANSON: HCPCS | Performed by: RADIOLOGY

## 2022-12-29 PROCEDURE — 94640 AIRWAY INHALATION TREATMENT: CPT

## 2022-12-29 PROCEDURE — 93005 ELECTROCARDIOGRAM TRACING: CPT

## 2022-12-29 PROCEDURE — 93971 EXTREMITY STUDY: CPT | Mod: 26 | Performed by: RADIOLOGY

## 2022-12-29 RX ORDER — LIDOCAINE 4 G/G
1 PATCH TOPICAL
Status: DISCONTINUED | OUTPATIENT
Start: 2022-12-29 | End: 2023-01-13 | Stop reason: HOSPADM

## 2022-12-29 RX ORDER — SULFAMETHOXAZOLE AND TRIMETHOPRIM 200; 40 MG/5ML; MG/5ML
10 SUSPENSION ORAL DAILY
Status: DISCONTINUED | OUTPATIENT
Start: 2022-12-31 | End: 2023-01-02

## 2022-12-29 RX ORDER — LIDOCAINE 4 G/G
1 PATCH TOPICAL
Status: DISCONTINUED | OUTPATIENT
Start: 2022-12-29 | End: 2022-12-29

## 2022-12-29 RX ORDER — POTASSIUM CHLORIDE 1.5 G/1.58G
20 POWDER, FOR SOLUTION ORAL ONCE
Status: COMPLETED | OUTPATIENT
Start: 2022-12-29 | End: 2022-12-29

## 2022-12-29 RX ORDER — HYDRALAZINE HYDROCHLORIDE 20 MG/ML
10 INJECTION INTRAMUSCULAR; INTRAVENOUS EVERY 6 HOURS PRN
Status: DISCONTINUED | OUTPATIENT
Start: 2022-12-29 | End: 2023-01-01

## 2022-12-29 RX ORDER — ACETYLCYSTEINE 100 MG/ML
4 SOLUTION ORAL; RESPIRATORY (INHALATION) EVERY 4 HOURS
Status: DISCONTINUED | OUTPATIENT
Start: 2022-12-29 | End: 2022-12-29

## 2022-12-29 RX ORDER — ALBUTEROL SULFATE 0.83 MG/ML
2.5 SOLUTION RESPIRATORY (INHALATION)
Status: DISCONTINUED | OUTPATIENT
Start: 2022-12-29 | End: 2022-12-29

## 2022-12-29 RX ORDER — HEPARIN SODIUM 5000 [USP'U]/.5ML
5000 INJECTION, SOLUTION INTRAVENOUS; SUBCUTANEOUS EVERY 8 HOURS
Status: DISCONTINUED | OUTPATIENT
Start: 2022-12-29 | End: 2023-01-02

## 2022-12-29 RX ORDER — TACROLIMUS 0.5 MG/1
0.5 CAPSULE ORAL
Status: DISCONTINUED | OUTPATIENT
Start: 2022-12-29 | End: 2022-12-29

## 2022-12-29 RX ORDER — SULFAMETHOXAZOLE AND TRIMETHOPRIM 400; 80 MG/1; MG/1
1 TABLET ORAL DAILY
Status: DISCONTINUED | OUTPATIENT
Start: 2022-12-31 | End: 2023-01-02

## 2022-12-29 RX ORDER — ALBUTEROL SULFATE 0.83 MG/ML
2.5 SOLUTION RESPIRATORY (INHALATION) 4 TIMES DAILY
Status: DISCONTINUED | OUTPATIENT
Start: 2022-12-29 | End: 2023-01-03

## 2022-12-29 RX ORDER — ACETYLCYSTEINE 100 MG/ML
4 SOLUTION ORAL; RESPIRATORY (INHALATION) 4 TIMES DAILY
Status: DISCONTINUED | OUTPATIENT
Start: 2022-12-29 | End: 2022-12-30

## 2022-12-29 RX ADMIN — MYCOPHENOLATE MOFETIL 1500 MG: 200 POWDER, FOR SUSPENSION ORAL at 22:23

## 2022-12-29 RX ADMIN — ONDANSETRON 4 MG: 4 TABLET, ORALLY DISINTEGRATING ORAL at 05:51

## 2022-12-29 RX ADMIN — ACYCLOVIR: 50 OINTMENT TOPICAL at 05:51

## 2022-12-29 RX ADMIN — HYDROMORPHONE HYDROCHLORIDE 0.4 MG: 0.2 INJECTION, SOLUTION INTRAMUSCULAR; INTRAVENOUS; SUBCUTANEOUS at 01:29

## 2022-12-29 RX ADMIN — POTASSIUM & SODIUM PHOSPHATES POWDER PACK 280-160-250 MG 1 PACKET: 280-160-250 PACK at 12:59

## 2022-12-29 RX ADMIN — ACYCLOVIR: 50 OINTMENT TOPICAL at 23:37

## 2022-12-29 RX ADMIN — TACROLIMUS 0.5 MG: 5 CAPSULE ORAL at 18:04

## 2022-12-29 RX ADMIN — PREDNISONE 35 MG: 20 TABLET ORAL at 21:15

## 2022-12-29 RX ADMIN — ACYCLOVIR: 50 OINTMENT TOPICAL at 00:20

## 2022-12-29 RX ADMIN — INSULIN ASPART 1 UNITS: 100 INJECTION, SOLUTION INTRAVENOUS; SUBCUTANEOUS at 18:03

## 2022-12-29 RX ADMIN — ACYCLOVIR: 50 OINTMENT TOPICAL at 14:13

## 2022-12-29 RX ADMIN — SENNOSIDES AND DOCUSATE SODIUM 2 TABLET: 8.6; 5 TABLET ORAL at 08:04

## 2022-12-29 RX ADMIN — MULTIVITAMIN 15 ML: LIQUID ORAL at 08:04

## 2022-12-29 RX ADMIN — CEFEPIME HYDROCHLORIDE 2 G: 2 INJECTION, POWDER, FOR SOLUTION INTRAVENOUS at 09:07

## 2022-12-29 RX ADMIN — ACYCLOVIR: 50 OINTMENT TOPICAL at 08:05

## 2022-12-29 RX ADMIN — HYDROMORPHONE HYDROCHLORIDE 0.4 MG: 0.2 INJECTION, SOLUTION INTRAMUSCULAR; INTRAVENOUS; SUBCUTANEOUS at 05:52

## 2022-12-29 RX ADMIN — POLYETHYLENE GLYCOL 3350 17 G: 17 POWDER, FOR SOLUTION ORAL at 21:16

## 2022-12-29 RX ADMIN — POTASSIUM & SODIUM PHOSPHATES POWDER PACK 280-160-250 MG 1 PACKET: 280-160-250 PACK at 05:51

## 2022-12-29 RX ADMIN — TERBUTALINE SULFATE 10 MG: 5 TABLET ORAL at 14:07

## 2022-12-29 RX ADMIN — POTASSIUM CHLORIDE 20 MEQ: 1.5 POWDER, FOR SOLUTION ORAL at 00:19

## 2022-12-29 RX ADMIN — ACYCLOVIR: 50 OINTMENT TOPICAL at 21:45

## 2022-12-29 RX ADMIN — OXYCODONE HYDROCHLORIDE 10 MG: 10 TABLET ORAL at 23:37

## 2022-12-29 RX ADMIN — OXYCODONE HYDROCHLORIDE 5 MG: 5 TABLET ORAL at 16:08

## 2022-12-29 RX ADMIN — Medication 12.5 MG: at 00:19

## 2022-12-29 RX ADMIN — POTASSIUM & SODIUM PHOSPHATES POWDER PACK 280-160-250 MG 2 PACKET: 280-160-250 PACK at 21:16

## 2022-12-29 RX ADMIN — OXYCODONE HYDROCHLORIDE 5 MG: 5 TABLET ORAL at 08:33

## 2022-12-29 RX ADMIN — CEFEPIME HYDROCHLORIDE 2 G: 2 INJECTION, POWDER, FOR SOLUTION INTRAVENOUS at 22:32

## 2022-12-29 RX ADMIN — ACETYLCYSTEINE 4 ML: 100 SOLUTION ORAL; RESPIRATORY (INHALATION) at 20:10

## 2022-12-29 RX ADMIN — SODIUM CHLORIDE 20 MG: 9 INJECTION, SOLUTION INTRAVENOUS at 11:14

## 2022-12-29 RX ADMIN — POLYETHYLENE GLYCOL 3350 17 G: 17 POWDER, FOR SOLUTION ORAL at 08:04

## 2022-12-29 RX ADMIN — POTASSIUM & SODIUM PHOSPHATES POWDER PACK 280-160-250 MG 2 PACKET: 280-160-250 PACK at 16:33

## 2022-12-29 RX ADMIN — LIDOCAINE PATCH 4% 1 PATCH: 40 PATCH TOPICAL at 11:57

## 2022-12-29 RX ADMIN — Medication 40 MG: at 08:05

## 2022-12-29 RX ADMIN — SENNOSIDES AND DOCUSATE SODIUM 2 TABLET: 8.6; 5 TABLET ORAL at 21:15

## 2022-12-29 RX ADMIN — INSULIN ASPART 1 UNITS: 100 INJECTION, SOLUTION INTRAVENOUS; SUBCUTANEOUS at 21:50

## 2022-12-29 RX ADMIN — METHOCARBAMOL 500 MG: 500 TABLET ORAL at 14:48

## 2022-12-29 RX ADMIN — ALBUTEROL SULFATE 2.5 MG: 2.5 SOLUTION RESPIRATORY (INHALATION) at 20:10

## 2022-12-29 RX ADMIN — POTASSIUM CHLORIDE 20 MEQ: 1.5 POWDER, FOR SOLUTION ORAL at 05:52

## 2022-12-29 RX ADMIN — ACYCLOVIR: 50 OINTMENT TOPICAL at 12:59

## 2022-12-29 RX ADMIN — MYCOPHENOLATE MOFETIL 1500 MG: 200 POWDER, FOR SUSPENSION ORAL at 08:05

## 2022-12-29 RX ADMIN — TERBUTALINE SULFATE 10 MG: 5 TABLET ORAL at 21:18

## 2022-12-29 RX ADMIN — Medication 12.5 MG: at 21:15

## 2022-12-29 RX ADMIN — POTASSIUM & SODIUM PHOSPHATES POWDER PACK 280-160-250 MG 1 PACKET: 280-160-250 PACK at 09:10

## 2022-12-29 RX ADMIN — METHOCARBAMOL 500 MG: 500 TABLET ORAL at 05:51

## 2022-12-29 RX ADMIN — TERBUTALINE SULFATE 10 MG: 5 TABLET ORAL at 05:52

## 2022-12-29 RX ADMIN — INSULIN ASPART 2 UNITS: 100 INJECTION, SOLUTION INTRAVENOUS; SUBCUTANEOUS at 14:13

## 2022-12-29 RX ADMIN — PREDNISONE 40 MG: 20 TABLET ORAL at 08:04

## 2022-12-29 RX ADMIN — OXYCODONE HYDROCHLORIDE 5 MG: 5 TABLET ORAL at 18:56

## 2022-12-29 RX ADMIN — Medication 10 MG: at 21:16

## 2022-12-29 ASSESSMENT — ACTIVITIES OF DAILY LIVING (ADL)
ADLS_ACUITY_SCORE: 42

## 2022-12-29 NOTE — PLAN OF CARE
Nights:  SHAW  Pt is restless and disoriented to time overnight.  Pt knows it is December 2022 unsure of day and time.  Pt picking at all IV lines and donovan catheter.  Updated MD and order for seroquel /melatonin helpful.  Pt slept about 4-6 hours intermittently overnight.   (Isuprel gtt on see MAR).  Pressors off.  MAP 75.  Lungs clear and afebrile.  Insulin gtt on with bg goal <150.  Urine 100cc/hr. CT output about 20-40cc/hr increased overnight.   Hgb drifted down to 7.7.  1 unit PRBC's given and currently Hgb 8.6.  CVP 12.  PA 40/10, SVo2 65%, CI 3.4.  Pain severe with pt breathing shallow and moaning out.  IV Dilaudid helpful.  IS effort is poor but pt is motivated to try.  K+, Phos+ replaced.  Tube feeding at 30cc/hr.    P:  Increase activity, Pain management.  Repeat swallow study. Pulmonary measures. Hemodynamic monitoring.

## 2022-12-29 NOTE — PLAN OF CARE
Goal Outcome Evaluation:      Plan of Care Reviewed With: patient, spouse, child    Overall Patient Progress: improvingOverall Patient Progress: improving    Outcome Evaluation: Off all gtts. CT chest this am.    Major Shift Events:  A&Ox4 throughout the shift. Some confusion and pulling at lines. CAM negative. Afberile. Stood with OT this am. Luis Alfredo removed this am. Last CI 4. Isuprel off. SBP goal <160. CT chest this am to check for bleeding. Chest tube output decreasing 10-20mL/hr. TF @ 35mL/hr. Rechecking Ph and will advance to goal when Ph is WNL. No BM. Urine output 80-100mL/hr.     Plan: Work with therapies. Video swallow study tomorrow.     For vital signs and complete assessments, please see documentation flowsheets.

## 2022-12-29 NOTE — PROGRESS NOTES
CV ICU Progress Note  12/29/2022        CO-MORBIDITIES:   Patient Active Problem List   Diagnosis     Acute respiratory failure with hypoxia (H)     Cardiomyopathy (H)     Stroke (H)     Coronary artery disease involving native coronary artery of native heart without angina pectoris     Essential hypertension     NSVT (nonsustained ventricular tachycardia)     SOB (shortness of breath)     CHF (congestive heart failure) (H)     Troponin level elevated     Anginal equivalent (H)     Heart failure, unspecified HF chronicity, unspecified heart failure type (H)     Benign neoplasm of colon     Acute on chronic combined systolic (congestive) and diastolic (congestive) heart failure (H)     Hyperlipidemia     Syncope and collapse     Primary central nervous system vasculitis (H)     Vasculitis, CNS (H)     Acute embolism and thrombosis of left peroneal vein (H)       ASSESSMENT: Paco Stein is a 67 year old male with a history of chronic systolic heart failure (suspected predominantly nonischemic cardiomyopathy, possibly arrhythmogenic), coronary artery disease (s/p PCI with TILA to LCx 4/2013 and to the LAD in 10/2021), hyperlipidemia, CNS vasculitis and stage 2 CKD who presents with transient bilateral hand tightness with unrevealing workup. He underwent DDHT on 12/25/22 with Dr. Rodriguez.      PLAN:  Neuro/ pain/ sedation:  Acute Postoperative pain  Primary central nervous system vasculitis  - Monitor neurological status. Notify the MD for any acute changes in exam.  - melatonin 10 and seroquel 12.5 HS.  - CAM negative this AM  - Pain: Scheduled tylenol. PRN oxycodone, dilaudid q4h. Lido patch   - PTA ativan and trazodone, hold. Have not been using much    Pulmonary care:   Postoperative ventilation management  - extubated 12/27  - on 4L NC  - Titrate supplemental oxygen to maintain saturation above 92%.  - PTA albuterol   - L pleural effusion with improvement  - discontinue ABG    Cardiovascular:    S/p heart tx  with Dr. Rodriguez on 12/25/22  History of CAD  HFrEF  ICD  Dyslipidemia  HTN  Elevated troponin  Recent echo on 11/16 with LVEF of 20-25%  - Monitor hemodynamic status.   - Goal MAP 65, SBP<120  - Statin start POD5 (12/30)  - BB hold  - ASA: start POD5 (12/30)  - reassess before giving fluids as he may be able to tolerate low CVP  - Norepi, vaso gtt; wean as tolerated  - epi off  - isoproterenol off   - terbutaline 10 TID   - CT chest today  - hydralazine prn for SBP >160  - first bx on Monday   - PTA amio 200 BID, ASA 81, lasix 20 BID PO, pravastatin 20 daily, hold     GI care/ Nutrition:   malnutrition  - SLP eval for swallow   - NPO except ice chips  - PPI  - Continue bowel regimen: miralax, senna  - prn suppository this PM  - CTM LFT  - TF via NJ to goal    Renal/ Fluid Balance/ Electrolytes:  CRI not on dialysis  BL creat appears to be ~ 0.8  - Strict I/O, daily weights  - Avoid/limit nephrotoxins as able  - Replete lytes PRN per protocol  - Low dose tacro  - FeNa, Josette <20  - CVP 10-12  - follow CVP and other parameters for consideration of diuresis    Endocrine:    Stress induced hyperglycemia  Preop A1c 6.2  - Insulin gtt to HDSSI  - Goal BG <180 for optimal healing  - on methylpred, mycophnolate, will switch to IV for now    ID/ Antibiotics:  Stress induced leukocytosis  - To complete perioperative regimen  - Continue to monitor fever curve, WBC and inflammatory markers as appropriate  - WBC trending down overall (on steroid)  - on vanc and cefepime   - valcyte and bactrim on POD5, dapsone if MILENA does not improve  - topical acyclovir   - differential 12/28 AM - increased granulocytes and neutrophils, consistent with steroid administration     Heme:     Stress induced leukocytosis  Acute blood loss anemia  Acute blood loss thrombocytopenia  H/o R axillary and L peroneal DVT  No s/sx active bleeding  - hgb goal 8<  - Continue to monitor  - CBC   - PTA apixaban, hold    MSK/ Skin:  Sternotomy  Surgical  Incision  - Sternal precautions  - Postoperative incision management per protocol  - PT/OT/CR    Prophylaxis:    - Mechanical prophylaxis for DVT  - Chemical DVT prophylaxis - SQH 5000 q8, restart  - PPI    Lines/ tubes/ drains:  - Arterial Line, CTs, R MAC line, donovan  - discontinue PICC and PA cath  - NJ     Disposition:  - CVICU    ICU Checklist  F - Feeding:   A - Analgesia:   S - Sedation:   T - Thromboembolic prophylaxis:      H - Head of bed elevated  U - Ulcer prophylaxis:  G - Glycemic control  S - SBT     B - Bowel regimen  I - Indwelling catheter  D - De-escalation of antibiotics    Patient seen, findings and plan discussed CVICU attending    Patricia Currie MD  Anesthesiology, [facility]      ====================================    Interval history:  Back pain is bothering.       PAST MEDICAL HISTORY:   Past Medical History:   Diagnosis Date     Congestive heart failure (H)        PAST SURGICAL HISTORY:   Past Surgical History:   Procedure Laterality Date     COLONOSCOPY N/A 11/17/2022    Procedure: COLONOSCOPY;  Surgeon: Roverto Nino MD;  Location: [facility]     CV CORONARY ANGIOGRAM N/A 11/11/2022    Procedure: Coronary Angiogram;  Surgeon: Justo Bush MD;  Location:  HEART CARDIAC CATH LAB     CV RIGHT HEART CATH MEASUREMENTS RECORDED N/A 11/11/2022    Procedure: Right Heart Catheterization;  Surgeon: Justo Bush MD;  [facility]     CV RIGHT HEART CATH MEASUREMENTS RECORDED N/A 12/8/2022    Procedure: Right Heart Catheterization;  Surgeon: Wayne Xavier MD;  Location:  HEART CARDIAC CATH LAB     CV RIGHT HEART CATH MEASUREMENTS RECORDED N/A 12/9/2022    Procedure: Right Heart Catheterization with leave-in [place];  Surgeon: Khari Mcneill MD;  Location:  HEART CARDIAC CATH LAB     PICC DOUBLE LUMEN PLACEMENT Right 11/10/2022    Right basilic, 43 cm, 1 cm external length     TRANSPLANT HEART RECIPIENT N/A 12/25/2022    Procedure: TRANSPLANT,  "HEART, RECIPIENT; Median Sternotomy, Cardiopulmonary Bypass, Removal of Pacemaker;  Surgeon: Brendan Rodriguez MD;  Location: [facility]       FAMILY HISTORY:   Family History   Problem Relation Age of Onset     Atrial fibrillation Father      Lung Cancer Brother        SOCIAL HISTORY:   Social History     Tobacco Use     Smoking status: Never     Smokeless tobacco: Never   Substance Use Topics     Alcohol use: Not Currently         OBJECTIVE:   1. VITAL SIGNS:    Vital signs:  Temp: 98.5  F (36.9  C) Temp src: Oral BP: 136/74 Pulse: 104   Resp: 16 SpO2: 97 % O2 Device: Nasal cannula Oxygen Delivery: 4 LPM Height: 188 cm (6' 2\") Weight: 102 kg (224 lb 13.9 oz)  Estimated body mass index is 28.87 kg/m  as calculated from the following:    Height as of this encounter: 1.88 m (6' 2\").    Weight as of this encounter: 102 kg (224 lb 13.9 oz).          2. INTAKE/ OUTPUT:    I/O last 3 completed shifts:  In: 1694.47 [I.V.:1364.47; NG/GT:210]  Out: 3255 [Urine:2465; Chest Tube:790]      3. PHYSICAL EXAMINATION:     General: NAD  Neuro: AAOx3  Resp: NLB on 4L NC  CV: S1, S2, sinus tach, no m/r/g   Abdomen: Soft, non-distended, non-tender  Incisions: c/d/i  Extremities: warm and well perfused, no edema  CT: To suction, serosang output, no airleak, crepitus    4. INVESTIGATIONS:   Arterial Blood Gases   Recent Labs   Lab 12/29/22  0402 12/28/22  2146 12/28/22  1550 12/28/22  1019   PH 7.46* 7.45 7.47* 7.46*   PCO2 37 37 34* 33*   PO2 102 112* 130* 69*   HCO3 27 26 25 23     Complete Blood Count   Recent Labs   Lab 12/29/22  0359 12/28/22  2144 12/28/22  1550 12/28/22  0804   WBC 13.7* 13.0* 14.5* 20.3*   HGB 8.6* 7.7* 8.1* 8.0*   PLT 79* 79* 80* 101*     Basic Metabolic Panel  Recent Labs   Lab 12/29/22  0630 12/29/22  0412 12/29/22  0359 12/29/22  0258 12/29/22  0109 12/28/22  2144 12/28/22  1606 12/28/22  1550 12/28/22  1033 12/28/22  1018   NA  --   --  145  --   --  144  --  144  --  141   POTASSIUM  --   --  3.7  --   " --  3.8  --  3.7  --  4.1   CHLORIDE  --   --  112*  --   --  111*  --  110*  --  107   CO2  --   --  23  --   --  23  --  21*  --  19*   BUN  --   --  55.3*  --   --  61.7*  --  67.7*  --  69.0*   CR  --   --  1.19*  --   --  1.43*  --  1.77*  --  2.12*   * 140* 141* 154*   < > 162*   < > 141*   < > 117*    < > = values in this interval not displayed.     Liver Function Tests  Recent Labs   Lab 12/29/22  0359 12/28/22  0343 12/27/22  1518 12/27/22  0346 12/26/22 0359 12/25/22 2303 12/25/22 2150 12/25/22  0604   AST 34 42 48 66*   < > 137*  --  33   ALT 33 36 42 45   < > 68*  --  54*   ALKPHOS 55 48 52 58   < > 71  --  68   BILITOTAL 0.5 0.5 0.4 0.4   < > 0.9  --  0.5   ALBUMIN 3.6 3.0* 3.0* 3.1*   < > 3.8  --  3.6   INR  --   --   --   --   --  1.27* 1.55* 1.04    < > = values in this interval not displayed.     Pancreatic Enzymes  Recent Labs   Lab 12/25/22  0604   AMYLASE 77     Coagulation Profile  Recent Labs   Lab 12/25/22 2303 12/25/22 2150 12/25/22  0604   INR 1.27* 1.55* 1.04   PTT 46* 30 84*         5. RADIOLOGY:   Recent Results (from the past 24 hour(s))   XR Chest Port 1 View    Narrative    Exam: XR CHEST PORT 1 VIEW, 12/28/2022 7:35 AM    Indication: pleural effusion    Comparison: Chest x-ray 12/27/2022    Findings:     Portable frontal projection x-ray of the chest. Right upper extremity  PICC, right IJ [place] catheter, bibasilar chest tubes, mediastinal  drain, sternotomy wires are unchanged. Small apical pneumothoraces,  left more than right. No pleural effusion. Unchanged cardiomediastinal  silhouette. Left perihilar and basilar opacities.      Impression    Impression:   1. Small apical pneumothoraces, left more than right. Bilateral chest  tubes in place. Minimal increase in the left-sided apical  pneumothorax.  2. Similar appearing left perihilar and basilar opacities, may  represent atelectasis and/or pulmonary edema. Difficult to rule out  infection.  3. Possible trace left  pleural effusion.  4. Unchanged support devices.    MARVIN CAUSEY MD         SYSTEM ID:  [id number]   XR Abdomen Port 1 View    Narrative    XR ABDOMEN PORT 1 VIEW  12/28/2022 2:58 PM      HISTORY: Verify small bowel feeding tube bedside placement    COMPARISON: 12/25/2022    FINDINGS:   Single supine view of the abdomen. Stable partially visualized  thoracic support devices. Left basilar streaky atelectasis. Feeding  tube tip over the proximal jejunum. Nonobstructive bowel gas pattern.  No definite pneumatosis or portal venous gas. Lucencies in the right  hemiabdomen are presumed intraluminal. Cholecystectomy.      Impression    IMPRESSION:   Feeding tube tip over the proximal jejunum.    I have personally reviewed the examination and initial interpretation  and I agree with the findings.    YAMILETH LEE MD         SYSTEM ID:  [id number]   XR Chest Port 1 View    Impression    RESIDENT PRELIMINARY INTERPRETATION  IMPRESSION:  1. Postoperative changes of heart transplant with stable positioning  of support devices.  2. Small residual/unchanged biapical pneumothoraces.  3. Continued perihilar/retrocardiac postsurgical edema/atelectasis  with mild increased atelectasis/edema over the left lower lobe.  Continued follow-up to resolution.  4. Unchanged likely trace left pleural effusion without significant  right pleural effusion.       =========================================

## 2022-12-29 NOTE — PROGRESS NOTES
[facility]    Advanced Heart Failure Consult Progress Note    Assessment and Plan:     Paco Stein is a 67 year old male with a history of chronic systolic heart failure (suspected predominantly nonischemic cardiomyopathy, possibly arrhythmogenic), coronary artery disease (s/p PCI with TILA to LCx 4/2013 and to the LAD in 10/2021), hyperlipidemia, CNS vasculitis and chronic kidney disease who presents with transient bilateral hand tightness with unrevealing workup, who was admitted for cardiogenic shock and evaluation of advance heart therapies on 12/7. Underwent orthopedic heart transplant on 12/25.     Interval History:    Patient overall seems recovering slowly but well from his post operative state. His main issuea are Hb still slowly going down as well as ICU delirium (which is being managed by primary team).    From cardiac perspective, excellent cardiac graft function with stable -140 and swan hemodynamics (RA 10, PA 41/14, PCWP 14, CO/CI 9.4/4) off pressors and isoproterenol. HR > 100 on terbutaline 10 mg TID. No arrythmias. Good urine output and still putting > 400 ml of all chest tubes. We will try a CVP goal close 10-12Ok to give PRN hydralazine for SBP >160. Plan to remove swan and PICC line given all this. Immunosuppression as detailed below.    As far, as his Hb still trending down. Two days ago he had increasing pressor requirement, drop in Hb to ~7 and increasing lactate. C-xray with left sided hemothorax. Hemodynamic improved after new chest tube was placed as well as PRBC x2 were given. Overnight he received an extra PRBC after Hb fell below 8 but in a slower fashion. We will order chest CT without contrast to further evaluate this.      Plan:   - CVP goal 10-12  -chest CT without contrast  -remove swan and PICC  -plan to start tacrolimus 0.5 mg BID  -Simulect  ordered    ---------------------------------------------------------------------------  ===CARDIOVASCULAR===  Mr. Stein is a 67 y.o male with hx of Advanced chronic systolic heart failure, EF 15-20%, NYHA class IV, ACC/AHA Stage D who was admitted for ambulatory cardiogenic shock and evaluation of advance therapies. Underwent heart transplant on 12/25 without complications.     Immediate post operative course without complications so far    Graft Function:     Hemodynamics:     Date & Time CVP PA WP PA Sat CO/CI (Indirect Aisha) PVR Current Therapies (Vasoactive meds/MCS)   12/26/22 4 32/13/19 12 63 4.6/2.2 1.5 Epinephrine 0.03, NE 0.06, Vasopressin 4   12/27/22 11 36/14/21 14 48 5.7/2.7 1.2 Epinephrine   12/28/22 11 49/17/27 17 77 11/5.6 0.9 Epinephrine 0.03   12/29/22 10 41/14/23 14 71 9.1/4 0.9                  Volume status: CVP goal 10-12  o Diuresis: none  o     Cardiac/Graft function:   o Pressors: none  o Inotrope:   o Continue terbutaline 10 mg TID once patient has TF. HR goal > 100    Rejection surveillance:   o Final crossmatch: pending   o Week 1 biopsy, RHC, TTE, and DSAs due: pending  o Echo, DSAs week 1 and week 4, RHC + EMBx done weeks 1-4, first RHC Monday (01/2)     Immunosuppression:    Induction: Yes, CNI sparing protocol with Simulect given POD 0 intraop (given), 2nd dose 20mg IV due POD #4 (on 12/29 given):    Maintenance:  o Steroids:   - 1000mg IV preop + 500mg IV if still awaiting transplant 24h after initial dose  - IntraOp: 500mg IV at release of cross clamp  - PostOp: 125mg IV q8hrs x 3 doses  - Then, prednisone 1mg/kg, given in 2 divided doses (started at 10 pm on 12/26). Taper by a total of 5mg/day until reach total daily dose of 0.5mg/kg/day dosed BID or 20mg bid.    - Plan for taper by 5mg after each normal biopsy to 5mg daily.  - If unable to give pills need to convert to IV    df  o Cell Cycle Inhibitor: MMF  - Preop: MMF 1500mg po q12h preoperatiely   o Postop: Continue MMF 1500mg  po q12h (1000mg po q12h for heart-kidney).   o CNI: Tacrolimus (goal 10-12) started on 12/29.  Tacro 0.5 mg BID Obtain daily troughs.     Prophylaxis:  CMV D-/R+; EBV D pending /R + ; Toxo D pending /R negative     Post-op antibiotics:  Cefepime end date 12/30 , Vancomycin end date 12/28  o (**if admitted from home, 72h post transplant, if hospitalized Vanc for 72h post transplant, Cefepime for 5 days post transplant.  If chest left open postop: Vanc/Cef + Micafungin for 72h post chest closure with Cefepime continued for total 5 days. If hx of prior infections, consult with ID. If H/K TP Vanc for 5 days post kidney, Cefepime + Micafungin for 7 days post kidney**)     CMV: (D- /R+).   o CMV prophylaxis: with Valgancylovir, 6 months for D+/R-, 3 months for D+/R+ and D-/R-, no prophylaxis if D-/R-.    Fungal:  Fluconazole/ voriconazole, Nysatin swish and swallow while on steroids.     PJP: bactrim while on steroids. Will need to be adjusted pending kidney function    Toxo: (D pending/R negative):  If toxo IgG seropositive for either D or R, lifelong Bactrim    Stress ulcer:  IV pantoprazole (Protonix while on steroids)     VTE prophylaxis:  subQ heparin per surgeon approval. Held on 12/28    (HCV: If donor was HCV+/INES+. 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 No episodes since. Neurological exam unremarkable. Neurology consulted during recent admission as part of VAD/transplant  "evaluation. MRI/MRA brain was also performed which showed no concern for progression of disease  -Hold PTA cellcept 1000mg qam and 500mg qpm for now    # Incidental punctate cerebellar infarct, likely embolic  Read on MR imaging 11/14/22 without new neuro deficits. Per radiology, appears acute, likely embolic. No known arterial clot history. Reviewed telemetry over admission, no afib or other tachycarrhythmias. Reassuring prior TTE this admission including bubble study, repeat JARED w/o septal defects or thrombus. Treated w/ heparin gtt and transitioned to Eliquis.  - Hold heparin drip post heart transplant    ===PULMONARY===  #Intubated, extubated on 12/28    # Non-productive cough, acute on chronic, resolved  - PRN cough lozenges  - PRN  tessalon pearls    ===GI===  # Shock liver, improving  -daily LFT    # Severe malnutrition in the context of acute illness/injury on chronic illness  -TF will be placed today  - Appreciate assistance from RD      ===RENAL===  # Acute kidney injury, improving  # renal failure, improing  Worsening Cr up to 2.6 post op course. Now Cr is improving and patient is having good urine output. CVP goal as detailed above    ===HEME/ONC===  # Occlusive left peroneal DVT  # Non-occlusive right axillary DVT  Identified on doppler 11/15/22 during previous admission.   -Will need plan for anticoagulation in the future    ===ENDOCRINE===  # Hyperglycemia  Managed per CVTS    INFECTIOUS DISEASE  -No active concerns   -Received abx therapy as detailed above    ===SKIN/MSK===  # Bilateral hand tightness, resolved    FEN: NPO, intubated  DVT PPx: SQH was held on 12/28  Code: Full code      Disposition: Pending   Discussed with Dr. Thenappan Jorge Reyes Castro, MD, MS   Cardiovascular disease fellow       Objective:   /74   Pulse 104   Temp 98.5  F (36.9  C)   Resp 16   Ht 1.88 m (6' 2\")   Wt 102 kg (224 lb 13.9 oz)   SpO2 97%   BMI 28.87 kg/m        Wt:   Wt Readings from Last 5 " Encounters:   12/29/22 102 kg (224 lb 13.9 oz)   11/21/22 90.9 kg (200 lb 6.4 oz)   11/10/22 108 kg (238 lb 1.6 oz)   11/08/22 95.7 kg (210 lb 14.4 oz)     Physical Exam   GEN: Patient laying in bed. Looks somnolent   HEENT: no icterus, EOMI  CV: heart sounds regular and rhythmic. No evident murmur. 2 mediastinal tubes and 3 chest tubes (L and R). Clean dressing covering sternotomy site.   CHEST: Passive movement of air.  ABD: soft, non-tender, non-distended   EXTR: warm extremities. No LE edema      DATA:   Labs, EKGs and imaging reviewed. Pertinent results discussed in assessment and plan above.

## 2022-12-30 ENCOUNTER — APPOINTMENT (OUTPATIENT)
Dept: SPEECH THERAPY | Facility: CLINIC | Age: 67
DRG: 001 | End: 2022-12-30
Attending: SURGERY
Payer: MEDICARE

## 2022-12-30 ENCOUNTER — APPOINTMENT (OUTPATIENT)
Dept: OCCUPATIONAL THERAPY | Facility: CLINIC | Age: 67
DRG: 001 | End: 2022-12-30
Attending: SURGERY
Payer: MEDICARE

## 2022-12-30 ENCOUNTER — APPOINTMENT (OUTPATIENT)
Dept: GENERAL RADIOLOGY | Facility: CLINIC | Age: 67
DRG: 001 | End: 2022-12-30
Attending: SURGERY
Payer: MEDICARE

## 2022-12-30 LAB
ALBUMIN SERPL BCG-MCNC: 3.3 G/DL (ref 3.5–5.2)
ALP SERPL-CCNC: 63 U/L (ref 40–129)
ALT SERPL W P-5'-P-CCNC: 30 U/L (ref 10–50)
ANION GAP SERPL CALCULATED.3IONS-SCNC: 9 MMOL/L (ref 7–15)
AST SERPL W P-5'-P-CCNC: 30 U/L (ref 10–50)
ATRIAL RATE - MUSE: 97 BPM
BASE EXCESS BLDV CALC-SCNC: 3.3 MMOL/L (ref -7.7–1.9)
BASOPHILS # BLD AUTO: 0 10E3/UL (ref 0–0.2)
BASOPHILS NFR BLD AUTO: 0 %
BILIRUB DIRECT SERPL-MCNC: <0.2 MG/DL (ref 0–0.3)
BILIRUB SERPL-MCNC: 0.6 MG/DL
BUN SERPL-MCNC: 32.5 MG/DL (ref 8–23)
BUN SERPL-MCNC: 33.3 MG/DL (ref 8–23)
BUN SERPL-MCNC: 35.8 MG/DL (ref 8–23)
CALCIUM SERPL-MCNC: 8.4 MG/DL (ref 8.8–10.2)
CALCIUM SERPL-MCNC: 8.6 MG/DL (ref 8.8–10.2)
CALCIUM SERPL-MCNC: 8.6 MG/DL (ref 8.8–10.2)
CHLORIDE SERPL-SCNC: 114 MMOL/L (ref 98–107)
CHLORIDE SERPL-SCNC: 115 MMOL/L (ref 98–107)
CHLORIDE SERPL-SCNC: 116 MMOL/L (ref 98–107)
CREAT SERPL-MCNC: 0.79 MG/DL (ref 0.67–1.17)
CREAT SERPL-MCNC: 0.8 MG/DL (ref 0.67–1.17)
CREAT SERPL-MCNC: 0.82 MG/DL (ref 0.67–1.17)
DEPRECATED HCO3 PLAS-SCNC: 25 MMOL/L (ref 22–29)
DEPRECATED HCO3 PLAS-SCNC: 25 MMOL/L (ref 22–29)
DEPRECATED HCO3 PLAS-SCNC: 26 MMOL/L (ref 22–29)
DIASTOLIC BLOOD PRESSURE - MUSE: NORMAL MMHG
EOSINOPHIL # BLD AUTO: 0 10E3/UL (ref 0–0.7)
EOSINOPHIL NFR BLD AUTO: 0 %
ERYTHROCYTE [DISTWIDTH] IN BLOOD BY AUTOMATED COUNT: 15.9 % (ref 10–15)
ERYTHROCYTE [DISTWIDTH] IN BLOOD BY AUTOMATED COUNT: 16.1 % (ref 10–15)
ERYTHROCYTE [DISTWIDTH] IN BLOOD BY AUTOMATED COUNT: 16.1 % (ref 10–15)
GFR SERPL CREATININE-BSD FRML MDRD: >90 ML/MIN/1.73M2
GLUCOSE BLDC GLUCOMTR-MCNC: 131 MG/DL (ref 70–99)
GLUCOSE BLDC GLUCOMTR-MCNC: 131 MG/DL (ref 70–99)
GLUCOSE BLDC GLUCOMTR-MCNC: 139 MG/DL (ref 70–99)
GLUCOSE BLDC GLUCOMTR-MCNC: 152 MG/DL (ref 70–99)
GLUCOSE BLDC GLUCOMTR-MCNC: 171 MG/DL (ref 70–99)
GLUCOSE SERPL-MCNC: 149 MG/DL (ref 70–99)
GLUCOSE SERPL-MCNC: 153 MG/DL (ref 70–99)
GLUCOSE SERPL-MCNC: 164 MG/DL (ref 70–99)
HCO3 BLDV-SCNC: 28 MMOL/L (ref 21–28)
HCT VFR BLD AUTO: 27.7 % (ref 40–53)
HCT VFR BLD AUTO: 28.5 % (ref 40–53)
HCT VFR BLD AUTO: 30 % (ref 40–53)
HGB BLD-MCNC: 8.5 G/DL (ref 13.3–17.7)
HGB BLD-MCNC: 8.9 G/DL (ref 13.3–17.7)
HGB BLD-MCNC: 9.2 G/DL (ref 13.3–17.7)
HOLD SPECIMEN HIT: NORMAL
IMM GRANULOCYTES # BLD: 0.1 10E3/UL
IMM GRANULOCYTES NFR BLD: 1 %
INTERPRETATION ECG - MUSE: NORMAL
LACTATE SERPL-SCNC: 1.2 MMOL/L (ref 0.7–2)
LACTATE SERPL-SCNC: 1.3 MMOL/L (ref 0.7–2)
LACTATE SERPL-SCNC: 1.7 MMOL/L (ref 0.7–2)
LYMPHOCYTES # BLD AUTO: 0.5 10E3/UL (ref 0.8–5.3)
LYMPHOCYTES NFR BLD AUTO: 4 %
MAGNESIUM SERPL-MCNC: 2 MG/DL (ref 1.7–2.3)
MCH RBC QN AUTO: 29 PG (ref 26.5–33)
MCH RBC QN AUTO: 29.1 PG (ref 26.5–33)
MCH RBC QN AUTO: 29.1 PG (ref 26.5–33)
MCHC RBC AUTO-ENTMCNC: 30.7 G/DL (ref 31.5–36.5)
MCHC RBC AUTO-ENTMCNC: 30.7 G/DL (ref 31.5–36.5)
MCHC RBC AUTO-ENTMCNC: 31.2 G/DL (ref 31.5–36.5)
MCV RBC AUTO: 93 FL (ref 78–100)
MCV RBC AUTO: 95 FL (ref 78–100)
MCV RBC AUTO: 95 FL (ref 78–100)
MONOCYTES # BLD AUTO: 1 10E3/UL (ref 0–1.3)
MONOCYTES NFR BLD AUTO: 8 %
NEUTROPHILS # BLD AUTO: 11.5 10E3/UL (ref 1.6–8.3)
NEUTROPHILS NFR BLD AUTO: 87 %
NRBC # BLD AUTO: 0 10E3/UL
NRBC BLD AUTO-RTO: 0 /100
O2/TOTAL GAS SETTING VFR VENT: 24 %
OXYHGB MFR BLDV: 65 % (ref 70–75)
P AXIS - MUSE: 55 DEGREES
PCO2 BLDV: 43 MM HG (ref 40–50)
PF4 HEPARIN CMPLX AB SER QL: NEGATIVE
PH BLDV: 7.42 [PH] (ref 7.32–7.43)
PHOSPHATE SERPL-MCNC: 2 MG/DL (ref 2.5–4.5)
PLATELET # BLD AUTO: 103 10E3/UL (ref 150–450)
PLATELET # BLD AUTO: 103 10E3/UL (ref 150–450)
PLATELET # BLD AUTO: 104 10E3/UL (ref 150–450)
PO2 BLDV: 36 MM HG (ref 25–47)
POTASSIUM SERPL-SCNC: 3.7 MMOL/L (ref 3.4–5.3)
POTASSIUM SERPL-SCNC: 3.9 MMOL/L (ref 3.4–5.3)
POTASSIUM SERPL-SCNC: 4.2 MMOL/L (ref 3.4–5.3)
PR INTERVAL - MUSE: 124 MS
PROT SERPL-MCNC: 5.3 G/DL (ref 6.4–8.3)
QRS DURATION - MUSE: 88 MS
QT - MUSE: 366 MS
QTC - MUSE: 464 MS
R AXIS - MUSE: 58 DEGREES
RBC # BLD AUTO: 2.93 10E6/UL (ref 4.4–5.9)
RBC # BLD AUTO: 3.06 10E6/UL (ref 4.4–5.9)
RBC # BLD AUTO: 3.16 10E6/UL (ref 4.4–5.9)
SODIUM SERPL-SCNC: 148 MMOL/L (ref 136–145)
SODIUM SERPL-SCNC: 149 MMOL/L (ref 136–145)
SODIUM SERPL-SCNC: 151 MMOL/L (ref 136–145)
SYSTOLIC BLOOD PRESSURE - MUSE: NORMAL MMHG
T AXIS - MUSE: 47 DEGREES
VENTRICULAR RATE- MUSE: 97 BPM
WBC # BLD AUTO: 13.2 10E3/UL (ref 4–11)
WBC # BLD AUTO: 13.2 10E3/UL (ref 4–11)
WBC # BLD AUTO: 14.3 10E3/UL (ref 4–11)

## 2022-12-30 PROCEDURE — 36415 COLL VENOUS BLD VENIPUNCTURE: CPT | Performed by: STUDENT IN AN ORGANIZED HEALTH CARE EDUCATION/TRAINING PROGRAM

## 2022-12-30 PROCEDURE — 250N000013 HC RX MED GY IP 250 OP 250 PS 637: Performed by: SURGERY

## 2022-12-30 PROCEDURE — 92611 MOTION FLUOROSCOPY/SWALLOW: CPT | Mod: GN

## 2022-12-30 PROCEDURE — 250N000009 HC RX 250

## 2022-12-30 PROCEDURE — 250N000013 HC RX MED GY IP 250 OP 250 PS 637: Performed by: STUDENT IN AN ORGANIZED HEALTH CARE EDUCATION/TRAINING PROGRAM

## 2022-12-30 PROCEDURE — 82248 BILIRUBIN DIRECT: CPT | Performed by: STUDENT IN AN ORGANIZED HEALTH CARE EDUCATION/TRAINING PROGRAM

## 2022-12-30 PROCEDURE — 250N000011 HC RX IP 250 OP 636: Performed by: SURGERY

## 2022-12-30 PROCEDURE — 200N000002 HC R&B ICU UMMC

## 2022-12-30 PROCEDURE — 80053 COMPREHEN METABOLIC PANEL: CPT | Performed by: SURGERY

## 2022-12-30 PROCEDURE — 250N000009 HC RX 250: Performed by: STUDENT IN AN ORGANIZED HEALTH CARE EDUCATION/TRAINING PROGRAM

## 2022-12-30 PROCEDURE — 92526 ORAL FUNCTION THERAPY: CPT | Mod: GN

## 2022-12-30 PROCEDURE — 250N000012 HC RX MED GY IP 250 OP 636 PS 637

## 2022-12-30 PROCEDURE — 250N000012 HC RX MED GY IP 250 OP 636 PS 637: Performed by: STUDENT IN AN ORGANIZED HEALTH CARE EDUCATION/TRAINING PROGRAM

## 2022-12-30 PROCEDURE — 250N000011 HC RX IP 250 OP 636: Performed by: STUDENT IN AN ORGANIZED HEALTH CARE EDUCATION/TRAINING PROGRAM

## 2022-12-30 PROCEDURE — 74230 X-RAY XM SWLNG FUNCJ C+: CPT | Mod: 26 | Performed by: STUDENT IN AN ORGANIZED HEALTH CARE EDUCATION/TRAINING PROGRAM

## 2022-12-30 PROCEDURE — 94640 AIRWAY INHALATION TREATMENT: CPT | Mod: 76

## 2022-12-30 PROCEDURE — 250N000013 HC RX MED GY IP 250 OP 250 PS 637

## 2022-12-30 PROCEDURE — 94640 AIRWAY INHALATION TREATMENT: CPT

## 2022-12-30 PROCEDURE — 99291 CRITICAL CARE FIRST HOUR: CPT | Performed by: ANESTHESIOLOGY

## 2022-12-30 PROCEDURE — 83605 ASSAY OF LACTIC ACID: CPT | Performed by: SURGERY

## 2022-12-30 PROCEDURE — 71045 X-RAY EXAM CHEST 1 VIEW: CPT

## 2022-12-30 PROCEDURE — 97535 SELF CARE MNGMENT TRAINING: CPT | Mod: GO

## 2022-12-30 PROCEDURE — 83735 ASSAY OF MAGNESIUM: CPT

## 2022-12-30 PROCEDURE — 82805 BLOOD GASES W/O2 SATURATION: CPT

## 2022-12-30 PROCEDURE — 250N000012 HC RX MED GY IP 250 OP 636 PS 637: Performed by: SURGERY

## 2022-12-30 PROCEDURE — 999N000128 HC STATISTIC PERIPHERAL IV START W/O US GUIDANCE

## 2022-12-30 PROCEDURE — 93010 ELECTROCARDIOGRAM REPORT: CPT | Performed by: INTERNAL MEDICINE

## 2022-12-30 PROCEDURE — 99291 CRITICAL CARE FIRST HOUR: CPT | Mod: GC | Performed by: INTERNAL MEDICINE

## 2022-12-30 PROCEDURE — 82310 ASSAY OF CALCIUM: CPT | Performed by: SURGERY

## 2022-12-30 PROCEDURE — 85027 COMPLETE CBC AUTOMATED: CPT

## 2022-12-30 PROCEDURE — 85025 COMPLETE CBC W/AUTO DIFF WBC: CPT | Performed by: STUDENT IN AN ORGANIZED HEALTH CARE EDUCATION/TRAINING PROGRAM

## 2022-12-30 PROCEDURE — 74230 X-RAY XM SWLNG FUNCJ C+: CPT

## 2022-12-30 PROCEDURE — 250N000011 HC RX IP 250 OP 636

## 2022-12-30 PROCEDURE — 85027 COMPLETE CBC AUTOMATED: CPT | Performed by: SURGERY

## 2022-12-30 PROCEDURE — 84100 ASSAY OF PHOSPHORUS: CPT | Performed by: SURGERY

## 2022-12-30 PROCEDURE — 71045 X-RAY EXAM CHEST 1 VIEW: CPT | Mod: 26 | Performed by: RADIOLOGY

## 2022-12-30 RX ORDER — BARIUM SULFATE 400 MG/ML
SUSPENSION ORAL ONCE
Status: COMPLETED | OUTPATIENT
Start: 2022-12-30 | End: 2022-12-30

## 2022-12-30 RX ORDER — ACETYLCYSTEINE 100 MG/ML
2 SOLUTION ORAL; RESPIRATORY (INHALATION) 4 TIMES DAILY
Status: DISCONTINUED | OUTPATIENT
Start: 2022-12-30 | End: 2023-01-03

## 2022-12-30 RX ORDER — BARIUM SULFATE 400 MG/ML
SUSPENSION ORAL ONCE
Status: DISCONTINUED | OUTPATIENT
Start: 2022-12-30 | End: 2022-12-30

## 2022-12-30 RX ORDER — TACROLIMUS 0.5 MG/1
0.5 CAPSULE ORAL ONCE
Status: DISCONTINUED | OUTPATIENT
Start: 2022-12-30 | End: 2022-12-30

## 2022-12-30 RX ORDER — ROSUVASTATIN CALCIUM 10 MG/1
10 TABLET, COATED ORAL DAILY
Status: DISCONTINUED | OUTPATIENT
Start: 2022-12-30 | End: 2023-01-13 | Stop reason: HOSPADM

## 2022-12-30 RX ORDER — ASPIRIN 81 MG/1
81 TABLET, CHEWABLE ORAL DAILY
Status: DISCONTINUED | OUTPATIENT
Start: 2022-12-30 | End: 2023-01-10

## 2022-12-30 RX ORDER — AMLODIPINE BESYLATE 5 MG/1
5 TABLET ORAL DAILY
Status: DISCONTINUED | OUTPATIENT
Start: 2022-12-30 | End: 2022-12-31

## 2022-12-30 RX ORDER — POLYETHYLENE GLYCOL 3350 17 G/17G
17 POWDER, FOR SOLUTION ORAL DAILY
Status: DISCONTINUED | OUTPATIENT
Start: 2022-12-31 | End: 2023-01-13 | Stop reason: HOSPADM

## 2022-12-30 RX ADMIN — OXYCODONE HYDROCHLORIDE 10 MG: 10 TABLET ORAL at 14:49

## 2022-12-30 RX ADMIN — POTASSIUM & SODIUM PHOSPHATES POWDER PACK 280-160-250 MG 1 PACKET: 280-160-250 PACK at 12:15

## 2022-12-30 RX ADMIN — NYSTATIN 1000000 UNITS: 500000 SUSPENSION ORAL at 21:01

## 2022-12-30 RX ADMIN — NYSTATIN 1000000 UNITS: 500000 SUSPENSION ORAL at 08:41

## 2022-12-30 RX ADMIN — TERBUTALINE SULFATE 10 MG: 5 TABLET ORAL at 14:16

## 2022-12-30 RX ADMIN — LIDOCAINE PATCH 4% 1 PATCH: 40 PATCH TOPICAL at 08:44

## 2022-12-30 RX ADMIN — MYCOPHENOLATE MOFETIL 1500 MG: 200 POWDER, FOR SUSPENSION ORAL at 08:42

## 2022-12-30 RX ADMIN — POTASSIUM & SODIUM PHOSPHATES POWDER PACK 280-160-250 MG 2 PACKET: 280-160-250 PACK at 00:07

## 2022-12-30 RX ADMIN — HEPARIN SODIUM 5000 UNITS: 5000 INJECTION, SOLUTION INTRAVENOUS; SUBCUTANEOUS at 21:00

## 2022-12-30 RX ADMIN — CEFEPIME HYDROCHLORIDE 2 G: 2 INJECTION, POWDER, FOR SOLUTION INTRAVENOUS at 09:56

## 2022-12-30 RX ADMIN — HYDROMORPHONE HYDROCHLORIDE 0.4 MG: 0.2 INJECTION, SOLUTION INTRAMUSCULAR; INTRAVENOUS; SUBCUTANEOUS at 18:39

## 2022-12-30 RX ADMIN — ACYCLOVIR: 50 OINTMENT TOPICAL at 04:13

## 2022-12-30 RX ADMIN — ACYCLOVIR: 50 OINTMENT TOPICAL at 18:30

## 2022-12-30 RX ADMIN — ALBUTEROL SULFATE 2.5 MG: 2.5 SOLUTION RESPIRATORY (INHALATION) at 07:45

## 2022-12-30 RX ADMIN — TACROLIMUS 0.5 MG: 5 CAPSULE ORAL at 08:43

## 2022-12-30 RX ADMIN — ACYCLOVIR: 50 OINTMENT TOPICAL at 21:04

## 2022-12-30 RX ADMIN — HEPARIN SODIUM 5000 UNITS: 5000 INJECTION, SOLUTION INTRAVENOUS; SUBCUTANEOUS at 12:15

## 2022-12-30 RX ADMIN — ASPIRIN 81 MG: 81 TABLET, CHEWABLE ORAL at 12:15

## 2022-12-30 RX ADMIN — POTASSIUM & SODIUM PHOSPHATES POWDER PACK 280-160-250 MG 1 PACKET: 280-160-250 PACK at 08:41

## 2022-12-30 RX ADMIN — ACETYLCYSTEINE 2 ML: 100 SOLUTION ORAL; RESPIRATORY (INHALATION) at 07:45

## 2022-12-30 RX ADMIN — ACYCLOVIR: 50 OINTMENT TOPICAL at 08:43

## 2022-12-30 RX ADMIN — OXYCODONE HYDROCHLORIDE 10 MG: 10 TABLET ORAL at 21:14

## 2022-12-30 RX ADMIN — BARIUM SULFATE 10 ML: 400 SUSPENSION ORAL at 13:29

## 2022-12-30 RX ADMIN — TERBUTALINE SULFATE 10 MG: 5 TABLET ORAL at 05:46

## 2022-12-30 RX ADMIN — PREDNISONE 35 MG: 20 TABLET ORAL at 08:41

## 2022-12-30 RX ADMIN — MULTIVITAMIN 15 ML: LIQUID ORAL at 08:41

## 2022-12-30 RX ADMIN — TACROLIMUS 1 MG: 5 CAPSULE ORAL at 18:30

## 2022-12-30 RX ADMIN — AMLODIPINE BESYLATE 5 MG: 5 TABLET ORAL at 12:15

## 2022-12-30 RX ADMIN — ALBUTEROL SULFATE 2.5 MG: 2.5 SOLUTION RESPIRATORY (INHALATION) at 21:17

## 2022-12-30 RX ADMIN — INSULIN ASPART 2 UNITS: 100 INJECTION, SOLUTION INTRAVENOUS; SUBCUTANEOUS at 09:06

## 2022-12-30 RX ADMIN — INSULIN ASPART 1 UNITS: 100 INJECTION, SOLUTION INTRAVENOUS; SUBCUTANEOUS at 16:32

## 2022-12-30 RX ADMIN — ACYCLOVIR: 50 OINTMENT TOPICAL at 12:16

## 2022-12-30 RX ADMIN — ACYCLOVIR: 50 OINTMENT TOPICAL at 05:46

## 2022-12-30 RX ADMIN — Medication 12.5 MG: at 21:00

## 2022-12-30 RX ADMIN — Medication 10 MG: at 21:00

## 2022-12-30 RX ADMIN — ACETYLCYSTEINE 2 ML: 100 SOLUTION ORAL; RESPIRATORY (INHALATION) at 21:17

## 2022-12-30 RX ADMIN — MYCOPHENOLATE MOFETIL 1500 MG: 200 POWDER, FOR SUSPENSION ORAL at 21:03

## 2022-12-30 RX ADMIN — VALGANCICLOVIR 900 MG: 50 FOR SOLUTION ORAL at 08:43

## 2022-12-30 RX ADMIN — ROSUVASTATIN CALCIUM 10 MG: 10 TABLET, FILM COATED ORAL at 12:15

## 2022-12-30 RX ADMIN — TERBUTALINE SULFATE 10 MG: 5 TABLET ORAL at 21:02

## 2022-12-30 RX ADMIN — TACROLIMUS 0.5 MG: 5 CAPSULE ORAL at 12:16

## 2022-12-30 RX ADMIN — POTASSIUM & SODIUM PHOSPHATES POWDER PACK 280-160-250 MG 1 PACKET: 280-160-250 PACK at 16:29

## 2022-12-30 RX ADMIN — Medication 40 MG: at 08:42

## 2022-12-30 RX ADMIN — PREDNISONE 35 MG: 20 TABLET ORAL at 21:00

## 2022-12-30 ASSESSMENT — ACTIVITIES OF DAILY LIVING (ADL)
ADLS_ACUITY_SCORE: 46
ADLS_ACUITY_SCORE: 42

## 2022-12-30 NOTE — PROGRESS NOTES
CV ICU Progress Note  12/30/2022        CO-MORBIDITIES:   Patient Active Problem List   Diagnosis     Acute respiratory failure with hypoxia (H)     Cardiomyopathy (H)     Stroke (H)     Coronary artery disease involving native coronary artery of native heart without angina pectoris     Essential hypertension     NSVT (nonsustained ventricular tachycardia)     SOB (shortness of breath)     CHF (congestive heart failure) (H)     Troponin level elevated     Anginal equivalent (H)     Heart failure, unspecified HF chronicity, unspecified heart failure type (H)     Benign neoplasm of colon     Acute on chronic combined systolic (congestive) and diastolic (congestive) heart failure (H)     Hyperlipidemia     Syncope and collapse     Primary central nervous system vasculitis (H)     Vasculitis, CNS (H)     Acute embolism and thrombosis of left peroneal vein (H)       ASSESSMENT: Paco Stein is a 67 year old male with a history of chronic systolic heart failure (suspected predominantly nonischemic cardiomyopathy, possibly arrhythmogenic), coronary artery disease (s/p PCI with TILA to LCx 4/2013 and to the LAD in 10/2021), hyperlipidemia, CNS vasculitis and stage 2 CKD who presents with transient bilateral hand tightness with unrevealing workup. He underwent DDHT on 12/25/22 with Dr. Rodriguez.      PLAN:  Neuro/ pain/ sedation:  Acute Postoperative pain  Primary central nervous system vasculitis  - Monitor neurological status. Notify the MD for any acute changes in exam.  - melatonin 10 and seroquel 12.5 HS.  - CAM negative this AM  - Pain: Scheduled tylenol. PRN oxycodone, dilaudid q4h. Lido patch   - PTA ativan and trazodone, hold. Have not been using much    Pulmonary care:   Postoperative ventilation management  - extubated 12/27  - on RA  - Titrate supplemental oxygen to maintain saturation above 92%.  - PTA albuterol   - L pleural effusion with improvement  - CORIE collapse; CPT, no pneumothorax     Cardiovascular:     S/p heart tx with Dr. Rodriguez on 12/25/22  History of CAD  HFrEF  ICD  Dyslipidemia  HTN  Elevated troponin  Recent echo on 11/16 with LVEF of 20-25%  - Monitor hemodynamic status.   - Goal MAP 65, SBP<120  - Statin start POD5 (12/30)  - BB hold  - ASA: start POD5 (12/30)  - reassess before giving fluids as he may be able to tolerate low CVP  - Norepi, vaso and epi  gtt off  - isoproterenol off 12/29  - terbutaline 10 TID   - CT chest 12/29  - hydralazine prn for SBP >160  - first bx on Monday   - r heart cath on Monday  - echo today  - amlodipine 5mg  - PTA amio 200 BID, ASA 81, lasix 20 BID PO, pravastatin 20 daily, hold     GI care/ Nutrition:   malnutrition  - SLP eval for swallow   - NPO except ice chips   - VSS 12/30  - PPI  - Continue bowel regimen: miralax, senna  - prn suppository this PM  - CTM LFT  - TF via NJ at goal    Renal/ Fluid Balance/ Electrolytes:  CRI not on dialysis  MILENA, improving  BL creat appears to be ~ 0.8  - Strict I/O, daily weights  - Avoid/limit nephrotoxins as able  - Replete lytes PRN per protocol  - increasing tacro  - FeNa, Josette <20  - CVP 10-12  - follow CVP and other parameters for consideration of diuresis  -  q2    Endocrine:    Stress induced hyperglycemia  Preop A1c 6.2  - HDSSI  - Goal BG <180 for optimal healing  - on methylpred, mycophnolate    ID/ Antibiotics:  Stress induced leukocytosis  - To complete perioperative regimen  - Continue to monitor fever curve, WBC and inflammatory markers as appropriate  - WBC trending down overall (on steroid)  - on vanc and cefepime   - valcyte POD5 and bactrim on POD6  - topical acyclovir   - differential 12/28 AM - increased granulocytes and neutrophils, consistent with steroid administration     Heme:     Stress induced leukocytosis  Acute blood loss anemia  Acute blood loss thrombocytopenia  H/o R axillary and L peroneal DVT  No s/sx active bleeding  - hgb goal 8<  - Continue to monitor  - CBC   - PTA apixaban, hold  - labs  q12 h     MSK/ Skin:  Sternotomy  Surgical Incision  - Sternal precautions  - Postoperative incision management per protocol  - PT/OT/CR    Prophylaxis:    - Mechanical prophylaxis for DVT  - Chemical DVT prophylaxis - SQH 5000 q8, restart  - PPI    Lines/ tubes/ drains:  - Arterial Line, out  - CTs  - R MAC line, remove  - remove CVC  - donovan, out  - place PIV  - NJ     Disposition:  - CVICU to 6C    ICU Checklist  F - Feeding:   A - Analgesia:   S - Sedation:   T - Thromboembolic prophylaxis:      H - Head of bed elevated  U - Ulcer prophylaxis:  G - Glycemic control  S - SBT     B - Bowel regimen  I - Indwelling catheter  D - De-escalation of antibiotics    Patient seen, findings and plan discussed CVICU attending    Patricia Currie MD  Anesthesiology, CA-2      ====================================    Interval history:  Slept well.       PAST MEDICAL HISTORY:   Past Medical History:   Diagnosis Date     Congestive heart failure (H)        PAST SURGICAL HISTORY:   Past Surgical History:   Procedure Laterality Date     COLONOSCOPY N/A 11/17/2022    Procedure: COLONOSCOPY;  Surgeon: Roverto Nino MD;  Location:  GI     CV CORONARY ANGIOGRAM N/A 11/11/2022    Procedure: Coronary Angiogram;  Surgeon: Justo Bush MD;  Location:  HEART CARDIAC CATH LAB     CV RIGHT HEART CATH MEASUREMENTS RECORDED N/A 11/11/2022    Procedure: Right Heart Catheterization;  Surgeon: Justo Bush MD;  Location:  HEART CARDIAC CATH LAB     CV RIGHT HEART CATH MEASUREMENTS RECORDED N/A 12/8/2022    Procedure: Right Heart Catheterization;  Surgeon: Wayne Xavier MD;  Location:  HEART CARDIAC CATH LAB     CV RIGHT HEART CATH MEASUREMENTS RECORDED N/A 12/9/2022    Procedure: Right Heart Catheterization with leave-in [place];  Surgeon: Khari Mcneill MD;  Location:  HEART CARDIAC CATH LAB     PICC DOUBLE LUMEN PLACEMENT Right 11/10/2022    Right basilic, 43 cm, 1 cm external length     TRANSPLANT  "HEART RECIPIENT N/A 12/25/2022    Procedure: TRANSPLANT, HEART, RECIPIENT; Median Sternotomy, Cardiopulmonary Bypass, Removal of Pacemaker;  Surgeon: Brendan Rodriguez MD;  Location:  OR       FAMILY HISTORY:   Family History   Problem Relation Age of Onset     Atrial fibrillation Father      Lung Cancer Brother        SOCIAL HISTORY:   Social History     Tobacco Use     Smoking status: Never     Smokeless tobacco: Never   Substance Use Topics     Alcohol use: Not Currently         OBJECTIVE:   1. VITAL SIGNS:    Vital signs:  Temp: 97.9  F (36.6  C) Temp src: Oral BP: (!) 151/86 Pulse: 105   Resp: 10 SpO2: 96 % O2 Device: None (Room air) Oxygen Delivery: 1 LPM Height: 188 cm (6' 2\") Weight: 102 kg (224 lb 13.9 oz)  Estimated body mass index is 28.87 kg/m  as calculated from the following:    Height as of this encounter: 1.88 m (6' 2\").    Weight as of this encounter: 102 kg (224 lb 13.9 oz).          2. INTAKE/ OUTPUT:    I/O last 3 completed shifts:  In: 2448.3 [I.V.:463.3; NG/GT:940]  Out: 3090 [Urine:2210; Chest Tube:880]      3. PHYSICAL EXAMINATION:     General: NAD  Neuro: AAOx3  Resp: NLB on RA  CV: S1, S2, sinus tach, no m/r/g   Abdomen: Soft, non-distended, non-tender  Incisions: c/d/i  Extremities: warm and well perfused, no edema  CT: To suction, serosang output, no airleak, crepitus    4. INVESTIGATIONS:   Arterial Blood Gases   Recent Labs   Lab 12/29/22  1009 12/29/22  0734 12/29/22  0402 12/28/22  2146   PH 7.47* 7.46* 7.46* 7.45   PCO2 37 37 37 37   PO2 103 136* 102 112*   HCO3 27 27 27 26     Complete Blood Count   Recent Labs   Lab 12/30/22  0541 12/29/22  2316 12/29/22  1746 12/29/22  0359   WBC 13.2* 12.9* 14.3* 13.7*   HGB 9.2* 8.7* 8.4* 8.6*   * 97* 94* 79*     Basic Metabolic Panel  Recent Labs   Lab 12/30/22  0446 12/30/22  0444 12/29/22  2316 12/29/22  2149 12/29/22  1802 12/29/22  1746 12/29/22  1015 12/29/22  1008   NA  --  148* 149*  --   --  148*  --  148*   POTASSIUM  " --  4.2 4.0  --   --  4.0  --  4.3   CHLORIDE  --  114* 116*  --   --  116*  --  115*   CO2  --  25 24  --   --  24  --  23   BUN  --  35.8* 38.8*  --   --  42.9*  --  50.7*   CR  --  0.82 0.88  --   --  0.93  --  1.07   * 153* 150* 153*   < > 153*   < > 133*    < > = values in this interval not displayed.     Liver Function Tests  Recent Labs   Lab 12/30/22  0444 12/29/22  1008 12/29/22  0359 12/28/22  0343 12/27/22  1518 12/26/22  0359 12/25/22  2303 12/25/22  2150 12/25/22  0604   AST 30  --  34 42 48   < > 137*  --  33   ALT 30  --  33 36 42   < > 68*  --  54*   ALKPHOS 63  --  55 48 52   < > 71  --  68   BILITOTAL 0.6  --  0.5 0.5 0.4   < > 0.9  --  0.5   ALBUMIN 3.3*  --  3.6 3.0* 3.0*   < > 3.8  --  3.6   INR  --  1.15  --   --   --   --  1.27* 1.55* 1.04    < > = values in this interval not displayed.     Pancreatic Enzymes  Recent Labs   Lab 12/25/22  0604   AMYLASE 77     Coagulation Profile  Recent Labs   Lab 12/29/22  1008 12/25/22  2303 12/25/22 2150 12/25/22  0604   INR 1.15 1.27* 1.55* 1.04   PTT 26 46* 30 84*         5. RADIOLOGY:   Recent Results (from the past 24 hour(s))   CT Chest w/o Contrast    Narrative    CT chest without contrast    INDICATION: Characterize pleural effusion    COMPARISON: No prior CT scans of the chest. Correlation with plain  films earlier this morning    FINDINGS: No contrast. There is some streak artifact from the  clavicles bilaterally projecting through the thyroid. There does  appear to be however a subcentimeter left thyroid lobe nodule. Median  sternotomy. Mediastinal drain. Left thoracostomy tube. Right  thoracostomy tube. No pericardial effusion. No definite fluid density  in the lungs. Cannot exclude trace pleural effusions beyond the  threshold of detection. Heart is upper normal size. NG/OG tube  partially imaged and progressing at least into the distalmost  duodenum. Cholecystectomy in the right upper abdomen. No adrenal  nodule.  Bone detail shows  continued appearance of the median sternotomy.  Cerclage wires grossly intact. No suspicious sclerotic or  lytic/distractive lesion. Minimal degenerative spurring is present  especially in the lower cervical spine but also in the thoracic spine.  Detail of the lungs shows no definite pneumothorax. Small loculated  air densities are noted in the mediastinum adjacent to the left  subclavian artery adjacent to the pleural surface without direct  connection to the left lung. There are patchy densities in the lower  lobes and right upper lobe with dependent groundglass density in the  right middle lobe laterally with mild left apical scarring. Dense left  upper lobe partial collapse with singular loculated air density  present. Major airways show some narrowing in short segments of the  left lower lobe bronchial tree.      Impression    IMPRESSION: Pleural thickening/atelectasis or consolidations  bilaterally with trace at most pleural effusions. Prior median  sternotomy with retrosternal fluid and other loculated  pneumomediastinum and loculated air densities adjacent to the left  lung which could represent trace loculated pneumothoraces. Partial  left upper lobe collapse. Meter left thyroid lobe nodule. Multiple  support devices. Cholecystectomy.    DEIDRE HINTON MD         SYSTEM ID:  [id number]   US Upper Extremity Venous Duplex Left Portable    Narrative    EXAMINATION: DOPPLER VENOUS ULTRASOUND OF THE LEFT UPPER EXTREMITY,  12/29/2022 4:14 PM     COMPARISON: Venous upper extremity ultrasound 11/15/2022    HISTORY: swelling in LUE     TECHNIQUE:  Gray-scale evaluation with compression, spectral flow and  color Doppler assessment of the deep venous system of the left upper  extremity.    FINDINGS:  Left: Normal blood flow and waveforms are demonstrated in the internal  jugular, innominate, subclavian, and axillary veins. There is normal  compressibility of the brachial, basilic and cephalic veins.  Partially  compressible thrombus in the left basilic vein in the upper forearm.  Periarticular fluid collection in the anterior left shoulder measuring  approximately 2.3 x 0.8 x 2.6 cm.      Impression    IMPRESSION:  1.  No evidence of left upper extremity deep venous thrombosis.  Superficial venous thrombosis in the left basilic vein in the upper  forearm.  2.  Periarticular fluid collection in the anterior left shoulder  measuring approximately 2.3 x 0.8 x 2.6 cm.    I have personally reviewed the examination and initial interpretation  and I agree with the findings.    REG WHITTAKER MD         SYSTEM ID:  [id number]   XR Chest Port 1 View    Impression    RESIDENT PRELIMINARY INTERPRETATION  IMPRESSION:  1. Postoperative changes of heart transplantation.  2. Interval removal of right IJ Dumont-Aniceto catheter, with right IJ  catheter sheath in place. Otherwise, stable positioning of support  devices.  3. Stable small biapical pneumothoraces.  4. Likely trace ongoing bilateral pleural effusions and mild  associated compressive atelectasis, with incompletely visualized  costophrenic angles.  5. Ongoing perihilar/retrocardiac and bibasilar edema/atelectasis in  the postsurgical setting.       =========================================

## 2022-12-30 NOTE — PROGRESS NOTES
CLINICAL NUTRITION SERVICES - BRIEF NOTE      Reason for RD note: Transition off of renal TF formula    New Findings/Chart Review:  -Pt has been tolerating Novasource Renal x 2 days.   -Potassium now WNL and phosphorus low     Interventions:  -Transition to new TF after current bag of formula runs out: TwoCal HN @ 60 mL/hr (1440 mL/day) to provide 2880 kcals (35 kcal/kg/day), 121 g PRO (1.5 g/kg/day), 1008 mL H2O, 315 g CHO and 7 g Fiber daily.     Nutrition will follow per protocol or sooner if consulted.    Felicita Sanderson, MS, RD, LD  4E (CVICU) RD pager: [phone]  Ascom: [phone]  Weekend/Holiday RD pager: [phone]

## 2022-12-30 NOTE — PROGRESS NOTES
Patient transferred to  at 1600.      A&O*4, with no confusion noted from 0607-2021.  Afebrile.  CMS intact.  Pulses dopplerable. CVP 8.  SBP <140 without use of PRNs.  US of LUE negative for DVT.  CT patent with 0-20cc of output per hour.  TF continuing at 35/hr due to low phos.  Free water flushes increased to 150 Q4 hours due to hypernatremia.  Hgb remains stable.  Medisternal incision dressing clean,dry and intact.      Patient did have some coughing with ice chip intake (sitting at 90 degrees).  Is NPO (ice chips okay) per SLP.  Advised that patient discontinue ice chip intake due to risk for aspiration.  Video swallow planned for tomorrow.

## 2022-12-30 NOTE — PLAN OF CARE
Major Shift Events:  Pt intermittently lethargic and slightly confused, but oriented x4. Call light giselle. Able to stand and pivot to bedside commode with assist x2. 1LNC LS dim. SBP<160 maintained without intervention. Sinus tachy. V pacer wires capped. Chest tubes x5 sanguinous output 0-50mL q2h. TF at 35; awaiting labs to advance to goal. 4x soft/loose stool. 1x continent on commode. Davis pulled and voided 200 into urinal. Art line removed. Hgb stable this AM.     Plan: Encourage activity today and continue bowel regimen. Monitor Chest tube output.     For vital signs and complete assessments, please see documentation flowsheets.

## 2022-12-30 NOTE — PROGRESS NOTES
[facility]    Advanced Heart Failure Consult Progress Note    Assessment and Plan:     Paco Stein is a 67 year old male with a history of chronic systolic heart failure (suspected predominantly nonischemic cardiomyopathy, possibly arrhythmogenic), coronary artery disease (s/p PCI with TILA to LCx 4/2013 and to the LAD in 10/2021), hyperlipidemia, CNS vasculitis and chronic kidney disease who presents with transient bilateral hand tightness with unrevealing workup, who was admitted for cardiogenic shock and evaluation of advance heart therapies on 12/7. Underwent orthopedic heart transplant on 12/25.     Interval History:    Patient is doing well this morning. Marshall and PICC line (placed previous to transplant) have been removed. SBP in the 150. Thus we will initiate amlodipine 5 mg. Labs are stable with normal lactate and Cr has normalized (with good urine output).     From cardiology perspective he is ready to be transfer to the floor.     Immunosuppression. Will increase tacrolimus     Plan:   - Increase Tacrolimus to 1mg BID; gave additional 0.5mg in AM  - Will drop cellcept dose once at goal for tacrolimus  - Amlodipine 5mg every day  - RHC w/ biopsy on Monday  - TTE on Monday    ---------------------------------------------------------------------------  ===CARDIOVASCULAR===  Mr. Stein is a 67 y.o male with hx of Advanced chronic systolic heart failure, EF 15-20%, NYHA class IV, ACC/AHA Stage D who was admitted for ambulatory cardiogenic shock and evaluation of advance therapies. Underwent heart transplant on 12/25 without complications.     Immediate post operative course without complications so far    Graft Function:     Hemodynamics:     Date & Time CVP PA WP PA Sat CO/CI (Indirect Aisha) PVR Current Therapies (Vasoactive meds/MCS)   12/26/22 4 32/13/19 12 63 4.6/2.2 1.5 Epinephrine 0.03, NE 0.06, Vasopressin 4   12/27/22 11 36/14/21 14 48 5.7/2.7 1.2 Epinephrine    12/28/22 11 49/17/27 17 77 11/5.6 0.9 Epinephrine 0.03   12/29/22 10 41/14/23 14 71 9.1/4 0.9                  Volume status: CVP goal 10-12  o Diuresis: none  o     Cardiac/Graft function:   o Pressors: none  o Inotrope:   o Continue terbutaline 10 mg TID     Rejection surveillance:   o Final crossmatch: pending   o Week 1 biopsy, RHC, TTE, and DSAs due: pending  o Echo, DSAs week 1 and week 4, RHC + EMBx done weeks 1-4, first RHC Monday (01/2)     Immunosuppression:    Induction: Yes, CNI sparing protocol with Simulect given POD 0 intraop (given), 2nd dose 20mg IV due POD #4 (on 12/29 given):    Maintenance:  o Steroids:   - 1000mg IV preop + 500mg IV if still awaiting transplant 24h after initial dose  - IntraOp: 500mg IV at release of cross clamp  - PostOp: 125mg IV q8hrs x 3 doses  - Then, prednisone 1mg/kg, given in 2 divided doses (started at 10 pm on 12/26). Taper by a total of 5mg/day until reach total daily dose of 0.5mg/kg/day dosed BID or 20mg bid.    - Plan for taper by 5mg after each normal biopsy to 5mg daily.  - If unable to give pills need to convert to IV    df  o Cell Cycle Inhibitor: MMF  - Preop: MMF 1500mg po q12h preoperatiely   o Postop: Continue MMF 1500mg po q12h (1000mg po q12h for heart-kidney).   o CNI: Tacrolimus (goal 10-12) started on 12/29.  Tacro 0.5 mg BID Obtain daily troughs.     Prophylaxis:  CMV D-/R+; EBV D pending /R + ; Toxo D pending /R negative     Post-op antibiotics:  Cefepime end date 12/30 , Vancomycin end date 12/28  o (**if admitted from home, 72h post transplant, if hospitalized Vanc for 72h post transplant, Cefepime for 5 days post transplant.  If chest left open postop: Vanc/Cef + Micafungin for 72h post chest closure with Cefepime continued for total 5 days. If hx of prior infections, consult with ID. If H/K TP Vanc for 5 days post kidney, Cefepime + Micafungin for 7 days post kidney**)     CMV: (D- /R+).   o CMV prophylaxis: with Valgancylovir, 6 months for  D+/R-, 3 months for D+/R+ and D-/R-, no prophylaxis if D-/R-.    Fungal:  Fluconazole/ voriconazole, Nysatin swish and swallow while on steroids.     PJP: bactrim while on steroids. Will need to be adjusted pending kidney function    Toxo: (D pending/R negative):  If toxo IgG seropositive for either D or R, lifelong Bactrim    Stress ulcer:  IV pantoprazole (Protonix while on steroids)     VTE prophylaxis:  subQ heparin per surgeon approval. Held on 12/28    (HCV: If donor was HCV+/INES+. 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 No episodes since. Neurological exam unremarkable. Neurology consulted during recent admission as part of VAD/transplant evaluation. MRI/MRA brain was also performed which showed no concern for progression of disease  -Hold PTA cellcept 1000mg qam and 500mg qpm for now    # Incidental punctate cerebellar infarct, likely embolic  Read on MR imaging 11/14/22 without new neuro deficits. Per radiology, appears acute, likely embolic. No known arterial clot history. Reviewed telemetry over admission, no afib or other tachycarrhythmias. Reassuring prior TTE this admission including bubble study, repeat JARED w/o septal defects or thrombus. Treated w/ heparin gtt and transitioned to Eliquis.  - Hold heparin drip post heart transplant    ===PULMONARY===  #Intubated, extubated on 12/28    # Non-productive cough, acute on chronic, resolved  - PRN cough lozenges  - PRN  tessalon  "pearls    ===GI===  # Shock liver, improving  -daily LFT    # Severe malnutrition in the context of acute illness/injury on chronic illness  -TF will be placed today  - Appreciate assistance from RD      ===RENAL===  # Acute kidney injury, improving  # renal failure, improing  Worsening Cr up to 2.6 post op course. Now Cr is improving and patient is having good urine output. CVP goal as detailed above    ===HEME/ONC===  # Occlusive left peroneal DVT  # Non-occlusive right axillary DVT  Identified on doppler 11/15/22 during previous admission.   -Will need plan for anticoagulation in the future    ===ENDOCRINE===  # Hyperglycemia  Managed per CVTS    INFECTIOUS DISEASE  -No active concerns   -Received abx therapy as detailed above    ===SKIN/MSK===  # Bilateral hand tightness, resolved    FEN: NPO, intubated  DVT PPx: SQH was held on 12/28  Code: Full code      Disposition: Pending   Discussed with Dr. Maharaj Jorge Reyes Castro, MD, MS   Cardiovascular disease fellow       Objective:   /75   Pulse 112   Temp 98.3  F (36.8  C) (Oral)   Resp 20   Ht 1.88 m (6' 2\")   Wt 102 kg (224 lb 13.9 oz)   SpO2 98%   BMI 28.87 kg/m        Wt:   Wt Readings from Last 5 Encounters:   12/29/22 102 kg (224 lb 13.9 oz)   11/21/22 90.9 kg (200 lb 6.4 oz)   11/10/22 108 kg (238 lb 1.6 oz)   11/08/22 95.7 kg (210 lb 14.4 oz)     Physical Exam   GEN: Patient laying in bed. Looks somnolent   HEENT: no icterus, EOMI  CV: heart sounds regular and rhythmic. No evident murmur. 2 mediastinal tubes and 3 chest tubes (L and R). Clean dressing covering sternotomy site.   CHEST: Passive movement of air.  ABD: soft, non-tender, non-distended   EXTR: warm extremities. No LE edema      DATA:   Labs, EKGs and imaging reviewed. Pertinent results discussed in assessment and plan above.   "

## 2022-12-30 NOTE — PROGRESS NOTES
12/30/22 3988   Appointment Info   Signing Clinician's Name / Credentials (SLP) Jodie Shultz MA CCC-SLP   General Information   Onset of Illness/Injury or Date of Surgery 12/06/22   Referring Physician Tiera Pang MD   Patient/Family Therapy Goal Statement (SLP) None stated   Pertinent History of Current Problem Pt is a 67 year old male with a history of chronic systolic heart failure (suspected predominantly nonischemic cardiomyopathy, possibly arrhythmogenic), coronary artery disease (s/p PCI with TILA to LCx 4/2013 and to the LAD in 10/2021), hyperlipidemia, CNS vasculitis and chronic kidney disease who presents with transient bilateral hand tightness with unrevealing workup, who was admitted for cardiogenic shock and evaluation of advance heart therapies on 12/7. Underwent orthopedic heart transplant on 12/25. VFSS completed today to objectively assess swallow mechanism.   General Observations Alert   Type of Evaluation   Type of Evaluation Swallow Evaluation   General Swallowing Observations   Current Diet/Method of Nutritional Intake (General Swallowing Observations, NIS) NPO;nasogastric tube (NG)   Respiratory Support (General Swallowing Observations) none   Swallowing Evaluation Videofluoroscopic swallow study (VFSS)   VFSS Evaluation   Radiologist Resident   Views Taken left lateral   Physical Location of Procedure [facility] [address]   VFSS Textures Trialed thin liquids;mildly thick liquids;pureed;solid foods   VFSS Eval: Thin Liquid Texture Trial   Mode of Presentation, Thin Liquid spoon;cup;fed by clinician;self-fed   Order of Presentation 1, 2, 4, 5, 6, 8, 10   Preparatory Phase WFL   Oral Phase, Thin Liquid WFL   Bolus Location When Swallow Triggered pyriforms   Pharyngeal Phase, Thin Liquid   (impaired)   Rosenbek's Penetration Aspiration Scale: Thin Liquid Trial Results 7 - contrast passes glottis, visible subglottic residue remains despite patient's response (aspiration)   Response  to Aspiration unproductive reflexive cough   Strategies and Compensations chin tuck   Diagnostic Statement On initial 4 trials of today's VFSS, pt demonstrated severe dysphagia. He was demonstrating an incomplete swallow, with very little laryngeal vestibule closure, incomplete epiglottic inversion, poor BOT retraction, and penetration and aspiration of thin liquids. After instruction, pt then began using the chin tuck with each swallow. Swallow function greatly improved; BOT retraction remained poor, but safety and efficiency of swallow improved, with consistent flash penetration of thin liquids but no aspiration. *Note that aspirated material lines the pt's anterior trachea for remainder of study.   VFSS Eval: Mildly Thick Liquids   Mode of Presentation cup;self-fed   Order of Presentation 11   Oral Phase WFL   Bolus Location When Swallow Triggered pyriforms   Pharyngeal Phase   (impaired)   Rosenbek's Penetration Aspiration Scale 2 - contrast enters airway, remains above the vocal cords, no residue remains (penetration)   Strategies and Compensations chin tuck   Diagnostic Statement Penetration, no aspiration when using chin tuck   VFSS Evaluation: Puree Solid Texture Trial   Mode of Presentation, Puree spoon;self-fed   Order of Presentation 3, 7   Preparatory Phase prolonged bolus preparation   Oral Phase, Puree impaired AP movement   Bolus Location When Swallow Triggered posterior angle of ramus   Pharyngeal Phase, Puree   (impaired)   Rosenbek's Penetration Aspiration Scale: Puree Food Trial Results 1 - no aspiration, contrast does not enter airway   Strategies and Compensations chin tuck   Diagnostic Statement C/w thin liquids, pt initially demonstrated very inefficient and unsafe swallowing with first trial of pudding. When using chin tuck, swallowing efficiency greatly improved.   VFSS Evaluation: Solid Food Texture Trial   Mode of Presentation, Solid self-fed   Order of Presentation 9   Preparatory Phase  prolonged bolus preparation   Oral Phase, Solid impaired AP movement   Bolus Location When Swallow Triggered posterior angle of ramus   Pharyngeal Phase, Solid   (impaired)   Rosenbek's Penetration Aspiration Scale: Solid Food Trial Results 1 - no aspiration, contrast does not enter airway   Strategies and Compensations chin tuck   Diagnostic Statement Pt had difficulty triggering a swallow; pt eventually swallowed on the following liquid rinse   Esophageal Phase of Swallow   Patient reports or presents with symptoms of esophageal dysphagia No   Swallowing Recommendations   Diet Consistency Recommendations full liquid diet;thin liquids (level 0)   Supervision Level for Intake 1:1 supervision needed   Mode of Delivery Recommendations bolus size, small;food moistened;slow rate of intake   Postural Recommendations chin tuck  (with every swallow)   Swallowing Maneuver Recommendations throat clear-swallow   Monitoring/Assistance Required (Eating/Swallowing) stop eating activities when fatigue is present;monitor for cough or change in vocal quality with intake  (pt is sensate to aspiration)   Recommended Feeding/Eating Techniques (Swallow Eval) maintain upright sitting position for eating   Medication Administration Recommendations, Swallowing (SLP) as tolerate - again, pt must use chin tuck with every swallow, including solids   Instrumental Assessment Recommendations VFSS (videofluoroscopic swallowing study)  (1 week)   General Therapy Interventions   Planned Therapy Interventions Dysphagia Treatment   Dysphagia treatment Oropharyngeal exercise training;Modified diet education;Instruction of safe swallow strategies;Compensatory strategies for swallowing   Clinical Impression   Criteria for Skilled Therapeutic Interventions Met (SLP Eval) Yes, treatment indicated   SLP Diagnosis Moderate oropharyngeal dysphagia after OHT   Risks & Benefits of therapy have been explained evaluation/treatment results reviewed;care  plan/treatment goals reviewed;risks/benefits reviewed;current/potential barriers reviewed;participants voiced agreement with care plan;participants included;patient   Clinical Impression Comments   Videoswallow study completed per MD order to objectively assess oropharyngeal swallow mechanism. Under fluoroscopy, pt presents with moderate oropharyngeal dysphagia in setting of weakness s/p OHT on 12/26. Pt assessed with thin liquids, mildly-thick liquids, pudding, and cookie. Oral phase characterized by prolonged AP transit and difficulty triggering a swallow with cookie. Swallow trigger delayed, with bolus in the pyriforms before the swallow is triggered. Interestingly, with pt's head in neutral, his swallow function is severely impaired, with aspiration during and after the swallow. Aspiration is followed by a minimally productive reflexive cough. When pt is instructed to complete a chin tuck, his swallowing safety and efficiency greatly improve. Deficits remain with laryngeal vestibule elevation, BOT retraction, and pharyngeal stripping wave. With chin tucked, pt demonstrates consistent penetration but no aspiration of liquids, and minimal vallecular residue with solids. As long as pt is able to sit fully upright and complete a chin tuck with each swallow, recommend the pt initiate a full liquid diet, thin consistency. Ensure the pt is upright for all PO. Hold PO with significant coughing/choking, as this was indicative of aspiration on today's study. SLP will follow for pharyngeal strengthening and will request orders for repeat VFSS as needed.     SLP Total Evaluation Time   Evaluation, videofluoroscopic eval of swallow function Minutes (45710) 26   Total Session Time   Total Session Time (sum of timed and untimed services) 36

## 2022-12-31 ENCOUNTER — APPOINTMENT (OUTPATIENT)
Dept: GENERAL RADIOLOGY | Facility: CLINIC | Age: 67
DRG: 001 | End: 2022-12-31
Attending: SURGERY
Payer: MEDICARE

## 2022-12-31 ENCOUNTER — APPOINTMENT (OUTPATIENT)
Dept: SPEECH THERAPY | Facility: CLINIC | Age: 67
DRG: 001 | End: 2022-12-31
Attending: SURGERY
Payer: MEDICARE

## 2022-12-31 ENCOUNTER — APPOINTMENT (OUTPATIENT)
Dept: PHYSICAL THERAPY | Facility: CLINIC | Age: 67
DRG: 001 | End: 2022-12-31
Attending: STUDENT IN AN ORGANIZED HEALTH CARE EDUCATION/TRAINING PROGRAM
Payer: MEDICARE

## 2022-12-31 LAB
ALBUMIN SERPL BCG-MCNC: 3.1 G/DL (ref 3.5–5.2)
ALP SERPL-CCNC: 62 U/L (ref 40–129)
ALT SERPL W P-5'-P-CCNC: 28 U/L (ref 10–50)
ANION GAP SERPL CALCULATED.3IONS-SCNC: 10 MMOL/L (ref 7–15)
ANION GAP SERPL CALCULATED.3IONS-SCNC: 8 MMOL/L (ref 7–15)
AST SERPL W P-5'-P-CCNC: 24 U/L (ref 10–50)
BILIRUB DIRECT SERPL-MCNC: <0.2 MG/DL (ref 0–0.3)
BILIRUB SERPL-MCNC: 0.4 MG/DL
BUN SERPL-MCNC: 27 MG/DL (ref 8–23)
BUN SERPL-MCNC: 28.1 MG/DL (ref 8–23)
CALCIUM SERPL-MCNC: 8.2 MG/DL (ref 8.8–10.2)
CALCIUM SERPL-MCNC: 8.5 MG/DL (ref 8.8–10.2)
CHLORIDE SERPL-SCNC: 112 MMOL/L (ref 98–107)
CHLORIDE SERPL-SCNC: 115 MMOL/L (ref 98–107)
CREAT SERPL-MCNC: 0.64 MG/DL (ref 0.67–1.17)
CREAT SERPL-MCNC: 0.65 MG/DL (ref 0.67–1.17)
DEPRECATED HCO3 PLAS-SCNC: 22 MMOL/L (ref 22–29)
DEPRECATED HCO3 PLAS-SCNC: 25 MMOL/L (ref 22–29)
ERYTHROCYTE [DISTWIDTH] IN BLOOD BY AUTOMATED COUNT: 15.8 % (ref 10–15)
ERYTHROCYTE [DISTWIDTH] IN BLOOD BY AUTOMATED COUNT: 15.9 % (ref 10–15)
GFR SERPL CREATININE-BSD FRML MDRD: >90 ML/MIN/1.73M2
GFR SERPL CREATININE-BSD FRML MDRD: >90 ML/MIN/1.73M2
GLUCOSE BLDC GLUCOMTR-MCNC: 130 MG/DL (ref 70–99)
GLUCOSE BLDC GLUCOMTR-MCNC: 138 MG/DL (ref 70–99)
GLUCOSE BLDC GLUCOMTR-MCNC: 141 MG/DL (ref 70–99)
GLUCOSE BLDC GLUCOMTR-MCNC: 144 MG/DL (ref 70–99)
GLUCOSE BLDC GLUCOMTR-MCNC: 155 MG/DL (ref 70–99)
GLUCOSE BLDC GLUCOMTR-MCNC: 159 MG/DL (ref 70–99)
GLUCOSE BLDC GLUCOMTR-MCNC: 171 MG/DL (ref 70–99)
GLUCOSE SERPL-MCNC: 151 MG/DL (ref 70–99)
GLUCOSE SERPL-MCNC: 169 MG/DL (ref 70–99)
HCT VFR BLD AUTO: 28.8 % (ref 40–53)
HCT VFR BLD AUTO: 29.6 % (ref 40–53)
HGB BLD-MCNC: 8.7 G/DL (ref 13.3–17.7)
HGB BLD-MCNC: 9.1 G/DL (ref 13.3–17.7)
HOLD SPECIMEN: NORMAL
LACTATE SERPL-SCNC: 1.3 MMOL/L (ref 0.7–2)
LACTATE SERPL-SCNC: 1.7 MMOL/L (ref 0.7–2)
MCH RBC QN AUTO: 29.2 PG (ref 26.5–33)
MCH RBC QN AUTO: 29.2 PG (ref 26.5–33)
MCHC RBC AUTO-ENTMCNC: 30.2 G/DL (ref 31.5–36.5)
MCHC RBC AUTO-ENTMCNC: 30.7 G/DL (ref 31.5–36.5)
MCV RBC AUTO: 95 FL (ref 78–100)
MCV RBC AUTO: 97 FL (ref 78–100)
PHOSPHATE SERPL-MCNC: 1.6 MG/DL (ref 2.5–4.5)
PHOSPHATE SERPL-MCNC: 1.6 MG/DL (ref 2.5–4.5)
PHOSPHATE SERPL-MCNC: 1.7 MG/DL (ref 2.5–4.5)
PLATELET # BLD AUTO: 114 10E3/UL (ref 150–450)
PLATELET # BLD AUTO: 120 10E3/UL (ref 150–450)
POTASSIUM SERPL-SCNC: 3.9 MMOL/L (ref 3.4–5.3)
POTASSIUM SERPL-SCNC: 4 MMOL/L (ref 3.4–5.3)
PROT SERPL-MCNC: 4.8 G/DL (ref 6.4–8.3)
RBC # BLD AUTO: 2.98 10E6/UL (ref 4.4–5.9)
RBC # BLD AUTO: 3.12 10E6/UL (ref 4.4–5.9)
SODIUM SERPL-SCNC: 144 MMOL/L (ref 136–145)
SODIUM SERPL-SCNC: 148 MMOL/L (ref 136–145)
TACROLIMUS BLD-MCNC: 1.7 UG/L (ref 5–15)
TME LAST DOSE: ABNORMAL H
TME LAST DOSE: ABNORMAL H
WBC # BLD AUTO: 12.9 10E3/UL (ref 4–11)
WBC # BLD AUTO: 14.1 10E3/UL (ref 4–11)

## 2022-12-31 PROCEDURE — 84100 ASSAY OF PHOSPHORUS: CPT | Performed by: SURGERY

## 2022-12-31 PROCEDURE — 94668 MNPJ CHEST WALL SBSQ: CPT

## 2022-12-31 PROCEDURE — 36415 COLL VENOUS BLD VENIPUNCTURE: CPT | Performed by: SURGERY

## 2022-12-31 PROCEDURE — 97530 THERAPEUTIC ACTIVITIES: CPT | Mod: GP | Performed by: STUDENT IN AN ORGANIZED HEALTH CARE EDUCATION/TRAINING PROGRAM

## 2022-12-31 PROCEDURE — 250N000009 HC RX 250: Performed by: STUDENT IN AN ORGANIZED HEALTH CARE EDUCATION/TRAINING PROGRAM

## 2022-12-31 PROCEDURE — 99291 CRITICAL CARE FIRST HOUR: CPT | Performed by: ANESTHESIOLOGY

## 2022-12-31 PROCEDURE — 250N000013 HC RX MED GY IP 250 OP 250 PS 637: Performed by: STUDENT IN AN ORGANIZED HEALTH CARE EDUCATION/TRAINING PROGRAM

## 2022-12-31 PROCEDURE — 250N000012 HC RX MED GY IP 250 OP 636 PS 637: Performed by: SURGERY

## 2022-12-31 PROCEDURE — 258N000003 HC RX IP 258 OP 636: Performed by: SURGERY

## 2022-12-31 PROCEDURE — 83605 ASSAY OF LACTIC ACID: CPT | Performed by: SURGERY

## 2022-12-31 PROCEDURE — 250N000012 HC RX MED GY IP 250 OP 636 PS 637: Performed by: STUDENT IN AN ORGANIZED HEALTH CARE EDUCATION/TRAINING PROGRAM

## 2022-12-31 PROCEDURE — 250N000009 HC RX 250

## 2022-12-31 PROCEDURE — 92526 ORAL FUNCTION THERAPY: CPT | Mod: GN

## 2022-12-31 PROCEDURE — 71045 X-RAY EXAM CHEST 1 VIEW: CPT

## 2022-12-31 PROCEDURE — 99291 CRITICAL CARE FIRST HOUR: CPT | Mod: GC | Performed by: INTERNAL MEDICINE

## 2022-12-31 PROCEDURE — 94667 MNPJ CHEST WALL 1ST: CPT

## 2022-12-31 PROCEDURE — 85027 COMPLETE CBC AUTOMATED: CPT | Performed by: SURGERY

## 2022-12-31 PROCEDURE — 80197 ASSAY OF TACROLIMUS: CPT | Performed by: SURGERY

## 2022-12-31 PROCEDURE — 250N000012 HC RX MED GY IP 250 OP 636 PS 637

## 2022-12-31 PROCEDURE — 250N000013 HC RX MED GY IP 250 OP 250 PS 637: Performed by: SURGERY

## 2022-12-31 PROCEDURE — 71045 X-RAY EXAM CHEST 1 VIEW: CPT | Mod: 26 | Performed by: STUDENT IN AN ORGANIZED HEALTH CARE EDUCATION/TRAINING PROGRAM

## 2022-12-31 PROCEDURE — 93005 ELECTROCARDIOGRAM TRACING: CPT

## 2022-12-31 PROCEDURE — 250N000011 HC RX IP 250 OP 636

## 2022-12-31 PROCEDURE — 93010 ELECTROCARDIOGRAM REPORT: CPT | Mod: 59 | Performed by: INTERNAL MEDICINE

## 2022-12-31 PROCEDURE — 250N000011 HC RX IP 250 OP 636: Performed by: STUDENT IN AN ORGANIZED HEALTH CARE EDUCATION/TRAINING PROGRAM

## 2022-12-31 PROCEDURE — 999N000157 HC STATISTIC RCP TIME EA 10 MIN

## 2022-12-31 PROCEDURE — 250N000013 HC RX MED GY IP 250 OP 250 PS 637

## 2022-12-31 PROCEDURE — 82248 BILIRUBIN DIRECT: CPT | Performed by: STUDENT IN AN ORGANIZED HEALTH CARE EDUCATION/TRAINING PROGRAM

## 2022-12-31 PROCEDURE — 97164 PT RE-EVAL EST PLAN CARE: CPT | Mod: GP | Performed by: STUDENT IN AN ORGANIZED HEALTH CARE EDUCATION/TRAINING PROGRAM

## 2022-12-31 PROCEDURE — 250N000009 HC RX 250: Performed by: SURGERY

## 2022-12-31 PROCEDURE — 200N000002 HC R&B ICU UMMC

## 2022-12-31 PROCEDURE — 97112 NEUROMUSCULAR REEDUCATION: CPT | Mod: GP | Performed by: STUDENT IN AN ORGANIZED HEALTH CARE EDUCATION/TRAINING PROGRAM

## 2022-12-31 PROCEDURE — 82310 ASSAY OF CALCIUM: CPT | Performed by: SURGERY

## 2022-12-31 PROCEDURE — 94640 AIRWAY INHALATION TREATMENT: CPT | Mod: 76

## 2022-12-31 RX ORDER — QUETIAPINE FUMARATE 25 MG/1
25 TABLET, FILM COATED ORAL AT BEDTIME
Status: DISCONTINUED | OUTPATIENT
Start: 2022-12-31 | End: 2023-01-13 | Stop reason: HOSPADM

## 2022-12-31 RX ORDER — HYDROMORPHONE HYDROCHLORIDE 1 MG/ML
0.6 INJECTION, SOLUTION INTRAMUSCULAR; INTRAVENOUS; SUBCUTANEOUS
Status: DISCONTINUED | OUTPATIENT
Start: 2022-12-31 | End: 2023-01-08

## 2022-12-31 RX ORDER — AMLODIPINE BESYLATE 10 MG/1
10 TABLET ORAL DAILY
Status: DISCONTINUED | OUTPATIENT
Start: 2022-12-31 | End: 2023-01-04

## 2022-12-31 RX ORDER — ACETAMINOPHEN 325 MG/1
975 TABLET ORAL EVERY 8 HOURS
Status: DISCONTINUED | OUTPATIENT
Start: 2022-12-31 | End: 2023-01-13 | Stop reason: HOSPADM

## 2022-12-31 RX ORDER — TERBUTALINE SULFATE 5 MG/1
5 TABLET ORAL EVERY 8 HOURS SCHEDULED
Status: DISCONTINUED | OUTPATIENT
Start: 2022-12-31 | End: 2023-01-01

## 2022-12-31 RX ORDER — TRAZODONE HYDROCHLORIDE 100 MG/1
100 TABLET ORAL
Status: DISCONTINUED | OUTPATIENT
Start: 2022-12-31 | End: 2023-01-13 | Stop reason: HOSPADM

## 2022-12-31 RX ORDER — HYDROMORPHONE HCL IN WATER/PF 6 MG/30 ML
0.4 PATIENT CONTROLLED ANALGESIA SYRINGE INTRAVENOUS
Status: DISCONTINUED | OUTPATIENT
Start: 2022-12-31 | End: 2023-01-08

## 2022-12-31 RX ORDER — OXYCODONE HYDROCHLORIDE 10 MG/1
10 TABLET ORAL EVERY 4 HOURS PRN
Status: DISCONTINUED | OUTPATIENT
Start: 2022-12-31 | End: 2023-01-08

## 2022-12-31 RX ORDER — HYDROMORPHONE HYDROCHLORIDE 1 MG/ML
0.6 INJECTION, SOLUTION INTRAMUSCULAR; INTRAVENOUS; SUBCUTANEOUS EVERY 4 HOURS PRN
Status: DISCONTINUED | OUTPATIENT
Start: 2022-12-31 | End: 2022-12-31

## 2022-12-31 RX ORDER — HYDROMORPHONE HCL IN WATER/PF 6 MG/30 ML
0.4 PATIENT CONTROLLED ANALGESIA SYRINGE INTRAVENOUS
Status: DISCONTINUED | OUTPATIENT
Start: 2022-12-31 | End: 2022-12-31

## 2022-12-31 RX ADMIN — ACETYLCYSTEINE 2 ML: 100 SOLUTION ORAL; RESPIRATORY (INHALATION) at 16:16

## 2022-12-31 RX ADMIN — SULFAMETHOXAZOLE AND TRIMETHOPRIM 1 TABLET: 400; 80 TABLET ORAL at 08:06

## 2022-12-31 RX ADMIN — QUETIAPINE FUMARATE 25 MG: 25 TABLET ORAL at 19:45

## 2022-12-31 RX ADMIN — ACETAMINOPHEN 975 MG: 325 TABLET, FILM COATED ORAL at 20:36

## 2022-12-31 RX ADMIN — POTASSIUM PHOSPHATE, MONOBASIC POTASSIUM PHOSPHATE, DIBASIC 9 MMOL: 224; 236 INJECTION, SOLUTION, CONCENTRATE INTRAVENOUS at 14:23

## 2022-12-31 RX ADMIN — ACETYLCYSTEINE 2 ML: 100 SOLUTION ORAL; RESPIRATORY (INHALATION) at 21:44

## 2022-12-31 RX ADMIN — POTASSIUM & SODIUM PHOSPHATES POWDER PACK 280-160-250 MG 2 PACKET: 280-160-250 PACK at 14:23

## 2022-12-31 RX ADMIN — ACETYLCYSTEINE 2 ML: 100 SOLUTION ORAL; RESPIRATORY (INHALATION) at 09:39

## 2022-12-31 RX ADMIN — ACYCLOVIR: 50 OINTMENT TOPICAL at 00:12

## 2022-12-31 RX ADMIN — TERBUTALINE SULFATE 5 MG: 5 TABLET ORAL at 14:23

## 2022-12-31 RX ADMIN — PREDNISONE 30 MG: 20 TABLET ORAL at 19:44

## 2022-12-31 RX ADMIN — OXYCODONE HYDROCHLORIDE 10 MG: 10 TABLET ORAL at 17:40

## 2022-12-31 RX ADMIN — METHOCARBAMOL 500 MG: 500 TABLET ORAL at 02:28

## 2022-12-31 RX ADMIN — ACYCLOVIR: 50 OINTMENT TOPICAL at 05:51

## 2022-12-31 RX ADMIN — INSULIN ASPART 1 UNITS: 100 INJECTION, SOLUTION INTRAVENOUS; SUBCUTANEOUS at 15:51

## 2022-12-31 RX ADMIN — HEPARIN SODIUM 5000 UNITS: 5000 INJECTION, SOLUTION INTRAVENOUS; SUBCUTANEOUS at 03:53

## 2022-12-31 RX ADMIN — Medication 10 MG: at 19:45

## 2022-12-31 RX ADMIN — NYSTATIN 1000000 UNITS: 500000 SUSPENSION ORAL at 19:45

## 2022-12-31 RX ADMIN — NYSTATIN 1000000 UNITS: 500000 SUSPENSION ORAL at 15:52

## 2022-12-31 RX ADMIN — POTASSIUM PHOSPHATE, MONOBASIC AND POTASSIUM PHOSPHATE, DIBASIC 15 MMOL: 224; 236 INJECTION, SOLUTION INTRAVENOUS at 22:47

## 2022-12-31 RX ADMIN — AMLODIPINE BESYLATE 10 MG: 10 TABLET ORAL at 08:06

## 2022-12-31 RX ADMIN — Medication 40 MG: at 08:06

## 2022-12-31 RX ADMIN — TACROLIMUS 2 MG: 5 CAPSULE ORAL at 17:02

## 2022-12-31 RX ADMIN — ASPIRIN 81 MG: 81 TABLET, CHEWABLE ORAL at 08:06

## 2022-12-31 RX ADMIN — ALBUTEROL SULFATE 2.5 MG: 2.5 SOLUTION RESPIRATORY (INHALATION) at 16:16

## 2022-12-31 RX ADMIN — NYSTATIN 1000000 UNITS: 500000 SUSPENSION ORAL at 08:06

## 2022-12-31 RX ADMIN — VALGANCICLOVIR HYDROCHLORIDE 900 MG: 450 TABLET ORAL at 08:06

## 2022-12-31 RX ADMIN — MYCOPHENOLATE MOFETIL 1500 MG: 200 POWDER, FOR SUSPENSION ORAL at 19:54

## 2022-12-31 RX ADMIN — NYSTATIN 1000000 UNITS: 500000 SUSPENSION ORAL at 12:10

## 2022-12-31 RX ADMIN — POTASSIUM & SODIUM PHOSPHATES POWDER PACK 280-160-250 MG 2 PACKET: 280-160-250 PACK at 10:27

## 2022-12-31 RX ADMIN — ACETYLCYSTEINE 2 ML: 100 SOLUTION ORAL; RESPIRATORY (INHALATION) at 12:29

## 2022-12-31 RX ADMIN — ACETAMINOPHEN 975 MG: 325 TABLET, FILM COATED ORAL at 14:23

## 2022-12-31 RX ADMIN — TERBUTALINE SULFATE 10 MG: 5 TABLET ORAL at 05:51

## 2022-12-31 RX ADMIN — ALBUTEROL SULFATE 2.5 MG: 2.5 SOLUTION RESPIRATORY (INHALATION) at 21:44

## 2022-12-31 RX ADMIN — TERBUTALINE SULFATE 5 MG: 5 TABLET ORAL at 22:11

## 2022-12-31 RX ADMIN — ROSUVASTATIN CALCIUM 10 MG: 10 TABLET, FILM COATED ORAL at 08:06

## 2022-12-31 RX ADMIN — INSULIN ASPART 1 UNITS: 100 INJECTION, SOLUTION INTRAVENOUS; SUBCUTANEOUS at 03:58

## 2022-12-31 RX ADMIN — ALBUTEROL SULFATE 2.5 MG: 2.5 SOLUTION RESPIRATORY (INHALATION) at 12:29

## 2022-12-31 RX ADMIN — INSULIN ASPART 2 UNITS: 100 INJECTION, SOLUTION INTRAVENOUS; SUBCUTANEOUS at 00:12

## 2022-12-31 RX ADMIN — TACROLIMUS 1 MG: 5 CAPSULE ORAL at 08:11

## 2022-12-31 RX ADMIN — POTASSIUM & SODIUM PHOSPHATES POWDER PACK 280-160-250 MG 2 PACKET: 280-160-250 PACK at 05:52

## 2022-12-31 RX ADMIN — OXYCODONE HYDROCHLORIDE 10 MG: 10 TABLET ORAL at 02:28

## 2022-12-31 RX ADMIN — LIDOCAINE PATCH 4% 1 PATCH: 40 PATCH TOPICAL at 05:43

## 2022-12-31 RX ADMIN — INSULIN ASPART 1 UNITS: 100 INJECTION, SOLUTION INTRAVENOUS; SUBCUTANEOUS at 23:54

## 2022-12-31 RX ADMIN — TRAZODONE HYDROCHLORIDE 100 MG: 100 TABLET ORAL at 20:36

## 2022-12-31 RX ADMIN — MYCOPHENOLATE MOFETIL 1500 MG: 200 POWDER, FOR SUSPENSION ORAL at 10:27

## 2022-12-31 RX ADMIN — ACYCLOVIR: 50 OINTMENT TOPICAL at 02:29

## 2022-12-31 RX ADMIN — ALBUTEROL SULFATE 2.5 MG: 2.5 SOLUTION RESPIRATORY (INHALATION) at 09:38

## 2022-12-31 RX ADMIN — HEPARIN SODIUM 5000 UNITS: 5000 INJECTION, SOLUTION INTRAVENOUS; SUBCUTANEOUS at 19:45

## 2022-12-31 RX ADMIN — MULTIVITAMIN 15 ML: LIQUID ORAL at 08:16

## 2022-12-31 RX ADMIN — HYDROMORPHONE HYDROCHLORIDE 0.4 MG: 0.2 INJECTION, SOLUTION INTRAMUSCULAR; INTRAVENOUS; SUBCUTANEOUS at 05:10

## 2022-12-31 RX ADMIN — HEPARIN SODIUM 5000 UNITS: 5000 INJECTION, SOLUTION INTRAVENOUS; SUBCUTANEOUS at 12:10

## 2022-12-31 RX ADMIN — OXYCODONE HYDROCHLORIDE 10 MG: 10 TABLET ORAL at 06:27

## 2022-12-31 RX ADMIN — ACYCLOVIR: 50 OINTMENT TOPICAL at 08:06

## 2022-12-31 RX ADMIN — PREDNISONE 35 MG: 20 TABLET ORAL at 08:06

## 2022-12-31 ASSESSMENT — ACTIVITIES OF DAILY LIVING (ADL)
ADLS_ACUITY_SCORE: 46
ADLS_ACUITY_SCORE: 42
ADLS_ACUITY_SCORE: 46
ADLS_ACUITY_SCORE: 46

## 2022-12-31 NOTE — PROGRESS NOTES
CV ICU Progress Note  12/31/2022        CO-MORBIDITIES:   Patient Active Problem List   Diagnosis     Acute respiratory failure with hypoxia (H)     Cardiomyopathy (H)     Stroke (H)     Coronary artery disease involving native coronary artery of native heart without angina pectoris     Essential hypertension     NSVT (nonsustained ventricular tachycardia)     SOB (shortness of breath)     CHF (congestive heart failure) (H)     Troponin level elevated     Anginal equivalent (H)     Heart failure, unspecified HF chronicity, unspecified heart failure type (H)     Benign neoplasm of colon     Acute on chronic combined systolic (congestive) and diastolic (congestive) heart failure (H)     Hyperlipidemia     Syncope and collapse     Primary central nervous system vasculitis (H)     Vasculitis, CNS (H)     Acute embolism and thrombosis of left peroneal vein (H)       ASSESSMENT: Paco Stein is a 67 year old male with a history of chronic systolic heart failure (suspected predominantly nonischemic cardiomyopathy, possibly arrhythmogenic), coronary artery disease (s/p PCI with TILA to LCx 4/2013 and to the LAD in 10/2021), hyperlipidemia, CNS vasculitis and stage 2 CKD who presents with transient bilateral hand tightness with unrevealing workup. He underwent DDHT on 12/25/22 with Dr. Rodriguez.    Today's Changes:  Increase amlodipine to 10mg   Dilaudid 0.4-0.6  Oxy 10-15  Scheduled tylenol  Seroquel 25mg  Terbutaline 5mg Q8  Consider anticoagulation in the future    PLAN:  Neuro/ pain/ sedation:  Acute Postoperative pain  Primary central nervous system vasculitis  - Monitor neurological status. Notify the MD for any acute changes in exam.  - melatonin 10 and seroquel 12.5 HS.  - CAM negative this AM  - Pain: Scheduled tylenol. PRN oxycodone, dilaudid q4h. Lido patch   - PTA trazodone PRN  - Seroquel     Pulmonary care:   Postoperative ventilation management  - extubated 12/27  - on RA  - CORIE CPT  - Titrate supplemental  oxygen to maintain saturation above 92%.  - PTA albuterol   - L pleural effusion with improvement  - CORIE collapse; CPT, no pneumothorax     Cardiovascular:    S/p heart tx with Dr. Rodriguez on 12/25/22  History of CAD  HFrEF  ICD  Dyslipidemia  HTN  Elevated troponin  Recent echo on 11/16 with LVEF of 20-25%  - Monitor hemodynamic status.   - Goal MAP 65, SBP<120  - Statin start POD5 (12/31)  - BB hold  - ASA: start POD5 (12/30)  - reassess before giving fluids as he may be able to tolerate low CVP  - Norepi, vaso and epi  gtt off  - isoproterenol off 12/29  - terbutaline 5mg Q8H   - CT chest 12/29  - hydralazine prn for SBP >160  - first bx on Monday 1/2/23  - r heart cath on Monday 1/2/23  - echo today  - amlodipine 10mg  - consider anticoagulation in the future following heart cath    GI care/ Nutrition:   malnutrition  - SLP eval for swallow   - NPO except ice chips   - VSS 12/30  - PPI  - Continue bowel regimen: miralax, senna  - prn suppository this PM  - CTM LFT  - TF via NJ at goal    Renal/ Fluid Balance/ Electrolytes:  CRI not on dialysis  MILENA, improving  BL creat appears to be ~ 0.8  - Strict I/O, daily weights  - Avoid/limit nephrotoxins as able  - Replete lytes PRN per protocol  - increasing tacro  - FeNa, Josette <20  - CVP 10-12  - follow CVP and other parameters for consideration of diuresis  -  q2    Endocrine:    Stress induced hyperglycemia  Preop A1c 6.2  - HDSSI  - Goal BG <180 for optimal healing  - on methylpred, mycophnolate    ID/ Antibiotics:  Stress induced leukocytosis  - To complete perioperative regimen  - Continue to monitor fever curve, WBC and inflammatory markers as appropriate  - WBC trending down overall (on steroid)  - on vanc and cefepime   - valcyte POD5 and bactrim on POD6  - topical acyclovir   - differential 12/28 AM - increased granulocytes and neutrophils, consistent with steroid administration     Heme:     Stress induced leukocytosis  Acute blood loss anemia  Acute  blood loss thrombocytopenia  H/o R axillary and L peroneal DVT  No s/sx active bleeding  - hgb goal 8<  - Continue to monitor  - CBC   - PTA apixaban, hold  - labs q12 h     MSK/ Skin:  Sternotomy  Surgical Incision  Left forearm swelling and bruise  - Sternal precautions  - Postoperative incision management per protocol  - PT/OT/CR  - Monitor left forearm swelling and bruise    Prophylaxis:    - Mechanical prophylaxis for DVT  - Chemical DVT prophylaxis - SQH 5000 q8, restart  - PPI    Lines/ tubes/ drains:  - CTs  - R MAC line, remove  - remove CVC  - place PIV  - NJ     Disposition:  - CVICU to 6C    ICU Checklist  F - Feeding:   A - Analgesia:   S - Sedation:   T - Thromboembolic prophylaxis:      H - Head of bed elevated  U - Ulcer prophylaxis:  G - Glycemic control  S - SBT     B - Bowel regimen  I - Indwelling catheter  D - De-escalation of antibiotics    Patient seen, findings and plan discussed CVICU attending    Gomez Latif MD  Anesthesiology, [facility]      ====================================    Interval history:  Slept well.       PAST MEDICAL HISTORY:   Past Medical History:   Diagnosis Date     Congestive heart failure (H)        PAST SURGICAL HISTORY:   Past Surgical History:   Procedure Laterality Date     COLONOSCOPY N/A 11/17/2022    Procedure: COLONOSCOPY;  Surgeon: Roverto Nino MD;  Location: [facility]     CV CORONARY ANGIOGRAM N/A 11/11/2022    Procedure: Coronary Angiogram;  Surgeon: Justo Bush MD;  Location:  HEART CARDIAC CATH LAB     CV RIGHT HEART CATH MEASUREMENTS RECORDED N/A 11/11/2022    Procedure: Right Heart Catheterization;  Surgeon: Justo Bush MD;  [facility]     CV RIGHT HEART CATH MEASUREMENTS RECORDED N/A 12/8/2022    Procedure: Right Heart Catheterization;  Surgeon: Wayne Xavier MD;  Location:  HEART CARDIAC CATH LAB     CV RIGHT HEART CATH MEASUREMENTS RECORDED N/A 12/9/2022    Procedure: Right Heart  "Catheterization with leave-in [place];  Surgeon: Khari Mcneill MD;  Location:  HEART CARDIAC CATH LAB     PICC DOUBLE LUMEN PLACEMENT Right 11/10/2022    Right basilic, 43 cm, 1 cm external length     TRANSPLANT HEART RECIPIENT N/A 12/25/2022    Procedure: TRANSPLANT, HEART, RECIPIENT; Median Sternotomy, Cardiopulmonary Bypass, Removal of Pacemaker;  Surgeon: Brendan Rodriguez MD;  Location:  OR       FAMILY HISTORY:   Family History   Problem Relation Age of Onset     Atrial fibrillation Father      Lung Cancer Brother        SOCIAL HISTORY:   Social History     Tobacco Use     Smoking status: Never     Smokeless tobacco: Never   Substance Use Topics     Alcohol use: Not Currently         OBJECTIVE:   1. VITAL SIGNS:    Vital signs:  Temp: 97.7  F (36.5  C) Temp src: Oral BP: (!) 150/75 Pulse: 102   Resp: 20 SpO2: 98 % O2 Device: None (Room air) Oxygen Delivery: 1 LPM Height: 188 cm (6' 2\") Weight: 102 kg (224 lb 13.9 oz)  Estimated body mass index is 28.87 kg/m  as calculated from the following:    Height as of this encounter: 1.88 m (6' 2\").    Weight as of this encounter: 102 kg (224 lb 13.9 oz).          2. INTAKE/ OUTPUT:    I/O last 3 completed shifts:  In: 2433 [I.V.:18; NG/GT:1285]  Out: 2530 [Urine:1430; Chest Tube:1100]      3. PHYSICAL EXAMINATION:     General: NAD  Neuro: AAOx3  Resp: NLB on RA  CV: S1, S2, sinus tach, no m/r/g   Abdomen: Soft, non-distended, non-tender  Incisions: c/d/i  Extremities: warm and well perfused, no edema  CT: To suction, serosang output, no airleak, crepitus    4. INVESTIGATIONS:   Arterial Blood Gases   Recent Labs   Lab 12/29/22  1009 12/29/22  0734 12/29/22  0402 12/28/22  2146   PH 7.47* 7.46* 7.46* 7.45   PCO2 37 37 37 37   PO2 103 136* 102 112*   HCO3 27 27 27 26     Complete Blood Count   Recent Labs   Lab 12/31/22  0433 12/30/22  1616 12/30/22  0958 12/30/22  0541   WBC 12.9* 13.2* 14.3* 13.2*   HGB 8.7* 8.5* 8.9* 9.2*   * 103* 104* 103*     Basic " Metabolic Panel  Recent Labs   Lab 12/31/22  0433 12/31/22  0357 12/31/22  0010 12/30/22  2118 12/30/22  1622 12/30/22  1616 12/30/22  1224 12/30/22  0958 12/30/22  0446 12/30/22  0444   *  --   --   --   --  149*  --  151*  --  148*   POTASSIUM 3.9  --   --   --   --  3.7  --  3.9  --  4.2   CHLORIDE 115*  --   --   --   --  115*  --  116*  --  114*   CO2 25  --   --   --   --  25  --  26  --  25   BUN 28.1*  --   --   --   --  32.5*  --  33.3*  --  35.8*   CR 0.64*  --   --   --   --  0.79  --  0.80  --  0.82   * 141* 171* 131*   < > 164*   < > 149*   < > 153*    < > = values in this interval not displayed.     Liver Function Tests  Recent Labs   Lab 12/31/22  0433 12/30/22 0444 12/29/22  1008 12/29/22  0359 12/28/22  0343 12/26/22 0359 12/25/22 2303 12/25/22 2150 12/25/22  0604   AST 24 30  --  34 42   < > 137*  --  33   ALT 28 30  --  33 36   < > 68*  --  54*   ALKPHOS 62 63  --  55 48   < > 71  --  68   BILITOTAL 0.4 0.6  --  0.5 0.5   < > 0.9  --  0.5   ALBUMIN 3.1* 3.3*  --  3.6 3.0*   < > 3.8  --  3.6   INR  --   --  1.15  --   --   --  1.27* 1.55* 1.04    < > = values in this interval not displayed.     Pancreatic Enzymes  Recent Labs   Lab 12/25/22  0604   AMYLASE 77     Coagulation Profile  Recent Labs   Lab 12/29/22  1008 12/25/22  2303 12/25/22  2150 12/25/22  0604   INR 1.15 1.27* 1.55* 1.04   PTT 26 46* 30 84*         5. RADIOLOGY:   Recent Results (from the past 24 hour(s))   XR Video Swallow with SLP or OT    Narrative    EXAMINATION:  Modified Barium Swallow Study with Speech Pathology on  12/30/2022 2:10 PM.    INDICATION: dysphagia post heart transplant     COMPARISON: None.    TECHNIQUE: Real time fluoroscopic acquisitions of the oropharynx and  upper esophagus were obtained after the oral administration of  multiple barium consistencies.    Total fluoroscopy time: 4 minutes    FINDINGS:   Clips are visualized in the neck and the right IJ catheter site. The  patient swallows  without difficulty. Pharyngeal motility is normal.  Aspiration on thin liquid consistency with head in neutral position.  Puree consistency showed significant residual in neutral head  position. Chin tuck position demonstrated penetration without  aspiration of thin liquid consistency, and increased clearance of  puree consistency. Solid was attempted and patient was unable to  swallow. The esophagus demonstrates normal motility.       Impression    IMPRESSION: Aspiration with thin liquid consistency which improved to  flash penetration with chin tuck maneuver.    Please see the speech pathologist report for further details.    I, MARVIN CAUSEY MD, attest that I was immediately available to  provide guidance and assistance during the entirety of the procedure.       XR Chest Port 1 View    Impression    RESIDENT PRELIMINARY INTERPRETATION  IMPRESSION:  1. Stable small apical pneumothoraces.  2. Mild decrease in the postsurgical basilar atelectasis, with  continued mild perihilar postsurgical edema.       =========================================

## 2022-12-31 NOTE — PLAN OF CARE
Major Shift Events:  Pt intermittently lethargic and slightly confused, but oriented x4. Call light giselle. Left Abd pain this AM near Chest tube site -- lido patch applied early per CVTS verbal. Oxy, Robaxin, Dilaudid all given PRN. Able to stand and pivot to bedside commode with assist x2. Room air. SBP<160 maintained without intervention. Sinus tachy w/ Frequent PVCs, intermittent quadrigeminy. V pacer wires capped. Chest tubes x5 sanguinous output 0-60mL q2h. TF at 60. No Bm overnight. Voiding into urinal.     Plan: Encourage activity today and continue bowel regimen. Monitor Chest tube output.     For vital signs and complete assessments, please see documentation flowsheets.

## 2022-12-31 NOTE — PROGRESS NOTES
[facility]    Advanced Heart Failure Consult Progress Note    Assessment and Plan:     Paco Stein is a 67 year old male with a history of chronic systolic heart failure (suspected predominantly nonischemic cardiomyopathy, possibly arrhythmogenic), coronary artery disease (s/p PCI with TILA to LCx 4/2013 and to the LAD in 10/2021), hyperlipidemia, CNS vasculitis and chronic kidney disease who presents with transient bilateral hand tightness with unrevealing workup, who was admitted for cardiogenic shock and evaluation of advance heart therapies on 12/7. Underwent orthopedic heart transplant on 12/25.     Interval History:    Patient continues to recover properly from post-operative course. SBP sill in the 150s. We will increase amlodipine from 5 to 10 mg. Labs with normal Cr and lactate.     Immunosuppression. Will increase tacrolimus today    Plan:   - Increase tacrolimus to 2mg BID  - Tacrolimus level on Monday  - Decreased terbutaline to 5mg Q8H  - RHC w/ biopsy on Monday  - TTE on Monday    ---------------------------------------------------------------------------  ===CARDIOVASCULAR===  Mr. Stein is a 67 y.o male with hx of Advanced chronic systolic heart failure, EF 15-20%, NYHA class IV, ACC/AHA Stage D who was admitted for ambulatory cardiogenic shock and evaluation of advance therapies. Underwent heart transplant on 12/25 without complications.     Immediate post operative course without complications so far    Graft Function:     Hemodynamics:     Date & Time CVP PA WP PA Sat CO/CI (Indirect Aisha) PVR Current Therapies (Vasoactive meds/MCS)   12/26/22 4 32/13/19 12 63 4.6/2.2 1.5 Epinephrine 0.03, NE 0.06, Vasopressin 4   12/27/22 11 36/14/21 14 48 5.7/2.7 1.2 Epinephrine   12/28/22 11 49/17/27 17 77 11/5.6 0.9 Epinephrine 0.03   12/29/22 10 41/14/23 14 71 9.1/4 0.9                  Volume status: CVP goal 10-12  o Diuresis: none  o     Cardiac/Graft function:    o Decrease terbutaline 5 mg TID     Rejection surveillance:   o Final crossmatch:   o Week 1 biopsy, RHC, TTE, and DSAs due: pending  o Echo, DSAs week 1 and week 4, RHC + EMBx done weeks 1-4, first RHC Monday (01/2)     Immunosuppression:    Induction: Yes, CNI sparing protocol with Simulect given POD 0 intraop (given), 2nd dose 20mg IV due POD #4 (on 12/29 given):    Maintenance:  o Steroids:   - 1000mg IV preop + 500mg IV if still awaiting transplant 24h after initial dose  - IntraOp: 500mg IV at release of cross clamp  - PostOp: 125mg IV q8hrs x 3 doses  - Then, prednisone 1mg/kg, given in 2 divided doses (started at 10 pm on 12/26). Taper by a total of 5mg/day until reach total daily dose of 0.5mg/kg/day dosed BID or 20mg bid.    - Plan for taper by 5mg after each normal biopsy to 5mg daily.  - If unable to give pills need to convert to IV    df  o Cell Cycle Inhibitor: MMF  - Preop: MMF 1500mg po q12h preoperatiely   o Postop: Continue MMF 1500mg po q12h (1000mg po q12h for heart-kidney).   o CNI: Tacrolimus (goal 10-12) started on 12/29.  Tacro 0.5 mg BID Obtain daily troughs.     Prophylaxis:  CMV D-/R+; EBV D pending /R + ; Toxo D pending /R negative     Post-op antibiotics:  Cefepime end date 12/30 , Vancomycin end date 12/28  o (**if admitted from home, 72h post transplant, if hospitalized Vanc for 72h post transplant, Cefepime for 5 days post transplant.  If chest left open postop: Vanc/Cef + Micafungin for 72h post chest closure with Cefepime continued for total 5 days. If hx of prior infections, consult with ID. If H/K TP Vanc for 5 days post kidney, Cefepime + Micafungin for 7 days post kidney**)     CMV: (D- /R+).   o CMV prophylaxis: with Valgancylovir, 6 months for D+/R-, 3 months for D+/R+ and D-/R-, no prophylaxis if D-/R-.    Fungal:  Fluconazole/ voriconazole, Nysatin swish and swallow while on steroids.     PJP: bactrim while on steroids    Toxo: (D pending/R negative):  If toxo IgG  seropositive for either D or R, lifelong Bactrim    Stress ulcer:  IV pantoprazole (Protonix while on steroids)     VTE prophylaxis:  subQ heparin per surgeon approval. Held on 12/28    (HCV: If donor was HCV+/INES+. Will need to send HCV PCR viral load by end of next week to monitor HCV seroconversion.)    CVA: aspirin and rosuvastatin.     # Hx of Multivessel coronary artery disease (s/p PCI with TILA to LCx 4/2013 and to the LAD in 10/2021)  # History of NSVT s/p biventricular PPM/ICD removed during OHT surgery 12/25      ===NEURO/PSYCH===  # Acute toxic metabolic encephalopathy post surgery, improving  # ICU delirium, resolved  # Insomnia/anxiety      #Pain control  -on tylenol TID for now    # History of CNS vasculitis  Patient with history of autoimmune CNS vasculitis with hx of strokes in 2013. No episodes since. Neurological exam unremarkable. Neurology consulted during recent admission as part of VAD/transplant evaluation. MRI/MRA brain was also performed which showed no concern for progression of disease  -Hold PTA cellcept 1000mg qam and 500mg qpm for now    # Incidental punctate cerebellar infarct, likely embolic  Read on MR imaging 11/14/22 without new neuro deficits. Per radiology, appears acute, likely embolic. No known arterial clot history. Reviewed telemetry over admission, no afib or other tachycarrhythmias. Reassuring prior TTE this admission including bubble study, repeat JARED w/o septal defects or thrombus. Treated w/ heparin gtt and transitioned to Eliquis.  - Hold heparin drip post heart transplant    ===PULMONARY===  #Intubated, extubated on 12/28    # Non-productive cough, acute on chronic, resolved  - PRN cough lozenges  - PRN  tessalon pearls    ===GI===  # Shock liver, improving  -daily LFT    # Severe malnutrition in the context of acute illness/injury on chronic illness  -TF will be placed today  - Appreciate assistance from RD      ===RENAL===  # Acute kidney injury, improving  # renal  "failure, improing  Worsening Cr up to 2.6 post op course. Now Cr is improving and patient is having good urine output. CVP goal as detailed above    ===HEME/ONC===  # Occlusive left peroneal DVT  # Non-occlusive right axillary DVT  Identified on doppler 11/15/22 during previous admission.   -Will need plan for anticoagulation in the future    ===ENDOCRINE===  # Hyperglycemia  Managed per CVTS    INFECTIOUS DISEASE  -No active concerns   -Received abx therapy as detailed above    ===SKIN/MSK===  # Bilateral hand tightness, resolved    FEN: full diet  DVT PPx: SQH  Code: Full code      Disposition: Pending   Discussed with Dr. Maharaj Jorge Reyes Castro, MD, MS   Cardiovascular disease fellow     Objective:   BP (!) 152/76   Pulse 96   Temp 97.7  F (36.5  C) (Oral)   Resp 20   Ht 1.88 m (6' 2\")   Wt 102 kg (224 lb 13.9 oz)   SpO2 97%   BMI 28.87 kg/m        Wt:   Wt Readings from Last 5 Encounters:   12/29/22 102 kg (224 lb 13.9 oz)   11/21/22 90.9 kg (200 lb 6.4 oz)   11/10/22 108 kg (238 lb 1.6 oz)   11/08/22 95.7 kg (210 lb 14.4 oz)     Physical Exam   GEN: Patient laying in bed. Looks awake and interactive.   HEENT: no icterus, EOMI  CV: heart sounds regular and rhythmic. No evident murmur. 2 mediastinal tubes and 3 chest tubes (L and R). Clean dressing covering sternotomy site.   CHEST: Passive movement of air.  ABD: soft, non-tender, non-distended   EXTR: warm extremities. No LE edema      DATA:   Labs, EKGs and imaging reviewed. Pertinent results discussed in assessment and plan above.   "

## 2022-12-31 NOTE — PROGRESS NOTES
12/31/22 1100   Appointment Info   Signing Clinician's Name / Credentials (PT) Maria Elena Dsouza, PT, DPT   Living Environment   People in Home spouse   Current Living Arrangements house   Home Accessibility stairs to enter home;stairs within home   Number of Stairs, Main Entrance 2   Stair Railings, Main Entrance none   Number of Stairs, Within Home, Primary   (15)   Stair Railings, Within Home, Primary railing on right side (ascending)   Transportation Anticipated car, drives self;family or friend will provide   Living Environment Comments See initial eval dated 12/16   General Information   Onset of Illness/Injury or Date of Surgery 12/25/22   Referring Physician Landon Mariscal   Patient/Family Therapy Goals Statement (PT) Get stronger and go home   Pertinent History of Current Problem (include personal factors and/or comorbidities that impact the POC) 67 year old male with a history of chronic systolic heart failure (suspected predominantly nonischemic cardiomyopathy, possibly arrhythmogenic), coronary artery disease (s/p PCI with TILA to LCx 4/2013 and to the LAD in 10/2021), hyperlipidemia, CNS vasculitis and chronic kidney disease who presents with transient bilateral hand tightness with unrevealing workup, who was admitted for cardiogenic shock and evaluation of advance heart therapies on 12/7. Underwent orthopedic heart transplant on 12/25.   Existing Precautions/Restrictions sternal   Heart Disease Risk Factors Gender;Medical history;Age   General Observations patient demonstrates consistent L sided neglect during mobility with R sided deficits with RLE noted. Per wife his L neglect is worse than baseline and he has had parasethesias in the RLE leading to difficulty with mobility since his previous stroke. Both are worse than normal   Cognition   Affect/Mental Status (Cognition) low arousal/lethargic   Orientation Status (Cognition) oriented x 4   Follows Commands (Cognition) WNL   Cognitive Status Comments  Patient with good recall of sternal precautions but has difficulty applying them   Integumentary/Edema   Integumentary/Edema Comments Incision clean and dry   Strength (Manual Muscle Testing)   Strength Comments At least 3/5 B. Demonstrates some R sided weakness during mobility   Transfers   Comment, (Transfers) Sit to stand with min A x2 from lower chair and min A x1 from bed.   Gait/Stairs (Locomotion)   Comment, (Gait/Stairs) Ambulates with fww and demonstrates step to gait pattern with shortened step length noted on the R. Poor walker management often outside of walker and consistently running into things on the L side environment. Per wife L neglect is worse than usual since surgery. Patient corrects with moderate verbal cues   Balance   Balance Comments Impaired dynamic balance with up to min A required   Sensory Examination   Sensory Perception Comments Impaired feeling on RLE due to previous stroke- difficulty with foot placement throughout   Clinical Impression   Criteria for Skilled Therapeutic Intervention Yes, treatment indicated   PT Diagnosis (PT) Impaired functional mobility   Influenced by the following impairments strength, activity tolerance, balance, cardiovascular status, posture   Functional limitations due to impairments gait, stairs, transfers, bed  mobility, functional endurance   Clinical Presentation (PT Evaluation Complexity) Stable/Uncomplicated   Clinical Presentation Rationale clinical reasoning   Clinical Decision Making (Complexity) moderate complexity   Planned Therapy Interventions (PT) balance training;bed mobility training;neuromuscular re-education;stair training;strengthening;transfer training;progressive activity/exercise;risk factor education;home program guidelines   Risk & Benefits of therapy have been explained evaluation/treatment results reviewed;care plan/treatment goals reviewed;risks/benefits reviewed;current/potential barriers reviewed;participants voiced agreement  with care plan;participants included;patient;spouse/significant other;son   Clinical Impression Comments Patient presents with worsening L sided neglect and RLE deficits with gait, however these are not new deficits given history of CNS vasculitus and previous stroke, they are just worsened by current medical status. However, patient is well supported by wife and is already moving with min A only. Anticipate pending LOS and progress, patient will be safe to discharge to home with 24/7 supervision and OP cardiac rehab   PT Total Evaluation Time   PT Eval, Re-eval Minutes (75702) 10   Physical Therapy Goals   PT Frequency 6x/week   PT Predicted Duration/Target Date for Goal Attainment 01/09/23   PT Goals Bed Mobility;Transfers;Gait;Stairs   PT: Bed Mobility Independent;Supine to/from sit;Within precautions   PT: Transfers Independent;Sit to/from stand;Within precautions   PT: Gait Modified independent;Assistive device;Greater than 200 feet   PT: Stairs Supervision/stand-by assist;2 stairs;Within precautions   Interventions   Interventions Quick Adds Neuromuscular Re-ed   Therapeutic Activity   Therapeutic Activities: dynamic activities to improve functional performance Minutes (90895) 15   Treatment Detail/Skilled Intervention PT: Patient greeted supine in bed agreeable to session on RA. Multiple CT and lines required time for out of room set up. Patient slightly lethargic throughout session but oriented and able to recite back sternal precautions. patient rolls with min A and requires min A x1 for supine to sit. Able to stand from bed following brief rest with min A x1. Following ambulation, rested in chair- consistently requires cues to scoot forward and minimize UE pushoff from the chair- min A x2 from lower wc height. Left up in chair following session. HR up to 120s with transfers and 130s with gait today with no desaturation on RA. Educated pt and wife on exercises for LEs verbally, reviewed sternal precautions  "and discussed activity ourside of PT time as well as dc to OP cardiac rehab- discussed expectations and potential schedule. Wife is very comfortable taking him home despite neuro deficits given those are baseline and she's been providing care for him for 3+ months full time.   Neuromuscular Re-education   Neuromuscular Re-Education Minutes (56536) 25   Symptoms Noted During/After Treatment fatigue   Treatment Detail/Skilled Intervention PT:Ambulates with fww and demonstrates step to gait pattern with shortened step length noted on the R. Poor walker management often outside of walker and consistently running into things on the L side environment. Per wife L neglect is worse than usual since surgery. Patient corrects with moderate verbal cues but needs consistently. No evidence of learning. Gait pattern best with cues for \"Big step\" on the RLE everytime otherwise has step to or less gait pattern. Patient ambulated ~30ft, sat to rest, then ambulated another 45 ft with wc follow throughout. Wheeled back with Ax2 and ambulated another 25ft into the room with min A required for balance to align to chair. Discussed with wife strategies to enhance L attention including scanning activities, sitting on L side and R sided sensory simultation to assist with awareness of RLE. Wife is a PT so asking excellent questions throughout regarding deficits and ways to enhance recovery outside of formal PT time.   Total Session Time   Timed Code Treatment Minutes 40   Total Session Time (sum of timed and untimed services) 50     "

## 2023-01-01 ENCOUNTER — APPOINTMENT (OUTPATIENT)
Dept: GENERAL RADIOLOGY | Facility: CLINIC | Age: 68
DRG: 001 | End: 2023-01-01
Attending: SURGERY
Payer: MEDICARE

## 2023-01-01 LAB
ALBUMIN SERPL BCG-MCNC: 2.7 G/DL (ref 3.5–5.2)
ALP SERPL-CCNC: 67 U/L (ref 40–129)
ALT SERPL W P-5'-P-CCNC: 23 U/L (ref 10–50)
ANION GAP SERPL CALCULATED.3IONS-SCNC: 11 MMOL/L (ref 7–15)
ANION GAP SERPL CALCULATED.3IONS-SCNC: 8 MMOL/L (ref 7–15)
AST SERPL W P-5'-P-CCNC: 21 U/L (ref 10–50)
BILIRUB DIRECT SERPL-MCNC: <0.2 MG/DL (ref 0–0.3)
BILIRUB SERPL-MCNC: 0.4 MG/DL
BUN SERPL-MCNC: 21 MG/DL (ref 8–23)
BUN SERPL-MCNC: 21.6 MG/DL (ref 8–23)
CA-I BLD-MCNC: 4.4 MG/DL (ref 4.4–5.2)
CALCIUM SERPL-MCNC: 7.8 MG/DL (ref 8.8–10.2)
CALCIUM SERPL-MCNC: 8.1 MG/DL (ref 8.8–10.2)
CHLORIDE SERPL-SCNC: 107 MMOL/L (ref 98–107)
CHLORIDE SERPL-SCNC: 110 MMOL/L (ref 98–107)
CREAT SERPL-MCNC: 0.6 MG/DL (ref 0.67–1.17)
CREAT SERPL-MCNC: 0.63 MG/DL (ref 0.67–1.17)
DEPRECATED HCO3 PLAS-SCNC: 21 MMOL/L (ref 22–29)
DEPRECATED HCO3 PLAS-SCNC: 23 MMOL/L (ref 22–29)
ERYTHROCYTE [DISTWIDTH] IN BLOOD BY AUTOMATED COUNT: 15.4 % (ref 10–15)
GFR SERPL CREATININE-BSD FRML MDRD: >90 ML/MIN/1.73M2
GFR SERPL CREATININE-BSD FRML MDRD: >90 ML/MIN/1.73M2
GLUCOSE BLDC GLUCOMTR-MCNC: 127 MG/DL (ref 70–99)
GLUCOSE BLDC GLUCOMTR-MCNC: 143 MG/DL (ref 70–99)
GLUCOSE BLDC GLUCOMTR-MCNC: 155 MG/DL (ref 70–99)
GLUCOSE BLDC GLUCOMTR-MCNC: 164 MG/DL (ref 70–99)
GLUCOSE BLDC GLUCOMTR-MCNC: 167 MG/DL (ref 70–99)
GLUCOSE SERPL-MCNC: 156 MG/DL (ref 70–99)
GLUCOSE SERPL-MCNC: 167 MG/DL (ref 70–99)
HCT VFR BLD AUTO: 27.1 % (ref 40–53)
HGB BLD-MCNC: 8.3 G/DL (ref 13.3–17.7)
LACTATE SERPL-SCNC: 1.1 MMOL/L (ref 0.7–2)
MAGNESIUM SERPL-MCNC: 1.6 MG/DL (ref 1.7–2.3)
MCH RBC QN AUTO: 28.8 PG (ref 26.5–33)
MCHC RBC AUTO-ENTMCNC: 30.6 G/DL (ref 31.5–36.5)
MCV RBC AUTO: 94 FL (ref 78–100)
PHOSPHATE SERPL-MCNC: 1.8 MG/DL (ref 2.5–4.5)
PLATELET # BLD AUTO: 123 10E3/UL (ref 150–450)
POTASSIUM SERPL-SCNC: 3.8 MMOL/L (ref 3.4–5.3)
POTASSIUM SERPL-SCNC: 4.3 MMOL/L (ref 3.4–5.3)
PROT SERPL-MCNC: 4.6 G/DL (ref 6.4–8.3)
RBC # BLD AUTO: 2.88 10E6/UL (ref 4.4–5.9)
SODIUM SERPL-SCNC: 139 MMOL/L (ref 136–145)
SODIUM SERPL-SCNC: 141 MMOL/L (ref 136–145)
WBC # BLD AUTO: 11.1 10E3/UL (ref 4–11)

## 2023-01-01 PROCEDURE — 250N000013 HC RX MED GY IP 250 OP 250 PS 637

## 2023-01-01 PROCEDURE — 250N000012 HC RX MED GY IP 250 OP 636 PS 637

## 2023-01-01 PROCEDURE — 82248 BILIRUBIN DIRECT: CPT | Performed by: STUDENT IN AN ORGANIZED HEALTH CARE EDUCATION/TRAINING PROGRAM

## 2023-01-01 PROCEDURE — 250N000013 HC RX MED GY IP 250 OP 250 PS 637: Performed by: SURGERY

## 2023-01-01 PROCEDURE — 99291 CRITICAL CARE FIRST HOUR: CPT | Performed by: ANESTHESIOLOGY

## 2023-01-01 PROCEDURE — 250N000013 HC RX MED GY IP 250 OP 250 PS 637: Performed by: STUDENT IN AN ORGANIZED HEALTH CARE EDUCATION/TRAINING PROGRAM

## 2023-01-01 PROCEDURE — 36415 COLL VENOUS BLD VENIPUNCTURE: CPT | Performed by: SURGERY

## 2023-01-01 PROCEDURE — 250N000011 HC RX IP 250 OP 636: Performed by: SURGERY

## 2023-01-01 PROCEDURE — 250N000009 HC RX 250: Performed by: SURGERY

## 2023-01-01 PROCEDURE — 71045 X-RAY EXAM CHEST 1 VIEW: CPT

## 2023-01-01 PROCEDURE — 250N000009 HC RX 250

## 2023-01-01 PROCEDURE — 85027 COMPLETE CBC AUTOMATED: CPT | Performed by: SURGERY

## 2023-01-01 PROCEDURE — 999N000157 HC STATISTIC RCP TIME EA 10 MIN

## 2023-01-01 PROCEDURE — 250N000009 HC RX 250: Performed by: STUDENT IN AN ORGANIZED HEALTH CARE EDUCATION/TRAINING PROGRAM

## 2023-01-01 PROCEDURE — 250N000012 HC RX MED GY IP 250 OP 636 PS 637: Performed by: STUDENT IN AN ORGANIZED HEALTH CARE EDUCATION/TRAINING PROGRAM

## 2023-01-01 PROCEDURE — 200N000002 HC R&B ICU UMMC

## 2023-01-01 PROCEDURE — 250N000011 HC RX IP 250 OP 636: Performed by: STUDENT IN AN ORGANIZED HEALTH CARE EDUCATION/TRAINING PROGRAM

## 2023-01-01 PROCEDURE — 82330 ASSAY OF CALCIUM: CPT | Performed by: STUDENT IN AN ORGANIZED HEALTH CARE EDUCATION/TRAINING PROGRAM

## 2023-01-01 PROCEDURE — 94640 AIRWAY INHALATION TREATMENT: CPT | Mod: 76

## 2023-01-01 PROCEDURE — 94668 MNPJ CHEST WALL SBSQ: CPT

## 2023-01-01 PROCEDURE — 250N000012 HC RX MED GY IP 250 OP 636 PS 637: Performed by: SURGERY

## 2023-01-01 PROCEDURE — 80053 COMPREHEN METABOLIC PANEL: CPT | Performed by: SURGERY

## 2023-01-01 PROCEDURE — 258N000003 HC RX IP 258 OP 636: Performed by: SURGERY

## 2023-01-01 PROCEDURE — 84100 ASSAY OF PHOSPHORUS: CPT | Performed by: SURGERY

## 2023-01-01 PROCEDURE — 71045 X-RAY EXAM CHEST 1 VIEW: CPT | Mod: 26 | Performed by: RADIOLOGY

## 2023-01-01 PROCEDURE — 99291 CRITICAL CARE FIRST HOUR: CPT | Mod: GC | Performed by: INTERNAL MEDICINE

## 2023-01-01 PROCEDURE — 83605 ASSAY OF LACTIC ACID: CPT | Performed by: SURGERY

## 2023-01-01 PROCEDURE — 83735 ASSAY OF MAGNESIUM: CPT | Performed by: SURGERY

## 2023-01-01 RX ORDER — MAGNESIUM SULFATE HEPTAHYDRATE 40 MG/ML
2 INJECTION, SOLUTION INTRAVENOUS ONCE
Status: COMPLETED | OUTPATIENT
Start: 2023-01-01 | End: 2023-01-01

## 2023-01-01 RX ORDER — HYDRALAZINE HYDROCHLORIDE 20 MG/ML
10 INJECTION INTRAMUSCULAR; INTRAVENOUS EVERY 6 HOURS PRN
Status: DISCONTINUED | OUTPATIENT
Start: 2023-01-01 | End: 2023-01-13 | Stop reason: HOSPADM

## 2023-01-01 RX ADMIN — Medication 10 MG: at 19:49

## 2023-01-01 RX ADMIN — ACETAMINOPHEN 975 MG: 325 TABLET, FILM COATED ORAL at 05:32

## 2023-01-01 RX ADMIN — LIDOCAINE PATCH 4% 1 PATCH: 40 PATCH TOPICAL at 07:41

## 2023-01-01 RX ADMIN — POTASSIUM & SODIUM PHOSPHATES POWDER PACK 280-160-250 MG 2 PACKET: 280-160-250 PACK at 19:48

## 2023-01-01 RX ADMIN — INSULIN ASPART 2 UNITS: 100 INJECTION, SOLUTION INTRAVENOUS; SUBCUTANEOUS at 16:02

## 2023-01-01 RX ADMIN — MAGNESIUM SULFATE IN WATER 2 G: 40 INJECTION, SOLUTION INTRAVENOUS at 12:00

## 2023-01-01 RX ADMIN — TERBUTALINE SULFATE 5 MG: 5 TABLET ORAL at 05:32

## 2023-01-01 RX ADMIN — POTASSIUM & SODIUM PHOSPHATES POWDER PACK 280-160-250 MG 2 PACKET: 280-160-250 PACK at 12:00

## 2023-01-01 RX ADMIN — ACETYLCYSTEINE 2 ML: 100 SOLUTION ORAL; RESPIRATORY (INHALATION) at 17:10

## 2023-01-01 RX ADMIN — NYSTATIN 1000000 UNITS: 500000 SUSPENSION ORAL at 12:00

## 2023-01-01 RX ADMIN — INSULIN ASPART 1 UNITS: 100 INJECTION, SOLUTION INTRAVENOUS; SUBCUTANEOUS at 04:16

## 2023-01-01 RX ADMIN — ACETYLCYSTEINE 2 ML: 100 SOLUTION ORAL; RESPIRATORY (INHALATION) at 09:09

## 2023-01-01 RX ADMIN — ACETAMINOPHEN 975 MG: 325 TABLET, FILM COATED ORAL at 21:33

## 2023-01-01 RX ADMIN — Medication 40 MG: at 07:42

## 2023-01-01 RX ADMIN — POTASSIUM PHOSPHATE, MONOBASIC AND POTASSIUM PHOSPHATE, DIBASIC 15 MMOL: 224; 236 INJECTION, SOLUTION INTRAVENOUS at 00:50

## 2023-01-01 RX ADMIN — HEPARIN SODIUM 5000 UNITS: 5000 INJECTION, SOLUTION INTRAVENOUS; SUBCUTANEOUS at 12:01

## 2023-01-01 RX ADMIN — OXYCODONE HYDROCHLORIDE 10 MG: 10 TABLET ORAL at 21:33

## 2023-01-01 RX ADMIN — QUETIAPINE FUMARATE 25 MG: 25 TABLET ORAL at 19:49

## 2023-01-01 RX ADMIN — MULTIVITAMIN 15 ML: LIQUID ORAL at 07:41

## 2023-01-01 RX ADMIN — HEPARIN SODIUM 5000 UNITS: 5000 INJECTION, SOLUTION INTRAVENOUS; SUBCUTANEOUS at 19:49

## 2023-01-01 RX ADMIN — SULFAMETHOXAZOLE AND TRIMETHOPRIM 1 TABLET: 400; 80 TABLET ORAL at 07:40

## 2023-01-01 RX ADMIN — TACROLIMUS 2 MG: 5 CAPSULE ORAL at 07:42

## 2023-01-01 RX ADMIN — ROSUVASTATIN CALCIUM 10 MG: 10 TABLET, FILM COATED ORAL at 07:40

## 2023-01-01 RX ADMIN — ALBUTEROL SULFATE 2.5 MG: 2.5 SOLUTION RESPIRATORY (INHALATION) at 09:09

## 2023-01-01 RX ADMIN — MYCOPHENOLATE MOFETIL 1500 MG: 200 POWDER, FOR SUSPENSION ORAL at 07:42

## 2023-01-01 RX ADMIN — VALGANCICLOVIR HYDROCHLORIDE 900 MG: 450 TABLET ORAL at 07:40

## 2023-01-01 RX ADMIN — MYCOPHENOLATE MOFETIL 1500 MG: 200 POWDER, FOR SUSPENSION ORAL at 19:48

## 2023-01-01 RX ADMIN — HEPARIN SODIUM 5000 UNITS: 5000 INJECTION, SOLUTION INTRAVENOUS; SUBCUTANEOUS at 04:13

## 2023-01-01 RX ADMIN — OXYCODONE HYDROCHLORIDE 10 MG: 10 TABLET ORAL at 17:17

## 2023-01-01 RX ADMIN — ACETYLCYSTEINE 2 ML: 100 SOLUTION ORAL; RESPIRATORY (INHALATION) at 19:14

## 2023-01-01 RX ADMIN — NYSTATIN 1000000 UNITS: 500000 SUSPENSION ORAL at 16:02

## 2023-01-01 RX ADMIN — OXYCODONE HYDROCHLORIDE 10 MG: 10 TABLET ORAL at 13:03

## 2023-01-01 RX ADMIN — PREDNISONE 30 MG: 20 TABLET ORAL at 19:49

## 2023-01-01 RX ADMIN — PREDNISONE 30 MG: 20 TABLET ORAL at 07:40

## 2023-01-01 RX ADMIN — ALBUTEROL SULFATE 2.5 MG: 2.5 SOLUTION RESPIRATORY (INHALATION) at 19:14

## 2023-01-01 RX ADMIN — ASPIRIN 81 MG: 81 TABLET, CHEWABLE ORAL at 07:40

## 2023-01-01 RX ADMIN — INSULIN ASPART 1 UNITS: 100 INJECTION, SOLUTION INTRAVENOUS; SUBCUTANEOUS at 12:10

## 2023-01-01 RX ADMIN — NYSTATIN 1000000 UNITS: 500000 SUSPENSION ORAL at 19:48

## 2023-01-01 RX ADMIN — AMLODIPINE BESYLATE 10 MG: 10 TABLET ORAL at 07:40

## 2023-01-01 RX ADMIN — POTASSIUM & SODIUM PHOSPHATES POWDER PACK 280-160-250 MG 2 PACKET: 280-160-250 PACK at 16:02

## 2023-01-01 RX ADMIN — ALBUTEROL SULFATE 2.5 MG: 2.5 SOLUTION RESPIRATORY (INHALATION) at 17:10

## 2023-01-01 RX ADMIN — ACETYLCYSTEINE 2 ML: 100 SOLUTION ORAL; RESPIRATORY (INHALATION) at 12:53

## 2023-01-01 RX ADMIN — ACETAMINOPHEN 975 MG: 325 TABLET, FILM COATED ORAL at 12:03

## 2023-01-01 RX ADMIN — NYSTATIN 1000000 UNITS: 500000 SUSPENSION ORAL at 07:41

## 2023-01-01 RX ADMIN — ALBUTEROL SULFATE 2.5 MG: 2.5 SOLUTION RESPIRATORY (INHALATION) at 12:53

## 2023-01-01 RX ADMIN — TACROLIMUS 2 MG: 5 CAPSULE ORAL at 18:03

## 2023-01-01 RX ADMIN — INSULIN ASPART 1 UNITS: 100 INJECTION, SOLUTION INTRAVENOUS; SUBCUTANEOUS at 19:47

## 2023-01-01 ASSESSMENT — ACTIVITIES OF DAILY LIVING (ADL)
ADLS_ACUITY_SCORE: 42
ADLS_ACUITY_SCORE: 42
ADLS_ACUITY_SCORE: 44
ADLS_ACUITY_SCORE: 42
ADLS_ACUITY_SCORE: 44

## 2023-01-01 NOTE — PLAN OF CARE
Major Shift Events:  Neuro: alert and oriented, intermittently confused specially when falling asleep, able to reorient without difficulty. Follows commands, pupils equal and brisk  CV: Chest tubes x 5, sinus tachy from high 90's to low 100's, BP <160, afebrile, pacer lines capped  Resp: RA, LS clear bilaterally, intermittent productive cough, requiring orotracheal suctioning  GI: 1x incontinent loose BM, TF at goal with 150q2 flush, uses urinal.  Labs: Phos replaced last night, recheck this AM.    Plan: continue plan of care, right card cath and biopsy Monday morning  For vital signs and complete assessments, please see documentation flowsheets.

## 2023-01-01 NOTE — PROGRESS NOTES
[facility]    Advanced Heart Failure Consult Progress Note    Assessment and Plan:     Paco Stein is a 67 year old male with a history of chronic systolic heart failure (suspected predominantly nonischemic cardiomyopathy, possibly arrhythmogenic), coronary artery disease (s/p PCI with TILA to LCx 4/2013 and to the LAD in 10/2021), hyperlipidemia, CNS vasculitis and chronic kidney disease who presents with transient bilateral hand tightness with unrevealing workup, who was admitted for cardiogenic shock and evaluation of advance heart therapies on 12/7. Underwent orthopedic heart transplant on 12/25.     Interval History:  NAEO. Nursing notes reviewed.    Slept well, pain controlled.     Plan:   - Tacrolimus level on Monday  - D/c terbutaline   - NPO @ MN; RHC w/ biopsy on Monday  - TTE on Monday  ---------------------------------------------------------------------------  ===CARDIOVASCULAR===  Mr. Stein is a 67 y.o male with hx of Advanced chronic systolic heart failure, EF 15-20%, NYHA class IV, ACC/AHA Stage D who was admitted for ambulatory cardiogenic shock and evaluation of advance therapies. Underwent heart transplant on 12/25 without complications.     Immediate post operative course without complications so far    Graft Function:     Hemodynamics:     Date & Time CVP PA WP PA Sat CO/CI (Indirect Aisha) PVR Current Therapies (Vasoactive meds/MCS)   12/26/22 4 32/13/19 12 63 4.6/2.2 1.5 Epinephrine 0.03, NE 0.06, Vasopressin 4   12/27/22 11 36/14/21 14 48 5.7/2.7 1.2 Epinephrine   12/28/22 11 49/17/27 17 77 11/5.6 0.9 Epinephrine 0.03   12/29/22 10 41/14/23 14 71 9.1/4 0.9                  Volume status: euvolemic  o Diuresis: none  o     Cardiac/Graft function:   o Discontinue terbutaline 5 mg TID on 1/1    Rejection surveillance:   o Final crossmatch:   o Week 1 biopsy, RHC, TTE, and DSAs due: pending  o Echo, DSAs week 1 and week 4, RHC + EMBx done weeks 1-4,  first [facility] Monday (01/2)     Immunosuppression:    Induction: Yes, CNI sparing protocol with Simulect given POD 0 intraop (given), 2nd dose 20mg IV due POD #4 (on 12/29 given):    Maintenance:  o Steroids:   - 1000mg IV preop + 500mg IV if still awaiting transplant 24h after initial dose  - IntraOp: 500mg IV at release of cross clamp  - PostOp: 125mg IV q8hrs x 3 doses  - Then, prednisone 1mg/kg, given in 2 divided doses (started at 10 pm on 12/26). Taper by a total of 5mg/day until reach total daily dose of 0.5mg/kg/day dosed BID or 20mg bid.    - Plan for taper by 5mg after each normal biopsy to 5mg daily.  - If unable to give pills need to convert to IV    df  o Cell Cycle Inhibitor: MMF  - Preop: MMF 1500mg po q12h preoperatiely   o Postop: Continue MMF 1500mg po q12h (1000mg po q12h for heart-kidney).   o CNI: Tacrolimus (goal 10-12) started on 12/29.  Tacro 2BID; trough 1/2     Prophylaxis:  CMV D-/R+; EBV D pending /R + ; Toxo D pending /R negative     Post-op antibiotics:  Cefepime end date 12/30 , Vancomycin end date 12/28  o (**if admitted from home, 72h post transplant, if hospitalized Vanc for 72h post transplant, Cefepime for 5 days post transplant.  If chest left open postop: Vanc/Cef + Micafungin for 72h post chest closure with Cefepime continued for total 5 days. If hx of prior infections, consult with ID. If H/K TP Vanc for 5 days post kidney, Cefepime + Micafungin for 7 days post kidney**)     CMV: (D- /R+).   o CMV prophylaxis: with Valgancylovir, 6 months for D+/R-, 3 months for D+/R+ and D-/R-, no prophylaxis if D-/R-.    Fungal:  Fluconazole/ voriconazole, Nysatin swish and swallow while on steroids.     PJP: bactrim while on steroids    Toxo: (D pending/R negative):  If toxo IgG seropositive for either D or R, lifelong Bactrim    Stress ulcer:  IV pantoprazole (Protonix while on steroids)     VTE prophylaxis:  subQ heparin per surgeon approval. Held on 12/28    (HCV: If donor was HCV+/INES+.  Will need to send HCV PCR viral load by end of next week to monitor HCV seroconversion.)    CVA: aspirin and rosuvastatin.     # Hx of Multivessel coronary artery disease (s/p PCI with TILA to LCx 4/2013 and to the LAD in 10/2021)  # History of NSVT s/p biventricular PPM/ICD removed during OHT surgery 12/25    ===NEURO/PSYCH===  # Acute toxic metabolic encephalopathy post surgery, improving  # ICU delirium, resolved  # Insomnia/anxiety    #Pain control  -on tylenol TID for now    # History of CNS vasculitis  Patient with history of autoimmune CNS vasculitis with hx of strokes in 2013. No episodes since. Neurological exam unremarkable. Neurology consulted during recent admission as part of VAD/transplant evaluation. MRI/MRA brain was also performed which showed no concern for progression of disease  -Hold PTA cellcept 1000mg qam and 500mg qpm for now    # Incidental punctate cerebellar infarct, likely embolic  Read on MR imaging 11/14/22 without new neuro deficits. Per radiology, appears acute, likely embolic. No known arterial clot history. Reviewed telemetry over admission, no afib or other tachycarrhythmias. Reassuring prior TTE this admission including bubble study, repeat JARED w/o septal defects or thrombus. Treated w/ heparin gtt and transitioned to Eliquis.  - Hold heparin drip post heart transplant    ===PULMONARY===    # Non-productive cough, acute on chronic, resolved  - PRN cough lozenges  - PRN  tessalon pearls    ===GI===    # Severe malnutrition in the context of acute illness/injury on chronic illness  -TF, Thin liquids    ===RENAL===  # Acute kidney injury( resolved)    ===HEME/ONC===  # Occlusive left peroneal DVT  # Non-occlusive right axillary DVT  Identified on doppler 11/15/22 during previous admission.   -Will need plan for anticoagulation in the future    ===ENDOCRINE===  #IZABELLA  INFECTIOUS DISEASE  -No active concerns     ===SKIN/MSK===  -IZABELLA    FEN: tubefeeds  DVT PPx: SQH  Code: Full code     "  Disposition: Pending   Discussed with Dr. Dandy Jackson   Cardiology Fellow  This note was created using Dragon dictation software, so please excuse any mistakes and incorrect syntax and semantics.     Objective:   /66 (BP Location: Left arm, Cuff Size: Adult Regular)   Pulse 98   Temp 98  F (36.7  C) (Oral)   Resp 17   Ht 1.88 m (6' 2\")   Wt 102 kg (224 lb 13.9 oz)   SpO2 98%   BMI 28.87 kg/m        Wt:   Wt Readings from Last 5 Encounters:   12/29/22 102 kg (224 lb 13.9 oz)   11/21/22 90.9 kg (200 lb 6.4 oz)   11/10/22 108 kg (238 lb 1.6 oz)   11/08/22 95.7 kg (210 lb 14.4 oz)     Physical Exam   GEN: Patient laying in bed. Looks awake and interactive.   HEENT: no icterus, EOMI  CV: heart sounds regular and rhythmic. No evident murmur. 2 mediastinal tubes and 3 chest tubes (L and R). Clean dressing covering sternotomy site.   CHEST: Passive movement of air.  ABD: soft, non-tender, non-distended   EXTR: warm extremities. No LE edema      DATA:   Labs, EKGs and imaging reviewed. Pertinent results discussed in assessment and plan above.   "

## 2023-01-01 NOTE — PROGRESS NOTES
CV ICU Progress Note  01/01/2023        CO-MORBIDITIES:   Patient Active Problem List   Diagnosis     Acute respiratory failure with hypoxia (H)     Cardiomyopathy (H)     Stroke (H)     Coronary artery disease involving native coronary artery of native heart without angina pectoris     Essential hypertension     NSVT (nonsustained ventricular tachycardia)     SOB (shortness of breath)     CHF (congestive heart failure) (H)     Troponin level elevated     Anginal equivalent (H)     Heart failure, unspecified HF chronicity, unspecified heart failure type (H)     Benign neoplasm of colon     Acute on chronic combined systolic (congestive) and diastolic (congestive) heart failure (H)     Hyperlipidemia     Syncope and collapse     Primary central nervous system vasculitis (H)     Vasculitis, CNS (H)     Acute embolism and thrombosis of left peroneal vein (H)       ASSESSMENT: Paco Stein is a 67 year old male with a history of chronic systolic heart failure (suspected predominantly nonischemic cardiomyopathy, possibly arrhythmogenic), coronary artery disease (s/p PCI with TILA to LCx 4/2013 and to the LAD in 10/2021), hyperlipidemia, CNS vasculitis and stage 2 CKD who presents with transient bilateral hand tightness with unrevealing workup. He underwent DDHT on 12/25/22 with Dr. Rodriguez.      PLAN:  Neuro/ pain/ sedation:  Acute Postoperative pain  Primary central nervous system vasculitis  - Monitor neurological status. Notify the MD for any acute changes in exam.  - melatonin 10 and seroquel 12.5 HS.  - CAM negative this AM  - Pain: Scheduled tylenol. PRN robaxin. PRN oxycodone (x1 dose 10 mg), IV PRN dilaudid (not used). Lidocaine patch. No NSAIDs.  - PTA trazodone PRN (used)  - Seroquel 25 mg qHS    Pulmonary care:   #CORIE atelectasis  # Bilateral apical pneumothoraces  - Room air   - Aggressive pulmonary toilet: CPT, albuterol, mucomyst, IS, flutter valve  - Titrate supplemental oxygen to maintain saturation  above 92%.    Cardiovascular:    S/p heart tx with Dr. Rodriguez on 12/25/22  History of CAD  HFrEF  ICD  Dyslipidemia  HTN  Recent echo on 11/16 with LVEF of 20-25%  - Monitor hemodynamic status.   - Goal MAP 65, SBP<120  - Continue statin  - Continue ASA 81 mg  - Discontinue terbutaline  - IV hydralazine prn for SBP >140. Daily amlodipine 10 mg.  - Heart bx, TTE and cath on Monday 1/2/23  - Cards 2 following. Appreciate recommendations.    GI care/ Nutrition:   malnutrition  - SLP following. Thin liquids with chin tuck  - NPO at midnight  - TF via NJ at goal   - PPI  - Last BM 12/31 (multiple). Continue bowel regimen: miralax, senna      Renal/ Fluid Balance/ Electrolytes:  CRI not on dialysis  MILENA, improving  Hypernatemia (improving)  BL creat appears to be ~ 0.8  - Strict I/O, daily weights  - Avoid/limit nephrotoxins as able  - Replete lytes PRN per protocol  -Decrease FWF from 150q2 to 75 q2. Na+ 141<144<148    Endocrine:    Stress induced hyperglycemia  Steroid induced hyperglycemia  Preop A1c 6.2  - HDSSI  - Goal BG <180 for optimal healing. Well controlled    ID/ Antibiotics:  Stress induced leukocytosis  - To complete perioperative regimen  - Continue to monitor fever curve, WBC and inflammatory markers as appropriate. Improved leukocytosis.  - Immunosuppression ppx: valcyte and bactrim      Heme:     Stress induced leukocytosis  Acute blood loss anemia  Acute blood loss thrombocytopenia  H/o R axillary and L peroneal DVT  No s/sx active bleeding  - Hgb 9.1<8.7  - CBC daily  - PTA apixaban, hold      MSK/ Skin:  Sternotomy  Surgical Incision  Left forearm swelling and bruise  - Sternal precautions  - Postoperative incision management per protocol  - PT/OT  - Monitor left forearm swelling and bruise    Prophylaxis:    - Mechanical prophylaxis for DVT  - Chemical DVT prophylaxis - SQH. Hold 1/2 am SQH  - PPI    Lines/ tubes/ drains:  - CTs x 5  - NJ   - Pacer wires   - PIV    Disposition:  - Floor      Patient  seen, findings and plan discussed CVICU attending    Tiera Pang  General Surgery Resident, PGY-3      ====================================    Interval history:  No issues overnight. Pain better controlled.      PAST MEDICAL HISTORY:   Past Medical History:   Diagnosis Date     Congestive heart failure (H)        PAST SURGICAL HISTORY:   Past Surgical History:   Procedure Laterality Date     COLONOSCOPY N/A 11/17/2022    Procedure: COLONOSCOPY;  Surgeon: Roverto Nino MD;  Location:  GI     CV CORONARY ANGIOGRAM N/A 11/11/2022    Procedure: Coronary Angiogram;  Surgeon: Justo Bush MD;  Location:  HEART CARDIAC CATH LAB     CV RIGHT HEART CATH MEASUREMENTS RECORDED N/A 11/11/2022    Procedure: Right Heart Catheterization;  Surgeon: Justo Bush MD;  Location:  HEART CARDIAC CATH LAB     CV RIGHT HEART CATH MEASUREMENTS RECORDED N/A 12/8/2022    Procedure: Right Heart Catheterization;  Surgeon: Wayne Xavier MD;  Location:  HEART CARDIAC CATH LAB     CV RIGHT HEART CATH MEASUREMENTS RECORDED N/A 12/9/2022    Procedure: Right Heart Catheterization with leave-in Gary;  Surgeon: Khari Mcneill MD;  Location:  HEART CARDIAC CATH LAB     PICC DOUBLE LUMEN PLACEMENT Right 11/10/2022    Right basilic, 43 cm, 1 cm external length     TRANSPLANT HEART RECIPIENT N/A 12/25/2022    Procedure: TRANSPLANT, HEART, RECIPIENT; Median Sternotomy, Cardiopulmonary Bypass, Removal of Pacemaker;  Surgeon: Brendan Rodriguez MD;  Location:  OR       FAMILY HISTORY:   Family History   Problem Relation Age of Onset     Atrial fibrillation Father      Lung Cancer Brother        SOCIAL HISTORY:   Social History     Tobacco Use     Smoking status: Never     Smokeless tobacco: Never   Substance Use Topics     Alcohol use: Not Currently         OBJECTIVE:   1. VITAL SIGNS:    Vital signs:  Temp: 98.5  F (36.9  C) Temp src: Axillary BP: (!) 150/74 Pulse: 104   Resp: 17 SpO2: 99 % O2  "Device: None (Room air) Oxygen Delivery: 1 LPM Height: 188 cm (6' 2\") Weight: 102 kg (224 lb 13.9 oz)  Estimated body mass index is 28.87 kg/m  as calculated from the following:    Height as of this encounter: 1.88 m (6' 2\").    Weight as of this encounter: 102 kg (224 lb 13.9 oz).          2. INTAKE/ OUTPUT:    I/O last 3 completed shifts:  In: 4010 [P.O.:940; I.V.:60; NG/GT:1690]  Out: 2490 [Urine:1650; Stool:250; Chest Tube:590]      3. PHYSICAL EXAMINATION:     General: NAD  Neuro: AAOx3  Resp: NLB on RA  CV: S1, S2, sinus tach, no m/r/g   Abdomen: Soft, non-distended, non-tender  Incisions: c/d/i  Extremities: warm and well perfused, no edema  CT: To suction, serosang output    4. INVESTIGATIONS:   Arterial Blood Gases   Recent Labs   Lab 12/29/22  1009 12/29/22  0734 12/29/22  0402 12/28/22  2146   PH 7.47* 7.46* 7.46* 7.45   PCO2 37 37 37 37   PO2 103 136* 102 112*   HCO3 27 27 27 26     Complete Blood Count   Recent Labs   Lab 01/01/23  0609 12/31/22  1650 12/31/22  0433 12/30/22  1616   WBC 11.1* 14.1* 12.9* 13.2*   HGB 8.3* 9.1* 8.7* 8.5*   * 120* 114* 103*     Basic Metabolic Panel  Recent Labs   Lab 01/01/23  0739 01/01/23  0609 01/01/23  0416 12/31/22  2352 12/31/22  1948 12/31/22  1650 12/31/22  0801 12/31/22  0433 12/30/22  1622 12/30/22  1616   NA  --  141  --   --   --  144  --  148*  --  149*   POTASSIUM  --  3.8  --   --   --  4.0  --  3.9  --  3.7   CHLORIDE  --  110*  --   --   --  112*  --  115*  --  115*   CO2  --  23  --   --   --  22  --  25  --  25   BUN  --  21.6  --   --   --  27.0*  --  28.1*  --  32.5*   CR  --  0.63*  --   --   --  0.65*  --  0.64*  --  0.79   * 156* 155* 155*   < > 169*   < > 151*   < > 164*    < > = values in this interval not displayed.     Liver Function Tests  Recent Labs   Lab 01/01/23  0609 12/31/22  0433 12/30/22  0444 12/29/22  1008 12/29/22  0359 12/26/22  0359 12/25/22  2303 12/25/22  2150   AST 21 24 30  --  34   < > 137*  --    ALT 23 28 30  " --  33   < > 68*  --    ALKPHOS 67 62 63  --  55   < > 71  --    BILITOTAL 0.4 0.4 0.6  --  0.5   < > 0.9  --    ALBUMIN 2.7* 3.1* 3.3*  --  3.6   < > 3.8  --    INR  --   --   --  1.15  --   --  1.27* 1.55*    < > = values in this interval not displayed.     Pancreatic Enzymes  No lab results found in last 7 days.  Coagulation Profile  Recent Labs   Lab 12/29/22  1008 12/25/22  2303 12/25/22  2150   INR 1.15 1.27* 1.55*   PTT 26 46* 30         5. RADIOLOGY:   Recent Results (from the past 24 hour(s))   XR Chest Port 1 View    Narrative    EXAM: XR CHEST PORT 1 VIEW  1/1/2023 4:08 AM     HISTORY:  post-op heart transplant monitoring       COMPARISON:  Chest x-ray 12/31/2022 at 0138. CT chest 12/29/2022.    FINDINGS:   AP portable chest, 45 degrees. Postoperative changes of heart  transplantation. Intact median sternotomy wires. Stable positioning of  the bibasilar and left upper chest tubes and mediastinal drains.  Enteric tube courses below the diaphragm with tip out of view. Trachea  is midline. Stable cardiomediastinal silhouette. Minimal interval  increase in the diffuse interstitial prominence and perihilar  haziness, without significant change in the retrocardiac/basilar  opacification. Stable small biapical pneumothoraces. No significant  pleural effusions.      Impression    IMPRESSION:  1. Stable postoperative changes of heart transplantation. Stable  positioning of support devices.  2. Stable small biapical pneumothoraces.  3. Minimal interval increase in the postsurgical interstitial and  perihilar edema, with unchanged mild bibasilar atelectasis.     I have personally reviewed the examination and initial interpretation  and I agree with the findings.    SRINIVASAN VALERIO MD         SYSTEM ID:  [id number]       =========================================

## 2023-01-02 ENCOUNTER — APPOINTMENT (OUTPATIENT)
Dept: CARDIOLOGY | Facility: CLINIC | Age: 68
DRG: 001 | End: 2023-01-02
Attending: STUDENT IN AN ORGANIZED HEALTH CARE EDUCATION/TRAINING PROGRAM
Payer: MEDICARE

## 2023-01-02 ENCOUNTER — APPOINTMENT (OUTPATIENT)
Dept: GENERAL RADIOLOGY | Facility: CLINIC | Age: 68
DRG: 001 | End: 2023-01-02
Attending: SURGERY
Payer: MEDICARE

## 2023-01-02 LAB
ALBUMIN SERPL BCG-MCNC: 2.7 G/DL (ref 3.5–5.2)
ALP SERPL-CCNC: 60 U/L (ref 40–129)
ALT SERPL W P-5'-P-CCNC: 25 U/L (ref 10–50)
ANION GAP SERPL CALCULATED.3IONS-SCNC: 7 MMOL/L (ref 7–15)
AST SERPL W P-5'-P-CCNC: 18 U/L (ref 10–50)
ATRIAL RATE - MUSE: 100 BPM
ATRIAL RATE - MUSE: 103 BPM
ATRIAL RATE - MUSE: 103 BPM
BILIRUB DIRECT SERPL-MCNC: <0.2 MG/DL (ref 0–0.3)
BILIRUB SERPL-MCNC: 0.3 MG/DL
BUN SERPL-MCNC: 18.3 MG/DL (ref 8–23)
CA-I BLD-MCNC: 4.5 MG/DL (ref 4.4–5.2)
CALCIUM SERPL-MCNC: 8.1 MG/DL (ref 8.8–10.2)
CHLORIDE SERPL-SCNC: 106 MMOL/L (ref 98–107)
CREAT SERPL-MCNC: 0.59 MG/DL (ref 0.67–1.17)
DEPRECATED HCO3 PLAS-SCNC: 23 MMOL/L (ref 22–29)
DIASTOLIC BLOOD PRESSURE - MUSE: NORMAL MMHG
ERYTHROCYTE [DISTWIDTH] IN BLOOD BY AUTOMATED COUNT: 15.5 % (ref 10–15)
GFR SERPL CREATININE-BSD FRML MDRD: >90 ML/MIN/1.73M2
GLUCOSE BLDC GLUCOMTR-MCNC: 129 MG/DL (ref 70–99)
GLUCOSE BLDC GLUCOMTR-MCNC: 132 MG/DL (ref 70–99)
GLUCOSE BLDC GLUCOMTR-MCNC: 135 MG/DL (ref 70–99)
GLUCOSE BLDC GLUCOMTR-MCNC: 139 MG/DL (ref 70–99)
GLUCOSE BLDC GLUCOMTR-MCNC: 143 MG/DL (ref 70–99)
GLUCOSE BLDC GLUCOMTR-MCNC: 172 MG/DL (ref 70–99)
GLUCOSE SERPL-MCNC: 140 MG/DL (ref 70–99)
HCT VFR BLD AUTO: 31 % (ref 40–53)
HGB BLD-MCNC: 9.6 G/DL (ref 13.3–17.7)
INTERPRETATION ECG - MUSE: NORMAL
LACTATE SERPL-SCNC: 0.9 MMOL/L (ref 0.7–2)
LVEF ECHO: NORMAL
MAGNESIUM SERPL-MCNC: 1.5 MG/DL (ref 1.7–2.3)
MAGNESIUM SERPL-MCNC: 2.1 MG/DL (ref 1.7–2.3)
MCH RBC QN AUTO: 29.7 PG (ref 26.5–33)
MCHC RBC AUTO-ENTMCNC: 31 G/DL (ref 31.5–36.5)
MCV RBC AUTO: 96 FL (ref 78–100)
P AXIS - MUSE: 41 DEGREES
P AXIS - MUSE: 42 DEGREES
P AXIS - MUSE: 51 DEGREES
PHOSPHATE SERPL-MCNC: 1.8 MG/DL (ref 2.5–4.5)
PLATELET # BLD AUTO: 178 10E3/UL (ref 150–450)
POTASSIUM SERPL-SCNC: 4.3 MMOL/L (ref 3.4–5.3)
PR INTERVAL - MUSE: 120 MS
PR INTERVAL - MUSE: 126 MS
PR INTERVAL - MUSE: 126 MS
PROT SERPL-MCNC: 4.5 G/DL (ref 6.4–8.3)
QRS DURATION - MUSE: 90 MS
QRS DURATION - MUSE: 92 MS
QRS DURATION - MUSE: 98 MS
QT - MUSE: 350 MS
QT - MUSE: 360 MS
QT - MUSE: 362 MS
QTC - MUSE: 458 MS
QTC - MUSE: 464 MS
QTC - MUSE: 474 MS
R AXIS - MUSE: 36 DEGREES
R AXIS - MUSE: 38 DEGREES
R AXIS - MUSE: 38 DEGREES
RBC # BLD AUTO: 3.23 10E6/UL (ref 4.4–5.9)
SODIUM SERPL-SCNC: 136 MMOL/L (ref 136–145)
SYSTOLIC BLOOD PRESSURE - MUSE: NORMAL MMHG
T AXIS - MUSE: 18 DEGREES
T AXIS - MUSE: 36 DEGREES
T AXIS - MUSE: 45 DEGREES
TACROLIMUS BLD-MCNC: 3 UG/L (ref 5–15)
TME LAST DOSE: ABNORMAL H
TME LAST DOSE: ABNORMAL H
VENTRICULAR RATE- MUSE: 100 BPM
VENTRICULAR RATE- MUSE: 103 BPM
VENTRICULAR RATE- MUSE: 103 BPM
WBC # BLD AUTO: 14.4 10E3/UL (ref 4–11)

## 2023-01-02 PROCEDURE — C1894 INTRO/SHEATH, NON-LASER: HCPCS | Performed by: INTERNAL MEDICINE

## 2023-01-02 PROCEDURE — 83605 ASSAY OF LACTIC ACID: CPT | Performed by: STUDENT IN AN ORGANIZED HEALTH CARE EDUCATION/TRAINING PROGRAM

## 2023-01-02 PROCEDURE — 93505 ENDOMYOCARDIAL BIOPSY: CPT | Mod: 26 | Performed by: INTERNAL MEDICINE

## 2023-01-02 PROCEDURE — 83735 ASSAY OF MAGNESIUM: CPT | Performed by: SURGERY

## 2023-01-02 PROCEDURE — 80053 COMPREHEN METABOLIC PANEL: CPT | Performed by: STUDENT IN AN ORGANIZED HEALTH CARE EDUCATION/TRAINING PROGRAM

## 2023-01-02 PROCEDURE — 250N000013 HC RX MED GY IP 250 OP 250 PS 637

## 2023-01-02 PROCEDURE — 250N000012 HC RX MED GY IP 250 OP 636 PS 637: Performed by: NURSE PRACTITIONER

## 2023-01-02 PROCEDURE — 93505 ENDOMYOCARDIAL BIOPSY: CPT | Performed by: INTERNAL MEDICINE

## 2023-01-02 PROCEDURE — 272N000001 HC OR GENERAL SUPPLY STERILE: Performed by: INTERNAL MEDICINE

## 2023-01-02 PROCEDURE — 93321 DOPPLER ECHO F-UP/LMTD STD: CPT | Mod: 26 | Performed by: INTERNAL MEDICINE

## 2023-01-02 PROCEDURE — 36415 COLL VENOUS BLD VENIPUNCTURE: CPT | Performed by: STUDENT IN AN ORGANIZED HEALTH CARE EDUCATION/TRAINING PROGRAM

## 2023-01-02 PROCEDURE — 250N000009 HC RX 250: Performed by: STUDENT IN AN ORGANIZED HEALTH CARE EDUCATION/TRAINING PROGRAM

## 2023-01-02 PROCEDURE — 86833 HLA CLASS II HIGH DEFIN QUAL: CPT | Performed by: STUDENT IN AN ORGANIZED HEALTH CARE EDUCATION/TRAINING PROGRAM

## 2023-01-02 PROCEDURE — 84100 ASSAY OF PHOSPHORUS: CPT | Performed by: SURGERY

## 2023-01-02 PROCEDURE — 85027 COMPLETE CBC AUTOMATED: CPT | Performed by: STUDENT IN AN ORGANIZED HEALTH CARE EDUCATION/TRAINING PROGRAM

## 2023-01-02 PROCEDURE — 82248 BILIRUBIN DIRECT: CPT | Performed by: STUDENT IN AN ORGANIZED HEALTH CARE EDUCATION/TRAINING PROGRAM

## 2023-01-02 PROCEDURE — 86832 HLA CLASS I HIGH DEFIN QUAL: CPT | Performed by: STUDENT IN AN ORGANIZED HEALTH CARE EDUCATION/TRAINING PROGRAM

## 2023-01-02 PROCEDURE — 36415 COLL VENOUS BLD VENIPUNCTURE: CPT | Performed by: SURGERY

## 2023-01-02 PROCEDURE — 200N000002 HC R&B ICU UMMC

## 2023-01-02 PROCEDURE — 02BK3ZX EXCISION OF RIGHT VENTRICLE, PERCUTANEOUS APPROACH, DIAGNOSTIC: ICD-10-PCS | Performed by: INTERNAL MEDICINE

## 2023-01-02 PROCEDURE — 250N000009 HC RX 250: Performed by: INTERNAL MEDICINE

## 2023-01-02 PROCEDURE — 250N000013 HC RX MED GY IP 250 OP 250 PS 637: Performed by: SURGERY

## 2023-01-02 PROCEDURE — 250N000013 HC RX MED GY IP 250 OP 250 PS 637: Performed by: STUDENT IN AN ORGANIZED HEALTH CARE EDUCATION/TRAINING PROGRAM

## 2023-01-02 PROCEDURE — 255N000002 HC RX 255 OP 636: Performed by: INTERNAL MEDICINE

## 2023-01-02 PROCEDURE — 999N000157 HC STATISTIC RCP TIME EA 10 MIN

## 2023-01-02 PROCEDURE — 99231 SBSQ HOSP IP/OBS SF/LOW 25: CPT | Mod: 25 | Performed by: NURSE PRACTITIONER

## 2023-01-02 PROCEDURE — 93325 DOPPLER ECHO COLOR FLOW MAPG: CPT | Mod: 26 | Performed by: INTERNAL MEDICINE

## 2023-01-02 PROCEDURE — 71045 X-RAY EXAM CHEST 1 VIEW: CPT

## 2023-01-02 PROCEDURE — 94668 MNPJ CHEST WALL SBSQ: CPT

## 2023-01-02 PROCEDURE — 258N000001 HC RX 258: Performed by: STUDENT IN AN ORGANIZED HEALTH CARE EDUCATION/TRAINING PROGRAM

## 2023-01-02 PROCEDURE — 80197 ASSAY OF TACROLIMUS: CPT

## 2023-01-02 PROCEDURE — 94667 MNPJ CHEST WALL 1ST: CPT

## 2023-01-02 PROCEDURE — 71045 X-RAY EXAM CHEST 1 VIEW: CPT | Mod: 26 | Performed by: RADIOLOGY

## 2023-01-02 PROCEDURE — 82374 ASSAY BLOOD CARBON DIOXIDE: CPT | Performed by: SURGERY

## 2023-01-02 PROCEDURE — 250N000011 HC RX IP 250 OP 636: Performed by: SURGERY

## 2023-01-02 PROCEDURE — 94640 AIRWAY INHALATION TREATMENT: CPT | Mod: 76

## 2023-01-02 PROCEDURE — 93308 TTE F-UP OR LMTD: CPT | Mod: 26 | Performed by: INTERNAL MEDICINE

## 2023-01-02 PROCEDURE — 250N000012 HC RX MED GY IP 250 OP 636 PS 637: Performed by: SURGERY

## 2023-01-02 PROCEDURE — 93308 TTE F-UP OR LMTD: CPT

## 2023-01-02 PROCEDURE — 250N000012 HC RX MED GY IP 250 OP 636 PS 637

## 2023-01-02 PROCEDURE — 4A023N6 MEASUREMENT OF CARDIAC SAMPLING AND PRESSURE, RIGHT HEART, PERCUTANEOUS APPROACH: ICD-10-PCS | Performed by: INTERNAL MEDICINE

## 2023-01-02 PROCEDURE — 258N000003 HC RX IP 258 OP 636

## 2023-01-02 PROCEDURE — 250N000012 HC RX MED GY IP 250 OP 636 PS 637: Performed by: STUDENT IN AN ORGANIZED HEALTH CARE EDUCATION/TRAINING PROGRAM

## 2023-01-02 PROCEDURE — 94640 AIRWAY INHALATION TREATMENT: CPT

## 2023-01-02 PROCEDURE — 88350 IMFLUOR EA ADDL 1ANTB STN PX: CPT | Mod: TC | Performed by: INTERNAL MEDICINE

## 2023-01-02 PROCEDURE — 250N000009 HC RX 250

## 2023-01-02 PROCEDURE — 82330 ASSAY OF CALCIUM: CPT | Performed by: STUDENT IN AN ORGANIZED HEALTH CARE EDUCATION/TRAINING PROGRAM

## 2023-01-02 RX ORDER — VALGANCICLOVIR 450 MG/1
900 TABLET, FILM COATED ORAL DAILY
Status: DISCONTINUED | OUTPATIENT
Start: 2023-01-03 | End: 2023-01-13 | Stop reason: HOSPADM

## 2023-01-02 RX ORDER — MAGNESIUM SULFATE HEPTAHYDRATE 40 MG/ML
4 INJECTION, SOLUTION INTRAVENOUS ONCE
Status: COMPLETED | OUTPATIENT
Start: 2023-01-02 | End: 2023-01-02

## 2023-01-02 RX ORDER — SULFAMETHOXAZOLE AND TRIMETHOPRIM 400; 80 MG/1; MG/1
1 TABLET ORAL DAILY
Status: DISCONTINUED | OUTPATIENT
Start: 2023-01-03 | End: 2023-01-13 | Stop reason: HOSPADM

## 2023-01-02 RX ADMIN — ACETYLCYSTEINE 2 ML: 100 SOLUTION ORAL; RESPIRATORY (INHALATION) at 20:49

## 2023-01-02 RX ADMIN — ASPIRIN 81 MG: 81 TABLET, CHEWABLE ORAL at 07:38

## 2023-01-02 RX ADMIN — ALBUTEROL SULFATE 2.5 MG: 2.5 SOLUTION RESPIRATORY (INHALATION) at 12:23

## 2023-01-02 RX ADMIN — POTASSIUM & SODIUM PHOSPHATES POWDER PACK 280-160-250 MG 2 PACKET: 280-160-250 PACK at 07:39

## 2023-01-02 RX ADMIN — MULTIVITAMIN 15 ML: LIQUID ORAL at 07:38

## 2023-01-02 RX ADMIN — HUMAN ALBUMIN MICROSPHERES AND PERFLUTREN 5 ML: 10; .22 INJECTION, SOLUTION INTRAVENOUS at 09:47

## 2023-01-02 RX ADMIN — ALBUTEROL SULFATE 2.5 MG: 2.5 SOLUTION RESPIRATORY (INHALATION) at 16:09

## 2023-01-02 RX ADMIN — MYCOPHENOLATE MOFETIL 1500 MG: 200 POWDER, FOR SUSPENSION ORAL at 20:06

## 2023-01-02 RX ADMIN — INSULIN ASPART 2 UNITS: 100 INJECTION, SOLUTION INTRAVENOUS; SUBCUTANEOUS at 00:12

## 2023-01-02 RX ADMIN — Medication 40 MG: at 07:43

## 2023-01-02 RX ADMIN — NYSTATIN 1000000 UNITS: 500000 SUSPENSION ORAL at 16:13

## 2023-01-02 RX ADMIN — SULFAMETHOXAZOLE AND TRIMETHOPRIM 1 TABLET: 400; 80 TABLET ORAL at 07:40

## 2023-01-02 RX ADMIN — POTASSIUM & SODIUM PHOSPHATES POWDER PACK 280-160-250 MG 2 PACKET: 280-160-250 PACK at 20:05

## 2023-01-02 RX ADMIN — ACETYLCYSTEINE 2 ML: 100 SOLUTION ORAL; RESPIRATORY (INHALATION) at 12:23

## 2023-01-02 RX ADMIN — PREDNISONE 30 MG: 20 TABLET ORAL at 07:39

## 2023-01-02 RX ADMIN — TACROLIMUS 2 MG: 5 CAPSULE ORAL at 07:45

## 2023-01-02 RX ADMIN — ALBUTEROL SULFATE 2.5 MG: 2.5 SOLUTION RESPIRATORY (INHALATION) at 08:39

## 2023-01-02 RX ADMIN — PREDNISONE 25 MG: 20 TABLET ORAL at 20:04

## 2023-01-02 RX ADMIN — VALGANCICLOVIR 900 MG: 50 FOR SOLUTION ORAL at 07:44

## 2023-01-02 RX ADMIN — QUETIAPINE FUMARATE 25 MG: 25 TABLET ORAL at 20:05

## 2023-01-02 RX ADMIN — SENNOSIDES AND DOCUSATE SODIUM 2 TABLET: 8.6; 5 TABLET ORAL at 07:39

## 2023-01-02 RX ADMIN — POTASSIUM & SODIUM PHOSPHATES POWDER PACK 280-160-250 MG 2 PACKET: 280-160-250 PACK at 12:08

## 2023-01-02 RX ADMIN — OXYCODONE HYDROCHLORIDE 10 MG: 10 TABLET ORAL at 18:47

## 2023-01-02 RX ADMIN — INSULIN ASPART 1 UNITS: 100 INJECTION, SOLUTION INTRAVENOUS; SUBCUTANEOUS at 03:44

## 2023-01-02 RX ADMIN — ACETYLCYSTEINE 2 ML: 100 SOLUTION ORAL; RESPIRATORY (INHALATION) at 08:40

## 2023-01-02 RX ADMIN — Medication 10 MG: at 20:04

## 2023-01-02 RX ADMIN — SENNOSIDES AND DOCUSATE SODIUM 1 TABLET: 8.6; 5 TABLET ORAL at 20:05

## 2023-01-02 RX ADMIN — ACETAMINOPHEN 975 MG: 325 TABLET, FILM COATED ORAL at 12:08

## 2023-01-02 RX ADMIN — AMLODIPINE BESYLATE 10 MG: 10 TABLET ORAL at 07:40

## 2023-01-02 RX ADMIN — TACROLIMUS 3 MG: 5 CAPSULE ORAL at 17:58

## 2023-01-02 RX ADMIN — TRAZODONE HYDROCHLORIDE 100 MG: 100 TABLET ORAL at 21:48

## 2023-01-02 RX ADMIN — NYSTATIN 1000000 UNITS: 500000 SUSPENSION ORAL at 20:04

## 2023-01-02 RX ADMIN — ACETAMINOPHEN 975 MG: 325 TABLET, FILM COATED ORAL at 05:27

## 2023-01-02 RX ADMIN — MAGNESIUM SULFATE IN WATER 4 G: 40 INJECTION, SOLUTION INTRAVENOUS at 07:40

## 2023-01-02 RX ADMIN — LIDOCAINE PATCH 4% 1 PATCH: 40 PATCH TOPICAL at 07:38

## 2023-01-02 RX ADMIN — ACETAMINOPHEN 975 MG: 325 TABLET, FILM COATED ORAL at 20:04

## 2023-01-02 RX ADMIN — NYSTATIN 1000000 UNITS: 500000 SUSPENSION ORAL at 12:08

## 2023-01-02 RX ADMIN — POTASSIUM & SODIUM PHOSPHATES POWDER PACK 280-160-250 MG 2 PACKET: 280-160-250 PACK at 16:13

## 2023-01-02 RX ADMIN — ACETYLCYSTEINE 2 ML: 100 SOLUTION ORAL; RESPIRATORY (INHALATION) at 16:09

## 2023-01-02 RX ADMIN — ROSUVASTATIN CALCIUM 10 MG: 10 TABLET, FILM COATED ORAL at 07:40

## 2023-01-02 RX ADMIN — MYCOPHENOLATE MOFETIL 1500 MG: 200 POWDER, FOR SUSPENSION ORAL at 07:45

## 2023-01-02 RX ADMIN — SODIUM CHLORIDE, POTASSIUM CHLORIDE, SODIUM LACTATE AND CALCIUM CHLORIDE 1000 ML: 600; 310; 30; 20 INJECTION, SOLUTION INTRAVENOUS at 16:28

## 2023-01-02 RX ADMIN — NYSTATIN 1000000 UNITS: 500000 SUSPENSION ORAL at 07:38

## 2023-01-02 RX ADMIN — DEXTROSE MONOHYDRATE 1000 ML: 100 INJECTION, SOLUTION INTRAVENOUS at 00:07

## 2023-01-02 RX ADMIN — ALBUTEROL SULFATE 2.5 MG: 2.5 SOLUTION RESPIRATORY (INHALATION) at 20:49

## 2023-01-02 ASSESSMENT — ACTIVITIES OF DAILY LIVING (ADL)
ADLS_ACUITY_SCORE: 42
ADLS_ACUITY_SCORE: 42
ADLS_ACUITY_SCORE: 44
ADLS_ACUITY_SCORE: 44
ADLS_ACUITY_SCORE: 39
ADLS_ACUITY_SCORE: 44
ADLS_ACUITY_SCORE: 42
ADLS_ACUITY_SCORE: 39
ADLS_ACUITY_SCORE: 44
ADLS_ACUITY_SCORE: 42

## 2023-01-02 NOTE — PROGRESS NOTES
[facility]   Cardiology II Service / Advanced Heart Failure  Daily Progress Note  Date of Service: 1/2/2023      Patient: Paco Stein  MRN: [id number]  Admission Date: 12/6/2022  Hospital Day # 27    Assessment and Plan: Paco Stein is a 67 year old male with a PMH of SCHF secondary to arrhythmogenic NICM, CAD s/p PCI, Hyperlipidemia, CNS Vasculitis, and CKD who was admitted for cardiogenic shock s/p OHT 12/25/22.     S/p OHT. Completed 12/25/22. Postoperative course complicated by ICU delirium, resolved.   Echo 12/27 with EF 55-60% and trivial pericardial effusion. Repeat today.   RHC/biopsy today.     Graft Function:   --Volume: Euvolemic  --BP: 129/72. Stable on Norvasc 10 mg po daily.   --HR: 90's, off Terbutaline     Immunosuppression:   --CNI-delaying protocol with basiliximab on 12/25 and 12/29  --prednisone taper  --Cellcept 1500 mg BID  --tacrolimus 2 mg BID (goal 10-12), level pending     Serostatus: CMV D-/R+; EBV D pending /R + ; Toxo D pending /R negative     Prophylaxis:   --CAV: aspirin 81 mg and rosuvastatin 10 mg daily  --Thrush: Nystatin   --PCP: Bactrim single strength daily   --GI: Protonix   --Osteoporosis: calcium/vitamin D   --CMV: CMV D-/R+, Valcyte 900 daily x 3 mos   --Toxo: Toxo D pending /R negative     Routine Surveillance:   --week 1 biopsy due today  --DSAs pending  --TTE pending    HTN.   - Norvasc as above.     History of CVS Vasculitis.   - Cellcept as above.     Incidental punctate cerebellar infarct. Identified per MR imaging 11/14/22 without new neuro deficits. Per radiology, appears acute, likely embolic. No known arterial clot history. No afib or other tachycarrhythmias. JARED w/o septal defects or thrombus. Treated w/ heparin gtt and transitioned to Eliquis.   - Eliquis on hold s/p OHT.     Occlusive left peroneal DVT. Non-occlusive right axillary DVT. Identified on US 11/15/22.  - Consider resumption of Eliquis when cleared per CVTS.     Severe Protein  "Calorie Malnutrition. Dysphagia.   - Appreciate Nutrition and Speech Consult.   - consider ramez counts, tolerated full liquid diet.   ================================================================    Interval History/ROS: He is feeling well today. He denies fever, chills, chest pain, palpitations, cough, nausea, vomiting, diarrhea, and LE edema. He is tolerating TF and full liquids, NPO for cath this morning. He is working with therapy.     Last 24 hr care team notes reviewed.   ROS:  4 point ROS including Respiratory, CV, GI and , other than that noted in the HPI, is negative.     Medications: Reviewed in EPIC.     Physical Exam:   /79 (BP Location: Left arm)   Pulse 93   Temp 97.9  F (36.6  C) (Oral)   Resp 25   Ht 1.88 m (6' 2\")   Wt 97.1 kg (214 lb 1.1 oz)   SpO2 99%   BMI 27.48 kg/m    GENERAL: Appears alert and oriented times three.   HEENT: Eye symmetrical and free of discharge bilaterally. NJ to nare. Mucous membranes moist and without lesions.  NECK: Supple and without lymphadenopathy. JVD below clavicular line.   CV: RRR, S1S2 present without murmur, rub, or gallop.   RESPIRATORY: Respirations regular, even, and unlabored. Lungs CTA throughout.   GI: Soft and non distended with normoactive bowel sounds present in all quadrants. No tenderness, rebound, guarding. No organomegaly.   EXTREMITIES: Trace bilateral LE peripheral edema. 2+ bilateral pedal pulses.   NEUROLOGIC: Alert and orientated x 3. CN II-XII grossly intact. No focal deficits.   MUSCULOSKELETAL: No joint swelling or tenderness.   SKIN: No jaundice. Chest and Left device site CDI.     Data:  CMPRecent Labs   Lab 01/02/23  0737 01/02/23  0637 01/02/23  0343 01/02/23  0011 01/01/23  1945 01/01/23  1649 01/01/23  0739 01/01/23  0609 12/31/22  2352 12/31/22  2054 12/31/22  1948 12/31/22  1650 12/31/22  0801 12/31/22  0433 12/30/22  0446 12/30/22  0444 12/29/22  0412 12/29/22  0359   NA  --  136  --   --   --  139  --  141  --   --   " --  144  --  148*   < > 148*   < > 145   POTASSIUM  --  4.3  --   --   --  4.3  --  3.8  --   --   --  4.0  --  3.9   < > 4.2   < > 3.7   CHLORIDE  --  106  --   --   --  107  --  110*  --   --   --  112*  --  115*   < > 114*   < > 112*   CO2  --  23  --   --   --  21*  --  23  --   --   --  22  --  25   < > 25   < > 23   ANIONGAP  --  7  --   --   --  11  --  8  --   --   --  10  --  8   < > 9   < > 10   * 140* 143* 172*   < > 167*   < > 156*   < >  --    < > 169*   < > 151*   < > 153*   < > 141*   BUN  --  18.3  --   --   --  21.0  --  21.6  --   --   --  27.0*  --  28.1*   < > 35.8*   < > 55.3*   CR  --  0.59*  --   --   --  0.60*  --  0.63*  --   --   --  0.65*  --  0.64*   < > 0.82   < > 1.19*   GFRESTIMATED  --  >90  --   --   --  >90  --  >90  --   --   --  >90  --  >90   < > >90   < > 66   TIM  --  8.1*  --   --   --  8.1*  --  7.8*  --   --   --  8.2*  --  8.5*   < > 8.4*   < > 8.8   MAG  --  1.5*  --   --   --   --   --  1.6*  --   --   --   --   --   --   --  2.0  --  2.4*   PHOS  --   --   --   --   --   --   --  1.8*  --  1.6*  --  1.6*  --  1.7*  --  2.0*   < > 2.4*   PROTTOTAL  --  4.5*  --   --   --   --   --  4.6*  --   --   --   --   --  4.8*  --  5.3*  --  5.1*   ALBUMIN  --  2.7*  --   --   --   --   --  2.7*  --   --   --   --   --  3.1*  --  3.3*  --  3.6   BILITOTAL  --  0.3  --   --   --   --   --  0.4  --   --   --   --   --  0.4  --  0.6  --  0.5   ALKPHOS  --  60  --   --   --   --   --  67  --   --   --   --   --  62  --  63  --  55   AST  --  18  --   --   --   --   --  21  --   --   --   --   --  24  --  30  --  34   ALT  --  25  --   --   --   --   --  23  --   --   --   --   --  28  --  30  --  33    < > = values in this interval not displayed.     CBC  Recent Labs   Lab 01/02/23  0637 01/01/23  0609 12/31/22  1650 12/31/22  0433   WBC 14.4* 11.1* 14.1* 12.9*   RBC 3.23* 2.88* 3.12* 2.98*   HGB 9.6* 8.3* 9.1* 8.7*   HCT 31.0* 27.1* 29.6* 28.8*   MCV 96 94 95 97   MCH 29.7 28.8  29.2 29.2   MCHC 31.0* 30.6* 30.7* 30.2*   RDW 15.5* 15.4* 15.9* 15.8*    123* 120* 114*     INR  Recent Labs   Lab 12/29/22  1008   INR 1.15       Time/Communication  I personally spent a total of 30 minutes. Of that 15 minutes was counseling/coordination of patient's care. Plan of care discussed with patient. See my note above for details. Patient discussed with Dr. Dorman.      Luly Street FNP  1/2/2023

## 2023-01-02 NOTE — PROGRESS NOTES
SPIRITUAL HEALTH SERVICES Progress Note  [facility] 4A    I offered brief follow up to Vince and his family. They shared their excitement and gratitude for Vince's transplant. I prayed for them per their request.     Joaquin Richardson  Chaplain Resident  Pager [phone]    * [facility] remains available 24/7 for emergent requests/referrals, either by having the switchboard page the on-call  or by entering an ASAP/STAT consult in Epic (this will also page the on-call ). Routine Epic consults receive an initial response within 24 hours.*

## 2023-01-02 NOTE — PLAN OF CARE
Major Shift Events: R heart cath w/ biopsy completed, okay to transfer to floor. 1L LR bolus for CVP of 0-1. CT x5 to 3 atriums. Neuro intact ex numbness to 4th and 5th finger on R hand. NSR 90s, SBP < 140 without intervention. Voiding via urinal, BM x2 this shift. TF @ goal via NJ @ 60ml/hr with standard flushes (decreased from 75ml q2). Full liquid diet with thins with chin tuck to swallow, good PO intake.    Patients wife would like to take him home at discharge (she is a retired physical therapist) and feels comfortable with his mobility at this time. 1A transfer, minimal assistance required to stand, 2nd person is helpful to assist with lines. Walked in the halls x1.     Plan: Transfer to floor pending bed availability. SLP to assess again for advancement of diet. Pacer wires to be removed tomorrow.  For vital signs and complete assessments, please see documentation flowsheets.

## 2023-01-02 NOTE — PLAN OF CARE
Goal Outcome Evaluation:      Plan of Care Reviewed With: patient    Overall Patient Progress: improvingOverall Patient Progress: improving    Outcome Evaluation: Pt meeting nutrition needs via EN. Will consider cyclic feeds when pt's diet advanced past full liquid.

## 2023-01-02 NOTE — PROGRESS NOTES
CV ICU Progress Note  01/02/2023        CO-MORBIDITIES:   Patient Active Problem List   Diagnosis     Acute respiratory failure with hypoxia (H)     Cardiomyopathy (H)     Stroke (H)     Coronary artery disease involving native coronary artery of native heart without angina pectoris     Essential hypertension     NSVT (nonsustained ventricular tachycardia)     SOB (shortness of breath)     CHF (congestive heart failure) (H)     Troponin level elevated     Anginal equivalent (H)     Heart failure, unspecified HF chronicity, unspecified heart failure type (H)     Benign neoplasm of colon     Acute on chronic combined systolic (congestive) and diastolic (congestive) heart failure (H)     Hyperlipidemia     Syncope and collapse     Primary central nervous system vasculitis (H)     Vasculitis, CNS (H)     Acute embolism and thrombosis of left peroneal vein (H)       ASSESSMENT: Paco Stein is a 67 year old male with a history of chronic systolic heart failure (suspected predominantly nonischemic cardiomyopathy, possibly arrhythmogenic), coronary artery disease (s/p PCI with TILA to LCx 4/2013 and to the LAD in 10/2021), hyperlipidemia, CNS vasculitis and stage 2 CKD who presents with transient bilateral hand tightness with unrevealing workup. He underwent DDHT on 12/25/22 with Dr. Rodriguez.      PLAN:  Neuro/ pain/ sedation:  Acute Postoperative pain  Primary central nervous system vasculitis  - Monitor neurological status. Notify the MD for any acute changes in exam.  - melatonin 10 and seroquel 12.5 HS.  - CAM negative this AM  - Pain: Scheduled tylenol. PRN robaxin. PRN oxycodone (x1 dose 10 mg), IV PRN dilaudid (not used 1/1/23). Lidocaine patch. No NSAIDs.  - PTA trazodone PRN (not used)  - Seroquel 25 mg qHS    Pulmonary care:   #CORIE atelectasis  # Bilateral apical pneumothoraces  - Room air   - Aggressive pulmonary toilet: CPT, albuterol, mucomyst, IS, flutter valve  - Titrate supplemental oxygen to maintain  saturation above 92%.    Cardiovascular:    S/p heart tx with Dr. Rodriguez on 12/25/22  History of CAD  HFrEF  ICD  Dyslipidemia  HTN  Recent echo on 11/16 with LVEF of 20-25%  - Monitor hemodynamic status.   - Goal MAP 65, SBP<120  - Continue statin  - Continue ASA 81 mg  - Discontinue terbutaline 1/1/23  - IV hydralazine prn for SBP >140. Daily amlodipine 10 mg.  - Heart bx, TTE and cath on Monday 1/2/23  - Cards 2 following. Appreciate recommendations.    GI care/ Nutrition:   malnutrition  - SLP following. Thin liquids with chin tuck  - NPO at midnight, restart after bx  - TF via NJ at goal   - PPI  - Last BM 1/1 (multiple). Continue bowel regimen: miralax, senna      Renal/ Fluid Balance/ Electrolytes:  CRI not on dialysis  MILENA, improving  Hypernatemia (resolved)  BL creat appears to be ~ 0.8  - Strict I/O, daily weights  - Avoid/limit nephrotoxins as able  - Replete lytes PRN per protocol  - Decrease FWF to 30 q4. Na+ normalized    Endocrine:    Stress induced hyperglycemia  Steroid induced hyperglycemia  Preop A1c 6.2  - HDSSI  - Goal BG <180 for optimal healing. Well controlled    ID/ Antibiotics:  Stress induced leukocytosis  - To complete perioperative regimen  - Continue to monitor fever curve, WBC and inflammatory markers as appropriate. Improved leukocytosis.  - Immunosuppression ppx: valcyte and bactrim      Heme:     Stress induced leukocytosis  Acute blood loss anemia  Acute blood loss thrombocytopenia  H/o R axillary and L peroneal DVT  No s/sx active bleeding  - Hgb 9.1<8.7  - CBC daily  - PTA apixaban, hold      MSK/ Skin:  Sternotomy  Surgical Incision  Left forearm swelling and bruise  - Sternal precautions  - Postoperative incision management per protocol  - PT/OT  - Monitor left forearm swelling and bruise    Prophylaxis:    - Mechanical prophylaxis for DVT  - Chemical DVT prophylaxis - SQH. Hold 1/2 am SQH  - PPI    Lines/ tubes/ drains:  - CTs x 5 (day 7)  - NJ (day4)  - Pacer wires (remove  tomorrow)  - PIV (day 2)    Disposition:  - Floor      Patient seen, findings and plan discussed CVICU attending    Patricia Currie MD  Anesthesiology, CA-2      ====================================    Interval history:  No issues overnight. Pain better controlled.      PAST MEDICAL HISTORY:   Past Medical History:   Diagnosis Date     Congestive heart failure (H)        PAST SURGICAL HISTORY:   Past Surgical History:   Procedure Laterality Date     COLONOSCOPY N/A 11/17/2022    Procedure: COLONOSCOPY;  Surgeon: Roverto Nino MD;  Location:  GI     CV CORONARY ANGIOGRAM N/A 11/11/2022    Procedure: Coronary Angiogram;  Surgeon: Justo Bush MD;  Location:  HEART CARDIAC CATH LAB     CV RIGHT HEART CATH MEASUREMENTS RECORDED N/A 11/11/2022    Procedure: Right Heart Catheterization;  Surgeon: Justo Bush MD;  Location:  HEART CARDIAC CATH LAB     CV RIGHT HEART CATH MEASUREMENTS RECORDED N/A 12/8/2022    Procedure: Right Heart Catheterization;  Surgeon: Wayne Xavier MD;  Location:  HEART CARDIAC CATH LAB     CV RIGHT HEART CATH MEASUREMENTS RECORDED N/A 12/9/2022    Procedure: Right Heart Catheterization with leave-in Goodman;  Surgeon: Khari Mcneill MD;  Location:  HEART CARDIAC CATH LAB     PICC DOUBLE LUMEN PLACEMENT Right 11/10/2022    Right basilic, 43 cm, 1 cm external length     TRANSPLANT HEART RECIPIENT N/A 12/25/2022    Procedure: TRANSPLANT, HEART, RECIPIENT; Median Sternotomy, Cardiopulmonary Bypass, Removal of Pacemaker;  Surgeon: Brendan Rodriguez MD;  Location:  OR       FAMILY HISTORY:   Family History   Problem Relation Age of Onset     Atrial fibrillation Father      Lung Cancer Brother        SOCIAL HISTORY:   Social History     Tobacco Use     Smoking status: Never     Smokeless tobacco: Never   Substance Use Topics     Alcohol use: Not Currently         OBJECTIVE:   1. VITAL SIGNS:    Vital signs:  Temp: 97.2  F (36.2  C) Temp src:  "Axillary BP: 129/72 Pulse: 91   Resp: 17 SpO2: 99 % O2 Device: None (Room air) Oxygen Delivery: 1 LPM Height: 188 cm (6' 2\") Weight: 97.1 kg (214 lb 1.1 oz)  Estimated body mass index is 27.48 kg/m  as calculated from the following:    Height as of this encounter: 1.88 m (6' 2\").    Weight as of this encounter: 97.1 kg (214 lb 1.1 oz).          2. INTAKE/ OUTPUT:    I/O last 3 completed shifts:  In: 3732 [P.O.:1087; I.V.:60; NG/GT:1265]  Out: 3570 [Urine:2600; Chest Tube:970]      3. PHYSICAL EXAMINATION:     General: NAD  Neuro: AAOx3  Resp: NLB on RA  CV: S1, S2, sinus tach, no m/r/g   Abdomen: Soft, non-distended, non-tender  Incisions: c/d/i  Extremities: warm and well perfused, no edema  CT: To suction, serosang output    4. INVESTIGATIONS:   Arterial Blood Gases   Recent Labs   Lab 12/29/22  1009 12/29/22  0734 12/29/22  0402 12/28/22  2146   PH 7.47* 7.46* 7.46* 7.45   PCO2 37 37 37 37   PO2 103 136* 102 112*   HCO3 27 27 27 26     Complete Blood Count   Recent Labs   Lab 01/01/23  0609 12/31/22  1650 12/31/22  0433 12/30/22  1616   WBC 11.1* 14.1* 12.9* 13.2*   HGB 8.3* 9.1* 8.7* 8.5*   * 120* 114* 103*     Basic Metabolic Panel  Recent Labs   Lab 01/02/23  0637 01/02/23  0343 01/02/23  0011 01/01/23  1945 01/01/23  1649 01/01/23  0739 01/01/23  0609 12/31/22  1948 12/31/22  1650     --   --   --  139  --  141  --  144   POTASSIUM 4.3  --   --   --  4.3  --  3.8  --  4.0   CHLORIDE 106  --   --   --  107  --  110*  --  112*   CO2 23  --   --   --  21*  --  23  --  22   BUN 18.3  --   --   --  21.0  --  21.6  --  27.0*   CR 0.59*  --   --   --  0.60*  --  0.63*  --  0.65*   * 143* 172* 143* 167*   < > 156*   < > 169*    < > = values in this interval not displayed.     Liver Function Tests  Recent Labs   Lab 01/02/23  0637 01/01/23  0609 12/31/22  0433 12/30/22  0444 12/29/22  1008   AST 18 21 24 30  --    ALT 25 23 28 30  --    ALKPHOS 60 67 62 63  --    BILITOTAL 0.3 0.4 0.4 0.6  --  "   ALBUMIN 2.7* 2.7* 3.1* 3.3*  --    INR  --   --   --   --  1.15     Pancreatic Enzymes  No lab results found in last 7 days.  Coagulation Profile  Recent Labs   Lab 12/29/22  1008   INR 1.15   PTT 26         5. RADIOLOGY:   No results found for this or any previous visit (from the past 24 hour(s)).    =========================================

## 2023-01-02 NOTE — PROGRESS NOTES
CLINICAL NUTRITION SERVICES - REASSESSMENT NOTE     Nutrition Prescription    RECOMMENDATIONS FOR MDs/PROVIDERS TO ORDER:  -Do NOT remove FT until pt eating >50% of all meals x 1 day on regular diet   -Daily fluid adjustments per MD  -Diet advancements per SLP    Malnutrition Status:    Moderate malnutrition in the context of acute on chronic illness    Recommendations already ordered by Registered Dietitian (RD):  -Continue TF as ordered: TwoCal HN @ 60 mL/hr (1440 mL/day) to provide 2880 kcals (35 kcal/kg/day), 121 g PRO (1.5 g/kg/day), 1008 mL H2O, 315 g CHO and 7 g Fiber daily.   -Continue daily MVI     Future/Additional Recommendations:  -Continue to monitor TF tolerance/adequacy  -Continue to monitor labs, stool output, weight trends  -Monitor for diet advancements per SLP  -Once pt's diet advanced past full liquid, recommend transitioning to cyclic, overnight feeds to increase PO intake throughout day    --GOAL TF: TwoCal HN @ 60 mL/hr x 12 hours overnight (720 mL/day) to provide 1440 kcals (17 kcal/kg/day), 61 g PRO (0.7 g/kg/day), 504 mL H2O, 158 g CHO and 4 g Fiber daily.      EVALUATION OF THE PROGRESS TOWARD GOALS   Diet: Full Liquid  Nutrition Support: Pt tolerating TF at goal rate.     12/28-30: Novasource Renal @ 55 ml/hr (1320 ml) provides 2640 kcal (32 kcal/kg), 120 g pro (1.4 g/kg), 242 g CHO, 946 ml free water, and 0 g fiber daily.     12/30-current: TwoCal HN @ 60 mL/hr (1440 mL/day) to provide 2880 kcals (35 kcal/kg/day), 121 g PRO (1.5 g/kg/day), 1008 mL H2O, 315 g CHO and 7 g Fiber daily.     Intake: Pt received an average of 951 ml TF/d over the past 5 days (12/28-1/1). This provided an average of 1902 kcal/d (23 kcal/kg) and 84 g protein/d (1 g/kg). This met 76% estimated energy and protein needs.     Pt cleared for full liquid diet on 12/31 and per RN documentation at 100% of two meals yesterday and 25% dinner. Chocolate Ensures ordered by RD yesterday. Eating items such as chicken broth,  pudding, soup, fruit ice, jello, milk.      NEW FINDINGS   Pt extubated on 12/27. Heart biopsy today.     GI: LBM 1/2; on bowel regimen     Labs: Reviewed - hypomagnesia (replaced this AM), hypophosphatemia (on neutraphos)    Medications: Reviewed     Weights: Down 7.2 kg (7%) x 6 days. Suspect fluid shifts. Remains above admit weight.   01/02/23 0400 97.1 kg (214 lb 1.1 oz)   12/29/22 0500 102 kg (224 lb 13.9 oz)   12/27/22 0400 104.3 kg (229 lb 15 oz)     MALNUTRITION  % Intake: Decreased intake does not meet criteria  % Weight Loss: > 2% in 1 week (severe) - suspect fluid shifts   Subcutaneous Fat Loss: Facial region:  mild and Upper arm:  mild  Muscle Loss: Temporal:  mild, Thoracic region (clavicle, acromium bone, deltoid, trapezius, pectoral):  mild and Posterior calf:  moderate  Fluid Accumulation/Edema: None noted  Malnutrition Diagnosis: Moderate malnutrition in the context of acute on chronic illness    Previous Goals   Diet adv v nutrition support within 1-2 days if unable to extubate  Evaluation: Met    Previous Nutrition Diagnosis  Inadequate oral intake related to intubation as evidenced by NPO status.   Evaluation: Improving    CURRENT NUTRITION DIAGNOSIS  Increased nutrient needs related to hypercatabolism as evidenced by s/p heart transplant.       INTERVENTIONS  Implementation  Enteral Nutrition - continue as ordered   Multivitamin/mineral supplement therapy    Goals  Patient to consume % of nutritionally adequate meal trays TID, or the equivalent with supplements/snacks OR   Total avg nutritional intake to meet a minimum of 30 kcal/kg and 1.3 g PRO/kg daily (per dosing wt 83 kg).    Monitoring/Evaluation  Progress toward goals will be monitored and evaluated per protocol.    Felicita Sanderson, MS, RD, LD  4E (CVICU) RD pager: [phone]  Ascom: [phone]  Weekend/Holiday RD pager: [phone]

## 2023-01-02 NOTE — PLAN OF CARE
Goal Outcome Evaluation:      Plan of Care Reviewed With: patient, spouse    Overall Patient Progress: improving    Major Shift Events:  NPO at midnight for R heart cath 1/2.    Neuro: A&Ox4, intermittent confusion. Follows commands. C/o pain managed w prn medication. Slept through the night, no prn traz given.  CV: HR > 80, SBP < 135, MAP > 60. Afebrile.  Resp: RA. Clear. Hiccups at times, no concerns from team.  GI: Voids spontaneously into urinal.  : NJ at 94cm - TF @ 60 (goal) w 75mL flushes q2. Full liquid diet w chin tuck - NPO at midnight for R heart cath this AM, TF continued per CVTS verbal.   Skin: All dressing and incisions CDI. Ctx5 w moderate output.    Plan: Keep NPO prior to heart cath, maintain TF via NJ. Monitor CV status. Transfer to floor when bed available.     Notify provider w any concerns. For vital signs and complete assessments, please see documentation flowsheets.

## 2023-01-02 NOTE — PROGRESS NOTES
Care Management Follow Up    Length of Stay (days): 27    Expected Discharge Date:       Concerns to be Addressed:       Patient plan of care discussed at interdisciplinary rounds: Yes    Anticipated Discharge Disposition: Rehab vs directly to local housing for temporary relocation.     Anticipated Discharge Services:    Anticipated Discharge DME:      Patient/family educated on Medicare website which has current facility and service quality ratings:    Education Provided on the Discharge Plan:    Patient/Family in Agreement with the Plan:      Referrals Placed by CM/SW:    Private pay costs discussed: Not applicable    Additional Information:  Social Work f/u per request of pt's wife to further discuss the [facility]. Pt's wife requesting a tour of the [facility] and unfortunately we cannot do this. However, informed wife that writer can send her a link to be able to take a virtual tour of the apartments and she thought this would be helpful. Pt is on list for an apartment.     Discussed current recs for Acute Rehab and pt's wife was surprised by this as no one has discussed this with her prior to today. Per wife, she reports she was told that pt would not require inpatient rehab. Wife unclear where she heard this. Wife is aware that [facility] is following pt's progress.       Arabella Batista, SAIGESW

## 2023-01-02 NOTE — PLAN OF CARE
Major Shift Events:  Neuros intact. Up to chair x2 today. ST, goal >80. SBP <140, met without intervention. RA. NJ with TF at goal of 60. 75 mL flushes q2h. Full clear liquid diet, good appetite. No BM. Voiding. Q4h BS with sliding scale. 5 CT, moderate sang output. Mg replaced x1.     Plan: NPO at 0000. Rt heart cath tomorrow w/ biopsy. Pacer wires to be removed tomorrow. 6C tx orders.    For vital signs and complete assessments, please see documentation flowsheets.

## 2023-01-03 ENCOUNTER — APPOINTMENT (OUTPATIENT)
Dept: GENERAL RADIOLOGY | Facility: CLINIC | Age: 68
DRG: 001 | End: 2023-01-03
Attending: SURGERY
Payer: MEDICARE

## 2023-01-03 ENCOUNTER — APPOINTMENT (OUTPATIENT)
Dept: SPEECH THERAPY | Facility: CLINIC | Age: 68
DRG: 001 | End: 2023-01-03
Attending: SURGERY
Payer: MEDICARE

## 2023-01-03 ENCOUNTER — APPOINTMENT (OUTPATIENT)
Dept: PHYSICAL THERAPY | Facility: CLINIC | Age: 68
DRG: 001 | End: 2023-01-03
Attending: SURGERY
Payer: MEDICARE

## 2023-01-03 LAB
ALBUMIN SERPL BCG-MCNC: 2.6 G/DL (ref 3.5–5.2)
ALP SERPL-CCNC: 68 U/L (ref 40–129)
ALT SERPL W P-5'-P-CCNC: 28 U/L (ref 10–50)
ANION GAP SERPL CALCULATED.3IONS-SCNC: 6 MMOL/L (ref 7–15)
AST SERPL W P-5'-P-CCNC: 21 U/L (ref 10–50)
BILIRUB DIRECT SERPL-MCNC: <0.2 MG/DL (ref 0–0.3)
BILIRUB SERPL-MCNC: 0.3 MG/DL
BUN SERPL-MCNC: 19.5 MG/DL (ref 8–23)
CA-I BLD-MCNC: 4.5 MG/DL (ref 4.4–5.2)
CALCIUM SERPL-MCNC: 7.8 MG/DL (ref 8.8–10.2)
CHLORIDE SERPL-SCNC: 105 MMOL/L (ref 98–107)
CREAT SERPL-MCNC: 0.63 MG/DL (ref 0.67–1.17)
DEPRECATED HCO3 PLAS-SCNC: 23 MMOL/L (ref 22–29)
ERYTHROCYTE [DISTWIDTH] IN BLOOD BY AUTOMATED COUNT: 15.9 % (ref 10–15)
GFR SERPL CREATININE-BSD FRML MDRD: >90 ML/MIN/1.73M2
GLUCOSE BLDC GLUCOMTR-MCNC: 117 MG/DL (ref 70–99)
GLUCOSE BLDC GLUCOMTR-MCNC: 126 MG/DL (ref 70–99)
GLUCOSE BLDC GLUCOMTR-MCNC: 145 MG/DL (ref 70–99)
GLUCOSE BLDC GLUCOMTR-MCNC: 153 MG/DL (ref 70–99)
GLUCOSE BLDC GLUCOMTR-MCNC: 154 MG/DL (ref 70–99)
GLUCOSE BLDC GLUCOMTR-MCNC: 160 MG/DL (ref 70–99)
GLUCOSE SERPL-MCNC: 147 MG/DL (ref 70–99)
HCT VFR BLD AUTO: 31.6 % (ref 40–53)
HGB BLD-MCNC: 9.8 G/DL (ref 13.3–17.7)
LACTATE SERPL-SCNC: 0.9 MMOL/L (ref 0.7–2)
MAGNESIUM SERPL-MCNC: 1.5 MG/DL (ref 1.7–2.3)
MAGNESIUM SERPL-MCNC: 2.1 MG/DL (ref 1.7–2.3)
MCH RBC QN AUTO: 29 PG (ref 26.5–33)
MCHC RBC AUTO-ENTMCNC: 31 G/DL (ref 31.5–36.5)
MCV RBC AUTO: 94 FL (ref 78–100)
PATH REPORT.COMMENTS IMP SPEC: NORMAL
PATH REPORT.FINAL DX SPEC: NORMAL
PATH REPORT.FINAL DX SPEC: NORMAL
PATH REPORT.GROSS SPEC: NORMAL
PATH REPORT.GROSS SPEC: NORMAL
PATH REPORT.MICROSCOPIC SPEC OTHER STN: NORMAL
PATH REPORT.MICROSCOPIC SPEC OTHER STN: NORMAL
PATH REPORT.RELEVANT HX SPEC: NORMAL
PATH REPORT.RELEVANT HX SPEC: NORMAL
PHOSPHATE SERPL-MCNC: 2.1 MG/DL (ref 2.5–4.5)
PHOTO IMAGE: NORMAL
PHOTO IMAGE: NORMAL
PLATELET # BLD AUTO: 203 10E3/UL (ref 150–450)
POTASSIUM SERPL-SCNC: 4.5 MMOL/L (ref 3.4–5.3)
PROT SERPL-MCNC: 4.6 G/DL (ref 6.4–8.3)
RBC # BLD AUTO: 3.38 10E6/UL (ref 4.4–5.9)
SODIUM SERPL-SCNC: 134 MMOL/L (ref 136–145)
WBC # BLD AUTO: 13.2 10E3/UL (ref 4–11)

## 2023-01-03 PROCEDURE — 250N000013 HC RX MED GY IP 250 OP 250 PS 637: Performed by: STUDENT IN AN ORGANIZED HEALTH CARE EDUCATION/TRAINING PROGRAM

## 2023-01-03 PROCEDURE — 94640 AIRWAY INHALATION TREATMENT: CPT | Mod: 76

## 2023-01-03 PROCEDURE — 92526 ORAL FUNCTION THERAPY: CPT | Mod: GN

## 2023-01-03 PROCEDURE — 88346 IMFLUOR 1ST 1ANTB STAIN PX: CPT | Mod: 26 | Performed by: PATHOLOGY

## 2023-01-03 PROCEDURE — 250N000011 HC RX IP 250 OP 636: Performed by: SURGERY

## 2023-01-03 PROCEDURE — 250N000013 HC RX MED GY IP 250 OP 250 PS 637

## 2023-01-03 PROCEDURE — 83735 ASSAY OF MAGNESIUM: CPT | Performed by: SURGERY

## 2023-01-03 PROCEDURE — 94640 AIRWAY INHALATION TREATMENT: CPT

## 2023-01-03 PROCEDURE — 83605 ASSAY OF LACTIC ACID: CPT | Performed by: STUDENT IN AN ORGANIZED HEALTH CARE EDUCATION/TRAINING PROGRAM

## 2023-01-03 PROCEDURE — 84100 ASSAY OF PHOSPHORUS: CPT | Performed by: SURGERY

## 2023-01-03 PROCEDURE — 250N000012 HC RX MED GY IP 250 OP 636 PS 637: Performed by: SURGERY

## 2023-01-03 PROCEDURE — 88311 DECALCIFY TISSUE: CPT | Mod: 26 | Performed by: PATHOLOGY

## 2023-01-03 PROCEDURE — 250N000009 HC RX 250

## 2023-01-03 PROCEDURE — 85027 COMPLETE CBC AUTOMATED: CPT | Performed by: STUDENT IN AN ORGANIZED HEALTH CARE EDUCATION/TRAINING PROGRAM

## 2023-01-03 PROCEDURE — 88309 TISSUE EXAM BY PATHOLOGIST: CPT | Mod: 26 | Performed by: PATHOLOGY

## 2023-01-03 PROCEDURE — 71045 X-RAY EXAM CHEST 1 VIEW: CPT | Mod: 26 | Performed by: RADIOLOGY

## 2023-01-03 PROCEDURE — 71045 X-RAY EXAM CHEST 1 VIEW: CPT | Mod: 77

## 2023-01-03 PROCEDURE — 250N000013 HC RX MED GY IP 250 OP 250 PS 637: Performed by: SURGERY

## 2023-01-03 PROCEDURE — 94668 MNPJ CHEST WALL SBSQ: CPT

## 2023-01-03 PROCEDURE — 80053 COMPREHEN METABOLIC PANEL: CPT | Performed by: SURGERY

## 2023-01-03 PROCEDURE — 71045 X-RAY EXAM CHEST 1 VIEW: CPT

## 2023-01-03 PROCEDURE — 200N000002 HC R&B ICU UMMC

## 2023-01-03 PROCEDURE — 36415 COLL VENOUS BLD VENIPUNCTURE: CPT | Performed by: STUDENT IN AN ORGANIZED HEALTH CARE EDUCATION/TRAINING PROGRAM

## 2023-01-03 PROCEDURE — 82248 BILIRUBIN DIRECT: CPT | Performed by: STUDENT IN AN ORGANIZED HEALTH CARE EDUCATION/TRAINING PROGRAM

## 2023-01-03 PROCEDURE — 36415 COLL VENOUS BLD VENIPUNCTURE: CPT | Performed by: SURGERY

## 2023-01-03 PROCEDURE — 250N000011 HC RX IP 250 OP 636

## 2023-01-03 PROCEDURE — 88350 IMFLUOR EA ADDL 1ANTB STN PX: CPT | Mod: 26 | Performed by: PATHOLOGY

## 2023-01-03 PROCEDURE — 250N000009 HC RX 250: Performed by: STUDENT IN AN ORGANIZED HEALTH CARE EDUCATION/TRAINING PROGRAM

## 2023-01-03 PROCEDURE — 250N000012 HC RX MED GY IP 250 OP 636 PS 637: Performed by: NURSE PRACTITIONER

## 2023-01-03 PROCEDURE — 97116 GAIT TRAINING THERAPY: CPT | Mod: GP

## 2023-01-03 PROCEDURE — 82330 ASSAY OF CALCIUM: CPT | Performed by: STUDENT IN AN ORGANIZED HEALTH CARE EDUCATION/TRAINING PROGRAM

## 2023-01-03 PROCEDURE — 999N000157 HC STATISTIC RCP TIME EA 10 MIN

## 2023-01-03 PROCEDURE — 97530 THERAPEUTIC ACTIVITIES: CPT | Mod: GP

## 2023-01-03 PROCEDURE — 97110 THERAPEUTIC EXERCISES: CPT | Mod: GP

## 2023-01-03 PROCEDURE — 88307 TISSUE EXAM BY PATHOLOGIST: CPT | Mod: 26 | Performed by: PATHOLOGY

## 2023-01-03 PROCEDURE — 99232 SBSQ HOSP IP/OBS MODERATE 35: CPT | Performed by: PHYSICIAN ASSISTANT

## 2023-01-03 PROCEDURE — 88302 TISSUE EXAM BY PATHOLOGIST: CPT | Mod: 26 | Performed by: PATHOLOGY

## 2023-01-03 PROCEDURE — 94667 MNPJ CHEST WALL 1ST: CPT

## 2023-01-03 PROCEDURE — 250N000012 HC RX MED GY IP 250 OP 636 PS 637: Performed by: STUDENT IN AN ORGANIZED HEALTH CARE EDUCATION/TRAINING PROGRAM

## 2023-01-03 RX ORDER — HEPARIN SODIUM 5000 [USP'U]/.5ML
5000 INJECTION, SOLUTION INTRAVENOUS; SUBCUTANEOUS EVERY 8 HOURS
Status: DISCONTINUED | OUTPATIENT
Start: 2023-01-03 | End: 2023-01-11

## 2023-01-03 RX ORDER — AMOXICILLIN 250 MG
1 CAPSULE ORAL 2 TIMES DAILY
Status: DISCONTINUED | OUTPATIENT
Start: 2023-01-03 | End: 2023-01-13 | Stop reason: HOSPADM

## 2023-01-03 RX ORDER — ALBUTEROL SULFATE 0.83 MG/ML
2.5 SOLUTION RESPIRATORY (INHALATION) EVERY 4 HOURS PRN
Status: DISCONTINUED | OUTPATIENT
Start: 2023-01-03 | End: 2023-01-13 | Stop reason: HOSPADM

## 2023-01-03 RX ORDER — MAGNESIUM SULFATE HEPTAHYDRATE 40 MG/ML
4 INJECTION, SOLUTION INTRAVENOUS ONCE
Status: COMPLETED | OUTPATIENT
Start: 2023-01-03 | End: 2023-01-03

## 2023-01-03 RX ORDER — ACETYLCYSTEINE 100 MG/ML
2 SOLUTION ORAL; RESPIRATORY (INHALATION) EVERY 4 HOURS PRN
Status: DISCONTINUED | OUTPATIENT
Start: 2023-01-03 | End: 2023-01-13 | Stop reason: HOSPADM

## 2023-01-03 RX ADMIN — NYSTATIN 1000000 UNITS: 500000 SUSPENSION ORAL at 20:28

## 2023-01-03 RX ADMIN — ASPIRIN 81 MG: 81 TABLET, CHEWABLE ORAL at 08:47

## 2023-01-03 RX ADMIN — POTASSIUM & SODIUM PHOSPHATES POWDER PACK 280-160-250 MG 2 PACKET: 280-160-250 PACK at 20:28

## 2023-01-03 RX ADMIN — ALBUTEROL SULFATE 2.5 MG: 2.5 SOLUTION RESPIRATORY (INHALATION) at 08:02

## 2023-01-03 RX ADMIN — PREDNISONE 25 MG: 20 TABLET ORAL at 08:45

## 2023-01-03 RX ADMIN — TRAZODONE HYDROCHLORIDE 100 MG: 100 TABLET ORAL at 22:06

## 2023-01-03 RX ADMIN — MYCOPHENOLATE MOFETIL 1500 MG: 200 POWDER, FOR SUSPENSION ORAL at 20:29

## 2023-01-03 RX ADMIN — POTASSIUM & SODIUM PHOSPHATES POWDER PACK 280-160-250 MG 2 PACKET: 280-160-250 PACK at 08:50

## 2023-01-03 RX ADMIN — MAGNESIUM SULFATE IN WATER 4 G: 40 INJECTION, SOLUTION INTRAVENOUS at 08:59

## 2023-01-03 RX ADMIN — INSULIN ASPART 1 UNITS: 100 INJECTION, SOLUTION INTRAVENOUS; SUBCUTANEOUS at 00:35

## 2023-01-03 RX ADMIN — INSULIN ASPART 1 UNITS: 100 INJECTION, SOLUTION INTRAVENOUS; SUBCUTANEOUS at 20:28

## 2023-01-03 RX ADMIN — ACETAMINOPHEN 975 MG: 325 TABLET, FILM COATED ORAL at 20:29

## 2023-01-03 RX ADMIN — POTASSIUM & SODIUM PHOSPHATES POWDER PACK 280-160-250 MG 2 PACKET: 280-160-250 PACK at 13:06

## 2023-01-03 RX ADMIN — HEPARIN SODIUM 5000 UNITS: 5000 INJECTION, SOLUTION INTRAVENOUS; SUBCUTANEOUS at 08:53

## 2023-01-03 RX ADMIN — LIDOCAINE PATCH 4% 1 PATCH: 40 PATCH TOPICAL at 08:50

## 2023-01-03 RX ADMIN — ACETYLCYSTEINE 2 ML: 100 SOLUTION ORAL; RESPIRATORY (INHALATION) at 16:28

## 2023-01-03 RX ADMIN — NYSTATIN 1000000 UNITS: 500000 SUSPENSION ORAL at 13:06

## 2023-01-03 RX ADMIN — SENNOSIDES AND DOCUSATE SODIUM 1 TABLET: 8.6; 5 TABLET ORAL at 08:40

## 2023-01-03 RX ADMIN — MULTIVITAMIN 15 ML: LIQUID ORAL at 08:07

## 2023-01-03 RX ADMIN — NYSTATIN 1000000 UNITS: 500000 SUSPENSION ORAL at 16:07

## 2023-01-03 RX ADMIN — INSULIN ASPART 1 UNITS: 100 INJECTION, SOLUTION INTRAVENOUS; SUBCUTANEOUS at 13:05

## 2023-01-03 RX ADMIN — ACETAMINOPHEN 975 MG: 325 TABLET, FILM COATED ORAL at 04:20

## 2023-01-03 RX ADMIN — MYCOPHENOLATE MOFETIL 1500 MG: 200 POWDER, FOR SUSPENSION ORAL at 08:02

## 2023-01-03 RX ADMIN — ACETYLCYSTEINE 2 ML: 100 SOLUTION ORAL; RESPIRATORY (INHALATION) at 12:38

## 2023-01-03 RX ADMIN — OXYCODONE HYDROCHLORIDE 10 MG: 10 TABLET ORAL at 10:46

## 2023-01-03 RX ADMIN — HEPARIN SODIUM 5000 UNITS: 5000 INJECTION, SOLUTION INTRAVENOUS; SUBCUTANEOUS at 16:03

## 2023-01-03 RX ADMIN — INSULIN ASPART 1 UNITS: 100 INJECTION, SOLUTION INTRAVENOUS; SUBCUTANEOUS at 04:20

## 2023-01-03 RX ADMIN — PREDNISONE 25 MG: 20 TABLET ORAL at 20:29

## 2023-01-03 RX ADMIN — Medication 10 MG: at 20:28

## 2023-01-03 RX ADMIN — ROSUVASTATIN CALCIUM 10 MG: 10 TABLET, FILM COATED ORAL at 08:40

## 2023-01-03 RX ADMIN — TACROLIMUS 3 MG: 5 CAPSULE ORAL at 08:04

## 2023-01-03 RX ADMIN — ALBUTEROL SULFATE 2.5 MG: 2.5 SOLUTION RESPIRATORY (INHALATION) at 12:38

## 2023-01-03 RX ADMIN — ACETYLCYSTEINE 2 ML: 100 SOLUTION ORAL; RESPIRATORY (INHALATION) at 08:02

## 2023-01-03 RX ADMIN — VALGANCICLOVIR HYDROCHLORIDE 900 MG: 450 TABLET ORAL at 08:47

## 2023-01-03 RX ADMIN — SENNOSIDES AND DOCUSATE SODIUM 1 TABLET: 8.6; 5 TABLET ORAL at 20:30

## 2023-01-03 RX ADMIN — POTASSIUM & SODIUM PHOSPHATES POWDER PACK 280-160-250 MG 2 PACKET: 280-160-250 PACK at 15:55

## 2023-01-03 RX ADMIN — ACETAMINOPHEN 975 MG: 325 TABLET, FILM COATED ORAL at 13:08

## 2023-01-03 RX ADMIN — AMLODIPINE BESYLATE 10 MG: 10 TABLET ORAL at 08:46

## 2023-01-03 RX ADMIN — SULFAMETHOXAZOLE AND TRIMETHOPRIM 1 TABLET: 400; 80 TABLET ORAL at 08:44

## 2023-01-03 RX ADMIN — QUETIAPINE FUMARATE 25 MG: 25 TABLET ORAL at 20:29

## 2023-01-03 RX ADMIN — TACROLIMUS 3 MG: 5 CAPSULE ORAL at 18:06

## 2023-01-03 RX ADMIN — Medication 40 MG: at 08:00

## 2023-01-03 RX ADMIN — NYSTATIN 1000000 UNITS: 500000 SUSPENSION ORAL at 09:04

## 2023-01-03 RX ADMIN — ALBUTEROL SULFATE 2.5 MG: 2.5 SOLUTION RESPIRATORY (INHALATION) at 16:28

## 2023-01-03 ASSESSMENT — ACTIVITIES OF DAILY LIVING (ADL)
ADLS_ACUITY_SCORE: 44
ADLS_ACUITY_SCORE: 42
ADLS_ACUITY_SCORE: 42
ADLS_ACUITY_SCORE: 44
ADLS_ACUITY_SCORE: 42
ADLS_ACUITY_SCORE: 42
ADLS_ACUITY_SCORE: 44
ADLS_ACUITY_SCORE: 39
ADLS_ACUITY_SCORE: 42
ADLS_ACUITY_SCORE: 44
ADLS_ACUITY_SCORE: 44
ADLS_ACUITY_SCORE: 42

## 2023-01-03 NOTE — PLAN OF CARE
2404-1081:  Neuro: numbness to 4th/5th finger on R hand, otherwise intact  CV: epicardial pacer wires intact and capped, HR >80, SBP <140, MAP>60. VSS.  Resp: RA, CPT and nebs scheduled. 5 chest tubes all to -20 suction. No SOB reported.  GI: NJ bridled at 94 w/ TF at goal (60mL), full liquid diet. Chin tuck with all oral intake. 1BM.  : voids spont  Skin: no new skin issues  GTT 0  PIV x1  Labs: Na 134, K 4.5, Mg and P pending, Hgb 9.8, iCal WNL    Goal Outcome Evaluation:      Plan of Care Reviewed With: patient    Overall Patient Progress: improvingOverall Patient Progress: improving    Outcome Evaluation: Pt slept well overnight, pain controlled well

## 2023-01-03 NOTE — PROGRESS NOTES
CV ICU Progress Note  01/03/2023        CO-MORBIDITIES:   Patient Active Problem List   Diagnosis     Acute respiratory failure with hypoxia (H)     Cardiomyopathy (H)     Stroke (H)     Coronary artery disease involving native coronary artery of native heart without angina pectoris     Essential hypertension     NSVT (nonsustained ventricular tachycardia)     SOB (shortness of breath)     CHF (congestive heart failure) (H)     Troponin level elevated     Anginal equivalent (H)     Heart failure, unspecified HF chronicity, unspecified heart failure type (H)     Benign neoplasm of colon     Acute on chronic combined systolic (congestive) and diastolic (congestive) heart failure (H)     Hyperlipidemia     Syncope and collapse     Primary central nervous system vasculitis (H)     Vasculitis, CNS (H)     Acute embolism and thrombosis of left peroneal vein (H)       ASSESSMENT: Paco Stein is a 67 year old male with a history of chronic systolic heart failure (suspected predominantly nonischemic cardiomyopathy, possibly arrhythmogenic), coronary artery disease (s/p PCI with TILA to LCx 4/2013 and to the LAD in 10/2021), hyperlipidemia, CNS vasculitis and stage 2 CKD who presents with transient bilateral hand tightness with unrevealing workup. He underwent DDHT on 12/25/22 with Dr. Rodriguez.      PLAN:  Neuro/ pain/ sedation:  Acute Postoperative pain  Primary central nervous system vasculitis  - Monitor neurological status. Notify the MD for any acute changes in exam.  - melatonin 10 and seroquel 12.5 HS.  - CAM negative this AM  - Pain: Scheduled tylenol. PRN robaxin. PRN oxycodone, IV PRN dilaudid. Lidocaine patch. No NSAIDs.  - PTA trazodone PRN     Pulmonary care:   #CORIE atelectasis  # Bilateral apical pneumothoraces  - Room air   - Aggressive pulmonary toilet: CPT, albuterol, mucomyst, IS, flutter valve  - Titrate supplemental oxygen to maintain saturation above 92%.    Cardiovascular:    S/p heart tx with   Michael on 12/25/22  History of CAD  HFrEF  ICD  Dyslipidemia  HTN  Recent echo on 11/16 with LVEF of 20-25%  - Monitor hemodynamic status.   - Goal MAP 65, SBP<120  - Continue statin  - Continue ASA 81 mg  - IV hydralazine prn for SBP >140. Daily amlodipine 10 mg.  - Heart bx, TTE and cath on Monday 1/2/23  - Cards 2 following. Appreciate recommendations.    GI care/ Nutrition:   malnutrition  - SLP following. Soft and bites (thin liquids) with chin tuck  - TF via NJ at goal   - PPI  - Last BM 1/3 (multiple). Continue bowel regimen: miralax, senna      Renal/ Fluid Balance/ Electrolytes:  CRI not on dialysis  MILENA, improving  Hypernatemia (resolved)  BL creat appears to be ~ 0.8  - Strict I/O, daily weights  - Avoid/limit nephrotoxins as able  - Replete lytes PRN per protocol    Endocrine:    Stress induced hyperglycemia  Steroid induced hyperglycemia  Preop A1c 6.2  - HDSSI  - Goal BG <180 for optimal healing. Well controlled    ID/ Antibiotics:  Stress induced leukocytosis  - To complete perioperative regimen  - Continue to monitor fever curve, WBC and inflammatory markers as appropriate. Improved leukocytosis.  - Immunosuppression ppx: valcyte and bactrim      Heme:     Stress induced leukocytosis  Acute blood loss anemia  Acute blood loss thrombocytopenia  H/o R axillary and L peroneal DVT  No s/sx active bleeding  - Hgb 9.8  - CBC daily  - PTA apixaban, hold      MSK/ Skin:  Sternotomy  Surgical Incision  Left forearm swelling and bruise  - Sternal precautions  - Postoperative incision management per protocol  - PT/OT  - Monitor left forearm swelling and bruise    Prophylaxis:    - Mechanical prophylaxis for DVT  - Chemical DVT prophylaxis - SQH.   - PPI    Lines/ tubes/ drains:  - CTs x 5 (day 8), pull L midaxillary CT  - NJ (day 5)  - Pacer wires (remove today)  - PIV (day 3)    Disposition:  - Floor      Patient seen, findings and plan discussed CVICU attending    Patricia Currie MD  Anesthesiology,  "CA-2      ====================================    Interval history:  No issues overnight. Slept well.      PAST MEDICAL HISTORY:   Past Medical History:   Diagnosis Date     Congestive heart failure (H)        PAST SURGICAL HISTORY:   Past Surgical History:   Procedure Laterality Date     COLONOSCOPY N/A 11/17/2022    Procedure: COLONOSCOPY;  Surgeon: Roverto Nino MD;  Location:  GI     CV CORONARY ANGIOGRAM N/A 11/11/2022    Procedure: Coronary Angiogram;  Surgeon: Justo Bush MD;  Location:  HEART CARDIAC CATH LAB     CV RIGHT HEART CATH MEASUREMENTS RECORDED N/A 11/11/2022    Procedure: Right Heart Catheterization;  Surgeon: Justo Bush MD;  Location:  HEART CARDIAC CATH LAB     CV RIGHT HEART CATH MEASUREMENTS RECORDED N/A 12/8/2022    Procedure: Right Heart Catheterization;  Surgeon: Wayne Xavier MD;  Location:  HEART CARDIAC CATH LAB     CV RIGHT HEART CATH MEASUREMENTS RECORDED N/A 12/9/2022    Procedure: Right Heart Catheterization with leave-in [place];  Surgeon: Khari Mcneill MD;  Location:  HEART CARDIAC CATH LAB     PICC DOUBLE LUMEN PLACEMENT Right 11/10/2022    Right basilic, 43 cm, 1 cm external length     TRANSPLANT HEART RECIPIENT N/A 12/25/2022    Procedure: TRANSPLANT, HEART, RECIPIENT; Median Sternotomy, Cardiopulmonary Bypass, Removal of Pacemaker;  Surgeon: rBendan Rodriguez MD;  Location:  OR       FAMILY HISTORY:   Family History   Problem Relation Age of Onset     Atrial fibrillation Father      Lung Cancer Brother        SOCIAL HISTORY:   Social History     Tobacco Use     Smoking status: Never     Smokeless tobacco: Never   Substance Use Topics     Alcohol use: Not Currently         OBJECTIVE:   1. VITAL SIGNS:    Vital signs:  Temp: 97.7  F (36.5  C) Temp src: Oral BP: 91/58 Pulse: 90   Resp: 15 SpO2: 99 % O2 Device: None (Room air) Oxygen Delivery: 1 LPM Height: 188 cm (6' 2\") Weight: 99.9 kg (220 lb 3.8 oz)  Estimated " "body mass index is 28.28 kg/m  as calculated from the following:    Height as of this encounter: 1.88 m (6' 2\").    Weight as of this encounter: 99.9 kg (220 lb 3.8 oz).          2. INTAKE/ OUTPUT:    I/O last 3 completed shifts:  In: 3755 [P.O.:370; I.V.:160; NG/GT:845; IV Piggyback:1000]  Out: 3847 [Urine:3260; Chest Tube:587]      3. PHYSICAL EXAMINATION:     General: NAD  Neuro: AAOx3  Resp: NLB on RA  CV: S1, S2, sinus tach, no m/r/g   Abdomen: Soft, non-distended, non-tender  Incisions: c/d/i  Extremities: warm and well perfused, no edema  CT: To suction, serosang output    4. INVESTIGATIONS:   Arterial Blood Gases   Recent Labs   Lab 12/29/22  1009 12/29/22  0734 12/29/22  0402 12/28/22  2146   PH 7.47* 7.46* 7.46* 7.45   PCO2 37 37 37 37   PO2 103 136* 102 112*   HCO3 27 27 27 26     Complete Blood Count   Recent Labs   Lab 01/03/23  0459 01/02/23  0637 01/01/23  0609 12/31/22  1650   WBC 13.2* 14.4* 11.1* 14.1*   HGB 9.8* 9.6* 8.3* 9.1*    178 123* 120*     Basic Metabolic Panel  Recent Labs   Lab 01/03/23  0459 01/03/23  0409 01/03/23  0022 01/02/23 2002 01/02/23  0737 01/02/23  0637 01/01/23  1945 01/01/23  1649 01/01/23  0739 01/01/23  0609   *  --   --   --   --  136  --  139  --  141   POTASSIUM 4.5  --   --   --   --  4.3  --  4.3  --  3.8   CHLORIDE 105  --   --   --   --  106  --  107  --  110*   CO2 23  --   --   --   --  23  --  21*  --  23   BUN 19.5  --   --   --   --  18.3  --  21.0  --  21.6   CR 0.63*  --   --   --   --  0.59*  --  0.60*  --  0.63*   * 153* 160* 139*   < > 140*   < > 167*   < > 156*    < > = values in this interval not displayed.     Liver Function Tests  Recent Labs   Lab 01/03/23  0459 01/02/23  0637 01/01/23  0609 12/31/22  0433 12/30/22  0444 12/29/22  1008   AST 21 18 21 24   < >  --    ALT 28 25 23 28   < >  --    ALKPHOS 68 60 67 62   < >  --    BILITOTAL 0.3 0.3 0.4 0.4   < >  --    ALBUMIN 2.6* 2.7* 2.7* 3.1*   < >  --    INR  --   --   --   --   " --  1.15    < > = values in this interval not displayed.     Pancreatic Enzymes  No lab results found in last 7 days.  Coagulation Profile  Recent Labs   Lab 22  1008   INR 1.15   PTT 26         5. RADIOLOGY:   Recent Results (from the past 24 hour(s))   Echo Limited   Result Value    LVEF  60-65%    [facility]    [id number]  [id number]  [id number]  [id number]^REYES CASTRO^MINI     [facility]  Echocardiography Laboratory  [address] [place] [address]     Name: SLOAN LAZAR  MRN: [id number]  : 1955  Study Date: 2023 09:31 AM  Age: 67 yrs  Gender: Male  Patient Location: [facility]  Reason For Study: Heart Transplant  Ordering Physician: REYES CASTRO, JORGE  Performed By: Manjula Ibanez     BSA: 2.3 m2  Height: 74 in  Weight: 224 lb  HR: 95  ______________________________________________________________________________  Procedure  Limited Portable Echo Adult. Contrast Optison. Technically difficult  study.Extremely difficult acoustic windows despite the use of contrast for  endcardial border definition. Optison (NDC #2278-0423-28) given intravenously.  Patient was given 5 ml mixture of 3 ml Optison and 6 ml saline. 4 ml wasted.  ______________________________________________________________________________  Interpretation Summary  Technically difficult study. Poor acoustic windows.  S/p OHT 22.  Global and regional left ventricular function is normal with an EF of 60-65%.  The right ventricle is normal in size and function.  This study was compared with the study from 22 .  No significant change.  ______________________________________________________________________________  Left Ventricle  Global and regional left ventricular function is normal with an EF of 60-65%.     Right Ventricle  The right ventricle is normal size. Global right ventricular function is  normal.     Atria  The atria cannot be assessed.     Mitral Valve  The mitral valve  cannot be assessed.     Aortic Valve  The aortic valve cannot be assessed.     Tricuspid Valve  The tricuspid valve cannot be assessed.     Pulmonic Valve  The pulmonic valve cannot be assessed.     Vessels  IVC measures 2.1 cm. Unable to assess for respiratory variation.     Pericardium  No pericardial effusion is present.     Compared to Previous Study  This study was compared with the study from 12/27/22 .     Attestation  I have personally viewed the imaging and agree with the interpretation and  report as documented by the fellow, John Link, and/or edited by me.  ______________________________________________________________________________  Report approved by: Gabe Chappell 01/02/2023 12:46 PM     ______________________________________________________________________________      TXP ABSTRACTED ECH   Result Value    Transplant Date (ECH)     Echo Type (ECH)     LV Ejection Fraction Percent (ECH) 60-65%    RV Systolic Est (mmHg) (ECH)     LV Global Function (ECH)     LV Regional Wall Motion (ECH)     Mitral Valve Morphology (ECH)     Mitral Valve Doppler (ECH)     Aortic Valve - Aorta (ECH)     Aortic Valve Morphology (ECH)     Aortic Valve Doppler (ECH)     RV Size (ECH)     RV Global Function (ECH)     RV Systolic Pressure (ECH)     Left Atrium (ECH)     Right Atrium (ECH)     Tricuspid Valve Morphology (ECH)     Tricuspid Valve Doppler (ECH)     Pulmonary Valve Morphology (ECH)     Pulmonary Valve Doppler (ECH)     M-Mode Measure LVIDd (ECH)     M-Mode Measure LVIDs (ECH)     M-Mode Measure PWD (ECH)     M-Mode Measure IVSD (ECH)     M-Mode Measure AO (ECH)        =========================================

## 2023-01-03 NOTE — PROGRESS NOTES
CLINICAL NUTRITION SERVICES - BRIEF NOTE      Reason for RD note: Transitioning to cyclic TFs    New Findings/Chart Review:  -Pt tolerating TF at goal rate x 6 days  -Pt's diet advanced to level 6 today and pt was eating well on full liquid diet in the days prior    Interventions:  -Transition to cyclic feeds: TwoCal HN @ 60 mL/hr x 12 hours overnight (720 mL/day) to provide 1440 kcals (17 kcal/kg/day), 61 g PRO (0.7 g/kg/day), 504 mL H2O, 158 g CHO and 4 g Fiber daily.   -Continue daily MVI  -Monitor for ability to discontinue EN and pull FT     Nutrition will follow per protocol or sooner if consulted.    Felicita Sanderson, MS, RD, LD  4E ([facility]) RD pager: [phone]  Ascom: [phone]  Weekend/Holiday RD pager: [phone]

## 2023-01-03 NOTE — PLAN OF CARE
Major Shift Events:  Pt's activity increased as tolerated. Pt up to chair x2 and walked in hallway with PT. Left midaxillary CT removed as well as pacer wires. Still awaiting 6C bed availability.   Plan: Continue POC and increase activity and PO intake as tolerated. Notify MD's of any changes. For vital signs and complete assessments, please see documentation flowsheets.    Goal Outcome Evaluation:    Plan of Care Reviewed With: patient, spouse  Overall Patient Progress: improvingOverall Patient Progress: improving  Outcome Evaluation: Increasing activity and PO intake

## 2023-01-03 NOTE — PROGRESS NOTES
[facility]   Cardiology II Service / Advanced Heart Failure  Daily Progress Note        Patient: Paco Stein  MRN: [id number]  Admission Date: 12/6/2022  Hospital Day # 28    Assessment and Plan: Paco Stein is a 67 year old male with a PMH of SCHF secondary to arrhythmogenic NICM, CAD s/p PCI, Hyperlipidemia, CNS Vasculitis, and CKD who was admitted for cardiogenic shock s/p OHT 12/25/22 thus far with uncomplicated course.     Recommendations:  - No changes from cardiac transplant team  - Recheck tacrolimus level tomorrow (ordered for you)     S/p OHT. Completed 12/25/22. Postoperative course complicated by ICU delirium, resolved.   Echo  1/2/22: EF is 60-65%, RV normal in size and function.  RHC 1/2/22: Ra 1, PA 20/5/12, PCW 4, CAROLYN CO/CI 6.66/2.98  Graft Function:   --Volume: Euvolemic  --BP: 94//90. Stable on Norvasc 10 mg po daily.   --HR: 90's, off Terbutaline     Immunosuppression:   --CNI-delaying protocol with basiliximab on 12/25 and 12/29  --prednisone taper  --Cellcept 1500 mg BID- once tacrolimus is closer to therapeutic will decrease this to 1000 mg BID given age  --tacrolimus 3 mg BID (goal 10-12), level pending     Serostatus: CMV D-/R+; EBV D pending /R + ; Toxo D pending /R negative     Prophylaxis:   --CAV: aspirin 81 mg and rosuvastatin 10 mg daily  --Thrush: Nystatin   --PCP: Bactrim single strength daily   --GI: Protonix   --Osteoporosis: calcium/vitamin D   --CMV: CMV D-/R+, Valcyte 900 daily x 3 mos   --Toxo: Toxo D negative /R negative     Routine Surveillance:   --week 1 biopsy pending  --DSAs pending  --week 2 biopsy due 1/9    HTN.   - Norvasc as above.     History of CVS Vasculitis.   - Cellcept as above.     Incidental punctate cerebellar infarct. Identified per MR imaging 11/14/22 without new neuro deficits. Per radiology, appears acute, likely embolic. No known arterial clot history. No afib or other tachycarrhythmias. JARED w/o septal defects or  "thrombus. Treated w/ heparin gtt and transitioned to Eliquis.   - Eliquis on hold s/p OHT.     Occlusive left peroneal DVT. Non-occlusive right axillary DVT. Identified on US 11/15/22.  - Consider resumption of Eliquis when cleared per CVTS.     Severe Protein Calorie Malnutrition. Dysphagia.   - Appreciate Nutrition and Speech Consult.   - consider ramez counts, tolerated full liquid diet.   ================================================================    Interval History/ROS: He is feeling well today. He denies fever, chills, chest pain, palpitations, cough, nausea, vomiting, diarrhea, and LE edema. He is working with therapy. He did take a morning nap today, but had already done a lot of activity by 10 am- speech, sitting in the chair, OT.     Last 24 hr care team notes reviewed.   ROS:  4 point ROS including Respiratory, CV, GI and , other than that noted in the HPI, is negative.     Medications: Reviewed in EPIC.     Physical Exam:   BP 90/59   Pulse 93   Temp 98.6  F (37  C) (Axillary)   Resp 14   Ht 1.88 m (6' 2\")   Wt 99.9 kg (220 lb 3.8 oz)   SpO2 98%   BMI 28.28 kg/m    GENERAL: Appears alert and oriented times three.   HEENT: Eye symmetrical and free of discharge bilaterally. NJ to nare.   NECK: Supple and without lymphadenopathy. JVD below clavicular lineat 60 degrees  CV: RRR, S1S2 present without murmur, rub, or gallop.   RESPIRATORY: Respirations regular, even, and unlabored. Lungs CTA throughout.   GI: Soft and non distended with normoactive bowel sounds present in all quadrants. No tenderness, rebound, guarding. No organomegaly.   EXTREMITIES: Trace bilateral LE peripheral edema. All extremities are warm and well perfused  NEUROLOGIC: Alert and interacting appropriately.. No focal deficits.   MUSCULOSKELETAL: No joint swelling or tenderness.   SKIN: No jaundice. Chest and Left device site CDI.     Data:  CMP  Recent Labs   Lab 01/03/23  1303 01/03/23  0843 01/03/23  0459 01/03/23  0409 " 01/02/23  1612 01/02/23  1335 01/02/23  0737 01/02/23  0637 01/01/23  1945 01/01/23  1649 01/01/23  0739 01/01/23  0609 12/31/22  2352 12/31/22  2054 12/31/22  0801 12/31/22  0433   NA  --   --  134*  --   --   --   --  136  --  139  --  141  --   --    < > 148*   POTASSIUM  --   --  4.5  --   --   --   --  4.3  --  4.3  --  3.8  --   --    < > 3.9   CHLORIDE  --   --  105  --   --   --   --  106  --  107  --  110*  --   --    < > 115*   CO2  --   --  23  --   --   --   --  23  --  21*  --  23  --   --    < > 25   ANIONGAP  --   --  6*  --   --   --   --  7  --  11  --  8  --   --    < > 8   * 117* 147* 153*   < >  --    < > 140*   < > 167*   < > 156*   < >  --    < > 151*   BUN  --   --  19.5  --   --   --   --  18.3  --  21.0  --  21.6  --   --    < > 28.1*   CR  --   --  0.63*  --   --   --   --  0.59*  --  0.60*  --  0.63*  --   --    < > 0.64*   GFRESTIMATED  --   --  >90  --   --   --   --  >90  --  >90  --  >90  --   --    < > >90   TIM  --   --  7.8*  --   --   --   --  8.1*  --  8.1*  --  7.8*  --   --    < > 8.5*   MAG  --   --  1.5*  --   --  2.1  --  1.5*  --   --   --  1.6*  --   --   --   --    PHOS  --   --  2.1*  --   --   --   --  1.8*  --   --   --  1.8*  --  1.6*   < > 1.7*   PROTTOTAL  --   --  4.6*  --   --   --   --  4.5*  --   --   --  4.6*  --   --   --  4.8*   ALBUMIN  --   --  2.6*  --   --   --   --  2.7*  --   --   --  2.7*  --   --   --  3.1*   BILITOTAL  --   --  0.3  --   --   --   --  0.3  --   --   --  0.4  --   --   --  0.4   ALKPHOS  --   --  68  --   --   --   --  60  --   --   --  67  --   --   --  62   AST  --   --  21  --   --   --   --  18  --   --   --  21  --   --   --  24   ALT  --   --  28  --   --   --   --  25  --   --   --  23  --   --   --  28    < > = values in this interval not displayed.     CBC  Recent Labs   Lab 01/03/23  0459 01/02/23  0637 01/01/23  0609 12/31/22  1650   WBC 13.2* 14.4* 11.1* 14.1*   RBC 3.38* 3.23* 2.88* 3.12*   HGB 9.8* 9.6* 8.3* 9.1*    HCT 31.6* 31.0* 27.1* 29.6*   MCV 94 96 94 95   MCH 29.0 29.7 28.8 29.2   MCHC 31.0* 31.0* 30.6* 30.7*   RDW 15.9* 15.5* 15.4* 15.9*    178 123* 120*     INR  Recent Labs   Lab 12/29/22  1008   INR 1.15       Time/Communication  I personally spent a total of 35 minutes. Of that 20 minutes was counseling/coordination of patient's care. Plan of care discussed with patient. See my note above for details. Patient discussed with Dr. Dorman.      Sherin Prasad PA-C

## 2023-01-03 NOTE — PROGRESS NOTES
Patient's RCAT scored acuity level of 4 - PRN for his nebulizer treatment and CPT. Changed order per RT protocol. Breathsounds were clear/diminished. Patient has been using Aerobika and IS every hour to prevent atelectasis. Recent CXR shows no ptx. Currently on room air and stable VSS.    Will continue to reassess.

## 2023-01-04 ENCOUNTER — APPOINTMENT (OUTPATIENT)
Dept: SPEECH THERAPY | Facility: CLINIC | Age: 68
DRG: 001 | End: 2023-01-04
Attending: SURGERY
Payer: MEDICARE

## 2023-01-04 ENCOUNTER — APPOINTMENT (OUTPATIENT)
Dept: OCCUPATIONAL THERAPY | Facility: CLINIC | Age: 68
DRG: 001 | End: 2023-01-04
Attending: SURGERY
Payer: MEDICARE

## 2023-01-04 ENCOUNTER — APPOINTMENT (OUTPATIENT)
Dept: PHYSICAL THERAPY | Facility: CLINIC | Age: 68
DRG: 001 | End: 2023-01-04
Attending: SURGERY
Payer: MEDICARE

## 2023-01-04 ENCOUNTER — APPOINTMENT (OUTPATIENT)
Dept: GENERAL RADIOLOGY | Facility: CLINIC | Age: 68
DRG: 001 | End: 2023-01-04
Attending: SURGERY
Payer: MEDICARE

## 2023-01-04 LAB
ALBUMIN SERPL BCG-MCNC: 2.6 G/DL (ref 3.5–5.2)
ALP SERPL-CCNC: 71 U/L (ref 40–129)
ALT SERPL W P-5'-P-CCNC: 38 U/L (ref 10–50)
ANION GAP SERPL CALCULATED.3IONS-SCNC: 9 MMOL/L (ref 7–15)
AST SERPL W P-5'-P-CCNC: 26 U/L (ref 10–50)
BILIRUB DIRECT SERPL-MCNC: <0.2 MG/DL (ref 0–0.3)
BILIRUB SERPL-MCNC: 0.3 MG/DL
BUN SERPL-MCNC: 21.7 MG/DL (ref 8–23)
CA-I BLD-MCNC: 4.6 MG/DL (ref 4.4–5.2)
CALCIUM SERPL-MCNC: 8.1 MG/DL (ref 8.8–10.2)
CHLORIDE SERPL-SCNC: 105 MMOL/L (ref 98–107)
CREAT SERPL-MCNC: 0.57 MG/DL (ref 0.67–1.17)
DEPRECATED HCO3 PLAS-SCNC: 20 MMOL/L (ref 22–29)
DONOR IDENTIFICATION: NORMAL
DSA COMMENTS: NORMAL
DSA PRESENT: NO
DSA TEST METHOD: NORMAL
ERYTHROCYTE [DISTWIDTH] IN BLOOD BY AUTOMATED COUNT: 16 % (ref 10–15)
GFR SERPL CREATININE-BSD FRML MDRD: >90 ML/MIN/1.73M2
GLUCOSE BLDC GLUCOMTR-MCNC: 133 MG/DL (ref 70–99)
GLUCOSE BLDC GLUCOMTR-MCNC: 137 MG/DL (ref 70–99)
GLUCOSE BLDC GLUCOMTR-MCNC: 160 MG/DL (ref 70–99)
GLUCOSE BLDC GLUCOMTR-MCNC: 165 MG/DL (ref 70–99)
GLUCOSE SERPL-MCNC: 158 MG/DL (ref 70–99)
HCT VFR BLD AUTO: 31.2 % (ref 40–53)
HGB BLD-MCNC: 9.7 G/DL (ref 13.3–17.7)
LACTATE SERPL-SCNC: 1.3 MMOL/L (ref 0.7–2)
MAGNESIUM SERPL-MCNC: 1.6 MG/DL (ref 1.7–2.3)
MCH RBC QN AUTO: 29.1 PG (ref 26.5–33)
MCHC RBC AUTO-ENTMCNC: 31.1 G/DL (ref 31.5–36.5)
MCV RBC AUTO: 94 FL (ref 78–100)
ORGAN: NORMAL
PHOSPHATE SERPL-MCNC: 2.5 MG/DL (ref 2.5–4.5)
PLATELET # BLD AUTO: 238 10E3/UL (ref 150–450)
POTASSIUM SERPL-SCNC: 4.7 MMOL/L (ref 3.4–5.3)
PROT SERPL-MCNC: 4.6 G/DL (ref 6.4–8.3)
RBC # BLD AUTO: 3.33 10E6/UL (ref 4.4–5.9)
SA 1 CELL: NORMAL
SA 1 TEST METHOD: NORMAL
SA 2 CELL: NORMAL
SA 2 TEST METHOD: NORMAL
SA1 HI RISK ABY: NORMAL
SA1 MOD RISK ABY: NORMAL
SA2 HI RISK ABY: NORMAL
SA2 MOD RISK ABY: NORMAL
SODIUM SERPL-SCNC: 134 MMOL/L (ref 136–145)
TACROLIMUS BLD-MCNC: 4.4 UG/L (ref 5–15)
TME LAST DOSE: ABNORMAL H
TME LAST DOSE: ABNORMAL H
UNACCEPTABLE ANTIGENS: NORMAL
UNOS CPRA: 0
WBC # BLD AUTO: 14.2 10E3/UL (ref 4–11)
ZZZSA 1  COMMENTS: NORMAL
ZZZSA 2 COMMENTS: NORMAL

## 2023-01-04 PROCEDURE — 250N000011 HC RX IP 250 OP 636

## 2023-01-04 PROCEDURE — 36415 COLL VENOUS BLD VENIPUNCTURE: CPT | Performed by: SURGERY

## 2023-01-04 PROCEDURE — 92526 ORAL FUNCTION THERAPY: CPT | Mod: GN

## 2023-01-04 PROCEDURE — 82248 BILIRUBIN DIRECT: CPT | Performed by: STUDENT IN AN ORGANIZED HEALTH CARE EDUCATION/TRAINING PROGRAM

## 2023-01-04 PROCEDURE — 250N000013 HC RX MED GY IP 250 OP 250 PS 637: Performed by: SURGERY

## 2023-01-04 PROCEDURE — 71045 X-RAY EXAM CHEST 1 VIEW: CPT

## 2023-01-04 PROCEDURE — 250N000012 HC RX MED GY IP 250 OP 636 PS 637: Performed by: SURGERY

## 2023-01-04 PROCEDURE — 83605 ASSAY OF LACTIC ACID: CPT | Performed by: STUDENT IN AN ORGANIZED HEALTH CARE EDUCATION/TRAINING PROGRAM

## 2023-01-04 PROCEDURE — 97535 SELF CARE MNGMENT TRAINING: CPT | Mod: GO

## 2023-01-04 PROCEDURE — 250N000013 HC RX MED GY IP 250 OP 250 PS 637: Performed by: STUDENT IN AN ORGANIZED HEALTH CARE EDUCATION/TRAINING PROGRAM

## 2023-01-04 PROCEDURE — 83735 ASSAY OF MAGNESIUM: CPT | Performed by: SURGERY

## 2023-01-04 PROCEDURE — 84100 ASSAY OF PHOSPHORUS: CPT | Performed by: SURGERY

## 2023-01-04 PROCEDURE — 97116 GAIT TRAINING THERAPY: CPT | Mod: GP

## 2023-01-04 PROCEDURE — 71045 X-RAY EXAM CHEST 1 VIEW: CPT | Mod: 26 | Performed by: RADIOLOGY

## 2023-01-04 PROCEDURE — 214N000001 HC R&B CCU UMMC

## 2023-01-04 PROCEDURE — 250N000013 HC RX MED GY IP 250 OP 250 PS 637

## 2023-01-04 PROCEDURE — 80053 COMPREHEN METABOLIC PANEL: CPT | Performed by: STUDENT IN AN ORGANIZED HEALTH CARE EDUCATION/TRAINING PROGRAM

## 2023-01-04 PROCEDURE — 97110 THERAPEUTIC EXERCISES: CPT | Mod: GP

## 2023-01-04 PROCEDURE — 99207 PR NO CHARGE LOS: CPT | Performed by: PHYSICIAN ASSISTANT

## 2023-01-04 PROCEDURE — 250N000012 HC RX MED GY IP 250 OP 636 PS 637: Performed by: NURSE PRACTITIONER

## 2023-01-04 PROCEDURE — 97110 THERAPEUTIC EXERCISES: CPT | Mod: GO

## 2023-01-04 PROCEDURE — 97530 THERAPEUTIC ACTIVITIES: CPT | Mod: GP

## 2023-01-04 PROCEDURE — 80197 ASSAY OF TACROLIMUS: CPT | Performed by: PHYSICIAN ASSISTANT

## 2023-01-04 PROCEDURE — 99233 SBSQ HOSP IP/OBS HIGH 50: CPT | Mod: FS | Performed by: PHYSICIAN ASSISTANT

## 2023-01-04 PROCEDURE — 85027 COMPLETE CBC AUTOMATED: CPT | Performed by: STUDENT IN AN ORGANIZED HEALTH CARE EDUCATION/TRAINING PROGRAM

## 2023-01-04 PROCEDURE — 82330 ASSAY OF CALCIUM: CPT | Performed by: STUDENT IN AN ORGANIZED HEALTH CARE EDUCATION/TRAINING PROGRAM

## 2023-01-04 PROCEDURE — 250N000012 HC RX MED GY IP 250 OP 636 PS 637: Performed by: STUDENT IN AN ORGANIZED HEALTH CARE EDUCATION/TRAINING PROGRAM

## 2023-01-04 RX ORDER — TACROLIMUS 1 MG/1
4 CAPSULE ORAL
Status: DISCONTINUED | OUTPATIENT
Start: 2023-01-05 | End: 2023-01-08

## 2023-01-04 RX ORDER — TACROLIMUS 1 MG/1
3 CAPSULE ORAL
Status: DISCONTINUED | OUTPATIENT
Start: 2023-01-04 | End: 2023-01-04

## 2023-01-04 RX ORDER — PANTOPRAZOLE SODIUM 40 MG/1
40 TABLET, DELAYED RELEASE ORAL
Status: DISCONTINUED | OUTPATIENT
Start: 2023-01-05 | End: 2023-01-13 | Stop reason: HOSPADM

## 2023-01-04 RX ORDER — MYCOPHENOLATE MOFETIL 250 MG/1
1500 CAPSULE ORAL 2 TIMES DAILY
Status: DISCONTINUED | OUTPATIENT
Start: 2023-01-04 | End: 2023-01-10

## 2023-01-04 RX ORDER — AMLODIPINE BESYLATE 5 MG/1
5 TABLET ORAL DAILY
Status: DISCONTINUED | OUTPATIENT
Start: 2023-01-05 | End: 2023-01-13 | Stop reason: HOSPADM

## 2023-01-04 RX ORDER — NICOTINE POLACRILEX 4 MG
15-30 LOZENGE BUCCAL
Status: DISCONTINUED | OUTPATIENT
Start: 2023-01-04 | End: 2023-01-06

## 2023-01-04 RX ORDER — DEXTROSE MONOHYDRATE 25 G/50ML
25-50 INJECTION, SOLUTION INTRAVENOUS
Status: DISCONTINUED | OUTPATIENT
Start: 2023-01-04 | End: 2023-01-06

## 2023-01-04 RX ADMIN — NYSTATIN 1000000 UNITS: 500000 SUSPENSION ORAL at 16:40

## 2023-01-04 RX ADMIN — SENNOSIDES AND DOCUSATE SODIUM 1 TABLET: 8.6; 5 TABLET ORAL at 20:45

## 2023-01-04 RX ADMIN — POTASSIUM & SODIUM PHOSPHATES POWDER PACK 280-160-250 MG 2 PACKET: 280-160-250 PACK at 16:41

## 2023-01-04 RX ADMIN — ACETAMINOPHEN 975 MG: 325 TABLET, FILM COATED ORAL at 04:09

## 2023-01-04 RX ADMIN — HEPARIN SODIUM 5000 UNITS: 5000 INJECTION, SOLUTION INTRAVENOUS; SUBCUTANEOUS at 08:30

## 2023-01-04 RX ADMIN — PREDNISONE 25 MG: 20 TABLET ORAL at 08:32

## 2023-01-04 RX ADMIN — NYSTATIN 1000000 UNITS: 500000 SUSPENSION ORAL at 12:38

## 2023-01-04 RX ADMIN — MULTIVITAMIN 15 ML: LIQUID ORAL at 08:32

## 2023-01-04 RX ADMIN — POTASSIUM & SODIUM PHOSPHATES POWDER PACK 280-160-250 MG 2 PACKET: 280-160-250 PACK at 08:32

## 2023-01-04 RX ADMIN — Medication 10 MG: at 21:04

## 2023-01-04 RX ADMIN — VALGANCICLOVIR HYDROCHLORIDE 900 MG: 450 TABLET ORAL at 08:31

## 2023-01-04 RX ADMIN — ACETAMINOPHEN 975 MG: 325 TABLET, FILM COATED ORAL at 20:44

## 2023-01-04 RX ADMIN — SULFAMETHOXAZOLE AND TRIMETHOPRIM 1 TABLET: 400; 80 TABLET ORAL at 08:32

## 2023-01-04 RX ADMIN — INSULIN ASPART 2 UNITS: 100 INJECTION, SOLUTION INTRAVENOUS; SUBCUTANEOUS at 04:09

## 2023-01-04 RX ADMIN — MYCOPHENOLATE MOFETIL 1500 MG: 200 POWDER, FOR SUSPENSION ORAL at 08:33

## 2023-01-04 RX ADMIN — POTASSIUM & SODIUM PHOSPHATES POWDER PACK 280-160-250 MG 2 PACKET: 280-160-250 PACK at 21:04

## 2023-01-04 RX ADMIN — ROSUVASTATIN CALCIUM 10 MG: 10 TABLET, FILM COATED ORAL at 08:32

## 2023-01-04 RX ADMIN — INSULIN ASPART 1 UNITS: 100 INJECTION, SOLUTION INTRAVENOUS; SUBCUTANEOUS at 00:28

## 2023-01-04 RX ADMIN — QUETIAPINE FUMARATE 25 MG: 25 TABLET ORAL at 20:45

## 2023-01-04 RX ADMIN — POLYETHYLENE GLYCOL 3350 17 G: 17 POWDER, FOR SOLUTION ORAL at 08:31

## 2023-01-04 RX ADMIN — PREDNISONE 20 MG: 20 TABLET ORAL at 20:45

## 2023-01-04 RX ADMIN — NYSTATIN 1000000 UNITS: 500000 SUSPENSION ORAL at 20:44

## 2023-01-04 RX ADMIN — AMLODIPINE BESYLATE 10 MG: 10 TABLET ORAL at 08:32

## 2023-01-04 RX ADMIN — HEPARIN SODIUM 5000 UNITS: 5000 INJECTION, SOLUTION INTRAVENOUS; SUBCUTANEOUS at 23:56

## 2023-01-04 RX ADMIN — HEPARIN SODIUM 5000 UNITS: 5000 INJECTION, SOLUTION INTRAVENOUS; SUBCUTANEOUS at 16:41

## 2023-01-04 RX ADMIN — NYSTATIN 1000000 UNITS: 500000 SUSPENSION ORAL at 08:32

## 2023-01-04 RX ADMIN — MYCOPHENOLATE MOFETIL 1500 MG: 250 CAPSULE ORAL at 20:44

## 2023-01-04 RX ADMIN — ASPIRIN 81 MG: 81 TABLET, CHEWABLE ORAL at 08:32

## 2023-01-04 RX ADMIN — TACROLIMUS 3 MG: 5 CAPSULE ORAL at 08:30

## 2023-01-04 RX ADMIN — POTASSIUM & SODIUM PHOSPHATES POWDER PACK 280-160-250 MG 2 PACKET: 280-160-250 PACK at 12:38

## 2023-01-04 RX ADMIN — TACROLIMUS 3 MG: 1 CAPSULE ORAL at 18:16

## 2023-01-04 RX ADMIN — Medication 40 MG: at 08:30

## 2023-01-04 RX ADMIN — HEPARIN SODIUM 5000 UNITS: 5000 INJECTION, SOLUTION INTRAVENOUS; SUBCUTANEOUS at 00:28

## 2023-01-04 RX ADMIN — OXYCODONE HYDROCHLORIDE 10 MG: 10 TABLET ORAL at 18:30

## 2023-01-04 ASSESSMENT — ACTIVITIES OF DAILY LIVING (ADL)
ADLS_ACUITY_SCORE: 42

## 2023-01-04 NOTE — PLAN OF CARE
Transferred to: Unit 6C at 1300  Belongings: remain with patient  Davis removed? Yes  Central line removed? Yes  Chart and medications sent with patient Yes  Family notified: Yes    Faith Ho RN on 1/4/2023 at 4:12 PM

## 2023-01-04 NOTE — PLAN OF CARE
[id number]:  Neuro: numbness to 4th/5th finger on R hand, otherwise intact  CV: HR >80, SBP <140, MAP>60. VSS.  Resp: RA, CPT and nebs scheduled. 4 chest tubes all to -20 suction. No SOB reported.  GI: NJ bridled at 94 w/ cyclic TF at goal (60mL/hr between 7561-7461), soft bite size diet. Chin tuck with all oral intake.  : voids spont  Skin: no new skin issues  GTT 0  PIV x1  Labs: pending    Goal Outcome Evaluation:      Plan of Care Reviewed With: patient    Overall Patient Progress: improvingOverall Patient Progress: improving    Outcome Evaluation: slept well overnight

## 2023-01-04 NOTE — PROGRESS NOTES
Two nurse head-to-toe skin assessment performed by Brittany Trimble and Hola Reynoso.  Noted to have chest tube site on lateral left chest, well approximated, dressing changed.  Also noted to have four chest tubes in medial upper abdomen, midsternal incision (open to air, approximated with skin glue), left subclavian incision (open to air, approximated with skin glue), and non-blanchable erythema on medial anterior left ankle.  Device that was over site of erythema was removed, will reassess site after an hour to assess blanchability.

## 2023-01-04 NOTE — PROGRESS NOTES
CLINICAL NUTRITION SERVICES - BRIEF NOTE      Reason for RD note: Discontinuing EN    New Findings/Chart Review:  -Pt tolerating cyclic feeds overnight last night  -Pt has been eating well on level 6 diet; % PO intake per RN documentation   -SLP now approved pt for regular diet    Interventions:  -Discontinue EN and RN to remove FT  -Will continue to monitor PO intake/adequacy     Nutrition will follow per protocol or sooner if consulted.    Felicita Sanderson, MS, RD, LD  4E (CVICU) RD pager: [phone]  Ascom: [phone]  Weekend/Holiday RD pager: [phone]

## 2023-01-04 NOTE — PROGRESS NOTES
[facility]   Cardiology II Service / Advanced Heart Failure  Daily Progress Note        Patient: Paco Stein  MRN: [id number]  Admission Date: 12/6/2022  Hospital Day # 29    Assessment and Plan: Paco Stein is a 67 year old male with a PMH of SCHF secondary to arrhythmogenic NICM, CAD s/p PCI, Hyperlipidemia, CNS Vasculitis, and CKD who was admitted for cardiogenic shock s/p OHT 12/25/22 thus far with uncomplicated course.     Recommendations:  - Decrease amlodipine to 5 mg daily  - F/u DSA  - F/up tacrolimus level     S/p OHT. Completed 12/25/22. Postoperative course complicated by ICU delirium, resolved.   Echo  1/2/22: EF is 60-65%, RV normal in size and function.  RHC 1/2/22: Ra 1, PA 20/5/12, PCW 4, CAROLYN CO/CI 6.66/2.98  Graft Function:   --Volume: Euvolemic to mild hypovolemia- encourage PO intake  --BP: Goal <130/80, decrease amlodipine to 5 mg daily  --HR: 90's, off Terbutaline     Immunosuppression:   --CNI-delaying protocol with basiliximab on 12/25 and 12/29  --Prednisone taper per protocol  --Cellcept 1500 mg BID- once tacrolimus is closer to therapeutic will decrease this to 1000 mg BID given age pending biopsies- if he continues to have c4d staining may leave at higher cellcept  --tacrolimus 3 mg BID (goal 10-12), level pending     Serostatus: CMV D-/R+; EBV D pending /R + ; Toxo D pending /R negative     Prophylaxis:   --CAV: aspirin 81 mg and rosuvastatin 10 mg daily  --Thrush: Nystatin   --PCP: Bactrim single strength daily   --GI: Protonix   --Osteoporosis: calcium/vitamin D   --CMV: CMV D-/R+, Valcyte 900 daily x 3 mos   --Toxo: Toxo D negative /R negative     Routine Surveillance:   --week 1 biopsy pending  --DSAs pending  --week 2 biopsy due 1/9    HTN, well controlled  - Norvasc as above.     History of CVS Vasculitis.   - Cellcept as above.     Incidental punctate cerebellar infarct. Identified per MR imaging 11/14/22 without new neuro deficits. Per radiology, appears  "acute, likely embolic. No known arterial clot history. No afib or other tachycarrhythmias. JARED w/o septal defects or thrombus. Treated w/heparin gtt and transitioned to Eliquis.   - Eliquis on hold s/p OHT.     Occlusive left peroneal DVT. Non-occlusive right axillary DVT. Identified on US 11/15/22.  - Consider resumption of Eliquis when cleared per CVTS.     Severe Protein Calorie Malnutrition. Dysphagia.   - Appreciate Nutrition and Speech Consult.   - consider ramez counts, tolerated full liquid diet.   ================================================================    Interval History/ROS: He is feeling well today. He denies fever, chills, chest pain, palpitations, cough, nausea, vomiting, diarrhea, and LE edema. He is working with therapy. He did take a morning nap today, but had already done a lot of activity by 10 am- speech, sitting in the chair, OT.     Last 24 hr care team notes reviewed.   ROS:  4 point ROS including Respiratory, CV, GI and , other than that noted in the HPI, is negative.     Medications: Reviewed in EPIC.     Physical Exam:   /74 (BP Location: Right arm)   Pulse 93   Temp 97.3  F (36.3  C) (Oral)   Resp 16   Ht 1.88 m (6' 2\")   Wt 100.8 kg (222 lb 3.6 oz)   SpO2 97%   BMI 28.53 kg/m    GENERAL: Appears alert and interacting appropriatly  HEENT: Eye symmetrical and free of discharge bilaterally.   NECK: Supple and without lymphadenopathy. JVD below clavicular line at 60 degrees  CV: RRR, S1S2 present without murmur, rub, or gallop.   RESPIRATORY: Respirations regular, even, and unlabored. Lungs CTA throughout.   GI: Soft and non distended with normoactive bowel sounds present in all quadrants. No tenderness, rebound, guarding.   EXTREMITIES: Trace bilateral LE peripheral edema. All extremities are warm and well perfused  NEUROLOGIC: Alert and interacting appropriately. No focal deficits.   MUSCULOSKELETAL: No joint swelling or tenderness.   SKIN: No jaundice. Chest and Left " device site CDI.     Data:  [facility]  Recent Labs   Lab 01/04/23  1236 01/04/23  0817 01/04/23  0458 01/04/23  0403 01/03/23  1553 01/03/23  1354 01/03/23  0843 01/03/23  0459 01/02/23  1612 01/02/23  1335 01/02/23  0737 01/02/23  0637 01/01/23  1945 01/01/23  1649 01/01/23  0739 01/01/23  0609   NA  --   --  134*  --   --   --   --  134*  --   --   --  136  --  139  --  141   POTASSIUM  --   --  4.7  --   --   --   --  4.5  --   --   --  4.3  --  4.3  --  3.8   CHLORIDE  --   --  105  --   --   --   --  105  --   --   --  106  --  107  --  110*   CO2  --   --  20*  --   --   --   --  23  --   --   --  23  --  21*  --  23   ANIONGAP  --   --  9  --   --   --   --  6*  --   --   --  7  --  11  --  8   * 133* 158* 165*   < >  --    < > 147*   < >  --    < > 140*   < > 167*   < > 156*   BUN  --   --  21.7  --   --   --   --  19.5  --   --   --  18.3  --  21.0  --  21.6   CR  --   --  0.57*  --   --   --   --  0.63*  --   --   --  0.59*  --  0.60*  --  0.63*   GFRESTIMATED  --   --  >90  --   --   --   --  >90  --   --   --  >90  --  >90  --  >90   TIM  --   --  8.1*  --   --   --   --  7.8*  --   --   --  8.1*  --  8.1*  --  7.8*   MAG  --   --  1.6*  --   --  2.1  --  1.5*  --  2.1  --  1.5*  --   --   --  1.6*   PHOS  --   --  2.5  --   --   --   --  2.1*  --   --   --  1.8*  --   --   --  1.8*   PROTTOTAL  --   --  4.6*  --   --   --   --  4.6*  --   --   --  4.5*  --   --   --  4.6*   ALBUMIN  --   --  2.6*  --   --   --   --  2.6*  --   --   --  2.7*  --   --   --  2.7*   BILITOTAL  --   --  0.3  --   --   --   --  0.3  --   --   --  0.3  --   --   --  0.4   ALKPHOS  --   --  71  --   --   --   --  68  --   --   --  60  --   --   --  67   AST  --   --  26  --   --   --   --  21  --   --   --  18  --   --   --  21   ALT  --   --  38  --   --   --   --  28  --   --   --  25  --   --   --  23    < > = values in this interval not displayed.     CBC  Recent Labs   Lab 01/04/23  0458 01/03/23  0459 01/02/23  0637  01/01/23  0609   WBC 14.2* 13.2* 14.4* 11.1*   RBC 3.33* 3.38* 3.23* 2.88*   HGB 9.7* 9.8* 9.6* 8.3*   HCT 31.2* 31.6* 31.0* 27.1*   MCV 94 94 96 94   MCH 29.1 29.0 29.7 28.8   MCHC 31.1* 31.0* 31.0* 30.6*   RDW 16.0* 15.9* 15.5* 15.4*    203 178 123*     INR  Recent Labs   Lab 12/29/22  1008   INR 1.15       Time/Communication  I personally spent a total of 35 minutes. Of that 20 minutes was counseling/coordination of patient's care. Plan of care discussed with patient. See my note above for details. Patient discussed with Dr. Dorman.      Sherin Prasad PA-C

## 2023-01-04 NOTE — PROGRESS NOTES
CV ICU Progress Note  01/04/2023        CO-MORBIDITIES:   Patient Active Problem List   Diagnosis     Acute respiratory failure with hypoxia (H)     Cardiomyopathy (H)     Stroke (H)     Coronary artery disease involving native coronary artery of native heart without angina pectoris     Essential hypertension     NSVT (nonsustained ventricular tachycardia)     SOB (shortness of breath)     CHF (congestive heart failure) (H)     Troponin level elevated     Anginal equivalent (H)     Heart failure, unspecified HF chronicity, unspecified heart failure type (H)     Benign neoplasm of colon     Acute on chronic combined systolic (congestive) and diastolic (congestive) heart failure (H)     Hyperlipidemia     Syncope and collapse     Primary central nervous system vasculitis (H)     Vasculitis, CNS (H)     Acute embolism and thrombosis of left peroneal vein (H)       ASSESSMENT: Paco Stein is a 67 year old male with a history of chronic systolic heart failure (suspected predominantly nonischemic cardiomyopathy, possibly arrhythmogenic), coronary artery disease (s/p PCI with TILA to LCx 4/2013 and to the LAD in 10/2021), hyperlipidemia, CNS vasculitis and stage 2 CKD who presents with transient bilateral hand tightness with unrevealing workup. He underwent DDHT on 12/25/22 with Dr. Rodriguez.    24Hr Events:  NAOE    Today's Changes:  Transfer to floor when bed available  Restart PTA apixaban if okay with CT surgery  Remove pleural chest tubes if okay with CT surgery    PLAN:  Neuro/ pain/ sedation:  Acute Postoperative pain  Primary central nervous system vasculitis  - Monitor neurological status. Notify the MD for any acute changes in exam.  - melatonin 10 and seroquel 12.5 HS.  - CAM negative this AM  - Pain: Scheduled tylenol. PRN robaxin. PRN oxycodone, IV PRN dilaudid. Lidocaine patch. No NSAIDs.  - PTA trazodone PRN     Pulmonary care:   #CORIE atelectasis  # Bilateral apical pneumothoraces  - Room air   -  Aggressive pulmonary toilet: CPT, albuterol, mucomyst, IS, flutter valve  - Titrate supplemental oxygen to maintain saturation above 92%.    Cardiovascular:    S/p heart tx with Dr. Rodriguez on 12/25/22  History of CAD  HFrEF  ICD  Dyslipidemia  HTN  Recent echo on 11/16 with LVEF of 20-25%  - Monitor hemodynamic status.   - Goal MAP 65, SBP<120  - Continue statin  - Continue ASA 81 mg  - IV hydralazine prn for SBP >140. Daily amlodipine 10 mg.  - Heart bx, TTE and cath on Monday 1/2/23   Conclusion: Normal PA pressures, R. Filling pressures normal, L. Filling pressures normal, CO normal  - Cards 2 following. Appreciate recommendations.    GI care/ Nutrition:   malnutrition  - SLP following. Regular Diet, thin liquids  - NJ removed 1/4/22  - PPI  - Last BM 1/4 (multiple). Continue bowel regimen: miralax, senna      Renal/ Fluid Balance/ Electrolytes:  CRI not on dialysis  MILENA, improving  Hypernatemia (resolved)  BL creat appears to be ~ 0.8  - Strict I/O, daily weights  - Avoid/limit nephrotoxins as able  - Replete lytes PRN per protocol    Endocrine:    Stress induced hyperglycemia  Steroid induced hyperglycemia  Preop A1c 6.2  - HDSSI  - Goal BG <180 for optimal healing. Well controlled    ID/ Antibiotics:  Stress induced leukocytosis  - To complete perioperative regimen  - Continue to monitor fever curve, WBC and inflammatory markers as appropriate. Improved leukocytosis.  - Immunosuppression ppx: valcyte and bactrim      Heme:     Stress induced leukocytosis  Acute blood loss anemia  Acute blood loss thrombocytopenia  H/o R axillary and L peroneal DVT  No s/sx active bleeding  - Hgb 9.8  - CBC daily  - PTA apixaban, discuss restarting this with CT surgery team.      MSK/ Skin:  Sternotomy  Surgical Incision  Left forearm swelling and bruise  - Sternal precautions  - Postoperative incision management per protocol  - PT/OT  - Monitor left forearm swelling and bruise    Prophylaxis:    - Mechanical prophylaxis for  DVT  - Chemical DVT prophylaxis - SQH.   - PPI    Lines/ tubes/ drains:  - CTs x 4 (day 9)  - NJ remove  - PIV (day 4)    Disposition:  - Floor      Patient seen, findings and plan discussed CVICU attending    Gomez Latif MD  Anesthesiology, CA1, PGY2        ====================================    Interval history:  No issues overnight. Slept well.      PAST MEDICAL HISTORY:   Past Medical History:   Diagnosis Date     Congestive heart failure (H)        PAST SURGICAL HISTORY:   Past Surgical History:   Procedure Laterality Date     COLONOSCOPY N/A 11/17/2022    Procedure: COLONOSCOPY;  Surgeon: Roverto Nino MD;  Location:  GI     CV CORONARY ANGIOGRAM N/A 11/11/2022    Procedure: Coronary Angiogram;  Surgeon: Justo Bush MD;  Location:  HEART CARDIAC CATH LAB     CV RIGHT HEART CATH MEASUREMENTS RECORDED N/A 11/11/2022    Procedure: Right Heart Catheterization;  Surgeon: Justo Bush MD;  Location:  HEART CARDIAC CATH LAB     CV RIGHT HEART CATH MEASUREMENTS RECORDED N/A 12/8/2022    Procedure: Right Heart Catheterization;  Surgeon: Wayne Xavier MD;  Location:  HEART CARDIAC CATH LAB     CV RIGHT HEART CATH MEASUREMENTS RECORDED N/A 12/9/2022    Procedure: Right Heart Catheterization with leave-in [place];  Surgeon: Khari Mcneill MD;  Location:  HEART CARDIAC CATH LAB     PICC DOUBLE LUMEN PLACEMENT Right 11/10/2022    Right basilic, 43 cm, 1 cm external length     TRANSPLANT HEART RECIPIENT N/A 12/25/2022    Procedure: TRANSPLANT, HEART, RECIPIENT; Median Sternotomy, Cardiopulmonary Bypass, Removal of Pacemaker;  Surgeon: Brendan Rodriguez MD;  Location:  OR       FAMILY HISTORY:   Family History   Problem Relation Age of Onset     Atrial fibrillation Father      Lung Cancer Brother        SOCIAL HISTORY:   Social History     Tobacco Use     Smoking status: Never     Smokeless tobacco: Never   Substance Use Topics     Alcohol use: Not Currently  "        OBJECTIVE:   1. VITAL SIGNS:    Vital signs:  Temp: 97.8  F (36.6  C) Temp src: Oral BP: (!) 124/105 Pulse: 92   Resp: 12 SpO2: 99 % O2 Device: None (Room air) Oxygen Delivery: 1 LPM Height: 188 cm (6' 2\") Weight: 100.8 kg (222 lb 3.6 oz)  Estimated body mass index is 28.53 kg/m  as calculated from the following:    Height as of this encounter: 1.88 m (6' 2\").    Weight as of this encounter: 100.8 kg (222 lb 3.6 oz).          2. INTAKE/ OUTPUT:    I/O last 3 completed shifts:  In: 2595 [P.O.:1277; I.V.:103; NG/GT:375]  Out: 2086 [Urine:1595; Chest Tube:491]      3. PHYSICAL EXAMINATION:     General: NAD  Neuro: AAOx3  Resp: NLB on RA  CV: S1, S2, sinus tach, no m/r/g   Abdomen: Soft, non-distended, non-tender  Incisions: c/d/i  Extremities: warm and well perfused, no edema  CT: To suction, serosang output    4. INVESTIGATIONS:   Arterial Blood Gases   Recent Labs   Lab 12/29/22  1009 12/29/22  0734 12/29/22  0402 12/28/22  2146   PH 7.47* 7.46* 7.46* 7.45   PCO2 37 37 37 37   PO2 103 136* 102 112*   HCO3 27 27 27 26     Complete Blood Count   Recent Labs   Lab 01/04/23  0458 01/03/23  0459 01/02/23  0637 01/01/23  0609   WBC 14.2* 13.2* 14.4* 11.1*   HGB 9.7* 9.8* 9.6* 8.3*    203 178 123*     Basic Metabolic Panel  Recent Labs   Lab 01/04/23  0458 01/04/23  0403 01/04/23  0027 01/03/23  2025 01/03/23  0843 01/03/23  0459 01/02/23  0737 01/02/23  0637 01/01/23  1945 01/01/23  1649   *  --   --   --   --  134*  --  136  --  139   POTASSIUM 4.7  --   --   --   --  4.5  --  4.3  --  4.3   CHLORIDE 105  --   --   --   --  105  --  106  --  107   CO2 20*  --   --   --   --  23  --  23  --  21*   BUN 21.7  --   --   --   --  19.5  --  18.3  --  21.0   CR 0.57*  --   --   --   --  0.63*  --  0.59*  --  0.60*   * 165* 160* 154*   < > 147*   < > 140*   < > 167*    < > = values in this interval not displayed.     Liver Function Tests  Recent Labs   Lab 01/04/23  0458 01/03/23  0459 01/02/23  0637 " 01/01/23  0609 12/30/22  0444 12/29/22  1008   AST 26 21 18 21   < >  --    ALT 38 28 25 23   < >  --    ALKPHOS 71 68 60 67   < >  --    BILITOTAL 0.3 0.3 0.3 0.4   < >  --    ALBUMIN 2.6* 2.6* 2.7* 2.7*   < >  --    INR  --   --   --   --   --  1.15    < > = values in this interval not displayed.     Pancreatic Enzymes  No lab results found in last 7 days.  Coagulation Profile  Recent Labs   Lab 12/29/22  1008   INR 1.15   PTT 26         5. RADIOLOGY:   Recent Results (from the past 24 hour(s))   XR Chest Port 1 View    Narrative    EXAM: XR CHEST PORT 1 VIEW  1/3/2023 4:41 PM     HISTORY:  CXR removed       COMPARISON:  Chest x-ray 1/3/2023 at 0114.    FINDINGS:   AP portable chest, 60 degrees. Postoperative changes of heart  transplantation. Intact median sternotomy wires. Stable positioning of  the bibasilar chest tubes and mediastinal drains, and interval removal  of the left upper chest tube. Stable tiny bilateral apical  pneumothoraces. No significant pleural effusions. No significant  change in the mild perihilar haziness. No interval new abnormal  airspace opacities. Enteric tube courses below the diaphragm with tip  out of view. Stable cardiac silhouette.      Impression    IMPRESSION:  1. Interval removal of the left chest tube. Stable tiny bilateral  apical pneumothoraces.   2. Stable positioning of remaining support devices.  3. Continued mild postsurgical perihilar edema.    I have personally reviewed the examination and initial interpretation  and I agree with the findings.    TANISHA MIRELES MD         SYSTEM ID:  [id number]       =========================================

## 2023-01-05 ENCOUNTER — APPOINTMENT (OUTPATIENT)
Dept: OCCUPATIONAL THERAPY | Facility: CLINIC | Age: 68
DRG: 001 | End: 2023-01-05
Attending: SURGERY
Payer: MEDICARE

## 2023-01-05 ENCOUNTER — APPOINTMENT (OUTPATIENT)
Dept: GENERAL RADIOLOGY | Facility: CLINIC | Age: 68
DRG: 001 | End: 2023-01-05
Attending: SURGERY
Payer: MEDICARE

## 2023-01-05 ENCOUNTER — APPOINTMENT (OUTPATIENT)
Dept: GENERAL RADIOLOGY | Facility: CLINIC | Age: 68
DRG: 001 | End: 2023-01-05
Attending: NURSE PRACTITIONER
Payer: MEDICARE

## 2023-01-05 LAB
ALBUMIN SERPL BCG-MCNC: 2.6 G/DL (ref 3.5–5.2)
ALP SERPL-CCNC: 69 U/L (ref 40–129)
ALT SERPL W P-5'-P-CCNC: 44 U/L (ref 10–50)
ANION GAP SERPL CALCULATED.3IONS-SCNC: 13 MMOL/L (ref 7–15)
ANION GAP SERPL CALCULATED.3IONS-SCNC: 9 MMOL/L (ref 7–15)
AST SERPL W P-5'-P-CCNC: 30 U/L (ref 10–50)
BILIRUB DIRECT SERPL-MCNC: <0.2 MG/DL (ref 0–0.3)
BILIRUB SERPL-MCNC: 0.4 MG/DL
BUN SERPL-MCNC: 21.5 MG/DL (ref 8–23)
BUN SERPL-MCNC: 26.1 MG/DL (ref 8–23)
CA-I BLD-MCNC: 4.6 MG/DL (ref 4.4–5.2)
CALCIUM SERPL-MCNC: 8.2 MG/DL (ref 8.8–10.2)
CALCIUM SERPL-MCNC: 8.4 MG/DL (ref 8.8–10.2)
CHLORIDE SERPL-SCNC: 100 MMOL/L (ref 98–107)
CHLORIDE SERPL-SCNC: 104 MMOL/L (ref 98–107)
CREAT SERPL-MCNC: 0.6 MG/DL (ref 0.67–1.17)
CREAT SERPL-MCNC: 0.69 MG/DL (ref 0.67–1.17)
DEPRECATED HCO3 PLAS-SCNC: 17 MMOL/L (ref 22–29)
DEPRECATED HCO3 PLAS-SCNC: 20 MMOL/L (ref 22–29)
ERYTHROCYTE [DISTWIDTH] IN BLOOD BY AUTOMATED COUNT: 16.2 % (ref 10–15)
GFR SERPL CREATININE-BSD FRML MDRD: >90 ML/MIN/1.73M2
GFR SERPL CREATININE-BSD FRML MDRD: >90 ML/MIN/1.73M2
GLUCOSE BLDC GLUCOMTR-MCNC: 105 MG/DL (ref 70–99)
GLUCOSE BLDC GLUCOMTR-MCNC: 111 MG/DL (ref 70–99)
GLUCOSE BLDC GLUCOMTR-MCNC: 116 MG/DL (ref 70–99)
GLUCOSE BLDC GLUCOMTR-MCNC: 139 MG/DL (ref 70–99)
GLUCOSE BLDC GLUCOMTR-MCNC: 147 MG/DL (ref 70–99)
GLUCOSE SERPL-MCNC: 124 MG/DL (ref 70–99)
GLUCOSE SERPL-MCNC: 190 MG/DL (ref 70–99)
HCT VFR BLD AUTO: 32.8 % (ref 40–53)
HGB BLD-MCNC: 10.3 G/DL (ref 13.3–17.7)
LACTATE SERPL-SCNC: 1.1 MMOL/L (ref 0.7–2)
MCH RBC QN AUTO: 29.6 PG (ref 26.5–33)
MCHC RBC AUTO-ENTMCNC: 31.4 G/DL (ref 31.5–36.5)
MCV RBC AUTO: 94 FL (ref 78–100)
PLATELET # BLD AUTO: 292 10E3/UL (ref 150–450)
POTASSIUM SERPL-SCNC: 4.8 MMOL/L (ref 3.4–5.3)
POTASSIUM SERPL-SCNC: 5.4 MMOL/L (ref 3.4–5.3)
PROT SERPL-MCNC: 4.7 G/DL (ref 6.4–8.3)
RBC # BLD AUTO: 3.48 10E6/UL (ref 4.4–5.9)
SODIUM SERPL-SCNC: 130 MMOL/L (ref 136–145)
SODIUM SERPL-SCNC: 133 MMOL/L (ref 136–145)
WBC # BLD AUTO: 15.2 10E3/UL (ref 4–11)

## 2023-01-05 PROCEDURE — 250N000011 HC RX IP 250 OP 636

## 2023-01-05 PROCEDURE — 71045 X-RAY EXAM CHEST 1 VIEW: CPT | Mod: 26 | Performed by: RADIOLOGY

## 2023-01-05 PROCEDURE — 97535 SELF CARE MNGMENT TRAINING: CPT | Mod: GO

## 2023-01-05 PROCEDURE — 250N000012 HC RX MED GY IP 250 OP 636 PS 637: Performed by: PHYSICIAN ASSISTANT

## 2023-01-05 PROCEDURE — 83605 ASSAY OF LACTIC ACID: CPT | Performed by: STUDENT IN AN ORGANIZED HEALTH CARE EDUCATION/TRAINING PROGRAM

## 2023-01-05 PROCEDURE — 250N000012 HC RX MED GY IP 250 OP 636 PS 637: Performed by: SURGERY

## 2023-01-05 PROCEDURE — 250N000013 HC RX MED GY IP 250 OP 250 PS 637: Performed by: STUDENT IN AN ORGANIZED HEALTH CARE EDUCATION/TRAINING PROGRAM

## 2023-01-05 PROCEDURE — 250N000013 HC RX MED GY IP 250 OP 250 PS 637: Performed by: SURGERY

## 2023-01-05 PROCEDURE — 250N000012 HC RX MED GY IP 250 OP 636 PS 637: Performed by: STUDENT IN AN ORGANIZED HEALTH CARE EDUCATION/TRAINING PROGRAM

## 2023-01-05 PROCEDURE — 71045 X-RAY EXAM CHEST 1 VIEW: CPT | Mod: 77

## 2023-01-05 PROCEDURE — 250N000013 HC RX MED GY IP 250 OP 250 PS 637: Performed by: PHYSICIAN ASSISTANT

## 2023-01-05 PROCEDURE — 97530 THERAPEUTIC ACTIVITIES: CPT | Mod: GO

## 2023-01-05 PROCEDURE — 214N000001 HC R&B CCU UMMC

## 2023-01-05 PROCEDURE — 82330 ASSAY OF CALCIUM: CPT | Performed by: STUDENT IN AN ORGANIZED HEALTH CARE EDUCATION/TRAINING PROGRAM

## 2023-01-05 PROCEDURE — 99232 SBSQ HOSP IP/OBS MODERATE 35: CPT | Performed by: PHYSICIAN ASSISTANT

## 2023-01-05 PROCEDURE — 82310 ASSAY OF CALCIUM: CPT | Performed by: PHYSICIAN ASSISTANT

## 2023-01-05 PROCEDURE — 80053 COMPREHEN METABOLIC PANEL: CPT | Performed by: SURGERY

## 2023-01-05 PROCEDURE — 71045 X-RAY EXAM CHEST 1 VIEW: CPT

## 2023-01-05 PROCEDURE — 82248 BILIRUBIN DIRECT: CPT | Performed by: STUDENT IN AN ORGANIZED HEALTH CARE EDUCATION/TRAINING PROGRAM

## 2023-01-05 PROCEDURE — 85027 COMPLETE CBC AUTOMATED: CPT | Performed by: STUDENT IN AN ORGANIZED HEALTH CARE EDUCATION/TRAINING PROGRAM

## 2023-01-05 PROCEDURE — 36415 COLL VENOUS BLD VENIPUNCTURE: CPT | Performed by: STUDENT IN AN ORGANIZED HEALTH CARE EDUCATION/TRAINING PROGRAM

## 2023-01-05 PROCEDURE — 36415 COLL VENOUS BLD VENIPUNCTURE: CPT | Performed by: PHYSICIAN ASSISTANT

## 2023-01-05 PROCEDURE — 250N000011 HC RX IP 250 OP 636: Performed by: STUDENT IN AN ORGANIZED HEALTH CARE EDUCATION/TRAINING PROGRAM

## 2023-01-05 RX ORDER — DEXTROSE MONOHYDRATE 25 G/50ML
25-50 INJECTION, SOLUTION INTRAVENOUS
Status: DISCONTINUED | OUTPATIENT
Start: 2023-01-05 | End: 2023-01-13 | Stop reason: HOSPADM

## 2023-01-05 RX ORDER — NICOTINE POLACRILEX 4 MG
15-30 LOZENGE BUCCAL
Status: DISCONTINUED | OUTPATIENT
Start: 2023-01-05 | End: 2023-01-13 | Stop reason: HOSPADM

## 2023-01-05 RX ADMIN — HEPARIN SODIUM 5000 UNITS: 5000 INJECTION, SOLUTION INTRAVENOUS; SUBCUTANEOUS at 08:01

## 2023-01-05 RX ADMIN — NYSTATIN 1000000 UNITS: 500000 SUSPENSION ORAL at 17:29

## 2023-01-05 RX ADMIN — PANTOPRAZOLE SODIUM 40 MG: 40 TABLET, DELAYED RELEASE ORAL at 08:02

## 2023-01-05 RX ADMIN — MYCOPHENOLATE MOFETIL 1500 MG: 250 CAPSULE ORAL at 08:02

## 2023-01-05 RX ADMIN — MYCOPHENOLATE MOFETIL 1500 MG: 250 CAPSULE ORAL at 20:53

## 2023-01-05 RX ADMIN — ASPIRIN 81 MG: 81 TABLET, CHEWABLE ORAL at 08:01

## 2023-01-05 RX ADMIN — PREDNISONE 20 MG: 20 TABLET ORAL at 20:54

## 2023-01-05 RX ADMIN — NYSTATIN 1000000 UNITS: 500000 SUSPENSION ORAL at 08:01

## 2023-01-05 RX ADMIN — ROSUVASTATIN CALCIUM 10 MG: 10 TABLET, FILM COATED ORAL at 08:00

## 2023-01-05 RX ADMIN — PREDNISONE 20 MG: 20 TABLET ORAL at 08:00

## 2023-01-05 RX ADMIN — NYSTATIN 1000000 UNITS: 500000 SUSPENSION ORAL at 22:43

## 2023-01-05 RX ADMIN — HYDROMORPHONE HYDROCHLORIDE 0.4 MG: 0.2 INJECTION, SOLUTION INTRAMUSCULAR; INTRAVENOUS; SUBCUTANEOUS at 10:22

## 2023-01-05 RX ADMIN — POTASSIUM & SODIUM PHOSPHATES POWDER PACK 280-160-250 MG 2 PACKET: 280-160-250 PACK at 08:00

## 2023-01-05 RX ADMIN — AMLODIPINE BESYLATE 5 MG: 5 TABLET ORAL at 08:01

## 2023-01-05 RX ADMIN — Medication 10 MG: at 20:54

## 2023-01-05 RX ADMIN — TACROLIMUS 4 MG: 1 CAPSULE ORAL at 19:04

## 2023-01-05 RX ADMIN — VALGANCICLOVIR HYDROCHLORIDE 900 MG: 450 TABLET ORAL at 08:02

## 2023-01-05 RX ADMIN — ACETAMINOPHEN 975 MG: 325 TABLET, FILM COATED ORAL at 13:06

## 2023-01-05 RX ADMIN — SULFAMETHOXAZOLE AND TRIMETHOPRIM 1 TABLET: 400; 80 TABLET ORAL at 08:02

## 2023-01-05 RX ADMIN — TACROLIMUS 4 MG: 1 CAPSULE ORAL at 08:02

## 2023-01-05 RX ADMIN — QUETIAPINE FUMARATE 25 MG: 25 TABLET ORAL at 20:54

## 2023-01-05 RX ADMIN — HEPARIN SODIUM 5000 UNITS: 5000 INJECTION, SOLUTION INTRAVENOUS; SUBCUTANEOUS at 17:29

## 2023-01-05 RX ADMIN — NYSTATIN 1000000 UNITS: 500000 SUSPENSION ORAL at 13:06

## 2023-01-05 ASSESSMENT — ACTIVITIES OF DAILY LIVING (ADL)
ADLS_ACUITY_SCORE: 42

## 2023-01-05 NOTE — PROGRESS NOTES
[facility]   Cardiology II Service / Advanced Heart Failure  Daily Progress Note        Patient: Paco Stein  MRN: [id number]  Admission Date: 12/6/2022  Hospital Day # 30    Assessment and Plan: Paco Stein is a 67 year old male with a PMH of SCHF secondary to arrhythmogenic NICM, CAD s/p PCI, Hyperlipidemia, CNS Vasculitis, and CKD who was admitted for cardiogenic shock s/p OHT 12/25/22 thus far with uncomplicated course.     Recommendations:  - Decreased amlodipine to 5 mg daily  - Recheck tacrolimus level tomorrow (ordered for you)  - Recheck K, stop neutra-phos, if still elevated, can consider holding bactrim  - Encourage PO intake  - Continue to trend WBC- if up again tomorrow would recommend blood cultures, UA/ daily cxr     S/p OHT. Completed 12/25/22. Postoperative course complicated by ICU delirium, resolved.   Echo  1/2/22: EF is 60-65%, RV normal in size and function.  RHC 1/2/22: Ra 1, PA 20/5/12, PCW 4, CAROLYN CO/CI 6.66/2.98  Graft Function:   --Volume: Euvolemic to mild hypovolemia- encourage PO intake  --BP: Goal <130/80, decreased amlodipine to 5 mg daily  --HR: 90's, off Terbutaline, pacer wires removed     Immunosuppression:   --CNI-delaying protocol with basiliximab on 12/25 and 12/29  --Prednisone taper per protocol  --Cellcept 1500 mg BID- once tacrolimus is closer to therapeutic will decrease this to 1000 mg BID given age pending biopsies- if he continues to have c4d staining may leave at higher cellcept  --Tacrolimus 4 mg BID (goal 10-12), recheck tomorrow    Serostatus: CMV D-/R+; EBV D pending /R + ; Toxo D pending /R negative     Prophylaxis:   --CAV: aspirin 81 mg and rosuvastatin 10 mg daily  --Thrush: Nystatin   --PCP: Bactrim single strength daily - can hold if k is still elevated  --GI: Protonix   --Osteoporosis: calcium/vitamin D   --CMV: CMV D-/R+, Valcyte 900 daily x 3 mos   --Toxo: Toxo D negative /R negative     Routine Surveillance:   --week 1 biopsy  "pending  --DSAs pending  --week 2 biopsy due 1/9    HTN, well controlled  - Norvasc as above.     History of CVS Vasculitis.   - Cellcept as above.     Incidental punctate cerebellar infarct. Identified per MR imaging 11/14/22 without new neuro deficits. Per radiology, appears acute, likely embolic. No known arterial clot history. No afib or other tachycarrhythmias. JARED w/o septal defects or thrombus. Treated w/heparin gtt and transitioned to Eliquis.   - Eliquis on hold s/p OHT.     Occlusive left peroneal DVT. Non-occlusive right axillary DVT. Identified on US 11/15/22.  - Consider resumption of Eliquis when cleared per CVTS.     Severe Protein Calorie Malnutrition. Dysphagia.   - Appreciate Nutrition and Speech Consult.   - Consider ramez counts, tolerated full liquid diet.     ==============================================================    Interval History/ROS: He is feeling well today. He denies fever, chills, chest pain, palpitations, cough, nausea, vomiting, diarrhea, and LE edema. He is working with therapy. Continues to feel well.    Last 24 hr care team notes reviewed.   ROS:  4 point ROS including Respiratory, CV, GI and , other than that noted in the HPI, is negative.     Medications: Reviewed in EPIC.     Physical Exam:   /74 (BP Location: Right arm)   Pulse 89   Temp 97.6  F (36.4  C) (Oral)   Resp 18   Ht 1.88 m (6' 2\")   Wt 86.2 kg (190 lb 1.6 oz)   SpO2 100%   BMI 24.41 kg/m    GENERAL: Appears alert and interacting appropriatly  HEENT: Eye symmetrical and free of discharge bilaterally.   NECK: Supple and without lymphadenopathy. JVD below clavicular line at 60 degrees  CV: RRR, S1S2 present without murmur, rub, or gallop.   RESPIRATORY: Respirations regular, even, and unlabored. Lungs CTA throughout.   GI: Soft and non distended with normoactive bowel sounds present in all quadrants. No tenderness, rebound, guarding.   EXTREMITIES: No bilateral LE peripheral edema. All extremities " are warm and well perfused  NEUROLOGIC: Alert and interacting appropriately. No focal deficits.   MUSCULOSKELETAL: No joint swelling or tenderness.   SKIN: No jaundice. Chest and Left device site CDI.     Data:  CMP  Recent Labs   Lab 01/05/23  1305 01/05/23  0757 01/05/23  0715 01/04/23  2355 01/04/23  0817 01/04/23  0458 01/03/23  1553 01/03/23  1354 01/03/23  0843 01/03/23  0459 01/02/23  1612 01/02/23  1335 01/02/23  0737 01/02/23  0637 01/01/23  0739 01/01/23  0609   NA  --   --  133*  --   --  134*  --   --   --  134*  --   --   --  136   < > 141   POTASSIUM  --   --  5.4*  --   --  4.7  --   --   --  4.5  --   --   --  4.3   < > 3.8   CHLORIDE  --   --  104  --   --  105  --   --   --  105  --   --   --  106   < > 110*   CO2  --   --  20*  --   --  20*  --   --   --  23  --   --   --  23   < > 23   ANIONGAP  --   --  9  --   --  9  --   --   --  6*  --   --   --  7   < > 8   * 147* 124* 105*   < > 158*   < >  --    < > 147*   < >  --    < > 140*   < > 156*   BUN  --   --  21.5  --   --  21.7  --   --   --  19.5  --   --   --  18.3   < > 21.6   CR  --   --  0.60*  --   --  0.57*  --   --   --  0.63*  --   --   --  0.59*   < > 0.63*   GFRESTIMATED  --   --  >90  --   --  >90  --   --   --  >90  --   --   --  >90   < > >90   TIM  --   --  8.4*  --   --  8.1*  --   --   --  7.8*  --   --   --  8.1*   < > 7.8*   MAG  --   --   --   --   --  1.6*  --  2.1  --  1.5*  --  2.1  --  1.5*  --  1.6*   PHOS  --   --   --   --   --  2.5  --   --   --  2.1*  --   --   --  1.8*  --  1.8*   PROTTOTAL  --   --  4.7*  --   --  4.6*  --   --   --  4.6*  --   --   --  4.5*  --  4.6*   ALBUMIN  --   --  2.6*  --   --  2.6*  --   --   --  2.6*  --   --   --  2.7*  --  2.7*   BILITOTAL  --   --  0.4  --   --  0.3  --   --   --  0.3  --   --   --  0.3  --  0.4   ALKPHOS  --   --  69  --   --  71  --   --   --  68  --   --   --  60  --  67   AST  --   --  30  --   --  26  --   --   --  21  --   --   --  18  --  21   ALT  --    --  44  --   --  38  --   --   --  28  --   --   --  25  --  23    < > = values in this interval not displayed.     CBC  Recent Labs   Lab 01/05/23  1103 01/04/23  0458 01/03/23  0459 01/02/23  0637   WBC 15.2* 14.2* 13.2* 14.4*   RBC 3.48* 3.33* 3.38* 3.23*   HGB 10.3* 9.7* 9.8* 9.6*   HCT 32.8* 31.2* 31.6* 31.0*   MCV 94 94 94 96   MCH 29.6 29.1 29.0 29.7   MCHC 31.4* 31.1* 31.0* 31.0*   RDW 16.2* 16.0* 15.9* 15.5*    238 203 178     INR  No lab results found in last 7 days.    Time/Communication  I personally spent a total of 35 minutes. Of that 20 minutes was counseling/coordination of patient's care. Plan of care discussed with patient. See my note above for details. Patient discussed with Dr. Dorman.      Sherin Prasad PA-C

## 2023-01-05 NOTE — PROGRESS NOTES
Cardiovascular Surgery Progress Note  01/05/2023         Assessment and Plan:     Paco Stein is a 67 year old male with a PMH of chronic systolic heart failure (suspected predominantly nonischemic cardiomyopathy, possible arrhythmogenic), CAD s/p PCI, HLD, CNS vasculitis, CKD Stage 2. Patient is now s/p orthotopic heart transplantation on 12/25/2022 with Dr. Rodriguez.    Cardiovascular:   Hx of End-stage heart failure s/p Heart transplantation  No arrhythmias overnight, HD stable, in NSR.  Pre-operative echo showed LVEF 15%  Intraoperative echo showed LVEF 45% (stunning)  Postoperative echo on 1/2 demonstrated LVEF 60-65%, preserved RV function  - 1 week Heart Biopsy demonstrated 1R with C4d however DSA negative. Clinically stable and echo looks good. 2 week bx due on 01/09.  - ASA 81 mg daily  - Statin: Crestor 10mg daily  - Norvasc 5mg daily  - Diuresis per CARDS2  Chest tubes: Left midaxillary CT removed on 01/03. CXR 01/03 post-pull, stable. Left and Right pleural as well as mediastinal removed 01/05. CXR ordered 01/05 post pull, stable.. The pericardial chest tube was left in place at this time.   TPW: removed 01/03    Pulmonary:  Extubated POD #2 to 4 lpm via NC. Now saturating well on RA.   - Pulm hygiene, IS, activity and deep breathing    Neurology / MSK:  Acute post-operative pain   Pain well controlled with current regimen:  - Acetaminophen, PO oxycodone PRN, IV dilaudid PRN, methocarbamol     / Renal / Fluid / Electrolytes:  Hyperkalemia  Hyponatremia  BL creat ~ 0.9, most recent creatinine 0.6; adequate UOP.   FLUID STATUS: Pre-op weight 192, weight today 190; I/O past 24 hours: -562  - Diuresis: per CARDS2  - Replete lytes per protocol  - Strict I/O, daily weights  - Avoid/limit nephrotoxins as able  - Will d/w Cards hyperkalemia and immunosuppression/prophy regimen  - Discontinue KPhos  - BMP ordered for repeat this afternoon, potential for holding of bactrim if needed by CARDS2    GI /  Nutrition:   - Tolerating regular diet  - Continue bowel regimen, last BM 01/04    Endocrine:  Stress induced hyperglycemia   Hgb A1c 6.2%  - Initially managed on insulin drip postop, transitioned to sliding scale; goal BG <180 for optimal healing    Infectious Disease:  Stress and steroid induced leukocytosis  WBC 15.2, remains afebrile, no signs or symptoms of infection  - Completed perioperative antibiotics  - Continue to monitor fever curve, CBC  Immunosuppression/prophylaxis  - Managed per Cards 2    Hematology:   Acute blood loss anemia and thrombocytopenia  Hgb 10.3; Plt 292, no signs or symptoms of active bleeding  - CBC: continue to trend, ordered for AM.   History of right axillary nonocclusive and left peroneal occlusive DVT 11/2022  - PTA Eliquis on hold. Will need to d/w Dr. Cornejo when/if to resume    Anticoagulation:   - ASA only    MSK/Skin:  Sternotomy  Surgical incision  - Sternal precautions  - Incisional cares per protocol    Prophylaxis:   - Stress ulcer prophylaxis: Pantoprazole 40 mg daily for 30 days  - DVT prophylaxis: Subcutaneous heparin, SCD    Disposition:   - Transferred to  on 01/04  - Therapies recommending discharge to home with assist at this time, likely 2-3 days pending CT removal and second heart bx. Will discharge to [facility]    Rounded with Mariely Dallas and Rylee Hernandez PA-C   Cardiothoracic Surgery  Pager: [phone]        Interval History:     No overnight events.  States pain is well managed on current regimen. Slept well overnight.  Tolerating diet, is passing flatus, + BM. No nausea or vomiting.  Breathing well without complaints.   Working with therapies and ambulating in halls with walker for assistance.   Denies chest pain, palpitations, dizziness, syncopal symptoms, fevers, chills, myalgias, or sternal popping/clicking.         Physical Exam:     Gen: A&Ox4, NAD  Neuro: no focal deficits   CV: RRR, normal S1 S2, no murmurs, rubs or gallops. No  JVD  Pulm: CTA, no wheezing or rhonchi, normal breathing on RA  Abd: nondistended, normal BS, soft, nontender  Ext: minimal peripheral edema, no pitting  Incision: clean, dry, intact, no erythema, sternum stable  Tubes/drain sites: dressing clean and dry, serosanguinous output, no air leak. 24 hr output 450 mL total from 4 tubes. 3/4 removed 01/05.         Data:    Imaging:  reviewed recent imaging, no acute concerns    Recent Results (from the past 24 hour(s))   XR Chest Port 1 View    Narrative    EXAM: XR CHEST PORT 1 VIEW  1/5/2023 9:35 AM     HISTORY:  post-op heart transplant monitoring       COMPARISON:  1/4/2023    FINDINGS: Single view of the chest. Postsurgical changes of the chest  with intact median sternotomy wires. Stable bibasilar chest tubes and  mediastinal drains. Enteric tube has been removed. Stable cardiac  silhouette. No significant pleural effusion. Stable trace biapical  pneumothoraces. Improved pulmonary edema. The visualized upper abdomen  is unremarkable.      Impression    IMPRESSION:   1. Stable trace biapical pneumothoraces with bibasilar chest tubes in  place.  2.  Improved pulmonary edema.    I have personally reviewed the examination and initial interpretation  and I agree with the findings.    REG WHITTAKER MD         SYSTEM ID:  [id number]        Labs:  Recent Labs   Lab 01/05/23  1103 01/05/23  0757 01/05/23  0715 01/04/23  2355 01/04/23  0817 01/04/23  0458 01/03/23  0843 01/03/23  0459   WBC 15.2*  --   --   --   --  14.2*  --  13.2*   HGB 10.3*  --   --   --   --  9.7*  --  9.8*   MCV 94  --   --   --   --  94  --  94     --   --   --   --  238  --  203   NA  --   --  133*  --   --  134*  --  134*   POTASSIUM  --   --  5.4*  --   --  4.7  --  4.5   CHLORIDE  --   --  104  --   --  105  --  105   CO2  --   --  20*  --   --  20*  --  23   BUN  --   --  21.5  --   --  21.7  --  19.5   CR  --   --  0.60*  --   --  0.57*  --  0.63*   ANIONGAP  --   --  9  --   --  9  --  6*    TIM  --   --  8.4*  --   --  8.1*  --  7.8*   GLC  --  147* 124* 105*   < > 158*   < > 147*   ALBUMIN  --   --  2.6*  --   --  2.6*  --  2.6*   PROTTOTAL  --   --  4.7*  --   --  4.6*  --  4.6*   BILITOTAL  --   --  0.4  --   --  0.3  --  0.3   ALKPHOS  --   --  69  --   --  71  --  68   ALT  --   --  44  --   --  38  --  28   AST  --   --  30  --   --  26  --  21    < > = values in this interval not displayed.      GLUCOSE:   Recent Labs   Lab 01/05/23  0757 01/05/23  0715 01/04/23  2355 01/04/23  1236 01/04/23  0817 01/04/23  0458   * 124* 105* 137* 133* 158*

## 2023-01-05 NOTE — PLAN OF CARE
Paco Stein is a 67 year old male with a PMH of SCHF secondary to arrhythmogenic NICM, CAD s/p PCI, Hyperlipidemia, CNS Vasculitis, and CKD who was admitted for cardiogenic shock s/p OHT 12/25/22 thus far with uncomplicated course.     Temp: 97.6  F (36.4  C) Temp src: Oral BP: 106/66 Pulse: 81   Resp: 18 SpO2: 97 % O2 Device: None (Room air)         Neuro: AOx4  Cardiac: on tele, NSR  Resp: Sating well on RA  GI/: voiding spontaneously, no BM this shift  Diet: Regular, thin liquids  LDA's: R PIV SL  Activity: assist x1  Skin: No deficits  Pain: Denies  Plan: Continue to monitor and contact CVTS with any changes/concerns

## 2023-01-05 NOTE — PLAN OF CARE
DX: s/p OHT   PMH:   SCHF secondary to arrhythmogenic NICM, CAD s/p PCI, Hyperlipidemia, CNS Vasculitis, and CKD     Code Status:  full Team: CVTS    Cardiac: SR, VSS  Respiratory: clear LS on RA  Neuro: AxO x4, has minor deficits from previous CVA  Pain: complained of moderate L shoulder pain. Oxy x1 given  GI/: urinating frequently and has regular Bms   Diet: regular    Skin: L arm more swollen than R, team is aware  Activity: Ax1 w/ walker and gait belt     Gtts/fluid: n/a     Plan: continue to follow POC

## 2023-01-05 NOTE — PROGRESS NOTES
Transfer  Transferred from:   Via:bed  Reason for transfer:Pt appropriate for 6C- improved patient condition  Family: Aware of transfer  Belongings: Sent with pt  Chart: Sent with pt  Medications: Meds from bin sent with pt  Report called from: Faith HERNANDEZ RN

## 2023-01-06 ENCOUNTER — TELEPHONE (OUTPATIENT)
Dept: TRANSPLANT | Facility: CLINIC | Age: 68
End: 2023-01-06

## 2023-01-06 ENCOUNTER — APPOINTMENT (OUTPATIENT)
Dept: SPEECH THERAPY | Facility: CLINIC | Age: 68
DRG: 001 | End: 2023-01-06
Attending: SURGERY
Payer: MEDICARE

## 2023-01-06 ENCOUNTER — APPOINTMENT (OUTPATIENT)
Dept: PHYSICAL THERAPY | Facility: CLINIC | Age: 68
DRG: 001 | End: 2023-01-06
Attending: SURGERY
Payer: MEDICARE

## 2023-01-06 ENCOUNTER — APPOINTMENT (OUTPATIENT)
Dept: GENERAL RADIOLOGY | Facility: CLINIC | Age: 68
DRG: 001 | End: 2023-01-06
Attending: SURGERY
Payer: MEDICARE

## 2023-01-06 LAB
ALBUMIN SERPL BCG-MCNC: 2.6 G/DL (ref 3.5–5.2)
ALP SERPL-CCNC: 71 U/L (ref 40–129)
ALT SERPL W P-5'-P-CCNC: 47 U/L (ref 10–50)
ANION GAP SERPL CALCULATED.3IONS-SCNC: 10 MMOL/L (ref 7–15)
AST SERPL W P-5'-P-CCNC: 25 U/L (ref 10–50)
BILIRUB DIRECT SERPL-MCNC: <0.2 MG/DL (ref 0–0.3)
BILIRUB SERPL-MCNC: 0.4 MG/DL
BUN SERPL-MCNC: 20.7 MG/DL (ref 8–23)
CA-I BLD-MCNC: 4.6 MG/DL (ref 4.4–5.2)
CALCIUM SERPL-MCNC: 8.5 MG/DL (ref 8.8–10.2)
CHLORIDE SERPL-SCNC: 105 MMOL/L (ref 98–107)
CREAT SERPL-MCNC: 0.65 MG/DL (ref 0.67–1.17)
DEPRECATED HCO3 PLAS-SCNC: 20 MMOL/L (ref 22–29)
ERYTHROCYTE [DISTWIDTH] IN BLOOD BY AUTOMATED COUNT: 16 % (ref 10–15)
GFR SERPL CREATININE-BSD FRML MDRD: >90 ML/MIN/1.73M2
GLUCOSE BLDC GLUCOMTR-MCNC: 134 MG/DL (ref 70–99)
GLUCOSE BLDC GLUCOMTR-MCNC: 185 MG/DL (ref 70–99)
GLUCOSE BLDC GLUCOMTR-MCNC: 193 MG/DL (ref 70–99)
GLUCOSE SERPL-MCNC: 102 MG/DL (ref 70–99)
HCT VFR BLD AUTO: 33.3 % (ref 40–53)
HGB BLD-MCNC: 10.5 G/DL (ref 13.3–17.7)
MAGNESIUM SERPL-MCNC: 1.4 MG/DL (ref 1.7–2.3)
MAGNESIUM SERPL-MCNC: 1.7 MG/DL (ref 1.7–2.3)
MCH RBC QN AUTO: 28.9 PG (ref 26.5–33)
MCHC RBC AUTO-ENTMCNC: 31.5 G/DL (ref 31.5–36.5)
MCV RBC AUTO: 92 FL (ref 78–100)
PHOSPHATE SERPL-MCNC: 3.5 MG/DL (ref 2.5–4.5)
PLATELET # BLD AUTO: 266 10E3/UL (ref 150–450)
POTASSIUM SERPL-SCNC: 4.6 MMOL/L (ref 3.4–5.3)
PROT SERPL-MCNC: 4.7 G/DL (ref 6.4–8.3)
RBC # BLD AUTO: 3.63 10E6/UL (ref 4.4–5.9)
SODIUM SERPL-SCNC: 135 MMOL/L (ref 136–145)
TACROLIMUS BLD-MCNC: 7.4 UG/L (ref 5–15)
TME LAST DOSE: NORMAL H
TME LAST DOSE: NORMAL H
WBC # BLD AUTO: 13.4 10E3/UL (ref 4–11)

## 2023-01-06 PROCEDURE — 250N000013 HC RX MED GY IP 250 OP 250 PS 637: Performed by: STUDENT IN AN ORGANIZED HEALTH CARE EDUCATION/TRAINING PROGRAM

## 2023-01-06 PROCEDURE — 250N000013 HC RX MED GY IP 250 OP 250 PS 637: Performed by: PHYSICIAN ASSISTANT

## 2023-01-06 PROCEDURE — 250N000011 HC RX IP 250 OP 636: Performed by: INTERNAL MEDICINE

## 2023-01-06 PROCEDURE — 250N000012 HC RX MED GY IP 250 OP 636 PS 637: Performed by: NURSE PRACTITIONER

## 2023-01-06 PROCEDURE — 36415 COLL VENOUS BLD VENIPUNCTURE: CPT | Performed by: STUDENT IN AN ORGANIZED HEALTH CARE EDUCATION/TRAINING PROGRAM

## 2023-01-06 PROCEDURE — 97116 GAIT TRAINING THERAPY: CPT | Mod: GP

## 2023-01-06 PROCEDURE — 250N000013 HC RX MED GY IP 250 OP 250 PS 637: Performed by: SURGERY

## 2023-01-06 PROCEDURE — 83735 ASSAY OF MAGNESIUM: CPT | Performed by: SURGERY

## 2023-01-06 PROCEDURE — 71045 X-RAY EXAM CHEST 1 VIEW: CPT

## 2023-01-06 PROCEDURE — 99232 SBSQ HOSP IP/OBS MODERATE 35: CPT | Mod: FS | Performed by: STUDENT IN AN ORGANIZED HEALTH CARE EDUCATION/TRAINING PROGRAM

## 2023-01-06 PROCEDURE — 82248 BILIRUBIN DIRECT: CPT | Performed by: STUDENT IN AN ORGANIZED HEALTH CARE EDUCATION/TRAINING PROGRAM

## 2023-01-06 PROCEDURE — 71045 X-RAY EXAM CHEST 1 VIEW: CPT | Mod: 26 | Performed by: RADIOLOGY

## 2023-01-06 PROCEDURE — 85027 COMPLETE CBC AUTOMATED: CPT | Performed by: STUDENT IN AN ORGANIZED HEALTH CARE EDUCATION/TRAINING PROGRAM

## 2023-01-06 PROCEDURE — 80197 ASSAY OF TACROLIMUS: CPT | Performed by: PHYSICIAN ASSISTANT

## 2023-01-06 PROCEDURE — 36415 COLL VENOUS BLD VENIPUNCTURE: CPT | Performed by: INTERNAL MEDICINE

## 2023-01-06 PROCEDURE — 97530 THERAPEUTIC ACTIVITIES: CPT | Mod: GP

## 2023-01-06 PROCEDURE — 250N000011 HC RX IP 250 OP 636

## 2023-01-06 PROCEDURE — 83735 ASSAY OF MAGNESIUM: CPT | Performed by: INTERNAL MEDICINE

## 2023-01-06 PROCEDURE — 250N000012 HC RX MED GY IP 250 OP 636 PS 637: Performed by: PHYSICIAN ASSISTANT

## 2023-01-06 PROCEDURE — 92526 ORAL FUNCTION THERAPY: CPT | Mod: GN

## 2023-01-06 PROCEDURE — 250N000012 HC RX MED GY IP 250 OP 636 PS 637: Performed by: STUDENT IN AN ORGANIZED HEALTH CARE EDUCATION/TRAINING PROGRAM

## 2023-01-06 PROCEDURE — 84100 ASSAY OF PHOSPHORUS: CPT | Performed by: SURGERY

## 2023-01-06 PROCEDURE — 82330 ASSAY OF CALCIUM: CPT | Performed by: STUDENT IN AN ORGANIZED HEALTH CARE EDUCATION/TRAINING PROGRAM

## 2023-01-06 PROCEDURE — 214N000001 HC R&B CCU UMMC

## 2023-01-06 PROCEDURE — 250N000012 HC RX MED GY IP 250 OP 636 PS 637: Performed by: SURGERY

## 2023-01-06 PROCEDURE — 80053 COMPREHEN METABOLIC PANEL: CPT | Performed by: SURGERY

## 2023-01-06 RX ORDER — MAGNESIUM SULFATE HEPTAHYDRATE 40 MG/ML
4 INJECTION, SOLUTION INTRAVENOUS ONCE
Status: COMPLETED | OUTPATIENT
Start: 2023-01-06 | End: 2023-01-06

## 2023-01-06 RX ADMIN — NYSTATIN 1000000 UNITS: 500000 SUSPENSION ORAL at 17:23

## 2023-01-06 RX ADMIN — TACROLIMUS 4 MG: 1 CAPSULE ORAL at 18:33

## 2023-01-06 RX ADMIN — MYCOPHENOLATE MOFETIL 1500 MG: 250 CAPSULE ORAL at 08:32

## 2023-01-06 RX ADMIN — NYSTATIN 1000000 UNITS: 500000 SUSPENSION ORAL at 08:31

## 2023-01-06 RX ADMIN — NYSTATIN 1000000 UNITS: 500000 SUSPENSION ORAL at 12:31

## 2023-01-06 RX ADMIN — MAGNESIUM SULFATE IN WATER 4 G: 40 INJECTION, SOLUTION INTRAVENOUS at 14:50

## 2023-01-06 RX ADMIN — VALGANCICLOVIR HYDROCHLORIDE 900 MG: 450 TABLET ORAL at 08:33

## 2023-01-06 RX ADMIN — TACROLIMUS 4 MG: 1 CAPSULE ORAL at 08:33

## 2023-01-06 RX ADMIN — PREDNISONE 20 MG: 20 TABLET ORAL at 08:33

## 2023-01-06 RX ADMIN — NYSTATIN 1000000 UNITS: 500000 SUSPENSION ORAL at 19:55

## 2023-01-06 RX ADMIN — OXYCODONE HYDROCHLORIDE 10 MG: 10 TABLET ORAL at 21:39

## 2023-01-06 RX ADMIN — AMLODIPINE BESYLATE 5 MG: 5 TABLET ORAL at 08:33

## 2023-01-06 RX ADMIN — HEPARIN SODIUM 5000 UNITS: 5000 INJECTION, SOLUTION INTRAVENOUS; SUBCUTANEOUS at 08:34

## 2023-01-06 RX ADMIN — PANTOPRAZOLE SODIUM 40 MG: 40 TABLET, DELAYED RELEASE ORAL at 08:32

## 2023-01-06 RX ADMIN — HEPARIN SODIUM 5000 UNITS: 5000 INJECTION, SOLUTION INTRAVENOUS; SUBCUTANEOUS at 17:23

## 2023-01-06 RX ADMIN — ROSUVASTATIN CALCIUM 10 MG: 10 TABLET, FILM COATED ORAL at 08:33

## 2023-01-06 RX ADMIN — Medication 10 MG: at 19:54

## 2023-01-06 RX ADMIN — HEPARIN SODIUM 5000 UNITS: 5000 INJECTION, SOLUTION INTRAVENOUS; SUBCUTANEOUS at 00:05

## 2023-01-06 RX ADMIN — PREDNISONE 20 MG: 20 TABLET ORAL at 19:55

## 2023-01-06 RX ADMIN — ACETAMINOPHEN 975 MG: 325 TABLET, FILM COATED ORAL at 19:55

## 2023-01-06 RX ADMIN — MYCOPHENOLATE MOFETIL 1500 MG: 250 CAPSULE ORAL at 19:55

## 2023-01-06 RX ADMIN — ASPIRIN 81 MG: 81 TABLET, CHEWABLE ORAL at 08:33

## 2023-01-06 RX ADMIN — QUETIAPINE FUMARATE 25 MG: 25 TABLET ORAL at 19:55

## 2023-01-06 RX ADMIN — TRAZODONE HYDROCHLORIDE 100 MG: 100 TABLET ORAL at 21:39

## 2023-01-06 RX ADMIN — SULFAMETHOXAZOLE AND TRIMETHOPRIM 1 TABLET: 400; 80 TABLET ORAL at 08:33

## 2023-01-06 RX ADMIN — OXYCODONE HYDROCHLORIDE 10 MG: 10 TABLET ORAL at 08:27

## 2023-01-06 RX ADMIN — POLYETHYLENE GLYCOL 3350 17 G: 17 POWDER, FOR SOLUTION ORAL at 08:34

## 2023-01-06 RX ADMIN — INSULIN ASPART 1 UNITS: 100 INJECTION, SOLUTION INTRAVENOUS; SUBCUTANEOUS at 12:32

## 2023-01-06 ASSESSMENT — ACTIVITIES OF DAILY LIVING (ADL)
ADLS_ACUITY_SCORE: 42

## 2023-01-06 NOTE — PROGRESS NOTES
Transplant Social Work Services Progress Note      Date of Initial Social Work Evaluation: 11/14/2022 with admission assessment completed 12/9/2022  Collaborated with: Wife-Arlette and Accommodations director-Nani    Data: Vince received his heart transplant on 12/25/2022. Still recovering from transplant, but has been moved up to the floor from ICU and is progressing well. Therapies continue to work with him, but are now recommending discharge home to local apt/hotel when medically stable. No longer needs inpatient ARU/TCU. Wife notified co-worker last week of desire to get into an apartment when patient discharges. There is an apartment available at this time. Asked accommodations to connect with wife today to see if she can meet with Wife on Monday in order to get her moved in. Patient may be ready for discharge Mon/Tuesday of next week.  Intervention: Met with patient and wife earlier this week for supportive visit, but met with wife this morning (patient sleeping). Discussed getting into apartment and notified Nani of need for connection to do so. Inquired into questions/concerns and assessed coping.  Assessment: Arlette seemed pleased about being able to move into an apartment. No other needs identified. Son coming next week and will be able to provide assistance at the apartment. Looking forward to discharge.  Education provided by [facility]: Ongoing SW availability, process of getting into an apartment and discharge planning process  Plan:    [facility]    Barriers to d/c plan: Medical condition-still has chest tube. Biopsy scheduled for Monday    Follow up Plan: SW will continue to follow for ongoing psychosocial support post-transplant and assistance with discharge planning.

## 2023-01-06 NOTE — PROGRESS NOTES
Cardiovascular Surgery Progress Note  01/06/2023         Assessment and Plan:     Paco Stein is a 67 year old male with a PMH of chronic systolic heart failure (suspected predominantly nonischemic cardiomyopathy, possible arrhythmogenic), CAD s/p PCI, HLD, CNS vasculitis, CKD Stage 2. Patient is now s/p orthotopic heart transplantation on 12/25/2022 with Dr. Rodriguez.    Cardiovascular:   Hx of End-stage heart failure s/p Heart transplantation  No arrhythmias overnight, HD stable, in NSR.  Pre-operative echo showed LVEF 15%  Intraoperative echo showed LVEF 45% (stunning)  Postoperative echo on 1/2 demonstrated LVEF 60-65%, preserved RV function  - 1 week Heart Biopsy demonstrated 1R with C4d however DSA negative. Clinically stable and echo looks good. 2 week bx due on 01/09.  - ASA 81 mg daily  - Statin: Crestor 10mg daily  - Norvasc 5mg daily  - Diuresis per CARDS2  Chest tubes: Left midaxillary CT removed on 01/03. CXR 01/03 post-pull, stable. Left and Right pleural as well as mediastinal removed 01/05. CXR ordered 01/05 post pull, stable. Will continue to leave the pericardial chest tube until <50 mL out in 24h  TPW: removed 01/03    Pulmonary:  Extubated POD #2 to 4 lpm via NC. Now saturating well on RA.   - Pulm hygiene, IS, activity and deep breathing    Neurology / MSK:  Acute post-operative pain   Pain well controlled with current regimen:  - Acetaminophen, PO oxycodone PRN, IV dilaudid PRN, methocarbamol     / Renal / Fluid / Electrolytes:  Hyperkalemia  Hyponatremia  BL creat ~ 0.9, most recent creatinine 0.65; adequate UOP.   FLUID STATUS: Pre-op weight 192, weight today 185; I/O past 24 hours: -2645  - Diuresis: per CARDS2  - Replete lytes per protocol  - Strict I/O, daily weights  - Avoid/limit nephrotoxins as able  - Hyperkalemia improved 01/06 after neutra-phos discontinued  - Discontinue KPhos  - BMP (K improved), continue AM draws to trend    GI / Nutrition:   - Tolerating regular diet  -  Continue bowel regimen, last BM 01/05    Endocrine:  Stress and steroid induced hyperglycemia   Hgb A1c 6.2% on 12/25  - Initially managed on insulin drip postop, transitioned to sliding scale; goal BG <180 for optimal healing    Infectious Disease:  Stress and steroid induced leukocytosis  WBC 13.4 and down-trending today, remains afebrile, no signs or symptoms of infection  - Completed perioperative antibiotics  - Continue to monitor fever curve, CBC  Immunosuppression/prophylaxis  - Managed per Cards 2    Hematology:   Acute blood loss anemia and thrombocytopenia  Hgb 10.5; Plt 266, no signs or symptoms of active bleeding  - CBC: continue to trend, ordered for AM.   History of right axillary nonocclusive and left peroneal occlusive DVT 11/2022  - PTA Eliquis on hold. Discussed with Dr. Rodriguez, connect with CARDS2 prior to resuming, potentially after next biopsy 01/09.     Anticoagulation:   - ASA only    MSK/Skin:  Sternotomy  Surgical incision  - Sternal precautions  - Incisional cares per protocol    Prophylaxis:   - Stress ulcer prophylaxis: Pantoprazole 40 mg daily for 30 days  - DVT prophylaxis: Subcutaneous heparin, SCD    Disposition:   - Transferred to  on 01/04  - Therapies recommending discharge to home with assist and outpatient cardiac rehab at this time, likely 3-4 days pending CT removal and second heart bx scheduled for 01/09. Will discharge to [facility]      Reyna Hernandez PA-C   Cardiothoracic Surgery  Pager: [phone]        Interval History:     No overnight events.  States pain is well managed on current regimen. Slept well overnight.  Tolerating diet, is passing flatus, + BM. No nausea or vomiting.  Breathing well without complaints and on room air.   Working with therapies and ambulating in halls with walker for assistance.   Denies chest pain, palpitations, dizziness, syncopal symptoms, fevers, chills, myalgias, or sternal popping/clicking.         Physical Exam:     Gen: A&Ox4,  NAD  Neuro: no focal deficits   CV: RRR, normal S1 S2, no murmurs, rubs or gallops. No JVD  Pulm: CTA, no wheezing or rhonchi, normal breathing on RA  Abd: nondistended, normal BS, soft, nontender  Ext: minimal peripheral edema, no pitting  Incision: clean, dry, intact, no erythema, sternum stable  Tubes/drain sites: dressing clean and dry, serosanguinous output, no air leak. 24 hr output 270mL total from pericardial CT.         Data:    Imaging:  reviewed recent imaging, no acute concerns    Recent Results (from the past 24 hour(s))   XR Chest Port 1 View    Narrative    Exam: XR CHEST PORT 1 VIEW, 1/5/2023 11:31 AM    Indication: S/P CT removal    Comparison: Chest x-ray same day 0829    Findings: AP portable chest radiograph at 45 degrees, at 1102.  Sternotomy wires are intact. Interval removal of chest tubes.  Mediastinal drain stable. Trachea is midline. Cardiac silhouette is  stable. Bilateral costophrenic angles are sharp. Stable trace  bilateral apical pneumothorax. Unchanged pulmonary markings. Partially  visualized upper abdomen is unremarkable.      Impression    Impression:  Interval removal of chest tubes with grossly unchanged  pulmonary markings and persistent bilateral apical trace pneumothorax.    I have personally reviewed the examination and initial interpretation  and I agree with the findings.    REG WHITTAKER MD         SYSTEM ID:  [id number]        Labs:  Recent Labs   Lab 01/06/23  0442 01/05/23  2247 01/05/23  1824 01/05/23  1406 01/05/23  1305 01/05/23  1103 01/05/23  0757 01/05/23  0715 01/04/23  0817 01/04/23  0458   WBC 13.4*  --   --   --   --  15.2*  --   --   --  14.2*   HGB 10.5*  --   --   --   --  10.3*  --   --   --  9.7*   MCV 92  --   --   --   --  94  --   --   --  94     --   --   --   --  292  --   --   --  238   *  --   --  130*  --   --   --  133*  --  134*   POTASSIUM 4.6  --   --  4.8  --   --   --  5.4*  --  4.7   CHLORIDE 105  --   --  100  --   --   --  104   --  105   CO2 20*  --   --  17*  --   --   --  20*  --  20*   BUN 20.7  --   --  26.1*  --   --   --  21.5  --  21.7   CR 0.65*  --   --  0.69  --   --   --  0.60*  --  0.57*   ANIONGAP 10  --   --  13  --   --   --  9  --  9   TIM 8.5*  --   --  8.2*  --   --   --  8.4*  --  8.1*   * 116* 139* 190*   < >  --    < > 124*   < > 158*   ALBUMIN 2.6*  --   --   --   --   --   --  2.6*  --  2.6*   PROTTOTAL 4.7*  --   --   --   --   --   --  4.7*  --  4.6*   BILITOTAL 0.4  --   --   --   --   --   --  0.4  --  0.3   ALKPHOS 71  --   --   --   --   --   --  69  --  71   ALT 47  --   --   --   --   --   --  44  --  38   AST 25  --   --   --   --   --   --  30  --  26    < > = values in this interval not displayed.      GLUCOSE:   Recent Labs   Lab 01/06/23  0442 01/05/23  2247 01/05/23  1824 01/05/23  1406 01/05/23  1305 01/05/23  0757   * 116* 139* 190* 111* 147*

## 2023-01-06 NOTE — PROGRESS NOTES
[facility]   Cardiology II Service / Advanced Heart Failure  Daily Progress Note        Patient: Paco Stein  MRN: [id number]  Admission Date: 12/6/2022  Hospital Day # 31    Assessment and Plan: Paco Stein is a 67 year old male with a PMH of SCHF secondary to arrhythmogenic NICM, CAD s/p PCI, Hyperlipidemia, CNS Vasculitis, and CKD who was admitted for cardiogenic shock s/p OHT 12/25/22 thus far with uncomplicated course.     Recommendations  - Follow-up tacrolimus level today  - Encourage PO intake  - CT management per CVTS- defer diuretic given low filling pressures on 1/2/22 cath  - Have requested transplant teaching early next week, coordinators aware. Family has already been contacted by pharmacy.     S/p OHT. Completed 12/25/22. Postoperative course complicated by ICU delirium, resolved.   Echo  1/2/22: EF is 60-65%, RV normal in size and function.  RHC 1/2/22: Ra 1, PA 20/5/12, PCW 4, CAROLYN CO/CI 6.66/2.98  Graft Function:   --Volume: Euvolemic to mild hypovolemia- encourage PO intake  --BP: Goal <130/80, decreased amlodipine to 5 mg daily  --HR: 90's, off Terbutaline, pacer wires removed     Immunosuppression:   --CNI-delaying protocol with basiliximab on 12/25 and 12/29  --Prednisone taper per protocol  --Cellcept 1500 mg BID- once tacrolimus is closer to therapeutic will  Consider decreasing this to 1000 mg BID given age pending biopsies- if he continues to have c4d staining may leave at higher cellcept  --Tacrolimus 4 mg BID (goal 10-12), recheck today pending    Serostatus: CMV D-/R+; EBV D pending /R + ; Toxo D pending /R negative     Prophylaxis:   --CAV: aspirin 81 mg and rosuvastatin 10 mg daily  --Thrush: Nystatin   --PCP: Bactrim single strength daily   --GI: Protonix   --Osteoporosis: calcium/vitamin D   --CMV: CMV D-/R+, Valcyte 900 daily x 3 mos   --Toxo: Toxo D negative /R negative     Routine Surveillance:   --week 1 biopsy 1/2/23: 1R, diffuse capillary staining for  "C4d) suggestive of antibody  mediated rejection (pAMR1(I+))- deffered increased IS given reassuring function and no DSAs  --DSAs 1/2/23 NEGATIVE  --week 2 biopsy due 1/9    HTN, well controlled  - Norvasc as above.     History of CVS Vasculitis.   - Cellcept as above.     Incidental punctate cerebellar infarct. Identified per MR imaging 11/14/22 without new neuro deficits. Per radiology, appears acute, likely embolic. No known arterial clot history. No afib or other tachycarrhythmias. JARED w/o septal defects or thrombus. Treated w/heparin gtt and transitioned to Eliquis.   - Eliquis on hold s/p OHT.     Occlusive left peroneal DVT. Non-occlusive right axillary DVT. Identified on US 11/15/22.  - Consider resumption of Eliquis when cleared per CVTS.     Severe Protein Calorie Malnutrition. Dysphagia.   - Appreciate Nutrition and Speech Consult.   - Consider ramez counts, tolerated full liquid diet.     ==============================================================    Interval History/ROS: He is feeling well today. He denies fever, chills, chest pain, palpitations, cough, nausea, vomiting, diarrhea, and LE edema. Having some pain at the site of his Ct today. No drainage. Continues to feel well.    Last 24 hr care team notes reviewed.   ROS:  4 point ROS including Respiratory, CV, GI and , other than that noted in the HPI, is negative.     Medications: Reviewed in EPIC.     Physical Exam:   /84 (BP Location: Right arm)   Pulse 88   Temp 97.9  F (36.6  C) (Oral)   Resp 16   Ht 1.88 m (6' 2\")   Wt 84.3 kg (185 lb 14.4 oz)   SpO2 97%   BMI 23.87 kg/m    GENERAL: Appears alert and interacting appropriatly  HEENT: Eye symmetrical and free of discharge bilaterally.   NECK: Supple and without lymphadenopathy. JVD below clavicular line at 45 degrees  CV: RRR, S1S2 present without murmur, rub, or gallop.   RESPIRATORY: Respirations regular, even, and unlabored. Lungs CTA throughout.   GI: Soft and non distended with " normoactive bowel sounds present in all quadrants. No tenderness, rebound, guarding.   EXTREMITIES: No bilateral LE peripheral edema. All extremities are warm and well perfused  NEUROLOGIC: Alert and interacting appropriately. No focal deficits.   MUSCULOSKELETAL: No joint swelling or tenderness.   SKIN: No jaundice. Chest and Left device site CDI.     Data:  CMP  Recent Labs   Lab 01/06/23  0442 01/05/23  2247 01/05/23  1824 01/05/23  1406 01/05/23  0757 01/05/23  0715 01/04/23  0817 01/04/23  0458 01/03/23  1553 01/03/23  1354 01/03/23  0843 01/03/23  0459 01/02/23  1612 01/02/23  1335 01/02/23  0737 01/02/23  0637 01/01/23  0739 01/01/23  0609   *  --   --  130*  --  133*  --  134*  --   --   --  134*  --   --   --  136   < > 141   POTASSIUM 4.6  --   --  4.8  --  5.4*  --  4.7  --   --   --  4.5  --   --   --  4.3   < > 3.8   CHLORIDE 105  --   --  100  --  104  --  105  --   --   --  105  --   --   --  106   < > 110*   CO2 20*  --   --  17*  --  20*  --  20*  --   --   --  23  --   --   --  23   < > 23   ANIONGAP 10  --   --  13  --  9  --  9  --   --   --  6*  --   --   --  7   < > 8   * 116* 139* 190*   < > 124*   < > 158*   < >  --    < > 147*   < >  --    < > 140*   < > 156*   BUN 20.7  --   --  26.1*  --  21.5  --  21.7  --   --   --  19.5  --   --   --  18.3   < > 21.6   CR 0.65*  --   --  0.69  --  0.60*  --  0.57*  --   --   --  0.63*  --   --   --  0.59*   < > 0.63*   GFRESTIMATED >90  --   --  >90  --  >90  --  >90  --   --   --  >90  --   --   --  >90   < > >90   TIM 8.5*  --   --  8.2*  --  8.4*  --  8.1*  --   --   --  7.8*  --   --   --  8.1*   < > 7.8*   MAG  --   --   --   --   --   --   --  1.6*  --  2.1  --  1.5*  --  2.1  --  1.5*  --  1.6*   PHOS  --   --   --   --   --   --   --  2.5  --   --   --  2.1*  --   --   --  1.8*  --  1.8*   PROTTOTAL 4.7*  --   --   --   --  4.7*  --  4.6*  --   --   --  4.6*  --   --   --  4.5*  --  4.6*   ALBUMIN 2.6*  --   --   --   --  2.6*  --   2.6*  --   --   --  2.6*  --   --   --  2.7*  --  2.7*   BILITOTAL 0.4  --   --   --   --  0.4  --  0.3  --   --   --  0.3  --   --   --  0.3  --  0.4   ALKPHOS 71  --   --   --   --  69  --  71  --   --   --  68  --   --   --  60  --  67   AST 25  --   --   --   --  30  --  26  --   --   --  21  --   --   --  18  --  21   ALT 47  --   --   --   --  44  --  38  --   --   --  28  --   --   --  25  --  23    < > = values in this interval not displayed.     CBC  Recent Labs   Lab 01/06/23  0442 01/05/23  1103 01/04/23  0458 01/03/23  0459   WBC 13.4* 15.2* 14.2* 13.2*   RBC 3.63* 3.48* 3.33* 3.38*   HGB 10.5* 10.3* 9.7* 9.8*   HCT 33.3* 32.8* 31.2* 31.6*   MCV 92 94 94 94   MCH 28.9 29.6 29.1 29.0   MCHC 31.5 31.4* 31.1* 31.0*   RDW 16.0* 16.2* 16.0* 15.9*    292 238 203     INR  No lab results found in last 7 days.    Time/Communication  I personally spent a total of 35 minutes. Of that 20 minutes was counseling/coordination of patient's care. Plan of care discussed with patient. See my note above for details. Patient discussed with Dr. Ibanez.      Sherin Prasad PA-C

## 2023-01-06 NOTE — PLAN OF CARE
DX: s/p OHT   PMH:   SCHF secondary to arrhythmogenic NICM, CAD s/p PCI, Hyperlipidemia, CNS Vasculitis, and CKD     Code Status:  full Team: CVTS    Cardiac: SR, VSS  Respiratory: clear LS on RA  Neuro: AxO x4, has minor deficits from previous CVA  Pain: complained of moderate L shoulder pain. Oxy x1 given  GI/: urinating frequently and had bm x1 on shift  Diet: regular    Skin: L arm unremarkable today   Activity: Ax1 w/ walker and gait belt     Gtts/fluid: n/a     Plan: continue to follow POC

## 2023-01-06 NOTE — PLAN OF CARE
D: Patient transferred from OSH on 12/6/22 for cardiogenic shock; now s/p heart transplant on 12/25/22.    I: Monitored and assessed patient status; nursing cares provided.    A:   Today's Highlights/Changes:    Mg 1.4, replaced with 4 grams Magnesium Sulfate, recheck Mg level ordered for 20:30 this evening.    Transplant Pharmacist met with pt and wife at bedside, provided education on transplant medications, medication card provided to pt.    One chest tube (pericardial) remains, intact to -20 cm sxn; per CVTS note, this will be pulled once there's <50 mL out in 24 hours. Output over 12 hour shift=90 mL      Neuro: A&Ox4  Cardiac: SR, HR 70-80's, normotensive  Respiratory: on RA, lungs clear  GI/: BS+, no BM this shift, Adequate urine output, using urinal at bedside  Diet/Appetite: Regular diet, excellent appetite. BS checks before meals and bedtime, needed insulin once this shift.   Skin: Sternal incision ROSLYN, CT dressing changed, old CT sites x3 healing  Pain: Oxycodone x1 for soreness at chest tube insertion site, adequate relief.   Labs/Lytes: Mg 1.4, replaced; re-check scheduled for 20:30 this evening  LDA's: Right PIV SL'd  Activity: Ambulated in ugalde x2 with walker and gait belt.       P: Pt has week 2 heart biopsy on Monday, 1/9. Pt will discharge directly to [facility] ([place] (not requiring TCU); SW assisting with transition of spouse moving into apartment early next week; see SW note for details. Continue with current plan of care. Contact CVTS for questions or concerns.    Jessica Simmons, [place]   Cardiology

## 2023-01-06 NOTE — PLAN OF CARE
Paco Stein is a 67 year old male with a PMH of chronic systolic heart failure (suspected predominantly nonischemic cardiomyopathy, possible arrhythmogenic), CAD s/p PCI, HLD, CNS vasculitis, CKD Stage 2. Patient is now s/p orthotopic heart transplantation on 12/25/2022 with Dr. Rodriguez.    Temp: 97.4  F (36.3  C) Temp src: Axillary BP: 117/77 Pulse: 93   Resp: 16 SpO2: 96 % O2 Device: None (Room air)       Neuro: AOx4  Cardiac: on tele, NSR  Resp: sating well on RA  GI/: voiding well, no BM this shift  Diet: regular  LDA's: R PIV SL  Activity: Assistx1  Skin: Blanchable redness on coccyx  Pain: Denies  Plan: Continue to monitor and contact CVTS with any changes/concerns

## 2023-01-06 NOTE — TELEPHONE ENCOUNTER
A pharmacist spent 50 minutes providing medication teaching with Paco Stein and Arlette (wife) for discharge with a focus on new medications/dose changes.  The discharge medication list was reviewed with the patient/family and the following points were discussed, as applicable: Name, description, purpose, dose/strength, duration of medications, common side effects, food/medications to avoid, action to be taken if dose is missed and how to obtain refills.  The spouse (Arlette) will be responsible for managing medications initially, then it will transition to Vince eventually. Additionally, the following transplant related education was covered: Purpose of medication card, Medication videos, Timing of medications and day of lab draw considerations , Prescription Insurance  and Discharge process for receiving meds   Patient will  transplant supplies including 7 day pill organizer, thermometer, and BP monitor at the discharge pharmacy along with medications.  Patient chooses to receive medications from [facility] pharmacy. Arlette is the one to call for med orders.  Clinical Pharmacy Consult:                                                      Transplant Specific:   Date of Transplant: 12/25/2022  Type of Transplant: heart  First Transplant: yes  History of rejection: no    Immunosuppression Regimen   TAC 4mg qAM & 4mg qPM, Prednisone 20mg qAM & 20mg qPM and MMF 1500mg qAM & 1500mg qPM  Patient specific goal: 10-12  Most recent level: 4.4, date 1/4/2023  Immunosuppressant Levels:  Subtherapeutic  Pt adherent to lab draws: yes  Scr:   Lab Results   Component Value Date    CR 0.65 01/06/2023     Side effects: Tremors    Prophylactic Medications  Antibacterial:  Bactrim 400-80mg daily  Scheduled Discontinue Date: 6 months    Antifungal: Nystatin  Scheduled Discontinue Date: 6 months    Antiviral: CrCl >/=60 mL/minute: Valcyte 900mg daily   Scheduled Discontinue Date: 3 months    Acid Reducer: Protonix  (pantoprazole)  Scheduled Reviewed Date: 6 months    Thrombosis Prevention: Aspirin 81 mg PO daily  Scheduled Discontinue Date:     Blood Pressure Management  Frequency of home Blood Pressure checks: twice daily  Most recent home BP: 111/81  Patient Blood pressure goal: <130/80  Patient blood pressure at goal:  no  Hospitalizations/ER visits since last assessment: 0      Med rec/DUR performed: yes  Med Rec Discrepancies: no    Reminders:    1. Bring to first clinic appt: med box, med card, bp monitor, all medications being taken, and lab book.  2.   MTM pharmacist visit on first clinic appt and if ok, again in 3 to 4 months during follow up appt.  3.   Avoid Grapefruit and Grapefruit juice.   4.   Avoid herbal supplements. If wish to take other medications or supplements, call your coordinator.   5.   Keep lab appts.   6.   Can use apps on phone like [employer] to help manage medication lists and reminders.   7.   Make sure you are protecting your skin by wearing long sleeves and applying sunscreen to exposed skin, for any significant time in the sun.     Transplant Coordinator is Mary Lou Luke.      Goldie Stuart [facility]

## 2023-01-07 ENCOUNTER — APPOINTMENT (OUTPATIENT)
Dept: OCCUPATIONAL THERAPY | Facility: CLINIC | Age: 68
DRG: 001 | End: 2023-01-07
Attending: SURGERY
Payer: MEDICARE

## 2023-01-07 ENCOUNTER — APPOINTMENT (OUTPATIENT)
Dept: GENERAL RADIOLOGY | Facility: CLINIC | Age: 68
DRG: 001 | End: 2023-01-07
Attending: SURGERY
Payer: MEDICARE

## 2023-01-07 LAB
ALBUMIN SERPL BCG-MCNC: 2.7 G/DL (ref 3.5–5.2)
ALBUMIN UR-MCNC: NEGATIVE MG/DL
ALP SERPL-CCNC: 80 U/L (ref 40–129)
ALT SERPL W P-5'-P-CCNC: 39 U/L (ref 10–50)
ANION GAP SERPL CALCULATED.3IONS-SCNC: 12 MMOL/L (ref 7–15)
APPEARANCE UR: CLEAR
AST SERPL W P-5'-P-CCNC: 27 U/L (ref 10–50)
BILIRUB DIRECT SERPL-MCNC: <0.2 MG/DL (ref 0–0.3)
BILIRUB SERPL-MCNC: 0.4 MG/DL
BILIRUB UR QL STRIP: NEGATIVE
BUN SERPL-MCNC: 23.1 MG/DL (ref 8–23)
CA-I BLD-MCNC: 4.5 MG/DL (ref 4.4–5.2)
CALCIUM SERPL-MCNC: 8.4 MG/DL (ref 8.8–10.2)
CHLORIDE SERPL-SCNC: 103 MMOL/L (ref 98–107)
COLOR UR AUTO: YELLOW
CREAT SERPL-MCNC: 0.65 MG/DL (ref 0.67–1.17)
DEPRECATED HCO3 PLAS-SCNC: 18 MMOL/L (ref 22–29)
ERYTHROCYTE [DISTWIDTH] IN BLOOD BY AUTOMATED COUNT: 16 % (ref 10–15)
GFR SERPL CREATININE-BSD FRML MDRD: >90 ML/MIN/1.73M2
GLUCOSE BLDC GLUCOMTR-MCNC: 106 MG/DL (ref 70–99)
GLUCOSE BLDC GLUCOMTR-MCNC: 162 MG/DL (ref 70–99)
GLUCOSE BLDC GLUCOMTR-MCNC: 175 MG/DL (ref 70–99)
GLUCOSE BLDC GLUCOMTR-MCNC: 215 MG/DL (ref 70–99)
GLUCOSE SERPL-MCNC: 110 MG/DL (ref 70–99)
GLUCOSE UR STRIP-MCNC: NEGATIVE MG/DL
HCT VFR BLD AUTO: 33.8 % (ref 40–53)
HGB BLD-MCNC: 10.7 G/DL (ref 13.3–17.7)
HGB UR QL STRIP: NEGATIVE
KETONES UR STRIP-MCNC: NEGATIVE MG/DL
LEUKOCYTE ESTERASE UR QL STRIP: NEGATIVE
MAGNESIUM SERPL-MCNC: 1.6 MG/DL (ref 1.7–2.3)
MCH RBC QN AUTO: 29.3 PG (ref 26.5–33)
MCHC RBC AUTO-ENTMCNC: 31.7 G/DL (ref 31.5–36.5)
MCV RBC AUTO: 93 FL (ref 78–100)
MUCOUS THREADS #/AREA URNS LPF: PRESENT /LPF
NITRATE UR QL: NEGATIVE
PH UR STRIP: 5.5 [PH] (ref 5–7)
PHOSPHATE SERPL-MCNC: 3.4 MG/DL (ref 2.5–4.5)
PLATELET # BLD AUTO: 238 10E3/UL (ref 150–450)
POTASSIUM SERPL-SCNC: 4.9 MMOL/L (ref 3.4–5.3)
PROT SERPL-MCNC: 4.9 G/DL (ref 6.4–8.3)
RBC # BLD AUTO: 3.65 10E6/UL (ref 4.4–5.9)
RBC URINE: <1 /HPF
SODIUM SERPL-SCNC: 133 MMOL/L (ref 136–145)
SP GR UR STRIP: 1.02 (ref 1–1.03)
TACROLIMUS BLD-MCNC: 8.2 UG/L (ref 5–15)
TME LAST DOSE: NORMAL H
TME LAST DOSE: NORMAL H
UROBILINOGEN UR STRIP-MCNC: NORMAL MG/DL
WBC # BLD AUTO: 19.9 10E3/UL (ref 4–11)
WBC URINE: 0 /HPF

## 2023-01-07 PROCEDURE — 36415 COLL VENOUS BLD VENIPUNCTURE: CPT | Performed by: NURSE PRACTITIONER

## 2023-01-07 PROCEDURE — 250N000013 HC RX MED GY IP 250 OP 250 PS 637: Performed by: PHYSICIAN ASSISTANT

## 2023-01-07 PROCEDURE — 87149 DNA/RNA DIRECT PROBE: CPT | Performed by: NURSE PRACTITIONER

## 2023-01-07 PROCEDURE — 83735 ASSAY OF MAGNESIUM: CPT | Performed by: SURGERY

## 2023-01-07 PROCEDURE — 84100 ASSAY OF PHOSPHORUS: CPT | Performed by: SURGERY

## 2023-01-07 PROCEDURE — 214N000001 HC R&B CCU UMMC

## 2023-01-07 PROCEDURE — 250N000013 HC RX MED GY IP 250 OP 250 PS 637: Performed by: SURGERY

## 2023-01-07 PROCEDURE — 97535 SELF CARE MNGMENT TRAINING: CPT | Mod: GO

## 2023-01-07 PROCEDURE — 250N000013 HC RX MED GY IP 250 OP 250 PS 637

## 2023-01-07 PROCEDURE — 82248 BILIRUBIN DIRECT: CPT | Performed by: STUDENT IN AN ORGANIZED HEALTH CARE EDUCATION/TRAINING PROGRAM

## 2023-01-07 PROCEDURE — 80197 ASSAY OF TACROLIMUS: CPT | Performed by: PHYSICIAN ASSISTANT

## 2023-01-07 PROCEDURE — 97110 THERAPEUTIC EXERCISES: CPT | Mod: GO

## 2023-01-07 PROCEDURE — 99232 SBSQ HOSP IP/OBS MODERATE 35: CPT | Mod: FS | Performed by: STUDENT IN AN ORGANIZED HEALTH CARE EDUCATION/TRAINING PROGRAM

## 2023-01-07 PROCEDURE — 87077 CULTURE AEROBIC IDENTIFY: CPT | Performed by: NURSE PRACTITIONER

## 2023-01-07 PROCEDURE — 82330 ASSAY OF CALCIUM: CPT | Performed by: STUDENT IN AN ORGANIZED HEALTH CARE EDUCATION/TRAINING PROGRAM

## 2023-01-07 PROCEDURE — 250N000013 HC RX MED GY IP 250 OP 250 PS 637: Performed by: STUDENT IN AN ORGANIZED HEALTH CARE EDUCATION/TRAINING PROGRAM

## 2023-01-07 PROCEDURE — 80053 COMPREHEN METABOLIC PANEL: CPT | Performed by: SURGERY

## 2023-01-07 PROCEDURE — 250N000011 HC RX IP 250 OP 636: Performed by: SURGERY

## 2023-01-07 PROCEDURE — 250N000012 HC RX MED GY IP 250 OP 636 PS 637: Performed by: SURGERY

## 2023-01-07 PROCEDURE — 250N000011 HC RX IP 250 OP 636

## 2023-01-07 PROCEDURE — 81001 URINALYSIS AUTO W/SCOPE: CPT | Performed by: NURSE PRACTITIONER

## 2023-01-07 PROCEDURE — 36415 COLL VENOUS BLD VENIPUNCTURE: CPT | Performed by: STUDENT IN AN ORGANIZED HEALTH CARE EDUCATION/TRAINING PROGRAM

## 2023-01-07 PROCEDURE — 71045 X-RAY EXAM CHEST 1 VIEW: CPT | Mod: 26 | Performed by: RADIOLOGY

## 2023-01-07 PROCEDURE — 85027 COMPLETE CBC AUTOMATED: CPT | Performed by: STUDENT IN AN ORGANIZED HEALTH CARE EDUCATION/TRAINING PROGRAM

## 2023-01-07 PROCEDURE — 250N000012 HC RX MED GY IP 250 OP 636 PS 637: Performed by: STUDENT IN AN ORGANIZED HEALTH CARE EDUCATION/TRAINING PROGRAM

## 2023-01-07 PROCEDURE — 71045 X-RAY EXAM CHEST 1 VIEW: CPT

## 2023-01-07 PROCEDURE — 250N000012 HC RX MED GY IP 250 OP 636 PS 637: Performed by: PHYSICIAN ASSISTANT

## 2023-01-07 RX ORDER — MAGNESIUM SULFATE HEPTAHYDRATE 40 MG/ML
2 INJECTION, SOLUTION INTRAVENOUS ONCE
Status: COMPLETED | OUTPATIENT
Start: 2023-01-07 | End: 2023-01-07

## 2023-01-07 RX ADMIN — TRAZODONE HYDROCHLORIDE 100 MG: 100 TABLET ORAL at 22:18

## 2023-01-07 RX ADMIN — OXYCODONE HYDROCHLORIDE 10 MG: 10 TABLET ORAL at 23:31

## 2023-01-07 RX ADMIN — PANTOPRAZOLE SODIUM 40 MG: 40 TABLET, DELAYED RELEASE ORAL at 07:56

## 2023-01-07 RX ADMIN — ACETAMINOPHEN 975 MG: 325 TABLET, FILM COATED ORAL at 05:02

## 2023-01-07 RX ADMIN — MYCOPHENOLATE MOFETIL 1500 MG: 250 CAPSULE ORAL at 07:56

## 2023-01-07 RX ADMIN — VALGANCICLOVIR HYDROCHLORIDE 900 MG: 450 TABLET ORAL at 07:56

## 2023-01-07 RX ADMIN — MYCOPHENOLATE MOFETIL 1500 MG: 250 CAPSULE ORAL at 20:34

## 2023-01-07 RX ADMIN — SULFAMETHOXAZOLE AND TRIMETHOPRIM 1 TABLET: 400; 80 TABLET ORAL at 07:56

## 2023-01-07 RX ADMIN — INSULIN ASPART 2 UNITS: 100 INJECTION, SOLUTION INTRAVENOUS; SUBCUTANEOUS at 18:07

## 2023-01-07 RX ADMIN — AMLODIPINE BESYLATE 5 MG: 5 TABLET ORAL at 07:56

## 2023-01-07 RX ADMIN — PREDNISONE 20 MG: 20 TABLET ORAL at 07:56

## 2023-01-07 RX ADMIN — NYSTATIN 1000000 UNITS: 500000 SUSPENSION ORAL at 16:53

## 2023-01-07 RX ADMIN — NYSTATIN 1000000 UNITS: 500000 SUSPENSION ORAL at 20:34

## 2023-01-07 RX ADMIN — MAGNESIUM SULFATE IN WATER 2 G: 40 INJECTION, SOLUTION INTRAVENOUS at 11:08

## 2023-01-07 RX ADMIN — HEPARIN SODIUM 5000 UNITS: 5000 INJECTION, SOLUTION INTRAVENOUS; SUBCUTANEOUS at 05:02

## 2023-01-07 RX ADMIN — NYSTATIN 1000000 UNITS: 500000 SUSPENSION ORAL at 07:55

## 2023-01-07 RX ADMIN — HEPARIN SODIUM 5000 UNITS: 5000 INJECTION, SOLUTION INTRAVENOUS; SUBCUTANEOUS at 12:57

## 2023-01-07 RX ADMIN — ASPIRIN 81 MG: 81 TABLET, CHEWABLE ORAL at 07:56

## 2023-01-07 RX ADMIN — INSULIN ASPART 1 UNITS: 100 INJECTION, SOLUTION INTRAVENOUS; SUBCUTANEOUS at 12:57

## 2023-01-07 RX ADMIN — OXYCODONE HYDROCHLORIDE 10 MG: 10 TABLET ORAL at 19:22

## 2023-01-07 RX ADMIN — NYSTATIN 1000000 UNITS: 500000 SUSPENSION ORAL at 12:57

## 2023-01-07 RX ADMIN — QUETIAPINE FUMARATE 25 MG: 25 TABLET ORAL at 20:35

## 2023-01-07 RX ADMIN — Medication 10 MG: at 20:35

## 2023-01-07 RX ADMIN — TACROLIMUS 4 MG: 1 CAPSULE ORAL at 18:14

## 2023-01-07 RX ADMIN — ACETAMINOPHEN 975 MG: 325 TABLET, FILM COATED ORAL at 16:52

## 2023-01-07 RX ADMIN — POLYETHYLENE GLYCOL 3350 17 G: 17 POWDER, FOR SOLUTION ORAL at 07:54

## 2023-01-07 RX ADMIN — TACROLIMUS 4 MG: 1 CAPSULE ORAL at 07:56

## 2023-01-07 RX ADMIN — PREDNISONE 20 MG: 20 TABLET ORAL at 20:35

## 2023-01-07 RX ADMIN — ROSUVASTATIN CALCIUM 10 MG: 10 TABLET, FILM COATED ORAL at 07:56

## 2023-01-07 RX ADMIN — HEPARIN SODIUM 5000 UNITS: 5000 INJECTION, SOLUTION INTRAVENOUS; SUBCUTANEOUS at 20:35

## 2023-01-07 ASSESSMENT — ACTIVITIES OF DAILY LIVING (ADL)
ADLS_ACUITY_SCORE: 42

## 2023-01-07 NOTE — PROGRESS NOTES
Cardiovascular Surgery Progress Note  01/07/2023         Assessment and Plan:     Paco Stein is a 67 year old male with a PMH of chronic systolic heart failure (suspected predominantly nonischemic cardiomyopathy, possible arrhythmogenic), CAD s/p PCI, HLD, CNS vasculitis, CKD Stage 2. Patient is now s/p orthotopic heart transplantation on 12/25/2022 with Dr. Rodriguez.    Cardiovascular:   Hx of End-stage heart failure s/p Heart transplantation  No arrhythmias overnight, HD stable, in NSR.  Pre-operative echo showed LVEF 15%  Intraoperative echo showed LVEF 45% (stunning)  Postoperative echo on 1/2 demonstrated LVEF 60-65%, preserved RV function  - 1 week Heart Biopsy demonstrated 1R with C4d however DSA negative. Clinically stable and echo looks good. 2 week bx due on 01/09.  - ASA 81 mg daily  - Statin: Crestor 10mg daily  - Norvasc 5mg daily  - Diuresis per CARDS2  Chest tubes: Left midaxillary CT removed on 01/03. CXR 01/03 post-pull, stable. Left and Right pleural as well as mediastinal removed 01/05. CXR ordered 01/05 post pull, stable. Will continue to leave the pericardial chest tube until <50 mL out in 24h  TPW: removed 01/03    Pulmonary:  Extubated POD #2 to 4 lpm via NC. Now saturating well on RA.   - Pulm hygiene, IS, activity and deep breathing    Neurology / MSK:  Acute post-operative pain   Pain well controlled with current regimen:  - Acetaminophen, PO oxycodone PRN, IV dilaudid PRN, methocarbamol     / Renal / Fluid / Electrolytes:  Hyperkalemia  Hyponatremia  BL creat ~ 0.9, most recent creatinine 0.65; adequate UOP.   FLUID STATUS: Pre-op weight 192, weight today 183; I/O past 24 hours: -1.8 L  - Diuresis: per CARDS2  - Replete lytes per protocol  - Strict I/O, daily weights  - Avoid/limit nephrotoxins as able  - Hyperkalemia improved 01/06 after neutra-phos discontinued  - Discontinue KPhos  - BMP (K improved), continue AM draws to trend    GI / Nutrition:   - Tolerating regular diet  -  Continue bowel regimen, last BM 01/05    Endocrine:  Stress and steroid induced hyperglycemia   Hgb A1c 6.2% on 12/25  - Initially managed on insulin drip postop, transitioned to sliding scale; goal BG <180 for optimal healing    Infectious Disease:  Stress and steroid induced leukocytosis  WBC 19.9, remains afebrile  - Blood and urine culture 1/7 due to jump in WBC  - Completed perioperative antibiotics  - Continue to monitor fever curve, CBC  Immunosuppression/prophylaxis  - Managed per Cards 2    Hematology:   Acute blood loss anemia and thrombocytopenia  Hgb stable; Plt stable, no signs or symptoms of active bleeding  - CBC: continue to trend, ordered for AM.   History of right axillary nonocclusive and left peroneal occlusive DVT 11/2022  - PTA Eliquis on hold. Discussed with Dr. Rodriguez, connect with CARDS2 prior to resuming, potentially after next biopsy 01/09.     Anticoagulation:   - ASA only    MSK/Skin:  Sternotomy  Surgical incision  - Sternal precautions  - Incisional cares per protocol    Prophylaxis:   - Stress ulcer prophylaxis: Pantoprazole 40 mg daily for 30 days  - DVT prophylaxis: Subcutaneous heparin, SCD    Disposition:   - Transferred to  on 01/04  - Therapies recommending discharge to home with assist and outpatient cardiac rehab at this time, likely 3-4 days pending CT removal and second heart bx scheduled for 01/09. Will discharge to [facility]      Deepthi Godinez PA-C   Cardiothoracic Surgery   Pager #[phone]  January 7, 2023 at 7:58 AM          Interval History:     No overnight events.  States pain is well managed on current regimen. Slept well overnight.  Tolerating diet, is passing flatus, + BM. No nausea or vomiting.  Breathing well without complaints and on room air.   Working with therapies and ambulating in halls with walker for assistance.   Denies chest pain, palpitations, dizziness, syncopal symptoms, fevers, chills, myalgias, or sternal popping/clicking.         Physical  Exam:     Gen: A&Ox4, NAD  Neuro: no focal deficits   CV: RRR, normal S1 S2, no murmurs, rubs or gallops. No JVD  Pulm: CTA, no wheezing or rhonchi, normal breathing on RA  Abd: nondistended, normal BS, soft, nontender  Ext: minimal peripheral edema, no pitting  Incision: clean, dry, intact, no erythema, sternum stable  Tubes/drain sites: dressing clean and dry, serosanguinous output, no air leak. 24 hr output 240mL total from pericardial CT.         Data:    Imaging:  reviewed recent imaging, no acute concerns    Recent Results (from the past 24 hour(s))   XR Chest Port 1 View    Narrative    EXAM: XR CHEST PORT 1 VIEW  1/6/2023 9:43 AM     HISTORY:  post-op heart transplant monitoring       COMPARISON:  1/5/2023    FINDINGS:   AP portable view of the chest. Stable positioning of a left  pericardial drain. Intact median sternotomy wires. No appreciable  pneumothorax or pleural effusion. No focal airspace opacity.  Visualized upper abdomen is unremarkable.      Impression    IMPRESSION:   No appreciable pneumothorax or focal airspace disease.    I have personally reviewed the examination and initial interpretation  and I agree with the findings.    DEIDRE HINTON MD         SYSTEM ID:  [id number]        Labs:  Recent Labs   Lab 01/07/23  0746 01/06/23  2106 01/06/23  1629 01/06/23  1229 01/06/23  0442 01/05/23  1824 01/05/23  1406 01/05/23  1305 01/05/23  1103 01/05/23  0757 01/05/23  0715 01/04/23  0817 01/04/23  0458   WBC  --   --   --   --  13.4*  --   --   --  15.2*  --   --   --  14.2*   HGB  --   --   --   --  10.5*  --   --   --  10.3*  --   --   --  9.7*   MCV  --   --   --   --  92  --   --   --  94  --   --   --  94   PLT  --   --   --   --  266  --   --   --  292  --   --   --  238   NA  --   --   --   --  135*  --  130*  --   --   --  133*  --  134*   POTASSIUM  --   --   --   --  4.6  --  4.8  --   --   --  5.4*  --  4.7   CHLORIDE  --   --   --   --  105  --  100  --   --   --  104  --  105   CO2   --   --   --   --  20*  --  17*  --   --   --  20*  --  20*   BUN  --   --   --   --  20.7  --  26.1*  --   --   --  21.5  --  21.7   CR  --   --   --   --  0.65*  --  0.69  --   --   --  0.60*  --  0.57*   ANIONGAP  --   --   --   --  10  --  13  --   --   --  9  --  9   TIM  --   --   --   --  8.5*  --  8.2*  --   --   --  8.4*  --  8.1*   * 193* 134*   < > 102*   < > 190*   < >  --    < > 124*   < > 158*   ALBUMIN  --   --   --   --  2.6*  --   --   --   --   --  2.6*  --  2.6*   PROTTOTAL  --   --   --   --  4.7*  --   --   --   --   --  4.7*  --  4.6*   BILITOTAL  --   --   --   --  0.4  --   --   --   --   --  0.4  --  0.3   ALKPHOS  --   --   --   --  71  --   --   --   --   --  69  --  71   ALT  --   --   --   --  47  --   --   --   --   --  44  --  38   AST  --   --   --   --  25  --   --   --   --   --  30  --  26    < > = values in this interval not displayed.      GLUCOSE:   Recent Labs   Lab 01/07/23  0746 01/06/23  2106 01/06/23  1629 01/06/23  1229 01/06/23  0442 01/05/23  4857   * 193* 134* 185* 102* 116*

## 2023-01-07 NOTE — PLAN OF CARE
D: Patient transferred from OSH on 12/6/22 for cardiogenic shock; now s/p heart transplant on 12/25/22.     I: Monitored and assessed patient status; nursing cares provided.    A:   Neuro: A&Ox4, baseline numbness in right ring finger and pinky (team aware). Able to make needs known.  Cardiac: SR, HR 80's-90's, normotensive  Respiratory: 100% on RA, lungs clear; one chest tube to -20 suction  GI/: +BS, no BM today; voiding using bedside urinal  Diet/Appetite: Regular diet, excellent appetite. BS checks before meals and bedtime, required insulin x2   Skin: Sternal incision ROSLYN, CT site x1, old CT site x3, healing.  Pain: Minimal pain at chest tube insertion site during day; oxycodone x1 in evening  Labs/Lytes: Mg 1.6 replaced per protocol; next Mg check in am.   LDA's: Right PIV SL'd  Activity: OT worked with patient on dressing self and provided thorough education on sternal precautions with pt and spouse. Ambulated in hallway with assist of 1, walker, and gait belt.      P: Week 2 heart biopsy scheduled for Monday, 1/9. Continue with current plan of care. Contact CVTS for questions or concerns.    Jessica Simmons, RN  Cardiology

## 2023-01-07 NOTE — PLAN OF CARE
Temp: 97.7  F (36.5  C) Temp src: Oral BP: 122/84 Pulse: 88   Resp: 16 SpO2: 98 % O2 Device: None (Room air)       Shift: [id number]  D: Patient transferred from OSH on 12/6/22 for cardiogenic shock; now s/p heart transplant on 12/25/22.      I: Monitored vitals and assessed pt status.     A:  Neuro: A&O x4. Numbness present in right pinky & ring finger, team aware. Calls appropriately, slept well between cares throughout the night.  Cardiac: Tele in place, SR. Afebrile. Denies chest pain  Resp: VSS on RA sating > 98%. Lung sounds clear.  GI/: Adequate urine output via bedside urinal. No BM during shift. Regular diet. BS check ACHS, no insulin needed during shift.  Skin: No new deficits noted  LDA's: R PIV in place SL. Chest tube x1 to -20 suction. Output over 12 hour shift = 40 mL  Pain: Oxycodone x1 for pain at chest tube site and when taking a deep breath.   AM weight: 183 lb 4.8 oz    Plan: Continue to monitor and follow POC. Week 2 heart biopsy planned for Monday, 1/9. Notify CVTS with any changes.

## 2023-01-07 NOTE — CONSULTS
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  He is now s/p OHT 12/25/22, with post-operative course c/b delirium, leukocytosis, and prolonged CT output.        RECOMMENDATIONS:  - due for week #2 RHC/bx on ~Monday, 1/9  - blood cultures and urine culture giving rising WBC -- 19.9 today  - repeat tacro level tomorrow        NICM, s/p OHT 12/25/22  His post-transplant course has been c/b delirium, leukocytosis, thought to be stress-induced, and prolonged CT output.    Rejection history:  Initial biopsy showed diffuse capillary staining for c4d, suggestive of AMR1.  No DSAs, no graft dysfunction.  No changes were made, plan to f/u with next week's biospy.  AlloMap scores:  n/a  DSAs:  none  Coronary angio/Ischemic eval:  To be completed at ~week #4 post-tx  Last RHC:  1/2/23 showed normal biventricular filling pressures, with RA 1, mPA 12, PCW 4, and CI 2.98.  Echo/cMRI:  TTE 1/2/23 showed normal graft function, with LVEF 60-65% and normal RV size/function.    Serostatus:  - CMV D+/R+  - EBV D?/R+  - Toxo D?/R-    Immunosuppression:  - received basiliximab 12/25 and 12/29  - prednisone taper, currently 20 BID  - MMF 1500mg twice daily, planning to decrease as he is > 60yrs of age pending next biopsy  - tacro, goal level 10-12.  Level from today was 8.2, reflecting an ~13hr trough.  Continue tacrolimus 4mg twice daily, and repeat a level  "tomorrow.    PPx:  - CAV:  Aspirin 81mg daily and rosuvastatin 10mg daily  - GI:  Pantoprazole 40mg daily  - Osteoporosis:  Starting calcium/vitamin D supplements  - CMV:  valcyte 900mg daily (x3 months, through 3/2023)  - PCP:  Bactrim single strength daily  - Thrush:  Nystatin s/s QID  - Toxo:  n/a    Graft function:  - BPs:  Controlled, 110-120/70-80s, continue amlodipine 5mg daily  - HRs:  80-90s, off terbutaline  - fluid status:  Euvolemic/mildly hypovolemic, off diuretics    Health maintenance:  - due for week #2 RHC/bx ~1/9/23      =============================================================    Interval History/ROS:   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  He has been ambulating, with improved strength and endurance.  His breathing has been well, and he denies SOB, PND, and orthopnea.  He is eating, with good appetite, and denies nausea, vomiting, diarrhea, and constipation.  He does not feel any fluid retention.  He otherwise denies chest pain, palpitations, dizziness, falls, headaches, acute vision changes, fevers, chills, cough, sore throat, and signs of bleeding.    Last 24 hr care team notes reviewed.   ROS:  4 point ROS including Respiratory, CV, GI and , other than that noted in the HPI, is negative.     Medications: Reviewed in EPIC.     Physical Exam:   /84 (BP Location: Right arm, Cuff Size: Adult Regular)   Pulse 87   Temp 97.6  F (36.4  C) (Oral)   Resp 16   Ht 1.88 m (6' 2\")   Wt 83.1 kg (183 lb 4.8 oz)   SpO2 100%   BMI 23.53 kg/m    GENERAL: Appears alert and oriented times three. Sitting upright in bed, NAD.  HEENT: Eye symmetrical and free of discharge bilaterally. Mucous membranes moist and without lesions.  NECK: Supple and without lymphadenopathy. JVD negative when sitting upright.   CV: RRR, S1S2 present without murmur, rub, or gallop.   RESPIRATORY: Respirations regular, even, and unlabored. Lungs CTA throughout.   GI: Soft and non distended with normoactive bowel sounds " present in all quadrants. No tenderness, rebound, guarding. No organomegaly.   EXTREMITIES: No LE edema. 2+ bilateral pedal pulses.   NEUROLOGIC: Alert and orientated x 3. CN II-XII grossly intact. No focal deficits.   MUSCULOSKELETAL: No joint swelling or tenderness.   SKIN: No jaundice. No rashes or lesions. Surgical incisions pink, edges well-approximated, no redness nor tenderness.    Data:  CMPRecent Labs   Lab 01/07/23  0746 01/07/23  0728 01/06/23  2202 01/06/23  2106 01/06/23  1629 01/06/23  1229 01/06/23  0442 01/05/23  1824 01/05/23  1406 01/05/23  0757 01/05/23  0715 01/04/23  0817 01/04/23  0458 01/03/23  0843 01/03/23  0459   NA  --  133*  --   --   --   --  135*  --  130*  --  133*  --  134*  --  134*   POTASSIUM  --  4.9  --   --   --   --  4.6  --  4.8  --  5.4*  --  4.7  --  4.5   CHLORIDE  --  103  --   --   --   --  105  --  100  --  104  --  105  --  105   CO2  --  18*  --   --   --   --  20*  --  17*  --  20*  --  20*  --  23   ANIONGAP  --  12  --   --   --   --  10  --  13  --  9  --  9  --  6*   * 110*  --  193* 134*   < > 102*   < > 190*   < > 124*   < > 158*   < > 147*   BUN  --  23.1*  --   --   --   --  20.7  --  26.1*  --  21.5  --  21.7  --  19.5   CR  --  0.65*  --   --   --   --  0.65*  --  0.69  --  0.60*  --  0.57*  --  0.63*   GFRESTIMATED  --  >90  --   --   --   --  >90  --  >90  --  >90  --  >90  --  >90   TIM  --  8.4*  --   --   --   --  8.5*  --  8.2*  --  8.4*  --  8.1*  --  7.8*   MAG  --  1.6* 1.7  --   --   --  1.4*  --   --   --   --   --  1.6*   < > 1.5*   PHOS  --  3.4  --   --   --   --  3.5  --   --   --   --   --  2.5  --  2.1*   PROTTOTAL  --   --   --   --   --   --  4.7*  --   --   --  4.7*  --  4.6*  --  4.6*   ALBUMIN  --   --   --   --   --   --  2.6*  --   --   --  2.6*  --  2.6*  --  2.6*   BILITOTAL  --   --   --   --   --   --  0.4  --   --   --  0.4  --  0.3  --  0.3   ALKPHOS  --   --   --   --   --   --  71  --   --   --  69  --  71  --  68   AST   --   --   --   --   --   --  25  --   --   --  30  --  26  --  21   ALT  --   --   --   --   --   --  47  --   --   --  44  --  38  --  28    < > = values in this interval not displayed.     CBC  Recent Labs   Lab 01/07/23  0914 01/06/23  0442 01/05/23  1103 01/04/23  0458   WBC 19.9* 13.4* 15.2* 14.2*   RBC 3.65* 3.63* 3.48* 3.33*   HGB 10.7* 10.5* 10.3* 9.7*   HCT 33.8* 33.3* 32.8* 31.2*   MCV 93 92 94 94   MCH 29.3 28.9 29.6 29.1   MCHC 31.7 31.5 31.4* 31.1*   RDW 16.0* 16.0* 16.2* 16.0*    266 292 238       Patient discussed with Dr. Ibanez.    Rose Mary Goldstein, PAOLA, FNP-BC  Advanced Heart Failure Nurse Practitioner  [facility]

## 2023-01-08 ENCOUNTER — APPOINTMENT (OUTPATIENT)
Dept: GENERAL RADIOLOGY | Facility: CLINIC | Age: 68
DRG: 001 | End: 2023-01-08
Attending: SURGERY
Payer: MEDICARE

## 2023-01-08 ENCOUNTER — APPOINTMENT (OUTPATIENT)
Dept: OCCUPATIONAL THERAPY | Facility: CLINIC | Age: 68
DRG: 001 | End: 2023-01-08
Attending: SURGERY
Payer: MEDICARE

## 2023-01-08 LAB
ANION GAP SERPL CALCULATED.3IONS-SCNC: 9 MMOL/L (ref 7–15)
BUN SERPL-MCNC: 24.1 MG/DL (ref 8–23)
CALCIUM SERPL-MCNC: 8.1 MG/DL (ref 8.8–10.2)
CHLORIDE SERPL-SCNC: 103 MMOL/L (ref 98–107)
CREAT SERPL-MCNC: 0.69 MG/DL (ref 0.67–1.17)
DEPRECATED HCO3 PLAS-SCNC: 20 MMOL/L (ref 22–29)
ENTEROCOCCUS FAECALIS: NOT DETECTED
ENTEROCOCCUS FAECIUM: NOT DETECTED
ERYTHROCYTE [DISTWIDTH] IN BLOOD BY AUTOMATED COUNT: 16.1 % (ref 10–15)
GFR SERPL CREATININE-BSD FRML MDRD: >90 ML/MIN/1.73M2
GLUCOSE BLDC GLUCOMTR-MCNC: 141 MG/DL (ref 70–99)
GLUCOSE BLDC GLUCOMTR-MCNC: 182 MG/DL (ref 70–99)
GLUCOSE BLDC GLUCOMTR-MCNC: 191 MG/DL (ref 70–99)
GLUCOSE BLDC GLUCOMTR-MCNC: 202 MG/DL (ref 70–99)
GLUCOSE SERPL-MCNC: 111 MG/DL (ref 70–99)
HCT VFR BLD AUTO: 31.9 % (ref 40–53)
HGB BLD-MCNC: 10.1 G/DL (ref 13.3–17.7)
LISTERIA SPECIES (DETECTED/NOT DETECTED): NOT DETECTED
MAGNESIUM SERPL-MCNC: 1.6 MG/DL (ref 1.7–2.3)
MCH RBC QN AUTO: 29.4 PG (ref 26.5–33)
MCHC RBC AUTO-ENTMCNC: 31.7 G/DL (ref 31.5–36.5)
MCV RBC AUTO: 93 FL (ref 78–100)
PHOSPHATE SERPL-MCNC: 3 MG/DL (ref 2.5–4.5)
PLATELET # BLD AUTO: 257 10E3/UL (ref 150–450)
POTASSIUM SERPL-SCNC: 4.9 MMOL/L (ref 3.4–5.3)
RBC # BLD AUTO: 3.43 10E6/UL (ref 4.4–5.9)
SODIUM SERPL-SCNC: 132 MMOL/L (ref 136–145)
STAPHYLOCOCCUS AUREUS: NOT DETECTED
STAPHYLOCOCCUS EPIDERMIDIS: DETECTED
STAPHYLOCOCCUS LUGDUNENSIS: NOT DETECTED
STREPTOCOCCUS AGALACTIAE: NOT DETECTED
STREPTOCOCCUS ANGINOSUS GROUP: NOT DETECTED
STREPTOCOCCUS PNEUMONIAE: NOT DETECTED
STREPTOCOCCUS PYOGENES: NOT DETECTED
STREPTOCOCCUS SPECIES: NOT DETECTED
TACROLIMUS BLD-MCNC: 8.3 UG/L (ref 5–15)
TME LAST DOSE: NORMAL H
TME LAST DOSE: NORMAL H
WBC # BLD AUTO: 18.4 10E3/UL (ref 4–11)

## 2023-01-08 PROCEDURE — 250N000013 HC RX MED GY IP 250 OP 250 PS 637: Performed by: STUDENT IN AN ORGANIZED HEALTH CARE EDUCATION/TRAINING PROGRAM

## 2023-01-08 PROCEDURE — 250N000011 HC RX IP 250 OP 636: Performed by: STUDENT IN AN ORGANIZED HEALTH CARE EDUCATION/TRAINING PROGRAM

## 2023-01-08 PROCEDURE — 250N000012 HC RX MED GY IP 250 OP 636 PS 637: Performed by: SURGERY

## 2023-01-08 PROCEDURE — 71045 X-RAY EXAM CHEST 1 VIEW: CPT

## 2023-01-08 PROCEDURE — 84100 ASSAY OF PHOSPHORUS: CPT | Performed by: SURGERY

## 2023-01-08 PROCEDURE — 85027 COMPLETE CBC AUTOMATED: CPT | Performed by: STUDENT IN AN ORGANIZED HEALTH CARE EDUCATION/TRAINING PROGRAM

## 2023-01-08 PROCEDURE — 250N000013 HC RX MED GY IP 250 OP 250 PS 637: Performed by: NURSE PRACTITIONER

## 2023-01-08 PROCEDURE — 258N000003 HC RX IP 258 OP 636: Performed by: SURGERY

## 2023-01-08 PROCEDURE — 71045 X-RAY EXAM CHEST 1 VIEW: CPT | Mod: 26 | Performed by: RADIOLOGY

## 2023-01-08 PROCEDURE — 87077 CULTURE AEROBIC IDENTIFY: CPT | Performed by: PHYSICIAN ASSISTANT

## 2023-01-08 PROCEDURE — 250N000013 HC RX MED GY IP 250 OP 250 PS 637

## 2023-01-08 PROCEDURE — 87077 CULTURE AEROBIC IDENTIFY: CPT | Performed by: NURSE PRACTITIONER

## 2023-01-08 PROCEDURE — 99223 1ST HOSP IP/OBS HIGH 75: CPT | Mod: 24 | Performed by: INTERNAL MEDICINE

## 2023-01-08 PROCEDURE — 83735 ASSAY OF MAGNESIUM: CPT | Performed by: SURGERY

## 2023-01-08 PROCEDURE — 250N000012 HC RX MED GY IP 250 OP 636 PS 637: Performed by: NURSE PRACTITIONER

## 2023-01-08 PROCEDURE — 250N000012 HC RX MED GY IP 250 OP 636 PS 637: Performed by: PHYSICIAN ASSISTANT

## 2023-01-08 PROCEDURE — 250N000013 HC RX MED GY IP 250 OP 250 PS 637: Performed by: PHYSICIAN ASSISTANT

## 2023-01-08 PROCEDURE — 36415 COLL VENOUS BLD VENIPUNCTURE: CPT | Performed by: NURSE PRACTITIONER

## 2023-01-08 PROCEDURE — 250N000013 HC RX MED GY IP 250 OP 250 PS 637: Performed by: SURGERY

## 2023-01-08 PROCEDURE — 250N000011 HC RX IP 250 OP 636: Performed by: SURGERY

## 2023-01-08 PROCEDURE — 36415 COLL VENOUS BLD VENIPUNCTURE: CPT | Performed by: PHYSICIAN ASSISTANT

## 2023-01-08 PROCEDURE — 250N000012 HC RX MED GY IP 250 OP 636 PS 637: Performed by: STUDENT IN AN ORGANIZED HEALTH CARE EDUCATION/TRAINING PROGRAM

## 2023-01-08 PROCEDURE — 250N000011 HC RX IP 250 OP 636

## 2023-01-08 PROCEDURE — 80197 ASSAY OF TACROLIMUS: CPT | Performed by: NURSE PRACTITIONER

## 2023-01-08 PROCEDURE — 80048 BASIC METABOLIC PNL TOTAL CA: CPT | Performed by: SURGERY

## 2023-01-08 PROCEDURE — 97530 THERAPEUTIC ACTIVITIES: CPT | Mod: GO

## 2023-01-08 PROCEDURE — 99232 SBSQ HOSP IP/OBS MODERATE 35: CPT | Mod: FS | Performed by: NURSE PRACTITIONER

## 2023-01-08 PROCEDURE — 97110 THERAPEUTIC EXERCISES: CPT | Mod: GO

## 2023-01-08 PROCEDURE — 214N000001 HC R&B CCU UMMC

## 2023-01-08 RX ORDER — TACROLIMUS 5 MG/1
5 CAPSULE ORAL
Status: DISCONTINUED | OUTPATIENT
Start: 2023-01-08 | End: 2023-01-13 | Stop reason: HOSPADM

## 2023-01-08 RX ORDER — MAGNESIUM SULFATE HEPTAHYDRATE 40 MG/ML
2 INJECTION, SOLUTION INTRAVENOUS ONCE
Status: COMPLETED | OUTPATIENT
Start: 2023-01-08 | End: 2023-01-08

## 2023-01-08 RX ORDER — LIDOCAINE 40 MG/G
CREAM TOPICAL
Status: CANCELLED | OUTPATIENT
Start: 2023-01-08

## 2023-01-08 RX ORDER — HYDROMORPHONE HCL IN WATER/PF 6 MG/30 ML
0.4 PATIENT CONTROLLED ANALGESIA SYRINGE INTRAVENOUS
Status: DISCONTINUED | OUTPATIENT
Start: 2023-01-08 | End: 2023-01-09

## 2023-01-08 RX ORDER — CALCIUM CARBONATE/VITAMIN D3 600 MG-10
1 TABLET ORAL 2 TIMES DAILY WITH MEALS
Status: DISCONTINUED | OUTPATIENT
Start: 2023-01-08 | End: 2023-01-13 | Stop reason: HOSPADM

## 2023-01-08 RX ORDER — TACROLIMUS 1 MG/1
4 CAPSULE ORAL
Status: DISCONTINUED | OUTPATIENT
Start: 2023-01-09 | End: 2023-01-13 | Stop reason: HOSPADM

## 2023-01-08 RX ORDER — OXYCODONE HYDROCHLORIDE 5 MG/1
5 TABLET ORAL EVERY 4 HOURS PRN
Status: DISCONTINUED | OUTPATIENT
Start: 2023-01-08 | End: 2023-01-13 | Stop reason: HOSPADM

## 2023-01-08 RX ORDER — VANCOMYCIN HYDROCHLORIDE 1 G/200ML
1000 INJECTION, SOLUTION INTRAVENOUS EVERY 12 HOURS
Status: DISCONTINUED | OUTPATIENT
Start: 2023-01-09 | End: 2023-01-13 | Stop reason: HOSPADM

## 2023-01-08 RX ADMIN — TACROLIMUS 4 MG: 1 CAPSULE ORAL at 08:52

## 2023-01-08 RX ADMIN — HEPARIN SODIUM 5000 UNITS: 5000 INJECTION, SOLUTION INTRAVENOUS; SUBCUTANEOUS at 12:49

## 2023-01-08 RX ADMIN — AMLODIPINE BESYLATE 5 MG: 5 TABLET ORAL at 08:51

## 2023-01-08 RX ADMIN — Medication 10 MG: at 20:14

## 2023-01-08 RX ADMIN — NYSTATIN 1000000 UNITS: 500000 SUSPENSION ORAL at 17:57

## 2023-01-08 RX ADMIN — VANCOMYCIN HYDROCHLORIDE 1250 MG: 10 INJECTION, POWDER, LYOPHILIZED, FOR SOLUTION INTRAVENOUS at 14:00

## 2023-01-08 RX ADMIN — INSULIN ASPART 2 UNITS: 100 INJECTION, SOLUTION INTRAVENOUS; SUBCUTANEOUS at 17:57

## 2023-01-08 RX ADMIN — QUETIAPINE FUMARATE 25 MG: 25 TABLET ORAL at 20:14

## 2023-01-08 RX ADMIN — PREDNISONE 20 MG: 20 TABLET ORAL at 20:14

## 2023-01-08 RX ADMIN — POLYETHYLENE GLYCOL 3350 17 G: 17 POWDER, FOR SOLUTION ORAL at 08:51

## 2023-01-08 RX ADMIN — ROSUVASTATIN CALCIUM 10 MG: 10 TABLET, FILM COATED ORAL at 08:51

## 2023-01-08 RX ADMIN — MYCOPHENOLATE MOFETIL 1500 MG: 250 CAPSULE ORAL at 08:52

## 2023-01-08 RX ADMIN — TACROLIMUS 5 MG: 5 CAPSULE ORAL at 17:58

## 2023-01-08 RX ADMIN — INSULIN ASPART 1 UNITS: 100 INJECTION, SOLUTION INTRAVENOUS; SUBCUTANEOUS at 12:50

## 2023-01-08 RX ADMIN — NYSTATIN 1000000 UNITS: 500000 SUSPENSION ORAL at 20:15

## 2023-01-08 RX ADMIN — MAGNESIUM SULFATE IN WATER 2 G: 40 INJECTION, SOLUTION INTRAVENOUS at 10:51

## 2023-01-08 RX ADMIN — CALCIUM CARBONATE 600 MG (1,500 MG)-VITAMIN D3 400 UNIT TABLET 1 TABLET: at 17:58

## 2023-01-08 RX ADMIN — HYDROMORPHONE HYDROCHLORIDE 0.4 MG: 0.2 INJECTION, SOLUTION INTRAMUSCULAR; INTRAVENOUS; SUBCUTANEOUS at 02:04

## 2023-01-08 RX ADMIN — SENNOSIDES AND DOCUSATE SODIUM 1 TABLET: 8.6; 5 TABLET ORAL at 08:51

## 2023-01-08 RX ADMIN — ACETAMINOPHEN 975 MG: 325 TABLET, FILM COATED ORAL at 03:31

## 2023-01-08 RX ADMIN — NYSTATIN 1000000 UNITS: 500000 SUSPENSION ORAL at 12:49

## 2023-01-08 RX ADMIN — OXYCODONE HYDROCHLORIDE 10 MG: 10 TABLET ORAL at 03:31

## 2023-01-08 RX ADMIN — HEPARIN SODIUM 5000 UNITS: 5000 INJECTION, SOLUTION INTRAVENOUS; SUBCUTANEOUS at 03:31

## 2023-01-08 RX ADMIN — NYSTATIN 1000000 UNITS: 500000 SUSPENSION ORAL at 08:51

## 2023-01-08 RX ADMIN — ACETAMINOPHEN 975 MG: 325 TABLET, FILM COATED ORAL at 12:49

## 2023-01-08 RX ADMIN — HEPARIN SODIUM 5000 UNITS: 5000 INJECTION, SOLUTION INTRAVENOUS; SUBCUTANEOUS at 20:15

## 2023-01-08 RX ADMIN — ASPIRIN 81 MG: 81 TABLET, CHEWABLE ORAL at 08:51

## 2023-01-08 RX ADMIN — PREDNISONE 20 MG: 20 TABLET ORAL at 08:51

## 2023-01-08 RX ADMIN — PANTOPRAZOLE SODIUM 40 MG: 40 TABLET, DELAYED RELEASE ORAL at 08:51

## 2023-01-08 RX ADMIN — CALCIUM CARBONATE 600 MG (1,500 MG)-VITAMIN D3 400 UNIT TABLET 1 TABLET: at 12:49

## 2023-01-08 RX ADMIN — ACETAMINOPHEN 975 MG: 325 TABLET, FILM COATED ORAL at 20:15

## 2023-01-08 RX ADMIN — SULFAMETHOXAZOLE AND TRIMETHOPRIM 1 TABLET: 400; 80 TABLET ORAL at 08:51

## 2023-01-08 RX ADMIN — MYCOPHENOLATE MOFETIL 1500 MG: 250 CAPSULE ORAL at 20:14

## 2023-01-08 RX ADMIN — TRAZODONE HYDROCHLORIDE 100 MG: 100 TABLET ORAL at 21:31

## 2023-01-08 RX ADMIN — VALGANCICLOVIR HYDROCHLORIDE 900 MG: 450 TABLET ORAL at 08:52

## 2023-01-08 ASSESSMENT — ACTIVITIES OF DAILY LIVING (ADL)
ADLS_ACUITY_SCORE: 42

## 2023-01-08 NOTE — PLAN OF CARE
Temp: 97.9  F (36.6  C) Temp src: Oral BP: 114/77 Pulse: 94   Resp: 16 SpO2: 98 % O2 Device: None (Room air)       Shift: [id number]  D: Patient transferred from OSH on 12/6/22 for cardiogenic shock; now s/p heart transplant on 12/25/22.      I: Monitored vitals and assessed pt status.     A:  Neuro: A&O x4. Calls appropriately, slept well between cares throughout the night.  Cardiac: Tele in place, SR. Afebrile. Denies chest pain  Resp: VSS on RA sating > 98%. Lung sounds clear.  GI/: Adequate urine output via bedside urinal. No BM during shift. Regular diet. BS check ACHS, no insulin needed during shift.  Skin: No new deficits noted  LDA's: R PIV in place SL. Chest tube x1 to -20 suction. Output over 12 hour shift = 5 mL  Pain: PRN oxycodone 10 mg x2 & dilaudid IV 0.2 mg given for shoulder/chest incisional pain. Pt stated he worked a lot with therapy yesterday and very sore throughout the night.     Plan: Continue to monitor and follow POC. Week 2 heart biopsy planned for Monday, 1/9. Notify CVTS with any changes.

## 2023-01-08 NOTE — CONSULTS
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  He is now s/p OHT 12/25/22, with post-operative course c/b delirium, leukocytosis, and prolonged CT output.        RECOMMENDATIONS:  - repeat blood cultures today  - agree with antibiotics given positive BC from 1/7  - transplant ID consult  - start calcium/vitamin D supplements  - tacro 5mg po this pm, then increase tacro to 4/5 starting tomorrow  - repeat tacro level Tuesday  - due for week #2 RHC/bx on ~Monday, 1/9        NICM, s/p OHT 12/25/22  His post-transplant course has been c/b delirium, leukocytosis, and prolonged CT output.    Rejection history:  Initial biopsy showed diffuse capillary staining for c4d, suggestive of pAMR1.  No DSAs, no graft dysfunction.  No changes were made, plan to f/u with next week's biospy.  AlloMap scores:  n/a  DSAs:  none  Coronary angio/Ischemic eval:  To be completed at ~week #4 post-tx  Last RHC:  1/2/23 showed normal biventricular filling pressures, with RA 1, mPA 12, PCW 4, and CI 2.98 --> weight 214# (bed scale).  Echo/cMRI:  TTE 1/2/23 showed normal graft function, with LVEF 60-65% and normal RV size/function.    Serostatus:  - CMV D+/R+  - EBV D?/R+  - Toxo D?/R-    Immunosuppression:  - received basiliximab 12/25 and 12/29  - prednisone taper, currently 20 BID  - MMF 1500mg twice daily, planning to decrease to 1000mg BID as he is > 60yrs of age pending next  biopsy  - tacro, goal level 10-12.  Level from today was 8.3, reflecting an ~13.5hr trough.  Give tacro 5mg this pm, then increase to 4/5 starting tomorrow.  Repeat tacro level Tuesday.    PPx:  - CAV:  Aspirin 81mg daily and rosuvastatin 10mg daily  - GI:  Pantoprazole 40mg daily  - Osteoporosis:  Starting calcium/vitamin D supplements  - CMV:  valcyte 900mg daily (x3 months, through 3/2023)  - PCP:  Bactrim single strength daily  - Thrush:  Nystatin s/s QID  - Toxo:  n/a    Graft function:  - BPs:  Controlled, 110/70-80s, continue amlodipine 5mg daily  - HRs:  80-90s, off terbutaline  - fluid status:  Euvolemic/mildly hypovolemic, off diuretics    Health maintenance:  - due for week #2 RHC/bx ~1/9/23      Leukocytosis  WBCs had been ranging low 10s, but increased to 19.9 on 1/8 (afebrile, no localizing symptoms).  BCs sent, with 1 set revealing Gram+ cocci today.    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  He is tired today, and his wife notes that he worked with OT yesterday and seems off today when compared to yesterday.  He has been ambulating, and continues to note improvement in his strength and endurance.  His breathing is well, and he notes improvement in his SOB and denies PND and orthopnea.  His appetite has been well, and he denies nausea, vomiting, diarrhea, and constipation.  He does not feel fluid retention.  He had sternal pain overnight, and was given IV dilaudid with no further pain.  He otherwise denies palpitations, dizziness, headaches, acute vision changes, fevers, chills, cough, sore throat, and signs of bleeding,    Last 24 hr care team notes reviewed.   ROS:  4 point ROS including Respiratory, CV, GI and , other than that noted in the HPI, is negative.     Medications: Reviewed in EPIC.  "    Physical Exam:   /77 (BP Location: Right arm, Cuff Size: Adult Regular)   Pulse 94   Temp 97.9  F (36.6  C) (Oral)   Resp 16   Ht 1.88 m (6' 2\")   Wt 84.7 kg (186 lb 11.2 oz)   SpO2 98%   BMI 23.97 kg/m    GENERAL: Appears alert and oriented times three. Sitting upright in bed, NAD.  HEENT: Eye symmetrical and free of discharge bilaterally. Mucous membranes moist and without lesions. No thrush noted on tongue or cheeks.  NECK: Supple and without lymphadenopathy. JVD negative when sitting upright.   CV: RRR, S1S2 present without murmur, rub, or gallop.   RESPIRATORY: Respirations regular, even, and unlabored. Lungs CTA throughout.   GI: Soft and non distended with normoactive bowel sounds present in all quadrants. No tenderness, rebound, guarding. No organomegaly.   EXTREMITIES: No LE edema. 2+ bilateral pedal pulses.   NEUROLOGIC: Alert and orientated x 3. CN II-XII grossly intact. No focal deficits.   MUSCULOSKELETAL: No joint swelling or tenderness.   SKIN: No jaundice. No rashes or lesions. Surgical incisions pink, edges well-approximated, no redness nor tenderness.    Data:  CMP  Recent Labs   Lab 01/07/23  2125 01/07/23  1737 01/07/23  1122 01/07/23  0746 01/07/23  0728 01/06/23  2202 01/06/23  1229 01/06/23  0442 01/05/23  1824 01/05/23  1406 01/05/23  0757 01/05/23  0715 01/04/23  0817 01/04/23  0458 01/03/23  0843 01/03/23  0459   NA  --   --   --   --  133*  --   --  135*  --  130*  --  133*  --  134*  --  134*   POTASSIUM  --   --   --   --  4.9  --   --  4.6  --  4.8  --  5.4*  --  4.7  --  4.5   CHLORIDE  --   --   --   --  103  --   --  105  --  100  --  104  --  105  --  105   CO2  --   --   --   --  18*  --   --  20*  --  17*  --  20*  --  20*  --  23   ANIONGAP  --   --   --   --  12  --   --  10  --  13  --  9  --  9  --  6*   * 215* 175* 106* 110*  --    < > 102*   < > 190*   < > 124*   < > 158*   < > 147*   BUN  --   --   --   --  23.1*  --   --  20.7  --  26.1*  --  21.5  " --  21.7  --  19.5   CR  --   --   --   --  0.65*  --   --  0.65*  --  0.69  --  0.60*  --  0.57*  --  0.63*   GFRESTIMATED  --   --   --   --  >90  --   --  >90  --  >90  --  >90  --  >90  --  >90   TIM  --   --   --   --  8.4*  --   --  8.5*  --  8.2*  --  8.4*  --  8.1*  --  7.8*   MAG  --   --   --   --  1.6* 1.7  --  1.4*  --   --   --   --   --  1.6*   < > 1.5*   PHOS  --   --   --   --  3.4  --   --  3.5  --   --   --   --   --  2.5  --  2.1*   PROTTOTAL  --   --   --   --  4.9*  --   --  4.7*  --   --   --  4.7*  --  4.6*  --  4.6*   ALBUMIN  --   --   --   --  2.7*  --   --  2.6*  --   --   --  2.6*  --  2.6*  --  2.6*   BILITOTAL  --   --   --   --  0.4  --   --  0.4  --   --   --  0.4  --  0.3  --  0.3   ALKPHOS  --   --   --   --  80  --   --  71  --   --   --  69  --  71  --  68   AST  --   --   --   --  27  --   --  25  --   --   --  30  --  26  --  21   ALT  --   --   --   --  39  --   --  47  --   --   --  44  --  38  --  28    < > = values in this interval not displayed.     CBC  Recent Labs   Lab 01/07/23  0914 01/06/23 0442 01/05/23  1103 01/04/23  0458   WBC 19.9* 13.4* 15.2* 14.2*   RBC 3.65* 3.63* 3.48* 3.33*   HGB 10.7* 10.5* 10.3* 9.7*   HCT 33.8* 33.3* 32.8* 31.2*   MCV 93 92 94 94   MCH 29.3 28.9 29.6 29.1   MCHC 31.7 31.5 31.4* 31.1*   RDW 16.0* 16.0* 16.2* 16.0*    266 292 238       Patient discussed with Dr. Ibanez.    Rose Mary Goldstein DNP, FN-BC  Advanced Heart Failure Nurse Practitioner  [facility]

## 2023-01-08 NOTE — PROGRESS NOTES
Cardiovascular Surgery Progress Note  01/08/2023         Assessment and Plan:     Paco Stein is a 67 year old male with a PMH of chronic systolic heart failure (suspected predominantly nonischemic cardiomyopathy, possible arrhythmogenic), CAD s/p PCI, HLD, CNS vasculitis, CKD Stage 2. Patient is now s/p orthotopic heart transplantation on 12/25/2022 with Dr. Rodriguez.    Cardiovascular:   Hx of End-stage heart failure s/p Heart transplantation  No arrhythmias overnight, HD stable, in NSR.  Pre-operative echo showed LVEF 15%  Intraoperative echo showed LVEF 45% (stunning)  Postoperative echo on 1/2 demonstrated LVEF 60-65%, preserved RV function  - 1 week Heart Biopsy demonstrated 1R with C4d however DSA negative. Clinically stable and echo looks good. 2 week bx due on 01/09.  - ASA 81 mg daily  - Statin: Crestor 10mg daily  - Norvasc 5mg daily  - Diuresis per CARDS2  Chest tubes: Left midaxillary CT removed on 01/03. CXR 01/03 post-pull, stable. Left and Right pleural as well as mediastinal removed 01/05. CXR ordered 01/05 post pull, stable. Will continue to leave the pericardial chest tube until <100 mL out in 24h  TPW: removed 01/03    Pulmonary:  Extubated POD #2 to 4 lpm via NC. Now saturating well on RA.   - Pulm hygiene, IS, activity and deep breathing    Neurology / MSK:  Acute post-operative pain   Pain well controlled with current regimen:  - Acetaminophen, PO oxycodone PRN, IV dilaudid PRN, methocarbamol  - Sleepy during visit due to pain med use this morning. Decreased oxy dose down to 5 mg q4h prn and dilaudid IV pushes to 0.4 mg     / Renal / Fluid / Electrolytes:  Hyperkalemia, resolved  Hyponatremia  BL creat ~ 0.9, most recent creatinine 0.65; adequate UOP.   FLUID STATUS: Pre-op weight 192, weight today 183; I/O past 24 hours: -810mL  - Diuresis: per CARDS2  - Replete lytes per protocol  - Strict I/O, daily weights  - Avoid/limit nephrotoxins as able  - Hyperkalemia improved 01/06 after  neutra-phos discontinued  - Discontinue KPhos  - BMP (K improved), continue AM draws to trend    GI / Nutrition:   - Tolerating regular diet  - Continue bowel regimen, last BM 01/05    Endocrine:  Stress and steroid induced hyperglycemia   Hgb A1c 6.2% on 12/25  - Initially managed on insulin drip postop, transitioned to sliding scale; goal BG <180 for optimal healing    Infectious Disease:  Stress and steroid induced leukocytosis  WBC 19.9>18.4, remains afebrile  - Blood and urine culture 1/7 due to jump in WBC. 1/2 bottles growing gram + cocci. Repeat Blood cx 1/8 and transplant ID consult placed. Started IV Vanco q12h  - Completed perioperative antibiotics  - Continue to monitor fever curve, CBC  Immunosuppression/prophylaxis  - Managed per Cards 2    Hematology:   Acute blood loss anemia and thrombocytopenia  Hgb stable; Plt stable, no signs or symptoms of active bleeding  - CBC: continue to trend  History of right axillary nonocclusive and left peroneal occlusive DVT 11/2022  - PTA Eliquis on hold. Discussed with Dr. Rodriguez, connect with CARDS2 prior to resuming, potentially after next biopsy 01/09.     Anticoagulation:   - ASA only    MSK/Skin:  Sternotomy  Surgical incision  - Sternal precautions  - Incisional cares per protocol    Prophylaxis:   - Stress ulcer prophylaxis: Pantoprazole 40 mg daily for 30 days  - DVT prophylaxis: Subcutaneous heparin, SCD    Disposition:   - Transferred to  on 01/04  - Therapies recommending discharge to home with assist and outpatient cardiac rehab at this time, likely 3-4 days pending CT removal and second heart bx scheduled for 01/09. Will discharge to [facility]    Discussed with Dr. Michael Godinez PA-C   Cardiothoracic Surgery   Pager #[phone]  January 8, 2023 at 7:31 AM          Interval History:     No overnight events. Used more pain meds than previous, but thought was due to increased activity with PT/OT yesterday  States pain is well managed on  current regimen. Slept well overnight.  Tolerating diet, is passing flatus, + BM. No nausea or vomiting.  Breathing well without complaints and on room air.   Working with therapies and ambulating in halls with walker for assistance.   Denies chest pain, palpitations, dizziness, syncopal symptoms, fevers, chills, myalgias, or sternal popping/clicking.         Physical Exam:     Gen: A&Ox4, NAD  Neuro: no focal deficits   CV: RRR, normal S1 S2, no murmurs, rubs or gallops. No JVD  Pulm: CTA, no wheezing or rhonchi, normal breathing on RA  Abd: nondistended, normal BS, soft, nontender  Ext: minimal peripheral edema, no pitting  Incision: clean, dry, intact, no erythema, sternum stable  Tubes/drain sites: dressing clean and dry, serosanguinous output, no air leak. 24 hr output 240mL total from pericardial CT.         Data:    Imaging:  reviewed recent imaging, no acute concerns    No results found for this or any previous visit (from the past 24 hour(s)).     Labs:  Recent Labs   Lab 01/08/23  0907 01/08/23  0737 01/07/23  2125 01/07/23  1122 01/07/23  0914 01/07/23  0746 01/07/23  0728 01/06/23  1229 01/06/23  0442   WBC  --  18.4*  --   --  19.9*  --   --   --  13.4*   HGB  --  10.1*  --   --  10.7*  --   --   --  10.5*   MCV  --  93  --   --  93  --   --   --  92   PLT  --  257  --   --  238  --   --   --  266   NA  --  132*  --   --   --   --  133*  --  135*   POTASSIUM  --  4.9  --   --   --   --  4.9  --  4.6   CHLORIDE  --  103  --   --   --   --  103  --  105   CO2  --  20*  --   --   --   --  18*  --  20*   BUN  --  24.1*  --   --   --   --  23.1*  --  20.7   CR  --  0.69  --   --   --   --  0.65*  --  0.65*   ANIONGAP  --  9  --   --   --   --  12  --  10   TIM  --  8.1*  --   --   --   --  8.4*  --  8.5*   * 111* 162*   < >  --    < > 110*   < > 102*   ALBUMIN  --   --   --   --   --   --  2.7*  --  2.6*   PROTTOTAL  --   --   --   --   --   --  4.9*  --  4.7*   BILITOTAL  --   --   --   --   --   --   0.4  --  0.4   ALKPHOS  --   --   --   --   --   --  80  --  71   ALT  --   --   --   --   --   --  39  --  47   AST  --   --   --   --   --   --  27  --  25    < > = values in this interval not displayed.      GLUCOSE:   Recent Labs   Lab 01/08/23  0907 01/08/23  0737 01/07/23  2125 01/07/23  1737 01/07/23  1122 01/07/23  0746   * 111* 162* 215* 175* 106*

## 2023-01-08 NOTE — PLAN OF CARE
D: Patient transferred from OSH on 12/6/22 for cardiogenic shock; now s/p heart transplant on 12/25/22.     I: Monitored and assessed patient status; nursing cares provided.     A:   Today's Highlights/Changes:    WBC elevated again today at 18.4; +Blood Cultures-->ID Consulted; One-time Vanco infused. Second set of blood cultures drawn in afternoon--no further abx ordered as of yet.     One chest tube remains, intact to -20 cm sxn; Output over 12 hour shift=18 mL    Pt resting more today, possibly due to increased work during OT session yesterday and/or potential infection.         Neuro: A&Ox4, able to make needs known.  Cardiac: SR, HR 80's-90's, normotensive  Respiratory: on RA, lungs clear  GI/: BS+, no BM this shift, Adequate urine output, using urinal at bedside  Diet/Appetite: Regular diet, excellent appetite. BS checks before meals and bedtime, needed insulin twice this shift.   Skin: Sternal incision ROSLYN, CT dressing changed, old CT sites x3 healing  Pain: Minimal pain at CT insertion site. Scheduled tylenol given, pt declined the need for additional pain medication. Oxycodone dose decreased to 5 mg every 4 hours for less drowsiness during daytime hours.  Labs/Lytes: Mg 1.6, replaced  LDA's: Right PIV SL'd  Activity: Up with assist of 1, walker and gait belt. Ambulated in halls and up in chair during afternoon.         P: NPO at midnight for week 2 heart biopsy tomorrow. Continue with current plan of care. Contact CVTS for questions or concerns.     Jessica Simmons, RN   Cardiology

## 2023-01-08 NOTE — PHARMACY-VANCOMYCIN DOSING SERVICE
Pharmacy Vancomycin Initial Note  Date of Service 2023  Patient's  1955  67 year old, male    Indication: Bacteremia and Postoperative Infection    Current estimated CrCl = Estimated Creatinine Clearance: 121 mL/min (based on SCr of 0.69 mg/dL).    Creatinine for last 3 days  2023:  4:42 AM Creatinine 0.65 mg/dL  2023:  7:28 AM Creatinine 0.65 mg/dL  2023:  7:37 AM Creatinine 0.69 mg/dL    Recent Vancomycin Level(s) for last 3 days  No results found for requested labs within last 72 hours.      Vancomycin IV Administrations (past 72 hours)                   vancomycin (VANCOCIN) 1,250 mg in 0.9% NaCl 250 mL intermittent infusion (mg) 1,250 mg New Bag 23 1400                Nephrotoxins and other renal medications (From now, onward)    Start     Dose/Rate Route Frequency Ordered Stop    23 0800  tacrolimus (GENERIC EQUIVALENT) capsule 4 mg         4 mg Oral EVERY MORNING. 23 1307      23 0200  vancomycin (VANCOCIN) 1000 mg in dextrose 5% 200 mL PREMIX         1,000 mg  200 mL/hr over 1 Hours Intravenous EVERY 12 HOURS 23 1236      23 1800  tacrolimus (GENERIC EQUIVALENT) capsule 5 mg         5 mg Oral EVERY EVENING. 23 1307      23 1200  vancomycin (VANCOCIN) 1,250 mg in 0.9% NaCl 250 mL intermittent infusion         1,250 mg  over 90 Minutes Intravenous ONCE 23 1134            Contrast Orders - past 72 hours (72h ago, onward)    None          [employer] Prediction of Planned Initial Vancomycin Regimen  Loading dose: Give 1250mg IV once  Regimen: 1000 mg IV every 12 hours.  Start time: 14:11 on 2023  Exposure target: AUC24 (range)400-600 mg/L.hr   AUC24,ss: 435 mg/L.hr  Probability of AUC24 > 400: 59 %  Ctrough,ss: 13.3 mg/L  Probability of Ctrough,ss > 20: 18 %  Probability of nephrotoxicity (Lodise AMOL ): 8 %          Plan:  1. Start vancomycin  1250mg IV once, then 1000mg IV q12h.   2. Vancomycin monitoring method:  AUC  3. Vancomycin therapeutic monitoring goal: 400-600 mg*h/L  4. Pharmacy will check vancomycin levels as appropriate in 1-3 Days.    5. Serum creatinine levels will be ordered daily for the first week of therapy and at least twice weekly for subsequent weeks.      Briseida Schulz RPH

## 2023-01-08 NOTE — CONSULTS
[facility]  Transplant Infectious Disease Consult Note - New Patient     Patient:  Paco Stein, Date of birth 1955, Medical record number [id number]  Date of Visit:  01/08/2023  Consult requested by Dr. Brendan Rodriguez for evaluation of gram positive bacteremia    Recommendations:   1. Repeat blood cultures x 2 sets before initiation of vancomycin  2. Start vancomycin with pharmacy to dose   3. If repeat blood cultures are negative at 24-48 hours will likely stop vancomycin    Thank you very much for this consultation. Transplant Infectious Disease will continue to follow with you.    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    Steroid induced leukocytosis  Gram positive cocci growth in blood cultures: 1/7 1 of 2 blood cultures sets positive for gram positive cocci in clusters (1 of 4 bottles). MRSA nares negative. This could represent a contaminant. He does have a midsternal and left chest wall incisions creating an entry for bacteria. The leukocytosis could be a result of infection vs stress post transplant vs steroid induced. I would not focus on this result too much if there are not other signs of infection. Will repeat blood culture sets x 2 today before initiation of vancomycin. If repeat cultures at 24-48 hours are negative will stop vancomycin.     Previous ID issues:   -positive coxsackie B1 and B4 antibodies, positive for ~ 2 years    Other ID considerations:  - QTc interval: 12/6 QTc 474  - Bacterial prophylaxis: none  - Pneumocystis prophylaxis: tmp/smx  - Viral serostatus & prophylaxis: CMV D+/R+, EBV  D +/R+, HSV 1+/2-, VZV +  - Other serologies: Toxo D-/R-, donor strongyloides, WNV,HTLV negative  - Fungal prophylaxis: nystatin  - Immunization status:  shingrix 2019, PCV 13 2017, PPSV 23 2018, COVID x 3, Influenza 2021  - Immunoglobulins: unknown  - Isolation status: standard  - IS:. Induction immunosuppression basiliximab 12/25 and 12/29 followed by prednisone taper, MMF, and tacrolimus for maintenance.       History of Infectious Disease Illness:   68 y/o male with a history of CNS vasculitis (dx 2953-3400)-previously on cellcept, right axillary nonocclusive and left peroneal occlusive DVT (11/2022), and NICM cardiomyopathy s/p OHT 12/25/22 (CMV D+/R+, EBV D +/R+, Toxo D-/R-) who developed leukocytosis and positive blood cultures post transplant. He was admitted 12/6/22 for advanced cardiac therapy evaluation. Ultimately underwent OHT on 12/25. Induction immunosuppression was basiliximab 12/25 and 12/29 followed by prednisone taper, MMF, and tacrolimus. Post operative he was extubated POD 2 and is now on RA. He has been on steroids and developed steroid induced leukocytosis and hyperglycemia. He has remained afebrile. WBC increased from 13-14 to 18-19. Undergoing a steroid taper with a current dose of prednisone 20 mg daily.  Prophylaxis includes tmp/smx and valganciclovir. UA negative. On 1/7 1/7 blood cultures (1 of 4 peripheral blood cultures) grew gram positive cocci in clusters. 1/7/22 CXR clear.     Yesterday he worked with OT and developed increased midsternal incisional and left shoulder pain. Had 3 doses of oxycodone over night per the wife and has increased lethargy today. Denies subjective fevers, chills, abdominal pain, nausea. No joint or spinal hardware. We discussed his CNS vasculitis diagnosis. Initial issue started in 2013 and was diagnosed with CNS vasculitis in 2016 at [place]. He was initially on cyclosporine followed by long term cellcept. He does not recall infections during this time.    Received covid booster x 1 and did not receive the influenza vaccine this year.     He lived in [place]. Has lived in [place] and [place]. Used to work as an ED physician. Trained in [employer] and worked as an ER director for 37 years. Stopped working in 2013. He likes to garden and cook. No animals in the home. No hot tubs or pools in the home.     Cardiology notes, UNet donor serologies reviewed.      Transplants:  12/25/2022 (Heart), Postoperative day:  14.  Coordinator Mary Lou Luke    Past Medical History:   Diagnosis Date     Congestive heart failure (H)        Past Surgical History:   Procedure Laterality Date     COLONOSCOPY N/A 11/17/2022    Procedure: COLONOSCOPY;  Surgeon: Roverto Nino MD;  Location:  GI     CV CORONARY ANGIOGRAM N/A 11/11/2022    Procedure: Coronary Angiogram;  Surgeon: Justo Bush MD;  Location:  HEART CARDIAC CATH LAB     CV RIGHT HEART CATH MEASUREMENTS RECORDED N/A 11/11/2022    Procedure: Right Heart Catheterization;  Surgeon: Justo Bush MD;  Location:  HEART CARDIAC CATH LAB     CV RIGHT HEART CATH MEASUREMENTS RECORDED N/A 12/8/2022    Procedure: Right Heart Catheterization;  Surgeon: Wayne Xavier MD;  Location:  HEART CARDIAC CATH LAB     CV RIGHT HEART CATH MEASUREMENTS RECORDED N/A 12/9/2022    Procedure: Right Heart Catheterization with leave-in [place];  Surgeon: Khari Mcneill MD;  Location:  HEART CARDIAC CATH LAB     PICC DOUBLE LUMEN PLACEMENT Right 11/10/2022    Right basilic, 43 cm, 1 cm external length     TRANSPLANT HEART RECIPIENT N/A 12/25/2022    Procedure: TRANSPLANT, HEART, RECIPIENT; Median Sternotomy, Cardiopulmonary Bypass, Removal of Pacemaker;  Surgeon: Brendan Rodriguez MD;  Location:  OR       Family History   Problem Relation Age of Onset     Atrial fibrillation Father      Lung Cancer Brother        Social History     Social History Narrative     Not on file     Social History      Tobacco Use     Smoking status: Never     Smokeless tobacco: Never   Substance Use Topics     Alcohol use: Not Currently       Immunization History   Administered Date(s) Administered     COVID-19 Vaccine 18+ (Moderna) 02/02/2021, 03/04/2021     Influenza (H1N1) 10/21/2009     Influenza (High Dose) 3 valent vaccine 10/08/2018     Influenza Vaccine >6 months (Alfuria,Fluzone) 09/30/2016, 10/14/2017     Pneumo Conj 13-V (2010&after) 02/01/2017     Pneumococcal 23 valent 10/08/2018     Td (Adult), Adsorbed 10/29/2003     Zoster vaccine recombinant adjuvanted (SHINGRIX) 01/29/2019, 04/16/2019     Zoster vaccine, live 03/18/2015       Patient Active Problem List   Diagnosis     Acute respiratory failure with hypoxia (H)     Cardiomyopathy (H)     Stroke (H)     Coronary artery disease involving native coronary artery of native heart without angina pectoris     Essential hypertension     NSVT (nonsustained ventricular tachycardia)     SOB (shortness of breath)     CHF (congestive heart failure) (H)     Troponin level elevated     Anginal equivalent (H)     Heart failure, unspecified HF chronicity, unspecified heart failure type (H)     Benign neoplasm of colon     Acute on chronic combined systolic (congestive) and diastolic (congestive) heart failure (H)     Hyperlipidemia     Syncope and collapse     Primary central nervous system vasculitis (H)     Vasculitis, CNS (H)     Acute embolism and thrombosis of left peroneal vein (H)            Current Medications & Allergies:       acetaminophen  975 mg Oral Q8H     amLODIPine  5 mg Oral Daily     aspirin  81 mg Oral Daily     calcium carbonate-vitamin D  1 tablet Oral BID w/meals     heparin ANTICOAGULANT  5,000 Units Subcutaneous Q8H     insulin aspart  1-7 Units Subcutaneous TID AC     insulin aspart  1-5 Units Subcutaneous At Bedtime     lidocaine  1 patch Transdermal Q24H     lidocaine   Transdermal Q8H NINFA     magnesium sulfate  2 g Intravenous Once     melatonin   10 mg Oral At Bedtime     mycophenolate  1,500 mg Oral BID     nystatin  1,000,000 Units Oral 4x Daily     pantoprazole  40 mg Oral QAM AC     polyethylene glycol  17 g Oral Daily     predniSONE  20 mg Oral BID     QUEtiapine  25 mg Oral At Bedtime     rosuvastatin  10 mg Oral Daily     senna-docusate  1 tablet Oral BID     sodium chloride (PF)  3 mL Intracatheter Q8H     sulfamethoxazole-trimethoprim  1 tablet Oral Daily     tacrolimus  4 mg Oral BID IS     valGANciclovir  900 mg Oral Daily       Infusions/Drips:      dextrose 1,000 mL (01/02/23 0007)       No Known Allergies         Physical Exam:     Patient Vitals for the past 24 hrs:   BP Temp Temp src Pulse Resp SpO2 Weight   01/08/23 0500 -- -- -- -- -- -- 84.7 kg (186 lb 11.2 oz)   01/07/23 2033 114/77 -- -- 94 16 98 % --   01/07/23 1547 122/82 97.9  F (36.6  C) Oral 96 16 100 % --   01/07/23 1124 114/72 97.6  F (36.4  C) Oral 90 18 100 % --     Ranges for vital signs:  Temp:  [97.6  F (36.4  C)-97.9  F (36.6  C)] 97.9  F (36.6  C)  Pulse:  [90-96] 94  Resp:  [16-18] 16  BP: (114-122)/(72-82) 114/77  SpO2:  [98 %-100 %] 98 %  Vitals:    01/06/23 0500 01/07/23 0618 01/08/23 0500   Weight: 84.3 kg (185 lb 14.4 oz) 83.1 kg (183 lb 4.8 oz) 84.7 kg (186 lb 11.2 oz)       Physical Examination:  GENERAL:  Fatigued appearing, no acute distress. Laying in bed. Pale appearing.   HEAD:  Head is normocephalic, atraumatic   EYES:  Eyes have anicteric sclerae without conjunctival injection. Pupils reactive bilaterally.    ENT:  Oropharynx is moist without exudates or ulcers. Tongue is midline  LUNGS:  Clear to auscultation bilaterally. No accessory muscle use. Not on oxygen. 1 chest tube in place.  CARDIOVASCULAR:  Regular rate and rhythm with no murmurs   ABDOMEN:  Normal bowel sounds, soft, non-tender, non-distended  SKIN:  midsternal incision and left chest incision (older pacemaker) intact, no erythema or drainage.   EXTREMITIES: no joint swelling or  erythema  NEUROLOGIC:  Alert. Oriented. Grossly nonfocal. Active x4 extremities. Strength equal.  LINES: peripheral IV in place. No erythema around insertion site and dressing intact.          Laboratory Data:     Metabolic Studies       Recent Labs   Lab Test 01/08/23  0907 01/08/23  0737 01/07/23  0746 01/07/23  0728 01/05/23  0757 01/05/23  0715 01/04/23  0817 01/04/23  0458 12/25/22  1856 12/25/22  0604   NA  --  132*  --  133*   < > 133*  --  134*   < > 138   POTASSIUM  --  4.9  --  4.9   < > 5.4*  --  4.7   < > 4.0   CHLORIDE  --  103  --  103   < > 104  --  105   < > 105   CO2  --  20*  --  18*   < > 20*  --  20*   < > 22   ANIONGAP  --  9  --  12   < > 9  --  9   < > 11   BUN  --  24.1*  --  23.1*   < > 21.5  --  21.7   < > 13.1   CR  --  0.69  --  0.65*   < > 0.60*  --  0.57*   < > 0.84   GFRESTIMATED  --  >90  --  >90   < > >90  --  >90   < > >90   * 111*   < > 110*   < > 124*   < > 158*   < > 104*   A1C  --   --   --   --   --   --   --   --   --  6.2*   TIM  --  8.1*  --  8.4*   < > 8.4*  --  8.1*   < > 9.4   PHOS  --   --   --  3.4   < >  --   --  2.5   < > 3.5   MAG  --  1.6*  --  1.6*   < >  --   --  1.6*   < > 1.8   LACT  --   --   --   --   --  1.1  --  1.3   < > 1.2    < > = values in this interval not displayed.       Hepatic Studies    Recent Labs   Lab Test 01/07/23  0728 01/06/23  0442 01/05/23 0715   BILITOTAL 0.4 0.4 0.4   DBIL <0.20 <0.20 <0.20   ALKPHOS 80 71 69   PROTTOTAL 4.9* 4.7* 4.7*   ALBUMIN 2.7* 2.6* 2.6*   AST 27 25 30   ALT 39 47 44       Hematology Studies      Recent Labs   Lab Test 01/08/23  0737 01/07/23  0914 01/06/23  0442 01/05/23  1103 01/04/23  0458 01/03/23  0459   WBC 18.4* 19.9* 13.4* 15.2* 14.2* 13.2*   HGB 10.1* 10.7* 10.5* 10.3* 9.7* 9.8*   HCT 31.9* 33.8* 33.3* 32.8* 31.2* 31.6*    238 266 292 238 203       Arterial Blood Gas Testing    Recent Labs   Lab Test 12/30/22  0605 12/29/22  1009 12/29/22  0734 12/29/22  0404 12/29/22  0402 12/28/22  2148  12/28/22  2146 12/28/22  1551 12/28/22  1550   PH  --  7.47* 7.46*  --  7.46*  --  7.45  --  7.47*   PCO2  --  37 37  --  37  --  37  --  34*   PO2  --  103 136*  --  102  --  112*  --  130*   HCO3  --  27 27  --  27  --  26  --  25   O2PER 24 3 4  4 35 35   < > 35   < > 4    < > = values in this interval not displayed.        Medication levels    Recent Labs   Lab Test 01/08/23  0737 12/31/22  0555 12/27/22  1639   VANCOMYCIN  --   --  17.8   TACROL 8.3   < >  --     < > = values in this interval not displayed.       Inflammatory Markers    Recent Labs   Lab Test 11/12/22  0458   PSA 1.63       Pancreatitis testing    Recent Labs   Lab Test 12/25/22  0604 11/12/22  0458   AMYLASE 77  --    TRIG  --  75     Clotting Studies    Recent Labs   Lab Test 12/29/22  1008 12/25/22  2303 12/25/22  2150 12/25/22  0604   INR 1.15 1.27* 1.55* 1.04   PTT 26 46* 30 84*       Thyroid Studies     Recent Labs   Lab Test 11/12/22  0458 11/08/22  1020   TSH 5.18* 3.75       Urine Studies     Recent Labs   Lab Test 01/07/23  1116 12/25/22  0746 11/11/22  1155   URINEPH 5.5 7.0 5.0   NITRITE Negative Negative Negative   LEUKEST Negative Negative Negative   WBCU 0 0 3     Microbiology:  Culture   Date Value Ref Range Status   01/07/2023 No growth after 12 hours  Preliminary   01/07/2023 Positive on the 1st day of incubation (A)  Preliminary   01/07/2023 Gram positive cocci in clusters (AA)  Preliminary     Comment:     1 of 2 bottles     Virology:  Coronavirus-19 testing    Recent Labs   Lab Test 12/06/22  0758 11/10/22  1310   [id number] Negative Negative       Respiratory virus testing    Recent Labs   Lab Test 12/06/22  0758   INFZA Negative   INFZB Negative   IRSV Negative       CMV Antibody IgG   Date Value Ref Range Status   12/25/2022 Positive, suggests recent or past exposure. (A) No detectable antibody.  Final   11/12/2022 Positive, suggests recent or past exposure. (A) No detectable antibody.  Final     Varicella Zoster  Antibody IgG   Date Value Ref Range Status   11/12/2022 Positive  Final     Comment:     Suggests previous exposure or immunization and probable immunity.     EBV Capsid Antibody IgG   Date Value Ref Range Status   12/25/2022 Positive (A) No detectable antibody. Final     Comment:     Suggests recent or past exposure.   11/12/2022 Positive (A) No detectable antibody. Final     Comment:     Suggests recent or past exposure.     Toxoplasma Antibody IgG   Date Value Ref Range Status   11/12/2022 <3.0 0.0 - 7.1 IU/mL Final     Comment:     Negative- Absence of detectable Toxoplasma gondii IgG antibodies. A negative result does not rule out acute infection.     Herpes Simplex Virus Type 1 IgG Antibody   Date Value Ref Range Status   11/12/2022 Positive.  IgG antibody to HSV-1 detected. (A) No HSV-1 IgG antibodies detected Final     Herpes Simplex Virus Type 2 IgG Antibody   Date Value Ref Range Status   11/12/2022 No HSV-2 IgG antibodies detected. No HSV-2 IgG antibodies detected Final     Hepatitis B Testing     Recent Labs   Lab Test 12/25/22  0604 11/12/22  0458   AUSAB 37.74 30.51   HBCAB Nonreactive Nonreactive   HEPBANG Nonreactive Nonreactive        Hepatitis C Antibody   Date Value Ref Range Status   12/25/2022 Nonreactive Nonreactive Final   11/12/2022 Nonreactive Nonreactive Final       Imaging:  Recent Results (from the past 48 hour(s))   XR Chest Port 1 View    Narrative    EXAM: XR CHEST PORT 1 VIEW  1/7/2023 9:21 AM     HISTORY:  post-op heart transplant monitoring       COMPARISON:  Radiograph on 1/6/2023, 1/5/2023.    TECHNIQUE: Upright AP view of the chest    FINDINGS:   Devices, lines, tubes: Median sternotomy wires are intact. Left  pericardial drain in stable positioning.    The trachea is midline. The cardiomediastinal silhouette is within  normal limits. The pulmonary vasculature is distinct.  No appreciable  pneumothorax, pleural effusion, or focal consolidative opacity. No  acute osseous  abnormality.        Impression    IMPRESSION:   No acute airspace opacities.    I have personally reviewed the examination and initial interpretation  and I agree with the findings.    ELSY RAMIREZ MD         SYSTEM ID:  [id number]

## 2023-01-09 ENCOUNTER — APPOINTMENT (OUTPATIENT)
Dept: GENERAL RADIOLOGY | Facility: CLINIC | Age: 68
DRG: 001 | End: 2023-01-09
Attending: SURGERY
Payer: MEDICARE

## 2023-01-09 ENCOUNTER — APPOINTMENT (OUTPATIENT)
Dept: GENERAL RADIOLOGY | Facility: CLINIC | Age: 68
DRG: 001 | End: 2023-01-09
Attending: NURSE PRACTITIONER
Payer: MEDICARE

## 2023-01-09 ENCOUNTER — APPOINTMENT (OUTPATIENT)
Dept: ULTRASOUND IMAGING | Facility: CLINIC | Age: 68
DRG: 001 | End: 2023-01-09
Attending: NURSE PRACTITIONER
Payer: MEDICARE

## 2023-01-09 PROBLEM — B95.7 STAPHYLOCOCCUS EPIDERMIDIS BACTEREMIA: Status: ACTIVE | Noted: 2023-01-09

## 2023-01-09 PROBLEM — R78.81 STAPHYLOCOCCUS EPIDERMIDIS BACTEREMIA: Status: ACTIVE | Noted: 2023-01-09

## 2023-01-09 PROBLEM — Z79.2 USING PROPHYLACTIC ANTIBIOTIC ON DAILY BASIS: Status: ACTIVE | Noted: 2023-01-09

## 2023-01-09 LAB
ANION GAP SERPL CALCULATED.3IONS-SCNC: 9 MMOL/L (ref 7–15)
BUN SERPL-MCNC: 19.6 MG/DL (ref 8–23)
CALCIUM SERPL-MCNC: 8.5 MG/DL (ref 8.8–10.2)
CHLORIDE SERPL-SCNC: 103 MMOL/L (ref 98–107)
CREAT SERPL-MCNC: 0.66 MG/DL (ref 0.67–1.17)
DEPRECATED HCO3 PLAS-SCNC: 22 MMOL/L (ref 22–29)
ERYTHROCYTE [DISTWIDTH] IN BLOOD BY AUTOMATED COUNT: 16.3 % (ref 10–15)
GFR SERPL CREATININE-BSD FRML MDRD: >90 ML/MIN/1.73M2
GLUCOSE BLDC GLUCOMTR-MCNC: 117 MG/DL (ref 70–99)
GLUCOSE BLDC GLUCOMTR-MCNC: 123 MG/DL (ref 70–99)
GLUCOSE BLDC GLUCOMTR-MCNC: 153 MG/DL (ref 70–99)
GLUCOSE BLDC GLUCOMTR-MCNC: 219 MG/DL (ref 70–99)
GLUCOSE SERPL-MCNC: 118 MG/DL (ref 70–99)
HCT VFR BLD AUTO: 33.2 % (ref 40–53)
HGB BLD-MCNC: 10.2 G/DL (ref 13.3–17.7)
HGB BLD-MCNC: 10.6 G/DL (ref 13.3–17.7)
MAGNESIUM SERPL-MCNC: 1.5 MG/DL (ref 1.7–2.3)
MAGNESIUM SERPL-MCNC: 2.9 MG/DL (ref 1.7–2.3)
MCH RBC QN AUTO: 29 PG (ref 26.5–33)
MCHC RBC AUTO-ENTMCNC: 30.7 G/DL (ref 31.5–36.5)
MCV RBC AUTO: 94 FL (ref 78–100)
OXYHGB MFR BLDV: 64 % (ref 92–100)
PHOSPHATE SERPL-MCNC: 2.7 MG/DL (ref 2.5–4.5)
PLATELET # BLD AUTO: 277 10E3/UL (ref 150–450)
POTASSIUM SERPL-SCNC: 4.8 MMOL/L (ref 3.4–5.3)
RADIOLOGIST FLAGS: ABNORMAL
RADIOLOGIST FLAGS: ABNORMAL
RBC # BLD AUTO: 3.52 10E6/UL (ref 4.4–5.9)
SODIUM SERPL-SCNC: 134 MMOL/L (ref 136–145)
WBC # BLD AUTO: 13.7 10E3/UL (ref 4–11)

## 2023-01-09 PROCEDURE — 250N000013 HC RX MED GY IP 250 OP 250 PS 637: Performed by: PHYSICIAN ASSISTANT

## 2023-01-09 PROCEDURE — 71045 X-RAY EXAM CHEST 1 VIEW: CPT

## 2023-01-09 PROCEDURE — 87040 BLOOD CULTURE FOR BACTERIA: CPT | Performed by: NURSE PRACTITIONER

## 2023-01-09 PROCEDURE — 80048 BASIC METABOLIC PNL TOTAL CA: CPT | Performed by: SURGERY

## 2023-01-09 PROCEDURE — 36415 COLL VENOUS BLD VENIPUNCTURE: CPT | Performed by: NURSE PRACTITIONER

## 2023-01-09 PROCEDURE — 0DHA7UZ INSERTION OF FEEDING DEVICE INTO JEJUNUM, VIA NATURAL OR ARTIFICIAL OPENING: ICD-10-PCS | Performed by: ANESTHESIOLOGY

## 2023-01-09 PROCEDURE — C1769 GUIDE WIRE: HCPCS | Performed by: INTERNAL MEDICINE

## 2023-01-09 PROCEDURE — 250N000013 HC RX MED GY IP 250 OP 250 PS 637: Performed by: STUDENT IN AN ORGANIZED HEALTH CARE EDUCATION/TRAINING PROGRAM

## 2023-01-09 PROCEDURE — 99233 SBSQ HOSP IP/OBS HIGH 50: CPT | Mod: 24 | Performed by: INTERNAL MEDICINE

## 2023-01-09 PROCEDURE — 36415 COLL VENOUS BLD VENIPUNCTURE: CPT | Performed by: SURGERY

## 2023-01-09 PROCEDURE — 83735 ASSAY OF MAGNESIUM: CPT | Performed by: SURGERY

## 2023-01-09 PROCEDURE — 71045 X-RAY EXAM CHEST 1 VIEW: CPT | Mod: 26 | Performed by: RADIOLOGY

## 2023-01-09 PROCEDURE — 85018 HEMOGLOBIN: CPT

## 2023-01-09 PROCEDURE — 250N000012 HC RX MED GY IP 250 OP 636 PS 637: Performed by: STUDENT IN AN ORGANIZED HEALTH CARE EDUCATION/TRAINING PROGRAM

## 2023-01-09 PROCEDURE — C1894 INTRO/SHEATH, NON-LASER: HCPCS | Performed by: INTERNAL MEDICINE

## 2023-01-09 PROCEDURE — 250N000013 HC RX MED GY IP 250 OP 250 PS 637: Performed by: SURGERY

## 2023-01-09 PROCEDURE — 250N000011 HC RX IP 250 OP 636

## 2023-01-09 PROCEDURE — 214N000001 HC R&B CCU UMMC

## 2023-01-09 PROCEDURE — 86140 C-REACTIVE PROTEIN: CPT | Performed by: INTERNAL MEDICINE

## 2023-01-09 PROCEDURE — 02BK3ZX EXCISION OF RIGHT VENTRICLE, PERCUTANEOUS APPROACH, DIAGNOSTIC: ICD-10-PCS | Performed by: INTERNAL MEDICINE

## 2023-01-09 PROCEDURE — 71045 X-RAY EXAM CHEST 1 VIEW: CPT | Mod: 77

## 2023-01-09 PROCEDURE — 250N000011 HC RX IP 250 OP 636: Performed by: SURGERY

## 2023-01-09 PROCEDURE — 84100 ASSAY OF PHOSPHORUS: CPT | Performed by: SURGERY

## 2023-01-09 PROCEDURE — 250N000009 HC RX 250: Performed by: INTERNAL MEDICINE

## 2023-01-09 PROCEDURE — 250N000013 HC RX MED GY IP 250 OP 250 PS 637

## 2023-01-09 PROCEDURE — 88350 IMFLUOR EA ADDL 1ANTB STN PX: CPT | Mod: TC

## 2023-01-09 PROCEDURE — 272N000001 HC OR GENERAL SUPPLY STERILE: Performed by: INTERNAL MEDICINE

## 2023-01-09 PROCEDURE — 250N000013 HC RX MED GY IP 250 OP 250 PS 637: Performed by: NURSE PRACTITIONER

## 2023-01-09 PROCEDURE — 82810 BLOOD GASES O2 SAT ONLY: CPT

## 2023-01-09 PROCEDURE — 85027 COMPLETE CBC AUTOMATED: CPT | Performed by: STUDENT IN AN ORGANIZED HEALTH CARE EDUCATION/TRAINING PROGRAM

## 2023-01-09 PROCEDURE — 250N000012 HC RX MED GY IP 250 OP 636 PS 637: Performed by: SURGERY

## 2023-01-09 PROCEDURE — 93971 EXTREMITY STUDY: CPT | Mod: 26 | Performed by: RADIOLOGY

## 2023-01-09 PROCEDURE — 93970 EXTREMITY STUDY: CPT | Mod: 26 | Performed by: RADIOLOGY

## 2023-01-09 PROCEDURE — 250N000012 HC RX MED GY IP 250 OP 636 PS 637: Performed by: NURSE PRACTITIONER

## 2023-01-09 PROCEDURE — 93505 ENDOMYOCARDIAL BIOPSY: CPT | Mod: 26 | Performed by: INTERNAL MEDICINE

## 2023-01-09 PROCEDURE — 93970 EXTREMITY STUDY: CPT

## 2023-01-09 PROCEDURE — 93971 EXTREMITY STUDY: CPT | Mod: LT

## 2023-01-09 PROCEDURE — 99232 SBSQ HOSP IP/OBS MODERATE 35: CPT | Mod: 25 | Performed by: STUDENT IN AN ORGANIZED HEALTH CARE EDUCATION/TRAINING PROGRAM

## 2023-01-09 PROCEDURE — 93505 ENDOMYOCARDIAL BIOPSY: CPT | Performed by: INTERNAL MEDICINE

## 2023-01-09 PROCEDURE — 4A023N6 MEASUREMENT OF CARDIAC SAMPLING AND PRESSURE, RIGHT HEART, PERCUTANEOUS APPROACH: ICD-10-PCS | Performed by: INTERNAL MEDICINE

## 2023-01-09 RX ORDER — NYSTATIN 100000/ML
1000000 SUSPENSION, ORAL (FINAL DOSE FORM) ORAL 4 TIMES DAILY
Status: DISCONTINUED | OUTPATIENT
Start: 2023-01-09 | End: 2023-01-13 | Stop reason: HOSPADM

## 2023-01-09 RX ORDER — MAGNESIUM SULFATE HEPTAHYDRATE 40 MG/ML
4 INJECTION, SOLUTION INTRAVENOUS ONCE
Status: COMPLETED | OUTPATIENT
Start: 2023-01-09 | End: 2023-01-09

## 2023-01-09 RX ADMIN — VANCOMYCIN HYDROCHLORIDE 1000 MG: 1 INJECTION, SOLUTION INTRAVENOUS at 01:52

## 2023-01-09 RX ADMIN — ROSUVASTATIN CALCIUM 10 MG: 10 TABLET, FILM COATED ORAL at 13:29

## 2023-01-09 RX ADMIN — SENNOSIDES AND DOCUSATE SODIUM 1 TABLET: 8.6; 5 TABLET ORAL at 20:08

## 2023-01-09 RX ADMIN — PREDNISONE 20 MG: 20 TABLET ORAL at 20:08

## 2023-01-09 RX ADMIN — NYSTATIN 1000000 UNITS: 500000 SUSPENSION ORAL at 20:08

## 2023-01-09 RX ADMIN — INSULIN ASPART 2 UNITS: 100 INJECTION, SOLUTION INTRAVENOUS; SUBCUTANEOUS at 18:06

## 2023-01-09 RX ADMIN — HEPARIN SODIUM 5000 UNITS: 5000 INJECTION, SOLUTION INTRAVENOUS; SUBCUTANEOUS at 20:08

## 2023-01-09 RX ADMIN — ACETAMINOPHEN 975 MG: 325 TABLET, FILM COATED ORAL at 13:29

## 2023-01-09 RX ADMIN — CALCIUM CARBONATE 600 MG (1,500 MG)-VITAMIN D3 400 UNIT TABLET 1 TABLET: at 18:07

## 2023-01-09 RX ADMIN — HEPARIN SODIUM 5000 UNITS: 5000 INJECTION, SOLUTION INTRAVENOUS; SUBCUTANEOUS at 04:11

## 2023-01-09 RX ADMIN — Medication 1 PACKET: at 13:29

## 2023-01-09 RX ADMIN — CALCIUM CARBONATE 600 MG (1,500 MG)-VITAMIN D3 400 UNIT TABLET 1 TABLET: at 13:29

## 2023-01-09 RX ADMIN — SULFAMETHOXAZOLE AND TRIMETHOPRIM 1 TABLET: 400; 80 TABLET ORAL at 13:29

## 2023-01-09 RX ADMIN — MAGNESIUM SULFATE IN WATER 4 G: 40 INJECTION, SOLUTION INTRAVENOUS at 11:47

## 2023-01-09 RX ADMIN — MYCOPHENOLATE MOFETIL 1500 MG: 250 CAPSULE ORAL at 20:07

## 2023-01-09 RX ADMIN — TACROLIMUS 5 MG: 5 CAPSULE ORAL at 18:07

## 2023-01-09 RX ADMIN — VALGANCICLOVIR HYDROCHLORIDE 900 MG: 450 TABLET ORAL at 09:08

## 2023-01-09 RX ADMIN — TACROLIMUS 4 MG: 1 CAPSULE ORAL at 09:08

## 2023-01-09 RX ADMIN — Medication 10 MG: at 21:56

## 2023-01-09 RX ADMIN — ACETAMINOPHEN 975 MG: 325 TABLET, FILM COATED ORAL at 04:11

## 2023-01-09 RX ADMIN — PREDNISONE 20 MG: 20 TABLET ORAL at 09:08

## 2023-01-09 RX ADMIN — ASPIRIN 81 MG: 81 TABLET, CHEWABLE ORAL at 09:08

## 2023-01-09 RX ADMIN — MYCOPHENOLATE MOFETIL 1500 MG: 250 CAPSULE ORAL at 09:09

## 2023-01-09 RX ADMIN — AMLODIPINE BESYLATE 5 MG: 5 TABLET ORAL at 09:08

## 2023-01-09 RX ADMIN — NYSTATIN 1000000 UNITS: 500000 SUSPENSION ORAL at 13:30

## 2023-01-09 RX ADMIN — QUETIAPINE FUMARATE 25 MG: 25 TABLET ORAL at 21:56

## 2023-01-09 RX ADMIN — Medication 1 PACKET: at 17:35

## 2023-01-09 RX ADMIN — VANCOMYCIN HYDROCHLORIDE 1000 MG: 1 INJECTION, SOLUTION INTRAVENOUS at 15:01

## 2023-01-09 RX ADMIN — NYSTATIN 1000000 UNITS: 500000 SUSPENSION ORAL at 17:30

## 2023-01-09 RX ADMIN — ACETAMINOPHEN 975 MG: 325 TABLET, FILM COATED ORAL at 20:08

## 2023-01-09 ASSESSMENT — ACTIVITIES OF DAILY LIVING (ADL)
ADLS_ACUITY_SCORE: 46
ADLS_ACUITY_SCORE: 42
ADLS_ACUITY_SCORE: 46
ADLS_ACUITY_SCORE: 42
ADLS_ACUITY_SCORE: 42
ADLS_ACUITY_SCORE: 46
ADLS_ACUITY_SCORE: 42
ADLS_ACUITY_SCORE: 42
ADLS_ACUITY_SCORE: 46
ADLS_ACUITY_SCORE: 46

## 2023-01-09 NOTE — PLAN OF CARE
D: S/p heart transplant on 12/25/22.   PMH of chronic systolic heart failure (suspected predominantly nonischemic cardiomyopathy, possible arrhythmogenic), CAD s/p PCI, HLD, CNS vasculitis, CKD Stage 2.     I: Monitored vitals and assessed pt status.   Changed: CT removed  Tele: Sinus rhythm, HR 80's  Mobility: Ax1    A: AOx4. VSS. Afebrile. Urinating adequately. LBM today. Denies any pain. RHC and biopsy completed today.     P: Continue to monitor Pt status and report changes to CV[facility].

## 2023-01-09 NOTE — PROVIDER NOTIFICATION
Received call from Carolee in ID lab at 12:11 for critical lab from R hand culture being Gram positive cocci in clusters. Jillian Moya NP from [facility] was updated with this result.

## 2023-01-09 NOTE — PROGRESS NOTES
"CLINICAL NUTRITION SERVICES - REASSESSMENT NOTE     Nutrition Prescription    RECOMMENDATIONS FOR MDs/PROVIDERS TO ORDER:  None at this time.     Malnutrition Status:    Severe malnutrition in the context of acute illness/injury on chronic illness    Recommendations already ordered by Registered Dietitian (RD):  Discontinued free water flushes  Modified snack/supplement order    Future/Additional Recommendations:  1. Resume regular diet order once post-procedure. Encourage high protein options and adequate kcals due to increased needs post-op Tx.   2. Monitor K+ trends. If K+ trends high, rec a 3 g K+ diet. Then, consider modifying oral supplements to Vital Cuisine or Glucerna.   3. Continue calcium/vitamin D twice daily, as ordered.   4. Continue to replace Mg++.   5. Order a multivitamin with minerals if oral intake decreases.   6. Monitor potential need for iron supplementation.   7. Monitor BG. Hgb A1c of 6.1 on 11/12, 6.2 on 12/25. BG of 111 on 1/8.     EVALUATION OF THE PROGRESS TOWARD GOALS   Diet: NPO for RHC/biopsy. Previously was on a regular diet (1/4-1/8), soft & bite sized diet with thin liquids 1/3, and full liquids prior (1/2-1/3). Per SLP on 1/6, rec \"Recommend regular diet/thin liquids\" with precautions. Has an order for Ensure Enlive with meals.   Intake: Tolerating diet. Per % intake flowsheets, pt consuming 75% with a good appetite 1/3 (declined dinner) and 100% with a good appetite 1/4-1/8. Per brief discussion (pt resting) with pt today (1/9), states his appetite is good although he is NPO so far today. He likes the Ensure Enlive, especially chocolate. [employer] (meal ordering system) indicates food/beverages sent 1/6-1/8 totaled 2918 kcals and 128 g protein daily on average (meeting estimated needs when consuming 100%). Ordering three meals on average daily.       Previous Nutrition Support:    Previous regimen: TwoCal HN @ 60 mL/hr x 12 hours overnight (720 mL/day) to provide 1440 kcals (17 " kcal/kg/day), 61 g PRO (0.7 g/kg/day), 504 mL H2O, 158 g CHO and 4 g Fiber daily. Last received TFs 1/4.       NEW FINDINGS   GI: On scheduled miralax and senna, held at times. Last stool was on 1/5. Stools are formed and brown. Having zero to one stool daily.   Weight History: 102.3 kg (12/21/2021), 99.9 kg (5/11/2022), 98.7 kg (7/18/2022), 99.3 kg (8/23/2022), 95.7 kg (11/8/2022), 108 kg (11/10/2022), 90.7 kg (12/6, admit), 84.7 kg (1/8) - Pt has lost at least 14% of his body wt over the last approximate five months. Suspect actual and fluid loss.     ASSESSED NUTRITION NEEDS (for inpatient hospital stay)  Dosing Weight: 83 kg (based on lowest wt so far this admission of 82.6 kg on 12/9)  Estimated Energy Needs: 6244-0548 kcals/day (30 - 35 kcals/kg)   Justification: Increased needs post-transplant  Estimated Protein Needs: 125-166 grams protein/day (1.5 - 2 grams of pro/kg)   Justification: Increased needs post-transplant  Estimated Fluid Needs: ~0818-4197 mL/day (25-30 mL/kg)   Justification: Estimated needs  pending fluid status    MALNUTRITION  % Intake: No decreased intake noted  % Weight Loss: > 10% in 6 months (severe)  Subcutaneous Fat Loss: Facial region:  mild and Upper arm:  mild per prior RD note - Pt was resting 1/9.   Muscle Loss: Temporal:  mild, Thoracic region (clavicle, acromium bone, deltoid, trapezius, pectoral):  mild and Posterior calf:  moderate  Fluid Accumulation/Edema: Not meeting this criteria.     Malnutrition Diagnosis: Severe malnutrition in the context of acute illness/injury on chronic illness    Previous Goals   Patient to consume % of nutritionally adequate meal trays TID, or the equivalent with supplements/snacks   Evaluation: Meeting  OR   Total avg nutritional intake to meet a minimum of 30 kcal/kg and 1.3 g PRO/kg daily (per dosing wt 83 kg).  Evaluation: Meeting    Previous Nutrition Diagnosis  Increased nutrient needs related to hypercatabolism as evidenced by s/p  heart transplant.    Evaluation: Unresolved. Updated below.     CURRENT NUTRITION DIAGNOSIS  Increased nutrient needs (protein-energy) related to hypercatabolism post-op Tx as evidenced by pt requiring at least 30 kcals/kg and 1.5 g protein/kg.    INTERVENTIONS  Implementation  Feeding tube flush: Discontinued free water flushes (no feeding tube access).  Medical food supplement therapy: Modified snack/supplement order to indicate pt's primary preference for chocolate-flavored oral supplements, followed by strawberry.   Nutrition education for nutrition relationship to health/disease: Provided handouts (pt resting): Guide to Your Diet after Transplant and Food Safety for Transplant Recipients booklet from the [place] [employer] Agriculture.    Goals  Patient to consume % of nutritionally adequate meal trays TID, or the equivalent with supplements/snacks.    Monitoring/Evaluation  Progress toward goals will be monitored and evaluated per protocol.     Nutrition will continue to follow.      Moriah Garcia, MS, RD, LD, [facility]   6C Pgr: [phone]

## 2023-01-09 NOTE — PLAN OF CARE
Goal Outcome Evaluation:      Plan of Care Reviewed With: patient    Overall Patient Progress: improvingOverall Patient Progress: improving    Outcome Evaluation: Encourage high protein/kcal options as pt post-op Tx. Monitor K+ trends and potential need to restict.       Sruthi Ellsworth   MRN: [id number]    Department:  [facility] Emergency Department in [place]   Date of Visit:  8/17/2018           Disclosure     Insurance plans vary and the physician(s) referred by the ER may not be covered by your plan.  Please contact tell this physician (or your personal doctor if your instructions are to return to your personal doctor) about any new or lasting problems. The primary care or specialist physician will see patients referred from the [facility] Emergency Department.  Wilbur Elkins

## 2023-01-09 NOTE — DISCHARGE INSTRUCTIONS
AFTER YOU GO HOME FROM YOUR HEART SURGERY  (Heart Transplant on 12/25/2022 with Dr. Rodriguez)    You had a sternotomy, avoid lifting anything greater than ten pounds for 8 weeks after surgery and then less than 20 pounds for an additional 4 weeks.   Do not reach backwards or use arms to push out of chair.   Do not let people pull on your arms to assist with standing.   Avoid twisting or reaching too far across your body.    Avoid strenuous activities such as bowling, vacuuming, raking, shoveling, golf or tennis for 12 weeks after your surgery.   It is okay to resume sex if you feel comfortable in doing so. You may have to try different positions with your partner.    Splint your chest incision by hugging a pillow or bringing your arms across your chest when coughing or sneezing.     No driving for 4 weeks after surgery or while on pain medication.    Shower or wash your incisions daily with soap and water (or as instructed), pat dry.   Keep wound clean and dry, showers are okay after discharge, but don't let spray hit directly on incision.   No baths or swimming for 1 month.   Cover chest tube sites with dry gauze until they stop draining, then leave open to air. It is not abnormal for chest tube sites to drain yellowish/clear fluid for up to 2-3 weeks after surgery.   Watch for signs of infection: increased redness, tenderness, warmth or any drainage that appears infected (pus like) or is persistent.  Also a temperature > 100.5 F or chills. Call your surgeon or primary care provider's office immediately.   Remove any skin glue left on incisions after 10-14 days. This will not affect your incision and can speed up healing.    Exercise is very important in your recovery. Please follow the guidelines set up for you in your cardiac rehab classes at the hospital. If outpatient cardiac rehab was ordered for you, we highly recommend you participate. If you have problems arranging your cardiac rehab, please call  "[phone] for all locations, with the exception of [place], please call [phone] and [place], please call [phone].    Avoid sitting for prolonged periods of time, try to walk every hour during the day. If you have a leg incision, elevate your leg often when you are not walking.    Check your weight when you get home from the hospital and continue to check it daily through your recovery for at least a month. If you notice a weight gain of 2-3 pounds in a week, notify your primary care physician, cardiologist or surgeon.    Bowel activity may be slow after surgery. If necessary, you may take an over the counter laxative such as Milk of Magnesia or Miralax. You may have stool softeners prescribed (docusate sodium, Senokot). We recommend using stool softeners while using narcotics for pain (oxycodone/percocet, hydrocodone/vicodin, hydromorphone/dilaudid).      Wean OFF of narcotics (oxycodone, dilaudid, hydrocodone) as soon as possible. You should continue taking acetaminophen as long as you have any surgical pain as the first choice for pain control and add narcotics as necessary for pain to be tolerable.      DENTAL VISITS AFTER SURGERY  If you have had your heart valve repaired or replaced, we do not recommend having any dental work done for 6 months and you will need to take an antibiotic prior to dental visits from now on.  Please notify your dentist before any procedure for the proper treatment needed. The antibiotic is taken by mouth one hour prior to visit. This includes routine cleanings.  You can sometimes hear a mechanical valve \"clicking,\" this is normal and not a sign of something wrong.    DO NOT SMOKE.  IF YOU NEED HELP QUITTING, PLEASE TALK WITH YOUR CARDIOLOGIST OR PRIMARY DOCTOR.    You are on a blood thinner, follow the instructions you were given in the hospital and DO NOT SKIP this medication. Try and take it the same time everyday.     You had a heart transplant, so call your " Transplant Coordinator with all questions or concerns.     REGARDING PRESCRIPTION REFILLS.  If you need a refill on your pain medication contact us to discuss your pain and a possible one time refill.   All other medications will be adjusted, discontinued and re-filled by your primary care physician and/or your cardiologist as they were prior to your surgery. We have given you enough for one to three month with possibly one refill.    POST-OPERATIVE CLINIC VISITS  You have a follow up visit with Cardiology Advance Care Practitioners on 01/17/2023.  You will then return to the care of your primary provider and your cardiologist. Future medication refills should come from your PCP or Cardiologist.   You should see your primary care provider in 2-4 weeks after discharge.   If you do not hear from a  in 7 days, please call [phone] (choose option 1) and request to be seen with a general cardiologist or someone that you have seen in the past.   If there is a need to return to see CT Surgery please call our  at [phone].    SURGICAL QUESTIONS  Please call Edgardo Johnson, Marlen Obrien, Fani Singer or Jessica Buitrago with surgical recovery and medication questions, their phone numbers are listed below.  They will assist you with your needs and contact other surgery care team members as indicated.    On weekends or after hours, please call [phone] and ask the  to page the Cardiothoracic Surgery fellow on call.      Thank you,    Your Cardiothoracic Surgery Team   Edgardo Johnson RN Care Coordinator - [phone]  Marlen Obrien RN Care Coordinator - [phone]   Jessica Buitrago, RN Care Coordinator - [phone]   Fani Singer, RN Care Coordinator - [phone]         On Tuesday, 1/17/23 You are scheduled at 0730 am in the Outpatient Lab [facility]st [facility]--you need your Tacrolimus level drawn.  At 9am you are scheduled to [facility] ([facility] on the 2nd floor of  [facility]---this is for your PICC dressing change and for your future labs, you can go to them on Tuesdays for lab draw and weekly PICC dressing change    ____________________________________________________________________________________________________________________________________________________________________            Nutrition  Diet: Regular diet as tolerated, or as recommended by your team/doctors  *Post-Transplant Diet Guidelines - Follow recommendations on the Guide to Your Diet after Transplant handout (summary below).    -  Maintain a healthy weight.  -  Eat a heart-healthy diet (low sodium, low saturated and trans-fat) once your appetite improves to normal.  -  Control blood sugar.  -  Limit sodium (salt).  -  Take calcium and vitamin D supplement if your doctor or team orders.  -  Eat more protein for six to eight weeks after transplant.    -  Prevent food poisoning, store and prepare foods to the proper temperature, practice good handwashing, heat all deli meat (to 165 degrees Fahrenheit), avoid raw fish and meats (including uncooked eggs, non-pasteurized or raw milk, and non-pasteurized or raw cheeses including brie, camembert, blue-veined cheese, and Mexican queso fresco), and throw out leftovers older than two days.   -  In some cases (but not in all cases), adjust potassium intake.

## 2023-01-09 NOTE — CONSULTS
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  He is now s/p OHT 12/25/22, with post-operative course c/b delirium, leukocytosis, and prolonged CT output.          RECOMMENDATIONS:  - continue vanco, further rec's per ID  - RHC today  - repeat venous ultrasound (bilateral UE, LLE) to follow-up DVTs -- defer anticoag to CVTS  - repeat tacro level Tuesday         NICM, s/p OHT 12/25/22  His post-transplant course has been c/b delirium, leukocytosis, and prolonged CT output.    Rejection history:  Initial biopsy showed diffuse capillary staining for c4d, suggestive of pAMR1.  No DSAs, no graft dysfunction.  No changes were made, plan to f/u with next week's biospy.  AlloMap scores:  n/a  DSAs:  none  Coronary angio/Ischemic eval:  To be completed at ~week #4 post-tx  Last RHC:  today showed normal biventricular filling pressures, with RA 4, mPA 16, PCW 9, and CI 2.6 --> weight 185# (standing scale).  Echo/cMRI:  TTE 1/2/23 showed normal graft function, with LVEF 60-65% and normal RV size/function.    Serostatus:  - CMV D+/R+  - EBV D?/R+  - Toxo D?/R-    Immunosuppression:  - received basiliximab 12/25 and 12/29  - prednisone taper, currently 20 BID  - MMF 1500mg twice daily, planning to decrease to 1000mg BID as he is > 60yrs of age pending today's biopsy  - tacro, goal level 10-12.  Level from yesterday was 8.3, reflecting an ~13.5hr trough.  Continue  tacro 4/5 and will repeat tacro level tomorrow.    PPx:  - CAV:  Aspirin 81mg daily and rosuvastatin 10mg daily  - GI:  Pantoprazole 40mg daily  - Osteoporosis:  calcium/vitamin D supplements  - CMV:  valcyte 900mg daily (x3 months, through 3/2023)  - PCP:  Bactrim single strength daily  - Thrush:  Nystatin s/s QID  - Toxo:  n/a    Graft function:  - BPs:  Controlled, 100-110/70-80s, continue amlodipine 5mg daily  - HRs:  80-90s, off terbutaline  - fluid status:  Euvolemic, off diuretics    Health maintenance:  - due for week #3 RHC/bx ~1/16/23  - note that during today's RHC, clot was noted on RIJ and LIJ was inaccessible so he was accessed femorally      h/o right axillary nonocclusive and left peroneal occlusive DVT (11/2022, pre-transplant)  Was on apixaban prior to transplant, which has been held.  During RHC 1/9, it was noted that his RIJ had clot and LIJ is inaccessible, so he required femoral access.  - repeat LLE ultrasound and bilateral UE ultrasounds  - defer a/c to CVTS    Leukocytosis  WBCs had been ranging low 10s, but increased to 19.9 on 1/7 (afebrile, no localizing symptoms).  One set of BCs on 1/7 showed staph epi, so cultures were repeated 1/8 with one set showing GPCs.  He was started on IV vanco 1/8 per ID.  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  He has no pain, and has been walking/working with rehab with improving strength and endurance.  His breathing has been well, and he denies SOB, PND, and orthopnea.  His appetite has been good, and he is eating well without nausea, vomiting, diarrhea, and constipation.  He does not feel any fluid retention.  He otherwise denies chest pain, palpitations, dizziness, falls, headaches, acute vision changes, fevers, chills, cough, sore throat, and signs of bleeding.    Last 24 hr care team notes  "reviewed.   ROS:  4 point ROS including Respiratory, CV, GI and , other than that noted in the HPI, is negative.     Medications: Reviewed in EPIC.     Physical Exam:   /80 (BP Location: Right arm)   Pulse 84   Temp 97.6  F (36.4  C) (Oral)   Resp 16   Ht 1.88 m (6' 2\")   Wt 84.7 kg (186 lb 11.2 oz)   SpO2 96%   BMI 23.97 kg/m    GENERAL: Appears alert and oriented times three. Sitting upright in bed, NAD.  HEENT: Eye symmetrical and free of discharge bilaterally. Mucous membranes moist and without lesions. No thrush noted on tongue or cheeks.  NECK: Supple and without lymphadenopathy. JVD negative when sitting upright.   CV: RRR, S1S2 present without murmur, rub, or gallop.   RESPIRATORY: Respirations regular, even, and unlabored. Lungs CTA throughout.   GI: Soft and non distended with normoactive bowel sounds present in all quadrants. No tenderness, rebound, guarding. No organomegaly.   EXTREMITIES: No LE edema. 2+ bilateral pedal pulses.   NEUROLOGIC: Alert and orientated x 3. CN II-XII grossly intact. No focal deficits.   MUSCULOSKELETAL: No joint swelling or tenderness.   SKIN: No jaundice. No rashes or lesions. Surgical incisions pink, edges well-approximated, no redness nor tenderness.    Data:  CMP  Recent Labs   Lab 01/09/23  0744 01/08/23  2124 01/08/23  1716 01/08/23  1231 01/08/23  0907 01/08/23  0737 01/07/23  0746 01/07/23  0728 01/06/23  2202 01/06/23  1229 01/06/23  0442 01/05/23  1824 01/05/23  1406 01/05/23  0757 01/05/23  0715 01/04/23  0817 01/04/23  0458   NA  --   --   --   --   --  132*  --  133*  --   --  135*  --  130*  --  133*  --  134*   POTASSIUM  --   --   --   --   --  4.9  --  4.9  --   --  4.6  --  4.8  --  5.4*  --  4.7   CHLORIDE  --   --   --   --   --  103  --  103  --   --  105  --  100  --  104  --  105   CO2  --   --   --   --   --  20*  --  18*  --   --  20*  --  17*  --  20*  --  20*   ANIONGAP  --   --   --   --   --  9  --  12  --   --  10  --  13  --  9  -- "  9   * 202* 191* 182*   < > 111*   < > 110*  --    < > 102*   < > 190*   < > 124*   < > 158*   BUN  --   --   --   --   --  24.1*  --  23.1*  --   --  20.7  --  26.1*  --  21.5  --  21.7   CR  --   --   --   --   --  0.69  --  0.65*  --   --  0.65*  --  0.69  --  0.60*  --  0.57*   GFRESTIMATED  --   --   --   --   --  >90  --  >90  --   --  >90  --  >90  --  >90  --  >90   TIM  --   --   --   --   --  8.1*  --  8.4*  --   --  8.5*  --  8.2*  --  8.4*  --  8.1*   MAG  --   --   --   --   --  1.6*  --  1.6* 1.7  --  1.4*  --   --   --   --   --  1.6*   PHOS  --   --   --   --   --  3.0  --  3.4  --   --  3.5  --   --   --   --   --  2.5   PROTTOTAL  --   --   --   --   --   --   --  4.9*  --   --  4.7*  --   --   --  4.7*  --  4.6*   ALBUMIN  --   --   --   --   --   --   --  2.7*  --   --  2.6*  --   --   --  2.6*  --  2.6*   BILITOTAL  --   --   --   --   --   --   --  0.4  --   --  0.4  --   --   --  0.4  --  0.3   ALKPHOS  --   --   --   --   --   --   --  80  --   --  71  --   --   --  69  --  71   AST  --   --   --   --   --   --   --  27  --   --  25  --   --   --  30  --  26   ALT  --   --   --   --   --   --   --  39  --   --  47  --   --   --  44  --  38    < > = values in this interval not displayed.     CBC  Recent Labs   Lab 01/09/23  0623 01/08/23  0737 01/07/23  0914 01/06/23  0442   WBC 13.7* 18.4* 19.9* 13.4*   RBC 3.52* 3.43* 3.65* 3.63*   HGB 10.2* 10.1* 10.7* 10.5*   HCT 33.2* 31.9* 33.8* 33.3*   MCV 94 93 93 92   MCH 29.0 29.4 29.3 28.9   MCHC 30.7* 31.7 31.7 31.5   RDW 16.3* 16.1* 16.0* 16.0*    257 238 266       Patient discussed with Dr. Ibanez.    Rose Mary Goldstein, PAOLA, FNP-BC  Advanced Heart Failure Nurse Practitioner  [facility]

## 2023-01-09 NOTE — PROGRESS NOTES
Cardiovascular Surgery Progress Note  01/09/2023         Assessment and Plan:     Paco Stein is a 67 year old male with a PMH of chronic systolic heart failure (suspected predominantly nonischemic cardiomyopathy, possible arrhythmogenic), CAD s/p PCI, HLD, CNS vasculitis, CKD Stage 2. Patient is now s/p orthotopic heart transplantation on 12/25/2022 with Dr. Rodirguez.    Cardiovascular:   Hx of End-stage heart failure s/p Heart transplantation  HD stable overnight in NSR  Pre-operative echo showed LVEF 15%  Intraoperative echo showed LVEF 45% (stunning)  Postoperative echo on 1/2 demonstrated LVEF 60-65%, preserved RV function  - 1 week Heart Biopsy demonstrated 1R with C4d however DSA negative. Clinically stable and echo looks stable. 2 week bx today, 1/9.  - ASA 81 mg daily  - Statin: Crestor 10mg daily  - Norvasc 5mg daily  - Diuresis per CARDS2  Chest tubes: Left midaxillary CT removed on 01/03. CXR 01/03 post-pull, stable. Left and Right pleural as well as mediastinal removed 01/05. CXR ordered 01/05 post pull, stable. Pericardial with 19 mL out past 24h, removed today 1/9.   TPW: removed 01/03    Pulmonary:  Extubated POD #2 to 4 lpm via NC. Now saturating well on RA.   - Pulm hygiene, IS, activity and deep breathing    Neurology / MSK:  Acute post-operative pain   Pain well controlled with current regimen:  - Acetaminophen, PO oxycodone PRN, methocarbamol PRN     / Renal / Fluid / Electrolytes:  Hyperkalemia, resolved  Hyponatremia, improving  BL creat ~ 0.9, most recent creatinine stable; adequate UOP.   FLUID STATUS: Pre-op weight 192, weight today 185; I/O past 24 hours: -853mL  - Diuresis: per CARDS2  - Replete lytes per protocol  - Strict I/O, daily weights  - Avoid/limit nephrotoxins as able  - Hyperkalemia improved 01/06 after neutra-phos discontinued  - Discontinue KPhos  - BMP (K improved), continue AM draws to trend    GI / Nutrition:   - Tolerating regular diet  - Continue bowel regimen,  last BM 01/9    Endocrine:  Stress and steroid induced hyperglycemia   Hgb A1c 6.2% on 12/25  - Initially managed on insulin drip postop, transitioned to sliding scale; goal BG <180 for optimal healing    Infectious Disease:  Stress and steroid induced leukocytosis  WBC 13.7 (18.4) remains afebrile  - Blood and urine culture 1/7 due to jump in WBC. 1/2 bottles growing gram + cocci. Repeat Blood cx 1/8 and transplant ID consult placed. Started IV Vanco q12h  - Repeat blood cultures 1/8 with NGTD, will wait for 72 hours and if remain negative, discontinue vanco  - Completed perioperative antibiotics  - Continue to monitor fever curve, CBC  Immunosuppression/prophylaxis  - Managed per Cards 2    Hematology:   Acute blood loss anemia and thrombocytopenia  Hgb stable; Plt stable, no signs or symptoms of active bleeding  - CBC: continue to trend  History of right axillary nonocclusive and left peroneal occlusive DVT 11/2022  - PTA Eliquis on hold. Discussed with Dr. Rodriguez, connect with CARDS2 prior to resuming, potentially after next biopsy 01/09. Consider repeat US.    Anticoagulation:   - ASA only    MSK/Skin:  Sternotomy  Surgical incision  - Sternal precautions  - Incisional cares per protocol    Prophylaxis:   - Stress ulcer prophylaxis: Pantoprazole 40 mg daily for 30 days  - DVT prophylaxis: Subcutaneous heparin, SCD    Disposition:   - Transferred to  on 01/04  - Therapies recommending discharge to home with assist and outpatient cardiac rehab at this time, likely 1-2 days. Will discharge to [facility]    Discussed with SHAKIR Delatorre, ACNPC-AG, CCRN  Nurse Practitioner  Cardiothoracic Surgery  Pager: [phone]          Interval History:     No acute events overnight. States pain is well managed on current regimen, slept well. Breathing is stable on room air, working with IS. Tolerating diet, is passing flatus, +BM, no n/v. Denies chest pain, palpitations, dizziness, syncopal symptoms,  chills, myalgias, sternal popping/clicking.         Physical Exam:     Gen: A&Ox4, NAD  Neuro: Intact with no focal deficits   CV: RRR, normal S1 S2, no murmurs, rubs or gallops. No JVD  Pulm: CTA, no wheezing or rhonchi, normal breathing on RA  Abd: nondistended, normal BS, soft, nontender  Ext: minimal peripheral edema, no pitting  Incision: clean, dry, intact, no erythema, sternum stable  Tubes/drain sites: dressing clean and dry, serosanguinous output, no air leak. 24 hr output 19 mL total from pericardial CT.         Data:    Imaging:  reviewed recent imaging, no acute concerns    Recent Results (from the past 24 hour(s))   XR Chest Port 1 View    Narrative    EXAM: XR CHEST PORT 1 VIEW  1/9/2023 7:58 AM      HISTORY: post-op heart transplant monitoring    COMPARISON: Radiographs 1/8/2023, 1/7/2023, 1/6/2023    FINDINGS: Single view of the chest. Postsurgical changes of heart  transplantation with median sternotomy wires. Stable positioning of  pericardial drain. Trachea is midline. Normal cardiomediastinal  silhouette. No pleural effusion or pneumothorax. Hazy opacities along  the left lung is with obscuration of the left hemidiaphragm. No acute  osseous abnormalities. Cholecystectomy surgical clips.      Impression    IMPRESSION: New hazy opacification along the left lung base, likely  atelectasis. Cannot rule out superimposed infection.    I have personally reviewed the examination and initial interpretation  and I agree with the findings.    DEIDRE HINTON MD         SYSTEM ID:  [id number]        Labs:  Recent Labs   Lab 01/09/23  1102 01/09/23  0744 01/09/23  0623 01/08/23  2124 01/08/23  0907 01/08/23  0737 01/07/23  1122 01/07/23  0914 01/07/23  0746 01/07/23  0728 01/06/23  1229 01/06/23  0442   WBC  --   --  13.7*  --   --  18.4*  --  19.9*  --   --   --  13.4*   HGB 10.6*  --  10.2*  --   --  10.1*  --  10.7*  --   --   --  10.5*   MCV  --   --  94  --   --  93  --  93  --   --   --  92   PLT  --    --  277  --   --  257  --  238  --   --   --  266   NA  --   --  134*  --   --  132*  --   --   --  133*  --  135*   POTASSIUM  --   --  4.8  --   --  4.9  --   --   --  4.9  --  4.6   CHLORIDE  --   --  103  --   --  103  --   --   --  103  --  105   CO2  --   --  22  --   --  20*  --   --   --  18*  --  20*   BUN  --   --  19.6  --   --  24.1*  --   --   --  23.1*  --  20.7   CR  --   --  0.66*  --   --  0.69  --   --   --  0.65*  --  0.65*   ANIONGAP  --   --  9  --   --  9  --   --   --  12  --  10   TIM  --   --  8.5*  --   --  8.1*  --   --   --  8.4*  --  8.5*   GLC  --  117* 118* 202*   < > 111*   < >  --    < > 110*   < > 102*   ALBUMIN  --   --   --   --   --   --   --   --   --  2.7*  --  2.6*   PROTTOTAL  --   --   --   --   --   --   --   --   --  4.9*  --  4.7*   BILITOTAL  --   --   --   --   --   --   --   --   --  0.4  --  0.4   ALKPHOS  --   --   --   --   --   --   --   --   --  80  --  71   ALT  --   --   --   --   --   --   --   --   --  39  --  47   AST  --   --   --   --   --   --   --   --   --  27  --  25    < > = values in this interval not displayed.      GLUCOSE:   Recent Labs   Lab 01/09/23  0744 01/09/23  0623 01/08/23  2124 01/08/23  1716 01/08/23  1231 01/08/23  0907   * 118* 202* 191* 182* 141*

## 2023-01-09 NOTE — PLAN OF CARE
Temp: 97.5  F (36.4  C) Temp src: Oral BP: 116/72 Pulse: 96   Resp: 16 SpO2: 96 %         Shift: 2048-4268  D: Patient transferred from OSH on 12/6/22 for cardiogenic shock; now s/p heart transplant on 12/25/22.      I: Monitored vitals and assessed pt status.     A:  Neuro: A&O x4. Calls appropriately, slept well between cares throughout the night.  Cardiac: Tele in place, SR. Afebrile. Denies chest pain  Resp: VSS on RA sating > 98%. Lung sounds clear.  GI/: Adequate urine output via bedside urinal. No BM during shift. NPO at midnight for RHC/biopsy procedure today 1/9. BS check ACHS, 1 unit of insulin given.   Skin: No new deficits noted  LDA's: R PIV in place SL. Chest tube x1 to -20 suction, minimal output.   Pain: Slight discomfort at mid sternal incision/shoulder pain. Managed with scheduled tylenol and rest.    Plan: Continue to monitor and follow POC. Week 2 RHC/biopsy planned for Monday, 1/9. Notify CVTS with any changes.

## 2023-01-09 NOTE — PROGRESS NOTES
[facility]  Transplant Infectious Disease Progress Note     Patient:  Paco Stein, Date of birth 1955, Medical record number [id number]  Date of Visit:  01/09/2023         Assessment and Recommendations:   Recommendations:  - Continue intravenous vancomycin.  - Continue daily blood cultures for three more days.  - Please follow CRP daily and ESR weekly (I have placed these orders for you).    Transplant Infectious Disease will continue to follow with you.      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 Right internal jugular vein thrombus incidentally noted 1/9/2023.   Infectious Disease issues include:  - Staphylococcus epidermidis in blood cultures from 1/7/2023 (drawn from the left arm) and 1/8/2023 (drawn from the right hand). He had developed leukocytosis after transplant, which was presumed due to steroids, but in retrospect could have had some association with this infection. He did not develop a fever with this infection, so fever cannot be used as an indication of duration of infection. His last pretransplant echocardiogram was a JARED on 11/16/2022, and there were no vegetations noted at that point in time. He was transplanted on 12/25/2022, although the echocardiograms performed since heart transplant have been portable and of limited quality. He has known vascular thromboses, so these could be infected. Vancomycin initiated 1/8/2023. Vancomycin CEASAR is 1.0, so the normal dosing nomogram can be used. Will need to continue to follow daily blood cultures to determine the last day of positive cultures, which would start the timeclock for the duration of therapy. Since we  should anticipate that Staphylococcus has infected his vascular clots, he will receive longer than two weeks of therapy once the time clock starts.  - Hx of positive coxsackie B1 and B4 antibodies, positive for ~ 2 years  - QTc interval: 474 ms on 12/31/2022 EKG  - PCP prophylaxis: Bactrim (Toxoplasma D-/R-)   - Viral serostatus & prophylaxis: CMV D+/R+, EBV D+/R+, HSV 1+/2-, VZV +; Valcyte  - Fungal prophylaxis: none  - Immunization status: shingrix 2019, PCV 13 2017, PPSV 23 2018, COVID x 3, Influenza 2021  - Gamma globulin status: unknown  - Isolation status: Good hand hygiene.    Laura Alvarez MD. Pager [phone]         Interval History:   Since Paco Stein was last seen by my infectious disease colleague Dr. Tiera Anguiano: 1/8/2023, he has had heart catheterization. Today is my first day seeing this hospital stay. Chart reviewed to date. He has known deep venous thrombosis, and a venous ultrasound follow-up study was performed today. Chest tube remains in place with minimal output. He has minimal incisional pain.      Transplants:  12/25/2022 (Heart), Postoperative day:  15.  Coordinator Mary Lou Luke    Review of Systems:  CONSTITUTIONAL:  He has no fever.  EYES:   ENT:    RESPIRATORY:    CARDIOVASCULAR:  Right heart catheterization and her biopsy were completed today.  GASTROINTESTINAL:  He had a bowel movement today.  GENITOURINARY:    HEME:  He is not required transfusions.  INTEGUMENT:    NEURO:  He is alert and oriented.         Current Medications & Allergies:       acetaminophen  975 mg Oral Q8H     amLODIPine  5 mg Oral Daily     aspirin  81 mg Oral Daily     calcium carbonate-vitamin D  1 tablet Oral BID w/meals     heparin ANTICOAGULANT  5,000 Units Subcutaneous Q8H     insulin aspart  1-7 Units Subcutaneous TID AC     insulin aspart  1-5 Units Subcutaneous At Bedtime     lidocaine  1 patch Transdermal Q24H     lidocaine   Transdermal Q8H NINFA     melatonin  10 mg Oral At Bedtime      mycophenolate  1,500 mg Oral BID     nystatin  1,000,000 Units Swish & Swallow 4x Daily     pantoprazole  40 mg Oral QAM AC     polyethylene glycol  17 g Oral Daily     potassium & sodium phosphates  1 packet Oral or Feeding Tube Q4H     predniSONE  20 mg Oral BID     QUEtiapine  25 mg Oral At Bedtime     rosuvastatin  10 mg Oral Daily     senna-docusate  1 tablet Oral BID     sodium chloride (PF)  3 mL Intracatheter Q8H     sulfamethoxazole-trimethoprim  1 tablet Oral Daily     tacrolimus  4 mg Oral QAM     tacrolimus  5 mg Oral QPM     valGANciclovir  900 mg Oral Daily     vancomycin  1,000 mg Intravenous Q12H       Infusions/Drips:    dextrose 1,000 mL (01/02/23 0007)       No Known Allergies         Physical Exam:     Patient Vitals for the past 24 hrs:   BP Temp Temp src Pulse Resp SpO2 Weight   01/09/23 1132 120/85 97.9  F (36.6  C) Oral 85 18 95 % --   01/09/23 1032 -- -- -- -- 17 -- --   01/09/23 0813 -- -- -- -- -- -- 84 kg (185 lb 3.2 oz)   01/09/23 0740 116/80 97.6  F (36.4  C) Oral 84 16 96 % --   01/08/23 1935 116/72 97.5  F (36.4  C) Oral 96 16 96 % --   01/08/23 1710 112/75 97.4  F (36.3  C) Oral 87 15 94 % --     Ranges for vital signs:  Temp:  [97.4  F (36.3  C)-97.9  F (36.6  C)] 97.9  F (36.6  C)  Pulse:  [84-96] 85  Resp:  [15-18] 18  BP: (112-120)/(72-85) 120/85  SpO2:  [94 %-96 %] 95 %  Vitals:    01/07/23 0618 01/08/23 0500 01/09/23 0813   Weight: 83.1 kg (183 lb 4.8 oz) 84.7 kg (186 lb 11.2 oz) 84 kg (185 lb 3.2 oz)       Physical Examination:  GENERAL:  well-developed, chronically ill appearing man, resting in bed in no acute distress. He is pale. He is resting on his back because he is just returned from heart catheterization, and needs to allow the femoral access point to heal.  HEAD:  Head is normocephalic, atraumatic   EYES:  Eyes have anicteric sclerae without conjunctival injection   ENT:  Oropharynx is moist without exudates or ulcers. Tongue is midline  NECK:  Supple.   SKIN:  No  acute rashes. PIV in place without any surrounding erythema or exudate.  NEUROLOGIC:  Grossly nonfocal. Active x4 extremities         Laboratory Data:     Metabolic Studies       Recent Labs   Lab Test 01/09/23  1451 01/09/23  1131 01/09/23  0744 01/09/23  0623 01/08/23  0907 01/08/23  0737 01/05/23  0757 01/05/23  0715 01/04/23  0817 01/04/23  0458 12/25/22  1856 12/25/22  0604   NA  --   --   --  134*  --  132*   < > 133*  --  134*   < > 138   POTASSIUM  --   --   --  4.8  --  4.9   < > 5.4*  --  4.7   < > 4.0   CHLORIDE  --   --   --  103  --  103   < > 104  --  105   < > 105   CO2  --   --   --  22  --  20*   < > 20*  --  20*   < > 22   ANIONGAP  --   --   --  9  --  9   < > 9  --  9   < > 11   BUN  --   --   --  19.6  --  24.1*   < > 21.5  --  21.7   < > 13.1   CR  --   --   --  0.66*  --  0.69   < > 0.60*  --  0.57*   < > 0.84   GFRESTIMATED  --   --   --  >90  --  >90   < > >90  --  >90   < > >90   GLC  --  123*   < > 118*   < > 111*   < > 124*   < > 158*   < > 104*   A1C  --   --   --   --   --   --   --   --   --   --   --  6.2*   TIM  --   --   --  8.5*  --  8.1*   < > 8.4*  --  8.1*   < > 9.4   PHOS  --   --   --  2.7  --  3.0   < >  --   --  2.5   < > 3.5   MAG 2.9*  --   --  1.5*  --  1.6*   < >  --   --  1.6*   < > 1.8   LACT  --   --   --   --   --   --   --  1.1  --  1.3   < > 1.2    < > = values in this interval not displayed.       Hepatic Studies    Recent Labs   Lab Test 01/07/23  0728 01/06/23  0442 01/05/23  0715   BILITOTAL 0.4 0.4 0.4   DBIL <0.20 <0.20 <0.20   ALKPHOS 80 71 69   PROTTOTAL 4.9* 4.7* 4.7*   ALBUMIN 2.7* 2.6* 2.6*   AST 27 25 30   ALT 39 47 44       Pancreatitis testing    Recent Labs   Lab Test 12/25/22  0604 11/12/22  0458   AMYLASE 77  --    TRIG  --  75       Hematology Studies   Recent Labs   Lab Test 01/09/23  1102 01/09/23  0623 01/08/23  0737 01/07/23  0914 01/06/23  0442 12/30/22  0958 12/30/22  0541 12/29/22  2316 12/06/22  0734 11/28/22  1200   WBC  --  13.7* 18.4*  19.9* 13.4*   < > 13.2* 12.9*   < >  --    69271  --   --   --   --   --   --   --   --   --  5.7   ANEUTAUTO  --   --   --   --   --   --  11.5* 11.6*   < >  --    ALYMPAUTO  --   --   --   --   --   --  0.5* 0.3*   < >  --    AMONOAUTO  --   --   --   --   --   --  1.0 0.9   < >  --    AEOSAUTO  --   --   --   --   --   --  0.0 0.0   < >  --    ABSBASO  --   --   --   --   --   --  0.0 0.0   < >  --    HGB 10.6* 10.2* 10.1* 10.7* 10.5*   < > 9.2* 8.7*   < >  --    71672  --   --   --   --   --   --   --   --   --  15.0   HCT  --  33.2* 31.9* 33.8* 33.3*   < > 30.0* 27.4*   < >  --    PLT  --  277 257 238 266   < > 103* 97*   < >  --    30749  --   --   --   --   --   --   --   --   --  223    < > = values in this interval not displayed.       Clotting Studies    Recent Labs   Lab Test 12/29/22  1008 12/25/22  2303 12/25/22  2150 12/25/22  0604   INR 1.15 1.27* 1.55* 1.04   PTT 26 46* 30 84*       Iron Testing    Recent Labs   Lab Test 01/09/23  0623 11/15/22  0444 11/12/22  0458   IRON  --   --  50*   FEB  --   --  221*   IRONSAT  --   --  23   RONALDO  --   --  226   MCV 94   < > 88    < > = values in this interval not displayed.       Arterial Blood Gas Testing    Recent Labs   Lab Test 12/30/22  0605 12/29/22  1009 12/29/22  0734 12/29/22  0404 12/29/22  0402 12/28/22  2148 12/28/22  2146 12/28/22  1551 12/28/22  1550   PH  --  7.47* 7.46*  --  7.46*  --  7.45  --  7.47*   PCO2  --  37 37  --  37  --  37  --  34*   PO2  --  103 136*  --  102  --  112*  --  130*   HCO3  --  27 27  --  27  --  26  --  25   O2PER 24 3 4  4 35 35   < > 35   < > 4    < > = values in this interval not displayed.        Thyroid Studies     Recent Labs   Lab Test 11/12/22  0458 11/08/22  1020   TSH 5.18* 3.75       Urine Studies     Recent Labs   Lab Test 01/07/23  1116 12/25/22  0746 11/11/22  1155   URINEPH 5.5 7.0 5.0   NITRITE Negative Negative Negative   LEUKEST Negative Negative Negative   WBCU 0 0 3       Medication levels     Recent Labs   Lab Test 01/08/23  0737 12/31/22  0555 12/27/22  1639   VANCOMYCIN  --   --  17.8   TACROL 8.3   < >  --     < > = values in this interval not displayed.       Microbiology:  Last Culture results   Culture   Date Value Ref Range Status   01/08/2023 No growth after 1 day  Preliminary   01/08/2023 Positive on the 1st day of incubation (A)  Preliminary   01/08/2023 Gram positive cocci in clusters (AA)  Preliminary     Comment:     2 of 2 bottles   01/07/2023 No growth after 2 days  Preliminary   01/07/2023 Positive on the 1st day of incubation (A)  Preliminary   01/07/2023 Staphylococcus epidermidis (AA)  Preliminary     Comment:     1 of 2 bottles           Virology:  Coronavirus-19 testing    Recent Labs   Lab Test 12/06/22  0758 11/10/22  1310   [id number] Negative Negative       Respiratory virus (non-coronavirus-19) testing    Recent Labs   Lab Test 12/06/22  0758   INFZA Negative   INFZB Negative   IRSV Negative       Imaging:  Recent Results (from the past 48 hour(s))   XR Chest Port 1 View    Narrative    EXAM: XR CHEST PORT 1 VIEW 1/8/2023 9:12 AM    HISTORY: post-op heart transplant monitoring.     COMPARISON: Chest x-ray 1/17/2023, 1/6/2023.    TECHNIQUE: AP supine portable radiograph of the chest..    FINDINGS:   Postsurgical changes of the chest with intact median sternotomy wires.  Stable positioning of the left pericardial drain. Trachea is midline.  Cardiac silhouette is within normal limits. Normal lung volumes.  Continued distinct pulmonary vasculature. Hazy left upper lobe  opacities, likely atelectasis. No pleural effusion or pneumothorax.  The upper abdomen is unremarkable.      Impression    IMPRESSION:   No acute airspace opacities.    I have personally reviewed the examination and initial interpretation  and I agree with the findings.    ELSY RAMIREZ MD         SYSTEM ID:  [id number]   XR Chest Port 1 View    Narrative    EXAM: XR CHEST PORT 1 VIEW  1/9/2023 7:58 AM       HISTORY: post-op heart transplant monitoring    COMPARISON: Radiographs 1/8/2023, 1/7/2023, 1/6/2023    FINDINGS: Single view of the chest. Postsurgical changes of heart  transplantation with median sternotomy wires. Stable positioning of  pericardial drain. Trachea is midline. Normal cardiomediastinal  silhouette. No pleural effusion or pneumothorax. Hazy opacities along  the left lung is with obscuration of the left hemidiaphragm. No acute  osseous abnormalities. Cholecystectomy surgical clips.      Impression    IMPRESSION: New hazy opacification along the left lung base, likely  atelectasis. Cannot rule out superimposed infection.    I have personally reviewed the examination and initial interpretation  and I agree with the findings.    DEIDRE HINTON MD         SYSTEM ID:  [id number]   Cardiac Catheterization    Narrative      Successful endomyocardial biopsy. Results pending.    Right sided filling pressures are normal.    Normal PA pressures.    Left sided filling pressures are normal.    Normal cardiac output level.    Hemodynamic data has been modified in Epic per physician review.     Right internal jugular vein thrombus incidentally noted. Left internal   jugular vein accessed but unable to pass the wire due to likely stenosis   in the brachiocephalic vein. Femoral access successful.      XR Chest Port 1 View    Narrative    XR CHEST PORT 1 VIEW  1/9/2023 2:32 PM      HISTORY: S/P CT removal    COMPARISON: 1/9/2023 0753 hours    FINDINGS:   Single AP view. Stable postoperative changes. Interval removal of the  pericardial/mediastinal drain. Stable cardiac silhouette. Midline  trachea. No pneumothorax or significant pleural effusion. Unchanged  probable trace left pleural effusion. Similar left basilar streaky and  hazy opacities.      Impression    IMPRESSION:   Interval removal of pericardial/mediastinal drain with stable trace  left pleural effusion and associated left  basilar  atelectasis/consolidation.    I have personally reviewed the examination and initial interpretation  and I agree with the findings.    DEIDRE HINTON MD         SYSTEM ID:  [id number]

## 2023-01-10 ENCOUNTER — APPOINTMENT (OUTPATIENT)
Dept: PHYSICAL THERAPY | Facility: CLINIC | Age: 68
DRG: 001 | End: 2023-01-10
Attending: SURGERY
Payer: MEDICARE

## 2023-01-10 ENCOUNTER — APPOINTMENT (OUTPATIENT)
Dept: GENERAL RADIOLOGY | Facility: CLINIC | Age: 68
DRG: 001 | End: 2023-01-10
Attending: SURGERY
Payer: MEDICARE

## 2023-01-10 ENCOUNTER — APPOINTMENT (OUTPATIENT)
Dept: SPEECH THERAPY | Facility: CLINIC | Age: 68
DRG: 001 | End: 2023-01-10
Attending: SURGERY
Payer: MEDICARE

## 2023-01-10 LAB
ANION GAP SERPL CALCULATED.3IONS-SCNC: 11 MMOL/L (ref 7–15)
BACTERIA BLD CULT: ABNORMAL
BACTERIA BLD CULT: ABNORMAL
BASOPHILS # BLD AUTO: 0 10E3/UL (ref 0–0.2)
BASOPHILS NFR BLD AUTO: 0 %
BUN SERPL-MCNC: 26.4 MG/DL (ref 8–23)
CALCIUM SERPL-MCNC: 8.7 MG/DL (ref 8.8–10.2)
CHLORIDE SERPL-SCNC: 102 MMOL/L (ref 98–107)
CREAT SERPL-MCNC: 0.81 MG/DL (ref 0.67–1.17)
CRP SERPL-MCNC: 45.9 MG/L
DEPRECATED HCO3 PLAS-SCNC: 21 MMOL/L (ref 22–29)
EOSINOPHIL # BLD AUTO: 0 10E3/UL (ref 0–0.7)
EOSINOPHIL NFR BLD AUTO: 0 %
ERYTHROCYTE [DISTWIDTH] IN BLOOD BY AUTOMATED COUNT: 16.2 % (ref 10–15)
ERYTHROCYTE [SEDIMENTATION RATE] IN BLOOD BY WESTERGREN METHOD: 40 MM/HR (ref 0–20)
GFR SERPL CREATININE-BSD FRML MDRD: >90 ML/MIN/1.73M2
GLUCOSE BLDC GLUCOMTR-MCNC: 136 MG/DL (ref 70–99)
GLUCOSE BLDC GLUCOMTR-MCNC: 163 MG/DL (ref 70–99)
GLUCOSE BLDC GLUCOMTR-MCNC: 166 MG/DL (ref 70–99)
GLUCOSE BLDC GLUCOMTR-MCNC: 200 MG/DL (ref 70–99)
GLUCOSE SERPL-MCNC: 171 MG/DL (ref 70–99)
HCT VFR BLD AUTO: 31.6 % (ref 40–53)
HGB BLD-MCNC: 10 G/DL (ref 13.3–17.7)
IGA SERPL-MCNC: 144 MG/DL (ref 84–499)
IGG SERPL-MCNC: 549 MG/DL (ref 610–1616)
IGM SERPL-MCNC: 67 MG/DL (ref 35–242)
IMM GRANULOCYTES # BLD: 0.1 10E3/UL
IMM GRANULOCYTES NFR BLD: 1 %
LYMPHOCYTES # BLD AUTO: 0.5 10E3/UL (ref 0.8–5.3)
LYMPHOCYTES NFR BLD AUTO: 4 %
MAGNESIUM SERPL-MCNC: 1.6 MG/DL (ref 1.7–2.3)
MCH RBC QN AUTO: 29.5 PG (ref 26.5–33)
MCHC RBC AUTO-ENTMCNC: 31.6 G/DL (ref 31.5–36.5)
MCV RBC AUTO: 93 FL (ref 78–100)
MONOCYTES # BLD AUTO: 0.4 10E3/UL (ref 0–1.3)
MONOCYTES NFR BLD AUTO: 3 %
NEUTROPHILS # BLD AUTO: 11.3 10E3/UL (ref 1.6–8.3)
NEUTROPHILS NFR BLD AUTO: 92 %
NRBC # BLD AUTO: 0 10E3/UL
NRBC BLD AUTO-RTO: 0 /100
PATH REPORT.COMMENTS IMP SPEC: NORMAL
PATH REPORT.COMMENTS IMP SPEC: NORMAL
PATH REPORT.FINAL DX SPEC: NORMAL
PATH REPORT.GROSS SPEC: NORMAL
PATH REPORT.RELEVANT HX SPEC: NORMAL
PHOSPHATE SERPL-MCNC: 2.6 MG/DL (ref 2.5–4.5)
PHOTO IMAGE: NORMAL
PLATELET # BLD AUTO: 280 10E3/UL (ref 150–450)
POTASSIUM SERPL-SCNC: 5.1 MMOL/L (ref 3.4–5.3)
RBC # BLD AUTO: 3.39 10E6/UL (ref 4.4–5.9)
SODIUM SERPL-SCNC: 134 MMOL/L (ref 136–145)
TACROLIMUS BLD-MCNC: 10.1 UG/L (ref 5–15)
TME LAST DOSE: NORMAL H
TME LAST DOSE: NORMAL H
VANCOMYCIN SERPL-MCNC: 11.7 UG/ML
WBC # BLD AUTO: 12.2 10E3/UL (ref 4–11)

## 2023-01-10 PROCEDURE — 83735 ASSAY OF MAGNESIUM: CPT | Performed by: SURGERY

## 2023-01-10 PROCEDURE — 97116 GAIT TRAINING THERAPY: CPT | Mod: GP

## 2023-01-10 PROCEDURE — 84100 ASSAY OF PHOSPHORUS: CPT | Performed by: SURGERY

## 2023-01-10 PROCEDURE — 88346 IMFLUOR 1ST 1ANTB STAIN PX: CPT | Mod: 26 | Performed by: PATHOLOGY

## 2023-01-10 PROCEDURE — 88350 IMFLUOR EA ADDL 1ANTB STN PX: CPT | Mod: 26 | Performed by: PATHOLOGY

## 2023-01-10 PROCEDURE — 85025 COMPLETE CBC W/AUTO DIFF WBC: CPT | Performed by: INTERNAL MEDICINE

## 2023-01-10 PROCEDURE — 71045 X-RAY EXAM CHEST 1 VIEW: CPT

## 2023-01-10 PROCEDURE — 88307 TISSUE EXAM BY PATHOLOGIST: CPT | Mod: 26 | Performed by: PATHOLOGY

## 2023-01-10 PROCEDURE — 99233 SBSQ HOSP IP/OBS HIGH 50: CPT | Mod: GC | Performed by: INTERNAL MEDICINE

## 2023-01-10 PROCEDURE — 36415 COLL VENOUS BLD VENIPUNCTURE: CPT | Performed by: NURSE PRACTITIONER

## 2023-01-10 PROCEDURE — 250N000012 HC RX MED GY IP 250 OP 636 PS 637: Performed by: NURSE PRACTITIONER

## 2023-01-10 PROCEDURE — 82947 ASSAY GLUCOSE BLOOD QUANT: CPT | Performed by: SURGERY

## 2023-01-10 PROCEDURE — 85652 RBC SED RATE AUTOMATED: CPT | Performed by: INTERNAL MEDICINE

## 2023-01-10 PROCEDURE — 250N000011 HC RX IP 250 OP 636: Performed by: SURGERY

## 2023-01-10 PROCEDURE — 87040 BLOOD CULTURE FOR BACTERIA: CPT | Performed by: INTERNAL MEDICINE

## 2023-01-10 PROCEDURE — 250N000013 HC RX MED GY IP 250 OP 250 PS 637: Performed by: STUDENT IN AN ORGANIZED HEALTH CARE EDUCATION/TRAINING PROGRAM

## 2023-01-10 PROCEDURE — 82785 ASSAY OF IGE: CPT | Performed by: INTERNAL MEDICINE

## 2023-01-10 PROCEDURE — 250N000013 HC RX MED GY IP 250 OP 250 PS 637: Performed by: NURSE PRACTITIONER

## 2023-01-10 PROCEDURE — 92526 ORAL FUNCTION THERAPY: CPT | Mod: GN

## 2023-01-10 PROCEDURE — 80197 ASSAY OF TACROLIMUS: CPT | Performed by: NURSE PRACTITIONER

## 2023-01-10 PROCEDURE — 250N000012 HC RX MED GY IP 250 OP 636 PS 637: Performed by: STUDENT IN AN ORGANIZED HEALTH CARE EDUCATION/TRAINING PROGRAM

## 2023-01-10 PROCEDURE — 250N000013 HC RX MED GY IP 250 OP 250 PS 637: Performed by: SURGERY

## 2023-01-10 PROCEDURE — 214N000001 HC R&B CCU UMMC

## 2023-01-10 PROCEDURE — 71045 X-RAY EXAM CHEST 1 VIEW: CPT | Mod: 26 | Performed by: RADIOLOGY

## 2023-01-10 PROCEDURE — 82784 ASSAY IGA/IGD/IGG/IGM EACH: CPT | Performed by: INTERNAL MEDICINE

## 2023-01-10 PROCEDURE — 36415 COLL VENOUS BLD VENIPUNCTURE: CPT | Performed by: SURGERY

## 2023-01-10 PROCEDURE — 80202 ASSAY OF VANCOMYCIN: CPT | Performed by: SURGERY

## 2023-01-10 PROCEDURE — 250N000012 HC RX MED GY IP 250 OP 636 PS 637: Performed by: SURGERY

## 2023-01-10 PROCEDURE — 250N000011 HC RX IP 250 OP 636

## 2023-01-10 PROCEDURE — 99232 SBSQ HOSP IP/OBS MODERATE 35: CPT | Mod: FS | Performed by: NURSE PRACTITIONER

## 2023-01-10 PROCEDURE — 250N000013 HC RX MED GY IP 250 OP 250 PS 637: Performed by: PHYSICIAN ASSISTANT

## 2023-01-10 RX ORDER — MAGNESIUM OXIDE 400 MG/1
400 TABLET ORAL EVERY 4 HOURS
Status: COMPLETED | OUTPATIENT
Start: 2023-01-10 | End: 2023-01-10

## 2023-01-10 RX ORDER — MYCOPHENOLATE MOFETIL 250 MG/1
1000 CAPSULE ORAL 2 TIMES DAILY
Status: DISCONTINUED | OUTPATIENT
Start: 2023-01-10 | End: 2023-01-13 | Stop reason: HOSPADM

## 2023-01-10 RX ORDER — PREDNISONE 20 MG/1
20 TABLET ORAL DAILY
Status: DISCONTINUED | OUTPATIENT
Start: 2023-01-11 | End: 2023-01-13 | Stop reason: HOSPADM

## 2023-01-10 RX ADMIN — ASPIRIN 81 MG: 81 TABLET, CHEWABLE ORAL at 07:39

## 2023-01-10 RX ADMIN — ACETAMINOPHEN 975 MG: 325 TABLET, FILM COATED ORAL at 04:00

## 2023-01-10 RX ADMIN — AMLODIPINE BESYLATE 5 MG: 5 TABLET ORAL at 07:39

## 2023-01-10 RX ADMIN — VALGANCICLOVIR HYDROCHLORIDE 900 MG: 450 TABLET ORAL at 07:39

## 2023-01-10 RX ADMIN — Medication 1 PACKET: at 10:25

## 2023-01-10 RX ADMIN — CALCIUM CARBONATE 600 MG (1,500 MG)-VITAMIN D3 400 UNIT TABLET 1 TABLET: at 11:48

## 2023-01-10 RX ADMIN — Medication 10 MG: at 22:53

## 2023-01-10 RX ADMIN — HEPARIN SODIUM 5000 UNITS: 5000 INJECTION, SOLUTION INTRAVENOUS; SUBCUTANEOUS at 19:45

## 2023-01-10 RX ADMIN — NYSTATIN 1000000 UNITS: 500000 SUSPENSION ORAL at 19:45

## 2023-01-10 RX ADMIN — ROSUVASTATIN CALCIUM 10 MG: 10 TABLET, FILM COATED ORAL at 07:39

## 2023-01-10 RX ADMIN — APIXABAN 5 MG: 5 TABLET, FILM COATED ORAL at 19:45

## 2023-01-10 RX ADMIN — QUETIAPINE FUMARATE 25 MG: 25 TABLET ORAL at 22:53

## 2023-01-10 RX ADMIN — SULFAMETHOXAZOLE AND TRIMETHOPRIM 1 TABLET: 400; 80 TABLET ORAL at 07:39

## 2023-01-10 RX ADMIN — PANTOPRAZOLE SODIUM 40 MG: 40 TABLET, DELAYED RELEASE ORAL at 07:39

## 2023-01-10 RX ADMIN — HEPARIN SODIUM 5000 UNITS: 5000 INJECTION, SOLUTION INTRAVENOUS; SUBCUTANEOUS at 11:48

## 2023-01-10 RX ADMIN — NYSTATIN 1000000 UNITS: 500000 SUSPENSION ORAL at 17:14

## 2023-01-10 RX ADMIN — ACETAMINOPHEN 975 MG: 325 TABLET, FILM COATED ORAL at 19:45

## 2023-01-10 RX ADMIN — Medication 1 PACKET: at 13:14

## 2023-01-10 RX ADMIN — HEPARIN SODIUM 5000 UNITS: 5000 INJECTION, SOLUTION INTRAVENOUS; SUBCUTANEOUS at 03:59

## 2023-01-10 RX ADMIN — METHOCARBAMOL 500 MG: 500 TABLET ORAL at 22:53

## 2023-01-10 RX ADMIN — CALCIUM CARBONATE 600 MG (1,500 MG)-VITAMIN D3 400 UNIT TABLET 1 TABLET: at 17:15

## 2023-01-10 RX ADMIN — MAGNESIUM OXIDE TAB 400 MG (241.3 MG ELEMENTAL MG) 400 MG: 400 (241.3 MG) TAB at 13:15

## 2023-01-10 RX ADMIN — INSULIN ASPART 1 UNITS: 100 INJECTION, SOLUTION INTRAVENOUS; SUBCUTANEOUS at 07:45

## 2023-01-10 RX ADMIN — PREDNISONE 15 MG: 5 TABLET ORAL at 17:14

## 2023-01-10 RX ADMIN — TACROLIMUS 4 MG: 1 CAPSULE ORAL at 07:38

## 2023-01-10 RX ADMIN — METHOCARBAMOL 500 MG: 500 TABLET ORAL at 00:04

## 2023-01-10 RX ADMIN — VANCOMYCIN HYDROCHLORIDE 1000 MG: 1 INJECTION, SOLUTION INTRAVENOUS at 14:15

## 2023-01-10 RX ADMIN — POLYETHYLENE GLYCOL 3350 17 G: 17 POWDER, FOR SOLUTION ORAL at 07:40

## 2023-01-10 RX ADMIN — TACROLIMUS 5 MG: 5 CAPSULE ORAL at 17:14

## 2023-01-10 RX ADMIN — INSULIN ASPART 2 UNITS: 100 INJECTION, SOLUTION INTRAVENOUS; SUBCUTANEOUS at 17:15

## 2023-01-10 RX ADMIN — NYSTATIN 1000000 UNITS: 500000 SUSPENSION ORAL at 07:39

## 2023-01-10 RX ADMIN — MYCOPHENOLATE MOFETIL 1000 MG: 250 CAPSULE ORAL at 19:45

## 2023-01-10 RX ADMIN — PREDNISONE 20 MG: 20 TABLET ORAL at 07:39

## 2023-01-10 RX ADMIN — ACETAMINOPHEN 975 MG: 325 TABLET, FILM COATED ORAL at 11:48

## 2023-01-10 RX ADMIN — NYSTATIN 1000000 UNITS: 500000 SUSPENSION ORAL at 11:48

## 2023-01-10 RX ADMIN — VANCOMYCIN HYDROCHLORIDE 1000 MG: 1 INJECTION, SOLUTION INTRAVENOUS at 02:36

## 2023-01-10 RX ADMIN — MYCOPHENOLATE MOFETIL 1500 MG: 250 CAPSULE ORAL at 07:39

## 2023-01-10 RX ADMIN — MAGNESIUM OXIDE TAB 400 MG (241.3 MG ELEMENTAL MG) 400 MG: 400 (241.3 MG) TAB at 10:25

## 2023-01-10 ASSESSMENT — ACTIVITIES OF DAILY LIVING (ADL)
ADLS_ACUITY_SCORE: 42
ADLS_ACUITY_SCORE: 46
ADLS_ACUITY_SCORE: 46
ADLS_ACUITY_SCORE: 42
ADLS_ACUITY_SCORE: 46

## 2023-01-10 NOTE — PROGRESS NOTES
[facility]  Transplant Infectious Disease Progress Note     Patient:  Paco Stein, Date of birth 1955, Medical record number [id number]  Date of Visit:  01/10/2023     Recommendations:   1. Continue IV vancomycin (1/8/23 - ) with plan for 4 week course  2. Continue daily blood cultures until 1/12/23  3. Follow CRP daily and ESR weekly while inpatient  4. Anticoagulation management for right and left internal jugular thrombi per primary team  5. Prophylaxis   * PJP prophylaxis: Bactrim (Toxoplasma D-/R-)    * Viral prophylaxis: Valcyte (CMV D+/R+)    Outpatient plan:  - Plan for follow up with infectious disease in the outpatient setting at 4 weeks after vancomycin (with day 1 of vancomycin therapy being the 1st day of negative blood cultures). Pending clinical course, he may need an extended therapy given bacteria may be imbedded in his thrombi.   - Plan for weekly CRP in the outpatient setting.     Transplant Infectious Disease will continue to follow with you.      Discussed with transplant ID attending, Dr. 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 Right internal jugular vein thrombus incidentally noted 1/9/2023 along with a non-occlusive left internal jugular thrombus.    Infectious Disease issues include:  #. Staphylococcus epidermidis bacteremia, concern for bacteria imbedded in the patient's new and old thrombi  - Staphylococcus epidermidis in blood cultures from 1/7/2023 (drawn from the left arm) and 1/8/2023 (drawn from the right hand and left hand). He had developed leukocytosis post transplant, which was presumed due to steroids, but  in retrospect could have had some association with this infection. He did not develop a fever with this infection, so fever cannot be used as an indication of duration of infection. His last pretransplant echocardiogram was a JARED on 11/16/2022, and there were no vegetations noted at that point in time. He was transplanted on 12/25/2022, although the echocardiograms performed since heart transplant have been portable and of limited quality; last of which was on 1/2/23. He has known vascular thromboses (left peroneal, right internal jugular and new non-occlusive left internal jugular), so these could be infected. He is on subcutaneous heparin, but not therapeutic dosing. Vancomycin initiated 1/8/2023. Vancomycin CEASAR is 1.0, so the normal dosing nomogram can be used. Plan to follow daily blood cultures to determine the last day of positive cultures, which would start the timeclock for the duration of therapy. Consider decreasing the frequency of anti-pyretic use (acetaminophen) given this may be masking fevers and using alternative agents for pain control. Plan to monitor daily CRP.     - Hx of positive coxsackie B1 and B4 antibodies, positive for ~ 2 years  - QTc interval: 474 ms on 12/31/2022 EKG  - PCP prophylaxis: Bactrim (Toxoplasma D-/R-)   - Viral serostatus & prophylaxis: CMV D+/R+, EBV D+/R+, HSV 1+/2-, VZV +; Valcyte  - Fungal prophylaxis: none  - Immunization status: shingrix 2019, PCV 13 2017, PPSV 23 2018, COVID x 3, Influenza 2021  - Gamma globulin status: unknown  - Isolation status: Good hand hygiene.         Interval History:   24-hour events reviewed and no fevers in the last 24 hours.    Wife present in the room. He walked up around outside of his room already today without any significant shortness of breath or cough. He has had formed stool today. He tolerated breakfast without any nausea or vomiting or abdominal pain. He has been working to consume 3 Ensure per day. Chest tube removed.        Transplants:  12/25/2022 (Heart), Postoperative day:  16.  Coordinator Mary Lou Luke    Review of Systems:  CONSTITUTIONAL: No fever.  EYES: No conjunctival icterus  ENT:   RESPIRATORY:   CARDIOVASCULAR: Denies chest pain  GASTROINTESTINAL: Soft brown bowel movement today  GENITOURINARY: No dysuria  HEME: No melena or hematochezia  INTEGUMENT: No jaundice  NEURO: Alert and oriented         Current Medications & Allergies:       acetaminophen  975 mg Oral Q8H     amLODIPine  5 mg Oral Daily     aspirin  81 mg Oral Daily     calcium carbonate-vitamin D  1 tablet Oral BID w/meals     heparin ANTICOAGULANT  5,000 Units Subcutaneous Q8H     insulin aspart  1-7 Units Subcutaneous TID AC     insulin aspart  1-5 Units Subcutaneous At Bedtime     lidocaine  1 patch Transdermal Q24H     lidocaine   Transdermal Q8H NINFA     magnesium oxide  400 mg Oral Q4H     melatonin  10 mg Oral At Bedtime     mycophenolate  1,500 mg Oral BID     nystatin  1,000,000 Units Swish & Swallow 4x Daily     pantoprazole  40 mg Oral QAM AC     polyethylene glycol  17 g Oral Daily     potassium & sodium phosphates  1 packet Oral or Feeding Tube Q4H     predniSONE  20 mg Oral BID     QUEtiapine  25 mg Oral At Bedtime     rosuvastatin  10 mg Oral Daily     senna-docusate  1 tablet Oral BID     sodium chloride (PF)  3 mL Intracatheter Q8H     sulfamethoxazole-trimethoprim  1 tablet Oral Daily     tacrolimus  4 mg Oral QAM     tacrolimus  5 mg Oral QPM     valGANciclovir  900 mg Oral Daily     vancomycin  1,000 mg Intravenous Q12H       Infusions/Drips:    dextrose 1,000 mL (01/02/23 0007)       No Known Allergies         Physical Exam:     Patient Vitals for the past 24 hrs:   BP Temp Temp src Pulse Resp SpO2   01/10/23 1122 119/80 97.6  F (36.4  C) Oral 84 16 96 %   01/10/23 0730 107/61 97.6  F (36.4  C) Oral 88 16 95 %   01/10/23 0355 120/80 97.6  F (36.4  C) Oral 85 16 95 %   01/09/23 2350 109/69 97.4  F (36.3  C) Oral 89 15 95 %   01/09/23 1941  103/72 97.5  F (36.4  C) Oral 93 16 97 %   01/09/23 1700 101/68 97.8  F (36.6  C) Axillary 87 16 94 %     Ranges for vital signs:  Temp:  [97.4  F (36.3  C)-97.8  F (36.6  C)] 97.6  F (36.4  C)  Pulse:  [84-93] 84  Resp:  [15-16] 16  BP: (101-120)/(61-80) 119/80  SpO2:  [94 %-97 %] 96 %  Vitals:    01/07/23 0618 01/08/23 0500 01/09/23 0813   Weight: 83.1 kg (183 lb 4.8 oz) 84.7 kg (186 lb 11.2 oz) 84 kg (185 lb 3.2 oz)       Physical Examination:  GENERAL: No acute distress.  HEAD:  Head is normocephalic, atraumatic   EYES:  Eyes have anicteric conjunctiva without conjunctival injection   ENT:  Oropharynx is moist without exudates or ulcers. Tongue is midline  NECK:  Supple.   RESP:  Clear to auscultation bilaterally   CARDIAC: RRR, no murmurs. Chest scar.   SKIN:  No acute rashes.   EXT: Trace Kel edema  NEUROLOGIC: Active x4 extremities         Laboratory Data:     Metabolic Studies       Recent Labs   Lab Test 01/10/23  0658 01/10/23  0625 01/09/23  0744 01/09/23  0623 01/05/23  0757 01/05/23  0715 01/04/23  0817 01/04/23  0458 12/25/22  1856 12/25/22  0604   NA  --  134*  --  134*   < > 133*  --  134*   < > 138   POTASSIUM  --  5.1  --  4.8   < > 5.4*  --  4.7   < > 4.0   CHLORIDE  --  102  --  103   < > 104  --  105   < > 105   CO2  --  21*  --  22   < > 20*  --  20*   < > 22   ANIONGAP  --  11  --  9   < > 9  --  9   < > 11   BUN  --  26.4*  --  19.6   < > 21.5  --  21.7   < > 13.1   CR  --  0.81  --  0.66*   < > 0.60*  --  0.57*   < > 0.84   GFRESTIMATED  --  >90  --  >90   < > >90  --  >90   < > >90   * 171*   < > 118*   < > 124*   < > 158*   < > 104*   A1C  --   --   --   --   --   --   --   --   --  6.2*   TIM  --  8.7*  --  8.5*   < > 8.4*  --  8.1*   < > 9.4   PHOS  --  2.6  --  2.7   < >  --   --  2.5   < > 3.5   MAG  --  1.6*   < > 1.5*   < >  --   --  1.6*   < > 1.8   LACT  --   --   --   --   --  1.1  --  1.3   < > 1.2    < > = values in this interval not displayed.       Hepatic Studies     Recent Labs   Lab Test 01/07/23  0728 01/06/23  0442 01/05/23  0715   BILITOTAL 0.4 0.4 0.4   DBIL <0.20 <0.20 <0.20   ALKPHOS 80 71 69   PROTTOTAL 4.9* 4.7* 4.7*   ALBUMIN 2.7* 2.6* 2.6*   AST 27 25 30   ALT 39 47 44       Pancreatitis testing    Recent Labs   Lab Test 12/25/22  0604 11/12/22  0458   AMYLASE 77  --    TRIG  --  75       Hematology Studies   Recent Labs   Lab Test 01/10/23  0625 01/09/23  1102 01/09/23  0623 01/08/23  0737 01/07/23  0914 12/30/22  0958 12/30/22  0541 12/06/22  0734 11/28/22  1200   WBC 12.2*  --  13.7* 18.4* 19.9*   < > 13.2*   < >  --    97184  --   --   --   --   --   --   --   --  5.7   ANEUTAUTO 11.3*  --   --   --   --   --  11.5*   < >  --    ALYMPAUTO 0.5*  --   --   --   --   --  0.5*   < >  --    AMONOAUTO 0.4  --   --   --   --   --  1.0   < >  --    AEOSAUTO 0.0  --   --   --   --   --  0.0   < >  --    ABSBASO 0.0  --   --   --   --   --  0.0   < >  --    HGB 10.0* 10.6* 10.2* 10.1* 10.7*   < > 9.2*   < >  --    91359  --   --   --   --   --   --   --   --  15.0   HCT 31.6*  --  33.2* 31.9* 33.8*   < > 30.0*   < >  --      --  277 257 238   < > 103*   < >  --    95599  --   --   --   --   --   --   --   --  223    < > = values in this interval not displayed.       Clotting Studies    Recent Labs   Lab Test 12/29/22  1008 12/25/22  2303 12/25/22  2150 12/25/22  0604   INR 1.15 1.27* 1.55* 1.04   PTT 26 46* 30 84*       Iron Testing    Recent Labs   Lab Test 01/10/23  0625 11/15/22  0444 11/12/22  0458   IRON  --   --  50*   FEB  --   --  221*   IRONSAT  --   --  23   RONALDO  --   --  226   MCV 93   < > 88    < > = values in this interval not displayed.       Arterial Blood Gas Testing    Recent Labs   Lab Test 12/30/22  0605 12/29/22  1009 12/29/22  0734 12/29/22  0404 12/29/22  0402 12/28/22  2148 12/28/22  2146 12/28/22  1551 12/28/22  1550   PH  --  7.47* 7.46*  --  7.46*  --  7.45  --  7.47*   PCO2  --  37 37  --  37  --  37  --  34*   PO2  --  103 136*  --  102   --  112*  --  130*   HCO3  --  27 27  --  27  --  26  --  25   O2PER 24 3 4  4 35 35   < > 35   < > 4    < > = values in this interval not displayed.        Thyroid Studies     Recent Labs   Lab Test 11/12/22  0458 11/08/22  1020   TSH 5.18* 3.75       Urine Studies     Recent Labs   Lab Test 01/07/23  1116 12/25/22  0746 11/11/22  1155   URINEPH 5.5 7.0 5.0   NITRITE Negative Negative Negative   LEUKEST Negative Negative Negative   WBCU 0 0 3       Medication levels    Recent Labs   Lab Test 01/10/23  0625 12/31/22  0555 12/27/22  1639   VANCOMYCIN  --   --  17.8   TACROL 10.1   < >  --     < > = values in this interval not displayed.       Microbiology:  Last Culture results   Culture   Date Value Ref Range Status   01/09/2023 No growth after 12 hours  Preliminary   01/09/2023 No growth after 12 hours  Preliminary   01/08/2023 Positive on the 2nd day of incubation (A)  Preliminary   01/08/2023 Staphylococcus epidermidis (AA)  Preliminary     Comment:     1 of 2 bottlesSusceptibilities done on previous cultures   01/08/2023 Positive on the 1st day of incubation (A)  Preliminary   01/08/2023 Staphylococcus epidermidis (AA)  Preliminary     Comment:     2 of 2 bottlesSusceptibilities done on previous cultures   01/07/2023 No growth after 2 days  Preliminary   01/07/2023 Positive on the 1st day of incubation (A)  Final   01/07/2023 Staphylococcus epidermidis (AA)  Final     Comment:     1 of 2 bottles           Virology:  Coronavirus-19 testing    Recent Labs   Lab Test 12/06/22  0758 11/10/22  1310   [id number] Negative Negative       Respiratory virus (non-coronavirus-19) testing    Recent Labs   Lab Test 12/06/22  0758   INFZA Negative   INFZB Negative   IRSV Negative       Imaging:  Recent Results (from the past 48 hour(s))   XR Chest Port 1 View    Narrative    EXAM: XR CHEST PORT 1 VIEW  1/9/2023 7:58 AM      HISTORY: post-op heart transplant monitoring    COMPARISON: Radiographs 1/8/2023, 1/7/2023,  1/6/2023    FINDINGS: Single view of the chest. Postsurgical changes of heart  transplantation with median sternotomy wires. Stable positioning of  pericardial drain. Trachea is midline. Normal cardiomediastinal  silhouette. No pleural effusion or pneumothorax. Hazy opacities along  the left lung is with obscuration of the left hemidiaphragm. No acute  osseous abnormalities. Cholecystectomy surgical clips.      Impression    IMPRESSION: New hazy opacification along the left lung base, likely  atelectasis. Cannot rule out superimposed infection.    I have personally reviewed the examination and initial interpretation  and I agree with the findings.    DEIDRE HINTON MD         SYSTEM ID:  [id number]   Cardiac Catheterization    Narrative      Successful endomyocardial biopsy. Results pending.    Fluoroscopy was used to visualize the right femoral vein.    Right sided filling pressures are normal.    Normal PA pressures.    Left sided filling pressures are normal.    Normal cardiac output level.    Hemodynamic data has been modified in Epic per physician review.     Right internal jugular vein thrombus incidentally noted. Left internal   jugular vein accessed but unable to pass the wire due to likely stenosis   in the brachiocephalic vein. Femoral access successful.      XR Chest Port 1 View    Narrative    XR CHEST PORT 1 VIEW  1/9/2023 2:32 PM      HISTORY: S/P CT removal    COMPARISON: 1/9/2023 0753 hours    FINDINGS:   Single AP view. Stable postoperative changes. Interval removal of the  pericardial/mediastinal drain. Stable cardiac silhouette. Midline  trachea. No pneumothorax or significant pleural effusion. Unchanged  probable trace left pleural effusion. Similar left basilar streaky and  hazy opacities.      Impression    IMPRESSION:   Interval removal of pericardial/mediastinal drain with stable trace  left pleural effusion and associated left basilar  atelectasis/consolidation.    I have personally  reviewed the examination and initial interpretation  and I agree with the findings.    DEIDRE HINTON MD         SYSTEM ID:  [id number]   US Lower Extremity Venous Duplex Left   Result Value    Radiologist flags Partial DVT of the peroneal vein. (Urgent)    Narrative    EXAMINATION: DOPPLER VENOUS ULTRASOUND OF THE LEFT LOWER EXTREMITY,  1/9/2023 5:28 PM     COMPARISON: 11/15/2022    HISTORY: f/u DVT    TECHNIQUE:  Gray-scale evaluation with compression, spectral flow, and  color Doppler assessment of the deep venous system of the left leg  from groin to knee, and then at the ankle.    FINDINGS:  In the left lower extremity, the common femoral, femoral, popliteal  and posterior tibial veins demonstrate normal compressibility and  blood flow. One of the paired peroneal veins from the upper to mid  calf is partially compressible.      Impression    IMPRESSION:  Nonocclusive deep venous thrombosis of the left peroneal vein at the  upper to mid calf.     [Urgent Result: Partial DVT of the peroneal vein.]    Finding was identified on 1/9/2023 5:27 PM.     Rose Mary Goldstein NP was contacted by Dr. Isaiah Ramirez at 1/9/2023 5:53 PM  and verbalized understanding of the urgent finding.     I have personally reviewed the examination and initial interpretation  and I agree with the findings.    ENEDINA PARSON DO         SYSTEM ID:  [id number]   US Upper Extremity Venous Duplex Bilat   Result Value    Radiologist flags (Urgent)     Occlusive right internal jugular vein and partially    Narrative    EXAMINATION: DOPPLER VENOUS ULTRASOUND OF BILATERAL UPPER EXTREMITIES,  1/9/2023 5:28 PM     COMPARISON: 12/29/2022    HISTORY: right IJ occlusive thrombus and left IJ unaccessible in cath  lab    TECHNIQUE:  Gray-scale evaluation with compression, spectral flow, and  color Doppler assessment of the deep veins of both upper extremities.    FINDINGS:  Partially compressible thrombus within the right and left internal  jugular veins,  greater in extent on the right. The thrombus in the  right internal jugular vein is mobile but adherent. Normal blood flow  and waveforms are demonstrated in the innominate, subclavian, and  axillary veins bilaterally. There is normal compressibility of the  brachial, basilic and cephalic veins bilaterally.    Redemonstration of the anterior left shoulder ovoid avascular anechoic  fluid collection measuring 1.9 x 0.5 x 0.9 cm, decreased in size since  12/29/2022.      Impression    IMPRESSION:  1.  Nonocclusive thrombus in the right greater than left internal  jugular veins.  2.  Redemonstration of the left anterior shoulder anechoic fluid  collection measuring up to 1.9 cm, decreased since 12/29/2022.    [Urgent Result: Occlusive right internal jugular vein and partially  occlusive left internal jugular vein thrombosis.]    Finding was identified on 1/9/2023 5:27 PM.     Rose Mary Goldstein NP was contacted by Dr. Isaiah Ramirez at 1/9/2023 5:53 PM  and verbalized understanding of the urgent finding.      I have personally reviewed the examination and initial interpretation  and I agree with the findings.    ENEDINA PARSON DO         SYSTEM ID:  [id number]   XR Chest Port 1 View    Narrative    XR CHEST PORT 1 VIEW  1/10/2023 8:13 AM      HISTORY: post-op heart transplant monitoring    COMPARISON: 1/9/2023    FINDINGS:   Single AP view. Stable postoperative changes. No pneumothorax or right  pleural effusion. Trace left pleural effusion. Minimally decreased  left basilar streaky and hazy opacities. This structures are  unchanged.      Impression    IMPRESSION:   Trace left pleural effusion with minimally improved left basilar  atelectasis.    I have personally reviewed the examination and initial interpretation  and I agree with the findings.    AISHA KAUR MD         SYSTEM ID:  [id number]

## 2023-01-10 NOTE — CONSULTS
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  He is now s/p OHT 12/25/22, with post-operative course c/b delirium (improved), bacteremia, and prolonged CT output.    RECOMMENDATIONS:  - decrease mmf to 1000 mg bid  - decrease pred to 20 mg in AM 15 mg     # s/p OHT 12/25/22 history of NICM  # Chronic immunosuppresion  His post-transplant course has been c/b delirium, bacteremia, and prolonged CT output.    Rejection history:  Initial biopsy showed diffuse capillary staining for c4d, suggestive of pAMR1.  No DSAs, no graft dysfunction.  No changes were made, plan to f/u with next week's biospy.  AlloMap scores:  n/a  DSAs:  none  Coronary angio/Ischemic eval:  To be completed at ~week #4 post-tx  Last RHC: RA 4, mPA 16, PCW 9, and CI 2.6 --> weight 185# (standing scale).  Echo/cMRI: TTE 1/2/23 LVEF 60-65% and normal RV size/function.    Immunosuppression:  - received basiliximab 12/25 and 12/29  - prednisone taper, currently 20 mg in AM and 15 mg in PM  - MMF decrease to 1000mg BID   - tacro, goal level 10-12.  Level 10.1 today.     PPx:  - CAV:  Ok to hold aspirin while on eliquis,continue rosuvastatin 10mg daily  - GI:  Pantoprazole 40mg daily  - Osteoporosis:  calcium/vitamin D supplements  - CMV:  valcyte 900mg daily (x3 months, through 3/2023)  - PCP:  Bactrim single strength daily  - Thrush:  Nystatin s/s QID  - Toxo:  N/a    Serostatus: CMV D+/R+  "EBV D?/R+ Toxo D?/R-    Graft function:  - BPs:  At goal continue amlodipine 5mg daily  - HRs:  80-90s, off terbutaline  - fluid status:  Euvolemic, off diuretics    Health maintenance:  - due for week #3 RHC/bx ~1/16/23  - note needs femoral access for RHC    # h/o right axillary nonocclusive and left peroneal occlusive DVT (11/2022, pre-transplant)  Was on apixaban prior to transplant, which has been held.  During RHC 1/9, it was noted that his RIJ had clot and LIJ is inaccessible, so he required femoral access.  - starting eliquis per CVTS    # Staph epi bacteremia  Blood cx from 1/7 and 1/8 with staph epi. Started on IV vanco 1/8 per ID.  - Transplant ID following  - daily blood cx through 1/12, will discuss PICC when ok from transplant standpoint  - IV vanco for four week course minimum    Symone Guerra, PAOLA, NP-C  Advanced Heart Failure/Cardiology 2 Nurse Practitioner  Pager     =============================================================    Interval History/ROS:   No overnight events. Breathing well. No complaints today. Working with  Therapies. Afebrile, WBC trending down.    Last 24 hr care team notes reviewed.   ROS:  4 point ROS including Respiratory, CV, GI and , other than that noted in the HPI, is negative.     Medications: Reviewed in EPIC.     Physical Exam:   /61 (BP Location: Right arm)   Pulse 88   Temp 97.6  F (36.4  C) (Oral)   Resp 16   Ht 1.88 m (6' 2\")   Wt 84 kg (185 lb 3.2 oz)   SpO2 95%   BMI 23.78 kg/m       GENERAL: Appears comfortable, in NAD .   HEENT: Eye symmetrical and free of discharge bilaterally. Mucous membranes moist and without lesions. No thrush noted on tongue or cheeks.  NECK: Supple, JVD negative when sitting upright.   CV: RRR, S1S2 present without murmur, rub, or gallop.   RESPIRATORY: Respirations regular, even, and unlabored. Lungs CTA throughout.   GI: Soft and non distended with normoactive bowel sounds present in all quadrants. No " tenderness, rebound, guarding. No organomegaly.   EXTREMITIES: No LE edema. 2+ bilateral pedal pulses.   NEUROLOGIC: Alert and orientated x 3. No focal deficits.   MUSCULOSKELETAL: No joint swelling or tenderness.   SKIN: No jaundice. No rashes or lesions. Surgical incisions pink, edges well-approximated, no redness nor tenderness.    Data:  CMP  Recent Labs   Lab 01/10/23  1237 01/10/23  0658 01/10/23  0625 01/09/23  2155 01/09/23  1743 01/09/23  1451 01/09/23  0744 01/09/23  0623 01/08/23  0907 01/08/23  0737 01/07/23  0746 01/07/23  0728 01/06/23  1229 01/06/23  0442 01/05/23  0757 01/05/23  0715 01/04/23  0817 01/04/23  0458   NA  --   --  134*  --   --   --   --  134*  --  132*  --  133*  --  135*   < > 133*  --  134*   POTASSIUM  --   --  5.1  --   --   --   --  4.8  --  4.9  --  4.9  --  4.6   < > 5.4*  --  4.7   CHLORIDE  --   --  102  --   --   --   --  103  --  103  --  103  --  105   < > 104  --  105   CO2  --   --  21*  --   --   --   --  22  --  20*  --  18*  --  20*   < > 20*  --  20*   ANIONGAP  --   --  11  --   --   --   --  9  --  9  --  12  --  10   < > 9  --  9   * 166* 171* 153*   < >  --    < > 118*   < > 111*   < > 110*   < > 102*   < > 124*   < > 158*   BUN  --   --  26.4*  --   --   --   --  19.6  --  24.1*  --  23.1*  --  20.7   < > 21.5  --  21.7   CR  --   --  0.81  --   --   --   --  0.66*  --  0.69  --  0.65*  --  0.65*   < > 0.60*  --  0.57*   GFRESTIMATED  --   --  >90  --   --   --   --  >90  --  >90  --  >90  --  >90   < > >90  --  >90   TIM  --   --  8.7*  --   --   --   --  8.5*  --  8.1*  --  8.4*  --  8.5*   < > 8.4*  --  8.1*   MAG  --   --  1.6*  --   --  2.9*  --  1.5*  --  1.6*  --  1.6*   < > 1.4*  --   --   --  1.6*   PHOS  --   --  2.6  --   --   --   --  2.7  --  3.0  --  3.4  --  3.5  --   --   --  2.5   PROTTOTAL  --   --   --   --   --   --   --   --   --   --   --  4.9*  --  4.7*  --  4.7*  --  4.6*   ALBUMIN  --   --   --   --   --   --   --   --   --   --    --  2.7*  --  2.6*  --  2.6*  --  2.6*   BILITOTAL  --   --   --   --   --   --   --   --   --   --   --  0.4  --  0.4  --  0.4  --  0.3   ALKPHOS  --   --   --   --   --   --   --   --   --   --   --  80  --  71  --  69  --  71   AST  --   --   --   --   --   --   --   --   --   --   --  27  --  25  --  30  --  26   ALT  --   --   --   --   --   --   --   --   --   --   --  39  --  47  --  44  --  38    < > = values in this interval not displayed.     CBC  Recent Labs   Lab 01/10/23  0625 01/09/23  1102 01/09/23  0623 01/08/23  0737 01/07/23  0914   WBC 12.2*  --  13.7* 18.4* 19.9*   RBC 3.39*  --  3.52* 3.43* 3.65*   HGB 10.0* 10.6* 10.2* 10.1* 10.7*   HCT 31.6*  --  33.2* 31.9* 33.8*   MCV 93  --  94 93 93   MCH 29.5  --  29.0 29.4 29.3   MCHC 31.6  --  30.7* 31.7 31.7   RDW 16.2*  --  16.3* 16.1* 16.0*     --  277 257 238       Patient discussed with Dr. Ibanez.

## 2023-01-10 NOTE — PHARMACY-VANCOMYCIN DOSING SERVICE
Pharmacy Vancomycin Note  Date of Service January 10, 2023  Patient's  1955   67 year old, male    Indication: Bacteremia  Day of Therapy: 3  Current vancomycin regimen: 1000 mg IV q12h  Current vancomycin monitoring method: AUC  Current vancomycin therapeutic monitoring goal: 400-600 mg*h/L    InsightRX Prediction of Current Vancomycin Regimen  Regimen: 1000 mg IV every 12 hours.  Exposure target: AUC24 (range)400-600 mg/L.hr   AUC24,ss: 474 mg/L.hr  Probability of AUC24 > 400: 81 %  Ctrough,ss: 14.7 mg/L  Probability of Ctrough,ss > 20: 12 %  Probability of nephrotoxicity (Lodise AMOL ): 10 %    Current estimated CrCl = Estimated Creatinine Clearance: 102.3 mL/min (based on SCr of 0.81 mg/dL).    Creatinine for last 3 days  2023:  7:37 AM Creatinine 0.69 mg/dL  2023:  6:23 AM Creatinine 0.66 mg/dL  1/10/2023:  6:25 AM Creatinine 0.81 mg/dL    Recent Vancomycin Levels (past 3 days)  1/10/2023:  1:06 PM Vancomycin 11.7 ug/mL    Vancomycin IV Administrations (past 72 hours)                   vancomycin (VANCOCIN) 1000 mg in dextrose 5% 200 mL PREMIX (mg) 1,000 mg New Bag 01/10/23 1415     1,000 mg New Bag  0236     1,000 mg New Bag 23 1501     1,000 mg New Bag  0152    vancomycin (VANCOCIN) 1,250 mg in 0.9% NaCl 250 mL intermittent infusion (mg) 1,250 mg New Bag 23 1400                Nephrotoxins and other renal medications (From now, onward)    Start     Dose/Rate Route Frequency Ordered Stop    23 0800  tacrolimus (GENERIC EQUIVALENT) capsule 4 mg         4 mg Oral EVERY MORNING. 23 1307      23 0200  vancomycin (VANCOCIN) 1000 mg in dextrose 5% 200 mL PREMIX         1,000 mg  200 mL/hr over 1 Hours Intravenous EVERY 12 HOURS 23 1236      23 1800  tacrolimus (GENERIC EQUIVALENT) capsule 5 mg         5 mg Oral EVERY EVENING. 23 1307               Contrast Orders - past 72 hours (72h ago, onward)    None          Interpretation of levels and current  regimen:  Vancomycin level is reflective of -600    Has serum creatinine changed greater than 50% in last 72 hours: No    Urine output:  good urine output    Renal Function: Stable    Plan:  1. Continue Current Dose: 1000 mg IV every 12 hours  2. Vancomycin monitoring method: AUC  3. Vancomycin therapeutic monitoring goal: 400-600 mg*h/L  4. Pharmacy will check vancomycin levels as appropriate in 2-4 days.  5. Serum creatinine levels will be ordered daily for the first week of therapy and at least twice weekly for subsequent weeks.    Tab Drew RPH

## 2023-01-10 NOTE — PLAN OF CARE
D: Admitted s/p OHT on 12/25/22 w/ Dr. Rodriguez  [facility] of chronic systolic HF, CAD s/p PCI, HLD, CNS vasculitis, CKD stage 2     I: Monitored vitals and assessed pt status.      A: A0x4. Afebrile. VSS. HRs 80s. Sinus rhythm. Sats >92% on RA. Pt denies any shortness of breath, chest pain/palpitations, nausea, dizziness, or chills. Pt prefers couple of meds at a time w/pudding. Right groin and sternal incision site WNL. Plan for Vanco for four week course. Mg+ 1.6 and Phos 2.6, replaced.  BGs before meals. Pt on regular diet. BM x1. Urinating adequately. Pt up w/assist x1 w/walker and gait belt. Ambulates frequently.     P: Pt to discharge to [place] pending medical stability. Continue to monitor pt status and notify CVTS treatment team with any changes or concerns.    Goal Outcome Evaluation:     Plan of Care Reviewed With: patient, spouse    Overall Patient Progress: improving

## 2023-01-10 NOTE — PROGRESS NOTES
Cardiovascular Surgery Progress Note  01/10/2023         Assessment and Plan:     Paco Stein is a 67 year old male with a PMH of chronic systolic heart failure (suspected predominantly nonischemic cardiomyopathy, possible arrhythmogenic), CAD s/p PCI, HLD, CNS vasculitis, CKD Stage 2. Patient is now s/p orthotopic heart transplantation on 12/25/2022 with Dr. Rodriguez.    Cardiovascular:   Hx of End-stage heart failure s/p Heart transplantation  HD stable overnight in NSR  Pre-operative echo showed LVEF 15%  Intraoperative echo showed LVEF 45% (stunning)  Postoperative echo on 1/2 demonstrated LVEF 60-65%, preserved RV function  - 1 week Heart Biopsy demonstrated 1R with C4d however DSA negative. Clinically stable and echo looks stable. 2 week bx today, 1/9.  - ASA 81 mg daily  - Statin: Crestor 10mg daily  - Norvasc 5mg daily  - Diuresis per CARDS2  Chest tubes: Left midaxillary CT removed on 01/03. CXR 01/03 post-pull, stable. Left and Right pleural as well as mediastinal removed 01/05. CXR ordered 01/05 post pull, stable. Pericardial with 19 mL out past 24h, removed 1/9. F/U CXR 1/9 and 1/10 were stable.  TPW: removed 01/03    Pulmonary:  Extubated POD #2 to 4 lpm via NC. Now saturating well on RA.   - Pulm hygiene, IS, activity and deep breathing    Neurology / MSK:  Acute post-operative pain   Pain well controlled with current regimen:  - Acetaminophen, PO oxycodone PRN, methocarbamol PRN     / Renal / Fluid / Electrolytes:  Hyperkalemia, resolved  Hyponatremia, improving  BL creat ~ 0.9, most recent creatinine stable; adequate UOP.   FLUID STATUS: Pre-op weight 192, weight today 185; I/O past 24 hours: -604mL  - Diuresis: per CARDS2  - Replete lytes per protocol  - Strict I/O, daily weights  - Avoid/limit nephrotoxins as able  - Hyperkalemia improved 01/06 after neutra-phos discontinued  - Discontinue KPhos  - BMP (K improved), continue AM draws to trend    GI / Nutrition:   - Tolerating regular diet  -  Continue bowel regimen, last BM 01/09    Endocrine:  Stress and steroid induced hyperglycemia   Hgb A1c 6.2% on 12/25  - Initially managed on insulin drip postop, transitioned to sliding scale; goal BG <180 for optimal healing    Infectious Disease:  Stress and steroid induced leukocytosis  Staph epi bacteremia  WBC 12.2 (19.9->18.4->13.7) remains afebrile  - Blood and urine culture 1/7 due to jump in WBC. 1/2 bottles growing gram + cocci. Repeat Blood cx 1/8 and transplant ID consult placed. Started IV Vanco q12h. Per ID continue daily blood culture for 2 more days (until 1/12).  - Repeat blood cultures 1/8 with 2/2 bottles growing gram + cocci. Repeat blood cultures 1/9 and 1/10 NGTD.  - Completed perioperative antibiotics  - Continue to monitor fever curve, CBC  Immunosuppression/prophylaxis  - Managed per Cards 2    Hematology:   Acute blood loss anemia and thrombocytopenia  Hgb stable; Plt stable, no signs or symptoms of active bleeding  - CBC: continue to trend  History of right axillary nonocclusive and left peroneal occlusive DVT 11/2022  - PTA Eliquis on hold. Discussed with Dr. Rodriguez, connect with CARDS2 prior to resuming, potentially after next biopsy 01/09.   - Repeat US 01/09: nonocclusive thrombus in jugular veins (R>L) and nonocclusive DVT in Left Peroneal vein    Anticoagulation:   - ASA   - Resuming Eliquis 5mg BID 1/10, discussed with Dr. Rodriguez    MSK/Skin:  Sternotomy  Surgical incision  - Sternal precautions  - Incisional cares per protocol    Prophylaxis:   - Stress ulcer prophylaxis: Pantoprazole 40 mg daily for 30 days  - DVT prophylaxis: Subcutaneous heparin, SCD    Disposition:   - Transferred to  on 01/04  - Therapies recommending discharge to home with assist and outpatient cardiac rehab at this time, likely 1-2 days once ID and CARDS2 clears. Will discharge to [facility]    Discussed with Dr. Michael Hernandez PA-C   Cardiothoracic Surgery  Pager: [phone]           Interval History:     No acute events overnight. Patient slept well. Pain well controlled on current regimen. Breathing is stable on room air, working on IS. Tolerating diet, +BM and voiding well on own. Denies CP, SOB, dizziness, lightheadedness, chills, myalgias, sternal popping/clicking and N/V.          Physical Exam:     Gen: A&Ox4, NAD  Neuro: Intact with no focal deficits   CV: RRR, normal S1 S2, no murmurs, rubs or gallops. No JVD  Pulm: CTA, no wheezing or rhonchi, normal breathing on RA  Abd: nondistended, normal BS, soft, nontender  Ext: minimal peripheral edema, no pitting  Incision: clean, dry, intact, no erythema, sternum stable  Tubes/drain sites: dressing clean and dry.          Data:    Imaging:  reviewed recent imaging, nonocclusive thrombus noted in jugular and peroneal. Surgeon aware.     Recent Results (from the past 24 hour(s))   Cardiac Catheterization    Narrative      Successful endomyocardial biopsy. Results pending.    Right sided filling pressures are normal.    Normal PA pressures.    Left sided filling pressures are normal.    Normal cardiac output level.    Hemodynamic data has been modified in Epic per physician review.     Right internal jugular vein thrombus incidentally noted. Left internal   jugular vein accessed but unable to pass the wire due to likely stenosis   in the brachiocephalic vein. Femoral access successful.      XR Chest Port 1 View    Narrative    XR CHEST PORT 1 VIEW  1/9/2023 2:32 PM      HISTORY: S/P CT removal    COMPARISON: 1/9/2023 0753 hours    FINDINGS:   Single AP view. Stable postoperative changes. Interval removal of the  pericardial/mediastinal drain. Stable cardiac silhouette. Midline  trachea. No pneumothorax or significant pleural effusion. Unchanged  probable trace left pleural effusion. Similar left basilar streaky and  hazy opacities.      Impression    IMPRESSION:   Interval removal of pericardial/mediastinal drain with stable trace  left pleural  effusion and associated left basilar  atelectasis/consolidation.    I have personally reviewed the examination and initial interpretation  and I agree with the findings.    DEIDRE HINTON MD         SYSTEM ID:  [id number]   US Lower Extremity Venous Duplex Left   Result Value    Radiologist flags Partial DVT of the peroneal vein. (Urgent)    Narrative    EXAMINATION: DOPPLER VENOUS ULTRASOUND OF THE LEFT LOWER EXTREMITY,  1/9/2023 5:28 PM     COMPARISON: 11/15/2022    HISTORY: f/u DVT    TECHNIQUE:  Gray-scale evaluation with compression, spectral flow, and  color Doppler assessment of the deep venous system of the left leg  from groin to knee, and then at the ankle.    FINDINGS:  In the left lower extremity, the common femoral, femoral, popliteal  and posterior tibial veins demonstrate normal compressibility and  blood flow. One of the paired peroneal veins from the upper to mid  calf is partially compressible.      Impression    IMPRESSION:  Nonocclusive deep venous thrombosis of the left peroneal vein at the  upper to mid calf.     [Urgent Result: Partial DVT of the peroneal vein.]    Finding was identified on 1/9/2023 5:27 PM.     Rose Mary Goldstein NP was contacted by Dr. Isaiah Ramirez at 1/9/2023 5:53 PM  and verbalized understanding of the urgent finding.     I have personally reviewed the examination and initial interpretation  and I agree with the findings.    ENEDINA PARSON DO         SYSTEM ID:  [id number]   US Upper Extremity Venous Duplex Bilat   Result Value    Radiologist flags (Urgent)     Occlusive right internal jugular vein and partially    Narrative    EXAMINATION: DOPPLER VENOUS ULTRASOUND OF BILATERAL UPPER EXTREMITIES,  1/9/2023 5:28 PM     COMPARISON: 12/29/2022    HISTORY: right IJ occlusive thrombus and left IJ unaccessible in cath  lab    TECHNIQUE:  Gray-scale evaluation with compression, spectral flow, and  color Doppler assessment of the deep veins of both upper  extremities.    FINDINGS:  Partially compressible thrombus within the right and left internal  jugular veins, greater in extent on the right. The thrombus in the  right internal jugular vein is mobile but adherent. Normal blood flow  and waveforms are demonstrated in the innominate, subclavian, and  axillary veins bilaterally. There is normal compressibility of the  brachial, basilic and cephalic veins bilaterally.    Redemonstration of the anterior left shoulder ovoid avascular anechoic  fluid collection measuring 1.9 x 0.5 x 0.9 cm, decreased in size since  12/29/2022.      Impression    IMPRESSION:  1.  Nonocclusive thrombus in the right greater than left internal  jugular veins.  2.  Redemonstration of the left anterior shoulder anechoic fluid  collection measuring up to 1.9 cm, decreased since 12/29/2022.    [Urgent Result: Occlusive right internal jugular vein and partially  occlusive left internal jugular vein thrombosis.]    Finding was identified on 1/9/2023 5:27 PM.     Rose Mary Goldstein NP was contacted by Dr. Isaiah Ramirez at 1/9/2023 5:53 PM  and verbalized understanding of the urgent finding.      I have personally reviewed the examination and initial interpretation  and I agree with the findings.    ENEDINA PARSON DO         SYSTEM ID:  [id number]        Labs:  Recent Labs   Lab 01/10/23  0658 01/10/23  0625 01/09/23  2155 01/09/23  1131 01/09/23  1102 01/09/23  0744 01/09/23  0623 01/08/23  0907 01/08/23  0737 01/07/23  0746 01/07/23  0728 01/06/23  1229 01/06/23  0442   WBC  --  12.2*  --   --   --   --  13.7*  --  18.4*   < >  --   --  13.4*   HGB  --  10.0*  --   --  10.6*  --  10.2*  --  10.1*   < >  --   --  10.5*   MCV  --  93  --   --   --   --  94  --  93   < >  --   --  92   PLT  --  280  --   --   --   --  277  --  257   < >  --   --  266   NA  --  134*  --   --   --   --  134*  --  132*  --  133*  --  135*   POTASSIUM  --  5.1  --   --   --   --  4.8  --  4.9  --  4.9  --  4.6   CHLORIDE  --   102  --   --   --   --  103  --  103  --  103  --  105   CO2  --  21*  --   --   --   --  22  --  20*  --  18*  --  20*   BUN  --  26.4*  --   --   --   --  19.6  --  24.1*  --  23.1*  --  20.7   CR  --  0.81  --   --   --   --  0.66*  --  0.69  --  0.65*  --  0.65*   ANIONGAP  --  11  --   --   --   --  9  --  9  --  12  --  10   TIM  --  8.7*  --   --   --   --  8.5*  --  8.1*  --  8.4*  --  8.5*   * 171* 153*   < >  --    < > 118*   < > 111*   < > 110*   < > 102*   ALBUMIN  --   --   --   --   --   --   --   --   --   --  2.7*  --  2.6*   PROTTOTAL  --   --   --   --   --   --   --   --   --   --  4.9*  --  4.7*   BILITOTAL  --   --   --   --   --   --   --   --   --   --  0.4  --  0.4   ALKPHOS  --   --   --   --   --   --   --   --   --   --  80  --  71   ALT  --   --   --   --   --   --   --   --   --   --  39  --  47   AST  --   --   --   --   --   --   --   --   --   --  27  --  25    < > = values in this interval not displayed.      GLUCOSE:   Recent Labs   Lab 01/10/23  0658 01/10/23  0625 01/09/23  2155 01/09/23  1743 01/09/23  1131 01/09/23  0744   * 171* 153* 219* 123* 117*

## 2023-01-10 NOTE — PROGRESS NOTES
Transplant Social Work Services Progress Note      Date of Initial Social Work Evaluation: 11/14/2022 with admission assessment completed 12/9/2022  Collaborated with: Cards II, CVTS, patient and wife-Arlette    Data: Vince remains inpatient on 6C recovering post-heart transplant. Wife got into [facility] yesterday and reports being very happy there. She is getting a toilet seat riser, however, for the bathroom as the patient is very tall and the toilet is quite low. Their son is in town this week and is able to help. Vince had his biopsy done on Monday and everything looks good, but has developed an infection, which will keep him inpatient for close monitoring for a few more days still.  Intervention: Met with Vince and Arlette. Offered support and inquired into questions/concerns. Assessed coping and talked about the discharge plan. Provided pamphlet on how to write a letter to the donor family and discussed the process.  Assessment: Vince and Arlette verbalized understanding toward the process of writing a letter to the donor family. No psychosocial concerns noted at this time. Coping skills appropriate.  Education provided by SW: Letter writing and ongoing SW availability  Plan:    Discharge Plans in Progress: [facility] with family providing 24-hour care    Barriers to d/c plan: Current infection requiring inpatient care    Follow up Plan: SW will remain available for ongoing psychosocial support as needed.

## 2023-01-10 NOTE — PROGRESS NOTES
D: S/p heart transplant on 12/25/22.   PMH of chronic systolic heart failure (suspected predominantly nonischemic cardiomyopathy, possible arrhythmogenic), CAD s/p PCI, HLD, CNS vasculitis, CKD Stage 2.    I: Monitored vitals and assessed pt status.   Changed: Upper and lower extremity US.  Tele: Sinus rhythm.  O2: Room air.   Mobility: Assist of 1.    A: A0x4. VSS. Afebrile. Urinating adequately.     P: Continue to monitor Pt status and report changes to CVTS.    Temp:  [97.5  F (36.4  C)-97.9  F (36.6  C)] 97.8  F (36.6  C)  Pulse:  [84-96] 87  Resp:  [16-18] 16  BP: (101-120)/(68-85) 101/68  SpO2:  [94 %-96 %] 94 %

## 2023-01-10 NOTE — PLAN OF CARE
D: s/p heart transplant 12/25     I: Monitored vitals and assessed patient status.   PRN: robaxin x1     A: A&Ox4. On room air. SR 80s-90s, VSS, afebrile. Sternal incision and left chest incision open to air. Dressing over old chest tube sites. Right groin site with primapore in place, scant dried drainage outlined, no change. Urinating adequately. LBM 1/9. Assist x1 with gaitbelt and walker.     P: Continue to monitor.

## 2023-01-10 NOTE — PROGRESS NOTES
Writer met with pt and wife for transplant education.   Reviewed:      ~Overview of meds (importance of keeping med card up to date); importance of notifying team of any med changes other MDs may request. Need/process for 12-hour tacro levels.      Pt and wife have completed education with the transplant pharmacist. Informed them that transplant pharmacy will contact them after discharge.       Please continue to keep med card up-to-date and review with patient/wife.        ~Home routines (documenting daily VS and weight and bringing info to clinic visits, along with med card).    ~Follow up cath lab and clinic schedule. Appts through 3 month can be reviewed in My Chart   ~Biopsy results; s/sx of rejection and treatment options  ~Transplant physiology, HR, vasculopathy  ~Preventive cares.  Pt is a . 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    ~How and when to call transplant team post discharge. Follow up My Chart message sent with contact information.      ~Information re [employer] [facility] [employer] support group sent in follow up My Chart message. Wife jose like to write letter to donor's family. SW will provide [employer] packet.        ~Enc to call/My Chart as needed.      Mary Lou Luke RN  Post Heart Transplant Coordinator

## 2023-01-11 ENCOUNTER — HOME INFUSION (PRE-WILLOW HOME INFUSION) (OUTPATIENT)
Dept: PHARMACY | Facility: CLINIC | Age: 68
End: 2023-01-11

## 2023-01-11 ENCOUNTER — APPOINTMENT (OUTPATIENT)
Dept: GENERAL RADIOLOGY | Facility: CLINIC | Age: 68
DRG: 001 | End: 2023-01-11
Attending: SURGERY
Payer: MEDICARE

## 2023-01-11 ENCOUNTER — APPOINTMENT (OUTPATIENT)
Dept: OCCUPATIONAL THERAPY | Facility: CLINIC | Age: 68
DRG: 001 | End: 2023-01-11
Attending: SURGERY
Payer: MEDICARE

## 2023-01-11 LAB
ANION GAP SERPL CALCULATED.3IONS-SCNC: 11 MMOL/L (ref 7–15)
BACTERIA BLD CULT: ABNORMAL
BUN SERPL-MCNC: 24.7 MG/DL (ref 8–23)
CALCIUM SERPL-MCNC: 8.4 MG/DL (ref 8.8–10.2)
CHLORIDE SERPL-SCNC: 103 MMOL/L (ref 98–107)
CREAT SERPL-MCNC: 0.7 MG/DL (ref 0.67–1.17)
CRP SERPL-MCNC: 10.6 MG/L
DEPRECATED HCO3 PLAS-SCNC: 20 MMOL/L (ref 22–29)
ERYTHROCYTE [DISTWIDTH] IN BLOOD BY AUTOMATED COUNT: 16 % (ref 10–15)
GFR SERPL CREATININE-BSD FRML MDRD: >90 ML/MIN/1.73M2
GLUCOSE BLDC GLUCOMTR-MCNC: 109 MG/DL (ref 70–99)
GLUCOSE BLDC GLUCOMTR-MCNC: 140 MG/DL (ref 70–99)
GLUCOSE BLDC GLUCOMTR-MCNC: 143 MG/DL (ref 70–99)
GLUCOSE BLDC GLUCOMTR-MCNC: 211 MG/DL (ref 70–99)
GLUCOSE SERPL-MCNC: 108 MG/DL (ref 70–99)
HCT VFR BLD AUTO: 32.3 % (ref 40–53)
HGB BLD-MCNC: 10.2 G/DL (ref 13.3–17.7)
IGE SERPL-ACNC: 71 KU/L (ref 0–114)
MAGNESIUM SERPL-MCNC: 1.5 MG/DL (ref 1.7–2.3)
MCH RBC QN AUTO: 29.1 PG (ref 26.5–33)
MCHC RBC AUTO-ENTMCNC: 31.6 G/DL (ref 31.5–36.5)
MCV RBC AUTO: 92 FL (ref 78–100)
PHOSPHATE SERPL-MCNC: 3 MG/DL (ref 2.5–4.5)
PLATELET # BLD AUTO: 252 10E3/UL (ref 150–450)
POTASSIUM SERPL-SCNC: 4.7 MMOL/L (ref 3.4–5.3)
RBC # BLD AUTO: 3.51 10E6/UL (ref 4.4–5.9)
SODIUM SERPL-SCNC: 134 MMOL/L (ref 136–145)
TACROLIMUS BLD-MCNC: 8.4 UG/L (ref 5–15)
TME LAST DOSE: NORMAL H
TME LAST DOSE: NORMAL H
WBC # BLD AUTO: 9.7 10E3/UL (ref 4–11)

## 2023-01-11 PROCEDURE — 250N000013 HC RX MED GY IP 250 OP 250 PS 637: Performed by: NURSE PRACTITIONER

## 2023-01-11 PROCEDURE — 250N000012 HC RX MED GY IP 250 OP 636 PS 637: Performed by: NURSE PRACTITIONER

## 2023-01-11 PROCEDURE — 86140 C-REACTIVE PROTEIN: CPT | Performed by: INTERNAL MEDICINE

## 2023-01-11 PROCEDURE — 83735 ASSAY OF MAGNESIUM: CPT | Performed by: NURSE PRACTITIONER

## 2023-01-11 PROCEDURE — 80048 BASIC METABOLIC PNL TOTAL CA: CPT | Performed by: SURGERY

## 2023-01-11 PROCEDURE — 71045 X-RAY EXAM CHEST 1 VIEW: CPT

## 2023-01-11 PROCEDURE — 250N000011 HC RX IP 250 OP 636: Performed by: SURGERY

## 2023-01-11 PROCEDURE — 99233 SBSQ HOSP IP/OBS HIGH 50: CPT | Mod: GC | Performed by: INTERNAL MEDICINE

## 2023-01-11 PROCEDURE — 214N000001 HC R&B CCU UMMC

## 2023-01-11 PROCEDURE — 250N000013 HC RX MED GY IP 250 OP 250 PS 637

## 2023-01-11 PROCEDURE — 250N000013 HC RX MED GY IP 250 OP 250 PS 637: Performed by: PHYSICIAN ASSISTANT

## 2023-01-11 PROCEDURE — 250N000013 HC RX MED GY IP 250 OP 250 PS 637: Performed by: STUDENT IN AN ORGANIZED HEALTH CARE EDUCATION/TRAINING PROGRAM

## 2023-01-11 PROCEDURE — 71045 X-RAY EXAM CHEST 1 VIEW: CPT | Mod: 26 | Performed by: RADIOLOGY

## 2023-01-11 PROCEDURE — 87040 BLOOD CULTURE FOR BACTERIA: CPT | Performed by: INTERNAL MEDICINE

## 2023-01-11 PROCEDURE — 99232 SBSQ HOSP IP/OBS MODERATE 35: CPT | Mod: FS | Performed by: NURSE PRACTITIONER

## 2023-01-11 PROCEDURE — 250N000011 HC RX IP 250 OP 636

## 2023-01-11 PROCEDURE — 84100 ASSAY OF PHOSPHORUS: CPT | Performed by: NURSE PRACTITIONER

## 2023-01-11 PROCEDURE — 80197 ASSAY OF TACROLIMUS: CPT | Performed by: NURSE PRACTITIONER

## 2023-01-11 PROCEDURE — 250N000013 HC RX MED GY IP 250 OP 250 PS 637: Performed by: SURGERY

## 2023-01-11 PROCEDURE — 97530 THERAPEUTIC ACTIVITIES: CPT | Mod: GO | Performed by: OCCUPATIONAL THERAPIST

## 2023-01-11 PROCEDURE — 85027 COMPLETE CBC AUTOMATED: CPT | Performed by: INTERNAL MEDICINE

## 2023-01-11 PROCEDURE — 36415 COLL VENOUS BLD VENIPUNCTURE: CPT | Performed by: NURSE PRACTITIONER

## 2023-01-11 RX ORDER — MAGNESIUM OXIDE 400 MG/1
400 TABLET ORAL DAILY
Status: DISCONTINUED | OUTPATIENT
Start: 2023-01-11 | End: 2023-01-12

## 2023-01-11 RX ADMIN — VANCOMYCIN HYDROCHLORIDE 1000 MG: 1 INJECTION, SOLUTION INTRAVENOUS at 02:48

## 2023-01-11 RX ADMIN — QUETIAPINE FUMARATE 25 MG: 25 TABLET ORAL at 20:44

## 2023-01-11 RX ADMIN — PANTOPRAZOLE SODIUM 40 MG: 40 TABLET, DELAYED RELEASE ORAL at 08:23

## 2023-01-11 RX ADMIN — Medication 10 MG: at 20:44

## 2023-01-11 RX ADMIN — TACROLIMUS 4 MG: 1 CAPSULE ORAL at 08:25

## 2023-01-11 RX ADMIN — VANCOMYCIN HYDROCHLORIDE 1000 MG: 1 INJECTION, SOLUTION INTRAVENOUS at 13:57

## 2023-01-11 RX ADMIN — VALGANCICLOVIR HYDROCHLORIDE 900 MG: 450 TABLET ORAL at 08:25

## 2023-01-11 RX ADMIN — SENNOSIDES AND DOCUSATE SODIUM 1 TABLET: 8.6; 5 TABLET ORAL at 20:43

## 2023-01-11 RX ADMIN — MYCOPHENOLATE MOFETIL 1000 MG: 250 CAPSULE ORAL at 20:44

## 2023-01-11 RX ADMIN — ACETAMINOPHEN 975 MG: 325 TABLET, FILM COATED ORAL at 03:15

## 2023-01-11 RX ADMIN — SULFAMETHOXAZOLE AND TRIMETHOPRIM 1 TABLET: 400; 80 TABLET ORAL at 08:24

## 2023-01-11 RX ADMIN — HEPARIN SODIUM 5000 UNITS: 5000 INJECTION, SOLUTION INTRAVENOUS; SUBCUTANEOUS at 03:15

## 2023-01-11 RX ADMIN — ACETAMINOPHEN 975 MG: 325 TABLET, FILM COATED ORAL at 12:30

## 2023-01-11 RX ADMIN — APIXABAN 5 MG: 5 TABLET, FILM COATED ORAL at 08:24

## 2023-01-11 RX ADMIN — MAGNESIUM OXIDE TAB 400 MG (241.3 MG ELEMENTAL MG) 400 MG: 400 (241.3 MG) TAB at 13:58

## 2023-01-11 RX ADMIN — ROSUVASTATIN CALCIUM 10 MG: 10 TABLET, FILM COATED ORAL at 08:24

## 2023-01-11 RX ADMIN — NYSTATIN 1000000 UNITS: 500000 SUSPENSION ORAL at 12:30

## 2023-01-11 RX ADMIN — TACROLIMUS 5 MG: 5 CAPSULE ORAL at 17:35

## 2023-01-11 RX ADMIN — AMLODIPINE BESYLATE 5 MG: 5 TABLET ORAL at 08:23

## 2023-01-11 RX ADMIN — PREDNISONE 15 MG: 5 TABLET ORAL at 17:35

## 2023-01-11 RX ADMIN — NYSTATIN 1000000 UNITS: 500000 SUSPENSION ORAL at 08:24

## 2023-01-11 RX ADMIN — NYSTATIN 1000000 UNITS: 500000 SUSPENSION ORAL at 20:44

## 2023-01-11 RX ADMIN — MYCOPHENOLATE MOFETIL 1000 MG: 250 CAPSULE ORAL at 08:25

## 2023-01-11 RX ADMIN — CALCIUM CARBONATE 600 MG (1,500 MG)-VITAMIN D3 400 UNIT TABLET 1 TABLET: at 17:35

## 2023-01-11 RX ADMIN — ACETAMINOPHEN 975 MG: 325 TABLET, FILM COATED ORAL at 20:44

## 2023-01-11 RX ADMIN — INSULIN ASPART 2 UNITS: 100 INJECTION, SOLUTION INTRAVENOUS; SUBCUTANEOUS at 17:38

## 2023-01-11 RX ADMIN — NYSTATIN 1000000 UNITS: 500000 SUSPENSION ORAL at 16:23

## 2023-01-11 RX ADMIN — POLYETHYLENE GLYCOL 3350 17 G: 17 POWDER, FOR SOLUTION ORAL at 08:27

## 2023-01-11 RX ADMIN — APIXABAN 5 MG: 5 TABLET, FILM COATED ORAL at 20:44

## 2023-01-11 RX ADMIN — PREDNISONE 20 MG: 20 TABLET ORAL at 08:24

## 2023-01-11 RX ADMIN — CALCIUM CARBONATE 600 MG (1,500 MG)-VITAMIN D3 400 UNIT TABLET 1 TABLET: at 12:30

## 2023-01-11 ASSESSMENT — ACTIVITIES OF DAILY LIVING (ADL)
ADLS_ACUITY_SCORE: 42

## 2023-01-11 NOTE — PROGRESS NOTES
Care Management Follow Up    Length of Stay (days): 36    Expected Discharge Date: 01/12/2023     Concerns to be Addressed: Discharge planning  Patient plan of care discussed at interdisciplinary rounds: Yes    Anticipated Discharge Disposition: Home     Anticipated Discharge Services: Home Infusion  Anticipated Discharge DME: None    Patient/family educated on Medicare website which has current facility and service quality ratings: yes  Education Provided on the Discharge Plan: yes  Patient/Family in Agreement with the Plan: yes    Referrals Placed by CM/SW: Home Infusion   Private pay costs discussed: Home Infusion    Additional Information:  This writer met with pt and wife to follow up on discharge planning. Pt's wife has moved into [place] and pt will be discharging directly there when ready. ID now recommending a 4 week course of IV vancomycin. This writer met with pt/wife to discuss home infusion. Pt and wife familiar with home infusion as pt was on home IV dobutamine through [facility] prior to admission. This writer explained that Medicare does not cover home IV antibiotics so this will be self pay. Provided [facility]'s quote of $50.82/day for drug/supplies on current regimen. Awaiting [facility]'s quote. Wife inquired about PICC line cares/labs, discussed home vs outpt. They are agreeable to either.    Pt scheduled for PLC on 1/12 at 1pm in his room. Wife confirmed she can also attend.    PT/OT recommending discharge to local apartment with OP CR.    [facility], [id number] [place], [place] [id number]:   This writer received quotes from [facility] and [facility]. Presented the following quotes:  [facility]: $50.82/day  [facility]: $60.80/day  [facility]: $41.52/day    Pt has chosen to proceed with [facility]. Sherice plans to meet with pt on Friday 1/13. Sherice stated [facility]  is reviewing and may be able to provide home care while pt is residing at [place]. Left vm with PLC to update on home infusion agency.    [facility]  Liason- Sherice: [phone]    CC will continue to monitor patient's medical condition and progress towards discharge.  Licha Montes RN BSN  6C Unit Care Coordinator  Phone number: [phone]  Pager: [phone]

## 2023-01-11 NOTE — PROGRESS NOTES
[facility]  Transplant Infectious Disease Progress Note     Patient:  Paco Stein, Date of birth 1955, Medical record number [id number]  Date of Visit:  01/11/2023     Recommendations:   1. Continue IV vancomycin (1/8/23 - ) with plan for 4 week course  2. Continue daily blood cultures until 1/12/23  3. Follow CRP daily and ESR weekly while inpatient  4. Prophylaxis   * PJP ppx: Bactrim (Toxoplasma D-/R-)    * Viral ppx: Valcyte (CMV D+/R+)   * Fungal ppx: Nystatin    Outpatient plan:  - Plan for follow up with infectious disease in the outpatient setting at 4 weeks after vancomycin (with day 1 of vancomycin therapy being the 1st day of negative blood cultures). Pending clinical course, he may need an extended therapy given bacteria may be imbedded in his thrombi.   - Plan for weekly CRP in the outpatient setting.     Transplant Infectious Disease will continue to follow with you.      Discussed with transplant ID attending, Dr. 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 Right internal jugular vein thrombus incidentally noted 1/9/2023 along with a non-occlusive left internal jugular thrombus.    Infectious Disease issues include:  #. Staphylococcus epidermidis bacteremia, concern for bacteria imbedded in the patient's new and old thrombi  - Staphylococcus epidermidis in blood cultures from 1/7/2023 (drawn from the left arm) and 1/8/2023 (drawn from the right hand and left hand). He had developed leukocytosis post transplant, which was presumed due to steroids, but in retrospect could have had some association with this infection. He did not  develop a fever with this infection, so fever cannot be used as an indication of duration of infection. His last pretransplant echocardiogram was a JARED on 11/16/2022, and there were no vegetations noted at that point in time. He was transplanted on 12/25/2022, although the echocardiograms performed since heart transplant have been portable and of limited quality; last of which was on 1/2/23. He has known vascular thromboses (left peroneal, right internal jugular and new non-occlusive left internal jugular), so these could be infected. He is on subcutaneous heparin, but not therapeutic dosing. Vancomycin initiated 1/8/2023. Vancomycin CEASAR is 1.0, so the normal dosing nomogram can be used. Plan to follow daily blood cultures to determine the last day of positive cultures, which would start the timeclock for the duration of therapy - blood cultures from 1/9 x 2, 1/10 x 1 and 1/11 x 1 are negative to date. CRP downtrended from 45.9 to 10.60 today. Plan to continue to monitor daily CRP.     - Hx of positive coxsackie B1 and B4 antibodies, positive for ~ 2 years  - QTc interval: 474 ms on 12/31/2022 EKG  - PCP prophylaxis: Bactrim (Toxoplasma D-/R-)   - Viral serostatus & prophylaxis: CMV D+/R+, EBV D+/R+, HSV 1+/2-, VZV +; Valcyte  - Fungal prophylaxis: none  - Immunization status: shingrix 2019, PCV 13 2017, PPSV 23 2018, COVID x 3, Influenza 2021  - Gamma globulin status: unknown  - Isolation status: Good hand hygiene.         Interval History:   24 hour events reviewed. Afebrile overnight. Started on therapeutic anticoagulation.     Wife present in the room - interested in getting counseling on PICC line management as well as pump management for prolonged vancomycin. Denies any fevers or chills. Denies any abdominal pain, nausea or emesis. Denies cough or shortness of breath. Working to walk in the halls and continue to take in 3 ensure per day.     Transplants:  12/25/2022 (Heart), Postoperative day:  17.   Coordinator Mary Lou Luke    Review of Systems:  CONSTITUTIONAL: No fever.  EYES: No conjunctival icterus  ENT:   RESPIRATORY:   CARDIOVASCULAR:   GASTROINTESTINAL: BM today  GENITOURINARY: No dysuria  HEME:   INTEGUMENT: No jaundice  NEURO: Alert and oriented         Current Medications & Allergies:       acetaminophen  975 mg Oral Q8H     amLODIPine  5 mg Oral Daily     apixaban ANTICOAGULANT  5 mg Oral BID     calcium carbonate-vitamin D  1 tablet Oral BID w/meals     heparin ANTICOAGULANT  5,000 Units Subcutaneous Q8H     insulin aspart  1-7 Units Subcutaneous TID AC     insulin aspart  1-5 Units Subcutaneous At Bedtime     lidocaine  1 patch Transdermal Q24H     lidocaine   Transdermal Q8H NINFA     melatonin  10 mg Oral At Bedtime     mycophenolate  1,000 mg Oral BID     nystatin  1,000,000 Units Swish & Swallow 4x Daily     pantoprazole  40 mg Oral QAM AC     polyethylene glycol  17 g Oral Daily     predniSONE  15 mg Oral QPM     predniSONE  20 mg Oral Daily     QUEtiapine  25 mg Oral At Bedtime     rosuvastatin  10 mg Oral Daily     senna-docusate  1 tablet Oral BID     sodium chloride (PF)  3 mL Intracatheter Q8H     sulfamethoxazole-trimethoprim  1 tablet Oral Daily     tacrolimus  4 mg Oral QAM     tacrolimus  5 mg Oral QPM     valGANciclovir  900 mg Oral Daily     vancomycin  1,000 mg Intravenous Q12H       Infusions/Drips:    dextrose 1,000 mL (01/02/23 0007)       No Known Allergies         Physical Exam:     Patient Vitals for the past 24 hrs:   BP Temp Temp src Pulse Resp SpO2   01/11/23 0804 123/80 98.2  F (36.8  C) Oral 83 18 95 %   01/11/23 0310 123/85 97.9  F (36.6  C) Oral 84 16 95 %   01/10/23 2255 119/72 98.4  F (36.9  C) Oral 85 15 95 %   01/10/23 1936 110/72 98.2  F (36.8  C) Oral 94 17 96 %   01/10/23 1507 118/78 97.1  F (36.2  C) Axillary 87 16 95 %   01/10/23 1122 119/80 97.6  F (36.4  C) Oral 84 16 96 %     Ranges for vital signs:  Temp:  [97.1  F (36.2  C)-98.4  F (36.9  C)] 98.2  F (36.8   C)  Pulse:  [83-94] 83  Resp:  [15-18] 18  BP: (110-123)/(72-85) 123/80  SpO2:  [95 %-96 %] 95 %  Vitals:    01/07/23 0618 01/08/23 0500 01/09/23 0813   Weight: 83.1 kg (183 lb 4.8 oz) 84.7 kg (186 lb 11.2 oz) 84 kg (185 lb 3.2 oz)       Physical Examination:  GENERAL: No acute distress.  HEAD:  Head is normocephalic, atraumatic   EYES:  Eyes have anicteric conjunctiva without conjunctival injection   ENT:  Oropharynx is moist without exudates or ulcers.  NECK:  Supple.   RESP:  Clear to auscultation bilaterally   CARDIAC: RRR, no murmurs. Chest scar.   SKIN:  No acute rashes.   EXT: Trace Kel edema  NEUROLOGIC: Active x4 extremities         Laboratory Data:     Metabolic Studies       Recent Labs   Lab Test 01/11/23  0820 01/10/23  0658 01/10/23  0625 01/09/23  0744 01/09/23  0623 01/05/23  0757 01/05/23  0715 01/04/23  0817 01/04/23  0458 12/25/22  1856 12/25/22  0604   NA  --   --  134*  --  134*   < > 133*  --  134*   < > 138   POTASSIUM  --   --  5.1  --  4.8   < > 5.4*  --  4.7   < > 4.0   CHLORIDE  --   --  102  --  103   < > 104  --  105   < > 105   CO2  --   --  21*  --  22   < > 20*  --  20*   < > 22   ANIONGAP  --   --  11  --  9   < > 9  --  9   < > 11   BUN  --   --  26.4*  --  19.6   < > 21.5  --  21.7   < > 13.1   CR  --   --  0.81  --  0.66*   < > 0.60*  --  0.57*   < > 0.84   GFRESTIMATED  --   --  >90  --  >90   < > >90  --  >90   < > >90   *   < > 171*   < > 118*   < > 124*   < > 158*   < > 104*   A1C  --   --   --   --   --   --   --   --   --   --  6.2*   TIM  --   --  8.7*  --  8.5*   < > 8.4*  --  8.1*   < > 9.4   PHOS  --   --  2.6  --  2.7   < >  --   --  2.5   < > 3.5   MAG  --   --  1.6*   < > 1.5*   < >  --   --  1.6*   < > 1.8   LACT  --   --   --   --   --   --  1.1  --  1.3   < > 1.2    < > = values in this interval not displayed.       Hepatic Studies    Recent Labs   Lab Test 01/07/23  0728 01/06/23  0442 01/05/23  0715   BILITOTAL 0.4 0.4 0.4   DBIL <0.20 <0.20 <0.20    ALKPHOS 80 71 69   PROTTOTAL 4.9* 4.7* 4.7*   ALBUMIN 2.7* 2.6* 2.6*   AST 27 25 30   ALT 39 47 44       Pancreatitis testing    Recent Labs   Lab Test 12/25/22  0604 11/12/22  0458   AMYLASE 77  --    TRIG  --  75       Hematology Studies   Recent Labs   Lab Test 01/10/23  0625 01/09/23  1102 01/09/23  0623 01/08/23  0737 01/07/23  0914 12/30/22  0958 12/30/22  0541 12/06/22  0734 11/28/22  1200   WBC 12.2*  --  13.7* 18.4* 19.9*   < > 13.2*   < >  --    77755  --   --   --   --   --   --   --   --  5.7   ANEUTAUTO 11.3*  --   --   --   --   --  11.5*   < >  --    ALYMPAUTO 0.5*  --   --   --   --   --  0.5*   < >  --    AMONOAUTO 0.4  --   --   --   --   --  1.0   < >  --    AEOSAUTO 0.0  --   --   --   --   --  0.0   < >  --    ABSBASO 0.0  --   --   --   --   --  0.0   < >  --    HGB 10.0* 10.6* 10.2* 10.1* 10.7*   < > 9.2*   < >  --    30830  --   --   --   --   --   --   --   --  15.0   HCT 31.6*  --  33.2* 31.9* 33.8*   < > 30.0*   < >  --      --  277 257 238   < > 103*   < >  --    49935  --   --   --   --   --   --   --   --  223    < > = values in this interval not displayed.       Clotting Studies    Recent Labs   Lab Test 12/29/22  1008 12/25/22  2303 12/25/22  2150 12/25/22  0604   INR 1.15 1.27* 1.55* 1.04   PTT 26 46* 30 84*       Iron Testing    Recent Labs   Lab Test 01/10/23  0625 11/15/22  0444 11/12/22  0458   IRON  --   --  50*   FEB  --   --  221*   IRONSAT  --   --  23   RONALDO  --   --  226   MCV 93   < > 88    < > = values in this interval not displayed.       Arterial Blood Gas Testing    Recent Labs   Lab Test 12/30/22  0605 12/29/22  1009 12/29/22  0734 12/29/22  0404 12/29/22  0402 12/28/22  2148 12/28/22  2146 12/28/22  1551 12/28/22  1550   PH  --  7.47* 7.46*  --  7.46*  --  7.45  --  7.47*   PCO2  --  37 37  --  37  --  37  --  34*   PO2  --  103 136*  --  102  --  112*  --  130*   HCO3  --  27 27  --  27  --  26  --  25   O2PER 24 3 4  4 35 35   < > 35   < > 4    < > =  values in this interval not displayed.        Thyroid Studies     Recent Labs   Lab Test 11/12/22  0458 11/08/22  1020   TSH 5.18* 3.75       Urine Studies     Recent Labs   Lab Test 01/07/23  1116 12/25/22  0746 11/11/22  1155   URINEPH 5.5 7.0 5.0   NITRITE Negative Negative Negative   LEUKEST Negative Negative Negative   WBCU 0 0 3       Medication levels    Recent Labs   Lab Test 01/10/23  1306 01/10/23  0625   VANCOMYCIN 11.7  --    TACROL  --  10.1       Microbiology:  Last Culture results   Culture   Date Value Ref Range Status   01/10/2023 No growth after 1 day  Preliminary   01/09/2023 No growth after 1 day  Preliminary   01/09/2023 No growth after 1 day  Preliminary   01/08/2023 Positive on the 2nd day of incubation (A)  Final   01/08/2023 Staphylococcus epidermidis (AA)  Final     Comment:     1 of 2 bottlesSusceptibilities done on previous cultures   01/08/2023 Positive on the 1st day of incubation (A)  Final   01/08/2023 Staphylococcus epidermidis (AA)  Final     Comment:     2 of 2 bottlesSusceptibilities done on previous cultures   01/07/2023 No growth after 3 days  Preliminary   01/07/2023 Positive on the 1st day of incubation (A)  Final   01/07/2023 Staphylococcus epidermidis (AA)  Final     Comment:     1 of 2 bottles           Virology:  Coronavirus-19 testing    Recent Labs   Lab Test 12/06/22  0758 11/10/22  1310   [id number] Negative Negative       Respiratory virus (non-coronavirus-19) testing    Recent Labs   Lab Test 12/06/22  0758   INFZA Negative   INFZB Negative   IRSV Negative       Imaging:  Recent Results (from the past 48 hour(s))   Cardiac Catheterization    Narrative      Successful endomyocardial biopsy. Results pending.    Fluoroscopy was used to visualize the right femoral vein.    Right sided filling pressures are normal.    Normal PA pressures.    Left sided filling pressures are normal.    Normal cardiac output level.    Hemodynamic data has been modified in Epic per physician  review.     Right internal jugular vein thrombus incidentally noted. Left internal   jugular vein accessed but unable to pass the wire due to likely stenosis   in the brachiocephalic vein. Femoral access successful.      XR Chest Port 1 View    Narrative    XR CHEST PORT 1 VIEW  1/9/2023 2:32 PM      HISTORY: S/P CT removal    COMPARISON: 1/9/2023 0753 hours    FINDINGS:   Single AP view. Stable postoperative changes. Interval removal of the  pericardial/mediastinal drain. Stable cardiac silhouette. Midline  trachea. No pneumothorax or significant pleural effusion. Unchanged  probable trace left pleural effusion. Similar left basilar streaky and  hazy opacities.      Impression    IMPRESSION:   Interval removal of pericardial/mediastinal drain with stable trace  left pleural effusion and associated left basilar  atelectasis/consolidation.    I have personally reviewed the examination and initial interpretation  and I agree with the findings.    DEIDRE HINTON MD         SYSTEM ID:  [id number]   US Lower Extremity Venous Duplex Left   Result Value    Radiologist flags Partial DVT of the peroneal vein. (Urgent)    Narrative    EXAMINATION: DOPPLER VENOUS ULTRASOUND OF THE LEFT LOWER EXTREMITY,  1/9/2023 5:28 PM     COMPARISON: 11/15/2022    HISTORY: f/u DVT    TECHNIQUE:  Gray-scale evaluation with compression, spectral flow, and  color Doppler assessment of the deep venous system of the left leg  from groin to knee, and then at the ankle.    FINDINGS:  In the left lower extremity, the common femoral, femoral, popliteal  and posterior tibial veins demonstrate normal compressibility and  blood flow. One of the paired peroneal veins from the upper to mid  calf is partially compressible.      Impression    IMPRESSION:  Nonocclusive deep venous thrombosis of the left peroneal vein at the  upper to mid calf.     [Urgent Result: Partial DVT of the peroneal vein.]    Finding was identified on 1/9/2023 5:27 PM.      Rose Mary Goldstein NP was contacted by Dr. Isaiah Ramirez at 1/9/2023 5:53 PM  and verbalized understanding of the urgent finding.     I have personally reviewed the examination and initial interpretation  and I agree with the findings.    ENEDINA PARSON DO         SYSTEM ID:  [id number]   US Upper Extremity Venous Duplex Bilat   Result Value    Radiologist flags (Urgent)     Occlusive right internal jugular vein and partially    Narrative    EXAMINATION: DOPPLER VENOUS ULTRASOUND OF BILATERAL UPPER EXTREMITIES,  1/9/2023 5:28 PM     COMPARISON: 12/29/2022    HISTORY: right IJ occlusive thrombus and left IJ unaccessible in cath  lab    TECHNIQUE:  Gray-scale evaluation with compression, spectral flow, and  color Doppler assessment of the deep veins of both upper extremities.    FINDINGS:  Partially compressible thrombus within the right and left internal  jugular veins, greater in extent on the right. The thrombus in the  right internal jugular vein is mobile but adherent. Normal blood flow  and waveforms are demonstrated in the innominate, subclavian, and  axillary veins bilaterally. There is normal compressibility of the  brachial, basilic and cephalic veins bilaterally.    Redemonstration of the anterior left shoulder ovoid avascular anechoic  fluid collection measuring 1.9 x 0.5 x 0.9 cm, decreased in size since  12/29/2022.      Impression    IMPRESSION:  1.  Nonocclusive thrombus in the right greater than left internal  jugular veins.  2.  Redemonstration of the left anterior shoulder anechoic fluid  collection measuring up to 1.9 cm, decreased since 12/29/2022.    [Urgent Result: Occlusive right internal jugular vein and partially  occlusive left internal jugular vein thrombosis.]    Finding was identified on 1/9/2023 5:27 PM.     Rose Mary Goldstein NP was contacted by Dr. Isiaah Ramirez at 1/9/2023 5:53 PM  and verbalized understanding of the urgent finding.      I have personally reviewed the examination and initial  interpretation  and I agree with the findings.    ENEDINA PARSON DO         SYSTEM ID:  [id number]   XR Chest Port 1 View    Narrative    XR CHEST PORT 1 VIEW  1/10/2023 8:13 AM      HISTORY: post-op heart transplant monitoring    COMPARISON: 1/9/2023    FINDINGS:   Single AP view. Stable postoperative changes. No pneumothorax or right  pleural effusion. Trace left pleural effusion. Minimally decreased  left basilar streaky and hazy opacities. This structures are  unchanged.      Impression    IMPRESSION:   Trace left pleural effusion with minimally improved left basilar  atelectasis.    I have personally reviewed the examination and initial interpretation  and I agree with the findings.    AISHA KAUR MD         SYSTEM ID:  [id number]

## 2023-01-11 NOTE — PROGRESS NOTES
Cardiovascular Surgery Progress Note  01/11/2023         Assessment and Plan:     Paco Stein is a 67 year old male with a PMH of chronic systolic heart failure (suspected predominantly nonischemic cardiomyopathy, possible arrhythmogenic), CAD s/p PCI, HLD, CNS vasculitis, CKD Stage 2. Patient is now s/p orthotopic heart transplantation on 12/25/2022 with Dr. Rodriguez.    Cardiovascular:   Hx of End-stage heart failure s/p Heart transplantation  HD stable overnight in NSR  Pre-operative echo showed LVEF 15%  Intraoperative echo showed LVEF 45% (stunning)  Postoperative echo on 1/2 demonstrated LVEF 60-65%, preserved RV function  - 1 week Heart Biopsy demonstrated 1R with C4d however DSA negative. Clinically stable and echo looks stable.   - 2 week bx 1/9: diffusely positive for capillary deposition of C4d, negative for capillary deposition of C3d.  - ASA: held while on Eliquis.   - Statin: Crestor 10mg daily  - Norvasc 5mg daily  - Diuresis per CARDS2  Chest tubes: Left midaxillary CT removed on 01/03. CXR 01/03 post-pull, stable. Left and Right pleural as well as mediastinal removed 01/05. CXR ordered 01/05 post pull, stable. Pericardial with 19 mL out past 24h, removed 1/9. F/U CXR 1/9 and 1/10 were stable.  TPW: removed 01/03    Pulmonary:  Extubated POD #2 to 4 lpm via NC. Now saturating well on RA.   - Pulm hygiene, IS, activity and deep breathing    Neurology / MSK:  Acute post-operative pain   Pain well controlled with current regimen:  - Acetaminophen, PO oxycodone PRN, methocarbamol PRN     / Renal / Fluid / Electrolytes:  Hyperkalemia, resolved  Hyponatremia, improving  BL creat ~ 0.9, most recent creatinine stable; adequate UOP.   FLUID STATUS: Pre-op weight 192, last weight 185; I/O past 24 hours: -1310mL  - Diuresis: per CARDS2  - Replete lytes per protocol  - Strict I/O, daily weights  - Avoid/limit nephrotoxins as able  - Hyperkalemia improved 01/06 after neutra-phos discontinued  - Discontinue  KPhos  - BMP (K improved), continue AM draws to trend    GI / Nutrition:   - Tolerating regular diet  - Continue bowel regimen, last BM 01/10    Endocrine:  Stress and steroid induced hyperglycemia   Hgb A1c 6.2% on 12/25  - Initially managed on insulin drip postop, transitioned to sliding scale; goal BG <180 for optimal healing    Infectious Disease:  Stress and steroid induced leukocytosis  Staph epi bacteremia  WBC 9.7 (down from 18.4 on 01/08), remains afebrile  - Blood and urine culture 1/7 due to jump in WBC. 1/2 bottles growing gram + cocci. Repeat Blood cx 1/8 and transplant ID consult placed. Started IV Vanco q12h. Per ID continue daily blood culture for 1 more days (until 1/12). Daily CRP and weekly ESR while inpatient.   - Repeat blood cultures 1/8 with 2/2 bottles growing gram + cocci. Repeat blood cultures 1/9 and 1/10 NGTD.  - IV vanco started 01/08/23, plan for 4 week course from first documented negative blood culture.  - Completed perioperative antibiotics  - Continue to monitor fever curve, CBC  Immunosuppression/prophylaxis  - Managed per Cards 2    Hematology:   Acute blood loss anemia and thrombocytopenia  Hgb stable; Plt stable, no signs or symptoms of active bleeding  - CBC: continue to trend  History of right axillary nonocclusive and left peroneal occlusive DVT 11/2022.  Nonocclusive thrombus in Internal Jugular Veins (R>L) noted 01/09   - PTA Eliquis resumed 01/10.   - Repeat US 01/09: nonocclusive thrombus in jugular veins (R>L) and nonocclusive DVT in Left Peroneal vein    Anticoagulation:   - ASA   - Resuming Eliquis 5mg BID 1/10, discussed with Dr. Michael ZUNIGAK/Skin:  Sternotomy  Surgical incision  - Sternal precautions  - Incisional cares per protocol    Prophylaxis:   - Stress ulcer prophylaxis: Pantoprazole 40 mg daily for 30 days  - DVT prophylaxis: Subcutaneous heparin, SCD    Disposition:   - Transferred to  on 01/04  - Therapies recommending discharge to home with assist and  outpatient cardiac rehab at this time, likely discharge 1-2 days once ID completes blood cultures and Cards2 clears as well. Will discharge to [facility] with wife.    Discussed with Dr. Michael Hernandez PA-C   Cardiothoracic Surgery  Pager: [phone]          Interval History:     No acute events overnight. Pain is well controlled on current multimodal regimen. Breathing is stable on room air, using IS. Tolerating diet, + flatulence, +BM, voiding well on own. Denies CP, SOB, N/V, dizziness, lightheadedness, chills, myalgias, and sternal popping/clicking.          Physical Exam:     Gen: A&Ox4, NAD  Neuro: Intact with no focal deficits   CV: RRR, normal S1 S2, no murmurs, rubs or gallops. No JVD  Pulm: CTA, no wheezing or rhonchi, normal breathing on RA  Abd: nondistended, normal BS, soft, nontender  Ext: minimal peripheral edema, no pitting  Incision: clean, dry, intact, no erythema, sternum stable  Tubes/drain sites: dressing clean and dry.          Data:    Imaging:  reviewed recent imaging, nonocclusive thrombus noted in jugular and peroneal. Surgeon aware.     No results found for this or any previous visit (from the past 24 hour(s)).     Labs:  Recent Labs   Lab 01/11/23  0822 01/11/23  0820 01/10/23  2216 01/10/23  0658 01/10/23  0625 01/09/23  1131 01/09/23  1102 01/09/23  0744 01/09/23  0623 01/07/23  0746 01/07/23  0728 01/06/23  1229 01/06/23  0442   WBC 9.7  --   --   --  12.2*  --   --   --  13.7*   < >  --   --  13.4*   HGB 10.2*  --   --   --  10.0*  --  10.6*  --  10.2*   < >  --   --  10.5*   MCV 92  --   --   --  93  --   --   --  94   < >  --   --  92     --   --   --  280  --   --   --  277   < >  --   --  266   *  --   --   --  134*  --   --   --  134*   < > 133*  --  135*   POTASSIUM 4.7  --   --   --  5.1  --   --   --  4.8   < > 4.9  --  4.6   CHLORIDE 103  --   --   --  102  --   --   --  103   < > 103  --  105   CO2 20*  --   --   --  21*  --   --   --  22    < > 18*  --  20*   BUN 24.7*  --   --   --  26.4*  --   --   --  19.6   < > 23.1*  --  20.7   CR 0.70  --   --   --  0.81  --   --   --  0.66*   < > 0.65*  --  0.65*   ANIONGAP 11  --   --   --  11  --   --   --  9   < > 12  --  10   TIM 8.4*  --   --   --  8.7*  --   --   --  8.5*   < > 8.4*  --  8.5*   * 109* 163*   < > 171*   < >  --    < > 118*   < > 110*   < > 102*   ALBUMIN  --   --   --   --   --   --   --   --   --   --  2.7*  --  2.6*   PROTTOTAL  --   --   --   --   --   --   --   --   --   --  4.9*  --  4.7*   BILITOTAL  --   --   --   --   --   --   --   --   --   --  0.4  --  0.4   ALKPHOS  --   --   --   --   --   --   --   --   --   --  80  --  71   ALT  --   --   --   --   --   --   --   --   --   --  39  --  47   AST  --   --   --   --   --   --   --   --   --   --  27  --  25    < > = values in this interval not displayed.      GLUCOSE:   Recent Labs   Lab 01/11/23  0822 01/11/23  0820 01/10/23  2216 01/10/23  1700 01/10/23  1237 01/10/23  0658   * 109* 163* 200* 136* 166*

## 2023-01-11 NOTE — PROGRESS NOTES
CLINICAL NUTRITION SERVICES    Reason for Assessment:  Post-Tx nutrition education    Diet History:  Per pt, has not received formal nutrition education on post-Tx guidelines in the recent past. Agreeable to nutrition education     Nutrition Diagnosis:  Food- and nutrition-related knowledge deficit r/t no recent post-Tx nutrition education AEB pt report of not receiving nutrition education on post-Tx diet guidelines in the recent past.      Interventions:  Nutrition Education (primarily pt's wife, pt resting at times): Provided verbal and written nutrition education on post-Tx nutrition guidelines. Nutrition education included need for increased protein and kcal needs post-op, food safety, heart-healthy eating in the long-term, and monitor K+ labs for potential need for restriction. Answered questions, including protein/kcal needs, food safety, and oral supplements. Handouts given previously: Guide to Your Diet after Transplant and Food Safety for Transplant Recipients booklet from the [place] [employer].    Nutrition instructions for discharge were entered.      Goals:   Patient will verbalize at least three nutrition-related aspects of post-Tx diet guidelines.    Follow-up:    Patient to ask any further nutrition-related questions before discharge. In addition, pt may request outpatient RD appointment.    Moriah Garcia, MS, RD, LD, [facility]   6C Pgr: [phone]

## 2023-01-11 NOTE — PROGRESS NOTES
Therapy: IV ABX   Insurance:Medicare, BCBS supplement and self pay     Pt has all Medicare products, which does not cover IV ABX in the home. (Pt would have coverage for short term TCU or IC). Below is what pt would be responsible for if pt wanted to go w [facility]      Drug would go to Part-D (pt would be responsible for the co-pay per dispense)    Pt would have to self-pay for the per-alexis (daily)  If not homebound, nursing would also be self-pay ($90.00 per visit)    Cost for Vancomycin 1GM Q12 is $50.82 per day for drug and supplies    Please contact Intake with any questions, [phone] or In Basket pool, [facility] ([phone]).

## 2023-01-11 NOTE — CONSULTS
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  He is now s/p OHT 12/25/22, with post-operative course c/b delirium (improved), staph epi bacteremia, and prolonged CT output (resolved).    RECOMMENDATIONS:  - agree with PICC placement and PLC tomorrow  - start MgOx 400 mg daily   - will follow-up PM tacrolimus level  (addendum: tacro 8.4 but not a trough, recheck tomorrow)    # s/p OHT 12/25/22 history of NICM  # Chronic immunosuppresion  His post-transplant course has been c/b delirium, staph epi bacteremia, and prolonged CT output.    Rejection history: first biopsy showed diffuse capillary staining for c4d, suggestive of pAMR1.  No DSAs, no graft dysfunction. No changes were made. Second biopsy with similar finding, deferred treatment at this time. Next biopsy due 1/17.  AlloMap scores:  n/a  DSAs:  None on POD 7  Coronary angio/Ischemic eval:  to be completed at ~week #4   RHC 1/9/23: RA 4, mPA 16, PCW 9, and CI 2.6 --> weight 185# (standing scale).  TTE 1/2/23: LVEF 60-65% and normal RV size/function.    Immunosuppression:  - received basiliximab 12/25 and 12/29  - prednisone taper per protocol, currently 20 mg in AM and 15 mg in PM  - MMF decreased to 1000mg BID given age and bacteremia   - tacro, goal level 10-12.  Level 10.1 yesterday, pending today.     PPx:  - CAV:  Ok to hold aspirin while on Eliquis, continue rosuvastatin 10mg daily  - GI:   "Pantoprazole 40mg daily  - Osteoporosis:  calcium/vitamin D supplements  - CMV:  valcyte 900mg daily (x3 months per protocol, through 3/2023)  - PCP:  Bactrim single strength daily  - Thrush:  Nystatin s/s QID  - Toxo:  N/a    Serostatus: CMV D+/R+ EBV D+/R+ Toxo D-/R-    Graft function:  - BPs: at goal continue amlodipine 5mg daily  - HRs:  80-90s, off terbutaline  - Fluid status:  euvolemic, off diuretics    Health maintenance:  - due for week #3 RHC/bx 1/17/23  - note: needs femoral access for RHC  - week #4 biopsy, RHC, angiogram due 1/24/23    # h/o right axillary nonocclusive and left peroneal occlusive DVT (11/2022, pre-transplant)  Was on apixaban prior to transplant, which has been held.  During RHC 1/9, it was noted that his RIJ had clot and LIJ is inaccessible, so he required femoral access.  - starting eliquis per CVTS    # Staph epi bacteremia  Blood cx from 1/7 and 1/8 with staph epi. Started on IV vanco 1/8 per ID.  - Transplant ID following  - daily blood cx through 1/12, PICC tomorrow with PLC  - IV vanco for four week course minimum    Symone Guerra, PAOLA, NP-C  Advanced Heart Failure/Cardiology 2 Nurse Practitioner  Pager     =============================================================    Interval History/ROS:   No overnight events. Feeling well. No shortness of breath. Tolerating ambulation around the unit. No concerns today.     Last 24 hr care team notes reviewed.   ROS:  4 point ROS including Respiratory, CV, GI and , other than that noted in the HPI, is negative.     Medications: Reviewed in EPIC.     Physical Exam:   /80 (BP Location: Right arm)   Pulse 83   Temp 98.2  F (36.8  C) (Oral)   Resp 18   Ht 1.88 m (6' 2\")   Wt 84 kg (185 lb 3.2 oz)   SpO2 95%   BMI 23.78 kg/m       GENERAL: Appears comfortable, in NAD .   HEENT: Eye symmetrical and free of discharge bilaterally. Mucous membranes moist and without lesions. No thrush noted on tongue or cheeks.  NECK: Supple, " JVD negative when sitting upright.   CV: RRR, S1S2 present without murmur, rub, or gallop.   RESPIRATORY: Respirations regular, even, and unlabored. Lungs CTA throughout.   GI: Soft and non distended with normoactive bowel sounds present in all quadrants. No tenderness, rebound, guarding. No organomegaly.   EXTREMITIES: No LE edema. 2+ bilateral pedal pulses.   NEUROLOGIC: Alert and orientated x 3. No focal deficits.   MUSCULOSKELETAL: No joint swelling or tenderness.   SKIN: No jaundice. No rashes or lesions. Surgical incisions pink, edges well-approximated, no redness nor tenderness.    Data:  CMP  Recent Labs   Lab 01/11/23  1207 01/11/23  0822 01/11/23  0820 01/10/23  2216 01/10/23  0658 01/10/23  0625 01/09/23  1743 01/09/23  1451 01/09/23  0744 01/09/23  0623 01/08/23  0907 01/08/23  0737 01/07/23  0746 01/07/23  0728 01/06/23  1229 01/06/23  0442 01/05/23  0757 01/05/23  0715   NA  --  134*  --   --   --  134*  --   --   --  134*  --  132*  --  133*  --  135*   < > 133*   POTASSIUM  --  4.7  --   --   --  5.1  --   --   --  4.8  --  4.9  --  4.9  --  4.6   < > 5.4*   CHLORIDE  --  103  --   --   --  102  --   --   --  103  --  103  --  103  --  105   < > 104   CO2  --  20*  --   --   --  21*  --   --   --  22  --  20*  --  18*  --  20*   < > 20*   ANIONGAP  --  11  --   --   --  11  --   --   --  9  --  9  --  12  --  10   < > 9   * 108* 109* 163*   < > 171*   < >  --    < > 118*   < > 111*   < > 110*   < > 102*   < > 124*   BUN  --  24.7*  --   --   --  26.4*  --   --   --  19.6  --  24.1*  --  23.1*  --  20.7   < > 21.5   CR  --  0.70  --   --   --  0.81  --   --   --  0.66*  --  0.69  --  0.65*  --  0.65*   < > 0.60*   GFRESTIMATED  --  >90  --   --   --  >90  --   --   --  >90  --  >90  --  >90  --  >90   < > >90   TIM  --  8.4*  --   --   --  8.7*  --   --   --  8.5*  --  8.1*  --  8.4*  --  8.5*   < > 8.4*   MAG  --  1.5*  --   --   --  1.6*  --  2.9*  --  1.5*  --  1.6*  --  1.6*   < > 1.4*  --    --    PHOS  --  3.0  --   --   --  2.6  --   --   --  2.7  --  3.0  --  3.4  --  3.5   < >  --    PROTTOTAL  --   --   --   --   --   --   --   --   --   --   --   --   --  4.9*  --  4.7*  --  4.7*   ALBUMIN  --   --   --   --   --   --   --   --   --   --   --   --   --  2.7*  --  2.6*  --  2.6*   BILITOTAL  --   --   --   --   --   --   --   --   --   --   --   --   --  0.4  --  0.4  --  0.4   ALKPHOS  --   --   --   --   --   --   --   --   --   --   --   --   --  80  --  71  --  69   AST  --   --   --   --   --   --   --   --   --   --   --   --   --  27  --  25  --  30   ALT  --   --   --   --   --   --   --   --   --   --   --   --   --  39  --  47  --  44    < > = values in this interval not displayed.     CBC  Recent Labs   Lab 01/11/23  0822 01/10/23  0625 01/09/23  1102 01/09/23  0623 01/08/23  0737   WBC 9.7 12.2*  --  13.7* 18.4*   RBC 3.51* 3.39*  --  3.52* 3.43*   HGB 10.2* 10.0* 10.6* 10.2* 10.1*   HCT 32.3* 31.6*  --  33.2* 31.9*   MCV 92 93  --  94 93   MCH 29.1 29.5  --  29.0 29.4   MCHC 31.6 31.6  --  30.7* 31.7   RDW 16.0* 16.2*  --  16.3* 16.1*    280  --  277 257       Patient discussed with Dr. Ibanez.

## 2023-01-12 ENCOUNTER — APPOINTMENT (OUTPATIENT)
Dept: GENERAL RADIOLOGY | Facility: CLINIC | Age: 68
DRG: 001 | End: 2023-01-12
Attending: SURGERY
Payer: MEDICARE

## 2023-01-12 ENCOUNTER — APPOINTMENT (OUTPATIENT)
Dept: EDUCATION SERVICES | Facility: CLINIC | Age: 68
DRG: 001 | End: 2023-01-12
Attending: NURSE PRACTITIONER
Payer: MEDICARE

## 2023-01-12 ENCOUNTER — APPOINTMENT (OUTPATIENT)
Dept: PHYSICAL THERAPY | Facility: CLINIC | Age: 68
DRG: 001 | End: 2023-01-12
Attending: SURGERY
Payer: MEDICARE

## 2023-01-12 ENCOUNTER — APPOINTMENT (OUTPATIENT)
Dept: OCCUPATIONAL THERAPY | Facility: CLINIC | Age: 68
DRG: 001 | End: 2023-01-12
Attending: SURGERY
Payer: MEDICARE

## 2023-01-12 LAB
ANION GAP SERPL CALCULATED.3IONS-SCNC: 11 MMOL/L (ref 7–15)
BACTERIA BLD CULT: NO GROWTH
BUN SERPL-MCNC: 24 MG/DL (ref 8–23)
CALCIUM SERPL-MCNC: 8.8 MG/DL (ref 8.8–10.2)
CHLORIDE SERPL-SCNC: 104 MMOL/L (ref 98–107)
CREAT SERPL-MCNC: 0.7 MG/DL (ref 0.67–1.17)
CRP SERPL-MCNC: 8.59 MG/L
DEPRECATED HCO3 PLAS-SCNC: 21 MMOL/L (ref 22–29)
ERYTHROCYTE [DISTWIDTH] IN BLOOD BY AUTOMATED COUNT: 15.9 % (ref 10–15)
GFR SERPL CREATININE-BSD FRML MDRD: >90 ML/MIN/1.73M2
GLUCOSE BLDC GLUCOMTR-MCNC: 136 MG/DL (ref 70–99)
GLUCOSE BLDC GLUCOMTR-MCNC: 169 MG/DL (ref 70–99)
GLUCOSE BLDC GLUCOMTR-MCNC: 175 MG/DL (ref 70–99)
GLUCOSE BLDC GLUCOMTR-MCNC: 203 MG/DL (ref 70–99)
GLUCOSE SERPL-MCNC: 104 MG/DL (ref 70–99)
HCT VFR BLD AUTO: 32.7 % (ref 40–53)
HGB BLD-MCNC: 10.2 G/DL (ref 13.3–17.7)
MAGNESIUM SERPL-MCNC: 1.3 MG/DL (ref 1.7–2.3)
MAGNESIUM SERPL-MCNC: 1.9 MG/DL (ref 1.7–2.3)
MCH RBC QN AUTO: 29.1 PG (ref 26.5–33)
MCHC RBC AUTO-ENTMCNC: 31.2 G/DL (ref 31.5–36.5)
MCV RBC AUTO: 93 FL (ref 78–100)
PHOSPHATE SERPL-MCNC: 3 MG/DL (ref 2.5–4.5)
PLATELET # BLD AUTO: 281 10E3/UL (ref 150–450)
POTASSIUM SERPL-SCNC: 4.4 MMOL/L (ref 3.4–5.3)
RBC # BLD AUTO: 3.51 10E6/UL (ref 4.4–5.9)
SODIUM SERPL-SCNC: 136 MMOL/L (ref 136–145)
TACROLIMUS BLD-MCNC: 8.9 UG/L (ref 5–15)
TME LAST DOSE: NORMAL H
TME LAST DOSE: NORMAL H
WBC # BLD AUTO: 9.4 10E3/UL (ref 4–11)

## 2023-01-12 PROCEDURE — 86140 C-REACTIVE PROTEIN: CPT | Performed by: INTERNAL MEDICINE

## 2023-01-12 PROCEDURE — 250N000013 HC RX MED GY IP 250 OP 250 PS 637: Performed by: SURGERY

## 2023-01-12 PROCEDURE — 36415 COLL VENOUS BLD VENIPUNCTURE: CPT | Performed by: INTERNAL MEDICINE

## 2023-01-12 PROCEDURE — 97535 SELF CARE MNGMENT TRAINING: CPT | Mod: GO | Performed by: OCCUPATIONAL THERAPIST

## 2023-01-12 PROCEDURE — 97530 THERAPEUTIC ACTIVITIES: CPT | Mod: GP | Performed by: PHYSICAL THERAPIST

## 2023-01-12 PROCEDURE — 250N000012 HC RX MED GY IP 250 OP 636 PS 637: Performed by: NURSE PRACTITIONER

## 2023-01-12 PROCEDURE — 84100 ASSAY OF PHOSPHORUS: CPT | Performed by: SURGERY

## 2023-01-12 PROCEDURE — 80197 ASSAY OF TACROLIMUS: CPT | Performed by: NURSE PRACTITIONER

## 2023-01-12 PROCEDURE — 250N000013 HC RX MED GY IP 250 OP 250 PS 637: Performed by: PHYSICIAN ASSISTANT

## 2023-01-12 PROCEDURE — 250N000013 HC RX MED GY IP 250 OP 250 PS 637: Performed by: NURSE PRACTITIONER

## 2023-01-12 PROCEDURE — 80048 BASIC METABOLIC PNL TOTAL CA: CPT | Performed by: SURGERY

## 2023-01-12 PROCEDURE — 214N000001 HC R&B CCU UMMC

## 2023-01-12 PROCEDURE — 999N000127 HC STATISTIC PERIPHERAL IV START W US GUIDANCE

## 2023-01-12 PROCEDURE — 99232 SBSQ HOSP IP/OBS MODERATE 35: CPT | Mod: FS | Performed by: NURSE PRACTITIONER

## 2023-01-12 PROCEDURE — 250N000013 HC RX MED GY IP 250 OP 250 PS 637

## 2023-01-12 PROCEDURE — 250N000011 HC RX IP 250 OP 636: Performed by: SURGERY

## 2023-01-12 PROCEDURE — 250N000009 HC RX 250: Performed by: NURSE PRACTITIONER

## 2023-01-12 PROCEDURE — 71045 X-RAY EXAM CHEST 1 VIEW: CPT | Mod: 26 | Performed by: RADIOLOGY

## 2023-01-12 PROCEDURE — 85041 AUTOMATED RBC COUNT: CPT | Performed by: INTERNAL MEDICINE

## 2023-01-12 PROCEDURE — 83735 ASSAY OF MAGNESIUM: CPT | Performed by: SURGERY

## 2023-01-12 PROCEDURE — 97110 THERAPEUTIC EXERCISES: CPT | Mod: GO | Performed by: OCCUPATIONAL THERAPIST

## 2023-01-12 PROCEDURE — 250N000013 HC RX MED GY IP 250 OP 250 PS 637: Performed by: STUDENT IN AN ORGANIZED HEALTH CARE EDUCATION/TRAINING PROGRAM

## 2023-01-12 PROCEDURE — 36592 COLLECT BLOOD FROM PICC: CPT | Performed by: SURGERY

## 2023-01-12 PROCEDURE — 02HV33Z INSERTION OF INFUSION DEVICE INTO SUPERIOR VENA CAVA, PERCUTANEOUS APPROACH: ICD-10-PCS | Performed by: INTERNAL MEDICINE

## 2023-01-12 PROCEDURE — 99232 SBSQ HOSP IP/OBS MODERATE 35: CPT | Mod: 24 | Performed by: INTERNAL MEDICINE

## 2023-01-12 PROCEDURE — 36569 INSJ PICC 5 YR+ W/O IMAGING: CPT

## 2023-01-12 PROCEDURE — 71045 X-RAY EXAM CHEST 1 VIEW: CPT

## 2023-01-12 PROCEDURE — 87040 BLOOD CULTURE FOR BACTERIA: CPT | Performed by: INTERNAL MEDICINE

## 2023-01-12 RX ORDER — DIPHENHYDRAMINE HYDROCHLORIDE 50 MG/ML
50 INJECTION INTRAMUSCULAR; INTRAVENOUS
Status: CANCELLED
Start: 2023-01-17

## 2023-01-12 RX ORDER — MAGNESIUM OXIDE 400 MG/1
400 TABLET ORAL 2 TIMES DAILY
Status: DISCONTINUED | OUTPATIENT
Start: 2023-01-12 | End: 2023-01-13 | Stop reason: HOSPADM

## 2023-01-12 RX ORDER — ALBUTEROL SULFATE 0.83 MG/ML
2.5 SOLUTION RESPIRATORY (INHALATION)
Status: CANCELLED | OUTPATIENT
Start: 2023-01-17

## 2023-01-12 RX ORDER — HEPARIN SODIUM (PORCINE) LOCK FLUSH IV SOLN 100 UNIT/ML 100 UNIT/ML
5 SOLUTION INTRAVENOUS
Status: CANCELLED | OUTPATIENT
Start: 2023-01-17

## 2023-01-12 RX ORDER — MAGNESIUM SULFATE HEPTAHYDRATE 40 MG/ML
4 INJECTION, SOLUTION INTRAVENOUS ONCE
Status: COMPLETED | OUTPATIENT
Start: 2023-01-12 | End: 2023-01-12

## 2023-01-12 RX ORDER — METHYLPREDNISOLONE SODIUM SUCCINATE 125 MG/2ML
125 INJECTION, POWDER, LYOPHILIZED, FOR SOLUTION INTRAMUSCULAR; INTRAVENOUS
Status: CANCELLED
Start: 2023-01-17

## 2023-01-12 RX ORDER — HEPARIN SODIUM,PORCINE 10 UNIT/ML
5 VIAL (ML) INTRAVENOUS
Status: CANCELLED | OUTPATIENT
Start: 2023-01-17

## 2023-01-12 RX ORDER — ALBUTEROL SULFATE 90 UG/1
1-2 AEROSOL, METERED RESPIRATORY (INHALATION)
Status: CANCELLED
Start: 2023-01-17

## 2023-01-12 RX ORDER — LIDOCAINE 40 MG/G
CREAM TOPICAL
Status: DISCONTINUED | OUTPATIENT
Start: 2023-01-12 | End: 2023-01-13 | Stop reason: HOSPADM

## 2023-01-12 RX ORDER — MEPERIDINE HYDROCHLORIDE 25 MG/ML
25 INJECTION INTRAMUSCULAR; INTRAVENOUS; SUBCUTANEOUS EVERY 30 MIN PRN
Status: CANCELLED | OUTPATIENT
Start: 2023-01-17

## 2023-01-12 RX ORDER — EPINEPHRINE 1 MG/ML
0.3 INJECTION, SOLUTION, CONCENTRATE INTRAVENOUS EVERY 5 MIN PRN
Status: CANCELLED | OUTPATIENT
Start: 2023-01-17

## 2023-01-12 RX ADMIN — NYSTATIN 1000000 UNITS: 500000 SUSPENSION ORAL at 08:57

## 2023-01-12 RX ADMIN — MYCOPHENOLATE MOFETIL 1000 MG: 250 CAPSULE ORAL at 19:52

## 2023-01-12 RX ADMIN — MYCOPHENOLATE MOFETIL 1000 MG: 250 CAPSULE ORAL at 08:56

## 2023-01-12 RX ADMIN — PREDNISONE 20 MG: 20 TABLET ORAL at 08:57

## 2023-01-12 RX ADMIN — APIXABAN 5 MG: 5 TABLET, FILM COATED ORAL at 19:52

## 2023-01-12 RX ADMIN — ROSUVASTATIN CALCIUM 10 MG: 10 TABLET, FILM COATED ORAL at 08:57

## 2023-01-12 RX ADMIN — PANTOPRAZOLE SODIUM 40 MG: 40 TABLET, DELAYED RELEASE ORAL at 08:56

## 2023-01-12 RX ADMIN — POLYETHYLENE GLYCOL 3350 17 G: 17 POWDER, FOR SOLUTION ORAL at 08:59

## 2023-01-12 RX ADMIN — SENNOSIDES AND DOCUSATE SODIUM 1 TABLET: 8.6; 5 TABLET ORAL at 19:21

## 2023-01-12 RX ADMIN — TACROLIMUS 5 MG: 5 CAPSULE ORAL at 17:22

## 2023-01-12 RX ADMIN — NYSTATIN 1000000 UNITS: 500000 SUSPENSION ORAL at 19:52

## 2023-01-12 RX ADMIN — MAGNESIUM SULFATE IN WATER 4 G: 40 INJECTION, SOLUTION INTRAVENOUS at 14:13

## 2023-01-12 RX ADMIN — AMLODIPINE BESYLATE 5 MG: 5 TABLET ORAL at 08:57

## 2023-01-12 RX ADMIN — VANCOMYCIN HYDROCHLORIDE 1000 MG: 1 INJECTION, SOLUTION INTRAVENOUS at 17:18

## 2023-01-12 RX ADMIN — CALCIUM CARBONATE 600 MG (1,500 MG)-VITAMIN D3 400 UNIT TABLET 1 TABLET: at 12:46

## 2023-01-12 RX ADMIN — MAGNESIUM OXIDE TAB 400 MG (241.3 MG ELEMENTAL MG) 400 MG: 400 (241.3 MG) TAB at 19:53

## 2023-01-12 RX ADMIN — APIXABAN 5 MG: 5 TABLET, FILM COATED ORAL at 08:57

## 2023-01-12 RX ADMIN — PREDNISONE 15 MG: 5 TABLET ORAL at 17:22

## 2023-01-12 RX ADMIN — CALCIUM CARBONATE 600 MG (1,500 MG)-VITAMIN D3 400 UNIT TABLET 1 TABLET: at 17:22

## 2023-01-12 RX ADMIN — LIDOCAINE HYDROCHLORIDE 2 ML: 10 INJECTION, SOLUTION EPIDURAL; INFILTRATION; INTRACAUDAL; PERINEURAL at 17:16

## 2023-01-12 RX ADMIN — ACETAMINOPHEN 975 MG: 325 TABLET, FILM COATED ORAL at 19:51

## 2023-01-12 RX ADMIN — SULFAMETHOXAZOLE AND TRIMETHOPRIM 1 TABLET: 400; 80 TABLET ORAL at 08:56

## 2023-01-12 RX ADMIN — VALGANCICLOVIR HYDROCHLORIDE 900 MG: 450 TABLET ORAL at 08:56

## 2023-01-12 RX ADMIN — NYSTATIN 1000000 UNITS: 500000 SUSPENSION ORAL at 12:46

## 2023-01-12 RX ADMIN — VANCOMYCIN HYDROCHLORIDE 1000 MG: 1 INJECTION, SOLUTION INTRAVENOUS at 03:23

## 2023-01-12 RX ADMIN — SENNOSIDES AND DOCUSATE SODIUM 1 TABLET: 8.6; 5 TABLET ORAL at 08:59

## 2023-01-12 RX ADMIN — MAGNESIUM OXIDE TAB 400 MG (241.3 MG ELEMENTAL MG) 400 MG: 400 (241.3 MG) TAB at 08:58

## 2023-01-12 RX ADMIN — Medication 10 MG: at 19:51

## 2023-01-12 RX ADMIN — TACROLIMUS 4 MG: 1 CAPSULE ORAL at 08:56

## 2023-01-12 RX ADMIN — INSULIN ASPART 1 UNITS: 100 INJECTION, SOLUTION INTRAVENOUS; SUBCUTANEOUS at 12:57

## 2023-01-12 RX ADMIN — QUETIAPINE FUMARATE 25 MG: 25 TABLET ORAL at 19:51

## 2023-01-12 ASSESSMENT — ACTIVITIES OF DAILY LIVING (ADL)
ADLS_ACUITY_SCORE: 42

## 2023-01-12 NOTE — PROGRESS NOTES
Transplant Social Work Service Discharge Note      Patient Name:  Paco Stein     Anticipated Discharge Date:  1/13/2023    Discharge Disposition:   Other:  Local Apartment-[place] [facility]    Plan for 24 hour care for immediate post transplant period: Wife will provide 24-hour care after discharge for 30 days or longer as needed. Adult son in town this week and will also provide support    If not local, plans for short term stay: Local apartment    Additional Services/Equipment Arranged:  None from , but RN CC working on discharging with 4 weeks of IV Vanco     Persons notified of above discharge plan:  Patient, Wife    Patient / Family response to discharge plan:  Agreeable to discharge    Education and resources provided by SW at discharge: role of transplant  in out patient setting and provided contact info for , availability of support groups    Discussed anticipated pharmacy out of pocket costs: YES    Provided [employer] Donor Letter Writing packet : YES    Plan: Patient hopes to discharge on Friday, 1/13 to local apartment with wife. Coping well status-post transplant. No psychosocial issues identified. Good family support. Will stay locally for 30 days or longer if needed prior to returning home to [place]. No financial concerns. Letter writing to the donor family process has been explained.    RN CC working on finalyzing plans to discharge with 4 weeks of IV Vanco.  SW will continue to follow while outpatient. Patient/wife aware of how to reach writer with questions and concerns and aware of how to connect to support group.

## 2023-01-12 NOTE — CONSULTS
Met with patient's spouse, Arlette at the bedside for PICC education and IV abx teaching. Talked about basic PICC cares/safety/when to call 911, when to call the care team. Vince discharged with IV dobutamine in November, so Arlette is familiar with PICC cares.    Taught Arlette the SASH method with antibiotic administration and did demo on PICC model. Arlette taught back prepping syringes, flushing and administering the antibiotic via elastomeric device on PICC model. She did very well with minimal cues needed.    Also demonstrated cap change on PICC model, in case cap change is needed at home.    Arlette verbalized and demonstrated understanding. All questions answered.    Literature given: Handwashing and Skin Care, Caring for Your PICC at Home, Changing the End Cap, Flushing the Line with Heparin, Saline or Citrate, Instructions for IV Medicine Ball.

## 2023-01-12 NOTE — PROCEDURES
[facility]    Single Lumen PICC Placement    Date/Time: 1/12/2023 5:29 PM  Performed by: AL Bronson  Authorized by: Toña Guerra APRN CNP   Indications: vascular access      UNIVERSAL PROTOCOL   Site Marked: Yes  Prior Images Obtained and Reviewed:  Yes  Required items: Required blood products, implants, devices and special equipment available    Patient identity confirmed:  Verbally with patient, provided demographic data, arm band, hospital-assigned identification number and anonymous protocol, patient vented/unresponsive  Patient was reevaluated immediately before administering moderate or deep sedation or anesthesia  Confirmation Checklist:  Patient's identity using two indicators, relevant allergies, procedure was appropriate and matched the consent or emergent situation and correct equipment/implants were available  Time out: Immediately prior to the procedure a time out was called    Universal Protocol: the Joint Commission Universal Protocol was followed    Preparation: Patient was prepped and draped in usual sterile fashion       ANESTHESIA    Anesthesia: See MAR for details  Local Anesthetic:  Lidocaine 1% without epinephrine  Anesthetic Total (mL):  2      SEDATION    Patient Sedated: No        Preparation: skin prepped with ChloraPrep  Skin prep agent: skin prep agent completely dried prior to procedure  Sterile barriers: maximum sterile barriers were used: cap, mask, sterile gown, sterile gloves, and large sterile sheet  Hand hygiene: hand hygiene performed prior to central venous catheter insertion  Type of line used: PICC  Catheter type: single lumen  Lumen type: power PICC and non-valved  Catheter size: 4 Fr  Brand: Bard  Lot number: [id number]  Placement method: venipuncture, MST, ultrasound and tip navigation system  Number of attempts: 1  Difficulty threading catheter: no  Successful placement: yes  Orientation: right    Location: basilic  vein  Tip Location: SVC  Arm circumference: adults 10 cm  Extremity circumference: 28  Visible catheter length: 1  Total catheter length: 45  Dressing and securement: chlorhexidine patch applied, dressing applied, line sutured, statlock, sterile dressing applied and transparent dressing  Post procedure assessment: blood return through all ports, free fluid flow and placement verified by 3CG technology  PROCEDURE   Patient Tolerance:  Patient tolerated the procedure well with no immediate complicationsDescribe Procedure: Picc ok to use

## 2023-01-12 NOTE — PROGRESS NOTES
Care Management Follow Up    Length of Stay (days): 37    Expected Discharge Date: 01/13/2023 to [address] with wife: Arlette     Concerns to be Addressed:    [facility]: pt decided they would like them to provide the IVAB with private pay as they have used them in the past and the cost with [facility] isn't that much less. I have informed [facility] to re-open his consult, done.  Arti Garcia following: they cannot find  agency with his insurance so..patient will have to go to ATC clinic for weekly PICC dressing changes and labs(needs Tacrolimus level in early morning)--pet ATC CN, Shoshana--Outpatient Therapy Plan is entered and pt is scheduled to begin with them at 9am on Tuesday, 1/17/23. They will schedule him for future weekly labs (early)  Pt is scheduled at Tuesday, 1/17 @ 0645 and needs to go there for his Tacrolimus level, then his follow up CVTS appointment and then to the ATC clinic, for PICC dressing change. PICC Placed on____    Pharmacist: Vanesa is working on moving his IV Vanco to  Better infusion time for home--per Humaira Garcia agreement is with 5am/5pm--infuses over 1 hour.  PLC today with wife--per notes Arlette did well.        [facility]: gretamonroe Sherice is aware of new plan with [facility] following and she will cancel referral.    Per prev RN CC: pt has had transplant education, transplant discharge pharmacist has met with them and bedside RN has filled out med card.    [facility]: Mary Lou Luke        Patient plan of care discussed at interdisciplinary rounds: Yes    Anticipated Discharge Disposition: Home--to [facility]     Anticipated Discharge Services:  [facility] and labs/PICC cares [place] [facility]  Anticipated Discharge DME:  none    Patient/family educated on Medicare website which has current facility and service quality ratings:    Education Provided on the Discharge Plan:  Yes per Symone Guerra NP, Humaira Garcia and cvts: Reyna Hernandez today  Patient/Family in Agreement with the Plan:  Yes    Referrals Placed by  CM/SW:  ATC clinic  Private pay costs discussed: Private pay for IVAB per [facility]    Additional Information: Pt needs PICC placed____          Dahiana Beverly RN

## 2023-01-12 NOTE — PHARMACY-TRANSPLANT NOTE
Solid Organ Transplant Recipient Prior to Discharge Note    67 year old male s/p heart transplant on 12/25/22.    Pharmacy has monitored for medication interactions and immunosuppression levels in conjunction with the multidisciplinary team. In anticipation for discharge, medication therapy needs have been addressed daily throughout the current admission via multidisciplinary rounds and/or discussions, order verification, daily clinical pharmacy review, and communication with prescribers.  Annemarie Klein, PharmD, BCCP, BCPS

## 2023-01-12 NOTE — PLAN OF CARE
D: S/p heart transplant 12/25. Hx of CHF (suspected predominantly nonischemic cardiomyopathy, possibly arrhythmogenic), CAD (multiple stents), HLD, CNS vasculitis and CKD.    I: Monitored vitals and assessed pt status.   Changed: PICC placed, education for PICC done.  Tele: Sinus rhythm.  O2: Room air.   Mobility: SBA.    A: A0x4. VSS. Afebrile. Urinating adequately. No c/o pain or SOB.     P: Continue to monitor Pt status and report changes to CVTS. Discharge to [facility] tomorrow 1/13.    Temp:  [97.6  F (36.4  C)-98  F (36.7  C)] 97.7  F (36.5  C)  Pulse:  [83-98] 91  Resp:  [16-20] 18  BP: (109-128)/(80-89) 128/85  SpO2:  [95 %-99 %] 99 %      Goal Outcome Evaluation:    Overall Patient Progress: improvingOverall Patient Progress: improving    Outcome Evaluation: Ready for discharge.

## 2023-01-12 NOTE — PROGRESS NOTES
[facility]  Transplant Infectious Disease Progress Note - Sign off     Patient:  Paco Stein, Date of birth 1955, Medical record number [id number]  Date of Visit:  01/12/2023     Recommendations:   - Continue IV vancomycin; plan for 4 week course to 2/6/2023. Pending clinical course, he may need an extended therapy given bacteria may be imbedded in his thrombi.   - Please have home infusion plan for weekly CRP check once outpatient.  - I have requested a 2/7/2023 infectious disease clinic follow up appt on 2/7/2023 with Dr. Tiera Anguiano, who did his original consult, as she has a clinic opening that day.  -Continue scheduled prophylaxes   * PJP ppx: Bactrim (Toxoplasma D-/R-)    * Viral ppx: Valcyte (CMV D+/R+)   * Fungal ppx: Nystatin    Transplant Infectious Disease will sign off daily visits, as he will be discharged 1/13/2023.    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 Right internal jugular vein thrombus incidentally noted 1/9/2023 along with a non-occlusive left internal jugular thrombus.    Infectious Disease issues include:  #. Staphylococcus epidermidis bacteremia, concern for bacteria embedded in the patient's new and old thrombi  - Staphylococcus epidermidis in blood cultures from 1/7/2023 (drawn from the left arm) and 1/8/2023 (drawn from the right hand and left hand). He had developed leukocytosis post transplant, which was presumed due to steroids, but in retrospect could have had some association with this infection. He did not develop a fever with this infection, so fever cannot be used as an indication of duration of infection.  His last pretransplant echocardiogram was a JARED on 11/16/2022, and there were no vegetations noted at that point in time. He was transplanted on 12/25/2022, although the echocardiograms performed since heart transplant have been portable and of limited quality; last of which was on 1/2/23. He has known vascular thromboses (left peroneal, right internal jugular and new non-occlusive left internal jugular), so these could be infected. He is on subcutaneous heparin, but not therapeutic dosing. Vancomycin initiated 1/8/2023. Vancomycin CEASAR is 1.0, so the normal dosing nomogram can be used. Plan to follow daily blood cultures to determine the last day of positive cultures, which would start the timeclock for the duration of therapy - blood cultures from 1/9 x 2, 1/10 x 1 and 1/11 x 1 are negative to date. CRP downtrended from 45.9 to 10.60 to 8.59 today.   - Hx of positive coxsackie B1 and B4 antibodies, positive for ~ 2 years  - QTc interval: 474 ms on 12/31/2022 EKG  - PCP prophylaxis: Bactrim (Toxoplasma D-/R-)   - Viral serostatus & prophylaxis: CMV D+/R+, EBV D+/R+, HSV 1+/2-, VZV +; Valcyte  - Fungal prophylaxis: none  - Immunization status: shingrix 2019, PCV 13 2017, PPSV 23 2018, COVID x 3, Influenza 2021  - Gamma globulin status: mild hypogammaglobulinemia when checked on 1/10/2023 (IgG 549).   - Isolation status: Good hand hygiene.         Interval History:   Since Vince was last seen by ID on 1/11/2023, he is feeling good. He is sitting in a bedside support chair. PICC will be placed today. He has no fever. CRP continues to trend down. WBC now down below 10K for 2 days in a row. Today's CXR (personally reviewed by me) looks unchanged from yesterday. His wife Arlette is with him. They can make a local ID appt on 2/7/2023, because they are planning to be in town through at least 2/9/2023 heart cath.     Transplants:  12/25/2022 (Heart), Postoperative day:  18.  Coordinator Mary Lou Luke    Review of  Systems:  CONSTITUTIONAL: No fever.  EYES: No conjunctival icterus  ENT:   RESPIRATORY:   CARDIOVASCULAR:   GASTROINTESTINAL:   GENITOURINARY: No dysuria  HEME:   INTEGUMENT: No jaundice  NEURO: Alert and oriented         Current Medications & Allergies:       acetaminophen  975 mg Oral Q8H     amLODIPine  5 mg Oral Daily     apixaban ANTICOAGULANT  5 mg Oral BID     calcium carbonate-vitamin D  1 tablet Oral BID w/meals     insulin aspart  1-7 Units Subcutaneous TID AC     insulin aspart  1-5 Units Subcutaneous At Bedtime     lidocaine  1 patch Transdermal Q24H     lidocaine   Transdermal Q8H NINFA     magnesium oxide  400 mg Oral Daily     melatonin  10 mg Oral At Bedtime     mycophenolate  1,000 mg Oral BID     nystatin  1,000,000 Units Swish & Swallow 4x Daily     pantoprazole  40 mg Oral QAM AC     polyethylene glycol  17 g Oral Daily     predniSONE  15 mg Oral QPM     predniSONE  20 mg Oral Daily     QUEtiapine  25 mg Oral At Bedtime     rosuvastatin  10 mg Oral Daily     senna-docusate  1 tablet Oral BID     sodium chloride (PF)  3 mL Intracatheter Q8H     sulfamethoxazole-trimethoprim  1 tablet Oral Daily     tacrolimus  4 mg Oral QAM     tacrolimus  5 mg Oral QPM     valGANciclovir  900 mg Oral Daily     vancomycin  1,000 mg Intravenous Q12H       Infusions/Drips:    dextrose 1,000 mL (01/02/23 0007)       No Known Allergies         Physical Exam:     Patient Vitals for the past 24 hrs:   BP Temp Temp src Pulse Resp SpO2 Weight   01/12/23 0727 126/89 97.8  F (36.6  C) Oral 83 18 95 % --   01/12/23 0600 -- -- -- -- -- -- 82.8 kg (182 lb 9.6 oz)   01/12/23 0350 123/80 97.8  F (36.6  C) Oral 88 18 95 % --   01/11/23 2010 125/80 97.8  F (36.6  C) Oral 87 16 97 % --   01/11/23 2009 125/80 97.6  F (36.4  C) Oral 87 16 97 % --   01/11/23 1550 114/82 98.1  F (36.7  C) Oral 91 16 97 % --   01/11/23 1200 116/71 97.6  F (36.4  C) Oral 91 17 96 % --     Ranges for vital signs:  Temp:  [97.6  F (36.4  C)-98.1  F (36.7   C)] 97.8  F (36.6  C)  Pulse:  [83-91] 83  Resp:  [16-18] 18  BP: (114-126)/(71-89) 126/89  SpO2:  [95 %-97 %] 95 %  Vitals:    01/08/23 0500 01/09/23 0813 01/12/23 0600   Weight: 84.7 kg (186 lb 11.2 oz) 84 kg (185 lb 3.2 oz) 82.8 kg (182 lb 9.6 oz)       Physical Examination:  GENERAL: No acute distress.  HEAD:  Head is normocephalic, atraumatic   EYES:  Eyes have anicteric conjunctiva   ENT:  Oropharynx is moist without exudates or ulcers.  NECK:  Supple.   CARDIAC: Midline sternal incision healing as expected.   SKIN:  No acute rashes.   NEUROLOGIC: Active x4 extremities         Laboratory Data:     Inflammatory Markers  Recent Labs   Lab Test 01/12/23  0555 01/11/23  0822 01/10/23  0625 01/09/23  1451 11/12/22  0458   SED  --   --  40*  --   --    CRP 8.59* 10.60*  --    < >  --    PSA  --   --   --   --  1.63    < > = values in this interval not displayed.       Immune Globulin Studies  Recent Labs   Lab Test 01/10/23  0625   *   IGM 67   IGE 71          Metabolic Studies       Recent Labs   Lab Test 01/12/23  0555 01/11/23  1207 01/11/23  0822 01/05/23  0757 01/05/23  0715 01/04/23  0817 01/04/23  0458 12/25/22  1856 12/25/22  0604     --  134*   < > 133*  --  134*   < > 138   POTASSIUM 4.4  --  4.7   < > 5.4*  --  4.7   < > 4.0   CHLORIDE 104  --  103   < > 104  --  105   < > 105   CO2 21*  --  20*   < > 20*  --  20*   < > 22   ANIONGAP 11  --  11   < > 9  --  9   < > 11   BUN 24.0*  --  24.7*   < > 21.5  --  21.7   < > 13.1   CR 0.70  --  0.70   < > 0.60*  --  0.57*   < > 0.84   GFRESTIMATED >90  --  >90   < > >90  --  >90   < > >90   *   < > 108*   < > 124*   < > 158*   < > 104*   A1C  --   --   --   --   --   --   --   --  6.2*   TIM 8.8  --  8.4*   < > 8.4*  --  8.1*   < > 9.4   PHOS  --   --  3.0   < >  --   --  2.5   < > 3.5   MAG  --   --  1.5*   < >  --   --  1.6*   < > 1.8   LACT  --   --   --   --  1.1  --  1.3   < > 1.2    < > = values in this interval not displayed.        Hepatic Studies    Recent Labs   Lab Test 01/07/23  0728 01/06/23  0442 01/05/23  0715   BILITOTAL 0.4 0.4 0.4   DBIL <0.20 <0.20 <0.20   ALKPHOS 80 71 69   PROTTOTAL 4.9* 4.7* 4.7*   ALBUMIN 2.7* 2.6* 2.6*   AST 27 25 30   ALT 39 47 44       Pancreatitis testing    Recent Labs   Lab Test 12/25/22  0604 11/12/22  0458   AMYLASE 77  --    TRIG  --  75       Hematology Studies   Recent Labs   Lab Test 01/12/23  0555 01/11/23  0822 01/10/23  0625 01/09/23  1102 01/09/23  0623 12/30/22  0958 12/30/22  0541 12/06/22  0734 11/28/22  1200   WBC 9.4 9.7 12.2*  --  13.7*   < > 13.2*   < >  --    93193  --   --   --   --   --   --   --   --  5.7   ANEUTAUTO  --   --  11.3*  --   --   --  11.5*   < >  --    ALYMPAUTO  --   --  0.5*  --   --   --  0.5*   < >  --    AMONOAUTO  --   --  0.4  --   --   --  1.0   < >  --    AEOSAUTO  --   --  0.0  --   --   --  0.0   < >  --    ABSBASO  --   --  0.0  --   --   --  0.0   < >  --    HGB 10.2* 10.2* 10.0*   < > 10.2*   < > 9.2*   < >  --    91096  --   --   --   --   --   --   --   --  15.0   HCT 32.7* 32.3* 31.6*  --  33.2*   < > 30.0*   < >  --     252 280  --  277   < > 103*   < >  --    54994  --   --   --   --   --   --   --   --  223    < > = values in this interval not displayed.       Clotting Studies    Recent Labs   Lab Test 12/29/22  1008 12/25/22  2303 12/25/22  2150 12/25/22  0604   INR 1.15 1.27* 1.55* 1.04   PTT 26 46* 30 84*       Iron Testing    Recent Labs   Lab Test 01/12/23  0555 11/15/22  0444 11/12/22  0458   IRON  --   --  50*   FEB  --   --  221*   IRONSAT  --   --  23   RONALDO  --   --  226   MCV 93   < > 88    < > = values in this interval not displayed.       Arterial Blood Gas Testing    Recent Labs   Lab Test 12/30/22  0605 12/29/22  1009 12/29/22  0734 12/29/22  0404 12/29/22  0402 12/28/22  2148 12/28/22  2146 12/28/22  1551 12/28/22  1550   PH  --  7.47* 7.46*  --  7.46*  --  7.45  --  7.47*   PCO2  --  37 37  --  37  --  37  --  34*   PO2  --   103 136*  --  102  --  112*  --  130*   HCO3  --  27 27  --  27  --  26  --  25   O2PER 24 3 4  4 35 35   < > 35   < > 4    < > = values in this interval not displayed.        Thyroid Studies     Recent Labs   Lab Test 11/12/22  0458 11/08/22  1020   TSH 5.18* 3.75       Urine Studies     Recent Labs   Lab Test 01/07/23  1116 12/25/22  0746 11/11/22  1155   URINEPH 5.5 7.0 5.0   NITRITE Negative Negative Negative   LEUKEST Negative Negative Negative   WBCU 0 0 3       Medication levels    Recent Labs   Lab Test 01/11/23  0822 01/10/23  1306   VANCOMYCIN  --  11.7   TACROL 8.4  --        Microbiology:  Last Culture results   Culture   Date Value Ref Range Status   01/11/2023 No growth after 1 day  Preliminary   01/10/2023 No growth after 2 days  Preliminary   01/09/2023 No growth after 2 days  Preliminary   01/09/2023 No growth after 2 days  Preliminary   01/08/2023 Positive on the 2nd day of incubation (A)  Final   01/08/2023 Staphylococcus epidermidis (AA)  Final     Comment:     1 of 2 bottlesSusceptibilities done on previous cultures   01/08/2023 Positive on the 1st day of incubation (A)  Final   01/08/2023 Staphylococcus epidermidis (AA)  Final     Comment:     2 of 2 bottlesSusceptibilities done on previous cultures   01/07/2023 No growth after 4 days  Preliminary   01/07/2023 Positive on the 1st day of incubation (A)  Final   01/07/2023 Staphylococcus epidermidis (AA)  Final     Comment:     1 of 2 bottles           Virology:  Coronavirus-19 testing    Recent Labs   Lab Test 12/06/22  0758 11/10/22  1310   [id number] Negative Negative       Respiratory virus (non-coronavirus-19) testing    Recent Labs   Lab Test 12/06/22  0758   INFZA Negative   INFZB Negative   IRSV Negative       Imaging:  Recent Results (from the past 24 hour(s))   XR Chest Port 1 View    Narrative    EXAM: XR CHEST PORT 1 VIEW  1/12/2023 8:41 AM      HISTORY: post-op heart transplant monitoring    COMPARISON: Chest radiograph 1/11/2023,  1/10/2023, 1/9/2023.    FINDINGS: Single view of the chest. Postsurgical changes of heart  transplant. Intact sternotomy wires. Trachea is midline. Stable  cardiomediastinal silhouette. Trace left pleural effusion. Mild  retrocardiac hazy opacification. No pleural effusion on the right. No  pneumothorax. No acute osseous abnormalities.      Impression    IMPRESSION:   1. Mild hazy retrocardiac opacification, likely atelectasis.  2. Trace left pleural effusion.    I have personally reviewed the examination and initial interpretation  and I agree with the findings.    DEIDRE HINTON MD         SYSTEM ID:  [id number]

## 2023-01-12 NOTE — PROGRESS NOTES
Cardiovascular Surgery Progress Note  01/12/2023         Assessment and Plan:     Paoc Stein is a 67 year old male with a PMH of chronic systolic heart failure (suspected predominantly nonischemic cardiomyopathy, possible arrhythmogenic), CAD s/p PCI, HLD, CNS vasculitis, CKD Stage 2. Patient is now s/p orthotopic heart transplantation on 12/25/2022 with Dr. Rodriguez.    Cardiovascular:   Hx of End-stage heart failure s/p Heart transplantation  HD stable overnight in NSR  Pre-operative echo showed LVEF 15%  Intraoperative echo showed LVEF 45% (stunning)  Postoperative echo on 1/2 demonstrated LVEF 60-65%, preserved RV function  - 1 week Heart Biopsy demonstrated 1R with C4d however DSA negative. Clinically stable and echo looks stable.   - 2 week bx 1/9: similar findings, next biopsy scheduled for 01/17.  - ASA: held while on Eliquis.   - Statin: Crestor 10mg daily  - Norvasc 5mg daily  - Diuresis per CARDS2  Chest tubes: Left midaxillary CT removed on 01/03. CXR 01/03 post-pull, stable. Left and Right pleural as well as mediastinal removed 01/05. CXR ordered 01/05 post pull, stable. Pericardial with 19 mL out past 24h, removed 1/9. F/U CXR 1/9 and 1/10 were stable.  TPW: removed 01/03    Pulmonary:  Extubated POD #2 to 4 lpm via NC. Now saturating well on RA.   - Pulm hygiene, IS, activity and deep breathing    Neurology / MSK:  Acute post-operative pain   Pain well controlled with current regimen:  - Acetaminophen, PO oxycodone PRN, methocarbamol PRN     / Renal / Fluid / Electrolytes:  Hyperkalemia, resolved  Hyponatremia, improving  BL creat ~ 0.9, most recent creatinine stable; adequate UOP.   FLUID STATUS: Pre-op weight 192, last weight 182; I/O past 24 hours: -2367  - Diuresis: per CARDS2  - Replete lytes per protocol  - Strict I/O, daily weights  - Avoid/limit nephrotoxins as able  - Hyperkalemia improved 01/06 after neutra-phos discontinued  - Discontinue KPhos  - BMP (K improved), continue AM draws  to trend    GI / Nutrition:   - Tolerating regular diet  - Continue bowel regimen, last BM 01/11    Endocrine:  Stress and steroid induced hyperglycemia   Hgb A1c 6.2% on 12/25  - Initially managed on insulin drip postop, transitioned to sliding scale; goal BG <180 for optimal healing. Minimal need while on prednisone, not needed at discharge.     Infectious Disease:  Stress and steroid induced leukocytosis  Staph epi bacteremia  WBC 9.7 (down from 18.4 on 01/08), remains afebrile  - Blood and urine culture 1/7 due to jump in WBC. 1/2 bottles growing gram + cocci. Repeat Blood cx 1/8 and transplant ID consult placed. Started IV Vanco q12h. Per ID continue daily blood culture for 1 more days (until 1/12). Daily CRP and weekly ESR while inpatient. Weekly CRP and PICC dressing changes.   - Repeat blood cultures 1/8 with 2/2 bottles growing gram + cocci. Repeat blood cultures 1/9-1/11 NGTD.  - IV vanco started 01/08/23, plan for 4 week course from first documented negative blood culture. 4 week course to end 02/06/23, pending course may need extension therapy so will follow up with ID 02/07/23.  - Completed perioperative antibiotics  - Continue to monitor fever curve, CBC  Immunosuppression/prophylaxis  - Managed per Cards 2  - PICC being placed today, 01/12, and PLC teaching as well.    Hematology:   Acute blood loss anemia and thrombocytopenia  Hgb stable; Plt stable, no signs or symptoms of active bleeding  - CBC: continue to trend  History of right axillary nonocclusive and left peroneal occlusive DVT 11/2022.  Nonocclusive thrombus in Internal Jugular Veins (R>L) noted 01/09   - PTA Eliquis resumed 01/10.   - Repeat US 01/09: nonocclusive thrombus in jugular veins (R>L) and nonocclusive DVT in Left Peroneal vein    Anticoagulation:   - ASA   - Resuming Eliquis 5mg BID 1/10, discussed with Dr. Michael ZUNIGAK/Skin:  Sternotomy  Surgical incision  - Sternal precautions  - Incisional cares per protocol    Prophylaxis:    - Stress ulcer prophylaxis: Pantoprazole 40 mg daily for 30 days  - DVT prophylaxis: Subcutaneous heparin, SCD    Disposition:   - Transferred to  on 01/04  - Therapies recommending discharge to home with assist and outpatient cardiac rehab at this time, discharge to [facility] [place] [facility] with wife tomorrow. PICC line and PLC teachings today, follow blood cultures from 01/12, if clear discharge tomorrow.    Discussed with Dr. Michael Hernandez PA-C   Cardiothoracic Surgery  Pager: [phone]          Interval History:     No acute events overnight. Pain is well controlled on current regimen, very little pain. Breathing is stable on room air. Tolerating diet, + flatulence, +BM, voiding well on own with no issues. Denies CP, SOB, dizziness, lightheadedness, chills, myalgia, sternal popping/clicking and N/V.          Physical Exam:     Gen: A&Ox4, NAD  Neuro: Intact with no focal deficits   CV: RRR, normal S1 S2, no murmurs, rubs or gallops. No JVD  Pulm: CTA, no wheezing or rhonchi, normal breathing on RA  Abd: nondistended, normal BS, soft, nontender  Ext: minimal peripheral edema, no pitting  Incision: clean, dry, intact, no erythema, sternum stable  Tubes/drain sites: dressing clean and dry.          Data:    Imaging:  reviewed recent imaging.     Recent Results (from the past 24 hour(s))   XR Chest Port 1 View    Narrative    Portable chest    INDICATION: Postoperative heart transplant    COMPARISON: Yesterday    FINDINGS: Cardiac transplantation silhouette appears normal in size  and shape. Median sternotomy again noted. Lungs and pulmonary  vasculature appear without interval change including minimal left  costophrenic angle blunting. Bony structures appear grossly  unremarkable otherwise.      Impression    IMPRESSION: Prior cardiac transplant with no acute interval change,  possible trace left pleural effusion.    DEIDRE HINTON MD         SYSTEM ID:  [id number]        Labs:  Recent Labs   Lab  01/12/23  0555 01/11/23  2218 01/11/23  1734 01/11/23  1207 01/11/23  0822 01/10/23  0658 01/10/23  0625 01/07/23  0746 01/07/23  0728 01/06/23  1229 01/06/23  0442   WBC 9.4  --   --   --  9.7  --  12.2*   < >  --   --  13.4*   HGB 10.2*  --   --   --  10.2*  --  10.0*   < >  --   --  10.5*   MCV 93  --   --   --  92  --  93   < >  --   --  92     --   --   --  252  --  280   < >  --   --  266     --   --   --  134*  --  134*   < > 133*  --  135*   POTASSIUM 4.4  --   --   --  4.7  --  5.1   < > 4.9  --  4.6   CHLORIDE 104  --   --   --  103  --  102   < > 103  --  105   CO2 21*  --   --   --  20*  --  21*   < > 18*  --  20*   BUN 24.0*  --   --   --  24.7*  --  26.4*   < > 23.1*  --  20.7   CR 0.70  --   --   --  0.70  --  0.81   < > 0.65*  --  0.65*   ANIONGAP 11  --   --   --  11  --  11   < > 12  --  10   TIM 8.8  --   --   --  8.4*  --  8.7*   < > 8.4*  --  8.5*   * 140* 211*   < > 108*   < > 171*   < > 110*   < > 102*   ALBUMIN  --   --   --   --   --   --   --   --  2.7*  --  2.6*   PROTTOTAL  --   --   --   --   --   --   --   --  4.9*  --  4.7*   BILITOTAL  --   --   --   --   --   --   --   --  0.4  --  0.4   ALKPHOS  --   --   --   --   --   --   --   --  80  --  71   ALT  --   --   --   --   --   --   --   --  39  --  47   AST  --   --   --   --   --   --   --   --  27  --  25    < > = values in this interval not displayed.      GLUCOSE:   Recent Labs   Lab 01/12/23  0555 01/11/23  2218 01/11/23  1734 01/11/23  1207 01/11/23  0822 01/11/23  0820   * 140* 211* 143* 108* 109*

## 2023-01-12 NOTE — CONSULTS
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  He is now s/p OHT 12/25/22, with post-operative course c/b delirium (improved), staph epi bacteremia, and prolonged CT output (resolved).    RECOMMENDATIONS:  - PICC placement and PLC   - will follow-up PM tacrolimus level  - tentative plan is for discharge tomorrow  - f/u plan d/w patient, wife, and transplant RN coordinator Mary Lou Marly    # s/p OHT 12/25/22 history of NICM  # Chronic immunosuppresion  His post-transplant course has been c/b delirium, staph epi bacteremia, and prolonged CT output.    Rejection history: first biopsy showed diffuse capillary staining for c4d, suggestive of pAMR1.  No DSAs, no graft dysfunction. No changes were made. Second biopsy with similar finding, deferred treatment at this time given likelihood of early abnormal findings, lack of DSAs or graft dysfunction. Next biopsy due 1/17.  AlloMap scores:  n/a  DSAs:  None on POD 7  Coronary angio/Ischemic eval:  to be completed at ~week #4   RHC 1/9/23: RA 4, mPA 16, PCW 9, and CI 2.6 --> weight 185# (standing scale).  TTE 1/2/23: LVEF 60-65% and normal RV size/function.    Immunosuppression:  - received basiliximab 12/25 and 12/29  - prednisone taper per protocol, currently 20 mg in AM and 15 mg in PM  - MMF decreased to 1000mg BID given age and bacteremia   - tacrolimus goal level 10-12.  Level 8.4 yesterday but ~15hr trough,  "pending today. Continue 4 mg in AM 5 mg in PM for now.     PPx:  - CAV:  Ok to hold aspirin while on Eliquis, continue rosuvastatin 10mg daily  - GI:  Pantoprazole 40mg daily  - Osteoporosis:  calcium/vitamin D supplements  - CMV:  valcyte 900mg daily (x3 months per protocol, through 3/2023)  - PCP:  Bactrim single strength daily  - Thrush:  Nystatin s/s QID  - Toxo:  N/a    Serostatus: CMV D+/R+ EBV D+/R+ Toxo D-/R-    Graft function:  - BPs: at goal continue amlodipine 5mg daily  - HRs:  80-90s, off terbutaline  - Fluid status: euvolemic, off diuretics    Health maintenance:  - due for week #3 RHC/bx 1/17/23  - note: needs femoral access for RHC  - week #4 biopsy, RHC, angiogram due 1/24/23    # h/o right axillary nonocclusive and left peroneal occlusive DVT (11/2022, pre-transplant)  Was on apixaban prior to transplant, which has been held.  During RHC 1/9, it was noted that his RIJ had clot and LIJ is inaccessible, so he required femoral access.  - Eliquis per CVTS, hold prior to biopsies    # Staph epi bacteremia  Blood cx from 1/7 and 1/8 with staph epi. Started on IV vanco 1/8 per ID.  - Transplant ID following  - daily blood cx through 1/12 (negative since 1/8), PICC today with [facility] teaching  - IV vanco for four week course minimum  - follow-up with ID as outpatietn    Symone Guerra, PAOLA, NP-C  Advanced Heart Failure/Cardiology 2 Nurse Practitioner  Pager     =============================================================    Interval History/ROS:   No overnight events. Slept well. Multiple walks around the unit, felt well. No GI symptoms. Plan discussed with patient and wife, questions answered.    Last 24 hr care team notes reviewed.   ROS:  4 point ROS including Respiratory, CV, GI and , other than that noted in the HPI, is negative.     Medications: Reviewed in EPIC.     Physical Exam:   /89 (BP Location: Right arm)   Pulse 83   Temp 97.8  F (36.6  C) (Oral)   Resp 18   Ht 1.88 m (6' 2\") "   Wt 82.8 kg (182 lb 9.6 oz)   SpO2 95%   BMI 23.44 kg/m       GENERAL: Appears comfortable, in NAD .   HEENT: Eye symmetrical and free of discharge bilaterally. Mucous membranes moist and without lesions. No thrush noted.  NECK: Supple, JVD not visible at 90 degrees.  CV: RRR, S1S2 present without murmur, rub, or gallop.   RESPIRATORY: Respirations regular, even, and unlabored. Lungs CTA throughout.   GI: Soft and non distended with normoactive bowel sounds present in all quadrants. No tenderness, rebound, guarding.  EXTREMITIES: No LE edema. 2+ bilateral pedal pulses.   NEUROLOGIC: Alert and orientated x 3. No focal deficits.   MUSCULOSKELETAL: No joint swelling or tenderness.   SKIN: No jaundice. No rashes or lesions. Surgical incisions pink, edges well-approximated, no redness nor tenderness.    Data:  CMP  Recent Labs   Lab 01/12/23  0555 01/11/23  2218 01/11/23  1734 01/11/23  1207 01/11/23  0822 01/10/23  0658 01/10/23  0625 01/09/23  1743 01/09/23  1451 01/09/23  0744 01/09/23  0623 01/08/23  0907 01/08/23  0737 01/07/23  0746 01/07/23  0728 01/06/23  1229 01/06/23  0442     --   --   --  134*  --  134*  --   --   --  134*  --  132*  --  133*  --  135*   POTASSIUM 4.4  --   --   --  4.7  --  5.1  --   --   --  4.8  --  4.9  --  4.9  --  4.6   CHLORIDE 104  --   --   --  103  --  102  --   --   --  103  --  103  --  103  --  105   CO2 21*  --   --   --  20*  --  21*  --   --   --  22  --  20*  --  18*  --  20*   ANIONGAP 11  --   --   --  11  --  11  --   --   --  9  --  9  --  12  --  10   * 140* 211* 143* 108*   < > 171*   < >  --    < > 118*   < > 111*   < > 110*   < > 102*   BUN 24.0*  --   --   --  24.7*  --  26.4*  --   --   --  19.6  --  24.1*  --  23.1*  --  20.7   CR 0.70  --   --   --  0.70  --  0.81  --   --   --  0.66*  --  0.69  --  0.65*  --  0.65*   GFRESTIMATED >90  --   --   --  >90  --  >90  --   --   --  >90  --  >90  --  >90  --  >90   TIM 8.8  --   --   --  8.4*  --  8.7*   --   --   --  8.5*  --  8.1*  --  8.4*  --  8.5*   MAG  --   --   --   --  1.5*  --  1.6*  --  2.9*  --  1.5*  --  1.6*  --  1.6*   < > 1.4*   PHOS  --   --   --   --  3.0  --  2.6  --   --   --  2.7  --  3.0  --  3.4  --  3.5   PROTTOTAL  --   --   --   --   --   --   --   --   --   --   --   --   --   --  4.9*  --  4.7*   ALBUMIN  --   --   --   --   --   --   --   --   --   --   --   --   --   --  2.7*  --  2.6*   BILITOTAL  --   --   --   --   --   --   --   --   --   --   --   --   --   --  0.4  --  0.4   ALKPHOS  --   --   --   --   --   --   --   --   --   --   --   --   --   --  80  --  71   AST  --   --   --   --   --   --   --   --   --   --   --   --   --   --  27  --  25   ALT  --   --   --   --   --   --   --   --   --   --   --   --   --   --  39  --  47    < > = values in this interval not displayed.     CBC  Recent Labs   Lab 01/12/23  0555 01/11/23  0822 01/10/23  0625 01/09/23  1102 01/09/23 0623   WBC 9.4 9.7 12.2*  --  13.7*   RBC 3.51* 3.51* 3.39*  --  3.52*   HGB 10.2* 10.2* 10.0* 10.6* 10.2*   HCT 32.7* 32.3* 31.6*  --  33.2*   MCV 93 92 93  --  94   MCH 29.1 29.1 29.5  --  29.0   MCHC 31.2* 31.6 31.6  --  30.7*   RDW 15.9* 16.0* 16.2*  --  16.3*    252 280  --  277       Patient discussed with Dr. Ibanez.

## 2023-01-12 NOTE — PLAN OF CARE
D: Paco Stein is a 67 year old male with a history of chronic systolic heart failure (suspected predominantly nonischemic cardiomyopathy, possibly arrhythmogenic), coronary artery disease (s/p PCI with TILA to LCx 4/2013 and to the LAD in 10/2021), hyperlipidemia, CNS vasculitis and chronic kidney disease who presents with transient bilateral hand tightness with unrevealing workup. He underwent DDHT on 12/25/22    PRN:   Tele: SR - 80s  O2: RA  Mobility: x1 gbw     A: Neuro: A/O x4.  Call light appropriate.  Able to make needs known.  Respiratory:  On room air.  Lung sounds clear.  Denies shortness of breath at rest.  Cardiac: VSS.  BP in goal range.   GI: No BM this shift.  No report of nausea or vomiting.  : Voiding adequate clear, yellow urine using urinal.  Endo:  Blood sugars ACHS.  Skin: Sternal incision, old CT sites, R groin site w primapore on, L chest incision - WNL  LDA:  RPIV - SL    Antibiotics:  Receiving vancomycin - will go home on that  Pain: Denies  Diet: Regular - NPO since midnight for heart biopsy today.     P: Plan for PICC placement, heart biopsy & education today. Discharge to [facility] on vancomycin. Continue to monitor Pt status and report changes to CVTS.      Goal Outcome Evaluation:      Plan of Care Reviewed With: patient    Overall Patient Progress: no change

## 2023-01-13 ENCOUNTER — PRE VISIT (OUTPATIENT)
Dept: TRANSPLANT | Facility: CLINIC | Age: 68
End: 2023-01-13

## 2023-01-13 ENCOUNTER — APPOINTMENT (OUTPATIENT)
Dept: GENERAL RADIOLOGY | Facility: CLINIC | Age: 68
DRG: 001 | End: 2023-01-13
Attending: SURGERY
Payer: MEDICARE

## 2023-01-13 ENCOUNTER — TELEPHONE (OUTPATIENT)
Dept: TRANSPLANT | Facility: CLINIC | Age: 68
End: 2023-01-13

## 2023-01-13 ENCOUNTER — TELEPHONE (OUTPATIENT)
Dept: CARDIOLOGY | Facility: CLINIC | Age: 68
End: 2023-01-13

## 2023-01-13 ENCOUNTER — APPOINTMENT (OUTPATIENT)
Dept: OCCUPATIONAL THERAPY | Facility: CLINIC | Age: 68
DRG: 001 | End: 2023-01-13
Attending: SURGERY
Payer: MEDICARE

## 2023-01-13 VITALS
TEMPERATURE: 97.5 F | RESPIRATION RATE: 18 BRPM | DIASTOLIC BLOOD PRESSURE: 80 MMHG | BODY MASS INDEX: 23.13 KG/M2 | OXYGEN SATURATION: 96 % | WEIGHT: 180.2 LBS | SYSTOLIC BLOOD PRESSURE: 126 MMHG | HEIGHT: 74 IN | HEART RATE: 89 BPM

## 2023-01-13 DIAGNOSIS — Z94.1 HEART TRANSPLANT RECIPIENT (H): Primary | ICD-10-CM

## 2023-01-13 LAB
ANION GAP SERPL CALCULATED.3IONS-SCNC: 12 MMOL/L (ref 7–15)
BUN SERPL-MCNC: 22.2 MG/DL (ref 8–23)
CALCIUM SERPL-MCNC: 8.9 MG/DL (ref 8.8–10.2)
CHLORIDE SERPL-SCNC: 102 MMOL/L (ref 98–107)
CREAT SERPL-MCNC: 0.75 MG/DL (ref 0.67–1.17)
CRP SERPL-MCNC: 6.97 MG/L
DEPRECATED HCO3 PLAS-SCNC: 21 MMOL/L (ref 22–29)
ERYTHROCYTE [DISTWIDTH] IN BLOOD BY AUTOMATED COUNT: 15.9 % (ref 10–15)
GFR SERPL CREATININE-BSD FRML MDRD: >90 ML/MIN/1.73M2
GLUCOSE BLDC GLUCOMTR-MCNC: 127 MG/DL (ref 70–99)
GLUCOSE BLDC GLUCOMTR-MCNC: 148 MG/DL (ref 70–99)
GLUCOSE BLDC GLUCOMTR-MCNC: 155 MG/DL (ref 70–99)
GLUCOSE SERPL-MCNC: 149 MG/DL (ref 70–99)
HCT VFR BLD AUTO: 36.6 % (ref 40–53)
HGB BLD-MCNC: 11.3 G/DL (ref 13.3–17.7)
MAGNESIUM SERPL-MCNC: 1.4 MG/DL (ref 1.7–2.3)
MCH RBC QN AUTO: 28.7 PG (ref 26.5–33)
MCHC RBC AUTO-ENTMCNC: 30.9 G/DL (ref 31.5–36.5)
MCV RBC AUTO: 93 FL (ref 78–100)
PHOSPHATE SERPL-MCNC: 2.6 MG/DL (ref 2.5–4.5)
PLATELET # BLD AUTO: 283 10E3/UL (ref 150–450)
POTASSIUM SERPL-SCNC: 3.9 MMOL/L (ref 3.4–5.3)
RBC # BLD AUTO: 3.94 10E6/UL (ref 4.4–5.9)
SODIUM SERPL-SCNC: 135 MMOL/L (ref 136–145)
TACROLIMUS BLD-MCNC: 8.9 UG/L (ref 5–15)
TME LAST DOSE: NORMAL H
TME LAST DOSE: NORMAL H
WBC # BLD AUTO: 9.7 10E3/UL (ref 4–11)

## 2023-01-13 PROCEDURE — 71045 X-RAY EXAM CHEST 1 VIEW: CPT

## 2023-01-13 PROCEDURE — 97535 SELF CARE MNGMENT TRAINING: CPT | Mod: GO

## 2023-01-13 PROCEDURE — 250N000011 HC RX IP 250 OP 636: Performed by: SURGERY

## 2023-01-13 PROCEDURE — 250N000013 HC RX MED GY IP 250 OP 250 PS 637: Performed by: PHYSICIAN ASSISTANT

## 2023-01-13 PROCEDURE — 250N000013 HC RX MED GY IP 250 OP 250 PS 637: Performed by: SURGERY

## 2023-01-13 PROCEDURE — 86140 C-REACTIVE PROTEIN: CPT | Performed by: INTERNAL MEDICINE

## 2023-01-13 PROCEDURE — 250N000013 HC RX MED GY IP 250 OP 250 PS 637: Performed by: STUDENT IN AN ORGANIZED HEALTH CARE EDUCATION/TRAINING PROGRAM

## 2023-01-13 PROCEDURE — 80048 BASIC METABOLIC PNL TOTAL CA: CPT | Performed by: SURGERY

## 2023-01-13 PROCEDURE — 97530 THERAPEUTIC ACTIVITIES: CPT | Mod: GO

## 2023-01-13 PROCEDURE — 71045 X-RAY EXAM CHEST 1 VIEW: CPT | Mod: 26 | Performed by: RADIOLOGY

## 2023-01-13 PROCEDURE — 250N000009 HC RX 250: Performed by: SURGERY

## 2023-01-13 PROCEDURE — 85027 COMPLETE CBC AUTOMATED: CPT | Performed by: INTERNAL MEDICINE

## 2023-01-13 PROCEDURE — 258N000003 HC RX IP 258 OP 636: Performed by: SURGERY

## 2023-01-13 PROCEDURE — 36592 COLLECT BLOOD FROM PICC: CPT | Performed by: NURSE PRACTITIONER

## 2023-01-13 PROCEDURE — 83735 ASSAY OF MAGNESIUM: CPT | Performed by: SURGERY

## 2023-01-13 PROCEDURE — 250N000012 HC RX MED GY IP 250 OP 636 PS 637: Performed by: NURSE PRACTITIONER

## 2023-01-13 PROCEDURE — 84100 ASSAY OF PHOSPHORUS: CPT | Performed by: SURGERY

## 2023-01-13 PROCEDURE — 99232 SBSQ HOSP IP/OBS MODERATE 35: CPT | Performed by: NURSE PRACTITIONER

## 2023-01-13 PROCEDURE — 80197 ASSAY OF TACROLIMUS: CPT | Performed by: NURSE PRACTITIONER

## 2023-01-13 PROCEDURE — 87040 BLOOD CULTURE FOR BACTERIA: CPT | Performed by: INTERNAL MEDICINE

## 2023-01-13 PROCEDURE — 250N000013 HC RX MED GY IP 250 OP 250 PS 637

## 2023-01-13 PROCEDURE — 250N000013 HC RX MED GY IP 250 OP 250 PS 637: Performed by: NURSE PRACTITIONER

## 2023-01-13 RX ORDER — PREDNISONE 5 MG/1
20 TABLET ORAL DAILY
Qty: 120 TABLET | Refills: 0 | Status: SHIPPED | OUTPATIENT
Start: 2023-01-14 | End: 2023-01-13

## 2023-01-13 RX ORDER — AMOXICILLIN 250 MG
1 CAPSULE ORAL 2 TIMES DAILY
Qty: 60 TABLET | Refills: 1 | Status: SHIPPED | OUTPATIENT
Start: 2023-01-13 | End: 2023-01-24

## 2023-01-13 RX ORDER — NYSTATIN 100000/ML
1000000 SUSPENSION, ORAL (FINAL DOSE FORM) ORAL 4 TIMES DAILY
Qty: 1200 ML | Refills: 1 | Status: SHIPPED | OUTPATIENT
Start: 2023-01-13 | End: 2023-02-06

## 2023-01-13 RX ORDER — VALGANCICLOVIR 450 MG/1
900 TABLET, FILM COATED ORAL DAILY
Qty: 60 TABLET | Refills: 0 | Status: SHIPPED | OUTPATIENT
Start: 2023-01-14 | End: 2023-02-06

## 2023-01-13 RX ORDER — CALCIUM CARBONATE/VITAMIN D3 600 MG-10
1 TABLET ORAL 2 TIMES DAILY WITH MEALS
Qty: 60 TABLET | Refills: 1 | Status: SHIPPED | OUTPATIENT
Start: 2023-01-13 | End: 2023-02-06

## 2023-01-13 RX ORDER — LIDOCAINE 40 MG/G
CREAM TOPICAL
Status: CANCELLED | OUTPATIENT
Start: 2023-01-13

## 2023-01-13 RX ORDER — AMLODIPINE BESYLATE 5 MG/1
5 TABLET ORAL DAILY
Qty: 30 TABLET | Refills: 1 | Status: SHIPPED | OUTPATIENT
Start: 2023-01-14 | End: 2023-02-06

## 2023-01-13 RX ORDER — PANTOPRAZOLE SODIUM 40 MG/1
40 TABLET, DELAYED RELEASE ORAL
Qty: 30 TABLET | Refills: 0 | Status: SHIPPED | OUTPATIENT
Start: 2023-01-14 | End: 2023-02-06

## 2023-01-13 RX ORDER — QUETIAPINE FUMARATE 25 MG/1
25 TABLET, FILM COATED ORAL
Qty: 30 TABLET | Refills: 0 | Status: CANCELLED | OUTPATIENT
Start: 2023-01-13

## 2023-01-13 RX ORDER — METHOCARBAMOL 500 MG/1
500 TABLET, FILM COATED ORAL EVERY 6 HOURS PRN
Qty: 30 TABLET | Refills: 0 | Status: SHIPPED | OUTPATIENT
Start: 2023-01-13 | End: 2023-02-06

## 2023-01-13 RX ORDER — TACROLIMUS 5 MG/1
5 CAPSULE ORAL EVERY EVENING
Qty: 30 CAPSULE | Refills: 0 | Status: SHIPPED | OUTPATIENT
Start: 2023-01-13 | End: 2023-01-17

## 2023-01-13 RX ORDER — ACETAMINOPHEN 325 MG/1
975 TABLET ORAL EVERY 8 HOURS
Qty: 100 TABLET | Refills: 1 | Status: SHIPPED | OUTPATIENT
Start: 2023-01-13 | End: 2023-02-21

## 2023-01-13 RX ORDER — OXYCODONE HYDROCHLORIDE 5 MG/1
5 TABLET ORAL EVERY 4 HOURS PRN
Qty: 5 TABLET | Refills: 0 | Status: SHIPPED | OUTPATIENT
Start: 2023-01-13 | End: 2023-02-21

## 2023-01-13 RX ORDER — POLYETHYLENE GLYCOL 3350 17 G/17G
17 POWDER, FOR SOLUTION ORAL DAILY
Qty: 30 PACKET | Refills: 0 | Status: SHIPPED | OUTPATIENT
Start: 2023-01-14 | End: 2023-02-06

## 2023-01-13 RX ORDER — PHENOL 1.4 %
10 AEROSOL, SPRAY (ML) MUCOUS MEMBRANE AT BEDTIME
Qty: 30 TABLET | Refills: 1 | Status: SHIPPED | OUTPATIENT
Start: 2023-01-13 | End: 2023-02-06

## 2023-01-13 RX ORDER — TACROLIMUS 1 MG/1
4 CAPSULE ORAL EVERY MORNING
Qty: 120 CAPSULE | Refills: 0 | Status: SHIPPED | OUTPATIENT
Start: 2023-01-13 | End: 2023-01-17

## 2023-01-13 RX ORDER — VANCOMYCIN HYDROCHLORIDE 1 G/200ML
1000 INJECTION, SOLUTION INTRAVENOUS EVERY 12 HOURS
Refills: 0
Start: 2023-01-13 | End: 2023-02-08

## 2023-01-13 RX ORDER — PREDNISONE 5 MG/1
15 TABLET ORAL EVERY EVENING
Qty: 90 TABLET | Refills: 0 | Status: SHIPPED | OUTPATIENT
Start: 2023-01-13 | End: 2023-01-13

## 2023-01-13 RX ORDER — SULFAMETHOXAZOLE AND TRIMETHOPRIM 400; 80 MG/1; MG/1
1 TABLET ORAL DAILY
Qty: 30 TABLET | Refills: 0 | Status: SHIPPED | OUTPATIENT
Start: 2023-01-14 | End: 2023-02-06

## 2023-01-13 RX ORDER — ROSUVASTATIN CALCIUM 10 MG/1
10 TABLET, COATED ORAL DAILY
Qty: 30 TABLET | Refills: 1 | Status: SHIPPED | OUTPATIENT
Start: 2023-01-14 | End: 2023-02-06

## 2023-01-13 RX ORDER — TRAZODONE HYDROCHLORIDE 100 MG/1
100 TABLET ORAL
Qty: 30 TABLET | Refills: 1 | Status: SHIPPED | OUTPATIENT
Start: 2023-01-13 | End: 2023-02-06

## 2023-01-13 RX ORDER — MYCOPHENOLATE MOFETIL 250 MG/1
1000 CAPSULE ORAL 2 TIMES DAILY
Qty: 240 CAPSULE | Refills: 1 | Status: SHIPPED | OUTPATIENT
Start: 2023-01-13 | End: 2023-02-06

## 2023-01-13 RX ORDER — MAGNESIUM SULFATE HEPTAHYDRATE 40 MG/ML
4 INJECTION, SOLUTION INTRAVENOUS ONCE
Status: COMPLETED | OUTPATIENT
Start: 2023-01-13 | End: 2023-01-13

## 2023-01-13 RX ORDER — PREDNISONE 5 MG/1
TABLET ORAL
Qty: 210 TABLET | Refills: 0 | Status: SHIPPED | OUTPATIENT
Start: 2023-01-13 | End: 2023-01-19

## 2023-01-13 RX ORDER — MAGNESIUM OXIDE 400 MG/1
400 TABLET ORAL 2 TIMES DAILY
Qty: 60 TABLET | Refills: 1 | Status: SHIPPED | OUTPATIENT
Start: 2023-01-13 | End: 2023-01-19

## 2023-01-13 RX ADMIN — NYSTATIN 1000000 UNITS: 500000 SUSPENSION ORAL at 12:34

## 2023-01-13 RX ADMIN — NYSTATIN 1000000 UNITS: 500000 SUSPENSION ORAL at 08:49

## 2023-01-13 RX ADMIN — PANTOPRAZOLE SODIUM 40 MG: 40 TABLET, DELAYED RELEASE ORAL at 09:06

## 2023-01-13 RX ADMIN — VALGANCICLOVIR HYDROCHLORIDE 900 MG: 450 TABLET ORAL at 09:05

## 2023-01-13 RX ADMIN — APIXABAN 5 MG: 5 TABLET, FILM COATED ORAL at 09:07

## 2023-01-13 RX ADMIN — MAGNESIUM SULFATE IN WATER 4 G: 40 INJECTION, SOLUTION INTRAVENOUS at 09:29

## 2023-01-13 RX ADMIN — POTASSIUM PHOSPHATE, MONOBASIC POTASSIUM PHOSPHATE, DIBASIC 9 MMOL: 224; 236 INJECTION, SOLUTION, CONCENTRATE INTRAVENOUS at 11:14

## 2023-01-13 RX ADMIN — MAGNESIUM OXIDE TAB 400 MG (241.3 MG ELEMENTAL MG) 400 MG: 400 (241.3 MG) TAB at 11:13

## 2023-01-13 RX ADMIN — SULFAMETHOXAZOLE AND TRIMETHOPRIM 1 TABLET: 400; 80 TABLET ORAL at 09:05

## 2023-01-13 RX ADMIN — CALCIUM CARBONATE 600 MG (1,500 MG)-VITAMIN D3 400 UNIT TABLET 1 TABLET: at 12:34

## 2023-01-13 RX ADMIN — ROSUVASTATIN CALCIUM 10 MG: 10 TABLET, FILM COATED ORAL at 09:06

## 2023-01-13 RX ADMIN — POLYETHYLENE GLYCOL 3350 17 G: 17 POWDER, FOR SOLUTION ORAL at 09:04

## 2023-01-13 RX ADMIN — VANCOMYCIN HYDROCHLORIDE 1000 MG: 1 INJECTION, SOLUTION INTRAVENOUS at 04:39

## 2023-01-13 RX ADMIN — MYCOPHENOLATE MOFETIL 1000 MG: 250 CAPSULE ORAL at 08:52

## 2023-01-13 RX ADMIN — SENNOSIDES AND DOCUSATE SODIUM 1 TABLET: 8.6; 5 TABLET ORAL at 09:31

## 2023-01-13 RX ADMIN — ACETAMINOPHEN 975 MG: 325 TABLET, FILM COATED ORAL at 12:34

## 2023-01-13 RX ADMIN — TACROLIMUS 4 MG: 1 CAPSULE ORAL at 08:50

## 2023-01-13 RX ADMIN — PREDNISONE 20 MG: 20 TABLET ORAL at 09:05

## 2023-01-13 RX ADMIN — AMLODIPINE BESYLATE 5 MG: 5 TABLET ORAL at 09:06

## 2023-01-13 ASSESSMENT — ACTIVITIES OF DAILY LIVING (ADL)
ADLS_ACUITY_SCORE: 42

## 2023-01-13 NOTE — PROGRESS NOTES
DISCHARGE                         1/13/2023  1:32 PM  ----------------------------------------------------------------------------  Discharged to: Home  Via: Automobile  Accompanied by: Family  Discharge Instructions: diet, activity, medications, follow up appointments, when to call the MD, aftercare instructions, and what to watchout for (i.e. s/s of infection, increasing SOB, palpitations, chest pain,)  Prescriptions: To be filled by [facility] per pt's request; medication list reviewed & sent with pt  Follow Up Appointments: arranged; information given  Belongings: All sent with pt  IV: out  Telemetry: off  Pt exhibits understanding of above discharge instructions; all questions answered.    Discharge Paperwork: Signed, copied, and sent home with patient.

## 2023-01-13 NOTE — PLAN OF CARE
D: Paco Stein is a 67 year old male with a history of chronic systolic heart failure (suspected predominantly nonischemic cardiomyopathy, possibly arrhythmogenic), coronary artery disease (s/p PCI with TILA to LCx 4/2013 and to the LAD in 10/2021), hyperlipidemia, CNS vasculitis and chronic kidney disease who presents with transient bilateral hand tightness with unrevealing workup. He underwent DDHT on 12/25/22     PRN:   Tele: SR - 80s  O2: RA  Mobility: x1 gbw     A: Neuro: A/O x4.  Call light appropriate.  Able to make needs known.  Respiratory:  On room air.  Lung sounds clear.  Denies shortness of breath at rest.  Cardiac: VSS.  BP in goal range.   GI: No BM this shift.  No report of nausea or vomiting.  : Voiding adequate clear, yellow urine using urinal.  Endo:  Blood sugars ACHS.  Skin: Sternal incision, old CT sites, R groin site w primapore on, L chest incision - WNL  LDA:  RPIV - SL    Antibiotics:  Receiving vancomycin - will go home on that  Pain: Denies  Diet: Regular - NPO since midnight for heart biopsy today.     P: Plan for discharge to [facility] today on vancomycin. Continue to monitor Pt status and report changes to CVTS.       Goal Outcome Evaluation:       Plan of Care Reviewed With: patient     Overall Patient Progress: no change

## 2023-01-13 NOTE — TELEPHONE ENCOUNTER
Attempted to schedule pt with Kim Rogers, spoke with pt's spouse. They would like a call back next week.

## 2023-01-13 NOTE — CONSULTS
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  He is now s/p OHT 12/25/22, with post-operative course c/b delirium (improved), staph epi bacteremia, and prolonged CT output (resolved).    RECOMMENDATIONS:  - ok to discharge today from our standpoint  - will follow-up tacro trough and increase to 5 mg bid if < 10  - next bx, labs, clinic on Tuesday    # s/p OHT 12/25/22 history of NICM  # Chronic immunosuppresion  # Arrhythmogenic cardiomyopathy on native heart biopsy, right sided  His post-transplant course has been c/b delirium, staph epi bacteremia, and prolonged CT output.    Rejection history: first biopsy showed diffuse capillary staining for c4d, suggestive of pAMR1.  No DSAs, no graft dysfunction. No changes were made. Second biopsy with similar finding, deferred treatment at this time given likelihood of early abnormal findings, lack of DSAs or graft dysfunction. Next biopsy due 1/17.  AlloMap scores:  n/a  DSAs:  None on POD 7  Coronary angio/Ischemic eval:  to be completed at ~week #4   RHC 1/9/23: RA 4, mPA 16, PCW 9, and CI 2.6 --> weight 185# (standing scale).  TTE 1/2/23: LVEF 60-65% and normal RV size/function.    Immunosuppression:  - received basiliximab 12/25 and 12/29  - prednisone taper per protocol, currently 20 mg in AM and 15 mg in PM  - MMF decreased to 1000mg BID given age and bacteremia   - tacrolimus goal level 10-12. Level  8.8 yesterday but no dose change, pending today. Continue 4 mg in AM 5 mg in PM for now, will increase to 5 mg bid if still < 10.     PPx:  - CAV:  Ok to hold aspirin while on Eliquis, continue rosuvastatin 10mg daily  - GI:  Pantoprazole 40mg daily  - Osteoporosis:  calcium/vitamin D supplements  - CMV:  valcyte 900mg daily (x3 months per protocol, through 3/2023)  - PCP:  Bactrim single strength daily  - Thrush:  Nystatin s/s QID  - Toxo:  N/a    Serostatus: CMV D+/R+ EBV D+/R+ Toxo D-/R-    Graft function:  - BPs: at goal continue amlodipine 5mg daily  - HRs:  80-90s, off terbutaline  - Fluid status: euvolemic, off diuretics    Other:   Discussed results of native heart biopsy. Patient's children have had echocardiograms but no genetic testing previously. Recommend further discussion in clinic re: recommendations for his children, grandchildren. Order placed for genetic counselor.     Surveillance:  - due for week #3 RHC/bx 1/17/23  - note: needs femoral access for RHC  - week #4 biopsy, RHC, angiogram due 1/24/23    # h/o right axillary nonocclusive and left peroneal occlusive DVT (11/2022, pre-transplant)  Was on apixaban prior to transplant, which has been held.  During RHC 1/9, it was noted that his RIJ had clot and LIJ is inaccessible, so he required femoral access.  - Eliquis per CVTS, hold four doses prior to biopsies    # Staph epi bacteremia  Blood cx from 1/7 and 1/8 with staph epi. Started on IV vanco 1/8 per ID.  - Transplant ID following  - daily blood cx through 1/12 (negative since 1/8), via PICC  - IV vanco for four week course minimum   - needs weekly CRP  - follow-up with ID as outpatient    Symone Guerra DNP, NP-C  Advanced Heart Failure/Cardiology 2 Nurse Practitioner  Pager     =============================================================    Interval History/ROS:   No overnight events. No new concerns. PICC teaching went well. Patient and wife comfortable with discharge plans.  "Reminded to call coordinator with any questions between discharge and Tuesday.     Last 24 hr care team notes reviewed.   ROS:  4 point ROS including Respiratory, CV, GI and , other than that noted in the HPI, is negative.     Medications: Reviewed in EPIC.     Physical Exam:   /83 (BP Location: Right arm)   Pulse 82   Temp 98.1  F (36.7  C) (Oral)   Resp 16   Ht 1.88 m (6' 2\")   Wt 81.7 kg (180 lb 3.2 oz)   SpO2 96%   BMI 23.14 kg/m       GENERAL: Appears comfortable, in NAD .   HEENT: Eye symmetrical and free of discharge bilaterally. Mucous membranes moist and without lesions. No thrush noted.  NECK: Supple, JVD not visible at 90 degrees.  CV: RRR, S1S2 present without murmur, rub, or gallop.   RESPIRATORY: Respirations regular, even, and unlabored. Lungs CTA throughout.   GI: Soft and non distended with normoactive bowel sounds present in all quadrants. No tenderness, rebound, guarding.  EXTREMITIES: No LE edema. 2+ bilateral pedal pulses.   NEUROLOGIC: Alert and orientated x 3. No focal deficits.   MUSCULOSKELETAL: No joint swelling or tenderness.   SKIN: No jaundice. No rashes or lesions. Surgical incisions pink, edges well-approximated, no redness nor tenderness.    Data:  CMP  Recent Labs   Lab 01/13/23  0758 01/13/23  0731 01/13/23  0246 01/12/23  2147 01/12/23  1920 01/12/23  1133 01/12/23  0555 01/11/23  1207 01/11/23  0822 01/10/23  0658 01/10/23  0625 01/07/23  0746 01/07/23  0728   *  --   --   --   --   --  136  --  134*  --  134*   < > 133*   POTASSIUM 3.9  --   --   --   --   --  4.4  --  4.7  --  5.1   < > 4.9   CHLORIDE 102  --   --   --   --   --  104  --  103  --  102   < > 103   CO2 21*  --   --   --   --   --  21*  --  20*  --  21*   < > 18*   ANIONGAP 12  --   --   --   --   --  11  --  11  --  11   < > 12   * 127* 148* 203*  --    < > 104*   < > 108*   < > 171*   < > 110*   BUN 22.2  --   --   --   --   --  24.0*  --  24.7*  --  26.4*   < > 23.1*   CR 0.75  --   " --   --   --   --  0.70  --  0.70  --  0.81   < > 0.65*   GFRESTIMATED >90  --   --   --   --   --  >90  --  >90  --  >90   < > >90   TIM 8.9  --   --   --   --   --  8.8  --  8.4*  --  8.7*   < > 8.4*   MAG 1.4*  --   --   --  1.9  --  1.3*  --  1.5*  --  1.6*   < > 1.6*   PHOS 2.6  --   --   --   --   --  3.0  --  3.0  --  2.6   < > 3.4   PROTTOTAL  --   --   --   --   --   --   --   --   --   --   --   --  4.9*   ALBUMIN  --   --   --   --   --   --   --   --   --   --   --   --  2.7*   BILITOTAL  --   --   --   --   --   --   --   --   --   --   --   --  0.4   ALKPHOS  --   --   --   --   --   --   --   --   --   --   --   --  80   AST  --   --   --   --   --   --   --   --   --   --   --   --  27   ALT  --   --   --   --   --   --   --   --   --   --   --   --  39    < > = values in this interval not displayed.     CBC  Recent Labs   Lab 01/13/23  0758 01/12/23  0555 01/11/23  0822 01/10/23  0625   WBC 9.7 9.4 9.7 12.2*   RBC 3.94* 3.51* 3.51* 3.39*   HGB 11.3* 10.2* 10.2* 10.0*   HCT 36.6* 32.7* 32.3* 31.6*   MCV 93 93 92 93   MCH 28.7 29.1 29.1 29.5   MCHC 30.9* 31.2* 31.6 31.6   RDW 15.9* 15.9* 16.0* 16.2*    281 252 280       Patient discussed with Dr. Mcneill.

## 2023-01-13 NOTE — PLAN OF CARE
Occupational Therapy Discharge Summary    Reason for therapy discharge:    Discharged to [facility]    Progress towards therapy goal(s). See goals on Care Plan in [facility] electronic health record for goal details.  Goals partially met.  Barriers to achieving goals:   discharge from facility.    Therapy recommendation(s):    Continued therapy is recommended.  Rationale/Recommendations:  Pt would benefit from continued OP CR and assist at [facility] from wife with I/ADL completion as pt continues to recover & build cardiopulmonary health & endurance.

## 2023-01-13 NOTE — DISCHARGE SUMMARY
[facility], [place]   [facility] Discharge Summary     Paco Stein MRN# [id number]   Age: 67 year old YOB: 1955     Admitting Physician:  Maricruz Mercer MD  Discharge Physician:  Reyna Hernandez PA-C  Primary Care Physician:        Quentin Odonnell     DATE OF ADMISSION: 12/6/2022      DATE OF DISCHARGE: January 13, 2023     Admit Wt: 200 lbs  Discharge Wt: 180 lbs          Primary Diagnoses:     End stage heart Failure, s/p heart transplant 12/25/2022    Cardiomyopathy     CHF    HTN    Staphylococcus epidermidis bacteremia    Nonocclusive thrombus in Internal Jugular Veins (R>L), noted 01/09/23          Secondary Diagnoses:     Acute postoperative pain, improving    Stress and steroid induced hyperglycemia, resolved    Stress and steroid induced leukocytosis, resolved    Acute blood loss anemia, stable    Acute blood loss thrombocytopenia, resolved    Acute respiratory failure with hypoxia    H/o Stroke    H/o CAD    H/o NSVT    H/o SOB    HLD    H/o syncope    Vasculitis    Acute embolism and thrombosis of left peroneal vein, 11/2022    PROCEDURES PERFORMED:   Date: 12/25/2022.  Surgeon: Dr. Dr. Brendan Rodriguez    Orthotopic heart transplantation, removal of AICD generator and leads, intraoperative JARED.    INTRAOPERATIVE FINDINGS:    none    PATHOLOGY RESULTS:      Medical device, implantable cardioverter defibrillator, removal:  - Intact cardioverter defibrillator generator and leads (gross examination)  - Fibroconnective tissue (with chronic inflammation, foamy macrophages, and giant cell reaction to foreign material) attached to leads       Heart, native, explant:  - Cardiomegaly (weight: 531 g)  - Partial fibrofatty replacement of right ventricle myocardium, suggestive of arrhythmogenic cardiomyopathy  - Left ventricle and and right ventricle myocyte hypertrophy and interstitial replacement fibrosis  - Interventricular septum myocyte  hypertrophy  - Coronary artery atherosclerosis              - Left anterior descending artery stent                          - 50% occluded distal to stent              - Circumflex artery stent                          - 40% occluded proximal to stent              - Right coronary artery 40% occluded  - Aortic valve cusps with focal fibrosis and calcifications  - Pulmonary valve cusps with myxoid change and pinpoint defects      CULTURE RESULTS:    Heart Biopsy 01/02:  HEART, ALLOGRAFT, ENDOMYOCARDIAL BIOPSY:  - ISHLT 2004 cellular grade: 1R (formerly 1A)                - Mild focal acute cellular rejection  - Immunofluorescence (diffuse capillary staining for C4d) suggestive of antibody  mediated rejection (pAMR1(I+))  - No detectable staining for C3d  - No histological features diagnostic of antibody-mediated rejection     Heart Biopsy 01/09:  Heart allograft, biopsy:    Negative for acute cellular rejection    Diffusely positive for capillary deposition of C4d    Negative for capillary deposition of C3d      CONSULTS:      PT/OT    RT    Intensivist    Cardiology    Transplant Infectious Disease    Nephrology        Psychiatry    Social Work    BRIEF HISTORY OF ILLNESS:  Paco Stein is a 67 year old male with a history of chronic systolic heart failure (suspected predominantly nonischemic cardiomyopathy, possibly arrhythmogenic), coronary artery disease (s/p PCI with TILA to LCx 4/2013 and to the LAD in 10/2021), hyperlipidemia, CNS vasculitis and chronic kidney disease who presents with transient bilateral hand tightness with unrevealing workup, now admitted for continued advanced therapies evaluation. Patient was listed for heart transplant (status 2) on 12/12/2022.    HOSPITAL COURSE: Paco Stein is a 67 year old male who on 12/25/2022 underwent the above-named procedures and tolerated the operation well.     Postoperatively was admitted to the CVICU.  Patient was extubated within protocol on  POD #2.  Blood pressure and cardiac index were managed with vasopressors and inotropic agents which were continuously weaned until no longer needed.  Patient was subsequently  transferred to the surgical telemetry floor.    While on the surgical unit, the patient continued to progress well. Chest tubes and temporary pacemaker wires were removed when deemed appropriate.    Patient was found to have a partial DVT in the left peroneal vein which was present on 11/2022 and when he originally was started on Eliquis. Also, on 01/09 patient was found to have nonocclussive thrombus in the right greater than left internal jugular veins. After these findings ASA was held by Cardiology and with discussion of Surgeon patient started on Eliquis 5mg BID.     Patient developed rising WBC and positive blood cultures on 01/07/2023 so Transplant Infectious Disease was consulted for help with management. Patient was started on IV antibiotics due to cultures growing Staphylococcus epidermidis, started on Vancomycin. They are assuming Staphylococcus has infected his vascular clots, so will need a longer duration of therapy. Currently scheduled for 4 weeks of of antibiotic treatment after first negative blood culture (01/09/2023), so antibiotics until 02/06/2023 for now. Will follow up with Dr. Tiera Anguiano 02/07/2023 for follow up. Weekly CRPs ordered as well to track and trend.      Patient was fluid overloaded and treated with diuretics. He is about 20lbs below his admission weight and 12lbs below his pre-operative weight. He will not discharge with any diuresis, and this will be managed long-term by the cardiology team during their in clinic follow-up.      Patient had transiently hyperglycemic caused by stress and steroids and he was treated with insulin infusion then transitioned to sliding scale insulin per protocol. Blood sugars have stayed between 100 and 160s the last 72 hrs and only needed max of 2 units of insulin 72hrs as  "well, so at discharge will not be sent with further glycemic control agents.     Prior to discharge, his pain was controlled well, he was working well with therapies, able to perform most ADLs, ambulate without difficulty, and had full return of bowel and bladder function.  On January 13, 2023, he was discharged to [facility] in stable condition with his wife. Follow up with cardiology and cardiac surgery have been arranged. Pt encouraged to follow up with PCP and cardiac rehab upon discharge.    Patient discharged on aspirin:  No, was placed on Eliquis 5mg BID d/t nonocclusive thrombus in IJV (R>L)  Patient discharged on beta blocker: no    Patient discharged on ACE Inhibitor/ARB: no      Patient discharged on statin: yes, Crestor  Patient discharged on CCB: Norvasc 5mg daily  Patient discharged on Vanco IV: 1000mg BID via PICC, managed by ID. Med scheduled until 02/06/2023 and ID follow up to be scheduled for 02/07/2023  Patient discharged on Immunosuppression/PPx for heart transplant: Prednisone taper, Calcium/VitD supplements, Valcyte, Bactrim, Nystatin, MMF, Tacrolimus         Discharge Disposition:     Discharged to [facility]            Condition on Discharge:     Discharge condition: Stable   Discharge vitals: Blood pressure 129/74, pulse 100, temperature 97.6  F (36.4  C), temperature source Oral, resp. rate 18, height 1.88 m (6' 2\"), weight 81.7 kg (180 lb 3.2 oz), SpO2 97 %.     Code status on discharge: Full Code     Vitals:    01/09/23 0813 01/12/23 0600 01/13/23 0600   Weight: 84 kg (185 lb 3.2 oz) 82.8 kg (182 lb 9.6 oz) 81.7 kg (180 lb 3.2 oz)       DAY OF DISCHARGE PHYSICAL EXAM:    Gen: A&Ox4, NAD  Neuro: no focal deficits   CV: RRR, normal S1 S2, no murmurs, rubs or gallops. No JVD  Pulm: CTA, no wheezing or rhonchi, normal breathing on RA  Abd: nondistended, normal BS, soft, nontender  Ext: no peripheral edema, no pitting  Incision: clean, dry, intact, no erythema, sternum " stable  Tubes/drain sites: dressing clean and dry    LABS  Most Recent 3 CBC's:  Recent Labs   Lab Test 01/13/23  0758 01/12/23  0555 01/11/23  0822   WBC 9.7 9.4 9.7   HGB 11.3* 10.2* 10.2*   MCV 93 93 92    281 252      Most Recent 3 BMP's:  Recent Labs   Lab Test 01/13/23  0758 01/13/23  0731 01/13/23  0246 01/12/23  1133 01/12/23  0555 01/11/23  1207 01/11/23  0822   *  --   --   --  136  --  134*   POTASSIUM 3.9  --   --   --  4.4  --  4.7   CHLORIDE 102  --   --   --  104  --  103   CO2 21*  --   --   --  21*  --  20*   BUN 22.2  --   --   --  24.0*  --  24.7*   CR 0.75  --   --   --  0.70  --  0.70   ANIONGAP 12  --   --   --  11  --  11   TIM 8.9  --   --   --  8.8  --  8.4*   * 127* 148*   < > 104*   < > 108*    < > = values in this interval not displayed.     Most Recent 2 LFT's:  Recent Labs   Lab Test 01/07/23  0728 01/06/23  0442   AST 27 25   ALT 39 47   ALKPHOS 80 71   BILITOTAL 0.4 0.4     Most Recent INR's and Anticoagulation Dosing History:  Anticoagulation Dose History     Recent Dosing and Labs Latest Ref Rng & Units 11/20/2022 11/21/2022 11/28/2022 12/25/2022 12/25/2022 12/25/2022 12/29/2022    INR 0.85 - 1.15 1.12 1.30(H) 1.16(H) 1.04 1.55(H) 1.27(H) 1.15        Most Recent 3 Troponin's:No lab results found.  Most Recent Cholesterol Panel:  Recent Labs   Lab Test 11/12/22  0458   CHOL 116   LDL 53   HDL 48   TRIG 75     Most Recent 6 Bacteria Isolates From Any Culture (See EPIC Reports for Culture Details):No lab results found.  Most Recent TSH, T4 and A1c Labs:  Recent Labs   Lab Test 12/25/22  0604 11/12/22  0458   TSH  --  5.18*   A1C 6.2* 6.1*      Recent Labs   Lab 01/13/23  0758 01/13/23  0731 01/13/23  0246 01/12/23  2147 01/12/23  1723 01/12/23  1257   * 127* 148* 203* 136* 169*       Imaging:  Most Recent 3 CBC's:  Recent Labs   Lab Test 01/13/23  0758 01/12/23  0555 01/11/23  0822   WBC 9.7 9.4 9.7   HGB 11.3* 10.2* 10.2*   MCV 93 93 92    281 252       Most Recent 3 BMP's:  Recent Labs   Lab Test 01/13/23  0758 01/13/23  0731 01/13/23  0246 01/12/23  1133 01/12/23  0555 01/11/23  1207 01/11/23  0822   *  --   --   --  136  --  134*   POTASSIUM 3.9  --   --   --  4.4  --  4.7   CHLORIDE 102  --   --   --  104  --  103   CO2 21*  --   --   --  21*  --  20*   BUN 22.2  --   --   --  24.0*  --  24.7*   CR 0.75  --   --   --  0.70  --  0.70   ANIONGAP 12  --   --   --  11  --  11   TIM 8.9  --   --   --  8.8  --  8.4*   * 127* 148*   < > 104*   < > 108*    < > = values in this interval not displayed.     Most Recent 2 LFT's:  Recent Labs   Lab Test 01/07/23  0728 01/06/23 0442   AST 27 25   ALT 39 47   ALKPHOS 80 71   BILITOTAL 0.4 0.4     Most Recent INR's and Anticoagulation Dosing History:  Anticoagulation Dose History     Recent Dosing and Labs Latest Ref Rng & Units 11/20/2022 11/21/2022 11/28/2022 12/25/2022 12/25/2022 12/25/2022 12/29/2022    INR 0.85 - 1.15 1.12 1.30(H) 1.16(H) 1.04 1.55(H) 1.27(H) 1.15        Most Recent 3 Troponin's:No lab results found.  Most Recent Cholesterol Panel:  Recent Labs   Lab Test 11/12/22  0458   CHOL 116   LDL 53   HDL 48   TRIG 75     Most Recent 6 Bacteria Isolates From Any Culture (See EPIC Reports for Culture Details):No lab results found.  Most Recent TSH, T4 and A1c Labs:  Recent Labs   Lab Test 12/25/22  0604 11/12/22  0458   TSH  --  5.18*   A1C 6.2* 6.1*          PRE-ADMISSION MEDICATIONS:  Medications Prior to Admission   Medication Sig Dispense Refill Last Dose     albuterol (PROAIR HFA/PROVENTIL HFA/VENTOLIN HFA) 108 (90 Base) MCG/ACT inhaler Inhale 2 puffs into the lungs every 6 hours as needed   Past Month     [DISCONTINUED] amiodarone (PACERONE) 200 MG tablet Take 200 mg by mouth 2 times daily   Past Week     [DISCONTINUED] apixaban ANTICOAGULANT (ELIQUIS) 5 MG tablet Take 2 tablets (10 mg) by mouth 2 times daily for 6 days, THEN 1 tablet (5 mg) 2 times daily for 24 days. Take 2 tablets (total of  10mg) twice daily up through Saturday 11/26. Starting Sunday 11/27, start taking 1 tablet (5mg) twice daily from then on. 72 tablet 0 Past Week     [DISCONTINUED] aspirin (ASA) 81 MG EC tablet Take 81 mg by mouth daily   Past Week     [DISCONTINUED] DOBUTamine 500 mg in dextrose 5% 250 mL (adult std conc) premix Inject 236.75 mcg/min into the vein continuous 500 mL 6 Unknown     [DISCONTINUED] furosemide (LASIX) 20 MG tablet Take 20 mg by mouth 2 times daily   Past Week     [DISCONTINUED] LORazepam (ATIVAN) 0.25 mg TABS half-tab Take 0.25 mg by mouth 3 times daily as needed   Past Week     [DISCONTINUED] mycophenolate (GENERIC EQUIVALENT) 500 MG tablet Take 500 mg by mouth every evening   Past Week     [DISCONTINUED] mycophenolate (GENERIC EQUIVALENT) 500 MG tablet Take 1,000 mg by mouth every morning   Past Week     [DISCONTINUED] potassium chloride ER (KLOR-CON M) 20 MEQ CR tablet Take 1 tablet (20 mEq) by mouth daily 90 tablet 3 Past Week     [DISCONTINUED] pravastatin (PRAVACHOL) 20 MG tablet TAKE 1 TABLET BY MOUTH DAILY/ DUE FOR FASTING LABWORK   Past Week     [DISCONTINUED] traZODone (DESYREL) 50 MG tablet TAKE 1 TABLET (50 MG TOTAL) BY MOUTH ONCE NIGHTLY AS NEEDED FOR SLEEP.   Past Week       DISCHARGE MEDICATIONS:   Current Discharge Medication List      START taking these medications    Details   acetaminophen (TYLENOL) 325 MG tablet Take 3 tablets (975 mg) by mouth every 8 hours  Qty: 100 tablet, Refills: 1    Associated Diagnoses: Heart replaced by transplant (H)      amLODIPine (NORVASC) 5 MG tablet Take 1 tablet (5 mg) by mouth daily  Qty: 30 tablet, Refills: 1    Associated Diagnoses: Heart replaced by transplant (H)      calcium carbonate-vitamin D (CALTRATE) 600-10 MG-MCG per tablet Take 1 tablet by mouth 2 times daily (with meals)  Qty: 60 tablet, Refills: 1    Associated Diagnoses: Heart replaced by transplant (H)      magnesium oxide (MAG-OX) 400 MG tablet Take 1 tablet (400 mg) by mouth 2 times  daily  Qty: 60 tablet, Refills: 1    Associated Diagnoses: Heart replaced by transplant (H)      melatonin 10 MG TABS tablet Take 1 tablet (10 mg) by mouth At Bedtime  Qty: 30 tablet, Refills: 1    Associated Diagnoses: Heart replaced by transplant (H)      methocarbamol (ROBAXIN) 500 MG tablet Take 1 tablet (500 mg) by mouth every 6 hours as needed for muscle spasms  Qty: 30 tablet, Refills: 0    Associated Diagnoses: Heart replaced by transplant (H)      mycophenolate (GENERIC EQUIVALENT) 250 MG capsule Take 4 capsules (1,000 mg) by mouth 2 times daily for 60 days  Qty: 240 capsule, Refills: 1    Associated Diagnoses: Heart replaced by transplant (H)      nystatin (MYCOSTATIN) 718020 UNIT/ML suspension Swish and swallow 10 mLs (1,000,000 Units) in mouth 4 times daily for 60 days  Qty: 1200 mL, Refills: 1    Associated Diagnoses: Heart replaced by transplant (H)      oxyCODONE (ROXICODONE) 5 MG tablet Take 1 tablet (5 mg) by mouth every 4 hours as needed for severe pain (7-10) or moderate pain (4-6)  Qty: 5 tablet, Refills: 0    Associated Diagnoses: Heart replaced by transplant (H)      pantoprazole (PROTONIX) 40 MG EC tablet Take 1 tablet (40 mg) by mouth every morning (before breakfast)  Qty: 30 tablet, Refills: 0    Associated Diagnoses: Heart replaced by transplant (H)      polyethylene glycol (MIRALAX) 17 g packet Take 17 g by mouth daily  Qty: 30 packet, Refills: 0    Associated Diagnoses: Heart replaced by transplant (H)      predniSONE (DELTASONE) 5 MG tablet You will take 20mg (4 tablets) every morning and 15mg (3 tablets) every evening. This will be tapered by your cardiologist during follow up visits.  Qty: 210 tablet, Refills: 0    Associated Diagnoses: Heart replaced by transplant (H)      rosuvastatin (CRESTOR) 10 MG tablet Take 1 tablet (10 mg) by mouth daily  Qty: 30 tablet, Refills: 1    Associated Diagnoses: Heart replaced by transplant (H)      senna-docusate (SENOKOT-S/PERICOLACE) 8.6-50 MG  tablet Take 1 tablet by mouth 2 times daily  Qty: 60 tablet, Refills: 1    Associated Diagnoses: Heart replaced by transplant (H)      sulfamethoxazole-trimethoprim (BACTRIM) 400-80 MG tablet Take 1 tablet by mouth daily  Qty: 30 tablet, Refills: 0    Associated Diagnoses: Heart replaced by transplant (H)      tacrolimus (GENERIC EQUIVALENT) 1 MG capsule Take 4 capsules (4 mg) by mouth every morning for 30 days  Qty: 120 capsule, Refills: 0    Associated Diagnoses: Heart replaced by transplant (H)      tacrolimus (GENERIC EQUIVALENT) 5 MG capsule Take 1 capsule (5 mg) by mouth every evening for 30 days  Qty: 30 capsule, Refills: 0    Associated Diagnoses: Heart replaced by transplant (H)      valGANciclovir (VALCYTE) 450 MG tablet Take 2 tablets (900 mg) by mouth daily for 30 days  Qty: 60 tablet, Refills: 0    Associated Diagnoses: Heart replaced by transplant (H)      vancomycin (VANCOCIN) 1000 mg in dextrose 5% 200 mL PREMIX Inject 200 mLs (1,000 mg) into the vein every 12 hours  Refills: 0    Associated Diagnoses: Heart replaced by transplant (H)         CONTINUE these medications which have CHANGED    Details   apixaban ANTICOAGULANT (ELIQUIS) 5 MG tablet Take 1 tablet (5 mg) by mouth 2 times daily  Qty: 60 tablet, Refills: 1    Associated Diagnoses: Heart replaced by transplant (H)      traZODone (DESYREL) 100 MG tablet Take 1 tablet (100 mg) by mouth nightly as needed for sleep  Qty: 30 tablet, Refills: 1    Associated Diagnoses: Heart replaced by transplant (H)         CONTINUE these medications which have NOT CHANGED    Details   albuterol (PROAIR HFA/PROVENTIL HFA/VENTOLIN HFA) 108 (90 Base) MCG/ACT inhaler Inhale 2 puffs into the lungs every 6 hours as needed         STOP taking these medications       amiodarone (PACERONE) 200 MG tablet Comments:   Reason for Stopping:         aspirin (ASA) 81 MG EC tablet Comments:   Reason for Stopping:         DOBUTamine 500 mg in dextrose 5% 250 mL (adult std conc)  premix Comments:   Reason for Stopping:         furosemide (LASIX) 20 MG tablet Comments:   Reason for Stopping:         LORazepam (ATIVAN) 0.25 mg TABS half-tab Comments:   Reason for Stopping:         mycophenolate (GENERIC EQUIVALENT) 500 MG tablet Comments:   Reason for Stopping:         mycophenolate (GENERIC EQUIVALENT) 500 MG tablet Comments:   Reason for Stopping:         potassium chloride ER (KLOR-CON M) 20 MEQ CR tablet Comments:   Reason for Stopping:         pravastatin (PRAVACHOL) 20 MG tablet Comments:   Reason for Stopping:                CC:Quentin Dubois      [facility]   Cardiothoracic Surgery  Office phone: [phone]  Office fax: [phone]

## 2023-01-13 NOTE — TELEPHONE ENCOUNTER
Paco is a recent heart transplant patient who discharged on 01/13/2023.     The spouse (Arlette) will be responsible for managing medications initially, then it will transition to Vince eventually.     Patient was given transplant supplies including 7 day pill organizer, thermometer, and BP monitor at the discharge pharmacy along with medications.      Patient chooses to receive medications from  specialty pharmacy.    **CAN PATIENT FILL AT [place] [facility] FOR MEDICATIONS LISTED? YES     HEALTH BENEFIT: MEDICARE   ID# [id number] (PART A EFFECTIVE (DATE: 03/01/2018) , PART B EFFECTIVE (DATE: 3/1/2018) )   PROCESSING INFO: ID# [id number] GRP# OTHER BIN# [id number]  SUPPLEMENTAL HEALTH BENEFIT: (BCBS)  ID# [id number] GRP# [id number] (EFFECTIVE (DATE: 1/1/2019) )  PROCESSING INFO: (BX SECONDARY) ID# GRP# BIN#[id number]  PT WILL PAY $0 AT TIME OF SERVICE       PHARMACY BENEFIT: ([employer]) PART D  PROCESSING INFO: ID# [id number] [facility]# [id number] PCN# [id number] BIN#[id number] (EFFECTIVE (DATE: 1/1/2019) )   DEDUCTIBLE (480)    COPAY STRUCTURE:  INITIAL PHASE:  $ (8) FOR TIER 1  $ (15) FOR TIER 2  $ (47) FOR TIER 3  % (50) COINSURANCE FOR TIER 4  % (25) COINSURANCE FOR TIER 5 MEDICATIONS UNTIL TOTAL YEAR DRUG COSTS REACH $4430  COVERAGE GAP (DONUT HOLE):   %25 COINSURANCE FOR GENERIC  %25 COINSURANCE FOR BRAND UNTIL TOTAL YEARLY PATIENT OUT-OF-POCKET COSTS REACH $7050  CATASTROPHIC PHASE:  THE GREATER OF EITHER %5 COST OF MEDICATION OR  $3.95 FOR GENERIC  $9.85 FOR BRAND     TEST CLAIM SPECIALTY #28   MYCOPHENOLATE 250mg (#240/30DS)=$0 AT TIME OF SERVICE  PROGRAF 1mg (#180/30DS)=$0 AT TIME OF SERVICE  TACROLIMUS 1mg (#60/30DS)=$0 AT TIME OF SERVICE  CYCLOSPORINE 100mg (#60/30DS)=$0 AT TIME OF SERVICE  VALGANCICLOVIR 450mg (#60/30DS)=$101.08  VALACYCLOVIR 1gm (#90=30DS) =$15.70      Teddy Colon, Pharm.D.  [facility]  [phone]

## 2023-01-14 ENCOUNTER — TELEPHONE (OUTPATIENT)
Dept: TRANSPLANT | Facility: CLINIC | Age: 68
End: 2023-01-14
Payer: MEDICARE

## 2023-01-14 LAB
BACTERIA BLD CULT: NO GROWTH
BACTERIA BLD CULT: NO GROWTH

## 2023-01-15 LAB — BACTERIA BLD CULT: NO GROWTH

## 2023-01-16 ENCOUNTER — TELEPHONE (OUTPATIENT)
Dept: CARDIOLOGY | Facility: CLINIC | Age: 68
End: 2023-01-16

## 2023-01-16 ENCOUNTER — TELEPHONE (OUTPATIENT)
Dept: TRANSPLANT | Facility: CLINIC | Age: 68
End: 2023-01-16

## 2023-01-16 LAB — BACTERIA BLD CULT: NO GROWTH

## 2023-01-16 ASSESSMENT — ENCOUNTER SYMPTOMS
TINGLING: 1
NERVOUS/ANXIOUS: 0
SHORTNESS OF BREATH: 0
WEIGHT LOSS: 1
COUGH DISTURBING SLEEP: 0
TREMORS: 1
FATIGUE: 1
BACK PAIN: 1
SORE THROAT: 0
HEADACHES: 0
HOARSE VOICE: 0
SLEEP DISTURBANCES DUE TO BREATHING: 0
SHORTNESS OF BREATH: 0
NECK PAIN: 0
SWOLLEN GLANDS: 0
ARTHRALGIAS: 0
MEMORY LOSS: 1
HEMOPTYSIS: 0
SINUS PAIN: 0
POLYPHAGIA: 0
SMELL DISTURBANCE: 0
EXERCISE INTOLERANCE: 1
ALTERED TEMPERATURE REGULATION: 0
WHEEZING: 0
BRUISES/BLEEDS EASILY: 0
CHILLS: 0
DISTURBANCES IN COORDINATION: 1
PARALYSIS: 0
MUSCLE CRAMPS: 0
DECREASED APPETITE: 0
SPUTUM PRODUCTION: 0
POLYDIPSIA: 0
PALPITATIONS: 0
BRUISES/BLEEDS EASILY: 0
DISTURBANCES IN COORDINATION: 1
PARALYSIS: 0
DYSPNEA ON EXERTION: 1
HYPERTENSION: 0
NUMBNESS: 0
TASTE DISTURBANCE: 0
ARTHRALGIAS: 0
PANIC: 0
SYNCOPE: 0
NECK PAIN: 0
SINUS CONGESTION: 0
POLYDIPSIA: 0
NUMBNESS: 0
POSTURAL DYSPNEA: 0
WHEEZING: 0
TROUBLE SWALLOWING: 0
TASTE DISTURBANCE: 0
TINGLING: 1
NECK MASS: 0
HEMOPTYSIS: 0
TREMORS: 1
SNORES LOUDLY: 0
NIGHT SWEATS: 0
TROUBLE SWALLOWING: 0
SNORES LOUDLY: 0
COUGH: 0
JOINT SWELLING: 0
HYPERTENSION: 0
HOARSE VOICE: 0
SORE THROAT: 0
MUSCLE CRAMPS: 0
LEG PAIN: 0
FEVER: 0
COUGH: 0
ORTHOPNEA: 0
HYPOTENSION: 0
MYALGIAS: 0
INCREASED ENERGY: 1
DEPRESSION: 0
LIGHT-HEADEDNESS: 1
LOSS OF CONSCIOUSNESS: 0
DECREASED CONCENTRATION: 0
SLEEP DISTURBANCES DUE TO BREATHING: 0
PALPITATIONS: 0
LIGHT-HEADEDNESS: 1
SMELL DISTURBANCE: 0
MEMORY LOSS: 1
COUGH DISTURBING SLEEP: 0
WEAKNESS: 1
MUSCLE WEAKNESS: 1
DIZZINESS: 1
CHILLS: 0
FATIGUE: 1
DECREASED APPETITE: 0
POLYPHAGIA: 0
SINUS CONGESTION: 0
BACK PAIN: 1
MYALGIAS: 0
SEIZURES: 0
NIGHT SWEATS: 0
INSOMNIA: 1
NERVOUS/ANXIOUS: 0
SEIZURES: 0
STIFFNESS: 0
SPEECH CHANGE: 0
FEVER: 0
JOINT SWELLING: 0
SPEECH CHANGE: 0
HYPOTENSION: 0
DECREASED CONCENTRATION: 0
HEADACHES: 0
EXERCISE INTOLERANCE: 1
NECK MASS: 0
INSOMNIA: 1
HALLUCINATIONS: 0
LEG PAIN: 0
INCREASED ENERGY: 1
ORTHOPNEA: 0
LOSS OF CONSCIOUSNESS: 0
MUSCLE WEAKNESS: 1
HALLUCINATIONS: 0
DEPRESSION: 0
WEAKNESS: 1
STIFFNESS: 0
ALTERED TEMPERATURE REGULATION: 0
PANIC: 0
WEIGHT LOSS: 1
SWOLLEN GLANDS: 0
SPUTUM PRODUCTION: 0
POSTURAL DYSPNEA: 0
DYSPNEA ON EXERTION: 1
SYNCOPE: 0
SINUS PAIN: 0
DIZZINESS: 1

## 2023-01-16 NOTE — PROGRESS NOTES
Speech Language Therapy Discharge Summary    Reason for therapy discharge:    Discharged to [facility]    Progress towards therapy goal(s). See goals on Care Plan in [facility] electronic health record for goal details.  Goals partially met.  Barriers to achieving goals:   discharge from facility.    Therapy recommendation(s):    Continued therapy is recommended.  Rationale/Recommendations:  Recommend regular diet/thin liquids with use of chin tuck for every swallow. Pt should be fully upright and alert for all PO, take small sips/bites, slow pacing, and alternate between consistencies. Caregivers to encourage independent completion of oropharyngeal exercises. Recommend repeat VFSS on an outpatient basis (to determine safety of discontinuing chin tuck strategy)..

## 2023-01-16 NOTE — PLAN OF CARE
Physical Therapy Discharge Summary    Reason for therapy discharge:    Discharged to home with outpatient therapy.     Progress towards therapy goal(s). See goals on Care Plan in [facility] electronic health record for goal details.  Goals partially met.  Barriers to achieving goals:   discharge from facility.    Therapy recommendation(s):    Continued therapy is recommended.  Rationale/Recommendations:  OP CR to maximize recovery/progress.

## 2023-01-16 NOTE — TELEPHONE ENCOUNTER
Wife called to review appts for 1/17/2023. Wght and BP stable since discharge     ~Confirmed Eliquis was being held as directed.  ~Reviewed timing of [place].  ~NPO after 6:30 AM   ~Enc to reach out with further questions.  ~Bring med card and VS book to clinic visit.     Discussed calls from this weekend. Reassurance offered.     NOTE: Lab draw changed to 2nd floor for PICC draw. Wife updated.

## 2023-01-16 NOTE — TELEPHONE ENCOUNTER
Wife called very frustrated. She was trying to administer an IV vanco dose through patient's PICC line and she was having difficulty. She attempted to get a hold of a pharmacy number she had been given on discharge and no one was responding. Then she attempted to call the on call coordinator for transplant and the operated made her feel like she didn't know what she was doing. The wife stated she felt all alone and very frustrated. By the time the writer was on the phone with her, a pharmacist had called her back and walked her through the steps and the vancomycin was complete. Writer reviewed the correct way to get a hold of transplant coordinator on call and  reassured her that she was doing it correctly. Writer apologized for the frustration she experienced tonight and will escalate to upper management for follow up.

## 2023-01-17 ENCOUNTER — TELEPHONE (OUTPATIENT)
Dept: TRANSPLANT | Facility: CLINIC | Age: 68
End: 2023-01-17

## 2023-01-17 ENCOUNTER — APPOINTMENT (OUTPATIENT)
Dept: MEDSURG UNIT | Facility: CLINIC | Age: 68
End: 2023-01-17
Attending: INTERNAL MEDICINE
Payer: MEDICARE

## 2023-01-17 ENCOUNTER — APPOINTMENT (OUTPATIENT)
Dept: LAB | Facility: CLINIC | Age: 68
End: 2023-01-17
Attending: NEUROLOGICAL SURGERY
Payer: MEDICARE

## 2023-01-17 ENCOUNTER — OFFICE VISIT (OUTPATIENT)
Dept: CARDIOLOGY | Facility: CLINIC | Age: 68
End: 2023-01-17
Attending: INTERNAL MEDICINE
Payer: MEDICARE

## 2023-01-17 ENCOUNTER — HOSPITAL ENCOUNTER (OUTPATIENT)
Facility: CLINIC | Age: 68
Discharge: HOME | End: 2023-01-17
Attending: INTERNAL MEDICINE | Admitting: INTERNAL MEDICINE
Payer: MEDICARE

## 2023-01-17 VITALS
HEART RATE: 98 BPM | WEIGHT: 181 LBS | DIASTOLIC BLOOD PRESSURE: 71 MMHG | SYSTOLIC BLOOD PRESSURE: 112 MMHG | HEIGHT: 74 IN | BODY MASS INDEX: 23.23 KG/M2 | OXYGEN SATURATION: 96 %

## 2023-01-17 VITALS
SYSTOLIC BLOOD PRESSURE: 121 MMHG | HEART RATE: 83 BPM | TEMPERATURE: 98 F | DIASTOLIC BLOOD PRESSURE: 83 MMHG | OXYGEN SATURATION: 97 % | RESPIRATION RATE: 18 BRPM

## 2023-01-17 DIAGNOSIS — Z94.1 HEART TRANSPLANT RECIPIENT (H): Primary | ICD-10-CM

## 2023-01-17 DIAGNOSIS — Z94.1 HEART TRANSPLANT RECIPIENT (H): ICD-10-CM

## 2023-01-17 DIAGNOSIS — Z95.810 BIVENTRICULAR AUTOMATIC IMPLANTABLE CARDIOVERTER DEFIBRILLATOR IN SITU: Primary | ICD-10-CM

## 2023-01-17 DIAGNOSIS — Z94.1 HEART REPLACED BY TRANSPLANT (H): ICD-10-CM

## 2023-01-17 LAB
BACTERIA BLD CULT: NO GROWTH
HGB BLD-MCNC: 11.2 G/DL (ref 13.3–17.7)
OXYHGB MFR BLDV: 65 % (ref 92–100)

## 2023-01-17 PROCEDURE — C1894 INTRO/SHEATH, NON-LASER: HCPCS | Performed by: INTERNAL MEDICINE

## 2023-01-17 PROCEDURE — 999N000142 HC STATISTIC PROCEDURE PREP ONLY

## 2023-01-17 PROCEDURE — G0463 HOSPITAL OUTPT CLINIC VISIT: HCPCS | Mod: 25 | Performed by: NURSE PRACTITIONER

## 2023-01-17 PROCEDURE — 250N000011 HC RX IP 250 OP 636: Performed by: INTERNAL MEDICINE

## 2023-01-17 PROCEDURE — 99153 MOD SED SAME PHYS/QHP EA: CPT | Performed by: INTERNAL MEDICINE

## 2023-01-17 PROCEDURE — 250N000009 HC RX 250: Performed by: INTERNAL MEDICINE

## 2023-01-17 PROCEDURE — 999N000132 HC STATISTIC PP CARE STAGE 1

## 2023-01-17 PROCEDURE — 99152 MOD SED SAME PHYS/QHP 5/>YRS: CPT | Mod: GC | Performed by: INTERNAL MEDICINE

## 2023-01-17 PROCEDURE — G0463 HOSPITAL OUTPT CLINIC VISIT: HCPCS | Performed by: NURSE PRACTITIONER

## 2023-01-17 PROCEDURE — 88307 TISSUE EXAM BY PATHOLOGIST: CPT | Mod: TC | Performed by: INTERNAL MEDICINE

## 2023-01-17 PROCEDURE — G0463 HOSPITAL OUTPT CLINIC VISIT: HCPCS

## 2023-01-17 PROCEDURE — 85018 HEMOGLOBIN: CPT

## 2023-01-17 PROCEDURE — 99215 OFFICE O/P EST HI 40 MIN: CPT | Mod: 24 | Performed by: NURSE PRACTITIONER

## 2023-01-17 PROCEDURE — 93505 ENDOMYOCARDIAL BIOPSY: CPT | Mod: 26 | Performed by: INTERNAL MEDICINE

## 2023-01-17 PROCEDURE — 272N000001 HC OR GENERAL SUPPLY STERILE: Performed by: INTERNAL MEDICINE

## 2023-01-17 PROCEDURE — 93454 CORONARY ARTERY ANGIO S&I: CPT | Mod: 26 | Performed by: INTERNAL MEDICINE

## 2023-01-17 PROCEDURE — 99152 MOD SED SAME PHYS/QHP 5/>YRS: CPT | Performed by: INTERNAL MEDICINE

## 2023-01-17 PROCEDURE — 82810 BLOOD GASES O2 SAT ONLY: CPT

## 2023-01-17 PROCEDURE — 93505 ENDOMYOCARDIAL BIOPSY: CPT | Performed by: INTERNAL MEDICINE

## 2023-01-17 RX ORDER — TACROLIMUS 5 MG/1
5 CAPSULE ORAL 2 TIMES DAILY
Qty: 60 CAPSULE | Refills: 0 | Status: SHIPPED | OUTPATIENT
Start: 2023-01-17 | End: 2023-01-24

## 2023-01-17 RX ORDER — TACROLIMUS 1 MG/1
CAPSULE ORAL
Qty: 120 CAPSULE | Refills: 0 | Status: ON HOLD
Start: 2023-01-17 | End: 2023-02-21

## 2023-01-17 RX ORDER — FENTANYL CITRATE 50 UG/ML
INJECTION, SOLUTION INTRAMUSCULAR; INTRAVENOUS
Status: DISCONTINUED | OUTPATIENT
Start: 2023-01-17 | End: 2023-01-17 | Stop reason: HOSPADM

## 2023-01-17 RX ORDER — LIDOCAINE 40 MG/G
CREAM TOPICAL
Status: DISCONTINUED | OUTPATIENT
Start: 2023-01-17 | End: 2023-01-17 | Stop reason: HOSPADM

## 2023-01-17 ASSESSMENT — PAIN SCALES - GENERAL: PAINLEVEL: NO PAIN (0)

## 2023-01-17 ASSESSMENT — ACTIVITIES OF DAILY LIVING (ADL)
ADLS_ACUITY_SCORE: 35
ADLS_ACUITY_SCORE: 35

## 2023-01-17 NOTE — NURSING NOTE
Transplant Coordinator Note  Reason for visit Week 3 post transplant     Health concerns addressed today  Pt with wife seen in clinic (1st visit post discharge) with MARGARET Triston, MARGARET reviewed meds, available labs and plan of care. Wght 179.8 at discharge, now 181#. -127/74-88. HR high 90s. Walking in halls of [facility]. Starting rehab this Friday. Couple episodes of vertigo; enc to stay hydrated. Took Oxy x1 for rib discomfort; Robaxin prn.     Continuing vancomycin infusions   Reviewed up coming appts.     Wife will make appt with genetics     Immunosuppressants  MMF 1000/1000 mg  Tac 5/4 mg goal 10-12; level pending  Pred 20/15 mg     Routine screenings  Derm to be yearly   Dental after pred complete unless emergency. Pt does need a tooth replaced.  Colonoscopy  Nov 2022 (no specimens)   Prostate 1.63  Eye to be yearly post pred complete  Flu Oct 2021 -> due after 100 days post transplant   Pneumonia UTD  COVID last dose Nov 2021     Medication record reviewed and reconciled. Questions and concerns addressed. AVS reviewed and copy provided. Pt verbalized an understanding of plan of care.     Patient Instructions  ~Please call your coordinator at [phone] with any questions or concerns.    ~Coordinator will update you on remaining labs, test results and medication changes.  ~Next week labs are fasting (after midnight) with 12-hour tacrolimus level.  ~Hold Eliquis Sat, Sun and Monday AM.   ~Stay hydrated  ~Labs per home infusion and cards

## 2023-01-17 NOTE — LETTER
1/17/2023      RE: Paco Stein  [address]  [place] [address]       Dear Colleague,    Thank you for the opportunity to participate in the care of your patient, Paco Stein, at the [facility] [place] at [facility]. Please see a copy of my visit note below.    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    His post-operative course has been c/b delirium, leukocytosis, staph epi bacteremia, and prolonged CT output.  He discharged 1/13.     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  No DSAs and no graft dysfunction, so further treatment has been deferred.  AlloMap scores:  n/a  DSAs:  none  Coronary angio/Ischemic eval:  To be completed at ~week #4 post-tx  Last RHC:  1/9/23 showed normal biventricular filling pressures, with RA 4, mPA 16, PCW 9, and CI 2.6 --> weight 185#  Echo/cMRI:  TTE 1/2/23 showed normal graft function, with LVEF 60-65% and normal RV size/function.     Since discharge, he states that he has been doing well.  He has been walking, and notes slowly improving strength and endurance.  He starts CR on Friday.  He has been sleeping well, and is able to lie flat without PND and orthopnea.  He denies SOB.  He had some rib pain last olivia, for which he took oxy with relief.  He has otherwise been using robaxin PRN.  His appetite has been well, and he is eating regularly without nausea, vomiting, diarrhea, and constipation.  His weight  has been stable, was 179 at discharge and has been 181# for the last 3 days.  He denies fluid retention.  His BPs have been stable, ranging 100-120/70-80s.  He has had 3 episodes of vertigo since transplant, 1 while in the hospital and 2 since discharge.  He is always lying flat when the episodes occur, and they resolve within 15 seconds.  He otherwise denies palpitations, falls, headaches, acute vision changes, fevers, chills, cough, sore throat, and signs of bleeding.      Serostatus:  CMV D+/R+  EBV D+/R+  Toxo D-/R-    Patient Active Problem List    Diagnosis Date Noted     Staphylococcus epidermidis bacteremia 01/09/2023     Priority: Medium     Using prophylactic antibiotic on daily basis 01/09/2023     Priority: Medium     Benign neoplasm of colon 12/12/2022     Priority: Medium     Troponin level elevated 12/06/2022     Priority: Medium     Anginal equivalent (H) 12/06/2022     Priority: Medium     Heart failure, unspecified HF chronicity, unspecified heart failure type (H) 12/06/2022     Priority: Medium     Acute embolism and thrombosis of left peroneal vein (H) 11/22/2022     Priority: Medium     Acute on chronic combined systolic (congestive) and diastolic (congestive) heart failure (H) 11/14/2022     Priority: Medium     CHF (congestive heart failure) (H) 11/10/2022     Priority: Medium     Acute respiratory failure with hypoxia (H) 11/08/2022     Priority: Medium     Coronary artery disease involving native coronary artery of native heart without angina pectoris 11/08/2022     Priority: Medium     Essential hypertension 11/08/2022     Priority: Medium     NSVT (nonsustained ventricular tachycardia) 11/08/2022     Priority: Medium     SOB (shortness of breath) 11/08/2022     Priority: Medium     Syncope and collapse 10/07/2021     Priority: Medium     Cardiomyopathy (H) 10/06/2021     Priority: Medium     Primary central nervous system vasculitis (H) 11/30/2017     Priority: Medium     Vasculitis, CNS  (H) 11/30/2017     Priority: Medium     Stroke (H) 09/28/2016     Priority: Medium     Hyperlipidemia 08/08/2013     Priority: Medium       PAST MEDICAL HISTORY:  Past Medical History:   Diagnosis Date     Congestive heart failure (H)        CURRENT MEDICATIONS:  Prescription Medications as of 1/17/2023       Rx Number Disp Refills Start End Last Dispensed Date Next Fill Date Owning Pharmacy    acetaminophen (TYLENOL) 325 MG tablet  100 tablet 1 1/13/2023    [address]    Sig: Take 3 tablets (975 mg) by mouth every 8 hours    Class: E-Prescribe    Route: Oral    albuterol (PROAIR HFA/PROVENTIL HFA/VENTOLIN HFA) 108 (90 Base) MCG/ACT inhaler            Sig: Inhale 2 puffs into the lungs every 6 hours as needed    Class: Historical    Route: Inhalation    amLODIPine (NORVASC) 5 MG tablet  30 tablet 1 1/14/2023    [address]    Sig: Take 1 tablet (5 mg) by mouth daily    Class: E-Prescribe    Route: Oral    apixaban ANTICOAGULANT (ELIQUIS) 5 MG tablet  60 tablet 1 1/13/2023    [address]    Sig: Take 1 tablet (5 mg) by mouth 2 times daily    Class: E-Prescribe    Route: Oral    calcium carbonate-vitamin D (CALTRATE) 600-10 MG-MCG per tablet  60 tablet 1 1/13/2023    [address]    Sig: Take 1 tablet by mouth 2 times daily (with meals)    Class: E-Prescribe    Route: Oral    magnesium oxide (MAG-OX) 400 MG tablet  60 tablet 1 1/13/2023    [address]    Sig: Take 1 tablet (400 mg) by mouth 2 times daily    Class: E-Prescribe    Route: Oral    melatonin 10 MG TABS tablet  30 tablet 1 1/13/2023    [address]    Sig: Take 1 tablet (10 mg) by mouth At Bedtime    Class:  E-Prescribe    Route: Oral    methocarbamol (ROBAXIN) 500 MG tablet  30 tablet 0 1/13/2023    [address]    Sig: Take 1 tablet (500 mg) by mouth every 6 hours as needed for muscle spasms    Class: E-Prescribe    Route: Oral    mycophenolate (GENERIC EQUIVALENT) 250 MG capsule  240 capsule 1 1/13/2023 3/14/2023   [address]    Sig: Take 4 capsules (1,000 mg) by mouth 2 times daily for 60 days    Class: E-Prescribe    Route: Oral    nystatin (MYCOSTATIN) 648513 UNIT/ML suspension  1200 mL 1 1/13/2023 3/14/2023   [address]    Sig: Swish and swallow 10 mLs (1,000,000 Units) in mouth 4 times daily for 60 days    Class: E-Prescribe    Route: Swish & Swallow    oxyCODONE (ROXICODONE) 5 MG tablet  5 tablet 0 1/13/2023    [address]    Sig: Take 1 tablet (5 mg) by mouth every 4 hours as needed for severe pain (7-10) or moderate pain (4-6)    Class: E-Prescribe    Earliest Fill Date: 1/13/2023    Route: Oral    pantoprazole (PROTONIX) 40 MG EC tablet  30 tablet 0 1/14/2023    [address]    Sig: Take 1 tablet (40 mg) by mouth every morning (before breakfast)    Class: E-Prescribe    Route: Oral    polyethylene glycol (MIRALAX) 17 g packet  30 packet 0 1/14/2023    [address]    Sig: Take 17 g by mouth daily    Class: E-Prescribe    Route: Oral    predniSONE (DELTASONE) 5 MG tablet  210 tablet 0 1/13/2023    [address]    Sig: You will take 20mg (4 tablets) every morning and 15mg (3 tablets) every evening. This will be tapered by your cardiologist during follow up visits.    Class: E-Prescribe    rosuvastatin  (CRESTOR) 10 MG tablet  30 tablet 1 1/14/2023    [address]    Sig: Take 1 tablet (10 mg) by mouth daily    Class: E-Prescribe    Route: Oral    senna-docusate (SENOKOT-S/PERICOLACE) 8.6-50 MG tablet  60 tablet 1 1/13/2023    [address]    Sig: Take 1 tablet by mouth 2 times daily    Class: E-Prescribe    Route: Oral    sulfamethoxazole-trimethoprim (BACTRIM) 400-80 MG tablet  30 tablet 0 1/14/2023    [address]    Sig: Take 1 tablet by mouth daily    Class: E-Prescribe    Route: Oral    tacrolimus (GENERIC EQUIVALENT) 1 MG capsule  120 capsule 0 1/13/2023 2/12/2023   [address]    Sig: Take 4 capsules (4 mg) by mouth every morning for 30 days    Class: E-Prescribe    Route: Oral    tacrolimus (GENERIC EQUIVALENT) 5 MG capsule  30 capsule 0 1/13/2023 2/12/2023   [address]    Sig: Take 1 capsule (5 mg) by mouth every evening for 30 days    Class: E-Prescribe    Route: Oral    traZODone (DESYREL) 100 MG tablet  30 tablet 1 1/13/2023    [address]    Sig: Take 1 tablet (100 mg) by mouth nightly as needed for sleep    Class: E-Prescribe    Route: Oral    valGANciclovir (VALCYTE) 450 MG tablet  60 tablet 0 1/14/2023 2/13/2023   [address]    Sig: Take 2 tablets (900 mg) by mouth daily for 30 days    Class: E-Prescribe    Route: Oral    vancomycin (VANCOCIN) 1000 mg in dextrose 5% 200 mL PREMIX   0 1/13/2023        Sig: Inject 200 mLs (1,000 mg) into the vein every 12 hours    Class: No Print Out    Route: Intravenous          ROS:   Answers for HPI/ROS submitted by the patient on 1/16/2023  General Symptoms:  Yes  Skin Symptoms: No  HENT Symptoms: Yes  EYE SYMPTOMS: No  HEART SYMPTOMS: Yes  LUNG SYMPTOMS: Yes  INTESTINAL SYMPTOMS: No  URINARY SYMPTOMS: No  REPRODUCTIVE SYMPTOMS: No  SKELETAL SYMPTOMS: Yes  BLOOD SYMPTOMS: Yes  NERVOUS SYSTEM SYMPTOMS: Yes  MENTAL HEALTH SYMPTOMS: Yes  Ear pain: No  Ear discharge: No  Hearing loss: No  Tinnitus: No  Nosebleeds: No  Congestion: No  Sinus pain: No  Trouble swallowing: No   Voice hoarseness: No  Mouth sores: No  Sore throat: No  Tooth pain: No  Gum tenderness: No  Bleeding gums: No  Change in taste: No  Change in sense of smell: No  Dry mouth: No  Hearing aid used: No  Neck lump: No  Fever: No  Loss of appetite: No  Weight loss: Yes  Fatigue: Yes  Night sweats: No  Chills: No  Increased stress: No  Excessive hunger: No  Excessive thirst: No  Feeling hot or cold when others believe the temperature is normal: No  Loss of height: No  Post-operative complications: No  Surgical site pain: Yes  Hallucinations: No  Change in or Loss of Energy: Yes  Hyperactivity: No  Confusion: No  Chest pain or pressure: No  Fast or irregular heartbeat: No  Pain in legs with walking: No  Trouble breathing while lying down: No  Fingers or toes appear blue: No  High blood pressure: No  Low blood pressure: No  Fainting: No  Murmurs: No  Pacemaker: No  Varicose veins: No  Wake up at night with shortness of breath: No  Light-headedness: Yes  Exercise intolerance: Yes  Cough: No  Sputum or phlegm: No  Coughing up blood: No  Difficulty breating or shortness of breath: No  Snoring: No  Wheezing: No  Difficulty breathing on exertion: Yes  Nighttime Cough: No  Difficulty breathing when lying flat: No  Back pain: Yes  Muscle aches: No  Neck pain: No  Swollen joints: No  Joint pain: No  Bone pain: No  Muscle cramps: No  Muscle weakness: Yes  Joint stiffness: No  Bone fracture: No  Edema or swelling: No  Anemia: Yes  Swollen glands: No  Easy bleeding or bruising: No  Trouble with coordination: Yes  Dizziness  "or trouble with balance: Yes  Fainting or black-out spells: No  Memory loss: Yes  Headache: No  Seizures: No  Speech problems: No  Tingling: Yes  Tremor: Yes  Weakness: Yes  Difficulty walking: Yes  Paralysis: No  Numbness: No  Nervous or Anxious: No  Depression: No  Trouble sleeping: Yes  Trouble thinking or concentrating: No  Mood changes: No  Panic attacks: No    Exam:  /71 (BP Location: Left arm, Patient Position: Sitting, Cuff Size: Adult Regular)   Pulse 98   Ht 1.88 m (6' 2\")   Wt 82.1 kg (181 lb)   SpO2 96%   BMI 23.24 kg/m    In general, the patient is a pleasant male in no apparent distress.    HEENT: NC/AT. PERRLA. EOMI.  Sclerae white, not injected. No thrush noted on tongue or cheeks.  Neck:  No adenopathy, No thyromegaly.    COR: No jugular venous distention when sitting upright.  RRR.  Normal S1 S2 splits physiologically.  No murmur, rub click, or gallop.    Lungs:  CTA. No rhonchi.    Abdomen: soft, nontender, nondistended.  No organomegaly.  Extremities:  No clubbing, cyanosis, or LE edema.    Neuro: Alert & Oriented x 3, grossly non focal.  Integument: no open lesions, rashes, or jaundice.    Labs:  CBC RESULTS:   Lab Results   Component Value Date    WBC 9.7 01/13/2023    RBC 3.94 (L) 01/13/2023    HGB 11.3 (L) 01/13/2023    HCT 36.6 (L) 01/13/2023    MCV 93 01/13/2023    MCH 28.7 01/13/2023    MCHC 30.9 (L) 01/13/2023    RDW 15.9 (H) 01/13/2023     01/13/2023       BMP RESULTS:  Lab Results   Component Value Date     (L) 01/13/2023    POTASSIUM 3.9 01/13/2023    POTASSIUM 3.7 12/25/2022    POTASSIUM 3.9 11/08/2022    CHLORIDE 102 01/13/2023    CHLORIDE 105 11/28/2022    CO2 21 (L) 01/13/2023    CO2 26 11/08/2022    ANIONGAP 12 01/13/2023    ANIONGAP 9 11/08/2022     (H) 01/13/2023     (H) 01/13/2023     (H) 11/08/2022    BUN 22.2 01/13/2023    BUN 35 (H) 11/08/2022    CR 0.75 01/13/2023    GFRESTIMATED >90 01/13/2023    TIM 8.9 01/13/2023      LIPID " RESULTS:  Lab Results   Component Value Date    CHOL 116 11/12/2022    HDL 48 11/12/2022    LDL 53 11/12/2022    TRIG 75 11/12/2022    NHDL 68 11/12/2022       IMMUNOSUPPRESSANT LEVELS  Lab Results   Component Value Date    TACROL 8.9 01/13/2023    DOSTAC  01/13/2023      Comment:      Last dose information not provided.       No components found for: CK  Lab Results   Component Value Date    MAG 1.4 (L) 01/13/2023     Lab Results   Component Value Date    A1C 6.2 (H) 12/25/2022     Lab Results   Component Value Date    PHOS 2.6 01/13/2023     Lab Results   Component Value Date    NTBNPI 2,750 (H) 12/06/2022     Lab Results   Component Value Date    SAITESTMET SA EDTA FCS 01/02/2023    SAICELL Class I 01/02/2023    [id number] None 01/02/2023    [id number] None 01/02/2023    SAIREPCOM  01/02/2023      HLA PRA Test performed by modified testing procedure that may also include pretreatment of serum. Pretreatment may be the addition of fetal calf serum, EDTA, and/or adsorption.  High-risk, MFI > 3,000.  Mod-risk, -3,000.     Lab Results   Component Value Date    SAIITESTME SA EDTA FCS 01/02/2023    SAIICELL Class II 01/02/2023    [id number] None 01/02/2023    [id number] None 01/02/2023    SAIIREPCOM  01/02/2023      HLA PRA Test performed by modified testing procedure that may also include pretreatment of serum. Pretreatment may be the addition of fetal calf serum, EDTA, and/or adsorption.  High-risk, MFI > 3,000.  Mod-risk, -3,000.       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    Labs today show Na 133 and  Mg 1.2.  Other electrolytes remain stable.  CBC shows WBC 11.4, CRP now negative.  Tacro level in process.    He will have a RHC/biopsy following today's appt.    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  His BPs are controlled.  His weight is stable, and he appears euvolemic.  He has been working on his rehab, and will start CR this week.  He is consuming excellent protein.    It does not appear that he has a vanco trough ordered, so will try to order one to be added to his labs from this morning.      Advised that he continue his current meds, with no changes, and will plan for him to follow-up in clinic per protocol.      NICM, s/p OHT 12/25/22  His post-operative course has been c/b delirium, leukocytosis, staph epi bacteremia, and prolonged CT output.    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  No DSAs and no graft dysfunction, so further treatment has been deferred.  AlloMap scores:  n/a  DSAs:  none  Coronary angio/Ischemic eval:  To be completed at ~week #4 post-tx  Last RHC:  1/9/23 showed normal biventricular filling pressures, with RA 4, mPA 16, PCW 9, and CI 2.6 --> weight 185#  Echo/cMRI:  TTE 1/2/23 showed normal graft function, with LVEF 60-65% and normal RV size/function.      Serostatus:  - CMV D+/R+  - EBV D+/R+  - Toxo D-/R-     Immunosuppression:  - received basiliximab 12/25 and 12/29  - prednisone taper, currently 20/15  - MMF 1000mg twice daily (given age and bacteremia)  - tacro, goal level 10-12.  Level from today pending.     PPx:  - CAV:  Holding Aspirin 81mg daily while on anticoagulation, continue rosuvastatin 10mg daily  - GI:  Pantoprazole 40mg daily  - Osteoporosis:  calcium/vitamin D supplements  - CMV:  valcyte 900mg daily (x3 months, through 3/2023)  - PCP:  Bactrim single strength daily  - Thrush:  Nystatin s/s QID  - Toxo:  n/a     Graft function:  - BPs:  remain controlled, continue amlodipine 5mg daily  - fluid status:   Euvolemic, off diuretics     Health maintenance:  - referral placed to genetic counselor re: diagnosis of ARVC      ARVC  Discussed that first-degree relatives should be screened with an echocardiogram and EKG, and have referred him to [facility].    h/o right axillary nonocclusive and left peroneal occlusive DVT (11/2022, pre-transplant)  Was on apixaban prior to transplant, which was held post-operatively.  During RHC 1/9, it was noted that his RIJ had clot and LIJ was inaccessible, so he required femoral access.  He has been started on apixaban, which needs to be held 48 hours prior to each biopsy.  Will plan to repeat a bilateral UE and LLE ultrasound in 3 months to follow-up and review ongoing anticoagulation needs.    Staph epi bacteremia  Blood cultures from 1/7 and 1/8 with staph epi, started on IV vanco 1/8 per ID.  Blood cultures have remained negative since 1/8.  Plan to continue IV vanco x 4 weeks minimum, per Transplant ID (~2/4/23).  He will follow-up with ID 2/7/23 -- advised that he continue vanco until he sees ID.  Adding vanco trough to labs today, if able.        The above was reviewed with Dr. Stein, who verbalized understanding and will call with further questions/concerns.    50 minutes spent with patient, along with preparing for visit, reviewing follow up, and completing documentation.      Rose Mary Goldstein DNP, FNP-BC  Advanced Heart Failure Nurse Practitioner  [facility]  Patient Care Team:  Quentin Odonnell as PCP - Arlene Lamb, RN as Specialty Care Coordinator (Cardiology)  Mary Lou Luke RN as Transplant Coordinator

## 2023-01-17 NOTE — PROGRESS NOTES
RFV site WNL. CMS intact. VSS and pt denies pain. Pt tolerating PO intake. PICC dressing due for weekly dressing change; completed prior to discharge. Discharge instructions reviewed and pt and wife verbalized understanding. Pt discharged to home via wheelchair with Arlette (wife).

## 2023-01-17 NOTE — PROGRESS NOTES
Pt arrived to unit 2A s/p RHC via RFV. Site WNL. VSS, pt denies pain. Bedrest until 1215. Will continue to monitor.

## 2023-01-17 NOTE — NURSING NOTE
Chief Complaint   Patient presents with     Follow Up     Return heart transplant           Vitals were taken and medications reconciled.     Vince Chandra, EMT   7:15 AM

## 2023-01-17 NOTE — Clinical Note
Potential access sites were evaluated for patency using ultrasound.   The right femoral vein was selected. Access was obtained under with Sonosite and Fluoroscopic guidance using a standard 18 guage needle with direct visualization of needle entry.

## 2023-01-17 NOTE — PATIENT INSTRUCTIONS
Please call your coordinator at [phone] with any questions or concerns.      Coordinator will update you on remaining labs, test results and medication changes.    Next week labs are fasting (after midnight) with 12-hour tacrolimus level.  Hold Eliquis Sat, Sun and Monday AM.     Stay hydrated    Labs per home infusion and cards

## 2023-01-17 NOTE — PROGRESS NOTES
Pre procedure complete. VSS, pt denies pain. Pt ate breakfast at 0500; Kady, cath lab charge RN, notified. Arlette (wife) at bedside.

## 2023-01-17 NOTE — DISCHARGE INSTRUCTIONS
[facility]                        Interventional Cardiology  Discharge Instructions   Post Right Heart Cath      AFTER YOU GO HOME:  DO drink plenty of fluids  DO resume your regular diet and medications unless otherwise instructed by your Primary Physician  Do not scrub the procedure site vigorously  No lotion or powder to the puncture site for 3 days  Keep dressing in place for 24 hours    CALL YOUR PRIMARY PHYSICIAN IF: You may resume all normal activity.  Monitor neck site for bleeding, swelling, or voice changes. If you notice bleeding or swelling immediately apply pressure to the site and call number below to speak with Cardiology Fellow.  If you experience any changes in your breathing you should call your doctor immediately or come to the closest Emergency Department.  Do not drive yourself.    ADDITIONAL INSTRUCTIONS: Medications: You are to resume all home medications including anticoagulation therapy unless otherwise advised by your primary cardiologist or nurse coordinator.    Follow Up: Per your primary cardiology team    If you have any questions or concerns regarding your procedure site please call [phone] at anytime and ask for Cardiology Fellow on call.  They are available 24 hours a day.  You may also contact the Cardiology Clinic after hours number at [phone].                                                       Telephone Numbers [phone] Monday-Friday 8:00 am to 4:30 pm    [phone] After 4:30 pm Monday-Friday, Weekends & Holidays  Ask for Interventional Cardiologist on call. Someone is on call 24 hours/day   [facility] toll free number [phone] Monday-Friday 8:00 am to 4:30 pm   [facility] Emergency Dept [phone]

## 2023-01-17 NOTE — PROGRESS NOTES
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    His post-operative course has been c/b delirium, leukocytosis, staph epi bacteremia, and prolonged CT output.  He discharged 1/13.     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  No DSAs and no graft dysfunction, so further treatment has been deferred.  AlloMap scores:  n/a  DSAs:  none  Coronary angio/Ischemic eval:  To be completed at ~week #4 post-tx  Last RHC:  1/9/23 showed normal biventricular filling pressures, with RA 4, mPA 16, PCW 9, and CI 2.6 --> weight 185#  Echo/cMRI:  TTE 1/2/23 showed normal graft function, with LVEF 60-65% and normal RV size/function.     Since discharge, he states that he has been doing well.  He has been walking, and notes slowly improving strength and endurance.  He starts CR on Friday.  He has been sleeping well, and is able to lie flat without PND and orthopnea.  He denies SOB.  He had some rib pain last olivia, for which he took oxy with relief.  He has otherwise been using robaxin PRN.  His appetite has been well, and he is eating regularly without nausea, vomiting, diarrhea, and constipation.  His weight has been stable, was 179 at discharge and has been 181# for the last 3 days.  He denies fluid retention.  His BPs have been stable, ranging 100-120/70-80s.  He has had 3 episodes of vertigo since transplant, 1 while in the hospital and 2 since discharge.  He is always lying flat when the episodes occur, and they resolve within 15 seconds.   He otherwise denies palpitations, falls, headaches, acute vision changes, fevers, chills, cough, sore throat, and signs of bleeding.      Serostatus:  CMV D+/R+  EBV D+/R+  Toxo D-/R-    Patient Active Problem List    Diagnosis Date Noted     Staphylococcus epidermidis bacteremia 01/09/2023     Priority: Medium     Using prophylactic antibiotic on daily basis 01/09/2023     Priority: Medium     Benign neoplasm of colon 12/12/2022     Priority: Medium     Troponin level elevated 12/06/2022     Priority: Medium     Anginal equivalent (H) 12/06/2022     Priority: Medium     Heart failure, unspecified HF chronicity, unspecified heart failure type (H) 12/06/2022     Priority: Medium     Acute embolism and thrombosis of left peroneal vein (H) 11/22/2022     Priority: Medium     Acute on chronic combined systolic (congestive) and diastolic (congestive) heart failure (H) 11/14/2022     Priority: Medium     CHF (congestive heart failure) (H) 11/10/2022     Priority: Medium     Acute respiratory failure with hypoxia (H) 11/08/2022     Priority: Medium     Coronary artery disease involving native coronary artery of native heart without angina pectoris 11/08/2022     Priority: Medium     Essential hypertension 11/08/2022     Priority: Medium     NSVT (nonsustained ventricular tachycardia) 11/08/2022     Priority: Medium     SOB (shortness of breath) 11/08/2022     Priority: Medium     Syncope and collapse 10/07/2021     Priority: Medium     Cardiomyopathy (H) 10/06/2021     Priority: Medium     Primary central nervous system vasculitis (H) 11/30/2017     Priority: Medium     Vasculitis, CNS (H) 11/30/2017     Priority: Medium     Stroke (H) 09/28/2016     Priority: Medium     Hyperlipidemia 08/08/2013     Priority: Medium       PAST MEDICAL HISTORY:  Past Medical History:   Diagnosis Date     Congestive heart failure (H)        CURRENT MEDICATIONS:  Prescription Medications as of 1/17/2023       Rx Number Disp Refills Start End  Last Dispensed Date Next Fill Date Owning Pharmacy    acetaminophen (TYLENOL) 325 MG tablet  100 tablet 1 1/13/2023    [address]    Sig: Take 3 tablets (975 mg) by mouth every 8 hours    Class: E-Prescribe    Route: Oral    albuterol (PROAIR HFA/PROVENTIL HFA/VENTOLIN HFA) 108 (90 Base) MCG/ACT inhaler            Sig: Inhale 2 puffs into the lungs every 6 hours as needed    Class: Historical    Route: Inhalation    amLODIPine (NORVASC) 5 MG tablet  30 tablet 1 1/14/2023    [address]    Sig: Take 1 tablet (5 mg) by mouth daily    Class: E-Prescribe    Route: Oral    apixaban ANTICOAGULANT (ELIQUIS) 5 MG tablet  60 tablet 1 1/13/2023    [address]    Sig: Take 1 tablet (5 mg) by mouth 2 times daily    Class: E-Prescribe    Route: Oral    calcium carbonate-vitamin D (CALTRATE) 600-10 MG-MCG per tablet  60 tablet 1 1/13/2023    [address]    Sig: Take 1 tablet by mouth 2 times daily (with meals)    Class: E-Prescribe    Route: Oral    magnesium oxide (MAG-OX) 400 MG tablet  60 tablet 1 1/13/2023    [address]    Sig: Take 1 tablet (400 mg) by mouth 2 times daily    Class: E-Prescribe    Route: Oral    melatonin 10 MG TABS tablet  30 tablet 1 1/13/2023    [address]    Sig: Take 1 tablet (10 mg) by mouth At Bedtime    Class: E-Prescribe    Route: Oral    methocarbamol (ROBAXIN) 500 MG tablet  30 tablet 0 1/13/2023    [address]    Sig: Take 1 tablet (500 mg) by mouth every 6 hours as needed for muscle spasms    Class: E-Prescribe    Route: Oral    mycophenolate (GENERIC EQUIVALENT) 250 MG capsule  240  capsule 1 1/13/2023 3/14/2023   [address]    Sig: Take 4 capsules (1,000 mg) by mouth 2 times daily for 60 days    Class: E-Prescribe    Route: Oral    nystatin (MYCOSTATIN) 083825 UNIT/ML suspension  1200 mL 1 1/13/2023 3/14/2023   [address]    Sig: Swish and swallow 10 mLs (1,000,000 Units) in mouth 4 times daily for 60 days    Class: E-Prescribe    Route: Swish & Swallow    oxyCODONE (ROXICODONE) 5 MG tablet  5 tablet 0 1/13/2023    [address]    Sig: Take 1 tablet (5 mg) by mouth every 4 hours as needed for severe pain (7-10) or moderate pain (4-6)    Class: E-Prescribe    Earliest Fill Date: 1/13/2023    Route: Oral    pantoprazole (PROTONIX) 40 MG EC tablet  30 tablet 0 1/14/2023    [address]    Sig: Take 1 tablet (40 mg) by mouth every morning (before breakfast)    Class: E-Prescribe    Route: Oral    polyethylene glycol (MIRALAX) 17 g packet  30 packet 0 1/14/2023    [address]    Sig: Take 17 g by mouth daily    Class: E-Prescribe    Route: Oral    predniSONE (DELTASONE) 5 MG tablet  210 tablet 0 1/13/2023    [address]    Sig: You will take 20mg (4 tablets) every morning and 15mg (3 tablets) every evening. This will be tapered by your cardiologist during follow up visits.    Class: E-Prescribe    rosuvastatin (CRESTOR) 10 MG tablet  30 tablet 1 1/14/2023    [address]    Sig: Take 1 tablet (10 mg) by mouth daily    Class: E-Prescribe    Route: Oral    senna-docusate (SENOKOT-S/PERICOLACE) 8.6-50 MG tablet  60 tablet 1 1/13/2023    [place] [id number]  [facility]    Sig: Take 1 tablet by mouth 2 times daily    Class: E-Prescribe    Route: Oral    sulfamethoxazole-trimethoprim (BACTRIM) 400-80 MG tablet  30 tablet 0 1/14/2023    [address]    Sig: Take 1 tablet by mouth daily    Class: E-Prescribe    Route: Oral    tacrolimus (GENERIC EQUIVALENT) 1 MG capsule  120 capsule 0 1/13/2023 2/12/2023   [address]    Sig: Take 4 capsules (4 mg) by mouth every morning for 30 days    Class: E-Prescribe    Route: Oral    tacrolimus (GENERIC EQUIVALENT) 5 MG capsule  30 capsule 0 1/13/2023 2/12/2023   [address]    Sig: Take 1 capsule (5 mg) by mouth every evening for 30 days    Class: E-Prescribe    Route: Oral    traZODone (DESYREL) 100 MG tablet  30 tablet 1 1/13/2023    [address]    Sig: Take 1 tablet (100 mg) by mouth nightly as needed for sleep    Class: E-Prescribe    Route: Oral    valGANciclovir (VALCYTE) 450 MG tablet  60 tablet 0 1/14/2023 2/13/2023   [address]    Sig: Take 2 tablets (900 mg) by mouth daily for 30 days    Class: E-Prescribe    Route: Oral    vancomycin (VANCOCIN) 1000 mg in dextrose 5% 200 mL PREMIX   0 1/13/2023        Sig: Inject 200 mLs (1,000 mg) into the vein every 12 hours    Class: No Print Out    Route: Intravenous          ROS:   Answers for HPI/ROS submitted by the patient on 1/16/2023  General Symptoms: Yes  Skin Symptoms: No  HENT Symptoms: Yes  EYE SYMPTOMS: No  HEART SYMPTOMS: Yes  LUNG SYMPTOMS: Yes  INTESTINAL SYMPTOMS: No  URINARY SYMPTOMS: No  REPRODUCTIVE SYMPTOMS: No  SKELETAL SYMPTOMS: Yes  BLOOD SYMPTOMS: Yes  NERVOUS SYSTEM SYMPTOMS: Yes  MENTAL HEALTH SYMPTOMS: Yes  Ear pain: No  Ear discharge: No  Hearing loss: No  Tinnitus:  No  Nosebleeds: No  Congestion: No  Sinus pain: No  Trouble swallowing: No   Voice hoarseness: No  Mouth sores: No  Sore throat: No  Tooth pain: No  Gum tenderness: No  Bleeding gums: No  Change in taste: No  Change in sense of smell: No  Dry mouth: No  Hearing aid used: No  Neck lump: No  Fever: No  Loss of appetite: No  Weight loss: Yes  Fatigue: Yes  Night sweats: No  Chills: No  Increased stress: No  Excessive hunger: No  Excessive thirst: No  Feeling hot or cold when others believe the temperature is normal: No  Loss of height: No  Post-operative complications: No  Surgical site pain: Yes  Hallucinations: No  Change in or Loss of Energy: Yes  Hyperactivity: No  Confusion: No  Chest pain or pressure: No  Fast or irregular heartbeat: No  Pain in legs with walking: No  Trouble breathing while lying down: No  Fingers or toes appear blue: No  High blood pressure: No  Low blood pressure: No  Fainting: No  Murmurs: No  Pacemaker: No  Varicose veins: No  Wake up at night with shortness of breath: No  Light-headedness: Yes  Exercise intolerance: Yes  Cough: No  Sputum or phlegm: No  Coughing up blood: No  Difficulty breating or shortness of breath: No  Snoring: No  Wheezing: No  Difficulty breathing on exertion: Yes  Nighttime Cough: No  Difficulty breathing when lying flat: No  Back pain: Yes  Muscle aches: No  Neck pain: No  Swollen joints: No  Joint pain: No  Bone pain: No  Muscle cramps: No  Muscle weakness: Yes  Joint stiffness: No  Bone fracture: No  Edema or swelling: No  Anemia: Yes  Swollen glands: No  Easy bleeding or bruising: No  Trouble with coordination: Yes  Dizziness or trouble with balance: Yes  Fainting or black-out spells: No  Memory loss: Yes  Headache: No  Seizures: No  Speech problems: No  Tingling: Yes  Tremor: Yes  Weakness: Yes  Difficulty walking: Yes  Paralysis: No  Numbness: No  Nervous or Anxious: No  Depression: No  Trouble sleeping: Yes  Trouble thinking or concentrating: No  Mood changes:  "No  Panic attacks: No    Exam:  /71 (BP Location: Left arm, Patient Position: Sitting, Cuff Size: Adult Regular)   Pulse 98   Ht 1.88 m (6' 2\")   Wt 82.1 kg (181 lb)   SpO2 96%   BMI 23.24 kg/m    In general, the patient is a pleasant male in no apparent distress.    HEENT: NC/AT. PERRLA. EOMI.  Sclerae white, not injected. No thrush noted on tongue or cheeks.  Neck:  No adenopathy, No thyromegaly.    COR: No jugular venous distention when sitting upright.  RRR.  Normal S1 S2 splits physiologically.  No murmur, rub click, or gallop.    Lungs:  CTA. No rhonchi.    Abdomen: soft, nontender, nondistended.  No organomegaly.  Extremities:  No clubbing, cyanosis, or LE edema.    Neuro: Alert & Oriented x 3, grossly non focal.  Integument: no open lesions, rashes, or jaundice.    Labs:  CBC RESULTS:   Lab Results   Component Value Date    WBC 9.7 01/13/2023    RBC 3.94 (L) 01/13/2023    HGB 11.3 (L) 01/13/2023    HCT 36.6 (L) 01/13/2023    MCV 93 01/13/2023    MCH 28.7 01/13/2023    MCHC 30.9 (L) 01/13/2023    RDW 15.9 (H) 01/13/2023     01/13/2023       BMP RESULTS:  Lab Results   Component Value Date     (L) 01/13/2023    POTASSIUM 3.9 01/13/2023    POTASSIUM 3.7 12/25/2022    POTASSIUM 3.9 11/08/2022    CHLORIDE 102 01/13/2023    CHLORIDE 105 11/28/2022    CO2 21 (L) 01/13/2023    CO2 26 11/08/2022    ANIONGAP 12 01/13/2023    ANIONGAP 9 11/08/2022     (H) 01/13/2023     (H) 01/13/2023     (H) 11/08/2022    BUN 22.2 01/13/2023    BUN 35 (H) 11/08/2022    CR 0.75 01/13/2023    GFRESTIMATED >90 01/13/2023    TIM 8.9 01/13/2023      LIPID RESULTS:  Lab Results   Component Value Date    CHOL 116 11/12/2022    HDL 48 11/12/2022    LDL 53 11/12/2022    TRIG 75 11/12/2022    NHDL 68 11/12/2022       IMMUNOSUPPRESSANT LEVELS  Lab Results   Component Value Date    TACROL 8.9 01/13/2023    DOSTAC  01/13/2023      Comment:      Last dose information not provided.       No components " found for: CK  Lab Results   Component Value Date    MAG 1.4 (L) 01/13/2023     Lab Results   Component Value Date    A1C 6.2 (H) 12/25/2022     Lab Results   Component Value Date    PHOS 2.6 01/13/2023     Lab Results   Component Value Date    NTBNPI 2,750 (H) 12/06/2022     Lab Results   Component Value Date    SAITESTMET SA EDTA FCS 01/02/2023    SAICELL Class I 01/02/2023    [id number] None 01/02/2023    [id number] None 01/02/2023    SAIREPCOM  01/02/2023      HLA PRA Test performed by modified testing procedure that may also include pretreatment of serum. Pretreatment may be the addition of fetal calf serum, EDTA, and/or adsorption.  High-risk, MFI > 3,000.  Mod-risk, -3,000.     Lab Results   Component Value Date    SAIITESTME SA EDTA FCS 01/02/2023    SAIICELL Class II 01/02/2023    [id number] None 01/02/2023    [id number] None 01/02/2023    SAIIREPCOM  01/02/2023      HLA PRA Test performed by modified testing procedure that may also include pretreatment of serum. Pretreatment may be the addition of fetal calf serum, EDTA, and/or adsorption.  High-risk, MFI > 3,000.  Mod-risk, -3,000.       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    Labs today show Na 133 and Mg 1.2.  Other electrolytes remain stable.  CBC shows WBC 11.4, CRP now negative.  Tacro level in process.    He will have a RHC/biopsy following today's appt.    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  His BPs are controlled.  His weight is stable, and he appears euvolemic.  He has been working on his rehab, and will start CR this week.  He is  consuming excellent protein.    It does not appear that he has a vanco trough ordered, so will try to order one to be added to his labs from this morning.      Advised that he continue his current meds, with no changes, and will plan for him to follow-up in clinic per protocol.      MIGUEL, s/p OHT 12/25/22  His post-operative course has been c/b delirium, leukocytosis, staph epi bacteremia, and prolonged CT output.    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  No DSAs and no graft dysfunction, so further treatment has been deferred.  AlloMap scores:  n/a  DSAs:  none  Coronary angio/Ischemic eval:  To be completed at ~week #4 post-tx  Last RHC:  1/9/23 showed normal biventricular filling pressures, with RA 4, mPA 16, PCW 9, and CI 2.6 --> weight 185#  Echo/cMRI:  TTE 1/2/23 showed normal graft function, with LVEF 60-65% and normal RV size/function.      Serostatus:  - CMV D+/R+  - EBV D+/R+  - Toxo D-/R-     Immunosuppression:  - received basiliximab 12/25 and 12/29  - prednisone taper, currently 20/15  - MMF 1000mg twice daily (given age and bacteremia)  - tacro, goal level 10-12.  Level from today pending.     PPx:  - CAV:  Holding Aspirin 81mg daily while on anticoagulation, continue rosuvastatin 10mg daily  - GI:  Pantoprazole 40mg daily  - Osteoporosis:  calcium/vitamin D supplements  - CMV:  valcyte 900mg daily (x3 months, through 3/2023)  - PCP:  Bactrim single strength daily  - Thrush:  Nystatin s/s QID  - Toxo:  n/a     Graft function:  - BPs:  remain controlled, continue amlodipine 5mg daily  - fluid status:  Euvolemic, off diuretics     Health maintenance:  - referral placed to genetic counselor re: diagnosis of ARVC      ARVC  Discussed that first-degree relatives should be screened with an echocardiogram and EKG, and have referred him to [facility].    h/o right axillary nonocclusive and left peroneal occlusive DVT (11/2022,  pre-transplant)  Was on apixaban prior to transplant, which was held post-operatively.  During RHC 1/9, it was noted that his RIJ had clot and LIJ was inaccessible, so he required femoral access.  He has been started on apixaban, which needs to be held 48 hours prior to each biopsy.  Will plan to repeat a bilateral UE and LLE ultrasound in 3 months to follow-up and review ongoing anticoagulation needs.    Staph epi bacteremia  Blood cultures from 1/7 and 1/8 with staph epi, started on IV vanco 1/8 per ID.  Blood cultures have remained negative since 1/8.  Plan to continue IV vanco x 4 weeks minimum, per Transplant ID (~2/4/23).  He will follow-up with ID 2/7/23 -- advised that he continue vanco until he sees ID.  Adding vanco trough to labs today, if able.        The above was reviewed with Dr. Stein, who verbalized understanding and will call with further questions/concerns.    50 minutes spent with patient, along with preparing for visit, reviewing follow up, and completing documentation.      Rose Mary Goldstein, PAOLA, FNP-BC  Advanced Heart Failure Nurse Practitioner  [facility]  Patient Care Team:  Quentin Odonnell as PCP - General  Myrtle Dominguez MD as Assigned Heart and Vascular Provider  Myrtle Dominguez MD as MD (Cardiovascular Disease)  Arlene Sanderson, RN as Specialty Care Coordinator (Cardiology)  Mary Lou Luke, [place] as Transplant Coordinator

## 2023-01-17 NOTE — TELEPHONE ENCOUNTER
Wife called with RHC and Tac level 8.8 - goal 10-12. Tac dose increased from 5/4 to 5 mg twice daily.  Recheck 1/23.     Med change confirmed

## 2023-01-18 ENCOUNTER — PRE VISIT (OUTPATIENT)
Dept: TRANSPLANT | Facility: CLINIC | Age: 68
End: 2023-01-18

## 2023-01-18 ENCOUNTER — LAB (OUTPATIENT)
Dept: LAB | Facility: CLINIC | Age: 68
End: 2023-01-18
Attending: INTERNAL MEDICINE
Payer: MEDICARE

## 2023-01-18 DIAGNOSIS — Z13.220 LIPID SCREENING: ICD-10-CM

## 2023-01-18 DIAGNOSIS — R78.81 STAPHYLOCOCCUS EPIDERMIDIS BACTEREMIA: Primary | ICD-10-CM

## 2023-01-18 DIAGNOSIS — Z94.1 HEART REPLACED BY TRANSPLANT (H): Primary | ICD-10-CM

## 2023-01-18 DIAGNOSIS — Z11.59 SPECIAL SCREENING EXAMINATION FOR VIRAL DISEASE: ICD-10-CM

## 2023-01-18 DIAGNOSIS — I82.90 OCCLUSIVE THROMBUS: ICD-10-CM

## 2023-01-18 DIAGNOSIS — I82.90 NON-OCCLUSIVE THROMBUS: ICD-10-CM

## 2023-01-18 DIAGNOSIS — I82.452 ACUTE DEEP VEIN THROMBOSIS (DVT) OF LEFT PERONEAL VEIN (H): ICD-10-CM

## 2023-01-18 DIAGNOSIS — I82.409 DVT (DEEP VENOUS THROMBOSIS) (H): ICD-10-CM

## 2023-01-18 DIAGNOSIS — Z11.59 ENCOUNTER FOR SCREENING FOR VIRAL DISEASE: ICD-10-CM

## 2023-01-18 DIAGNOSIS — I82.C11 INTERNAL JUGULAR (IJ) VEIN THROMBOEMBOLISM, ACUTE, RIGHT (H): ICD-10-CM

## 2023-01-18 DIAGNOSIS — B99.8 OTHER INFECTIOUS DISEASE: ICD-10-CM

## 2023-01-18 DIAGNOSIS — Z20.828 EXPOSURE TO SARS-ASSOCIATED CORONAVIRUS: ICD-10-CM

## 2023-01-18 DIAGNOSIS — B95.7 STAPHYLOCOCCUS EPIDERMIDIS BACTEREMIA: Primary | ICD-10-CM

## 2023-01-18 DIAGNOSIS — Z79.899 ENCOUNTER FOR LONG-TERM (CURRENT) USE OF HIGH-RISK MEDICATION: ICD-10-CM

## 2023-01-18 LAB
BACTERIA BLD CULT: NO GROWTH
PATH REPORT.COMMENTS IMP SPEC: NORMAL
PATH REPORT.FINAL DX SPEC: NORMAL
PATH REPORT.GROSS SPEC: NORMAL
PATH REPORT.MICROSCOPIC SPEC OTHER STN: NORMAL
PATH REPORT.RELEVANT HX SPEC: NORMAL
PHOTO IMAGE: NORMAL
VANCOMYCIN SERPL-MCNC: 10.1 UG/ML

## 2023-01-18 PROCEDURE — 88307 TISSUE EXAM BY PATHOLOGIST: CPT | Mod: 26 | Performed by: PATHOLOGY

## 2023-01-18 PROCEDURE — 250N000011 HC RX IP 250 OP 636: Performed by: INTERNAL MEDICINE

## 2023-01-18 PROCEDURE — 80202 ASSAY OF VANCOMYCIN: CPT | Performed by: NURSE PRACTITIONER

## 2023-01-18 PROCEDURE — 88350 IMFLUOR EA ADDL 1ANTB STN PX: CPT | Mod: 26 | Performed by: PATHOLOGY

## 2023-01-18 PROCEDURE — 88346 IMFLUOR 1ST 1ANTB STAIN PX: CPT | Mod: 26 | Performed by: PATHOLOGY

## 2023-01-18 PROCEDURE — 36592 COLLECT BLOOD FROM PICC: CPT | Performed by: NURSE PRACTITIONER

## 2023-01-18 RX ORDER — SODIUM CHLORIDE 9 MG/ML
INJECTION, SOLUTION INTRAVENOUS CONTINUOUS
Status: CANCELLED | OUTPATIENT
Start: 2023-01-18

## 2023-01-18 RX ORDER — ASPIRIN 81 MG/1
243 TABLET, CHEWABLE ORAL ONCE
Status: CANCELLED | OUTPATIENT
Start: 2023-01-18

## 2023-01-18 RX ORDER — POTASSIUM CHLORIDE 1500 MG/1
20 TABLET, EXTENDED RELEASE ORAL
Status: CANCELLED | OUTPATIENT
Start: 2023-01-18

## 2023-01-18 RX ORDER — HEPARIN SODIUM (PORCINE) LOCK FLUSH IV SOLN 100 UNIT/ML 100 UNIT/ML
5 SOLUTION INTRAVENOUS DAILY PRN
Status: DISCONTINUED | OUTPATIENT
Start: 2023-01-18 | End: 2023-01-24 | Stop reason: HOSPADM

## 2023-01-18 RX ORDER — HEPARIN SODIUM,PORCINE 10 UNIT/ML
5 VIAL (ML) INTRAVENOUS
Status: ACTIVE | OUTPATIENT
Start: 2023-01-18 | End: 2023-01-19

## 2023-01-18 RX ORDER — ASPIRIN 325 MG
325 TABLET ORAL ONCE
Status: CANCELLED | OUTPATIENT
Start: 2023-01-18 | End: 2023-01-18

## 2023-01-18 RX ORDER — POTASSIUM CHLORIDE 1500 MG/1
40 TABLET, EXTENDED RELEASE ORAL
Status: CANCELLED | OUTPATIENT
Start: 2023-01-18

## 2023-01-18 RX ADMIN — Medication 5 ML: at 07:09

## 2023-01-18 NOTE — NURSING NOTE
Chief Complaint   Patient presents with     Blood Draw     Labs drawn via PICC by RN in lab.      Labs drawn via PICC.  Line flushed and Heparin locked. Monique Bautista RN

## 2023-01-19 ENCOUNTER — TELEPHONE (OUTPATIENT)
Dept: TRANSPLANT | Facility: CLINIC | Age: 68
End: 2023-01-19
Payer: MEDICARE

## 2023-01-19 ENCOUNTER — VIRTUAL VISIT (OUTPATIENT)
Dept: PHARMACY | Facility: CLINIC | Age: 68
End: 2023-01-19
Payer: COMMERCIAL

## 2023-01-19 DIAGNOSIS — R52 PAIN: ICD-10-CM

## 2023-01-19 DIAGNOSIS — E78.5 DYSLIPIDEMIA: ICD-10-CM

## 2023-01-19 DIAGNOSIS — G47.00 INSOMNIA, UNSPECIFIED TYPE: ICD-10-CM

## 2023-01-19 DIAGNOSIS — Z94.1 HEART REPLACED BY TRANSPLANT (H): Primary | ICD-10-CM

## 2023-01-19 DIAGNOSIS — Z86.718 PERSONAL HISTORY OF DVT (DEEP VEIN THROMBOSIS): ICD-10-CM

## 2023-01-19 DIAGNOSIS — K59.00 CONSTIPATION, UNSPECIFIED CONSTIPATION TYPE: ICD-10-CM

## 2023-01-19 DIAGNOSIS — I10 ESSENTIAL HYPERTENSION: ICD-10-CM

## 2023-01-19 DIAGNOSIS — Z94.1 HEART REPLACED BY TRANSPLANT (H): ICD-10-CM

## 2023-01-19 PROCEDURE — 99607 MTMS BY PHARM ADDL 15 MIN: CPT | Performed by: PHARMACIST

## 2023-01-19 PROCEDURE — 99605 MTMS BY PHARM NP 15 MIN: CPT | Performed by: PHARMACIST

## 2023-01-19 RX ORDER — MAGNESIUM OXIDE 400 MG/1
800 TABLET ORAL 2 TIMES DAILY
Qty: 120 TABLET | Refills: 3 | Status: SHIPPED | OUTPATIENT
Start: 2023-01-19 | End: 2023-02-06

## 2023-01-19 NOTE — TELEPHONE ENCOUNTER
Called patient's wife regarding heart biospy results. Per Dr. Dominguez, okay to decrease prednisone since there is no pathologic AMR. Instructed patient to decrease prednisone from 20mg in the AM and 15mg in the PM to 15mg BID. Verbalized understanding. Sent updated script to Pharmacy.     Reviewed pre-procedure instructions with patient and wife for procedures on 1/23. Pt's wife also asked if she should do a heparin lock on pt's PICC line. Writer checked with cath lab. Per cath lab charge RN, pt is okay to do heparin lock on PICC. Called patient/spouse back to let them know. No answer. Left VM message with info and encouraged them to call back with any further questions.

## 2023-01-19 NOTE — PROGRESS NOTES
"Medication Therapy Management (MTM) Encounter    ASSESSMENT:                            Medication Adherence/Access: No issues identified    Heart Transplant:  Magnesium levels low and dropping, will increase magnesium to 2 tablets (800mg) twice daily. Patient received infusions inpatient.    Hypertension: BP at goal <140/90. Recommend he move amlodipine to the evening to improve efficacy.     Insomnia: Stable.     Pain: Stable.     Hyperlipidemia: Stable.    Anticoagulant: Stable.     Constipation: Stable.     PLAN:                            1. Move Amlodipine 5mg to the evening.   2. Increase Magnesium 800mg twice daily. Let us know if you have any diarrhea from.   3. [facility] mail order will call you three weeks post discharge, but if your dose changes, call the number on the back of the pill box to talk to them earlier, [phone]. If your doctor sends over a new prescription to the mail order pharmacy you will have to call them to set up the order. They have an Zia called \"[facility]\" as well.     Follow-up: 2-3 months    SUBJECTIVE/OBJECTIVE:                          Paco Stein is a 67 year old male called for a transitions of care visit. He was discharged from [facility] on 1/13 for heart transplant. Patient was accompanied by Arlette.     Reason for visit: initial post transplant.    Allergies/ADRs: Reviewed in chart  Past Medical History: Reviewed in chart  Tobacco: He reports that he has never smoked. He has never used smokeless tobacco.  Alcohol: not currently using  Other Substance Use: does not use THC/CBD    Medication Adherence/Access: Patient uses pill box(es), uses reminders/alarms and has assistance with medication administration: family member, Arlette.  Patient takes medications 4 time(s) per day. 8am, 10am, 6pm, 8pm  Per patient, misses medication 0 times per week.   The patient fills medications at [facility]: YES.    Heart Transplant:  Current immunosuppressants include Tacrolimus 5mg twice " daily, Prednisone 15mg twice daily, and Mycophenolate Mofetil 1000mg twice daily.  Pt reports vertigo.  Transplant date: 12/25/22  Estimated Creatinine Clearance: 102.5 mL/min (based on SCr of 0.79 mg/dL).  CMV prophylaxis: CrCl >/=60 mL/minute: Valcyte 900mg daily treat for 3 months.   PJP prophylaxis: Bactrim S S daily for 6 months post txp  Antifungal Prophylaxis: Nystatin 10 mL 4 TIMES DAILY until off steroids.   PPI use: Pantoprazole 40mg every morning.   Supplements: Mag Oxide 400mg twice daily ( 2 hours from MMF), Calcium/D twice daily .  Tx Coordinator: Sandy Dale MD: Dr. Dominguez, Using Med Card: Yes  Recent Infections: Staphylococcus epidermidis: Vancomycin 1000mg IV 7am and 7pm x 40 days.   Immunizations: annual flu shot 2021; Pneumovax 23:  2018; Prevnar 13: 2017; TDaP:  2003; Shingrix: 2019x2, HBV: unknown, COVID: Moderna x3  Magnesium   Date Value Ref Range Status   01/17/2023 1.2 (L) 1.7 - 2.3 mg/dL Final   01/13/2023 1.4 (L) 1.7 - 2.3 mg/dL Final   01/12/2023 1.9 1.7 - 2.3 mg/dL Final   01/12/2023 1.3 (L) 1.7 - 2.3 mg/dL Final   01/11/2023 1.5 (L) 1.7 - 2.3 mg/dL Final     Hypertension: Current medications include Amlodipine 5mg every morning.  Patient does self-monitor blood pressure. 120/70s.  Patient reports no current medication side effects.  BP Readings from Last 3 Encounters:   01/17/23 128/78   01/17/23 121/83   01/17/23 112/71     Insomnia: Current medications include: melatonin 10mg at bedtime, Trazodone 100mg at bedtime prn. Patient reports no issues at this time.     Pain: Pt is taking Methocarbamol 500mg at bedtime, Oxycodone 5mg rarely, Acetaminophen prn. Highest 4/10, does not have pain all the time.   Patient has had low back pain more so than incisional.     Hyperlipidemia: Current therapy includes Rosuvastatin 10mg daily.  Patient reports no significant myalgias or other side effects.  The ASCVD Risk score (Renny RAMOS, et al., 2019) failed to calculate for the following  reasons:    The patient has a prior MI or stroke diagnosis  Recent Labs   Lab Test 11/12/22  0458   CHOL 116   HDL 48   LDL 53   TRIG 75       Anticoagulant: Pt is taking Eliquis 5mg twice daily. No gastrointestinal bleed sx. DVT in left leg, one in both sides of neck.     Constipation: Pt is taking Miralax 17g daily, not taking senna. Having 1 soft stool daily.     Today's Vitals: There were no vitals taken for this visit.  ----------------  Post Discharge Medication Reconciliation Status: discharge medications reconciled and changed, per note/orders.    I spent 23 minutes with this patient today. All changes were made via collaborative practice agreement with Dr. Slater. A copy of the visit note was provided to the patient's provider(s).    A summary of these recommendations was sent via [facility].    Abram Simeon, PharmD  MTM Pharmacist    Phone: [phone]     Telemedicine Visit Details  Type of service:  Telephone visit  Start Time: 2:15 PM  End Time: 2:38 PM     Medication Therapy Recommendations  Essential hypertension    Current Medication: amLODIPine (NORVASC) 5 MG tablet   Rationale: Incorrect administration - Dosage too low - Effectiveness   Recommendation: Change Administration Time   Status: Patient Agreed - Adherence/Education         Heart replaced by transplant (H)    Current Medication: magnesium oxide (MAG-OX) 400 MG tablet (Discontinued)   Rationale: Dose too low - Dosage too low - Effectiveness   Recommendation: Increase Dose   Status: Accepted per CPA

## 2023-01-19 NOTE — PATIENT INSTRUCTIONS
"Recommendations from today's MTM visit:                                                       1. Move Amlodipine 5mg to the evening.   2. Increase Magnesium 800mg twice daily. Let us know if you have any diarrhea from.   3. [facility] mail order will call you three weeks post discharge, but if your dose changes, call the number on the back of the pill box to talk to them earlier, [phone]. If your doctor sends over a new prescription to the mail order pharmacy you will have to call them to set up the order. They have an Zia called \"[facility]\" as well.     Follow-up: 2-3 months    It was great speaking with you today.  I value your experience and would be very thankful for your time in providing feedback in our clinic survey. In the next few days, you may receive an email or text message from [employer] with a link to a survey related to your  clinical pharmacist.\"     To schedule another MTM appointment, please call the clinic directly or you may call the MTM scheduling line at [phone] or toll-free at [phone].     My Clinical Pharmacist's contact information:                                                      Please feel free to contact me with any questions or concerns you have.      Abram Simeon, Jin  MTM Pharmacist    Phone: [phone]     "

## 2023-01-20 ENCOUNTER — TELEPHONE (OUTPATIENT)
Dept: CARDIOLOGY | Facility: CLINIC | Age: 68
End: 2023-01-20
Payer: MEDICARE

## 2023-01-20 ENCOUNTER — HOSPITAL ENCOUNTER (OUTPATIENT)
Dept: CARDIAC REHAB | Facility: CLINIC | Age: 68
Discharge: HOME | End: 2023-01-20
Attending: STUDENT IN AN ORGANIZED HEALTH CARE EDUCATION/TRAINING PROGRAM
Payer: MEDICARE

## 2023-01-20 DIAGNOSIS — I50.22 CHRONIC SYSTOLIC CONGESTIVE HEART FAILURE (H): ICD-10-CM

## 2023-01-20 PROCEDURE — 93798 PHYS/QHP OP CAR RHAB W/ECG: CPT

## 2023-01-20 PROCEDURE — 93797 PHYS/QHP OP CAR RHAB WO ECG: CPT | Mod: 59

## 2023-01-20 RX ORDER — PREDNISONE 5 MG/1
15 TABLET ORAL 2 TIMES DAILY
Qty: 180 TABLET | Refills: 3 | Status: SHIPPED | OUTPATIENT
Start: 2023-01-20 | End: 2023-01-24

## 2023-01-23 ENCOUNTER — HOSPITAL ENCOUNTER (OUTPATIENT)
Facility: CLINIC | Age: 68
Discharge: HOME | End: 2023-01-23
Attending: INTERNAL MEDICINE | Admitting: INTERNAL MEDICINE
Payer: MEDICARE

## 2023-01-23 ENCOUNTER — APPOINTMENT (OUTPATIENT)
Dept: MEDSURG UNIT | Facility: CLINIC | Age: 68
End: 2023-01-23
Attending: INTERNAL MEDICINE
Payer: MEDICARE

## 2023-01-23 ENCOUNTER — APPOINTMENT (OUTPATIENT)
Dept: LAB | Facility: CLINIC | Age: 68
End: 2023-01-23
Attending: INTERNAL MEDICINE
Payer: MEDICARE

## 2023-01-23 ENCOUNTER — HOSPITAL ENCOUNTER (OUTPATIENT)
Dept: CARDIOLOGY | Facility: CLINIC | Age: 68
Discharge: HOME | End: 2023-01-23
Attending: INTERNAL MEDICINE | Admitting: INTERNAL MEDICINE
Payer: MEDICARE

## 2023-01-23 VITALS
BODY MASS INDEX: 23.62 KG/M2 | WEIGHT: 184 LBS | SYSTOLIC BLOOD PRESSURE: 125 MMHG | RESPIRATION RATE: 16 BRPM | DIASTOLIC BLOOD PRESSURE: 92 MMHG | OXYGEN SATURATION: 96 % | HEART RATE: 79 BPM

## 2023-01-23 DIAGNOSIS — Z13.220 LIPID SCREENING: ICD-10-CM

## 2023-01-23 DIAGNOSIS — Z11.59 SPECIAL SCREENING EXAMINATION FOR VIRAL DISEASE: ICD-10-CM

## 2023-01-23 DIAGNOSIS — B95.7 STAPHYLOCOCCUS EPIDERMIDIS BACTEREMIA: ICD-10-CM

## 2023-01-23 DIAGNOSIS — Z94.1 HEART REPLACED BY TRANSPLANT (H): ICD-10-CM

## 2023-01-23 DIAGNOSIS — Z94.1 HEART TRANSPLANT RECIPIENT (H): ICD-10-CM

## 2023-01-23 DIAGNOSIS — R78.81 STAPHYLOCOCCUS EPIDERMIDIS BACTEREMIA: ICD-10-CM

## 2023-01-23 DIAGNOSIS — B99.8 OTHER INFECTIOUS DISEASE: ICD-10-CM

## 2023-01-23 DIAGNOSIS — Z79.899 ENCOUNTER FOR LONG-TERM (CURRENT) USE OF HIGH-RISK MEDICATION: ICD-10-CM

## 2023-01-23 PROBLEM — Z98.890 STATUS POST CORONARY ANGIOGRAM: Status: ACTIVE | Noted: 2023-01-23

## 2023-01-23 LAB
ACT BLD: 177 SECONDS (ref 74–150)
ACT BLD: 190 SECONDS (ref 74–150)
ACT BLD: 203 SECONDS (ref 74–150)
ACT BLD: 207 SECONDS (ref 74–150)
ACT BLD: 258 SECONDS (ref 74–150)
ALBUMIN SERPL BCG-MCNC: 3.5 G/DL (ref 3.5–5.2)
ALP SERPL-CCNC: 86 U/L (ref 40–129)
ALT SERPL W P-5'-P-CCNC: 23 U/L (ref 10–50)
ANION GAP SERPL CALCULATED.3IONS-SCNC: 11 MMOL/L (ref 7–15)
AST SERPL W P-5'-P-CCNC: 18 U/L (ref 10–50)
BASOPHILS # BLD AUTO: 0 10E3/UL (ref 0–0.2)
BASOPHILS NFR BLD AUTO: 0 %
BILIRUB SERPL-MCNC: 0.3 MG/DL
BUN SERPL-MCNC: 23.8 MG/DL (ref 8–23)
CALCIUM SERPL-MCNC: 9.1 MG/DL (ref 8.8–10.2)
CHLORIDE SERPL-SCNC: 101 MMOL/L (ref 98–107)
CHOLEST SERPL-MCNC: 217 MG/DL
CK SERPL-CCNC: 30 U/L (ref 39–308)
CMV DNA SPEC NAA+PROBE-ACNC: NOT DETECTED IU/ML
CREAT SERPL-MCNC: 0.77 MG/DL (ref 0.67–1.17)
CRP SERPL-MCNC: <3 MG/L
DEPRECATED HCO3 PLAS-SCNC: 24 MMOL/L (ref 22–29)
EOSINOPHIL # BLD AUTO: 0 10E3/UL (ref 0–0.7)
EOSINOPHIL NFR BLD AUTO: 0 %
ERYTHROCYTE [DISTWIDTH] IN BLOOD BY AUTOMATED COUNT: 15.9 % (ref 10–15)
ERYTHROCYTE [SEDIMENTATION RATE] IN BLOOD BY WESTERGREN METHOD: 14 MM/HR (ref 0–20)
GFR SERPL CREATININE-BSD FRML MDRD: >90 ML/MIN/1.73M2
GLUCOSE SERPL-MCNC: 129 MG/DL (ref 70–99)
HCT VFR BLD AUTO: 37.4 % (ref 40–53)
HDLC SERPL-MCNC: 110 MG/DL
HGB BLD-MCNC: 11.4 G/DL (ref 13.3–17.7)
HGB BLD-MCNC: 11.7 G/DL (ref 13.3–17.7)
IMM GRANULOCYTES # BLD: 0.1 10E3/UL
IMM GRANULOCYTES NFR BLD: 1 %
LDLC SERPL CALC-MCNC: 91 MG/DL
LVEF ECHO: NORMAL
LYMPHOCYTES # BLD AUTO: 0.8 10E3/UL (ref 0.8–5.3)
LYMPHOCYTES NFR BLD AUTO: 9 %
MAGNESIUM SERPL-MCNC: 1.4 MG/DL (ref 1.7–2.3)
MCH RBC QN AUTO: 29 PG (ref 26.5–33)
MCHC RBC AUTO-ENTMCNC: 31.3 G/DL (ref 31.5–36.5)
MCV RBC AUTO: 93 FL (ref 78–100)
MONOCYTES # BLD AUTO: 0.4 10E3/UL (ref 0–1.3)
MONOCYTES NFR BLD AUTO: 5 %
NEUTROPHILS # BLD AUTO: 7.4 10E3/UL (ref 1.6–8.3)
NEUTROPHILS NFR BLD AUTO: 85 %
NONHDLC SERPL-MCNC: 107 MG/DL
NRBC # BLD AUTO: 0 10E3/UL
NRBC BLD AUTO-RTO: 0 /100
OXYHGB MFR BLDV: 72 % (ref 92–100)
PHOSPHATE SERPL-MCNC: 3.1 MG/DL (ref 2.5–4.5)
PLATELET # BLD AUTO: 284 10E3/UL (ref 150–450)
POTASSIUM SERPL-SCNC: 4.6 MMOL/L (ref 3.4–5.3)
PROT SERPL-MCNC: 5.7 G/DL (ref 6.4–8.3)
RBC # BLD AUTO: 4.04 10E6/UL (ref 4.4–5.9)
SODIUM SERPL-SCNC: 136 MMOL/L (ref 136–145)
TACROLIMUS BLD-MCNC: 10.5 UG/L (ref 5–15)
TME LAST DOSE: NORMAL H
TME LAST DOSE: NORMAL H
TRIGL SERPL-MCNC: 80 MG/DL
VANCOMYCIN SERPL-MCNC: 10.2 UG/ML
WBC # BLD AUTO: 8.7 10E3/UL (ref 4–11)

## 2023-01-23 PROCEDURE — C1769 GUIDE WIRE: HCPCS | Performed by: INTERNAL MEDICINE

## 2023-01-23 PROCEDURE — 250N000011 HC RX IP 250 OP 636: Performed by: INTERNAL MEDICINE

## 2023-01-23 PROCEDURE — 250N000013 HC RX MED GY IP 250 OP 250 PS 637: Performed by: INTERNAL MEDICINE

## 2023-01-23 PROCEDURE — 272N000001 HC OR GENERAL SUPPLY STERILE: Performed by: INTERNAL MEDICINE

## 2023-01-23 PROCEDURE — 93505 ENDOMYOCARDIAL BIOPSY: CPT | Mod: 26 | Performed by: INTERNAL MEDICINE

## 2023-01-23 PROCEDURE — 88350 IMFLUOR EA ADDL 1ANTB STN PX: CPT | Mod: 26 | Performed by: PATHOLOGY

## 2023-01-23 PROCEDURE — 84100 ASSAY OF PHOSPHORUS: CPT | Performed by: NURSE PRACTITIONER

## 2023-01-23 PROCEDURE — 93010 ELECTROCARDIOGRAM REPORT: CPT | Mod: 59 | Performed by: INTERNAL MEDICINE

## 2023-01-23 PROCEDURE — 88307 TISSUE EXAM BY PATHOLOGIST: CPT | Mod: 26 | Performed by: PATHOLOGY

## 2023-01-23 PROCEDURE — C1894 INTRO/SHEATH, NON-LASER: HCPCS | Performed by: INTERNAL MEDICINE

## 2023-01-23 PROCEDURE — 87799 DETECT AGENT NOS DNA QUANT: CPT | Performed by: NURSE PRACTITIONER

## 2023-01-23 PROCEDURE — 85025 COMPLETE CBC W/AUTO DIFF WBC: CPT

## 2023-01-23 PROCEDURE — 80202 ASSAY OF VANCOMYCIN: CPT

## 2023-01-23 PROCEDURE — 92978 ENDOLUMINL IVUS OCT C 1ST: CPT | Performed by: INTERNAL MEDICINE

## 2023-01-23 PROCEDURE — 86140 C-REACTIVE PROTEIN: CPT | Performed by: PHYSICIAN ASSISTANT

## 2023-01-23 PROCEDURE — 87521 HEPATITIS C PROBE&RVRS TRNSC: CPT | Performed by: NURSE PRACTITIONER

## 2023-01-23 PROCEDURE — 80061 LIPID PANEL: CPT | Performed by: NURSE PRACTITIONER

## 2023-01-23 PROCEDURE — 93505 ENDOMYOCARDIAL BIOPSY: CPT | Performed by: INTERNAL MEDICINE

## 2023-01-23 PROCEDURE — 85347 COAGULATION TIME ACTIVATED: CPT | Mod: 91

## 2023-01-23 PROCEDURE — 86828 HLA CLASS I&II ANTIBODY QUAL: CPT | Performed by: NURSE PRACTITIONER

## 2023-01-23 PROCEDURE — 85018 HEMOGLOBIN: CPT

## 2023-01-23 PROCEDURE — 93456 R HRT CORONARY ARTERY ANGIO: CPT | Performed by: INTERNAL MEDICINE

## 2023-01-23 PROCEDURE — 82810 BLOOD GASES O2 SAT ONLY: CPT

## 2023-01-23 PROCEDURE — 86832 HLA CLASS I HIGH DEFIN QUAL: CPT | Performed by: NURSE PRACTITIONER

## 2023-01-23 PROCEDURE — 258N000003 HC RX IP 258 OP 636: Performed by: STUDENT IN AN ORGANIZED HEALTH CARE EDUCATION/TRAINING PROGRAM

## 2023-01-23 PROCEDURE — C1760 CLOSURE DEV, VASC: HCPCS | Performed by: INTERNAL MEDICINE

## 2023-01-23 PROCEDURE — 999N000134 HC STATISTIC PP CARE STAGE 3

## 2023-01-23 PROCEDURE — 258N000003 HC RX IP 258 OP 636: Performed by: INTERNAL MEDICINE

## 2023-01-23 PROCEDURE — 85652 RBC SED RATE AUTOMATED: CPT

## 2023-01-23 PROCEDURE — 99153 MOD SED SAME PHYS/QHP EA: CPT | Performed by: INTERNAL MEDICINE

## 2023-01-23 PROCEDURE — C1887 CATHETER, GUIDING: HCPCS | Performed by: INTERNAL MEDICINE

## 2023-01-23 PROCEDURE — 82550 ASSAY OF CK (CPK): CPT | Performed by: NURSE PRACTITIONER

## 2023-01-23 PROCEDURE — 99152 MOD SED SAME PHYS/QHP 5/>YRS: CPT | Performed by: INTERNAL MEDICINE

## 2023-01-23 PROCEDURE — 99152 MOD SED SAME PHYS/QHP 5/>YRS: CPT | Mod: GC | Performed by: INTERNAL MEDICINE

## 2023-01-23 PROCEDURE — 36415 COLL VENOUS BLD VENIPUNCTURE: CPT

## 2023-01-23 PROCEDURE — 250N000009 HC RX 250: Performed by: INTERNAL MEDICINE

## 2023-01-23 PROCEDURE — 93306 TTE W/DOPPLER COMPLETE: CPT | Mod: 26 | Performed by: STUDENT IN AN ORGANIZED HEALTH CARE EDUCATION/TRAINING PROGRAM

## 2023-01-23 PROCEDURE — 86833 HLA CLASS II HIGH DEFIN QUAL: CPT | Performed by: NURSE PRACTITIONER

## 2023-01-23 PROCEDURE — 999N000054 HC STATISTIC EKG NON-CHARGEABLE

## 2023-01-23 PROCEDURE — 93306 TTE W/DOPPLER COMPLETE: CPT

## 2023-01-23 PROCEDURE — 80197 ASSAY OF TACROLIMUS: CPT | Performed by: NURSE PRACTITIONER

## 2023-01-23 PROCEDURE — 88346 IMFLUOR 1ST 1ANTB STAIN PX: CPT | Mod: 26 | Performed by: PATHOLOGY

## 2023-01-23 PROCEDURE — 93454 CORONARY ARTERY ANGIO S&I: CPT | Mod: 26 | Performed by: INTERNAL MEDICINE

## 2023-01-23 PROCEDURE — C1753 CATH, INTRAVAS ULTRASOUND: HCPCS | Performed by: INTERNAL MEDICINE

## 2023-01-23 PROCEDURE — 83735 ASSAY OF MAGNESIUM: CPT | Performed by: NURSE PRACTITIONER

## 2023-01-23 PROCEDURE — 999N000142 HC STATISTIC PROCEDURE PREP ONLY

## 2023-01-23 PROCEDURE — 80053 COMPREHEN METABOLIC PANEL: CPT | Performed by: NURSE PRACTITIONER

## 2023-01-23 PROCEDURE — 93454 CORONARY ARTERY ANGIO S&I: CPT | Performed by: INTERNAL MEDICINE

## 2023-01-23 PROCEDURE — 93005 ELECTROCARDIOGRAM TRACING: CPT

## 2023-01-23 PROCEDURE — 88350 IMFLUOR EA ADDL 1ANTB STN PX: CPT | Mod: TC | Performed by: INTERNAL MEDICINE

## 2023-01-23 DEVICE — CLOSURE ANGIOSEAL 6FR 610130: Type: IMPLANTABLE DEVICE | Status: FUNCTIONAL

## 2023-01-23 RX ORDER — ASPIRIN 81 MG/1
243 TABLET, CHEWABLE ORAL ONCE
Status: COMPLETED | OUTPATIENT
Start: 2023-01-23 | End: 2023-01-23

## 2023-01-23 RX ORDER — SODIUM CHLORIDE 9 MG/ML
INJECTION, SOLUTION INTRAVENOUS CONTINUOUS
Status: DISCONTINUED | OUTPATIENT
Start: 2023-01-23 | End: 2023-01-23 | Stop reason: HOSPADM

## 2023-01-23 RX ORDER — FENTANYL CITRATE 50 UG/ML
25 INJECTION, SOLUTION INTRAMUSCULAR; INTRAVENOUS
Status: DISCONTINUED | OUTPATIENT
Start: 2023-01-23 | End: 2023-01-23 | Stop reason: HOSPADM

## 2023-01-23 RX ORDER — ASPIRIN 325 MG
325 TABLET ORAL ONCE
Status: COMPLETED | OUTPATIENT
Start: 2023-01-23 | End: 2023-01-23

## 2023-01-23 RX ORDER — NALOXONE HYDROCHLORIDE 0.4 MG/ML
0.2 INJECTION, SOLUTION INTRAMUSCULAR; INTRAVENOUS; SUBCUTANEOUS
Status: DISCONTINUED | OUTPATIENT
Start: 2023-01-23 | End: 2023-01-23 | Stop reason: HOSPADM

## 2023-01-23 RX ORDER — IOPAMIDOL 755 MG/ML
INJECTION, SOLUTION INTRAVASCULAR
Status: DISCONTINUED | OUTPATIENT
Start: 2023-01-23 | End: 2023-01-23 | Stop reason: HOSPADM

## 2023-01-23 RX ORDER — HEPARIN SODIUM (PORCINE) LOCK FLUSH IV SOLN 100 UNIT/ML 100 UNIT/ML
5 SOLUTION INTRAVENOUS ONCE
Status: COMPLETED | OUTPATIENT
Start: 2023-01-23 | End: 2023-01-23

## 2023-01-23 RX ORDER — FLUMAZENIL 0.1 MG/ML
0.2 INJECTION, SOLUTION INTRAVENOUS
Status: DISCONTINUED | OUTPATIENT
Start: 2023-01-23 | End: 2023-01-23 | Stop reason: HOSPADM

## 2023-01-23 RX ORDER — ATROPINE SULFATE 0.1 MG/ML
0.5 INJECTION INTRAVENOUS
Status: DISCONTINUED | OUTPATIENT
Start: 2023-01-23 | End: 2023-01-23 | Stop reason: HOSPADM

## 2023-01-23 RX ORDER — NALOXONE HYDROCHLORIDE 0.4 MG/ML
0.4 INJECTION, SOLUTION INTRAMUSCULAR; INTRAVENOUS; SUBCUTANEOUS
Status: DISCONTINUED | OUTPATIENT
Start: 2023-01-23 | End: 2023-01-23 | Stop reason: HOSPADM

## 2023-01-23 RX ORDER — HEPARIN SODIUM 1000 [USP'U]/ML
INJECTION, SOLUTION INTRAVENOUS; SUBCUTANEOUS
Status: DISCONTINUED | OUTPATIENT
Start: 2023-01-23 | End: 2023-01-23 | Stop reason: HOSPADM

## 2023-01-23 RX ORDER — POTASSIUM CHLORIDE 750 MG/1
20 TABLET, EXTENDED RELEASE ORAL
Status: DISCONTINUED | OUTPATIENT
Start: 2023-01-23 | End: 2023-01-23 | Stop reason: HOSPADM

## 2023-01-23 RX ORDER — POTASSIUM CHLORIDE 750 MG/1
40 TABLET, EXTENDED RELEASE ORAL
Status: DISCONTINUED | OUTPATIENT
Start: 2023-01-23 | End: 2023-01-23 | Stop reason: HOSPADM

## 2023-01-23 RX ORDER — OXYCODONE HYDROCHLORIDE 5 MG/1
5 TABLET ORAL EVERY 4 HOURS PRN
Status: DISCONTINUED | OUTPATIENT
Start: 2023-01-23 | End: 2023-01-23 | Stop reason: HOSPADM

## 2023-01-23 RX ORDER — OXYCODONE HYDROCHLORIDE 5 MG/1
10 TABLET ORAL EVERY 4 HOURS PRN
Status: DISCONTINUED | OUTPATIENT
Start: 2023-01-23 | End: 2023-01-23 | Stop reason: HOSPADM

## 2023-01-23 RX ORDER — FENTANYL CITRATE 50 UG/ML
INJECTION, SOLUTION INTRAMUSCULAR; INTRAVENOUS
Status: DISCONTINUED | OUTPATIENT
Start: 2023-01-23 | End: 2023-01-23 | Stop reason: HOSPADM

## 2023-01-23 RX ADMIN — SODIUM CHLORIDE 500 ML: 9 INJECTION, SOLUTION INTRAVENOUS at 12:13

## 2023-01-23 RX ADMIN — Medication 5 ML: at 16:49

## 2023-01-23 RX ADMIN — SODIUM CHLORIDE 500 ML: 9 INJECTION, SOLUTION INTRAVENOUS at 08:43

## 2023-01-23 RX ADMIN — ASPIRIN 325 MG ORAL TABLET 325 MG: 325 PILL ORAL at 08:42

## 2023-01-23 ASSESSMENT — ACTIVITIES OF DAILY LIVING (ADL)
ADLS_ACUITY_SCORE: 35

## 2023-01-23 NOTE — PROGRESS NOTES
D/I/A: Manual pressure held for 15 minutes to R groin site.  Sheath, size 7,  pulled by TG from RFV.  Site CDI, no hematoma.  Stasis achieved at 1446. Off bedrest @ 1646.  A+O x4 and making needs known.  Denies pain or sob.  VSSA.  Tolerated sheath removal well.  P: Continue to monitor status.  Discharge to home once meeting criteria.

## 2023-01-23 NOTE — PROGRESS NOTES
Pt completed 2 hours bedrest post R femoral venous sheath removal. VSS. Denies pain. Tolerating po intake, voided and ambulated in [place]. R groin site c/d/i with no bleeding or hematoma. CMS intact. Discharge instructions reviewed with pt and wife. PICC flushed and hep locked. Pt and wife have no further questions. Pt discharged via wheelchair to the  station accompanied by wife. All belongings returned to pt.

## 2023-01-23 NOTE — DISCHARGE INSTRUCTIONS
[facility]                        Interventional Cardiology  Discharge Instructions   Post Angiogram (Cardiac)      AFTER YOU GO HOME:  DO NOT drive or operate machinery at home or at work for at least 24 hours  If you were given sedation: we recommend that an adult stay with you for 24 hours  DO relax and take it easy for 24 hours  DO drink plenty of fluids  DO resume your regular diet, unless otherwise instructed by your Primary Physician  DO NOT SMOKE FOR AT LEAST 24 HOURS. If you have been given any medications that were to help you relax or sedate you during your procedure  DO NOT drink alcoholic beverages the day of your procedure  DO NOT do any strenuous exercise or lifting (>10 lbs) for at least 7 days following your procedure  DO NOT take a tub bath, get in a hot tub, or pool for at least 5 days following your procedure  Do Not scrub the procedure site vigorously  DO NOT make any important or legal decisions for 24 hours following your procedure    CALL YOUR PRIMARY PHYSICIAN IF:  You start bleeding from the procedure site. If you do start to bleed from that site, lie down flat and hold pressure on the site. Call your physician, your physician will tell you if you need to return to the hospital. It is common to have a small bruise or lump at the site  You have numbness, coolness or change in color of the leg of the puncture site    You experience pain or redness at the puncture site    You develop hives or a rash or unexplained itching  You develop a temperature of 101 degrees F or greater    ADDITIONAL INSTRUCTIONS   Support the puncture site for coughing, sneezing, or moving your bowels for the first 48 hours  No lotion or powder to the puncture site for 3 days    [facility] INTERVENTIONAL CARDIOLOGY DEPARTMENT:    Procedure Physician: Dr Bush                                          Date of Procedure:1/23/2023    Telephone Numbers [phone] Monday-Friday 8:00 am to 4:30 pm     [phone]  [phone] After 4:30 pm Monday-Friday, Weekends & Holidays  Ask for Interventional Cardiologist on call. Someone is on call 24 hours/day   [facility] toll free number [phone] Monday-Friday 8:00 am to 4:30 pm   [facility] Emergency Dept [phone]

## 2023-01-23 NOTE — PRE-PROCEDURE
GENERAL PRE-PROCEDURE:   Procedure:  Right heart catheterization with biopsy and coronary angiogram with IVUS  Date/Time:  1/23/2023 9:48 AM    Written consent obtained?: Yes    Risks and benefits: Risks, benefits and alternatives were discussed    Consent given by:  Patient  Patient states understanding of procedure being performed: Yes    Patient's understanding of procedure matches consent: Yes    Procedure consent matches procedure scheduled: Yes    Expected level of sedation:  Moderate  Appropriately NPO:  Yes  Mallampati  :  Grade 3- soft palate visible, posterior pharyngeal wall not visible  Lungs:  Lungs clear with good breath sounds bilaterally  Heart:  Normal heart sounds and rate  History & Physical reviewed:  History and physical reviewed and no updates needed  Statement of review:  I have reviewed the lab findings, diagnostic data, medications, and the plan for sedation    Plan of care discussed with Dr. Bush who agrees with the above assessment and plan.    John Link MD  Cardiology Fellow

## 2023-01-24 ENCOUNTER — ANCILLARY PROCEDURE (OUTPATIENT)
Dept: GENERAL RADIOLOGY | Facility: CLINIC | Age: 68
End: 2023-01-24
Attending: INTERNAL MEDICINE
Payer: MEDICARE

## 2023-01-24 ENCOUNTER — TELEPHONE (OUTPATIENT)
Dept: TRANSPLANT | Facility: CLINIC | Age: 68
End: 2023-01-24

## 2023-01-24 ENCOUNTER — INFUSION THERAPY VISIT (OUTPATIENT)
Dept: INFUSION THERAPY | Facility: CLINIC | Age: 68
End: 2023-01-24
Attending: PHYSICIAN ASSISTANT
Payer: MEDICARE

## 2023-01-24 ENCOUNTER — OFFICE VISIT (OUTPATIENT)
Dept: CARDIOLOGY | Facility: CLINIC | Age: 68
End: 2023-01-24
Attending: INTERNAL MEDICINE
Payer: MEDICARE

## 2023-01-24 VITALS — DIASTOLIC BLOOD PRESSURE: 72 MMHG | OXYGEN SATURATION: 96 % | SYSTOLIC BLOOD PRESSURE: 103 MMHG | HEART RATE: 99 BPM

## 2023-01-24 DIAGNOSIS — Z94.1 HEART TRANSPLANT RECIPIENT (H): ICD-10-CM

## 2023-01-24 DIAGNOSIS — Z94.1 HEART REPLACED BY TRANSPLANT (H): ICD-10-CM

## 2023-01-24 DIAGNOSIS — B95.7 STAPHYLOCOCCUS EPIDERMIDIS BACTEREMIA: Primary | ICD-10-CM

## 2023-01-24 DIAGNOSIS — R78.81 STAPHYLOCOCCUS EPIDERMIDIS BACTEREMIA: Primary | ICD-10-CM

## 2023-01-24 LAB
PATH REPORT.COMMENTS IMP SPEC: NORMAL
PATH REPORT.FINAL DX SPEC: NORMAL
PATH REPORT.GROSS SPEC: NORMAL
PATH REPORT.MICROSCOPIC SPEC OTHER STN: NORMAL
PATH REPORT.RELEVANT HX SPEC: NORMAL
PHOTO IMAGE: NORMAL

## 2023-01-24 PROCEDURE — G0463 HOSPITAL OUTPT CLINIC VISIT: HCPCS

## 2023-01-24 PROCEDURE — 99024 POSTOP FOLLOW-UP VISIT: CPT | Performed by: NURSE PRACTITIONER

## 2023-01-24 PROCEDURE — 71046 X-RAY EXAM CHEST 2 VIEWS: CPT | Mod: GC | Performed by: RADIOLOGY

## 2023-01-24 PROCEDURE — 250N000011 HC RX IP 250 OP 636: Performed by: PHYSICIAN ASSISTANT

## 2023-01-24 PROCEDURE — G0463 HOSPITAL OUTPT CLINIC VISIT: HCPCS | Performed by: NURSE PRACTITIONER

## 2023-01-24 RX ORDER — DIPHENHYDRAMINE HYDROCHLORIDE 50 MG/ML
50 INJECTION INTRAMUSCULAR; INTRAVENOUS
Status: CANCELLED
Start: 2023-01-24

## 2023-01-24 RX ORDER — ALBUTEROL SULFATE 0.83 MG/ML
2.5 SOLUTION RESPIRATORY (INHALATION)
Status: CANCELLED | OUTPATIENT
Start: 2023-01-24

## 2023-01-24 RX ORDER — EPINEPHRINE 1 MG/ML
0.3 INJECTION, SOLUTION INTRAMUSCULAR; SUBCUTANEOUS EVERY 5 MIN PRN
Status: CANCELLED | OUTPATIENT
Start: 2023-01-24

## 2023-01-24 RX ORDER — ALBUTEROL SULFATE 90 UG/1
1-2 AEROSOL, METERED RESPIRATORY (INHALATION)
Status: CANCELLED
Start: 2023-01-24

## 2023-01-24 RX ORDER — MEPERIDINE HYDROCHLORIDE 25 MG/ML
25 INJECTION INTRAMUSCULAR; INTRAVENOUS; SUBCUTANEOUS EVERY 30 MIN PRN
Status: CANCELLED | OUTPATIENT
Start: 2023-01-24

## 2023-01-24 RX ORDER — PREDNISONE 5 MG/1
TABLET ORAL
Qty: 180 TABLET | Refills: 3
Start: 2023-01-24 | End: 2023-02-06

## 2023-01-24 RX ORDER — METHYLPREDNISOLONE SODIUM SUCCINATE 125 MG/2ML
125 INJECTION, POWDER, LYOPHILIZED, FOR SOLUTION INTRAMUSCULAR; INTRAVENOUS
Status: CANCELLED
Start: 2023-01-24

## 2023-01-24 RX ORDER — TACROLIMUS 5 MG/1
5 CAPSULE ORAL 2 TIMES DAILY
Qty: 180 CAPSULE | Refills: 3 | Status: SHIPPED | OUTPATIENT
Start: 2023-01-24 | End: 2023-02-17

## 2023-01-24 RX ORDER — HEPARIN SODIUM (PORCINE) LOCK FLUSH IV SOLN 100 UNIT/ML 100 UNIT/ML
5 SOLUTION INTRAVENOUS
Status: CANCELLED | OUTPATIENT
Start: 2023-01-24

## 2023-01-24 RX ORDER — HEPARIN SODIUM,PORCINE 10 UNIT/ML
5 VIAL (ML) INTRAVENOUS
Status: CANCELLED | OUTPATIENT
Start: 2023-01-24

## 2023-01-24 RX ORDER — HEPARIN SODIUM,PORCINE 10 UNIT/ML
5 VIAL (ML) INTRAVENOUS
Status: DISCONTINUED | OUTPATIENT
Start: 2023-01-24 | End: 2023-01-24 | Stop reason: HOSPADM

## 2023-01-24 RX ADMIN — Medication 5 ML: at 09:23

## 2023-01-24 ASSESSMENT — PAIN SCALES - GENERAL: PAINLEVEL: NO PAIN (0)

## 2023-01-24 NOTE — PATIENT INSTRUCTIONS
Patient Instructions   Please call your coordinator at [phone] with any questions or concern     Coordinator will update you on remaining labs, test results and medication changes     Return in two weeks as scheduled - February 9, 2023        No changes with tacrolimus dose today.     We will follow up with ID re: vanco level of 10.2.

## 2023-01-24 NOTE — NURSING NOTE
Chief Complaint   Patient presents with     Follow Up     Week 4 follow up     Vitals were taken and medications reconciled.    Gage Joseph, EMT  8:22 AM

## 2023-01-24 NOTE — TELEPHONE ENCOUNTER
Reviewed heart biopsy results with Dr Dominguez. OK to decrease pred to 15/10 mg.  Wife called with results and med change.

## 2023-01-24 NOTE — LETTER
1/24/2023      RE: Paco Stein  [address]  [place] [address]       Dear Colleague,    Thank you for the opportunity to participate in the care of your patient, Paco Stein, at the [facility] [place] at . Please see a copy of my visit note below.    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     His post-operative course has been c/b delirium, leukocytosis, staph epi bacteremia, and prolonged CT output.  He discharged 1/13.     Rejection history:  First 3 biopsies have shown diffuse capillary staining for c4d, suggestive of pAMR1.  No DSAs and no graft dysfunction, so further treatment has been deferred and prednisone taper continues.  AlloMap scores:  n/a  DSAs:  none  Coronary angio/Ischemic eval:  see below  Last RHC:  1/17/23 showed normal biventricular filling pressures, with RA 2, mPA 10 PCW 6, and CI 2.63  Echo/cMRI:  TTE 1/2/23 showed normal graft function, with LVEF 60-65% and normal RV size/function.     Since his last visit, he states that he has been doing well.  He has started cardiac rehab, and continues to walk and exercise regularly with no exertional concerns.  He continues to note improvements in her strength and endurance.  His breathing has been well, and he denies SOB.  He is sleeping in a bed, and uses 1 pillow without PND and orthopnea.  His appetite has been well, and he is eating regularly without nausea, vomiting, diarrhea, and constipation.  His weight is up, which  he attributes to increased po intake, and is now ranging ~184#.  He does not feel any fluid retention.  His BPs have ranged 100-120/70-90s.  He has ongoing vertigo, noticeable when lying flat, moving his head, and when rising.  It is not worse.  He had a little nosebleed one morning.  He otherwise denies chest pain, palpitations, falls, headaches, acute vision changes, fevers, chills, cough, sore throat, and signs of bleeding.      Serostatus:  CMV D+/R+  EBV D+/R+  Toxo D-/R-      Patient Active Problem List    Diagnosis Date Noted     Status post coronary angiogram 01/23/2023     Priority: Medium     Staphylococcus epidermidis bacteremia 01/09/2023     Priority: Medium     Using prophylactic antibiotic on daily basis 01/09/2023     Priority: Medium     Benign neoplasm of colon 12/12/2022     Priority: Medium     Troponin level elevated 12/06/2022     Priority: Medium     Anginal equivalent (H) 12/06/2022     Priority: Medium     Heart failure, unspecified HF chronicity, unspecified heart failure type (H) 12/06/2022     Priority: Medium     Acute embolism and thrombosis of left peroneal vein (H) 11/22/2022     Priority: Medium     Acute on chronic combined systolic (congestive) and diastolic (congestive) heart failure (H) 11/14/2022     Priority: Medium     CHF (congestive heart failure) (H) 11/10/2022     Priority: Medium     Acute respiratory failure with hypoxia (H) 11/08/2022     Priority: Medium     Coronary artery disease involving native coronary artery of native heart without angina pectoris 11/08/2022     Priority: Medium     Essential hypertension 11/08/2022     Priority: Medium     NSVT (nonsustained ventricular tachycardia) 11/08/2022     Priority: Medium     SOB (shortness of breath) 11/08/2022     Priority: Medium     Syncope and collapse 10/07/2021     Priority: Medium     Cardiomyopathy (H) 10/06/2021     Priority: Medium     Primary central nervous system vasculitis (H) 11/30/2017     Priority:  Medium     Vasculitis, CNS (H) 11/30/2017     Priority: Medium     Stroke (H) 09/28/2016     Priority: Medium     Hyperlipidemia 08/08/2013     Priority: Medium       PAST MEDICAL HISTORY:  Past Medical History:   Diagnosis Date     Congestive heart failure (H)        CURRENT MEDICATIONS:  Prescription Medications as of 1/24/2023       Rx Number Disp Refills Start End Last Dispensed Date Next Fill Date Owning Pharmacy    acetaminophen (TYLENOL) 325 MG tablet  100 tablet 1 1/13/2023    [address]    Sig: Take 3 tablets (975 mg) by mouth every 8 hours    Class: E-Prescribe    Route: Oral    amLODIPine (NORVASC) 5 MG tablet  30 tablet 1 1/14/2023    [address]    Sig: Take 1 tablet (5 mg) by mouth daily    Class: E-Prescribe    Route: Oral    apixaban ANTICOAGULANT (ELIQUIS) 5 MG tablet  60 tablet 1 1/13/2023    [address]    Sig: Take 1 tablet (5 mg) by mouth 2 times daily    Class: E-Prescribe    Route: Oral    calcium carbonate-vitamin D (CALTRATE) 600-10 MG-MCG per tablet  60 tablet 1 1/13/2023    [address]    Sig: Take 1 tablet by mouth 2 times daily (with meals)    Class: E-Prescribe    Route: Oral    magnesium oxide (MAG-OX) 400 MG tablet  120 tablet 3 1/19/2023    [facility] [address]    Sig: Take 2 tablets (800 mg) by mouth 2 times daily    Class: E-Prescribe    Route: Oral    melatonin 10 MG TABS tablet  30 tablet 1 1/13/2023    [address]    Sig: Take 1 tablet (10 mg) by mouth At Bedtime    Class: E-Prescribe    Route: Oral    methocarbamol (ROBAXIN) 500 MG tablet  30 tablet 0 1/13/2023    [address]     Sig: Take 1 tablet (500 mg) by mouth every 6 hours as needed for muscle spasms    Class: E-Prescribe    Route: Oral    mycophenolate (GENERIC EQUIVALENT) 250 MG capsule  240 capsule 1 1/13/2023 3/14/2023   [address]    Sig: Take 4 capsules (1,000 mg) by mouth 2 times daily for 60 days    Class: E-Prescribe    Route: Oral    nystatin (MYCOSTATIN) 651884 UNIT/ML suspension  1200 mL 1 1/13/2023 3/14/2023   [address]    Sig: Swish and swallow 10 mLs (1,000,000 Units) in mouth 4 times daily for 60 days    Class: E-Prescribe    Route: Swish & Swallow    oxyCODONE (ROXICODONE) 5 MG tablet  5 tablet 0 1/13/2023    [address]    Sig: Take 1 tablet (5 mg) by mouth every 4 hours as needed for severe pain (7-10) or moderate pain (4-6)    Class: E-Prescribe    Earliest Fill Date: 1/13/2023    Route: Oral    pantoprazole (PROTONIX) 40 MG EC tablet  30 tablet 0 1/14/2023    [address]    Sig: Take 1 tablet (40 mg) by mouth every morning (before breakfast)    Class: E-Prescribe    Route: Oral    polyethylene glycol (MIRALAX) 17 g packet  30 packet 0 1/14/2023    [facility] [address]    Sig: Take 17 g by mouth daily    Class: E-Prescribe    Route: Oral    predniSONE (DELTASONE) 5 MG tablet  180 tablet 3 1/20/2023    [facility] [address]    Sig: Take 3 tablets (15 mg) by mouth 2 times daily    Class: E-Prescribe    Notes to Pharmacy: Dose change. TXP DT 12/25/2022 (Heart) TXP Dischg DT 1/13/2023 DX Heart replaced by transplant Z94.1 TX [facility] ([place], [place])    Route: Oral    rosuvastatin (CRESTOR) 10 MG tablet  30 tablet 1 1/14/2023    [facility] [address]    Sig: Take 1 tablet (10 mg) by mouth daily    Class: E-Prescribe    Route: Oral    senna-docusate (SENOKOT-S/PERICOLACE) 8.6-50 MG tablet  60 tablet 1 1/13/2023    [address]    Sig: Take 1 tablet by mouth 2 times daily    Class: E-Prescribe    Route: Oral    sulfamethoxazole-trimethoprim (BACTRIM) 400-80 MG tablet  30 tablet 0 1/14/2023    [address]    Sig: Take 1 tablet by mouth daily    Class: E-Prescribe    Route: Oral    tacrolimus (GENERIC EQUIVALENT) 1 MG capsule  120 capsule 0 1/17/2023        Sig: ON HOLD due to dose change    Class: No Print Out    Notes to Pharmacy: TXP DT 12/25/2022 (Heart) TXP Dischg DT 1/13/2023 DX Heart replaced by transplant Z94.1 TX [facility] ([place], [place])    tacrolimus (GENERIC EQUIVALENT) 5 MG capsule  60 capsule 0 1/17/2023    [place], [place] - [address] [place] [address] [id number]    Sig: Take 1 capsule (5 mg) by mouth 2 times daily    Class: E-Prescribe    Notes to Pharmacy: TXP DT 12/25/2022 (Heart) TXP Dischg DT 1/13/2023 DX Heart transplant Z94.1 @  [facility] ([place], [place]) NOTE  Wife will  before 8 AM on 1/18/2023    Route: Oral    traZODone (DESYREL) 100 MG tablet  30 tablet 1 1/13/2023    [address]    Sig: Take 1 tablet (100 mg) by mouth nightly as needed for sleep    Class: E-Prescribe    Route: Oral    valGANciclovir (VALCYTE) 450 MG tablet  60 tablet 0 1/14/2023 2/13/2023   [address]    Sig: Take 2 tablets (900 mg) by mouth daily for 30 days    Class: E-Prescribe    Route: Oral    vancomycin (VANCOCIN) 1000 mg in dextrose 5% 200 mL PREMIX   0 1/13/2023         Sig: Inject 200 mLs (1,000 mg) into the vein every 12 hours    Class: No Print Out    Route: Intravenous      Hospital Medications as of 1/24/2023       Dose Frequency Start End    aspirin (ASA) chewable tablet 243 mg (Completed) 243 mg ONCE 1/23/2023 1/23/2023    Admin Instructions: If patient received aspirin on the day of the procedure, give 3 81 mg tablets on the morning of the procedure to TOTAL 324 dose.    Exceptions: IF patient is allergic to aspirin                      OR                      IF patient already received aspirin 325 mg today    Class: E-Prescribe    Route: Oral    Linked Group 1: See [employer] for full Linked Orders Report.        aspirin (ASA) tablet 325 mg (Completed) 325 mg ONCE 1/23/2023 1/23/2023    Admin Instructions: Give if patient did not already receive aspirin on the same day of the procedure.  Exceptions IF patient is allergic to aspirin    Class: E-Prescribe    Route: Oral    Linked Group 1: See [employer] for full Linked Orders Report.        heparin 100 UNIT/ML injection 5 mL (Completed) 5 mL ONCE 1/23/2023 1/23/2023    Class: E-Prescribe    Route: Intracatheter          ROS:   Answers for HPI/ROS submitted by the patient on 1/16/2023  General Symptoms: Yes  Skin Symptoms: No  HENT Symptoms: Yes  EYE SYMPTOMS: No  HEART SYMPTOMS: Yes  LUNG SYMPTOMS: Yes  INTESTINAL SYMPTOMS: No  URINARY SYMPTOMS: No  REPRODUCTIVE SYMPTOMS: No  SKELETAL SYMPTOMS: Yes  BLOOD SYMPTOMS: Yes  NERVOUS SYSTEM SYMPTOMS: Yes  MENTAL HEALTH SYMPTOMS: Yes  Ear pain: No  Ear discharge: No  Hearing loss: No  Tinnitus: No  Nosebleeds: No  Congestion: No  Sinus pain: No  Trouble swallowing: No   Voice hoarseness: No  Mouth sores: No  Sore throat: No  Tooth pain: No  Gum tenderness: No  Bleeding gums: No  Change in taste: No  Change in sense of smell: No  Dry mouth: No  Hearing aid used: No  Neck lump: No  Fever: No  Loss of appetite: No  Weight loss: Yes  Fatigue: Yes  Night sweats: No  Chills:  No  Increased stress: No  Excessive hunger: No  Excessive thirst: No  Feeling hot or cold when others believe the temperature is normal: No  Loss of height: No  Post-operative complications: No  Surgical site pain: Yes  Hallucinations: No  Change in or Loss of Energy: Yes  Hyperactivity: No  Confusion: No  Chest pain or pressure: No  Fast or irregular heartbeat: No  Pain in legs with walking: No  Trouble breathing while lying down: No  Fingers or toes appear blue: No  High blood pressure: No  Low blood pressure: No  Fainting: No  Murmurs: No  Pacemaker: No  Varicose veins: No  Wake up at night with shortness of breath: No  Light-headedness: Yes  Exercise intolerance: Yes  Cough: No  Sputum or phlegm: No  Coughing up blood: No  Difficulty breating or shortness of breath: No  Snoring: No  Wheezing: No  Difficulty breathing on exertion: Yes  Nighttime Cough: No  Difficulty breathing when lying flat: No  Back pain: Yes  Muscle aches: No  Neck pain: No  Swollen joints: No  Joint pain: No  Bone pain: No  Muscle cramps: No  Muscle weakness: Yes  Joint stiffness: No  Bone fracture: No  Edema or swelling: No  Anemia: Yes  Swollen glands: No  Easy bleeding or bruising: No  Trouble with coordination: Yes  Dizziness or trouble with balance: Yes  Fainting or black-out spells: No  Memory loss: Yes  Headache: No  Seizures: No  Speech problems: No  Tingling: Yes  Tremor: Yes  Weakness: Yes  Difficulty walking: Yes  Paralysis: No  Numbness: No  Nervous or Anxious: No  Depression: No  Trouble sleeping: Yes  Trouble thinking or concentrating: No  Mood changes: No  Panic attacks: No    Exam:  /72 (BP Location: Left arm, Patient Position: Chair, Cuff Size: Adult Regular)   Pulse 99   SpO2 96%   In general, the patient is a pleasant male in no apparent distress.    HEENT: NC/AT. PERRLA. EOMI.  Sclerae white, not injected.  No thrush noted on tongue or cheeks.  Neck:  No adenopathy, No thyromegaly.    COR: No jugular venous  distention when sitting upright.  RRR.  Normal S1 S2 splits physiologically.  No murmur, rub click, or gallop.    Lungs:  CTA. No rhonchi.    Abdomen: soft, nontender, nondistended.  No organomegaly.  Extremities:  No clubbing, cyanosis, or LE edema.    Neuro: Alert & Oriented x 3, grossly non focal.  Integument: no open lesions, rashes, or jaundice.    Labs:  CBC RESULTS:   Lab Results   Component Value Date    WBC 8.7 01/23/2023    RBC 4.04 (L) 01/23/2023    HGB 11.4 (L) 01/23/2023    HGB 11.7 (L) 01/23/2023    HCT 37.4 (L) 01/23/2023    MCV 93 01/23/2023    MCH 29.0 01/23/2023    MCHC 31.3 (L) 01/23/2023    RDW 15.9 (H) 01/23/2023     01/23/2023       BMP RESULTS:  Lab Results   Component Value Date     01/23/2023    POTASSIUM 4.6 01/23/2023    POTASSIUM 3.7 12/25/2022    POTASSIUM 3.9 11/08/2022    CHLORIDE 101 01/23/2023    CHLORIDE 105 11/28/2022    CO2 24 01/23/2023    CO2 26 11/08/2022    ANIONGAP 11 01/23/2023    ANIONGAP 9 11/08/2022     (H) 01/23/2023     (H) 01/13/2023     (H) 11/08/2022    BUN 23.8 (H) 01/23/2023    BUN 35 (H) 11/08/2022    CR 0.77 01/23/2023    GFRESTIMATED >90 01/23/2023    TIM 9.1 01/23/2023      LIPID RESULTS:  Lab Results   Component Value Date    CHOL 217 (H) 01/23/2023     01/23/2023    LDL 91 01/23/2023    TRIG 80 01/23/2023    NHDL 107 01/23/2023       IMMUNOSUPPRESSANT LEVELS  Lab Results   Component Value Date    TACROL 10.5 01/23/2023    DOSTAC  01/23/2023      Comment:      Last dose information not provided.       No components found for: CK  Lab Results   Component Value Date    MAG 1.4 (L) 01/23/2023     Lab Results   Component Value Date    A1C 6.2 (H) 12/25/2022     Lab Results   Component Value Date    PHOS 3.1 01/23/2023     Lab Results   Component Value Date    NTBNPI 2,750 (H) 12/06/2022     Lab Results   Component Value Date    SAITESTMET SA EDTA FCS 01/02/2023    SAICELL Class I 01/02/2023    [id number] None 01/02/2023     [id number] None 01/02/2023    SAIREPCOM  01/02/2023      HLA PRA Test performed by modified testing procedure that may also include pretreatment of serum. Pretreatment may be the addition of fetal calf serum, EDTA, and/or adsorption.  High-risk, MFI > 3,000.  Mod-risk, -3,000.     Lab Results   Component Value Date    SAIITESTME SA EDTA FCS 01/02/2023    SAIICELL Class II 01/02/2023    [id number] None 01/02/2023    [id number] None 01/02/2023    SAIIREPCOM  01/02/2023      HLA PRA Test performed by modified testing procedure that may also include pretreatment of serum. Pretreatment may be the addition of fetal calf serum, EDTA, and/or adsorption.  High-risk, MFI > 3,000.  Mod-risk, -3,000.       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    Labs from yesterday showed stable electrolytes, renal function, liver function, and blood counts.  FLP shows mildly elevated total cholesterol (217).  CMV negative, tacro level 10.5.  Biopsy in process.    TTE from yesterday showed stable graft function, with LVEF 55-60% and normal RV size/function.    Coronary angio from yesterday showed normal coronary arteries with eccentric, mild native CAD (MIGUEL ÁNGEL of LM 1.1mm, pLAD 0.2mm).  RHC showed normal biventricular filling pressures, with RA 7, mPA 18, PCW 9, and CI 3.2.    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  His BPs remain controlled overall (note some dBPs in the 90s, so will not decrease antihypertensive management today).  His weight is up, but he remains euvolemic on exam and per  RHC.    Advised that he continue his current meds, with no changes, and will plan for him to follow-up in clinic per protocol or sooner should new concerns arise.      MIGUEL, s/p OHT 12/25/22  His post-operative course has been c/b delirium, leukocytosis, staph epi bacteremia, and prolonged CT output.  He discharged 1/13.     Rejection history:  First 3 biopsies have shown diffuse capillary staining for c4d, suggestive of pAMR1.  No DSAs and no graft dysfunction, so further treatment has been deferred and prednisone taper continues.  AlloMap scores:  n/a  DSAs:  none  Coronary angio/Ischemic eval:  Coronary angio 1/23/23 showed normal coronary arteries with eccentric, mild native CAD (MIGUEL ÁNGEL of LM 1.1mm, pLAD 0.2mm).  Last RHC:  1/23/23 showed normal biventricular filling pressures, with RA 7, mPA 18, PCW 9, and CI 3.2.  Echo/cMRI:  TTE 1/23/23 showed stable graft function, with LVEF 55-60% and normal RV size/function.    Serostatus:  - CMV D+/R+  - EBV D+/R+  - Toxo D-/R-     Immunosuppression:  - received basiliximab 12/25 and 12/29  - prednisone taper, currently 15/15  - MMF 1000mg twice daily (given age and bacteremia)  - tacro, goal level 10-12.  Level from yesterday 10.5.     PPx:  - CAV:  Holding Aspirin 81mg daily while on anticoagulation, continue rosuvastatin 10mg daily  - GI:  Pantoprazole 40mg daily  - Osteoporosis:  calcium/vitamin D supplements  - CMV:  valcyte 900mg daily (x3 months, through 3/2023)  - PCP:  Bactrim single strength daily  - Thrush:  Nystatin s/s QID  - Toxo:  n/a     Graft function:  - BPs:  remain controlled, continue amlodipine 5mg daily  - fluid status:  Euvolemic, off diuretics     Health maintenance:  - vanco dosing per ID/home care        ARVC  Discussed that first-degree relatives should be screened with an echocardiogram and EKG, and have referred him to [facility].     h/o right axillary nonocclusive and left peroneal occlusive DVT (11/2022, pre-transplant)  Was on  apixaban prior to transplant, which was held post-operatively.  During C 1/9, it was noted that his RIJ had clot and LIJ was inaccessible, so he required femoral access.  He has been started on apixaban, which needs to be held 48 hours prior to each biopsy.  Will plan to repeat a bilateral UE and LLE ultrasound in 3 months to follow-up and review ongoing anticoagulation needs.     Staph epi bacteremia  Blood cultures from 1/7 and 1/8 with staph epi, started on IV vanco 1/8 per ID.  Blood cultures have remained negative since 1/8.  Plan to continue IV vanco x 4 weeks minimum, per Transplant ID (~2/4/23).  He will follow-up with ID 2/7/23 -- advised that he continue vanco until he sees ID.  Vanco level 10.2 1/23/23.      The above was reviewed with Dr. Stein, who verbalized understanding and will call with further questions/concerns.    45 minutes spent with patient, along with preparing for visit, reviewing follow up, and completing documentation.            Please do not hesitate to contact me if you have any questions/concerns.     Sincerely,     Rose Mary Goldstein NP

## 2023-01-24 NOTE — LETTER
Date:January 24, 2023      Provider requested that no letter be sent. Do not send.       [facility]

## 2023-01-24 NOTE — PROGRESS NOTES
Patient present to [facility] for a PICC dressing change. Name and  verified. Patient tolerated dressing change well. No signs of infection. Skin clean and intact. Blood return noted. Single lumen cap changed and saline locked, RN heparin locked. Dressing change took about 15 minutes to complete. Patient to return back in 1 week.    KEM Churchill LPN    Administrations This Visit     heparin lock flush 10 UNIT/ML injection 5 mL     Admin Date  2023 Action  Given Dose  5 mL Route  Intracatheter Administered By  Nadira Elam RN

## 2023-01-24 NOTE — PROGRESS NOTES
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     His post-operative course has been c/b delirium, leukocytosis, staph epi bacteremia, and prolonged CT output.  He discharged 1/13.     Rejection history:  First 3 biopsies have shown diffuse capillary staining for c4d, suggestive of pAMR1.  No DSAs and no graft dysfunction, so further treatment has been deferred and prednisone taper continues.  AlloMap scores:  n/a  DSAs:  none  Coronary angio/Ischemic eval:  see below  Last RHC:  1/17/23 showed normal biventricular filling pressures, with RA 2, mPA 10 PCW 6, and CI 2.63  Echo/cMRI:  TTE 1/2/23 showed normal graft function, with LVEF 60-65% and normal RV size/function.     Since his last visit, he states that he has been doing well.  He has started cardiac rehab, and continues to walk and exercise regularly with no exertional concerns.  He continues to note improvements in her strength and endurance.  His breathing has been well, and he denies SOB.  He is sleeping in a bed, and uses 1 pillow without PND and orthopnea.  His appetite has been well, and he is eating regularly without nausea, vomiting, diarrhea, and constipation.  His weight is up, which he attributes to increased po intake, and is now ranging ~184#.  He does not feel any fluid retention.  His BPs have ranged 100-120/70-90s.  He has ongoing vertigo, noticeable when lying flat, moving his head, and when rising.  It is not worse.  He had a little nosebleed one morning.  He otherwise denies chest pain, palpitations, falls,  headaches, acute vision changes, fevers, chills, cough, sore throat, and signs of bleeding.      Serostatus:  CMV D+/R+  EBV D+/R+  Toxo D-/R-      Patient Active Problem List    Diagnosis Date Noted     Status post coronary angiogram 01/23/2023     Priority: Medium     Staphylococcus epidermidis bacteremia 01/09/2023     Priority: Medium     Using prophylactic antibiotic on daily basis 01/09/2023     Priority: Medium     Benign neoplasm of colon 12/12/2022     Priority: Medium     Troponin level elevated 12/06/2022     Priority: Medium     Anginal equivalent (H) 12/06/2022     Priority: Medium     Heart failure, unspecified HF chronicity, unspecified heart failure type (H) 12/06/2022     Priority: Medium     Acute embolism and thrombosis of left peroneal vein (H) 11/22/2022     Priority: Medium     Acute on chronic combined systolic (congestive) and diastolic (congestive) heart failure (H) 11/14/2022     Priority: Medium     CHF (congestive heart failure) (H) 11/10/2022     Priority: Medium     Acute respiratory failure with hypoxia (H) 11/08/2022     Priority: Medium     Coronary artery disease involving native coronary artery of native heart without angina pectoris 11/08/2022     Priority: Medium     Essential hypertension 11/08/2022     Priority: Medium     NSVT (nonsustained ventricular tachycardia) 11/08/2022     Priority: Medium     SOB (shortness of breath) 11/08/2022     Priority: Medium     Syncope and collapse 10/07/2021     Priority: Medium     Cardiomyopathy (H) 10/06/2021     Priority: Medium     Primary central nervous system vasculitis (H) 11/30/2017     Priority: Medium     Vasculitis, CNS (H) 11/30/2017     Priority: Medium     Stroke (H) 09/28/2016     Priority: Medium     Hyperlipidemia 08/08/2013     Priority: Medium       PAST MEDICAL HISTORY:  Past Medical History:   Diagnosis Date     Congestive heart failure (H)        CURRENT MEDICATIONS:  Prescription Medications as of 1/24/2023       Rx  Number Disp Refills Start End Last Dispensed Date Next Fill Date Owning Pharmacy    acetaminophen (TYLENOL) 325 MG tablet  100 tablet 1 1/13/2023    [address]    Sig: Take 3 tablets (975 mg) by mouth every 8 hours    Class: E-Prescribe    Route: Oral    amLODIPine (NORVASC) 5 MG tablet  30 tablet 1 1/14/2023    [address]    Sig: Take 1 tablet (5 mg) by mouth daily    Class: E-Prescribe    Route: Oral    apixaban ANTICOAGULANT (ELIQUIS) 5 MG tablet  60 tablet 1 1/13/2023    [address]    Sig: Take 1 tablet (5 mg) by mouth 2 times daily    Class: E-Prescribe    Route: Oral    calcium carbonate-vitamin D (CALTRATE) 600-10 MG-MCG per tablet  60 tablet 1 1/13/2023    [address]    Sig: Take 1 tablet by mouth 2 times daily (with meals)    Class: E-Prescribe    Route: Oral    magnesium oxide (MAG-OX) 400 MG tablet  120 tablet 3 1/19/2023    [facility] [place], [place] - [facility] [address]    Sig: Take 2 tablets (800 mg) by mouth 2 times daily    Class: E-Prescribe    Route: Oral    melatonin 10 MG TABS tablet  30 tablet 1 1/13/2023    [address]    Sig: Take 1 tablet (10 mg) by mouth At Bedtime    Class: E-Prescribe    Route: Oral    methocarbamol (ROBAXIN) 500 MG tablet  30 tablet 0 1/13/2023    [address]    Sig: Take 1 tablet (500 mg) by mouth every 6 hours as needed for muscle spasms    Class: E-Prescribe    Route: Oral    mycophenolate (GENERIC EQUIVALENT) 250 MG capsule  240 capsule 1 1/13/2023 3/14/2023   [address]    Sig: Take 4 capsules (1,000 mg) by mouth 2 times daily for 60  days    Class: E-Prescribe    Route: Oral    nystatin (MYCOSTATIN) 641551 UNIT/ML suspension  1200 mL 1 1/13/2023 3/14/2023   [address]    Sig: Swish and swallow 10 mLs (1,000,000 Units) in mouth 4 times daily for 60 days    Class: E-Prescribe    Route: Swish & Swallow    oxyCODONE (ROXICODONE) 5 MG tablet  5 tablet 0 1/13/2023    [address]    Sig: Take 1 tablet (5 mg) by mouth every 4 hours as needed for severe pain (7-10) or moderate pain (4-6)    Class: E-Prescribe    Earliest Fill Date: 1/13/2023    Route: Oral    pantoprazole (PROTONIX) 40 MG EC tablet  30 tablet 0 1/14/2023    [address]    Sig: Take 1 tablet (40 mg) by mouth every morning (before breakfast)    Class: E-Prescribe    Route: Oral    polyethylene glycol (MIRALAX) 17 g packet  30 packet 0 1/14/2023    [address]    Sig: Take 17 g by mouth daily    Class: E-Prescribe    Route: Oral    predniSONE (DELTASONE) 5 MG tablet  180 tablet 3 1/20/2023    [facility] - [place], [place] - [address] [place] [address]    Sig: Take 3 tablets (15 mg) by mouth 2 times daily    Class: E-Prescribe    Notes to Pharmacy: Dose change. TXP DT 12/25/2022 (Heart) TXP Dischg DT 1/13/2023 DX Heart replaced by transplant Z94.1 TX [facility] ([place], [place])    Route: Oral    rosuvastatin (CRESTOR) 10 MG tablet  30 tablet 1 1/14/2023    [address]    Sig: Take 1 tablet (10 mg) by mouth daily    Class: E-Prescribe    Route: Oral    senna-docusate (SENOKOT-S/PERICOLACE) 8.6-50 MG tablet  60 tablet 1 1/13/2023    [address]    Sig: Take 1 tablet by mouth 2 times daily     Class: E-Prescribe    Route: Oral    sulfamethoxazole-trimethoprim (BACTRIM) 400-80 MG tablet  30 tablet 0 1/14/2023    [place], [place] - [address] [place] [address]    Sig: Take 1 tablet by mouth daily    Class: E-Prescribe    Route: Oral    tacrolimus (GENERIC EQUIVALENT) 1 MG capsule  120 capsule 0 1/17/2023        Sig: ON HOLD due to dose change    Class: No Print Out    Notes to Pharmacy: TXP DT 12/25/2022 (Heart) TXP Dischg DT 1/13/2023 DX Heart replaced by transplant Z94.1 TX [facility] ([place], [place])    tacrolimus (GENERIC EQUIVALENT) 5 MG capsule  60 capsule 0 1/17/2023    [place], [place] - [address] [facility] [id number]    Sig: Take 1 capsule (5 mg) by mouth 2 times daily    Class: E-Prescribe    Notes to Pharmacy: TXP DT 12/25/2022 (Heart) TXP Dischg DT 1/13/2023 DX Heart transplant Z94.1 @  [facility] ([place], [place]) NOTE  Wife will  before 8 AM on 1/18/2023    Route: Oral    traZODone (DESYREL) 100 MG tablet  30 tablet 1 1/13/2023    [place], [place] - [address] [place] [address]    Sig: Take 1 tablet (100 mg) by mouth nightly as needed for sleep    Class: E-Prescribe    Route: Oral    valGANciclovir (VALCYTE) 450 MG tablet  60 tablet 0 1/14/2023 2/13/2023   [place], [place] - [address] [place] [address]    Sig: Take 2 tablets (900 mg) by mouth daily for 30 days    Class: E-Prescribe    Route: Oral    vancomycin (VANCOCIN) 1000 mg in dextrose 5% 200 mL PREMIX   0 1/13/2023        Sig: Inject 200 mLs (1,000 mg) into the vein every 12 hours    Class: No Print Out    Route: Intravenous      Hospital Medications as of 1/24/2023       Dose Frequency Start End    aspirin (ASA) chewable tablet 243 mg (Completed) 243 mg ONCE 1/23/2023 1/23/2023    Admin Instructions: If patient received aspirin on the day of the procedure,  give 3 81 mg tablets on the morning of the procedure to TOTAL 324 dose.    Exceptions: IF patient is allergic to aspirin                      OR                      IF patient already received aspirin 325 mg today    Class: E-Prescribe    Route: Oral    Linked Group 1: See [facility] for full Linked Orders Report.        aspirin (ASA) tablet 325 mg (Completed) 325 mg ONCE 1/23/2023 1/23/2023    Admin Instructions: Give if patient did not already receive aspirin on the same day of the procedure.  Exceptions IF patient is allergic to aspirin    Class: E-Prescribe    Route: Oral    Linked Group 1: See [facility] for full Linked Orders Report.        heparin 100 UNIT/ML injection 5 mL (Completed) 5 mL ONCE 1/23/2023 1/23/2023    Class: E-Prescribe    Route: Intracatheter          ROS:   Answers for HPI/ROS submitted by the patient on 1/16/2023  General Symptoms: Yes  Skin Symptoms: No  HENT Symptoms: Yes  EYE SYMPTOMS: No  HEART SYMPTOMS: Yes  LUNG SYMPTOMS: Yes  INTESTINAL SYMPTOMS: No  URINARY SYMPTOMS: No  REPRODUCTIVE SYMPTOMS: No  SKELETAL SYMPTOMS: Yes  BLOOD SYMPTOMS: Yes  NERVOUS SYSTEM SYMPTOMS: Yes  MENTAL HEALTH SYMPTOMS: Yes  Ear pain: No  Ear discharge: No  Hearing loss: No  Tinnitus: No  Nosebleeds: No  Congestion: No  Sinus pain: No  Trouble swallowing: No   Voice hoarseness: No  Mouth sores: No  Sore throat: No  Tooth pain: No  Gum tenderness: No  Bleeding gums: No  Change in taste: No  Change in sense of smell: No  Dry mouth: No  Hearing aid used: No  Neck lump: No  Fever: No  Loss of appetite: No  Weight loss: Yes  Fatigue: Yes  Night sweats: No  Chills: No  Increased stress: No  Excessive hunger: No  Excessive thirst: No  Feeling hot or cold when others believe the temperature is normal: No  Loss of height: No  Post-operative complications: No  Surgical site pain: Yes  Hallucinations: No  Change in or Loss of Energy: Yes  Hyperactivity: No  Confusion: No  Chest pain or pressure: No  Fast or irregular  heartbeat: No  Pain in legs with walking: No  Trouble breathing while lying down: No  Fingers or toes appear blue: No  High blood pressure: No  Low blood pressure: No  Fainting: No  Murmurs: No  Pacemaker: No  Varicose veins: No  Wake up at night with shortness of breath: No  Light-headedness: Yes  Exercise intolerance: Yes  Cough: No  Sputum or phlegm: No  Coughing up blood: No  Difficulty breating or shortness of breath: No  Snoring: No  Wheezing: No  Difficulty breathing on exertion: Yes  Nighttime Cough: No  Difficulty breathing when lying flat: No  Back pain: Yes  Muscle aches: No  Neck pain: No  Swollen joints: No  Joint pain: No  Bone pain: No  Muscle cramps: No  Muscle weakness: Yes  Joint stiffness: No  Bone fracture: No  Edema or swelling: No  Anemia: Yes  Swollen glands: No  Easy bleeding or bruising: No  Trouble with coordination: Yes  Dizziness or trouble with balance: Yes  Fainting or black-out spells: No  Memory loss: Yes  Headache: No  Seizures: No  Speech problems: No  Tingling: Yes  Tremor: Yes  Weakness: Yes  Difficulty walking: Yes  Paralysis: No  Numbness: No  Nervous or Anxious: No  Depression: No  Trouble sleeping: Yes  Trouble thinking or concentrating: No  Mood changes: No  Panic attacks: No    Exam:  /72 (BP Location: Left arm, Patient Position: Chair, Cuff Size: Adult Regular)   Pulse 99   SpO2 96%   In general, the patient is a pleasant male in no apparent distress.    HEENT: NC/AT. PERRLA. EOMI.  Sclerae white, not injected.  No thrush noted on tongue or cheeks.  Neck:  No adenopathy, No thyromegaly.    COR: No jugular venous distention when sitting upright.  RRR.  Normal S1 S2 splits physiologically.  No murmur, rub click, or gallop.    Lungs:  CTA. No rhonchi.    Abdomen: soft, nontender, nondistended.  No organomegaly.  Extremities:  No clubbing, cyanosis, or LE edema.    Neuro: Alert & Oriented x 3, grossly non focal.  Integument: no open lesions, rashes, or  jaundice.    Labs:  CBC RESULTS:   Lab Results   Component Value Date    WBC 8.7 01/23/2023    RBC 4.04 (L) 01/23/2023    HGB 11.4 (L) 01/23/2023    HGB 11.7 (L) 01/23/2023    HCT 37.4 (L) 01/23/2023    MCV 93 01/23/2023    MCH 29.0 01/23/2023    MCHC 31.3 (L) 01/23/2023    RDW 15.9 (H) 01/23/2023     01/23/2023       BMP RESULTS:  Lab Results   Component Value Date     01/23/2023    POTASSIUM 4.6 01/23/2023    POTASSIUM 3.7 12/25/2022    POTASSIUM 3.9 11/08/2022    CHLORIDE 101 01/23/2023    CHLORIDE 105 11/28/2022    CO2 24 01/23/2023    CO2 26 11/08/2022    ANIONGAP 11 01/23/2023    ANIONGAP 9 11/08/2022     (H) 01/23/2023     (H) 01/13/2023     (H) 11/08/2022    BUN 23.8 (H) 01/23/2023    BUN 35 (H) 11/08/2022    CR 0.77 01/23/2023    GFRESTIMATED >90 01/23/2023    TIM 9.1 01/23/2023      LIPID RESULTS:  Lab Results   Component Value Date    CHOL 217 (H) 01/23/2023     01/23/2023    LDL 91 01/23/2023    TRIG 80 01/23/2023    NHDL 107 01/23/2023       IMMUNOSUPPRESSANT LEVELS  Lab Results   Component Value Date    TACROL 10.5 01/23/2023    DOSTAC  01/23/2023      Comment:      Last dose information not provided.       No components found for: CK  Lab Results   Component Value Date    MAG 1.4 (L) 01/23/2023     Lab Results   Component Value Date    A1C 6.2 (H) 12/25/2022     Lab Results   Component Value Date    PHOS 3.1 01/23/2023     Lab Results   Component Value Date    NTBNPI 2,750 (H) 12/06/2022     Lab Results   Component Value Date    SA[place]ET SA EDTA FCS 01/02/2023    SAICELL Class I 01/02/2023    [id number] None 01/02/2023    ME7WPQVQXY None 01/02/2023    SAIREPCOM  01/02/2023      HLA PRA Test performed by modified testing procedure that may also include pretreatment of serum. Pretreatment may be the addition of fetal calf serum, EDTA, and/or adsorption.  High-risk, MFI > 3,000.  Mod-risk, -3,000.     Lab Results   Component Value Date    [facility] SA  EDTA FCS 01/02/2023    SAIICELL Class II 01/02/2023    [id number] None 01/02/2023    [id number] None 01/02/2023    SAIIREPCOM  01/02/2023      HLA PRA Test performed by modified testing procedure that may also include pretreatment of serum. Pretreatment may be the addition of fetal calf serum, EDTA, and/or adsorption.  High-risk, MFI > 3,000.  Mod-risk, -3,000.       Assessment and Plan:   Paco Martinezekiel is a 67yr old male with a history of HTN, CAD (s/p PCI to LAD in 2013), hyperlipidemia, CNS vasculitis (2013, residual left-sided weakness, on Cytoxan --> CellCept with no further neuro insult), systolic heart failure secondary to NICM (ARVC, diagnosed 2021), sustained VT s/p ICD (1/2022), and CKD now s/p OHT 12/25/22 who presents to clinic for follow-up of hospitalization and for routine surveillance.    Labs from yesterday showed stable electrolytes, renal function, liver function, and blood counts.  FLP shows mildly elevated total cholesterol (217).  CMV negative, tacro level 10.5.  Biopsy in process.    TTE from yesterday showed stable graft function, with LVEF 55-60% and normal RV size/function.    Coronary angio from yesterday showed normal coronary arteries with eccentric, mild native CAD (MIGUEL ÁNGEL of LM 1.1mm, pLAD 0.2mm).  RHC showed normal biventricular filling pressures, with RA 7, mPA 18, PCW 9, and CI 3.2.    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  His BPs remain controlled overall (note some dBPs in the 90s, so will not decrease antihypertensive management today).  His weight is up, but he remains euvolemic on exam and per RHC.    Advised that he continue his current meds, with no changes, and will plan for him to follow-up in clinic per protocol or sooner should new concerns arise.      NICM, s/p OHT 12/25/22  His post-operative course has been c/b delirium, leukocytosis, staph epi bacteremia, and prolonged CT output.  He discharged 1/13.     Rejection  history:  First 3 biopsies have shown diffuse capillary staining for c4d, suggestive of pAMR1.  No DSAs and no graft dysfunction, so further treatment has been deferred and prednisone taper continues.  AlloMap scores:  n/a  DSAs:  none  Coronary angio/Ischemic eval:  Coronary angio 1/23/23 showed normal coronary arteries with eccentric, mild native CAD (MIGUEL ÁNGEL of LM 1.1mm, pLAD 0.2mm).  Last RHC:  1/23/23 showed normal biventricular filling pressures, with RA 7, mPA 18, PCW 9, and CI 3.2.  Echo/cMRI:  TTE 1/23/23 showed stable graft function, with LVEF 55-60% and normal RV size/function.    Serostatus:  - CMV D+/R+  - EBV D+/R+  - Toxo D-/R-     Immunosuppression:  - received basiliximab 12/25 and 12/29  - prednisone taper, currently 15/15  - MMF 1000mg twice daily (given age and bacteremia)  - tacro, goal level 10-12.  Level from yesterday 10.5.     PPx:  - CAV:  Holding Aspirin 81mg daily while on anticoagulation, continue rosuvastatin 10mg daily  - GI:  Pantoprazole 40mg daily  - Osteoporosis:  calcium/vitamin D supplements  - CMV:  valcyte 900mg daily (x3 months, through 3/2023)  - PCP:  Bactrim single strength daily  - Thrush:  Nystatin s/s QID  - Toxo:  n/a     Graft function:  - BPs:  remain controlled, continue amlodipine 5mg daily  - fluid status:  Euvolemic, off diuretics     Health maintenance:  - vanco dosing per ID/home care        ARVC  Discussed that first-degree relatives should be screened with an echocardiogram and EKG, and have referred him to [facility].     h/o right axillary nonocclusive and left peroneal occlusive DVT (11/2022, pre-transplant)  Was on apixaban prior to transplant, which was held post-operatively.  During RHC 1/9, it was noted that his RIJ had clot and LIJ was inaccessible, so he required femoral access.  He has been started on apixaban, which needs to be held 48 hours prior to each biopsy.  Will plan to repeat a bilateral UE and LLE ultrasound in 3 months to follow-up and  review ongoing anticoagulation needs.     Staph epi bacteremia  Blood cultures from 1/7 and 1/8 with staph epi, started on IV vanco 1/8 per ID.  Blood cultures have remained negative since 1/8.  Plan to continue IV vanco x 4 weeks minimum, per Transplant ID (~2/4/23).  He will follow-up with ID 2/7/23 -- advised that he continue vanco until he sees ID.  Vanco level 10.2 1/23/23.      The above was reviewed with Dr. Stein, who verbalized understanding and will call with further questions/concerns.    45 minutes spent with patient, along with preparing for visit, reviewing follow up, and completing documentation.      Rose Mary Goldstein DNP, FNP-BC  Advanced Heart Failure Nurse Practitioner  [facility]  Patient Care Team:  Quentin Odonnell as PCP - General  Myrtle Dominguez MD as Assigned Heart and Vascular Provider  Myrtle Dominguez MD as MD (Cardiovascular Disease)  Arlene Sanderson, RN as Specialty Care Coordinator (Cardiology)  Mary Lou Luke RN as Transplant Coordinator  Abram Simeon [facility] as Pharmacist (Pharmacist)  Tiera Anguiano DO as MD (Infectious Diseases)  JUAN FRANCISCO CAMACHO

## 2023-01-24 NOTE — NURSING NOTE
Transplant Coordinator Note   Reason for visit Week 4 post transplant       Health concerns addressed today   1. Started cardiac rehab. Making progress  2. Gained 5 lbs. Eating well. 184 lbs at home. No fluid weight.   3. Peeing throughout night. Using urinal.   4. Diastolic pressures still in the 90's, not decreasing BP meds yet.   5. Some c/o dizziness with head movement mainly while lying down. Wondering if it's a tacro side effect?? Continue to monitor for now.   6.           Immunosuppressants   MMF 1000/1000 mg   Tac 5/5 mg goal 10-12; level 10.5, no changes.    Pred 15/15 mg       Routine screenings   Derm to be yearly   Dental after pred complete unless emergency. Pt does need a tooth replaced.   Colonoscopy  Nov 2022 (no specimens)   Prostate 1.63   Eye to be yearly post pred complete   Flu Oct 2021 -> due after 100 days post transplant   Pneumonia UTD   COVID last dose Nov 2021       Medication record reviewed and reconciled. Questions and concerns addressed. AVS reviewed and copy provided. Pt verbalized an understanding of plan of care.       Patient Instructions   Please call your coordinator at [phone] with any questions or concern     Coordinator will update you on remaining labs, test results and medication changes     Return in two weeks as scheduled - February 9, 2023        No changes with tacrolimus dose today.     We will follow up with ID re: vanco level of 10.2.

## 2023-01-24 NOTE — LETTER
2023         RE: Paco Stein   [address]  [place] [address]        Dear Colleague,    Thank you for referring your patient, Paco Stein, to the [facility]. Please see a copy of my visit note below.    Patient present to [facility] for a PICC dressing change. Name and  verified. Patient tolerated dressing change well. No signs of infection. Skin clean and intact. Blood return noted. Single lumen cap changed and saline locked, RN heparin locked. Dressing change took about 15 minutes to complete. Patient to return back in 1 week.    KEM Churchill LPN    Administrations This Visit     heparin lock flush 10 UNIT/ML injection 5 mL     Admin Date  2023 Action  Given Dose  5 mL Route  Intracatheter Administered By  Nadira Elam RN                    Again, thank you for allowing me to participate in the care of your patient.        Sincerely,        Specialty Infusion Nurse

## 2023-01-25 ENCOUNTER — HOSPITAL ENCOUNTER (OUTPATIENT)
Dept: CARDIAC REHAB | Facility: CLINIC | Age: 68
Discharge: HOME | End: 2023-01-25
Attending: STUDENT IN AN ORGANIZED HEALTH CARE EDUCATION/TRAINING PROGRAM
Payer: MEDICARE

## 2023-01-25 LAB
DONOR IDENTIFICATION: NORMAL
DSA COMMENTS: NORMAL
DSA PRESENT: NO
DSA TEST METHOD: NORMAL
EBV DNA # SPEC NAA+PROBE: NOT DETECTED COPIES/ML
HBV DNA SERPL QL NAA+PROBE: NORMAL
HCV RNA SERPL QL NAA+PROBE: NORMAL
HIV1+2 RNA SERPL QL NAA+PROBE: NORMAL
ORGAN: NORMAL
SA 1 CELL: NORMAL
SA 1 TEST METHOD: NORMAL
SA 2 CELL: NORMAL
SA 2 TEST METHOD: NORMAL
SA1 HI RISK ABY: NORMAL
SA1 MOD RISK ABY: NORMAL
SA2 HI RISK ABY: NORMAL
SA2 MOD RISK ABY: NORMAL
SCR 1 TEST METHOD: NORMAL
SCR1 CELL: NORMAL
SCR1 RESULT: NORMAL
SCR2 CELL: NORMAL
SCR2 RESULT: NORMAL
SCR2 TEST METHOD: NORMAL
UNACCEPTABLE ANTIGENS: NORMAL
UNOS CPRA: 0
ZZZSA 1  COMMENTS: NORMAL
ZZZSA 2 COMMENTS: NORMAL
ZZZSCR1 COMMENTS: NORMAL
ZZZSCR2 COMMENTS: NORMAL

## 2023-01-25 PROCEDURE — 93798 PHYS/QHP OP CAR RHAB W/ECG: CPT

## 2023-01-26 ENCOUNTER — TRANSFERRED RECORDS (OUTPATIENT)
Dept: HEALTH INFORMATION MANAGEMENT | Facility: CLINIC | Age: 68
End: 2023-01-26
Payer: MEDICARE

## 2023-01-26 LAB
ATRIAL RATE - MUSE: 85 BPM
DIASTOLIC BLOOD PRESSURE - MUSE: NORMAL MMHG
INTERPRETATION ECG - MUSE: NORMAL
P AXIS - MUSE: 47 DEGREES
PR INTERVAL - MUSE: 118 MS
QRS DURATION - MUSE: 90 MS
QT - MUSE: 382 MS
QTC - MUSE: 454 MS
R AXIS - MUSE: 24 DEGREES
SYSTOLIC BLOOD PRESSURE - MUSE: NORMAL MMHG
T AXIS - MUSE: 135 DEGREES
VENTRICULAR RATE- MUSE: 85 BPM

## 2023-01-27 ENCOUNTER — TELEPHONE (OUTPATIENT)
Dept: TRANSPLANT | Facility: CLINIC | Age: 68
End: 2023-01-27
Payer: MEDICARE

## 2023-01-27 DIAGNOSIS — J90 PLEURAL EFFUSION: ICD-10-CM

## 2023-01-27 DIAGNOSIS — Z94.1 HEART REPLACED BY TRANSPLANT (H): Primary | ICD-10-CM

## 2023-01-27 DIAGNOSIS — Z94.1 HEART REPLACED BY TRANSPLANT (H): ICD-10-CM

## 2023-01-27 DIAGNOSIS — Z79.899 ENCOUNTER FOR LONG-TERM (CURRENT) USE OF HIGH-RISK MEDICATION: Primary | ICD-10-CM

## 2023-01-27 NOTE — TELEPHONE ENCOUNTER
Reviewed CXR with Dr Dominguez and surgery TOMI LAFLEUR.     Will plan to tap L pleural effusion. IR order placed. Pt/wife updated.

## 2023-01-28 ENCOUNTER — TELEPHONE (OUTPATIENT)
Dept: TRANSPLANT | Facility: CLINIC | Age: 68
End: 2023-01-28
Payer: MEDICARE

## 2023-01-28 NOTE — TELEPHONE ENCOUNTER
Wife called on call coordinator wondering if Eliquis should be stopped over there weekend for a potential IR procedure. Procedure currently not scheduled. Discussed with Dr. Burch, and pt should continue Eliquis until date of procedure is know. Will have Primary coordinator touch base with pt on Monday to review a plan.

## 2023-01-30 ENCOUNTER — HOSPITAL ENCOUNTER (OUTPATIENT)
Dept: CARDIAC REHAB | Facility: CLINIC | Age: 68
Discharge: HOME | End: 2023-01-30
Attending: STUDENT IN AN ORGANIZED HEALTH CARE EDUCATION/TRAINING PROGRAM
Payer: MEDICARE

## 2023-01-30 DIAGNOSIS — J90 PLEURAL EFFUSION: ICD-10-CM

## 2023-01-30 DIAGNOSIS — Z94.1 HEART REPLACED BY TRANSPLANT (H): Primary | ICD-10-CM

## 2023-01-30 PROCEDURE — 93798 PHYS/QHP OP CAR RHAB W/ECG: CPT

## 2023-01-31 ENCOUNTER — INFUSION THERAPY VISIT (OUTPATIENT)
Dept: INFUSION THERAPY | Facility: CLINIC | Age: 68
End: 2023-01-31
Attending: PHYSICIAN ASSISTANT
Payer: MEDICARE

## 2023-01-31 ENCOUNTER — PRE VISIT (OUTPATIENT)
Dept: TRANSPLANT | Facility: CLINIC | Age: 68
End: 2023-01-31

## 2023-01-31 DIAGNOSIS — R78.81 STAPHYLOCOCCUS EPIDERMIDIS BACTEREMIA: ICD-10-CM

## 2023-01-31 DIAGNOSIS — Z94.1 HEART REPLACED BY TRANSPLANT (H): Primary | ICD-10-CM

## 2023-01-31 DIAGNOSIS — Z79.899 ENCOUNTER FOR LONG-TERM (CURRENT) USE OF HIGH-RISK MEDICATION: ICD-10-CM

## 2023-01-31 DIAGNOSIS — Z79.899 ENCOUNTER FOR LONG-TERM (CURRENT) USE OF HIGH-RISK MEDICATION: Primary | ICD-10-CM

## 2023-01-31 DIAGNOSIS — B95.7 STAPHYLOCOCCUS EPIDERMIDIS BACTEREMIA: ICD-10-CM

## 2023-01-31 DIAGNOSIS — Z94.1 HEART REPLACED BY TRANSPLANT (H): ICD-10-CM

## 2023-01-31 LAB
BASOPHILS # BLD AUTO: 0 10E3/UL (ref 0–0.2)
BASOPHILS NFR BLD AUTO: 0 %
CREAT SERPL-MCNC: 1.06 MG/DL (ref 0.67–1.17)
CRP SERPL-MCNC: <3 MG/L
EOSINOPHIL # BLD AUTO: 0 10E3/UL (ref 0–0.7)
EOSINOPHIL NFR BLD AUTO: 0 %
ERYTHROCYTE [DISTWIDTH] IN BLOOD BY AUTOMATED COUNT: 15.7 % (ref 10–15)
ERYTHROCYTE [SEDIMENTATION RATE] IN BLOOD BY WESTERGREN METHOD: 10 MM/HR (ref 0–20)
GFR SERPL CREATININE-BSD FRML MDRD: 76 ML/MIN/1.73M2
HCT VFR BLD AUTO: 37 % (ref 40–53)
HGB BLD-MCNC: 11.9 G/DL (ref 13.3–17.7)
IMM GRANULOCYTES # BLD: 0.1 10E3/UL
IMM GRANULOCYTES NFR BLD: 1 %
LYMPHOCYTES # BLD AUTO: 1.7 10E3/UL (ref 0.8–5.3)
LYMPHOCYTES NFR BLD AUTO: 20 %
MCH RBC QN AUTO: 28.8 PG (ref 26.5–33)
MCHC RBC AUTO-ENTMCNC: 32.2 G/DL (ref 31.5–36.5)
MCV RBC AUTO: 90 FL (ref 78–100)
MONOCYTES # BLD AUTO: 0.5 10E3/UL (ref 0–1.3)
MONOCYTES NFR BLD AUTO: 5 %
NEUTROPHILS # BLD AUTO: 6.3 10E3/UL (ref 1.6–8.3)
NEUTROPHILS NFR BLD AUTO: 74 %
NRBC # BLD AUTO: 0 10E3/UL
NRBC BLD AUTO-RTO: 0 /100
PLATELET # BLD AUTO: 254 10E3/UL (ref 150–450)
RBC # BLD AUTO: 4.13 10E6/UL (ref 4.4–5.9)
VANCOMYCIN SERPL-MCNC: 11.1 UG/ML
WBC # BLD AUTO: 8.5 10E3/UL (ref 4–11)

## 2023-01-31 PROCEDURE — 85652 RBC SED RATE AUTOMATED: CPT

## 2023-01-31 PROCEDURE — 36415 COLL VENOUS BLD VENIPUNCTURE: CPT

## 2023-01-31 PROCEDURE — G0463 HOSPITAL OUTPT CLINIC VISIT: HCPCS

## 2023-01-31 PROCEDURE — 250N000011 HC RX IP 250 OP 636: Performed by: PHYSICIAN ASSISTANT

## 2023-01-31 PROCEDURE — 85025 COMPLETE CBC W/AUTO DIFF WBC: CPT

## 2023-01-31 PROCEDURE — 86140 C-REACTIVE PROTEIN: CPT

## 2023-01-31 PROCEDURE — 82565 ASSAY OF CREATININE: CPT

## 2023-01-31 PROCEDURE — 80202 ASSAY OF VANCOMYCIN: CPT

## 2023-01-31 RX ORDER — EPINEPHRINE 1 MG/ML
0.3 INJECTION, SOLUTION INTRAMUSCULAR; SUBCUTANEOUS EVERY 5 MIN PRN
Status: CANCELLED | OUTPATIENT
Start: 2023-01-31

## 2023-01-31 RX ORDER — METHYLPREDNISOLONE SODIUM SUCCINATE 125 MG/2ML
125 INJECTION, POWDER, LYOPHILIZED, FOR SOLUTION INTRAMUSCULAR; INTRAVENOUS
Status: CANCELLED
Start: 2023-01-31

## 2023-01-31 RX ORDER — MEPERIDINE HYDROCHLORIDE 25 MG/ML
25 INJECTION INTRAMUSCULAR; INTRAVENOUS; SUBCUTANEOUS EVERY 30 MIN PRN
Status: CANCELLED | OUTPATIENT
Start: 2023-01-31

## 2023-01-31 RX ORDER — HEPARIN SODIUM,PORCINE 10 UNIT/ML
5 VIAL (ML) INTRAVENOUS
Status: DISCONTINUED | OUTPATIENT
Start: 2023-01-31 | End: 2023-01-31 | Stop reason: HOSPADM

## 2023-01-31 RX ORDER — DIPHENHYDRAMINE HYDROCHLORIDE 50 MG/ML
50 INJECTION INTRAMUSCULAR; INTRAVENOUS
Status: CANCELLED
Start: 2023-01-31

## 2023-01-31 RX ORDER — HEPARIN SODIUM,PORCINE 10 UNIT/ML
5 VIAL (ML) INTRAVENOUS
Status: CANCELLED | OUTPATIENT
Start: 2023-01-31

## 2023-01-31 RX ORDER — ALBUTEROL SULFATE 0.83 MG/ML
2.5 SOLUTION RESPIRATORY (INHALATION)
Status: CANCELLED | OUTPATIENT
Start: 2023-01-31

## 2023-01-31 RX ORDER — HEPARIN SODIUM (PORCINE) LOCK FLUSH IV SOLN 100 UNIT/ML 100 UNIT/ML
5 SOLUTION INTRAVENOUS
Status: CANCELLED | OUTPATIENT
Start: 2023-01-31

## 2023-01-31 RX ORDER — ALBUTEROL SULFATE 90 UG/1
1-2 AEROSOL, METERED RESPIRATORY (INHALATION)
Status: CANCELLED
Start: 2023-01-31

## 2023-01-31 RX ADMIN — Medication 5 ML: at 08:46

## 2023-01-31 ASSESSMENT — ENCOUNTER SYMPTOMS
SYNCOPE: 0
LIGHT-HEADEDNESS: 1
DIFFICULTY URINATING: 0
MUSCLE WEAKNESS: 0
LEG PAIN: 0
NECK PAIN: 0
LIGHT-HEADEDNESS: 1
NUMBNESS: 1
HEADACHES: 0
STIFFNESS: 0
DIFFICULTY URINATING: 0
SEIZURES: 0
ORTHOPNEA: 0
LEG PAIN: 0
TREMORS: 1
ARTHRALGIAS: 0
TREMORS: 1
PALPITATIONS: 0
PALPITATIONS: 0
PARALYSIS: 0
MYALGIAS: 0
DISTURBANCES IN COORDINATION: 1
TINGLING: 1
JOINT SWELLING: 0
SPEECH CHANGE: 0
EXERCISE INTOLERANCE: 1
PARALYSIS: 0
FLANK PAIN: 0
HYPERTENSION: 1
ARTHRALGIAS: 0
LOSS OF CONSCIOUSNESS: 0
DYSURIA: 0
SPEECH CHANGE: 0
DIZZINESS: 1
MEMORY LOSS: 0
MUSCLE CRAMPS: 0
SYNCOPE: 0
FLANK PAIN: 0
MUSCLE WEAKNESS: 0
EXERCISE INTOLERANCE: 1
MUSCLE CRAMPS: 0
HEMATURIA: 0
HYPERTENSION: 1
NUMBNESS: 1
HYPOTENSION: 0
ORTHOPNEA: 0
WEAKNESS: 1
WEAKNESS: 1
BACK PAIN: 1
HEMATURIA: 0
DIZZINESS: 1
JOINT SWELLING: 0
TINGLING: 1
MEMORY LOSS: 0
BACK PAIN: 1
SEIZURES: 0
LOSS OF CONSCIOUSNESS: 0
DYSURIA: 0
DISTURBANCES IN COORDINATION: 1
NECK PAIN: 0
HEADACHES: 0
HYPOTENSION: 0
STIFFNESS: 0
SLEEP DISTURBANCES DUE TO BREATHING: 0
SLEEP DISTURBANCES DUE TO BREATHING: 0
MYALGIAS: 0

## 2023-01-31 ASSESSMENT — PAIN SCALES - GENERAL: PAINLEVEL: NO PAIN (0)

## 2023-01-31 NOTE — PROGRESS NOTES
Patient present to [facility] for a PICC dressing change and labs. Name and  verified. Patient tolerated dressing change well. No signs of infection. Skin clean and intact. Single lumen cap changed and saline locked, RN heparin locked lumen. Blood return noted. Dressing change took about 15 minutes to complete. Patient to return back in 1 week.      KEM Churchill LPN    Administrations This Visit     heparin lock flush 10 UNIT/ML injection 5 mL     Admin Date  2023 Action  Given Dose  5 mL Route  Intracatheter Administered By  Nida Moore, RN          sodium chloride (PF) 0.9% PF flush 3-20 mL     Admin Date  2023 Action  Given Dose  20 mL Route  Intracatheter Administered By  Nida Moore, RN

## 2023-01-31 NOTE — LETTER
Date:February 1, 2023      Provider requested that no letter be sent. Do not send.       [facility]

## 2023-01-31 NOTE — LETTER
2023         RE: Paco Stein   [address] [place] [address]        Dear Colleague,    Thank you for referring your patient, Paco Stein, to the [facility]. Please see a copy of my visit note below.    Patient present to [facility] for a PICC dressing change and labs. Name and  verified. Patient tolerated dressing change well. No signs of infection. Skin clean and intact. Single lumen cap changed and saline locked, RN heparin locked lumen. Blood return noted. Dressing change took about 15 minutes to complete. Patient to return back in 1 week.      KEM Churchill LPN    Administrations This Visit     heparin lock flush 10 UNIT/ML injection 5 mL     Admin Date  2023 Action  Given Dose  5 mL Route  Intracatheter Administered By  Nida Moore, RN          sodium chloride (PF) 0.9% PF flush 3-20 mL     Admin Date  2023 Action  Given Dose  20 mL Route  Intracatheter Administered By  Nida Moore, AL                    Again, thank you for allowing me to participate in the care of your patient.        Sincerely,        Specialty Infusion Nurse

## 2023-02-01 DIAGNOSIS — Z94.1 HEART REPLACED BY TRANSPLANT (H): Primary | ICD-10-CM

## 2023-02-01 RX ORDER — LIDOCAINE 40 MG/G
CREAM TOPICAL
Status: CANCELLED | OUTPATIENT
Start: 2023-02-01

## 2023-02-02 ENCOUNTER — HOSPITAL ENCOUNTER (OUTPATIENT)
Dept: CARDIAC REHAB | Facility: CLINIC | Age: 68
Discharge: HOME | End: 2023-02-02
Attending: STUDENT IN AN ORGANIZED HEALTH CARE EDUCATION/TRAINING PROGRAM
Payer: MEDICARE

## 2023-02-02 PROCEDURE — 93798 PHYS/QHP OP CAR RHAB W/ECG: CPT

## 2023-02-03 ENCOUNTER — TELEPHONE (OUTPATIENT)
Dept: TRANSPLANT | Facility: CLINIC | Age: 68
End: 2023-02-03
Payer: MEDICARE

## 2023-02-03 ENCOUNTER — TELEPHONE (OUTPATIENT)
Dept: TRANSPLANT | Facility: CLINIC | Age: 68
End: 2023-02-03

## 2023-02-03 ENCOUNTER — TELEPHONE (OUTPATIENT)
Dept: OTOLARYNGOLOGY | Facility: CLINIC | Age: 68
End: 2023-02-03

## 2023-02-03 ENCOUNTER — HOSPITAL ENCOUNTER (EMERGENCY)
Facility: CLINIC | Age: 68
Discharge: HOME | End: 2023-02-03
Attending: EMERGENCY MEDICINE | Admitting: EMERGENCY MEDICINE
Payer: MEDICARE

## 2023-02-03 VITALS
DIASTOLIC BLOOD PRESSURE: 82 MMHG | OXYGEN SATURATION: 96 % | HEART RATE: 90 BPM | RESPIRATION RATE: 15 BRPM | SYSTOLIC BLOOD PRESSURE: 115 MMHG

## 2023-02-03 DIAGNOSIS — Z79.01 LONG TERM (CURRENT) USE OF ANTICOAGULANTS: ICD-10-CM

## 2023-02-03 DIAGNOSIS — Z94.1 HEART REPLACED BY TRANSPLANT (H): ICD-10-CM

## 2023-02-03 DIAGNOSIS — R04.0 EPISTAXIS: ICD-10-CM

## 2023-02-03 LAB
ALBUMIN SERPL BCG-MCNC: 3.5 G/DL (ref 3.5–5.2)
ALP SERPL-CCNC: 64 U/L (ref 40–129)
ALT SERPL W P-5'-P-CCNC: 21 U/L (ref 10–50)
ANION GAP SERPL CALCULATED.3IONS-SCNC: 9 MMOL/L (ref 7–15)
APTT PPP: 25 SECONDS (ref 22–38)
AST SERPL W P-5'-P-CCNC: 18 U/L (ref 10–50)
BASOPHILS # BLD AUTO: 0 10E3/UL (ref 0–0.2)
BASOPHILS NFR BLD AUTO: 0 %
BILIRUB SERPL-MCNC: 0.3 MG/DL
BUN SERPL-MCNC: 25.2 MG/DL (ref 8–23)
CALCIUM SERPL-MCNC: 9 MG/DL (ref 8.8–10.2)
CHLORIDE SERPL-SCNC: 102 MMOL/L (ref 98–107)
CREAT SERPL-MCNC: 0.89 MG/DL (ref 0.67–1.17)
DEPRECATED HCO3 PLAS-SCNC: 25 MMOL/L (ref 22–29)
EOSINOPHIL # BLD AUTO: 0 10E3/UL (ref 0–0.7)
EOSINOPHIL NFR BLD AUTO: 0 %
ERYTHROCYTE [DISTWIDTH] IN BLOOD BY AUTOMATED COUNT: 15.7 % (ref 10–15)
GFR SERPL CREATININE-BSD FRML MDRD: >90 ML/MIN/1.73M2
GLUCOSE SERPL-MCNC: 112 MG/DL (ref 70–99)
HCT VFR BLD AUTO: 36.7 % (ref 40–53)
HGB BLD-MCNC: 11.4 G/DL (ref 13.3–17.7)
HGB BLD-MCNC: 11.4 G/DL (ref 13.3–17.7)
IMM GRANULOCYTES # BLD: 0.1 10E3/UL
IMM GRANULOCYTES NFR BLD: 1 %
INR PPP: 1.06 (ref 0.85–1.15)
LYMPHOCYTES # BLD AUTO: 0.7 10E3/UL (ref 0.8–5.3)
LYMPHOCYTES NFR BLD AUTO: 6 %
MCH RBC QN AUTO: 28.6 PG (ref 26.5–33)
MCHC RBC AUTO-ENTMCNC: 31.1 G/DL (ref 31.5–36.5)
MCV RBC AUTO: 92 FL (ref 78–100)
MONOCYTES # BLD AUTO: 0.5 10E3/UL (ref 0–1.3)
MONOCYTES NFR BLD AUTO: 5 %
NEUTROPHILS # BLD AUTO: 10.1 10E3/UL (ref 1.6–8.3)
NEUTROPHILS NFR BLD AUTO: 88 %
NRBC # BLD AUTO: 0 10E3/UL
NRBC BLD AUTO-RTO: 0 /100
PLATELET # BLD AUTO: 238 10E3/UL (ref 150–450)
POTASSIUM SERPL-SCNC: 4.5 MMOL/L (ref 3.4–5.3)
PROT SERPL-MCNC: 5.7 G/DL (ref 6.4–8.3)
RBC # BLD AUTO: 3.99 10E6/UL (ref 4.4–5.9)
SODIUM SERPL-SCNC: 136 MMOL/L (ref 136–145)
WBC # BLD AUTO: 11.5 10E3/UL (ref 4–11)

## 2023-02-03 PROCEDURE — 96374 THER/PROPH/DIAG INJ IV PUSH: CPT | Mod: 59 | Performed by: EMERGENCY MEDICINE

## 2023-02-03 PROCEDURE — 250N000009 HC RX 250: Performed by: EMERGENCY MEDICINE

## 2023-02-03 PROCEDURE — 85014 HEMATOCRIT: CPT | Performed by: EMERGENCY MEDICINE

## 2023-02-03 PROCEDURE — 85018 HEMOGLOBIN: CPT | Performed by: EMERGENCY MEDICINE

## 2023-02-03 PROCEDURE — 99284 EMERGENCY DEPT VISIT MOD MDM: CPT | Mod: 25 | Performed by: EMERGENCY MEDICINE

## 2023-02-03 PROCEDURE — 85730 THROMBOPLASTIN TIME PARTIAL: CPT | Performed by: EMERGENCY MEDICINE

## 2023-02-03 PROCEDURE — 36415 COLL VENOUS BLD VENIPUNCTURE: CPT | Performed by: EMERGENCY MEDICINE

## 2023-02-03 PROCEDURE — 30905 CONTROL OF NOSEBLEED: CPT | Mod: LT | Performed by: EMERGENCY MEDICINE

## 2023-02-03 PROCEDURE — 99282 EMERGENCY DEPT VISIT SF MDM: CPT | Performed by: PHYSICIAN ASSISTANT

## 2023-02-03 PROCEDURE — 30905 CONTROL OF NOSEBLEED: CPT | Performed by: EMERGENCY MEDICINE

## 2023-02-03 PROCEDURE — 85610 PROTHROMBIN TIME: CPT | Performed by: EMERGENCY MEDICINE

## 2023-02-03 PROCEDURE — 80053 COMPREHEN METABOLIC PANEL: CPT | Performed by: EMERGENCY MEDICINE

## 2023-02-03 PROCEDURE — 250N000011 HC RX IP 250 OP 636: Performed by: EMERGENCY MEDICINE

## 2023-02-03 RX ORDER — TRANEXAMIC ACID 100 MG/ML
500 INJECTION, SOLUTION INTRAVENOUS ONCE
Status: COMPLETED | OUTPATIENT
Start: 2023-02-03 | End: 2023-02-03

## 2023-02-03 RX ORDER — LIDOCAINE HYDROCHLORIDE 20 MG/ML
10 SOLUTION OROPHARYNGEAL ONCE
Status: COMPLETED | OUTPATIENT
Start: 2023-02-03 | End: 2023-02-03

## 2023-02-03 RX ORDER — TRANEXAMIC ACID 100 MG/ML
INJECTION, SOLUTION INTRAVENOUS ONCE
Status: COMPLETED | OUTPATIENT
Start: 2023-02-03 | End: 2023-02-03

## 2023-02-03 RX ORDER — OXYMETAZOLINE HYDROCHLORIDE 0.05 G/100ML
2 SPRAY NASAL 2 TIMES DAILY
Status: DISCONTINUED | OUTPATIENT
Start: 2023-02-03 | End: 2023-02-03 | Stop reason: HOSPADM

## 2023-02-03 RX ORDER — ONDANSETRON 2 MG/ML
4 INJECTION INTRAMUSCULAR; INTRAVENOUS ONCE
Status: COMPLETED | OUTPATIENT
Start: 2023-02-03 | End: 2023-02-03

## 2023-02-03 RX ADMIN — TRANEXAMIC ACID 500 MG: 1 INJECTION, SOLUTION INTRAVENOUS at 12:20

## 2023-02-03 RX ADMIN — ONDANSETRON 4 MG: 2 INJECTION INTRAMUSCULAR; INTRAVENOUS at 12:09

## 2023-02-03 RX ADMIN — TRANEXAMIC ACID 1000 MG: 1 INJECTION, SOLUTION INTRAVENOUS at 13:34

## 2023-02-03 RX ADMIN — LIDOCAINE HYDROCHLORIDE 10 ML: 20 SOLUTION OROPHARYNGEAL at 13:35

## 2023-02-03 RX ADMIN — OXYMETAZOLINE HYDROCHLORIDE 2 SPRAY: 0.05 SPRAY NASAL at 12:21

## 2023-02-03 RX ADMIN — SILVER NITRATE APPLICATORS: 25; 75 STICK TOPICAL at 13:35

## 2023-02-03 ASSESSMENT — ACTIVITIES OF DAILY LIVING (ADL)
ADLS_ACUITY_SCORE: 35
ADLS_ACUITY_SCORE: 35

## 2023-02-03 NOTE — ED PROVIDER NOTES
ED Provider Note  [facility]      History     Chief Complaint   Patient presents with     Epistaxis     HPI  Paco Stien is a 67 year old male with a history of CAD (s/p PCI to LAD in 2013), HTN, CKD, CNS vasculitis, and recent heart transplant who presents with epistaxis.  Patient underwent heart transplant on 12/25/2022.  He developed epistaxis with no known trauma at 10 this morning.  He has had 1 similar occurrence but this was self-limited.  He notes bleeding from the left nare and blood running down his posterior pharynx.  Patient is taking apixaban.  No other symptoms noted.    Past Medical History  Past Medical History:   Diagnosis Date     Congestive heart failure (H)      Past Surgical History:   Procedure Laterality Date     COLONOSCOPY N/A 11/17/2022    Procedure: COLONOSCOPY;  Surgeon: Roverto Nino MD;  Location:  GI     CV CORONARY ANGIOGRAM N/A 11/11/2022    Procedure: Coronary Angiogram;  Surgeon: Justo Bush MD;  Location:  HEART CARDIAC CATH LAB     CV CORONARY ANGIOGRAM N/A 1/23/2023    Procedure: Coronary Angiogram - biplane;  Surgeon: Justo Bush MD;  Location:  HEART CARDIAC CATH LAB     CV HEART BIOPSY N/A 01/09/2023    Procedure: Heart Cath Heart Biopsy;  Surgeon: Tab Agee MD;  Location:  HEART CARDIAC CATH LAB     CV HEART BIOPSY N/A 01/02/2023    Procedure: Heart Biopsy;  Surgeon: Ghulam Mercado MD;  Location:  HEART CARDIAC CATH LAB     CV HEART BIOPSY N/A 1/23/2023    Procedure: Heart Cath Heart Biopsy;  Surgeon: Justo Bush MD;  Location:  HEART CARDIAC CATH LAB     CV HEART BIOPSY N/A 1/17/2023    Procedure: Heart Cath Heart Biopsy;  Surgeon: Wayne Xavier MD;  Location:  HEART CARDIAC CATH LAB     CV INTRAVASULAR ULTRASOUND N/A 1/23/2023    Procedure: Intravascular Ultrasound;  Surgeon: Justo Bush MD;  Location: [facility] CARDIAC CATH LAB      CV RIGHT HEART CATH MEASUREMENTS RECORDED N/A 11/11/2022    Procedure: Right Heart Catheterization;  Surgeon: Justo Bush MD;  Location:  HEART CARDIAC CATH LAB     CV RIGHT HEART CATH MEASUREMENTS RECORDED N/A 12/08/2022    Procedure: Right Heart Catheterization;  Surgeon: Wayne Xavier MD;  Location:  HEART CARDIAC CATH LAB     CV RIGHT HEART CATH MEASUREMENTS RECORDED N/A 12/09/2022    Procedure: Right Heart Catheterization with leave-in [place];  Surgeon: Khari Mcneill MD;  Location:  HEART CARDIAC CATH LAB     CV RIGHT HEART CATH MEASUREMENTS RECORDED N/A 01/09/2023    Procedure: Heart Cath Right Heart Cath;  Surgeon: aTb Agee MD;  Location:  HEART CARDIAC CATH LAB     CV RIGHT HEART CATH MEASUREMENTS RECORDED N/A 01/02/2023    Procedure: Right Heart Catheterization;  Surgeon: Ghulam Mercado MD;  Location:  HEART CARDIAC CATH LAB     CV RIGHT HEART CATH MEASUREMENTS RECORDED N/A 1/23/2023    Procedure: Right Heart Catheterization;  Surgeon: Justo Bush MD;  Location:  HEART CARDIAC CATH LAB     CV RIGHT HEART CATH MEASUREMENTS RECORDED N/A 1/17/2023    Procedure: Right Heart Catheterization;  Surgeon: Wayne Xavier MD;  Location: [facility] CARDIAC CATH LAB     PICC Right 01/12/2023    placed in basilic and no problem     PICC DOUBLE LUMEN PLACEMENT Right 11/10/2022    Right basilic, 43 cm, 1 cm external length     TRANSPLANT HEART RECIPIENT N/A 12/25/2022    Procedure: TRANSPLANT, HEART, RECIPIENT; Median Sternotomy, Cardiopulmonary Bypass, Removal of Pacemaker;  Surgeon: Brendan Rodriguez MD;  Location:  OR     acetaminophen (TYLENOL) 325 MG tablet  amLODIPine (NORVASC) 5 MG tablet  apixaban ANTICOAGULANT (ELIQUIS) 5 MG tablet  calcium carbonate-vitamin D (CALTRATE) 600-10 MG-MCG per tablet  magnesium oxide (MAG-OX) 400 MG tablet  melatonin 10 MG TABS tablet  methocarbamol (ROBAXIN) 500 MG tablet  mycophenolate  (GENERIC EQUIVALENT) 250 MG capsule  nystatin (MYCOSTATIN) 816064 UNIT/ML suspension  oxyCODONE (ROXICODONE) 5 MG tablet  pantoprazole (PROTONIX) 40 MG EC tablet  polyethylene glycol (MIRALAX) 17 g packet  predniSONE (DELTASONE) 5 MG tablet  rosuvastatin (CRESTOR) 10 MG tablet  sulfamethoxazole-trimethoprim (BACTRIM) 400-80 MG tablet  tacrolimus (GENERIC EQUIVALENT) 1 MG capsule  tacrolimus (GENERIC EQUIVALENT) 5 MG capsule  traZODone (DESYREL) 100 MG tablet  valGANciclovir (VALCYTE) 450 MG tablet  vancomycin (VANCOCIN) 1000 mg in dextrose 5% 200 mL PREMIX      No Known Allergies  Family History  Family History   Problem Relation Age of Onset     Atrial fibrillation Father      Lung Cancer Brother      Social History   Social History     Tobacco Use     Smoking status: Never     Smokeless tobacco: Never   Substance Use Topics     Alcohol use: Not Currently      Past medical history, past surgical history, medications, allergies, family history, and social history were reviewed with the patient. No additional pertinent items.      A medically appropriate review of systems was performed with pertinent positives and negatives noted in the HPI, and all other systems negative.    Physical Exam      Physical Exam  Vitals and nursing note reviewed.   Constitutional:       General: He is not in acute distress.     Appearance: He is not diaphoretic.   HENT:      Head: Atraumatic.      Nose:      Right Nostril: No septal hematoma.      Left Nostril: Epistaxis present. No septal hematoma.   Eyes:      General: No scleral icterus.     Pupils: Pupils are equal, round, and reactive to light.   Cardiovascular:      Heart sounds: Normal heart sounds.   Pulmonary:      Effort: No respiratory distress.      Breath sounds: Normal breath sounds.   Abdominal:      General: Bowel sounds are normal.      Palpations: Abdomen is soft.      Tenderness: There is no abdominal tenderness.   Musculoskeletal:         General: No tenderness.    Skin:     General: Skin is warm.      Findings: No rash.           ED Course, Procedures, & Data   [facility]    -Epistaxis Mgmt    Date/Time: 2/3/2023 7:08 PM  Performed by: Andrea Whittington DO  Authorized by: Andrea Whittington DO     Risks, benefits and alternatives discussed.      ANESTHESIA (see MAR for exact dosages)     Anesthesia method:  Topical application    Topical anesthetic:  Lidocaine gel      PROCEDURE DETAILS     Treatment site:  L posterior    Treatment method:  Merocel sponge    Treatment complexity:  Limited    Treatment episode: recurrence of recent bleed        POST PROCEDURE     Assessment:  Bleeding stopped    PROCEDURE  Describe Procedure: Initially treated with direct pressure, oxymetazoline, and topical TXA.  Lidocaine gel was applied to left nare and a Merocel sponge with bacitracin was inserted.  This stopped epistaxis.  Patient Tolerance:  Patient tolerated the procedure well with no immediate complications                        No results found for any visits on 02/03/23.  Medications - No data to display  Labs Ordered and Resulted from Time of ED Arrival to Time of ED Departure - No data to display  No orders to display          Medical Decision Making  The patient's presentation is strongly suggestive of an acute and uncomplicated illness or injury.    The patient's evaluation involved:  an assessment requiring an independent historian (Family)  review of external note(s) from 3+ sources (Previous notes)  ordering and/or review of 3+ test(s) in this encounter (see separate area of note for details)  review of 3+ test result(s) ordered prior to this encounter (Previous lab values)  discussion of management or test interpretation with another health professional (Cardiology, Transplant Infectious Disease, ENT)    The patient's management involved a decision regarding minor procedure/surgery with identified risk factors and a  decision regarding hospitalization.      Assessment & Plan    Is a 67-year-old male who underwent heart transplant on 12/25/2022 and is on apixaban who presents with epistaxis.  Patient had spontaneous left-sided epistaxis starting this morning.  This was initially treated with direct pressure and oxymetazoline as well as TXA.  This did not stop bleeding.  Merocel sponge was then placed which stopped patient's bleeding.  Hemoglobin is 11.4.  I discussed the case with Cardiology.  They recommend holding anticoagulation.  I also discussed case with ENT who saw the patient.  They will see patient on 2 6 or 2 7 for removal of packing.  They gave recommendations to patient regarding care of packing and reasons to return to the Emergency Department.  As patient has had recent transplant and is high risk for infection I discussed antibiotics with Transplant Infectious Disease.  As patient is currently on IV vancomycin they recommend continuing this as this will cover to decrease risk of infection after packing.  Repeat hemoglobin was obtained which is 11.4.  We will discharge with return precautions.    I have reviewed the nursing notes. I have reviewed the findings, diagnosis, plan and need for follow up with the patient.    New Prescriptions    No medications on file       Final diagnoses:   None       Andrea Whittington DO  [facility] EMERGENCY DEPARTMENT  2/3/2023     Andrea Whittington DO  02/03/23 1925

## 2023-02-03 NOTE — DISCHARGE INSTRUCTIONS
Continue with scheduled follow-up. Return to the emergency department if bleeding recurs and you cannot get it to stop, there are any new symptoms or any cause for concern.

## 2023-02-03 NOTE — TELEPHONE ENCOUNTER
Wife called this AM stating that after pt walk, he laid down and developed bloody nose. Pt is on Eliqius. He is applying pressure; currently a slow drip. Pt otherwise asymptomatic.     Enc to monitor. If persistent, increases drainage, come to the ER for assessment.    Wife/pt verbalized understanding.

## 2023-02-03 NOTE — PROGRESS NOTES
Otolaryngology Consult Note  February 3, 2023      CC: epistaxis    HPI: Paco Stein is a 67 year old male with a past medical history of CAD, HTN, CKD, CNS vasculitis, systolic HF 2/2 NICM s/p OHT 12/25/22, DVT on Eliquis, staph epi bacteremia on IV vancomycin who presented to the ED today with left sided epistaxis. He denies a history of nose bleeds in the past. He reports bleeding was brisk and started spontaneously while taking a nap. No history of nasal trauma or picking inside the nose. The ED physician treated with Afrin, pressure, topical TXA with continued bleeding. Bleeding was able to be controlled after ED physician placed a merocel in the left nasal passage. ENT was asked to evaluate the patient for epistaxis. No further bleeding 30 minutes after packing was placed. Patient denies feeling of swallowing blood.    Past Medical History:   Diagnosis Date     Congestive heart failure (H)        Past Surgical History:   Procedure Laterality Date     COLONOSCOPY N/A 11/17/2022    Procedure: COLONOSCOPY;  Surgeon: Roverto Nino MD;  Location:  GI     CV CORONARY ANGIOGRAM N/A 11/11/2022    Procedure: Coronary Angiogram;  Surgeon: Justo Bush MD;  Location:  HEART CARDIAC CATH LAB     CV CORONARY ANGIOGRAM N/A 1/23/2023    Procedure: Coronary Angiogram - biplane;  Surgeon: Justo Bush MD;  Location:  HEART CARDIAC CATH LAB     CV HEART BIOPSY N/A 01/09/2023    Procedure: Heart Cath Heart Biopsy;  Surgeon: Tab Agee MD;  Location:  HEART CARDIAC CATH LAB     CV HEART BIOPSY N/A 01/02/2023    Procedure: Heart Biopsy;  Surgeon: Ghulam Mercado MD;  Location:  HEART CARDIAC CATH LAB     CV HEART BIOPSY N/A 1/23/2023    Procedure: Heart Cath Heart Biopsy;  Surgeon: Justo Bush MD;  Location:  HEART CARDIAC CATH LAB     CV HEART BIOPSY N/A 1/17/2023    Procedure: Heart Cath Heart Biopsy;  Surgeon: Wayne Xavier MD;   Location:  HEART CARDIAC CATH LAB     CV INTRAVASULAR ULTRASOUND N/A 1/23/2023    Procedure: Intravascular Ultrasound;  Surgeon: Justo Bush MD;  Location:  HEART CARDIAC CATH LAB     CV RIGHT HEART CATH MEASUREMENTS RECORDED N/A 11/11/2022    Procedure: Right Heart Catheterization;  Surgeon: Justo Bush MD;  Location:  HEART CARDIAC CATH LAB     CV RIGHT HEART CATH MEASUREMENTS RECORDED N/A 12/08/2022    Procedure: Right Heart Catheterization;  Surgeon: Wayne Xavier MD;  Location:  HEART CARDIAC CATH LAB     CV RIGHT HEART CATH MEASUREMENTS RECORDED N/A 12/09/2022    Procedure: Right Heart Catheterization with leave-in [place];  Surgeon: Khari Mcneill MD;  Location:  HEART CARDIAC CATH LAB     CV RIGHT HEART CATH MEASUREMENTS RECORDED N/A 01/09/2023    Procedure: Heart Cath Right Heart Cath;  Surgeon: Tab Agee MD;  Location:  HEART CARDIAC CATH LAB     CV RIGHT HEART CATH MEASUREMENTS RECORDED N/A 01/02/2023    Procedure: Right Heart Catheterization;  Surgeon: Ghulam Mercado MD;  Location:  HEART CARDIAC CATH LAB     CV RIGHT HEART CATH MEASUREMENTS RECORDED N/A 1/23/2023    Procedure: Right Heart Catheterization;  Surgeon: Justo Bush MD;  Location:  HEART CARDIAC CATH LAB     CV RIGHT HEART CATH MEASUREMENTS RECORDED N/A 1/17/2023    Procedure: Right Heart Catheterization;  Surgeon: Wayne Xavier MD;  Location: [facility] CARDIAC CATH LAB     PICC Right 01/12/2023    placed in basilic and no problem     PICC DOUBLE LUMEN PLACEMENT Right 11/10/2022    Right basilic, 43 cm, 1 cm external length     TRANSPLANT HEART RECIPIENT N/A 12/25/2022    Procedure: TRANSPLANT, HEART, RECIPIENT; Median Sternotomy, Cardiopulmonary Bypass, Removal of Pacemaker;  Surgeon: Brendan Rodriguez MD;  Location:  OR       Current Outpatient Medications   Medication Sig Dispense Refill     acetaminophen (TYLENOL) 325 MG tablet  Take 3 tablets (975 mg) by mouth every 8 hours 100 tablet 1     amLODIPine (NORVASC) 5 MG tablet Take 1 tablet (5 mg) by mouth daily (Patient taking differently: Take 5 mg by mouth At Bedtime) 30 tablet 1     apixaban ANTICOAGULANT (ELIQUIS) 5 MG tablet Take 1 tablet (5 mg) by mouth 2 times daily 60 tablet 1     calcium carbonate-vitamin D (CALTRATE) 600-10 MG-MCG per tablet Take 1 tablet by mouth 2 times daily (with meals) 60 tablet 1     magnesium oxide (MAG-OX) 400 MG tablet Take 2 tablets (800 mg) by mouth 2 times daily 120 tablet 3     melatonin 10 MG TABS tablet Take 1 tablet (10 mg) by mouth At Bedtime 30 tablet 1     methocarbamol (ROBAXIN) 500 MG tablet Take 1 tablet (500 mg) by mouth every 6 hours as needed for muscle spasms 30 tablet 0     mycophenolate (GENERIC EQUIVALENT) 250 MG capsule Take 4 capsules (1,000 mg) by mouth 2 times daily for 60 days 240 capsule 1     nystatin (MYCOSTATIN) 780725 UNIT/ML suspension Swish and swallow 10 mLs (1,000,000 Units) in mouth 4 times daily for 60 days 1200 mL 1     oxyCODONE (ROXICODONE) 5 MG tablet Take 1 tablet (5 mg) by mouth every 4 hours as needed for severe pain (7-10) or moderate pain (4-6) 5 tablet 0     pantoprazole (PROTONIX) 40 MG EC tablet Take 1 tablet (40 mg) by mouth every morning (before breakfast) 30 tablet 0     polyethylene glycol (MIRALAX) 17 g packet Take 17 g by mouth daily 30 packet 0     predniSONE (DELTASONE) 5 MG tablet Take THREE tabs every AM (15 mg) and TWO tabs every PM (10 mg) 180 tablet 3     rosuvastatin (CRESTOR) 10 MG tablet Take 1 tablet (10 mg) by mouth daily 30 tablet 1     sulfamethoxazole-trimethoprim (BACTRIM) 400-80 MG tablet Take 1 tablet by mouth daily 30 tablet 0     tacrolimus (GENERIC EQUIVALENT) 1 MG capsule ON HOLD due to dose change 120 capsule 0     tacrolimus (GENERIC EQUIVALENT) 5 MG capsule Take 1 capsule (5 mg) by mouth 2 times daily 180 capsule 3     traZODone (DESYREL) 100 MG tablet Take 1 tablet (100 mg) by  mouth nightly as needed for sleep 30 tablet 1     valGANciclovir (VALCYTE) 450 MG tablet Take 2 tablets (900 mg) by mouth daily for 30 days 60 tablet 0     vancomycin (VANCOCIN) 1000 mg in dextrose 5% 200 mL PREMIX Inject 200 mLs (1,000 mg) into the vein every 12 hours  0        No Known Allergies    Social History     Socioeconomic History     Marital status:      Spouse name: Not on file     Number of children: Not on file     Years of education: Not on file     Highest education level: Not on file   Occupational History     Not on file   Tobacco Use     Smoking status: Never     Smokeless tobacco: Never   Substance and Sexual Activity     Alcohol use: Not Currently     Drug use: Not on file     Sexual activity: Not on file   Other Topics Concern     Not on file   Social History Narrative     Not on file     Social Determinants of Health     Financial Resource Strain: Not on file   Food Insecurity: Not on file   Transportation Needs: Not on file   Physical Activity: Not on file   Stress: Not on file   Social Connections: Not on file   Intimate Partner Violence: Not on file   Housing Stability: Not on file       Family History   Problem Relation Age of Onset     Atrial fibrillation Father      Lung Cancer Brother        ROS: 12 point review of systems is negative unless noted in HPI.    PHYSICAL EXAM:  General: laying in bed, no acute distress  /75 (BP Location: Left arm)   Pulse 100   Resp 16   SpO2 95%   HEAD: normocephalic, atraumatic  Face: symmetrical, no swelling, edema, or erythema.   Eyes: EOMI, clear sclera  Ears: external auricles appear normal  Nose: merocel present in left nasal passage. No anterior nasal bleeding. Right nasal passage clear with no blood  Mouth: moist, tongue midline and symmetric  Oropharynx: oropharynx clear, uvula midline, no blood or clots  Neck: soft and flat  Neuro: cranial nerves 2-12 grossly intact  Respiratory: breathing non-labored on RA, no stridor  Skin: no  rashes or skin lesions of the face/neck  Psych: pleasant affect  Cardio: extremities warm and well perfused     ROUTINE IP LABS (Last four results)  BMP  Recent Labs   Lab 02/03/23  1231 01/31/23  0825     --    POTASSIUM 4.5  --    CHLORIDE 102  --    TIM 9.0  --    CO2 25  --    BUN 25.2*  --    CR 0.89 1.06   *  --      CBC  Recent Labs   Lab 02/03/23  1455 02/03/23  1231 01/31/23  0825   WBC  --  11.5* 8.5   RBC  --  3.99* 4.13*   HGB 11.4* 11.4* 11.9*   HCT  --  36.7* 37.0*   MCV  --  92 90   MCH  --  28.6 28.8   MCHC  --  31.1* 32.2   RDW  --  15.7* 15.7*   PLT  --  238 254     INR  Recent Labs   Lab 02/03/23  1231   INR 1.06       Assessment and Plan  Paco Stein is a 67 year old male with a past medical history of CAD, HTN, CKD, CNS vasculitis, systolic HF 2/2 NICM s/p OHT 12/25/22, DVT on Eliquis, staph epi bacteremia on IV vancomycin who presented to the ED today with left sided epistaxis that was able to be controlled with Afrin, pressure, topical TXA and a merocel placed by the ED physician.    - Recommend antibiotics while packing remains in place. Defer to transplant team regarding antibiotic choice. Please ensure staph coverage.  - Defer decision of whether to hold anticoagulation to cardiology/transplant team  - Recommend saline nasal spray Q2h while awake to keep nasal mucosa moist.Spray directly into packing to maintain moisture and prevent packing from crusting to nasal tissue  - Recommend A[place] nasal saline gel to anterior nares QHS, alternatively may use Aquaphor or Vaseline to left anterior septum  - If bleeding recurs, spray Afrin (3 sprays) and hold firm digital pressure over the soft part of the nose for 20 minutes continuously. Nasal clips are ineffective and should not be used in place of digital pressure. If bleeding continues despite these measures or is very brisk return to ED for further intervention   - Message sent to ENT clinic to schedule appointment Monday/Tuesday  next week for merocel removal    -- Patient and above plan to be discussed with Dr. Phillips.    Lala Carlson PA-C  Otolaryngology-Head & Neck Surgery  Please page ENT with questions by dialing [phone] and entering job code [phone] when prompted.

## 2023-02-03 NOTE — ED TRIAGE NOTES
Pt arrives ambulatory w/ c/o epistaxis. States onset ~1000 this am- denies trauma. Currently anticoagulated w/ Eliquis, indicated for recent heart tx (12/2022)

## 2023-02-03 NOTE — ED TRIAGE NOTES
Triage Assessment     Row Name 02/03/23 1134       Triage Assessment (Adult)    Airway WDL WDL       Respiratory WDL    Respiratory WDL WDL       Skin Circulation/Temperature WDL    Skin Circulation/Temperature WDL WDL       Cardiac WDL    Cardiac WDL X       Peripheral/Neurovascular WDL    Peripheral Neurovascular WDL WDL       Cognitive/Neuro/Behavioral WDL    Cognitive/Neuro/Behavioral WDL WDL

## 2023-02-03 NOTE — ED NOTES
Bed: ED09  Expected date:   Expected time:   Means of arrival:   Comments:  Hold for pt in [facility] (ENT to see)

## 2023-02-06 ENCOUNTER — DOCUMENTATION ONLY (OUTPATIENT)
Dept: TRANSPLANT | Facility: CLINIC | Age: 68
End: 2023-02-06

## 2023-02-06 ENCOUNTER — HOSPITAL ENCOUNTER (OUTPATIENT)
Dept: CARDIAC REHAB | Facility: CLINIC | Age: 68
Discharge: HOME | End: 2023-02-06
Attending: STUDENT IN AN ORGANIZED HEALTH CARE EDUCATION/TRAINING PROGRAM
Payer: MEDICARE

## 2023-02-06 ENCOUNTER — OFFICE VISIT (OUTPATIENT)
Dept: OTOLARYNGOLOGY | Facility: CLINIC | Age: 68
End: 2023-02-06
Payer: MEDICARE

## 2023-02-06 VITALS
OXYGEN SATURATION: 100 % | HEART RATE: 102 BPM | WEIGHT: 187 LBS | BODY MASS INDEX: 24 KG/M2 | SYSTOLIC BLOOD PRESSURE: 119 MMHG | TEMPERATURE: 98.6 F | DIASTOLIC BLOOD PRESSURE: 69 MMHG | HEIGHT: 74 IN

## 2023-02-06 DIAGNOSIS — Z94.1 HEART REPLACED BY TRANSPLANT (H): ICD-10-CM

## 2023-02-06 DIAGNOSIS — R04.0 EPISTAXIS: Primary | ICD-10-CM

## 2023-02-06 PROCEDURE — 99202 OFFICE O/P NEW SF 15 MIN: CPT | Performed by: OTOLARYNGOLOGY

## 2023-02-06 PROCEDURE — 93798 PHYS/QHP OP CAR RHAB W/ECG: CPT

## 2023-02-06 RX ORDER — NYSTATIN 100000/ML
1000000 SUSPENSION, ORAL (FINAL DOSE FORM) ORAL 4 TIMES DAILY
Qty: 1200 ML | Refills: 1 | Status: SHIPPED | OUTPATIENT
Start: 2023-02-06 | End: 2023-04-28

## 2023-02-06 RX ORDER — METHOCARBAMOL 500 MG/1
500 TABLET, FILM COATED ORAL EVERY 6 HOURS PRN
Qty: 30 TABLET | Refills: 1 | Status: SHIPPED | OUTPATIENT
Start: 2023-02-06 | End: 2023-02-21

## 2023-02-06 RX ORDER — MYCOPHENOLATE MOFETIL 250 MG/1
1000 CAPSULE ORAL 2 TIMES DAILY
Qty: 720 CAPSULE | Refills: 3 | Status: SHIPPED | OUTPATIENT
Start: 2023-02-06 | End: 2023-05-22

## 2023-02-06 RX ORDER — VALGANCICLOVIR 450 MG/1
900 TABLET, FILM COATED ORAL DAILY
Qty: 60 TABLET | Refills: 3 | Status: SHIPPED | OUTPATIENT
Start: 2023-02-06 | End: 2023-03-23

## 2023-02-06 RX ORDER — POLYETHYLENE GLYCOL 3350 17 G/17G
17 POWDER, FOR SOLUTION ORAL DAILY PRN
Qty: 510 G | Refills: 1 | Status: ON HOLD | OUTPATIENT
Start: 2023-02-06 | End: 2023-06-29

## 2023-02-06 RX ORDER — PANTOPRAZOLE SODIUM 40 MG/1
40 TABLET, DELAYED RELEASE ORAL
Qty: 90 TABLET | Refills: 3 | Status: SHIPPED | OUTPATIENT
Start: 2023-02-06 | End: 2023-04-28

## 2023-02-06 RX ORDER — PHENOL 1.4 %
10 AEROSOL, SPRAY (ML) MUCOUS MEMBRANE
Qty: 30 TABLET | Refills: 1 | Status: ON HOLD | OUTPATIENT
Start: 2023-02-06 | End: 2023-02-21

## 2023-02-06 RX ORDER — MAGNESIUM OXIDE 400 MG/1
800 TABLET ORAL 2 TIMES DAILY
Qty: 120 TABLET | Refills: 3 | Status: ON HOLD | OUTPATIENT
Start: 2023-02-06 | End: 2023-05-22 | Stop reason: ALTCHOICE

## 2023-02-06 RX ORDER — TRAZODONE HYDROCHLORIDE 100 MG/1
100 TABLET ORAL
Qty: 30 TABLET | Refills: 1 | Status: ON HOLD | OUTPATIENT
Start: 2023-02-06 | End: 2023-06-29

## 2023-02-06 RX ORDER — ROSUVASTATIN CALCIUM 10 MG/1
10 TABLET, COATED ORAL DAILY
Qty: 90 TABLET | Refills: 1 | Status: ON HOLD | OUTPATIENT
Start: 2023-02-06 | End: 2023-06-29

## 2023-02-06 RX ORDER — CALCIUM CARBONATE/VITAMIN D3 600 MG-10
1 TABLET ORAL 2 TIMES DAILY WITH MEALS
Qty: 180 TABLET | Refills: 0 | Status: SHIPPED | OUTPATIENT
Start: 2023-02-06 | End: 2023-05-05

## 2023-02-06 RX ORDER — PREDNISONE 5 MG/1
TABLET ORAL
Qty: 150 TABLET | Refills: 3 | Status: SHIPPED | OUTPATIENT
Start: 2023-02-06 | End: 2023-02-10

## 2023-02-06 RX ORDER — SULFAMETHOXAZOLE AND TRIMETHOPRIM 400; 80 MG/1; MG/1
1 TABLET ORAL DAILY
Qty: 30 TABLET | Refills: 11 | Status: SHIPPED | OUTPATIENT
Start: 2023-02-06 | End: 2023-04-28

## 2023-02-06 RX ORDER — AMLODIPINE BESYLATE 5 MG/1
5 TABLET ORAL AT BEDTIME
Qty: 90 TABLET | Refills: 3 | Status: SHIPPED | OUTPATIENT
Start: 2023-02-06 | End: 2024-01-29

## 2023-02-06 ASSESSMENT — PAIN SCALES - GENERAL: PAINLEVEL: NO PAIN (0)

## 2023-02-06 NOTE — PROGRESS NOTES
Per Dr Dominguez post ER visit for epistaxis :      He is on vanco already which should be appropriate coverage while the nose packing is in.      Hold eliquis hold until nasal packing is out

## 2023-02-06 NOTE — PATIENT INSTRUCTIONS
You were seen in the ENT Clinic today by Dr. Mena. If you have any questions or concerns after your appointment, please contact us (see below)      2.   Please return to clinic as needed.         How to Contact Us:  Send a [employer] message to your provider. Our team will respond to you via [employer]. Occasionally, we will need to call you to get further information.  For urgent matters (Monday-Friday), call the ENT Clinic: [phone] and speak with a call center team member - they will route your call appropriately.   If you'd like to speak directly with a nurse, please find our contact information below. We do our best to check voicemail frequently throughout the day, and will work to call you back within 1-2 days. For urgent matters, please use the general clinic phone numbers listed above.      Kristen CARTWRIGHT RN  ENT RN Care Coordinator  Direct: [phone]  [place] SHORT LPN  Direct: [phone]

## 2023-02-06 NOTE — LETTER
2/6/2023       RE: Paco Stein  [address] [place] [address]     Dear Colleague,    Thank you for referring your patient, Paco Stein, to the [facility] EAR NOSE AND THROAT CLINIC [place] [facility]. Please see a copy of my visit note below.    HISTORY OF PRESENT ILLNESS:  Tammie is here to see us today.  He recently had his nose packed in the Emergency Department.  He was on Eliquis for a blood clot.  He has not had any bleeding since the Merocel was placed.  He is here for Merocel removal.    PHYSICAL EXAMINATION:  On examination today, the patient is in no distress, alert and interactive.  Breathing without difficulty or stridor.  Eyes are anicteric.  Skin of the head and neck appears normal.    The Merocel was removed from the left side of the nose and intranasal exam demonstrates a left to right septal deviation, but no source of immediate bleeding or concerning lesion.    ASSESSMENT AND PLAN: A 67-year-old with recent heart transplant recipient who had Eliquis and a nosebleed.  The Eliquis has been stopped.  It will be determined whether it is continued if necessary by his cardiac team.  Otherwise, we will see him back as needed.  He will continue to hydrate his nose.        Again, thank you for allowing me to participate in the care of your patient.      Sincerely,    Cruzito Mena MD

## 2023-02-06 NOTE — PROGRESS NOTES
HISTORY OF PRESENT ILLNESS:  Tammie is here to see us today.  He recently had his nose packed in the Emergency Department.  He was on Eliquis for a blood clot.  He has not had any bleeding since the Merocel was placed.  He is here for Merocel removal.    PHYSICAL EXAMINATION:  On examination today, the patient is in no distress, alert and interactive.  Breathing without difficulty or stridor.  Eyes are anicteric.  Skin of the head and neck appears normal.    The Merocel was removed from the left side of the nose and intranasal exam demonstrates a left to right septal deviation, but no source of immediate bleeding or concerning lesion.    ASSESSMENT AND PLAN: A 67-year-old with recent heart transplant recipient who had Eliquis and a nosebleed.  The Eliquis has been stopped.  It will be determined whether it is continued if necessary by his cardiac team.  Otherwise, we will see him back as needed.  He will continue to hydrate his nose.

## 2023-02-07 ENCOUNTER — INFUSION THERAPY VISIT (OUTPATIENT)
Dept: INFUSION THERAPY | Facility: CLINIC | Age: 68
End: 2023-02-07
Attending: PHYSICIAN ASSISTANT
Payer: MEDICARE

## 2023-02-07 ENCOUNTER — OFFICE VISIT (OUTPATIENT)
Dept: INFECTIOUS DISEASES | Facility: CLINIC | Age: 68
End: 2023-02-07
Attending: INTERNAL MEDICINE
Payer: MEDICARE

## 2023-02-07 VITALS
WEIGHT: 186.2 LBS | OXYGEN SATURATION: 96 % | HEART RATE: 99 BPM | DIASTOLIC BLOOD PRESSURE: 85 MMHG | SYSTOLIC BLOOD PRESSURE: 121 MMHG | BODY MASS INDEX: 23.91 KG/M2 | TEMPERATURE: 98.5 F

## 2023-02-07 VITALS
RESPIRATION RATE: 16 BRPM | HEART RATE: 108 BPM | TEMPERATURE: 97.6 F | OXYGEN SATURATION: 97 % | DIASTOLIC BLOOD PRESSURE: 77 MMHG | SYSTOLIC BLOOD PRESSURE: 117 MMHG

## 2023-02-07 DIAGNOSIS — D84.9 IMMUNOSUPPRESSED STATUS (H): ICD-10-CM

## 2023-02-07 DIAGNOSIS — R78.81 STAPHYLOCOCCUS EPIDERMIDIS BACTEREMIA: Primary | ICD-10-CM

## 2023-02-07 DIAGNOSIS — Z94.1 HEART REPLACED BY TRANSPLANT (H): ICD-10-CM

## 2023-02-07 DIAGNOSIS — I80.9 SEPTIC THROMBOPHLEBITIS: ICD-10-CM

## 2023-02-07 DIAGNOSIS — B95.7 STAPHYLOCOCCUS EPIDERMIDIS BACTEREMIA: Primary | ICD-10-CM

## 2023-02-07 DIAGNOSIS — Z94.1 HEART TRANSPLANT RECIPIENT (H): ICD-10-CM

## 2023-02-07 DIAGNOSIS — Z79.899 ENCOUNTER FOR LONG-TERM (CURRENT) USE OF HIGH-RISK MEDICATION: ICD-10-CM

## 2023-02-07 LAB
ANION GAP SERPL CALCULATED.3IONS-SCNC: 10 MMOL/L (ref 7–15)
BASOPHILS # BLD AUTO: 0 10E3/UL (ref 0–0.2)
BASOPHILS NFR BLD AUTO: 0 %
BUN SERPL-MCNC: 22.4 MG/DL (ref 8–23)
CALCIUM SERPL-MCNC: 9.3 MG/DL (ref 8.8–10.2)
CHLORIDE SERPL-SCNC: 99 MMOL/L (ref 98–107)
CREAT SERPL-MCNC: 0.91 MG/DL (ref 0.67–1.17)
CRP SERPL-MCNC: <3 MG/L
DEPRECATED HCO3 PLAS-SCNC: 25 MMOL/L (ref 22–29)
EOSINOPHIL # BLD AUTO: 0.1 10E3/UL (ref 0–0.7)
EOSINOPHIL NFR BLD AUTO: 1 %
ERYTHROCYTE [DISTWIDTH] IN BLOOD BY AUTOMATED COUNT: 15.5 % (ref 10–15)
ERYTHROCYTE [SEDIMENTATION RATE] IN BLOOD BY WESTERGREN METHOD: 12 MM/HR (ref 0–20)
GFR SERPL CREATININE-BSD FRML MDRD: >90 ML/MIN/1.73M2
GLUCOSE SERPL-MCNC: 200 MG/DL (ref 70–99)
HCT VFR BLD AUTO: 33 % (ref 40–53)
HGB BLD-MCNC: 10.6 G/DL (ref 13.3–17.7)
IMM GRANULOCYTES # BLD: 0.1 10E3/UL
IMM GRANULOCYTES NFR BLD: 1 %
LYMPHOCYTES # BLD AUTO: 1.4 10E3/UL (ref 0.8–5.3)
LYMPHOCYTES NFR BLD AUTO: 13 %
MAGNESIUM SERPL-MCNC: 1.4 MG/DL (ref 1.7–2.3)
MCH RBC QN AUTO: 28.7 PG (ref 26.5–33)
MCHC RBC AUTO-ENTMCNC: 32.1 G/DL (ref 31.5–36.5)
MCV RBC AUTO: 89 FL (ref 78–100)
MONOCYTES # BLD AUTO: 0.5 10E3/UL (ref 0–1.3)
MONOCYTES NFR BLD AUTO: 4 %
NEUTROPHILS # BLD AUTO: 8.3 10E3/UL (ref 1.6–8.3)
NEUTROPHILS NFR BLD AUTO: 81 %
NRBC # BLD AUTO: 0 10E3/UL
NRBC BLD AUTO-RTO: 0 /100
PHOSPHATE SERPL-MCNC: 3.4 MG/DL (ref 2.5–4.5)
PLATELET # BLD AUTO: 250 10E3/UL (ref 150–450)
POTASSIUM SERPL-SCNC: 4.3 MMOL/L (ref 3.4–5.3)
RBC # BLD AUTO: 3.69 10E6/UL (ref 4.4–5.9)
SODIUM SERPL-SCNC: 134 MMOL/L (ref 136–145)
TACROLIMUS BLD-MCNC: 10.4 UG/L (ref 5–15)
TME LAST DOSE: NORMAL H
TME LAST DOSE: NORMAL H
VANCOMYCIN SERPL-MCNC: 12.4 UG/ML
WBC # BLD AUTO: 10.4 10E3/UL (ref 4–11)

## 2023-02-07 PROCEDURE — 36592 COLLECT BLOOD FROM PICC: CPT

## 2023-02-07 PROCEDURE — 83735 ASSAY OF MAGNESIUM: CPT

## 2023-02-07 PROCEDURE — 85652 RBC SED RATE AUTOMATED: CPT

## 2023-02-07 PROCEDURE — 84100 ASSAY OF PHOSPHORUS: CPT

## 2023-02-07 PROCEDURE — 80202 ASSAY OF VANCOMYCIN: CPT

## 2023-02-07 PROCEDURE — 86140 C-REACTIVE PROTEIN: CPT

## 2023-02-07 PROCEDURE — G0463 HOSPITAL OUTPT CLINIC VISIT: HCPCS | Mod: 25 | Performed by: INTERNAL MEDICINE

## 2023-02-07 PROCEDURE — 80197 ASSAY OF TACROLIMUS: CPT

## 2023-02-07 PROCEDURE — 99214 OFFICE O/P EST MOD 30 MIN: CPT | Mod: 24 | Performed by: INTERNAL MEDICINE

## 2023-02-07 PROCEDURE — 80048 BASIC METABOLIC PNL TOTAL CA: CPT

## 2023-02-07 PROCEDURE — 85025 COMPLETE CBC W/AUTO DIFF WBC: CPT

## 2023-02-07 RX ORDER — METHYLPREDNISOLONE SODIUM SUCCINATE 125 MG/2ML
125 INJECTION, POWDER, LYOPHILIZED, FOR SOLUTION INTRAMUSCULAR; INTRAVENOUS
Status: CANCELLED
Start: 2023-02-07

## 2023-02-07 RX ORDER — DIPHENHYDRAMINE HYDROCHLORIDE 50 MG/ML
50 INJECTION INTRAMUSCULAR; INTRAVENOUS
Status: CANCELLED
Start: 2023-02-07

## 2023-02-07 RX ORDER — HEPARIN SODIUM (PORCINE) LOCK FLUSH IV SOLN 100 UNIT/ML 100 UNIT/ML
5 SOLUTION INTRAVENOUS
Status: CANCELLED | OUTPATIENT
Start: 2023-02-07

## 2023-02-07 RX ORDER — HEPARIN SODIUM,PORCINE 10 UNIT/ML
5 VIAL (ML) INTRAVENOUS
Status: DISCONTINUED | OUTPATIENT
Start: 2023-02-07 | End: 2023-02-07 | Stop reason: HOSPADM

## 2023-02-07 RX ORDER — EPINEPHRINE 1 MG/ML
0.3 INJECTION, SOLUTION INTRAMUSCULAR; SUBCUTANEOUS EVERY 5 MIN PRN
Status: CANCELLED | OUTPATIENT
Start: 2023-02-07

## 2023-02-07 RX ORDER — ALBUTEROL SULFATE 0.83 MG/ML
2.5 SOLUTION RESPIRATORY (INHALATION)
Status: CANCELLED | OUTPATIENT
Start: 2023-02-07

## 2023-02-07 RX ORDER — MEPERIDINE HYDROCHLORIDE 25 MG/ML
25 INJECTION INTRAMUSCULAR; INTRAVENOUS; SUBCUTANEOUS EVERY 30 MIN PRN
Status: CANCELLED | OUTPATIENT
Start: 2023-02-07

## 2023-02-07 RX ORDER — HEPARIN SODIUM,PORCINE 10 UNIT/ML
5 VIAL (ML) INTRAVENOUS
Status: CANCELLED | OUTPATIENT
Start: 2023-02-07

## 2023-02-07 RX ORDER — ALBUTEROL SULFATE 90 UG/1
1-2 AEROSOL, METERED RESPIRATORY (INHALATION)
Status: CANCELLED
Start: 2023-02-07

## 2023-02-07 ASSESSMENT — PAIN SCALES - GENERAL: PAINLEVEL: NO PAIN (0)

## 2023-02-07 NOTE — PROGRESS NOTES
[facility]  Transplant Infectious Disease Clinic Note:  Follow up     Patient:  Paco Stein, Date of birth 1955,   Medical record number [id number]  Date of Visit:  02/07/2023    Recommendations:   1. Stop vancomycin after the evening dose on 2/7/23  2. Remove PICC line on 2/9/23 after cardiac cath and biopsy. Coordinated this with transplant coordinator and [facility].   3. Discussed signs and symptoms of a relapsed infection.   4. There are plans to resume the apixaban for DVTs after the procedure on 2/9/23  5. Follow up as needed    38 minutes spent on the date of the encounter doing chart review, review of test results, interpretation of tests, patient visit, documentation and discussion with other provider(s)     Tiera Anguiano DO.   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 There was also concern for steroids induced leukocytosis and the development of a right and left internal jugular vein thrombi.     Oxacillin Resistant Staphylococcus epidermidis bacteremia with concern for septic thrombophelbitis  - Blood cx grew Staphylococcus epidermidis 1/7/2023 (drawn from the left arm) and 1/8/2023 (drawn from the right hand and left hand).  Post transplant develop leukocytosis through to be from steroids but it possible he had an infection developing. He did not have fevers or other systemic signs.   Pretransplant JARED 11/16/2022 was without vegetations. He has known vascular thromboses (left peroneal, right internal jugular, and new non-occlusive left internal jugular), so these could be infected as well. He previously had been on apixaban although  currently held due to recent epistaxis and upcoming cardiac biopsy/cath.   He also has a left anterior shoulder anechoic fluid collection. This fluid collection has been present since at least 12/29/22. It has reduced in size and he does not have localizing symptoms c/w septic arthritis.  He has been on vancomycin since 1/8/2023.  1/9/23 blood cultures negative. Since being on the vancomycin his trough levels have been adequate. Creatinine stable and inflammatory markers have normalized. Had planned for a 4 week course of vancomycin which would be complete. Appropriate to discontinue at this time given adequate therapy and clinical improvement. We discussed signs and symptoms of a relapsed infection.     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   - Immunosuppression: tacrolimus, prednisone, mycophenolate    History of the Infectious Disease lllness:   Last seen by Dr. Alvarez inpatient on 1/12/23. Blood cultures were positive for Staph epi on 1/7 and 1/8/23. Blood cultures negative on 1/9/23. 1/9 US demonstrated nonocclusive DVT of the left peroneal vein, right and left internal jugular veins, and left anterior shoulder anechoic fluid collection measuring 1.9 cm. Some of these are older findings. Previous JARED on 12/25/23 showed a calcified aortic annulus and TV regurgitation. At this time time planned for a 4 week course of vancomycin through 2/6/23. He was discharged on 1/13/23. Seen in the ED on 2/3/23 for epistaxis - he remained on vancomycin. Discharged home.   Apixaban has yet to be resumed. On the vancomycin his  creatinine has been stable. CRP down trended to < 3 (from 45). ESR down to 10 from 40. Vanco level has been 10-11.   IS mycophenolate, prednisone 15 mg/10 mg, tacrolimus  Prophylaxis is tmp/smx, valganciclovir. 1/23/23 CMV and EBV negative.    He feels well overall. Still feels a little weak and has tried to increase his time of walking. Denies subjective fevers, chills, left shoulder pain, nausea, vomiting, diarrhea. No other joint or back pain. Per the patient and he wife the fluid in the left shoulder has been a chronic issue. There has not been left shoulder pain, redness, or movement issues. The PICC line has been working well. The dose of the vancomycin has been adjusted a few times as an outpatient. When he was on the higher vancomycin dose he endorses having to urinate more.     Answers for HPI/ROS submitted by the patient on 1/31/2023  General Symptoms: No  Skin Symptoms: No  HENT Symptoms: No  EYE SYMPTOMS: No  HEART SYMPTOMS: Yes  LUNG SYMPTOMS: No  INTESTINAL SYMPTOMS: No  URINARY SYMPTOMS: Yes  REPRODUCTIVE SYMPTOMS: No  SKELETAL SYMPTOMS: Yes  BLOOD SYMPTOMS: No  NERVOUS SYSTEM SYMPTOMS: Yes  MENTAL HEALTH SYMPTOMS: No  Chest pain or pressure: No  Fast or irregular heartbeat: No  Pain in legs with walking: No  Trouble breathing while lying down: No  Fingers or toes appear blue: No  High blood pressure: Yes  Low blood pressure: No  Fainting: No  Murmurs: No  Pacemaker: No  Varicose veins: No  Edema or swelling: No  Wake up at night with shortness of breath: No  Light-headedness: Yes  Exercise intolerance: Yes  Trouble holding urine or incontinence: No  Pain or burning: No  Trouble starting or stopping: No  Increased frequency of urination: Yes  Blood in urine: No  Decreased frequency of urination: No  Frequent nighttime urination: Yes  Flank pain: No  Difficulty emptying bladder: No  Back pain: Yes  Muscle aches: No  Neck pain: No  Swollen joints: No  Joint pain: No  Bone pain: No  Muscle cramps:  No  Muscle weakness: No  Joint stiffness: No  Bone fracture: No  Trouble with coordination: Yes  Dizziness or trouble with balance: Yes  Fainting or black-out spells: No  Memory loss: No  Headache: No  Seizures: No  Speech problems: No  Tingling: Yes  Tremor: Yes  Weakness: Yes  Difficulty walking: Yes  Paralysis: No  Numbness: Yes    Transplants:  12/25/2022 (Heart); Postoperative day:  44.  Coordinator Mary Lou Luke    Patient Active Problem List    Diagnosis Date Noted     Status post coronary angiogram 01/23/2023     Priority: Medium     Staphylococcus epidermidis bacteremia 01/09/2023     Priority: Medium     Using prophylactic antibiotic on daily basis 01/09/2023     Priority: Medium     Benign neoplasm of colon 12/12/2022     Priority: Medium     Troponin level elevated 12/06/2022     Priority: Medium     Anginal equivalent (H) 12/06/2022     Priority: Medium     Heart failure, unspecified HF chronicity, unspecified heart failure type (H) 12/06/2022     Priority: Medium     Acute embolism and thrombosis of left peroneal vein (H) 11/22/2022     Priority: Medium     Acute on chronic combined systolic (congestive) and diastolic (congestive) heart failure (H) 11/14/2022     Priority: Medium     CHF (congestive heart failure) (H) 11/10/2022     Priority: Medium     Acute respiratory failure with hypoxia (H) 11/08/2022     Priority: Medium     Coronary artery disease involving native coronary artery of native heart without angina pectoris 11/08/2022     Priority: Medium     Essential hypertension 11/08/2022     Priority: Medium     NSVT (nonsustained ventricular tachycardia) 11/08/2022     Priority: Medium     SOB (shortness of breath) 11/08/2022     Priority: Medium     Syncope and collapse 10/07/2021     Priority: Medium     Cardiomyopathy (H) 10/06/2021     Priority: Medium     Primary central nervous system vasculitis (H) 11/30/2017     Priority: Medium     Vasculitis, CNS (H) 11/30/2017     Priority: Medium      Stroke (H) 09/28/2016     Priority: Medium     Hyperlipidemia 08/08/2013     Priority: Medium       No Known Allergies    Current Outpatient Medications   Medication     acetaminophen (TYLENOL) 325 MG tablet     amLODIPine (NORVASC) 5 MG tablet     apixaban ANTICOAGULANT (ELIQUIS) 5 MG tablet     calcium carbonate-vitamin D (CALTRATE) 600-10 MG-MCG per tablet     magnesium oxide (MAG-OX) 400 MG tablet     Melatonin 10 MG TABS tablet     methocarbamol (ROBAXIN) 500 MG tablet     mycophenolate (GENERIC EQUIVALENT) 250 MG capsule     nystatin (MYCOSTATIN) 058977 UNIT/ML suspension     oxyCODONE (ROXICODONE) 5 MG tablet     pantoprazole (PROTONIX) 40 MG EC tablet     polyethylene glycol (MIRALAX) 17 GM/Dose powder     predniSONE (DELTASONE) 5 MG tablet     rosuvastatin (CRESTOR) 10 MG tablet     sulfamethoxazole-trimethoprim (BACTRIM) 400-80 MG tablet     tacrolimus (GENERIC EQUIVALENT) 1 MG capsule     tacrolimus (GENERIC EQUIVALENT) 5 MG capsule     traZODone (DESYREL) 100 MG tablet     valGANciclovir (VALCYTE) 450 MG tablet     vancomycin (VANCOCIN) 1000 mg in dextrose 5% 200 mL PREMIX     No current facility-administered medications for this visit.            Physical Exam:   /85   Pulse 99   Temp 98.5  F (36.9  C) (Oral)   Wt 84.5 kg (186 lb 3.2 oz)   SpO2 96%   BMI 23.91 kg/m       GENERAL: well appearing, no acute distress. Wife is present.   HEAD: Head is normocephalic, atraumatic   EYES: Eyes have anicteric sclerae.  Pupils reactive bilaterally.  ENT: Oropharynx is moist without exudates or ulcers.  NECK: Supple.  LUNGS: Clear to auscultation bilaterally. No accessory muscle use. No oxygen.   CARDIOVASCULAR: Regular rate and rhythm with no murmurs  ABDOMEN: Normal bowel sounds, soft, nontender.  SKIN: No acute rashes.  EXTREMITIES: No joint erythema or swelling. Left shoulder with good ROM. No pain with movement.   NEUROLOGIC: Alert. Oriented. Grossly nonfocal. Generally weak when attempting to get  out of the chair.   LINES: PICC without erythema. Dressing intact.    Microbiology     Culture   Date Value Ref Range Status   01/13/2023 No Growth  Final   01/12/2023 No Growth  Final   01/11/2023 No Growth  Final   01/10/2023 No Growth  Final   01/09/2023 No Growth  Final   01/09/2023 No Growth  Final   01/08/2023 Positive on the 2nd day of incubation (A)  Final   01/08/2023 Staphylococcus epidermidis (AA)  Final     Comment:     1 of 2 bottlesSusceptibilities done on previous cultures   01/08/2023 Positive on the 1st day of incubation (A)  Final   01/08/2023 Staphylococcus epidermidis (AA)  Final     Comment:     2 of 2 bottlesSusceptibilities done on previous cultures         Urine Studies     Recent Labs   Lab Test 01/07/23  1116 12/25/22  0746 11/11/22  1155   URINEPH 5.5 7.0 5.0   NITRITE Negative Negative Negative   LEUKEST Negative Negative Negative   WBCU 0 0 3     Quantiferon testing   Recent Labs   Lab Test 02/03/23  1231 01/31/23  0825 12/06/22  0734 11/12/22  0458   TBRES  --   --   --  Negative   LYMPH 6 20   < > 19    < > = values in this interval not displayed.       Respiratory virus testing    Recent Labs   Lab Test 12/06/22  0758   INFZA Negative   INFZB Negative   IRSV Negative       EBV DNA Copies/mL   Date Value Ref Range Status   01/23/2023 Not Detected Not Detected copies/mL Final     Hepatitis B Testing     Recent Labs   Lab Test 12/25/22  0604 11/12/22  0458   AUSAB 37.74 30.51   HBCAB Nonreactive Nonreactive   HEPBANG Nonreactive Nonreactive       CMV Antibody IgG   Date Value Ref Range Status   12/25/2022 Positive, suggests recent or past exposure. (A) No detectable antibody.  Final   11/12/2022 Positive, suggests recent or past exposure. (A) No detectable antibody.  Final     Varicella Zoster Antibody IgG   Date Value Ref Range Status   11/12/2022 Positive  Final     Comment:     Suggests previous exposure or immunization and probable immunity.       EBV Capsid Antibody IgG   Date Value  Ref Range Status   12/25/2022 Positive (A) No detectable antibody. Final     Comment:     Suggests recent or past exposure.   11/12/2022 Positive (A) No detectable antibody. Final     Comment:     Suggests recent or past exposure.     Toxoplasma Antibody IgG   Date Value Ref Range Status   11/12/2022 <3.0 0.0 - 7.1 IU/mL Final     Comment:     Negative- Absence of detectable Toxoplasma gondii IgG antibodies. A negative result does not rule out acute infection.       Herpes Simplex Virus Type 1 IgG Antibody   Date Value Ref Range Status   11/12/2022 Positive.  IgG antibody to HSV-1 detected. (A) No HSV-1 IgG antibodies detected Final     Herpes Simplex Virus Type 2 IgG Antibody   Date Value Ref Range Status   11/12/2022 No HSV-2 IgG antibodies detected. No HSV-2 IgG antibodies detected Final            Laboratory Data:     Recent Labs   Lab Test 02/03/23  1231 01/31/23  0825 01/23/23  0721 01/17/23  0648 01/05/23  0757 01/05/23  0715 12/06/22  0734 11/28/22  1200     --  136 133*   < > 133*   < >  --    POTASSIUM 4.5  --  4.6 4.4   < > 5.4*   < >  --    CHLORIDE 102  --  101 99   < > 104   < > 105   CO2 25  --  24 24   < > 20*   < >  --    ANIONGAP 9  --  11 10   < > 9   < >  --    BUN 25.2*  --  23.8* 24.8*   < > 21.5   < >  --    CR 0.89 1.06 0.77 0.79   < > 0.60*   < >  --    06071  --   --   --   --   --   --   --  1.18   GFRESTIMATED >90 76 >90 >90   < > >90   < >  --    *  --  129* 170*   < > 124*   < >  --    TIM 9.0  --  9.1 9.1   < > 8.4*   < >  --    PHOS  --   --  3.1 3.2   < >  --    < >  --    MAG  --   --  1.4* 1.2*   < >  --    < >  --    LACT  --   --   --   --   --  1.1   < >  --    CKT  --   --  30*  --   --   --   --   --     < > = values in this interval not displayed.       Recent Labs   Lab Test 02/03/23  1231 01/23/23  0721 01/07/23  0728   BILITOTAL 0.3 0.3 0.4   DBIL  --   --  <0.20   ALKPHOS 64 86 80   PROTTOTAL 5.7* 5.7* 4.9*   ALBUMIN 3.5 3.5 2.7*   AST 18 18 27   ALT 21 23 39        Recent Labs   Lab Test 02/03/23  1455 02/03/23  1231 01/31/23  0825 01/23/23  1052 01/23/23  0721 01/17/23  1004 01/17/23  0648 12/06/22  0734 11/28/22  1200   WBC  --  11.5* 8.5  --  8.7  --  11.4*   < >  --    30566  --   --   --   --   --   --   --   --  5.7   HGB 11.4* 11.4* 11.9*   < > 11.7*   < > 11.8*   < >  --    30139  --   --   --   --   --   --   --   --  15.0   HCT  --  36.7* 37.0*  --  37.4*  --  37.0*   < >  --    PLT  --  238 254  --  284  --  359   < >  --    66170  --   --   --   --   --   --   --   --  223    < > = values in this interval not displayed.       Recent Labs   Lab Test 01/31/23  0825 01/23/23  0721   VANCOMYCIN 11.1 10.2   TACROL  --  10.5       Recent Labs   Lab Test 01/31/23  0825 01/23/23  0721 01/10/23  0625   SED 10 14 40*       Recent Labs   Lab Test 01/10/23  0625   *   IGM 67   IGE 71          Imaging:  Results for orders placed or performed in visit on 01/24/23   TXP ABSTRACTED [facility]     Value    Transplant Date ([facility])     Echo Type (ECH)     LV Ejection Fraction Percent (ECH) 55-60%    RV Systolic Est (mmHg) (ECH)     LV Global Function (ECH)     LV Regional Wall Motion (ECH)     Mitral Valve Morphology (ECH)     Mitral Valve Doppler (ECH)     Aortic Valve - Aorta (ECH)     Aortic Valve Morphology (ECH)     Aortic Valve Doppler ([facility])     RV Size (ECH)     RV Global Function (ECH)     RV Systolic Pressure (ECH)     Left Atrium (ECH)     Right Atrium (ECH)     Tricuspid Valve Morphology (ECH)     Tricuspid Valve Doppler (ECH)     Pulmonary Valve Morphology (ECH)     Pulmonary Valve Doppler ([facility])     M-Mode Measure LVIDd ([facility])     M-Mode Measure LVIDs ([facility])     M-Mode Measure PWD ([facility])     M-Mode Measure IVSD ([facility])     M-Mode Measure AO (ECH)      1/9/22 upper ext US:  1.  Nonocclusive thrombus in the right greater than left internal  jugular veins.  2.  Redemonstration of the left anterior shoulder anechoic fluid  collection measuring up to 1.9 cm, decreased  since 12/29/2022.    1/9/22 lower ext US:  Nonocclusive deep venous thrombosis of the left peroneal vein at the  upper to mid calf.

## 2023-02-07 NOTE — LETTER
Date:February 8, 2023      Provider requested that no letter be sent. Do not send.       [facility]

## 2023-02-07 NOTE — PROGRESS NOTES
Infusion Nursing Note:  Paco Stein presents today for   Chief Complaint   Patient presents with     Labs Only     Labs, PICC dressing change       Patient seen by provider today: Yes: has appt with Dr. Anguiano later this afternoon.   present during visit today: Not Applicable.    Note:   -Orders from Sophia Carrillo PA-C completed. Frequency: weekly.  -Labs drawn via PICC.  -PICC dressing changed; skin c/d/i, no redness or drainage. Single lumen cap changed and saline locked as patient reports vanco dose is due and will be administered by his wife immediately after this appt.    Intravenous Access:  PICC.    Discharge Plan:   Discharge instructions reviewed with: Patient.  Patient and/or family verbalized understanding of discharge instructions and all questions answered.  AVS to patient via [employer].  Patient will return in 1-2 weeks for next appointment - dependent on recommendations from Dr. Anguiano.   Patient discharged in stable condition accompanied by: wife.  Departure Mode: Ambulatory.      Nida Cabrera RN    /77 (BP Location: Left arm, Patient Position: Sitting, Cuff Size: Adult Regular)   Pulse 108   Temp 97.6  F (36.4  C)   Resp 16   SpO2 97%

## 2023-02-07 NOTE — PATIENT INSTRUCTIONS
Dear Paco Stein    Thank you for choosing [facility] ([facility]) for your PICC dressing change and lab draw.      We look forward to seeing you at your next appointment here at [facility] ([facility]).  Please don t hesitate to call us at [phone] to reschedule any of your appointments or to speak with one of the [facility] registered nurses.  It was a pleasure taking care of you today.    Sincerely,    [facility]  [facility]  [address] [place] [address]  [id number]  Phone:  [phone]

## 2023-02-07 NOTE — PATIENT INSTRUCTIONS
Stop vancomycin after today's dose  Please call me if you were to develop fevers, chills.   I will discuss with [facility] about keep the PICC line through Thursday 2/9/23

## 2023-02-07 NOTE — LETTER
2/7/2023         RE: Paco Stein  [address] [place] [address]        Dear Colleague,    Thank you for referring your patient, Paco Stein, to the [facility]. Please see a copy of my visit note below.    Infusion Nursing Note:  Paco Stein presents today for   Chief Complaint   Patient presents with     Labs Only     Labs, PICC dressing change       Patient seen by provider today: Yes: has appt with Dr. Anguiano later this afternoon.   present during visit today: Not Applicable.    Note:   -Orders from Sophia Carrillo PA-C completed. Frequency: weekly.  -Labs drawn via PICC.  -PICC dressing changed; skin c/d/i, no redness or drainage. Single lumen cap changed and saline locked as patient reports vanco dose is due and will be administered by his wife immediately after this appt.    Intravenous Access:  PICC.    Discharge Plan:   Discharge instructions reviewed with: Patient.  Patient and/or family verbalized understanding of discharge instructions and all questions answered.  AVS to patient via [employer].  Patient will return in 1-2 weeks for next appointment - dependent on recommendations from Dr. Anguiano.   Patient discharged in stable condition accompanied by: wife.  Departure Mode: Ambulatory.      Nida Cabrera RN    /77 (BP Location: Left arm, Patient Position: Sitting, Cuff Size: Adult Regular)   Pulse 108   Temp 97.6  F (36.4  C)   Resp 16   SpO2 97%                             Again, thank you for allowing me to participate in the care of your patient.        Sincerely,        Specialty Infusion Nurse

## 2023-02-07 NOTE — LETTER
2/7/2023       RE: Paco Stein  [address] [place] [address]     Dear Colleague,    Thank you for referring your patient, Paco Stein, to the [facility] INFECTIOUS DISEASE CLIN[facility] [place] at [facility]. Please see a copy of my visit note below.    [facility]  Transplant Infectious Disease Clinic Note:  Follow up     Patient:  Paco Stein, Date of birth 1955,   Medical record number [id number]  Date of Visit:  02/07/2023    Recommendations:   1. Stop vancomycin after the evening dose on 2/7/23  2. Remove PICC line on 2/9/23 after cardiac cath and biopsy. Coordinated this with transplant coordinator and [facility].   3. Discussed signs and symptoms of a relapsed infection.   4. There are plans to resume the apixaban for DVTs after the procedure on 2/9/23  5. Follow up as needed    38 minutes spent on the date of the encounter doing chart review, review of test results, interpretation of tests, patient visit, documentation and discussion with other provider(s)     Tiera Anguiano DO.   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 There was also concern for steroids induced leukocytosis and the development of a right and left internal jugular vein thrombi.     Oxacillin Resistant Staphylococcus epidermidis bacteremia with concern for septic thrombophelbitis  - Blood cx grew Staphylococcus epidermidis 1/7/2023 (drawn from the left arm) and 1/8/2023 (drawn from the right hand and left hand).  Post transplant develop leukocytosis through to be from steroids but it possible he had an infection  developing. He did not have fevers or other systemic signs.   Pretransplant JARED 11/16/2022 was without vegetations. He has known vascular thromboses (left peroneal, right internal jugular, and new non-occlusive left internal jugular), so these could be infected as well. He previously had been on apixaban although currently held due to recent epistaxis and upcoming cardiac biopsy/cath.   He also has a left anterior shoulder anechoic fluid collection. This fluid collection has been present since at least 12/29/22. It has reduced in size and he does not have localizing symptoms c/w septic arthritis.  He has been on vancomycin since 1/8/2023.  1/9/23 blood cultures negative. Since being on the vancomycin his trough levels have been adequate. Creatinine stable and inflammatory markers have normalized. Had planned for a 4 week course of vancomycin which would be complete. Appropriate to discontinue at this time given adequate therapy and clinical improvement. We discussed signs and symptoms of a relapsed infection.     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   - Immunosuppression: tacrolimus, prednisone, mycophenolate    History of the Infectious Disease lllness:   Last seen by Dr. Alvarez inpatient on 1/12/23. Blood cultures were positive for Staph epi on 1/7 and 1/8/23. Blood cultures negative on 1/9/23. 1/9 US demonstrated nonocclusive DVT of the left peroneal vein, right and left internal jugular veins, and left anterior shoulder anechoic fluid collection measuring 1.9 cm. Some of these are older findings. Previous JARED on  12/25/23 showed a calcified aortic annulus and TV regurgitation. At this time time planned for a 4 week course of vancomycin through 2/6/23. He was discharged on 1/13/23. Seen in the ED on 2/3/23 for epistaxis - he remained on vancomycin. Discharged home.   Apixaban has yet to be resumed. On the vancomycin his creatinine has been stable. CRP down trended to < 3 (from 45). ESR down to 10 from 40. Vanco level has been 10-11.   IS mycophenolate, prednisone 15 mg/10 mg, tacrolimus  Prophylaxis is tmp/smx, valganciclovir. 1/23/23 CMV and EBV negative.    He feels well overall. Still feels a little weak and has tried to increase his time of walking. Denies subjective fevers, chills, left shoulder pain, nausea, vomiting, diarrhea. No other joint or back pain. Per the patient and he wife the fluid in the left shoulder has been a chronic issue. There has not been left shoulder pain, redness, or movement issues. The PICC line has been working well. The dose of the vancomycin has been adjusted a few times as an outpatient. When he was on the higher vancomycin dose he endorses having to urinate more.     Answers for HPI/ROS submitted by the patient on 1/31/2023  General Symptoms: No  Skin Symptoms: No  HENT Symptoms: No  EYE SYMPTOMS: No  HEART SYMPTOMS: Yes  LUNG SYMPTOMS: No  INTESTINAL SYMPTOMS: No  URINARY SYMPTOMS: Yes  REPRODUCTIVE SYMPTOMS: No  SKELETAL SYMPTOMS: Yes  BLOOD SYMPTOMS: No  NERVOUS SYSTEM SYMPTOMS: Yes  MENTAL HEALTH SYMPTOMS: No  Chest pain or pressure: No  Fast or irregular heartbeat: No  Pain in legs with walking: No  Trouble breathing while lying down: No  Fingers or toes appear blue: No  High blood pressure: Yes  Low blood pressure: No  Fainting: No  Murmurs: No  Pacemaker: No  Varicose veins: No  Edema or swelling: No  Wake up at night with shortness of breath: No  Light-headedness: Yes  Exercise intolerance: Yes  Trouble holding urine or incontinence: No  Pain or burning: No  Trouble starting or  stopping: No  Increased frequency of urination: Yes  Blood in urine: No  Decreased frequency of urination: No  Frequent nighttime urination: Yes  Flank pain: No  Difficulty emptying bladder: No  Back pain: Yes  Muscle aches: No  Neck pain: No  Swollen joints: No  Joint pain: No  Bone pain: No  Muscle cramps: No  Muscle weakness: No  Joint stiffness: No  Bone fracture: No  Trouble with coordination: Yes  Dizziness or trouble with balance: Yes  Fainting or black-out spells: No  Memory loss: No  Headache: No  Seizures: No  Speech problems: No  Tingling: Yes  Tremor: Yes  Weakness: Yes  Difficulty walking: Yes  Paralysis: No  Numbness: Yes    Transplants:  12/25/2022 (Heart); Postoperative day:  44.  Coordinator Mary Lou Luke    Patient Active Problem List    Diagnosis Date Noted     Status post coronary angiogram 01/23/2023     Priority: Medium     Staphylococcus epidermidis bacteremia 01/09/2023     Priority: Medium     Using prophylactic antibiotic on daily basis 01/09/2023     Priority: Medium     Benign neoplasm of colon 12/12/2022     Priority: Medium     Troponin level elevated 12/06/2022     Priority: Medium     Anginal equivalent (H) 12/06/2022     Priority: Medium     Heart failure, unspecified HF chronicity, unspecified heart failure type (H) 12/06/2022     Priority: Medium     Acute embolism and thrombosis of left peroneal vein (H) 11/22/2022     Priority: Medium     Acute on chronic combined systolic (congestive) and diastolic (congestive) heart failure (H) 11/14/2022     Priority: Medium     CHF (congestive heart failure) (H) 11/10/2022     Priority: Medium     Acute respiratory failure with hypoxia (H) 11/08/2022     Priority: Medium     Coronary artery disease involving native coronary artery of native heart without angina pectoris 11/08/2022     Priority: Medium     Essential hypertension 11/08/2022     Priority: Medium     NSVT (nonsustained ventricular tachycardia) 11/08/2022     Priority: Medium      SOB (shortness of breath) 11/08/2022     Priority: Medium     Syncope and collapse 10/07/2021     Priority: Medium     Cardiomyopathy (H) 10/06/2021     Priority: Medium     Primary central nervous system vasculitis (H) 11/30/2017     Priority: Medium     Vasculitis, CNS (H) 11/30/2017     Priority: Medium     Stroke (H) 09/28/2016     Priority: Medium     Hyperlipidemia 08/08/2013     Priority: Medium       No Known Allergies    Current Outpatient Medications   Medication     acetaminophen (TYLENOL) 325 MG tablet     amLODIPine (NORVASC) 5 MG tablet     apixaban ANTICOAGULANT (ELIQUIS) 5 MG tablet     calcium carbonate-vitamin D (CALTRATE) 600-10 MG-MCG per tablet     magnesium oxide (MAG-OX) 400 MG tablet     Melatonin 10 MG TABS tablet     methocarbamol (ROBAXIN) 500 MG tablet     mycophenolate (GENERIC EQUIVALENT) 250 MG capsule     nystatin (MYCOSTATIN) 405088 UNIT/ML suspension     oxyCODONE (ROXICODONE) 5 MG tablet     pantoprazole (PROTONIX) 40 MG EC tablet     polyethylene glycol (MIRALAX) 17 GM/Dose powder     predniSONE (DELTASONE) 5 MG tablet     rosuvastatin (CRESTOR) 10 MG tablet     sulfamethoxazole-trimethoprim (BACTRIM) 400-80 MG tablet     tacrolimus (GENERIC EQUIVALENT) 1 MG capsule     tacrolimus (GENERIC EQUIVALENT) 5 MG capsule     traZODone (DESYREL) 100 MG tablet     valGANciclovir (VALCYTE) 450 MG tablet     vancomycin (VANCOCIN) 1000 mg in dextrose 5% 200 mL PREMIX     No current facility-administered medications for this visit.            Physical Exam:   /85   Pulse 99   Temp 98.5  F (36.9  C) (Oral)   Wt 84.5 kg (186 lb 3.2 oz)   SpO2 96%   BMI 23.91 kg/m       GENERAL: well appearing, no acute distress. Wife is present.   HEAD: Head is normocephalic, atraumatic   EYES: Eyes have anicteric sclerae.  Pupils reactive bilaterally.  ENT: Oropharynx is moist without exudates or ulcers.  NECK: Supple.  LUNGS: Clear to auscultation bilaterally. No accessory muscle use. No oxygen.    CARDIOVASCULAR: Regular rate and rhythm with no murmurs  ABDOMEN: Normal bowel sounds, soft, nontender.  SKIN: No acute rashes.  EXTREMITIES: No joint erythema or swelling. Left shoulder with good ROM. No pain with movement.   NEUROLOGIC: Alert. Oriented. Grossly nonfocal. Generally weak when attempting to get out of the chair.   LINES: PICC without erythema. Dressing intact.    Microbiology     Culture   Date Value Ref Range Status   01/13/2023 No Growth  Final   01/12/2023 No Growth  Final   01/11/2023 No Growth  Final   01/10/2023 No Growth  Final   01/09/2023 No Growth  Final   01/09/2023 No Growth  Final   01/08/2023 Positive on the 2nd day of incubation (A)  Final   01/08/2023 Staphylococcus epidermidis (AA)  Final     Comment:     1 of 2 bottlesSusceptibilities done on previous cultures   01/08/2023 Positive on the 1st day of incubation (A)  Final   01/08/2023 Staphylococcus epidermidis (AA)  Final     Comment:     2 of 2 bottlesSusceptibilities done on previous cultures         Urine Studies     Recent Labs   Lab Test 01/07/23  1116 12/25/22  0746 11/11/22  1155   URINEPH 5.5 7.0 5.0   NITRITE Negative Negative Negative   LEUKEST Negative Negative Negative   WBCU 0 0 3     Quantiferon testing   Recent Labs   Lab Test 02/03/23  1231 01/31/23  0825 12/06/22  0734 11/12/22  0458   TBRES  --   --   --  Negative   LYMPH 6 20   < > 19    < > = values in this interval not displayed.       Respiratory virus testing    Recent Labs   Lab Test 12/06/22  0758   INFZA Negative   INFZB Negative   IRSV Negative       EBV DNA Copies/mL   Date Value Ref Range Status   01/23/2023 Not Detected Not Detected copies/mL Final     Hepatitis B Testing     Recent Labs   Lab Test 12/25/22  0604 11/12/22  0458   AUSAB 37.74 30.51   HBCAB Nonreactive Nonreactive   HEPBANG Nonreactive Nonreactive       CMV Antibody IgG   Date Value Ref Range Status   12/25/2022 Positive, suggests recent or past exposure. (A) No detectable antibody.   Final   11/12/2022 Positive, suggests recent or past exposure. (A) No detectable antibody.  Final     Varicella Zoster Antibody IgG   Date Value Ref Range Status   11/12/2022 Positive  Final     Comment:     Suggests previous exposure or immunization and probable immunity.       EBV Capsid Antibody IgG   Date Value Ref Range Status   12/25/2022 Positive (A) No detectable antibody. Final     Comment:     Suggests recent or past exposure.   11/12/2022 Positive (A) No detectable antibody. Final     Comment:     Suggests recent or past exposure.     Toxoplasma Antibody IgG   Date Value Ref Range Status   11/12/2022 <3.0 0.0 - 7.1 IU/mL Final     Comment:     Negative- Absence of detectable Toxoplasma gondii IgG antibodies. A negative result does not rule out acute infection.       Herpes Simplex Virus Type 1 IgG Antibody   Date Value Ref Range Status   11/12/2022 Positive.  IgG antibody to HSV-1 detected. (A) No HSV-1 IgG antibodies detected Final     Herpes Simplex Virus Type 2 IgG Antibody   Date Value Ref Range Status   11/12/2022 No HSV-2 IgG antibodies detected. No HSV-2 IgG antibodies detected Final            Laboratory Data:     Recent Labs   Lab Test 02/03/23  1231 01/31/23  0825 01/23/23  0721 01/17/23  0648 01/05/23  0757 01/05/23  0715 12/06/22  0734 11/28/22  1200     --  136 133*   < > 133*   < >  --    POTASSIUM 4.5  --  4.6 4.4   < > 5.4*   < >  --    CHLORIDE 102  --  101 99   < > 104   < > 105   CO2 25  --  24 24   < > 20*   < >  --    ANIONGAP 9  --  11 10   < > 9   < >  --    BUN 25.2*  --  23.8* 24.8*   < > 21.5   < >  --    CR 0.89 1.06 0.77 0.79   < > 0.60*   < >  --    41909  --   --   --   --   --   --   --  1.18   GFRESTIMATED >90 76 >90 >90   < > >90   < >  --    *  --  129* 170*   < > 124*   < >  --    TIM 9.0  --  9.1 9.1   < > 8.4*   < >  --    PHOS  --   --  3.1 3.2   < >  --    < >  --    MAG  --   --  1.4* 1.2*   < >  --    < >  --    LACT  --   --   --   --   --  1.1   < >   --    CKT  --   --  30*  --   --   --   --   --     < > = values in this interval not displayed.       Recent Labs   Lab Test 02/03/23  1231 01/23/23  0721 01/07/23  0728   BILITOTAL 0.3 0.3 0.4   DBIL  --   --  <0.20   ALKPHOS 64 86 80   PROTTOTAL 5.7* 5.7* 4.9*   ALBUMIN 3.5 3.5 2.7*   AST 18 18 27   ALT 21 23 39       Recent Labs   Lab Test 02/03/23  1455 02/03/23  1231 01/31/23  0825 01/23/23  1052 01/23/23  0721 01/17/23  1004 01/17/23  0648 12/06/22  0734 11/28/22  1200   WBC  --  11.5* 8.5  --  8.7  --  11.4*   < >  --    75120  --   --   --   --   --   --   --   --  5.7   HGB 11.4* 11.4* 11.9*   < > 11.7*   < > 11.8*   < >  --    89480  --   --   --   --   --   --   --   --  15.0   HCT  --  36.7* 37.0*  --  37.4*  --  37.0*   < >  --    PLT  --  238 254  --  284  --  359   < >  --    29167  --   --   --   --   --   --   --   --  223    < > = values in this interval not displayed.       Recent Labs   Lab Test 01/31/23  0825 01/23/23  0721   VANCOMYCIN 11.1 10.2   TACROL  --  10.5       Recent Labs   Lab Test 01/31/23  0825 01/23/23  0721 01/10/23  0625   SED 10 14 40*       Recent Labs   Lab Test 01/10/23  0625   *   IGM 67   IGE 71          Imaging:  Results for orders placed or performed in visit on 01/24/23   TXP ABSTRACTED Formerly [facility]     Value    Transplant Date (ECH)     Echo Type (ECH)     LV Ejection Fraction Percent (ECH) 55-60%    RV Systolic Est (mmHg) (ECH)     LV Global Function (ECH)     LV Regional Wall Motion (ECH)     Mitral Valve Morphology (ECH)     Mitral Valve Doppler (ECH)     Aortic Valve - Aorta (ECH)     Aortic Valve Morphology (ECH)     Aortic Valve Doppler (ECH)     RV Size (ECH)     RV Global Function (ECH)     RV Systolic Pressure (ECH)     Left Atrium (ECH)     Right Atrium (ECH)     Tricuspid Valve Morphology (ECH)     Tricuspid Valve Doppler (ECH)     Pulmonary Valve Morphology (ECH)     Pulmonary Valve Doppler (ECH)     M-Mode Measure LVIDd (ECH)     M-Mode Measure LVIDs  (ECH)     M-Mode Measure PWD (ECH)     M-Mode Measure IVSD (ECH)     M-Mode Measure AO (ECH)      1/9/22 upper ext US:  1.  Nonocclusive thrombus in the right greater than left internal  jugular veins.  2.  Redemonstration of the left anterior shoulder anechoic fluid  collection measuring up to 1.9 cm, decreased since 12/29/2022.    1/9/22 lower ext US:  Nonocclusive deep venous thrombosis of the left peroneal vein at the  upper to mid calf.   Tiera Anguiano, DO

## 2023-02-08 ENCOUNTER — HOSPITAL ENCOUNTER (OUTPATIENT)
Dept: CARDIAC REHAB | Facility: CLINIC | Age: 68
Discharge: HOME | End: 2023-02-08
Attending: STUDENT IN AN ORGANIZED HEALTH CARE EDUCATION/TRAINING PROGRAM
Payer: MEDICARE

## 2023-02-08 ENCOUNTER — TELEPHONE (OUTPATIENT)
Dept: CARDIOLOGY | Facility: CLINIC | Age: 68
End: 2023-02-08
Payer: MEDICARE

## 2023-02-08 PROCEDURE — 93798 PHYS/QHP OP CAR RHAB W/ECG: CPT

## 2023-02-09 ENCOUNTER — OFFICE VISIT (OUTPATIENT)
Dept: CARDIOLOGY | Facility: CLINIC | Age: 68
End: 2023-02-09
Attending: INTERNAL MEDICINE
Payer: MEDICARE

## 2023-02-09 ENCOUNTER — HOSPITAL ENCOUNTER (OUTPATIENT)
Facility: CLINIC | Age: 68
Discharge: HOME | End: 2023-02-09
Attending: INTERNAL MEDICINE | Admitting: INTERNAL MEDICINE
Payer: MEDICARE

## 2023-02-09 ENCOUNTER — APPOINTMENT (OUTPATIENT)
Dept: MEDSURG UNIT | Facility: CLINIC | Age: 68
End: 2023-02-09
Attending: INTERNAL MEDICINE
Payer: MEDICARE

## 2023-02-09 VITALS
RESPIRATION RATE: 20 BRPM | DIASTOLIC BLOOD PRESSURE: 90 MMHG | HEART RATE: 85 BPM | OXYGEN SATURATION: 96 % | SYSTOLIC BLOOD PRESSURE: 110 MMHG | TEMPERATURE: 98.5 F

## 2023-02-09 VITALS — OXYGEN SATURATION: 98 % | SYSTOLIC BLOOD PRESSURE: 110 MMHG | HEART RATE: 94 BPM | DIASTOLIC BLOOD PRESSURE: 73 MMHG

## 2023-02-09 DIAGNOSIS — Z94.1 HEART REPLACED BY TRANSPLANT (H): ICD-10-CM

## 2023-02-09 DIAGNOSIS — Z94.1 HEART REPLACED BY TRANSPLANT (H): Primary | ICD-10-CM

## 2023-02-09 DIAGNOSIS — Z79.899 ENCOUNTER FOR LONG-TERM (CURRENT) USE OF HIGH-RISK MEDICATION: ICD-10-CM

## 2023-02-09 LAB
BASOPHILS # BLD AUTO: 0 10E3/UL (ref 0–0.2)
BASOPHILS NFR BLD AUTO: 0 %
CREAT SERPL-MCNC: 0.97 MG/DL (ref 0.67–1.17)
CRP SERPL-MCNC: <3 MG/L
EOSINOPHIL # BLD AUTO: 0.1 10E3/UL (ref 0–0.7)
EOSINOPHIL NFR BLD AUTO: 1 %
ERYTHROCYTE [DISTWIDTH] IN BLOOD BY AUTOMATED COUNT: 15.5 % (ref 10–15)
ERYTHROCYTE [SEDIMENTATION RATE] IN BLOOD BY WESTERGREN METHOD: 17 MM/HR (ref 0–20)
GFR SERPL CREATININE-BSD FRML MDRD: 85 ML/MIN/1.73M2
HCT VFR BLD AUTO: 33 % (ref 40–53)
HGB BLD-MCNC: 10.3 G/DL (ref 13.3–17.7)
HGB BLD-MCNC: 10.4 G/DL (ref 13.3–17.7)
IMM GRANULOCYTES # BLD: 0.1 10E3/UL
IMM GRANULOCYTES NFR BLD: 1 %
LYMPHOCYTES # BLD AUTO: 0.8 10E3/UL (ref 0.8–5.3)
LYMPHOCYTES NFR BLD AUTO: 9 %
MCH RBC QN AUTO: 28.3 PG (ref 26.5–33)
MCHC RBC AUTO-ENTMCNC: 31.5 G/DL (ref 31.5–36.5)
MCV RBC AUTO: 90 FL (ref 78–100)
MONOCYTES # BLD AUTO: 0.4 10E3/UL (ref 0–1.3)
MONOCYTES NFR BLD AUTO: 5 %
NEUTROPHILS # BLD AUTO: 7.5 10E3/UL (ref 1.6–8.3)
NEUTROPHILS NFR BLD AUTO: 84 %
NRBC # BLD AUTO: 0 10E3/UL
NRBC BLD AUTO-RTO: 0 /100
OXYHGB MFR BLDV: 70 % (ref 92–100)
PLATELET # BLD AUTO: 232 10E3/UL (ref 150–450)
RBC # BLD AUTO: 3.67 10E6/UL (ref 4.4–5.9)
WBC # BLD AUTO: 8.8 10E3/UL (ref 4–11)

## 2023-02-09 PROCEDURE — 99153 MOD SED SAME PHYS/QHP EA: CPT | Performed by: INTERNAL MEDICINE

## 2023-02-09 PROCEDURE — 93505 ENDOMYOCARDIAL BIOPSY: CPT | Performed by: INTERNAL MEDICINE

## 2023-02-09 PROCEDURE — 99214 OFFICE O/P EST MOD 30 MIN: CPT | Mod: 24 | Performed by: INTERNAL MEDICINE

## 2023-02-09 PROCEDURE — G0463 HOSPITAL OUTPT CLINIC VISIT: HCPCS | Mod: 25 | Performed by: INTERNAL MEDICINE

## 2023-02-09 PROCEDURE — 999N000134 HC STATISTIC PP CARE STAGE 3

## 2023-02-09 PROCEDURE — 82810 BLOOD GASES O2 SAT ONLY: CPT

## 2023-02-09 PROCEDURE — 272N000001 HC OR GENERAL SUPPLY STERILE: Performed by: INTERNAL MEDICINE

## 2023-02-09 PROCEDURE — 88307 TISSUE EXAM BY PATHOLOGIST: CPT | Mod: 26 | Performed by: PATHOLOGY

## 2023-02-09 PROCEDURE — 250N000011 HC RX IP 250 OP 636: Performed by: INTERNAL MEDICINE

## 2023-02-09 PROCEDURE — 85652 RBC SED RATE AUTOMATED: CPT | Performed by: INTERNAL MEDICINE

## 2023-02-09 PROCEDURE — 85018 HEMOGLOBIN: CPT

## 2023-02-09 PROCEDURE — 36415 COLL VENOUS BLD VENIPUNCTURE: CPT | Performed by: INTERNAL MEDICINE

## 2023-02-09 PROCEDURE — 88346 IMFLUOR 1ST 1ANTB STAIN PX: CPT | Mod: 26 | Performed by: PATHOLOGY

## 2023-02-09 PROCEDURE — 88350 IMFLUOR EA ADDL 1ANTB STN PX: CPT | Mod: TC | Performed by: INTERNAL MEDICINE

## 2023-02-09 PROCEDURE — G0463 HOSPITAL OUTPT CLINIC VISIT: HCPCS

## 2023-02-09 PROCEDURE — 82565 ASSAY OF CREATININE: CPT | Performed by: INTERNAL MEDICINE

## 2023-02-09 PROCEDURE — G0463 HOSPITAL OUTPT CLINIC VISIT: HCPCS | Performed by: INTERNAL MEDICINE

## 2023-02-09 PROCEDURE — 250N000009 HC RX 250: Performed by: INTERNAL MEDICINE

## 2023-02-09 PROCEDURE — 999N000142 HC STATISTIC PROCEDURE PREP ONLY

## 2023-02-09 PROCEDURE — 88350 IMFLUOR EA ADDL 1ANTB STN PX: CPT | Mod: 26 | Performed by: PATHOLOGY

## 2023-02-09 PROCEDURE — 93505 ENDOMYOCARDIAL BIOPSY: CPT | Mod: 26 | Performed by: INTERNAL MEDICINE

## 2023-02-09 PROCEDURE — C1894 INTRO/SHEATH, NON-LASER: HCPCS | Performed by: INTERNAL MEDICINE

## 2023-02-09 PROCEDURE — 99152 MOD SED SAME PHYS/QHP 5/>YRS: CPT | Performed by: INTERNAL MEDICINE

## 2023-02-09 PROCEDURE — 85004 AUTOMATED DIFF WBC COUNT: CPT | Performed by: INTERNAL MEDICINE

## 2023-02-09 PROCEDURE — 86140 C-REACTIVE PROTEIN: CPT | Performed by: INTERNAL MEDICINE

## 2023-02-09 RX ORDER — LIDOCAINE 40 MG/G
CREAM TOPICAL
Status: DISCONTINUED | OUTPATIENT
Start: 2023-02-09 | End: 2023-02-09 | Stop reason: HOSPADM

## 2023-02-09 RX ORDER — FENTANYL CITRATE 50 UG/ML
INJECTION, SOLUTION INTRAMUSCULAR; INTRAVENOUS
Status: COMPLETED | OUTPATIENT
Start: 2023-02-09 | End: 2023-02-09

## 2023-02-09 ASSESSMENT — ACTIVITIES OF DAILY LIVING (ADL)
ADLS_ACUITY_SCORE: 35

## 2023-02-09 ASSESSMENT — PAIN SCALES - GENERAL: PAINLEVEL: NO PAIN (0)

## 2023-02-09 NOTE — PROGRESS NOTES
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     His post-operative course has been c/b delirium, leukocytosis, staph epi bacteremia, and prolonged CT output.  He discharged 1/13.     He had some complement deposition on his pathology early on but no graft dysfunction or rejection.       He just finished IV vancomycin for staph bacteremia a few days ago and the PICC line is out he.     He is doing cardiac rehab locally although feels the progress has slowed down a little bit.   He did have a nosebleed and this required Rhino Rocket placement and he maintained on his IV vancomycin until it came out. The nosebleed stopped this was likely provoked by being on a NOAC.  No further nosebleeding.     Overall he feels he is getting stronger since the time of discharge the 2 days in the ER and the nosebleed was a bit of a setback.  He presently denies PND orthopnea or lower extreme edema he has no diarrhea fevers chills blood pressures are well controlled.       Past medical history no change from prior    Social history no change from prior    Current Outpatient Medications   Medication Sig Dispense Refill     acetaminophen (TYLENOL) 325 MG tablet Take 3 tablets (975 mg) by mouth every 8 hours 100 tablet 1     amLODIPine (NORVASC) 5 MG tablet Take 1 tablet (5 mg) by mouth At Bedtime 90 tablet 3     apixaban ANTICOAGULANT (ELIQUIS) 5 MG tablet Take 1 tablet (5 mg) by mouth 2 times daily 60 tablet 3     calcium carbonate-vitamin D (CALTRATE) 600-10 MG-MCG per tablet Take 1 tablet by mouth  2 times daily (with meals) 180 tablet 0     magnesium oxide (MAG-OX) 400 MG tablet Take 2 tablets (800 mg) by mouth 2 times daily 120 tablet 3     Melatonin 10 MG TABS tablet Take 1 tablet (10 mg) by mouth nightly as needed for sleep 30 tablet 1     methocarbamol (ROBAXIN) 500 MG tablet Take 1 tablet (500 mg) by mouth every 6 hours as needed for muscle spasms 30 tablet 1     mycophenolate (GENERIC EQUIVALENT) 250 MG capsule Take 4 capsules (1,000 mg) by mouth 2 times daily 720 capsule 3     nystatin (MYCOSTATIN) 663352 UNIT/ML suspension Swish and swallow 10 mLs (1,000,000 Units) in mouth 4 times daily 1200 mL 1     oxyCODONE (ROXICODONE) 5 MG tablet Take 1 tablet (5 mg) by mouth every 4 hours as needed for severe pain (7-10) or moderate pain (4-6) 5 tablet 0     pantoprazole (PROTONIX) 40 MG EC tablet Take 1 tablet (40 mg) by mouth every morning (before breakfast) 90 tablet 3     polyethylene glycol (MIRALAX) 17 GM/Dose powder Take 17 g by mouth daily as needed for constipation 510 g 1     predniSONE (DELTASONE) 5 MG tablet Take THREE tabs every AM (15 mg) and TWO tabs every PM (10 mg) 150 tablet 3     rosuvastatin (CRESTOR) 10 MG tablet Take 1 tablet (10 mg) by mouth daily 90 tablet 1     sulfamethoxazole-trimethoprim (BACTRIM) 400-80 MG tablet Take 1 tablet by mouth daily 30 tablet 11     tacrolimus (GENERIC EQUIVALENT) 1 MG capsule ON HOLD due to dose change 120 capsule 0     tacrolimus (GENERIC EQUIVALENT) 5 MG capsule Take 1 capsule (5 mg) by mouth 2 times daily 180 capsule 3     traZODone (DESYREL) 100 MG tablet Take 1 tablet (100 mg) by mouth nightly as needed for sleep 30 tablet 1     valGANciclovir (VALCYTE) 450 MG tablet Take 2 tablets (900 mg) by mouth daily 60 tablet 3         ROS:   Constitutional: No fever, chills, or sweats. Weight stable . + fatigue   ENT: No visual disturbance, ear ache, + epistaxis, no sore throat.   Allergies/Immunologic: Negative.   Respiratory: No cough, hemoptysis.    Cardiovascular: As per HPI.   GI: No nausea, vomiting, hematemesis, melena, or hematochezia.   : No urinary frequency, dysuria, or hematuria.   Integument: Negative.   Psychiatric: Mood is overall stable  Neuro: Negative.   Endocrinology: Negative.   Musculoskeletal:  positive for general weakness       EXAM:  /73 (BP Location: Left arm, Patient Position: Chair, Cuff Size: Adult Regular)   Pulse 94   SpO2 98%   General: appears comfortable, alert and articulate  Head: normocephalic, atraumatic  Eyes: anicteric sclera, EOMI  Neck: no adenopathy  Orophyarynx: moist mucosa, no lesions, dentition intact  Heart: PMI diffuse,trace systolic murmur at LLSB, regular, S1/S2, rub, estimated JVP 9 cm   Lungs: clear, no rales or wheezing  Abdomen: soft, non-tender, bowel sounds present, no hepatosplenomegaly  Extremities: no clubbing, cyanosis or edema  Neurological: normal speech and affect, no gross motor deficits        Laboratory data    Cr 0.98   Na 134     Hgb 10       Echo:   Procedure  Echocardiogram with two-dimensional, color and spectral Doppler performed.  ______________________________________________________________________________  Interpretation Summary  Global and regional left ventricular function is normal with an EF of 55-60%.  Left ventricular size is normal.  The right ventricle is normal size. Global right ventricular function is  normal.  No pericardial effusion is present.     This study was compared with the study from 1/2/2023. No significant changes  noted.      RHC:    RA 9/5/3 mmHg  RV 29/10 mmHg  PA 29/8/16 mmHg  PCW 14/15/8 mmHg  Aisha CO 6.72 L/min Normal = 4.0-8.0 L/min  Aisha CI 3.19 L/min/m2 Normal = 2.5-4.0 L/min/m2  TD CO 5.5 L/min Normal = 4.0-8.0 L/min  TD CI 2.61 L/min/m2 Normal = 2.5-4.0 L/min/m2  PA 70.1 sat %   Hgb 10.3 g/dL   PVR 1.19 Woods units  .9 dynes-sec/cm5 Right sided filling pressures are normal. Left sided filling pressures are normal. Normal PA pressures.  Left ventricular filling pressures are normal. Normal cardiac output level.      Assessment and recommendations:    NICM, s/p OHT 12/25/22  His post-operative course has been c/b delirium, leukocytosis, staph epi bacteremia, and prolonged CT output.  He discharged 1/13.    Graph function is normal, other than complement deposition there is been no rejection, baseline coronary angiogram with mild native coronary disease (proximal LAD 0.2 mm)    Serostatus:  - CMV D+/R+  - EBV D+/R+  - Toxo D-/R-     Immunosuppression:  - received basiliximab 12/25 and 12/29  - prednisone taper per protocol  - MMF 1000mg twice daily (given age and bacteremia)  - tacro, goal level 10-12.      We will lower prednisone per protocol if this biopsy is negative     PPx:  - CAV:  Holding Aspirin 81mg daily while on anticoagulation, continue rosuvastatin 10mg daily  - GI:  Pantoprazole 40mg daily  - Osteoporosis:  calcium/vitamin D supplements  - CMV:  valcyte 900mg daily (x3 months, through 3/2023)  - PCP:  Bactrim single strength daily  - Thrush:  Nystatin s/s QID  - Toxo:  n/a       Hypertension: Controlled on amlodipine      expected familial cardiomyopathy -genetic referral is placed first-degree relatives have had echocardiograms      Right axillary nonocclusive DVT, left peroneal DVT, more recently right IJ DVT.   These are all provoked from low cardiac output as well as line placement.   We are resuming NOAC for at least 1 more month and we will get a D-dimer a month from now if this is negative I be okay coming off blood thinner at that time as some of these may have become chronic.     Staph epi bacteremia-completed antibiotics PICC line out    History of vasculitis-with neurologic symptoms he is to be maintained on CellCept we may want to have him establish with neuro here will address at the next visit    Frailty deconditioning-improving he will do cardiac rehab when he gets back to [place]-     Anemia: We will obtain iron  studies at the next visit      Myrtle Dominguez MD          CC  Patient Care Team:  Quentin Odonnell as PCP - General  Angelica, Myrtle Hernandez MD as MD (Cardiovascular Disease)  Mary Lou Luke RN as Transplant Coordinator  Abram Simeon [facility] as Pharmacist (Pharmacist)  Tiera Anguiano DO as MD (Infectious Diseases)  Rose Mary Goldstein NP as Assigned Heart and Vascular Provider  Abram Simeon RP as Assigned [place] Pharmacist  JUAN FRANCISCO CAMACHO

## 2023-02-09 NOTE — Clinical Note
dry, intact, no bleeding and no hematoma. 7fr sheath removed from right femoral vein. Dipesh pressure held, hemostasis obtained. Band aid applied.

## 2023-02-09 NOTE — PATIENT INSTRUCTIONS
Please call your coordinator at [phone] with any questions or concerns.      Coordinator will update you with biopsy results    Will send referral for local rehab    Return in 2 weeks as scheduled

## 2023-02-09 NOTE — LETTER
2/9/2023      RE: Paco Stein  [address]  [place] [address]       Dear Colleague,    Thank you for the opportunity to participate in the care of your patient, Paco Stein, at the [facility] [place] [facility]. Please see a copy of my visit note below.    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     His post-operative course has been c/b delirium, leukocytosis, staph epi bacteremia, and prolonged CT output.  He discharged 1/13.     He had some complement deposition on his pathology early on but no graft dysfunction or rejection.       He just finished IV vancomycin for staph bacteremia a few days ago and the PICC line is out he.     He is doing cardiac rehab locally although feels the progress has slowed down a little bit.   He did have a nosebleed and this required Rhino Rocket placement and he maintained on his IV vancomycin until it came out. The nosebleed stopped this was likely provoked by being on a NOAC.  No further nosebleeding.     Overall he feels he is getting stronger since the time of discharge the 2 days in the ER and the nosebleed was a bit of a setback.  He presently denies PND orthopnea or lower extreme edema he has no diarrhea fevers chills blood pressures are well controlled.       Past medical history no change from prior    Social history no change from prior    Current Outpatient Medications   Medication Sig Dispense Refill     acetaminophen (TYLENOL) 325 MG tablet Take  3 tablets (975 mg) by mouth every 8 hours 100 tablet 1     amLODIPine (NORVASC) 5 MG tablet Take 1 tablet (5 mg) by mouth At Bedtime 90 tablet 3     apixaban ANTICOAGULANT (ELIQUIS) 5 MG tablet Take 1 tablet (5 mg) by mouth 2 times daily 60 tablet 3     calcium carbonate-vitamin D (CALTRATE) 600-10 MG-MCG per tablet Take 1 tablet by mouth 2 times daily (with meals) 180 tablet 0     magnesium oxide (MAG-OX) 400 MG tablet Take 2 tablets (800 mg) by mouth 2 times daily 120 tablet 3     Melatonin 10 MG TABS tablet Take 1 tablet (10 mg) by mouth nightly as needed for sleep 30 tablet 1     methocarbamol (ROBAXIN) 500 MG tablet Take 1 tablet (500 mg) by mouth every 6 hours as needed for muscle spasms 30 tablet 1     mycophenolate (GENERIC EQUIVALENT) 250 MG capsule Take 4 capsules (1,000 mg) by mouth 2 times daily 720 capsule 3     nystatin (MYCOSTATIN) 716216 UNIT/ML suspension Swish and swallow 10 mLs (1,000,000 Units) in mouth 4 times daily 1200 mL 1     oxyCODONE (ROXICODONE) 5 MG tablet Take 1 tablet (5 mg) by mouth every 4 hours as needed for severe pain (7-10) or moderate pain (4-6) 5 tablet 0     pantoprazole (PROTONIX) 40 MG EC tablet Take 1 tablet (40 mg) by mouth every morning (before breakfast) 90 tablet 3     polyethylene glycol (MIRALAX) 17 GM/Dose powder Take 17 g by mouth daily as needed for constipation 510 g 1     predniSONE (DELTASONE) 5 MG tablet Take THREE tabs every AM (15 mg) and TWO tabs every PM (10 mg) 150 tablet 3     rosuvastatin (CRESTOR) 10 MG tablet Take 1 tablet (10 mg) by mouth daily 90 tablet 1     sulfamethoxazole-trimethoprim (BACTRIM) 400-80 MG tablet Take 1 tablet by mouth daily 30 tablet 11     tacrolimus (GENERIC EQUIVALENT) 1 MG capsule ON HOLD due to dose change 120 capsule 0     tacrolimus (GENERIC EQUIVALENT) 5 MG capsule Take 1 capsule (5 mg) by mouth 2 times daily 180 capsule 3     traZODone (DESYREL) 100 MG tablet Take 1 tablet (100 mg) by mouth nightly as needed for sleep 30  tablet 1     valGANciclovir (VALCYTE) 450 MG tablet Take 2 tablets (900 mg) by mouth daily 60 tablet 3         ROS:   Constitutional: No fever, chills, or sweats. Weight stable . + fatigue   ENT: No visual disturbance, ear ache, + epistaxis, no sore throat.   Allergies/Immunologic: Negative.   Respiratory: No cough, hemoptysis.   Cardiovascular: As per HPI.   GI: No nausea, vomiting, hematemesis, melena, or hematochezia.   : No urinary frequency, dysuria, or hematuria.   Integument: Negative.   Psychiatric: Mood is overall stable  Neuro: Negative.   Endocrinology: Negative.   Musculoskeletal:  positive for general weakness       EXAM:  /73 (BP Location: Left arm, Patient Position: Chair, Cuff Size: Adult Regular)   Pulse 94   SpO2 98%   General: appears comfortable, alert and articulate  Head: normocephalic, atraumatic  Eyes: anicteric sclera, EOMI  Neck: no adenopathy  Orophyarynx: moist mucosa, no lesions, dentition intact  Heart: PMI diffuse,trace systolic murmur at LLSB, regular, S1/S2, rub, estimated JVP 9 cm   Lungs: clear, no rales or wheezing  Abdomen: soft, non-tender, bowel sounds present, no hepatosplenomegaly  Extremities: no clubbing, cyanosis or edema  Neurological: normal speech and affect, no gross motor deficits        Laboratory data    Cr 0.98   Na 134     Hgb 10       Echo:   Procedure  Echocardiogram with two-dimensional, color and spectral Doppler performed.  ______________________________________________________________________________  Interpretation Summary  Global and regional left ventricular function is normal with an EF of 55-60%.  Left ventricular size is normal.  The right ventricle is normal size. Global right ventricular function is  normal.  No pericardial effusion is present.     This study was compared with the study from 1/2/2023. No significant changes  noted.      RHC:    RA 9/5/3 mmHg  RV 29/10 mmHg  PA 29/8/16 mmHg  PCW 14/15/8 mmHg  Aisha CO 6.72 L/min Normal =  4.0-8.0 L/min  Aisha CI 3.19 L/min/m2 Normal = 2.5-4.0 L/min/m2  TD CO 5.5 L/min Normal = 4.0-8.0 L/min  TD CI 2.61 L/min/m2 Normal = 2.5-4.0 L/min/m2  PA 70.1 sat %   Hgb 10.3 g/dL   PVR 1.19 Woods units  .9 dynes-sec/cm5 Right sided filling pressures are normal. Left sided filling pressures are normal. Normal PA pressures. Left ventricular filling pressures are normal. Normal cardiac output level.      Assessment and recommendations:    NICM, s/p OHT 12/25/22  His post-operative course has been c/b delirium, leukocytosis, staph epi bacteremia, and prolonged CT output.  He discharged 1/13.    Graph function is normal, other than complement deposition there is been no rejection, baseline coronary angiogram with mild native coronary disease (proximal LAD 0.2 mm)    Serostatus:  - CMV D+/R+  - EBV D+/R+  - Toxo D-/R-     Immunosuppression:  - received basiliximab 12/25 and 12/29  - prednisone taper per protocol  - MMF 1000mg twice daily (given age and bacteremia)  - tacro, goal level 10-12.      We will lower prednisone per protocol if this biopsy is negative     PPx:  - CAV:  Holding Aspirin 81mg daily while on anticoagulation, continue rosuvastatin 10mg daily  - GI:  Pantoprazole 40mg daily  - Osteoporosis:  calcium/vitamin D supplements  - CMV:  valcyte 900mg daily (x3 months, through 3/2023)  - PCP:  Bactrim single strength daily  - Thrush:  Nystatin s/s QID  - Toxo:  n/a       Hypertension: Controlled on amlodipine      expected familial cardiomyopathy -genetic referral is placed first-degree relatives have had echocardiograms      Right axillary nonocclusive DVT, left peroneal DVT, more recently right IJ DVT.   These are all provoked from low cardiac output as well as line placement.   We are resuming NOAC for at least 1 more month and we will get a D-dimer a month from now if this is negative I be okay coming off blood thinner at that time as some of these may have become chronic.     Staph epi  bacteremia-completed antibiotics PICC line out    History of vasculitis-with neurologic symptoms he is to be maintained on CellCept we may want to have him establish with neuro here will address at the next visit    Frailty deconditioning-improving he will do cardiac rehab when he gets back to [place]-     Anemia: We will obtain iron studies at the next visit      Myrtle Dominguez MD      CC  Patient Care Team:  Quentin Odonnell as PCP - Mary Lou Haas RN as Transplant Coordinator  Abram Simeon [facility] as Pharmacist (Pharmacist)  Tiera Anguiano DO as MD (Infectious Diseases)  Rose Mary Goldstein NP as Assigned Heart and Vascular Provider  Abram Simeon RPH as Assigned MTM Pharmacist  JUAN FRANCISCO CAMACHO

## 2023-02-09 NOTE — PROGRESS NOTES
Arrived for a right heart catheterization and biopsy.  VSS.  Denies pain.  Labs sent.  Consent obtained.  Held Eliquis since last Friday due to a nose bleed.  Ready for procedure.

## 2023-02-09 NOTE — DISCHARGE INSTRUCTIONS
[facility]                        Interventional Cardiology  Discharge Instructions   Post Right Heart Catheterization and Biopsy      AFTER YOU GO HOME:  DO NOT drive or operate machinery at home or at work for at least 24 hours  If you were given sedation: we recommend that an adult stay with you for 24 hours  DO relax and take it easy for 24 hours  DO drink plenty of fluids  DO resume your regular diet, unless otherwise instructed by your Primary Physician  DO NOT SMOKE FOR AT LEAST 24 HOURS. If you have been given any mediations that were to help you relax or sedate you during your procedure  DO NOT drink alcoholic beverages the day of your procedure  DO NOT do any strenuous exercise or lifting (>10 lbs) for at least 7 days following your procedure  DO NOT take a tub bath, get in a hot tub, or pool for at least 5 days following your procedure  Do Not scrub the procedure site vigorously  DO NOT make any important or legal decisions for 24 hours following your procedure    CALL YOUR PRIMARY PHYSICIAN IF:  You start bleeding from the procedure site. If you do start to bleed from that site, lie down flat and hold pressure on the site. Call your physician, your physician will tell you if you need to return to the hospital. It is common to have a small bruise or lump at the site  You have numbness, coolness or change in color of the leg of the puncture site    You experience pain or redness at the puncture site    You develop hives or a rash or unexplained itching  You develop a temperature of 101 degrees F or greater    ADDITIONAL INSTRUCTIONS   Support the puncture site for coughing, sneezing, or moving your bowels for the first 48 hours  No lotion or powder to the puncture site for 3 days    [facility] INTERVENTIONAL CARDIOLOGY DEPARTMENT:    Procedure Physician: Dr. Mercado                                           Date of Procedure:  February 9, 2023    Telephone Numbers [phone] Monday-Friday  8:00 am to 4:30 pm    [phone] After 4:30 pm Monday-Friday, Weekends & Holidays  Ask for Interventional Cardiologist on call. Someone is on call 24 hours/day   [facility] toll free number [phone] Monday-Friday 8:00 am to 4:30 pm   [facility] Emergency Dept [phone]

## 2023-02-09 NOTE — PROGRESS NOTES
Tolerated bedrest without issues.  Tolerated food, fluids, transfer to chair without issues.  Right groin biopsy site CDI.  PICC line removed.  Reviewed discharge instructions with Vince and his wife.  Discharged to home with wife.

## 2023-02-09 NOTE — PROGRESS NOTES
Returned from cardiac cath lab s/p right heart catheterization and biopsy.  VSS.  Denies pain.  Right groin site CDI.  Flat bedrest.  Resting in bed.

## 2023-02-09 NOTE — NURSING NOTE
Chief Complaint   Patient presents with     Follow Up     Return Ht Tx 6 week follow-up      Vitals were taken and medications reconciled.    Darci Coffey, EMT  3:55 PM

## 2023-02-10 ENCOUNTER — TELEPHONE (OUTPATIENT)
Dept: TRANSPLANT | Facility: CLINIC | Age: 68
End: 2023-02-10
Payer: MEDICARE

## 2023-02-10 DIAGNOSIS — Z94.1 HEART REPLACED BY TRANSPLANT (H): ICD-10-CM

## 2023-02-10 LAB
PATH REPORT.COMMENTS IMP SPEC: NORMAL
PATH REPORT.COMMENTS IMP SPEC: NORMAL
PATH REPORT.FINAL DX SPEC: NORMAL
PATH REPORT.GROSS SPEC: NORMAL
PATH REPORT.RELEVANT HX SPEC: NORMAL
PHOTO IMAGE: NORMAL

## 2023-02-10 RX ORDER — PREDNISONE 5 MG/1
TABLET ORAL
Qty: 150 TABLET | Refills: 3
Start: 2023-02-10 | End: 2023-02-22

## 2023-02-10 NOTE — TELEPHONE ENCOUNTER
Reviewed biopsy results with Dr Dominguez. OK to decrease the pred from 15/10 mg to 10 mg BID.   Wife called with results and med change     Final Diagnosis   Heart transplant, biopsy:   - No evidence of acute cellular rejection   - Positive in over 50% of the capillaries for C4d deposition   - Negative for capillary deposition of C3d       They are 100 miles from home - very excited to see their family again! Enc to reach out as needed.

## 2023-02-10 NOTE — NURSING NOTE
Transplant Coordinator Note  Reason for visit Week 6 post transplant      Health concerns addressed today  Pt with wife seen in clinic with Dr Dominguez. MD reviewed meds, testing, available labs and plan of care. Biopsy pending. Wght and BP stable. No further nose bleeds. Reports apartment very dry. Using NS nose spray and humidifier. Instructed to resume Eliquis. D Dimer check in one month per MD.     Hgb 10.4 - will plan to check iron studies next visit.     Vancomycin infusions completed. PICC pulled on 2A.    Approved to move home this weekend. Will transfer rehab to [facility]      Immunosuppressants  MMF 1000/1000 mg  Tac 5/5 mg goal 10-12; level 10.4   Pred 15/10 mg      Routine screenings  Derm to be yearly   Dental after pred complete unless emergency. Pt does need a tooth replaced.  Colonoscopy  Nov 2022 (no specimens)   Prostate 1.63  Eye to be yearly post pred complete  Flu Oct 2021 -> due after 100 days post transplant   Pneumonia UTD  COVID last dose Nov 2021      Medication record reviewed and reconciled. Questions and concerns addressed. AVS reviewed and copy provided. Pt verbalized an understanding of plan of care.      Patient Instructions  ~Please call your coordinator at [phone] with any questions or concerns.    ~Coordinator will update you with biopsy results  ~Will send referral for local rehab  ~Return in 2 weeks as scheduled

## 2023-02-12 ENCOUNTER — HEALTH MAINTENANCE LETTER (OUTPATIENT)
Age: 68
End: 2023-02-12

## 2023-02-14 ENCOUNTER — PRE VISIT (OUTPATIENT)
Dept: TRANSPLANT | Facility: CLINIC | Age: 68
End: 2023-02-14
Payer: MEDICARE

## 2023-02-14 DIAGNOSIS — Z94.1 HEART REPLACED BY TRANSPLANT (H): Primary | ICD-10-CM

## 2023-02-14 DIAGNOSIS — Z79.899 ENCOUNTER FOR LONG-TERM (CURRENT) USE OF HIGH-RISK MEDICATION: ICD-10-CM

## 2023-02-14 DIAGNOSIS — I77.6 VASCULITIS (H): Primary | ICD-10-CM

## 2023-02-14 DIAGNOSIS — Z94.1 HEART REPLACED BY TRANSPLANT (H): ICD-10-CM

## 2023-02-14 DIAGNOSIS — D64.9 LOW HEMOGLOBIN: ICD-10-CM

## 2023-02-14 RX ORDER — LIDOCAINE 40 MG/G
CREAM TOPICAL
Status: CANCELLED | OUTPATIENT
Start: 2023-02-14

## 2023-02-17 DIAGNOSIS — Z94.1 HEART REPLACED BY TRANSPLANT (H): ICD-10-CM

## 2023-02-17 RX ORDER — TACROLIMUS 5 MG/1
5 CAPSULE ORAL 2 TIMES DAILY
Qty: 120 CAPSULE | Refills: 1 | Status: ON HOLD | OUTPATIENT
Start: 2023-02-17 | End: 2023-02-21

## 2023-02-19 ASSESSMENT — ENCOUNTER SYMPTOMS
NUMBNESS: 1
TINGLING: 1
WEAKNESS: 1
SPEECH CHANGE: 0
DISTURBANCES IN COORDINATION: 1
MEMORY LOSS: 0
TREMORS: 1
SWOLLEN GLANDS: 0
BRUISES/BLEEDS EASILY: 1
DIZZINESS: 1
LOSS OF CONSCIOUSNESS: 0
SEIZURES: 0
HEADACHES: 0

## 2023-02-20 RX ORDER — TACROLIMUS 5 MG/1
5 CAPSULE ORAL 2 TIMES DAILY
Qty: 60 CAPSULE | Refills: 1 | Status: ON HOLD | OUTPATIENT
Start: 2023-02-20 | End: 2023-02-21

## 2023-02-21 ENCOUNTER — TELEPHONE (OUTPATIENT)
Dept: TRANSPLANT | Facility: CLINIC | Age: 68
End: 2023-02-21

## 2023-02-21 ENCOUNTER — APPOINTMENT (OUTPATIENT)
Dept: MEDSURG UNIT | Facility: CLINIC | Age: 68
End: 2023-02-21
Attending: INTERNAL MEDICINE
Payer: MEDICARE

## 2023-02-21 ENCOUNTER — LAB (OUTPATIENT)
Dept: LAB | Facility: CLINIC | Age: 68
End: 2023-02-21
Payer: MEDICARE

## 2023-02-21 ENCOUNTER — OFFICE VISIT (OUTPATIENT)
Dept: CARDIOLOGY | Facility: CLINIC | Age: 68
End: 2023-02-21
Attending: INTERNAL MEDICINE
Payer: MEDICARE

## 2023-02-21 ENCOUNTER — VIRTUAL VISIT (OUTPATIENT)
Dept: CARDIOLOGY | Facility: CLINIC | Age: 68
End: 2023-02-21
Attending: NURSE PRACTITIONER
Payer: MEDICARE

## 2023-02-21 ENCOUNTER — HOSPITAL ENCOUNTER (OUTPATIENT)
Facility: CLINIC | Age: 68
Discharge: HOME | End: 2023-02-21
Attending: INTERNAL MEDICINE | Admitting: INTERNAL MEDICINE
Payer: MEDICARE

## 2023-02-21 VITALS
HEART RATE: 98 BPM | WEIGHT: 187.1 LBS | SYSTOLIC BLOOD PRESSURE: 129 MMHG | OXYGEN SATURATION: 98 % | BODY MASS INDEX: 24.02 KG/M2 | DIASTOLIC BLOOD PRESSURE: 88 MMHG

## 2023-02-21 VITALS
TEMPERATURE: 98 F | HEART RATE: 85 BPM | OXYGEN SATURATION: 98 % | RESPIRATION RATE: 18 BRPM | DIASTOLIC BLOOD PRESSURE: 85 MMHG | SYSTOLIC BLOOD PRESSURE: 138 MMHG

## 2023-02-21 DIAGNOSIS — Z82.49 FAMILY HISTORY OF CARDIOMYOPATHY: ICD-10-CM

## 2023-02-21 DIAGNOSIS — Z94.1 HEART REPLACED BY TRANSPLANT (H): ICD-10-CM

## 2023-02-21 DIAGNOSIS — Z94.1 HEART TRANSPLANT RECIPIENT (H): ICD-10-CM

## 2023-02-21 DIAGNOSIS — D64.9 LOW HEMOGLOBIN: ICD-10-CM

## 2023-02-21 DIAGNOSIS — Z79.899 ENCOUNTER FOR LONG-TERM (CURRENT) USE OF HIGH-RISK MEDICATION: ICD-10-CM

## 2023-02-21 DIAGNOSIS — Z84.89 FAMILY HISTORY OF SUDDEN DEATH: ICD-10-CM

## 2023-02-21 DIAGNOSIS — Z94.1 HEART REPLACED BY TRANSPLANT (H): Primary | ICD-10-CM

## 2023-02-21 DIAGNOSIS — I42.8 NONISCHEMIC CARDIOMYOPATHY (H): Primary | ICD-10-CM

## 2023-02-21 LAB
ANION GAP SERPL CALCULATED.3IONS-SCNC: 11 MMOL/L (ref 7–15)
BASOPHILS # BLD AUTO: 0 10E3/UL (ref 0–0.2)
BASOPHILS NFR BLD AUTO: 0 %
BUN SERPL-MCNC: 27.9 MG/DL (ref 8–23)
CALCIUM SERPL-MCNC: 9.4 MG/DL (ref 8.8–10.2)
CHLORIDE SERPL-SCNC: 101 MMOL/L (ref 98–107)
CREAT SERPL-MCNC: 0.95 MG/DL (ref 0.67–1.17)
CRP SERPL-MCNC: <3 MG/L
DEPRECATED HCO3 PLAS-SCNC: 23 MMOL/L (ref 22–29)
EOSINOPHIL # BLD AUTO: 0.1 10E3/UL (ref 0–0.7)
EOSINOPHIL NFR BLD AUTO: 1 %
ERYTHROCYTE [DISTWIDTH] IN BLOOD BY AUTOMATED COUNT: 14.9 % (ref 10–15)
ERYTHROCYTE [SEDIMENTATION RATE] IN BLOOD BY WESTERGREN METHOD: 15 MM/HR (ref 0–20)
FERRITIN SERPL-MCNC: 69 NG/ML (ref 31–409)
GFR SERPL CREATININE-BSD FRML MDRD: 87 ML/MIN/1.73M2
GLUCOSE SERPL-MCNC: 219 MG/DL (ref 70–99)
HCT VFR BLD AUTO: 35.5 % (ref 40–53)
HGB BLD-MCNC: 11 G/DL (ref 13.3–17.7)
HGB BLD-MCNC: 11.2 G/DL (ref 13.3–17.7)
IMM GRANULOCYTES # BLD: 0 10E3/UL
IMM GRANULOCYTES NFR BLD: 1 %
IRON BINDING CAPACITY (ROCHE): 308 UG/DL (ref 240–430)
IRON SATN MFR SERPL: 12 % (ref 15–46)
IRON SERPL-MCNC: 38 UG/DL (ref 61–157)
LYMPHOCYTES # BLD AUTO: 0.6 10E3/UL (ref 0.8–5.3)
LYMPHOCYTES NFR BLD AUTO: 8 %
MAGNESIUM SERPL-MCNC: 1.5 MG/DL (ref 1.7–2.3)
MCH RBC QN AUTO: 27.7 PG (ref 26.5–33)
MCHC RBC AUTO-ENTMCNC: 31.5 G/DL (ref 31.5–36.5)
MCV RBC AUTO: 88 FL (ref 78–100)
MONOCYTES # BLD AUTO: 0.3 10E3/UL (ref 0–1.3)
MONOCYTES NFR BLD AUTO: 3 %
NEUTROPHILS # BLD AUTO: 6.7 10E3/UL (ref 1.6–8.3)
NEUTROPHILS NFR BLD AUTO: 87 %
NRBC # BLD AUTO: 0 10E3/UL
NRBC BLD AUTO-RTO: 0 /100
OXYHGB MFR BLDV: 67 % (ref 92–100)
PHOSPHATE SERPL-MCNC: 3 MG/DL (ref 2.5–4.5)
PLATELET # BLD AUTO: 248 10E3/UL (ref 150–450)
POTASSIUM SERPL-SCNC: 4.8 MMOL/L (ref 3.4–5.3)
RBC # BLD AUTO: 4.04 10E6/UL (ref 4.4–5.9)
SODIUM SERPL-SCNC: 135 MMOL/L (ref 136–145)
TACROLIMUS BLD-MCNC: 12.6 UG/L (ref 5–15)
TME LAST DOSE: NORMAL H
TME LAST DOSE: NORMAL H
WBC # BLD AUTO: 7.6 10E3/UL (ref 4–11)

## 2023-02-21 PROCEDURE — 272N000001 HC OR GENERAL SUPPLY STERILE: Performed by: INTERNAL MEDICINE

## 2023-02-21 PROCEDURE — 99000 SPECIMEN HANDLING OFFICE-LAB: CPT | Performed by: PATHOLOGY

## 2023-02-21 PROCEDURE — 86140 C-REACTIVE PROTEIN: CPT | Performed by: PATHOLOGY

## 2023-02-21 PROCEDURE — 82810 BLOOD GASES O2 SAT ONLY: CPT

## 2023-02-21 PROCEDURE — 85652 RBC SED RATE AUTOMATED: CPT | Performed by: PATHOLOGY

## 2023-02-21 PROCEDURE — 93505 ENDOMYOCARDIAL BIOPSY: CPT | Mod: 26 | Performed by: INTERNAL MEDICINE

## 2023-02-21 PROCEDURE — 36415 COLL VENOUS BLD VENIPUNCTURE: CPT | Performed by: PATHOLOGY

## 2023-02-21 PROCEDURE — 99152 MOD SED SAME PHYS/QHP 5/>YRS: CPT | Performed by: INTERNAL MEDICINE

## 2023-02-21 PROCEDURE — 88350 IMFLUOR EA ADDL 1ANTB STN PX: CPT | Mod: 26 | Performed by: PATHOLOGY

## 2023-02-21 PROCEDURE — 250N000009 HC RX 250: Performed by: INTERNAL MEDICINE

## 2023-02-21 PROCEDURE — 80048 BASIC METABOLIC PNL TOTAL CA: CPT | Performed by: PATHOLOGY

## 2023-02-21 PROCEDURE — 85018 HEMOGLOBIN: CPT

## 2023-02-21 PROCEDURE — 80197 ASSAY OF TACROLIMUS: CPT | Performed by: NURSE PRACTITIONER

## 2023-02-21 PROCEDURE — 83550 IRON BINDING TEST: CPT | Performed by: PATHOLOGY

## 2023-02-21 PROCEDURE — 99152 MOD SED SAME PHYS/QHP 5/>YRS: CPT | Mod: GC | Performed by: INTERNAL MEDICINE

## 2023-02-21 PROCEDURE — 84238 ASSAY NONENDOCRINE RECEPTOR: CPT | Mod: 90 | Performed by: PATHOLOGY

## 2023-02-21 PROCEDURE — 88350 IMFLUOR EA ADDL 1ANTB STN PX: CPT | Mod: TC | Performed by: INTERNAL MEDICINE

## 2023-02-21 PROCEDURE — 84100 ASSAY OF PHOSPHORUS: CPT | Performed by: PATHOLOGY

## 2023-02-21 PROCEDURE — C1894 INTRO/SHEATH, NON-LASER: HCPCS | Performed by: INTERNAL MEDICINE

## 2023-02-21 PROCEDURE — 83540 ASSAY OF IRON: CPT | Performed by: PATHOLOGY

## 2023-02-21 PROCEDURE — 82728 ASSAY OF FERRITIN: CPT | Performed by: NURSE PRACTITIONER

## 2023-02-21 PROCEDURE — 85025 COMPLETE CBC W/AUTO DIFF WBC: CPT | Performed by: PATHOLOGY

## 2023-02-21 PROCEDURE — 96040 HC GENETIC COUNSELING, EACH 30 MINUTES: CPT | Mod: GT | Performed by: GENETIC COUNSELOR, MS

## 2023-02-21 PROCEDURE — G0463 HOSPITAL OUTPT CLINIC VISIT: HCPCS | Mod: 25,27 | Performed by: NURSE PRACTITIONER

## 2023-02-21 PROCEDURE — 88307 TISSUE EXAM BY PATHOLOGIST: CPT | Mod: 26 | Performed by: PATHOLOGY

## 2023-02-21 PROCEDURE — 88346 IMFLUOR 1ST 1ANTB STAIN PX: CPT | Mod: 26 | Performed by: PATHOLOGY

## 2023-02-21 PROCEDURE — 99215 OFFICE O/P EST HI 40 MIN: CPT | Mod: 24 | Performed by: NURSE PRACTITIONER

## 2023-02-21 PROCEDURE — 250N000011 HC RX IP 250 OP 636: Performed by: INTERNAL MEDICINE

## 2023-02-21 PROCEDURE — 93505 ENDOMYOCARDIAL BIOPSY: CPT | Performed by: INTERNAL MEDICINE

## 2023-02-21 PROCEDURE — 83735 ASSAY OF MAGNESIUM: CPT | Performed by: PATHOLOGY

## 2023-02-21 RX ORDER — TACROLIMUS 1 MG/1
CAPSULE ORAL
Qty: 120 CAPSULE | Refills: 11 | Status: SHIPPED | OUTPATIENT
Start: 2023-02-21 | End: 2023-03-09

## 2023-02-21 RX ORDER — MULTIVITAMIN
1 TABLET ORAL DAILY
Qty: 90 TABLET | Refills: 3
Start: 2023-02-21

## 2023-02-21 RX ORDER — TACROLIMUS 5 MG/1
CAPSULE ORAL
Qty: 30 CAPSULE | Refills: 11 | Status: SHIPPED | OUTPATIENT
Start: 2023-02-21 | End: 2023-03-09

## 2023-02-21 RX ORDER — LIDOCAINE 40 MG/G
CREAM TOPICAL
Status: DISCONTINUED | OUTPATIENT
Start: 2023-02-21 | End: 2023-02-21 | Stop reason: HOSPADM

## 2023-02-21 RX ORDER — FENTANYL CITRATE 50 UG/ML
INJECTION, SOLUTION INTRAMUSCULAR; INTRAVENOUS
Status: COMPLETED | OUTPATIENT
Start: 2023-02-21 | End: 2023-02-21

## 2023-02-21 ASSESSMENT — ACTIVITIES OF DAILY LIVING (ADL)
ADLS_ACUITY_SCORE: 35

## 2023-02-21 ASSESSMENT — PAIN SCALES - GENERAL: PAINLEVEL: NO PAIN (0)

## 2023-02-21 NOTE — PATIENT INSTRUCTIONS
Please call your coordinator at [phone] with any questions or concerns.      Coordinator will update you on remaining labs and test results    Start daily multi-vitamin with iron; ok to  over the counter    Please call if diastolic consistently greater than 90      Return in two weeks as scheduled.  (3/9)

## 2023-02-21 NOTE — NURSING NOTE
Chief Complaint   Patient presents with     Follow Up     Return Heart Transplant- week 8 follow up     Vitals were taken and medications reconciled.    STACEY Mcknight  8:26 AM

## 2023-02-21 NOTE — PROGRESS NOTES
Pt arrived on 2a post right heart cath and biopsy. VSS Ra. Right groin f/d/i. No pain. Pt remains on flat bedrest until 1500.

## 2023-02-21 NOTE — NURSING NOTE
Chief Complaint   Patient presents with     Consult     Consult for genetic counseling, referred by Dr. Dominguez. Pt currently still awaiting to be discharged from [facility] procedure today. Medications/allergies reviewed with pt's wife, no changes per pt.        Is the patient currently in the state of [place]? YES    Visit mode:VIDEO    If the visit is dropped, the patient can be reconnected by: VIDEO VISIT: Text to cell phone: [phone]    Will anyone else be joining the visit? YES: Wife Arlette will be joining visit today.       How would you like to obtain your AVS? MyChart    Are changes needed to the allergy or medication list? NO    Patient denies any changes since echeck-in regarding medication and allergies and states all information entered during echeck-in remains accurate.    Lupillo Hansen, Visit Facilitator/MA.

## 2023-02-21 NOTE — LETTER
2/21/2023      RE: Paco Stein  [address]  [place] [address]       Dear Colleague,    Thank you for the opportunity to participate in the care of your patient, Paco Stein, at the [facility] [place] [facility]. Please see a copy of my visit note below.    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     His post-operative course has been c/b delirium, leukocytosis, staph epi bacteremia, and prolonged CT output.  He discharged 1/13.     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  No DSAs and no graft dysfunction, so further treatment has been deferred and prednisone taper continues.  AlloMap scores:  n/a  DSAs:  none  Coronary angio/Ischemic eval:  Baseline coronary angio 1/23/23 showed normal coronary arteries with eccentric, mild native CAD (MIGUEL ÁNGEL of LM 1.1mm, pLAD 0.2mm).  Last RHC:  2/9/23 showed normal biventricular filling pressures, with RA 3, mPA 16, PCW 8, and CI 3.19.  Echo/cMRI:  TTE 1/23/23 showed normal graft function, with LVEF 55-60% and normal RV size/function.     Since his last visit, he states that he has been doing well.  He is walking, and notes continued improvements in his strength and endurance.  He is going to start CR next week.  His breathing has been well, and he denies SOB.  He has been sleeping well, and is able to lie flat without PND and orthopnea.  His appetite has been well, and he is eating regularly without nausea, vomiting, diarrhea, and  constipation.  His weight has been stable, ranging 186-188#, and he denies fluid retention.  His BPs remain stable, ranging 120-130/80-90s.  His vertigo has resolved.  He otherwise denies chest pain, palpitations, dizziness, falls, headaches, acute vision changes, fevers, chills, cough, sore throat, and signs of bleeding.        Serostatus:  CMV D+/R+  EBV D+/R+  Toxo D-/R-      Patient Active Problem List    Diagnosis Date Noted     Status post coronary angiogram 01/23/2023     Priority: Medium     Staphylococcus epidermidis bacteremia 01/09/2023     Priority: Medium     Using prophylactic antibiotic on daily basis 01/09/2023     Priority: Medium     Benign neoplasm of colon 12/12/2022     Priority: Medium     Troponin level elevated 12/06/2022     Priority: Medium     Anginal equivalent (H) 12/06/2022     Priority: Medium     Heart failure, unspecified HF chronicity, unspecified heart failure type (H) 12/06/2022     Priority: Medium     Acute embolism and thrombosis of left peroneal vein (H) 11/22/2022     Priority: Medium     Acute on chronic combined systolic (congestive) and diastolic (congestive) heart failure (H) 11/14/2022     Priority: Medium     CHF (congestive heart failure) (H) 11/10/2022     Priority: Medium     Acute respiratory failure with hypoxia (H) 11/08/2022     Priority: Medium     Coronary artery disease involving native coronary artery of native heart without angina pectoris 11/08/2022     Priority: Medium     Essential hypertension 11/08/2022     Priority: Medium     NSVT (nonsustained ventricular tachycardia) 11/08/2022     Priority: Medium     SOB (shortness of breath) 11/08/2022     Priority: Medium     Syncope and collapse 10/07/2021     Priority: Medium     Cardiomyopathy (H) 10/06/2021     Priority: Medium     Primary central nervous system vasculitis (H) 11/30/2017     Priority: Medium     Vasculitis, CNS (H) 11/30/2017     Priority: Medium     Stroke (H) 09/28/2016     Priority:  Medium     Hyperlipidemia 08/08/2013     Priority: Medium       PAST MEDICAL HISTORY:  Past Medical History:   Diagnosis Date     Congestive heart failure (H)        CURRENT MEDICATIONS:  Prescription Medications as of 2/21/2023       Rx Number Disp Refills Start End Last Dispensed Date Next Fill Date Owning Pharmacy    acetaminophen (TYLENOL) 325 MG tablet  100 tablet 1 1/13/2023    [place], [place] - [address] [place] [address]    Sig: Take 3 tablets (975 mg) by mouth every 8 hours    Class: E-Prescribe    Route: Oral    amLODIPine (NORVASC) 5 MG tablet  90 tablet 3 2/6/2023    [address]    Sig: Take 1 tablet (5 mg) by mouth At Bedtime    Class: E-Prescribe    Route: Oral    apixaban ANTICOAGULANT (ELIQUIS) 5 MG tablet  60 tablet 3 2/6/2023    [address]    Sig: Take 1 tablet (5 mg) by mouth 2 times daily    Class: E-Prescribe    Route: Oral    calcium carbonate-vitamin D (CALTRATE) 600-10 MG-MCG per tablet  180 tablet 0 2/6/2023    [address]    Sig: Take 1 tablet by mouth 2 times daily (with meals)    Class: E-Prescribe    Route: Oral    magnesium oxide (MAG-OX) 400 MG tablet  120 tablet 3 2/6/2023    [address]    Sig: Take 2 tablets (800 mg) by mouth 2 times daily    Class: E-Prescribe    Route: Oral    Melatonin 10 MG TABS tablet  30 tablet 1 2/6/2023    [address]    Sig: Take 1 tablet (10 mg) by mouth nightly as needed for sleep    Class: E-Prescribe    Route: Oral    methocarbamol (ROBAXIN) 500 MG tablet  30 tablet 1 2/6/2023    [address]    Sig: Take 1  tablet (500 mg) by mouth every 6 hours as needed for muscle spasms    Class: E-Prescribe    Route: Oral    mycophenolate (GENERIC EQUIVALENT) 250 MG capsule  720 capsule 3 2/6/2023    [address]    Sig: Take 4 capsules (1,000 mg) by mouth 2 times daily    Class: E-Prescribe    Notes to Pharmacy: TXP DT 12/25/2022 (Heart) TXP Dischg DT 1/13/2023 DX Heart transplant Z94.1 @ [facility] ([place], [place]) 90 day supply if able    Route: Oral    nystatin (MYCOSTATIN) [id number] UNIT/ML suspension  1200 mL 1 2/6/2023    [address]    Sig: Swish and swallow 10 mLs (1,000,000 Units) in mouth 4 times daily    Class: E-Prescribe    Route: Swish & Swallow    oxyCODONE (ROXICODONE) 5 MG tablet  5 tablet 0 1/13/2023    [place], [place] - [address] [place] [address]    Sig: Take 1 tablet (5 mg) by mouth every 4 hours as needed for severe pain (7-10) or moderate pain (4-6)    Class: E-Prescribe    Earliest Fill Date: 1/13/2023    Route: Oral    pantoprazole (PROTONIX) 40 MG EC tablet  90 tablet 3 2/6/2023    [address]    Sig: Take 1 tablet (40 mg) by mouth every morning (before breakfast)    Class: E-Prescribe    Notes to Pharmacy: 90 day supply if able    Route: Oral    polyethylene glycol (MIRALAX) 17 GM/Dose powder  510 g 1 2/6/2023    [address]    Sig: Take 17 g by mouth daily as needed for constipation    Class: E-Prescribe    Route: Oral    predniSONE (DELTASONE) 5 MG tablet  150 tablet 3 2/10/2023        Sig: Take TWO tabs every AM (10 mg) and TWO tabs every PM (10 mg)    Class: No Print Out    Notes to Pharmacy: TXP DT 12/25/2022 (Heart) TXP Dischg DT 1/13/2023 DX Heart replaced by  transplant Z94.1 TX [facility] ([place], [place])    rosuvastatin (CRESTOR) 10 MG tablet  90 tablet 1 2/6/2023    [address]    Sig: Take 1 tablet (10 mg) by mouth daily    Class: E-Prescribe    Notes to Pharmacy: 90 day supply if able    Route: Oral    sulfamethoxazole-trimethoprim (BACTRIM) 400-80 MG tablet  30 tablet 11 2/6/2023    [address]    Sig: Take 1 tablet by mouth daily    Class: E-Prescribe    Route: Oral    tacrolimus (GENERIC EQUIVALENT) 1 MG capsule  120 capsule 0 1/17/2023        Sig: ON HOLD due to dose change    Class: No Print Out    Notes to Pharmacy: TXP DT 12/25/2022 (Heart) TXP Dischg DT 1/13/2023 DX Heart replaced by transplant Z94.1 TX [facility] ([place], [place])    tacrolimus (GENERIC EQUIVALENT) 5 MG capsule  60 capsule 1 2/20/2023    [facility] [place], [place] - [facility] [address]    Sig: Take 1 capsule (5 mg) by mouth 2 times daily    Class: E-Prescribe    Route: Oral    tacrolimus (GENERIC EQUIVALENT) 5 MG capsule  120 capsule 1 2/17/2023    [facility] [place] [address]    Sig: Take 1 capsule (5 mg) by mouth 2 times daily    Class: E-Prescribe    Notes to Pharmacy: Pt tac goal will be decreasing.    Route: Oral    traZODone (DESYREL) 100 MG tablet  30 tablet 1 2/6/2023    [address]    Sig: Take 1 tablet (100 mg) by mouth nightly as needed for sleep    Class: E-Prescribe    Route: Oral    valGANciclovir (VALCYTE) 450 MG tablet  60 tablet 3 2/6/2023    [address]    Sig: Take 2 tablets (900 mg) by mouth daily    Class: E-Prescribe    Route: Oral           ROS:   Answers for HPI/ROS submitted by the patient on 2/19/2023  General Symptoms: No  Skin Symptoms: No  HENT Symptoms: No  EYE SYMPTOMS: No  HEART SYMPTOMS: No  LUNG SYMPTOMS: No  INTESTINAL SYMPTOMS: No  URINARY SYMPTOMS: No  REPRODUCTIVE SYMPTOMS: No  SKELETAL SYMPTOMS: No  BLOOD SYMPTOMS: Yes  NERVOUS SYSTEM SYMPTOMS: Yes  MENTAL HEALTH SYMPTOMS: No  Anemia: No  Swollen glands: No  Easy bleeding or bruising: Yes  Edema or swelling: No  Trouble with coordination: Yes  Dizziness or trouble with balance: Yes  Fainting or black-out spells: No  Memory loss: No  Headache: No  Seizures: No  Speech problems: No  Tingling: Yes  Tremor: Yes  Weakness: Yes  Difficulty walking: Yes  Numbness: Yes    Exam:  /88 (BP Location: Left arm, Patient Position: Supine, Cuff Size: Adult Regular)   Pulse 98   Wt 84.9 kg (187 lb 1.6 oz)   SpO2 98%   BMI 24.02 kg/m    In general, the patient is a pleasant male in no apparent distress.    HEENT: NC/AT. PERRLA. EOMI.  Sclerae white, not injected. No thrush noted on tongue or cheeks.   Neck:  No adenopathy, No thyromegaly.    COR: No jugular venous distention when sitting upright.  RRR.  Normal S1 S2 splits physiologically.  No murmur, rub click, or gallop.    Lungs:  CTA. No rhonchi.    Abdomen: soft, nontender, nondistended.  No organomegaly.  Extremities:  No clubbing, cyanosis, or LE edema.    Neuro: Alert & Oriented x 3, grossly non focal.  Integument: no open lesions, rashes, or jaundice.    Labs:  CBC RESULTS:   Lab Results   Component Value Date    WBC 8.8 02/09/2023    RBC 3.67 (L) 02/09/2023    HGB 10.3 (L) 02/09/2023    HGB 10.4 (L) 02/09/2023    HCT 33.0 (L) 02/09/2023    MCV 90 02/09/2023    MCH 28.3 02/09/2023    MCHC 31.5 02/09/2023    RDW 15.5 (H) 02/09/2023     02/09/2023       BMP RESULTS:  Lab Results   Component Value Date     (L) 02/07/2023    POTASSIUM 4.3 02/07/2023    POTASSIUM 3.7 12/25/2022    POTASSIUM 3.9 11/08/2022    CHLORIDE 99  02/07/2023    CHLORIDE 105 11/28/2022    CO2 25 02/07/2023    CO2 26 11/08/2022    ANIONGAP 10 02/07/2023    ANIONGAP 9 11/08/2022     (H) 02/07/2023     (H) 01/13/2023     (H) 11/08/2022    BUN 22.4 02/07/2023    BUN 35 (H) 11/08/2022    CR 0.97 02/09/2023    GFRESTIMATED 85 02/09/2023    TIM 9.3 02/07/2023      LIPID RESULTS:  Lab Results   Component Value Date    CHOL 217 (H) 01/23/2023     01/23/2023    LDL 91 01/23/2023    TRIG 80 01/23/2023    NHDL 107 01/23/2023       IMMUNOSUPPRESSANT LEVELS  Lab Results   Component Value Date    TACROL 10.4 02/07/2023    DOSTAC 2/6/2023 02/07/2023       No components found for: CK  Lab Results   Component Value Date    MAG 1.4 (L) 02/07/2023     Lab Results   Component Value Date    A1C 6.2 (H) 12/25/2022     Lab Results   Component Value Date    PHOS 3.4 02/07/2023     Lab Results   Component Value Date    NTBNPI 2,750 (H) 12/06/2022     Lab Results   Component Value Date    SAITESTMET SA EDTA FCS 01/23/2023    SAICELL Class I 01/23/2023    [id number] None 01/23/2023    [id number] None 01/23/2023    SAIREPCOM  01/23/2023      HLA PRA Test performed by modified testing procedure that may also include pretreatment of serum. Pretreatment may be the addition of fetal calf serum, EDTA, and/or adsorption.  High-risk, MFI > 3,000.  Mod-risk, -3,000.     Lab Results   Component Value Date    SAIITESTME SA EDTA FCS 01/23/2023    SAIICELL Class II 01/23/2023    [id number] Invalid. See SCN results. 01/23/2023    [id number] Invalid. See SCN results. 01/23/2023    SAIIREPCOM  01/23/2023     Additional testing performed on current date to address potential testing artifact. HLA PRA Test performed by modified testing procedure that may also include pretreatment of serum. Pretreatment may be the addition of fetal calf serum, EDTA, and/or adsorption.  High-risk, MFI > 3,000.  Mod-risk, -3,000.       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     Labs from today shows stable electrolytes, renal function, and blood counts.  Iron sat 12%.  Tacro level in process.    He will have a biopsy following today's appt.    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  His weight has remained stable, and he appears euvolemic.  His BPs are controlled.    Advised that he continue his current meds, with no changes, and will plan for him to follow-up in clinic per protocol or sooner should new concerns arise.       NICM, s/p OHT 12/25/22  His post-operative course has been c/b delirium, leukocytosis, staph epi bacteremia, and prolonged CT output.  He discharged 1/13.     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  No DSAs and no graft dysfunction, so further treatment has been deferred and prednisone taper continues.  AlloMap scores:  n/a  DSAs:  none  Coronary angio/Ischemic eval:  Baseline coronary angio 1/23/23 showed normal coronary arteries with eccentric, mild native CAD (MIGUEL ÁNGEL of LM 1.1mm, pLAD 0.2mm).  Last RHC:  2/9/23 showed normal biventricular filling pressures, with RA 3, mPA 16, PCW 8, and CI 3.19.  Echo/cMRI:  TTE 1/23/23 showed normal graft function, with LVEF 55-60% and normal RV size/function.     Serostatus:  - CMV D+/R+  - EBV D+/R+  - Toxo D-/R-     Immunosuppression:  - prednisone taper, currently 10/10  - MMF 1000mg twice daily (given age and bacteremia)  - tacro, goal level 10-12.  Level from today is in process.     PPx:  - CAV:  Holding Aspirin 81mg daily while on anticoagulation, continue rosuvastatin 10mg daily  -  GI:  Pantoprazole 40mg daily  - Osteoporosis:  calcium/vitamin D supplements  - CMV:  valcyte 900mg daily (x3 months, through 3/2023)  - PCP:  Bactrim single strength daily  - Thrush:  Nystatin s/s QID  - Toxo:  n/a     Graft function:  - BPs:  remain controlled, continue amlodipine 5mg daily  - fluid status:  Euvolemic, off diuretics     Health maintenance:  - starting a multivitamin with iron, OTC  - will consider referring to neurology re: past h/o vasculitis        ARVC  Discussed that first-degree relatives should be screened with an echocardiogram and EKG, and have referred him to [facility] -- scheduled to meet with her today.    h/o vasculitis  With neurological symptoms.  Will consider referral to neuro as he in on MMF for IMS.     h/o right axillary nonocclusive and left peroneal occlusive DVT (11/2022, pre-transplant)  RIJ DVT  Was on apixaban prior to transplant, which was held post-operatively.  During RHC 1/9, it was noted that his RIJ had clot and LIJ was inaccessible, so he required femoral access.  He has been started on apixaban, which needs to be held 48 hours prior to each biopsy.  Will plan to repeat a D-Dimer early March to review ongoing anticoagulation needs.     Staph epi bacteremia, resolved  Blood cultures from 1/7 and 1/8 with staph epi, s/p IV vanco per ID.  PICC line out.        The above was reviewed with Dr. Stein, who verbalized understanding and will call with further questions/concerns.     45 minutes spent with patient, along with preparing for visit, reviewing follow up, and completing documentation.        Rose Mary Goldstein, PAOLA, FNP-BC  Advanced Heart Failure Nurse Practitioner  [facility]  Patient Care Team:  Quentin Odonnell as PCP - Myrtle Garrison MD as MD (Cardiovascular Disease)  Mary Lou Luke RN as Transplant Coordinator  Abram Simeon [facility] as Pharmacist (Pharmacist)  Tiera Anguiano DO as MD (Infectious Diseases)  Triston  ALIA Bazzi as Assigned Heart and Vascular Provider  Abram Simeon [facility] as Assigned [facility] Pharmacist  Butr Andrews MD

## 2023-02-21 NOTE — DISCHARGE INSTRUCTIONS
Going Home after a Heart Biopsy and Right Heart Cath        After you go home:  Drink plenty of fluids.  You may eat your normal diet, unless your doctor tells you otherwise.  For 24 hours:  - Relax and take it easy.  Remove the Band-Aid after 24 hours. If there is minor oozing, apply another Band-aid and remove it after 12 hours.  Do NOT take a bath, or use a hot tub or pool for at least 3 days. You may shower.    Care of groin site  It is normal to have a small bruise or lump at the site.  Do NOT scrub the site.  For the first 2 days, when you cough, sneeze or move your bowels, hold your hand over the puncture site and press gently.  Do NOT lift more than 10 pounds for at least 3 to 5 days.  Do not use lotion or powder near the puncture site for 3 days.  If you start bleeding from the site in your groin, lie down flat and press firmly on the site. Call  your doctor as soon as you can.    Medicines  Resume Medications    Call your doctor if:  You have a large or growing hard lump around the site.    The site is red, swollen, hot or tender.  Blood or fluid is draining from the site.  You have chills or a fever greater than 101 F (38 C).  Your right leg turns bluish, feels numb or cool.  You have hives, a rash or unusual itching.  Call 911 right away if you have:   Bleeding that does not stop.   Heavy bleeding.  [facility] [place]:  [phone] (7 days a week)

## 2023-02-21 NOTE — PROGRESS NOTES
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     His post-operative course has been c/b delirium, leukocytosis, staph epi bacteremia, and prolonged CT output.  He discharged 1/13.     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  No DSAs and no graft dysfunction, so further treatment has been deferred and prednisone taper continues.  AlloMap scores:  n/a  DSAs:  none  Coronary angio/Ischemic eval:  Baseline coronary angio 1/23/23 showed normal coronary arteries with eccentric, mild native CAD (MIGUEL ÁNGEL of LM 1.1mm, pLAD 0.2mm).  Last RHC:  2/9/23 showed normal biventricular filling pressures, with RA 3, mPA 16, PCW 8, and CI 3.19.  Echo/cMRI:  TTE 1/23/23 showed normal graft function, with LVEF 55-60% and normal RV size/function.     Since his last visit, he states that he has been doing well.  He is walking, and notes continued improvements in his strength and endurance.  He is going to start CR next week.  His breathing has been well, and he denies SOB.  He has been sleeping well, and is able to lie flat without PND and orthopnea.  His appetite has been well, and he is eating regularly without nausea, vomiting, diarrhea, and constipation.  His weight has been stable, ranging 186-188#, and he denies fluid retention.  His BPs remain stable, ranging 120-130/80-90s.  His vertigo has resolved.  He otherwise denies chest pain, palpitations, dizziness, falls, headaches, acute vision changes, fevers, chills, cough, sore throat, and signs of bleeding.        Serostatus:  CMV  D+/R+  EBV D+/R+  Toxo D-/R-      Patient Active Problem List    Diagnosis Date Noted     Status post coronary angiogram 01/23/2023     Priority: Medium     Staphylococcus epidermidis bacteremia 01/09/2023     Priority: Medium     Using prophylactic antibiotic on daily basis 01/09/2023     Priority: Medium     Benign neoplasm of colon 12/12/2022     Priority: Medium     Troponin level elevated 12/06/2022     Priority: Medium     Anginal equivalent (H) 12/06/2022     Priority: Medium     Heart failure, unspecified HF chronicity, unspecified heart failure type (H) 12/06/2022     Priority: Medium     Acute embolism and thrombosis of left peroneal vein (H) 11/22/2022     Priority: Medium     Acute on chronic combined systolic (congestive) and diastolic (congestive) heart failure (H) 11/14/2022     Priority: Medium     CHF (congestive heart failure) (H) 11/10/2022     Priority: Medium     Acute respiratory failure with hypoxia (H) 11/08/2022     Priority: Medium     Coronary artery disease involving native coronary artery of native heart without angina pectoris 11/08/2022     Priority: Medium     Essential hypertension 11/08/2022     Priority: Medium     NSVT (nonsustained ventricular tachycardia) 11/08/2022     Priority: Medium     SOB (shortness of breath) 11/08/2022     Priority: Medium     Syncope and collapse 10/07/2021     Priority: Medium     Cardiomyopathy (H) 10/06/2021     Priority: Medium     Primary central nervous system vasculitis (H) 11/30/2017     Priority: Medium     Vasculitis, CNS (H) 11/30/2017     Priority: Medium     Stroke (H) 09/28/2016     Priority: Medium     Hyperlipidemia 08/08/2013     Priority: Medium       PAST MEDICAL HISTORY:  Past Medical History:   Diagnosis Date     Congestive heart failure (H)        CURRENT MEDICATIONS:  Prescription Medications as of 2/21/2023       Rx Number Disp Refills Start End Last Dispensed Date Next Fill Date Owning Pharmacy    acetaminophen (TYLENOL) 325 MG  tablet  100 tablet 1 1/13/2023    [place], [place] - [address] [facility]    Sig: Take 3 tablets (975 mg) by mouth every 8 hours    Class: E-Prescribe    Route: Oral    amLODIPine (NORVASC) 5 MG tablet  90 tablet 3 2/6/2023    [address]    Sig: Take 1 tablet (5 mg) by mouth At Bedtime    Class: E-Prescribe    Route: Oral    apixaban ANTICOAGULANT (ELIQUIS) 5 MG tablet  60 tablet 3 2/6/2023    [address]    Sig: Take 1 tablet (5 mg) by mouth 2 times daily    Class: E-Prescribe    Route: Oral    calcium carbonate-vitamin D (CALTRATE) 600-10 MG-MCG per tablet  180 tablet 0 2/6/2023    [address]    Sig: Take 1 tablet by mouth 2 times daily (with meals)    Class: E-Prescribe    Route: Oral    magnesium oxide (MAG-OX) 400 MG tablet  120 tablet 3 2/6/2023    [address]    Sig: Take 2 tablets (800 mg) by mouth 2 times daily    Class: E-Prescribe    Route: Oral    Melatonin 10 MG TABS tablet  30 tablet 1 2/6/2023    [address]    Sig: Take 1 tablet (10 mg) by mouth nightly as needed for sleep    Class: E-Prescribe    Route: Oral    methocarbamol (ROBAXIN) 500 MG tablet  30 tablet 1 2/6/2023    [address]    Sig: Take 1 tablet (500 mg) by mouth every 6 hours as needed for muscle spasms    Class: E-Prescribe    Route: Oral    mycophenolate (GENERIC EQUIVALENT) 250 MG capsule  720 capsule 3 2/6/2023    [address]    Sig: Take 4 capsules (1,000 mg) by mouth 2 times daily    Class: E-Prescribe    Notes  to Pharmacy: TXP DT 12/25/2022 (Heart) TXP Dischg DT 1/13/2023 DX Heart transplant Z94.1 @ [facility] ([place], [place]) 90 day supply if able    Route: Oral    nystatin (MYCOSTATIN) 762295 UNIT/ML suspension  1200 mL 1 2/6/2023    [place], [place] - [address] [place] [address] [address]    Sig: Swish and swallow 10 mLs (1,000,000 Units) in mouth 4 times daily    Class: E-Prescribe    Route: Swish & Swallow    oxyCODONE (ROXICODONE) 5 MG tablet  5 tablet 0 1/13/2023    [place], [place] - [address]    Sig: Take 1 tablet (5 mg) by mouth every 4 hours as needed for severe pain (7-10) or moderate pain (4-6)    Class: E-Prescribe    Earliest Fill Date: 1/13/2023    Route: Oral    pantoprazole (PROTONIX) 40 MG EC tablet  90 tablet 3 2/6/2023    [place], [place] - [address] [place] [address] [address]    Sig: Take 1 tablet (40 mg) by mouth every morning (before breakfast)    Class: E-Prescribe    Notes to Pharmacy: 90 day supply if able    Route: Oral    polyethylene glycol (MIRALAX) 17 GM/Dose powder  510 g 1 2/6/2023    [place], [place] - [address] [place] [address] [address]    Sig: Take 17 g by mouth daily as needed for constipation    Class: E-Prescribe    Route: Oral    predniSONE (DELTASONE) 5 MG tablet  150 tablet 3 2/10/2023        Sig: Take TWO tabs every AM (10 mg) and TWO tabs every PM (10 mg)    Class: No Print Out    Notes to Pharmacy: TXP DT 12/25/2022 (Heart) TXP Dischg DT 1/13/2023 DX Heart replaced by transplant Z94.1 TX Center [facility] ([place], [place])    rosuvastatin (CRESTOR) 10 MG tablet  90 tablet 1 2/6/2023    [address]    Sig: Take 1 tablet (10 mg) by mouth daily    Class: E-Prescribe    Notes to Pharmacy: 90 day supply if able     Route: Oral    sulfamethoxazole-trimethoprim (BACTRIM) 400-80 MG tablet  30 tablet 11 2/6/2023    [address]    Sig: Take 1 tablet by mouth daily    Class: E-Prescribe    Route: Oral    tacrolimus (GENERIC EQUIVALENT) 1 MG capsule  120 capsule 0 1/17/2023        Sig: ON HOLD due to dose change    Class: No Print Out    Notes to Pharmacy: TXP DT 12/25/2022 (Heart) TXP Dischg DT 1/13/2023 DX Heart replaced by transplant Z94.1 TX Center [facility] ([place], [place])    tacrolimus (GENERIC EQUIVALENT) 5 MG capsule  60 capsule 1 2/20/2023    [facility] [place] [address] [facility]    Sig: Take 1 capsule (5 mg) by mouth 2 times daily    Class: E-Prescribe    Route: Oral    tacrolimus (GENERIC EQUIVALENT) 5 MG capsule  120 capsule 1 2/17/2023    [facility] [address]    Sig: Take 1 capsule (5 mg) by mouth 2 times daily    Class: E-Prescribe    Notes to Pharmacy: Pt tac goal will be decreasing.    Route: Oral    traZODone (DESYREL) 100 MG tablet  30 tablet 1 2/6/2023    [address]    Sig: Take 1 tablet (100 mg) by mouth nightly as needed for sleep    Class: E-Prescribe    Route: Oral    valGANciclovir (VALCYTE) 450 MG tablet  60 tablet 3 2/6/2023    [address]    Sig: Take 2 tablets (900 mg) by mouth daily    Class: E-Prescribe    Route: Oral          ROS:   Answers for HPI/ROS submitted by the patient on 2/19/2023  General Symptoms: No  Skin Symptoms: No  HENT Symptoms: No  EYE SYMPTOMS: No  HEART SYMPTOMS: No  LUNG SYMPTOMS: No  INTESTINAL SYMPTOMS: No  URINARY SYMPTOMS: No  REPRODUCTIVE SYMPTOMS: No  SKELETAL SYMPTOMS: No  BLOOD SYMPTOMS: Yes  NERVOUS SYSTEM SYMPTOMS: Yes  MENTAL HEALTH  SYMPTOMS: No  Anemia: No  Swollen glands: No  Easy bleeding or bruising: Yes  Edema or swelling: No  Trouble with coordination: Yes  Dizziness or trouble with balance: Yes  Fainting or black-out spells: No  Memory loss: No  Headache: No  Seizures: No  Speech problems: No  Tingling: Yes  Tremor: Yes  Weakness: Yes  Difficulty walking: Yes  Numbness: Yes    Exam:  /88 (BP Location: Left arm, Patient Position: Supine, Cuff Size: Adult Regular)   Pulse 98   Wt 84.9 kg (187 lb 1.6 oz)   SpO2 98%   BMI 24.02 kg/m    In general, the patient is a pleasant male in no apparent distress.    HEENT: NC/AT. PERRLA. EOMI.  Sclerae white, not injected. No thrush noted on tongue or cheeks.   Neck:  No adenopathy, No thyromegaly.    COR: No jugular venous distention when sitting upright.  RRR.  Normal S1 S2 splits physiologically.  No murmur, rub click, or gallop.    Lungs:  CTA. No rhonchi.    Abdomen: soft, nontender, nondistended.  No organomegaly.  Extremities:  No clubbing, cyanosis, or LE edema.    Neuro: Alert & Oriented x 3, grossly non focal.  Integument: no open lesions, rashes, or jaundice.    Labs:  CBC RESULTS:   Lab Results   Component Value Date    WBC 8.8 02/09/2023    RBC 3.67 (L) 02/09/2023    HGB 10.3 (L) 02/09/2023    HGB 10.4 (L) 02/09/2023    HCT 33.0 (L) 02/09/2023    MCV 90 02/09/2023    MCH 28.3 02/09/2023    MCHC 31.5 02/09/2023    RDW 15.5 (H) 02/09/2023     02/09/2023       BMP RESULTS:  Lab Results   Component Value Date     (L) 02/07/2023    POTASSIUM 4.3 02/07/2023    POTASSIUM 3.7 12/25/2022    POTASSIUM 3.9 11/08/2022    CHLORIDE 99 02/07/2023    CHLORIDE 105 11/28/2022    CO2 25 02/07/2023    CO2 26 11/08/2022    ANIONGAP 10 02/07/2023    ANIONGAP 9 11/08/2022     (H) 02/07/2023     (H) 01/13/2023     (H) 11/08/2022    BUN 22.4 02/07/2023    BUN 35 (H) 11/08/2022    CR 0.97 02/09/2023    GFRESTIMATED 85 02/09/2023    TIM 9.3 02/07/2023      LIPID  RESULTS:  Lab Results   Component Value Date    CHOL 217 (H) 01/23/2023     01/23/2023    LDL 91 01/23/2023    TRIG 80 01/23/2023    NHDL 107 01/23/2023       IMMUNOSUPPRESSANT LEVELS  Lab Results   Component Value Date    TACROL 10.4 02/07/2023    DOSTAC 2/6/2023 02/07/2023       No components found for: CK  Lab Results   Component Value Date    MAG 1.4 (L) 02/07/2023     Lab Results   Component Value Date    A1C 6.2 (H) 12/25/2022     Lab Results   Component Value Date    PHOS 3.4 02/07/2023     Lab Results   Component Value Date    NTBNPI 2,750 (H) 12/06/2022     Lab Results   Component Value Date    SAITESTMET SA EDTA FCS 01/23/2023    SAICELL Class I 01/23/2023    [id number] None 01/23/2023    [id number] None 01/23/2023    SAIREPCOM  01/23/2023      HLA PRA Test performed by modified testing procedure that may also include pretreatment of serum. Pretreatment may be the addition of fetal calf serum, EDTA, and/or adsorption.  High-risk, MFI > 3,000.  Mod-risk, -3,000.     Lab Results   Component Value Date    SAIITESTME SA EDTA FCS 01/23/2023    SAIICELL Class II 01/23/2023    [id number] Invalid. See SCN results. 01/23/2023    [id number] Invalid. See SCN results. 01/23/2023    SAIIREPCOM  01/23/2023     Additional testing performed on current date to address potential testing artifact. HLA PRA Test performed by modified testing procedure that may also include pretreatment of serum. Pretreatment may be the addition of fetal calf serum, EDTA, and/or adsorption.  High-risk, MFI > 3,000.  Mod-risk, -3,000.       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     Labs from today shows stable electrolytes, renal function, and blood counts.  Iron sat 12%.  Tacro level in process.    He will have a biopsy following today's appt.    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  His weight has remained stable, and he appears euvolemic.  His BPs are controlled.    Advised that he continue his current meds, with no changes, and will plan for him to follow-up in clinic per protocol or sooner should new concerns arise.       NICM, s/p OHT 12/25/22  His post-operative course has been c/b delirium, leukocytosis, staph epi bacteremia, and prolonged CT output.  He discharged 1/13.     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  No DSAs and no graft dysfunction, so further treatment has been deferred and prednisone taper continues.  AlloMap scores:  n/a  DSAs:  none  Coronary angio/Ischemic eval:  Baseline coronary angio 1/23/23 showed normal coronary arteries with eccentric, mild native CAD (MIGUEL ÁNGEL of LM 1.1mm, pLAD 0.2mm).  Last RHC:  2/9/23 showed normal biventricular filling pressures, with RA 3, mPA 16, PCW 8, and CI 3.19.  Echo/cMRI:  TTE 1/23/23 showed normal graft function, with LVEF 55-60% and normal RV size/function.     Serostatus:  - CMV D+/R+  - EBV D+/R+  - Toxo D-/R-     Immunosuppression:  - prednisone taper, currently 10/10  - MMF 1000mg twice daily (given age and bacteremia)  - tacro, goal level 10-12.  Level from today is in process.     PPx:  - CAV:  Holding Aspirin 81mg daily while on anticoagulation, continue rosuvastatin 10mg daily  - GI:  Pantoprazole 40mg daily  - Osteoporosis:  calcium/vitamin D supplements  - CMV:  valcyte 900mg daily (x3 months, through 3/2023)  - PCP:  Bactrim single strength daily  - Thrush:  Nystatin s/s QID  - Toxo:  n/a     Graft function:  - BPs:  remain controlled, continue amlodipine 5mg daily  - fluid status:  Euvolemic, off  diuretics     Health maintenance:  - starting a multivitamin with iron, OTC  - will consider referring to neurology re: past h/o vasculitis        ARVC  Discussed that first-degree relatives should be screened with an echocardiogram and EKG, and have referred him to [facility] -- scheduled to meet with her today.    h/o vasculitis  With neurological symptoms.  Will consider referral to neuro as he in on MMF for IMS.     h/o right axillary nonocclusive and left peroneal occlusive DVT (11/2022, pre-transplant)  RIJ DVT  Was on apixaban prior to transplant, which was held post-operatively.  During RHC 1/9, it was noted that his RIJ had clot and LIJ was inaccessible, so he required femoral access.  He has been started on apixaban, which needs to be held 48 hours prior to each biopsy.  Will plan to repeat a D-Dimer early March to review ongoing anticoagulation needs.     Staph epi bacteremia, resolved  Blood cultures from 1/7 and 1/8 with staph epi, s/p IV vanco per ID.  PICC line out.        The above was reviewed with Dr. Stein, who verbalized understanding and will call with further questions/concerns.     45 minutes spent with patient, along with preparing for visit, reviewing follow up, and completing documentation.        Rose Mary Goldstein DNP, FNP-BC  Advanced Heart Failure Nurse Practitioner  [facility]  Patient Care Team:  Quentin Odonnell as PCP - Myrtle Garrison MD as MD (Cardiovascular Disease)  Mary Lou Luke, [place] as Transplant Coordinator  Abram Simeon RP as Pharmacist (Pharmacist)  Tiera Anguiano DO as MD (Infectious Diseases)  Rose Mary Goldstein, NP as Assigned Heart and Vascular Provider  Abram Simeon RP as Assigned MTM Pharmacist  Burt Andrews MD

## 2023-02-21 NOTE — NURSING NOTE
Transplant Coordinator Note  Reason for visit Week 8 post transplant      Health concerns addressed today  Pt with wife seen in clinic with MARGARET Triston. MARGARET reviewed meds, available labs and plan of care. Biopsy to follow. Pt making progress (no wheelchair or cane). BP mostly 120-130/80-90. Occas diastolic >90. Iron studies borderline; requested to start MVI with iron. HGB 11.2. CRP WNL.     Wght 187-> goal wght for pt ~210#.     Starting rehab locally on 2/27. Walking the mall at home.     Immunosuppressants  MMF 1000/1000 mg  Tac 5/5 mg goal 10-12; level pending   Pred 10/10 mg      Routine screenings  Derm to be yearly   Dental after pred complete unless emergency. Pt does need a tooth replaced.  Colonoscopy  Nov 2022 (no specimens)   Prostate 1.63  Eye to be yearly post pred complete  Flu Oct 2021 -> due after 100 days post transplant   Pneumonia UTD  COVID last dose Nov 2021      Medication record reviewed and reconciled. Questions and concerns addressed. AVS reviewed and copy provided. Pt verbalized an understanding of plan of care.      Patient Instructions  ~Please call your coordinator at [phone] with any questions or concerns.    ~Coordinator will update you on remaining labs and test results  ~Start daily multi-vitamin with iron; ok to  over the counter  ~Please call if diastolic consistently greater than 90    ~Return in two weeks as scheduled.  (3/9)

## 2023-02-21 NOTE — PROGRESS NOTES
Video-Visit Details  Type of service:  Video Visit    Video Start Time (time video started): 3:35  Video End Time (time video stopped): 4:20    Originating Location (pt. Location): Other recovery room from rt heart cath  Distant Location (provider location):  Off-site    Mode of Communication:  Video Conference via Picodeon    PROVIDER APPOINTMENT  Here is a copy of the progress note from your recent genetic counseling visit through the [facility] on Date: 2/21/2023.  Due to Covid-19 pandemic, this appointment was moved to a virtual visit.    PROGRESS NOTE:Paco, who goes by Vince, was referred by Myrtle Dominguez MD for genetic counseling due to his history of arrhythmogenic cardiomyopathy (ACM).  I had the opportunity to talk with Vince and his wife Arlette today to discuss the genetic component of ACM and testing options available to him.     MEDICAL HISTORY: Vince was diagnosed with cardiomyopathy in 2021 after fainting episode lead to cardiac work up.  MRI at that time showed LVEF at 22%, dilation, late gadolinium enhancement, and reduced RVEF (35%).   Symptoms were mild so minimal treatment.  In June 2022 symptoms started progressing and by September/October 2022 he had significant shortness of breath, decreased appetite, fatigue.  He transferred care to Dr. Dominguez at [facility].  He had a VT event that prompted placement of an ICD.  Ultimately he had a heart transplant on 12/25/2022.      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    FAMILY HISTORY:A detailed family history was obtained during today's consult.  Family history was significant for the following cardiac history:    Brother Gary diagnosed with CM in his 60's and heart transplant at 70 years.  Pathology showed ARVD.  He had genetic testing for the Comprehensive Cardiac panel (100 genes) through [facility] that revealed a VUS in the  SOS2 gene.  This gene can be associated with [place] syndrome, but he as no symptoms of this disease.      Brother Flo  suddenly at 48 years.  It was thought to be a viral myocarditis.      Brother Bryan  at 45 with ALS.     Brother Jorge  at 66 with a brain tumor and history of stents for CAD.      Brother Kodi  at 73 years with lung cancer.  He had physical symptoms and learning disabilities suggestive of Marisel syndrome, but never diagnosed.      Another brother (82) with AFib and pacing ICD.     Brother and sister with high blood pressure.     Father  at 94 years with no known heart problems.      Paternal grandfather  suddenly at 50 and grandmother  from cancer at 44 year.      No known heart problems in 13 paternal aunts and uncles.    Mother  at 93 years with pneumonia.  She had a history of breast and colon cancer, in her 70's and 80's.    Paternal grandmother  at 84 years with heart failure.  Grandfather  70's with alcoholism.    No known heart problems in 11 maternal aunts and uncles.    Daughter  at 7 years with rhabdomyosarcoma.    Son (33) was diagnosed with Hodgkins lymphoma at 28 but is doing well now. He had a normal ECHO.    Daughter (27) is in good health and had a normal ECHO. She is currently 34 weeks pregnant and the baby girl was diagnosed with hypoplastic left heart syndrome. She moved to [place] for pre- care.    Son (40) in good health.  He had a normal ECHO.  There is no additional history of cardiomyopathy, arrhythmias, heart attacks, fainting, sudden cardiac death, genetic conditions, or birth defects. (A copy of pedigree may be found under media tab).    DISCUSSION: Arrhythmogenic cardiomyopathy (ACM), has different subtypes including arrhythmogenic right ventricular cardiomyopathy/dysplasia (ARVC/D) and left dominant arrhythmogenic cardiomyopathy (LDAC).  ACM is a difficult condition to diagnose.  It is characterized by fatty  replacement and fibrosis of the heart muscle which interrupts the electrical signals being sent between the heart cells. This loss of connection between the heart cells results in arrhythmias, including palpitations, fainting, and sudden death.  ACM is frequently diagnosed as dilated cardiomyopathy during the initial phase. Explained that a good portion of ACM cases have a genetic component.     Reviewed autosomal dominant (AD) inheritance pattern most commonly associated with ACM, including the 50% risk for recurrence. Briefly reviewed autosomal recessive inheritance. Explained that ACM gene mutations are associated with reduced penetrance and variable expressivity, meaning that individuals who carry a gene mutation may or may not get the disease and onset and severity can vary from one family member to the next. This explains why you may not see cardiomyopathy in each generation of a family.     At least 10 genes have been found to be related to ACM but testing is done thru comprehensive cardiomyopathy panel. Genetic testing currently identifies mutations in about 40% of idiopathic cases. Reviewed capabilities, limitations, and logistics of testing. DNA sample via saliva or blood is collected and sent to testing lab for evaluation of selected genes. The results could directly impact care and treatment.       Explained three possible outcomes of genetic testing including: positive identification of a mutation, no mutation identified, and identification of a variant of unknown significance (VUS). If a mutation is identified, presymptomatic testing would be available to at risk family members. If no mutation is identified, it does not rule out the possibility of a genetic component to this disease. Family members could still be at risk for developing the same condition. If a VUS is identified, it is unclear if the mutation is disease causing or just a normal variation. It may take time and possibly additional testing  to determine the meaning of a VUS result.     Test results take approximately 2-4 weeks on average. Discussed cost of testing through commercial lab. Explained that the lab works with insurance to determine coverage and contact patient if out of pocket costs are expected to exceed $100.     Discussed pros and cons of genetic testing. Explained that results could determine the cause for ACM. If a mutation is identified, presymptomatic testing is available to all at risk relatives. Reviewed possible issues associated with presymptomatic testing including genetic discrimination, current laws to prevent discrimination (ie. [place]), insurance issues, and emotional and psychosocial outcomes of testing.     Since Vince's brother Gary, with cardiomyopathy, had genetic testing and no mutation was found, I think the likelihood of finding a mutation in Vince is lower than the typically 40% range.  However, it is not impossible for there to be different causes for disease within a family.  Since his brother Gary did have a variant of uncertain significance in the SOS2 gene, we discussed the association between SOS2 gene and Marisel syndrome.  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 [place] syndrome is included on the cardiomyopathy panels because individuals with Marisel syndrome can develop hypertrophic cardiomyopathy, where the heart muscle is thickened and doesn't relax well.    Explained that clinical evaluation is recommended for all first degree relatives (parents, siblings, and children) of an affected individual regardless of decision to pursue genetic testing. Clinical evaluation should be performed every two years after age 10 with frequency changing after 40 years or with endurance athletes.  Evaluation should include history, cardiac exam, cardiac MRI,  signal averaged EKG, exercise stress testing and 14 day Holter monitoring.    Arlette had questions about possible association between ACM and hypoplastic left heart syndrome (HLHS).  Explained that HLHS is often inherited like other congenital heart disease (CHD) as a multifactorial condition.  However, in some families there can be a dominant gene that plays a role in CHD.  These genes are not known to be associated with ACM.    All questions answered at this time.     PLAN:Paco elected to proceed with genetic testing for CM Next panel.  Requisition and consent forms were completed and signed.  DNA will be collected via saliva sample and sent to [facility]. I will contact patient when results are available.    TOTAL TIME SPENT IN COUNSELIN minutes    Kim Rogers MS, [facility] [place]  Licensed, Certified Genetic Counselor  [facility]

## 2023-02-21 NOTE — TELEPHONE ENCOUNTER
Discussed cath protocol with wife. If cath team feels they need to add RHC, all good. Per protocol, would do RHC with biopsy at 12 weeks.     Enc to call with further questions.

## 2023-02-21 NOTE — RESULT ENCOUNTER NOTE
Tac level 12.6 - goal 10-12; dose decreased from 5/5 to 5/4 mg  Recheck in 2 weeks.  My chart message sent

## 2023-02-21 NOTE — LETTER
2/21/2023      RE: Paco Stein  [address]  [place] [address]       Dear Colleague,    Thank you for the opportunity to participate in the care of your patient, Paco Stein, at the [facility] [place] at [facility]. Please see a copy of my visit note below.      PROVIDER APPOINTMENT  Here is a copy of the progress note from your recent genetic counseling visit through the [facility] on Date: 2/21/2023.  Due to Covid-19 pandemic, this appointment was moved to a virtual visit.    PROGRESS NOTE:Paco, who goes by Vicne, was referred by Myrtle Dominguez MD for genetic counseling due to his history of arrhythmogenic cardiomyopathy (ACM).  I had the opportunity to talk with Vince and his wife Arlette today to discuss the genetic component of ACM and testing options available to him.     MEDICAL HISTORY: Vince was diagnosed with cardiomyopathy in 2021 after fainting episode lead to cardiac work up.  MRI at that time showed LVEF at 22%, dilation, late gadolinium enhancement, and reduced RVEF (35%).   Symptoms were mild so minimal treatment.  In June 2022 symptoms started progressing and by September/October 2022 he had significant shortness of breath, decreased appetite, fatigue.  He transferred care to Dr. Dominguez at [facility].  He had a VT event that prompted placement of an ICD.  Ultimately he had a heart transplant on 12/25/2022.      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    FAMILY HISTORY:A detailed family history was obtained during today's consult.  Family history was significant for the following cardiac history:    Brother Gary diagnosed with CM in his 60's and heart transplant at 70 years.  Pathology showed ARVD.  He had genetic testing for the Comprehensive Cardiac panel (100 genes) through [facility] that revealed a  VUS in the SOS2 gene.  This gene can be associated with [place] syndrome, but he as no symptoms of this disease.      Brother Flo  suddenly at 48 years.  It was thought to be a viral myocarditis.      Brother Bryan  at 45 with ALS.     Brother Jorge  at 66 with a brain tumor and history of stents for CAD.      Brother Kodi  at 73 years with lung cancer.  He had physical symptoms and learning disabilities suggestive of Marisel syndrome, but never diagnosed.      Another brother (82) with AFib and pacing ICD.     Brother and sister with high blood pressure.     Father  at 94 years with no known heart problems.      Paternal grandfather  suddenly at 50 and grandmother  from cancer at 44 year.      No known heart problems in 13 paternal aunts and uncles.    Mother  at 93 years with pneumonia.  She had a history of breast and colon cancer, in her 70's and 80's.    Paternal grandmother  at 84 years with heart failure.  Grandfather  70's with alcoholism.    No known heart problems in 11 maternal aunts and uncles.    Daughter  at 7 years with rhabdomyosarcoma.    Son (33) was diagnosed with Hodgkins lymphoma at 28 but is doing well now. He had a normal ECHO.    Daughter (27) is in good health and had a normal ECHO. She is currently 34 weeks pregnant and the baby girl was diagnosed with hypoplastic left heart syndrome. She moved to [place] for pre- care.    Son (40) in good health.  He had a normal ECHO.  There is no additional history of cardiomyopathy, arrhythmias, heart attacks, fainting, sudden cardiac death, genetic conditions, or birth defects. (A copy of pedigree may be found under media tab).    DISCUSSION: Arrhythmogenic cardiomyopathy (ACM), has different subtypes including arrhythmogenic right ventricular cardiomyopathy/dysplasia (ARVC/D) and left dominant arrhythmogenic cardiomyopathy (LDAC).  ACM is a difficult condition to diagnose.  It is characterized by  fatty replacement and fibrosis of the heart muscle which interrupts the electrical signals being sent between the heart cells. This loss of connection between the heart cells results in arrhythmias, including palpitations, fainting, and sudden death.  ACM is frequently diagnosed as dilated cardiomyopathy during the initial phase. Explained that a good portion of ACM cases have a genetic component.     Reviewed autosomal dominant (AD) inheritance pattern most commonly associated with ACM, including the 50% risk for recurrence. Briefly reviewed autosomal recessive inheritance. Explained that ACM gene mutations are associated with reduced penetrance and variable expressivity, meaning that individuals who carry a gene mutation may or may not get the disease and onset and severity can vary from one family member to the next. This explains why you may not see cardiomyopathy in each generation of a family.     At least 10 genes have been found to be related to ACM but testing is done thru comprehensive cardiomyopathy panel. Genetic testing currently identifies mutations in about 40% of idiopathic cases. Reviewed capabilities, limitations, and logistics of testing. DNA sample via saliva or blood is collected and sent to testing lab for evaluation of selected genes. The results could directly impact care and treatment.       Explained three possible outcomes of genetic testing including: positive identification of a mutation, no mutation identified, and identification of a variant of unknown significance (VUS). If a mutation is identified, presymptomatic testing would be available to at risk family members. If no mutation is identified, it does not rule out the possibility of a genetic component to this disease. Family members could still be at risk for developing the same condition. If a VUS is identified, it is unclear if the mutation is disease causing or just a normal variation. It may take time and possibly additional  testing to determine the meaning of a VUS result.     Test results take approximately 2-4 weeks on average. Discussed cost of testing through commercial lab. Explained that the lab works with insurance to determine coverage and contact patient if out of pocket costs are expected to exceed $100.     Discussed pros and cons of genetic testing. Explained that results could determine the cause for ACM. If a mutation is identified, presymptomatic testing is available to all at risk relatives. Reviewed possible issues associated with presymptomatic testing including genetic discrimination, current laws to prevent discrimination (ie. [place]), insurance issues, and emotional and psychosocial outcomes of testing.     Since Vince's brother Gary, with cardiomyopathy, had genetic testing and no mutation was found, I think the likelihood of finding a mutation in Vince is lower than the typically 40% range.  However, it is not impossible for there to be different causes for disease within a family.  Since his brother Gary did have a variant of uncertain significance in the SOS2 gene, we discussed the association between SOS2 gene and Marisel syndrome.  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 Marisel syndrome is included on the cardiomyopathy panels because individuals with Marisel syndrome can develop hypertrophic cardiomyopathy, where the heart muscle is thickened and doesn't relax well.    Explained that clinical evaluation is recommended for all first degree relatives (parents, siblings, and children) of an affected individual regardless of decision to pursue genetic testing. Clinical evaluation should be performed every two years after age 10 with frequency changing after 40 years or with endurance athletes.  Evaluation should include history, cardiac exam,  cardiac MRI, signal averaged EKG, exercise stress testing and 14 day Holter monitoring.    Arlette had questions about possible association between ACM and hypoplastic left heart syndrome (HLHS).  Explained that HLHS is often inherited like other congenital heart disease (CHD) as a multifactorial condition.  However, in some families there can be a dominant gene that plays a role in CHD.  These genes are not known to be associated with ACM.    All questions answered at this time.     PLAN:Paco elected to proceed with genetic testing for CM Next panel.  Requisition and consent forms were completed and signed.  DNA will be collected via saliva sample and sent to [facility]. I will contact patient when results are available.    TOTAL TIME SPENT IN COUNSELIN minutes    Kim Rogers MS, [facility] [place]  Licensed, Certified Genetic Counselor  [facility]

## 2023-02-21 NOTE — PROGRESS NOTES
Pt tolerated recovery without complication. Discharge instructions reviewed, copy given to pt. Pt tolerated ambulation and oral intake. Voided. Right groin remains f/d/i. PIV dc'd. Pt discharged home accompanied by wife.

## 2023-02-22 ENCOUNTER — TELEPHONE (OUTPATIENT)
Dept: TRANSPLANT | Facility: CLINIC | Age: 68
End: 2023-02-22
Payer: MEDICARE

## 2023-02-22 DIAGNOSIS — Z94.1 HEART REPLACED BY TRANSPLANT (H): ICD-10-CM

## 2023-02-22 DIAGNOSIS — Z94.1 HEART REPLACED BY TRANSPLANT (H): Primary | ICD-10-CM

## 2023-02-22 LAB
PATH REPORT.COMMENTS IMP SPEC: NORMAL
PATH REPORT.FINAL DX SPEC: NORMAL
PATH REPORT.GROSS SPEC: NORMAL
PATH REPORT.MICROSCOPIC SPEC OTHER STN: NORMAL
PATH REPORT.RELEVANT HX SPEC: NORMAL
PHOTO IMAGE: NORMAL
STFR SERPL-MCNC: 6.7 MG/L

## 2023-02-22 RX ORDER — PREDNISONE 5 MG/1
TABLET ORAL
Qty: 150 TABLET | Refills: 3
Start: 2023-02-22 | End: 2023-03-10

## 2023-02-22 NOTE — PATIENT INSTRUCTIONS
"Indication for Genetic Counseling:     Arrhythmogenic cardiomyopathy (ACM) is a genetic condition that can cause irregular heartbeat, fainting, shortness of breath, heart failure, accumulation of fluid in the body, cardiac arrest, and sudden cardiac death (SCD).  AC occurs when heart cells are replaced by fat and fibrous tissue, which disrupts the electrical signaling in the heart.  ACM can occur on either the right or left side of the heart and often occurs on both sides.  When the right side is predominantly involved, it is called arrhythmogenic right ventricular cardiomyopathy/dysplasia (ARVC/D), and when the left side is predominantly involved is it called left dominant arrhythmogenic cardiomyopathy (LDAC).  Other conditions that can cause similar features as ACM are inflammation of the heart muscle (myocarditis) and infiltration of fiber cells in the heart muscle (myocardial fibrosis).     Inheritance:   General:  Humans have over 20,000 genes that instruct our bodies how to function.  We have two copies of each gene because we inherit one from our mother and one from our father.  In most cardiac cases with a genetic component, the condition is inherited in an autosomal dominant (AD) pattern.  This means that in order to have the condition, a person needs to inherit a mutation on one copy of a particular gene.  This mutation or pathogenic variant dominates the \"normal\" working copy of the gene.  When an affected individual has children, they can either pass on the \"normal\" copy of the gene or the mutation.  Therefore, children have a 50% chance of inheriting the mutation.  Other family members also have an increased risk but the specific risk depends on the degree of relationship.  Additional inheritance patterns can occur within families and may alter the risk of recurrence.     Testing Options:   Genetic testing is available to assess a panel of genes known to cause this condition.  This test reads through " the DNA (sequencing) of these genes to look for spelling mistakes or mutations that could cause the condition.      There are three types of results you could receive from this test.     -Positive result (mutation/pathogenic variant identified) - confirms diagnosis and provides an answer to why this happened.  In addition, identifying a mutation allows family members to have testing to determine their risk.     -Negative result (mutation not identified) - no genetic changes were identified.  This does not rule out a genetic cause for the condition as the genetic testing only identifies 40-50% of genetic causes for this condition.    -Variant of uncertain significance (VUS) - a genetic change was identified, but there is not enough information to determine whether it is disease-causing or normal human genetic variation.     Although genetic testing may identify a mutation, it cannot provide information about the severity of symptoms or the progression of disease.  We cannot predict age of onset or severity of symptoms due to reduced penetrance and variable expressivity.    Logistics:   Genetic testing involves collecting a sample of DNA, thru blood, saliva, or cheek cells.  The sample will be sent to a laboratory to extract the DNA and sequence the genes for mutations.  The laboratory will work with your insurance company to determine the out of pocket (OOP) cost and will notify you if the OOP cost is greater than $100.  Remember to ask the lab about financial assistance pricing and self pay options as well.  Sometimes those are much lower than insurance pricing.  When testing is initiated, results take about 2-4 weeks to return. I will contact you over the phone when results are available.     Genetic Information and Nondiscrimination Act:  The Genetic Information and Nondiscrimination Act of 2008 ([place]) is a federal law that protects individuals from genetic discrimination in health insurance and employment.  Genetic discrimination is defined as the misuse of genetic information. This law does not address potential discrimination regarding life insurance or disability insurance.      This is especially relevant for at risk individuals who are considering presymptomatic testing.    Screening Recommendations:  Clinical evaluation is recommended for all first degree relatives (parents, siblings, and children) of an affected individual regardless of decision to pursue genetic testing. Clinical evaluation should be performed at least every two years after age 10 with frequency changing after 40 years or with endurance athletes.  Evaluation should include history, cardiac exam, cardiac MRI, signal averaged EKG, exercise stress testing, and 14 day Holter monitoring.    Resources:  Children's Cardiomyopathy Foundation - childrenscardiomyopathy.org  [place] Cardiomyopathy Association - cardiomyopathy.org  Sudden Arrhythmia Death Syndromes Foundation - sads.org  Heart Rhythm Society - HRSonline.org    General   American Heart Association - americanheart.org  Genetics Home Reference - ghr.nlm.nih.gov  Genetic Information and Nondiscrimination Act - [place] [employer].org    Contact Information:  Kim Rogers MS  Licensed Genetic Counselor  [facility]  [facility]    Office:  [phone]  Appointments:  [phone]  Fax: [phone]  Email: [email or web address]

## 2023-02-22 NOTE — TELEPHONE ENCOUNTER
Reviewed biopsy pathology results with Dr Dominguez.  OK to decrease Pred to 10/5 mg   Add: DSAs, RHC, ECHO to appt in 2 weeks.  Wife/pt (speaker phone) called with results, med change and next steps.      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

## 2023-03-01 ENCOUNTER — PRE VISIT (OUTPATIENT)
Dept: TRANSPLANT | Facility: CLINIC | Age: 68
End: 2023-03-01
Payer: MEDICARE

## 2023-03-01 ENCOUNTER — NURSE TRIAGE (OUTPATIENT)
Dept: CARDIOLOGY | Facility: CLINIC | Age: 68
End: 2023-03-01
Payer: MEDICARE

## 2023-03-01 ENCOUNTER — TELEPHONE (OUTPATIENT)
Dept: PHARMACY | Facility: CLINIC | Age: 68
End: 2023-03-01
Payer: MEDICARE

## 2023-03-01 DIAGNOSIS — Z94.1 HEART REPLACED BY TRANSPLANT (H): ICD-10-CM

## 2023-03-01 DIAGNOSIS — Z94.1 HEART REPLACED BY TRANSPLANT (H): Primary | ICD-10-CM

## 2023-03-01 DIAGNOSIS — Z79.899 ENCOUNTER FOR LONG-TERM (CURRENT) USE OF HIGH-RISK MEDICATION: ICD-10-CM

## 2023-03-01 DIAGNOSIS — D64.9 LOW HEMOGLOBIN: ICD-10-CM

## 2023-03-01 RX ORDER — LIDOCAINE 40 MG/G
CREAM TOPICAL
Status: CANCELLED | OUTPATIENT
Start: 2023-03-01

## 2023-03-01 NOTE — TELEPHONE ENCOUNTER
"Received a direct call from Kenyon Max who is with the cardiac rehab program in [place], [place]. He is trying to connect with Dr. Dominguez's nurse to speak about their heart failure program. He is requesting a return call. [phone].  Will send a message to [facility] for follow up.  1. REASON FOR CALL or QUESTION: \"What is your reason for calling today?\" or \"How can I best help you?\" or \"What question do you have that I can help answer?\"    "

## 2023-03-01 NOTE — TELEPHONE ENCOUNTER
Clinical Pharmacy Consult:                                                      Transplant Specific: 1 month post transplant medication review  Date of Transplant: 12/25/2022  Type of Transplant: heart  First Transplant: yes  History of rejection: no    Immunosuppression Regimen   TAC 5mg qAM & 4mg qPM, Prednisone 10mg qAM & 5mg qPM and MMF 1000mg qAM & 1000mg qPM  Patient specific goal: 10-15  Most recent level: 12.6, date 2/21/23  Immunosuppressant Levels:  Therapeutic  Pt adherent to lab draws: yes  Scr:   Lab Results   Component Value Date    CR 0.95 02/21/2023     Side effects: Tremors     Prophylactic Medications  Antibacterial:  Bactrim 1 daily  Scheduled Discontinue Date: 6 months    Antifungal: Nystatin  Scheduled Discontinue Date: 6 months    Antiviral: CrCl >/=60 mL/minute: Valcyte 900mg daily   Scheduled Discontinue Date: 3 months    Acid Reducer: Protonix (pantoprazole)  Scheduled Reviewed Date: 6 months    Thrombosis Prevention: Eliquis 5ng BID  Scheduled Discontinue Date: to be determined    Blood Pressure Management  Frequency of home Blood Pressure checks: once daily  Most recent home BP: 122/90  Patient Blood pressure goal: <140/90  Patient blood pressure at goal:  yes  Hospitalizations/ER visits since last assessment: 0    Med rec/DUR performed: yes  Med Rec Discrepancies: no    Medication adherence flowsheet 3/1/2023   Patient medication administration: Has assistance with medications   Patient estimated adherence level: %   Pharmacist assessment of adherence: Good   Patient reported doses missed per week: 0-1   Facilitators to medication adherence  Pill box;Alarm;Caregiver assistance;Medication dosing chart   Patient reported barriers to medication adherence  -   Adherence intervention(s): -      Medication access flowsheet 3/1/2023   Number of pharmacies used: 1   Pharmacy: [facility]   Enrolled in [facility]? Yes   Patient reported barriers to accessing  medications: -   Medication access interventions: -      Spoke to wife, Arlette. She reported David is doing well. He still has some tremors that fluctuate in severity but stil manageable. The urinary frequency that started after surgery have improved. Arlette still takes care of all medication and he has not missed any of his immuno dose but have missed 1 or 2 of magnesium.  Arlette have his BP checks everyday. Reading have been higher at home than in clinic. However both readings have been within goal.  No other questions or concerns. Follow up in 1 month,    Prince Morales, SINDY

## 2023-03-02 DIAGNOSIS — Z94.1 HEART REPLACED BY TRANSPLANT (H): ICD-10-CM

## 2023-03-02 DIAGNOSIS — I77.6 VASCULITIS (H): Primary | ICD-10-CM

## 2023-03-02 DIAGNOSIS — Z79.899 ENCOUNTER FOR LONG-TERM (CURRENT) USE OF HIGH-RISK MEDICATION: ICD-10-CM

## 2023-03-06 ASSESSMENT — ENCOUNTER SYMPTOMS
MEMORY LOSS: 0
DISTURBANCES IN COORDINATION: 1
SEIZURES: 0
PALPITATIONS: 0
WEAKNESS: 1
HYPERTENSION: 0
LIGHT-HEADEDNESS: 0
LOSS OF CONSCIOUSNESS: 0
PARALYSIS: 0
TREMORS: 1
SPEECH CHANGE: 0
HEADACHES: 0
LEG PAIN: 0
HYPOTENSION: 0
EXERCISE INTOLERANCE: 1
NUMBNESS: 0
TINGLING: 0
SYNCOPE: 0
ORTHOPNEA: 0
DIZZINESS: 1
SLEEP DISTURBANCES DUE TO BREATHING: 0

## 2023-03-07 NOTE — TELEPHONE ENCOUNTER
Action 3/7/23 MV 8.29am   Action Taken Imaging request faxed to:    [facility]     Action 3/10/23 MV 11.28am   Action Taken 1) [facility] images resolved in PACS  2) Called [facility] for status update on imaging - LVM and refaxed request     Action 3/10/23 MV 12.45pm   Action Taken Images resolved in PACS from [facility]               RECORDS RECEIVED FROM: internal   REASON FOR VISIT: vasculitis   Date of Appt: 3/14/23   NOTES (FOR ALL VISITS) STATUS DETAILS   OFFICE NOTE from referring provider Internal Dr Myrtle Dominguez @ [facility]:  2/9/23   OFFICE NOTE from other specialist Care Everywhere Dr Karan Lyles @ [facility] [place]:  7/18/22    Dr Valentina Santiago @ [facility] [place]:  12/21/21  6/14/21  12/10/21  5/7/20  (additional encounters)    Dr Yeison Galvan @ [facility]:  10/2/18    Dr Brown Marlow @ [facility]:  6/7/18 11/30/17 6/5/17 12/19/16  (additional encounters)   DISCHARGE SUMMARY from hospital Internal/CE [facility]:  11/10/22-11/21/22    [facility] [place]:  9/28/16-9/30/16   DISCHARGE REPORT from the ER Care Everywhere [facility] [place]:  11/6/16   EEG Care Everywhere [facility] [place]:  9/16/21   MEDICATION LIST Internal    IMAGING  (FOR ALL VISITS)     IR PACS [facility]:  IR Cerebral Artery Angio 11/3/16   ULTRASOUND (CAROTID BILAT) *VASCULAR* PACS [facility]:  US Carotid Bilat 11/11/22   MRI (HEAD, NECK, SPINE) PACS [facility]:  MRI Brain 3/1/23  MRI Brain 7/18/22*  MRI Brain 12/21/21*  MRI Brain 6/14/21*  MRI Brain 12/10/20  MRI Brain 5/7/20  MRI Brain 10/9/19  MRI Brain 6/4/18*  MRI Brain 2/21/17  MRI Brain 12/15/16  MRI Brain 10/28/16  MRI Brain 9/28/16*  MRA Head Neck 9/28/16*    [facility]:  MRA Neck Carotids 11/14/22  MRI Brain 11/14/22  MRA Brain COW 11/14/22    [facility]:  MRI Brain 10/2/18  MRI Brain 11/27/17  MRI Brain 6/8/17  MRA Brain 10/21/16   CT (HEAD, NECK, SPINE) PACS [facility]:  CT Head 11/11/22    [facility]:  CTA Head 9/28/16  CT Head 9/28/16*

## 2023-03-08 ENCOUNTER — EXTERNAL ORDER RESULTS (OUTPATIENT)
Dept: LAB | Facility: CLINIC | Age: 68
End: 2023-03-08
Payer: MEDICARE

## 2023-03-08 ENCOUNTER — TELEPHONE (OUTPATIENT)
Dept: CARDIOLOGY | Facility: CLINIC | Age: 68
End: 2023-03-08
Payer: MEDICARE

## 2023-03-08 DIAGNOSIS — Z94.1 HEART REPLACED BY TRANSPLANT (H): ICD-10-CM

## 2023-03-08 DIAGNOSIS — I42.8 NONISCHEMIC CARDIOMYOPATHY (H): ICD-10-CM

## 2023-03-08 DIAGNOSIS — Z84.89 FAMILY HISTORY OF SUDDEN DEATH: ICD-10-CM

## 2023-03-08 DIAGNOSIS — Z82.49 FAMILY HISTORY OF CARDIOMYOPATHY: ICD-10-CM

## 2023-03-09 ENCOUNTER — HOSPITAL ENCOUNTER (OUTPATIENT)
Facility: CLINIC | Age: 68
Discharge: HOME | End: 2023-03-09
Attending: INTERNAL MEDICINE | Admitting: INTERNAL MEDICINE
Payer: MEDICARE

## 2023-03-09 ENCOUNTER — APPOINTMENT (OUTPATIENT)
Dept: MEDSURG UNIT | Facility: CLINIC | Age: 68
End: 2023-03-09
Attending: INTERNAL MEDICINE
Payer: MEDICARE

## 2023-03-09 ENCOUNTER — APPOINTMENT (OUTPATIENT)
Dept: LAB | Facility: CLINIC | Age: 68
End: 2023-03-09
Attending: INTERNAL MEDICINE
Payer: MEDICARE

## 2023-03-09 ENCOUNTER — HOSPITAL ENCOUNTER (OUTPATIENT)
Dept: CARDIOLOGY | Facility: CLINIC | Age: 68
Discharge: HOME | End: 2023-03-09
Attending: INTERNAL MEDICINE | Admitting: INTERNAL MEDICINE
Payer: MEDICARE

## 2023-03-09 ENCOUNTER — OFFICE VISIT (OUTPATIENT)
Dept: CARDIOLOGY | Facility: CLINIC | Age: 68
End: 2023-03-09
Attending: INTERNAL MEDICINE
Payer: MEDICARE

## 2023-03-09 VITALS
SYSTOLIC BLOOD PRESSURE: 122 MMHG | TEMPERATURE: 97.6 F | DIASTOLIC BLOOD PRESSURE: 81 MMHG | WEIGHT: 198.1 LBS | RESPIRATION RATE: 16 BRPM | BODY MASS INDEX: 25.42 KG/M2 | HEIGHT: 74 IN | HEART RATE: 95 BPM | OXYGEN SATURATION: 96 %

## 2023-03-09 VITALS
DIASTOLIC BLOOD PRESSURE: 81 MMHG | OXYGEN SATURATION: 98 % | SYSTOLIC BLOOD PRESSURE: 119 MMHG | HEART RATE: 101 BPM | BODY MASS INDEX: 25.78 KG/M2 | HEIGHT: 74 IN | WEIGHT: 200.9 LBS

## 2023-03-09 DIAGNOSIS — Z94.1 HEART REPLACED BY TRANSPLANT (H): ICD-10-CM

## 2023-03-09 DIAGNOSIS — D64.9 LOW HEMOGLOBIN: ICD-10-CM

## 2023-03-09 DIAGNOSIS — Z94.1 HEART TRANSPLANT RECIPIENT (H): ICD-10-CM

## 2023-03-09 DIAGNOSIS — Z79.899 ENCOUNTER FOR LONG-TERM (CURRENT) USE OF HIGH-RISK MEDICATION: ICD-10-CM

## 2023-03-09 LAB
ANION GAP SERPL CALCULATED.3IONS-SCNC: 11 MMOL/L (ref 7–15)
BUN SERPL-MCNC: 28.1 MG/DL (ref 8–23)
CALCIUM SERPL-MCNC: 11.3 MG/DL (ref 8.8–10.2)
CHLORIDE SERPL-SCNC: 104 MMOL/L (ref 98–107)
CREAT SERPL-MCNC: 1.19 MG/DL (ref 0.67–1.17)
D DIMER PPP FEU-MCNC: 1.24 UG/ML FEU (ref 0–0.5)
DEPRECATED HCO3 PLAS-SCNC: 23 MMOL/L (ref 22–29)
ERYTHROCYTE [DISTWIDTH] IN BLOOD BY AUTOMATED COUNT: 15 % (ref 10–15)
FERRITIN SERPL-MCNC: 39 NG/ML (ref 31–409)
GFR SERPL CREATININE-BSD FRML MDRD: 66 ML/MIN/1.73M2
GLUCOSE SERPL-MCNC: 183 MG/DL (ref 70–99)
HCT VFR BLD AUTO: 37.1 % (ref 40–53)
HGB BLD-MCNC: 11.6 G/DL (ref 13.3–17.7)
IRON BINDING CAPACITY (ROCHE): 284 UG/DL (ref 240–430)
IRON SATN MFR SERPL: 27 % (ref 15–46)
IRON SERPL-MCNC: 76 UG/DL (ref 61–157)
LVEF ECHO: NORMAL
MAGNESIUM SERPL-MCNC: 1.9 MG/DL (ref 1.7–2.3)
MCH RBC QN AUTO: 27.9 PG (ref 26.5–33)
MCHC RBC AUTO-ENTMCNC: 31.3 G/DL (ref 31.5–36.5)
MCV RBC AUTO: 89 FL (ref 78–100)
PHOSPHATE SERPL-MCNC: 2.9 MG/DL (ref 2.5–4.5)
PLATELET # BLD AUTO: 248 10E3/UL (ref 150–450)
POTASSIUM SERPL-SCNC: 4.2 MMOL/L (ref 3.4–5.3)
RBC # BLD AUTO: 4.16 10E6/UL (ref 4.4–5.9)
SODIUM SERPL-SCNC: 138 MMOL/L (ref 136–145)
TACROLIMUS BLD-MCNC: 12.8 UG/L (ref 5–15)
TME LAST DOSE: NORMAL H
TME LAST DOSE: NORMAL H
WBC # BLD AUTO: 7.5 10E3/UL (ref 4–11)

## 2023-03-09 PROCEDURE — 93321 DOPPLER ECHO F-UP/LMTD STD: CPT | Mod: 26 | Performed by: INTERNAL MEDICINE

## 2023-03-09 PROCEDURE — 86828 HLA CLASS I&II ANTIBODY QUAL: CPT | Performed by: INTERNAL MEDICINE

## 2023-03-09 PROCEDURE — 93505 ENDOMYOCARDIAL BIOPSY: CPT | Mod: 26 | Performed by: INTERNAL MEDICINE

## 2023-03-09 PROCEDURE — 88350 IMFLUOR EA ADDL 1ANTB STN PX: CPT | Mod: 26 | Performed by: STUDENT IN AN ORGANIZED HEALTH CARE EDUCATION/TRAINING PROGRAM

## 2023-03-09 PROCEDURE — 82728 ASSAY OF FERRITIN: CPT | Performed by: INTERNAL MEDICINE

## 2023-03-09 PROCEDURE — 99214 OFFICE O/P EST MOD 30 MIN: CPT | Mod: 25 | Performed by: INTERNAL MEDICINE

## 2023-03-09 PROCEDURE — 85379 FIBRIN DEGRADATION QUANT: CPT | Performed by: INTERNAL MEDICINE

## 2023-03-09 PROCEDURE — 93505 ENDOMYOCARDIAL BIOPSY: CPT | Performed by: INTERNAL MEDICINE

## 2023-03-09 PROCEDURE — 93325 DOPPLER ECHO COLOR FLOW MAPG: CPT | Mod: 26 | Performed by: INTERNAL MEDICINE

## 2023-03-09 PROCEDURE — 83550 IRON BINDING TEST: CPT | Performed by: INTERNAL MEDICINE

## 2023-03-09 PROCEDURE — 93325 DOPPLER ECHO COLOR FLOW MAPG: CPT

## 2023-03-09 PROCEDURE — 99152 MOD SED SAME PHYS/QHP 5/>YRS: CPT | Performed by: INTERNAL MEDICINE

## 2023-03-09 PROCEDURE — 80197 ASSAY OF TACROLIMUS: CPT | Performed by: INTERNAL MEDICINE

## 2023-03-09 PROCEDURE — 86833 HLA CLASS II HIGH DEFIN QUAL: CPT | Performed by: INTERNAL MEDICINE

## 2023-03-09 PROCEDURE — 84238 ASSAY NONENDOCRINE RECEPTOR: CPT | Performed by: INTERNAL MEDICINE

## 2023-03-09 PROCEDURE — 999N000132 HC STATISTIC PP CARE STAGE 1

## 2023-03-09 PROCEDURE — 83735 ASSAY OF MAGNESIUM: CPT | Performed by: INTERNAL MEDICINE

## 2023-03-09 PROCEDURE — 86832 HLA CLASS I HIGH DEFIN QUAL: CPT | Performed by: INTERNAL MEDICINE

## 2023-03-09 PROCEDURE — 85027 COMPLETE CBC AUTOMATED: CPT | Performed by: INTERNAL MEDICINE

## 2023-03-09 PROCEDURE — 250N000011 HC RX IP 250 OP 636: Performed by: INTERNAL MEDICINE

## 2023-03-09 PROCEDURE — 88346 IMFLUOR 1ST 1ANTB STAIN PX: CPT | Mod: 26 | Performed by: STUDENT IN AN ORGANIZED HEALTH CARE EDUCATION/TRAINING PROGRAM

## 2023-03-09 PROCEDURE — 999N000142 HC STATISTIC PROCEDURE PREP ONLY

## 2023-03-09 PROCEDURE — 36415 COLL VENOUS BLD VENIPUNCTURE: CPT | Performed by: INTERNAL MEDICINE

## 2023-03-09 PROCEDURE — 250N000009 HC RX 250: Performed by: INTERNAL MEDICINE

## 2023-03-09 PROCEDURE — 99152 MOD SED SAME PHYS/QHP 5/>YRS: CPT | Mod: GC | Performed by: INTERNAL MEDICINE

## 2023-03-09 PROCEDURE — C8924 2D TTE W OR W/O FOL W/CON,FU: HCPCS

## 2023-03-09 PROCEDURE — 82310 ASSAY OF CALCIUM: CPT | Performed by: INTERNAL MEDICINE

## 2023-03-09 PROCEDURE — 88350 IMFLUOR EA ADDL 1ANTB STN PX: CPT | Mod: TC | Performed by: INTERNAL MEDICINE

## 2023-03-09 PROCEDURE — 88307 TISSUE EXAM BY PATHOLOGIST: CPT | Mod: 26 | Performed by: STUDENT IN AN ORGANIZED HEALTH CARE EDUCATION/TRAINING PROGRAM

## 2023-03-09 PROCEDURE — 84100 ASSAY OF PHOSPHORUS: CPT | Performed by: INTERNAL MEDICINE

## 2023-03-09 PROCEDURE — 272N000001 HC OR GENERAL SUPPLY STERILE: Performed by: INTERNAL MEDICINE

## 2023-03-09 PROCEDURE — G0463 HOSPITAL OUTPT CLINIC VISIT: HCPCS | Mod: 25 | Performed by: INTERNAL MEDICINE

## 2023-03-09 PROCEDURE — 255N000002 HC RX 255 OP 636: Performed by: INTERNAL MEDICINE

## 2023-03-09 PROCEDURE — C1894 INTRO/SHEATH, NON-LASER: HCPCS | Performed by: INTERNAL MEDICINE

## 2023-03-09 PROCEDURE — 93308 TTE F-UP OR LMTD: CPT | Mod: 26 | Performed by: INTERNAL MEDICINE

## 2023-03-09 RX ORDER — LIDOCAINE 40 MG/G
CREAM TOPICAL
Status: DISCONTINUED | OUTPATIENT
Start: 2023-03-09 | End: 2023-03-09 | Stop reason: HOSPADM

## 2023-03-09 RX ORDER — FENTANYL CITRATE 50 UG/ML
INJECTION, SOLUTION INTRAMUSCULAR; INTRAVENOUS
Status: DISCONTINUED | OUTPATIENT
Start: 2023-03-09 | End: 2023-03-09 | Stop reason: HOSPADM

## 2023-03-09 RX ORDER — OXYCODONE HYDROCHLORIDE 5 MG/1
10 TABLET ORAL EVERY 4 HOURS PRN
Status: CANCELLED | OUTPATIENT
Start: 2023-03-09

## 2023-03-09 RX ORDER — OXYCODONE HYDROCHLORIDE 5 MG/1
5 TABLET ORAL EVERY 4 HOURS PRN
Status: CANCELLED | OUTPATIENT
Start: 2023-03-09

## 2023-03-09 RX ORDER — TACROLIMUS 1 MG/1
CAPSULE ORAL
Qty: 240 CAPSULE | Refills: 11 | Status: SHIPPED | OUTPATIENT
Start: 2023-03-09 | End: 2023-03-23

## 2023-03-09 RX ORDER — FENTANYL CITRATE 50 UG/ML
25 INJECTION, SOLUTION INTRAMUSCULAR; INTRAVENOUS
Status: DISCONTINUED | OUTPATIENT
Start: 2023-03-09 | End: 2023-03-09

## 2023-03-09 RX ORDER — NALOXONE HYDROCHLORIDE 0.4 MG/ML
0.4 INJECTION, SOLUTION INTRAMUSCULAR; INTRAVENOUS; SUBCUTANEOUS
Status: DISCONTINUED | OUTPATIENT
Start: 2023-03-09 | End: 2023-03-09

## 2023-03-09 RX ORDER — ATROPINE SULFATE 0.1 MG/ML
0.5 INJECTION INTRAVENOUS
Status: DISCONTINUED | OUTPATIENT
Start: 2023-03-09 | End: 2023-03-09

## 2023-03-09 RX ORDER — AMOXICILLIN 500 MG/1
CAPSULE ORAL
Qty: 4 CAPSULE | Refills: 1 | Status: SHIPPED | OUTPATIENT
Start: 2023-03-09 | End: 2023-08-14

## 2023-03-09 RX ORDER — TACROLIMUS 5 MG/1
CAPSULE ORAL
Qty: 30 CAPSULE | Refills: 11
Start: 2023-03-09 | End: 2023-06-05 | Stop reason: DRUGHIGH

## 2023-03-09 RX ORDER — NALOXONE HYDROCHLORIDE 0.4 MG/ML
0.2 INJECTION, SOLUTION INTRAMUSCULAR; INTRAVENOUS; SUBCUTANEOUS
Status: DISCONTINUED | OUTPATIENT
Start: 2023-03-09 | End: 2023-03-09

## 2023-03-09 RX ORDER — LIDOCAINE 40 MG/G
CREAM TOPICAL
Status: CANCELLED | OUTPATIENT
Start: 2023-03-09

## 2023-03-09 RX ORDER — FLUMAZENIL 0.1 MG/ML
0.2 INJECTION, SOLUTION INTRAVENOUS
Status: DISCONTINUED | OUTPATIENT
Start: 2023-03-09 | End: 2023-03-09

## 2023-03-09 RX ADMIN — HUMAN ALBUMIN MICROSPHERES AND PERFLUTREN 6 ML: 10; .22 INJECTION, SOLUTION INTRAVENOUS at 11:12

## 2023-03-09 ASSESSMENT — ACTIVITIES OF DAILY LIVING (ADL)
ADLS_ACUITY_SCORE: 35

## 2023-03-09 ASSESSMENT — PAIN SCALES - GENERAL: PAINLEVEL: NO PAIN (0)

## 2023-03-09 NOTE — DISCHARGE INSTRUCTIONS
[facility]                        Interventional Cardiology  Discharge Instructions   Post Right Heart Cath/Heart Biopsy      AFTER YOU GO HOME:  DO NOT drive or operate machinery at home or at work for at least 24 hours  If you were given sedation: we recommend that an adult stay with you for 24 hours  DO relax and take it easy for 24 hours  DO drink plenty of fluids  DO resume your regular diet, unless otherwise instructed by your Primary Physician  DO NOT SMOKE FOR AT LEAST 24 HOURS. If you have been given any mediations that were to help you relax or sedate you during your procedure  DO NOT drink alcoholic beverages the day of your procedure  DO NOT do any strenuous exercise or lifting (>10 lbs) for at least 7 days following your procedure  DO NOT take a tub bath, get in a hot tub, or pool for at least 5 days following your procedure  Do Not scrub the procedure site vigorously  DO NOT make any important or legal decisions for 24 hours following your procedure    CALL YOUR PRIMARY PHYSICIAN IF:  You start bleeding from the procedure site. If you do start to bleed from that site, lie down flat and hold pressure on the site. Call your physician, your physician will tell you if you need to return to the hospital. It is common to have a small bruise or lump at the site  You have numbness, coolness or change in color of the leg of the puncture site    You experience pain or redness at the puncture site    You develop hives or a rash or unexplained itching  You develop a temperature of 101 degrees F or greater    ADDITIONAL INSTRUCTIONS   Support the puncture site for coughing, sneezing, or moving your bowels for the first 48 hours  No lotion or powder to the puncture site for 3 days    [facility] INTERVENTIONAL CARDIOLOGY DEPARTMENT:    Procedure Physician: Dr Agee                                          Date of Procedure: 3/9/23     Telephone Numbers [phone] Monday-Friday 8:00 am to 4:30 pm     [phone]  [phone] After 4:30 pm Monday-Friday, Weekends & Holidays  Ask for Interventional Cardiologist on call. Someone is on call 24 hours/day   [facility] toll free number [phone] Monday-Friday 8:00 am to 4:30 pm   [facility] Emergency Dept [phone]

## 2023-03-09 NOTE — NURSING NOTE
Chief Complaint   Patient presents with     New Patient     Angelica new HF, HFrEF, on dobutamine, labs prior          Vitals were taken and medications reconciled at earlier apt.       Vince Chandra, EMT   3:26 PM

## 2023-03-09 NOTE — NURSING NOTE
Transplant Coordinator Note  Reason for visit Week 10 post transplant      Health concerns addressed today  Pt with wife seen in clinic with Dr Dominguez. MD reviewed meds, available labs, RHC (WNL), ECHO (55-60% - ok to cancel 12-week ECHO)  and plan of care. Biopsy results pending.    Pt reports doing well; BP WNL, gaining weight, good appetite. Iron studies WNL. Overall feeling stronger. +tremors - Tac goal to be decreased at 12 weeks.      +d dimer - US in one month  - cont Eliquis  Glucose 183- will monitor, pred cont to decrease    Wght 191-> goal wght for pt ~210#.     Chipped another tooth; ok to see dentist. Take Amoxicillin 1 hour prior wile on Pred      Rehab locally. [place].  Completed genetics test kit and mailed back    Upcoming appt with Neuro; Working with Rheumatology to be scheduled.     Immunosuppressants  MMF 1000/1000 mg  Tac 5/4 mg goal 10-12; level 12.8. Decrease tac to 4 mg twice daily    Pred 10/5 mg      Routine screenings  Derm to be yearly   Dental after pred complete unless emergency. See above.  Colonoscopy  Nov 2022 (no specimens)   Prostate 1.63  Eye to be yearly post pred complete  Flu Oct 2021 -> due after 100 days post transplant   Pneumonia UTD  COVID last dose Nov 2021      Medication record reviewed and reconciled. Questions and concerns addressed. AVS reviewed and copy provided. Pt verbalized an understanding of plan of care.      Patient Instructions  ~Please call your transplant coordinator at [phone] with any questions or concerns.  Please note: after hours, weekends and holidays, this phone number to routed to an  to page out the coordinator on call.   ~Tac goal 10-12; level 12.8. Decrease tac to 4 mg twice daily. Recheck in two weeks   ~Return in two weeks as scheduled (3/21/23)   ~Coordinator will update you on remaining results.

## 2023-03-09 NOTE — PRE-PROCEDURE
GENERAL PRE-PROCEDURE:   Procedure:  Right heart catheterization with biopsy  Date/Time:  3/9/2023 9:09 AM    Verbal consent obtained?: Yes    Written consent obtained?: Yes    Risks and benefits: Risks, benefits and alternatives were discussed    Consent given by:  Patient  Patient states understanding of procedure being performed: Yes    Patient's understanding of procedure matches consent: Yes    Procedure consent matches procedure scheduled: Yes    Expected level of sedation:  Moderate  Appropriately NPO:  Yes  ASA Class:  3  Mallampati  :  Grade 3- soft palate visible, posterior pharyngeal wall not visible  Lungs:  Lungs clear with good breath sounds bilaterally  Heart:  Normal heart sounds and rate  History & Physical reviewed:  History and physical reviewed and no updates needed  Statement of review:  I have reviewed the lab findings, diagnostic data, medications, and the plan for sedation

## 2023-03-09 NOTE — PROGRESS NOTES
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     His post-operative course has been c/b delirium, leukocytosis, staph epi bacteremia, and prolonged CT output.  He discharged 1/13.     He had some complement deposition on his pathology  but no graft dysfunction or rejection.     Finished IV vancomycin for staph bacteremia      Did also have a nosebleed s/p Rhino Rocket placement -  No further nose bleeding and tolerating a NOAC    He presently denies PND orthopnea or lower extreme edema he has no diarrhea fevers chills blood pressures are well controlled.     He has been home for 2 weeks and getting stronger and stronger     Past medical history no change from prior    Social history no change from prior    Current Outpatient Medications   Medication Sig Dispense Refill     amLODIPine (NORVASC) 5 MG tablet Take 1 tablet (5 mg) by mouth At Bedtime 90 tablet 3     apixaban ANTICOAGULANT (ELIQUIS) 5 MG tablet Take 1 tablet (5 mg) by mouth 2 times daily 60 tablet 3     calcium carbonate-vitamin D (CALTRATE) 600-10 MG-MCG per tablet Take 1 tablet by mouth 2 times daily (with meals) 180 tablet 0     magnesium oxide (MAG-OX) 400 MG tablet Take 2 tablets (800 mg) by mouth 2 times daily 120 tablet 3     multivitamin (ONE-DAILY) tablet Take 1 tablet by mouth daily 90 tablet 3     mycophenolate (GENERIC EQUIVALENT) 250 MG capsule Take 4 capsules (1,000 mg) by mouth 2 times daily 720 capsule 3     nystatin (MYCOSTATIN) 247454 UNIT/ML suspension Swish and swallow 10 mLs (1,000,000 Units) in mouth 4  "times daily 1200 mL 1     pantoprazole (PROTONIX) 40 MG EC tablet Take 1 tablet (40 mg) by mouth every morning (before breakfast) 90 tablet 3     polyethylene glycol (MIRALAX) 17 GM/Dose powder Take 17 g by mouth daily as needed for constipation 510 g 1     predniSONE (DELTASONE) 5 MG tablet Take TWO tabs every AM (10 mg) and ONE tab every PM (5 mg) 150 tablet 3     rosuvastatin (CRESTOR) 10 MG tablet Take 1 tablet (10 mg) by mouth daily 90 tablet 1     sulfamethoxazole-trimethoprim (BACTRIM) 400-80 MG tablet Take 1 tablet by mouth daily 30 tablet 11     tacrolimus (GENERIC EQUIVALENT) 1 MG capsule Take FOUR caps every PM (4 mg) 120 capsule 11     tacrolimus (GENERIC EQUIVALENT) 5 MG capsule Take ONE cap every AM (5 mg) 30 capsule 11     traZODone (DESYREL) 100 MG tablet Take 1 tablet (100 mg) by mouth nightly as needed for sleep 30 tablet 1     valGANciclovir (VALCYTE) 450 MG tablet Take 2 tablets (900 mg) by mouth daily 60 tablet 3       ROS:   Constitutional: No fever, chills, or sweats. Weight stable . + fatigue   ENT: No visual disturbance, ear ache, + epistaxis, no sore throat.   Allergies/Immunologic: Negative.   Respiratory: No cough, hemoptysis.   Cardiovascular: As per HPI.   GI: No nausea, vomiting, hematemesis, melena, or hematochezia.   : No urinary frequency, dysuria, or hematuria.   Integument: Negative.   Psychiatric: Mood is overall stable with known emotional lability   Neuro: Negative.   Endocrinology: Negative.   Musculoskeletal:  positive for general weakness - getting better     EXAM:  /81 (BP Location: Right arm, Patient Position: Sitting, Cuff Size: Adult Regular)   Pulse 101   Ht 1.891 m (6' 2.45\")   Wt 91.1 kg (200 lb 14.4 oz)   SpO2 98%   BMI 25.48 kg/m    General: appears comfortable, alert and articulate  Head: normocephalic, atraumatic  Eyes: anicteric sclera, EOMI  Neck: no adenopathy  Orophyarynx: moist mucosa, no lesions, dentition intact- no thrush   Heart: PMI " diffuse,trace systolic murmur at LLSB, regular, S1/S2, rub, estimated JVP 9 cm   Lungs: clear, no rales or wheezing  Abdomen: soft, non-tender, bowel sounds present, no hepatosplenomegaly  Extremities: no clubbing, cyanosis or edema  Neurological: speech fluency is at baseline, able to walk without assistance    Psych: stable emotional lability     Data reviewed   Cr 1.19   Na 138     Hgb 11.6     Echo:   Interpretation Summary  Global and regional left ventricular function is normal with an EF of 55-60%.  Global right ventricular function is normal.  The inferior vena cava was normal in size with preserved respiratory  variability.  No pericardial effusion is present.    Left Ventricle  Left ventricular size is normal. Global and regional left ventricular function  is normal with an EF of 55-60%.     Right Ventricle  The right ventricle is normal size. Global right ventricular function is  normal.     Mitral Valve  The mitral valve is normal.     Aortic Valve  Aortic valve is normal in structure and function.     Tricuspid Valve  The tricuspid valve is normal.     Vessels  The inferior vena cava was normal in size with preserved respiratory  variability.    RHC:  BP: 135/88/105 mmHg  RA: -/-/7 mmHg  RV: 22/3 mmHg  PA: 22/12/15 mmHg  PCWP: -/-/10 mmHg  PA sat: 65%  Aisha CO/CI: 6.43 L/min / 2.97 L/min/m2  TD CO/CI: 5.13 L/min / 2.37 L/min/m2  PVR (by TD): 0.97 UREÑA  SVR (by TD): 1528 dyn/s/cm-5   Right sided filling pressures are normal. Left sided filling pressures are normal. Normal PA pressures. Normal cardiac output level. Hemodynamic data has been modified in Epic per physician review.    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  He discharged 1/13.    Graph function is normal, other than complement deposition there is been no rejection, baseline coronary angiogram with mild native coronary disease (proximal LAD 0.2 mm)    Serostatus:  - CMV D+/R+  - EBV D+/R+  - Toxo D-/R-     Immunosuppression:  - received basiliximab 12/25 and 12/29  - prednisone taper per protocol  - MMF 1000mg twice daily (given age and bacteremia)  - tacro, goal level 10-12 - will lower at 3 months     We will lower prednisone per protocol if this biopsy is negative      PPx:  - CAV:  Holding Aspirin 81mg daily while on anticoagulation, continue rosuvastatin 10mg daily  - GI:  Pantoprazole 40mg daily  - Osteoporosis:  calcium/vitamin D supplements  - CMV:  valcyte 900mg daily (until 3/25)   - PCP:  Bactrim single strength daily  - Thrush:  Nystatin s/s QID  - Toxo:  n/a     Hypertension: Controlled on amlodipine    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   These are all provoked from low cardiac output as well as line placement.     D-dimer elevated still today   Keep NOAC for one more a month   Repeat US in 1 month to determine if this stays on     Staph epi bacteremia-completed antibiotics PICC line out    History of vasculitis-with stable  neurologic symptoms he is to be maintained on CellCept   Establishing here  with neuro and rheum     Frailty deconditioning-improving    Myrtle Dominguez MD      CC  Patient Care Team:  Quentin Odonnell as PCP - General  Myrtle Dominguez MD as MD (Cardiovascular Disease)  Mary Lou Luke RN as Transplant Coordinator  Abram Simeon [facility] as Pharmacist (Pharmacist)  Tiera Anguiano DO as MD (Infectious  Diseases)  Abram Simeon, [facility] as Assigned MTM Pharmacist  Burt Andrews MD Cogswell, Rebecca Jane, MD as Assigned Heart and Vascular Provider  Tiera Anguiano DO as Assigned Infectious Disease Provider  Cruzito Mena MD as Assigned Surgical Provider  JUAN FRANCISCO CAMACHO

## 2023-03-09 NOTE — LETTER
3/9/2023      RE: Paco Stein  [address]  [place] [address]       Dear Colleague,    Thank you for the opportunity to participate in the care of your patient, Paco Stein, at the [facility] HEART [facility] [place] at [facility]. Please see a copy of my visit note below.    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     His post-operative course has been c/b delirium, leukocytosis, staph epi bacteremia, and prolonged CT output.  He discharged 1/13.     He had some complement deposition on his pathology  but no graft dysfunction or rejection.     Finished IV vancomycin for staph bacteremia      Did also have a nosebleed s/p Rhino Rocket placement -  No further nose bleeding and tolerating a NOAC    He presently denies PND orthopnea or lower extreme edema he has no diarrhea fevers chills blood pressures are well controlled.     He has been home for 2 weeks and getting stronger and stronger     Past medical history no change from prior    Social history no change from prior    Current Outpatient Medications   Medication Sig Dispense Refill     amLODIPine (NORVASC) 5 MG tablet Take 1 tablet (5 mg) by mouth At Bedtime 90 tablet 3     apixaban ANTICOAGULANT (ELIQUIS) 5 MG tablet Take 1 tablet (5 mg) by mouth 2 times daily 60 tablet 3     calcium carbonate-vitamin D (CALTRATE) 600-10 MG-MCG per tablet Take 1 tablet by mouth 2 times daily (with meals) 180 tablet 0     magnesium oxide (MAG-OX) 400 MG tablet Take 2 tablets (800  mg) by mouth 2 times daily 120 tablet 3     multivitamin (ONE-DAILY) tablet Take 1 tablet by mouth daily 90 tablet 3     mycophenolate (GENERIC EQUIVALENT) 250 MG capsule Take 4 capsules (1,000 mg) by mouth 2 times daily 720 capsule 3     nystatin (MYCOSTATIN) 570025 UNIT/ML suspension Swish and swallow 10 mLs (1,000,000 Units) in mouth 4 times daily 1200 mL 1     pantoprazole (PROTONIX) 40 MG EC tablet Take 1 tablet (40 mg) by mouth every morning (before breakfast) 90 tablet 3     polyethylene glycol (MIRALAX) 17 GM/Dose powder Take 17 g by mouth daily as needed for constipation 510 g 1     predniSONE (DELTASONE) 5 MG tablet Take TWO tabs every AM (10 mg) and ONE tab every PM (5 mg) 150 tablet 3     rosuvastatin (CRESTOR) 10 MG tablet Take 1 tablet (10 mg) by mouth daily 90 tablet 1     sulfamethoxazole-trimethoprim (BACTRIM) 400-80 MG tablet Take 1 tablet by mouth daily 30 tablet 11     tacrolimus (GENERIC EQUIVALENT) 1 MG capsule Take FOUR caps every PM (4 mg) 120 capsule 11     tacrolimus (GENERIC EQUIVALENT) 5 MG capsule Take ONE cap every AM (5 mg) 30 capsule 11     traZODone (DESYREL) 100 MG tablet Take 1 tablet (100 mg) by mouth nightly as needed for sleep 30 tablet 1     valGANciclovir (VALCYTE) 450 MG tablet Take 2 tablets (900 mg) by mouth daily 60 tablet 3       ROS:   Constitutional: No fever, chills, or sweats. Weight stable . + fatigue   ENT: No visual disturbance, ear ache, + epistaxis, no sore throat.   Allergies/Immunologic: Negative.   Respiratory: No cough, hemoptysis.   Cardiovascular: As per HPI.   GI: No nausea, vomiting, hematemesis, melena, or hematochezia.   : No urinary frequency, dysuria, or hematuria.   Integument: Negative.   Psychiatric: Mood is overall stable with known emotional lability   Neuro: Negative.   Endocrinology: Negative.   Musculoskeletal:  positive for general weakness - getting better     EXAM:  /81 (BP Location: Right arm, Patient Position: Sitting, Cuff Size:  "Adult Regular)   Pulse 101   Ht 1.891 m (6' 2.45\")   Wt 91.1 kg (200 lb 14.4 oz)   SpO2 98%   BMI 25.48 kg/m    General: appears comfortable, alert and articulate  Head: normocephalic, atraumatic  Eyes: anicteric sclera, EOMI  Neck: no adenopathy  Orophyarynx: moist mucosa, no lesions, dentition intact- no thrush   Heart: PMI diffuse,trace systolic murmur at LLSB, regular, S1/S2, rub, estimated JVP 9 cm   Lungs: clear, no rales or wheezing  Abdomen: soft, non-tender, bowel sounds present, no hepatosplenomegaly  Extremities: no clubbing, cyanosis or edema  Neurological: speech fluency is at baseline, able to walk without assistance    Psych: stable emotional lability     Data reviewed   Cr 1.19   Na 138     Hgb 11.6     Echo:   Interpretation Summary  Global and regional left ventricular function is normal with an EF of 55-60%.  Global right ventricular function is normal.  The inferior vena cava was normal in size with preserved respiratory  variability.  No pericardial effusion is present.    Left Ventricle  Left ventricular size is normal. Global and regional left ventricular function  is normal with an EF of 55-60%.     Right Ventricle  The right ventricle is normal size. Global right ventricular function is  normal.     Mitral Valve  The mitral valve is normal.     Aortic Valve  Aortic valve is normal in structure and function.     Tricuspid Valve  The tricuspid valve is normal.     Vessels  The inferior vena cava was normal in size with preserved respiratory  variability.    RHC:  BP: 135/88/105 mmHg  RA: -/-/7 mmHg  RV: 22/3 mmHg  PA: 22/12/15 mmHg  PCWP: -/-/10 mmHg  PA sat: 65%  Aisha CO/CI: 6.43 L/min / 2.97 L/min/m2  TD CO/CI: 5.13 L/min / 2.37 L/min/m2  PVR (by TD): 0.97 UREÑA  SVR (by TD): 1528 dyn/s/cm-5   Right sided filling pressures are normal. Left sided filling pressures are normal. Normal PA pressures. Normal cardiac output level. Hemodynamic data has been modified in Epic per physician " review.    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  He discharged 1/13.    Graph function is normal, other than complement deposition there is been no rejection, baseline coronary angiogram with mild native coronary disease (proximal LAD 0.2 mm)    Serostatus:  - CMV D+/R+  - EBV D+/R+  - Toxo D-/R-     Immunosuppression:  - received basiliximab 12/25 and 12/29  - prednisone taper per protocol  - MMF 1000mg twice daily (given age and bacteremia)  - tacro, goal level 10-12 - will lower at 3 months     We will lower prednisone per protocol if this biopsy is negative      PPx:  - CAV:  Holding Aspirin 81mg daily while on anticoagulation, continue rosuvastatin 10mg daily  - GI:  Pantoprazole 40mg daily  - Osteoporosis:  calcium/vitamin D supplements  - CMV:  valcyte 900mg daily (until 3/25)   - PCP:  Bactrim single strength daily  - Thrush:  Nystatin s/s QID  - Toxo:  n/a     Hypertension: Controlled on amlodipine    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   These are all provoked from low cardiac output as well as line placement.     D-dimer elevated still today   Keep NOAC for one more a month   Repeat US in 1 month to determine if this stays on     Staph epi bacteremia-completed antibiotics PICC line out    History of vasculitis-with stable  neurologic symptoms he is to be maintained on CellCept   Establishing here  with neuro and  rheum     Frailty deconditioning-improving    Myrtle Dominguez MD      CC  Patient Care Team:  Quentin Odonnell as PCP - General  SenstMary Lou, RN as Transplant Coordinator  Abram Simeon RP as Pharmacist (Pharmacist)  Tiera Anguiano DO as MD (Infectious Diseases)  Abram Simeon RPH as Assigned MTM Pharmacist  Burt Andrews MD Fontana, Lauren, DO as Assigned Infectious Disease Provider  Cruzito Mena MD as Assigned Surgical Provider  JUAN FRANCISCO CAMACHO

## 2023-03-09 NOTE — PROGRESS NOTES
Pt returned to 2A following RHC, Right groin access. Right groin CDI, soft, no hematoma with primapore dressing, CMS intact, DP 2+. VSS, denies pain.   Pt offered food and drink. Wife at bedside.

## 2023-03-09 NOTE — PATIENT INSTRUCTIONS
Please call your transplant coordinator at [phone] with any questions or concerns.  Please note: after hours, weekends and holidays, this phone number to routed to an  to page out the coordinator on call.     Tac goal 10-12; level 12.8. Decrease tac to 4 mg twice daily. Recheck in two weeks     Return in two weeks as scheduled (3/21/23)     Coordinator will update you on remaining results.

## 2023-03-09 NOTE — PROGRESS NOTES
1220 Discharge instructions reviewed with pt, pt verbalizes understanding. Right groin site remains soft, flat, non tender, nickel sized amount of drainage unchanged. Report given to AL Cheek.  1250 Pt ambulated, voided, right groin site remains unchanged. PIV was discontinued. Pt transported off unit via wheelchair by spouse.

## 2023-03-09 NOTE — PROGRESS NOTES
Pt arrives to 2a, with spouse, for RHC/heart biopsy. Pt is groin approach and gets sedation. Pt had breakfast at 0600. Pt's spouse reports he's been approved for a 3 hour NPO window. Dr CRISSY Watts notified and aware.

## 2023-03-10 ENCOUNTER — TELEPHONE (OUTPATIENT)
Dept: TRANSPLANT | Facility: CLINIC | Age: 68
End: 2023-03-10
Payer: MEDICARE

## 2023-03-10 DIAGNOSIS — Z94.1 HEART REPLACED BY TRANSPLANT (H): Primary | ICD-10-CM

## 2023-03-10 DIAGNOSIS — Z94.1 HEART REPLACED BY TRANSPLANT (H): ICD-10-CM

## 2023-03-10 LAB
PATH REPORT.COMMENTS IMP SPEC: NORMAL
PATH REPORT.FINAL DX SPEC: NORMAL
PATH REPORT.GROSS SPEC: NORMAL
PATH REPORT.MICROSCOPIC SPEC OTHER STN: NORMAL
PATH REPORT.RELEVANT HX SPEC: NORMAL
PHOTO IMAGE: NORMAL
STFR SERPL-MCNC: 4.8 MG/L

## 2023-03-10 RX ORDER — PREDNISONE 5 MG/1
5 TABLET ORAL 2 TIMES DAILY
Qty: 150 TABLET | Refills: 3
Start: 2023-03-10 | End: 2023-03-23

## 2023-03-10 NOTE — TELEPHONE ENCOUNTER
Heart biopsy reviewed with Dr Dominguez.  OK to decrease pred from 10/5 mg to 5 mg mg BID  LVM and sent My Chart message

## 2023-03-13 ENCOUNTER — TELEPHONE (OUTPATIENT)
Dept: TRANSPLANT | Facility: CLINIC | Age: 68
End: 2023-03-13

## 2023-03-13 LAB
DONOR IDENTIFICATION: NORMAL
DSA COMMENTS: NORMAL
DSA PRESENT: NO
DSA TEST METHOD: NORMAL
ORGAN: NORMAL
SA 1 CELL: NORMAL
SA 1 TEST METHOD: NORMAL
SA 2 CELL: NORMAL
SA 2 TEST METHOD: NORMAL
SA1 HI RISK ABY: NORMAL
SA1 MOD RISK ABY: NORMAL
SA2 HI RISK ABY: NORMAL
SA2 MOD RISK ABY: NORMAL
SCR 1 TEST METHOD: NORMAL
SCR1 CELL: NORMAL
SCR1 RESULT: NORMAL
SCR2 CELL: NORMAL
SCR2 RESULT: NORMAL
SCR2 TEST METHOD: NORMAL
UNACCEPTABLE ANTIGENS: NORMAL
UNOS CPRA: 0
ZZZSA 1  COMMENTS: NORMAL
ZZZSA 2 COMMENTS: NORMAL
ZZZSCR1 COMMENTS: NORMAL
ZZZSCR2 COMMENTS: NORMAL

## 2023-03-14 ENCOUNTER — VIRTUAL VISIT (OUTPATIENT)
Dept: NEUROLOGY | Facility: CLINIC | Age: 68
End: 2023-03-14
Payer: MEDICARE

## 2023-03-14 ENCOUNTER — PRE VISIT (OUTPATIENT)
Dept: NEUROLOGY | Facility: CLINIC | Age: 68
End: 2023-03-14

## 2023-03-14 DIAGNOSIS — Z94.1 HEART REPLACED BY TRANSPLANT (H): ICD-10-CM

## 2023-03-14 DIAGNOSIS — I63.49 CEREBROVASCULAR ACCIDENT (CVA) DUE TO EMBOLISM OF OTHER CEREBRAL ARTERY (H): Primary | ICD-10-CM

## 2023-03-14 PROCEDURE — 99205 OFFICE O/P NEW HI 60 MIN: CPT | Mod: VID | Performed by: PSYCHIATRY & NEUROLOGY

## 2023-03-14 PROCEDURE — 99417 PROLNG OP E/M EACH 15 MIN: CPT | Mod: VID | Performed by: PSYCHIATRY & NEUROLOGY

## 2023-03-14 ASSESSMENT — ENCOUNTER SYMPTOMS
PALPITATIONS: 0
HYPOTENSION: 0
NUMBNESS: 0
EXERCISE INTOLERANCE: 1
MEMORY LOSS: 0
LIGHT-HEADEDNESS: 0
LOSS OF CONSCIOUSNESS: 0
HEADACHES: 0
SEIZURES: 0
DISTURBANCES IN COORDINATION: 1
WEAKNESS: 1
PARALYSIS: 0
SYNCOPE: 0
TREMORS: 1
SPEECH CHANGE: 0
HYPERTENSION: 0
LEG PAIN: 0
DIZZINESS: 1
TINGLING: 0
ORTHOPNEA: 0
SLEEP DISTURBANCES DUE TO BREATHING: 0

## 2023-03-14 NOTE — LETTER
3/14/2023       RE: Paco Stein  [address] [place] [address]     Dear Colleague,    Thank you for referring your patient, Paco Stein, to the [facility] NEUROLOGY CLINIC [place] at [facility]. Please see a copy of my visit note below.    Paco is a 67 year old who is being evaluated via a billable video visit.      How would you like to obtain your AVS? Mychart  If the video visit is dropped, the invitation should be resent by:[phone]      Video-Visit Details    Type of service:  Video Visit   Video Start Time: 10 AM  Video End Time:10:54 AM    Originating Location (pt. 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 His wife Arlette is very supportive to her  and assisted with the history and exam. She is a physical therapist. 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 He was diagnosed/treated only in 2016 at [place]. Diagnosis was by cerebral angiogram and there was no biopsy. The patient was reported to have been treated by cytoxan the first 2 years and then cellcept since 2018. Other past medical history includes a h/o HTN, CAD, systolic HF secondary to  NICM, VT and ICD placement in 1/22 and CKD. He underwent a heart transplant on 12/25/22 and after some post-operative complications (described in Dr. Dominguez note of 3/9/23) was discharged home on 1/13/22. The patient underwent a heart biopsy in Feb 2023 and per Dr. Dominguez's note there was no graft dysfunction or rejection.     The patient has 6 month  Brain surveillance MRI. His most recent MRI was on 3/1/23 (abstracted below) and showed two small areas of diffusion restriction consistent with ischemic strokes. One of them showed mild contrast enhancement.     The patient's prior brain MRI was at [facility] on 11/14/22 (abstracted below) and there was a tiny diffusion restriction in R cerebellum which could be an ischemic insult vs. An arterfact (it was seen on only 1 slice and ADC was unclear). A H&N MRA did not show any large vessel stenosis or occlusion. Of note, the patient was not on anticoagulation at that time and his EF was ~15%. Pre-transplant the patient had multiple DVT involving R axillary vei, L peroneal vein and R internal jugular. He is now anticoagulated with apixaban. No aspirin.     Patient was deconditioned after his heart transplant and is now improving.     Encounter Diagnoses   Name Primary?     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 This MRI was on 3/1/23 after a heart biopsy on Feb 21, 2023 - there was a R heart catheterization and a heart biopsy. I will discuss with Dr. Dominguez, the location and mechanics of this biopsy to clarify the embolic potential. Other questions for Dr. Dominguez include any inter-atrial communications in the transplanted heart. The patient also had a DWI lesion in November of 2022. This could be due to the very low EF (15%) he had pre-transplant. I reviewed all the MRI up until  2019. There were occasional DWI abnormalities as abstracted below. These may be due to the cardiomyopathy. He is immunosuppressed currently and hence vasculitis is unlikely with the recent events. This note will be addended after discussion with Dr. Dominguez.     ADDENDUM  Discussed with Dr. Dominguez. There is no inter-atrial communication since this is addressed during transplant. The biopsy is entirely on the R side (R heart cath and  RV biopsy). So the procedure does not explain the 3/1/23 DWI abnormalities. He is immunosuppressed and hence vasculitis is less likely. They are also establishing care with a rheumatologist is my understanding. Plan is for Cardiology to mail him a heart monitor for monitoring for arrhythmias. They will also perform an echo when he comes in for his visit in 2 weeks. RTC after his next surveillance MRI. His November and earlier DWI abnormalities are most likely due to his low EF and cardiomyopathy. He is anticoagulated with apixaban and this should continue. I can also see him after next surveillance MRI and will attempt to connect with his rheumatologist.       Brain MRI 3/1/23  A new small region of acute restricted diffusion in the right frontal lobe white matter superimposed on rather extensive bilateral chronic white matter T2 hyperintensities. There may be additional second acute to subacute area of ischemia/restricted    diffusion in the right parietal-occipital junction as described. Post contrast images show some minimal linear 3 mm area of associated enhancement along the margin of the new right frontal lobe white matter lesion. Chronic paranasal sinus disease.   11/14/22  IMPRESSION:  1. Punctate focus of restricted diffusion in the lateral right  cerebellar hemisphere, which could represent a tiny embolic infarct  versus MR artifact.  2. Multifocal and confluent FLAIR hyperintensity in the  periventricular white matter of both cerebral hemispheres and lacunar  infarcts in  the basal ganglia and periventricular white matter, not  significantly changed since 6/14/2021 and mildly increased since 2016.  Findings suggestive of chronic small vessel ischemic disease.  3. No abnormal intracranial enhancement.    MRA 11/14/22  FINDINGS:   MRA COW:  Normal variant left vertebral artery terminating in the left PICA.  Minimal atherosclerotic calcifications of the cavernous portions of  both internal carotid arteries, without significant stenosis. Anterior  communicating artery is patent. The bilateral anterior, middle, and  posterior cerebral arteries are patent and normal in caliber.  Posterior communicating arteries are not seen. No aneurysm or  stenosis.     MRA Neck:  Patent major cervical vasculature. No significant stenosis or  irregularity carotid bifurcations. Minimal irregularity left carotid  bifurcation without significant stenosis. Bilateral patent vertebral  arteries patent origins. The normal distal right internal carotid  artery measures 5 mm. The normal distal left internal carotid artery  measures 5 mm.                                                                      IMPRESSION:  No aneurysm or significant stenosis of the major cervical or  intracranial arteries. No evidence of vasculitis.     7/18/22 MRI  FINDINGS:       Diffusion sequences are unremarkable with no findings for acute ischemia.   Rather considerable T2 signal throughout the white matter has remained considerably stable in the interval without a significant progression of disease. No developing enhancing abnormalities. The ventricles and sulci remain prominent but unchanged in the    interval. Mild chronic appearing paranasal sinus disease with mucosal thickening. Nasal septum deviated to the right. No air-fluid levels.     12/21/21  MRI    Two new areas of acute restricted diffusion and T2 hyperintensity within the right cerebral hemisphere.  One seen on image 40 of 58 diffusion sequences 3.8 mm in size  parietal lobe, and one on image 47 of 58 diffusion sequences 7.3 mm in size temporal   lobe.  Remaining chronic abnormalities appear similar to the prior study.  No active enhancement detected.      6/14/21 MRI      FINDINGS:       There is rather considerable stability of extensive white matter T2 hyperintensity with no new restricted diffusion or enhancement.  Mild chronic sinus disease similar to previous study.  There are no developing masses or midline shift.  The ventricular   size is stable.  Remainder unchanged.    MRI 12/20    Impression:   1.  Since previous study there is new T2 lesion just lateral to the posterior horn of the right lateral ventricle, consistent with interval small cerebral infarct.   2.  No acute or subacute cerebral infarcts.   3.  Old small T2 lesions consistent with older small infarcts.   4.  Susceptibility imaging demonstrates a few tiny lesions consistent with tiny collections of blood products similar to previous studies.   5.  Minimal paranasal sinus disease.   6.  Age-related changes.        5/7/2020      Unenhanced MRI examination of the head followed by an enhanced MRI examination using IV gadolinium.  Since prior studies 10/9/2019 and 6/4/2018, there is a new approximately 7 mm x 4 mm area of increased abnormal signal involving the corona radiata/deep   white matter junction of the left parietal lobe.  This is of increased signal on diffusion imaging with minimal decreased signal on ADC imaging.  This shows no associated abnormal enhancement.  There is some new minimal inflammation seen involving some   lower left mastoid air cells.  Otherwise the exam is stable with again seen mild mucosal thickening involving a few bilateral ethmoid air cells and the maxillary sinuses bilaterally.  Mild inflammation involving some lower right mastoid air cells.  There    are moderate scattered areas of abnormal increased T2 signal seen scattered throughout the deep and periventricular white  matter bilaterally.  No abnormal areas of enhancement seen.  No evidence for an acute stroke identified.  The exam is otherwise   Unremarkable.    10/2019  MRI    Comparison: 6/4/2018     Findings: Stable white matter T2 hyperintensities with no new areas of restricted diffusion or enhancement. No significant progression of white matter abnormalities. Brainstem stable in appearance. Mild chronic sinus disease.     IMPRESSION   Impression: Stable MRI examination of the brain       Current Outpatient Medications   Medication     amLODIPine (NORVASC) 5 MG tablet     amoxicillin (AMOXIL) 500 MG capsule     apixaban ANTICOAGULANT (ELIQUIS) 5 MG tablet     calcium carbonate-vitamin D (CALTRATE) 600-10 MG-MCG per tablet     magnesium oxide (MAG-OX) 400 MG tablet     multivitamin (ONE-DAILY) tablet     mycophenolate (GENERIC EQUIVALENT) 250 MG capsule     nystatin (MYCOSTATIN) 515951 UNIT/ML suspension     pantoprazole (PROTONIX) 40 MG EC tablet     polyethylene glycol (MIRALAX) 17 GM/Dose powder     predniSONE (DELTASONE) 5 MG tablet     rosuvastatin (CRESTOR) 10 MG tablet     sulfamethoxazole-trimethoprim (BACTRIM) 400-80 MG tablet     tacrolimus (GENERIC EQUIVALENT) 1 MG capsule     tacrolimus (GENERIC EQUIVALENT) 5 MG capsule     traZODone (DESYREL) 100 MG tablet     valGANciclovir (VALCYTE) 450 MG tablet     No current facility-administered medications for this visit.         Past Medical History:   Diagnosis Date     Congestive heart failure (H)      Social History     Tobacco Use     Smoking status: Never     Smokeless tobacco: Never   Substance Use Topics     Alcohol use: Not Currently     Drug use: Never     EXAM    Orientation: Normal; Language normal; Attention:Serial 7; normal  Memory 3 -> 2 -> 3     Cranial nerves: EOMI slight NLF flattening no face numbness reported tongue ML shoulder shrug normal    Motor: FFM possibly normal but patient has tremor; Positional tremor  And intentional fine tremor  No drift;   Strength normal in both UE in SA EF EE and WE. FE slightly weak 4+ bilaterally    LE:  HF 4 bilaterally; Rest of LE strength antigravity or better.    Sensory: no deficits to LT reported    Co-ordination normal in in R UE. L UE FN dysmetric but patient corrects    Gait: variable slow steady  - question of L foot drop but on Arlette's testing seems to have good strength in heel DF            Again, thank you for allowing me to participate in the care of your patient.      Sincerely,    Jackelin Khan MD

## 2023-03-14 NOTE — PROGRESS NOTES
Paco is a 67 year old who is being evaluated via a billable video visit.      How would you like to obtain your AVS? Mychart  If the video visit is dropped, the invitation should be resent by:[phone]      Video-Visit Details    Type of service:  Video Visit   Video Start Time: 10 AM  Video End Time:10:54 AM    Originating Location (pt. Location): Home    Distant Location (provider location):  Off-site  Platform used for Video Visit: Singular

## 2023-03-14 NOTE — PATIENT INSTRUCTIONS
Follow up after next surveillance MRI or as needed  They will contact us with questions via userfox

## 2023-03-14 NOTE — PROGRESS NOTES
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 His wife Arlette is very supportive to her  and assisted with the history and exam. She is a physical therapist. 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 He was diagnosed/treated only in 2016 at [place]. Diagnosis was by cerebral angiogram and there was no biopsy. The patient was reported to have been treated by cytoxan the first 2 years and then cellcept since 2018. Other past medical history includes a h/o HTN, CAD, systolic HF secondary to NICM, VT and ICD placement in 1/22 and CKD. He underwent a heart transplant on 12/25/22 and after some post-operative complications (described in Dr. Dominguez note of 3/9/23) was discharged home on 1/13/22. The patient underwent a heart biopsy in Feb 2023 and per Dr. Dominguez's note there was no graft dysfunction or rejection.     The patient has 6 month  Brain surveillance MRI. His most recent MRI was on 3/1/23 (abstracted below) and showed two small areas of diffusion restriction consistent with ischemic strokes. One of them showed mild contrast enhancement.     The patient's prior brain MRI was at [facility] on 11/14/22 (abstracted below) and there was a tiny diffusion restriction in R cerebellum which could be an ischemic insult vs. An arterfact (it was seen on only 1  slice and ADC was unclear). A H&N MRA did not show any large vessel stenosis or occlusion. Of note, the patient was not on anticoagulation at that time and his EF was ~15%. Pre-transplant the patient had multiple DVT involving R axillary vei, L peroneal vein and R internal jugular. He is now anticoagulated with apixaban. No aspirin.     Patient was deconditioned after his heart transplant and is now improving.     Encounter Diagnoses   Name Primary?     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 This MRI was on 3/1/23 after a heart biopsy on Feb 21, 2023 - there was a R heart catheterization and a heart biopsy. I will discuss with Dr. Dominguez, the location and mechanics of this biopsy to clarify the embolic potential. Other questions for Dr. Dominguez include any inter-atrial communications in the transplanted heart. The patient also had a DWI lesion in November of 2022. This could be due to the very low EF (15%) he had pre-transplant. I reviewed all the MRI up until 2019. There were occasional DWI abnormalities as abstracted below. These may be due to the cardiomyopathy. He is immunosuppressed currently and hence vasculitis is unlikely with the recent events. This note will be addended after discussion with Dr. Dominguez.     ADDENDUM  Discussed with Dr. Dominguez. There is no inter-atrial communication since this is addressed during transplant. The biopsy is entirely on the R side (R heart cath and  RV biopsy). So the procedure does not explain the 3/1/23 DWI abnormalities. He is immunosuppressed and hence vasculitis is less likely. They are also establishing care with a rheumatologist is my understanding. Plan is for Cardiology to mail him a heart monitor for monitoring for arrhythmias. They will also perform an echo when he  comes in for his visit in 2 weeks. RTC after his next surveillance MRI. His November and earlier DWI abnormalities are most likely due to his low EF and cardiomyopathy. He is anticoagulated with apixaban and this should continue. I can also see him after next surveillance MRI and will attempt to connect with his rheumatologist.       Brain MRI 3/1/23  A new small region of acute restricted diffusion in the right frontal lobe white matter superimposed on rather extensive bilateral chronic white matter T2 hyperintensities. There may be additional second acute to subacute area of ischemia/restricted    diffusion in the right parietal-occipital junction as described. Post contrast images show some minimal linear 3 mm area of associated enhancement along the margin of the new right frontal lobe white matter lesion. Chronic paranasal sinus disease.   11/14/22  IMPRESSION:  1. Punctate focus of restricted diffusion in the lateral right  cerebellar hemisphere, which could represent a tiny embolic infarct  versus MR artifact.  2. Multifocal and confluent FLAIR hyperintensity in the  periventricular white matter of both cerebral hemispheres and lacunar  infarcts in the basal ganglia and periventricular white matter, not  significantly changed since 6/14/2021 and mildly increased since 2016.  Findings suggestive of chronic small vessel ischemic disease.  3. No abnormal intracranial enhancement.    MRA 11/14/22  FINDINGS:   MRA COW:  Normal variant left vertebral artery terminating in the left PICA.  Minimal atherosclerotic calcifications of the cavernous portions of  both internal carotid arteries, without significant stenosis. Anterior  communicating artery is patent. The bilateral anterior, middle, and  posterior cerebral arteries are patent and normal in caliber.  Posterior communicating arteries are not seen. No aneurysm or  stenosis.     MRA Neck:  Patent major cervical vasculature. No significant stenosis  or  irregularity carotid bifurcations. Minimal irregularity left carotid  bifurcation without significant stenosis. Bilateral patent vertebral  arteries patent origins. The normal distal right internal carotid  artery measures 5 mm. The normal distal left internal carotid artery  measures 5 mm.                                                                      IMPRESSION:  No aneurysm or significant stenosis of the major cervical or  intracranial arteries. No evidence of vasculitis.     7/18/22 MRI  FINDINGS:       Diffusion sequences are unremarkable with no findings for acute ischemia.   Rather considerable T2 signal throughout the white matter has remained considerably stable in the interval without a significant progression of disease. No developing enhancing abnormalities. The ventricles and sulci remain prominent but unchanged in the    interval. Mild chronic appearing paranasal sinus disease with mucosal thickening. Nasal septum deviated to the right. No air-fluid levels.     12/21/21  MRI    Two new areas of acute restricted diffusion and T2 hyperintensity within the right cerebral hemisphere.  One seen on image 40 of 58 diffusion sequences 3.8 mm in size parietal lobe, and one on image 47 of 58 diffusion sequences 7.3 mm in size temporal   lobe.  Remaining chronic abnormalities appear similar to the prior study.  No active enhancement detected.      6/14/21 MRI      FINDINGS:       There is rather considerable stability of extensive white matter T2 hyperintensity with no new restricted diffusion or enhancement.  Mild chronic sinus disease similar to previous study.  There are no developing masses or midline shift.  The ventricular   size is stable.  Remainder unchanged.    MRI 12/20    Impression:   1.  Since previous study there is new T2 lesion just lateral to the posterior horn of the right lateral ventricle, consistent with interval small cerebral infarct.   2.  No acute or subacute cerebral infarcts.    3.  Old small T2 lesions consistent with older small infarcts.   4.  Susceptibility imaging demonstrates a few tiny lesions consistent with tiny collections of blood products similar to previous studies.   5.  Minimal paranasal sinus disease.   6.  Age-related changes.        5/7/2020      Unenhanced MRI examination of the head followed by an enhanced MRI examination using IV gadolinium.  Since prior studies 10/9/2019 and 6/4/2018, there is a new approximately 7 mm x 4 mm area of increased abnormal signal involving the corona radiata/deep   white matter junction of the left parietal lobe.  This is of increased signal on diffusion imaging with minimal decreased signal on ADC imaging.  This shows no associated abnormal enhancement.  There is some new minimal inflammation seen involving some   lower left mastoid air cells.  Otherwise the exam is stable with again seen mild mucosal thickening involving a few bilateral ethmoid air cells and the maxillary sinuses bilaterally.  Mild inflammation involving some lower right mastoid air cells.  There    are moderate scattered areas of abnormal increased T2 signal seen scattered throughout the deep and periventricular white matter bilaterally.  No abnormal areas of enhancement seen.  No evidence for an acute stroke identified.  The exam is otherwise   Unremarkable.    10/2019  MRI    Comparison: 6/4/2018     Findings: Stable white matter T2 hyperintensities with no new areas of restricted diffusion or enhancement. No significant progression of white matter abnormalities. Brainstem stable in appearance. Mild chronic sinus disease.     IMPRESSION   Impression: Stable MRI examination of the brain       Current Outpatient Medications   Medication     amLODIPine (NORVASC) 5 MG tablet     amoxicillin (AMOXIL) 500 MG capsule     apixaban ANTICOAGULANT (ELIQUIS) 5 MG tablet     calcium carbonate-vitamin D (CALTRATE) 600-10 MG-MCG per tablet     magnesium oxide (MAG-OX) 400 MG tablet      multivitamin (ONE-DAILY) tablet     mycophenolate (GENERIC EQUIVALENT) 250 MG capsule     nystatin (MYCOSTATIN) 473467 UNIT/ML suspension     pantoprazole (PROTONIX) 40 MG EC tablet     polyethylene glycol (MIRALAX) 17 GM/Dose powder     predniSONE (DELTASONE) 5 MG tablet     rosuvastatin (CRESTOR) 10 MG tablet     sulfamethoxazole-trimethoprim (BACTRIM) 400-80 MG tablet     tacrolimus (GENERIC EQUIVALENT) 1 MG capsule     tacrolimus (GENERIC EQUIVALENT) 5 MG capsule     traZODone (DESYREL) 100 MG tablet     valGANciclovir (VALCYTE) 450 MG tablet     No current facility-administered medications for this visit.         Past Medical History:   Diagnosis Date     Congestive heart failure (H)      Social History     Tobacco Use     Smoking status: Never     Smokeless tobacco: Never   Substance Use Topics     Alcohol use: Not Currently     Drug use: Never     EXAM    Orientation: Normal; Language normal; Attention:Serial 7; normal  Memory 3 -> 2 -> 3     Cranial nerves: EOMI slight NLF flattening no face numbness reported tongue ML shoulder shrug normal    Motor: FFM possibly normal but patient has tremor; Positional tremor  And intentional fine tremor  No drift;  Strength normal in both UE in SA EF EE and WE. FE slightly weak 4+ bilaterally    LE:  HF 4 bilaterally; Rest of LE strength antigravity or better.    Sensory: no deficits to LT reported    Co-ordination normal in in R UE. L UE FN dysmetric but patient corrects    Gait: variable slow steady  - question of L foot drop but on Arlette's testing seems to have good strength in heel DF    Jackelin Khan MD

## 2023-03-16 ENCOUNTER — TELEPHONE (OUTPATIENT)
Dept: CARDIOLOGY | Facility: CLINIC | Age: 68
End: 2023-03-16
Payer: MEDICARE

## 2023-03-16 ENCOUNTER — PRE VISIT (OUTPATIENT)
Dept: TRANSPLANT | Facility: CLINIC | Age: 68
End: 2023-03-16
Payer: MEDICARE

## 2023-03-16 DIAGNOSIS — Z79.899 ENCOUNTER FOR LONG-TERM (CURRENT) USE OF HIGH-RISK MEDICATION: ICD-10-CM

## 2023-03-16 DIAGNOSIS — Z94.1 HEART REPLACED BY TRANSPLANT (H): Primary | ICD-10-CM

## 2023-03-16 DIAGNOSIS — Z20.828 CONTACT WITH AND (SUSPECTED) EXPOSURE TO OTHER VIRAL COMMUNICABLE DISEASES: ICD-10-CM

## 2023-03-16 RX ORDER — LIDOCAINE 40 MG/G
CREAM TOPICAL
Status: CANCELLED | OUTPATIENT
Start: 2023-03-16

## 2023-03-16 NOTE — TELEPHONE ENCOUNTER
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  This variant was found in his other brother and is associated with [place] syndrome.  Vince does not carry this variant so cannot pass it on to his children.  It also shows that this variant is not the sole cause for cardiomyopathy in the family.  3/22/23 Spoke with Vince's wife Arlette today, per his request, to review results of genetic testing. Vince underwent genetic testing due to his diagnosis of arrhythmogenic cardiomyopathy (ACM).   Genetic testing for the CardioNext panel thru [employer] revealed that Vince does NOT carry a mutation in the 56 genes analyzed. It is predicted that this panel will identify mutations in 40-50% of ACM cases.   Therefore, this negative result does not rule out a genetic basis for the ACM in Vince but eliminates the option of presymptomatic genetic testing in at risk family members, at this time. However, since this condition could still be genetic, clinical screening is recommended in all first degree relatives (children, siblings, parents).  Clinical screening should include cardiac exam, ECHO, and EKG to assess their current status. Testing may also include heart monitor, stress testing, and MRI.   Explained that with continued research and genetic knowledge the detection rate for identifying mutations may increase over time. Therefore, it is worth touching base in the years to come to learn about new testing options.   Arlette asked about the SOS2 variant of unknown significance that was found in Vince's brother.  I realized that this test includes SOS1 gene but not SOS2 gene.  Arlette requested that the SOS2 gene also be analyzed.  I will request that add on through [employer] and notify her when results are available.  A summary letter and copy of the results will be sent to patient once all results are back. All questions answered at this time.   Kim Rogers MS, CGC  Licensed, Certified Genetic  Counselor  [facility]  [facility]

## 2023-03-17 ENCOUNTER — TELEPHONE (OUTPATIENT)
Dept: TRANSPLANT | Facility: CLINIC | Age: 68
End: 2023-03-17
Payer: MEDICARE

## 2023-03-17 DIAGNOSIS — Z94.1 HEART REPLACED BY TRANSPLANT (H): Primary | ICD-10-CM

## 2023-03-17 NOTE — TELEPHONE ENCOUNTER
Message received from Dr Dominguez:     Ousmane Barreto,   We connected today and  is worried he may have had a new small ischemic event.     Can we get a 1 week ziopatch mailed to him- he is already on anti-coag   and also repeat echo when he is here next   We are just searching for causes     ECHO scheduled fro 3/20. Ziopatch to be started 3/21.     Wife call with alina.

## 2023-03-20 ENCOUNTER — TELEPHONE (OUTPATIENT)
Dept: CARDIOLOGY | Facility: CLINIC | Age: 68
End: 2023-03-20
Payer: MEDICARE

## 2023-03-20 ENCOUNTER — HOSPITAL ENCOUNTER (OUTPATIENT)
Dept: CARDIOLOGY | Facility: CLINIC | Age: 68
Discharge: HOME | End: 2023-03-20
Attending: INTERNAL MEDICINE | Admitting: INTERNAL MEDICINE
Payer: MEDICARE

## 2023-03-20 DIAGNOSIS — Z94.1 HEART REPLACED BY TRANSPLANT (H): ICD-10-CM

## 2023-03-20 LAB — LVEF ECHO: NORMAL

## 2023-03-20 PROCEDURE — 93306 TTE W/DOPPLER COMPLETE: CPT

## 2023-03-20 PROCEDURE — 93306 TTE W/DOPPLER COMPLETE: CPT | Mod: 26 | Performed by: INTERNAL MEDICINE

## 2023-03-20 NOTE — TELEPHONE ENCOUNTER
Left voicemail to remind patient of Cardiac Cath Lab appointment on 3/21 and inform patient of updated Visitor Policy, need for  and that someone needs to stay with pt after.

## 2023-03-21 ENCOUNTER — ANCILLARY PROCEDURE (OUTPATIENT)
Dept: GENERAL RADIOLOGY | Facility: CLINIC | Age: 68
End: 2023-03-21
Attending: INTERNAL MEDICINE
Payer: MEDICARE

## 2023-03-21 ENCOUNTER — HOSPITAL ENCOUNTER (OUTPATIENT)
Facility: CLINIC | Age: 68
Discharge: HOME | End: 2023-03-21
Attending: INTERNAL MEDICINE | Admitting: INTERNAL MEDICINE
Payer: MEDICARE

## 2023-03-21 ENCOUNTER — OFFICE VISIT (OUTPATIENT)
Dept: CARDIOLOGY | Facility: CLINIC | Age: 68
End: 2023-03-21
Attending: INTERNAL MEDICINE
Payer: MEDICARE

## 2023-03-21 ENCOUNTER — APPOINTMENT (OUTPATIENT)
Dept: MEDSURG UNIT | Facility: CLINIC | Age: 68
End: 2023-03-21
Attending: INTERNAL MEDICINE
Payer: MEDICARE

## 2023-03-21 ENCOUNTER — LAB (OUTPATIENT)
Dept: LAB | Facility: CLINIC | Age: 68
End: 2023-03-21
Payer: MEDICARE

## 2023-03-21 VITALS
SYSTOLIC BLOOD PRESSURE: 124 MMHG | HEART RATE: 112 BPM | DIASTOLIC BLOOD PRESSURE: 83 MMHG | WEIGHT: 201.2 LBS | BODY MASS INDEX: 25.52 KG/M2 | OXYGEN SATURATION: 94 %

## 2023-03-21 VITALS
OXYGEN SATURATION: 99 % | SYSTOLIC BLOOD PRESSURE: 139 MMHG | HEIGHT: 74 IN | DIASTOLIC BLOOD PRESSURE: 80 MMHG | WEIGHT: 200.18 LBS | RESPIRATION RATE: 20 BRPM | HEART RATE: 86 BPM | BODY MASS INDEX: 25.69 KG/M2 | TEMPERATURE: 98.3 F

## 2023-03-21 DIAGNOSIS — Z79.899 ENCOUNTER FOR LONG-TERM (CURRENT) USE OF HIGH-RISK MEDICATION: ICD-10-CM

## 2023-03-21 DIAGNOSIS — Z94.1 HEART REPLACED BY TRANSPLANT (H): ICD-10-CM

## 2023-03-21 DIAGNOSIS — Z94.1 HEART TRANSPLANT RECIPIENT (H): ICD-10-CM

## 2023-03-21 DIAGNOSIS — J90 PLEURAL EFFUSION: ICD-10-CM

## 2023-03-21 DIAGNOSIS — Z20.828 CONTACT WITH AND (SUSPECTED) EXPOSURE TO OTHER VIRAL COMMUNICABLE DISEASES: ICD-10-CM

## 2023-03-21 DIAGNOSIS — Z94.1 HEART REPLACED BY TRANSPLANT (H): Primary | ICD-10-CM

## 2023-03-21 LAB
ANION GAP SERPL CALCULATED.3IONS-SCNC: 10 MMOL/L (ref 7–15)
BASOPHILS # BLD AUTO: 0 10E3/UL (ref 0–0.2)
BASOPHILS NFR BLD AUTO: 1 %
BUN SERPL-MCNC: 24.1 MG/DL (ref 8–23)
CALCIUM SERPL-MCNC: 9.4 MG/DL (ref 8.8–10.2)
CHLORIDE SERPL-SCNC: 104 MMOL/L (ref 98–107)
CREAT SERPL-MCNC: 1.16 MG/DL (ref 0.67–1.17)
DEPRECATED HCO3 PLAS-SCNC: 25 MMOL/L (ref 22–29)
EOSINOPHIL # BLD AUTO: 0.1 10E3/UL (ref 0–0.7)
EOSINOPHIL NFR BLD AUTO: 2 %
ERYTHROCYTE [DISTWIDTH] IN BLOOD BY AUTOMATED COUNT: 14.9 % (ref 10–15)
GFR SERPL CREATININE-BSD FRML MDRD: 68 ML/MIN/1.73M2
GLUCOSE SERPL-MCNC: 124 MG/DL (ref 70–99)
HCT VFR BLD AUTO: 37.7 % (ref 40–53)
HGB BLD-MCNC: 10.8 G/DL (ref 13.3–17.7)
HGB BLD-MCNC: 12.1 G/DL (ref 13.3–17.7)
IMM GRANULOCYTES # BLD: 0 10E3/UL
IMM GRANULOCYTES NFR BLD: 1 %
LYMPHOCYTES # BLD AUTO: 1.5 10E3/UL (ref 0.8–5.3)
LYMPHOCYTES NFR BLD AUTO: 36 %
MAGNESIUM SERPL-MCNC: 1.7 MG/DL (ref 1.7–2.3)
MCH RBC QN AUTO: 28.1 PG (ref 26.5–33)
MCHC RBC AUTO-ENTMCNC: 32.1 G/DL (ref 31.5–36.5)
MCV RBC AUTO: 88 FL (ref 78–100)
MONOCYTES # BLD AUTO: 0.4 10E3/UL (ref 0–1.3)
MONOCYTES NFR BLD AUTO: 10 %
NEUTROPHILS # BLD AUTO: 2.1 10E3/UL (ref 1.6–8.3)
NEUTROPHILS NFR BLD AUTO: 50 %
NRBC # BLD AUTO: 0 10E3/UL
NRBC BLD AUTO-RTO: 0 /100
OXYHGB MFR BLDV: 70 % (ref 92–100)
PHOSPHATE SERPL-MCNC: 3.2 MG/DL (ref 2.5–4.5)
PLATELET # BLD AUTO: 263 10E3/UL (ref 150–450)
POTASSIUM SERPL-SCNC: 4.4 MMOL/L (ref 3.4–5.3)
RBC # BLD AUTO: 4.31 10E6/UL (ref 4.4–5.9)
SODIUM SERPL-SCNC: 139 MMOL/L (ref 136–145)
TACROLIMUS BLD-MCNC: 10.9 UG/L (ref 5–15)
TME LAST DOSE: NORMAL H
TME LAST DOSE: NORMAL H
WBC # BLD AUTO: 3.9 10E3/UL (ref 4–11)
WBC # BLD AUTO: 3.9 10E3/UL (ref 4–11)

## 2023-03-21 PROCEDURE — 85025 COMPLETE CBC W/AUTO DIFF WBC: CPT | Performed by: PATHOLOGY

## 2023-03-21 PROCEDURE — 36415 COLL VENOUS BLD VENIPUNCTURE: CPT | Performed by: PATHOLOGY

## 2023-03-21 PROCEDURE — 99152 MOD SED SAME PHYS/QHP 5/>YRS: CPT | Performed by: INTERNAL MEDICINE

## 2023-03-21 PROCEDURE — 87521 HEPATITIS C PROBE&RVRS TRNSC: CPT | Performed by: NURSE PRACTITIONER

## 2023-03-21 PROCEDURE — 88350 IMFLUOR EA ADDL 1ANTB STN PX: CPT | Mod: TC | Performed by: INTERNAL MEDICINE

## 2023-03-21 PROCEDURE — C1894 INTRO/SHEATH, NON-LASER: HCPCS | Performed by: INTERNAL MEDICINE

## 2023-03-21 PROCEDURE — 84100 ASSAY OF PHOSPHORUS: CPT | Performed by: PATHOLOGY

## 2023-03-21 PROCEDURE — 88346 IMFLUOR 1ST 1ANTB STAIN PX: CPT | Mod: 26 | Performed by: PATHOLOGY

## 2023-03-21 PROCEDURE — 87535 HIV-1 PROBE&REVERSE TRNSCRPJ: CPT | Mod: GZ | Performed by: NURSE PRACTITIONER

## 2023-03-21 PROCEDURE — G0463 HOSPITAL OUTPT CLINIC VISIT: HCPCS | Mod: 25 | Performed by: NURSE PRACTITIONER

## 2023-03-21 PROCEDURE — 93505 ENDOMYOCARDIAL BIOPSY: CPT | Mod: 26 | Performed by: INTERNAL MEDICINE

## 2023-03-21 PROCEDURE — 80197 ASSAY OF TACROLIMUS: CPT | Performed by: NURSE PRACTITIONER

## 2023-03-21 PROCEDURE — 71046 X-RAY EXAM CHEST 2 VIEWS: CPT | Performed by: RADIOLOGY

## 2023-03-21 PROCEDURE — 83735 ASSAY OF MAGNESIUM: CPT | Performed by: PATHOLOGY

## 2023-03-21 PROCEDURE — 85018 HEMOGLOBIN: CPT

## 2023-03-21 PROCEDURE — 93248 EXT ECG>7D<15D REV&INTERPJ: CPT | Performed by: INTERNAL MEDICINE

## 2023-03-21 PROCEDURE — 272N000001 HC OR GENERAL SUPPLY STERILE: Performed by: INTERNAL MEDICINE

## 2023-03-21 PROCEDURE — 99215 OFFICE O/P EST HI 40 MIN: CPT | Mod: 25 | Performed by: NURSE PRACTITIONER

## 2023-03-21 PROCEDURE — 88350 IMFLUOR EA ADDL 1ANTB STN PX: CPT | Mod: 26 | Performed by: PATHOLOGY

## 2023-03-21 PROCEDURE — 86833 HLA CLASS II HIGH DEFIN QUAL: CPT | Performed by: NURSE PRACTITIONER

## 2023-03-21 PROCEDURE — 99152 MOD SED SAME PHYS/QHP 5/>YRS: CPT | Mod: GC | Performed by: INTERNAL MEDICINE

## 2023-03-21 PROCEDURE — 86352 CELL FUNCTION ASSAY W/STIM: CPT | Performed by: NURSE PRACTITIONER

## 2023-03-21 PROCEDURE — 86832 HLA CLASS I HIGH DEFIN QUAL: CPT | Performed by: NURSE PRACTITIONER

## 2023-03-21 PROCEDURE — 87799 DETECT AGENT NOS DNA QUANT: CPT | Performed by: NURSE PRACTITIONER

## 2023-03-21 PROCEDURE — 93246 EXT ECG>7D<15D RECORDING: CPT

## 2023-03-21 PROCEDURE — 250N000009 HC RX 250: Performed by: INTERNAL MEDICINE

## 2023-03-21 PROCEDURE — 93505 ENDOMYOCARDIAL BIOPSY: CPT | Performed by: INTERNAL MEDICINE

## 2023-03-21 PROCEDURE — 999N000142 HC STATISTIC PROCEDURE PREP ONLY

## 2023-03-21 PROCEDURE — 999N000133 HC STATISTIC PP CARE STAGE 2

## 2023-03-21 PROCEDURE — 250N000011 HC RX IP 250 OP 636: Performed by: INTERNAL MEDICINE

## 2023-03-21 PROCEDURE — 80048 BASIC METABOLIC PNL TOTAL CA: CPT | Performed by: PATHOLOGY

## 2023-03-21 PROCEDURE — 82810 BLOOD GASES O2 SAT ONLY: CPT

## 2023-03-21 PROCEDURE — 88307 TISSUE EXAM BY PATHOLOGIST: CPT | Mod: 26 | Performed by: PATHOLOGY

## 2023-03-21 PROCEDURE — 86828 HLA CLASS I&II ANTIBODY QUAL: CPT | Mod: 59 | Performed by: NURSE PRACTITIONER

## 2023-03-21 PROCEDURE — 88307 TISSUE EXAM BY PATHOLOGIST: CPT | Mod: TC | Performed by: INTERNAL MEDICINE

## 2023-03-21 RX ORDER — FENTANYL CITRATE 50 UG/ML
INJECTION, SOLUTION INTRAMUSCULAR; INTRAVENOUS
Status: DISCONTINUED | OUTPATIENT
Start: 2023-03-21 | End: 2023-03-21 | Stop reason: HOSPADM

## 2023-03-21 RX ORDER — OXYCODONE HYDROCHLORIDE 5 MG/1
5 TABLET ORAL EVERY 4 HOURS PRN
Status: DISCONTINUED | OUTPATIENT
Start: 2023-03-21 | End: 2023-03-21 | Stop reason: HOSPADM

## 2023-03-21 RX ORDER — LIDOCAINE 40 MG/G
CREAM TOPICAL
Status: DISCONTINUED | OUTPATIENT
Start: 2023-03-21 | End: 2023-03-21 | Stop reason: HOSPADM

## 2023-03-21 RX ORDER — OXYCODONE HYDROCHLORIDE 10 MG/1
10 TABLET ORAL EVERY 4 HOURS PRN
Status: DISCONTINUED | OUTPATIENT
Start: 2023-03-21 | End: 2023-03-21 | Stop reason: HOSPADM

## 2023-03-21 ASSESSMENT — PAIN SCALES - GENERAL: PAINLEVEL: NO PAIN (0)

## 2023-03-21 ASSESSMENT — ACTIVITIES OF DAILY LIVING (ADL)
ADLS_ACUITY_SCORE: 35
ADLS_ACUITY_SCORE: 35

## 2023-03-21 NOTE — Clinical Note
Report called to 2A AL Walker. VSS. All paperwork sent back to room with pt on 2A. 2 hours bedrest starting at 1330. normal rate and rhythm, no chest pain and no edema.

## 2023-03-21 NOTE — PATIENT INSTRUCTIONS
HAPPY BIRTHDAY!    Please call your transplant coordinator at [phone] with any questions or concerns.  Please note: after hours, weekends and holidays, this phone number to routed to an  to page out the coordinator on call.     Coordinator will update you on remaining results. Will plan to decrease tacrolimus goal 8-10.    14-day Ziopatch

## 2023-03-21 NOTE — PROGRESS NOTES
Pt tolerated recovery without complication. Discharge instructions reviewed, copy given to pt. Pt tolerated oral intake and ambulation. NATALIYA howell. Pt discharged home accompanied by son.

## 2023-03-21 NOTE — PROGRESS NOTES
"Per Dr. Dominguez, patient to have 14 day zio monitor placed.  Diagnosis: Ht Tx  Monitor placed: {YES / NO:837804::\"Yes\"}  Patient Instructed: {YES / NO:176671::\"Yes\"}  Patient verbalized understanding: {YES / NO:451161::\"Yes\"}  Holter # [id number]  Kamron Coffey"

## 2023-03-21 NOTE — PROGRESS NOTES
Patient arrived to floor with RN from CCL s/p [facility] right groin approach. VSS. Right groin site C/D/I no hematoma. Patient denies pain, tolerating regular diet. Plan discharge at 1530 per MD orders. Son at bedside.

## 2023-03-21 NOTE — Clinical Note
dry, intact, no bleeding and no hematoma. 4Fr sheath removed from RFA. Manual pressure held. Hemostasis obtained.

## 2023-03-21 NOTE — LETTER
3/21/2023      RE: Paco Stein  [address]  [place] [address]       Dear Colleague,    Thank you for the opportunity to participate in the care of your patient, Paco Stein, at the [facility] [place] at [facility]. Please see a copy of my visit note below.    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     His post-operative course has been c/b delirium, leukocytosis, staph epi bacteremia, and prolonged CT output.  He discharged 1/13.     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  No DSAs and no graft dysfunction, so further treatment has been deferred and prednisone taper continues.  AlloMap scores:  n/a  DSAs:  none  Coronary angio/Ischemic eval:  Baseline coronary angio 1/23/23 showed normal coronary arteries with eccentric, mild native CAD (MIGUEL ÁNGEL of LM 1.1mm, pLAD 0.2mm).  Last RHC:  3/9/23 showed normal biventricular filling pressures, with RA 7, mPA 15, PCW 10, and CI 2.97.  Echo/cMRI:  TTE 3/9/23 showed normal graft function, with LVEF 55-60% and normal RV size/function.     Since his last visit, he states that he has been doing well.  He continues to work with rehab, with improving strength and endurance.  He continues to walk regularly, with no exertional concerns.  His breathing has been well, and he denies SOB.  He is able to lie flat without PND and orthopnea.  His appetite has been well, and he is eating regularly without nausea, vomiting, diarrhea, and constipation.  His  weight has been stable, ranging 190-195#, and he denies fluid retention.  His BPs remain < 130/90s.  He is no longer using trazodone for sleep.  He otherwise denies chest pain, palpitations, dizziness, falls, headaches, acute vision changes, fevers, chills, cough, sore throat, and signs of bleeding.      Serostatus:  CMV D+/R+  EBV D+/R+  Toxo D-/R-    Patient Active Problem List    Diagnosis Date Noted     Status post coronary angiogram 01/23/2023     Priority: Medium     Staphylococcus epidermidis bacteremia 01/09/2023     Priority: Medium     Using prophylactic antibiotic on daily basis 01/09/2023     Priority: Medium     Benign neoplasm of colon 12/12/2022     Priority: Medium     Troponin level elevated 12/06/2022     Priority: Medium     Anginal equivalent (H) 12/06/2022     Priority: Medium     Heart failure, unspecified HF chronicity, unspecified heart failure type (H) 12/06/2022     Priority: Medium     Acute embolism and thrombosis of left peroneal vein (H) 11/22/2022     Priority: Medium     Acute on chronic combined systolic (congestive) and diastolic (congestive) heart failure (H) 11/14/2022     Priority: Medium     CHF (congestive heart failure) (H) 11/10/2022     Priority: Medium     Acute respiratory failure with hypoxia (H) 11/08/2022     Priority: Medium     Coronary artery disease involving native coronary artery of native heart without angina pectoris 11/08/2022     Priority: Medium     Essential hypertension 11/08/2022     Priority: Medium     NSVT (nonsustained ventricular tachycardia) 11/08/2022     Priority: Medium     SOB (shortness of breath) 11/08/2022     Priority: Medium     Syncope and collapse 10/07/2021     Priority: Medium     Cardiomyopathy (H) 10/06/2021     Priority: Medium     Primary central nervous system vasculitis (H) 11/30/2017     Priority: Medium     Vasculitis, CNS (H) 11/30/2017     Priority: Medium     Stroke (H) 09/28/2016     Priority: Medium     Hyperlipidemia  08/08/2013     Priority: Medium       PAST MEDICAL HISTORY:  Past Medical History:   Diagnosis Date     Congestive heart failure (H)        CURRENT MEDICATIONS:  Prescription Medications as of 3/21/2023       Rx Number Disp Refills Start End Last Dispensed Date Next Fill Date Owning Pharmacy    amLODIPine (NORVASC) 5 MG tablet  90 tablet 3 2/6/2023    [address]    Sig: Take 1 tablet (5 mg) by mouth At Bedtime    Class: E-Prescribe    Route: Oral    amoxicillin (AMOXIL) 500 MG capsule  4 capsule 1 3/9/2023    [address]224    Sig: Take FOUR caps (2000 mg) one hour prior dental work while on Prednisone    Class: E-Prescribe    apixaban ANTICOAGULANT (ELIQUIS) 5 MG tablet  60 tablet 3 2/6/2023    [address]    Sig: Take 1 tablet (5 mg) by mouth 2 times daily    Class: E-Prescribe    Route: Oral    calcium carbonate-vitamin D (CALTRATE) 600-10 MG-MCG per tablet  180 tablet 0 2/6/2023    [address]    Sig: Take 1 tablet by mouth 2 times daily (with meals)    Class: E-Prescribe    Route: Oral    magnesium oxide (MAG-OX) 400 MG tablet  120 tablet 3 2/6/2023    [address]    Sig: Take 2 tablets (800 mg) by mouth 2 times daily    Class: E-Prescribe    Route: Oral    multivitamin (ONE-DAILY) tablet  90 tablet 3 2/21/2023        Sig: Take 1 tablet by mouth daily    Class: No Print Out    Route: Oral    mycophenolate (GENERIC EQUIVALENT) 250 MG capsule  720 capsule 3 2/6/2023    [address]    Sig: Take 4 capsules (1,000 mg) by mouth 2 times daily    Class: E-Prescribe    Notes to Pharmacy: FRANCOIS DUMONT  12/25/2022 (Heart) TXP Dischg DT 1/13/2023 DX Heart transplant Z94.1 @ [facility] ([place], [place]) 90 day supply if able    Route: Oral    nystatin (MYCOSTATIN) [id number] UNIT/ML suspension  1200 mL 1 2/6/2023    [address]    Sig: Swish and swallow 10 mLs (1,000,000 Units) in mouth 4 times daily    Class: E-Prescribe    Route: Swish & Swallow    pantoprazole (PROTONIX) 40 MG EC tablet  90 tablet 3 2/6/2023    [address]    Sig: Take 1 tablet (40 mg) by mouth every morning (before breakfast)    Class: E-Prescribe    Notes to Pharmacy: 90 day supply if able    Route: Oral    polyethylene glycol (MIRALAX) 17 GM/Dose powder  510 g 1 2/6/2023    [address]    Sig: Take 17 g by mouth daily as needed for constipation    Class: E-Prescribe    Route: Oral    predniSONE (DELTASONE) 5 MG tablet  150 tablet 3 3/10/2023        Sig: Take 1 tablet (5 mg) by mouth 2 times daily    Class: No Print Out    Notes to Pharmacy: TXP DT 12/25/2022 (Heart) TXP Dischg DT 1/13/2023 DX Heart replaced by transplant Z94.1 TX [facility] ([place], [place])    Route: Oral    rosuvastatin (CRESTOR) 10 MG tablet  90 tablet 1 2/6/2023    [address]    Sig: Take 1 tablet (10 mg) by mouth daily    Class: E-Prescribe    Notes to Pharmacy: 90 day supply if able    Route: Oral    sulfamethoxazole-trimethoprim (BACTRIM) 400-80 MG tablet  30 tablet 11 2/6/2023    [address]    Sig: Take 1 tablet by mouth daily    Class: E-Prescribe    Route: Oral    tacrolimus (GENERIC EQUIVALENT) 1 MG capsule  240 capsule 11 3/9/2023     [address]    Sig: Take FOUR caps every AM and every PM (4 mg twice daily)    Class: E-Prescribe    Notes to Pharmacy: TXP DT 12/25/2022 (Heart) TXP Dischg DT 1/13/2023 DX Heart transplant Z94.1 TX [facility] ([place], [place]) DOSE CHANGE    tacrolimus (GENERIC EQUIVALENT) 5 MG capsule  30 capsule 11 3/9/2023        Sig: On hold due to dose change    Class: No Print Out    Notes to Pharmacy: TXP DT 12/25/2022 (Heart) TXP Dischg DT 1/13/2023 DX Heart replaced by transplant Z94.1 [facility] ([place], [place])    traZODone (DESYREL) 100 MG tablet  30 tablet 1 2/6/2023    [address]    Sig: Take 1 tablet (100 mg) by mouth nightly as needed for sleep    Class: E-Prescribe    Route: Oral    valGANciclovir (VALCYTE) 450 MG tablet  60 tablet 3 2/6/2023    [address]    Sig: Take 2 tablets (900 mg) by mouth daily    Class: E-Prescribe    Route: Oral          ROS:   Answers for HPI/ROS submitted by the patient on 3/14/2023  General Symptoms: No  Skin Symptoms: No  HENT Symptoms: No  EYE SYMPTOMS: No  HEART SYMPTOMS: Yes  LUNG SYMPTOMS: No  INTESTINAL SYMPTOMS: No  URINARY SYMPTOMS: No  REPRODUCTIVE SYMPTOMS: No  SKELETAL SYMPTOMS: No  BLOOD SYMPTOMS: No  NERVOUS SYSTEM SYMPTOMS: Yes  MENTAL HEALTH SYMPTOMS: No  Chest pain or pressure: No  Fast or irregular heartbeat: No  Pain in legs with walking: No  Trouble breathing while lying down: No  Fingers or toes appear blue: No  High blood pressure: No  Low blood pressure: No  Fainting: No  Murmurs: No  Pacemaker: No  Varicose veins: No  Edema or swelling: No  Wake up at night with shortness of breath: No  Light-headedness: No  Exercise intolerance: Yes  Trouble with  coordination: Yes  Dizziness or trouble with balance: Yes  Fainting or black-out spells: No  Memory loss: No  Headache: No  Seizures: No  Speech problems: No  Tingling: No  Tremor: Yes  Weakness: Yes  Difficulty walking: Yes  Paralysis: No  Numbness: No    Exam:  /83 (BP Location: Right arm, Patient Position: Chair, Cuff Size: Adult Large)   Pulse 112   Wt 91.3 kg (201 lb 3.2 oz)   SpO2 94%   BMI 25.52 kg/m    In general, the patient is a pleasant male in no apparent distress.    HEENT: NC/AT. PERRLA. EOMI.  Sclerae white, not injected.  No thrush noted on tongue or cheeks.  Neck:  No adenopathy, No thyromegaly.    COR: No jugular venous distention when sitting upright.  RRR.  Normal S1 S2 splits physiologically.  No murmur, rub click, or gallop.    Lungs:  CTA. No rhonchi.    Abdomen: soft, nontender, nondistended.  No organomegaly.  Extremities:  No clubbing, cyanosis, or LE edema.    Neuro: Alert & Oriented x 3, grossly non focal.  Integument: no open lesions, rashes, or jaundice.     Labs:  CBC RESULTS:   Lab Results   Component Value Date    WBC 3.9 (L) 03/21/2023    RBC 4.31 (L) 03/21/2023    HGB 12.1 (L) 03/21/2023    HCT 37.7 (L) 03/21/2023    MCV 88 03/21/2023    MCH 28.1 03/21/2023    MCHC 32.1 03/21/2023    RDW 14.9 03/21/2023     03/21/2023       BMP RESULTS:  Lab Results   Component Value Date     03/21/2023    POTASSIUM 4.4 03/21/2023    POTASSIUM 3.7 12/25/2022    POTASSIUM 3.9 11/08/2022    CHLORIDE 104 03/21/2023    CHLORIDE 105 11/28/2022    CO2 23 03/09/2023    CO2 26 11/08/2022    ANIONGAP 11 03/09/2023    ANIONGAP 9 11/08/2022     (H) 03/09/2023     (H) 01/13/2023     (H) 11/08/2022    BUN 28.1 (H) 03/09/2023    BUN 35 (H) 11/08/2022    CR 1.19 (H) 03/09/2023    GFRESTIMATED 66 03/09/2023    TIM 11.3 (H) 03/09/2023      LIPID RESULTS:  Lab Results   Component Value Date    CHOL 217 (H) 01/23/2023     01/23/2023    LDL 91 01/23/2023    TRIG 80  01/23/2023    NHDL 107 01/23/2023       IMMUNOSUPPRESSANT LEVELS  Lab Results   Component Value Date    TACROL 12.8 03/09/2023    DOSTAC  03/09/2023      Comment:      Last dose information not provided.       No components found for: CK  Lab Results   Component Value Date    MAG 1.7 03/21/2023     Lab Results   Component Value Date    A1C 6.2 (H) 12/25/2022     Lab Results   Component Value Date    PHOS 2.9 03/09/2023     Lab Results   Component Value Date    NTBNPI 2,750 (H) 12/06/2022     Lab Results   Component Value Date    SAITESTMET SA EDTA FCS 03/09/2023    SAICELL Class I 03/09/2023    [id number] Invalid. See SCN results. 03/09/2023    [id number] Invalid. See SCN results. 03/09/2023    SAIREPCOM  03/09/2023     Intentional discrepancy between current date and historical date Single Antigen Bead specificity reporting.  Additional testing performed on current date to address potential testing artifact. HLA PRA Test performed by modified testing procedure that may also include pretreatment of serum. Pretreatment may be the addition of fetal calf serum, EDTA, and/or adsorption.  High-risk, MFI > 3,000.  Mod-risk, -3,000.     Lab Results   Component Value Date    SAIITESTME SA EDTA FCS 03/09/2023    SAIICELL Class II 03/09/2023    [id number] Invalid. See SCN results. 03/09/2023    [id number] Invalid. See SCN results. 03/09/2023    SAIIREPCOM  03/09/2023     Intentional discrepancy between current date and historical date Single Antigen Bead specificity reporting.  Additional testing performed on current date to address potential testing artifact. HLA PRA Test performed by modified testing procedure that may also include pretreatment of serum. Pretreatment may be the addition of fetal calf serum, EDTA, and/or adsorption.  High-risk, MFI > 3,000.  Mod-risk, -3,000.       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     Labs from today shows stable electrolytes and renal function.  CBC shows WBC 3.9, other counts stable.  Tacro level, hepatitis panel, CMV, EBV, AlloMap, DSAs, and ImmuKnow levels are in process.     Echo from yesterday showed stable graft function, with LVEF 60-65% and normal RV size/function.    He will have a RHC/biopsy following today's appt.     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  His BPs are controlled.  His weight trend has remained stable, and he appears euvolemic.      As his WBC is down slightly, will add a differential to labs from today.     He has seen neuro re: his past h/o vasculitis, who has expressed some concerns for an ischemic event.  Ordering Zio today to r/o arrhythmogenic causes.  No concerning findings on yesterday's TTE.    Advised that he continue his current meds, with no changes, and will plan for him to follow-up in clinic per protocol or sooner should new concerns arise.       NICM, s/p OHT 12/25/22  His post-operative course has been c/b delirium, leukocytosis, staph epi bacteremia, and prolonged CT output.  He discharged 1/13.     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  No DSAs and no graft dysfunction, so further treatment has been deferred and prednisone taper continues.  AlloMap scores:  n/a  DSAs:  none  Coronary angio/Ischemic eval:  Baseline coronary angio 1/23/23 showed normal coronary arteries with eccentric, mild native CAD (MIGUEL ÁNGEL of LM 1.1mm, pLAD 0.2mm).  Last RHC:  3/9/23 showed normal biventricular filling pressures, with RA 7, mPA 15, PCW 10, and CI 2.97.  Echo/cMRI:  TTE yesterday, as above.    Serostatus:  - CMV D+/R+  - EBV D+/R+  - Toxo  D-/R-     Immunosuppression:  - prednisone taper, currently 5/5  - MMF 1000mg twice daily (given age and bacteremia)  - tacro, decreasing goal level to 8-10.  Level from today is in process.     PPx:  - CAV:  Holding Aspirin 81mg daily while on anticoagulation, continue rosuvastatin 10mg daily  - GI:  Pantoprazole 40mg daily  - Osteoporosis:  calcium/vitamin D supplements  - CMV:  valcyte 900mg daily (x3 months, through 3/2023)  - PCP:  Bactrim single strength daily  - Thrush:  Nystatin s/s QID  - Toxo:  n/a     Graft function:  - BPs:  remain controlled, continue amlodipine 5mg daily  - fluid status:  Euvolemic, off diuretics     Health maintenance:  - adding diff to WBC today        ARVC  Discussed that first-degree relatives should be screened with an echocardiogram and EKG.  He has met with Kim Rogers GC, genetic testing was negative for a mutation.     h/o vasculitis  With neurological symptoms.  Has been referred to neuro and rheum, neuro has reported some concerns about an ischemic event.  Ordering Zio today, TTE yesterday was negative for acute changes.     h/o right axillary nonocclusive and left peroneal occlusive DVT (11/2022, pre-transplant)  RIJ DVT  Was on apixaban prior to transplant, which was held post-operatively.  During C 1/9, it was noted that his RIJ had clot and LIJ was inaccessible, so he required femoral access.  He has been started on apixaban, which needs to be held 48 hours prior to each biopsy.  D-Dimer has been elevated, continuing anticoagulation and will repeat an ultrasound early April to review ongoing anticoagulation needs.     Staph epi bacteremia, resolved  Blood cultures from 1/7 and 1/8 with staph epi, s/p IV vanco per ID.  PICC line out.        The above was reviewed with Dr. Stein, who verbalized understanding and will call with further questions/concerns.     45 minutes spent with patient, along with preparing for visit, reviewing follow up, and completing  documentation.        Rose Mary Goldstein DNP, FNP-BC  Advanced Heart Failure Nurse Practitioner  [facility]  Patient Care Team:  Quentin Odonnell as PCP - General  Mary Lou Luke RN as Transplant Coordinator  Abram Simeon [facility] as Pharmacist (Pharmacist)  Tiera Anguiano DO as MD (Infectious Diseases)  Burt Andrews MD Fontana, Lauren, DO as Assigned Infectious Disease Provider  Cruzito Mena MD as Assigned Surgical Provider

## 2023-03-21 NOTE — DISCHARGE INSTRUCTIONS
Going Home after a Right Heart Catheterization Groin Approach        After you go home:  Have an adult stay with you for 24 hours.  Drink plenty of fluids.  You may eat your normal diet, unless your doctor tells you otherwise.  For 24 hours:  - Relax and take it easy.  - Do NOT smoke.  - Do NOT make any important or legal decisions.  - Do NOT drive or operate machines at home or at work.  - Do NOT drink alcohol.    For 2 days, do NOT have sex or do any heavy exercise.  Do NOT take a bath, or use a hot tub or pool for at least 3 days. You may shower.  Care of groin site  It is normal to have a small bruise or lump at the site.  Do NOT scrub the site.  For the first 2 days, when you cough, sneeze or move your bowels, hold your hand over the puncture site and press gently.  Do NOT lift more than 10 pounds for at least 3 to 5 days.  Do not use lotion or powder near the puncture site for 3 days.  If you start bleeding from the site in your groin, lie down flat and press firmly on the site. Call  your doctor as soon as you can.      Call your doctor if:  You have a large or growing hard lump around the site.    The site is red, swollen, hot or tender.  Blood or fluid is draining from the site.  You have chills or a fever greater than 101 F (38 C).  Your leg or arm turns bluish, feels numb or cool.  You have hives, a rash or unusual itching.  Call 911 right away if you have:   Bleeding that does not stop.   Heavy bleeding.  [facility] at [place]:  [phone] (7 days a week)

## 2023-03-21 NOTE — NURSING NOTE
Chief Complaint   Patient presents with     Follow Up     Return Heart Txp     Vitals were taken and medications reconciled.    Gage Joseph, EMT  9:57 AM

## 2023-03-21 NOTE — PROGRESS NOTES
Pt arrived to [facility]A from home for RHC. VSS. Denies pain, awaiting consent. Lab resulted. H&P current. Allergies reviewed with pt. Appropriately NPO. Prep completed. 20g PIV saline locked, groin prep complete, pedal pulses marked. Son Burt at bedside; will be transporting patient to hotel.

## 2023-03-21 NOTE — NURSING NOTE
Transplant Coordinator Note  Reason for visit Week 12 post transplant      Health concerns addressed today  Pt with son seen in clinic with MARGARET Triston. MARGARET reviewed meds, available labs, and ECHO. Biopsy/RHC to follow. Per previous discussion - will apply Ziopatch post visit.      Pt reports doing well; BP WNL, gaining weight, good appetite. +tremors - Tac goal to be decreased. Level pending.       US next month  - cont Eliquis  Glucose 183- will monitor, pred cont to decrease    Wght 193#(up 2# since last visit)-> goal wght for pt ~210#.      Chipped another tooth; has not seen dentist. Did  Amoxicillin as discussed last visit        Immunosuppressants  MMF 1000/1000 mg  Tac 4/4 mg  - goal decreased to 8-10    Pred 5/5 mg      Routine screenings  Derm to be yearly   Dental after pred complete unless emergency. See above.  Colonoscopy  Nov 2022 (no specimens)   Prostate 1.63  Eye to be yearly post pred complete  Flu Oct 2021 -> due after 100 days post transplant   Pneumonia UTD  COVID last dose Nov 2021      Medication record reviewed and reconciled. Questions and concerns addressed. AVS reviewed and copy provided. Pt verbalized an understanding of plan of care.      Patient Instructions  ~Please call your transplant coordinator at [phone] with any questions or concerns.  Please note: after hours, weekends and holidays, this phone number to routed to an  to page out the coordinator on call.   ~Coordinator will update you on remaining results. Will plan to decrease tacrolimus goal 8-10.  ~14-day Ziopatch

## 2023-03-22 LAB
CMV DNA SPEC NAA+PROBE-ACNC: NOT DETECTED IU/ML
EBV DNA # SPEC NAA+PROBE: NOT DETECTED COPIES/ML
PATH REPORT.COMMENTS IMP SPEC: NORMAL
PATH REPORT.FINAL DX SPEC: NORMAL
PATH REPORT.GROSS SPEC: NORMAL
PATH REPORT.MICROSCOPIC SPEC OTHER STN: NORMAL
PATH REPORT.RELEVANT HX SPEC: NORMAL
PHOTO IMAGE: NORMAL

## 2023-03-23 ENCOUNTER — TELEPHONE (OUTPATIENT)
Dept: TRANSPLANT | Facility: CLINIC | Age: 68
End: 2023-03-23
Payer: MEDICARE

## 2023-03-23 DIAGNOSIS — Z94.1 HEART REPLACED BY TRANSPLANT (H): ICD-10-CM

## 2023-03-23 LAB
HBV DNA SERPL QL NAA+PROBE: NORMAL
HCV RNA SERPL QL NAA+PROBE: NORMAL
HIV1+2 RNA SERPL QL NAA+PROBE: NORMAL
IMMUKNOW IMMUNE CELL FUNCTION: 472 NG/ML

## 2023-03-23 RX ORDER — TACROLIMUS 1 MG/1
CAPSULE ORAL
Qty: 210 CAPSULE | Refills: 11 | Status: SHIPPED | OUTPATIENT
Start: 2023-03-23 | End: 2023-04-10

## 2023-03-23 RX ORDER — PREDNISONE 5 MG/1
5 TABLET ORAL DAILY
Qty: 150 TABLET | Refills: 3
Start: 2023-03-23 | End: 2023-04-28

## 2023-03-23 NOTE — TELEPHONE ENCOUNTER
Reviewed results and med changes with wife Arlette:   Biopsy neg ACR, C4d 100% pos. RHC WNL No change on ECHO.   Pred decreased to 5 mg daily.   Resume Eliquis  - US 5/22 visit     D CMV - ; R CMV +   CMV neg.  Stop valcyte per protocol.     Tac level 10.9; goal now 8-10. Dose decreased from 4 mg BID to 4/3 mg. Recheck level 7-10 days after med change. Orders faxed to [facility] copied to patient    Enc to call wit questions and concerns.

## 2023-03-23 NOTE — LETTER
[facility] lab   Fax      2023    Patient Name: Paco Stein   :  1955   MRN: [id number]  ICD10:  z94.1 , z79.899    Subject: Request for Labs    Paco Stein is a heart transplant patient currently being followed by the [facility].  We would like to request your assistance in obtaining the following laboratory tests which are part of our routine surveillance program for heart transplant recipients.  (Items needed are checked.)    Please fax the lab results as soon as they are available to:   Thoracic Transplant Department  Fax Number:  [phone]     Item Frequency   x CBC with platelets ~2023 and every two weeks if medication changes    x Basic metabolic panel (including:  BUN,   Serum Creatinine, Sodium, Potassium, Chloride,   CO2, Calcium, Magnesium, Phosphorus, and Glucose) ~2023 and every two weeks if medication changes    x Tacrolimus/Prograf level - 12-hour trough  (Should be mailed to the [facility] in the  provided to you by the patient.) ~2023 and every two weeks if medication changes      Thank you  for your continued support and the opportunity to collaborate in the care of this patient.  If you have any questions, please call the Thoracic Transplant Team at [phone] or [phone].      Myrtle Dominguez M.D.  Division of Cardiology   of Medicine

## 2023-03-24 LAB
ALLOMAP SCORE (EXTERNAL): 23 (ref 0–40)
DONOR IDENTIFICATION: NORMAL
DSA COMMENTS: NORMAL
DSA PRESENT: NO
DSA TEST METHOD: NORMAL
NEGATIVE PREDICTIVE VALUE PERCENT (EXTERNAL): 99 %
ORGAN: NORMAL
POSITIVE PREDICTIVE VALUE PERCENT (EXTERNAL): 2.9 %
SA 1 CELL: NORMAL
SA 1 TEST METHOD: NORMAL
SA 2 CELL: NORMAL
SA 2 TEST METHOD: NORMAL
SA1 HI RISK ABY: NORMAL
SA1 MOD RISK ABY: NORMAL
SA2 HI RISK ABY: NORMAL
SA2 MOD RISK ABY: NORMAL
SCR 1 TEST METHOD: NORMAL
SCR1 CELL: NORMAL
SCR1 RESULT: NORMAL
SCR2 CELL: NORMAL
SCR2 RESULT: NORMAL
SCR2 TEST METHOD: NORMAL
UNACCEPTABLE ANTIGENS: NORMAL
UNOS CPRA: 0
ZZZSA 1  COMMENTS: NORMAL
ZZZSA 2 COMMENTS: NORMAL
ZZZSCR1 COMMENTS: NORMAL
ZZZSCR2 COMMENTS: NORMAL

## 2023-03-27 ENCOUNTER — TELEPHONE (OUTPATIENT)
Dept: PHARMACY | Facility: CLINIC | Age: 68
End: 2023-03-27
Payer: MEDICARE

## 2023-03-27 NOTE — TELEPHONE ENCOUNTER
Clinical Pharmacy Consult:                                                      Transplant Specific: 2 month post transplant medication review  Date of Transplant: 12/25/2022  Type of Transplant: heart  First Transplant: yes  History of rejection: no    Immunosuppression Regimen   TAC 4mg qAM & 3mg qPM, Prednisone 5mg qAM and MMF 1000mg qAM & 1000mg qPM  Patient specific goal: 8-10  Most recent level: 12.6, date 2/21/23  Immunosuppressant Levels:  Therapeutic  Pt adherent to lab draws: yes  Scr:   Lab Results   Component Value Date    CR 0.95 02/21/2023     Side effects: Tremors     Prophylactic Medications  Antibacterial:  Bactrim 1 daily  Scheduled Discontinue Date: 6 months    Antifungal: Nystatin  Scheduled Discontinue Date: 6 months    Antiviral: CrCl >/=60 mL/minute: Valcyte 900mg daily   Scheduled Discontinue Date: 3 months, complete    Acid Reducer: Protonix (pantoprazole)  Scheduled Reviewed Date: 6 months    Thrombosis Prevention: Eliquis 5ng BID  Scheduled Discontinue Date: to be determined    Blood Pressure Management  Frequency of home Blood Pressure checks: once daily  Most recent home BP: 130's/80's  Patient Blood pressure goal: <140/90  Patient blood pressure at goal:  Yes    Hospitalizations/ER visits since last assessment: 0    Med rec/DUR performed: yes  Med Rec Discrepancies: no    Medication adherence flowsheet 3/27/2023   Patient medication administration: Has assistance with medications   Patient estimated adherence level: %   Pharmacist assessment of adherence: Good   Patient reported doses missed per week: 0-1   Facilitators to medication adherence  Cell phone;Medication dosing chart;Pill box;Caregiver assistance   Patient reported barriers to medication adherence  -   Adherence intervention(s): -      Medication access flowsheet 3/27/2023   Number of pharmacies used: 1   Pharmacy: [facility]   Enrolled in [facility] pharmacy? Yes   Patient reported barriers to  accessing medications: -   Medication access interventions: -         Spoke to wife, Arlette. She reported David is doing very well. He still has some tremors that have improved with decrease in tacro dose.     Arlette takes care of all medication and he has not missed any of his immuno dose but have missed 1 or 2 of magnesium and the fourth dose of nystatin  .  Arlette checks his BP twice a day. She reports the diastolic has went down.    No other questions or concerns.    MTM next month    Quentin Barton [facility]  Specialty Pharmacist [phone]

## 2023-04-04 ENCOUNTER — TELEPHONE (OUTPATIENT)
Dept: TRANSPLANT | Facility: CLINIC | Age: 68
End: 2023-04-04
Payer: MEDICARE

## 2023-04-04 NOTE — TELEPHONE ENCOUNTER
Patient at lab right now and lab will not draw unless we give them a verbal or an order   Lab Ph# [phone]  lab Fax# [phone]  Wife was unsure what is needed for draw.    LAVONNE Santiago

## 2023-04-04 NOTE — TELEPHONE ENCOUNTER
Lab tech requests orders that state that these orders are good for one year. Accepted verbal order.   Will plan to run Tac at [facility] this time as pt received ALA vs drug mailers.     Wife/pt aware of plan.

## 2023-04-04 NOTE — LETTER
[facility] lab   Fax [phone]    2023    Patient Name: Paco Stein   :  1955   MRN: [id number]  ICD10:  z94.1 , z79.899    Subject: Request for Labs    Paco Stein is a heart transplant patient currently being followed by the [facility].  We would like to request your assistance in obtaining the following laboratory tests which are part of our routine surveillance program for heart transplant recipients.  (Items needed are checked.)    Please fax the lab results as soon as they are available to:   Thoracic Transplant Department  Fax Number:  [phone]     Item Frequency   x CBC with platelets ~2023 and every two weeks if medication changes through end of 2023    x Basic metabolic panel (including:  BUN,   Serum Creatinine, Sodium, Potassium, Chloride,   CO2, Calcium, Magnesium, Phosphorus, and Glucose) ~2023 and every two weeks if medication changes through end of 2023   x Tacrolimus/Prograf level - 12-hour trough  (Should be mailed to the [facility] in the  provided to you by the patient.) ~2023 and every two weeks if medication changes through end of 2023      Thank you  for your continued support and the opportunity to collaborate in the care of this patient.  If you have any questions, please call the Thoracic Transplant Team at [phone] or [phone].      Myrtle Dominguez M.D.  Division of Cardiology   of Medicine

## 2023-04-10 DIAGNOSIS — Z94.1 HEART REPLACED BY TRANSPLANT (H): ICD-10-CM

## 2023-04-10 RX ORDER — TACROLIMUS 0.5 MG/1
CAPSULE ORAL
Qty: 30 CAPSULE | Refills: 11 | Status: SHIPPED | OUTPATIENT
Start: 2023-04-10 | End: 2023-05-30

## 2023-04-10 RX ORDER — TACROLIMUS 1 MG/1
CAPSULE ORAL
Qty: 210 CAPSULE | Refills: 11 | Status: SHIPPED | OUTPATIENT
Start: 2023-04-10 | End: 2023-05-30

## 2023-04-21 ENCOUNTER — TELEPHONE (OUTPATIENT)
Dept: TRANSPLANT | Facility: CLINIC | Age: 68
End: 2023-04-21
Payer: MEDICARE

## 2023-04-21 DIAGNOSIS — Z79.899 ENCOUNTER FOR LONG-TERM (CURRENT) USE OF HIGH-RISK MEDICATION: ICD-10-CM

## 2023-04-21 DIAGNOSIS — Z94.1 HEART REPLACED BY TRANSPLANT (H): Primary | ICD-10-CM

## 2023-04-21 RX ORDER — LIDOCAINE 40 MG/G
CREAM TOPICAL
Status: CANCELLED | OUTPATIENT
Start: 2023-04-21

## 2023-04-21 NOTE — TELEPHONE ENCOUNTER
Reviewed Ziopatch results with wife - they had not read My Chart message.     Discussed appt next week, NPO for biopsy (sedatoin for groin access), 12-hour trough on Tacro. Hold Eliquis Tues, Wed and Thursday AM.     Enc to call with questions.

## 2023-04-24 ENCOUNTER — TELEPHONE (OUTPATIENT)
Dept: TRANSPLANT | Facility: CLINIC | Age: 68
End: 2023-04-24
Payer: MEDICARE

## 2023-04-24 NOTE — TELEPHONE ENCOUNTER
Confirmed appts w/wife, holding Eliquis Tues/Wed and Thurs AM. Fasting for groin access biopsy, 12-hour tacro level.    Enc to call with further questions.

## 2023-04-26 NOTE — PROGRESS NOTES
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     His post-operative course has been c/b delirium, leukocytosis, staph epi bacteremia, and prolonged CT output.  He discharged 1/13.     He had some complement deposition on his pathology but no graft dysfunction or rejection.     Finished IV vancomycin for staph bacteremia.      Did also have a nosebleed s/p Rhino Rocket placement - Continues to have no further nose bleeding and tolerating NOAC.    He presently denies PND orthopnea or lower extremity edema he has no diarrhea, fevers, chills, cough, epistaxis and blood pressures are well controlled. The only other minor symptom to report is a slight tremor.    Patient has made a lot of progress this month. His personality has come back, and he doesn't feel weak all of the time. At cardiac rehab he is on a step and bicycle 20 minutes each, and walks at the mall about a mile. Patient can completely dress himself now. The other day he was showing signs of wanting to do yard work. Patient is up in weight with a healthy appetite.     We discussed weaning off of Trazodone. Also recommended that the patient and his wife discuss it with their PCP.    Patient's wife was inquiring about a dental procedure in June, just verifying with me that the patient will be okay from a cardiac standpoint to undergo that. Also she was inquiring about a 1.1% sodium fluoride treatment, that wouldn't be swallowed. From my standpoint all of that should be acceptable.    Past medical history no change from  prior    Social history no change from prior      Current Outpatient Medications   Medication Sig Dispense Refill     amLODIPine (NORVASC) 5 MG tablet Take 1 tablet (5 mg) by mouth At Bedtime 90 tablet 3     amoxicillin (AMOXIL) 500 MG capsule Take FOUR caps (2000 mg) one hour prior dental work while on Prednisone 4 capsule 1     aspirin (ASA) 81 MG EC tablet Take 1 tablet (81 mg) by mouth daily 30 tablet 11     calcium carbonate-vitamin D (CALTRATE) 600-10 MG-MCG per tablet Take 1 tablet by mouth 2 times daily (with meals) 180 tablet 0     magnesium oxide (MAG-OX) 400 MG tablet Take 2 tablets (800 mg) by mouth 2 times daily 120 tablet 3     multivitamin (ONE-DAILY) tablet Take 1 tablet by mouth daily 90 tablet 3     mycophenolate (GENERIC EQUIVALENT) 250 MG capsule Take 4 capsules (1,000 mg) by mouth 2 times daily 720 capsule 3     nystatin (MYCOSTATIN) 614601 UNIT/ML suspension Swish and swallow 10 mLs (1,000,000 Units) in mouth 4 times daily 1200 mL 1     pantoprazole (PROTONIX) 40 MG EC tablet Take 1 tablet (40 mg) by mouth every morning (before breakfast) 90 tablet 3     polyethylene glycol (MIRALAX) 17 GM/Dose powder Take 17 g by mouth daily as needed for constipation 510 g 1     predniSONE (DELTASONE) 5 MG tablet Take 1 tablet (5 mg) by mouth daily 150 tablet 3     rosuvastatin (CRESTOR) 10 MG tablet Take 1 tablet (10 mg) by mouth daily 90 tablet 1     sulfamethoxazole-trimethoprim (BACTRIM) 400-80 MG tablet Take 1 tablet by mouth daily 30 tablet 11     tacrolimus (GENERIC EQUIVALENT) 0.5 MG capsule Take ONE cap with THREE 1 mg caps (3.5 mg) every PM 30 capsule 11     tacrolimus (GENERIC EQUIVALENT) 1 MG capsule Take FOUR caps every AM (4 mg) and THREE caps with ONE 0.5 mg cap every PM (3.5 mg) 210 capsule 11     tacrolimus (GENERIC EQUIVALENT) 5 MG capsule On hold due to dose change (Patient not taking: Reported on 4/27/2023) 30 capsule 11     traZODone (DESYREL) 100 MG tablet Take 1 tablet (100 mg) by mouth  nightly as needed for sleep 30 tablet 1       ROS:   Constitutional: No fever, chills, or sweats. Weight stable .   ENT: No visual disturbance, ear ache, no epistaxis, no sore throat.   Allergies/Immunologic: Negative.   Respiratory: No cough, hemoptysis.   Cardiovascular: As per HPI.   GI: No nausea, vomiting, hematemesis, melena, or hematochezia.   : No urinary frequency, dysuria, or hematuria.   Integument: Negative.   Psychiatric: Mood is overall stable with known emotional lability, but much improved.  Neuro: Negative.   Endocrinology: Negative.   Musculoskeletal:  positive for general weakness - getting better     EXAM:  /81 (BP Location: Right arm, Patient Position: Chair, Cuff Size: Adult Large)   Pulse 113   Wt 94 kg (207 lb 4.8 oz)   SpO2 97%   BMI 26.62 kg/m    General: appears comfortable, alert and articulate  Head: normocephalic, atraumatic  Eyes: anicteric sclera, EOMI  Neck: no adenopathy  Oropharynx: moist mucosa, no lesions, dentition intact- no thrush   Heart: PMI diffuse,trace systolic murmur at LLSB, regular, S1/S2, rub, estimated<  JVP 9 cm   Lungs: clear, no rales or wheezing  Abdomen: soft, non-tender, bowel sounds present, no hepatosplenomegaly  Extremities: no clubbing, cyanosis or edema  Neurological: speech fluency is at baseline, able to walk without assistance   Psych: stable emotional lability     Data reviewed   BMP 04/27/2023 Personally Reviewed     Sodium 136 - 145 mmol/L 138     Potassium 3.4 - 5.3 mmol/L 4.3     Chloride 98 - 107 mmol/L 103     Carbon Dioxide (CO2) 22 - 29 mmol/L 28     Anion Gap 7 - 15 mmol/L 7     Urea Nitrogen 8.0 - 23.0 mg/dL 16.7     Creatinine 0.67 - 1.17 mg/dL 1.18 High      Calcium 8.8 - 10.2 mg/dL 9.6     Glucose 70 - 99 mg/dL 104 High      GFR Estimate >60 mL/min/1.73m2 66        Phosphorous 04/27/2023 Personally Reviewed  Phosphorus 2.5 - 4.5 mg/dL 1.6 Low      CBC Hemoglobin: 04/27/2023 Personally Reviewed    Hemoglobin 13.3 - 17.7 g/dL  11.9 Low      Tacrolimus score: 4/27/2023 Personally Reviewed   Tacrolimus by Tandem Mass Spectrometry 5.0 - 15.0 ug/L 9.4      Echo:   3/20/2023 Personally Reviewed    Interpretation Summary  Global and regional left ventricular function is normal with an EF of 60-65%.  Right ventricular function, chamber size, wall motion, and thickness are  normal.  No pericardial effusion is present.    Left Ventricle  Global and regional left ventricular function is normal with an EF of 60-65%.     Right Ventricle  Right ventricular function, chamber size, wall motion, and thickness are  normal.     Vessels  Small IVC.     Pericardium  No pericardial effusion is present. Prominent epicardial fat is noted.    CV Cardiac Catheterization:  4/27/2023 Personally Reviewed    Summary of findings:  Mean RAP: 2  RV: 23/2  PAP: 23/4 (12)  PCWP: 4    SvO2: 66.2%  SpO2: 95%  Hgb: 10.6  MAP: 124    Aisha CO: 6.5 LPM  Aisha CI: 3 L/min^m2    Thermodilution CO: 5.6 LPM  Thermodilution CI: 2.6 L/min^m2  PVR: 1.2 UREÑA Right sided filling pressures are normal. Left sided filling pressures are normal. Normal PA pressures. Normal cardiac output level.    EKG 03/21/2023: Personally Reviewed  Final Interpretation:  Agree with findings.  8 VT runs up to 12 beats.  One SVT run up to 6 beats.  Rare PACs and rare PVCs.  No patient's events.    US UE Venous Duplex B/L  04/27/2023 Personally Reviewed    Impression: Internal jugular deep venous thromboses have cleared. No  deep venous thrombosis demonstrated in either arm.    Surgical Pathology Exam:  03/21/23 personally reviewed for assessment and recommendation.    Final Diagnosis  HEART ALLOGRAFT, ENDOMYOCARDIAL BIOPSY AT 3 MONTHS:  -Negative for acute cellular rejection  -No light microscopic features of antibody mediated rejection  -Persistent immunofluorescence staining for C4d capillary deposition (almost 100%)  -Negative C3d capillary deposition by immunofluorescence  -ISHLT 2005 grade 0R (previously  0); ISHLT 2013 grade pAMR1 (I+)  (See Comment)    DSA comments 03/21/2023 Personally Reviewed: No donor antibodies detected.    Assessment and recommendations:    NICM, s/p OHT 12/25/22  His post-operative course has been c/b delirium, leukocytosis, staph epi bacteremia, and prolonged CT output.  He discharged 1/13.    Graph function is normal, other than complement deposition there is been no rejection, baseline coronary angiogram with mild native coronary disease (proximal LAD 0.2 mm)    Serostatus:  - CMV D+/R+  - EBV D+/R+  - Toxo D-/R-     Immunosuppression:  - received basiliximab 12/25 and 12/29  If the biopsy is negative will just continue prednisone  - keep MMF 1000 BID.  - The TAC goal is 8-10.       PPx:  - CAV:  adding aspirin 81 mg daily, discontinuing NOAC today, continue rosuvastatin 10mg daily  - GI: Discontinuing Pantoprazole 40mg daily if biopsy is negative.  - Osteoporosis:  calcium/vitamin D supplements  - CMV:  valcyte 900mg daily (until 3/25)   - PCP: Discontinuing bactrim if biopsy is negative.  - Thrush: Discontinuing nystatin if biopsy negative.  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   Resolved on current US, discontinuing Noac today.    Staph epi bacteremia-completed antibiotics PICC line out    History of vasculitis- He is established with neurology and rheumatology here following their recommendations.      Frailty deconditioning- improving encouragement provided he is now doing cardiac rehab.      Scribe Disclosure:   I, June Weathers, am serving as a scribe; to document services personally performed by Myrtle Dominguez MD -based on data collection and the provider's statements to me.     Provider Disclosure:  I agree with above History, Review of Systems, Physical exam and Plan.  I have reviewed the content of the  documentation and have edited it as needed. I have personally performed the services documented here and the documentation accurately represents those services and the decisions I have made.      Electronically signed by:  Myrtle Dominguez MD        Patient Care Team:  Quentin Odonnell as PCP - General  Angelica, Myrtle Hernandez MD as MD (Cardiovascular Disease)  Mary Lou Luke, RN as Transplant Coordinator  Abram Simeon [facility] as Pharmacist (Pharmacist)  Tiera Anguiano DO as MD (Infectious Diseases)  Abram Simeon [facility] as Assigned MTM Pharmacist  Burt Andrews MD Fontana, Lauren, DO as Assigned Infectious Disease Provider  Cruzito Mena MD as Assigned Surgical Provider  Angelica, Myrtle Hernandez MD as Assigned Heart and Vascular Provider  Jackelin Khan MD as Assigned Neuroscience Provider  JUAN FRANCISCO CAMACHO      Answers for HPI/ROS submitted by the patient on 4/21/2023  General Symptoms: No  Skin Symptoms: No  HENT Symptoms: No  EYE SYMPTOMS: No  HEART SYMPTOMS: No  LUNG SYMPTOMS: No  INTESTINAL SYMPTOMS: No  URINARY SYMPTOMS: No  REPRODUCTIVE SYMPTOMS: No  SKELETAL SYMPTOMS: No  BLOOD SYMPTOMS: No  NERVOUS SYSTEM SYMPTOMS: No  MENTAL HEALTH SYMPTOMS: No

## 2023-04-27 ENCOUNTER — ANCILLARY PROCEDURE (OUTPATIENT)
Dept: ULTRASOUND IMAGING | Facility: CLINIC | Age: 68
End: 2023-04-27
Attending: INTERNAL MEDICINE
Payer: MEDICARE

## 2023-04-27 ENCOUNTER — APPOINTMENT (OUTPATIENT)
Dept: LAB | Facility: CLINIC | Age: 68
End: 2023-04-27
Attending: INTERNAL MEDICINE
Payer: MEDICARE

## 2023-04-27 ENCOUNTER — APPOINTMENT (OUTPATIENT)
Dept: MEDSURG UNIT | Facility: CLINIC | Age: 68
End: 2023-04-27
Attending: INTERNAL MEDICINE
Payer: MEDICARE

## 2023-04-27 ENCOUNTER — HOSPITAL ENCOUNTER (OUTPATIENT)
Facility: CLINIC | Age: 68
Discharge: HOME | End: 2023-04-27
Attending: INTERNAL MEDICINE | Admitting: INTERNAL MEDICINE
Payer: MEDICARE

## 2023-04-27 ENCOUNTER — OFFICE VISIT (OUTPATIENT)
Dept: PHARMACY | Facility: CLINIC | Age: 68
End: 2023-04-27
Payer: COMMERCIAL

## 2023-04-27 ENCOUNTER — OFFICE VISIT (OUTPATIENT)
Dept: CARDIOLOGY | Facility: CLINIC | Age: 68
End: 2023-04-27
Attending: INTERNAL MEDICINE
Payer: MEDICARE

## 2023-04-27 VITALS
OXYGEN SATURATION: 97 % | RESPIRATION RATE: 17 BRPM | BODY MASS INDEX: 25.7 KG/M2 | WEIGHT: 200.3 LBS | SYSTOLIC BLOOD PRESSURE: 146 MMHG | DIASTOLIC BLOOD PRESSURE: 93 MMHG | TEMPERATURE: 98.5 F | HEART RATE: 104 BPM | HEIGHT: 74 IN

## 2023-04-27 VITALS
OXYGEN SATURATION: 97 % | BODY MASS INDEX: 26.62 KG/M2 | SYSTOLIC BLOOD PRESSURE: 122 MMHG | DIASTOLIC BLOOD PRESSURE: 81 MMHG | HEART RATE: 113 BPM | WEIGHT: 207.3 LBS

## 2023-04-27 DIAGNOSIS — I82.C11 INTERNAL JUGULAR (IJ) VEIN THROMBOEMBOLISM, ACUTE, RIGHT (H): ICD-10-CM

## 2023-04-27 DIAGNOSIS — Z94.1 HEART REPLACED BY TRANSPLANT (H): Primary | ICD-10-CM

## 2023-04-27 DIAGNOSIS — I82.452 ACUTE DEEP VEIN THROMBOSIS (DVT) OF LEFT PERONEAL VEIN (H): ICD-10-CM

## 2023-04-27 DIAGNOSIS — I82.90 NON-OCCLUSIVE THROMBUS: ICD-10-CM

## 2023-04-27 DIAGNOSIS — Z94.1 HEART REPLACED BY TRANSPLANT (H): ICD-10-CM

## 2023-04-27 DIAGNOSIS — Z79.899 ENCOUNTER FOR LONG-TERM (CURRENT) USE OF HIGH-RISK MEDICATION: ICD-10-CM

## 2023-04-27 LAB
CRP SERPL-MCNC: <3 MG/L
ERYTHROCYTE [DISTWIDTH] IN BLOOD BY AUTOMATED COUNT: 13.4 % (ref 10–15)
HCT VFR BLD AUTO: 38.7 % (ref 40–53)
HGB BLD-MCNC: 10.6 G/DL (ref 13.3–17.7)
HGB BLD-MCNC: 11.9 G/DL (ref 13.3–17.7)
MAGNESIUM SERPL-MCNC: 1.3 MG/DL (ref 1.7–2.3)
MCH RBC QN AUTO: 26.8 PG (ref 26.5–33)
MCHC RBC AUTO-ENTMCNC: 30.7 G/DL (ref 31.5–36.5)
MCV RBC AUTO: 87 FL (ref 78–100)
OXYHGB MFR BLDA: 66 % (ref 92–100)
PHOSPHATE SERPL-MCNC: 1.6 MG/DL (ref 2.5–4.5)
PLATELET # BLD AUTO: 252 10E3/UL (ref 150–450)
RBC # BLD AUTO: 4.44 10E6/UL (ref 4.4–5.9)
TACROLIMUS BLD-MCNC: 9.4 UG/L (ref 5–15)
TME LAST DOSE: NORMAL H
TME LAST DOSE: NORMAL H
WBC # BLD AUTO: 7.7 10E3/UL (ref 4–11)

## 2023-04-27 PROCEDURE — 82810 BLOOD GASES O2 SAT ONLY: CPT

## 2023-04-27 PROCEDURE — 85018 HEMOGLOBIN: CPT

## 2023-04-27 PROCEDURE — 83735 ASSAY OF MAGNESIUM: CPT | Performed by: INTERNAL MEDICINE

## 2023-04-27 PROCEDURE — 250N000009 HC RX 250: Performed by: INTERNAL MEDICINE

## 2023-04-27 PROCEDURE — G0463 HOSPITAL OUTPT CLINIC VISIT: HCPCS | Mod: 25 | Performed by: INTERNAL MEDICINE

## 2023-04-27 PROCEDURE — 272N000001 HC OR GENERAL SUPPLY STERILE: Performed by: INTERNAL MEDICINE

## 2023-04-27 PROCEDURE — 84100 ASSAY OF PHOSPHORUS: CPT | Performed by: INTERNAL MEDICINE

## 2023-04-27 PROCEDURE — 93971 EXTREMITY STUDY: CPT | Mod: XS | Performed by: RADIOLOGY

## 2023-04-27 PROCEDURE — 93505 ENDOMYOCARDIAL BIOPSY: CPT | Performed by: INTERNAL MEDICINE

## 2023-04-27 PROCEDURE — 93505 ENDOMYOCARDIAL BIOPSY: CPT | Mod: 26 | Performed by: INTERNAL MEDICINE

## 2023-04-27 PROCEDURE — 85027 COMPLETE CBC AUTOMATED: CPT | Performed by: INTERNAL MEDICINE

## 2023-04-27 PROCEDURE — 88350 IMFLUOR EA ADDL 1ANTB STN PX: CPT | Mod: 26 | Performed by: PATHOLOGY

## 2023-04-27 PROCEDURE — 86140 C-REACTIVE PROTEIN: CPT | Performed by: INTERNAL MEDICINE

## 2023-04-27 PROCEDURE — 99207 PR NO CHARGE LOS: CPT | Performed by: PHARMACIST

## 2023-04-27 PROCEDURE — 80197 ASSAY OF TACROLIMUS: CPT | Performed by: INTERNAL MEDICINE

## 2023-04-27 PROCEDURE — 93970 EXTREMITY STUDY: CPT | Performed by: RADIOLOGY

## 2023-04-27 PROCEDURE — 250N000013 HC RX MED GY IP 250 OP 250 PS 637: Performed by: INTERNAL MEDICINE

## 2023-04-27 PROCEDURE — 88350 IMFLUOR EA ADDL 1ANTB STN PX: CPT | Mod: TC | Performed by: INTERNAL MEDICINE

## 2023-04-27 PROCEDURE — 88346 IMFLUOR 1ST 1ANTB STAIN PX: CPT | Mod: 26 | Performed by: PATHOLOGY

## 2023-04-27 PROCEDURE — C1894 INTRO/SHEATH, NON-LASER: HCPCS | Performed by: INTERNAL MEDICINE

## 2023-04-27 PROCEDURE — 82310 ASSAY OF CALCIUM: CPT | Performed by: INTERNAL MEDICINE

## 2023-04-27 PROCEDURE — 99214 OFFICE O/P EST MOD 30 MIN: CPT | Performed by: INTERNAL MEDICINE

## 2023-04-27 PROCEDURE — 999N000132 HC STATISTIC PP CARE STAGE 1

## 2023-04-27 PROCEDURE — 36415 COLL VENOUS BLD VENIPUNCTURE: CPT | Performed by: INTERNAL MEDICINE

## 2023-04-27 PROCEDURE — 88307 TISSUE EXAM BY PATHOLOGIST: CPT | Mod: 26 | Performed by: PATHOLOGY

## 2023-04-27 PROCEDURE — 999N000142 HC STATISTIC PROCEDURE PREP ONLY

## 2023-04-27 RX ORDER — MAGNESIUM OXIDE 400 MG/1
400 TABLET ORAL ONCE
Status: COMPLETED | OUTPATIENT
Start: 2023-04-27 | End: 2023-04-27

## 2023-04-27 RX ORDER — LIDOCAINE 40 MG/G
CREAM TOPICAL
Status: COMPLETED | OUTPATIENT
Start: 2023-04-27 | End: 2023-04-27

## 2023-04-27 RX ADMIN — MAGNESIUM OXIDE TAB 400 MG (241.3 MG ELEMENTAL MG) 400 MG: 400 (241.3 MG) TAB at 11:48

## 2023-04-27 RX ADMIN — POTASSIUM & SODIUM PHOSPHATES POWDER PACK 280-160-250 MG 2 PACKET: 280-160-250 PACK at 12:10

## 2023-04-27 RX ADMIN — LIDOCAINE: 40 CREAM TOPICAL at 11:01

## 2023-04-27 ASSESSMENT — ACTIVITIES OF DAILY LIVING (ADL)
ADLS_ACUITY_SCORE: 35
ADLS_ACUITY_SCORE: 35

## 2023-04-27 ASSESSMENT — PAIN SCALES - GENERAL: PAINLEVEL: NO PAIN (0)

## 2023-04-27 NOTE — NURSING NOTE
Transplant Coordinator Note    Reason for visit: 4 month return  Coordinator: Present   Caregiver: Spouse - Arlette     Health concerns addressed today:  1. Phosphorus level today 1.6, historically normal, will repeat chemistry next week. Patient has standing orders at lab. Will contact coordinator with any problems.  2. Resolution of DVT, will stop taking Eliquis and start 81 mg aspirin daily.  3. Patient and spouse discussed weaning off trazadone in the future, will try by next appointment.   4. Dental procedures can resume once off prednisone, currently taking amoxicillin while on prednisone.     Immunosuppressants:  Tacrolimus, goal 8-10, currently taking 4 mg in the AM and 3.5 mg in the PM, current level 9.4, no changes to tacrolimus level.  MMF 1,000 mg BID  Prednisone 5 mg daily.    Routine screenings:    Vaccinations can resume after prednisone is stopped.    Labs:   Reviewed resulted labs in clinic, patient should repeat chemistry (phos) next week.  If biopsy negative, discontinue prednisone, pantoprazole, nystatin, and bactrim.    Plan care/instructions reviewed with patient  Medication record reviewed and reconciled  Pending lab and test results will be called to patient or MyChart   Questions and concerns addressed    AVS reviewed and copy provided. Patient verbalized an understanding of plan of care.     Next transplant clinic appointment: 5/22/23, [place], clinic, labs  Next lab draw:  Next week for chemistry.    Call [phone] or MyChart, with any questions or concerns.

## 2023-04-27 NOTE — PATIENT INSTRUCTIONS
Stop taking Eliquis, start taking 81 mg aspirin daily.  Repeat chemistry lab next week.  Coordinator will call or MyChart you with remaining test results: biopsy and AlloMap    **Non-Fasting labs and drug level on 5/22/23, or earlier if needed.  **Return on 5/22/23 with labs, clinic, heart biopsy     Call [phone] or MyChart, with any questions or concerns.

## 2023-04-27 NOTE — PROGRESS NOTES
Disease State Management Encounter:                          Paco Stein is a 68 year old male coming in for a follow-up visit.  Today's visit is a follow-up visit from 1/19     Reason for visit: 4 months post transplant. He is wondering if he can come of Trazodone. Txp team referred him to MTM to discuss. He had some withdrawal trying to stop cold turkey at 100mg, now taking 50mg at bedtime.    Allergies/ADRs: Reviewed in chart  Past Medical History: Reviewed in chart  Tobacco: He reports that he has never smoked. He has never used smokeless tobacco.  Alcohol: not currently using  THC/CBD: none.      Medication Adherence/Access: Per patient, misses txp medication 0 times per week. Does miss magnesium frequently though.    Heart Transplant:  Current immunosuppressants include Tacrolimus 4mg every morning and 3.5mg every evening, Prednisone 5mg  daily, and Mycophenolate Mofetil 1000mg twice daily.  Pt reports vertigo.  Transplant date: 12/25/22  Estimated Creatinine Clearance: 77.4 mL/min (A) (based on SCr of 1.18 mg/dL (H)).  CMV prophylaxis: Completed.   PJP prophylaxis: Bactrim S S daily for 6 months post txp  Antifungal Prophylaxis: Nystatin 10 mL 4 TIMES DAILY until off steroids.   PPI use: Pantoprazole 40mg every morning. Denies GERD sx.   Supplements: Mag Oxide 800mg twice daily ( 2 hours from MMF), Calcium/D twice daily .  Tx Coordinator: Sandy Dale MD: Dr. Dominguez, Using Med Card: Yes  Immunizations: annual flu shot 2021; Pneumovax 23:  2018; Prevnar 13: 2017; TDaP:  2003; Shingrix: 2019x2, HBV: immune, COVID: Moderna x3  Lab Results   Component Value Date    MAG 1.3 (L) 04/27/2023    MAG 1.7 03/21/2023    MAG 1.9 03/09/2023    MAG 1.5 (L) 02/21/2023    MAG 1.4 (L) 02/07/2023     Today's Vitals: There were no vitals taken for this visit.    Assessment/Plan:    1. Reduce Trazodone to 25mg at bedtime for a several weeks.   2. Set an alarm for magnesium.   3. In the future, Envarsus may  reduce tremors, it is an extended release Tacrolimus.   4. After 6/25 due for Tetanus.    Follow-up: as needed    I spent 15 minutes with this patient today. All changes were made via collaborative practice agreement with Dr. Dominguez. A copy of the visit note was provided to the patient's provider(s).    A summary of these recommendations was given to the patient.    Abram Simeon, PharmD  [facility] Pharmacist    Phone: [phone]      Medication Therapy Recommendations  Heart replaced by transplant (H)    Current Medication: magnesium oxide (MAG-OX) 400 MG tablet   Rationale: Patient forgets to take - Adherence - Adherence   Recommendation: Provide Adherence Intervention   Status: Patient Agreed - Adherence/Education          Current Medication: tacrolimus (GENERIC EQUIVALENT) 1 MG capsule   Rationale: Undesirable effect - Adverse medication event - Safety   Recommendation: Provide Education   Status: Patient Agreed - Adherence/Education          Current Medication: traZODone (DESYREL) 100 MG tablet   Rationale: No medical indication at this time - Unnecessary medication therapy - Indication   Recommendation: Discontinue Medication   Status: Accepted per CPA          Rationale: Preventive therapy - Needs additional medication therapy - Indication   Recommendation: Order Vaccine - TETANUS-DIPHTH-ACELL PERTUSSIS   Status: Patient Agreed - Adherence/Education

## 2023-04-27 NOTE — PROGRESS NOTES
Pt arrives to 2a, with spouse, for RHC and heart biopsy. Pt has been a groin access, however pt had ultrasound this morning which states internal jugular DVTs have cleared. Pt's groin and neck prepped. 1115 Page sent to 1710 to notify MD pt's magnesium 1.3 and phosphorus 1.6  1123 Dr Shelton notified of the above electrolyte results.

## 2023-04-27 NOTE — LETTER
4/27/2023      RE: Paco Stein  [address] [facility] [address]       Dear Colleague,    Thank you for the opportunity to participate in the care of your patient, Paco Stein, at the [facility] [place] at [facility]. Please see a copy of my visit note below.    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     His post-operative course has been c/b delirium, leukocytosis, staph epi bacteremia, and prolonged CT output.  He discharged 1/13.     He had some complement deposition on his pathology but no graft dysfunction or rejection.     Finished IV vancomycin for staph bacteremia.      Did also have a nosebleed s/p Rhino Rocket placement - Continues to have no further nose bleeding and tolerating NOAC.    He presently denies PND orthopnea or lower extremity edema he has no diarrhea, fevers, chills, cough, epistaxis and blood pressures are well controlled. The only other minor symptom to report is a slight tremor.    Patient has made a lot of progress this month. His personality has come back, and he doesn't feel weak all of the time. At cardiac rehab he is on a step and bicycle 20 minutes each, and walks at the mall about a mile. Patient can completely dress himself now. The other day he was showing signs of wanting to do yard work. Patient is up in weight with a healthy appetite.     We discussed weaning off of Trazodone. Also recommended that the patient and his wife discuss it with their PCP.    Patient's  wife was inquiring about a dental procedure in June, just verifying with me that the patient will be okay from a cardiac standpoint to undergo that. Also she was inquiring about a 1.1% sodium fluoride treatment, that wouldn't be swallowed. From my standpoint all of that should be acceptable.    Past medical history no change from prior    Social history no change from prior      Current Outpatient Medications   Medication Sig Dispense Refill    amLODIPine (NORVASC) 5 MG tablet Take 1 tablet (5 mg) by mouth At Bedtime 90 tablet 3    amoxicillin (AMOXIL) 500 MG capsule Take FOUR caps (2000 mg) one hour prior dental work while on Prednisone 4 capsule 1    aspirin (ASA) 81 MG EC tablet Take 1 tablet (81 mg) by mouth daily 30 tablet 11    calcium carbonate-vitamin D (CALTRATE) 600-10 MG-MCG per tablet Take 1 tablet by mouth 2 times daily (with meals) 180 tablet 0    magnesium oxide (MAG-OX) 400 MG tablet Take 2 tablets (800 mg) by mouth 2 times daily 120 tablet 3    multivitamin (ONE-DAILY) tablet Take 1 tablet by mouth daily 90 tablet 3    mycophenolate (GENERIC EQUIVALENT) 250 MG capsule Take 4 capsules (1,000 mg) by mouth 2 times daily 720 capsule 3    nystatin (MYCOSTATIN) 703634 UNIT/ML suspension Swish and swallow 10 mLs (1,000,000 Units) in mouth 4 times daily 1200 mL 1    pantoprazole (PROTONIX) 40 MG EC tablet Take 1 tablet (40 mg) by mouth every morning (before breakfast) 90 tablet 3    polyethylene glycol (MIRALAX) 17 GM/Dose powder Take 17 g by mouth daily as needed for constipation 510 g 1    predniSONE (DELTASONE) 5 MG tablet Take 1 tablet (5 mg) by mouth daily 150 tablet 3    rosuvastatin (CRESTOR) 10 MG tablet Take 1 tablet (10 mg) by mouth daily 90 tablet 1    sulfamethoxazole-trimethoprim (BACTRIM) 400-80 MG tablet Take 1 tablet by mouth daily 30 tablet 11    tacrolimus (GENERIC EQUIVALENT) 0.5 MG capsule Take ONE cap with THREE 1 mg caps (3.5 mg) every PM 30 capsule 11    tacrolimus (GENERIC  EQUIVALENT) 1 MG capsule Take FOUR caps every AM (4 mg) and THREE caps with ONE 0.5 mg cap every PM (3.5 mg) 210 capsule 11    tacrolimus (GENERIC EQUIVALENT) 5 MG capsule On hold due to dose change (Patient not taking: Reported on 4/27/2023) 30 capsule 11    traZODone (DESYREL) 100 MG tablet Take 1 tablet (100 mg) by mouth nightly as needed for sleep 30 tablet 1       ROS:   Constitutional: No fever, chills, or sweats. Weight stable .   ENT: No visual disturbance, ear ache, no epistaxis, no sore throat.   Allergies/Immunologic: Negative.   Respiratory: No cough, hemoptysis.   Cardiovascular: As per HPI.   GI: No nausea, vomiting, hematemesis, melena, or hematochezia.   : No urinary frequency, dysuria, or hematuria.   Integument: Negative.   Psychiatric: Mood is overall stable with known emotional lability, but much improved.  Neuro: Negative.   Endocrinology: Negative.   Musculoskeletal:  positive for general weakness - getting better     EXAM:  /81 (BP Location: Right arm, Patient Position: Chair, Cuff Size: Adult Large)   Pulse 113   Wt 94 kg (207 lb 4.8 oz)   SpO2 97%   BMI 26.62 kg/m    General: appears comfortable, alert and articulate  Head: normocephalic, atraumatic  Eyes: anicteric sclera, EOMI  Neck: no adenopathy  Oropharynx: moist mucosa, no lesions, dentition intact- no thrush   Heart: PMI diffuse,trace systolic murmur at LLSB, regular, S1/S2, rub, estimated<  JVP 9 cm   Lungs: clear, no rales or wheezing  Abdomen: soft, non-tender, bowel sounds present, no hepatosplenomegaly  Extremities: no clubbing, cyanosis or edema  Neurological: speech fluency is at baseline, able to walk without assistance   Psych: stable emotional lability     Data reviewed   BMP 04/27/2023 Personally Reviewed     Sodium 136 - 145 mmol/L 138     Potassium 3.4 - 5.3 mmol/L 4.3     Chloride 98 - 107 mmol/L 103     Carbon Dioxide (CO2) 22 - 29 mmol/L 28     Anion Gap 7 - 15 mmol/L 7     Urea Nitrogen 8.0 - 23.0 mg/dL  16.7     Creatinine 0.67 - 1.17 mg/dL 1.18 High      Calcium 8.8 - 10.2 mg/dL 9.6     Glucose 70 - 99 mg/dL 104 High      GFR Estimate >60 mL/min/1.73m2 66        Phosphorous 04/27/2023 Personally Reviewed  Phosphorus 2.5 - 4.5 mg/dL 1.6 Low      CBC Hemoglobin: 04/27/2023 Personally Reviewed    Hemoglobin 13.3 - 17.7 g/dL 11.9 Low      Tacrolimus score: 4/27/2023 Personally Reviewed   Tacrolimus by Tandem Mass Spectrometry 5.0 - 15.0 ug/L 9.4      Echo:   3/20/2023 Personally Reviewed    Interpretation Summary  Global and regional left ventricular function is normal with an EF of 60-65%.  Right ventricular function, chamber size, wall motion, and thickness are  normal.  No pericardial effusion is present.    Left Ventricle  Global and regional left ventricular function is normal with an EF of 60-65%.     Right Ventricle  Right ventricular function, chamber size, wall motion, and thickness are  normal.     Vessels  Small IVC.     Pericardium  No pericardial effusion is present. Prominent epicardial fat is noted.    CV Cardiac Catheterization:  4/27/2023 Personally Reviewed    Summary of findings:  Mean RAP: 2  RV: 23/2  PAP: 23/4 (12)  PCWP: 4    SvO2: 66.2%  SpO2: 95%  Hgb: 10.6  MAP: 124    Aisha CO: 6.5 LPM  Aisha CI: 3 L/min^m2    Thermodilution CO: 5.6 LPM  Thermodilution CI: 2.6 L/min^m2  PVR: 1.2 UREÑA Right sided filling pressures are normal. Left sided filling pressures are normal. Normal PA pressures. Normal cardiac output level.    EKG 03/21/2023: Personally Reviewed  Final Interpretation:  Agree with findings.  8 VT runs up to 12 beats.  One SVT run up to 6 beats.  Rare PACs and rare PVCs.  No patient's events.    US UE Venous Duplex B/L  04/27/2023 Personally Reviewed    Impression: Internal jugular deep venous thromboses have cleared. No  deep venous thrombosis demonstrated in either arm.    Surgical Pathology Exam:  03/21/23 personally reviewed for assessment and recommendation.    Final Diagnosis  HEART  ALLOGRAFT, ENDOMYOCARDIAL BIOPSY AT 3 MONTHS:  -Negative for acute cellular rejection  -No light microscopic features of antibody mediated rejection  -Persistent immunofluorescence staining for C4d capillary deposition (almost 100%)  -Negative C3d capillary deposition by immunofluorescence  -ISHLT 2005 grade 0R (previously 0); ISHLT 2013 grade pAMR1 (I+)  (See Comment)    DSA comments 03/21/2023 Personally Reviewed: No donor antibodies detected.    Assessment and recommendations:    NICM, s/p OHT 12/25/22  His post-operative course has been c/b delirium, leukocytosis, staph epi bacteremia, and prolonged CT output.  He discharged 1/13.    Graph function is normal, other than complement deposition there is been no rejection, baseline coronary angiogram with mild native coronary disease (proximal LAD 0.2 mm)    Serostatus:  - CMV D+/R+  - EBV D+/R+  - Toxo D-/R-     Immunosuppression:  - received basiliximab 12/25 and 12/29  If the biopsy is negative will just continue prednisone  - keep MMF 1000 BID.  - The TAC goal is 8-10.       PPx:  - CAV:  adding aspirin 81 mg daily, discontinuing NOAC today, continue rosuvastatin 10mg daily  - GI: Discontinuing Pantoprazole 40mg daily if biopsy is negative.  - Osteoporosis:  calcium/vitamin D supplements  - CMV:  valcyte 900mg daily (until 3/25)   - PCP: Discontinuing bactrim if biopsy is negative.  - Thrush: Discontinuing nystatin if biopsy negative.  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   Resolved on current US, discontinuing Noac today.    Staph epi bacteremia-completed antibiotics PICC line out    History of vasculitis- He is established with neurology and rheumatology here following their recommendations.      Frailty deconditioning- improving encouragement provided he is now doing cardiac rehab.      Scribe  Disclosure:   I, June Weathers, am serving as a scribe; to document services personally performed by Myrtle Dominguez MD -based on data collection and the provider's statements to me.     Provider Disclosure:  I agree with above History, Review of Systems, Physical exam and Plan.  I have reviewed the content of the documentation and have edited it as needed. I have personally performed the services documented here and the documentation accurately represents those services and the decisions I have made.      Electronically signed by:  Myrtle Dominguez MD        Patient Care Team:  Quentin Odonnell as PCP - General  Angelica, Myrtle Hernandez MD as MD (Cardiovascular Disease)  Mary Lou Luke RN as Transplant Coordinator  Abram Simeon RP as Pharmacist (Pharmacist)  Tiera Anguiano DO as MD (Infectious Diseases)  Abram Simeon RPH as Assigned MTM Pharmacist  Burt Andrews MD Fontana, Lauren, DO as Assigned Infectious Disease Provider  Cruzito Mena MD as Assigned Surgical Provider  Angelica, Myrtle Hernandez MD as Assigned Heart and Vascular Provider  Jackelin Khan MD as Assigned Neuroscience Provider  JUAN FRANCISCO CAMACHO          Please do not hesitate to contact me if you have any questions/concerns.     Sincerely,     Myrtle Dominguez MD

## 2023-04-27 NOTE — PROGRESS NOTES
Pt has tolerated PO. Ready for discharge. Pt transported to [facility] via wheelchair, spouse present.

## 2023-04-27 NOTE — Clinical Note
DISCHARGE SUMMARY:  Discharge Time: 1210  Via:  Wheelchair  Accompanied by:  Patient's significant other  Verbal Instructions given: Yes  Verbalized understanding: Yes  Written Instructions given: Yes  Discharge Signature Obtained: Yes  Patient Able to Void: Yes  Discharged Stable Per MD Order: Yes   To brendan mccullough rn by self

## 2023-04-27 NOTE — DISCHARGE INSTRUCTIONS
[facility]                        Interventional Cardiology  Discharge Instructions   Post Right Heart Cath  AFTER YOU GO HOME:  DO drink plenty of fluids  DO resume your regular diet and medications unless otherwise instructed by your Primary Physician  Do Not scrub the procedure site vigorously  No lotion or powder to the puncture site for 3 days    CALL YOUR PRIMARY PHYSICIAN IF: You may resume all normal activity.  Monitor neck site for bleeding, swelling, or voice changes. If you notice bleeding or swelling immediately apply pressure to the site and call number below to speak with Cardiology Fellow.  If you experience any changes in your breathing you should call your doctor immediately or come to the closest Emergency Department.  Do not drive yourself.    ADDITIONAL INSTRUCTIONS: Medications: You are to resume all home medications including anticoagulation therapy unless otherwise advised by your primary cardiologist or nurse coordinator.    Follow Up: Per your primary cardiology team    If you have any questions or concerns regarding your procedure site please call [phone] at anytime and ask for Cardiology Fellow on call.  They are available 24 hours a day.  You may also contact the Cardiology Clinic after hours number at [phone].                                                     Telephone Numbers [phone] Monday-Friday 8:00 am to 4:30 pm    [phone] After 4:30 pm Monday-Friday, Weekends & Holidays  Ask for Interventional Cardiologist on call. Someone is on call 24 hours/day   [facility] toll free number [phone] Monday-Friday 8:00 am to 4:30 pm   [facility] Emergency Dept [phone]

## 2023-04-27 NOTE — PATIENT INSTRUCTIONS
"Recommendations from today's MTM visit:                                                       1. Reduce Trazodone to 25mg at bedtime for a several weeks.   2. Set an alarm for magnesium.   3. In the future, Envarsus may reduce tremors, it is an extended release Tacrolimus.   4. After 6/25 due for Tetanus.    Follow-up: as needed    It was great speaking with you today.  I value your experience and would be very thankful for your time in providing feedback in our clinic survey. In the next few days, you may receive an email or text message from [employer] with a link to a survey related to your  clinical pharmacist.\"     To schedule another MTM appointment, please call the clinic directly or you may call the MTM scheduling line at [phone] or toll-free at [phone].     My Clinical Pharmacist's contact information:                                                      Please feel free to contact me with any questions or concerns you have.      Abram Simeon, PharmD  MTM Pharmacist    Phone: [phone]     "

## 2023-04-28 ENCOUNTER — TELEPHONE (OUTPATIENT)
Dept: TRANSPLANT | Facility: CLINIC | Age: 68
End: 2023-04-28
Payer: MEDICARE

## 2023-04-28 NOTE — TELEPHONE ENCOUNTER
Heart biopsy negative for ACR and C3d, remains C4d+ which is chronic.    Discontinued prednisone, bactrim (toxo -/-), nystatin, and pantoprazole.     Allomap still pending.    Pt/wife verbalized understanding of information/changes.

## 2023-05-01 LAB
ALLOMAP SCORE (EXTERNAL): 19 (ref 0–40)
NEGATIVE PREDICTIVE VALUE PERCENT (EXTERNAL): 100 %
POSITIVE PREDICTIVE VALUE PERCENT (EXTERNAL): <=2.7 %

## 2023-05-04 DIAGNOSIS — Z94.1 HEART REPLACED BY TRANSPLANT (H): ICD-10-CM

## 2023-05-05 ENCOUNTER — TELEPHONE (OUTPATIENT)
Dept: CARDIOLOGY | Facility: CLINIC | Age: 68
End: 2023-05-05
Payer: MEDICARE

## 2023-05-05 LAB
ANION GAP SERPL CALCULATED.3IONS-SCNC: 7 MMOL/L (ref 7–15)
BUN SERPL-MCNC: 16.7 MG/DL (ref 8–23)
CALCIUM SERPL-MCNC: 9.6 MG/DL (ref 8.8–10.2)
CHLORIDE SERPL-SCNC: 103 MMOL/L (ref 98–107)
CREAT SERPL-MCNC: 1.43 MG/DL (ref 0.67–1.17)
DEPRECATED HCO3 PLAS-SCNC: 28 MMOL/L (ref 22–29)
GFR SERPL CREATININE-BSD FRML MDRD: 53 ML/MIN/1.73M2
GLUCOSE SERPL-MCNC: 104 MG/DL (ref 70–99)
POTASSIUM SERPL-SCNC: 4.3 MMOL/L (ref 3.4–5.3)
SODIUM SERPL-SCNC: 138 MMOL/L (ref 136–145)

## 2023-05-05 RX ORDER — CALCIUM CARBONATE/VITAMIN D3 600 MG-10
1 TABLET ORAL 2 TIMES DAILY WITH MEALS
Qty: 180 TABLET | Refills: 3 | Status: SHIPPED | OUTPATIENT
Start: 2023-05-05 | End: 2024-05-30

## 2023-05-05 NOTE — TELEPHONE ENCOUNTER
[facility] Call Center    Phone Message    May a detailed message be left on voicemail: yes     Reason for Call: Other: Pahda from home infusion would like a call back asap as they have sent over a few orders for home infusion and have not recieved anything back and she would like to discuss     Action Taken: Message routed to:  [facility] ([facility]): Cardio    Travel Screening: Not Applicable

## 2023-05-15 ASSESSMENT — ENCOUNTER SYMPTOMS
WEAKNESS: 1
TREMORS: 1
SPEECH CHANGE: 0
MEMORY LOSS: 0
DIZZINESS: 0
LOSS OF CONSCIOUSNESS: 0
SEIZURES: 0
TINGLING: 0
DISTURBANCES IN COORDINATION: 0
HEADACHES: 0

## 2023-05-17 ENCOUNTER — PRE VISIT (OUTPATIENT)
Dept: TRANSPLANT | Facility: CLINIC | Age: 68
End: 2023-05-17
Payer: MEDICARE

## 2023-05-17 DIAGNOSIS — Z94.1 HEART REPLACED BY TRANSPLANT (H): Primary | ICD-10-CM

## 2023-05-17 RX ORDER — LIDOCAINE 40 MG/G
CREAM TOPICAL
Status: CANCELLED | OUTPATIENT
Start: 2023-05-17

## 2023-05-19 ENCOUNTER — TELEPHONE (OUTPATIENT)
Dept: CARDIOLOGY | Facility: CLINIC | Age: 68
End: 2023-05-19
Payer: MEDICARE

## 2023-05-19 DIAGNOSIS — Z94.1 HEART REPLACED BY TRANSPLANT (H): ICD-10-CM

## 2023-05-22 ENCOUNTER — OFFICE VISIT (OUTPATIENT)
Dept: CARDIOLOGY | Facility: CLINIC | Age: 68
End: 2023-05-22
Attending: INTERNAL MEDICINE
Payer: MEDICARE

## 2023-05-22 ENCOUNTER — HOSPITAL ENCOUNTER (OUTPATIENT)
Facility: CLINIC | Age: 68
Discharge: HOME | End: 2023-05-22
Attending: INTERNAL MEDICINE | Admitting: INTERNAL MEDICINE
Payer: MEDICARE

## 2023-05-22 ENCOUNTER — LAB (OUTPATIENT)
Dept: LAB | Facility: CLINIC | Age: 68
End: 2023-05-22
Payer: MEDICARE

## 2023-05-22 ENCOUNTER — APPOINTMENT (OUTPATIENT)
Dept: MEDSURG UNIT | Facility: CLINIC | Age: 68
End: 2023-05-22
Attending: INTERNAL MEDICINE
Payer: MEDICARE

## 2023-05-22 VITALS
HEART RATE: 70 BPM | SYSTOLIC BLOOD PRESSURE: 148 MMHG | BODY MASS INDEX: 27.21 KG/M2 | OXYGEN SATURATION: 96 % | TEMPERATURE: 97.4 F | RESPIRATION RATE: 16 BRPM | WEIGHT: 212 LBS | HEIGHT: 74 IN | DIASTOLIC BLOOD PRESSURE: 99 MMHG

## 2023-05-22 VITALS
OXYGEN SATURATION: 95 % | WEIGHT: 212.6 LBS | BODY MASS INDEX: 27.3 KG/M2 | DIASTOLIC BLOOD PRESSURE: 85 MMHG | SYSTOLIC BLOOD PRESSURE: 119 MMHG | HEART RATE: 105 BPM

## 2023-05-22 DIAGNOSIS — Z94.1 HEART REPLACED BY TRANSPLANT (H): Primary | ICD-10-CM

## 2023-05-22 DIAGNOSIS — I77.6 VASCULITIS (H): ICD-10-CM

## 2023-05-22 DIAGNOSIS — Z79.899 ENCOUNTER FOR LONG-TERM (CURRENT) USE OF HIGH-RISK MEDICATION: ICD-10-CM

## 2023-05-22 DIAGNOSIS — D84.9 IMMUNOSUPPRESSION (H): ICD-10-CM

## 2023-05-22 DIAGNOSIS — Z94.1 HEART REPLACED BY TRANSPLANT (H): ICD-10-CM

## 2023-05-22 DIAGNOSIS — Z82.49 FAMILY HISTORY OF CARDIOMYOPATHY: Primary | ICD-10-CM

## 2023-05-22 LAB
ANION GAP SERPL CALCULATED.3IONS-SCNC: 9 MMOL/L (ref 7–15)
BUN SERPL-MCNC: 19.6 MG/DL (ref 8–23)
CALCIUM SERPL-MCNC: 9.7 MG/DL (ref 8.8–10.2)
CHLORIDE SERPL-SCNC: 105 MMOL/L (ref 98–107)
CREAT SERPL-MCNC: 1.27 MG/DL (ref 0.67–1.17)
DEPRECATED HCO3 PLAS-SCNC: 27 MMOL/L (ref 22–29)
ERYTHROCYTE [DISTWIDTH] IN BLOOD BY AUTOMATED COUNT: 13.1 % (ref 10–15)
GFR SERPL CREATININE-BSD FRML MDRD: 61 ML/MIN/1.73M2
GLUCOSE SERPL-MCNC: 115 MG/DL (ref 70–99)
HCT VFR BLD AUTO: 39.9 % (ref 40–53)
HGB BLD-MCNC: 11.8 G/DL (ref 13.3–17.7)
HGB BLD-MCNC: 12.6 G/DL (ref 13.3–17.7)
MAGNESIUM SERPL-MCNC: 1.3 MG/DL (ref 1.7–2.3)
MCH RBC QN AUTO: 26.9 PG (ref 26.5–33)
MCHC RBC AUTO-ENTMCNC: 31.6 G/DL (ref 31.5–36.5)
MCV RBC AUTO: 85 FL (ref 78–100)
OXYHGB MFR BLDV: 69 % (ref 92–100)
PHOSPHATE SERPL-MCNC: 3.3 MG/DL (ref 2.5–4.5)
PLATELET # BLD AUTO: 271 10E3/UL (ref 150–450)
POTASSIUM SERPL-SCNC: 4.5 MMOL/L (ref 3.4–5.3)
RBC # BLD AUTO: 4.69 10E6/UL (ref 4.4–5.9)
SODIUM SERPL-SCNC: 141 MMOL/L (ref 136–145)
TACROLIMUS BLD-MCNC: 9.8 UG/L (ref 5–15)
TME LAST DOSE: NORMAL H
TME LAST DOSE: NORMAL H
WBC # BLD AUTO: 5.2 10E3/UL (ref 4–11)

## 2023-05-22 PROCEDURE — 250N000009 HC RX 250: Performed by: INTERNAL MEDICINE

## 2023-05-22 PROCEDURE — 999N000132 HC STATISTIC PP CARE STAGE 1

## 2023-05-22 PROCEDURE — 88346 IMFLUOR 1ST 1ANTB STAIN PX: CPT | Mod: 26 | Performed by: PATHOLOGY

## 2023-05-22 PROCEDURE — 250N000011 HC RX IP 250 OP 636: Performed by: INTERNAL MEDICINE

## 2023-05-22 PROCEDURE — 85027 COMPLETE CBC AUTOMATED: CPT | Performed by: PATHOLOGY

## 2023-05-22 PROCEDURE — 85018 HEMOGLOBIN: CPT

## 2023-05-22 PROCEDURE — 80048 BASIC METABOLIC PNL TOTAL CA: CPT | Performed by: PATHOLOGY

## 2023-05-22 PROCEDURE — 93505 ENDOMYOCARDIAL BIOPSY: CPT | Mod: 26 | Performed by: INTERNAL MEDICINE

## 2023-05-22 PROCEDURE — 84100 ASSAY OF PHOSPHORUS: CPT | Performed by: PATHOLOGY

## 2023-05-22 PROCEDURE — C1751 CATH, INF, PER/CENT/MIDLINE: HCPCS | Performed by: INTERNAL MEDICINE

## 2023-05-22 PROCEDURE — 88307 TISSUE EXAM BY PATHOLOGIST: CPT | Mod: TC | Performed by: INTERNAL MEDICINE

## 2023-05-22 PROCEDURE — 80197 ASSAY OF TACROLIMUS: CPT | Performed by: NURSE PRACTITIONER

## 2023-05-22 PROCEDURE — 83735 ASSAY OF MAGNESIUM: CPT | Performed by: PATHOLOGY

## 2023-05-22 PROCEDURE — 82810 BLOOD GASES O2 SAT ONLY: CPT

## 2023-05-22 PROCEDURE — 99214 OFFICE O/P EST MOD 30 MIN: CPT | Performed by: NURSE PRACTITIONER

## 2023-05-22 PROCEDURE — C1769 GUIDE WIRE: HCPCS | Performed by: INTERNAL MEDICINE

## 2023-05-22 PROCEDURE — G0463 HOSPITAL OUTPT CLINIC VISIT: HCPCS | Performed by: NURSE PRACTITIONER

## 2023-05-22 PROCEDURE — 88350 IMFLUOR EA ADDL 1ANTB STN PX: CPT | Mod: TC | Performed by: INTERNAL MEDICINE

## 2023-05-22 PROCEDURE — 88350 IMFLUOR EA ADDL 1ANTB STN PX: CPT | Mod: 26 | Performed by: PATHOLOGY

## 2023-05-22 PROCEDURE — C1894 INTRO/SHEATH, NON-LASER: HCPCS | Performed by: INTERNAL MEDICINE

## 2023-05-22 PROCEDURE — 93505 ENDOMYOCARDIAL BIOPSY: CPT | Performed by: INTERNAL MEDICINE

## 2023-05-22 PROCEDURE — 36415 COLL VENOUS BLD VENIPUNCTURE: CPT | Performed by: PATHOLOGY

## 2023-05-22 PROCEDURE — 999N000142 HC STATISTIC PROCEDURE PREP ONLY

## 2023-05-22 PROCEDURE — 88307 TISSUE EXAM BY PATHOLOGIST: CPT | Mod: 26 | Performed by: PATHOLOGY

## 2023-05-22 PROCEDURE — 272N000001 HC OR GENERAL SUPPLY STERILE: Performed by: INTERNAL MEDICINE

## 2023-05-22 RX ORDER — LIDOCAINE 40 MG/G
CREAM TOPICAL
Status: COMPLETED | OUTPATIENT
Start: 2023-05-22 | End: 2023-05-22

## 2023-05-22 RX ORDER — MYCOPHENOLATE MOFETIL 250 MG/1
1000 CAPSULE ORAL
Qty: 240 CAPSULE | Refills: 11 | Status: SHIPPED | OUTPATIENT
Start: 2023-05-22 | End: 2023-05-30

## 2023-05-22 RX ORDER — IOPAMIDOL 755 MG/ML
INJECTION, SOLUTION INTRAVASCULAR
Status: DISCONTINUED | OUTPATIENT
Start: 2023-05-22 | End: 2023-05-22 | Stop reason: HOSPADM

## 2023-05-22 RX ORDER — MAGNESIUM SULFATE HEPTAHYDRATE 40 MG/ML
4 INJECTION, SOLUTION INTRAVENOUS ONCE
Status: COMPLETED | OUTPATIENT
Start: 2023-05-22 | End: 2023-05-22

## 2023-05-22 RX ADMIN — LIDOCAINE: 40 CREAM TOPICAL at 10:03

## 2023-05-22 RX ADMIN — MAGNESIUM SULFATE HEPTAHYDRATE 4 G: 40 INJECTION, SOLUTION INTRAVENOUS at 10:37

## 2023-05-22 ASSESSMENT — ACTIVITIES OF DAILY LIVING (ADL)
ADLS_ACUITY_SCORE: 35

## 2023-05-22 ASSESSMENT — PAIN SCALES - GENERAL: PAINLEVEL: NO PAIN (0)

## 2023-05-22 NOTE — PROGRESS NOTES
Pt on 2A post RHC; right neck is flat, dry and intact, covered in guaze dressing. Pt awake and alert, reports right neck sore but tolerable. Wife at bedside. Discharge instructions reviewed with pt and wife, stated understanding. Pt given snack diet soda and cheese stick. Copy to pt. Pt will discharge to self care with wife when dressed.

## 2023-05-22 NOTE — Clinical Note
Prepped: groin and right neck. Prepped with: ChloraPrep. The patient was draped. .Pre-procedure site marking:Insertion site not predetermined

## 2023-05-22 NOTE — PROGRESS NOTES
ADULT HEART TRANSPLANT CLINIC    HPI:   Mr. Stein is a 68 year old male who presents to clinic today for routine heart transplant follow-up. 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 His post-operative course c/b delirium, leukocytosis, staph epi bacteremia, and prolonged CT output. He had some complement deposition on repeat biopsies but no graft dysfunction or rejection so not treated. Finished IV vancomycin for staph bacteremia. Did also have a nosebleed s/p Rhino Rocket placement. Last seen in clinic 4/27/23 by Dr Dominguez.     Since last visit patient reports feeling well. He had two days of leg swelling that resolved. Wears compressions socks. Goes on frequent walks without exertional symptoms of shortness of breath, chest pain, lightheadedness. Denies any recent fever, chills, nightsweats, n/v/d, HA, mouth sores, rashes. NP at 120s/80s at home.       Assessment/Plan:  Mr. Stein is a 68 year old male who is s/p orthotopic heart transplant on 12/25/22 who presents to clinic for routine follow-up. From a transplant standpoint  he is doing very well. Gaining strength back, cognition has improved. Labs stable. Continues to have low Mg due to CNI so will adjust dose and formula today. Repeat bx with complement staining but no DSAs and not treated - graft function normal.     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 Graft function normal, other than complement deposition there is been no rejection, baseline coronary angiogram with mild native coronary disease (proximal LAD 0.2 mm).    *  Rejection history: none.   * Recent immunosuppression changes: none  * Last biopsy: 4/27/23 NER, +C4d negative C3d  * DSAs:  none  * Last Immuknow: 472 3/2023  * Intolerance to medications: none    Immunosuppression:  - MMF 1000 BID  - Tacrolimus goal 8-10, in process today  - defer to primary cardiologist re mTORI switch given mild cardiac allograft vasculopathy      PPx:  - CAV: aspirin 81 mg daily and rosuvastatin 10mg daily  - Osteoporosis: calcium/vitamin D   - CMV: s/p 3mos valcyte   - Toxo: n/a    Serostatus: CMV D+/R+ EBV D+/R+ Toxo D-/R-    # Drug induced hypoMg  Mg 1.3 today. Increase to 1200 mg bid, will trial [facility] brand to see if better absorbed     # Hypertension   Controlled on amlodipine 5 mg daily     # C/f familial cardiomyopathy  # Hx ?arrhythmogenic cardiomyopathy   Had genetic testing, including SOS2 gene (brother carries), in contact with counselor re: recs for first degrees relatives. Genetic testing negative for the 57 genes anazlyzed.     # Right axillary nonocclusive DVT, left peroneal DVT, right IJ DVT, resolved  Resolved on current US, now off DOAC     # Staph epi bacteremia  Completed antibiotics      # History of vasculitis  Follows with rheumatology          Symone Guerra DNP, NP-C  Advanced Heart Failure/Cardiac Transplant Nurse Practitioner  5/22/2023        30 minutes spent on the date of the encounter doing chart review, history and exam, documentation and further activities per the note    PAST MEDICAL HISTORY:  Past Medical History:   Diagnosis Date     Congestive heart failure (H)        FAMILY HISTORY:  Family History   Problem Relation Age of Onset     Atrial fibrillation Father      Lung Cancer Brother        SOCIAL HISTORY:  Socioeconomic History     Marital status:    Tobacco Use     Smoking status: Never     Smokeless tobacco: Never   Substance and Sexual Activity     Alcohol use: Not Currently     Drug use: Never       CURRENT MEDICATIONS:  amLODIPine  (NORVASC) 5 MG tablet, Take 1 tablet (5 mg) by mouth At Bedtime  amoxicillin (AMOXIL) 500 MG capsule, Take FOUR caps (2000 mg) one hour prior dental work while on Prednisone  aspirin (ASA) 81 MG EC tablet, Take 1 tablet (81 mg) by mouth daily  calcium carbonate-vitamin D (CALTRATE) 600-10 MG-MCG per tablet, Take 1 tablet by mouth 2 times daily (with meals)  multivitamin (ONE-DAILY) tablet, Take 1 tablet by mouth daily  polyethylene glycol (MIRALAX) 17 GM/Dose powder, Take 17 g by mouth daily as needed for constipation  rosuvastatin (CRESTOR) 10 MG tablet, Take 1 tablet (10 mg) by mouth daily  traZODone (DESYREL) 100 MG tablet, Take 1 tablet (100 mg) by mouth nightly as needed for sleep    No current facility-administered medications on file prior to visit.      ROS:  See HPI    EXAM:  /85 (BP Location: Right arm, Patient Position: Chair, Cuff Size: Adult Large)   Pulse 105   Wt 96.4 kg (212 lb 9.6 oz)   SpO2 95%   BMI 27.30 kg/m      GENERAL: Appears comfortable, in no acute distress.   HEENT: Eye symmetrical, no discharge or icterus bilaterally. Mucous membranes moist and without lesions. No thrush.   CV: Ttachycardic per baseline, +S1S2, no murmur, rub, or gallop. JVP not visible.   RESPIRATORY: Respirations regular, even, and unlabored. Lungs CTA throughout.   GI: Soft and non distended with normoactive bowel sounds present in all quadrants. No tenderness, rebound, guarding. No hepatomegaly.   EXTREMITIES: No peripheral edema. 2+ bilateral pedal pulses.   NEUROLOGIC: Alert and oriented x 3. No focal deficits.   MUSCULOSKELETAL: No joint swelling or tenderness.   SKIN: No jaundice. No rashes or lesions.     Labs - reviewed with patient in clinic today:  CBC RESULTS:  Lab Results   Component Value Date    WBC 5.2 05/22/2023    RBC 4.69 05/22/2023    HGB 12.6 (L) 05/22/2023    HCT 39.9 (L) 05/22/2023    MCV 85 05/22/2023    MCH 26.9 05/22/2023    MCHC 31.6 05/22/2023    RDW 13.1 05/22/2023      05/22/2023       CMP RESULTS:  Lab Results   Component Value Date     04/27/2023    POTASSIUM 4.3 04/27/2023    POTASSIUM 3.7 12/25/2022    POTASSIUM 3.9 11/08/2022    CHLORIDE 103 04/27/2023    CHLORIDE 103 03/30/2023    CO2 28 04/27/2023    CO2 26 11/08/2022    ANIONGAP 7 04/27/2023    ANIONGAP 9 11/08/2022     (H) 04/27/2023     (H) 01/13/2023     (H) 11/08/2022    BUN 16.7 04/27/2023    BUN 35 (H) 11/08/2022    CR 1.43 (H) 04/27/2023    GFRESTIMATED 53 (L) 04/27/2023    TIM 9.6 04/27/2023    BILITOTAL 0.3 02/03/2023    ALBUMIN 3.5 02/03/2023    ALKPHOS 64 02/03/2023    ALT 21 02/03/2023    AST 18 02/03/2023        INR RESULTS:  Lab Results   Component Value Date    INR 1.06 02/03/2023    INR 1.16 (H) 11/28/2022       LIPID RESULTS:  Lab Results   Component Value Date    CHOL 217 (H) 01/23/2023     01/23/2023    LDL 91 01/23/2023    TRIG 80 01/23/2023       IMMUNOSUPPRESSANT LEVELS:  Lab Results   Component Value Date    TACROL 9.4 04/27/2023    DOSTAC 4/26/2023 04/27/2023       No components found for: CK  Lab Results   Component Value Date    MAG 1.3 (L) 04/27/2023     Lab Results   Component Value Date    A1C 6.2 (H) 12/25/2022     Lab Results   Component Value Date    PHOS 1.6 (L) 04/27/2023     Lab Results   Component Value Date    NTBNP 4,423 (H) 11/08/2022     Lab Results   Component Value Date    SAITESTMET SA EDTA FCS 03/21/2023    SAICELL Class I 03/21/2023    [id number] None 03/21/2023    [id number] None 03/21/2023    SAIREPCOM  03/21/2023      HLA PRA Test performed by modified testing procedure that may also include pretreatment of serum. Pretreatment may be the addition of fetal calf serum, EDTA, and/or adsorption.  High-risk, MFI > 3,000.  Mod-risk, -3,000.     Lab Results   Component Value Date    SAIITESTME SA EDTA FCS 03/21/2023    SAIICELL Class II 03/21/2023    [id number] Invalid. See SCN results. 03/21/2023    [id number] Invalid. See SCN results.  03/21/2023    SAIIREPCOM  03/21/2023     Additional testing performed on current date to address potential testing artifact. HLA PRA Test performed by modified testing procedure that may also include pretreatment of serum. Pretreatment may be the addition of fetal calf serum, EDTA, and/or adsorption.  High-risk, MFI > 3,000.  Mod-risk, -3,000.     No results found for: CSPEC, CMQNT, CMLOG    Diagnostic Studies:  RHC 4/27/23  RAP: --/--/2  RV: 25/2  PAP: 25/5 (12)  PCWP: --/--/5    SvO2: 66.2%  SpO2: 95%  Hgb: 10.6  MAP: 124    Carolyn CO: 6.5 LPM  Carolyn CI: 3 L/min^m2    Thermodilution CO: 5.6 LPM  Thermodilution CI: 2.6 L/min^m2  PVR: 1.1 (CAROLYN) UREÑA Right sided filling pressures are normal. Left sided filling pressures are normal. Normal PA pressures. Normal cardiac output level. Hemodynamic data has been modified in Epic per physician review.    TTE 3/20/23  Global and regional left ventricular function is normal with an EF of 60-65%.  Right ventricular function, chamber size, wall motion, and thickness are normal.  No pericardial effusion is present.    Cor angio 1/23/23  Angiographically normal coronary arteries.  IVUS of the left main, LAD.  Eccentric mild non obstructive native coronary artery disease with MIGUEL ÁNGEL of left main of 1.1 mm and MIGUEL ÁNGEL of proximal LAD is 0.2 mm.          YAQUELIN MCFARLANE              Answers for HPI/ROS submitted by the patient on 5/15/2023  General Symptoms: No  Skin Symptoms: No  HENT Symptoms: No  EYE SYMPTOMS: No  HEART SYMPTOMS: No  LUNG SYMPTOMS: No  INTESTINAL SYMPTOMS: No  URINARY SYMPTOMS: No  REPRODUCTIVE SYMPTOMS: No  SKELETAL SYMPTOMS: No  BLOOD SYMPTOMS: No  NERVOUS SYSTEM SYMPTOMS: Yes  MENTAL HEALTH SYMPTOMS: No  Trouble with coordination: No  Dizziness or trouble with balance: No  Fainting or black-out spells: No  Memory loss: No  Headache: No  Seizures: No  Speech problems: No  Tingling: No  Tremor: Yes  Weakness: Yes  Difficulty walking: Yes

## 2023-05-22 NOTE — NURSING NOTE
Chief Complaint   Patient presents with     Follow Up     Return Heart Transplant 5 months     Vitals were taken and medications reconciled.    Gage Joseph, EMT  8:31 AM

## 2023-05-22 NOTE — PATIENT INSTRUCTIONS
"Please call your transplant coordinator at [phone] with any questions or concerns.  Please note: after hours, weekends and holidays, this phone number is routed to an  to page out the coordinator on call.     Coordinator will update you on remaining results.     Next lab draw: Pending today's level     Increase magnesium to 1200 mg twice daily - will send prescription for \"[facility]\" to the clinic pharmacy.      Return June 29th as scheduled   "

## 2023-05-22 NOTE — DISCHARGE INSTRUCTIONS
[facility]                        Interventional Cardiology  Discharge Instructions   Post Right Heart Cath  And Biopsy      AFTER YOU GO HOME:  DO drink plenty of fluids  DO resume your regular diet and medications unless otherwise instructed by your Primary Physician  Do Not scrub the procedure site vigorously  No lotion or powder to the puncture site for 3 days    CALL YOUR PRIMARY PHYSICIAN IF: You may resume all normal activity.  Monitor neck site for bleeding, swelling, or voice changes. If you notice bleeding or swelling immediately apply pressure to the site and call number below to speak with Cardiology Fellow.  If you experience any changes in your breathing you should call your doctor immediately or come to the closest Emergency Department.  Do not drive yourself.    ADDITIONAL INSTRUCTIONS: Medications: You are to resume all home medications including anticoagulation therapy unless otherwise advised by your primary cardiologist or nurse coordinator.    Follow Up: Per your primary cardiology team    If you have any questions or concerns regarding your procedure site please call [phone] at anytime and ask for Cardiology Fellow on call.  They are available 24 hours a day.  You may also contact the Cardiology Clinic after hours number at [phone].                                                       Telephone Numbers [phone] Monday-Friday 8:00 am to 4:30 pm    [phone] After 4:30 pm Monday-Friday, Weekends & Holidays  Ask for Interventional Cardiologist on call. Someone is on call 24 hours/day   [facility] toll free number [phone] Monday-Friday 8:00 am to 4:30 pm   [facility] Emergency Dept [phone]

## 2023-05-22 NOTE — PROGRESS NOTES
Pt is here for Heart cath and biopsy.  Pt is stable. AVSS and pain free.  Pt is Tan[place]. Prep completed. Consent is signed. PIV in and SL.   Both Groin and R internal jugular are prepped since both were used in the past for his RHC+ Biopsy.  Spouse Arlette, is here. Her Cell # [phone]    Mag=1.3. Provider notified. Replacement is ordered.    Mag Sulfate 4gm infusing.

## 2023-05-22 NOTE — Clinical Note
5/22/2023      RE: Paco Stein  [address]  [place] [address]       Dear Colleague,    Thank you for the opportunity to participate in the care of your patient, Paco Stein, at the [facility] [place] [facility]. Please see a copy of my visit note below.    ADULT HEART TRANSPLANT CLINIC    HPI:   Mr. Stein is a 68 year old male who presents to clinic today for routine heart transplant follow-up. 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 His post-operative course c/b delirium, leukocytosis, staph epi bacteremia, and prolonged CT output. He had some complement deposition on his pathology but no graft dysfunction or rejection so not treated. Finished IV vancomycin for staph bacteremia. Did also have a nosebleed s/p Rhino Rocket placement. Last seen in clinic *** by Dr Dominguez.     Compression socks  Two days leg swelling  Walking   Twice a weeks  120s/80s    Night       Patient denies fever, chills, night sweats, oral lesions, rashes, lightheadedness, dizziness, headaches, chest pain, palpitations, nausea, vomiting, diarrhea.    Assessment/Plan:  Mr. Stein is a 68 year old male who is s/p orthotopic heart transplant on *** who presents to clinic for routine follow-up. ***    # s/p OHT 12/25/22 for ARVC  # Immunosuppression  # Mild cardiac allograft vasculopathy   Post-operative course c/b delirium, leukocytosis, staph epi bacteremia, and prolonged CT output. Graft function normal, other than complement deposition there is been no rejection, baseline coronary angiogram with mild native coronary disease (proximal LAD 0.2 mm).    * Rejection history: none.   * Recent immunosuppression changes: none  * Last biopsy: 4/27/23 NER,  "+C4d negative C3d  * DSAs:  none  * Last Immuknow: 472 3/2023  * Intolerance to medications: none    Immunosuppression:  - MMF 1000 BID  - Tacrolimus goal 8-10, in process today  - defer to primary cardiologist re\" mTORI switch given mild cardiac allograft vasculopathy      PPx:  - CAV:  aspirin 81 mg daily and rosuvastatin 10mg daily  - Osteoporosis: calcium/vitamin D supplements  - CMV: s/p valcyte 900mg daily (until 3/25)   - Toxo: n/a    Serostatus: CMV D+/R+ EBV D+/R+ Toxo D-/R-    # Drug induced hypoMg  Mg *** today.      # Hypertension   Controlled on amlodipine 5 mg daily     # C/f familial cardiomyopathy  Had genetic testing, including SOS2 gene (brother carries), in contact with counselor re: joanna for first degrees relatives. Genetic testing negative for the 57 genes anazlyzed.     # Right axillary nonocclusive DVT, left peroneal DVT, right IJ DVT, resolved  Resolved on current US, now off DOAC     # Staph epi bacteremia  Completed antibiotics      # History of vasculitis  Has appt with rheumatology later this week    # Insomnia  Remains on trazodone         Symone Guerra, PAOLA, NP-C  Advanced Heart Failure/Cardiac Transplant Nurse Practitioner  5/22/2023        30 minutes spent on the date of the encounter doing chart review, history and exam, documentation and further activities per the note    PAST MEDICAL HISTORY:  Past Medical History:   Diagnosis Date     Congestive heart failure (H)        FAMILY HISTORY:  Family History   Problem Relation Age of Onset     Atrial fibrillation Father      Lung Cancer Brother        SOCIAL HISTORY:  Social History     Socioeconomic History     Marital status:    Tobacco Use     Smoking status: Never     Smokeless tobacco: Never   Substance and Sexual Activity     Alcohol use: Not Currently     Drug use: Never       CURRENT MEDICATIONS:  amLODIPine (NORVASC) 5 MG tablet, Take 1 tablet (5 mg) by mouth At Bedtime  amoxicillin (AMOXIL) 500 MG capsule, Take FOUR caps " (2000 mg) one hour prior dental work while on Prednisone  aspirin (ASA) 81 MG EC tablet, Take 1 tablet (81 mg) by mouth daily  calcium carbonate-vitamin D (CALTRATE) 600-10 MG-MCG per tablet, Take 1 tablet by mouth 2 times daily (with meals)  magnesium oxide (MAG-OX) 400 MG tablet, Take 2 tablets (800 mg) by mouth 2 times daily  multivitamin (ONE-DAILY) tablet, Take 1 tablet by mouth daily  mycophenolate (GENERIC EQUIVALENT) 250 MG capsule, Take 4 capsules (1,000 mg) by mouth 2 times daily  polyethylene glycol (MIRALAX) 17 GM/Dose powder, Take 17 g by mouth daily as needed for constipation  rosuvastatin (CRESTOR) 10 MG tablet, Take 1 tablet (10 mg) by mouth daily  tacrolimus (GENERIC EQUIVALENT) 0.5 MG capsule, Take ONE cap with THREE 1 mg caps (3.5 mg) every PM  tacrolimus (GENERIC EQUIVALENT) 1 MG capsule, Take FOUR caps every AM (4 mg) and THREE caps with ONE 0.5 mg cap every PM (3.5 mg)  tacrolimus (GENERIC EQUIVALENT) 5 MG capsule, On hold due to dose change (Patient not taking: Reported on 4/27/2023)  traZODone (DESYREL) 100 MG tablet, Take 1 tablet (100 mg) by mouth nightly as needed for sleep    No current facility-administered medications on file prior to visit.      ROS:  See HPI    EXAM:  There were no vitals taken for this visit.    GENERAL: Appears comfortable, in no acute distress.   HEENT: Eye symmetrical, no discharge or icterus bilaterally. Mucous membranes moist and without lesions. No thrush.   CV: RRR***, +S1S2, *** murmur, rub, or gallop. JVP ***.   RESPIRATORY: Respirations regular, even, and unlabored. Lungs CTA throughout.   GI: Soft*** and non distended with normoactive bowel sounds present in all quadrants. No tenderness, rebound, guarding. No hepatomegaly.   EXTREMITIES: *** peripheral edema. 2+ bilateral pedal pulses.   NEUROLOGIC: Alert and oriented x 3. No focal deficits.   MUSCULOSKELETAL: No joint swelling or tenderness.   SKIN: No jaundice. No rashes or lesions.     Labs - reviewed  with patient in clinic today:  CBC RESULTS:  Lab Results   Component Value Date    WBC 5.2 05/22/2023    RBC 4.69 05/22/2023    HGB 12.6 (L) 05/22/2023    HCT 39.9 (L) 05/22/2023    MCV 85 05/22/2023    MCH 26.9 05/22/2023    MCHC 31.6 05/22/2023    RDW 13.1 05/22/2023     05/22/2023       CMP RESULTS:  Lab Results   Component Value Date     04/27/2023    POTASSIUM 4.3 04/27/2023    POTASSIUM 3.7 12/25/2022    POTASSIUM 3.9 11/08/2022    CHLORIDE 103 04/27/2023    CHLORIDE 103 03/30/2023    CO2 28 04/27/2023    CO2 26 11/08/2022    ANIONGAP 7 04/27/2023    ANIONGAP 9 11/08/2022     (H) 04/27/2023     (H) 01/13/2023     (H) 11/08/2022    BUN 16.7 04/27/2023    BUN 35 (H) 11/08/2022    CR 1.43 (H) 04/27/2023    GFRESTIMATED 53 (L) 04/27/2023    TIM 9.6 04/27/2023    BILITOTAL 0.3 02/03/2023    ALBUMIN 3.5 02/03/2023    ALKPHOS 64 02/03/2023    ALT 21 02/03/2023    AST 18 02/03/2023        INR RESULTS:  Lab Results   Component Value Date    INR 1.06 02/03/2023    INR 1.16 (H) 11/28/2022       LIPID RESULTS:  Lab Results   Component Value Date    CHOL 217 (H) 01/23/2023     01/23/2023    LDL 91 01/23/2023    TRIG 80 01/23/2023       IMMUNOSUPPRESSANT LEVELS:  Lab Results   Component Value Date    TACROL 9.4 04/27/2023    DOSTAC 4/26/2023 04/27/2023       No components found for: CK  Lab Results   Component Value Date    MAG 1.3 (L) 04/27/2023     Lab Results   Component Value Date    A1C 6.2 (H) 12/25/2022     Lab Results   Component Value Date    PHOS 1.6 (L) 04/27/2023     Lab Results   Component Value Date    NTBNP 4,423 (H) 11/08/2022     Lab Results   Component Value Date    SAITESTMET SA EDTA FCS 03/21/2023    SAICELL Class I 03/21/2023    [id number] None 03/21/2023    [id number] None 03/21/2023    SAIREPCOM  03/21/2023      HLA PRA Test performed by modified testing procedure that may also include pretreatment of serum. Pretreatment may be the addition of fetal calf serum,  EDTA, and/or adsorption.  High-risk, MFI > 3,000.  Mod-risk, -3,000.     Lab Results   Component Value Date    SAIITESTME SA EDTA FCS 03/21/2023    SAIICELL Class II 03/21/2023    [id number] Invalid. See SCN results. 03/21/2023    [id number] Invalid. See SCN results. 03/21/2023    SAIIREPCOM  03/21/2023     Additional testing performed on current date to address potential testing artifact. HLA PRA Test performed by modified testing procedure that may also include pretreatment of serum. Pretreatment may be the addition of fetal calf serum, EDTA, and/or adsorption.  High-risk, MFI > 3,000.  Mod-risk, -3,000.     No results found for: CSPEC, CMQNT, CMLOG    Diagnostic Studies:  RHC 4/27/23  RAP: --/--/2  RV: 25/2  PAP: 25/5 (12)  PCWP: --/--/5    SvO2: 66.2%  SpO2: 95%  Hgb: 10.6  MAP: 124    Carolyn CO: 6.5 LPM  Carolyn CI: 3 L/min^m2    Thermodilution CO: 5.6 LPM  Thermodilution CI: 2.6 L/min^m2  PVR: 1.1 (CAROLYN) UREÑA Right sided filling pressures are normal. Left sided filling pressures are normal. Normal PA pressures. Normal cardiac output level. Hemodynamic data has been modified in Epic per physician review.    TTE 3/20/23  Global and regional left ventricular function is normal with an EF of 60-65%.  Right ventricular function, chamber size, wall motion, and thickness are normal.  No pericardial effusion is present.    Cor angio 1/23/23  Angiographically normal coronary arteries.  IVUS of the left main, LAD.  Eccentric mild non obstructive native coronary artery disease with MIGUEL ÁNGEL of left main of 1.1 mm and MIGUEL ÁNGEL of proximal LAD is 0.2 mm.          YAQUELIN MCFARLANE              Answers for HPI/ROS submitted by the patient on 5/15/2023  General Symptoms: No  Skin Symptoms: No  HENT Symptoms: No  EYE SYMPTOMS: No  HEART SYMPTOMS: No  LUNG SYMPTOMS: No  INTESTINAL SYMPTOMS: No  URINARY SYMPTOMS: No  REPRODUCTIVE SYMPTOMS: No  SKELETAL SYMPTOMS: No  BLOOD SYMPTOMS: No  NERVOUS SYSTEM SYMPTOMS: Yes  MENTAL  HEALTH SYMPTOMS: No  Trouble with coordination: No  Dizziness or trouble with balance: No  Fainting or black-out spells: No  Memory loss: No  Headache: No  Seizures: No  Speech problems: No  Tingling: No  Tremor: Yes  Weakness: Yes  Difficulty walking: Yes          Please do not hesitate to contact me if you have any questions/concerns.     Sincerely,     SHAKIR Syed CNP

## 2023-05-23 DIAGNOSIS — Z94.1 HEART REPLACED BY TRANSPLANT (H): Primary | ICD-10-CM

## 2023-05-23 NOTE — NURSING NOTE
"Transplant Coordinator Note  Reason for visit Month 5 post transplant      Health concerns addressed today  Pt with wife seen in clinic with PAOLA Guerra. DNP reviewed meds and available labs. Biopsy to follow. Pt reports doing well; BP WNL on Amlodipine, gaining weight and strength. Looking forward to gardening (with a mask) and working out with a . No weight or workout restrictions related to his transplant.  Wife feels he is cognitively improved. Reports two days of leg swelling; wearing compression socks.       Mag conts low 1.3  DNP increased dose and switch to Dr Quiroga.       Immunosuppressants  MMF 1000/1000 mg  Tac 4/3.5 mg  - goal 8-10. Level pending        Routine screenings  Derm to be yearly   Dental In process   Colonoscopy  Nov 2022 (no specimens)   Prostate 1.63  Eye to be yearly   Flu Oct 2021 -> due after 100 days post transplant   Pneumonia UTD  COVID last dose Nov 2021      Medication record reviewed and reconciled. Questions and concerns addressed. AVS reviewed and copy provided. Pt verbalized an understanding of plan of care.      Patient Instructions  ~Please call your transplant coordinator at [phone] with any questions or concerns.  Please note: after hours, weekends and holidays, this phone number is routed to an  to page out the coordinator on call.   ~Coordinator will update you on remaining results.   ~Next lab draw: Pending today's level   ~Increase magnesium to 1200 mg twice daily - will send prescription for \"[facility]'s [facility]\" to the clinic pharmacy.    ~Return June 29th as scheduled   "

## 2023-05-23 NOTE — LETTER
[facility] lab   Fax [phone]    May 23, 2023    Patient Name: Paco Stein   :  1955   MRN: [id number]  ICD10:  z94.1 , z79.899    Subject: Request for Labs    Paco Stein is a heart transplant patient currently being followed by the [facility].  We would like to request your assistance in obtaining the following laboratory tests which are part of our routine surveillance program for heart transplant recipients.  (Items needed are checked.)    Please fax the lab results as soon as they are available to:   Thoracic Transplant Department  Fax Number:  [phone]     Item Frequency   x Basic metabolic panel (including:  BUN,   Serum Creatinine, Sodium, Potassium, Chloride,   CO2, Calcium, and Glucose) ~2023 and every two week if medication changes    x Magnesium, Phosphorus ~2023 and every two week if medication changes      Thank you  for your continued support and the opportunity to collaborate in the care of this patient.  If you have any questions, please call the Thoracic Transplant Team at [phone] or [phone].      Myrtle Dominguez M.D.  Division of Cardiology   of Medicine

## 2023-05-24 ENCOUNTER — OFFICE VISIT (OUTPATIENT)
Dept: RHEUMATOLOGY | Facility: CLINIC | Age: 68
End: 2023-05-24
Attending: INTERNAL MEDICINE
Payer: MEDICARE

## 2023-05-24 VITALS
TEMPERATURE: 98.9 F | SYSTOLIC BLOOD PRESSURE: 117 MMHG | OXYGEN SATURATION: 96 % | DIASTOLIC BLOOD PRESSURE: 82 MMHG | WEIGHT: 212.7 LBS | HEART RATE: 104 BPM | BODY MASS INDEX: 27.31 KG/M2

## 2023-05-24 DIAGNOSIS — Z94.1 HEART REPLACED BY TRANSPLANT (H): ICD-10-CM

## 2023-05-24 DIAGNOSIS — I77.6 VASCULITIS (H): ICD-10-CM

## 2023-05-24 DIAGNOSIS — Z79.899 ENCOUNTER FOR LONG-TERM (CURRENT) USE OF HIGH-RISK MEDICATION: ICD-10-CM

## 2023-05-24 LAB
ALLOMAP SCORE (EXTERNAL): 27 (ref 0–40)
NEGATIVE PREDICTIVE VALUE PERCENT (EXTERNAL): 98.7 %
POSITIVE PREDICTIVE VALUE PERCENT (EXTERNAL): 3.4 %

## 2023-05-24 PROCEDURE — G0463 HOSPITAL OUTPT CLINIC VISIT: HCPCS | Performed by: INTERNAL MEDICINE

## 2023-05-24 PROCEDURE — 99205 OFFICE O/P NEW HI 60 MIN: CPT | Performed by: INTERNAL MEDICINE

## 2023-05-24 ASSESSMENT — PAIN SCALES - GENERAL: PAINLEVEL: NO PAIN (0)

## 2023-05-24 NOTE — PROGRESS NOTES
Rheumatology Clinic Visit  [facility]  Jose Liu M.D.     Paco Stein MRN# [id number]   YOB: 1955 Age: 68 year old   Date of Visit: 05/24/2023  Primary care provider: Quentin Odonnell          Assessment and Plan:     # Nonischemic cardiomyopathy, status post orthotopic heart transplant December 25, 2022: doing well, followed by Dr. 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 Cognition and affect and responsiveness have all reportedly improved since undergoing heart transplant.  Exam today shows an alert, lively gentleman with mild dysarthria, broad-based gait, and intermittent brief crying episodes.  There is coarse bilateral upper extremity tremor.    Laboratory evaluation on May 22, 2023 showed creatinine of 1.27 down from 1.3.  Patient magnesium was low at 1.3 as well as phosphorus at 1.6.  CBC showed hemoglobin 12.6, improved and normal white count and platelets.  Recent AST and ALT were normal; CRP was 7.  In December proBNP was > 2k.    Imaging:   OSH MRI brain March 2023 showed: A new tiny region of acute restricted diffusion in the right frontal lobe white matter superimposed on rather extensive bilateral chronic white matter T2 hyperintensities. There may be one additional acute to subacute area of ischemia/restricted    diffusion in the right parietal-occipital junction as described. Post contrast images show some minimal linear 3 mm area of associated enhancement along the margin of the new right frontal lobe white matter lesion.    Discussion: I agree with Dr. Khan, neurology.  Primary angiitis of the CNS is inactive based on steadily improving cognition, affect, and no progression of prior motor or  sensory defects, and based on brain imaging that has shown no significant progression or new lesions that correlate with clinical referents.  Recent onset tremor is likely caused by use of tacrolimus, and I expect that it will improve as tacrolimus dose gradually decreases as part of the posttransplant taper.  I am in agreement with continued brain MRI imaging, as primary angiitis of the CNS can recur or relapse, and may do so without dramatic or sudden neurologic worsening.  I think that MRI of the brain should be repeated every 6 to 9 months.  I do not think that additional immunomodulatory therapy is warranted; patient uses moderate dose mycophenolate (2000 mg daily) as a part of antirejection regimen.  The dose of mycophenolate used in this regimen is appropriate for suppressing and reducing the likelihood of relapse of CNS vasculitis.  While using mycophenolate, patient should undergo every 4-month measurement of CBC with platelets, AST, ALT, and albumin.    RTC October 12 or 13, 2023 after followup MRI brain    Jose Liu MD  Staff Rheumatologist, [facility]    On the day of the encounter, a total of 75 minutes was spent in chart review, and in counseling and coordination of care, regarding the patient's complex medical problem of primary angiitis of the CNS, and orthotopic heart transplant.           History of Present Illness:   Paco Stein presents for evaluation of possible vasculitis.  Referred by Dr. Dominguez.    Initial history May 23, 2023    At today's visit, patient is able to communicate at a high level and his wife is extremely helpful in adding details of past medical history as well as providing observations about patient's progress over many months.    Patient was seen by provider Charlie on May 22, 2023 in follow-up of heart transplant.  Impression was of orthotopic heart transplant performed in December 2022.  Transplant was performed for nonischemic cardiomyopathy with sustained  ventricular tachycardia.  Comorbidities include hypertension, coronary artery disease status post percutaneous angioplasty in 2013, hyperlipidemia, and CKD.  Complement protein deposits on recent endomyocardial biopsy had been observed but no graft rejection or dysfunction was noted. Current immunosuppression included mycophenolate 1000 mg twice daily, tacrolimus 3.5 mg daily.  No prednisone with current regimen.    Patient was seen by [facility] rheumatologist Dr. Lyles in [place] on July 22, 2022. Rheum History reviewed: Patient presented with stroke in 2016 with weakness and pseudo bulbar involvement resulting in change in affect with emotional instability. He was eventually diagnosed with CNS vasculitis and was treated with mycophenolate after a course of cyclophosphamide.     He had been initially on mycophenolate 2000 mg daily. He had been stable and this was decreased to 1500 mg but in 2021 there was concern with some radiographic worsening and he was increased back to 2000 mg. Repeat brain MRI 7-22 again revealed stability compared with the past 2 MRI studies. Dr. Lyles's impression was of CNS vasculitis, stable on moderate dose mycophenolate.  Recommendation was to continue mycophenolate 1000 mg twice daily.    Today, Dr. Stein and his spouse Arlette relate the series of events leading up to the diagnosis of CNS vasculitis. Spouse states that patient suddenly developed slurred speech and incoordination in Fall 2016.  Initial neurology evaluation revealed multifocal cerebrovascular accident as likely cause of the symptoms. however over the ensuing several months, patient had worsening neurologic symptoms, and subsequent imaging showed increasing ischemic cerebrovascular accident lesions.  He was evaluated at [facility], where a diagnosis of primary CNS angiitis was established.  I do not have primary [facility] records to review today. No brain biopsy was performed. He was treated with  intravenous Cytoxan and high-dose corticosteroids.  His neurologic status apparently improved and stabilized. He states that he has had no further cerebrovascular accident either by symptoms or by MRI.  After 2 years of cycling cyclophosphamide, he was transitioned to mycophenolate in .  Moderate dose mycophenolate was continued under the supervision of rheumatology and neurology in [place] until the present time.    In 2021 he had an episode of loss of consciousness, after which he was diagnosed with nonischemic cardiomyopathy and an ejection fraction of 18%.  Initially there was concern that previous chemotherapy with cytoxan was a contributor to the cardiomyopathy.  However a genetic form of nonischemic cardiomyopathy was ultimately suspected after comparing the patient's myocardial histopathology with that of the cardiac biopsies obtained from patient's younger a brother who underwent orthotopic heart transplant, and from a second brother who  in his mid 40s from cardiomyopathy.  Despite maximal medical treatment, patient continued with low ejection fraction and declining physical function through .  He was confined to the intensive care unit for many weeks in late  with advanced heart failure, and received orthotopic heart transplant on 2022.    Today the patient and his spouse state that he is overall much better than prior to heart transplant in 2022.  Not only are his exercise tolerance and energy level markedly improved; neurologic symptoms and cognitive function are also better.  Patient's spouse states that he had a very flat affect and muted personality in the weeks leading up to transplant.  Now he is much more lively and interactive and cognitive capacity is significantly improved.  His wife notes that formerly as a consequence of CNS vasculitis, patient experienced pseudobulbar affect of disorder, which has resulted in emotional displays that do not  match the patient's stated mood or current thinking.  For example, patient may frequently have brief crying episodes at a time when he states he is happy or in a moment of laughter.  Patient and his spouse state that such emotional displays have reduced in frequency and duration since the heart transplant.  He does have continuing easy fatigability, and tasks requiring large muscle bulk and tone such as rising from a chair without the aid of his arms, are still difficult and limited.  He also has developed a resting and intention tremor in the right greater than left hands in association with starting tacrolimus as part of his transplant antirejection regimen.  However, he and his spouse deny that new vision changes, unilateral weakness, slurred speech, or sensory symptoms have occurred or worsened since transplant.         Review of Systems:     Constitutional: negative  Skin: negative  Eyes: negative  Ears/Nose/Throat: negative  Respiratory: No shortness of breath, dyspnea on exertion, cough, or hemoptysis  Cardiovascular: negative  Gastrointestinal: negative  Genitourinary: negative  Musculoskeletal: negative  Neurologic: negative  Psychiatric: negative  Hematologic/Lymphatic/Immunologic: negative  Endocrine: negative         Active Problem List:     Patient Active Problem List    Diagnosis Date Noted     Status post coronary angiogram 01/23/2023     Priority: Medium     Staphylococcus epidermidis bacteremia 01/09/2023     Priority: Medium     Using prophylactic antibiotic on daily basis 01/09/2023     Priority: Medium     Benign neoplasm of colon 12/12/2022     Priority: Medium     Troponin level elevated 12/06/2022     Priority: Medium     Anginal equivalent (H) 12/06/2022     Priority: Medium     Heart failure, unspecified HF chronicity, unspecified heart failure type (H) 12/06/2022     Priority: Medium     Acute embolism and thrombosis of left peroneal vein (H) 11/22/2022     Priority: Medium     Acute on  chronic combined systolic (congestive) and diastolic (congestive) heart failure (H) 11/14/2022     Priority: Medium     CHF (congestive heart failure) (H) 11/10/2022     Priority: Medium     Acute respiratory failure with hypoxia (H) 11/08/2022     Priority: Medium     Coronary artery disease involving native coronary artery of native heart without angina pectoris 11/08/2022     Priority: Medium     Essential hypertension 11/08/2022     Priority: Medium     NSVT (nonsustained ventricular tachycardia) (H) 11/08/2022     Priority: Medium     SOB (shortness of breath) 11/08/2022     Priority: Medium     Syncope and collapse 10/07/2021     Priority: Medium     Cardiomyopathy (H) 10/06/2021     Priority: Medium     Primary central nervous system vasculitis (H) 11/30/2017     Priority: Medium     Vasculitis, CNS (H) 11/30/2017     Priority: Medium     Stroke (H) 09/28/2016     Priority: Medium     Hyperlipidemia 08/08/2013     Priority: Medium            Past Medical History:     Past Medical History:   Diagnosis Date     Congestive heart failure (H)      Past Surgical History:   Procedure Laterality Date     COLONOSCOPY N/A 11/17/2022    Procedure: COLONOSCOPY;  Surgeon: Roverto Nino MD;  Location:  GI     CV CORONARY ANGIOGRAM N/A 11/11/2022    Procedure: Coronary Angiogram;  Surgeon: Justo Bush MD;  Location: [facility] CARDIAC CATH LAB     CV CORONARY ANGIOGRAM N/A 1/23/2023    Procedure: Coronary Angiogram - biplane;  Surgeon: Justo Bush MD;  Location:  HEART CARDIAC CATH LAB     CV HEART BIOPSY N/A 01/09/2023    Procedure: Heart Cath Heart Biopsy;  Surgeon: Tab Agee MD;  Location:  HEART CARDIAC CATH LAB     CV HEART BIOPSY N/A 01/02/2023    Procedure: Heart Biopsy;  Surgeon: Ghulam Mercado MD;  Location:  HEART CARDIAC CATH LAB     CV HEART BIOPSY N/A 1/23/2023    Procedure: Heart Cath Heart Biopsy;  Surgeon: Justo Bush MD;   [facility]     CV HEART BIOPSY N/A 1/17/2023    Procedure: Heart Cath Heart Biopsy;  Surgeon: Wayne Xavier MD;  Location: [facility] HEART CARDIAC CATH LAB     CV HEART BIOPSY N/A 2/9/2023    Procedure: Heart Cath Heart Biopsy;  Surgeon: Ghulam Mercado MD;  [facility]: [facility] HEART CARDIAC CATH LAB     CV HEART BIOPSY N/A 2/21/2023    Procedure: Heart Biopsy;  Surgeon: Wayne Xavier MD;  [facility]: [facility] HEART CARDIAC CATH LAB     CV HEART BIOPSY N/A 3/9/2023    Procedure: Heart Cath Heart Biopsy;  Surgeon: Tab Agee MD;  Location:  HEART CARDIAC CATH LAB     CV HEART BIOPSY N/A 3/21/2023    Procedure: Heart Cath Heart Biopsy;  Surgeon: Ghulam Mercado MD;  Location:  HEART CARDIAC CATH LAB     CV HEART BIOPSY N/A 4/27/2023    Procedure: Heart Cath Heart Biopsy;  Surgeon: Justo Bush MD;  Location:  HEART CARDIAC CATH LAB     CV HEART BIOPSY N/A 5/22/2023    Procedure: Heart Cath Heart Biopsy;  Surgeon: Wayne Xavier MD;  Location:  HEART CARDIAC CATH LAB     CV INTRAVASULAR ULTRASOUND N/A 1/23/2023    Procedure: Intravascular Ultrasound;  Surgeon: Justo Bush MD;  Location:  HEART CARDIAC CATH LAB     CV RIGHT HEART CATH MEASUREMENTS RECORDED N/A 11/11/2022    Procedure: Right Heart Catheterization;  Surgeon: Justo Bush MD;  Location:  HEART CARDIAC CATH LAB     CV RIGHT HEART CATH MEASUREMENTS RECORDED N/A 12/08/2022    Procedure: Right Heart Catheterization;  Surgeon: Wayne Xavier MD;  Location:  HEART CARDIAC CATH LAB     CV RIGHT HEART CATH MEASUREMENTS RECORDED N/A 12/09/2022    Procedure: Right Heart Catheterization with leave-in [place];  Surgeon: Khari Mcneill MD;  Location:  HEART CARDIAC CATH LAB     CV RIGHT HEART CATH MEASUREMENTS RECORDED N/A 01/09/2023    Procedure: Heart Cath Right Heart Cath;  Surgeon: Tab Agee MD;  Location:  HEART CARDIAC CATH LAB      CV RIGHT HEART CATH MEASUREMENTS RECORDED N/A 01/02/2023    Procedure: Right Heart Catheterization;  Surgeon: Ghulam Mercado MD;  Location:  HEART CARDIAC CATH LAB     CV RIGHT HEART CATH MEASUREMENTS RECORDED N/A 1/23/2023    Procedure: Right Heart Catheterization;  Surgeon: Justo Bush MD;  Location:  HEART CARDIAC CATH LAB     CV RIGHT HEART CATH MEASUREMENTS RECORDED N/A 1/17/2023    Procedure: Right Heart Catheterization;  Surgeon: Wayne Xavier MD;  Location:  HEART CARDIAC CATH LAB     CV RIGHT HEART CATH MEASUREMENTS RECORDED N/A 2/9/2023    Procedure: Right Heart Catheterization;  Surgeon: Ghulam Mercado MD;  Location:  HEART CARDIAC CATH LAB     CV RIGHT HEART CATH MEASUREMENTS RECORDED N/A 2/21/2023    Procedure: Right Heart Catheterization;  Surgeon: Wayne Xavier MD;  Location:  HEART CARDIAC CATH LAB     CV RIGHT HEART CATH MEASUREMENTS RECORDED N/A 3/9/2023    Procedure: Right Heart Catheterization;  Surgeon: Tab Agee MD;  Location:  HEART CARDIAC CATH LAB     CV RIGHT HEART CATH MEASUREMENTS RECORDED N/A 3/21/2023    Procedure: Right Heart Catheterization;  Surgeon: Ghulam Mercado MD;  Location:  HEART CARDIAC CATH LAB     CV RIGHT HEART CATH MEASUREMENTS RECORDED N/A 4/27/2023    Procedure: Right Heart Catheterization;  Surgeon: Justo Bush MD;  Location:  HEART CARDIAC CATH LAB     CV RIGHT HEART CATH MEASUREMENTS RECORDED N/A 5/22/2023    Procedure: Right Heart Catheterization;  Surgeon: Wayne Xavier MD;  Location: [facility] CARDIAC CATH LAB     PICC Right 01/12/2023    placed in basilic and no problem     PICC DOUBLE LUMEN PLACEMENT Right 11/10/2022    Right basilic, 43 cm, 1 cm external length     TRANSPLANT HEART RECIPIENT N/A 12/25/2022    Procedure: TRANSPLANT, HEART, RECIPIENT; Median Sternotomy, Cardiopulmonary Bypass, Removal of Pacemaker;  Surgeon: Brendan Rodriguez MD;  Location:  [facility] OR     #1 primary angiitis of the CNS  2.  Nonischemic cardiomyopathy, heart replaced by transplant 2022       Social History:     Social History     Socioeconomic History     Marital status:      Spouse name: Not on file     Number of children: Not on file     Years of education: Not on file     Highest education level: Not on file   Occupational History     Not on file   Tobacco Use     Smoking status: Never     Smokeless tobacco: Never   Vaping Use     Vaping status: Never Used   Substance and Sexual Activity     Alcohol use: Not Currently     Drug use: Never     Sexual activity: Not on file   Other Topics Concern     Not on file   Social History Narrative     Not on file     Social Determinants of Health     Financial Resource Strain: Not on file   Food Insecurity: Not on file   Transportation Needs: Not on file   Physical Activity: Not on file   Stress: Not on file   Social Connections: Not on file   Intimate Partner Violence: Not on file   Housing Stability: Not on file       Retired emergency room physician  1 child has non-Hodgkin's lymphoma  Does not smoke or drink.       Family History:     Family History   Problem Relation Age of Onset     Atrial fibrillation Father      Lung Cancer Brother      2 brothers have cardiomyopathy; 1  in his mid 40s, and the other received heart transplant.    No history of autoimmunity, rheumatic disease.    Mother  in mid 90s, had osteoarthritis  Father  in mid 90s no rheumatic or autoimmune disease.       Allergies:   No Known Allergies         Medications:     Current Outpatient Medications   Medication Sig Dispense Refill      magnesium glycinate lysinate chelate (DOCTOR'S BEST) 100 MG TABS tablet Take 3 tablets (300 mg) by mouth 2 times daily Take separate from Mycophenolate. 180 tablet 11     amLODIPine (NORVASC) 5 MG tablet Take 1 tablet (5 mg) by mouth At Bedtime 90 tablet 3     amoxicillin (AMOXIL) 500 MG capsule Take FOUR caps  (2000 mg) one hour prior dental work while on Prednisone 4 capsule 1     aspirin (ASA) 81 MG EC tablet Take 1 tablet (81 mg) by mouth daily 30 tablet 11     calcium carbonate-vitamin D (CALTRATE) 600-10 MG-MCG per tablet Take 1 tablet by mouth 2 times daily (with meals) 180 tablet 3     multivitamin (ONE-DAILY) tablet Take 1 tablet by mouth daily 90 tablet 3     mycophenolate (GENERIC EQUIVALENT) 250 MG capsule Take 4 capsules (1,000 mg) by mouth 2 times daily 240 capsule 11     polyethylene glycol (MIRALAX) 17 GM/Dose powder Take 17 g by mouth daily as needed for constipation 510 g 1     rosuvastatin (CRESTOR) 10 MG tablet Take 1 tablet (10 mg) by mouth daily 90 tablet 1     tacrolimus (GENERIC EQUIVALENT) 0.5 MG capsule Take ONE cap with THREE 1 mg caps (3.5 mg) every PM 30 capsule 11     tacrolimus (GENERIC EQUIVALENT) 1 MG capsule Take FOUR caps every AM (4 mg) and THREE caps with ONE 0.5 mg cap every PM (3.5 mg) 210 capsule 11     traZODone (DESYREL) 100 MG tablet Take 1 tablet (100 mg) by mouth nightly as needed for sleep 30 tablet 1     tacrolimus (GENERIC EQUIVALENT) 5 MG capsule On hold due to dose change (Patient not taking: Reported on 4/27/2023) 30 capsule 11            Physical Exam:   Blood pressure 117/82, pulse 104, temperature 98.9  F (37.2  C), weight 96.5 kg (212 lb 11.2 oz), SpO2 96 %.  Wt Readings from Last 6 Encounters:   05/24/23 96.5 kg (212 lb 11.2 oz)   05/22/23 96.2 kg (212 lb)   05/22/23 96.4 kg (212 lb 9.6 oz)   04/27/23 94 kg (207 lb 4.8 oz)   04/27/23 90.9 kg (200 lb 4.8 oz)   03/21/23 90.8 kg (200 lb 2.8 oz)     Body mass index is 27.31 kg/m .  Constitutional: well-developed, appearing stated age; cooperative  Eyes: nl EOM, PERRLA, vision, conjunctiva, sclera  ENT: nl external ears, nose, hearing, lips, teeth, gums, throat  No mucous membrane lesions, normal saliva pool  Neck: no mass or thyroid enlargement  Resp: lungs clear to auscultation,  CV: RRR, no murmurs, rubs or gallops, no  edema  Lymph: no cervical, supraclavicular, inguinal or epitrochlear nodes  MS: The TMJ, neck, shoulder, elbow, wrist, MCP/PIP/DIP, spine, hip, knee, ankle, and foot MTP/IP joints were examined and found normal. No active synovitis or altered joint anatomy. Full joint ROM. Normal  strength.  Skin: no nail pitting, alopecia, rash, nodules or lesions  Neuro: Cranial nerves are intact.  There is mild dysarthria; no discernible memory loss in the course of conversation.  Gait is broad-based and halting.  There is coarse tremor in the hands and fingers.  Psych: Patient displays several episodes of brief crying in the course of conversation without reference to painful or emotionally disturbing topics         Data:     @      Latest Ref Rng & Units 4/27/2023    10:10 AM 4/27/2023    10:35 AM 5/22/2023     8:11 AM   RHEUM RESULTS   Creatinine 0.67 - 1.17 mg/dL 1.43  C  1.27     CRP Inflammation <5.00 mg/L <3.00       GFR Estimate >60 mL/min/1.73m2 53  C  61     Hematocrit 40.0 - 53.0 %  38.7   39.9     Hemoglobin 13.3 - 17.7 g/dL  11.9   12.6     WBC 4.0 - 11.0 10e3/uL  7.7   5.2     RBC Count 4.40 - 5.90 10e6/uL  4.44   4.69     RDW 10.0 - 15.0 %  13.4   13.1     MCHC 31.5 - 36.5 g/dL  30.7   31.6     MCV 78 - 100 fL  87   85     Platelet Count 150 - 450 10e3/uL  252   271        C Corrected result        ,  ,  ,  ,  ,  ,  ,  ,  ,  ,  ,  ,  ,  ,   Hepatitis B Core Antibody Total   Date Value Ref Range Status   12/25/2022 Nonreactive Nonreactive Final   ,   Hepatitis B Surface Antigen   Date Value Ref Range Status   12/25/2022 Nonreactive Nonreactive Final   ,  ,  ,  ,   Quantiferon-TB Gold Plus   Date Value Ref Range Status   11/12/2022 Negative Negative Final     Comment:     No interferon gamma response to M.tuberculosis antigens was detected. Infection with M.tuberculosis is unlikely, however a single negative result does not exclude infection. In patients at high risk for infection, a second test should be  considered in accordance with the 2017 ATS/IDSA/CDC Clinical Pract  ice Guidelines for Diagnosis of Tuberculosis in Adults and Children      TB1 Ag minus Nil Value   Date Value Ref Range Status   11/12/2022 0.00 IU/mL Final     TB2 Ag minus Nil Value   Date Value Ref Range Status   11/12/2022 0.00 IU/mL Final     Mitogen minus Nil Result   Date Value Ref Range Status   11/12/2022 9.99 IU/mL Final     Nil Result   Date Value Ref Range Status   11/12/2022 0.01 IU/mL Final   ,  ,  ,  ,  ,  ,  ,  ,  ,  ,  ,  ,  ,  ,  ,  ,  ,  ,   Immunoglobulin G   Date Value Ref Range Status   01/10/2023 549 (L) 610 - 1,616 mg/dL Final     Immunoglobulin A   Date Value Ref Range Status   01/10/2023 144 84 - 499 mg/dL Final     Immunoglobulin M   Date Value Ref Range Status   01/10/2023 67 35 - 242 mg/dL Final   ,  ,  ,  ,  ,  ,  ,

## 2023-05-24 NOTE — NURSING NOTE
Chief Complaint   Patient presents with     Consult     New pt consult.     Blood pressure 117/82, pulse 104, temperature 98.9  F (37.2  C), weight 96.5 kg (212 lb 11.2 oz), SpO2 96 %.    LUC ALMAZAN

## 2023-05-30 DIAGNOSIS — Z94.1 HEART REPLACED BY TRANSPLANT (H): ICD-10-CM

## 2023-05-30 RX ORDER — TACROLIMUS 0.5 MG/1
CAPSULE ORAL
Qty: 30 CAPSULE | Refills: 11 | Status: SHIPPED | OUTPATIENT
Start: 2023-05-30 | End: 2023-06-05

## 2023-05-30 RX ORDER — MYCOPHENOLATE MOFETIL 250 MG/1
1000 CAPSULE ORAL
Qty: 240 CAPSULE | Refills: 11 | Status: SHIPPED | OUTPATIENT
Start: 2023-05-30 | End: 2024-05-28

## 2023-05-30 RX ORDER — TACROLIMUS 1 MG/1
CAPSULE ORAL
Qty: 210 CAPSULE | Refills: 11 | Status: SHIPPED | OUTPATIENT
Start: 2023-05-30 | End: 2023-06-05

## 2023-06-02 ENCOUNTER — TELEPHONE (OUTPATIENT)
Dept: TRANSPLANT | Facility: CLINIC | Age: 68
End: 2023-06-02
Payer: MEDICARE

## 2023-06-02 NOTE — TELEPHONE ENCOUNTER
Reviewed labs with pt and wife (speaker phone)  Mag 1.4 after being switch to Dr Quiroga Mag (was 1.3)  Creat  1.33 (previous 1.27)    Pt states he has several loose BMs per day with some urgency.   Wife to monitor. No Mag changes at this time to prevent further loose BMs and dehydration.     RTC 6/29

## 2023-06-05 DIAGNOSIS — Z94.1 HEART REPLACED BY TRANSPLANT (H): ICD-10-CM

## 2023-06-05 RX ORDER — TACROLIMUS 1 MG/1
CAPSULE ORAL
Qty: 180 CAPSULE | Refills: 11 | Status: SHIPPED | OUTPATIENT
Start: 2023-06-05 | End: 2023-06-14

## 2023-06-05 RX ORDER — TACROLIMUS 0.5 MG/1
CAPSULE ORAL
Qty: 30 CAPSULE | Refills: 11 | Status: SHIPPED | OUTPATIENT
Start: 2023-06-05 | End: 2023-06-14

## 2023-06-12 ENCOUNTER — LAB (OUTPATIENT)
Dept: LAB | Facility: CLINIC | Age: 68
End: 2023-06-12

## 2023-06-12 DIAGNOSIS — Z94.1 HEART REPLACED BY TRANSPLANT (H): Primary | ICD-10-CM

## 2023-06-12 PROCEDURE — 80197 ASSAY OF TACROLIMUS: CPT | Performed by: INTERNAL MEDICINE

## 2023-06-14 DIAGNOSIS — Z94.1 HEART REPLACED BY TRANSPLANT (H): Primary | ICD-10-CM

## 2023-06-14 LAB
TACROLIMUS BLD-MCNC: 10.4 UG/L (ref 5–15)
TME LAST DOSE: NORMAL H
TME LAST DOSE: NORMAL H

## 2023-06-14 RX ORDER — TACROLIMUS 1 MG/1
CAPSULE ORAL
Qty: 150 CAPSULE | Refills: 11 | Status: SHIPPED | OUTPATIENT
Start: 2023-06-14 | End: 2023-06-23

## 2023-06-14 RX ORDER — TACROLIMUS 0.5 MG/1
CAPSULE ORAL
Qty: 30 CAPSULE | Refills: 11 | Status: SHIPPED | OUTPATIENT
Start: 2023-06-14 | End: 2023-06-23

## 2023-06-19 ENCOUNTER — PRE VISIT (OUTPATIENT)
Dept: TRANSPLANT | Facility: CLINIC | Age: 68
End: 2023-06-19
Payer: MEDICARE

## 2023-06-19 DIAGNOSIS — Z94.1 HEART REPLACED BY TRANSPLANT (H): Primary | ICD-10-CM

## 2023-06-19 DIAGNOSIS — Z13.220 LIPID SCREENING: ICD-10-CM

## 2023-06-19 DIAGNOSIS — Z79.899 ENCOUNTER FOR LONG-TERM (CURRENT) USE OF HIGH-RISK MEDICATION: ICD-10-CM

## 2023-06-19 DIAGNOSIS — Z12.5 PROSTATE CANCER SCREENING: ICD-10-CM

## 2023-06-19 RX ORDER — LIDOCAINE 40 MG/G
CREAM TOPICAL
Status: CANCELLED | OUTPATIENT
Start: 2023-06-19

## 2023-06-20 ENCOUNTER — LAB (OUTPATIENT)
Dept: LAB | Facility: CLINIC | Age: 68
End: 2023-06-20

## 2023-06-20 DIAGNOSIS — Z94.1 HEART REPLACED BY TRANSPLANT (H): ICD-10-CM

## 2023-06-20 PROCEDURE — 80197 ASSAY OF TACROLIMUS: CPT | Performed by: INTERNAL MEDICINE

## 2023-06-22 LAB
TACROLIMUS BLD-MCNC: 8.2 UG/L (ref 5–15)
TME LAST DOSE: NORMAL H
TME LAST DOSE: NORMAL H

## 2023-06-23 ENCOUNTER — TELEPHONE (OUTPATIENT)
Dept: TRANSPLANT | Facility: CLINIC | Age: 68
End: 2023-06-23
Payer: MEDICARE

## 2023-06-23 DIAGNOSIS — Z94.1 HEART REPLACED BY TRANSPLANT (H): ICD-10-CM

## 2023-06-23 DIAGNOSIS — Z79.899 ENCOUNTER FOR LONG-TERM (CURRENT) USE OF HIGH-RISK MEDICATION: ICD-10-CM

## 2023-06-23 RX ORDER — TACROLIMUS 0.5 MG/1
CAPSULE ORAL
Qty: 60 CAPSULE | Refills: 11 | Status: SHIPPED | OUTPATIENT
Start: 2023-06-23 | End: 2023-06-29

## 2023-06-23 RX ORDER — TACROLIMUS 1 MG/1
CAPSULE ORAL
Qty: 120 CAPSULE | Refills: 11 | Status: SHIPPED | OUTPATIENT
Start: 2023-06-23 | End: 2023-06-29

## 2023-06-23 NOTE — TELEPHONE ENCOUNTER
Tac level 8.2 - goal 7-9.  Dose decreased from 3/2.5 to 2.5 mg BID    Mag 1.3 - Taking Dr Quiroga Mag 300 mg BID; one formed BM daily. Requested to take two additional tabs (200 mg) daily.       Recheck all labs next week  - 6 month follow up   Appts for 6/29 reviewed; including need for NPO and fasting labs with level.    Enc to call/My Chart with additional questions.

## 2023-06-29 ENCOUNTER — LAB (OUTPATIENT)
Dept: LAB | Facility: CLINIC | Age: 68
End: 2023-06-29
Attending: INTERNAL MEDICINE
Payer: MEDICARE

## 2023-06-29 ENCOUNTER — APPOINTMENT (OUTPATIENT)
Dept: MEDSURG UNIT | Facility: CLINIC | Age: 68
End: 2023-06-29
Attending: INTERNAL MEDICINE
Payer: MEDICARE

## 2023-06-29 ENCOUNTER — HOSPITAL ENCOUNTER (OUTPATIENT)
Facility: CLINIC | Age: 68
Discharge: HOME | End: 2023-06-29
Attending: INTERNAL MEDICINE | Admitting: INTERNAL MEDICINE
Payer: MEDICARE

## 2023-06-29 ENCOUNTER — HOSPITAL ENCOUNTER (OUTPATIENT)
Dept: CARDIOLOGY | Facility: CLINIC | Age: 68
Discharge: HOME | End: 2023-06-29
Attending: INTERNAL MEDICINE | Admitting: INTERNAL MEDICINE
Payer: MEDICARE

## 2023-06-29 VITALS
HEIGHT: 74 IN | WEIGHT: 206.3 LBS | HEART RATE: 98 BPM | TEMPERATURE: 97.3 F | SYSTOLIC BLOOD PRESSURE: 134 MMHG | DIASTOLIC BLOOD PRESSURE: 87 MMHG | OXYGEN SATURATION: 96 % | BODY MASS INDEX: 26.48 KG/M2 | RESPIRATION RATE: 16 BRPM

## 2023-06-29 DIAGNOSIS — Z94.1 HEART REPLACED BY TRANSPLANT (H): ICD-10-CM

## 2023-06-29 DIAGNOSIS — Z79.899 ENCOUNTER FOR LONG-TERM (CURRENT) USE OF HIGH-RISK MEDICATION: ICD-10-CM

## 2023-06-29 DIAGNOSIS — Z13.220 LIPID SCREENING: ICD-10-CM

## 2023-06-29 DIAGNOSIS — Z12.5 PROSTATE CANCER SCREENING: ICD-10-CM

## 2023-06-29 DIAGNOSIS — Z98.890 STATUS POST CORONARY ANGIOGRAM: Primary | ICD-10-CM

## 2023-06-29 LAB
ALBUMIN SERPL BCG-MCNC: 4.3 G/DL (ref 3.5–5.2)
ALP SERPL-CCNC: 76 U/L (ref 40–129)
ALT SERPL W P-5'-P-CCNC: 17 U/L (ref 0–70)
ANION GAP SERPL CALCULATED.3IONS-SCNC: 11 MMOL/L (ref 7–15)
AST SERPL W P-5'-P-CCNC: 24 U/L (ref 0–45)
BILIRUB SERPL-MCNC: 0.5 MG/DL
BUN SERPL-MCNC: 19.4 MG/DL (ref 8–23)
CALCIUM SERPL-MCNC: 9.6 MG/DL (ref 8.8–10.2)
CHLORIDE SERPL-SCNC: 104 MMOL/L (ref 98–107)
CHOLEST SERPL-MCNC: 153 MG/DL
CK SERPL-CCNC: 104 U/L (ref 39–308)
CREAT SERPL-MCNC: 1.14 MG/DL (ref 0.67–1.17)
DEPRECATED HCO3 PLAS-SCNC: 26 MMOL/L (ref 22–29)
ERYTHROCYTE [DISTWIDTH] IN BLOOD BY AUTOMATED COUNT: 12.7 % (ref 10–15)
GFR SERPL CREATININE-BSD FRML MDRD: 69 ML/MIN/1.73M2
GLUCOSE SERPL-MCNC: 106 MG/DL (ref 70–99)
HCT VFR BLD AUTO: 44.5 % (ref 40–53)
HDLC SERPL-MCNC: 64 MG/DL
HGB BLD-MCNC: 12.9 G/DL (ref 13.3–17.7)
HGB BLD-MCNC: 13.9 G/DL (ref 13.3–17.7)
LDLC SERPL CALC-MCNC: 66 MG/DL
LVEF ECHO: NORMAL
MAGNESIUM SERPL-MCNC: 1.7 MG/DL (ref 1.7–2.3)
MCH RBC QN AUTO: 26.9 PG (ref 26.5–33)
MCHC RBC AUTO-ENTMCNC: 31.2 G/DL (ref 31.5–36.5)
MCV RBC AUTO: 86 FL (ref 78–100)
NONHDLC SERPL-MCNC: 89 MG/DL
OXYHGB MFR BLDV: 71 % (ref 92–100)
PHOSPHATE SERPL-MCNC: 3.1 MG/DL (ref 2.5–4.5)
PLATELET # BLD AUTO: 238 10E3/UL (ref 150–450)
POTASSIUM SERPL-SCNC: 4.1 MMOL/L (ref 3.4–5.3)
PROT SERPL-MCNC: 6.9 G/DL (ref 6.4–8.3)
PSA SERPL DL<=0.01 NG/ML-MCNC: 1 NG/ML (ref 0–6.5)
RBC # BLD AUTO: 5.17 10E6/UL (ref 4.4–5.9)
SODIUM SERPL-SCNC: 141 MMOL/L (ref 136–145)
TACROLIMUS BLD-MCNC: 7.2 UG/L (ref 5–15)
TME LAST DOSE: NORMAL H
TME LAST DOSE: NORMAL H
TRIGL SERPL-MCNC: 115 MG/DL
WBC # BLD AUTO: 5.4 10E3/UL (ref 4–11)

## 2023-06-29 PROCEDURE — 86833 HLA CLASS II HIGH DEFIN QUAL: CPT | Performed by: INTERNAL MEDICINE

## 2023-06-29 PROCEDURE — 86832 HLA CLASS I HIGH DEFIN QUAL: CPT | Performed by: INTERNAL MEDICINE

## 2023-06-29 PROCEDURE — 84100 ASSAY OF PHOSPHORUS: CPT | Performed by: INTERNAL MEDICINE

## 2023-06-29 PROCEDURE — 85018 HEMOGLOBIN: CPT

## 2023-06-29 PROCEDURE — 272N000001 HC OR GENERAL SUPPLY STERILE: Performed by: INTERNAL MEDICINE

## 2023-06-29 PROCEDURE — 99152 MOD SED SAME PHYS/QHP 5/>YRS: CPT | Performed by: INTERNAL MEDICINE

## 2023-06-29 PROCEDURE — G0103 PSA SCREENING: HCPCS | Performed by: INTERNAL MEDICINE

## 2023-06-29 PROCEDURE — 85014 HEMATOCRIT: CPT | Performed by: INTERNAL MEDICINE

## 2023-06-29 PROCEDURE — 93505 ENDOMYOCARDIAL BIOPSY: CPT | Mod: 26 | Performed by: INTERNAL MEDICINE

## 2023-06-29 PROCEDURE — 88307 TISSUE EXAM BY PATHOLOGIST: CPT | Mod: 26 | Performed by: PATHOLOGY

## 2023-06-29 PROCEDURE — 86352 CELL FUNCTION ASSAY W/STIM: CPT | Performed by: INTERNAL MEDICINE

## 2023-06-29 PROCEDURE — 93306 TTE W/DOPPLER COMPLETE: CPT | Mod: 26 | Performed by: STUDENT IN AN ORGANIZED HEALTH CARE EDUCATION/TRAINING PROGRAM

## 2023-06-29 PROCEDURE — 88350 IMFLUOR EA ADDL 1ANTB STN PX: CPT | Mod: TC | Performed by: INTERNAL MEDICINE

## 2023-06-29 PROCEDURE — 88346 IMFLUOR 1ST 1ANTB STAIN PX: CPT | Mod: 26 | Performed by: PATHOLOGY

## 2023-06-29 PROCEDURE — 999N000142 HC STATISTIC PROCEDURE PREP ONLY

## 2023-06-29 PROCEDURE — 93505 ENDOMYOCARDIAL BIOPSY: CPT | Performed by: INTERNAL MEDICINE

## 2023-06-29 PROCEDURE — 86828 HLA CLASS I&II ANTIBODY QUAL: CPT | Mod: XU | Performed by: INTERNAL MEDICINE

## 2023-06-29 PROCEDURE — 999N000133 HC STATISTIC PP CARE STAGE 2

## 2023-06-29 PROCEDURE — 82810 BLOOD GASES O2 SAT ONLY: CPT

## 2023-06-29 PROCEDURE — 250N000011 HC RX IP 250 OP 636: Performed by: INTERNAL MEDICINE

## 2023-06-29 PROCEDURE — 36415 COLL VENOUS BLD VENIPUNCTURE: CPT | Performed by: INTERNAL MEDICINE

## 2023-06-29 PROCEDURE — C1894 INTRO/SHEATH, NON-LASER: HCPCS | Performed by: INTERNAL MEDICINE

## 2023-06-29 PROCEDURE — 80053 COMPREHEN METABOLIC PANEL: CPT | Performed by: INTERNAL MEDICINE

## 2023-06-29 PROCEDURE — 93306 TTE W/DOPPLER COMPLETE: CPT

## 2023-06-29 PROCEDURE — 80197 ASSAY OF TACROLIMUS: CPT | Performed by: INTERNAL MEDICINE

## 2023-06-29 PROCEDURE — 83735 ASSAY OF MAGNESIUM: CPT | Performed by: INTERNAL MEDICINE

## 2023-06-29 PROCEDURE — 250N000009 HC RX 250: Performed by: INTERNAL MEDICINE

## 2023-06-29 PROCEDURE — C1751 CATH, INF, PER/CENT/MIDLINE: HCPCS | Performed by: INTERNAL MEDICINE

## 2023-06-29 PROCEDURE — 88350 IMFLUOR EA ADDL 1ANTB STN PX: CPT | Mod: 26 | Performed by: PATHOLOGY

## 2023-06-29 PROCEDURE — 80061 LIPID PANEL: CPT | Performed by: INTERNAL MEDICINE

## 2023-06-29 PROCEDURE — 87799 DETECT AGENT NOS DNA QUANT: CPT | Performed by: INTERNAL MEDICINE

## 2023-06-29 PROCEDURE — 82550 ASSAY OF CK (CPK): CPT | Performed by: INTERNAL MEDICINE

## 2023-06-29 RX ORDER — FLUMAZENIL 0.1 MG/ML
0.2 INJECTION, SOLUTION INTRAVENOUS
Status: DISCONTINUED | OUTPATIENT
Start: 2023-06-29 | End: 2023-06-29 | Stop reason: HOSPADM

## 2023-06-29 RX ORDER — ROSUVASTATIN CALCIUM 10 MG/1
10 TABLET, COATED ORAL DAILY
Qty: 90 TABLET | Refills: 3 | Status: SHIPPED | OUTPATIENT
Start: 2023-06-29 | End: 2024-08-01

## 2023-06-29 RX ORDER — FENTANYL CITRATE 50 UG/ML
25 INJECTION, SOLUTION INTRAMUSCULAR; INTRAVENOUS
Status: DISCONTINUED | OUTPATIENT
Start: 2023-06-29 | End: 2023-06-29 | Stop reason: HOSPADM

## 2023-06-29 RX ORDER — ATROPINE SULFATE 0.1 MG/ML
0.5 INJECTION INTRAVENOUS
Status: DISCONTINUED | OUTPATIENT
Start: 2023-06-29 | End: 2023-06-29 | Stop reason: HOSPADM

## 2023-06-29 RX ORDER — LIDOCAINE 40 MG/G
CREAM TOPICAL
Status: DISCONTINUED | OUTPATIENT
Start: 2023-06-29 | End: 2023-06-29 | Stop reason: HOSPADM

## 2023-06-29 RX ORDER — OXYCODONE HYDROCHLORIDE 10 MG/1
10 TABLET ORAL EVERY 4 HOURS PRN
Status: CANCELLED | OUTPATIENT
Start: 2023-06-29

## 2023-06-29 RX ORDER — NALOXONE HYDROCHLORIDE 0.4 MG/ML
0.2 INJECTION, SOLUTION INTRAMUSCULAR; INTRAVENOUS; SUBCUTANEOUS
Status: DISCONTINUED | OUTPATIENT
Start: 2023-06-29 | End: 2023-06-29 | Stop reason: HOSPADM

## 2023-06-29 RX ORDER — NALOXONE HYDROCHLORIDE 0.4 MG/ML
0.4 INJECTION, SOLUTION INTRAMUSCULAR; INTRAVENOUS; SUBCUTANEOUS
Status: DISCONTINUED | OUTPATIENT
Start: 2023-06-29 | End: 2023-06-29 | Stop reason: HOSPADM

## 2023-06-29 RX ORDER — LIDOCAINE 40 MG/G
CREAM TOPICAL
Status: CANCELLED | OUTPATIENT
Start: 2023-06-29

## 2023-06-29 RX ORDER — TACROLIMUS 1 MG/1
CAPSULE ORAL
Qty: 120 CAPSULE | Refills: 11 | Status: SHIPPED | OUTPATIENT
Start: 2023-06-29 | End: 2023-07-10

## 2023-06-29 RX ORDER — TACROLIMUS 0.5 MG/1
CAPSULE ORAL
Qty: 30 CAPSULE | Refills: 11 | Status: SHIPPED | OUTPATIENT
Start: 2023-06-29 | End: 2023-07-10

## 2023-06-29 RX ORDER — FENTANYL CITRATE 50 UG/ML
INJECTION, SOLUTION INTRAMUSCULAR; INTRAVENOUS
Status: DISCONTINUED | OUTPATIENT
Start: 2023-06-29 | End: 2023-06-29 | Stop reason: HOSPADM

## 2023-06-29 RX ORDER — OXYCODONE HYDROCHLORIDE 5 MG/1
5 TABLET ORAL EVERY 4 HOURS PRN
Status: CANCELLED | OUTPATIENT
Start: 2023-06-29

## 2023-06-29 ASSESSMENT — ACTIVITIES OF DAILY LIVING (ADL)
ADLS_ACUITY_SCORE: 35

## 2023-06-29 NOTE — PROGRESS NOTES
Pt on 2A #6 prepped for right heart cath and biopsy via femoral access. IV in place and groin prep complete. No further needs at this time.

## 2023-06-29 NOTE — PROGRESS NOTES
Patient tolerated recovery stage well. VSS, right groin site clean/dry/intact, no hematoma, and denies pain. Patient tolerated PO food and fluids. Teaching was done and discharge instructions were given. Patient ambulated, voided, and PIV was removed. Patient discharged from the hospital via wheel chair to home with family.

## 2023-06-29 NOTE — Clinical Note
dry, intact, no bleeding and no hematoma. 7fr sheath removed from RFV, manual pressure held to hemostasis

## 2023-06-29 NOTE — DISCHARGE INSTRUCTIONS
[facility]                        Interventional Cardiology  Discharge Instructions   Post Right Heart Cath and Biopsy      AFTER YOU GO HOME:  DO drink plenty of fluids  DO resume your regular diet and medications unless otherwise instructed by your Primary Physician  Do Not scrub the procedure site vigorously  No lotion or powder to the puncture site for 3 days    CALL YOUR PRIMARY PHYSICIAN IF: You may resume all normal activity.  Monitor neck site for bleeding, swelling, or voice changes. If you notice bleeding or swelling immediately apply pressure to the site and call number below to speak with Cardiology Fellow.  If you experience any changes in your breathing you should call your doctor immediately or come to the closest Emergency Department.  Do not drive yourself.    ADDITIONAL INSTRUCTIONS: Medications: You are to resume all home medications including anticoagulation therapy unless otherwise advised by your primary cardiologist or nurse coordinator.  Support Right Groin site for the next 48 hours with cough, sneeze or bearing down.     Follow Up: Per your primary cardiology team    If you have any questions or concerns regarding your procedure site please call [phone] at anytime and ask for Cardiology Fellow on call.  They are available 24 hours a day.  You may also contact the Cardiology Clinic after hours number at [phone].                                                       Telephone Numbers [phone] Monday-Friday 8:00 am to 4:30 pm    [phone] After 4:30 pm Monday-Friday, Weekends & Holidays  Ask for Interventional Cardiologist on call. Someone is on call 24 hours/day   [facility] toll free number [phone] Monday-Friday 8:00 am to 4:30 pm   [facility] Emergency Dept [phone]

## 2023-06-29 NOTE — PROGRESS NOTES
[facility]   Interventional Cardiology        Consenting/Education for Right Heart Catheterization/Endomyocardial Biopsy     Patient understands as a part of routine surveillance after heart transplant, we would like to perform a right heart catheterization/endomyocardial biopsy.  This procedure will be performed by the interventional cardiologist.    Patient understands during the portion for right heart catheterization a fine tube (catheter) is put into the vein of the groin/neck.  It is carefully passed along until it reaches the heart and then goes up into the blood vessels of the lungs. This is done to measure a variety of pressures in your heart and can tell us how well the heart is filling and emptying, as well as monitor fluid status. While the catheter is in your neck/groin vein, a  Biopsy forceps  or pincers at the end of the catheter are used to take the tissue samples. This is may be repeated to get enough samples. The biopsy pieces are very small (one to two millimeters).     Patient also understands risks and complications of the procedure which include, but are not limited to bruising/swelling around the incision site, infection, bleeding, allergic reaction to local anesthetic.      Patient verbalized understanding of risks and benefits of the right heart catheterization and endomyocardial biopsy and has elected to proceed with the procedure.       Priya SCHILLING, IVAN  [facility] Interventional Cardiology

## 2023-06-30 ENCOUNTER — TELEPHONE (OUTPATIENT)
Dept: CARDIOLOGY | Facility: CLINIC | Age: 68
End: 2023-06-30
Payer: MEDICARE

## 2023-06-30 LAB
CMV DNA SPEC NAA+PROBE-ACNC: NOT DETECTED IU/ML
EBV DNA # SPEC NAA+PROBE: NOT DETECTED COPIES/ML
PATH REPORT.COMMENTS IMP SPEC: NORMAL
PATH REPORT.COMMENTS IMP SPEC: NORMAL
PATH REPORT.FINAL DX SPEC: NORMAL
PATH REPORT.GROSS SPEC: NORMAL
PATH REPORT.MICROSCOPIC SPEC OTHER STN: NORMAL
PATH REPORT.RELEVANT HX SPEC: NORMAL
PHOTO IMAGE: NORMAL

## 2023-06-30 NOTE — CONFIDENTIAL NOTE
Patient had RHC &  biopsy yesterday with sedation. Wife states he is doing fine, RFV site is flat and dry.

## 2023-07-03 LAB
ALLOMAP SCORE (EXTERNAL): 25 (ref 0–40)
DONOR IDENTIFICATION: NORMAL
DSA COMMENTS: NORMAL
DSA PRESENT: NO
DSA TEST METHOD: NORMAL
IMMUKNOW IMMUNE CELL FUNCTION: 517 NG/ML
NEGATIVE PREDICTIVE VALUE PERCENT (EXTERNAL): 100 %
ORGAN: NORMAL
POSITIVE PREDICTIVE VALUE PERCENT (EXTERNAL): 2.2 %
SA 1 CELL: NORMAL
SA 1 TEST METHOD: NORMAL
SA 2 CELL: NORMAL
SA 2 TEST METHOD: NORMAL
SA1 HI RISK ABY: NORMAL
SA1 MOD RISK ABY: NORMAL
SA2 HI RISK ABY: NORMAL
SA2 MOD RISK ABY: NORMAL
SCR 1 TEST METHOD: NORMAL
SCR1 CELL: NORMAL
SCR1 RESULT: NORMAL
SCR2 CELL: NORMAL
SCR2 RESULT: NORMAL
SCR2 TEST METHOD: NORMAL
UNACCEPTABLE ANTIGENS: NORMAL
UNOS CPRA: 0
ZZZSA 1  COMMENTS: NORMAL
ZZZSA 2 COMMENTS: NORMAL
ZZZSCR1 COMMENTS: NORMAL
ZZZSCR2 COMMENTS: NORMAL

## 2023-07-03 NOTE — RESULT ENCOUNTER NOTE
Marcos mod 444  Allomap correlates with biopsy  Tac goal decreased to 6-8 from 7-9  MMF 1000 mg BID

## 2023-07-06 ENCOUNTER — LAB (OUTPATIENT)
Dept: LAB | Facility: CLINIC | Age: 68
End: 2023-07-06

## 2023-07-06 DIAGNOSIS — Z94.1 HEART REPLACED BY TRANSPLANT (H): ICD-10-CM

## 2023-07-06 PROCEDURE — 80197 ASSAY OF TACROLIMUS: CPT | Performed by: INTERNAL MEDICINE

## 2023-07-09 LAB
TACROLIMUS BLD-MCNC: 7.5 UG/L (ref 5–15)
TME LAST DOSE: NORMAL H
TME LAST DOSE: NORMAL H

## 2023-07-10 ENCOUNTER — TELEPHONE (OUTPATIENT)
Dept: PHARMACY | Facility: CLINIC | Age: 68
End: 2023-07-10
Payer: MEDICARE

## 2023-07-10 DIAGNOSIS — Z94.1 HEART REPLACED BY TRANSPLANT (H): ICD-10-CM

## 2023-07-10 RX ORDER — TACROLIMUS 1 MG/1
CAPSULE ORAL
Qty: 120 CAPSULE | Refills: 11 | Status: SHIPPED | OUTPATIENT
Start: 2023-07-10 | End: 2023-07-26

## 2023-07-10 RX ORDER — TACROLIMUS 0.5 MG/1
CAPSULE ORAL
Qty: 30 CAPSULE | Refills: 11
Start: 2023-07-10 | End: 2023-07-26

## 2023-07-10 NOTE — TELEPHONE ENCOUNTER
Clinical Pharmacy Consult:                                                      Transplant Specific: 6 month post transplant medication review  Date of Transplant: 12/25/2022  Type of Transplant: heart  First Transplant: yes  History of rejection: no    Immunosuppression Regimen   TAC 2mg qAM & 2mg qPM and MMF 1000mg qAM & 1000mg qPM  Patient specific goal: 6-8  Most recent level: 7.2, date 6/29/23  Immunosuppressant Levels:  Therapeutic  Pt adherent to lab draws: yes  Scr:   Lab Results   Component Value Date    CR 0.95 02/21/2023     Side effects: none    Prophylactic Medications  Antibacterial:  Bactrim 1 daily  Scheduled Discontinue Date: 6 months- completed    Antifungal: Nystatin  Scheduled Discontinue Date: 6 months-completed    Antiviral: CrCl >/=60 mL/minute: Valcyte 900mg daily   Scheduled Discontinue Date: 3 months, complete    Acid Reducer: Protonix (pantoprazole)  Scheduled Reviewed Date: 6 months - completed    Thrombosis Prevention: Eliquis 5ng BID  Scheduled Discontinue Date: to be determined - completed    Blood Pressure Management  Frequency of home Blood Pressure checks: 4-5 times a week  Most recent home BP: 130's/80's  Patient Blood pressure goal: <140/90  Patient blood pressure at goal:  Yes    Hospitalizations/ER visits since last assessment: 0    Med rec/DUR performed: yes  Med Rec Discrepancies: no        3/27/2023    10:00 AM   Medication adherence flowsheet   Patient medication administration: Has assistance with medications   Patient estimated adherence level: %   Pharmacist assessment of adherence: Good   Patient reported doses missed per week: 0-1   Facilitators to medication adherence  Cell phone;Medication dosing chart;Pill box;Caregiver assistance          3/27/2023    10:00 AM   Medication access flowsheet   Number of pharmacies used: 1   Pharmacy: [facility]   Enrolled in [facility]? Yes         Spoke to wife, Arlette. Vince is feeling good, starting to  work on getting his muscle back and able to get outside and enjoy the weather again. They just came back from a cabin trip for the first time since his [place] He still gets weak in the legs and have some trouble when trying to get up from a low chair. This is to be expected and he is improving day to day. He has completed quite a few courses of medications and very happy with the reduced amount of medications he has to take now.    BP: not checking everyday but he also has heart conditioning 3 times a week where BP is taken. His BP is always better in clinic versus at home. Both are still wtihin goal.    No other questions or concerns. Follow up in  6 month    Prince Morales, Pharm D  [facility]

## 2023-07-10 NOTE — RESULT ENCOUNTER NOTE
Tac level 7.5. goal 6-8.  Tac dose decreased from 2.5/2 mg to 2 mg twice daily   Pt updated via My Chart. Dose change confirmed

## 2023-07-24 ENCOUNTER — LAB (OUTPATIENT)
Dept: LAB | Facility: CLINIC | Age: 68
End: 2023-07-24

## 2023-07-24 DIAGNOSIS — Z94.1 HEART REPLACED BY TRANSPLANT (H): ICD-10-CM

## 2023-07-24 PROCEDURE — 80197 ASSAY OF TACROLIMUS: CPT | Performed by: INTERNAL MEDICINE

## 2023-07-26 ENCOUNTER — CARE COORDINATION (OUTPATIENT)
Dept: TRANSPLANT | Facility: CLINIC | Age: 68
End: 2023-07-26
Payer: MEDICARE

## 2023-07-26 DIAGNOSIS — Z94.1 HEART REPLACED BY TRANSPLANT (H): ICD-10-CM

## 2023-07-26 RX ORDER — TACROLIMUS 0.5 MG/1
CAPSULE ORAL
Qty: 30 CAPSULE | Refills: 11 | Status: SHIPPED | OUTPATIENT
Start: 2023-07-26 | End: 2024-08-05

## 2023-07-26 RX ORDER — TACROLIMUS 1 MG/1
CAPSULE ORAL
Qty: 120 CAPSULE | Refills: 11 | Status: SHIPPED | OUTPATIENT
Start: 2023-07-26 | End: 2024-08-22

## 2023-07-26 NOTE — PROGRESS NOTES
Pt's tacrolimus level 5.0. Instructed pt to increase dose from 2mg twice a day to 2.5mg in the morning and 2mg in the evening. Goal range is 6-8. Instructed to recheck in 1-2 weeks.

## 2023-07-27 LAB
TACROLIMUS BLD-MCNC: 5.6 UG/L (ref 5–15)
TME LAST DOSE: NORMAL H
TME LAST DOSE: NORMAL H

## 2023-08-11 ENCOUNTER — PRE VISIT (OUTPATIENT)
Dept: TRANSPLANT | Facility: CLINIC | Age: 68
End: 2023-08-11
Payer: MEDICARE

## 2023-08-11 DIAGNOSIS — Z79.899 ENCOUNTER FOR LONG-TERM (CURRENT) USE OF HIGH-RISK MEDICATION: ICD-10-CM

## 2023-08-11 DIAGNOSIS — Z94.1 HEART REPLACED BY TRANSPLANT (H): Primary | ICD-10-CM

## 2023-08-11 ASSESSMENT — ENCOUNTER SYMPTOMS
WEAKNESS: 1
TINGLING: 1
NUMBNESS: 0
PARALYSIS: 0
DISTURBANCES IN COORDINATION: 1

## 2023-08-14 ENCOUNTER — HOSPITAL ENCOUNTER (OUTPATIENT)
Dept: CARDIOLOGY | Facility: CLINIC | Age: 68
Discharge: HOME | End: 2023-08-14
Attending: INTERNAL MEDICINE
Payer: MEDICARE

## 2023-08-14 ENCOUNTER — OFFICE VISIT (OUTPATIENT)
Dept: CARDIOLOGY | Facility: CLINIC | Age: 68
End: 2023-08-14
Attending: INTERNAL MEDICINE
Payer: MEDICARE

## 2023-08-14 ENCOUNTER — APPOINTMENT (OUTPATIENT)
Dept: LAB | Facility: CLINIC | Age: 68
End: 2023-08-14
Payer: MEDICARE

## 2023-08-14 VITALS
OXYGEN SATURATION: 97 % | SYSTOLIC BLOOD PRESSURE: 124 MMHG | DIASTOLIC BLOOD PRESSURE: 83 MMHG | WEIGHT: 218.4 LBS | HEIGHT: 74 IN | BODY MASS INDEX: 28.03 KG/M2 | HEART RATE: 102 BPM

## 2023-08-14 DIAGNOSIS — Z94.1 TRANSPLANTED HEART (H): Primary | ICD-10-CM

## 2023-08-14 DIAGNOSIS — Z94.1 HEART REPLACED BY TRANSPLANT (H): ICD-10-CM

## 2023-08-14 DIAGNOSIS — I77.6 VASCULITIS (H): ICD-10-CM

## 2023-08-14 DIAGNOSIS — Z79.899 ENCOUNTER FOR LONG-TERM (CURRENT) USE OF HIGH-RISK MEDICATION: Primary | ICD-10-CM

## 2023-08-14 DIAGNOSIS — Z79.899 ENCOUNTER FOR LONG-TERM (CURRENT) USE OF HIGH-RISK MEDICATION: ICD-10-CM

## 2023-08-14 DIAGNOSIS — I10 ESSENTIAL HYPERTENSION: ICD-10-CM

## 2023-08-14 DIAGNOSIS — D84.9 IMMUNOSUPPRESSION (H): ICD-10-CM

## 2023-08-14 DIAGNOSIS — E83.42 HYPOMAGNESEMIA: ICD-10-CM

## 2023-08-14 LAB
ANION GAP SERPL CALCULATED.3IONS-SCNC: 11 MMOL/L (ref 7–15)
BUN SERPL-MCNC: 20.3 MG/DL (ref 8–23)
CALCIUM SERPL-MCNC: 9.5 MG/DL (ref 8.8–10.2)
CHLORIDE SERPL-SCNC: 104 MMOL/L (ref 98–107)
CMV DNA SPEC NAA+PROBE-ACNC: NOT DETECTED IU/ML
CREAT SERPL-MCNC: 1.08 MG/DL (ref 0.67–1.17)
DEPRECATED HCO3 PLAS-SCNC: 24 MMOL/L (ref 22–29)
ERYTHROCYTE [DISTWIDTH] IN BLOOD BY AUTOMATED COUNT: 12.8 % (ref 10–15)
GFR SERPL CREATININE-BSD FRML MDRD: 74 ML/MIN/1.73M2
GLUCOSE SERPL-MCNC: 144 MG/DL (ref 70–99)
HCT VFR BLD AUTO: 43 % (ref 40–53)
HGB BLD-MCNC: 14.1 G/DL (ref 13.3–17.7)
LVEF ECHO: NORMAL
MAGNESIUM SERPL-MCNC: 1.4 MG/DL (ref 1.7–2.3)
MCH RBC QN AUTO: 27.4 PG (ref 26.5–33)
MCHC RBC AUTO-ENTMCNC: 32.8 G/DL (ref 31.5–36.5)
MCV RBC AUTO: 84 FL (ref 78–100)
PHOSPHATE SERPL-MCNC: 2.9 MG/DL (ref 2.5–4.5)
PLATELET # BLD AUTO: 226 10E3/UL (ref 150–450)
POTASSIUM SERPL-SCNC: 4 MMOL/L (ref 3.4–5.3)
RBC # BLD AUTO: 5.15 10E6/UL (ref 4.4–5.9)
SODIUM SERPL-SCNC: 139 MMOL/L (ref 136–145)
TACROLIMUS BLD-MCNC: 7.1 UG/L (ref 5–15)
TME LAST DOSE: NORMAL H
TME LAST DOSE: NORMAL H
WBC # BLD AUTO: 5.7 10E3/UL (ref 4–11)

## 2023-08-14 PROCEDURE — G0463 HOSPITAL OUTPT CLINIC VISIT: HCPCS | Mod: 25 | Performed by: NURSE PRACTITIONER

## 2023-08-14 PROCEDURE — 93306 TTE W/DOPPLER COMPLETE: CPT

## 2023-08-14 PROCEDURE — 93306 TTE W/DOPPLER COMPLETE: CPT | Mod: 26 | Performed by: INTERNAL MEDICINE

## 2023-08-14 PROCEDURE — 83735 ASSAY OF MAGNESIUM: CPT | Performed by: NURSE PRACTITIONER

## 2023-08-14 PROCEDURE — 36415 COLL VENOUS BLD VENIPUNCTURE: CPT | Performed by: NURSE PRACTITIONER

## 2023-08-14 PROCEDURE — 80048 BASIC METABOLIC PNL TOTAL CA: CPT | Performed by: NURSE PRACTITIONER

## 2023-08-14 PROCEDURE — 84100 ASSAY OF PHOSPHORUS: CPT | Performed by: NURSE PRACTITIONER

## 2023-08-14 PROCEDURE — 80197 ASSAY OF TACROLIMUS: CPT | Performed by: NURSE PRACTITIONER

## 2023-08-14 PROCEDURE — 87799 DETECT AGENT NOS DNA QUANT: CPT | Performed by: NURSE PRACTITIONER

## 2023-08-14 PROCEDURE — 85027 COMPLETE CBC AUTOMATED: CPT | Performed by: NURSE PRACTITIONER

## 2023-08-14 PROCEDURE — 99215 OFFICE O/P EST HI 40 MIN: CPT | Mod: 25 | Performed by: NURSE PRACTITIONER

## 2023-08-14 RX ORDER — POLYETHYLENE GLYCOL 3350 17 G/17G
1 POWDER, FOR SOLUTION ORAL DAILY
COMMUNITY

## 2023-08-14 RX ORDER — ACETAMINOPHEN 80 MG/1
80 TABLET, CHEWABLE ORAL EVERY 4 HOURS PRN
COMMUNITY

## 2023-08-14 ASSESSMENT — PAIN SCALES - GENERAL: PAINLEVEL: NO PAIN (0)

## 2023-08-14 NOTE — LETTER
8/14/2023      RE: Paco Stein  [address]  [place] [address]       Dear Colleague,    Thank you for the opportunity to participate in the care of your patient, Paco Stein, at the [facility] [place] at [facility]. Please see a copy of my visit note below.    ADULT HEART TRANSPLANT CLINIC    HPI:   Mr. Stein is a 68 year old male who presents to clinic today for routine heart transplant follow-up. 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 His post-operative course c/b delirium, leukocytosis, staph epi bacteremia, and prolonged CT output. He had some complement deposition on repeat biopsies but no graft dysfunction or rejection so has not been treated. Finished IV vancomycin for staph bacteremia. He has no DSAs. Did also have a nosebleed s/p Rhino Rocket placement. Last seen in clinic 5/22/23 by Dr Dominguez.     Since last visit patient reports feeling well. He finished cardiac rehab but is now allowed access to the gym so he has continued to exercise. He mostly does weight lifting and rowing machine. Also goes on daily walks with his wife. Denies any exertional chest pain, shortness of breath, lightheadedness with exercise. No recent LE edema, orthopnea, PND. BPs are 120s/70-90s at home. He is due for tooth extraction. Denies any recent fever, chills, nightsweats, n/v/d, HA, mouth sores, rashes.  Wife notes that last night he took incorrect dose of tacro (took 2.5 mg instead of 2 mg).     Assessment/Plan:  Mr. Stein is a 68 year old male who is s/p orthotopic heart transplant on 12/25/22 who presents to clinic for routine follow-up. From a transplant standpoint  he is doing very well and doing regular exercise. 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 Graft function normal, other than complement deposition there is been no rejection, baseline coronary angiogram with mild native coronary disease (proximal LAD 0.2 mm).    * Rejection history: none.   * Recent immunosuppression changes: none  * Last biopsy: 8/14/23  Negative for acute cellular rejection, No light microscopic features of antibody mediated rejection, +but less diffuse C4d (50%) but also focally C3d (10% immunofluorescence capillary staining, ISHLT 2005 grade 0R (previously 0); ISHLT 2013 grade pAMR1 (I+)  * DSAs:  none  * Last Immuknow: 472 3/2023  * Intolerance to medications: none    Immunosuppression:  - MMF 1000 BID  - Tacrolimus goal 6-8, pending today (2.5 mg in AM 2 mg in PM but took 2.5 mg last PM)  - defer to primary cardiologist re mTORI switch at one year     PPx:  - CAV: aspirin 81 mg daily and rosuvastatin 10mg daily  - Osteoporosis: calcium/vitamin D   - CMV: s/p 3mos valcyte   - Toxo: n/a    Serostatus: CMV D+/R+ EBV D+/R+ Toxo D-/R-    # Drug induced hypoMg  Mg 1.4 today, on Doctors Best BID.      # Hypertension   Controlled on amlodipine 5 mg daily     # C/f familial cardiomyopathy  # Hx ?arrhythmogenic cardiomyopathy   Had genetic testing, including SOS2 gene (brother carries), in contact with counselor re: recs for first degrees relatives. Genetic testing negative for the 57 genes anazlyzed.     # Right axillary nonocclusive DVT, left peroneal DVT, right IJ DVT, resolved  Resolved on current US, now off DOAC     # Staph epi bacteremia  Completed antibiotics      # History of vasculitis  Follows with rheumatology and  neuro    Recommend COVID vaccine this fall with flu vaccine, discussed today. Referral to dermatology placed.     Symone Guerra DNP, NP-C  Advanced Heart Failure/Cardiac Transplant Nurse Practitioner        40 minutes spent on the date of the encounter doing chart review, history and exam, documentation and further activities per the note    PAST MEDICAL HISTORY:  Past Medical History:   Diagnosis Date    Congestive heart failure (H)        FAMILY HISTORY:  Family History   Problem Relation Age of Onset    Atrial fibrillation Father     Lung Cancer Brother        SOCIAL HISTORY:  Socioeconomic History    Marital status:    Tobacco Use    Smoking status: Never    Smokeless tobacco: Never   Substance and Sexual Activity    Alcohol use: Not Currently    Drug use: Never       CURRENT MEDICATIONS:   magnesium glycinate lysinate chelate (DOCTOR'S BEST) 100 MG TABS tablet, Take 3 tablets (300 mg) by mouth 2 times daily Plus two additional tabs (200 mg) daily Take separate from Mycophenolate.  acetaminophen (TYLENOL) 80 MG chewable tablet, Take 80 mg by mouth every 4 hours as needed for mild pain or fever  amLODIPine (NORVASC) 5 MG tablet, Take 1 tablet (5 mg) by mouth At Bedtime  aspirin (ASA) 81 MG EC tablet, Take 1 tablet (81 mg) by mouth daily  calcium carbonate-vitamin D (CALTRATE) 600-10 MG-MCG per tablet, Take 1 tablet by mouth 2 times daily (with meals)  multivitamin (ONE-DAILY) tablet, Take 1 tablet by mouth daily  mycophenolate (GENERIC EQUIVALENT) 250 MG capsule, Take 4 capsules (1,000 mg) by mouth 2 times daily  polyethylene glycol (MIRALAX) 17 g packet, Take 1 packet by mouth daily  rosuvastatin (CRESTOR) 10 MG tablet, Take 1 tablet (10 mg) by mouth daily  tacrolimus (GENERIC EQUIVALENT) 0.5 MG capsule, Take one 0.5mg capsule with two 1mg capsules every morning for a total dose of 2.5 mg in the AM and 2mg in the PM  tacrolimus (GENERIC EQUIVALENT) 1 MG capsule, Take TWO caps (2 mg) with one 0.5mg capsule  "in the AM and take TWO caps (2mg) in the PM for a total dose of 2.5mg in the AM and 2mg in the PM.    No current facility-administered medications on file prior to visit.      ROS:  See HPI    EXAM:  /83 (BP Location: Right arm, Patient Position: Sitting, Cuff Size: Adult Regular)   Pulse 102   Ht 1.88 m (6' 2.02\")   Wt 99.1 kg (218 lb 6.4 oz)   SpO2 97%   BMI 28.03 kg/m      GENERAL: Appears comfortable, in no acute distress.   HEENT: Eye symmetrical, no discharge or icterus bilaterally. Mucous membranes moist and without lesions. No thrush.   CV: Tachycardic per baseline, +S1S2, no murmur, rub, or gallop. JVP not visible.   RESPIRATORY: Respirations regular, even, and unlabored. Lungs CTA throughout.   GI: Soft and non distended with normoactive bowel sounds present in all quadrants. No tenderness, rebound, guarding. No hepatomegaly.   EXTREMITIES: No peripheral edema. 2+ bilateral pedal pulses.   NEUROLOGIC: Alert and oriented x 3. No focal deficits.   MUSCULOSKELETAL: No joint swelling or tenderness.   SKIN: No jaundice. No rashes or lesions.     Labs - reviewed with patient in clinic today:  CBC RESULTS:  Lab Results   Component Value Date    WBC 5.7 08/14/2023    RBC 5.15 08/14/2023    HGB 14.1 08/14/2023    HCT 43.0 08/14/2023    MCV 84 08/14/2023    MCH 27.4 08/14/2023    MCHC 32.8 08/14/2023    RDW 12.8 08/14/2023     08/14/2023       CMP RESULTS:  Lab Results   Component Value Date     08/14/2023    POTASSIUM 4.0 08/14/2023    POTASSIUM 3.7 12/25/2022    POTASSIUM 3.9 11/08/2022    CHLORIDE 104 08/14/2023    CHLORIDE 103 03/30/2023    CO2 24 08/14/2023    CO2 26 11/08/2022    ANIONGAP 11 08/14/2023    ANIONGAP 9 11/08/2022     (H) 08/14/2023     (H) 01/13/2023     (H) 11/08/2022    BUN 20.3 08/14/2023    BUN 35 (H) 11/08/2022    CR 1.08 08/14/2023    GFRESTIMATED 74 08/14/2023    TIM 9.5 08/14/2023    BILITOTAL 0.5 06/29/2023    ALBUMIN 4.3 06/29/2023    ALKPHOS " 76 06/29/2023    ALT 17 06/29/2023    AST 24 06/29/2023        INR RESULTS:  Lab Results   Component Value Date    INR 1.06 02/03/2023    INR 1.16 (H) 11/28/2022       LIPID RESULTS:  Lab Results   Component Value Date    CHOL 153 06/29/2023    HDL 64 06/29/2023    LDL 66 06/29/2023    TRIG 115 06/29/2023       IMMUNOSUPPRESSANT LEVELS:  Lab Results   Component Value Date    TACROL 5.6 07/24/2023    DOSTAC  07/24/2023      Comment:      Last dose information not provided.       No components found for: CK  Lab Results   Component Value Date    MAG 1.7 06/29/2023     Lab Results   Component Value Date    A1C 6.2 (H) 12/25/2022     Lab Results   Component Value Date    PHOS 3.1 06/29/2023     Lab Results   Component Value Date    NTBNP 4,423 (H) 11/08/2022     Lab Results   Component Value Date    SAITESTMET SA EDTA FCS 06/29/2023    SAICELL Class I 06/29/2023    [id number] None 06/29/2023    [id number] None 06/29/2023    SAIREPCOM  06/29/2023      HLA PRA Test performed by modified testing procedure that may also include pretreatment of serum. Pretreatment may be the addition of fetal calf serum, EDTA, and/or adsorption.  High-risk, MFI > 3,000.  Mod-risk, -3,000.     Lab Results   Component Value Date    SAIITESTME SA EDTA FCS 06/29/2023    SAIICELL Class II 06/29/2023    [id number] Invalid. See SCN results. 06/29/2023    [id number] Invalid. See SCN results. 06/29/2023    SAIIREPCOM  06/29/2023     Intentional discrepancy between current date and historical date Single Antigen Bead specificity reporting.  Additional testing performed on current date to address potential testing artifact. HLA PRA Test performed by modified testing procedure that may also include pretreatment of serum. Pretreatment may be the addition of fetal calf serum, EDTA, and/or adsorption.  High-risk, MFI > 3,000.  Mod-risk, -3,000.     No results found for: CSPEC, CMQNT, CMLOG    Diagnostic Studies:  TTE 8/14/23  Left ventricular  size, wall motion and function are normal. The ejection  fraction is 60-65%.  Right ventricular function, chamber size, wall motion, and thickness are  normal.  The inferior vena cava was normal in size with preserved respiratory  variability. Sinuses of Valsalva 4.0 cm. No pericardial effusion is present.     No significant changes noted.    RHC 6/29/23  NIBP 138/85/106  RA 3  RV 30/5  PA 30/15/20  PCWP 12  PA Sat 71%  Aisha CO/CI 6.6/3.0  SVR 1248 dynes  PVR 1.2 UREÑA       TTE 3/20/23  Global and regional left ventricular function is normal with an EF of 60-65%.  Right ventricular function, chamber size, wall motion, and thickness are normal.  No pericardial effusion is present.    Cor angio 1/23/23  Angiographically normal coronary arteries.  IVUS of the left main, LAD.  Eccentric mild non obstructive native coronary artery disease with MIGUEL ÁNGEL of left main of 1.1 mm and MIGUEL ÁNGEL of proximal LAD is 0.2 mm.    TTE 8/14/23  Left ventricular size, wall motion and function are normal. The ejection  fraction is 60-65%.  Right ventricular function, chamber size, wall motion, and thickness are  normal.  The inferior vena cava was normal in size with preserved respiratory  variability. Sinuses of Valsalva 4.0 cm. No pericardial effusion is present.     No significant changes noted      YAQUELIN MCFARLANE            Answers submitted by the patient for this visit:  RETURN HEART TRANSPLANT on 8/14/2023 11:30 AM with SHAKIR Jackson CNP  Symptoms you have experienced in the last 30 days (Submitted on 8/11/2023)  General Symptoms: No  Skin Symptoms: No  HENT Symptoms: No  EYE SYMPTOMS: No  HEART SYMPTOMS: No  LUNG SYMPTOMS: No  INTESTINAL SYMPTOMS: No  URINARY SYMPTOMS: No  REPRODUCTIVE SYMPTOMS: No  SKELETAL SYMPTOMS: No  BLOOD SYMPTOMS: No  NERVOUS SYSTEM SYMPTOMS: Yes  MENTAL HEALTH SYMPTOMS: No      Please do not hesitate to contact me if you have any questions/concerns.     Sincerely,     SHAKIR Syed CNP

## 2023-08-14 NOTE — NURSING NOTE
Transplant Coordinator Note   Reason for visit Month 8 post transplant      Health concerns addressed today   Ok to take amox off med list. Done.   Ok with Mg of 1.4.   Ok to get covid booster or wait until new variance comes out in Sept. Pt prefers to wait.   Pt took 2.5 (instead of 2 mg ) last pm.   They prefer early Dec for first annual appointments, but are flexible.     Immunosuppressants   MMF 1000/1000 mg   Tac 2.5/2 mg  - goal 6-8. Level pending        Routine screenings   Derm to be yearly   Dental In process   Colonoscopy  Nov 2022 (no specimens)   Prostate 1.63   Eye to be yearly   Flu Oct 2021 -> due this fall   Pneumonia UTD   COVID last dose Nov 2021      Medication record reviewed and reconciled. Questions and concerns addressed. AVS reviewed and copy provided. Pt verbalized an understanding of plan of care.      Patient Instructions   - Mary Lou will call you with all pending lab results.   - Ok to get covid booster or wait until this fall with your flu shot.   -We will continue to monitor your magnesium. We are ok with 1.4 for now.   -We will try our best to align one of your upcoming appointments with dermatology.       Please call your transplant coordinator at [phone] with any questions or concerns.  Please note: after hours, weekends and holidays, this phone number is routed to an  to page out the coordinator on call.     Coordinator will update you on remaining results.     Next lab draw: Pending today's results     Return in 2 months for month 10 follow up  - October 12, 2023

## 2023-08-14 NOTE — NURSING NOTE
Chief Complaint   Patient presents with    Follow Up     Return heart transplant       Vitals were taken, medications reconciled.    Mae Kowalski, EMT   11:26 AM

## 2023-08-14 NOTE — PATIENT INSTRUCTIONS
Patient Instructions   - Mary Lou will call you with all pending lab results.   - Ok to get covid booster or wait until this fall with your flu shot.   -We will continue to monitor your magnesium. We are ok with 1.4 for now.   -We will try our best to align one of your upcoming appointments with dermatology.       Please call your transplant coordinator at [phone] with any questions or concerns.  Please note: after hours, weekends and holidays, this phone number is routed to an  to page out the coordinator on call.     Coordinator will update you on remaining results.     Next lab draw: Pending today's results     Return in 2 months for month 10 follow up  - October 12, 2023

## 2023-08-14 NOTE — PROGRESS NOTES
ADULT HEART TRANSPLANT CLINIC    HPI:   Mr. Stein is a 68 year old male who presents to clinic today for routine heart transplant follow-up. 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 His post-operative course c/b delirium, leukocytosis, staph epi bacteremia, and prolonged CT output. He had some complement deposition on repeat biopsies but no graft dysfunction or rejection so has not been treated. Finished IV vancomycin for staph bacteremia. He has no DSAs. Did also have a nosebleed s/p Rhino Rocket placement. Last seen in clinic 5/22/23 by Dr Dominguez.     Since last visit patient reports feeling well. He finished cardiac rehab but is now allowed access to the gym so he has continued to exercise. He mostly does weight lifting and rowing machine. Also goes on daily walks with his wife. Denies any exertional chest pain, shortness of breath, lightheadedness with exercise. No recent LE edema, orthopnea, PND. BPs are 120s/70-90s at home. He is due for tooth extraction. Denies any recent fever, chills, nightsweats, n/v/d, HA, mouth sores, rashes.  Wife notes that last night he took incorrect dose of tacro (took 2.5 mg instead of 2 mg).     Assessment/Plan:  Mr. Stein is a 68 year old male who is s/p orthotopic heart transplant on 12/25/22 who presents to clinic for routine follow-up. From a transplant standpoint  he is doing very well and doing regular exercise. 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 Graft function normal, other than complement deposition there is been no rejection, baseline coronary angiogram with mild native coronary disease (proximal LAD 0.2 mm).    * Rejection history: none.   * Recent immunosuppression changes: none  * Last biopsy: 8/14/23  Negative for acute cellular rejection, No light microscopic features of antibody mediated rejection, +but less diffuse C4d (50%) but also focally C3d (10% immunofluorescence capillary staining, ISHLT 2005 grade 0R (previously 0); ISHLT 2013 grade pAMR1 (I+)  * DSAs:  none  * Last Immuknow: 472 3/2023  * Intolerance to medications: none    Immunosuppression:  - MMF 1000 BID  - Tacrolimus goal 6-8, pending today (2.5 mg in AM 2 mg in PM but took 2.5 mg last PM)  - defer to primary cardiologist re mTORI switch at one year     PPx:  - CAV: aspirin 81 mg daily and rosuvastatin 10mg daily  - Osteoporosis: calcium/vitamin D   - CMV: s/p 3mos valcyte   - Toxo: n/a    Serostatus: CMV D+/R+ EBV D+/R+ Toxo D-/R-    # Drug induced hypoMg  Mg 1.4 today, on [facility] Best BID.      # Hypertension   Controlled on amlodipine 5 mg daily     # C/f familial cardiomyopathy  # Hx ?arrhythmogenic cardiomyopathy   Had genetic testing, including SOS2 gene (brother carries), in contact with counselor re: joanna for first degrees relatives. Genetic testing negative for the 57 genes anazlyzed.     # Right axillary nonocclusive DVT, left peroneal DVT, right IJ DVT, resolved  Resolved on current US, now off DOAC     # Staph epi bacteremia  Completed antibiotics      # History of vasculitis  Follows with rheumatology and neuro    Recommend COVID vaccine this fall with flu vaccine, discussed today. Referral to dermatology placed.     Symone Guerra, PAOLA, NP-C  Advanced Heart Failure/Cardiac Transplant Nurse Practitioner        40 minutes spent on the date of the encounter doing chart review, history and exam, documentation and further activities per the  "note    PAST MEDICAL HISTORY:  Past Medical History:   Diagnosis Date    Congestive heart failure (H)        FAMILY HISTORY:  Family History   Problem Relation Age of Onset    Atrial fibrillation Father     Lung Cancer Brother        SOCIAL HISTORY:  Socioeconomic History    Marital status:    Tobacco Use    Smoking status: Never    Smokeless tobacco: Never   Substance and Sexual Activity    Alcohol use: Not Currently    Drug use: Never       CURRENT MEDICATIONS:   magnesium glycinate lysinate chelate (DOCTOR'S BEST) 100 MG TABS tablet, Take 3 tablets (300 mg) by mouth 2 times daily Plus two additional tabs (200 mg) daily Take separate from Mycophenolate.  acetaminophen (TYLENOL) 80 MG chewable tablet, Take 80 mg by mouth every 4 hours as needed for mild pain or fever  amLODIPine (NORVASC) 5 MG tablet, Take 1 tablet (5 mg) by mouth At Bedtime  aspirin (ASA) 81 MG EC tablet, Take 1 tablet (81 mg) by mouth daily  calcium carbonate-vitamin D (CALTRATE) 600-10 MG-MCG per tablet, Take 1 tablet by mouth 2 times daily (with meals)  multivitamin (ONE-DAILY) tablet, Take 1 tablet by mouth daily  mycophenolate (GENERIC EQUIVALENT) 250 MG capsule, Take 4 capsules (1,000 mg) by mouth 2 times daily  polyethylene glycol (MIRALAX) 17 g packet, Take 1 packet by mouth daily  rosuvastatin (CRESTOR) 10 MG tablet, Take 1 tablet (10 mg) by mouth daily  tacrolimus (GENERIC EQUIVALENT) 0.5 MG capsule, Take one 0.5mg capsule with two 1mg capsules every morning for a total dose of 2.5 mg in the AM and 2mg in the PM  tacrolimus (GENERIC EQUIVALENT) 1 MG capsule, Take TWO caps (2 mg) with one 0.5mg capsule in the AM and take TWO caps (2mg) in the PM for a total dose of 2.5mg in the AM and 2mg in the PM.    No current facility-administered medications on file prior to visit.      ROS:  See HPI    EXAM:  /83 (BP Location: Right arm, Patient Position: Sitting, Cuff Size: Adult Regular)   Pulse 102   Ht 1.88 m (6' 2.02\")   Wt 99.1 " kg (218 lb 6.4 oz)   SpO2 97%   BMI 28.03 kg/m      GENERAL: Appears comfortable, in no acute distress.   HEENT: Eye symmetrical, no discharge or icterus bilaterally. Mucous membranes moist and without lesions. No thrush.   CV: Tachycardic per baseline, +S1S2, no murmur, rub, or gallop. JVP not visible.   RESPIRATORY: Respirations regular, even, and unlabored. Lungs CTA throughout.   GI: Soft and non distended with normoactive bowel sounds present in all quadrants. No tenderness, rebound, guarding. No hepatomegaly.   EXTREMITIES: No peripheral edema. 2+ bilateral pedal pulses.   NEUROLOGIC: Alert and oriented x 3. No focal deficits.   MUSCULOSKELETAL: No joint swelling or tenderness.   SKIN: No jaundice. No rashes or lesions.     Labs - reviewed with patient in clinic today:  CBC RESULTS:  Lab Results   Component Value Date    WBC 5.7 08/14/2023    RBC 5.15 08/14/2023    HGB 14.1 08/14/2023    HCT 43.0 08/14/2023    MCV 84 08/14/2023    MCH 27.4 08/14/2023    MCHC 32.8 08/14/2023    RDW 12.8 08/14/2023     08/14/2023       CMP RESULTS:  Lab Results   Component Value Date     08/14/2023    POTASSIUM 4.0 08/14/2023    POTASSIUM 3.7 12/25/2022    POTASSIUM 3.9 11/08/2022    CHLORIDE 104 08/14/2023    CHLORIDE 103 03/30/2023    CO2 24 08/14/2023    CO2 26 11/08/2022    ANIONGAP 11 08/14/2023    ANIONGAP 9 11/08/2022     (H) 08/14/2023     (H) 01/13/2023     (H) 11/08/2022    BUN 20.3 08/14/2023    BUN 35 (H) 11/08/2022    CR 1.08 08/14/2023    GFRESTIMATED 74 08/14/2023    TIM 9.5 08/14/2023    BILITOTAL 0.5 06/29/2023    ALBUMIN 4.3 06/29/2023    ALKPHOS 76 06/29/2023    ALT 17 06/29/2023    AST 24 06/29/2023        INR RESULTS:  Lab Results   Component Value Date    INR 1.06 02/03/2023    INR 1.16 (H) 11/28/2022       LIPID RESULTS:  Lab Results   Component Value Date    CHOL 153 06/29/2023    HDL 64 06/29/2023    LDL 66 06/29/2023    TRIG 115 06/29/2023       IMMUNOSUPPRESSANT  LEVELS:  Lab Results   Component Value Date    TACROL 5.6 07/24/2023    DOSTAC  07/24/2023      Comment:      Last dose information not provided.       No components found for: CK  Lab Results   Component Value Date    MAG 1.7 06/29/2023     Lab Results   Component Value Date    A1C 6.2 (H) 12/25/2022     Lab Results   Component Value Date    PHOS 3.1 06/29/2023     Lab Results   Component Value Date    NTBNP 4,423 (H) 11/08/2022     Lab Results   Component Value Date    SAITESTMET SA EDTA FCS 06/29/2023    SAICELL Class I 06/29/2023    WN2PIPWUS None 06/29/2023    [id number] None 06/29/2023    SAIREPCOM  06/29/2023      HLA PRA Test performed by modified testing procedure that may also include pretreatment of serum. Pretreatment may be the addition of fetal calf serum, EDTA, and/or adsorption.  High-risk, MFI > 3,000.  Mod-risk, -3,000.     Lab Results   Component Value Date    SAIITESTME SA EDTA FCS 06/29/2023    SAIICELL Class II 06/29/2023    TC1WITTUM Invalid. See SCN results. 06/29/2023    GJ2GLFYQIP Invalid. See SCN results. 06/29/2023    SAIIREPCOM  06/29/2023     Intentional discrepancy between current date and historical date Single Antigen Bead specificity reporting.  Additional testing performed on current date to address potential testing artifact. HLA PRA Test performed by modified testing procedure that may also include pretreatment of serum. Pretreatment may be the addition of fetal calf serum, EDTA, and/or adsorption.  High-risk, MFI > 3,000.  Mod-risk, -3,000.     No results found for: CSPEC, CMQNT, CMLOG    Diagnostic Studies:  TTE 8/14/23  Left ventricular size, wall motion and function are normal. The ejection  fraction is 60-65%.  Right ventricular function, chamber size, wall motion, and thickness are  normal.  The inferior vena cava was normal in size with preserved respiratory  variability. Sinuses of Valsalva 4.0 cm. No pericardial effusion is present.     No significant  changes noted.    RHC 6/29/23  NIBP 138/85/106  RA 3  RV 30/5  PA 30/15/20  PCWP 12  PA Sat 71%  Aisha CO/CI 6.6/3.0  SVR 1248 dynes  PVR 1.2 UREÑA       TTE 3/20/23  Global and regional left ventricular function is normal with an EF of 60-65%.  Right ventricular function, chamber size, wall motion, and thickness are normal.  No pericardial effusion is present.    Cor angio 1/23/23  Angiographically normal coronary arteries.  IVUS of the left main, LAD.  Eccentric mild non obstructive native coronary artery disease with MIGUEL ÁNGEL of left main of 1.1 mm and MIGUEL ÁNGEL of proximal LAD is 0.2 mm.    TTE 8/14/23  Left ventricular size, wall motion and function are normal. The ejection  fraction is 60-65%.  Right ventricular function, chamber size, wall motion, and thickness are  normal.  The inferior vena cava was normal in size with preserved respiratory  variability. Sinuses of Valsalva 4.0 cm. No pericardial effusion is present.     No significant changes noted      YAQUELIN MCFARLANE            Answers submitted by the patient for this visit:  RETURN HEART TRANSPLANT on 8/14/2023 11:30 AM with SHAKIR Jackson CNP  Symptoms you have experienced in the last 30 days (Submitted on 8/11/2023)  General Symptoms: No  Skin Symptoms: No  HENT Symptoms: No  EYE SYMPTOMS: No  HEART SYMPTOMS: No  LUNG SYMPTOMS: No  INTESTINAL SYMPTOMS: No  URINARY SYMPTOMS: No  REPRODUCTIVE SYMPTOMS: No  SKELETAL SYMPTOMS: No  BLOOD SYMPTOMS: No  NERVOUS SYSTEM SYMPTOMS: Yes  MENTAL HEALTH SYMPTOMS: No

## 2023-08-15 LAB — EBV DNA # SPEC NAA+PROBE: NOT DETECTED COPIES/ML

## 2023-08-16 LAB
ALLOMAP SCORE (EXTERNAL): 25 (ref 0–40)
NEGATIVE PREDICTIVE VALUE PERCENT (EXTERNAL): 100 %
POSITIVE PREDICTIVE VALUE PERCENT (EXTERNAL): 2.2 %

## 2023-08-17 ENCOUNTER — TELEPHONE (OUTPATIENT)
Dept: DERMATOLOGY | Facility: CLINIC | Age: 68
End: 2023-08-17
Payer: MEDICARE

## 2023-08-17 NOTE — TELEPHONE ENCOUNTER
Patient Contacted and schedule the following:    Appointment type: [facility] Transplant Skin Check  Provider: Dr. Hastings  Return date: 10/13/23  Specialty phone number: [phone]

## 2023-08-23 PROCEDURE — 80197 ASSAY OF TACROLIMUS: CPT | Performed by: INTERNAL MEDICINE

## 2023-08-30 ENCOUNTER — LAB (OUTPATIENT)
Dept: LAB | Facility: CLINIC | Age: 68
End: 2023-08-30

## 2023-08-30 DIAGNOSIS — Z94.1 HEART REPLACED BY TRANSPLANT (H): ICD-10-CM

## 2023-08-30 PROCEDURE — 80197 ASSAY OF TACROLIMUS: CPT | Performed by: INTERNAL MEDICINE

## 2023-08-31 LAB
TACROLIMUS BLD-MCNC: 6.7 UG/L (ref 5–15)
TME LAST DOSE: NORMAL H
TME LAST DOSE: NORMAL H

## 2023-09-03 LAB
TACROLIMUS BLD-MCNC: 7 UG/L (ref 5–15)
TME LAST DOSE: NORMAL H
TME LAST DOSE: NORMAL H

## 2023-10-06 ENCOUNTER — PRE VISIT (OUTPATIENT)
Dept: TRANSPLANT | Facility: CLINIC | Age: 68
End: 2023-10-06
Payer: MEDICARE

## 2023-10-06 DIAGNOSIS — Z79.899 ENCOUNTER FOR LONG-TERM (CURRENT) USE OF HIGH-RISK MEDICATION: Primary | ICD-10-CM

## 2023-10-06 DIAGNOSIS — Z94.1 HEART REPLACED BY TRANSPLANT (H): ICD-10-CM

## 2023-10-06 ASSESSMENT — ENCOUNTER SYMPTOMS
SINUS CONGESTION: 1
COUGH: 1

## 2023-10-10 ENCOUNTER — VIRTUAL VISIT (OUTPATIENT)
Dept: NEUROLOGY | Facility: CLINIC | Age: 68
End: 2023-10-10
Payer: MEDICARE

## 2023-10-10 DIAGNOSIS — Z94.1 HEART REPLACED BY TRANSPLANT (H): ICD-10-CM

## 2023-10-10 DIAGNOSIS — I77.6 VASCULITIS, CNS (H): ICD-10-CM

## 2023-10-10 DIAGNOSIS — I63.49 CEREBROVASCULAR ACCIDENT (CVA) DUE TO EMBOLISM OF OTHER CEREBRAL ARTERY (H): Primary | ICD-10-CM

## 2023-10-10 PROCEDURE — 99215 OFFICE O/P EST HI 40 MIN: CPT | Mod: 95 | Performed by: PSYCHIATRY & NEUROLOGY

## 2023-10-10 ASSESSMENT — PAIN SCALES - GENERAL: PAINLEVEL: NO PAIN (0)

## 2023-10-10 NOTE — NURSING NOTE
Is the patient currently in the state of [place]? YES    Visit mode:VIDEO    If the visit is dropped, the patient can be reconnected by: VIDEO VISIT: Text to cell phone:   Telephone Information:   Mobile [phone]       Will anyone else be joining the visit? NO  (If patient encounters technical issues they should call [phone]    How would you like to obtain your AVS? MyChart    Are changes needed to the allergy or medication list? Pt stated no med changes    Reason for visit: RECHECK (Stroke )    Brenda MENDOZAF

## 2023-10-10 NOTE — PROGRESS NOTES
[facility] [address]  3RD FLOOR  [facility] [address] [phone]          October 10, 2023    Paco Stein                                                                                                                     [address]  [place] [address]    TOTAL 53 minutes  Video 11:30 - 11:58  (in [facility])  Documentation, coordination: 25 minutes    Mr. Stein is a 68 year old gentleman who I first met in March of 2023 (3/14/23). This gentleman's medical history includes CNS vasculitis diagnosed/treated in 2016 after strokes in 2013. Diagnosis was by angiogram and there was no biopsy. He was treated with cytoxan initially and then cellcept since 2018. The patient's other relevant history includes a h/o HTN, CAD, systolic HF secondary to NICM, VT and ICD placement in 1/22 and CKD. He underwent a heart transplant on 12/25/22 and after some post-operative complications (described in Dr. Dominguez note of 3/9/23) was discharged home on 1/13/22. I saw him in March after a surveillance MRI and the patient as had other MRI including one on 11/14/22 and others pre-transplant. Some of the past MRI did show small DWI abnormalities and his EF was very low pre-transplant. He had a H&N MRA in 11/22 and there was no large vessel stenosis. After his last surveillance MRI in 3/23, I discussed his stroke work up with Dr. Dominguez. We agreed on a cardiac monitor and he wore a monitor for 14 days and there was no AFIB. The patient is now on 81 mg of aspirin and doing very well by his own report and his wife's report. He is immunosuppressed post-transplant. They say that he may be occasionally forgetful of names.     Mrs. Stein who is a PT was very helpful with today's visit in assisting with the exam.    ASSESSMENT / PLAN  Encounter Diagnoses   Name Primary?     Cerebrovascular accident (CVA) due to embolism of other cerebral artery (H) Yes     Heart  replaced by transplant (H)      Vasculitis, CNS (H24)      This 68 year old gentleman with a h/o CVA and history of heart transplant and very low EF pre-transplant and diagnosed/treated with CNS vasculitis in 2016 is doing well neurologically. His exam is clinically stable. His brain MRI was compared between March and September 2023 and shared with the patient. There were no CNS changes between the two MRI. We discussed optimal follow up. He is immunosuppressed from the transplant and hence a recurrence of vasculitis is unlikely. I offered to see him in a year after a repeat surveillance MRI at that time. He will d/w Cardiology and Rheumatology. He will also discuss aspirin dosing with cardiology (my recommendation would be 325 mg daily). If forgetfulness continues to be an issue, I would recommend a B12/TSH screen locally. They will also do the MRI locally and transfer images. Recommend 1 year follow up.       MRI 9/18/23  FINDINGS:       Diffusion sequences are unremarkable with no findings for acute ischemia.   Chronic T2 hyperintensities through the white matter similar to previous examination without a significant increase in white matter T2 burden. This includes some T2 signal along the corpus callosum and some callosal thinning as on prior studies. No   developing mass or midline shift. No developing intra or extra-axial fluid accumulations. No developing enhancing abnormality or developing blood product on SWI imaging.   Mild paranasal sinus disease most pronounced in the maxillary sinuses left greater than right. No air-fluid level seen.   Some T2 signal developing within left mastoid air cells could represent some mild developing otomastoiditis change new since prior study.      1. Stable MRI examination of the brain with extensive white matter T2 hyperintensities not significantly changed in the interval.   2. Mild chronic paranasal sinus disease.   3. New mild T2 signal through a few left mastoid air  cells could represent mild otomastoiditis change and could be correlated clinically.          Current Outpatient Medications   Medication      magnesium glycinate lysinate chelate (DOCTOR'S BEST) 100 MG TABS tablet     acetaminophen (TYLENOL) 80 MG chewable tablet     amLODIPine (NORVASC) 5 MG tablet     aspirin (ASA) 81 MG EC tablet     calcium carbonate-vitamin D (CALTRATE) 600-10 MG-MCG per tablet     multivitamin (ONE-DAILY) tablet     mycophenolate (GENERIC EQUIVALENT) 250 MG capsule     polyethylene glycol (MIRALAX) 17 g packet     rosuvastatin (CRESTOR) 10 MG tablet     tacrolimus (GENERIC EQUIVALENT) 0.5 MG capsule     tacrolimus (GENERIC EQUIVALENT) 1 MG capsule     No current facility-administered medications for this visit.         EXAM      Orientation: Normal; Language normal; Attention: DLROW; normal Serial 7: 4/5  Memory 3/3    Cranial nerves: EOMI Slight flattening L NLF; tongue ML; does not report any face numbness; shoulder shrug normal;     Motor: FFM normal bilaterally; No drift; Strength normal in both UE and LE except mild finger abduction/extension weakness; SA EF EE WE FE/FA; HF KF KE DF EHL  Strength was tested in detail by his wife who is a PT    Sensory: no deficits to LT reported  Co-ordination normal in both UE   Gait: normal base steady can walk on heels toes and tandem without difficulty          Jackelin Khan MD

## 2023-10-10 NOTE — LETTER
10/10/2023       RE: Paco Stein  [address] [place] [address]       Dear Colleague,    Thank you for referring your patient, Paco Stein, to the [facility] [place] at [facility]. Please see a copy of my visit note below.          [facility] [place]  [address]  3RD FLOOR  [facility] [id number] [phone]          October 10, 2023    Paco Stein                                                                                                                     [address] [place] [address]    TOTAL 53 minutes  Video 11:30 - 11:58  (in [facility])  Documentation, coordination: 25 minutes    Mr. Stein is a 68 year old gentleman who I first met in March of 2023 (3/14/23). This gentleman's medical history includes CNS vasculitis diagnosed/treated in 2016 after strokes in 2013. Diagnosis was by angiogram and there was no biopsy. He was treated with cytoxan initially and then cellcept since 2018. The patient's other relevant history includes a h/o HTN, CAD, systolic HF secondary to NICM, VT and ICD placement in 1/22 and CKD. He underwent a heart transplant on 12/25/22 and after some post-operative complications (described in Dr. Dominguez note of 3/9/23) was discharged home on 1/13/22. I saw him in March after a surveillance MRI and the patient as had other MRI including one on 11/14/22 and others pre-transplant. Some of the past MRI did show small DWI abnormalities and his EF was very low pre-transplant. He had a H&N MRA in 11/22 and there was no large vessel stenosis. After his last surveillance MRI in 3/23, I discussed his stroke work up with Dr. Dominguez. We agreed on a cardiac monitor and he wore a monitor for 14 days and there was no AFIB. The patient is now on 81 mg of aspirin and doing very well by his own report and his wife's report. He is immunosuppressed  post-transplant. They say that he may be occasionally forgetful of names.     Mrs. Stein who is a PT was very helpful with today's visit in assisting with the exam.    ASSESSMENT / PLAN  Encounter Diagnoses   Name Primary?    Cerebrovascular accident (CVA) due to embolism of other cerebral artery (H) Yes    Heart replaced by transplant (H)     Vasculitis, CNS (H24)      This 68 year old gentleman with a h/o CVA and history of heart transplant and very low EF pre-transplant and diagnosed/treated with CNS vasculitis in 2016 is doing well neurologically. His exam is clinically stable. His brain MRI was compared between March and September 2023 and shared with the patient. There were no CNS changes between the two MRI. We discussed optimal follow up. He is immunosuppressed from the transplant and hence a recurrence of vasculitis is unlikely. I offered to see him in a year after a repeat surveillance MRI at that time. He will d/w Cardiology and Rheumatology. He will also discuss aspirin dosing with cardiology (my recommendation would be 325 mg daily). If forgetfulness continues to be an issue, I would recommend a B12/TSH screen locally. They will also do the MRI locally and transfer images. Recommend 1 year follow up.       MRI 9/18/23  FINDINGS:       Diffusion sequences are unremarkable with no findings for acute ischemia.   Chronic T2 hyperintensities through the white matter similar to previous examination without a significant increase in white matter T2 burden. This includes some T2 signal along the corpus callosum and some callosal thinning as on prior studies. No   developing mass or midline shift. No developing intra or extra-axial fluid accumulations. No developing enhancing abnormality or developing blood product on SWI imaging.   Mild paranasal sinus disease most pronounced in the maxillary sinuses left greater than right. No air-fluid level seen.   Some T2 signal developing within left mastoid air cells  could represent some mild developing otomastoiditis change new since prior study.      1. Stable MRI examination of the brain with extensive white matter T2 hyperintensities not significantly changed in the interval.   2. Mild chronic paranasal sinus disease.   3. New mild T2 signal through a few left mastoid air cells could represent mild otomastoiditis change and could be correlated clinically.          Current Outpatient Medications   Medication     magnesium glycinate lysinate chelate (DOCTOR'S BEST) 100 MG TABS tablet    acetaminophen (TYLENOL) 80 MG chewable tablet    amLODIPine (NORVASC) 5 MG tablet    aspirin (ASA) 81 MG EC tablet    calcium carbonate-vitamin D (CALTRATE) 600-10 MG-MCG per tablet    multivitamin (ONE-DAILY) tablet    mycophenolate (GENERIC EQUIVALENT) 250 MG capsule    polyethylene glycol (MIRALAX) 17 g packet    rosuvastatin (CRESTOR) 10 MG tablet    tacrolimus (GENERIC EQUIVALENT) 0.5 MG capsule    tacrolimus (GENERIC EQUIVALENT) 1 MG capsule     No current facility-administered medications for this visit.         EXAM      Orientation: Normal; Language normal; Attention: DLROW; normal Serial 7: 4/5  Memory 3/3    Cranial nerves: EOMI Slight flattening L NLF; tongue ML; does not report any face numbness; shoulder shrug normal;     Motor: FFM normal bilaterally; No drift; Strength normal in both UE and LE except mild finger abduction/extension weakness; SA EF EE WE FE/FA; HF KF KE DF EHL  Strength was tested in detail by his wife who is a PT    Sensory: no deficits to LT reported  Co-ordination normal in both UE   Gait: normal base steady can walk on heels toes and tandem without difficulty          Again, thank you for allowing me to participate in the care of your patient.      Sincerely,    Jackelin Khan MD

## 2023-10-10 NOTE — PROGRESS NOTES
Virtual Visit Details    Type of service:  Video Visit   Video Start Time: 11:30 AM  Video End Time:11:58 AM    Originating Location (pt. Location): [facility]  Distant Location (provider location):  Off-site  Platform used for Video Visit: Bri

## 2023-10-11 NOTE — PROGRESS NOTES
ADULT HEART TRANSPLANT CLINIC    HPI:   Mr. Stein is a 68 year old male who presents to clinic today for routine heart transplant follow-up. 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 His post-operative course c/b delirium, leukocytosis, staph epi bacteremia, and prolonged CT output. He had some complement deposition on repeat biopsies but no graft dysfunction or rejection so not treated. Finished IV vancomycin for staph bacteremia. Did also have a nosebleed s/p Rhino Rocket placement.     Since last visit patient reports feeling well. No more leg swelling. Denies chest pain, shortness of breath. No longer on diuretics. Reports that he sometimes feels dizzy when he stands up after sitting, lasts a few seconds, resolves by itself. BP is normal during these episodes. Can walk about a mile with no difficulty. Denies any recent fever, chills, nightsweats, n/v/d, HA, mouth sores, rashes. Blood pressures in the range of 120s/80s at home. Has gained 3-5 lbs in the last couple of months, has been trying to increase caloric and protein intake.     Has RHC and angiogram scheduled for December 2023.    PAST MEDICAL HISTORY:  Past Medical History:   Diagnosis Date    Congestive heart failure (H)        FAMILY HISTORY:  Family History   Problem Relation Age of Onset    Atrial fibrillation Father     Lung Cancer Brother        SOCIAL HISTORY:  Socioeconomic History    Marital status:    Tobacco Use    Smoking status: Never    Smokeless tobacco: Never   Substance and Sexual Activity    Alcohol use: Not Currently    Drug use: Never       CURRENT MEDICATIONS:   magnesium glycinate lysinate chelate (DOCTOR'S BEST) 100 MG TABS tablet, Take 3 tablets (300 mg) by mouth 2 times daily Plus two additional tabs (200 mg) daily  "Take separate from Mycophenolate.  acetaminophen (TYLENOL) 80 MG chewable tablet, Take 80 mg by mouth every 4 hours as needed for mild pain or fever  amLODIPine (NORVASC) 5 MG tablet, Take 1 tablet (5 mg) by mouth At Bedtime  aspirin (ASA) 81 MG EC tablet, Take 1 tablet (81 mg) by mouth daily  calcium carbonate-vitamin D (CALTRATE) 600-10 MG-MCG per tablet, Take 1 tablet by mouth 2 times daily (with meals)  multivitamin (ONE-DAILY) tablet, Take 1 tablet by mouth daily  mycophenolate (GENERIC EQUIVALENT) 250 MG capsule, Take 4 capsules (1,000 mg) by mouth 2 times daily  polyethylene glycol (MIRALAX) 17 g packet, Take 1 packet by mouth daily  rosuvastatin (CRESTOR) 10 MG tablet, Take 1 tablet (10 mg) by mouth daily  tacrolimus (GENERIC EQUIVALENT) 0.5 MG capsule, Take one 0.5mg capsule with two 1mg capsules every morning for a total dose of 2.5 mg in the AM and 2mg in the PM  tacrolimus (GENERIC EQUIVALENT) 1 MG capsule, Take TWO caps (2 mg) with one 0.5mg capsule in the AM and take TWO caps (2mg) in the PM for a total dose of 2.5mg in the AM and 2mg in the PM.    No current facility-administered medications on file prior to visit.      ROS:  See HPI    EXAM:  /88 (BP Location: Right arm, Patient Position: Chair, Cuff Size: Adult Large)   Pulse 93   Ht 1.895 m (6' 2.61\")   Wt 98.3 kg (216 lb 12.8 oz)   SpO2 96%   BMI 27.39 kg/m      GENERAL: Appears comfortable, in no acute distress.   HEENT: Eye symmetrical, no discharge or icterus bilaterally. Mucous membranes moist and without lesions. No thrush.   CV: Ttachycardic per baseline, +S1S2, no murmur, rub, or gallop. JVP not visible.   RESPIRATORY: Respirations regular, even, and unlabored. Lungs CTA throughout.   GI: Soft and non distended with normoactive bowel sounds present in all quadrants. No tenderness, rebound, guarding. No hepatomegaly.   EXTREMITIES: No peripheral edema. 2+ bilateral pedal pulses.   NEUROLOGIC: Alert and oriented x 3. No focal " "deficits.   MUSCULOSKELETAL: No joint swelling or tenderness.   SKIN: No jaundice. No rashes or lesions.     Labs - reviewed with patient in clinic today:  CBC RESULTS:  Lab Results   Component Value Date    WBC 5.9 10/12/2023    RBC 5.19 10/12/2023    HGB 14.3 10/12/2023    HCT 44.3 10/12/2023    MCV 85 10/12/2023    MCH 27.6 10/12/2023    MCHC 32.3 10/12/2023    RDW 12.5 10/12/2023     10/12/2023       CMP RESULTS:  Lab Results   Component Value Date     10/12/2023    POTASSIUM 4.3 10/12/2023    POTASSIUM 3.7 12/25/2022    POTASSIUM 3.9 11/08/2022    CHLORIDE 105 10/12/2023    CHLORIDE 103 03/30/2023    CO2 27 10/12/2023    CO2 26 11/08/2022    ANIONGAP 8 10/12/2023    ANIONGAP 9 11/08/2022     (H) 10/12/2023     (H) 01/13/2023     (H) 11/08/2022    BUN 18.4 10/12/2023    BUN 35 (H) 11/08/2022    CR 1.24 (H) 10/12/2023    GFRESTIMATED 63 10/12/2023    TIM 9.7 10/12/2023    BILITOTAL 0.5 06/29/2023    ALBUMIN 4.3 06/29/2023    ALKPHOS 76 06/29/2023    ALT 17 06/29/2023    AST 24 06/29/2023        INR RESULTS:  Lab Results   Component Value Date    INR 1.06 02/03/2023    INR 1.16 (H) 11/28/2022       LIPID RESULTS:  Lab Results   Component Value Date    CHOL 153 06/29/2023    HDL 64 06/29/2023    LDL 66 06/29/2023    TRIG 115 06/29/2023       IMMUNOSUPPRESSANT LEVELS:  Lab Results   Component Value Date    TACROL 6.7 08/30/2023    DOSTAC 8/29/2023 08/30/2023       No components found for: \"CK\"  Lab Results   Component Value Date    MAG 1.6 (L) 10/12/2023     Lab Results   Component Value Date    A1C 6.2 (H) 12/25/2022     Lab Results   Component Value Date    PHOS 2.4 (L) 10/12/2023     Lab Results   Component Value Date    NTBNP 4,423 (H) 11/08/2022     Lab Results   Component Value Date    SAITESTMET SA EDTA FCS 06/29/2023    SAICELL Class I 06/29/2023    [id number] None 06/29/2023    [id number] None 06/29/2023    SAIREPCOM  06/29/2023      HLA PRA Test performed by modified " "testing procedure that may also include pretreatment of serum. Pretreatment may be the addition of fetal calf serum, EDTA, and/or adsorption.  High-risk, MFI > 3,000.  Mod-risk, -3,000.     Lab Results   Component Value Date    SAIITESTME SA EDTA FCS 06/29/2023    SAIICELL Class II 06/29/2023    [id number] Invalid. See SCN results. 06/29/2023    [id number] Invalid. See SCN results. 06/29/2023    SAIIREPCOM  06/29/2023     Intentional discrepancy between current date and historical date Single Antigen Bead specificity reporting.  Additional testing performed on current date to address potential testing artifact. HLA PRA Test performed by modified testing procedure that may also include pretreatment of serum. Pretreatment may be the addition of fetal calf serum, EDTA, and/or adsorption.  High-risk, MFI > 3,000.  Mod-risk, -3,000.     No results found for: \"CSPEC\", \"CMQNT\", \"CMLOG\"    Diagnostic Studies:  RHC 4/27/23  RAP: --/--/2  RV: 25/2  PAP: 25/5 (12)  PCWP: --/--/5    SvO2: 66.2%  SpO2: 95%  Hgb: 10.6  MAP: 124    Carolyn CO: 6.5 LPM  Carolyn CI: 3 L/min^m2    Thermodilution CO: 5.6 LPM  Thermodilution CI: 2.6 L/min^m2  PVR: 1.1 (CAROLYN) UREÑA Right sided filling pressures are normal. Left sided filling pressures are normal. Normal PA pressures. Normal cardiac output level. Hemodynamic data has been modified in Epic per physician review.    TTE from today-pending    Cor angio 1/23/23  Angiographically normal coronary arteries.  IVUS of the left main, LAD.  Eccentric mild non obstructive native coronary artery disease with MIGUEL ÁNGEL of left main of 1.1 mm and MIGUEL ÁNGEL of proximal LAD is 0.2 mm.    Assessment/Plan:  Mr. Stein is a 68 year old male who is s/p orthotopic heart transplant on 12/25/22 who presents to clinic for routine follow-up. From a transplant standpoint  he is doing very well. Gaining strength back, euvolemic. Tolerating immunosuppression without issues.    # s/p OHT 12/25/22 for ARVC/familial " cardiomyopathy  # Mild cardiac allograft vasculopathy   Post-operative course c/b delirium, leukocytosis, staph epi bacteremia, and prolonged CT output. Graft function normal, other than complement deposition there is been no rejection, Baseline coronary angiogram with mild native coronary disease (proximal LAD 0.2 mm).    MMF 1000/1000 mg  Tac 2.5/2 mg  - goal 6-8. Level pending     Immuknow 517 in June 23  * Rejection history: none.   * Last biopsy: 4/27/23 NER, +C4d negative C3d  * DSAs:  none  * Intolerance to medications: none  - will discuss whether to switch to mTORI after angiogram, given mild cardiac allograft vasculopathy     Has RHC and angiogram scheduled for December 2023.     PPx:  - CAV: aspirin 81 mg daily and rosuvastatin 10mg daily  - Osteoporosis: calcium/vitamin D   - CMV: s/p 3mos valcyte   - Toxo: n/a    Serostatus: CMV D+/R+ EBV D+/R+ Toxo D-/R-    # Drug induced hypoMg  Mg 1.6 today. Continue current supplementation     # Hypertension   Controlled on amlodipine 5 mg daily    Continue follow-up as per post transplant protocol.    Plan discussed with Dr. Dominguez.

## 2023-10-12 ENCOUNTER — OFFICE VISIT (OUTPATIENT)
Dept: CARDIOLOGY | Facility: CLINIC | Age: 68
End: 2023-10-12
Attending: INTERNAL MEDICINE
Payer: MEDICARE

## 2023-10-12 ENCOUNTER — LAB (OUTPATIENT)
Dept: LAB | Facility: CLINIC | Age: 68
End: 2023-10-12
Payer: MEDICARE

## 2023-10-12 VITALS
WEIGHT: 216.8 LBS | HEART RATE: 93 BPM | SYSTOLIC BLOOD PRESSURE: 124 MMHG | HEIGHT: 75 IN | DIASTOLIC BLOOD PRESSURE: 88 MMHG | OXYGEN SATURATION: 96 % | BODY MASS INDEX: 26.96 KG/M2

## 2023-10-12 DIAGNOSIS — Z94.1 HEART REPLACED BY TRANSPLANT (H): ICD-10-CM

## 2023-10-12 DIAGNOSIS — Z79.899 ENCOUNTER FOR LONG-TERM (CURRENT) USE OF HIGH-RISK MEDICATION: ICD-10-CM

## 2023-10-12 DIAGNOSIS — Z94.1 HEART REPLACED BY TRANSPLANT (H): Primary | ICD-10-CM

## 2023-10-12 LAB
ANION GAP SERPL CALCULATED.3IONS-SCNC: 8 MMOL/L (ref 7–15)
BUN SERPL-MCNC: 18.4 MG/DL (ref 8–23)
CALCIUM SERPL-MCNC: 9.7 MG/DL (ref 8.8–10.2)
CHLORIDE SERPL-SCNC: 105 MMOL/L (ref 98–107)
CMV DNA SPEC NAA+PROBE-ACNC: NOT DETECTED IU/ML
CREAT SERPL-MCNC: 1.24 MG/DL (ref 0.67–1.17)
DEPRECATED HCO3 PLAS-SCNC: 27 MMOL/L (ref 22–29)
EGFRCR SERPLBLD CKD-EPI 2021: 63 ML/MIN/1.73M2
ERYTHROCYTE [DISTWIDTH] IN BLOOD BY AUTOMATED COUNT: 12.5 % (ref 10–15)
GLUCOSE SERPL-MCNC: 122 MG/DL (ref 70–99)
HCT VFR BLD AUTO: 44.3 % (ref 40–53)
HGB BLD-MCNC: 14.3 G/DL (ref 13.3–17.7)
LVEF ECHO: NORMAL
MAGNESIUM SERPL-MCNC: 1.6 MG/DL (ref 1.7–2.3)
MCH RBC QN AUTO: 27.6 PG (ref 26.5–33)
MCHC RBC AUTO-ENTMCNC: 32.3 G/DL (ref 31.5–36.5)
MCV RBC AUTO: 85 FL (ref 78–100)
PHOSPHATE SERPL-MCNC: 2.4 MG/DL (ref 2.5–4.5)
PLATELET # BLD AUTO: 251 10E3/UL (ref 150–450)
POTASSIUM SERPL-SCNC: 4.3 MMOL/L (ref 3.4–5.3)
RBC # BLD AUTO: 5.19 10E6/UL (ref 4.4–5.9)
SODIUM SERPL-SCNC: 140 MMOL/L (ref 135–145)
TACROLIMUS BLD-MCNC: 6.6 UG/L (ref 5–15)
TME LAST DOSE: NORMAL H
TME LAST DOSE: NORMAL H
WBC # BLD AUTO: 5.9 10E3/UL (ref 4–11)

## 2023-10-12 PROCEDURE — 83735 ASSAY OF MAGNESIUM: CPT | Performed by: PATHOLOGY

## 2023-10-12 PROCEDURE — G0463 HOSPITAL OUTPT CLINIC VISIT: HCPCS | Performed by: INTERNAL MEDICINE

## 2023-10-12 PROCEDURE — 36415 COLL VENOUS BLD VENIPUNCTURE: CPT | Performed by: PATHOLOGY

## 2023-10-12 PROCEDURE — 80197 ASSAY OF TACROLIMUS: CPT | Performed by: INTERNAL MEDICINE

## 2023-10-12 PROCEDURE — 85027 COMPLETE CBC AUTOMATED: CPT | Performed by: PATHOLOGY

## 2023-10-12 PROCEDURE — 84100 ASSAY OF PHOSPHORUS: CPT | Performed by: PATHOLOGY

## 2023-10-12 PROCEDURE — 99000 SPECIMEN HANDLING OFFICE-LAB: CPT | Performed by: PATHOLOGY

## 2023-10-12 PROCEDURE — 87799 DETECT AGENT NOS DNA QUANT: CPT | Performed by: INTERNAL MEDICINE

## 2023-10-12 PROCEDURE — 99214 OFFICE O/P EST MOD 30 MIN: CPT | Mod: 25 | Performed by: INTERNAL MEDICINE

## 2023-10-12 PROCEDURE — 93306 TTE W/DOPPLER COMPLETE: CPT | Performed by: INTERNAL MEDICINE

## 2023-10-12 PROCEDURE — 80048 BASIC METABOLIC PNL TOTAL CA: CPT | Performed by: PATHOLOGY

## 2023-10-12 ASSESSMENT — PAIN SCALES - GENERAL: PAINLEVEL: NO PAIN (0)

## 2023-10-12 NOTE — LETTER
10/12/2023      RE: Paco Stein  [address]  [place] [address]       Dear Colleague,    Thank you for the opportunity to participate in the care of your patient, Paco Stein, at the [facility] [place] at [facility]. Please see a copy of my visit note below.    ADULT HEART TRANSPLANT CLINIC    HPI:   Mr. Stein is a 68 year old male who presents to clinic today for routine heart transplant follow-up. 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 His post-operative course c/b delirium, leukocytosis, staph epi bacteremia, and prolonged CT output. He had some complement deposition on repeat biopsies but no graft dysfunction or rejection so not treated. Finished IV vancomycin for staph bacteremia. Did also have a nosebleed s/p Rhino Rocket placement.     Since last visit patient reports feeling well. No more leg swelling. Denies chest pain, shortness of breath. No longer on diuretics. Reports that he sometimes feels dizzy when he stands up after sitting, lasts a few seconds, resolves by itself. BP is normal during these episodes. Can walk about a mile with no difficulty. Denies any recent fever, chills, nightsweats, n/v/d, HA, mouth sores, rashes. Blood pressures in the range of 120s/80s at home. Has gained 3-5 lbs in the last couple of months, has been trying to increase caloric and protein intake.     Has RHC and angiogram scheduled for December 2023.    PAST MEDICAL HISTORY:  Past Medical History:   Diagnosis Date    Congestive heart failure (H)        FAMILY HISTORY:  Family History   Problem Relation Age of Onset    Atrial fibrillation Father     Lung Cancer Brother        SOCIAL HISTORY:  Socioeconomic History    Marital status:  "   Tobacco Use    Smoking status: Never    Smokeless tobacco: Never   Substance and Sexual Activity    Alcohol use: Not Currently    Drug use: Never       CURRENT MEDICATIONS:   magnesium glycinate lysinate chelate (DOCTOR'S BEST) 100 MG TABS tablet, Take 3 tablets (300 mg) by mouth 2 times daily Plus two additional tabs (200 mg) daily Take separate from Mycophenolate.  acetaminophen (TYLENOL) 80 MG chewable tablet, Take 80 mg by mouth every 4 hours as needed for mild pain or fever  amLODIPine (NORVASC) 5 MG tablet, Take 1 tablet (5 mg) by mouth At Bedtime  aspirin (ASA) 81 MG EC tablet, Take 1 tablet (81 mg) by mouth daily  calcium carbonate-vitamin D (CALTRATE) 600-10 MG-MCG per tablet, Take 1 tablet by mouth 2 times daily (with meals)  multivitamin (ONE-DAILY) tablet, Take 1 tablet by mouth daily  mycophenolate (GENERIC EQUIVALENT) 250 MG capsule, Take 4 capsules (1,000 mg) by mouth 2 times daily  polyethylene glycol (MIRALAX) 17 g packet, Take 1 packet by mouth daily  rosuvastatin (CRESTOR) 10 MG tablet, Take 1 tablet (10 mg) by mouth daily  tacrolimus (GENERIC EQUIVALENT) 0.5 MG capsule, Take one 0.5mg capsule with two 1mg capsules every morning for a total dose of 2.5 mg in the AM and 2mg in the PM  tacrolimus (GENERIC EQUIVALENT) 1 MG capsule, Take TWO caps (2 mg) with one 0.5mg capsule in the AM and take TWO caps (2mg) in the PM for a total dose of 2.5mg in the AM and 2mg in the PM.    No current facility-administered medications on file prior to visit.      ROS:  See HPI    EXAM:  /88 (BP Location: Right arm, Patient Position: Chair, Cuff Size: Adult Large)   Pulse 93   Ht 1.895 m (6' 2.61\")   Wt 98.3 kg (216 lb 12.8 oz)   SpO2 96%   BMI 27.39 kg/m      GENERAL: Appears comfortable, in no acute distress.   HEENT: Eye symmetrical, no discharge or icterus bilaterally. Mucous membranes moist and without lesions. No thrush.   CV: Ttachycardic per baseline, +S1S2, no murmur, rub, or gallop. JVP " "not visible.   RESPIRATORY: Respirations regular, even, and unlabored. Lungs CTA throughout.   GI: Soft and non distended with normoactive bowel sounds present in all quadrants. No tenderness, rebound, guarding. No hepatomegaly.   EXTREMITIES: No peripheral edema. 2+ bilateral pedal pulses.   NEUROLOGIC: Alert and oriented x 3. No focal deficits.   MUSCULOSKELETAL: No joint swelling or tenderness.   SKIN: No jaundice. No rashes or lesions.     Labs - reviewed with patient in clinic today:  CBC RESULTS:  Lab Results   Component Value Date    WBC 5.9 10/12/2023    RBC 5.19 10/12/2023    HGB 14.3 10/12/2023    HCT 44.3 10/12/2023    MCV 85 10/12/2023    MCH 27.6 10/12/2023    MCHC 32.3 10/12/2023    RDW 12.5 10/12/2023     10/12/2023       CMP RESULTS:  Lab Results   Component Value Date     10/12/2023    POTASSIUM 4.3 10/12/2023    POTASSIUM 3.7 12/25/2022    POTASSIUM 3.9 11/08/2022    CHLORIDE 105 10/12/2023    CHLORIDE 103 03/30/2023    CO2 27 10/12/2023    CO2 26 11/08/2022    ANIONGAP 8 10/12/2023    ANIONGAP 9 11/08/2022     (H) 10/12/2023     (H) 01/13/2023     (H) 11/08/2022    BUN 18.4 10/12/2023    BUN 35 (H) 11/08/2022    CR 1.24 (H) 10/12/2023    GFRESTIMATED 63 10/12/2023    TIM 9.7 10/12/2023    BILITOTAL 0.5 06/29/2023    ALBUMIN 4.3 06/29/2023    ALKPHOS 76 06/29/2023    ALT 17 06/29/2023    AST 24 06/29/2023        INR RESULTS:  Lab Results   Component Value Date    INR 1.06 02/03/2023    INR 1.16 (H) 11/28/2022       LIPID RESULTS:  Lab Results   Component Value Date    CHOL 153 06/29/2023    HDL 64 06/29/2023    LDL 66 06/29/2023    TRIG 115 06/29/2023       IMMUNOSUPPRESSANT LEVELS:  Lab Results   Component Value Date    TACROL 6.7 08/30/2023    DOSTAC 8/29/2023 08/30/2023       No components found for: \"CK\"  Lab Results   Component Value Date    MAG 1.6 (L) 10/12/2023     Lab Results   Component Value Date    A1C 6.2 (H) 12/25/2022     Lab Results   Component " "Value Date    PHOS 2.4 (L) 10/12/2023     Lab Results   Component Value Date    NTBNP 4,423 (H) 11/08/2022     Lab Results   Component Value Date    SAITESTMET SA EDTA FCS 06/29/2023    SAICELL Class I 06/29/2023    [id number] None 06/29/2023    [id number] None 06/29/2023    SAIREPCOM  06/29/2023      HLA PRA Test performed by modified testing procedure that may also include pretreatment of serum. Pretreatment may be the addition of fetal calf serum, EDTA, and/or adsorption.  High-risk, MFI > 3,000.  Mod-risk, -3,000.     Lab Results   Component Value Date    SAIITESTME SA EDTA FCS 06/29/2023    SAIICELL Class II 06/29/2023    [id number] Invalid. See SCN results. 06/29/2023    [id number] Invalid. See SCN results. 06/29/2023    SAIIREPCOM  06/29/2023     Intentional discrepancy between current date and historical date Single Antigen Bead specificity reporting.  Additional testing performed on current date to address potential testing artifact. HLA PRA Test performed by modified testing procedure that may also include pretreatment of serum. Pretreatment may be the addition of fetal calf serum, EDTA, and/or adsorption.  High-risk, MFI > 3,000.  Mod-risk, -3,000.     No results found for: \"CSPEC\", \"CMQNT\", \"CMLOG\"    Diagnostic Studies:  RHC 4/27/23  RAP: --/--/2  RV: 25/2  PAP: 25/5 (12)  PCWP: --/--/5    SvO2: 66.2%  SpO2: 95%  Hgb: 10.6  MAP: 124    Carolyn CO: 6.5 LPM  Carolyn CI: 3 L/min^m2    Thermodilution CO: 5.6 LPM  Thermodilution CI: 2.6 L/min^m2  PVR: 1.1 (CAROLYN) UREÑA Right sided filling pressures are normal. Left sided filling pressures are normal. Normal PA pressures. Normal cardiac output level. Hemodynamic data has been modified in Epic per physician review.    TTE from today-pending    Cor angio 1/23/23  Angiographically normal coronary arteries.  IVUS of the left main, LAD.  Eccentric mild non obstructive native coronary artery disease with MIGUEL ÁNGEL of left main of 1.1 mm and MIGUEL ÁNGEL of proximal LAD is 0.2 " mm.    Assessment/Plan:  Mr. Stein is a 68 year old male who is s/p orthotopic heart transplant on 12/25/22 who presents to clinic for routine follow-up. From a transplant standpoint  he is doing very well. Gaining strength back, euvolemic. Tolerating immunosuppression without issues.    # s/p OHT 12/25/22 for ARVC/familial cardiomyopathy  # Mild cardiac allograft vasculopathy   Post-operative course c/b delirium, leukocytosis, staph epi bacteremia, and prolonged CT output. Graft function normal, other than complement deposition there is been no rejection, Baseline coronary angiogram with mild native coronary disease (proximal LAD 0.2 mm).    MMF 1000/1000 mg  Tac 2.5/2 mg  - goal 6-8. Level pending     Immuknow 517 in June 23  * Rejection history: none.   * Last biopsy: 4/27/23 NER, +C4d negative C3d  * DSAs:  none  * Intolerance to medications: none  - will discuss whether to switch to mTORI after angiogram, given mild cardiac allograft vasculopathy     Has RHC and angiogram scheduled for December 2023.     PPx:  - CAV: aspirin 81 mg daily and rosuvastatin 10mg daily  - Osteoporosis: calcium/vitamin D   - CMV: s/p 3mos valcyte   - Toxo: n/a    Serostatus: CMV D+/R+ EBV D+/R+ Toxo D-/R-    # Drug induced hypoMg  Mg 1.6 today. Continue current supplementation     # Hypertension   Controlled on amlodipine 5 mg daily    Continue follow-up as per post transplant protocol.    Plan discussed with Dr. Dominguez.      Attestation signed by Myrtle Dominguez MD at 10/13/2023 11:25 AM:    I have personally seen and examined the patient on October 12, 2023    I saw the patient with the resident (or fellow) and agree with the findings and the plan of care as documented in the resident's (or fellow) note. I have personally reviewed vital signs, medications, laboratory results, available imaging and hemodynamic data.      Overall clinically doing very well.     Will change to  mg daily per neuro       Myrtle  MD Angelica  Associate Professor of Medicine   [facility] Division of Cardiology

## 2023-10-12 NOTE — PATIENT INSTRUCTIONS
Please call your transplant coordinator at [phone] with any questions or concerns.  Please note: after hours, weekends and holidays, this phone number is routed to an  to page out the coordinator on call.     Coordinator will update you on remaining results.     Next lab draw: Pending today's results and in December    Flu, COVID and RSV vaccines this fall     Back in Dec for first annual

## 2023-10-12 NOTE — PATIENT INSTRUCTIONS
RTC 1 year    Stroke & Endovascular RN Care Coordinators:    Heather Royal, RN, BSN  Adia Vazquez, RN, CNRN, SCRN    If you have any questions please contact the RN Care Coordinators at [phone], option 1.     Thank you for choosing [facility] for your health care needs.

## 2023-10-12 NOTE — NURSING NOTE
Chief Complaint   Patient presents with    Follow Up     Return Heart Transplant        Vitals were taken, medications reconciled.    Mumtaz Conklin, Facilitator   10:27 AM

## 2023-10-12 NOTE — NURSING NOTE
Transplant Coordinator Note  Reason for visit Month 10 post transplant      Health concerns addressed today  Pt with wife seen in clinic with Dr Dominguez. MD reviewed meds and available labs. ECHO in process. Pt doing well; walking up to a mile. Feels he has not reached the plateau of his recovery.  BP WNL; gaining table weight with increased calorie and protein intake.     Immunosuppressants  MMF 1000/1000 mg  Tac 2.5/2 mg  - goal 6-8. Level pending        Routine screenings  Derm scheduled 10/13  Dental In process   Colonoscopy  Nov 2022 (no specimens)   Prostate 1.63  Eye UTD  Flu due  RSV due  Pneumonia UTD  COVID due     Medication record reviewed and reconciled. Questions and concerns addressed. AVS reviewed and copy provided. Pt verbalized an understanding of plan of care.      Patient Instructions  ~Please call your transplant coordinator at [phone] with any questions or concerns.  Please note: after hours, weekends and holidays, this phone number is routed to an  to page out the coordinator on call.   ~Coordinator will update you on remaining results.   ~Next lab draw: Pending today's results and in December  ~Flu, COVID and RSV vaccines this fall   ~Back in Dec for first annual

## 2023-10-13 ENCOUNTER — OFFICE VISIT (OUTPATIENT)
Dept: RHEUMATOLOGY | Facility: CLINIC | Age: 68
End: 2023-10-13
Attending: INTERNAL MEDICINE
Payer: MEDICARE

## 2023-10-13 ENCOUNTER — OFFICE VISIT (OUTPATIENT)
Dept: DERMATOLOGY | Facility: CLINIC | Age: 68
End: 2023-10-13
Payer: MEDICARE

## 2023-10-13 VITALS
WEIGHT: 219.2 LBS | SYSTOLIC BLOOD PRESSURE: 125 MMHG | OXYGEN SATURATION: 96 % | BODY MASS INDEX: 27.69 KG/M2 | DIASTOLIC BLOOD PRESSURE: 85 MMHG | HEART RATE: 99 BPM

## 2023-10-13 DIAGNOSIS — L57.8 ACTINIC SKIN DAMAGE: ICD-10-CM

## 2023-10-13 DIAGNOSIS — L57.0 AK (ACTINIC KERATOSIS): Primary | ICD-10-CM

## 2023-10-13 DIAGNOSIS — I77.6 PRIMARY ANGIITIS OF CENTRAL NERVOUS SYSTEM (H): Primary | ICD-10-CM

## 2023-10-13 DIAGNOSIS — L81.4 LENTIGINES: ICD-10-CM

## 2023-10-13 DIAGNOSIS — L82.1 SEBORRHEIC KERATOSES: ICD-10-CM

## 2023-10-13 DIAGNOSIS — D48.9 NEOPLASM, UNCERTAIN WHETHER BENIGN OR MALIGNANT: ICD-10-CM

## 2023-10-13 DIAGNOSIS — Z94.1 HEART REPLACED BY TRANSPLANT (H): ICD-10-CM

## 2023-10-13 DIAGNOSIS — D22.5 MELANOCYTIC NEVUS OF TRUNK: ICD-10-CM

## 2023-10-13 DIAGNOSIS — I77.6 VASCULITIS (H): Primary | ICD-10-CM

## 2023-10-13 DIAGNOSIS — D18.01 CHERRY ANGIOMA: ICD-10-CM

## 2023-10-13 LAB — EBV DNA # SPEC NAA+PROBE: NOT DETECTED COPIES/ML

## 2023-10-13 PROCEDURE — 11103 TANGNTL BX SKIN EA SEP/ADDL: CPT | Mod: GC | Performed by: STUDENT IN AN ORGANIZED HEALTH CARE EDUCATION/TRAINING PROGRAM

## 2023-10-13 PROCEDURE — 11102 TANGNTL BX SKIN SINGLE LES: CPT | Mod: GC | Performed by: STUDENT IN AN ORGANIZED HEALTH CARE EDUCATION/TRAINING PROGRAM

## 2023-10-13 PROCEDURE — 88305 TISSUE EXAM BY PATHOLOGIST: CPT | Mod: TC | Performed by: STUDENT IN AN ORGANIZED HEALTH CARE EDUCATION/TRAINING PROGRAM

## 2023-10-13 PROCEDURE — 99213 OFFICE O/P EST LOW 20 MIN: CPT | Mod: 25 | Performed by: STUDENT IN AN ORGANIZED HEALTH CARE EDUCATION/TRAINING PROGRAM

## 2023-10-13 PROCEDURE — 99214 OFFICE O/P EST MOD 30 MIN: CPT | Performed by: INTERNAL MEDICINE

## 2023-10-13 PROCEDURE — 88305 TISSUE EXAM BY PATHOLOGIST: CPT | Mod: 26 | Performed by: DERMATOLOGY

## 2023-10-13 PROCEDURE — 17000 DESTRUCT PREMALG LESION: CPT | Mod: XS | Performed by: STUDENT IN AN ORGANIZED HEALTH CARE EDUCATION/TRAINING PROGRAM

## 2023-10-13 PROCEDURE — 17003 DESTRUCT PREMALG LES 2-14: CPT | Mod: GC | Performed by: STUDENT IN AN ORGANIZED HEALTH CARE EDUCATION/TRAINING PROGRAM

## 2023-10-13 PROCEDURE — G0463 HOSPITAL OUTPT CLINIC VISIT: HCPCS | Performed by: INTERNAL MEDICINE

## 2023-10-13 RX ORDER — ASPIRIN 325 MG
325 TABLET, DELAYED RELEASE (ENTERIC COATED) ORAL DAILY
Qty: 90 TABLET | Refills: 3 | Status: SHIPPED | OUTPATIENT
Start: 2023-10-13

## 2023-10-13 ASSESSMENT — PAIN SCALES - GENERAL
PAINLEVEL: NO PAIN (0)
PAINLEVEL: NO PAIN (0)

## 2023-10-13 NOTE — NURSING NOTE
Lidocaine-epinephrine 1-1:347929 % injection   1.5mL once for one use, starting 10/13/2023 ending 10/13/2023,  2mL disp, R-0, injection  Injected by Dr. Hastings

## 2023-10-13 NOTE — LETTER
10/13/2023       RE: Paco Stein  [address]  [place] [address]     Dear Colleague,    Thank you for referring your patient, Paco Stein, to the [facility] DERMATOLOG[facility] [place] at [facility]. Please see a copy of my visit note below.    [facility] Dermatology Note  Encounter Date: Oct 13, 2023  Office Visit     Dermatology Problem List:  # Heart transplant 12/25/22  - cellcept and tacrolimus  # NUB  - central chest s/p shave 10/13/23  - vertex scalp s/p shave 10/13/23   ____________________________________________    Assessment & Plan:     # Heart transplant 12/25/22. On cellcept and tacrolimus.  # Actinic skin damage  - continue with yearly skin checks  - sun protection recommendations provided   - Reviewed the compounding benefits of incremental changes to sun protective clothing behaviors including increased frequency of sunscreen and sun protective clothing like broad brimmed hats and longsleeved UPF containing clothing      # NUB  - central chest s/p shave 10/13/23  - vertex scalp s/p shave 10/13/23    Scalp           Chest        # Actinic keratoses.   - Cryotherapy performed today (see procedure note(s) below).       # Seborrheic keratoses, melanocytic nevi, solar lentigines, cherry angioma.   - NTD: No further management at this time.       Procedures Performed:   - Cryotherapy procedure note, location(s): scalp. After verbal consent and discussion of risks and benefits including, but not limited to, dyspigmentation/scar, blister, and pain, 2 lesion(s) was(were) treated with 1-2 mm freeze border for 1-2 cycles with liquid nitrogen. Post cryotherapy instructions were provided.  - Shave biopsy procedure note, location(s): vertex scalp and central chest. After discussion of benefits and risks including but not limited to bleeding, infection, scar, incomplete removal, recurrence, and non-diagnostic biopsy, written  consent and photographs were obtained. The area was cleaned with isopropyl alcohol. 0.5mL of 1% lidocaine with epinephrine was injected to obtain adequate anesthesia of lesion(s). Shave biopsy at site(s) performed. Hemostasis was achieved with aluminium chloride. Petrolatum ointment and a sterile dressing were applied. The patient tolerated the procedure and no complications were noted. The patient was provided with verbal and written post care instructions.     Follow-up: 6 month(s) in-person, or earlier for new or changing lesions    Staff and Resident:    Suraj MCQUEEN MD, discussed and evaluated the patient with Dr. Hastings.    ____________________________________________    CC: Skin Check (Paco is here for a transplant skin check. He has a spot on middle upper his chest that he is concerned about. )    HPI:  Mr. Paco Stein is a(n) 68 year old male who presents today as a new patient for transplant skin check.    Has a spot on his chest he is wondering about. Has been present for 20 years, not growing bigger, not symptomatic, not tender.    Has seen dermatology in [place] with no history of skin cancer or other skin diseases. No family history of melanoma.    Wears sunscreen when outside.    Patient is otherwise feeling well, without additional skin concerns.    Labs Reviewed:  N/A    Physical Exam:  Vitals: There were no vitals taken for this visit.  SKIN: Total skin excluding the undergarment areas was performed. The exam included the head/face, neck, both arms, chest, back, abdomen, both legs, digits and/or nails.   - Central chest with 2 cm erythematous superificial non indurated eroded scaly plaque that is not tender to touch  - Scalp with 5 mm pink eroded papule  - Mutliple gritty pink papules on scalp  - There are dome shaped bright red papules on the chest and back.   - There is a skin colored fleshy papule(s) located on the trunk.  - Scattered brown macules on sun exposed areas.  - There are  waxy stuck on tan to brown papules on the face, trunk and extremities.   - No other lesions of concern on areas examined.     Medications:  Current Outpatient Medications   Medication     magnesium glycinate lysinate chelate (DOCTOR'S BEST) 100 MG TABS tablet    acetaminophen (TYLENOL) 80 MG chewable tablet    amLODIPine (NORVASC) 5 MG tablet    aspirin (ASA) 81 MG EC tablet    calcium carbonate-vitamin D (CALTRATE) 600-10 MG-MCG per tablet    multivitamin (ONE-DAILY) tablet    mycophenolate (GENERIC EQUIVALENT) 250 MG capsule    polyethylene glycol (MIRALAX) 17 g packet    rosuvastatin (CRESTOR) 10 MG tablet    tacrolimus (GENERIC EQUIVALENT) 0.5 MG capsule    tacrolimus (GENERIC EQUIVALENT) 1 MG capsule     No current facility-administered medications for this visit.      Past Medical History:   Patient Active Problem List   Diagnosis    Acute respiratory failure with hypoxia (H)    Cardiomyopathy (H)    Stroke (H)    Coronary artery disease involving native coronary artery of native heart without angina pectoris    Essential hypertension    NSVT (nonsustained ventricular tachycardia) (H)    SOB (shortness of breath)    CHF (congestive heart failure) (H)    Troponin level elevated    Anginal equivalent    Heart failure, unspecified HF chronicity, unspecified heart failure type (H)    Benign neoplasm of colon    Acute on chronic combined systolic (congestive) and diastolic (congestive) heart failure (H)    Hyperlipidemia    Syncope and collapse    Primary central nervous system vasculitis (H24)    Vasculitis, CNS (H24)    Acute embolism and thrombosis of left peroneal vein (H)    Staphylococcus epidermidis bacteremia    Using prophylactic antibiotic on daily basis    Status post coronary angiogram     Past Medical History:   Diagnosis Date    Congestive heart failure (H)        CC No referring provider defined for this encounter. on close of this encounter.

## 2023-10-13 NOTE — PATIENT INSTRUCTIONS
"Shave Biopsy Instructions    For the biopsied area, leave the bandaid on for 24 hours. After 24 hours, wash the biopsied area with soap and water and then put on a Aquaphor, Vasaline, Vaniply, or plain petroleum jelly and cover a bandaid. Wash and replace the ointment and a bandaid every day until the area heals. If you develop spreading redness, pus, or tenderness, please call the clinic. You may experience some mild itching as the skin heals. Please try to avoid scratching the area.     If the biopsy area were to start to bleed, apply firm direct pressure for 15 minutes without peeking. If after 15 minutes, the area is still bleeding, you can repeat the 15 minutes of pressure for 2 more times. But, if after 45 minutes, it is still bleeding, please call the clinic or go to urgent care/the emergency department.     It may take up to 2 weeks to get a result from the biopsy taken today. If you have not received a message or notification of the result after 2 weeks, please call our office.       Cryotherapy Instructions    For the areas treated with liquid nitrogen (cryotherapy or freezing) today, they are expected to get pink, puffy, and perhaps even blister. The area should then crust up and fall off and the goal is to have nice new skin underneath. There is nothing special that you need to do for these areas. You can wash them as you do normal skin.     Sometimes a blister develops; if a blister does develop do NOT pop it. However, if it breaks open on its own, be sure to wash it with soap and water daily and put plain vasaline or petroleum jelly and a bandaid on it until the skin is healed.     Please call the clinic if you have any questions or concerns.       Sun Protection    Sunscreen   What does \"broad spectrum mean\"?  Broad spectrum sunscreens protect against both UVA and UVB radiation. UVC is filtered out by the ozone layer.     What does SPF mean?   SPF stands for  Sun Protection Factor  and represents the " ability to screen only UVB (burning) rays. UVB rays are mostly blocked in all sunscreens, but only those that contain titanium dioxide, zinc oxide, mexoryl or Parsol 1789 (avobenzone) block the UVA spectrum. Even though a sunscreen is labeled  UVA/UVB Protection  that is not entirely accurate because products that only partially protect against UVA can claim to protect against both UVA and UVB.     What SPF should I chose?   Aim to get a sunscreen that is at least sun protection factor (SPF) 30. SPF 15 provides about 92-93% coverage, SPF 30 about 95-97% coverage, and SPF 45 about 98% coverage. That is to say, SPF 30 is not twice as good as SPF 15. The reason why we recommend SPF 30 is because we are usually only putting on half the necessary amount of sunscreen to achieve the advertised protection. That means that it is very possible that your SPF 30 sunscreen is only providing you with SPF 15 coverage based on how much you are applying. SPF 15 (92-93% coverage) is the absolute minimal that we recommend. Similarly, the benefit of sunscreens with SPF higher than 50 is that even if you put on less than the required amount, you are likely still getting good protection (ex: even if you apply only half the recommended amount of , it should still provide you with an SPF of 50).     How much should I apply?  If covering your whole body, you should be using 30 grams, or one ounce, which is how much is in one shot glass! That s a THICK layer! May times, you are only applying half the recommended amount, which means that you are only getting half the SPF (for example, you may be using SPF 30 but if you're only applying half the recommended amount, you're only getting SPF 15 protection.      When do I need to wear sunscreen?  Every day, rain or shine! Even on a rainy day or a day when you are only indoors, you are still being exposed harmful UV radiation from the sun. We usually recommend physical/mineral sunscreens  (active ingredient is titanium dioxide or zinc oxide) as these ingredients have been around for many years, there is no concern of them being absorbed into the bloodstream, and they are coral reef friendly! However, the best sunscreen is the one that you will use everyday.     What about my kids?  Sunscreen is not recommended for infants under the age of 6 months. Use clothing, shade and sun avoidance for small infants. For kids older than 6 months, we recommend that you should use only mineral/physical sunscreens that have zinc oxide as the active ingredient. Sun-protective clothing and hats are also important for people of all ages.     Sun protective Clothing:  www.coolibar.com  www.sunprecautions.com  www.anny.com    Do I need tinted sunscreen?  There is more and more research showing that visible light can also lead to discoloration (such as melasma). Tinted sunscreens (which contain iron oxides) protect against visible light as an added bonus.     What brands do you recommend?    Physical/Mineral Sunscreens (in no particular order)  Elta MD UV Physical Broad-Spectrum SPF 41 Sunscreen (Tinted, $33)  Skin Ceuticals Physical Fusion UV Defense SPF 50 (Tinted, $34)  Unsun Mineral Tinted Face Sunscreen (Tinted, with 2 shade ranges, $29)  It Cosmetics CC+ Cream with SPF 50+ (Tinted, can also double as foundation/coverage -- great range of shades, $40)  Biossance Squalane + Zinc Sheer Mineral Sunscreen SPF 30 PA +++ (goes on white then blends in, $30)  Cerave 100% Mineral Sunscreen SPF 50 Face (good for sensitive skin, $15)  La Roche Posay Anthelios Mineral Zinc Oxide Sunscreen SPF 50 ($35)  La Roche Posay Anthelios Mineral Tinted Sunscreen for Face SPF 50 ($35)  Think Sport Sunscreen (great for sports, though has more of a white cast, $20)  Think Baby Sunscreen (for kids, $21)  Color Science Sunforgettable Total Protection Brush On Shield SPF 50 (Multiple tints, $130)    Chemical Sunscreens  Rosi Anderson  "Weightless Protection SPF 30 ($48)  Lavonne SPF Brightening Moisturizer ($30)  Urban Skin Complexion Protection Moisturizer SPF 30 ($20)  Total Defense + Repair Broad Spectrum SPF 34 ($68)  Clarins UV PLUS Anti-Pollution Sunscreen Multi-Protection Tint SPF 50 (Multiple tints, $45)  Neutrogena Healthy Skin Glow Sheers Tinted Moisturizer with SPF 20 (Multiple tints, $11)    The ABCDEs of Melanoma  Skin cancer can develop anywhere on the skin. Once a month, take a look at your entire body and note any changing moles or spots. Ask someone for help when checking your skin, especially for hard to see places such as your back. If you notice a mole that looks different from others, or one that changes, enlarges, itches, or bleeds, you should see a dermatologist.    Asymmetry, Border (irregularity), Color (not uniform, changes in color), Diameter (greater than 6 mm which is about the size of a pencil eraser), and Evolving (any changes in pre-existing moles). In short, look for the \"ugly duckling.\" You want all of the spots on your body to look like cousins (like they could be related). If something stands out, take a photo of it and make an appointment to have it evaluated.     "

## 2023-10-13 NOTE — PATIENT INSTRUCTIONS
Diagnosis:  1.  CNS vasculitis with cognitive changes: Symptoms suggest no recurrence of active CNS vasculitis, in line with results of September 2023 MRI.  I recommend no change in immunomodulatory regimen, which is mycophenolate and tacrolimus employed for antirejection purposes.  I recommend repeating CNS imaging (MRI) in 1 year, and follow-up in approximately 8 months to dovetail with follow-up in cardiology.

## 2023-10-13 NOTE — LETTER
10/13/2023       RE: Paco Stein  [address]  [place] [address]     Dear Colleague,    Thank you for referring your patient, Paco Stein, to the [facility] [place] at [facility]. Please see a copy of my visit note below.    Rheumatology Clinic Visit  [facility]  Jose Liu M.D.     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 Cognition and affect and responsiveness have all reportedly improved since undergoing heart transplant.  Exam today shows an alert, lively gentleman with mild dysarthria, broad-based gait, and intermittent brief crying episodes. Coarse bilateral upper extremity tremor showed reduced amplitude compared to 3-2023.    Data: Lab work on October 12, 2023 showed comprehensive metabolic panel remarkable for creatinine of 1.24; magnesium low 1.6; CBC normal.    Imaging:   MRI of the brain on September 18, 2023 showed extensive white matter T2 hyperintensities not significantly changed since 3-2023; new T2 signal near the left mastoid air cells raising potential for otomastoiditis.    Discussion: Primary angiitis of the CNS is inactive based on continued improvement in cognition and affect control, on no progression of prexisting motor or sensory defects, and on brain imaging showing no progression of previously noted white matter changes, and no new lesions that  correlate with clinical symptoms/signs. Tremor intensity has decreased, associated with reduced dose of tacrolimus since last visit.    Primary angiitis of the CNS may still recur or relapse, and may do so without dramatic or sudden neurologic worsening.  I agree with Neurology that MRI of the brain should be repeated as part of a surveillance program. It is reasonable to reduce frequency of such imaging to once every 12 months. No additional immunomodulatory therapy is warranted. Patient continues moderate dose mycophenolate (2000 mg daily) as a component of antirejection regimen; dose is appropriate for suppressing and reducing the likelihood of CNS vasculitis relapse.  While using mycophenolate, patient should undergo every 4-month measurement of CBC with platelets, AST, ALT, and albumin.    RTC ~8 mos, dovetailing with planned [facility] Cardiology followup    Jose Liu MD  Staff Rheumatologist, [facility]    On the day of the encounter, a total of 31 minutes was spent in chart review, and in counseling and coordination of care, regarding the patient's complex medical problem of primary angiitis of the CNS, and orthotopic heart transplant.           History of Present Illness:   Paco Stein follows up for CNS vasculitis.    Interval history October 13 2023    Patient was seen by Dr. Dominguez in Cardiology on October 12, 2023 in follow-up of heart transplant performed in December 2022.  Impression was of orthotopic heart transplant doing very well.  No rejection noted other than complement deposition and previous myocardial biopsies.  Recommendation was to repeat coronary angiography and right heart catheterization and consider switch to amatory intervention afterwards.    Today, he reports doing well. His spouse Arlette endorses. Complexity and intensity of ADLs has increased since last visit.  He goes out daily, works out at [employer] several days weekly, shops for grocery, cooks supper for family, interacts. Does take  a nap midday.  Still has pseudobulbar affective symptoms (easy crying), often associated with emotionally-engaging topics of discussion.  Tremor is improved, he associates with decreased tacrolimus dose. Writing and shirt-buttoning are improved. No joint pain, skin rash, visual changes, HA/ear pain.    Initial history May 23, 2023    At today's visit, patient is able to communicate at a high level and his wife is extremely helpful in adding details of past medical history as well as providing observations about patient's progress over many months.    Patient was seen by provider Charlie on May 22, 2023 in follow-up of heart transplant.  Impression was of orthotopic heart transplant performed in December 2022.  Transplant was performed for nonischemic cardiomyopathy with sustained ventricular tachycardia.  Comorbidities include hypertension, coronary artery disease status post percutaneous angioplasty in 2013, hyperlipidemia, and CKD.  Complement protein deposits on recent endomyocardial biopsy had been observed but no graft rejection or dysfunction was noted. Current immunosuppression included mycophenolate 1000 mg twice daily, tacrolimus 3.5 mg daily.  No prednisone with current regimen.    Patient was seen by [facility] rheumatologist Dr. Lyles in [place] on July 22, 2022. Rheum History reviewed: Patient presented with stroke in 2016 with weakness and pseudo bulbar involvement resulting in change in affect with emotional instability. He was eventually diagnosed with CNS vasculitis and was treated with mycophenolate after a course of cyclophosphamide.     He had been initially on mycophenolate 2000 mg daily. He had been stable and this was decreased to 1500 mg but in 2021 there was concern with some radiographic worsening and he was increased back to 2000 mg. Repeat brain MRI 7-22 again revealed stability compared with the past 2 MRI studies. Dr. Lyles's impression was of CNS vasculitis, stable on  moderate dose mycophenolate.  Recommendation was to continue mycophenolate 1000 mg twice daily.    Today, Dr. Stein and his spouse Arlette relate the series of events leading up to the diagnosis of CNS vasculitis. Spouse states that patient suddenly developed slurred speech and incoordination in Fall .  Initial neurology evaluation revealed multifocal cerebrovascular accident as likely cause of the symptoms. however over the ensuing several months, patient had worsening neurologic symptoms, and subsequent imaging showed increasing ischemic cerebrovascular accident lesions.  He was evaluated at [facility], where a diagnosis of primary CNS angiitis was established.  I do not have primary [facility] records to review today. No brain biopsy was performed. He was treated with intravenous Cytoxan and high-dose corticosteroids.  His neurologic status apparently improved and stabilized. He states that he has had no further cerebrovascular accident either by symptoms or by MRI.  After 2 years of cycling cyclophosphamide, he was transitioned to mycophenolate in .  Moderate dose mycophenolate was continued under the supervision of rheumatology and neurology in [place] until the present time.    In 2021 he had an episode of loss of consciousness, after which he was diagnosed with nonischemic cardiomyopathy and an ejection fraction of 18%.  Initially there was concern that previous chemotherapy with cytoxan was a contributor to the cardiomyopathy.  However a genetic form of nonischemic cardiomyopathy was ultimately suspected after comparing the patient's myocardial histopathology with that of the cardiac biopsies obtained from patient's younger a brother who underwent orthotopic heart transplant, and from a second brother who  in his mid 40s from cardiomyopathy.  Despite maximal medical treatment, patient continued with low ejection fraction and declining physical function through .  He was  confined to the intensive care unit for many weeks in late 2022 with advanced heart failure, and received orthotopic heart transplant on December 25, 2022.    Today the patient and his spouse state that he is overall much better than prior to heart transplant in December 2022.  Not only are his exercise tolerance and energy level markedly improved; neurologic symptoms and cognitive function are also better.  Patient's spouse states that he had a very flat affect and muted personality in the weeks leading up to transplant.  Now he is much more lively and interactive and cognitive capacity is significantly improved.  His wife notes that formerly as a consequence of CNS vasculitis, patient experienced pseudobulbar affect of disorder, which has resulted in emotional displays that do not match the patient's stated mood or current thinking.  For example, patient may frequently have brief crying episodes at a time when he states he is happy or in a moment of laughter.  Patient and his spouse state that such emotional displays have reduced in frequency and duration since the heart transplant.  He does have continuing easy fatigability, and tasks requiring large muscle bulk and tone such as rising from a chair without the aid of his arms, are still difficult and limited.  He also has developed a resting and intention tremor in the right greater than left hands in association with starting tacrolimus as part of his transplant antirejection regimen.  However, he and his spouse deny that new vision changes, unilateral weakness, slurred speech, or sensory symptoms have occurred or worsened since transplant.         Review of Systems:     Constitutional: negative  Skin: negative  Eyes: negative  Ears/Nose/Throat: negative  Respiratory: No shortness of breath, dyspnea on exertion, cough, or hemoptysis  Cardiovascular: negative  Gastrointestinal: negative  Genitourinary: negative  Musculoskeletal: negative  Neurologic:  negative  Psychiatric: negative  Hematologic/Lymphatic/Immunologic: negative  Endocrine: negative         Active Problem List:     Patient Active Problem List    Diagnosis Date Noted    Status post coronary angiogram 01/23/2023     Priority: Medium    Staphylococcus epidermidis bacteremia 01/09/2023     Priority: Medium    Using prophylactic antibiotic on daily basis 01/09/2023     Priority: Medium    Benign neoplasm of colon 12/12/2022     Priority: Medium    Troponin level elevated 12/06/2022     Priority: Medium    Anginal equivalent 12/06/2022     Priority: Medium    Heart failure, unspecified HF chronicity, unspecified heart failure type (H) 12/06/2022     Priority: Medium    Acute embolism and thrombosis of left peroneal vein (H) 11/22/2022     Priority: Medium    Acute on chronic combined systolic (congestive) and diastolic (congestive) heart failure (H) 11/14/2022     Priority: Medium    CHF (congestive heart failure) (H) 11/10/2022     Priority: Medium    Acute respiratory failure with hypoxia (H) 11/08/2022     Priority: Medium    Coronary artery disease involving native coronary artery of native heart without angina pectoris 11/08/2022     Priority: Medium    Essential hypertension 11/08/2022     Priority: Medium    NSVT (nonsustained ventricular tachycardia) (H) 11/08/2022     Priority: Medium    SOB (shortness of breath) 11/08/2022     Priority: Medium    Syncope and collapse 10/07/2021     Priority: Medium    Cardiomyopathy (H) 10/06/2021     Priority: Medium    Primary central nervous system vasculitis (H24) 11/30/2017     Priority: Medium    Vasculitis, CNS (H24) 11/30/2017     Priority: Medium    Stroke (H) 09/28/2016     Priority: Medium    Hyperlipidemia 08/08/2013     Priority: Medium            Past Medical History:     Past Medical History:   Diagnosis Date    Congestive heart failure (H)      Past Surgical History:   Procedure Laterality Date    COLONOSCOPY N/A 11/17/2022    Procedure:  COLONOSCOPY;  Surgeon: Roverto Nino MD;  Location:  GI    CV CORONARY ANGIOGRAM N/A 11/11/2022    Procedure: Coronary Angiogram;  Surgeon: Justo Bush MD;  Location: [facility]    CV CORONARY ANGIOGRAM N/A 1/23/2023    Procedure: Coronary Angiogram - biplane;  Surgeon: Justo Bush MD;  Location: [facility]    CV HEART BIOPSY N/A 01/09/2023    Procedure: Heart Cath Heart Biopsy;  Surgeon: Tab Agee MD;  Location: [facility]    CV HEART BIOPSY N/A 01/02/2023    Procedure: Heart Biopsy;  Surgeon: Ghulam Mercado MD;  Location: [facility]    CV HEART BIOPSY N/A 1/23/2023    Procedure: Heart Cath Heart Biopsy;  Surgeon: Justo Bush MD;  Location: [facility]    CV HEART BIOPSY N/A 1/17/2023    Procedure: Heart Cath Heart Biopsy;  Surgeon: Wayne Xavier MD;  Location: [facility]    CV HEART BIOPSY N/A 2/9/2023    Procedure: Heart Cath Heart Biopsy;  Surgeon: Ghulam Mercado MD;  Location: [facility]    CV HEART BIOPSY N/A 2/21/2023    Procedure: Heart Biopsy;  Surgeon: Wayne Xavier MD;  Location: [facility]    CV HEART BIOPSY N/A 3/9/2023    Procedure: Heart Cath Heart Biopsy;  Surgeon: Tab Agee MD;  Location: [facility]    CV HEART BIOPSY N/A 3/21/2023    Procedure: Heart Cath Heart Biopsy;  Surgeon: Ghulam Mercado MD;  Location: [facility]    CV HEART BIOPSY N/A 4/27/2023    Procedure: Heart Cath Heart Biopsy;  Surgeon: Justo Bush MD;  Location: [facility]    CV HEART BIOPSY N/A 5/22/2023    Procedure: Heart Cath Heart Biopsy;  Surgeon: Wayne Xavier MD;  Location: [facility]    CV HEART BIOPSY N/A 6/29/2023    Procedure: Heart Biopsy (groin stick- requests sedation);  Surgeon: Wayne Xavier MD;   Location: [facility]    CV INTRAVASULAR ULTRASOUND N/A 1/23/2023    Procedure: Intravascular Ultrasound;  Surgeon: Justo Bush MD;  [facility]    CV RIGHT HEART CATH MEASUREMENTS RECORDED N/A 11/11/2022    Procedure: Right Heart Catheterization;  Surgeon: Justo Bush MD;  [facility]    CV RIGHT HEART CATH MEASUREMENTS RECORDED N/A 12/08/2022    Procedure: Right Heart Catheterization;  Surgeon: Wayne Xavier MD;  [facility]    CV RIGHT HEART CATH MEASUREMENTS RECORDED N/A 12/09/2022    Procedure: Right Heart Catheterization with leave-in [place];  Surgeon: Khari Mcneill MD;  [facility]: [facility]    CV RIGHT HEART CATH MEASUREMENTS RECORDED N/A 01/09/2023    Procedure: Heart Cath Right Heart Cath;  Surgeon: Tab Agee MD;  Location: [facility]    CV RIGHT HEART CATH MEASUREMENTS RECORDED N/A 01/02/2023    Procedure: Right Heart Catheterization;  Surgeon: Ghulam Mercado MD;  [facility]: [facility]    CV RIGHT HEART CATH MEASUREMENTS RECORDED N/A 1/23/2023    Procedure: Right Heart Catheterization;  Surgeon: Justo Bush MD;  [facility]    CV RIGHT HEART CATH MEASUREMENTS RECORDED N/A 1/17/2023    Procedure: Right Heart Catheterization;  Surgeon: Wayne Xavier MD;  [facility]    CV RIGHT HEART CATH MEASUREMENTS RECORDED N/A 2/9/2023    Procedure: Right Heart Catheterization;  Surgeon: Ghulam Mercado MD;  [facility]    CV RIGHT HEART CATH MEASUREMENTS RECORDED N/A 2/21/2023    Procedure: Right Heart Catheterization;  Surgeon: Wayne Xavier MD;  [facility]    CV RIGHT HEART CATH MEASUREMENTS RECORDED N/A 3/9/2023    Procedure: Right Heart Catheterization;  Surgeon: Tab Agee MD;   Location:  HEART CARDIAC CATH LAB    CV RIGHT HEART CATH MEASUREMENTS RECORDED N/A 3/21/2023    Procedure: Right Heart Catheterization;  Surgeon: Ghulam Mercado MD;  Location:  HEART CARDIAC CATH LAB    CV RIGHT HEART CATH MEASUREMENTS RECORDED N/A 4/27/2023    Procedure: Right Heart Catheterization;  Surgeon: Justo Bush MD;  Location:  HEART CARDIAC CATH LAB    CV RIGHT HEART CATH MEASUREMENTS RECORDED N/A 5/22/2023    Procedure: Right Heart Catheterization;  Surgeon: Wayne Xavier MD;  Location:  HEART CARDIAC CATH LAB    CV RIGHT HEART CATH MEASUREMENTS RECORDED N/A 6/29/2023    Procedure: Right Heart Catheterization;  Surgeon: Wayne Xavier MD;  Location: [facility] CARDIAC CATH LAB    PICC Right 01/12/2023    placed in basilic and no problem    PICC DOUBLE LUMEN PLACEMENT Right 11/10/2022    Right basilic, 43 cm, 1 cm external length    TRANSPLANT HEART RECIPIENT N/A 12/25/2022    Procedure: TRANSPLANT, HEART, RECIPIENT; Median Sternotomy, Cardiopulmonary Bypass, Removal of Pacemaker;  Surgeon: Brendan Rodriguez MD;  Location:  OR     #1 primary angiitis of the CNS  2.  Nonischemic cardiomyopathy, heart replaced by transplant December 2022       Social History:     Social History     Socioeconomic History    Marital status:      Spouse name: Not on file    Number of children: Not on file    Years of education: Not on file    Highest education level: Not on file   Occupational History    Not on file   Tobacco Use    Smoking status: Never    Smokeless tobacco: Never   Vaping Use    Vaping Use: Never used   Substance and Sexual Activity    Alcohol use: Not Currently    Drug use: Never    Sexual activity: Not on file   Other Topics Concern    Not on file   Social History Narrative    Not on file     Social Determinants of Health     Financial Resource Strain: Not on file   Food Insecurity: Not on file   Transportation Needs: Not on file   Physical  Activity: Not on file   Stress: Not on file   Social Connections: Not on file   Interpersonal Safety: Not on file   Housing Stability: Not on file       Retired emergency room physician  1 child has non-Hodgkin's lymphoma  Does not smoke or drink.       Family History:     Family History   Problem Relation Age of Onset    Atrial fibrillation Father     Lung Cancer Brother      2 brothers have cardiomyopathy; 1  in his mid 40s, and the other received heart transplant.    No history of autoimmunity, rheumatic disease.    Mother  in mid 90s, had osteoarthritis  Father  in mid 90s no rheumatic or autoimmune disease.       Allergies:   No Known Allergies         Medications:     Current Outpatient Medications   Medication Sig Dispense Refill     magnesium glycinate lysinate chelate (DOCTOR'S BEST) 100 MG TABS tablet Take 3 tablets (300 mg) by mouth 2 times daily Plus two additional tabs (200 mg) daily Take separate from Mycophenolate. 240 tablet 11    acetaminophen (TYLENOL) 80 MG chewable tablet Take 80 mg by mouth every 4 hours as needed for mild pain or fever      amLODIPine (NORVASC) 5 MG tablet Take 1 tablet (5 mg) by mouth At Bedtime 90 tablet 3    calcium carbonate-vitamin D (CALTRATE) 600-10 MG-MCG per tablet Take 1 tablet by mouth 2 times daily (with meals) 180 tablet 3    multivitamin (ONE-DAILY) tablet Take 1 tablet by mouth daily 90 tablet 3    mycophenolate (GENERIC EQUIVALENT) 250 MG capsule Take 4 capsules (1,000 mg) by mouth 2 times daily 240 capsule 11    polyethylene glycol (MIRALAX) 17 g packet Take 1 packet by mouth daily      rosuvastatin (CRESTOR) 10 MG tablet Take 1 tablet (10 mg) by mouth daily 90 tablet 3    tacrolimus (GENERIC EQUIVALENT) 0.5 MG capsule Take one 0.5mg capsule with two 1mg capsules every morning for a total dose of 2.5 mg in the AM and 2mg in the PM 30 capsule 11    tacrolimus (GENERIC EQUIVALENT) 1 MG capsule Take TWO caps (2 mg) with one 0.5mg capsule in the  AM and take TWO caps (2mg) in the PM for a total dose of 2.5mg in the AM and 2mg in the PM. 120 capsule 11    aspirin (ASA) 325 MG EC tablet Take 1 tablet (325 mg) by mouth daily 90 tablet 3            Physical Exam:   Blood pressure 125/85, pulse 99, weight 99.4 kg (219 lb 3.2 oz), SpO2 96%.  Wt Readings from Last 6 Encounters:   10/13/23 99.4 kg (219 lb 3.2 oz)   10/12/23 98.3 kg (216 lb 12.8 oz)   08/14/23 99.1 kg (218 lb 6.4 oz)   06/29/23 93.6 kg (206 lb 4.8 oz)   05/24/23 96.5 kg (212 lb 11.2 oz)   05/22/23 96.2 kg (212 lb)     Body mass index is 27.69 kg/m .  Constitutional: well-developed, appearing stated age; cooperative  Eyes: nl EOM, PERRLA, vision, conjunctiva, sclera  ENT: nl external ears, nose, hearing, lips, teeth, gums, throat  No mucous membrane lesions, normal saliva pool  Neck: no mass or thyroid enlargement  Resp: lungs clear to auscultation,  CV: RRR, no murmurs, rubs or gallops, no edema  Lymph: no cervical, supraclavicular, inguinal or epitrochlear nodes  MS: The TMJ, neck, shoulder, elbow, wrist, MCP/PIP/DIP, spine, hip, knee, ankle, and foot MTP/IP joints were examined and found normal. No active synovitis or altered joint anatomy. Full joint ROM. Normal  strength.  Skin: no nail pitting, alopecia, rash, nodules or lesions  Neuro: Cranial nerves are intact.  There is very mild dysarthria; no discernible memory loss in the course of conversation.  Gait is broad-based and halting.  There is reduced fine tremor in R > L hands and fingers.  Psych: Patient displays several brief tearing up episodes in the course of conversation without reference to painful or emotionally disturbing topics         Data:     @      Latest Ref Rng & Units 6/29/2023     9:51 AM 8/14/2023     9:08 AM 10/12/2023     8:56 AM   RHEUM RESULTS   Albumin 3.5 - 5.2 g/dL 4.3      ALT 0 - 70 U/L 17      AST 0 - 45 U/L 24      CK Total 39 - 308 U/L 104      Creatinine 0.67 - 1.17 mg/dL 1.14  1.08  1.24    GFR Estimate >60  mL/min/1.73m2 69  74  63    Hematocrit 40.0 - 53.0 % 44.5  43.0  44.3    Hemoglobin 13.3 - 17.7 g/dL 13.9  14.1  14.3    WBC 4.0 - 11.0 10e3/uL 5.4  5.7  5.9    RBC Count 4.40 - 5.90 10e6/uL 5.17  5.15  5.19    RDW 10.0 - 15.0 % 12.7  12.8  12.5    MCHC 31.5 - 36.5 g/dL 31.2  32.8  32.3    MCV 78 - 100 fL 86  84  85    Platelet Count 150 - 450 10e3/uL 238  226  251         ,  ,  ,  ,  ,  ,  ,  ,  ,  ,  ,  ,  ,  ,   Hepatitis B Core Antibody Total   Date Value Ref Range Status   12/25/2022 Nonreactive Nonreactive Final   ,   Hepatitis B Surface Antigen   Date Value Ref Range Status   12/25/2022 Nonreactive Nonreactive Final   ,  ,  ,  ,   Quantiferon-TB Gold Plus   Date Value Ref Range Status   11/12/2022 Negative Negative Final     Comment:     No interferon gamma response to M.tuberculosis antigens was detected. Infection with M.tuberculosis is unlikely, however a single negative result does not exclude infection. In patients at high risk for infection, a second test should be considered in accordance with the 2017 ATS/IDSA/CDC Clinical Pract  ice Guidelines for Diagnosis of Tuberculosis in Adults and Children      TB1 Ag minus Nil Value   Date Value Ref Range Status   11/12/2022 0.00 IU/mL Final     TB2 Ag minus Nil Value   Date Value Ref Range Status   11/12/2022 0.00 IU/mL Final     Mitogen minus Nil Result   Date Value Ref Range Status   11/12/2022 9.99 IU/mL Final     Nil Result   Date Value Ref Range Status   11/12/2022 0.01 IU/mL Final   ,  ,  ,  ,  ,  ,  ,  ,  ,  ,  ,  ,  ,  ,  ,  ,  ,  ,   Immunoglobulin G   Date Value Ref Range Status   01/10/2023 549 (L) 610 - 1,616 mg/dL Final     Immunoglobulin A   Date Value Ref Range Status   01/10/2023 144 84 - 499 mg/dL Final     Immunoglobulin M   Date Value Ref Range Status   01/10/2023 67 35 - 242 mg/dL Final   ,  ,  ,  ,  ,  ,  ,         Again, thank you for allowing me to participate in the care of your patient.      Sincerely,    Jose Liu MD

## 2023-10-13 NOTE — NURSING NOTE
Dermatology Rooming Note    Paco Stein's goals for this visit include:   Chief Complaint   Patient presents with    Skin Check     Paco is here for a transplant skin check. He has a spot on middle upper his chest that he is concerned about.      Jillian Mayes, visit facilitator

## 2023-10-13 NOTE — PROGRESS NOTES
Rheumatology Clinic Visit  [facility]  Jose Liu M.D.     Paco Stein MRN# [id number]   YOB: 1955 Age: 68 year old   Date of Visit: 10-  Primary care provider: Quentin Odonnell          Assessment and Plan:     # Nonischemic cardiomyopathy, status post orthotopic heart transplant December 25, 2022: doing well, followed by Dr. 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 Cognition and affect and responsiveness have all reportedly improved since undergoing heart transplant.  Exam today shows an alert, lively gentleman with mild dysarthria, broad-based gait, and intermittent brief crying episodes. Coarse bilateral upper extremity tremor showed reduced amplitude compared to 3-2023.    Data: Lab work on October 12, 2023 showed comprehensive metabolic panel remarkable for creatinine of 1.24; magnesium low 1.6; CBC normal.    Imaging:   MRI of the brain on September 18, 2023 showed extensive white matter T2 hyperintensities not significantly changed since 3-2023; new T2 signal near the left mastoid air cells raising potential for otomastoiditis.    Discussion: Primary angiitis of the CNS is inactive based on continued improvement in cognition and affect control, on no progression of prexisting motor or sensory defects, and on brain imaging showing no progression of previously noted white matter changes, and no new lesions that correlate with clinical symptoms/signs. Tremor intensity has decreased, associated with reduced dose of tacrolimus since last visit.    Primary angiitis of the CNS may still recur or relapse, and may do so without dramatic or sudden neurologic worsening.  I agree with Neurology that MRI of the brain should be repeated  as part of a surveillance program. It is reasonable to reduce frequency of such imaging to once every 12 months. No additional immunomodulatory therapy is warranted. Patient continues moderate dose mycophenolate (2000 mg daily) as a component of antirejection regimen; dose is appropriate for suppressing and reducing the likelihood of CNS vasculitis relapse.  While using mycophenolate, patient should undergo every 4-month measurement of CBC with platelets, AST, ALT, and albumin.    RTC ~8 mos, dovetailing with planned [facility] Cardiology followup    Jose Liu MD  Staff Rheumatologist, [facility]    On the day of the encounter, a total of 31 minutes was spent in chart review, and in counseling and coordination of care, regarding the patient's complex medical problem of primary angiitis of the CNS, and orthotopic heart transplant.           History of Present Illness:   Paco Stein follows up for CNS vasculitis.    Interval history October 13 2023    Patient was seen by Dr. Dominguez in Cardiology on October 12, 2023 in follow-up of heart transplant performed in December 2022.  Impression was of orthotopic heart transplant doing very well.  No rejection noted other than complement deposition and previous myocardial biopsies.  Recommendation was to repeat coronary angiography and right heart catheterization and consider switch to amatory intervention afterwards.    Today, he reports doing well. His spouse Arlette endorses. Complexity and intensity of ADLs has increased since last visit.  He goes out daily, works out at [employer] several days weekly, shops for grocery, cooks supper for family, interacts. Does take a nap midday.  Still has pseudobulbar affective symptoms (easy crying), often associated with emotionally-engaging topics of discussion.  Tremor is improved, he associates with decreased tacrolimus dose. Writing and shirt-buttoning are improved. No joint pain, skin rash, visual changes, HA/ear pain.    Initial history  May 23, 2023    At today's visit, patient is able to communicate at a high level and his wife is extremely helpful in adding details of past medical history as well as providing observations about patient's progress over many months.    Patient was seen by provider Charlie on May 22, 2023 in follow-up of heart transplant.  Impression was of orthotopic heart transplant performed in December 2022.  Transplant was performed for nonischemic cardiomyopathy with sustained ventricular tachycardia.  Comorbidities include hypertension, coronary artery disease status post percutaneous angioplasty in 2013, hyperlipidemia, and CKD.  Complement protein deposits on recent endomyocardial biopsy had been observed but no graft rejection or dysfunction was noted. Current immunosuppression included mycophenolate 1000 mg twice daily, tacrolimus 3.5 mg daily.  No prednisone with current regimen.    Patient was seen by [facility] rheumatologist Dr. Lyles in [place] on July 22, 2022. Rheum History reviewed: Patient presented with stroke in 2016 with weakness and pseudo bulbar involvement resulting in change in affect with emotional instability. He was eventually diagnosed with CNS vasculitis and was treated with mycophenolate after a course of cyclophosphamide.     He had been initially on mycophenolate 2000 mg daily. He had been stable and this was decreased to 1500 mg but in 2021 there was concern with some radiographic worsening and he was increased back to 2000 mg. Repeat brain MRI 7-22 again revealed stability compared with the past 2 MRI studies. Dr. Lyles's impression was of CNS vasculitis, stable on moderate dose mycophenolate.  Recommendation was to continue mycophenolate 1000 mg twice daily.    Today, Dr. Stein and his spouse Arlette relate the series of events leading up to the diagnosis of CNS vasculitis. Spouse states that patient suddenly developed slurred speech and incoordination in Fall 2016.  Initial neurology  evaluation revealed multifocal cerebrovascular accident as likely cause of the symptoms. however over the ensuing several months, patient had worsening neurologic symptoms, and subsequent imaging showed increasing ischemic cerebrovascular accident lesions.  [facility] where a diagnosis of primary CNS angiitis was established.  I do not have primary [facility] records to review today. No brain biopsy was performed. He was treated with intravenous Cytoxan and high-dose corticosteroids.  His neurologic status apparently improved and stabilized. He states that he has had no further cerebrovascular accident either by symptoms or by MRI.  After 2 years of cycling cyclophosphamide, he was transitioned to mycophenolate in .  Moderate dose mycophenolate was continued under the supervision of rheumatology and neurology in [place] until the present time.    In 2021 he had an episode of loss of consciousness, after which he was diagnosed with nonischemic cardiomyopathy and an ejection fraction of 18%.  Initially there was concern that previous chemotherapy with cytoxan was a contributor to the cardiomyopathy.  However a genetic form of nonischemic cardiomyopathy was ultimately suspected after comparing the patient's myocardial histopathology with that of the cardiac biopsies obtained from patient's younger a brother who underwent orthotopic heart transplant, and from a second brother who  in his mid 40s from cardiomyopathy.  Despite maximal medical treatment, patient continued with low ejection fraction and declining physical function through .  He was confined to the intensive care unit for many weeks in late  with advanced heart failure, and received orthotopic heart transplant on 2022.    Today the patient and his spouse state that he is overall much better than prior to heart transplant in 2022.  Not only are his exercise tolerance and energy level  markedly improved; neurologic symptoms and cognitive function are also better.  Patient's spouse states that he had a very flat affect and muted personality in the weeks leading up to transplant.  Now he is much more lively and interactive and cognitive capacity is significantly improved.  His wife notes that formerly as a consequence of CNS vasculitis, patient experienced pseudobulbar affect of disorder, which has resulted in emotional displays that do not match the patient's stated mood or current thinking.  For example, patient may frequently have brief crying episodes at a time when he states he is happy or in a moment of laughter.  Patient and his spouse state that such emotional displays have reduced in frequency and duration since the heart transplant.  He does have continuing easy fatigability, and tasks requiring large muscle bulk and tone such as rising from a chair without the aid of his arms, are still difficult and limited.  He also has developed a resting and intention tremor in the right greater than left hands in association with starting tacrolimus as part of his transplant antirejection regimen.  However, he and his spouse deny that new vision changes, unilateral weakness, slurred speech, or sensory symptoms have occurred or worsened since transplant.         Review of Systems:     Constitutional: negative  Skin: negative  Eyes: negative  Ears/Nose/Throat: negative  Respiratory: No shortness of breath, dyspnea on exertion, cough, or hemoptysis  Cardiovascular: negative  Gastrointestinal: negative  Genitourinary: negative  Musculoskeletal: negative  Neurologic: negative  Psychiatric: negative  Hematologic/Lymphatic/Immunologic: negative  Endocrine: negative         Active Problem List:     Patient Active Problem List    Diagnosis Date Noted    Status post coronary angiogram 01/23/2023     Priority: Medium    Staphylococcus epidermidis bacteremia 01/09/2023     Priority: Medium    Using prophylactic  antibiotic on daily basis 01/09/2023     Priority: Medium    Benign neoplasm of colon 12/12/2022     Priority: Medium    Troponin level elevated 12/06/2022     Priority: Medium    Anginal equivalent 12/06/2022     Priority: Medium    Heart failure, unspecified HF chronicity, unspecified heart failure type (H) 12/06/2022     Priority: Medium    Acute embolism and thrombosis of left peroneal vein (H) 11/22/2022     Priority: Medium    Acute on chronic combined systolic (congestive) and diastolic (congestive) heart failure (H) 11/14/2022     Priority: Medium    CHF (congestive heart failure) (H) 11/10/2022     Priority: Medium    Acute respiratory failure with hypoxia (H) 11/08/2022     Priority: Medium    Coronary artery disease involving native coronary artery of native heart without angina pectoris 11/08/2022     Priority: Medium    Essential hypertension 11/08/2022     Priority: Medium    NSVT (nonsustained ventricular tachycardia) (H) 11/08/2022     Priority: Medium    SOB (shortness of breath) 11/08/2022     Priority: Medium    Syncope and collapse 10/07/2021     Priority: Medium    Cardiomyopathy (H) 10/06/2021     Priority: Medium    Primary central nervous system vasculitis (H24) 11/30/2017     Priority: Medium    Vasculitis, CNS (H24) 11/30/2017     Priority: Medium    Stroke (H) 09/28/2016     Priority: Medium    Hyperlipidemia 08/08/2013     Priority: Medium            Past Medical History:     Past Medical History:   Diagnosis Date    Congestive heart failure (H)      Past Surgical History:   Procedure Laterality Date    COLONOSCOPY N/A 11/17/2022    Procedure: COLONOSCOPY;  Surgeon: Roverto Nino MD;  Location:  GI    CV CORONARY ANGIOGRAM N/A 11/11/2022    Procedure: Coronary Angiogram;  Surgeon: Justo Bush MD;  Location:  HEART CARDIAC CATH LAB    CV CORONARY ANGIOGRAM N/A 1/23/2023    Procedure: Coronary Angiogram - biplane;  Surgeon: Justo Bush MD;   Location: [facility] HEART CARDIAC CATH LAB    CV HEART BIOPSY N/A 01/09/2023    Procedure: Heart Cath Heart Biopsy;  Surgeon: Tab Agee MD;  Location: U HEART CARDIAC CATH LAB    CV HEART BIOPSY N/A 01/02/2023    Procedure: Heart Biopsy;  Surgeon: Ghulam Mercado MD;  Location: [facility] HEART CARDIAC CATH LAB    CV HEART BIOPSY N/A 1/23/2023    Procedure: Heart Cath Heart Biopsy;  Surgeon: Justo Bush MD;  Location: [facility] HEART CARDIAC CATH LAB    CV HEART BIOPSY N/A 1/17/2023    Procedure: Heart Cath Heart Biopsy;  Surgeon: Wayne Xavier MD;  Location:  HEART CARDIAC CATH LAB    CV HEART BIOPSY N/A 2/9/2023    Procedure: Heart Cath Heart Biopsy;  Surgeon: Ghulam Mercado MD;  Location:  HEART CARDIAC CATH LAB    CV HEART BIOPSY N/A 2/21/2023    Procedure: Heart Biopsy;  Surgeon: Wayne Xavier MD;  Location:  HEART CARDIAC CATH LAB    CV HEART BIOPSY N/A 3/9/2023    Procedure: Heart Cath Heart Biopsy;  Surgeon: Tab Agee MD;  Location:  HEART CARDIAC CATH LAB    CV HEART BIOPSY N/A 3/21/2023    Procedure: Heart Cath Heart Biopsy;  Surgeon: Ghulam Mercado MD;  Location:  HEART CARDIAC CATH LAB    CV HEART BIOPSY N/A 4/27/2023    Procedure: Heart Cath Heart Biopsy;  Surgeon: Justo Bush MD;  Location:  HEART CARDIAC CATH LAB    CV HEART BIOPSY N/A 5/22/2023    Procedure: Heart Cath Heart Biopsy;  Surgeon: Wayne Xavier MD;  Location:  HEART CARDIAC CATH LAB    CV HEART BIOPSY N/A 6/29/2023    Procedure: Heart Biopsy (groin stick- requests sedation);  Surgeon: Wayne Xavier MD;  Location:  HEART CARDIAC CATH LAB    CV INTRAVASULAR ULTRASOUND N/A 1/23/2023    Procedure: Intravascular Ultrasound;  Surgeon: Justo Bush MD;  Location:  HEART CARDIAC CATH LAB    CV RIGHT HEART CATH MEASUREMENTS RECORDED N/A 11/11/2022    Procedure: Right Heart Catheterization;  Surgeon: Justo Bush,  MD;  Location: [facility]    CV RIGHT HEART CATH MEASUREMENTS RECORDED N/A 12/08/2022    Procedure: Right Heart Catheterization;  Surgeon: Wayne Xavier MD;  Location: [facility] LAB    CV RIGHT HEART CATH MEASUREMENTS RECORDED N/A 12/09/2022    Procedure: Right Heart Catheterization with leave-in [place];  Surgeon: Khari Mcneill MD;  Location: [facility]    CV RIGHT HEART CATH MEASUREMENTS RECORDED N/A 01/09/2023    Procedure: Heart Cath Right Heart Cath;  Surgeon: Tab Agee MD;  Location: [facility]    CV RIGHT HEART CATH MEASUREMENTS RECORDED N/A 01/02/2023    Procedure: Right Heart Catheterization;  Surgeon: Ghulam Mercado MD;  Location: [facility]ARD[facility] C[facility] LAB    CV RIGHT HEART CATH MEASUREMENTS RECORDED N/A 1/23/2023    Procedure: Right Heart Catheterization;  Surgeon: Justo Bush MD;  Location: [facility] LAB    CV RIGHT HEART CATH MEASUREMENTS RECORDED N/A 1/17/2023    Procedure: Right Heart Catheterization;  Surgeon: Wayne Xavier MD;  Location: [facility]    CV RIGHT HEART CATH MEASUREMENTS RECORDED N/A 2/9/2023    Procedure: Right Heart Catheterization;  Surgeon: Ghulam Mercado MD;  Location: [facility]    CV RIGHT HEART CATH MEASUREMENTS RECORDED N/A 2/21/2023    Procedure: Right Heart Catheterization;  Surgeon: Wayne Xavier MD;  Location: [facility]    CV RIGHT HEART CATH MEASUREMENTS RECORDED N/A 3/9/2023    Procedure: Right Heart Catheterization;  Surgeon: Tab Agee MD;  Location: [facility]    CV RIGHT HEART CATH MEASUREMENTS RECORDED N/A 3/21/2023    Procedure: Right Heart Catheterization;  Surgeon: Ghulam Mercado MD;  Location: [facility]    CV RIGHT HEART CATH MEASUREMENTS RECORDED N/A 4/27/2023    Procedure: Right Heart Catheterization;  Surgeon: Justo Bush MD;   Location:  HEART CARDIAC CATH LAB    CV RIGHT HEART CATH MEASUREMENTS RECORDED N/A 5/22/2023    Procedure: Right Heart Catheterization;  Surgeon: Wayne Xavier MD;  Location:  HEART CARDIAC CATH LAB    CV RIGHT HEART CATH MEASUREMENTS RECORDED N/A 6/29/2023    Procedure: Right Heart Catheterization;  Surgeon: Wayne Xavier MD;  Location:  HEART CARDIAC CATH LAB    PICC Right 01/12/2023    placed in basilic and no problem    PICC DOUBLE LUMEN PLACEMENT Right 11/10/2022    Right basilic, 43 cm, 1 cm external length    TRANSPLANT HEART RECIPIENT N/A 12/25/2022    Procedure: TRANSPLANT, HEART, RECIPIENT; Median Sternotomy, Cardiopulmonary Bypass, Removal of Pacemaker;  Surgeon: Brendan Rodriguez MD;  Location:  OR     #1 primary angiitis of the CNS  2.  Nonischemic cardiomyopathy, heart replaced by transplant December 2022       Social History:     Social History     Socioeconomic History    Marital status:      Spouse name: Not on file    Number of children: Not on file    Years of education: Not on file    Highest education level: Not on file   Occupational History    Not on file   Tobacco Use    Smoking status: Never    Smokeless tobacco: Never   Vaping Use    Vaping Use: Never used   Substance and Sexual Activity    Alcohol use: Not Currently    Drug use: Never    Sexual activity: Not on file   Other Topics Concern    Not on file   Social History Narrative    Not on file     Social Determinants of Health     Financial Resource Strain: Not on file   Food Insecurity: Not on file   Transportation Needs: Not on file   Physical Activity: Not on file   Stress: Not on file   Social Connections: Not on file   Interpersonal Safety: Not on file   Housing Stability: Not on file       Retired emergency room physician  1 child has non-Hodgkin's lymphoma  Does not smoke or drink.       Family History:     Family History   Problem Relation Age of Onset    Atrial fibrillation Father      Lung Cancer Brother      2 brothers have cardiomyopathy; 1  in his mid 40s, and the other received heart transplant.    No history of autoimmunity, rheumatic disease.    Mother  in mid 90s, had osteoarthritis  Father  in mid 90s no rheumatic or autoimmune disease.       Allergies:   No Known Allergies         Medications:     Current Outpatient Medications   Medication Sig Dispense Refill     magnesium glycinate lysinate chelate (DOCTOR'S BEST) 100 MG TABS tablet Take 3 tablets (300 mg) by mouth 2 times daily Plus two additional tabs (200 mg) daily Take separate from Mycophenolate. 240 tablet 11    acetaminophen (TYLENOL) 80 MG chewable tablet Take 80 mg by mouth every 4 hours as needed for mild pain or fever      amLODIPine (NORVASC) 5 MG tablet Take 1 tablet (5 mg) by mouth At Bedtime 90 tablet 3    calcium carbonate-vitamin D (CALTRATE) 600-10 MG-MCG per tablet Take 1 tablet by mouth 2 times daily (with meals) 180 tablet 3    multivitamin (ONE-DAILY) tablet Take 1 tablet by mouth daily 90 tablet 3    mycophenolate (GENERIC EQUIVALENT) 250 MG capsule Take 4 capsules (1,000 mg) by mouth 2 times daily 240 capsule 11    polyethylene glycol (MIRALAX) 17 g packet Take 1 packet by mouth daily      rosuvastatin (CRESTOR) 10 MG tablet Take 1 tablet (10 mg) by mouth daily 90 tablet 3    tacrolimus (GENERIC EQUIVALENT) 0.5 MG capsule Take one 0.5mg capsule with two 1mg capsules every morning for a total dose of 2.5 mg in the AM and 2mg in the PM 30 capsule 11    tacrolimus (GENERIC EQUIVALENT) 1 MG capsule Take TWO caps (2 mg) with one 0.5mg capsule in the AM and take TWO caps (2mg) in the PM for a total dose of 2.5mg in the AM and 2mg in the PM. 120 capsule 11    aspirin (ASA) 325 MG EC tablet Take 1 tablet (325 mg) by mouth daily 90 tablet 3            Physical Exam:   Blood pressure 125/85, pulse 99, weight 99.4 kg (219 lb 3.2 oz), SpO2 96%.  Wt Readings from Last 6 Encounters:   10/13/23 99.4 kg (219 lb  3.2 oz)   10/12/23 98.3 kg (216 lb 12.8 oz)   08/14/23 99.1 kg (218 lb 6.4 oz)   06/29/23 93.6 kg (206 lb 4.8 oz)   05/24/23 96.5 kg (212 lb 11.2 oz)   05/22/23 96.2 kg (212 lb)     Body mass index is 27.69 kg/m .  Constitutional: well-developed, appearing stated age; cooperative  Eyes: nl EOM, PERRLA, vision, conjunctiva, sclera  ENT: nl external ears, nose, hearing, lips, teeth, gums, throat  No mucous membrane lesions, normal saliva pool  Neck: no mass or thyroid enlargement  Resp: lungs clear to auscultation,  CV: RRR, no murmurs, rubs or gallops, no edema  Lymph: no cervical, supraclavicular, inguinal or epitrochlear nodes  MS: The TMJ, neck, shoulder, elbow, wrist, MCP/PIP/DIP, spine, hip, knee, ankle, and foot MTP/IP joints were examined and found normal. No active synovitis or altered joint anatomy. Full joint ROM. Normal  strength.  Skin: no nail pitting, alopecia, rash, nodules or lesions  Neuro: Cranial nerves are intact.  There is very mild dysarthria; no discernible memory loss in the course of conversation.  Gait is broad-based and halting.  There is reduced fine tremor in R > L hands and fingers.  Psych: Patient displays several brief tearing up episodes in the course of conversation without reference to painful or emotionally disturbing topics         Data:     @      Latest Ref Rng & Units 6/29/2023     9:51 AM 8/14/2023     9:08 AM 10/12/2023     8:56 AM   RHEUM RESULTS   Albumin 3.5 - 5.2 g/dL 4.3      ALT 0 - 70 U/L 17      AST 0 - 45 U/L 24      CK Total 39 - 308 U/L 104      Creatinine 0.67 - 1.17 mg/dL 1.14  1.08  1.24    GFR Estimate >60 mL/min/1.73m2 69  74  63    Hematocrit 40.0 - 53.0 % 44.5  43.0  44.3    Hemoglobin 13.3 - 17.7 g/dL 13.9  14.1  14.3    WBC 4.0 - 11.0 10e3/uL 5.4  5.7  5.9    RBC Count 4.40 - 5.90 10e6/uL 5.17  5.15  5.19    RDW 10.0 - 15.0 % 12.7  12.8  12.5    MCHC 31.5 - 36.5 g/dL 31.2  32.8  32.3    MCV 78 - 100 fL 86  84  85    Platelet Count 150 - 450 10e3/uL 238   226  251         ,  ,  ,  ,  ,  ,  ,  ,  ,  ,  ,  ,  ,  ,   Hepatitis B Core Antibody Total   Date Value Ref Range Status   12/25/2022 Nonreactive Nonreactive Final   ,   Hepatitis B Surface Antigen   Date Value Ref Range Status   12/25/2022 Nonreactive Nonreactive Final   ,  ,  ,  ,   Quantiferon-TB Gold Plus   Date Value Ref Range Status   11/12/2022 Negative Negative Final     Comment:     No interferon gamma response to M.tuberculosis antigens was detected. Infection with M.tuberculosis is unlikely, however a single negative result does not exclude infection. In patients at high risk for infection, a second test should be considered in accordance with the 2017 ATS/IDSA/CDC Clinical Pract  ice Guidelines for Diagnosis of Tuberculosis in Adults and Children      TB1 Ag minus Nil Value   Date Value Ref Range Status   11/12/2022 0.00 IU/mL Final     TB2 Ag minus Nil Value   Date Value Ref Range Status   11/12/2022 0.00 IU/mL Final     Mitogen minus Nil Result   Date Value Ref Range Status   11/12/2022 9.99 IU/mL Final     Nil Result   Date Value Ref Range Status   11/12/2022 0.01 IU/mL Final   ,  ,  ,  ,  ,  ,  ,  ,  ,  ,  ,  ,  ,  ,  ,  ,  ,  ,   Immunoglobulin G   Date Value Ref Range Status   01/10/2023 549 (L) 610 - 1,616 mg/dL Final     Immunoglobulin A   Date Value Ref Range Status   01/10/2023 144 84 - 499 mg/dL Final     Immunoglobulin M   Date Value Ref Range Status   01/10/2023 67 35 - 242 mg/dL Final   ,  ,  ,  ,  ,  ,  ,

## 2023-10-13 NOTE — NURSING NOTE
Chief Complaint   Patient presents with    RECHECK     Follow up neuro vasculitis/MRI results     /85 (BP Location: Right arm, Patient Position: Sitting, Cuff Size: Adult Large)   Pulse 99   Wt 99.4 kg (219 lb 3.2 oz)   SpO2 96%   BMI 27.69 kg/m

## 2023-10-13 NOTE — PROGRESS NOTES
I have personally examined this patient and agree with the resident doctor's documentation and plan of care. I have reviewed and amended the resident's note. The documentation accurately reflects my clinical observations, diagnoses, treatment and follow-up plans. I was present for key portions of the procedure(s).       Jose Hastings MD  Dermatology Staff        [facility] Dermatology Note  Encounter Date: Oct 13, 2023  Office Visit     Dermatology Problem List:  # Heart transplant 12/25/22  - cellcept and tacrolimus  # NUB  - central chest s/p shave 10/13/23  - vertex scalp s/p shave 10/13/23   ____________________________________________    Assessment & Plan:     # Heart transplant 12/25/22. On cellcept and tacrolimus.  # Actinic skin damage  - continue with yearly skin checks  - sun protection recommendations provided   - Reviewed the compounding benefits of incremental changes to sun protective clothing behaviors including increased frequency of sunscreen and sun protective clothing like broad brimmed hats and longsleeved UPF containing clothing      # NUB  - central chest s/p shave 10/13/23  - vertex scalp s/p shave 10/13/23    Scalp           Chest        # Actinic keratoses.   - Cryotherapy performed today (see procedure note(s) below).       # Seborrheic keratoses, melanocytic nevi, solar lentigines, cherry angioma.   - NTD: No further management at this time.       Procedures Performed:   - Cryotherapy procedure note, location(s): scalp. After verbal consent and discussion of risks and benefits including, but not limited to, dyspigmentation/scar, blister, and pain, 2 lesion(s) was(were) treated with 1-2 mm freeze border for 1-2 cycles with liquid nitrogen. Post cryotherapy instructions were provided.  - Shave biopsy procedure note, location(s): vertex scalp and central chest. After discussion of benefits and risks including but not limited to bleeding, infection, scar, incomplete  removal, recurrence, and non-diagnostic biopsy, written consent and photographs were obtained. The area was cleaned with isopropyl alcohol. 0.5mL of 1% lidocaine with epinephrine was injected to obtain adequate anesthesia of lesion(s). Shave biopsy at site(s) performed. Hemostasis was achieved with aluminium chloride. Petrolatum ointment and a sterile dressing were applied. The patient tolerated the procedure and no complications were noted. The patient was provided with verbal and written post care instructions.     Follow-up: 6 month(s) in-person, or earlier for new or changing lesions    Staff and Resident:    Suraj MCQUEEN MD, discussed and evaluated the patient with Dr. Hastings.    ____________________________________________    CC: Skin Check (Paco is here for a transplant skin check. He has a spot on middle upper his chest that he is concerned about. )    HPI:  Mr. Paco Stein is a(n) 68 year old male who presents today as a new patient for transplant skin check.    Has a spot on his chest he is wondering about. Has been present for 20 years, not growing bigger, not symptomatic, not tender.    Has seen dermatology in [place] with no history of skin cancer or other skin diseases. No family history of melanoma.    Wears sunscreen when outside.    Patient is otherwise feeling well, without additional skin concerns.    Labs Reviewed:  N/A    Physical Exam:  Vitals: There were no vitals taken for this visit.  SKIN: Total skin excluding the undergarment areas was performed. The exam included the head/face, neck, both arms, chest, back, abdomen, both legs, digits and/or nails.   - Central chest with 2 cm erythematous superificial non indurated eroded scaly plaque that is not tender to touch  - Scalp with 5 mm pink eroded papule  - Mutliple gritty pink papules on scalp  - There are dome shaped bright red papules on the chest and back.   - There is a skin colored fleshy papule(s) located on the trunk.  -  Scattered brown macules on sun exposed areas.  - There are waxy stuck on tan to brown papules on the face, trunk and extremities.   - No other lesions of concern on areas examined.     Medications:  Current Outpatient Medications   Medication     magnesium glycinate lysinate chelate (DOCTOR'S BEST) 100 MG TABS tablet    acetaminophen (TYLENOL) 80 MG chewable tablet    amLODIPine (NORVASC) 5 MG tablet    aspirin (ASA) 81 MG EC tablet    calcium carbonate-vitamin D (CALTRATE) 600-10 MG-MCG per tablet    multivitamin (ONE-DAILY) tablet    mycophenolate (GENERIC EQUIVALENT) 250 MG capsule    polyethylene glycol (MIRALAX) 17 g packet    rosuvastatin (CRESTOR) 10 MG tablet    tacrolimus (GENERIC EQUIVALENT) 0.5 MG capsule    tacrolimus (GENERIC EQUIVALENT) 1 MG capsule     No current facility-administered medications for this visit.      Past Medical History:   Patient Active Problem List   Diagnosis    Acute respiratory failure with hypoxia (H)    Cardiomyopathy (H)    Stroke (H)    Coronary artery disease involving native coronary artery of native heart without angina pectoris    Essential hypertension    NSVT (nonsustained ventricular tachycardia) (H)    SOB (shortness of breath)    CHF (congestive heart failure) (H)    Troponin level elevated    Anginal equivalent    Heart failure, unspecified HF chronicity, unspecified heart failure type (H)    Benign neoplasm of colon    Acute on chronic combined systolic (congestive) and diastolic (congestive) heart failure (H)    Hyperlipidemia    Syncope and collapse    Primary central nervous system vasculitis (H24)    Vasculitis, CNS (H24)    Acute embolism and thrombosis of left peroneal vein (H)    Staphylococcus epidermidis bacteremia    Using prophylactic antibiotic on daily basis    Status post coronary angiogram     Past Medical History:   Diagnosis Date    Congestive heart failure (H)        CC No referring provider defined for this encounter. on close of this  encounter.

## 2023-10-16 LAB
ALLOMAP SCORE (EXTERNAL): 25 (ref 0–40)
NEGATIVE PREDICTIVE VALUE PERCENT (EXTERNAL): 100 %
PATH REPORT.COMMENTS IMP SPEC: ABNORMAL
PATH REPORT.COMMENTS IMP SPEC: ABNORMAL
PATH REPORT.COMMENTS IMP SPEC: YES
PATH REPORT.FINAL DX SPEC: ABNORMAL
PATH REPORT.GROSS SPEC: ABNORMAL
PATH REPORT.MICROSCOPIC SPEC OTHER STN: ABNORMAL
PATH REPORT.RELEVANT HX SPEC: ABNORMAL
POSITIVE PREDICTIVE VALUE PERCENT (EXTERNAL): 2.2 %

## 2023-10-16 NOTE — LETTER
5/24/2023       RE: Paco Stein  [address]  [place] [address]     Dear Colleague,    Thank you for referring your patient, Paco Stein, to the [facility] [place] at . Please see a copy of my visit note below.    Rheumatology Clinic Visit  [facility]  Jose Liu M.D.     Paco Stein MRN# [id number]   YOB: 1955 Age: 68 year old   Date of Visit: 05/24/2023  Primary care provider: Quentin Odonnell          Assessment and Plan:     # Nonischemic cardiomyopathy, status post orthotopic heart transplant December 25, 2022: doing well, followed by Dr. 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 Cognition and affect and responsiveness have all reportedly improved since undergoing heart transplant.  Exam today shows an alert, lively gentleman with mild dysarthria, broad-based gait, and intermittent brief crying episodes.  There is coarse bilateral upper extremity tremor.    Laboratory evaluation on May 22, 2023 showed creatinine of 1.27 down from 1.3.  Patient magnesium was low at 1.3 as well as phosphorus at 1.6.  CBC showed hemoglobin 12.6, improved and normal white count and platelets.  Recent AST and ALT were normal; CRP was 7.  In December proBNP was > 2k.    Imaging:   [facility] MRI brain March 2023 showed: A new tiny region of acute restricted diffusion in the right frontal lobe white matter superimposed on rather extensive bilateral chronic white matter T2 hyperintensities. There may be one additional acute to subacute area of ischemia/restricted    diffusion in the right parietal-occipital junction as described. Post contrast images  show some minimal linear 3 mm area of associated enhancement along the margin of the new right frontal lobe white matter lesion.    Discussion: I agree with Dr. Khan, neurology.  Primary angiitis of the CNS is inactive based on steadily improving cognition, affect, and no progression of prior motor or sensory defects, and based on brain imaging that has shown no significant progression or new lesions that correlate with clinical referents.  Recent onset tremor is likely caused by use of tacrolimus, and I expect that it will improve as tacrolimus dose gradually decreases as part of the posttransplant taper.  I am in agreement with continued brain MRI imaging, as primary angiitis of the CNS can recur or relapse, and may do so without dramatic or sudden neurologic worsening.  I think that MRI of the brain should be repeated every 6 to 9 months.  I do not think that additional immunomodulatory therapy is warranted; patient uses moderate dose mycophenolate (2000 mg daily) as a part of antirejection regimen.  The dose of mycophenolate used in this regimen is appropriate for suppressing and reducing the likelihood of relapse of CNS vasculitis.  While using mycophenolate, patient should undergo every 4-month measurement of CBC with platelets, AST, ALT, and albumin.    RTC October 12 or 13, 2023 after followup MRI brain    Jose Liu MD  Staff Rheumatologist, [facility]    On the day of the encounter, a total of 75 minutes was spent in chart review, and in counseling and coordination of care, regarding the patient's complex medical problem of primary angiitis of the CNS, and orthotopic heart transplant.           History of Present Illness:   Paco Stein presents for evaluation of possible vasculitis.  Referred by Dr. Dominguez.    Initial history May 23, 2023    At today's visit, patient is able to communicate at a high level and his wife is extremely helpful in adding details of past medical history as  well as providing observations about patient's progress over many months.    Patient was seen by provider Charlie on May 22, 2023 in follow-up of heart transplant.  Impression was of orthotopic heart transplant performed in December 2022.  Transplant was performed for nonischemic cardiomyopathy with sustained ventricular tachycardia.  Comorbidities include hypertension, coronary artery disease status post percutaneous angioplasty in 2013, hyperlipidemia, and CKD.  Complement protein deposits on recent endomyocardial biopsy had been observed but no graft rejection or dysfunction was noted. Current immunosuppression included mycophenolate 1000 mg twice daily, tacrolimus 3.5 mg daily.  No prednisone with current regimen.    Patient was seen by [facility] rheumatologist Dr. Lyles in [place] on July 22, 2022. Rheum History reviewed: Patient presented with stroke in 2016 with weakness and pseudo bulbar involvement resulting in change in affect with emotional instability. He was eventually diagnosed with CNS vasculitis and was treated with mycophenolate after a course of cyclophosphamide.     He had been initially on mycophenolate 2000 mg daily. He had been stable and this was decreased to 1500 mg but in 2021 there was concern with some radiographic worsening and he was increased back to 2000 mg. Repeat brain MRI 7-22 again revealed stability compared with the past 2 MRI studies. Dr. Lyles's impression was of CNS vasculitis, stable on moderate dose mycophenolate.  Recommendation was to continue mycophenolate 1000 mg twice daily.    Today, Dr. Stein and his spouse Arlette relate the series of events leading up to the diagnosis of CNS vasculitis. Spouse states that patient suddenly developed slurred speech and incoordination in Fall 2016.  Initial neurology evaluation revealed multifocal cerebrovascular accident as likely cause of the symptoms. however over the ensuing several months, patient had worsening  neurologic symptoms, and subsequent imaging showed increasing ischemic cerebrovascular accident lesions.  He was evaluated at [facility], where a diagnosis of primary CNS angiitis was established.  I do not have primary [facility] records to review today. No brain biopsy was performed. He was treated with intravenous Cytoxan and high-dose corticosteroids.  His neurologic status apparently improved and stabilized. He states that he has had no further cerebrovascular accident either by symptoms or by MRI.  After 2 years of cycling cyclophosphamide, he was transitioned to mycophenolate in 2018.  Moderate dose mycophenolate was continued under the supervision of rheumatology and neurology in [place] until the present time.    In 2021 he had an episode of loss of consciousness, after which he was diagnosed with nonischemic cardiomyopathy and an ejection fraction of 18%.  Initially there was concern that previous chemotherapy with cytoxan was a contributor to the cardiomyopathy.  However a genetic form of nonischemic cardiomyopathy was ultimately suspected after comparing the patient's myocardial histopathology with that of the cardiac biopsies obtained from patient's younger a brother who underwent orthotopic heart transplant, and from a second brother who  in his mid 40s from cardiomyopathy.  Despite maximal medical treatment, patient continued with low ejection fraction and declining physical function through .  He was confined to the intensive care unit for many weeks in late  with advanced heart failure, and received orthotopic heart transplant on 2022.    Today the patient and his spouse state that he is overall much better than prior to heart transplant in 2022.  Not only are his exercise tolerance and energy level markedly improved; neurologic symptoms and cognitive function are also better.  Patient's spouse states that he had a very flat affect and muted  personality in the weeks leading up to transplant.  Now he is much more lively and interactive and cognitive capacity is significantly improved.  His wife notes that formerly as a consequence of CNS vasculitis, patient experienced pseudobulbar affect of disorder, which has resulted in emotional displays that do not match the patient's stated mood or current thinking.  For example, patient may frequently have brief crying episodes at a time when he states he is happy or in a moment of laughter.  Patient and his spouse state that such emotional displays have reduced in frequency and duration since the heart transplant.  He does have continuing easy fatigability, and tasks requiring large muscle bulk and tone such as rising from a chair without the aid of his arms, are still difficult and limited.  He also has developed a resting and intention tremor in the right greater than left hands in association with starting tacrolimus as part of his transplant antirejection regimen.  However, he and his spouse deny that new vision changes, unilateral weakness, slurred speech, or sensory symptoms have occurred or worsened since transplant.         Review of Systems:     Constitutional: negative  Skin: negative  Eyes: negative  Ears/Nose/Throat: negative  Respiratory: No shortness of breath, dyspnea on exertion, cough, or hemoptysis  Cardiovascular: negative  Gastrointestinal: negative  Genitourinary: negative  Musculoskeletal: negative  Neurologic: negative  Psychiatric: negative  Hematologic/Lymphatic/Immunologic: negative  Endocrine: negative         Active Problem List:     Patient Active Problem List    Diagnosis Date Noted    Status post coronary angiogram 01/23/2023     Priority: Medium    Staphylococcus epidermidis bacteremia 01/09/2023     Priority: Medium    Using prophylactic antibiotic on daily basis 01/09/2023     Priority: Medium    Benign neoplasm of colon 12/12/2022     Priority: Medium    Troponin level elevated  12/06/2022     Priority: Medium    Anginal equivalent (H) 12/06/2022     Priority: Medium    Heart failure, unspecified HF chronicity, unspecified heart failure type (H) 12/06/2022     Priority: Medium    Acute embolism and thrombosis of left peroneal vein (H) 11/22/2022     Priority: Medium    Acute on chronic combined systolic (congestive) and diastolic (congestive) heart failure (H) 11/14/2022     Priority: Medium    CHF (congestive heart failure) (H) 11/10/2022     Priority: Medium    Acute respiratory failure with hypoxia (H) 11/08/2022     Priority: Medium    Coronary artery disease involving native coronary artery of native heart without angina pectoris 11/08/2022     Priority: Medium    Essential hypertension 11/08/2022     Priority: Medium    NSVT (nonsustained ventricular tachycardia) (H) 11/08/2022     Priority: Medium    SOB (shortness of breath) 11/08/2022     Priority: Medium    Syncope and collapse 10/07/2021     Priority: Medium    Cardiomyopathy (H) 10/06/2021     Priority: Medium    Primary central nervous system vasculitis (H) 11/30/2017     Priority: Medium    Vasculitis, CNS (H) 11/30/2017     Priority: Medium    Stroke (H) 09/28/2016     Priority: Medium    Hyperlipidemia 08/08/2013     Priority: Medium            Past Medical History:     Past Medical History:   Diagnosis Date    Congestive heart failure (H)      Past Surgical History:   Procedure Laterality Date    COLONOSCOPY N/A 11/17/2022    Procedure: COLONOSCOPY;  Surgeon: Roverto Nino MD;  Location:  GI    CV CORONARY ANGIOGRAM N/A 11/11/2022    Procedure: Coronary Angiogram;  Surgeon: Justo Bush MD;  Location: [facility] CARDIAC CATH LAB    CV CORONARY ANGIOGRAM N/A 1/23/2023    Procedure: Coronary Angiogram - biplane;  Surgeon: Justo Bush MD;  Location:  HEART CARDIAC CATH LAB    CV HEART BIOPSY N/A 01/09/2023    Procedure: Heart Cath Heart Biopsy;  Surgeon: Tab Agee MD;   Location: [facility] HEART CARDIAC CATH LAB    CV HEART BIOPSY N/A 01/02/2023    Procedure: Heart Biopsy;  Surgeon: Ghulam Mercado MD;  Location: [facility] HEART CARDIAC CATH LAB    CV HEART BIOPSY N/A 1/23/2023    Procedure: Heart Cath Heart Biopsy;  Surgeon: Justo Bush MD;  Location: [facility] HE[facility] CARDIAC CATH LAB    CV HEART BIOPSY N/A 1/17/2023    Procedure: Heart Cath Heart Biopsy;  Surgeon: Wayne Xavier MD;  Location: [facility] CARDIAC CATH LAB    CV HEART BIOPSY N/A 2/9/2023    Procedure: Heart Cath Heart Biopsy;  Surgeon: Ghulam Mercado MD;  [facility] CARD[facility] C[facility] LAB    CV HEART BIOPSY N/A 2/21/2023    Procedure: Heart Biopsy;  Surgeon: Wayne Xavier MD;  Location: [facility] CARDIAC CATH LAB    CV HEART BIOPSY N/A 3/9/2023    Procedure: Heart Cath Heart Biopsy;  Surgeon: Tab Agee MD;  Location: [facility] CARDIAC CATH LAB    CV HEART BIOPSY N/A 3/21/2023    Procedure: Heart Cath Heart Biopsy;  Surgeon: Ghulam Mercado MD;  Location: [facility] CARDIAC CATH LAB    CV HEART BIOPSY N/A 4/27/2023    Procedure: Heart Cath Heart Biopsy;  Surgeon: Justo Bush MD;  Location:  HEART CARDIAC CATH LAB    CV HEART BIOPSY N/A 5/22/2023    Procedure: Heart Cath Heart Biopsy;  Surgeon: Wayne Xavier MD;  Location:  HEART CARDIAC CATH LAB    CV INTRAVASULAR ULTRASOUND N/A 1/23/2023    Procedure: Intravascular Ultrasound;  Surgeon: Justo Bush MD;  Location:  HEART CARDIAC CATH LAB    CV RIGHT HEART CATH MEASUREMENTS RECORDED N/A 11/11/2022    Procedure: Right Heart Catheterization;  Surgeon: Justo Bush MD;  Location:  HEART CARDIAC CATH LAB    CV RIGHT HEART CATH MEASUREMENTS RECORDED N/A 12/08/2022    Procedure: Right Heart Catheterization;  Surgeon: Wayne Xavier MD;  Location:  HEART CARDIAC CATH LAB    CV RIGHT HEART CATH MEASUREMENTS RECORDED N/A 12/09/2022    Procedure: Right Heart  Catheterization with leave-in [place];  Surgeon: Khari Mcneill MD;  Location:  HEART CARDIAC CATH LAB    CV RIGHT HEART CATH MEASUREMENTS RECORDED N/A 01/09/2023    Procedure: Heart Cath Right Heart Cath;  Surgeon: Tab Agee MD;  Location: [facility] CARDIAC CATH LAB    CV RIGHT HEART CATH MEASUREMENTS RECORDED N/A 01/02/2023    Procedure: Right Heart Catheterization;  Surgeon: Ghulam Mercado MD;  Location: [facility] CARDIAC CATH LAB    CV RIGHT HEART CATH MEASUREMENTS RECORDED N/A 1/23/2023    Procedure: Right Heart Catheterization;  Surgeon: Justo Bush MD;  Location: [facility] CARDIAC CATH LAB    CV RIGHT HEART CATH MEASUREMENTS RECORDED N/A 1/17/2023    Procedure: Right Heart Catheterization;  Surgeon: Wayne Xavier MD;  Location:  HEART CARDIAC CATH LAB    CV RIGHT HEART CATH MEASUREMENTS RECORDED N/A 2/9/2023    Procedure: Right Heart Catheterization;  Surgeon: Ghulam Mercado MD;  Location: [facility] HE[facility] CARDIAC CATH LAB    CV RIGHT HEART CATH MEASUREMENTS RECORDED N/A 2/21/2023    Procedure: Right Heart Catheterization;  Surgeon: Wayne Xavier MD;  Location: U HEART CARDIAC CATH LAB    CV RIGHT HEART CATH MEASUREMENTS RECORDED N/A 3/9/2023    Procedure: Right Heart Catheterization;  Surgeon: Tab Agee MD;  Location:  HEART CARDIAC CATH LAB    CV RIGHT HEART CATH MEASUREMENTS RECORDED N/A 3/21/2023    Procedure: Right Heart Catheterization;  Surgeon: Ghulam Mercado MD;  Location:  HEART CARDIAC CATH LAB    CV RIGHT HEART CATH MEASUREMENTS RECORDED N/A 4/27/2023    Procedure: Right Heart Catheterization;  Surgeon: Justo Bush MD;  Location:  HEART CARDIAC CATH LAB    CV RIGHT HEART CATH MEASUREMENTS RECORDED N/A 5/22/2023    Procedure: Right Heart Catheterization;  Surgeon: Wayne Xavier MD;  Location:  HEART CARDIAC CATH LAB    PICC Right 01/12/2023    placed in basilic and no problem    PICC DOUBLE LUMEN  PLACEMENT Right 11/10/2022    Right basilic, 43 cm, 1 cm external length    TRANSPLANT HEART RECIPIENT N/A 2022    Procedure: TRANSPLANT, HEART, RECIPIENT; Median Sternotomy, Cardiopulmonary Bypass, Removal of Pacemaker;  Surgeon: Brendan Rodriguez MD;  Location:  OR     #1 primary angiitis of the CNS  2.  Nonischemic cardiomyopathy, heart replaced by transplant 2022       Social History:     Social History     Socioeconomic History    Marital status:      Spouse name: Not on file    Number of children: Not on file    Years of education: Not on file    Highest education level: Not on file   Occupational History    Not on file   Tobacco Use    Smoking status: Never    Smokeless tobacco: Never   Vaping Use    Vaping status: Never Used   Substance and Sexual Activity    Alcohol use: Not Currently    Drug use: Never    Sexual activity: Not on file   Other Topics Concern    Not on file   Social History Narrative    Not on file     Social Determinants of Health     Financial Resource Strain: Not on file   Food Insecurity: Not on file   Transportation Needs: Not on file   Physical Activity: Not on file   Stress: Not on file   Social Connections: Not on file   Intimate Partner Violence: Not on file   Housing Stability: Not on file       Retired emergency room physician  1 child has non-Hodgkin's lymphoma  Does not smoke or drink.       Family History:     Family History   Problem Relation Age of Onset    Atrial fibrillation Father     Lung Cancer Brother      2 brothers have cardiomyopathy; 1  in his mid 40s, and the other received heart transplant.    No history of autoimmunity, rheumatic disease.    Mother  in mid 90s, had osteoarthritis  Father  in mid 90s no rheumatic or autoimmune disease.       Allergies:   No Known Allergies         Medications:     Current Outpatient Medications   Medication Sig Dispense Refill     magnesium glycinate lysinate chelate (DOCTOR'S BEST)  100 MG TABS tablet Take 3 tablets (300 mg) by mouth 2 times daily Take separate from Mycophenolate. 180 tablet 11    amLODIPine (NORVASC) 5 MG tablet Take 1 tablet (5 mg) by mouth At Bedtime 90 tablet 3    amoxicillin (AMOXIL) 500 MG capsule Take FOUR caps (2000 mg) one hour prior dental work while on Prednisone 4 capsule 1    aspirin (ASA) 81 MG EC tablet Take 1 tablet (81 mg) by mouth daily 30 tablet 11    calcium carbonate-vitamin D (CALTRATE) 600-10 MG-MCG per tablet Take 1 tablet by mouth 2 times daily (with meals) 180 tablet 3    multivitamin (ONE-DAILY) tablet Take 1 tablet by mouth daily 90 tablet 3    mycophenolate (GENERIC EQUIVALENT) 250 MG capsule Take 4 capsules (1,000 mg) by mouth 2 times daily 240 capsule 11    polyethylene glycol (MIRALAX) 17 GM/Dose powder Take 17 g by mouth daily as needed for constipation 510 g 1    rosuvastatin (CRESTOR) 10 MG tablet Take 1 tablet (10 mg) by mouth daily 90 tablet 1    tacrolimus (GENERIC EQUIVALENT) 0.5 MG capsule Take ONE cap with THREE 1 mg caps (3.5 mg) every PM 30 capsule 11    tacrolimus (GENERIC EQUIVALENT) 1 MG capsule Take FOUR caps every AM (4 mg) and THREE caps with ONE 0.5 mg cap every PM (3.5 mg) 210 capsule 11    traZODone (DESYREL) 100 MG tablet Take 1 tablet (100 mg) by mouth nightly as needed for sleep 30 tablet 1    tacrolimus (GENERIC EQUIVALENT) 5 MG capsule On hold due to dose change (Patient not taking: Reported on 4/27/2023) 30 capsule 11            Physical Exam:   Blood pressure 117/82, pulse 104, temperature 98.9  F (37.2  C), weight 96.5 kg (212 lb 11.2 oz), SpO2 96 %.  Wt Readings from Last 6 Encounters:   05/24/23 96.5 kg (212 lb 11.2 oz)   05/22/23 96.2 kg (212 lb)   05/22/23 96.4 kg (212 lb 9.6 oz)   04/27/23 94 kg (207 lb 4.8 oz)   04/27/23 90.9 kg (200 lb 4.8 oz)   03/21/23 90.8 kg (200 lb 2.8 oz)     Body mass index is 27.31 kg/m .  Constitutional: well-developed, appearing stated age; cooperative  Eyes: nl EOM, PERRLA, vision,  conjunctiva, sclera  ENT: nl external ears, nose, hearing, lips, teeth, gums, throat  No mucous membrane lesions, normal saliva pool  Neck: no mass or thyroid enlargement  Resp: lungs clear to auscultation,  CV: RRR, no murmurs, rubs or gallops, no edema  Lymph: no cervical, supraclavicular, inguinal or epitrochlear nodes  MS: The TMJ, neck, shoulder, elbow, wrist, MCP/PIP/DIP, spine, hip, knee, ankle, and foot MTP/IP joints were examined and found normal. No active synovitis or altered joint anatomy. Full joint ROM. Normal  strength.  Skin: no nail pitting, alopecia, rash, nodules or lesions  Neuro: Cranial nerves are intact.  There is mild dysarthria; no discernible memory loss in the course of conversation.  Gait is broad-based and halting.  There is coarse tremor in the hands and fingers.  Psych: Patient displays several episodes of brief crying in the course of conversation without reference to painful or emotionally disturbing topics         Data:     @      Latest Ref Rng & Units 4/27/2023    10:10 AM 4/27/2023    10:35 AM 5/22/2023     8:11 AM   RHEUM RESULTS   Creatinine 0.67 - 1.17 mg/dL 1.43  C  1.27     CRP Inflammation <5.00 mg/L <3.00       GFR Estimate >60 mL/min/1.73m2 53  C  61     Hematocrit 40.0 - 53.0 %  38.7   39.9     Hemoglobin 13.3 - 17.7 g/dL  11.9   12.6     WBC 4.0 - 11.0 10e3/uL  7.7   5.2     RBC Count 4.40 - 5.90 10e6/uL  4.44   4.69     RDW 10.0 - 15.0 %  13.4   13.1     MCHC 31.5 - 36.5 g/dL  30.7   31.6     MCV 78 - 100 fL  87   85     Platelet Count 150 - 450 10e3/uL  252   271        C Corrected result        ,  ,  ,  ,  ,  ,  ,  ,  ,  ,  ,  ,  ,  ,   Hepatitis B Core Antibody Total   Date Value Ref Range Status   12/25/2022 Nonreactive Nonreactive Final   ,   Hepatitis B Surface Antigen   Date Value Ref Range Status   12/25/2022 Nonreactive Nonreactive Final   ,  ,  ,  ,   Quantiferon-TB Gold Plus   Date Value Ref Range Status   11/12/2022 Negative Negative Final     Comment:      No interferon gamma response to M.tuberculosis antigens was detected. Infection with M.tuberculosis is unlikely, however a single negative result does not exclude infection. In patients at high risk for infection, a second test should be considered in accordance with the 2017 ATS/IDSA/CDC Clinical Pract  ice Guidelines for Diagnosis of Tuberculosis in Adults and Children      TB1 Ag minus Nil Value   Date Value Ref Range Status   11/12/2022 0.00 IU/mL Final     TB2 Ag minus Nil Value   Date Value Ref Range Status   11/12/2022 0.00 IU/mL Final     Mitogen minus Nil Result   Date Value Ref Range Status   11/12/2022 9.99 IU/mL Final     Nil Result   Date Value Ref Range Status   11/12/2022 0.01 IU/mL Final   ,  ,  ,  ,  ,  ,  ,  ,  ,  ,  ,  ,  ,  ,  ,  ,  ,  ,   Immunoglobulin G   Date Value Ref Range Status   01/10/2023 549 (L) 610 - 1,616 mg/dL Final     Immunoglobulin A   Date Value Ref Range Status   01/10/2023 144 84 - 499 mg/dL Final     Immunoglobulin M   Date Value Ref Range Status   01/10/2023 67 35 - 242 mg/dL Final   ,  ,  ,  ,  ,  ,  ,         Again, thank you for allowing me to participate in the care of your patient.      Sincerely,    Jose Liu MD       Azelaic Acid Counseling: Patient counseled that medicine may cause skin irritation and to avoid applying near the eyes.  In the event of skin irritation, the patient was advised to reduce the amount of the drug applied or use it less frequently.   The patient verbalized understanding of the proper use and possible adverse effects of azelaic acid.  All of the patient's questions and concerns were addressed.

## 2023-10-18 ENCOUNTER — TELEPHONE (OUTPATIENT)
Dept: DERMATOLOGY | Facility: CLINIC | Age: 68
End: 2023-10-18
Payer: MEDICARE

## 2023-10-18 DIAGNOSIS — C44.91 BCC (BASAL CELL CARCINOMA): Primary | ICD-10-CM

## 2023-10-18 NOTE — TELEPHONE ENCOUNTER
----- Message from Jose Hastings MD sent at 10/16/2023  4:12 PM CDT -----  Please call pt. Let me know after contact is made and I will refer for mohs for lesion on the scalp. Pt will be scheduled w/ me for lesion on the chest by [facility] team.     Lilian duque you please schedule the pt for excision with me at [facility] for the cancer on the chest       Dr. Shah cc'd as FYI since we saw pt in clinic together

## 2023-10-18 NOTE — TELEPHONE ENCOUNTER
Result Care Coordination      Result Notes     Zoila Elias CMA  10/18/2023  9:32 AM CDT Back to Top      Patient gave verbal consent to communicate biopsy results. Patient's wife notified of results. Verbal understanding with no further questions.  CHARLOTTE Juares, CHARLOTTE  10/18/2023  9:03 AM CDT       Left a voicemail on wife's phone (only phone number in chart) to call back to discuss biopsy results. Clinic number provided to call back. There is not a consent to communicate with the wife in the patient's chart. Results need to be discussed with the patient only unless given verbal consent to discuss with the wife.     CHARLOTTE Kong, CHALROTTE  10/18/2023  9:01 AM CDT       Final Diagnosis  A. Vertex scalp:  - Basal cell carcinoma, superficial type - (see description)     B. Central chest:  - Basal cell carcinoma, superficial and nodular types - (see description)    Jose Hastings MD  10/16/2023  4:12 PM CDT       Please call pt. Let me know after contact is made and I will refer for mohs for lesion on the scalp. Pt will be scheduled w/ me for lesion on the chest by [facility] team.     Lilian duque you please schedule the pt for excision with me at [facility] for the cancer on the chest        Dr. Shah cc'd as FYI since we saw pt in clinic together    Mary Lou Luke RN  10/16/2023  2:34 PM CDT       Managed by ordering provider

## 2023-10-23 ENCOUNTER — TELEPHONE (OUTPATIENT)
Dept: DERMATOLOGY | Facility: CLINIC | Age: 68
End: 2023-10-23
Payer: MEDICARE

## 2023-10-23 NOTE — TELEPHONE ENCOUNTER
Called patient to schedule surgery with Dr. Hastings    Date of Surgery: 12/14    Surgery type: mohs and excision    Consult scheduled: No    Has patient had mohs with us before? Yes    Additional comments: pt will already be in town the day before for a cardio appt.       Tiana Lira on 10/23/2023 at 9:56 AM

## 2023-10-23 NOTE — TELEPHONE ENCOUNTER
Left patient a voicemail to schedule a consult & mohs for BCC of sclap  with Dr. Hastings. Preferably 12/13 . Provided my direct phone number.    Tiana Lira on 10/23/2023 at 8:41 AM

## 2023-10-23 NOTE — TELEPHONE ENCOUNTER
Via phone, spoke with pt wife and pt will call back  Appointment type: Return  Provider: Dr. Hastings  Return date: April 2024  Specialty phone number: [phone]

## 2023-10-24 ENCOUNTER — TELEPHONE (OUTPATIENT)
Dept: DERMATOLOGY | Facility: CLINIC | Age: 68
End: 2023-10-24
Payer: MEDICARE

## 2023-10-24 DIAGNOSIS — Z94.1 HEART REPLACED BY TRANSPLANT (H): Primary | ICD-10-CM

## 2023-10-24 NOTE — TELEPHONE ENCOUNTER
FUTURE VISIT INFORMATION      FUTURE VISIT INFORMATION:  Date: 12.14.23  Time: 8:30  Location: [facility]  REFERRAL INFORMATION:  Referring provider:  CIARRA Hastings  Referring providers clinic:  Derm  Reason for visit/diagnosis  BCC of scalp, BCC chest excision.      RECORDS REQUESTED FROM:       Clinic name Comments Records Status Imaging Status   Derm 10.13.23  Path # [id number] Epic Epic

## 2023-10-24 NOTE — TELEPHONE ENCOUNTER
[facility] Call Center    Phone Message    May a detailed message be left on voicemail: yes     Reason for Call: Appointment Intake    Referring Provider Name: NA  Diagnosis and/or Symptoms: follow-up   Pt's wife Arlette called returning call to schedule follow-up from mohs. Please call pt's wife back to schedule. Thanks     Action Taken: Message routed to:  [facility] Derm    Travel Screening: Not Applicable

## 2023-10-24 NOTE — TELEPHONE ENCOUNTER
Patient Contacted for the patient to schedule the following, pt wife stated they will call back to schedule:    Appointment type: Return  Provider: Dr. Hastings  Return date: April 2024  Specialty phone number: [phone]

## 2023-10-25 ENCOUNTER — MYC MEDICAL ADVICE (OUTPATIENT)
Dept: RHEUMATOLOGY | Facility: CLINIC | Age: 68
End: 2023-10-25
Payer: MEDICARE

## 2023-10-25 ENCOUNTER — TELEPHONE (OUTPATIENT)
Dept: RHEUMATOLOGY | Facility: CLINIC | Age: 68
End: 2023-10-25
Payer: MEDICARE

## 2023-10-25 NOTE — TELEPHONE ENCOUNTER
DECREASED VA 10/18/18, HOWEVER NO INC FLUID. Message received from spouse who was instructed to schedule a follow up appt with Dr. Liu once they knew when he was seeing his Cardiologist.  Patient is scheduled with Cardiology April 18th at 145pm and Derm at 345pm, hoping Dr. Liu has room that day as well for him to do the follow up.  Routed to Dr. Liu's team.  Team Work makes the Dream Work!    Vernell Sutherland RN, BSN, PHN  [facility] Nurse Navigator  [phone]

## 2023-10-26 ENCOUNTER — MYC MEDICAL ADVICE (OUTPATIENT)
Dept: TRANSPLANT | Facility: CLINIC | Age: 68
End: 2023-10-26
Payer: MEDICARE

## 2023-10-26 NOTE — TELEPHONE ENCOUNTER
Pt's wife calling, would like to know if Dr. Liu would accommodate their schedule for 04/18/23, Writer was unable to find an opening. Please reach out to pt.

## 2023-10-26 NOTE — TELEPHONE ENCOUNTER
Could patient see me in [place] on 4-18 at 0930, then travel to [facility] for his appointments there please?  I cannot accommodate him in person at [facility] on 4-18.  Plan B I could do a video visit from [place] that day

## 2023-10-27 ENCOUNTER — TELEPHONE (OUTPATIENT)
Dept: RHEUMATOLOGY | Facility: CLINIC | Age: 68
End: 2023-10-27
Payer: MEDICARE

## 2023-10-27 NOTE — TELEPHONE ENCOUNTER
Follow up appt to patient's wife. Confirmed and schedule appt on 4/19 with Dr. Liu.  All questions answered.    iNda Paige, BSN, RN  RN Care Coordinator Rheumatology

## 2023-10-27 NOTE — TELEPHONE ENCOUNTER
This RN also left message, instructed to call back to discuss options.    RUFINO MahajanN, RN  RN Care Coordinator Rheumatology

## 2023-12-07 ENCOUNTER — PRE VISIT (OUTPATIENT)
Dept: TRANSPLANT | Facility: CLINIC | Age: 68
End: 2023-12-07
Payer: MEDICARE

## 2023-12-07 DIAGNOSIS — Z94.1 HEART REPLACED BY TRANSPLANT (H): Primary | ICD-10-CM

## 2023-12-07 DIAGNOSIS — Z12.5 PROSTATE CANCER SCREENING: ICD-10-CM

## 2023-12-07 DIAGNOSIS — Z13.220 LIPID SCREENING: ICD-10-CM

## 2023-12-07 DIAGNOSIS — Z79.899 ENCOUNTER FOR LONG-TERM (CURRENT) USE OF HIGH-RISK MEDICATION: ICD-10-CM

## 2023-12-07 RX ORDER — POTASSIUM CHLORIDE 1500 MG/1
20 TABLET, EXTENDED RELEASE ORAL
Status: CANCELLED | OUTPATIENT
Start: 2023-12-07

## 2023-12-07 RX ORDER — POTASSIUM CHLORIDE 1500 MG/1
40 TABLET, EXTENDED RELEASE ORAL
Status: CANCELLED | OUTPATIENT
Start: 2023-12-07

## 2023-12-07 RX ORDER — ASPIRIN 325 MG
325 TABLET ORAL ONCE
Status: CANCELLED | OUTPATIENT
Start: 2023-12-07 | End: 2023-12-07

## 2023-12-07 RX ORDER — SODIUM CHLORIDE 9 MG/ML
INJECTION, SOLUTION INTRAVENOUS CONTINUOUS
Status: CANCELLED | OUTPATIENT
Start: 2023-12-07

## 2023-12-07 RX ORDER — ASPIRIN 81 MG/1
243 TABLET, CHEWABLE ORAL ONCE
Status: CANCELLED | OUTPATIENT
Start: 2023-12-07

## 2023-12-12 ENCOUNTER — TELEPHONE (OUTPATIENT)
Dept: TRANSPLANT | Facility: CLINIC | Age: 68
End: 2023-12-12
Payer: MEDICARE

## 2023-12-12 NOTE — TELEPHONE ENCOUNTER
Pt routinely takes 325 mg ASA every evening.   Pt took it last evening, will take now this afternoon (vs this AM) - wonders if he should take another dose in AM.     Ok to take dose now; cath lab team can provide the AM dose as needed.

## 2023-12-13 ENCOUNTER — LAB (OUTPATIENT)
Dept: LAB | Facility: CLINIC | Age: 68
End: 2023-12-13
Attending: INTERNAL MEDICINE
Payer: MEDICARE

## 2023-12-13 ENCOUNTER — HOSPITAL ENCOUNTER (OUTPATIENT)
Dept: GENERAL RADIOLOGY | Facility: CLINIC | Age: 68
Discharge: HOME | End: 2023-12-13
Attending: INTERNAL MEDICINE
Payer: MEDICARE

## 2023-12-13 ENCOUNTER — OFFICE VISIT (OUTPATIENT)
Dept: CARDIOLOGY | Facility: CLINIC | Age: 68
End: 2023-12-13
Attending: INTERNAL MEDICINE
Payer: MEDICARE

## 2023-12-13 ENCOUNTER — HOSPITAL ENCOUNTER (OUTPATIENT)
Facility: CLINIC | Age: 68
Discharge: HOME | End: 2023-12-13
Attending: INTERNAL MEDICINE | Admitting: INTERNAL MEDICINE
Payer: MEDICARE

## 2023-12-13 ENCOUNTER — APPOINTMENT (OUTPATIENT)
Dept: MEDSURG UNIT | Facility: CLINIC | Age: 68
End: 2023-12-13
Attending: INTERNAL MEDICINE
Payer: MEDICARE

## 2023-12-13 ENCOUNTER — HOSPITAL ENCOUNTER (OUTPATIENT)
Dept: MRI IMAGING | Facility: CLINIC | Age: 68
Discharge: HOME | End: 2023-12-13
Attending: INTERNAL MEDICINE
Payer: MEDICARE

## 2023-12-13 VITALS — SYSTOLIC BLOOD PRESSURE: 139 MMHG | DIASTOLIC BLOOD PRESSURE: 92 MMHG | HEART RATE: 92 BPM

## 2023-12-13 VITALS
BODY MASS INDEX: 28.16 KG/M2 | DIASTOLIC BLOOD PRESSURE: 89 MMHG | WEIGHT: 222.9 LBS | OXYGEN SATURATION: 97 % | SYSTOLIC BLOOD PRESSURE: 126 MMHG | HEART RATE: 98 BPM

## 2023-12-13 VITALS
HEIGHT: 74 IN | DIASTOLIC BLOOD PRESSURE: 96 MMHG | BODY MASS INDEX: 28.62 KG/M2 | HEART RATE: 100 BPM | TEMPERATURE: 98.1 F | SYSTOLIC BLOOD PRESSURE: 156 MMHG | RESPIRATION RATE: 12 BRPM | OXYGEN SATURATION: 98 %

## 2023-12-13 DIAGNOSIS — Z94.1 HEART REPLACED BY TRANSPLANT (H): ICD-10-CM

## 2023-12-13 DIAGNOSIS — Z13.220 LIPID SCREENING: ICD-10-CM

## 2023-12-13 DIAGNOSIS — Z79.899 ENCOUNTER FOR LONG-TERM (CURRENT) USE OF HIGH-RISK MEDICATION: ICD-10-CM

## 2023-12-13 DIAGNOSIS — I50.43 ACUTE ON CHRONIC COMBINED SYSTOLIC (CONGESTIVE) AND DIASTOLIC (CONGESTIVE) HEART FAILURE (H): Primary | ICD-10-CM

## 2023-12-13 DIAGNOSIS — Z98.890 STATUS POST CORONARY ANGIOGRAM: ICD-10-CM

## 2023-12-13 DIAGNOSIS — Z12.5 PROSTATE CANCER SCREENING: ICD-10-CM

## 2023-12-13 LAB
ALBUMIN SERPL BCG-MCNC: 4.1 G/DL (ref 3.5–5.2)
ALP SERPL-CCNC: 67 U/L (ref 40–150)
ALT SERPL W P-5'-P-CCNC: 24 U/L (ref 0–70)
ANION GAP SERPL CALCULATED.3IONS-SCNC: 10 MMOL/L (ref 7–15)
AST SERPL W P-5'-P-CCNC: 25 U/L (ref 0–45)
BILIRUB SERPL-MCNC: 0.7 MG/DL
BUN SERPL-MCNC: 18.5 MG/DL (ref 8–23)
CALCIUM SERPL-MCNC: 9.5 MG/DL (ref 8.8–10.2)
CHLORIDE SERPL-SCNC: 105 MMOL/L (ref 98–107)
CHOLEST SERPL-MCNC: 142 MG/DL
CK SERPL-CCNC: 155 U/L (ref 39–308)
CMV DNA SPEC NAA+PROBE-ACNC: NOT DETECTED IU/ML
CREAT SERPL-MCNC: 1.09 MG/DL (ref 0.67–1.17)
DEPRECATED HCO3 PLAS-SCNC: 24 MMOL/L (ref 22–29)
EGFRCR SERPLBLD CKD-EPI 2021: 73 ML/MIN/1.73M2
ERYTHROCYTE [DISTWIDTH] IN BLOOD BY AUTOMATED COUNT: 12.5 % (ref 10–15)
FASTING STATUS PATIENT QL REPORTED: YES
GLUCOSE SERPL-MCNC: 104 MG/DL (ref 70–99)
HCT VFR BLD AUTO: 46.9 % (ref 40–53)
HDLC SERPL-MCNC: 71 MG/DL
HGB BLD-MCNC: 14.1 G/DL (ref 13.3–17.7)
HGB BLD-MCNC: 15.2 G/DL (ref 13.3–17.7)
LDLC SERPL CALC-MCNC: 57 MG/DL
MAGNESIUM SERPL-MCNC: 1.6 MG/DL (ref 1.7–2.3)
MCH RBC QN AUTO: 28.1 PG (ref 26.5–33)
MCHC RBC AUTO-ENTMCNC: 32.4 G/DL (ref 31.5–36.5)
MCV RBC AUTO: 87 FL (ref 78–100)
NONHDLC SERPL-MCNC: 71 MG/DL
OXYHGB MFR BLDV: 75 % (ref 92–100)
PHOSPHATE SERPL-MCNC: 2.9 MG/DL (ref 2.5–4.5)
PLATELET # BLD AUTO: 223 10E3/UL (ref 150–450)
POTASSIUM SERPL-SCNC: 4.2 MMOL/L (ref 3.4–5.3)
PROT SERPL-MCNC: 6.7 G/DL (ref 6.4–8.3)
PSA SERPL DL<=0.01 NG/ML-MCNC: 1.12 NG/ML (ref 0–6.5)
RBC # BLD AUTO: 5.41 10E6/UL (ref 4.4–5.9)
SODIUM SERPL-SCNC: 139 MMOL/L (ref 135–145)
TACROLIMUS BLD-MCNC: 7.4 UG/L (ref 5–15)
TME LAST DOSE: NORMAL H
TME LAST DOSE: NORMAL H
TRIGL SERPL-MCNC: 70 MG/DL
WBC # BLD AUTO: 5.8 10E3/UL (ref 4–11)

## 2023-12-13 PROCEDURE — 255N000002 HC RX 255 OP 636: Mod: JZ | Performed by: INTERNAL MEDICINE

## 2023-12-13 PROCEDURE — 999N000134 HC STATISTIC PP CARE STAGE 3

## 2023-12-13 PROCEDURE — 88346 IMFLUOR 1ST 1ANTB STAIN PX: CPT | Mod: TC | Performed by: INTERNAL MEDICINE

## 2023-12-13 PROCEDURE — 93016 CV STRESS TEST SUPVJ ONLY: CPT | Performed by: STUDENT IN AN ORGANIZED HEALTH CARE EDUCATION/TRAINING PROGRAM

## 2023-12-13 PROCEDURE — 99152 MOD SED SAME PHYS/QHP 5/>YRS: CPT | Mod: GC | Performed by: INTERNAL MEDICINE

## 2023-12-13 PROCEDURE — 82810 BLOOD GASES O2 SAT ONLY: CPT

## 2023-12-13 PROCEDURE — 86833 HLA CLASS II HIGH DEFIN QUAL: CPT | Performed by: NURSE PRACTITIONER

## 2023-12-13 PROCEDURE — 93010 ELECTROCARDIOGRAM REPORT: CPT | Performed by: INTERNAL MEDICINE

## 2023-12-13 PROCEDURE — 88350 IMFLUOR EA ADDL 1ANTB STN PX: CPT | Mod: 26 | Performed by: PATHOLOGY

## 2023-12-13 PROCEDURE — 80061 LIPID PANEL: CPT | Performed by: NURSE PRACTITIONER

## 2023-12-13 PROCEDURE — 93005 ELECTROCARDIOGRAM TRACING: CPT

## 2023-12-13 PROCEDURE — 99215 OFFICE O/P EST HI 40 MIN: CPT | Performed by: NURSE PRACTITIONER

## 2023-12-13 PROCEDURE — 999N000054 HC STATISTIC EKG NON-CHARGEABLE

## 2023-12-13 PROCEDURE — C1894 INTRO/SHEATH, NON-LASER: HCPCS | Performed by: INTERNAL MEDICINE

## 2023-12-13 PROCEDURE — 258N000003 HC RX IP 258 OP 636: Performed by: INTERNAL MEDICINE

## 2023-12-13 PROCEDURE — G0463 HOSPITAL OUTPT CLINIC VISIT: HCPCS | Performed by: NURSE PRACTITIONER

## 2023-12-13 PROCEDURE — 87799 DETECT AGENT NOS DNA QUANT: CPT | Performed by: NURSE PRACTITIONER

## 2023-12-13 PROCEDURE — 93505 ENDOMYOCARDIAL BIOPSY: CPT | Performed by: INTERNAL MEDICINE

## 2023-12-13 PROCEDURE — G1010 CDSM STANSON: HCPCS | Performed by: STUDENT IN AN ORGANIZED HEALTH CARE EDUCATION/TRAINING PROGRAM

## 2023-12-13 PROCEDURE — 93018 CV STRESS TEST I&R ONLY: CPT | Performed by: STUDENT IN AN ORGANIZED HEALTH CARE EDUCATION/TRAINING PROGRAM

## 2023-12-13 PROCEDURE — 93454 CORONARY ARTERY ANGIO S&I: CPT | Mod: 26 | Performed by: INTERNAL MEDICINE

## 2023-12-13 PROCEDURE — G0103 PSA SCREENING: HCPCS | Performed by: NURSE PRACTITIONER

## 2023-12-13 PROCEDURE — 85018 HEMOGLOBIN: CPT | Mod: 91

## 2023-12-13 PROCEDURE — 99152 MOD SED SAME PHYS/QHP 5/>YRS: CPT | Performed by: INTERNAL MEDICINE

## 2023-12-13 PROCEDURE — 86352 CELL FUNCTION ASSAY W/STIM: CPT | Performed by: NURSE PRACTITIONER

## 2023-12-13 PROCEDURE — 250N000011 HC RX IP 250 OP 636: Performed by: INTERNAL MEDICINE

## 2023-12-13 PROCEDURE — 36415 COLL VENOUS BLD VENIPUNCTURE: CPT | Performed by: NURSE PRACTITIONER

## 2023-12-13 PROCEDURE — A9585 GADOBUTROL INJECTION: HCPCS | Mod: JZ | Performed by: INTERNAL MEDICINE

## 2023-12-13 PROCEDURE — 88346 IMFLUOR 1ST 1ANTB STAIN PX: CPT | Mod: 26 | Performed by: PATHOLOGY

## 2023-12-13 PROCEDURE — 93454 CORONARY ARTERY ANGIO S&I: CPT | Performed by: INTERNAL MEDICINE

## 2023-12-13 PROCEDURE — 75563 CARD MRI W/STRESS IMG & DYE: CPT | Mod: 26 | Performed by: STUDENT IN AN ORGANIZED HEALTH CARE EDUCATION/TRAINING PROGRAM

## 2023-12-13 PROCEDURE — 84100 ASSAY OF PHOSPHORUS: CPT | Performed by: NURSE PRACTITIONER

## 2023-12-13 PROCEDURE — 88307 TISSUE EXAM BY PATHOLOGIST: CPT | Mod: 26 | Performed by: PATHOLOGY

## 2023-12-13 PROCEDURE — 85027 COMPLETE CBC AUTOMATED: CPT | Performed by: NURSE PRACTITIONER

## 2023-12-13 PROCEDURE — 250N000011 HC RX IP 250 OP 636: Performed by: STUDENT IN AN ORGANIZED HEALTH CARE EDUCATION/TRAINING PROGRAM

## 2023-12-13 PROCEDURE — 93505 ENDOMYOCARDIAL BIOPSY: CPT | Mod: 26 | Performed by: INTERNAL MEDICINE

## 2023-12-13 PROCEDURE — 86832 HLA CLASS I HIGH DEFIN QUAL: CPT | Performed by: NURSE PRACTITIONER

## 2023-12-13 PROCEDURE — 99153 MOD SED SAME PHYS/QHP EA: CPT | Performed by: INTERNAL MEDICINE

## 2023-12-13 PROCEDURE — 272N000001 HC OR GENERAL SUPPLY STERILE: Performed by: INTERNAL MEDICINE

## 2023-12-13 PROCEDURE — 83735 ASSAY OF MAGNESIUM: CPT | Performed by: NURSE PRACTITIONER

## 2023-12-13 PROCEDURE — 250N000011 HC RX IP 250 OP 636: Mod: JZ | Performed by: INTERNAL MEDICINE

## 2023-12-13 PROCEDURE — 82550 ASSAY OF CK (CPK): CPT | Performed by: NURSE PRACTITIONER

## 2023-12-13 PROCEDURE — 80197 ASSAY OF TACROLIMUS: CPT | Performed by: NURSE PRACTITIONER

## 2023-12-13 PROCEDURE — 93456 R HRT CORONARY ARTERY ANGIO: CPT | Performed by: INTERNAL MEDICINE

## 2023-12-13 PROCEDURE — 71046 X-RAY EXAM CHEST 2 VIEWS: CPT | Mod: 26 | Performed by: RADIOLOGY

## 2023-12-13 PROCEDURE — 71046 X-RAY EXAM CHEST 2 VIEWS: CPT

## 2023-12-13 PROCEDURE — 82040 ASSAY OF SERUM ALBUMIN: CPT | Performed by: NURSE PRACTITIONER

## 2023-12-13 PROCEDURE — 250N000009 HC RX 250: Performed by: INTERNAL MEDICINE

## 2023-12-13 PROCEDURE — G1010 CDSM STANSON: HCPCS

## 2023-12-13 PROCEDURE — 86828 HLA CLASS I&II ANTIBODY QUAL: CPT | Performed by: NURSE PRACTITIONER

## 2023-12-13 PROCEDURE — 999N000142 HC STATISTIC PROCEDURE PREP ONLY

## 2023-12-13 PROCEDURE — C1751 CATH, INF, PER/CENT/MIDLINE: HCPCS | Performed by: INTERNAL MEDICINE

## 2023-12-13 RX ORDER — OXYCODONE HYDROCHLORIDE 10 MG/1
10 TABLET ORAL EVERY 4 HOURS PRN
Status: DISCONTINUED | OUTPATIENT
Start: 2023-12-13 | End: 2023-12-13 | Stop reason: HOSPADM

## 2023-12-13 RX ORDER — GADOBUTROL 604.72 MG/ML
10 INJECTION INTRAVENOUS ONCE
Status: COMPLETED | OUTPATIENT
Start: 2023-12-13 | End: 2023-12-13

## 2023-12-13 RX ORDER — NALOXONE HYDROCHLORIDE 0.4 MG/ML
0.2 INJECTION, SOLUTION INTRAMUSCULAR; INTRAVENOUS; SUBCUTANEOUS
Status: DISCONTINUED | OUTPATIENT
Start: 2023-12-13 | End: 2023-12-13 | Stop reason: HOSPADM

## 2023-12-13 RX ORDER — POTASSIUM CHLORIDE 750 MG/1
20 TABLET, EXTENDED RELEASE ORAL
Status: DISCONTINUED | OUTPATIENT
Start: 2023-12-13 | End: 2023-12-13 | Stop reason: HOSPADM

## 2023-12-13 RX ORDER — ASPIRIN 325 MG
325 TABLET ORAL ONCE
Status: COMPLETED | OUTPATIENT
Start: 2023-12-13 | End: 2023-12-13

## 2023-12-13 RX ORDER — IOPAMIDOL 755 MG/ML
INJECTION, SOLUTION INTRAVASCULAR
Status: DISCONTINUED | OUTPATIENT
Start: 2023-12-13 | End: 2023-12-13 | Stop reason: HOSPADM

## 2023-12-13 RX ORDER — AMINOPHYLLINE 25 MG/ML
100 INJECTION, SOLUTION INTRAVENOUS ONCE
Status: COMPLETED | OUTPATIENT
Start: 2023-12-13 | End: 2023-12-13

## 2023-12-13 RX ORDER — FLUMAZENIL 0.1 MG/ML
0.2 INJECTION, SOLUTION INTRAVENOUS
Status: DISCONTINUED | OUTPATIENT
Start: 2023-12-13 | End: 2023-12-13 | Stop reason: HOSPADM

## 2023-12-13 RX ORDER — FENTANYL CITRATE 50 UG/ML
25 INJECTION, SOLUTION INTRAMUSCULAR; INTRAVENOUS
Status: DISCONTINUED | OUTPATIENT
Start: 2023-12-13 | End: 2023-12-13 | Stop reason: HOSPADM

## 2023-12-13 RX ORDER — DIPHENHYDRAMINE HCL 25 MG
25 CAPSULE ORAL
Status: DISCONTINUED | OUTPATIENT
Start: 2023-12-13 | End: 2023-12-14 | Stop reason: HOSPADM

## 2023-12-13 RX ORDER — ONDANSETRON 2 MG/ML
4 INJECTION INTRAMUSCULAR; INTRAVENOUS
Status: DISCONTINUED | OUTPATIENT
Start: 2023-12-13 | End: 2023-12-14 | Stop reason: HOSPADM

## 2023-12-13 RX ORDER — METHYLPREDNISOLONE SODIUM SUCCINATE 125 MG/2ML
125 INJECTION, POWDER, LYOPHILIZED, FOR SOLUTION INTRAMUSCULAR; INTRAVENOUS
Status: DISCONTINUED | OUTPATIENT
Start: 2023-12-13 | End: 2023-12-14 | Stop reason: HOSPADM

## 2023-12-13 RX ORDER — ALBUTEROL SULFATE 90 UG/1
2 AEROSOL, METERED RESPIRATORY (INHALATION) EVERY 5 MIN PRN
Status: DISCONTINUED | OUTPATIENT
Start: 2023-12-13 | End: 2023-12-14 | Stop reason: HOSPADM

## 2023-12-13 RX ORDER — LIDOCAINE 40 MG/G
CREAM TOPICAL
Status: DISCONTINUED | OUTPATIENT
Start: 2023-12-13 | End: 2023-12-13 | Stop reason: HOSPADM

## 2023-12-13 RX ORDER — ACYCLOVIR 200 MG/1
0-1 CAPSULE ORAL
Status: DISCONTINUED | OUTPATIENT
Start: 2023-12-13 | End: 2023-12-14 | Stop reason: HOSPADM

## 2023-12-13 RX ORDER — DIAZEPAM 5 MG
5 TABLET ORAL EVERY 30 MIN PRN
Status: DISCONTINUED | OUTPATIENT
Start: 2023-12-13 | End: 2023-12-14 | Stop reason: HOSPADM

## 2023-12-13 RX ORDER — POTASSIUM CHLORIDE 750 MG/1
40 TABLET, EXTENDED RELEASE ORAL
Status: DISCONTINUED | OUTPATIENT
Start: 2023-12-13 | End: 2023-12-13 | Stop reason: HOSPADM

## 2023-12-13 RX ORDER — CAFFEINE CITRATE 20 MG/ML
60 SOLUTION INTRAVENOUS
Status: DISCONTINUED | OUTPATIENT
Start: 2023-12-13 | End: 2023-12-14 | Stop reason: HOSPADM

## 2023-12-13 RX ORDER — OXYCODONE HYDROCHLORIDE 5 MG/1
5 TABLET ORAL EVERY 4 HOURS PRN
Status: DISCONTINUED | OUTPATIENT
Start: 2023-12-13 | End: 2023-12-13 | Stop reason: HOSPADM

## 2023-12-13 RX ORDER — FENTANYL CITRATE 50 UG/ML
INJECTION, SOLUTION INTRAMUSCULAR; INTRAVENOUS
Status: DISCONTINUED | OUTPATIENT
Start: 2023-12-13 | End: 2023-12-13 | Stop reason: HOSPADM

## 2023-12-13 RX ORDER — ASPIRIN 81 MG/1
243 TABLET, CHEWABLE ORAL ONCE
Status: COMPLETED | OUTPATIENT
Start: 2023-12-13 | End: 2023-12-13

## 2023-12-13 RX ORDER — NALOXONE HYDROCHLORIDE 0.4 MG/ML
0.4 INJECTION, SOLUTION INTRAMUSCULAR; INTRAVENOUS; SUBCUTANEOUS
Status: DISCONTINUED | OUTPATIENT
Start: 2023-12-13 | End: 2023-12-13 | Stop reason: HOSPADM

## 2023-12-13 RX ORDER — ATROPINE SULFATE 0.1 MG/ML
0.5 INJECTION INTRAVENOUS
Status: DISCONTINUED | OUTPATIENT
Start: 2023-12-13 | End: 2023-12-13 | Stop reason: HOSPADM

## 2023-12-13 RX ORDER — SODIUM CHLORIDE 9 MG/ML
INJECTION, SOLUTION INTRAVENOUS CONTINUOUS
Status: DISCONTINUED | OUTPATIENT
Start: 2023-12-13 | End: 2023-12-13 | Stop reason: HOSPADM

## 2023-12-13 RX ORDER — DIPHENHYDRAMINE HYDROCHLORIDE 50 MG/ML
25-50 INJECTION INTRAMUSCULAR; INTRAVENOUS
Status: DISCONTINUED | OUTPATIENT
Start: 2023-12-13 | End: 2023-12-14 | Stop reason: HOSPADM

## 2023-12-13 RX ORDER — REGADENOSON 0.08 MG/ML
0.4 INJECTION, SOLUTION INTRAVENOUS ONCE
Status: COMPLETED | OUTPATIENT
Start: 2023-12-13 | End: 2023-12-13

## 2023-12-13 RX ADMIN — GADOBUTROL 10 ML: 604.72 INJECTION INTRAVENOUS at 09:53

## 2023-12-13 RX ADMIN — SODIUM CHLORIDE: 9 INJECTION, SOLUTION INTRAVENOUS at 13:16

## 2023-12-13 RX ADMIN — AMINOPHYLLINE 100 MG: 25 INJECTION, SOLUTION INTRAVENOUS at 09:15

## 2023-12-13 RX ADMIN — REGADENOSON 0.4 MG: 0.08 INJECTION, SOLUTION INTRAVENOUS at 09:11

## 2023-12-13 ASSESSMENT — ACTIVITIES OF DAILY LIVING (ADL)
ADLS_ACUITY_SCORE: 35

## 2023-12-13 ASSESSMENT — PAIN SCALES - GENERAL: PAINLEVEL: NO PAIN (0)

## 2023-12-13 NOTE — Clinical Note
dry, intact, no bleeding and no hematoma. RFV sheath pulled intra procedure. Manual pressure held to hemostasis

## 2023-12-13 NOTE — PROGRESS NOTES
D/I/A:  Patient is tolerating liquids and foods, ambulating, urinating, puncture site stable (no bleeding and no hematoma) and patient has a .  A+O x4 and making needs known.  CCL access site C/D/I; no bleeding or hematoma; CMS intact.  VSSA.  SR/ST on monitor.  IV access removed.  Education completed and outlined in AVS or handout: medications reviewed with patient.  Questions answered prior to discharge.  Belongings returned to patient at discharge.    P: Discharged to self care.  Patient to follow up with appts as per discharge instruction.

## 2023-12-13 NOTE — LETTER
12/13/2023      RE: Paco Stein  [address]  [place] [address]       Dear Colleague,    Thank you for the opportunity to participate in the care of your patient, Paco Stein, at the [facility] [place] [facility]. Please see a copy of my visit note below.    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     His post-operative was c/b delirium, leukocytosis, staph epi bacteremia, and prolonged CT output.     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  No DSAs and no graft dysfunction, so further treatment was deferred.  AlloMap scores:  < 35  DSAs:  none  Coronary angio/Ischemic eval:  Baseline coronary angio 1/23/23 showed normal coronary arteries with eccentric, mild native CAD (MIGUEL ÁNGEL of LM 1.1mm, pLAD 0.2mm).  Last RHC:  6/2023 showed normal biventricular filling pressures, with RA 5, mPA 20, PCW 12, and CI 3.0.  Echo/cMRI:  TTE 10/2023 showed normal graft function, with LVEF 60-65% and normal RV size/function.    Since his last visit, he states that he feels well.  [facility] has [facility] regularly[facility] with [facility] exertional concerns.  His breathing has been well, and he denies SOB.  He is able to lie flat without PND and orthopnea.  His appetite has been well, and he is eating regularly without nausea, vomiting, diarrhea, and constipation.  His weight is up and his clothes feel a little tighter, noting that he is currently ranging 217#.  He attributes his  weight gain to diet, and denies fluid retention.  His BPs remain stable, ranging 120-130/80s.  He otherwise denies chest pain, palpitations, dizziness, falls, headaches, acute vision changes, fevers, chills, cough, sore throat, and signs of bleeding.      Serostatus:  CMV D+/R+  EBV D+/R+  Toxo D-/R-    Patient Active Problem List    Diagnosis Date Noted    Status post coronary angiogram 01/23/2023     Priority: Medium    Staphylococcus epidermidis bacteremia 01/09/2023     Priority: Medium    Using prophylactic antibiotic on daily basis 01/09/2023     Priority: Medium    Benign neoplasm of colon 12/12/2022     Priority: Medium    Troponin level elevated 12/06/2022     Priority: Medium    Anginal equivalent 12/06/2022     Priority: Medium    Heart failure, unspecified HF chronicity, unspecified heart failure type (H) 12/06/2022     Priority: Medium    Acute embolism and thrombosis of left peroneal vein (H) 11/22/2022     Priority: Medium    Acute on chronic combined systolic (congestive) and diastolic (congestive) heart failure (H) 11/14/2022     Priority: Medium    CHF (congestive heart failure) (H) 11/10/2022     Priority: Medium    Acute respiratory failure with hypoxia (H) 11/08/2022     Priority: Medium    Coronary artery disease involving native coronary artery of native heart without angina pectoris 11/08/2022     Priority: Medium    Essential hypertension 11/08/2022     Priority: Medium    NSVT (nonsustained ventricular tachycardia) (H) 11/08/2022     Priority: Medium    SOB (shortness of breath) 11/08/2022     Priority: Medium    Syncope and collapse 10/07/2021     Priority: Medium    Cardiomyopathy (H) 10/06/2021     Priority: Medium    Primary central nervous system vasculitis (H24) 11/30/2017     Priority: Medium    Vasculitis, CNS (H24) 11/30/2017     Priority: Medium    Stroke (H) 09/28/2016     Priority: Medium    Hyperlipidemia 08/08/2013     Priority: Medium       PAST MEDICAL HISTORY:  Past Medical  History:   Diagnosis Date    Congestive heart failure (H)        CURRENT MEDICATIONS:  Prescription Medications as of 12/13/2023         Rx Number Disp Refills Start End Last Dispensed Date Next Fill Date Owning Pharmacy     magnesium glycinate lysinate chelate ([facility]) 100 MG TABS tablet  240 tablet 11 6/23/2023    [facility] [place] [id number] [address]    Sig: Take 3 tablets (300 mg) by mouth 2 times daily Plus two additional tabs (200 mg) daily Take separate from Mycophenolate.    Class: E-Prescribe    Notes to Pharmacy: Dose change    Route: Oral    acetaminophen (TYLENOL) 80 MG chewable tablet            Sig: Take 80 mg by mouth every 4 hours as needed for mild pain or fever    Class: Historical    Route: Oral    amLODIPine (NORVASC) 5 MG tablet  90 tablet 3 2/6/2023    [facility] [place] [id number] [facility] [phone]    Sig: Take 1 tablet (5 mg) by mouth At Bedtime    Class: E-Prescribe    Route: Oral    aspirin (ASA) 325 MG EC tablet  90 tablet 3 10/13/2023    [facility] [place] [id number] [address]    Sig: Take 1 tablet (325 mg) by mouth daily    Class: E-Prescribe    Notes to Pharmacy: In place of 81 mg    Route: Oral    calcium carbonate-vitamin D (CALTRATE) 600-10 MG-MCG per tablet  180 tablet 3 5/5/2023    [facility] [place] [id number] [address]    Sig: Take 1 tablet by mouth 2 times daily (with meals)    Class: E-Prescribe    Route: Oral    multivitamin (ONE-DAILY) tablet  90 tablet 3 2/21/2023        Sig: Take 1 tablet by mouth daily    Class: No Print Out    Route: Oral    mycophenolate (GENERIC EQUIVALENT) 250 MG capsule  240 capsule 11 5/30/2023    [facility] [place] [address] [address]    Sig: Take 4 capsules (1,000 mg) by mouth 2 times daily    Class: E-Prescribe    Notes to Pharmacy: TXP DT 12/25/2022 (Heart) TXP Dischg DT 1/13/2023  DX Heart replaced by transplant Z94.1 [facility] ([place], [place])    Route: Oral    polyethylene glycol (MIRALAX) 17 g packet            Sig: Take 1 packet by mouth daily    Class: Historical    Route: Oral    tacrolimus (GENERIC EQUIVALENT) 0.5 MG capsule  30 capsule 11 7/26/2023    [facility] [place] [address]    Sig: Take one 0.5mg capsule with two 1mg capsules every morning for a total dose of 2.5 mg in the AM and 2mg in the PM    Class: E-Prescribe    Notes to Pharmacy: TXP DT 12/25/2022 (Heart) TXP Dischg DT 1/13/2023 DX Heart replaced by transplant Z94.1 [facility] ([place], [place])    tacrolimus (GENERIC EQUIVALENT) 1 MG capsule  120 capsule 11 7/26/2023    [facility] [place] [address]    Sig: Take TWO caps (2 mg) with one 0.5mg capsule in the AM and take TWO caps (2mg) in the PM for a total dose of 2.5mg in the AM and 2mg in the PM.    Class: E-Prescribe    Notes to Pharmacy: TXP DT 12/25/2022 (Heart) TXP Dischg DT 1/13/2023 DX Heart replaced by transplant Z94.1 [facility] ([place], [place])    rosuvastatin (CRESTOR) 10 MG tablet  90 tablet 3 6/29/2023    [facility] [place] [address] [address]    Sig: Take 1 tablet (10 mg) by mouth daily    Class: E-Prescribe    Notes to Pharmacy: 90 day supply if able    Route: Oral          Hospital Medications as of 12/13/2023         Dose Frequency Start End    albuterol (PROVENTIL HFA/VENTOLIN HFA) inhaler 2 puff EVERY 5 MIN PRN 12/13/2023     Admin Instructions: Max total three doses  Indications:  - COPD  - severe bronchospasm (notify the procedural provider that Albuterol was administered)  Check the dose counter on the inhaler to ensure there are doses remaining before administering. Prime by spraying into the  air 4 times prior to first use and if not used within 2 weeks.    Class: E-Prescribe    Route: Inhalation    aminophylline 100 mg (Completed) 100 mg ONCE 12/13/2023 12/13/2023    Admin Instructions: Slow IV push over 60 seconds.   Hold aminophylline if patient has a history of seizure disorder.  If aminophylline unavailable or in short supply, please administer caffeine unless the patient has a history of seizures.  Vesicant. Do not refrigerate.    Class: E-Prescribe    Route: Intravenous    caffeine citrate (CAFCIT) injection 60 mg 60 mg ONCE PRN 12/13/2023     Admin Instructions: DO NOT GIVE if the patient has a history of seizures.  Slow IV push.    Class: E-Prescribe    Route: Intravenous    diazepam (VALIUM) tablet 5 mg 5 mg EVERY 30 MIN PRN 12/13/2023     Admin Instructions: Give 1 hour prior to the procedure.  IF the patient has not received a dose on the patient care unit or at home prior to arrival.  This dose is to be given ONLY by provider or CT procedure staff ONLY as verbally requested by the provider.  Hold if respiratory rate less than 12 or systolic blood pressure less than 90 mmHg.    Class: E-Prescribe    Route: Oral    diphenhydrAMINE (BENADRYL) capsule 25 mg 25 mg ONCE PRN 12/13/2023     Admin Instructions: To be completed prior to the procedure.    Class: E-Prescribe    Route: Oral    diphenhydrAMINE (BENADRYL) injection 25-50 mg 25-50 mg ONCE PRN 12/13/2023     Admin Instructions: Can be given as 25 mg x 2 doses, or, as 50 mg x 1 dose.  Give no faster than 25 mg/min.  To be completed prior to the procedure.  This dose is to be given by provider or CV imaging procedure staff as verbally directed by the provider.  Protect from light.    Class: E-Prescribe    Route: Intravenous    gadobutrol (GADAVIST) injection 10 mL (Completed) 10 mL ONCE 12/13/2023 12/13/2023    Admin Instructions: Supplied by, and administered by MRI.    Class: E-Prescribe    Route: Intravenous    gadobutrol (GADAVIST)  injection 10 mL (Completed) 10 mL ONCE 12/13/2023 12/13/2023    Admin Instructions: Supplied by, and administered by MRI.    Class: E-Prescribe    Route: Intravenous    gadobutrol (GADAVIST) injection 10 mL (Completed) 10 mL ONCE 12/13/2023 12/13/2023    Admin Instructions: Supplied by, and administered by MRI.    Class: E-Prescribe    Route: Intravenous    methylPREDNISolone sodium succinate (solu-MEDROL) injection 125 mg 125 mg ONCE PRN 12/13/2023     Admin Instructions: This dose is to be given by the provider or CV imaging procedure staff as verbally directed by the provider    Class: E-Prescribe    Route: Intravenous    ondansetron (ZOFRAN) injection 4 mg 4 mg ONCE PRN 12/13/2023     Admin Instructions: Irritant.    Class: E-Prescribe    Route: Intravenous    regadenoson (LEXISCAN) injection 0.4 mg (Completed) 0.4 mg ONCE 12/13/2023 12/13/2023    Admin Instructions: Lexiscan Orders should be initiated ONLY IF a Lexiscan MRI is scheduled to be completed by supervising provider.  Push  medication in through saline lock over 15 seconds. Follow medication with saline flush. To be given in procedure area.    Class: E-Prescribe    Route: Intravenous    sodium chloride (PF) 0.9% PF flush 5-10 mL 5-10 mL EVERY 5 MIN PRN 12/13/2023     Class: E-Prescribe    Route: Intravenous    sodium chloride (PF) 0.9% PF flush 5-10 mL 5-10 mL EVERY 1 MIN PRN 12/13/2023     Admin Instructions: 2 flushes through saline lock.   First flush given immediately   Usual flush dose is 10 mL over 15 seconds after bolus injection of Lexiscan.  Second flush given immediately after isotope injection.  Usual flush dose is 5 mL.   FOR TOTAL OF 2 doses.    Class: E-Prescribe    Route: Intravenous    sodium chloride 0.9% BOLUS 100 mL 100 mL ONCE PRN 12/13/2023     Admin Instructions: Pull sodium chloride out of the normal saline bag with a syringe and put it in the power injector.  Inject the medication as a flush after the contrast medication is  given.    Class: E-Prescribe    Route: Intravenous    sodium chloride bacteriostatic 0.9 % flush 0-1 mL 0-1 mL ONCE PRN 12/13/2023     Admin Instructions: Give immediately prior to the procedure  This dose is to be given ONLY by provider or procedure staff ONLY as verbally requested by the provider    Class: E-Prescribe    Route: Intradermal            ROS:   A comprehensive ROS was completed and found to be negative except for that noted in the HPI.    Exam:  /89 (BP Location: Left arm, Patient Position: Chair, Cuff Size: Adult Regular)   Pulse 98   Wt 101.1 kg (222 lb 14.4 oz)   SpO2 97%   BMI 28.16 kg/m    In general, the patient is a pleasant male in no apparent distress.    HEENT: NC/AT. PERRLA. EOMI.  Sclerae white, not injected.    Neck:  No adenopathy, No thyromegaly.    COR: No jugular venous distention when sitting upright.  RRR.  Normal S1 S2 splits physiologically.  No murmur, rub click, or gallop.    Lungs:  CTA. No rhonchi.    Abdomen: soft, nontender, nondistended.  No organomegaly.  Extremities:  No clubbing, cyanosis, or LE edema.    Neuro: Alert & Oriented x 3, grossly non focal.  Integument: no open lesions, rashes, or jaundice.    Labs:  CBC RESULTS:   Lab Results   Component Value Date    WBC 5.8 12/13/2023    RBC 5.41 12/13/2023    HGB 15.2 12/13/2023    HCT 46.9 12/13/2023    MCV 87 12/13/2023    MCH 28.1 12/13/2023    MCHC 32.4 12/13/2023    RDW 12.5 12/13/2023     12/13/2023       BMP RESULTS:  Lab Results   Component Value Date     12/13/2023    POTASSIUM 4.2 12/13/2023    POTASSIUM 3.7 12/25/2022    POTASSIUM 3.9 11/08/2022    CHLORIDE 105 12/13/2023    CHLORIDE 103 03/30/2023    CO2 24 12/13/2023    CO2 26 11/08/2022    ANIONGAP 10 12/13/2023    ANIONGAP 9 11/08/2022     (H) 12/13/2023     (H) 01/13/2023     (H) 11/08/2022    BUN 18.5 12/13/2023    BUN 35 (H) 11/08/2022    CR 1.09 12/13/2023    GFRESTIMATED 73 12/13/2023    TIM 9.5 12/13/2023     "  LIPID RESULTS:  Lab Results   Component Value Date    CHOL 142 12/13/2023    HDL 71 12/13/2023    LDL 57 12/13/2023    TRIG 70 12/13/2023    NHDL 71 12/13/2023       IMMUNOSUPPRESSANT LEVELS  Lab Results   Component Value Date    TACROL 6.6 10/12/2023    DOSTAC 10/11/2023 10/12/2023       No components found for: \"CK\"  Lab Results   Component Value Date    MAG 1.6 (L) 12/13/2023     Lab Results   Component Value Date    A1C 6.2 (H) 12/25/2022     Lab Results   Component Value Date    PHOS 2.9 12/13/2023     Lab Results   Component Value Date    NTBNPI 2,750 (H) 12/06/2022     Lab Results   Component Value Date    SAITESTMET SA EDTA FCS 06/29/2023    SAICELL Class I 06/29/2023    [id number] None 06/29/2023    [id number] None 06/29/2023    SAIREPCOM  06/29/2023      HLA PRA Test performed by modified testing procedure that may also include pretreatment of serum. Pretreatment may be the addition of fetal calf serum, EDTA, and/or adsorption.  High-risk, MFI > 3,000.  Mod-risk, -3,000.     Lab Results   Component Value Date    SAIITESTME SA EDTA FCS 06/29/2023    SAIICELL Class II 06/29/2023    [id number] Invalid. See SCN results. 06/29/2023    [id number] Invalid. See SCN results. 06/29/2023    SAIIREPCOM  06/29/2023     Intentional discrepancy between current date and historical date Single Antigen Bead specificity reporting.  Additional testing performed on current date to address potential testing artifact. HLA PRA Test performed by modified testing procedure that may also include pretreatment of serum. Pretreatment may be the addition of fetal calf serum, EDTA, and/or adsorption.  High-risk, MFI > 3,000.  Mod-risk, -3,000.     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    Labs today show stable electrolytes, renal function, liver function, lipid profile, PSA, and blood counts.  CMV, EBV, DSAs, ImmuKnow, AlloMap, and tacro levels are in process.  CXR today was negative for acute changes.      cMRI results from today are in process.    He will have a RHC, biopsy, and coronary angio following this appt.    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  His BPs are stable.  His weight is up slightly, and he appears euvolemic.    Advised that he continue his current medications, with no changes, and will plan for him to follow-up in clinic per protocol or sooner should new concerns arise.      NICM, s/p OHT 12/25/22  His post-operative was c/b delirium, leukocytosis, staph epi bacteremia, and prolonged CT output.     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  No DSAs and no graft dysfunction, so further treatment was deferred.  AlloMap scores:  < 35  DSAs:  none  Coronary angio/Ischemic eval:  Baseline coronary angio 1/23/23 showed normal coronary arteries with eccentric, mild native CAD (MIGUEL ÁNGEL of LM 1.1mm, pLAD 0.2mm).  Last RHC:  6/2023 showed normal biventricular filling pressures, with RA 5, mPA 20, PCW 12, and CI 3.0.  Echo/cMRI:  TTE 10/2023 showed normal graft function, with LVEF 60-65% and normal RV size/function.     Serostatus:  - CMV D+/R+  - EBV D+/R+  - Toxo D-/R-     Immunosuppression:  - MMF 1000mg twice daily (given age and bacteremia)  - tacro, goal level 6-8.  Level from today is in process.     PPx:  - CAV:  aspirin 325mg daily (per neuro) and rosuvastatin 10mg daily  - GI:  n/a  - Osteoporosis:  calcium/vitamin D supplements     Graft function:  - BPs:  remain controlled, continue amlodipine 5mg daily  - fluid status:  Euvolemic, off diuretics     Health maintenance:  - derm exam UTD, scheduled  moh's on head and excision on chest  - eye exam UTD  - dental exam UTD  - last colo 11/2022  - received flu 2023  - needs covid booster -- declines  - RSV -- declines       ARVC  Discussed that first-degree relatives should be screened with an echocardiogram and EKG.  He has met with Kim Rogers [facility], genetic testing was negative for a mutation.     h/o vasculitis  With neurological symptoms.  Has been referred to neuro and rheum, neuro has reported some concerns about an ischemic event.  On asa 325mg daily per neuro.     h/o right axillary nonocclusive and left peroneal occlusive DVT (11/2022, pre-transplant)  RIJ DVT  Was on apixaban prior to transplant, which was held post-operatively.  During RHC 1/9, it was noted that his RIJ had clot and LIJ was inaccessible, so he required femoral access.  He was restarted on apixaban, and is now off.  ASA as above.     Staph epi bacteremia, resolved  Blood cultures from 1/7 and 1/8 with staph epi, s/p IV vanco per ID.      The above was reviewed with Dr. Stein, who verbalized understanding and will call with further questions/concerns.    45 minutes spent with patient, along with preparing for visit, reviewing follow up, and completing documentation.      Rose Mary Goldstein DNP, FNP-BC  Advanced Heart Failure Nurse Practitioner  [facility]  Patient Care Team:  Quentin Odonnell as PCP - General

## 2023-12-13 NOTE — NURSING NOTE
Chief Complaint   Patient presents with    Follow Up     Return Heart Transplant     Vitals were taken and medications reconciled.    STACEY Mcknight  10:45 AM

## 2023-12-13 NOTE — PROGRESS NOTES
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     His post-operative was c/b delirium, leukocytosis, staph epi bacteremia, and prolonged CT output.     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  No DSAs and no graft dysfunction, so further treatment was deferred.  AlloMap scores:  < 35  DSAs:  none  Coronary angio/Ischemic eval:  Baseline coronary angio 1/23/23 showed normal coronary arteries with eccentric, mild native CAD (MIGUEL ÁNGEL of LM 1.1mm, pLAD 0.2mm).  Last RHC:  6/2023 showed normal biventricular filling pressures, with RA 5, mPA 20, PCW 12, and CI 3.0.  Echo/cMRI:  TTE 10/2023 showed normal graft function, with LVEF 60-65% and normal RV size/function.    Since his last visit, he states that he feels well.  [facility] has [facility] regularly[facility] with [facility] exertional concerns.  His breathing has been well, and he denies SOB.  He is able to lie flat without PND and orthopnea.  His appetite has been well, and he is eating regularly without nausea, vomiting, diarrhea, and constipation.  His weight is up and his clothes feel a little tighter, noting that he is currently ranging 217#.  He attributes his weight gain to diet, and denies fluid retention.  His BPs remain stable, ranging 120-130/80s.  He otherwise denies chest pain, palpitations, dizziness, falls, headaches, acute vision changes, fevers, chills, cough, sore throat, and signs of bleeding.      Serostatus:  CMV D+/R+  EBV D+/R+  Toxo D-/R-    Patient Active Problem List     Diagnosis Date Noted     Status post coronary angiogram 01/23/2023     Priority: Medium     Staphylococcus epidermidis bacteremia 01/09/2023     Priority: Medium     Using prophylactic antibiotic on daily basis 01/09/2023     Priority: Medium     Benign neoplasm of colon 12/12/2022     Priority: Medium     Troponin level elevated 12/06/2022     Priority: Medium     Anginal equivalent 12/06/2022     Priority: Medium     Heart failure, unspecified HF chronicity, unspecified heart failure type (H) 12/06/2022     Priority: Medium     Acute embolism and thrombosis of left peroneal vein (H) 11/22/2022     Priority: Medium     Acute on chronic combined systolic (congestive) and diastolic (congestive) heart failure (H) 11/14/2022     Priority: Medium     CHF (congestive heart failure) (H) 11/10/2022     Priority: Medium     Acute respiratory failure with hypoxia (H) 11/08/2022     Priority: Medium     Coronary artery disease involving native coronary artery of native heart without angina pectoris 11/08/2022     Priority: Medium     Essential hypertension 11/08/2022     Priority: Medium     NSVT (nonsustained ventricular tachycardia) (H) 11/08/2022     Priority: Medium     SOB (shortness of breath) 11/08/2022     Priority: Medium     Syncope and collapse 10/07/2021     Priority: Medium     Cardiomyopathy (H) 10/06/2021     Priority: Medium     Primary central nervous system vasculitis (H24) 11/30/2017     Priority: Medium     Vasculitis, CNS (H24) 11/30/2017     Priority: Medium     Stroke (H) 09/28/2016     Priority: Medium     Hyperlipidemia 08/08/2013     Priority: Medium       PAST MEDICAL HISTORY:  Past Medical History:   Diagnosis Date     Congestive heart failure (H)        CURRENT MEDICATIONS:  Prescription Medications as of 12/13/2023         Rx Number Disp Refills Start End Last Dispensed Date Next Fill Date Owning Pharmacy     magnesium glycinate lysinate chelate (DOCTOR'S BEST) 100 MG TABS tablet  240 tablet 11  6/23/2023    [facility] [place] [id number]    Sig: Take 3 tablets (300 mg) by mouth 2 times daily Plus two additional tabs (200 mg) daily Take separate from Mycophenolate.    Class: E-Prescribe    Notes to Pharmacy: Dose change    Route: Oral    acetaminophen (TYLENOL) 80 MG chewable tablet            Sig: Take 80 mg by mouth every 4 hours as needed for mild pain or fever    Class: Historical    Route: Oral    amLODIPine (NORVASC) 5 MG tablet  90 tablet 3 2/6/2023    [facility] [place] [id number] [facility] [phone]    Sig: Take 1 tablet (5 mg) by mouth At Bedtime    Class: E-Prescribe    Route: Oral    aspirin (ASA) 325 MG EC tablet  90 tablet 3 10/13/2023    [facility] [place] [address] [address]    Sig: Take 1 tablet (325 mg) by mouth daily    Class: E-Prescribe    Notes to Pharmacy: In place of 81 mg    Route: Oral    calcium carbonate-vitamin D (CALTRATE) 600-10 MG-MCG per tablet  180 tablet 3 5/5/2023    [facility] [place] [id number] [address]     Sig: Take 1 tablet by mouth 2 times daily (with meals)    Class: E-Prescribe    Route: Oral    multivitamin (ONE-DAILY) tablet  90 tablet 3 2/21/2023        Sig: Take 1 tablet by mouth daily    Class: No Print Out    Route: Oral    mycophenolate (GENERIC EQUIVALENT) 250 MG capsule  240 capsule 11 5/30/2023    [facility] [place] [address]    Sig: Take 4 capsules (1,000 mg) by mouth 2 times daily    Class: E-Prescribe    Notes to Pharmacy: TXP DT 12/25/2022 (Heart) TXP Dischg DT 1/13/2023 DX Heart replaced by transplant Z94.1 TX [facility] ([place], [place])    Route: Oral    polyethylene glycol (MIRALAX) 17 g packet            Sig: Take 1 packet by mouth daily    Class: Historical    Route: Oral    tacrolimus (GENERIC EQUIVALENT) 0.5 MG capsule  30  capsule 11 7/26/2023    [facility] - [place], [place] - [address]    Sig: Take one 0.5mg capsule with two 1mg capsules every morning for a total dose of 2.5 mg in the AM and 2mg in the PM    Class: E-Prescribe    Notes to Pharmacy: TXP DT 12/25/2022 (Heart) TXP Dischg DT 1/13/2023 DX Heart replaced by transplant Z94.1 TX [facility] ([place], [place])    tacrolimus (GENERIC EQUIVALENT) 1 MG capsule  120 capsule 11 7/26/2023    [facility] - [place], [place] - [facility] [address]    Sig: Take TWO caps (2 mg) with one 0.5mg capsule in the AM and take TWO caps (2mg) in the PM for a total dose of 2.5mg in the AM and 2mg in the PM.    Class: E-Prescribe    Notes to Pharmacy: TXP DT 12/25/2022 (Heart) TXP Dischg DT 1/13/2023 DX Heart replaced by transplant Z94.1 [facility] ([place], [place])    rosuvastatin (CRESTOR) 10 MG tablet  90 tablet 3 6/29/2023    [facility] - [place], [place] - [address]    Sig: Take 1 tablet (10 mg) by mouth daily    Class: E-Prescribe    Notes to Pharmacy: 90 day supply if able    Route: Oral          Hospital Medications as of 12/13/2023         Dose Frequency Start End    albuterol (PROVENTIL HFA/VENTOLIN HFA) inhaler 2 puff EVERY 5 MIN PRN 12/13/2023     Admin Instructions: Max total three doses  Indications:  - COPD  - severe bronchospasm (notify the procedural provider that Albuterol was administered)  Check the dose counter on the inhaler to ensure there are doses remaining before administering. Prime by spraying into the air 4 times prior to first use and if not used within 2 weeks.    Class: E-Prescribe    Route: Inhalation    aminophylline 100 mg (Completed) 100 mg ONCE 12/13/2023 12/13/2023    Admin Instructions: Slow IV push over 60 seconds.   Hold aminophylline if patient has a history of seizure disorder.  If  aminophylline unavailable or in short supply, please administer caffeine unless the patient has a history of seizures.  Vesicant. Do not refrigerate.    Class: E-Prescribe    Route: Intravenous    caffeine citrate (CAFCIT) injection 60 mg 60 mg ONCE PRN 12/13/2023     Admin Instructions: DO NOT GIVE if the patient has a history of seizures.  Slow IV push.    Class: E-Prescribe    Route: Intravenous    diazepam (VALIUM) tablet 5 mg 5 mg EVERY 30 MIN PRN 12/13/2023     Admin Instructions: Give 1 hour prior to the procedure.  IF the patient has not received a dose on the patient care unit or at home prior to arrival.  This dose is to be given ONLY by provider or CT procedure staff ONLY as verbally requested by the provider.  Hold if respiratory rate less than 12 or systolic blood pressure less than 90 mmHg.    Class: E-Prescribe    Route: Oral    diphenhydrAMINE (BENADRYL) capsule 25 mg 25 mg ONCE PRN 12/13/2023     Admin Instructions: To be completed prior to the procedure.    Class: E-Prescribe    Route: Oral    diphenhydrAMINE (BENADRYL) injection 25-50 mg 25-50 mg ONCE PRN 12/13/2023     Admin Instructions: Can be given as 25 mg x 2 doses, or, as 50 mg x 1 dose.  Give no faster than 25 mg/min.  To be completed prior to the procedure.  This dose is to be given by provider or CV imaging procedure staff as verbally directed by the provider.  Protect from light.    Class: E-Prescribe    Route: Intravenous    gadobutrol (GADAVIST) injection 10 mL (Completed) 10 mL ONCE 12/13/2023 12/13/2023    Admin Instructions: Supplied by, and administered by MRI.    Class: E-Prescribe    Route: Intravenous    gadobutrol (GADAVIST) injection 10 mL (Completed) 10 mL ONCE 12/13/2023 12/13/2023    Admin Instructions: Supplied by, and administered by MRI.    Class: E-Prescribe    Route: Intravenous    gadobutrol (GADAVIST) injection 10 mL (Completed) 10 mL ONCE 12/13/2023 12/13/2023    Admin Instructions: Supplied by, and administered by  MRI.    Class: E-Prescribe    Route: Intravenous    methylPREDNISolone sodium succinate (solu-MEDROL) injection 125 mg 125 mg ONCE PRN 12/13/2023     Admin Instructions: This dose is to be given by the provider or CV imaging procedure staff as verbally directed by the provider    Class: E-Prescribe    Route: Intravenous    ondansetron (ZOFRAN) injection 4 mg 4 mg ONCE PRN 12/13/2023     Admin Instructions: Irritant.    Class: E-Prescribe    Route: Intravenous    regadenoson (LEXISCAN) injection 0.4 mg (Completed) 0.4 mg ONCE 12/13/2023 12/13/2023    Admin Instructions: Lexiscan Orders should be initiated ONLY IF a Lexiscan MRI is scheduled to be completed by supervising provider.  Push  medication in through saline lock over 15 seconds. Follow medication with saline flush. To be given in procedure area.    Class: E-Prescribe    Route: Intravenous    sodium chloride (PF) 0.9% PF flush 5-10 mL 5-10 mL EVERY 5 MIN PRN 12/13/2023     Class: E-Prescribe    Route: Intravenous    sodium chloride (PF) 0.9% PF flush 5-10 mL 5-10 mL EVERY 1 MIN PRN 12/13/2023     Admin Instructions: 2 flushes through saline lock.   First flush given immediately   Usual flush dose is 10 mL over 15 seconds after bolus injection of Lexiscan.  Second flush given immediately after isotope injection.  Usual flush dose is 5 mL.   FOR TOTAL OF 2 doses.    Class: E-Prescribe    Route: Intravenous    sodium chloride 0.9% BOLUS 100 mL 100 mL ONCE PRN 12/13/2023     Admin Instructions: Pull sodium chloride out of the normal saline bag with a syringe and put it in the power injector.  Inject the medication as a flush after the contrast medication is given.    Class: E-Prescribe    Route: Intravenous    sodium chloride bacteriostatic 0.9 % flush 0-1 mL 0-1 mL ONCE PRN 12/13/2023     Admin Instructions: Give immediately prior to the procedure  This dose is to be given ONLY by provider or procedure staff ONLY as verbally requested by the provider    Class:  E-Prescribe    Route: Intradermal            ROS:   A comprehensive ROS was completed and found to be negative except for that noted in the HPI.    Exam:  /89 (BP Location: Left arm, Patient Position: Chair, Cuff Size: Adult Regular)   Pulse 98   Wt 101.1 kg (222 lb 14.4 oz)   SpO2 97%   BMI 28.16 kg/m    In general, the patient is a pleasant male in no apparent distress.    HEENT: NC/AT. PERRLA. EOMI.  Sclerae white, not injected.    Neck:  No adenopathy, No thyromegaly.    COR: No jugular venous distention when sitting upright.  RRR.  Normal S1 S2 splits physiologically.  No murmur, rub click, or gallop.    Lungs:  CTA. No rhonchi.    Abdomen: soft, nontender, nondistended.  No organomegaly.  Extremities:  No clubbing, cyanosis, or LE edema.    Neuro: Alert & Oriented x 3, grossly non focal.  Integument: no open lesions, rashes, or jaundice.    Labs:  CBC RESULTS:   Lab Results   Component Value Date    WBC 5.8 12/13/2023    RBC 5.41 12/13/2023    HGB 15.2 12/13/2023    HCT 46.9 12/13/2023    MCV 87 12/13/2023    MCH 28.1 12/13/2023    MCHC 32.4 12/13/2023    RDW 12.5 12/13/2023     12/13/2023       BMP RESULTS:  Lab Results   Component Value Date     12/13/2023    POTASSIUM 4.2 12/13/2023    POTASSIUM 3.7 12/25/2022    POTASSIUM 3.9 11/08/2022    CHLORIDE 105 12/13/2023    CHLORIDE 103 03/30/2023    CO2 24 12/13/2023    CO2 26 11/08/2022    ANIONGAP 10 12/13/2023    ANIONGAP 9 11/08/2022     (H) 12/13/2023     (H) 01/13/2023     (H) 11/08/2022    BUN 18.5 12/13/2023    BUN 35 (H) 11/08/2022    CR 1.09 12/13/2023    GFRESTIMATED 73 12/13/2023    TIM 9.5 12/13/2023      LIPID RESULTS:  Lab Results   Component Value Date    CHOL 142 12/13/2023    HDL 71 12/13/2023    LDL 57 12/13/2023    TRIG 70 12/13/2023    NHDL 71 12/13/2023       IMMUNOSUPPRESSANT LEVELS  Lab Results   Component Value Date    TACROL 6.6 10/12/2023    DOSTAC 10/11/2023 10/12/2023       No components  "found for: \"CK\"  Lab Results   Component Value Date    MAG 1.6 (L) 12/13/2023     Lab Results   Component Value Date    A1C 6.2 (H) 12/25/2022     Lab Results   Component Value Date    PHOS 2.9 12/13/2023     Lab Results   Component Value Date    NTBNPI 2,750 (H) 12/06/2022     Lab Results   Component Value Date    SAITESTMET SA EDTA FCS 06/29/2023    SAICELL Class I 06/29/2023    AU2ZVHARR None 06/29/2023    [id number] None 06/29/2023    SAIREPCOM  06/29/2023      HLA PRA Test performed by modified testing procedure that may also include pretreatment of serum. Pretreatment may be the addition of fetal calf serum, EDTA, and/or adsorption.  High-risk, MFI > 3,000.  Mod-risk, -3,000.     Lab Results   Component Value Date    SAIITESTME SA EDTA FCS 06/29/2023    SAIICELL Class II 06/29/2023    [id number] Invalid. See SCN results. 06/29/2023    [id number] Invalid. See SCN results. 06/29/2023    SAIIREPCOM  06/29/2023     Intentional discrepancy between current date and historical date Single Antigen Bead specificity reporting.  Additional testing performed on current date to address potential testing artifact. HLA PRA Test performed by modified testing procedure that may also include pretreatment of serum. Pretreatment may be the addition of fetal calf serum, EDTA, and/or adsorption.  High-risk, MFI > 3,000.  Mod-risk, -3,000.     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    Labs today show stable electrolytes, renal function, liver function, lipid profile, PSA, and blood counts.  CMV, EBV, DSAs, ImmuKnow, AlloMap, and tacro levels are in " process.  CXR today was negative for acute changes.      cMRI results from today are in process.    He will have a RHC, biopsy, and coronary angio following this appt.    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  His BPs are stable.  His weight is up slightly, and he appears euvolemic.    Advised that he continue his current medications, with no changes, and will plan for him to follow-up in clinic per protocol or sooner should new concerns arise.      NICM, s/p OHT 12/25/22  His post-operative was c/b delirium, leukocytosis, staph epi bacteremia, and prolonged CT output.     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  No DSAs and no graft dysfunction, so further treatment was deferred.  AlloMap scores:  < 35  DSAs:  none  Coronary angio/Ischemic eval:  Baseline coronary angio 1/23/23 showed normal coronary arteries with eccentric, mild native CAD (MIGUEL ÁNGEL of LM 1.1mm, pLAD 0.2mm).  Last RHC:  6/2023 showed normal biventricular filling pressures, with RA 5, mPA 20, PCW 12, and CI 3.0.  Echo/cMRI:  TTE 10/2023 showed normal graft function, with LVEF 60-65% and normal RV size/function.     Serostatus:  - CMV D+/R+  - EBV D+/R+  - Toxo D-/R-     Immunosuppression:  - MMF 1000mg twice daily (given age and bacteremia)  - tacro, goal level 6-8.  Level from today is in process.     PPx:  - CAV:  aspirin 325mg daily (per neuro) and rosuvastatin 10mg daily  - GI:  n/a  - Osteoporosis:  calcium/vitamin D supplements     Graft function:  - BPs:  remain controlled, continue amlodipine 5mg daily  - fluid status:  Euvolemic, off diuretics     Health maintenance:  - derm exam UTD, scheduled moh's on head and excision on chest  - eye exam UTD  - dental exam UTD  - last colo 11/2022  - received flu 2023  - needs covid booster -- declines  - RSV -- declines       ARVC  Discussed that first-degree relatives should be screened with an echocardiogram and EKG.  He has met with Kim Rogers GC,  genetic testing was negative for a mutation.     h/o vasculitis  With neurological symptoms.  Has been referred to neuro and rheum, neuro has reported some concerns about an ischemic event.  On asa 325mg daily per neuro.     h/o right axillary nonocclusive and left peroneal occlusive DVT (11/2022, pre-transplant)  RIJ DVT  Was on apixaban prior to transplant, which was held post-operatively.  During RHC 1/9, it was noted that his RIJ had clot and LIJ was inaccessible, so he required femoral access.  He was restarted on apixaban, and is now off.  ASA as above.     Staph epi bacteremia, resolved  Blood cultures from 1/7 and 1/8 with staph epi, s/p IV vanco per ID.      The above was reviewed with Dr. Stein, who verbalized understanding and will call with further questions/concerns.    45 minutes spent with patient, along with preparing for visit, reviewing follow up, and completing documentation.      Rose Mary Goldstein DNP, P-BC  Advanced Heart Failure Nurse Practitioner  [facility]  Patient Care Team:  Quentin Odonnell as PCP - General  Yaquelin Dominguez MD as MD (Cardiovascular Disease)  Mary Lou Luke, [place] as Transplant Coordinator  Abram Simeon RP as Pharmacist (Pharmacist)  Tiera Anguiano DO as MD (Infectious Diseases)  Abram Simeon RP as Assigned MTM Pharmacist  Burt Andrews MD Fontana, Lauren, DO as Assigned Infectious Disease Provider  Vernell Sutherland, RN as Specialty Care Coordinator (Rheumatology)  Jackelin Khan MD as Assigned Neuroscience Provider  Yaquelin Dominguez MD as Assigned Heart and Vascular Provider  Jose Liu MD as Assigned Rheumatology Provider  Jose Hastings MD as Assigned Surgical Provider  YAQUELIN DOMINGUEZ

## 2023-12-13 NOTE — PATIENT INSTRUCTIONS
Please call your transplant coordinator at [phone] with any questions or concerns.  Please note: after hours, weekends and holidays, this phone number is routed to an  to page out the coordinator on call.     Coordinator will update you on remaining results.     Next lab draw: Pending today's results

## 2023-12-13 NOTE — DISCHARGE INSTRUCTIONS
Going Home after a Coronary Angiogram        After you go home:  Have an adult stay with you for 24 hours.  Drink plenty of fluids.  You may eat your normal diet, unless your doctor tells you otherwise.  For 24 hours:  - Relax and take it easy.  - Do NOT smoke.  - Do NOT make any important or legal decisions.  - Do NOT drive or operate machines at home or at work.  - Do NOT drink alcohol.    Remove the Band-Aid after 24 hours. You may shower at that time. Do not scrub the procedure site vigorously for 5 days. If there is minor oozing, apply another Band-aid and remove it after 12 hours.  For 2 days, do NOT have sex or do any heavy exercise.  Do NOT take a bath, or use a hot tub or pool for at least 3 days.     Care of groin site  It is normal to have a small bruise or lump at the site.  For the first 2 days, when you cough, sneeze or move your bowels, hold your hand over the puncture site and press gently.  Do NOT lift more than 10 pounds for at least 3 to 5 days.  Do not use lotion or powder near the puncture site for 3 days.  If you start bleeding from the site in your groin, lie down flat and press firmly on the site. Call  your doctor as soon as you can.    Call your doctor if:  You have a large or growing hard lump around the site.    The site is red, swollen, hot or tender.  Blood or fluid is draining from the site.  You have chills or a fever greater than 101 F (38 C).  Your leg turns bluish, feels numb or cool.  You have hives, a rash or unusual itching.  Call 911 right away if you have:   Bleeding that does not stop.   Heavy bleeding.  [facility] [place]:  [phone] (7 days a week), Ask to speak with the Cardiology Fellow on call    Emergency Department: [phone]

## 2023-12-13 NOTE — NURSING NOTE
Transplant Coordinator Note  Reason for visit 1st annual post transplant      Health concerns addressed today  Pt with wife seen in clinic with MARGARET Triston. MARGARET reviewed meds, available labs, MRI and CXR. Cath procedure to follow. Reports doing well. Lifting weights and walks a mile. Could do more; he is good with a mile. BP < 140/90. Good appetite -  [facility] 217#.     Returns in April for 1 yr 4 month follow up    Immunosuppressants  MMF 1000/1000 mg  Tac 2.5/2 mg  - goal 6-8. Level pending       No DSAs     D CMV - ; R CMV + ; D EBV +; R EBV + ; D Toxo - ; R Toxo -    Routine screenings  Derm Mohs scheduled 12/14  Dental UTD  Colonoscopy  Nov 2022 (no specimens)   Prostate 1.12  Eye UTD  Flu fall 2023 per pt   RSV  - pt decline  Pneumonia UTD  COVID - pt decline      Medication record reviewed and reconciled. Questions and concerns addressed. AVS reviewed. Pt verbalized an understanding of plan of care.      Patient Instructions  ~Please call your transplant coordinator at [phone] with any questions or concerns.  Please note: after hours, weekends and holidays, this phone number is routed to an  to page out the coordinator on call.   ~Coordinator will update you on remaining results.   ~Next lab draw: Pending today's results

## 2023-12-14 ENCOUNTER — PRE VISIT (OUTPATIENT)
Dept: DERMATOLOGY | Facility: CLINIC | Age: 68
End: 2023-12-14

## 2023-12-14 ENCOUNTER — OFFICE VISIT (OUTPATIENT)
Dept: DERMATOLOGY | Facility: CLINIC | Age: 68
End: 2023-12-14
Payer: MEDICARE

## 2023-12-14 VITALS — HEART RATE: 105 BPM | DIASTOLIC BLOOD PRESSURE: 81 MMHG | SYSTOLIC BLOOD PRESSURE: 120 MMHG

## 2023-12-14 DIAGNOSIS — C44.519 BASAL CELL CARCINOMA OF ANTERIOR CHEST: Primary | ICD-10-CM

## 2023-12-14 DIAGNOSIS — C44.41 BASAL CELL CARCINOMA (BCC) OF SCALP: ICD-10-CM

## 2023-12-14 LAB
ATRIAL RATE - MUSE: 92 BPM
ATRIAL RATE - MUSE: 95 BPM
ATRIAL RATE - MUSE: 96 BPM
DIASTOLIC BLOOD PRESSURE - MUSE: NORMAL MMHG
EBV DNA # SPEC NAA+PROBE: NOT DETECTED COPIES/ML
INTERPRETATION ECG - MUSE: NORMAL
P AXIS - MUSE: 33 DEGREES
P AXIS - MUSE: 55 DEGREES
P AXIS - MUSE: NORMAL DEGREES
PATH REPORT.COMMENTS IMP SPEC: NORMAL
PATH REPORT.FINAL DX SPEC: NORMAL
PATH REPORT.GROSS SPEC: NORMAL
PATH REPORT.MICROSCOPIC SPEC OTHER STN: NORMAL
PATH REPORT.RELEVANT HX SPEC: NORMAL
PHOTO IMAGE: NORMAL
PR INTERVAL - MUSE: 138 MS
PR INTERVAL - MUSE: 140 MS
PR INTERVAL - MUSE: 144 MS
QRS DURATION - MUSE: 90 MS
QRS DURATION - MUSE: 94 MS
QRS DURATION - MUSE: 94 MS
QT - MUSE: 356 MS
QT - MUSE: 358 MS
QT - MUSE: 366 MS
QTC - MUSE: 442 MS
QTC - MUSE: 447 MS
QTC - MUSE: 462 MS
R AXIS - MUSE: 128 DEGREES
R AXIS - MUSE: 52 DEGREES
R AXIS - MUSE: 60 DEGREES
SYSTOLIC BLOOD PRESSURE - MUSE: NORMAL MMHG
T AXIS - MUSE: 159 DEGREES
T AXIS - MUSE: 17 DEGREES
T AXIS - MUSE: 27 DEGREES
VENTRICULAR RATE- MUSE: 92 BPM
VENTRICULAR RATE- MUSE: 95 BPM
VENTRICULAR RATE- MUSE: 96 BPM

## 2023-12-14 PROCEDURE — 17313 MOHS 1 STAGE T/A/L: CPT | Performed by: DERMATOLOGY

## 2023-12-14 PROCEDURE — 17311 MOHS 1 STAGE H/N/HF/G: CPT | Performed by: DERMATOLOGY

## 2023-12-14 PROCEDURE — 13121 CMPLX RPR S/A/L 2.6-7.5 CM: CPT | Performed by: DERMATOLOGY

## 2023-12-14 PROCEDURE — 12032 INTMD RPR S/A/T/EXT 2.6-7.5: CPT | Performed by: DERMATOLOGY

## 2023-12-14 ASSESSMENT — PAIN SCALES - GENERAL: PAINLEVEL: NO PAIN (0)

## 2023-12-14 NOTE — NURSING NOTE
Chief Complaint   Patient presents with    Procedure     Patient presents for treatment of BCC x2 on scalp and chest.      Lashanda Conroy LPN

## 2023-12-14 NOTE — LETTER
12/14/2023       RE: Paco Stein  [address]  [place] [address]     Dear Colleague,    Thank you for referring your patient, Paco Stein, to the [facility] [place] at [facility]. Please see a copy of my visit note below.    [facility]atologic Surgery [facility] [place] Procedure Note      Date of Service:  Dec 14, 2023  Surgery: Mohs micrographic surgery    Case 1  Repair Type: Complex  Repair Size: 4.0 cm  Suture Material: 4-0 Monocryl / 5-0 Fast Absorbing Gut  Tumor Type: Basal Cell Carcinoma  Location: Vertex Scalp  Derm-Path Accession #: [id number]   PreOp Size: 0.9 cm x 0.8 cm  PostOp Size: 2.0 cm x 1.8 cm  Mohs Accession #:   Level of Defect: Fascia      Procedure:  We discussed the principles of treatment and most likely complications including scarring, bleeding, infection, swelling, pain, crusting, nerve damage, large wound,  incomplete excision, wound dehiscence,  nerve damage, recurrence, and a second procedure may be recommended to obtain the best cosmetic or functional result.    Informed consent was obtained and the patient underwent the procedure as follows:  The patient was placed supine on the operating table.  The cancer was identified, outlined with a marker, and verified by the patient.  The entire surgical field was prepped with Hibiclens.  The surgical site was anesthetized using Lidocaine 1% with epi 1:100,000.      The area of clinically apparent tumor was not debulked. The layer of tissue was then surgically excised using a #15 blade and was then transferred onto a specimen sheet maintaining the orientation of the specimen. Hemostasis was obtained using elctrocoagulation. The wound site was then covered with a dressing while the tissue samples were processed for examination.    The excised tissue was transported to the Mohs histology laboratory maintaining the tissue  orientation.  The tissue specimen was relaxed so that the entire surgical margin was in a a single horizontal plane for sectioning and inked for precise mapping.  A precise reference map was drawn to reflect the sectioning of the specimen, colored inking of the margins, and orientation on the patient. The tissue was processed using horizontal sectioning of the base and continuous peripheral margins.  The histopathologic sections were reviewed in conjunction with the reference map.    Total blocks: 1    Total slides:  2    There were no cancer cells visualized on examination, therefore Mohs surgery was complete.    Reconstruction: Complex Linear Closure    Due to one or more of the following factors, complex linear closure was required/indicated: Extensive undermining (equal to or greater than the maximum perpendicular width of the defect), exposure of underlying structure (bone, cartilage, tendon, named neurovascular), free margin involvement (helical rim, vermillion border, nostril rim), and/or placement of retention sutures.     The patient was taken to the operative suite and placed supine on the operating room table.  The defect was identified.  Appropriate markings were made with a marking pen to plan the repair.  The area was infiltrated with Lidocaine 1% with epi 1:100,000 and prepped with Hibiclens and draped with sterile towels.     The wound was debeveled and undermined widely.  Cones were excised within relaxed skin tension lines on both sides of the defect.  Hemostasis was obtained using electrocoagulation. A fascial plication suture using 4-0 Monocryl suture material was placed to narrow the primary defect. Subcutaneous tissues were then approximated using 4-0 Monocryl buried vertical mattress sutures.  The wound edges were then approximated additional 5-0 fast absorbing gut buried sutures were placed in a similar fashion where needed.  Percutaneous simple running 5-0 fast absorbing gut sutures were  carefully placed for maximum eversion and meticulous approximation.    Repair Size: 4.0 cm    The wound was cleansed with saline and ointment was applied along the wound surface.     A sterile pressure dressing was applied.  Wound care instructions were given verbally and in writing.  The patient left the operating suite in stable condition.  Patient was informed that additional refinement of the resulting surgical scar may be used as a second stage of this reconstruction.     The attending surgeon was present for key procedure and always immediately available.    [facility] Dermatologic Surgery [facility] [place] Procedure Note      Date of Service:  Dec 14, 2023  Surgery: Mohs micrographic surgery    Case 2  Repair Type: Intermediate   Repair Size: 3.9 cm  Suture Material: 4-0 Monocryl   Tumor Type: Basal Cell Carcinoma  Location: L Central Chest   Derm-Path Accession #: [id number]   PreOp Size: 2.3 cm x 2.1 cm  PostOp Size: 3.4 cm x 3.4 cm  Mohs Accession #:   Level of Defect: Fat      Procedure:  We discussed the principles of treatment and most likely complications including scarring, bleeding, infection, swelling, pain, crusting, nerve damage, large wound,  incomplete excision, wound dehiscence,  nerve damage, recurrence, and a second procedure may be recommended to obtain the best cosmetic or functional result.    Informed consent was obtained and the patient underwent the procedure as follows:  The patient was placed supine on the operating table.  The cancer was identified, outlined with a marker, and verified by the patient.  The entire surgical field was prepped with Hibiclens.  The surgical site was anesthetized using Lidocaine 1% with epi 1:100,000.      The area of clinically apparent tumor was not debulked. The layer of tissue was then surgically excised using a #15 blade and was then transferred onto a specimen sheet maintaining the orientation of the specimen. Hemostasis was obtained  using electrocoagulation. The wound site was then covered with a dressing while the tissue samples were processed for examination.    The excised tissue was transported to the Mohs histology laboratory maintaining the tissue orientation.  The tissue specimen was relaxed so that the entire surgical margin was in a a single horizontal plane for sectioning and inked for precise mapping.  A precise reference map was drawn to reflect the sectioning of the specimen, colored inking of the margins, and orientation on the patient.  The tissue was processed using horizontal sectioning of the base and continuous peripheral margins.  The histopathologic sections were reviewed in conjunction with the reference map.    Total blocks: 2    Total slides:  6    There were no cancer cells visualized on examination, therefore Mohs surgery was complete.      Reconstruction: Intermediate Linear Closure      The patient was taken to the operative suite and placed supine on the operating room table.  The defect was identified.  Appropriate markings were made with a marking pen to plan the repair.  The area was infiltrated with Lidocaine 1% with epi 1:100,000 and prepped with Hibiclens and draped with sterile towels.     The wound was debeveled and undermined widely.  Cones were excised within relaxed skin tension lines on both sides of the defect.  Hemostasis was obtained using electrocoagulation .  Subcutaneous tissues were then approximated using 4.0 Monocryl buried vertical mattress sutures.  The wound edges were then approximated additional  buried sutures were placed.  Running subcuticular 4.0 monocryl sutures were carefully placed for maximum eversion and meticulous approximation.    Repair Size: 3.9 cm    The wound was cleansed with saline and ointment was applied along the wound surface.     A sterile pressure dressing was applied.  Wound care instructions were given verbally and in writing.  The patient left the operating suite in  stable condition.  Patient was informed that additional refinement of the resulting surgical scar may be used as a second stage of this reconstruction.     The attending surgeon was present for entire procedure and always immediately available.    Scribe Disclosure:   I, DANY BRITO, am serving as a scribe; to document services personally performed by Elijah Hastings MD -based on data collection and the provider's statements to me.     Attending attestation:  I personally performed the entire procedure.  I have reviewed the note and edited it as necessary, and agree with its contents.    Elijah Hastings M.D.  Professor  Director of Dermatologic Surgery  Department of Dermatology  [facility]    Dermatology Surgery Clinic  [facility]  [address] [place], [place] [id number]

## 2023-12-14 NOTE — PROGRESS NOTES
[facility] Dermatologic Surgery Clinic [place] Procedure Note      Date of Service:  Dec 14, 2023  Surgery: Mohs micrographic surgery    Case 1  Repair Type: Complex  Repair Size: 4.0 cm  Suture Material: 4-0 Monocryl / 5-0 Fast Absorbing Gut  Tumor Type: Basal Cell Carcinoma  Location: Vertex Scalp  Derm-Path Accession #: [id number]   PreOp Size: 0.9 cm x 0.8 cm  PostOp Size: 2.0 cm x 1.8 cm  Mohs Accession #:   Level of Defect: Fascia      Procedure:  We discussed the principles of treatment and most likely complications including scarring, bleeding, infection, swelling, pain, crusting, nerve damage, large wound,  incomplete excision, wound dehiscence,  nerve damage, recurrence, and a second procedure may be recommended to obtain the best cosmetic or functional result.    Informed consent was obtained and the patient underwent the procedure as follows:  The patient was placed supine on the operating table.  The cancer was identified, outlined with a marker, and verified by the patient.  The entire surgical field was prepped with Hibiclens.  The surgical site was anesthetized using Lidocaine 1% with epi 1:100,000.      The area of clinically apparent tumor was not debulked. The layer of tissue was then surgically excised using a #15 blade and was then transferred onto a specimen sheet maintaining the orientation of the specimen. Hemostasis was obtained using elctrocoagulation. The wound site was then covered with a dressing while the tissue samples were processed for examination.    The excised tissue was transported to the Mohs histology laboratory maintaining the tissue orientation.  The tissue specimen was relaxed so that the entire surgical margin was in a a single horizontal plane for sectioning and inked for precise mapping.  A precise reference map was drawn to reflect the sectioning of the specimen, colored inking of the margins, and orientation on the patient. The tissue was processed  using horizontal sectioning of the base and continuous peripheral margins.  The histopathologic sections were reviewed in conjunction with the reference map.    Total blocks: 1    Total slides:  2    There were no cancer cells visualized on examination, therefore Mohs surgery was complete.    Reconstruction: Complex Linear Closure    Due to one or more of the following factors, complex linear closure was required/indicated: Extensive undermining (equal to or greater than the maximum perpendicular width of the defect), exposure of underlying structure (bone, cartilage, tendon, named neurovascular), free margin involvement (helical rim, vermillion border, nostril rim), and/or placement of retention sutures.     The patient was taken to the operative suite and placed supine on the operating room table.  The defect was identified.  Appropriate markings were made with a marking pen to plan the repair.  The area was infiltrated with Lidocaine 1% with epi 1:100,000 and prepped with Hibiclens and draped with sterile towels.     The wound was debeveled and undermined widely.  Cones were excised within relaxed skin tension lines on both sides of the defect.  Hemostasis was obtained using electrocoagulation. A fascial plication suture using 4-0 Monocryl suture material was placed to narrow the primary defect. Subcutaneous tissues were then approximated using 4-0 Monocryl buried vertical mattress sutures.  The wound edges were then approximated additional 5-0 fast absorbing gut buried sutures were placed in a similar fashion where needed.  Percutaneous simple running 5-0 fast absorbing gut sutures were carefully placed for maximum eversion and meticulous approximation.    Repair Size: 4.0 cm    The wound was cleansed with saline and ointment was applied along the wound surface.     A sterile pressure dressing was applied.  Wound care instructions were given verbally and in writing.  The patient left the operating suite in  stable condition.  Patient was informed that additional refinement of the resulting surgical scar may be used as a second stage of this reconstruction.     The attending surgeon was present for key procedure and always immediately available.    [facility] Dermatologic Surgery Clinic [place] Procedure Note      Date of Service:  Dec 14, 2023  Surgery: Mohs micrographic surgery    Case 2  Repair Type: Intermediate   Repair Size: 3.9 cm  Suture Material: 4-0 Monocryl   Tumor Type: Basal Cell Carcinoma  Location: L Central Chest   Derm-Path Accession #: [id number]   PreOp Size: 2.3 cm x 2.1 cm  PostOp Size: 3.4 cm x 3.4 cm  Mohs Accession #:   Level of Defect: Fat      Procedure:  We discussed the principles of treatment and most likely complications including scarring, bleeding, infection, swelling, pain, crusting, nerve damage, large wound,  incomplete excision, wound dehiscence,  nerve damage, recurrence, and a second procedure may be recommended to obtain the best cosmetic or functional result.    Informed consent was obtained and the patient underwent the procedure as follows:  The patient was placed supine on the operating table.  The cancer was identified, outlined with a marker, and verified by the patient.  The entire surgical field was prepped with Hibiclens.  The surgical site was anesthetized using Lidocaine 1% with epi 1:100,000.      The area of clinically apparent tumor was not debulked. The layer of tissue was then surgically excised using a #15 blade and was then transferred onto a specimen sheet maintaining the orientation of the specimen. Hemostasis was obtained using electrocoagulation. The wound site was then covered with a dressing while the tissue samples were processed for examination.    The excised tissue was transported to the Mohs histology laboratory maintaining the tissue orientation.  The tissue specimen was relaxed so that the entire surgical margin was in a a single  horizontal plane for sectioning and inked for precise mapping.  A precise reference map was drawn to reflect the sectioning of the specimen, colored inking of the margins, and orientation on the patient.  The tissue was processed using horizontal sectioning of the base and continuous peripheral margins.  The histopathologic sections were reviewed in conjunction with the reference map.    Total blocks: 2    Total slides:  6    There were no cancer cells visualized on examination, therefore Mohs surgery was complete.      Reconstruction: Intermediate Linear Closure      The patient was taken to the operative suite and placed supine on the operating room table.  The defect was identified.  Appropriate markings were made with a marking pen to plan the repair.  The area was infiltrated with Lidocaine 1% with epi 1:100,000 and prepped with Hibiclens and draped with sterile towels.     The wound was debeveled and undermined widely.  Cones were excised within relaxed skin tension lines on both sides of the defect.  Hemostasis was obtained using electrocoagulation .  Subcutaneous tissues were then approximated using 4.0 Monocryl buried vertical mattress sutures.  The wound edges were then approximated additional  buried sutures were placed.  Running subcuticular 4.0 monocryl sutures were carefully placed for maximum eversion and meticulous approximation.    Repair Size: 3.9 cm    The wound was cleansed with saline and ointment was applied along the wound surface.     A sterile pressure dressing was applied.  Wound care instructions were given verbally and in writing.  The patient left the operating suite in stable condition.  Patient was informed that additional refinement of the resulting surgical scar may be used as a second stage of this reconstruction.     The attending surgeon was present for entire procedure and always immediately available.    Scribe Disclosure:   I, DANY BRITO, am serving as a scribe; to document  services personally performed by Elijah Hastings MD -based on data collection and the provider's statements to me.     Attending attestation:  I personally performed the entire procedure.  I have reviewed the note and edited it as necessary, and agree with its contents.    Elijah Hastings M.D.  Professor  Director of Dermatologic Surgery  Department of Dermatology  [facility]    Dermatology Surgery Clinic  [facility] [place] [phone]

## 2023-12-14 NOTE — PATIENT INSTRUCTIONS
Wound Care Instructions  I will experience scar, altered skin color, bleeding, swelling, pain, crusting and redness. I may experience altered sensation. Risks are excessive bleeding, infection, muscle weakness, thick (hypertrophic or keloidal) scar, and recurrence,. A second procedure may be recommended to obtain the best cosmetic or functional result.  Possible complications of any surgical procedure are bleeding, infection, scarring, alteration in skin color and sensation, muscle weakness in the area, wound dehiscence or seperation, or recurrence of the lesion or disease. On occasion, after healing, a secondary procedure or revision may be recommended in order to obtain the best cosmetic or functional result.   After your surgery, a pressure bandage will be placed over the area that has sutures. This will help prevent bleeding.   For the First 48 hours After Surgery:  Leave the pressure bandage on and keep it dry. If it should come loose, you may retape it, but do not take it off.  Relax and take it easy. Do not do any vigorous exercise, heavy lifting, or bending forward. This could cause the wound to bleed.  Post-operative pain is usually mild. You may take plain or extra strength Tylenol every 4 hours as needed (do not take more than 4,000mg in one day). Do not take any medicine that contains aspirin, ibuprofen or motrin unless you have been recommended these by a doctor.  Avoid alcohol and vitamin E as these may increase your tendency to bleed.  You may put an ice pack around the bandaged area for 20 minutes every 2-3 hours. This may help reduce swelling, bruising, and pain. Make sure the ice pack is waterproof so that the pressure bandage does not get wet.   You may see a small amount of drainage or blood on your pressure bandage. This is normal. However, if drainage or bleeding continues or saturates the bandage, you will need to apply firm pressure over the bandage with a washcloth for 15 minutes. If  bleeding continues after applying pressure for 15 minutes then go to the nearest emergency room.  48 Hours After Surgery  Carefully remove the bandage and start daily wound care and dressing changes. You may also now shower and get the wound wet. Wash wound with a mild soap and water.  Use caution when washing the wound. Be gentle and do not let the forceful shower stream hit the wound directly.  PAT dry.  Daily Wound Care:  Wash wound with a mild soap and water.  Use caution when washing the wound, be gentle and do not let the forceful shower stream hit the wound directly.  PAT DRY.  Apply Vaseline (from a new container or tube) over the suture line with a Q-tip. It is very important to keep the wound continuously moist, as wounds heal best in a moist environment.   Keep the site covered until sutures are removed, you can cover it with a Telfa (non-stick) dressing and tape or a band-aid.    If you are unable to keep wound covered, you must apply Vaseline every 2 - 3 hours (while awake) to ensure it is being kept moist for optimal healing. A dressing overnight is recommended to keep the area moist.   Call Us If:  You have pain that is not controlled with Tylenol.  You have signs or symptoms of an infection, such as: fever over 100 degrees F, redness, warmth, or foul-smelling or yellow/creamy drainage from the wound.  Who should I call with questions?  [facility]: [phone]   [facility]: [phone]  For urgent needs outside of business hours call the [facility] at [phone] and ask for the dermatology resident on call      Wound care    I will experience scar, bleeding, swelling, pain, crusting and redness. I may experience incomplete removal or recurrence. Risks are bleeding, bruising, swelling, infection, nerve damage, & a large wound. A second procedure may be recommended to obtain the best cosmetic or functional result.       A three  month office visit with your Surgeon is recommended for scar evaluation. Please reach out sooner if you have concerns about you surgical site/wound.    Caring for your skin after surgery    After your surgery, a pressure bandage will be placed over the area that has stitches. This is important to prevent bleeding. Please follow these instructions over the next 1 to 2 weeks. Following this regimen will help to prevent complications as your wound heals.     For the first 48 hours after your surgery:    Leave the pressure dressing on and keep it dry. If it should come loose, you may re-tape it, but do not take it off.  Relax and take it easy. Do not do any vigorous exercise or heavy lifting. This could cause the wound to bleed.  Post-Operative pain is usually mild. If you are able to take tylenol, You may take plain or extra-strength Tylenol (acetaminophen) As directed on the bottle (do not take more than 4,000mg in one day). If you are able to take ibuprofen, you can alternate the tylenol and ibuprofen.   Avoid alcohol as this may increase your tendency to bleed.   You may put an ice pack around the bandaged area for 20 minutes at a time as needed. This may help reduce swelling, bruising, and pain. Make sure the ice pack is waterproof so that the pressure bandage doesn t get wet.  If the wound is on the face try to sleep with your head elevated. Either in a recliner or propped up in bed, this will decrease swelling around the eyes.   You may see a small amount of drainage or blood on your pressure bandage. This is normal. However:  If drainage or bleeding continues or saturates the bandage, you will need to apply firm pressure over the bandage with a piece of gauze for 15 minutes.  If bleeding continues after applying pressure for 15 minutes, apply an ice pack to the bandaged area for 15 minutes.  If bleeding still continues, call our office or go to the nearest emergency room.    Remove pressure dressing 48 hours  "after surgery:    Carefully remove the pressure bandage. If it seems sticky or too difficult to get off, you may need to soak it off in the shower.  After the pressure dressing is removed, you may shower and get the wound wet. However, Do Not let the forceful stream of the shower hit the wound directly.  Follow these wound care and dressing change instructions:    You have steri strips, skin glue (purplish/clear), and tegaderm (clear film) over your incision line, you can shower.   You may allow water to run over the site. Do not soak.  Do Not rub or scrub the site.  Pat dry after the shower or bath.  Avoid topical medications, lotions, creams, ointment,or oils.  Do not use tanning lamps or expose the site to sun.   Check wound appearance daily, some swelling and redness is normal after a procedure but should go away as your would is healing. If the swelling and redness or pain increases or if any other signs of infection occur listed below please send in a photo via my chart and or call us to let us know.  The clear film should start peeling off approximately around 2 weeks. By this time your wound should be sufficiently healed. If it still looks to be healing when the glue comes off you can clean the wound with soapy warm water daily in the shower and apply Vaseline to the incision line.   Once the clear film starts peeling off to the point where water is getting trapped under the clear film, please gently peel off.        If you are able to take acetaminophen (\"Tylenol,\" etc.) and ibuprofen (\"Advil\" or \"Motrin,\" etc.), then you may STAGGER these medications by taking 400 mg of ibuprofen (usually two tabs) every 8 hours and 1,000 mg of acetaminophen (e.g., two tabs of extra-strength Tylenol) every 8 hours.    This means, for example, that you could take the followin,000 mg of Tylenol, followed 4 hours later by 400 mg Ibuprofen, followed 4 hours later with 1,000 mg of Tylenol, followed 4 hours later by 400 mg " Ibuprofen, followed 4 hours later with 1,000 mg of Tylenol, and so forth.     Essentially, you can take either 1,000 mg of Tylenol or 400 mg of ibuprofen in alternating fashion EVERY FOUR HOURS.    Do NOT exceed more than 4,000 mg of Tylenol or 3,200 mg of ibuprofen per 24 hours. If you are not able to take Tylenol or ibuprofen as above due to other health issues (or a physician has told you directly that you are not allowed to take one of them, say due to pre-existing severe liver or kidney issues), then disregard the above directions.    Scientific evidence supports that this combination/schedule of pain medications is just as effective, if not more effective, than taking a narcotic pain medicine.       Follow up will be a 3 month scar evaluation either in person or via a telephone visit with you sending in a photo via cloudControl. Unless you have been told to follow up sooner or if you have concerns and would like to be see sooner. Please call or send us in a cloudControl message if possible and attach a photo.        What to expect:    The first couple of days your wound may be tender and may bleed slightly when doing wound care.  There may be swelling and bruising around the wound, especially if it is near the eyes. For your comfort, you may apply ice or cold compresses to the bruises after your have removed the pressure bandage.  The area around your wound may be numb for several weeks or even months.  You may experience periodic sharp pain or mild itching around the wound as it heals.   The suture line will look dark for a while but will lighten over time.       When to call us:    You have bleeding that will not stop after applying pressure and ice.  You have pain that is not controlled with Tylenol (acetaminophen.)  You have signs or symptoms of an infection such as:  Fever over 100 degrees Fahrenheit  Redness, warmth or foul-smelling drainage from the wound  If you have any questions, or are not sure how to take  care of the wound.    Phone numbers:    During business hours (M-F 8:00-4:30 p.m.)  [facility]-  [phone] Option 1 appt. desk  [phone]  Option 3 nurse triage line  ---------------------------------------------------------  Evenings/Weekends/Holidays  Hospital - [phone]   TTY for hearing agqupnsz-[phone]  *Ask  to page dermatologist on-call  Emergency Esgs-[phone]  TTY for hearing impaired- [phone]

## 2023-12-15 ENCOUNTER — TELEPHONE (OUTPATIENT)
Dept: TRANSPLANT | Facility: CLINIC | Age: 68
End: 2023-12-15
Payer: MEDICARE

## 2023-12-15 ENCOUNTER — TELEPHONE (OUTPATIENT)
Dept: DERMATOLOGY | Facility: CLINIC | Age: 68
End: 2023-12-15
Payer: MEDICARE

## 2023-12-15 LAB — IMMUKNOW IMMUNE CELL FUNCTION: 438 NG/ML

## 2023-12-15 ASSESSMENT — ENCOUNTER SYMPTOMS: NEW SYMPTOMS OF CORONARY ARTERY DISEASE: 0

## 2023-12-15 NOTE — TELEPHONE ENCOUNTER
Follow up call completed following Mohs procedure with Dr. Hastings.     The following questions were asked:    What are you doing to manage your pain? Tylenol yesterday, doesn't seem to have pain this morning  Is it helping? Yes  Are you applying ice? No  Have you had any noticeable bleeding through the bandage? No  Do you have any concerns? No        Please call [phone] option 3 if you have any additional questions.    Toña FELICIANO RN

## 2023-12-15 NOTE — TELEPHONE ENCOUNTER
Wife called with test results.  Angio/RHC WNL. Wife reports no concern with angio site    Tac level 7.4 - goal 6-8. Immuknow 438. No dose change    CMV/EBV not detected     Biopsy conts to show staining. Routed to Dr Go for confirmation. Good heart function     HEART ALLOGRAFT, ENDOMYOCARDIAL BIOPSY AT 12 MONTHS:   -Negative for acute cellular rejection    -No light microscopic features of antibody mediated rejection   -POSITIVE, diffuse, immunofluorescence staining for C4d capillary deposition   -Negative for immunofluorescent staining for C3d capillary deposition   -ISHLT 2005 grade 0R (previously 0); ISHLT 2013 grade pAMR 1 (I+)             (See Comment)     Allomap and DSAs pending     No med changes. Return in April as scheduled    Enc to call with questions.

## 2023-12-18 LAB
ALLOMAP SCORE (EXTERNAL): 24 (ref 0–40)
NEGATIVE PREDICTIVE VALUE PERCENT (EXTERNAL): 100 %
POSITIVE PREDICTIVE VALUE PERCENT (EXTERNAL): 2.1 %

## 2023-12-27 NOTE — PROGRESS NOTES
Losartan   Prep complete on 2A #9 for CORS/RHC/HBX/IVUS. Wife at bedside. No active complaints. Plan of care discussed. Unsuccessful RHC neck access in past, plan per consenting MD is for groin access. PICC right arm. No further needs at this time.

## 2024-01-04 NOTE — PROGRESS NOTES
After chart review, it appears 1mg of Versed and 50mcg of Fentanyl administered by the writer of this note but not charted.

## 2024-01-17 ENCOUNTER — TELEPHONE (OUTPATIENT)
Dept: PHARMACY | Facility: CLINIC | Age: 69
End: 2024-01-17
Payer: MEDICARE

## 2024-01-17 NOTE — TELEPHONE ENCOUNTER
Clinical Pharmacy Consult:                                                      Transplant Specific: 12 month post transplant medication review  Date of Transplant: 12/25/2022  Type of Transplant: heart  First Transplant: yes  History of rejection: no    Immunosuppression Regimen   TAC 2.5mg qAM & 2mg qPM and MMF 1000mg qAM & 1000mg qPM  Patient specific goal: 6-8  Most recent level: 7.4, date 12/13/23  Immunosuppressant Levels:  Therapeutic  Pt adherent to lab draws: yes  Scr:   Lab Results   Component Value Date    CR 0.95 02/21/2023     Side effects: none    Prophylactic Medications  Antibacterial:  Bactrim 1 daily  Scheduled Discontinue Date: 6 months- completed    Antifungal: Nystatin  Scheduled Discontinue Date: 6 months-completed    Antiviral: CrCl >/=60 mL/minute: Valcyte 900mg daily   Scheduled Discontinue Date: 3 months, complete    Acid Reducer: Protonix (pantoprazole)  Scheduled Reviewed Date: 6 months - completed    Thrombosis Prevention: Eliquis 5ng BID  Scheduled Discontinue Date:  to be determined  - completed    Blood Pressure Management  Frequency of home Blood Pressure checks: 3 times a week  Most recent home BP: 126/79  Patient Blood pressure goal: <140/90  Patient blood pressure at goal:  Yes    Hospitalizations/ER visits since last assessment: 0    Med rec/DUR performed: yes  Med Rec Discrepancies: no    Spoke to wife, Arlette. Vince is doing excellent.  Arlette helps him with his medications. They use a med list, pill boxes and timers.  No missed doses reported.  There was one dose in December 2023 where he took the dose 2 hours late, but it was not missed.  Vince takes medications at 8am, 10am, 6pm, and 8pm.  No problems with getting medications refilled.  They are very happy with the services at [facility].      BP: checking 3 times a week. His most recent home reading was at goal.    Last tacro level was at goal.     No other questions or concerns. Follow up in 1 year.    Nurys  Catarino Pharm D  [facility]

## 2024-01-29 DIAGNOSIS — Z94.1 HEART REPLACED BY TRANSPLANT (H): ICD-10-CM

## 2024-01-29 RX ORDER — AMLODIPINE BESYLATE 5 MG/1
5 TABLET ORAL AT BEDTIME
Qty: 90 TABLET | Refills: 3 | Status: SHIPPED | OUTPATIENT
Start: 2024-01-29

## 2024-03-10 ENCOUNTER — HEALTH MAINTENANCE LETTER (OUTPATIENT)
Age: 69
End: 2024-03-10

## 2024-04-02 PROBLEM — C44.91 BASAL CELL CARCINOMA: Status: ACTIVE | Noted: 2023-10-13

## 2024-04-11 ENCOUNTER — PRE VISIT (OUTPATIENT)
Dept: TRANSPLANT | Facility: CLINIC | Age: 69
End: 2024-04-11
Payer: MEDICARE

## 2024-04-11 DIAGNOSIS — Z94.1 HEART REPLACED BY TRANSPLANT (H): Primary | ICD-10-CM

## 2024-04-11 DIAGNOSIS — Z13.220 LIPID SCREENING: ICD-10-CM

## 2024-04-11 DIAGNOSIS — Z79.899 ENCOUNTER FOR LONG-TERM (CURRENT) USE OF HIGH-RISK MEDICATION: ICD-10-CM

## 2024-04-18 ENCOUNTER — OFFICE VISIT (OUTPATIENT)
Dept: DERMATOLOGY | Facility: CLINIC | Age: 69
End: 2024-04-18
Payer: MEDICARE

## 2024-04-18 ENCOUNTER — ANCILLARY PROCEDURE (OUTPATIENT)
Dept: CARDIOLOGY | Facility: CLINIC | Age: 69
End: 2024-04-18
Attending: INTERNAL MEDICINE
Payer: MEDICARE

## 2024-04-18 ENCOUNTER — LAB (OUTPATIENT)
Dept: LAB | Facility: CLINIC | Age: 69
End: 2024-04-18
Payer: MEDICARE

## 2024-04-18 ENCOUNTER — EXTERNAL ORDER RESULTS (OUTPATIENT)
Dept: TRANSPLANT | Facility: CLINIC | Age: 69
End: 2024-04-18

## 2024-04-18 VITALS
HEART RATE: 98 BPM | OXYGEN SATURATION: 98 % | BODY MASS INDEX: 29.15 KG/M2 | SYSTOLIC BLOOD PRESSURE: 123 MMHG | WEIGHT: 227 LBS | DIASTOLIC BLOOD PRESSURE: 86 MMHG

## 2024-04-18 DIAGNOSIS — D22.9 MULTIPLE BENIGN NEVI: ICD-10-CM

## 2024-04-18 DIAGNOSIS — Z79.899 ENCOUNTER FOR LONG-TERM (CURRENT) USE OF HIGH-RISK MEDICATION: ICD-10-CM

## 2024-04-18 DIAGNOSIS — C44.91 SKIN CANCER, BASAL CELL: ICD-10-CM

## 2024-04-18 DIAGNOSIS — Z94.1 HEART REPLACED BY TRANSPLANT (H): ICD-10-CM

## 2024-04-18 DIAGNOSIS — Z85.828 HISTORY OF BASAL CELL CARCINOMA: ICD-10-CM

## 2024-04-18 DIAGNOSIS — D48.5 NEOPLASM OF UNCERTAIN BEHAVIOR OF SKIN: Primary | ICD-10-CM

## 2024-04-18 DIAGNOSIS — Z13.220 LIPID SCREENING: ICD-10-CM

## 2024-04-18 DIAGNOSIS — L57.8 ACTINIC SKIN DAMAGE: ICD-10-CM

## 2024-04-18 DIAGNOSIS — Z94.1 HEART REPLACED BY TRANSPLANT (H): Primary | ICD-10-CM

## 2024-04-18 LAB
ALBUMIN SERPL BCG-MCNC: 4.2 G/DL (ref 3.5–5.2)
ALP SERPL-CCNC: 63 U/L (ref 40–150)
ALT SERPL W P-5'-P-CCNC: 21 U/L (ref 0–70)
ANION GAP SERPL CALCULATED.3IONS-SCNC: 8 MMOL/L (ref 7–15)
AORTIC VALVE - AORTA (ECH): NORMAL
AORTIC VALVE DOPPLER (ECH): NORMAL
AORTIC VALVE MORPHOLOGY (ECH): NORMAL
AST SERPL W P-5'-P-CCNC: 22 U/L (ref 0–45)
BILIRUB SERPL-MCNC: 0.7 MG/DL
BUN SERPL-MCNC: 20.1 MG/DL (ref 8–23)
CALCIUM SERPL-MCNC: 9.5 MG/DL (ref 8.8–10.2)
CHLORIDE SERPL-SCNC: 106 MMOL/L (ref 98–107)
CHOLEST SERPL-MCNC: 149 MG/DL
CK SERPL-CCNC: 165 U/L (ref 39–308)
CREAT SERPL-MCNC: 1.26 MG/DL (ref 0.67–1.17)
DEPRECATED HCO3 PLAS-SCNC: 28 MMOL/L (ref 22–29)
ECHO TYPE (ECH): NORMAL
EGFRCR SERPLBLD CKD-EPI 2021: 61 ML/MIN/1.73M2
ERYTHROCYTE [DISTWIDTH] IN BLOOD BY AUTOMATED COUNT: 12.2 % (ref 10–15)
FASTING STATUS PATIENT QL REPORTED: YES
GLUCOSE SERPL-MCNC: 108 MG/DL (ref 70–99)
HCT VFR BLD AUTO: 44.9 % (ref 40–53)
HDLC SERPL-MCNC: 67 MG/DL
HGB BLD-MCNC: 14.8 G/DL (ref 13.3–17.7)
LDLC SERPL CALC-MCNC: 67 MG/DL
LEFT ATRIUM (ECH): NORMAL
LV EJECTION FRACTION PERCENT (ECH): NORMAL
LV GLOBAL FUNCTION (ECH): NORMAL
LV REGIONAL WALL MOTION (ECH): NORMAL
LVEF ECHO: NORMAL
M-MODE MEASURE AO (ECH): NORMAL
M-MODE MEASURE IVSD (ECH): NORMAL
M-MODE MEASURE LVIDD (ECH): NORMAL
M-MODE MEASURE LVIDS (ECH): NORMAL
M-MODE MEASURE PWD (ECH): NORMAL
MAGNESIUM SERPL-MCNC: 1.5 MG/DL (ref 1.7–2.3)
MCH RBC QN AUTO: 28.1 PG (ref 26.5–33)
MCHC RBC AUTO-ENTMCNC: 33 G/DL (ref 31.5–36.5)
MCV RBC AUTO: 85 FL (ref 78–100)
MITRAL VALVE DOPPLER (ECH): NORMAL
MITRAL VALVE MORPHOLOGY (ECH): NORMAL
NONHDLC SERPL-MCNC: 82 MG/DL
PHOSPHATE SERPL-MCNC: 2.8 MG/DL (ref 2.5–4.5)
PLATELET # BLD AUTO: 218 10E3/UL (ref 150–450)
POTASSIUM SERPL-SCNC: 4.5 MMOL/L (ref 3.4–5.3)
PROT SERPL-MCNC: 6.6 G/DL (ref 6.4–8.3)
PULMONARY VALVE DOPPLER (ECH): NORMAL
PULMONARY VALVE MORPHOLOGY (ECH): NORMAL
RBC # BLD AUTO: 5.27 10E6/UL (ref 4.4–5.9)
RIGHT ATRIUM (ECH): NORMAL
RV GLOBAL FUNCTION (ECH): NORMAL
RV SIZE (ECH): NORMAL
RV SYSTOLIC EST (MMHG) (ECH): NORMAL
RV SYSTOLIC PRESSURE (ECH): NORMAL
SODIUM SERPL-SCNC: 142 MMOL/L (ref 135–145)
TACROLIMUS BLD-MCNC: 7.9 UG/L (ref 5–15)
TME LAST DOSE: NORMAL H
TME LAST DOSE: NORMAL H
TRANSPLANT DATE (ECH): NORMAL
TRICUSPID VALVE DOPPLER (ECH): NORMAL
TRICUSPID VALVE MORPHOLOGY (ECH): NORMAL
TRIGL SERPL-MCNC: 75 MG/DL
WBC # BLD AUTO: 5.5 10E3/UL (ref 4–11)

## 2024-04-18 PROCEDURE — 83735 ASSAY OF MAGNESIUM: CPT | Performed by: PATHOLOGY

## 2024-04-18 PROCEDURE — 99215 OFFICE O/P EST HI 40 MIN: CPT | Mod: 25 | Performed by: INTERNAL MEDICINE

## 2024-04-18 PROCEDURE — 11103 TANGNTL BX SKIN EA SEP/ADDL: CPT | Performed by: STUDENT IN AN ORGANIZED HEALTH CARE EDUCATION/TRAINING PROGRAM

## 2024-04-18 PROCEDURE — 80061 LIPID PANEL: CPT | Performed by: PATHOLOGY

## 2024-04-18 PROCEDURE — 88305 TISSUE EXAM BY PATHOLOGIST: CPT | Mod: 26 | Performed by: DERMATOLOGY

## 2024-04-18 PROCEDURE — 99214 OFFICE O/P EST MOD 30 MIN: CPT | Mod: 25 | Performed by: STUDENT IN AN ORGANIZED HEALTH CARE EDUCATION/TRAINING PROGRAM

## 2024-04-18 PROCEDURE — 11102 TANGNTL BX SKIN SINGLE LES: CPT | Performed by: STUDENT IN AN ORGANIZED HEALTH CARE EDUCATION/TRAINING PROGRAM

## 2024-04-18 PROCEDURE — 85027 COMPLETE CBC AUTOMATED: CPT | Performed by: PATHOLOGY

## 2024-04-18 PROCEDURE — 88305 TISSUE EXAM BY PATHOLOGIST: CPT | Mod: TC | Performed by: STUDENT IN AN ORGANIZED HEALTH CARE EDUCATION/TRAINING PROGRAM

## 2024-04-18 PROCEDURE — 99000 SPECIMEN HANDLING OFFICE-LAB: CPT | Performed by: PATHOLOGY

## 2024-04-18 PROCEDURE — 80197 ASSAY OF TACROLIMUS: CPT | Performed by: INTERNAL MEDICINE

## 2024-04-18 PROCEDURE — 84100 ASSAY OF PHOSPHORUS: CPT | Performed by: PATHOLOGY

## 2024-04-18 PROCEDURE — G0463 HOSPITAL OUTPT CLINIC VISIT: HCPCS | Performed by: INTERNAL MEDICINE

## 2024-04-18 PROCEDURE — 36415 COLL VENOUS BLD VENIPUNCTURE: CPT | Performed by: PATHOLOGY

## 2024-04-18 PROCEDURE — 82550 ASSAY OF CK (CPK): CPT | Performed by: PATHOLOGY

## 2024-04-18 PROCEDURE — 80053 COMPREHEN METABOLIC PANEL: CPT | Performed by: PATHOLOGY

## 2024-04-18 PROCEDURE — 93306 TTE W/DOPPLER COMPLETE: CPT | Performed by: INTERNAL MEDICINE

## 2024-04-18 ASSESSMENT — PAIN SCALES - GENERAL: PAINLEVEL: NO PAIN (0)

## 2024-04-18 NOTE — NURSING NOTE
Chief Complaint   Patient presents with    Follow Up     Return Ht Tx -1 year 4 month follow-up      Vitals were taken and medications reconciled.    Darci Coffey, EMT  2:02 PM

## 2024-04-18 NOTE — LETTER
4/18/2024       RE: Paco Stein  [address]  [place] [address]     Dear Colleague,    Thank you for referring your patient, Paco Stein, to the [facility] [place] at [facility]. Please see a copy of my visit note below.    [facility] Dermatology Note    Encounter Date: Apr 18, 2024    Dermatology Problem List:      Major PMHx  # Heart transplant 12/25/22  - cellcept and tacrolimus  ______________________________________    Impression/Plan:  Paco was seen today for derm problem.    Diagnoses and all orders for this visit:    Neoplasm of uncertain behavior of skin  -     IA TANGENTIAL BIOPSY OF SKIN, FIRST/SINGLE LESION [[id number]]  -     IA TANGENTIAL BIOPSY OF SKIN, EA SEPARATE/ADDL LESION [[id number]]  -     Dermatological Path Order and Indications; Standing  -     Dermatological Path Order and Indications  -Shave biopsy x 2 right forearm and left shin DDx BCC  - Discussed treatment ladder  - Patient would prefer to trial imiquimod daily for 6 weeks and then follow-up in 4 months to assess progress                    History of basal cell carcinoma  - No obvious evidence of recurrence at site of previous malignancy    Actinic skin damage  Multiple benign nevi  - Reviewed the compounding benefits of incremental changes to sun protective clothing behaviors including increased frequency of sunscreen and sun protective clothing like broad brimmed hats and longsleeved UPF containing clothing        - SHAVE BIOPSY PROCEDURE NOTE: After written informed consent was obtained, a time out was taken to identify the patient and the correct site for biopsy. The lesion on the right forearm and left shin was cleansed with a 70% isopropyl alcohol wipe, and then injected with 2cc of lidocaine 1% with epinephrine 1:100,000. Once anesthesia was ensured, the visible surface of the lesion was biopsied using a Nancy blade  in standard technique. Hemostasis was obtained with pressure and aluminum chloride 20% solution. The specimen was placed in a labeled formalin container and sent to pathology for sectioning and analysis. The wound was dressed with white petrolatum and an adhesive bandage. The patient tolerated the procedure well. Post-procedure instructions and recommendations were provided both verbally and in writing.    Follow-up in 4 mo.       Staff Involved:  Staff Only    Jose Hastings MD   of Dermatology  Department of Dermatology  [facility]      CC:   Chief Complaint   Patient presents with    Derm Problem     FBSE; post MOHs       History of Present Illness:  Mr. Paco Stein is a 69 year old male who presents as a return patient.    Patient was last seen by me October 13, 2023 at that time we froze several actinic keratoses and biopsied 2 lesions 1 on the vertex scalp which returned as a superficial basal cell, and the other was a superficial nodular basal cell in the central chest.  He was referred for Mohs surgery and had that performed on both lesions on December 14.    Patient presents today for follow-up    Labs:      Physical exam:  Vitals: There were no vitals taken for this visit.  GEN: well developed, well-nourished, in no acute distress, in a pleasant mood.     SKIN: Mcdonough phototype 1  - Full skin, which includes the head/face, both arms, chest, back, abdomen,both legs, genitalia and/or groin buttocks, digits and/or nails, was examined.  - No obvious evidence of recurrence at site of previous malignancy  - Flat brown macules and patches in a sun exposed areas on face and extremities  -Deep red slightly pearly papules right forearm left shin  - No other lesions of concern on areas examined.     Past Medical History:   Past Medical History:   Diagnosis Date    Congestive heart failure (H)      Past Surgical History:   Procedure Laterality Date     COLONOSCOPY N/A 11/17/2022    Procedure: COLONOSCOPY;  Surgeon: Roverto Nino MD;  [facility]: [facility]    CV CORONARY ANGIOGRAM N/A 11/11/2022    Procedure: Coronary Angiogram;  Surgeon: Justo Bush MD;  [facility]AC [facility]ATH LAB    CV CORONARY ANGIOGRAM N/A 1/23/2023    Procedure: Coronary Angiogram - biplane;  Surgeon: Justo Bush MD;  [facility]    CV CORONARY ANGIOGRAM N/A 12/13/2023    Procedure: Coronary Angiogram;  Surgeon: Wayne Xavier MD;  [facility]    CV HEART BIOPSY N/A 01/09/2023    Procedure: Heart Cath Heart Biopsy;  Surgeon: Tab Agee MD;  [facility]AC CATH LAB    CV HEART BIOPSY N/A 01/02/2023    Procedure: Heart Biopsy;  Surgeon: Ghulam Mercado MD;  [facility]    CV HEART BIOPSY N/A 1/23/2023    Procedure: Heart Cath Heart Biopsy;  Surgeon: Justo Bush MD;  [facility]    CV HEART BIOPSY N/A 1/17/2023    Procedure: Heart Cath Heart Biopsy;  Surgeon: Wayne Xavier MD;  [facility]    CV HEART BIOPSY N/A 2/9/2023    Procedure: Heart Cath Heart Biopsy;  Surgeon: Ghulam Mercado MD;  [facility]    CV HEART BIOPSY N/A 2/21/2023    Procedure: Heart Biopsy;  Surgeon: Wayne Xavier MD;  [facility]    CV HEART BIOPSY N/A 3/9/2023    Procedure: Heart Cath Heart Biopsy;  Surgeon: Tab Agee MD;  [facility]    CV HEART BIOPSY N/A 3/21/2023    Procedure: Heart Cath Heart Biopsy;  Surgeon: Ghulam Mercado MD;  [facility]    CV HEART BIOPSY N/A 4/27/2023    Procedure: Heart Cath Heart Biopsy;  Surgeon: Justo Bush MD;  [facility]    CV HEART BIOPSY N/A 5/22/2023    Procedure: Heart Cath Heart Biopsy;  Surgeon:  Wayne Xavier MD;  Location:  HEART CARDIAC CATH LAB    CV HEART BIOPSY N/A 6/29/2023    Procedure: Heart Biopsy (groin stick- requests sedation);  Surgeon: Wayne Xavier MD;  Location:  HEART CARDIAC CATH LAB    CV HEART BIOPSY N/A 12/13/2023    Procedure: Heart Biopsy;  Surgeon: Wayne Xavier MD;  Location:  HEART CARDIAC CATH LAB    CV INTRAVASULAR ULTRASOUND N/A 1/23/2023    Procedure: Intravascular Ultrasound;  Surgeon: Justo Bush MD;  Location:  HEART CARDIAC CATH LAB    CV RIGHT HEART CATH MEASUREMENTS RECORDED N/A 11/11/2022    Procedure: Right Heart Catheterization;  Surgeon: Justo Bush MD;  Location:  HEART CARDIAC CATH LAB    CV RIGHT HEART CATH MEASUREMENTS RECORDED N/A 12/08/2022    Procedure: Right Heart Catheterization;  Surgeon: Wayne Xavier MD;  Location:  HEART CARDIAC CATH LAB    CV RIGHT HEART CATH MEASUREMENTS RECORDED N/A 12/09/2022    Procedure: Right Heart Catheterization with leave-in [place];  Surgeon: Khari Mcneill MD;  Location:  HEART CARDIAC CATH LAB    CV RIGHT HEART CATH MEASUREMENTS RECORDED N/A 01/09/2023    Procedure: Heart Cath Right Heart Cath;  Surgeon: Tab Agee MD;  Location:  HEART CARDIAC CATH LAB    CV RIGHT HEART CATH MEASUREMENTS RECORDED N/A 01/02/2023    Procedure: Right Heart Catheterization;  Surgeon: Ghulam Mercado MD;  Location:  HEART CARDIAC CATH LAB    CV RIGHT HEART CATH MEASUREMENTS RECORDED N/A 1/23/2023    Procedure: Right Heart Catheterization;  Surgeon: Justo Bush MD;  Location:  HEART CARDIAC CATH LAB    CV RIGHT HEART CATH MEASUREMENTS RECORDED N/A 1/17/2023    Procedure: Right Heart Catheterization;  Surgeon: Wayne Xavier MD;  Location:  HEART CARDIAC CATH LAB    CV RIGHT HEART CATH MEASUREMENTS RECORDED N/A 2/9/2023    Procedure: Right Heart Catheterization;  Surgeon: Ghulam Mercado MD;  Location: [facility]  CARDIAC CATH LAB    CV RIGHT HEART CATH MEASUREMENTS RECORDED N/A 2/21/2023    Procedure: Right Heart Catheterization;  Surgeon: Wayne Xavier MD;  Location: [facility] CARDIAC CATH LAB    CV RIGHT HEART CATH MEASUREMENTS RECORDED N/A 3/9/2023    Procedure: Right Heart Catheterization;  Surgeon: Tab Agee MD;  Location: [facility] CARDIAC CATH LAB    CV RIGHT HEART CATH MEASUREMENTS RECORDED N/A 3/21/2023    Procedure: Right Heart Catheterization;  Surgeon: Ghulam Mercado MD;  Location: [facility] CARDIAC CATH LAB    CV RIGHT HEART CATH MEASUREMENTS RECORDED N/A 4/27/2023    Procedure: Right Heart Catheterization;  Surgeon: Justo Bush MD;  Location: [facility] CARDIAC CATH LAB    CV RIGHT HEART CATH MEASUREMENTS RECORDED N/A 5/22/2023    Procedure: Right Heart Catheterization;  Surgeon: Wayne Xavier MD;  Location: [facility] CARDIAC CATH LAB    CV RIGHT HEART CATH MEASUREMENTS RECORDED N/A 6/29/2023    Procedure: Right Heart Catheterization;  Surgeon: Wayne Xavier MD;  Location: [facility] CARDIAC CATH LAB    CV RIGHT HEART CATH MEASUREMENTS RECORDED N/A 12/13/2023    Procedure: Right Heart Catheterization;  Surgeon: Wayne Xavier MD;  Location: [facility] CARDIAC CATH LAB    PICC Right 01/12/2023    placed in basilic and no problem    PICC DOUBLE LUMEN PLACEMENT Right 11/10/2022    Right basilic, 43 cm, 1 cm external length    TRANSPLANT HEART RECIPIENT N/A 12/25/2022    Procedure: TRANSPLANT, HEART, RECIPIENT; Median Sternotomy, Cardiopulmonary Bypass, Removal of Pacemaker;  Surgeon: Brendan Rodriguez MD;  Location:  OR       Social History:   reports that he has never smoked. He has never used smokeless tobacco. He reports that he does not currently use alcohol. He reports that he does not use drugs.    Family History:  Family History   Problem Relation Age of Onset    Atrial fibrillation Father     Lung Cancer Brother         Medications:  Current Outpatient Medications   Medication Sig Dispense Refill     magnesium glycinate lysinate chelate (DOCTOR'S BEST) 100 MG TABS tablet Take 3 tablets (300 mg) by mouth 2 times daily Plus two additional tabs (200 mg) daily Take separate from Mycophenolate. 240 tablet 11    acetaminophen (TYLENOL) 80 MG chewable tablet Take 80 mg by mouth every 4 hours as needed for mild pain or fever      amLODIPine (NORVASC) 5 MG tablet Take 1 tablet (5 mg) by mouth at bedtime 90 tablet 3    aspirin (ASA) 325 MG EC tablet Take 1 tablet (325 mg) by mouth daily 90 tablet 3    calcium carbonate-vitamin D (CALTRATE) 600-10 MG-MCG per tablet Take 1 tablet by mouth 2 times daily (with meals) 180 tablet 3    multivitamin (ONE-DAILY) tablet Take 1 tablet by mouth daily 90 tablet 3    mycophenolate (GENERIC EQUIVALENT) 250 MG capsule Take 4 capsules (1,000 mg) by mouth 2 times daily 240 capsule 11    polyethylene glycol (MIRALAX) 17 g packet Take 1 packet by mouth daily      rosuvastatin (CRESTOR) 10 MG tablet Take 1 tablet (10 mg) by mouth daily 90 tablet 3    tacrolimus (GENERIC EQUIVALENT) 0.5 MG capsule Take one 0.5mg capsule with two 1mg capsules every morning for a total dose of 2.5 mg in the AM and 2mg in the PM 30 capsule 11    tacrolimus (GENERIC EQUIVALENT) 1 MG capsule Take TWO caps (2 mg) with one 0.5mg capsule in the AM and take TWO caps (2mg) in the PM for a total dose of 2.5mg in the AM and 2mg in the PM. 120 capsule 11     No Known Allergies              Lidocaine-epinephrine 1-1:414067 % injection   2.5mL once for one use, starting 4/18/2024 ending 4/18/2024,  2mL disp, R-0, injection  Injected by Norris Elizabeth, EMT-B

## 2024-04-18 NOTE — PATIENT INSTRUCTIONS
Please call your transplant coordinator at [phone] with any questions or concerns.  Please note: after hours, weekends and holidays, this phone number is routed to an  to page out the coordinator on call.     Coordinator will update you on remaining results.     Next lab draw: Pending today's results     Return Aug 21 as scheduled

## 2024-04-18 NOTE — PROGRESS NOTES
[facility] Dermatology Note    Encounter Date: Apr 18, 2024    Dermatology Problem List:      Major PMHx  # Heart transplant 12/25/22  - cellcept and tacrolimus  ______________________________________    Impression/Plan:  Paco was seen today for derm problem.    Diagnoses and all orders for this visit:    Neoplasm of uncertain behavior of skin  -     SD TANGENTIAL BIOPSY OF SKIN, FIRST/SINGLE LESION [[id number]]  -     SD TANGENTIAL BIOPSY OF SKIN, EA SEPARATE/ADDL LESION [[id number]]  -     Dermatological Path Order and Indications; Standing  -     Dermatological Path Order and Indications  -Shave biopsy x 2 right forearm and left shin DDx BCC  - Discussed treatment ladder  - Patient would prefer to trial imiquimod daily for 6 weeks and then follow-up in 4 months to assess progress                    History of basal cell carcinoma  - No obvious evidence of recurrence at site of previous malignancy    Actinic skin damage  Multiple benign nevi  - Reviewed the compounding benefits of incremental changes to sun protective clothing behaviors including increased frequency of sunscreen and sun protective clothing like broad brimmed hats and longsleeved UPF containing clothing        - SHAVE BIOPSY PROCEDURE NOTE: After written informed consent was obtained, a time out was taken to identify the patient and the correct site for biopsy. The lesion on the right forearm and left shin was cleansed with a 70% isopropyl alcohol wipe, and then injected with 2cc of lidocaine 1% with epinephrine 1:100,000. Once anesthesia was ensured, the visible surface of the lesion was biopsied using a Nancy blade in standard technique. Hemostasis was obtained with pressure and aluminum chloride 20% solution. The specimen was placed in a labeled formalin container and sent to pathology for sectioning and analysis. The wound was dressed with white petrolatum and an adhesive bandage. The patient tolerated the procedure well.  Post-procedure instructions and recommendations were provided both verbally and in writing.    Follow-up in 4 mo.       Staff Involved:  Staff Only    Jose Hastings MD   of Dermatology  Department of Dermatology  [facility] [employer] [facility]      CC:   Chief Complaint   Patient presents with    Derm Problem     FBSE; post MOHs       History of Present Illness:  Mr. Paco Stein is a 69 year old male who presents as a return patient.    Patient was last seen by me October 13, 2023 at that time we froze several actinic keratoses and biopsied 2 lesions 1 on the vertex scalp which returned as a superficial basal cell, and the other was a superficial nodular basal cell in the central chest.  He was referred for Mohs surgery and had that performed on both lesions on December 14.    Patient presents today for follow-up    Labs:      Physical exam:  Vitals: There were no vitals taken for this visit.  GEN: well developed, well-nourished, in no acute distress, in a pleasant mood.     SKIN: Mcdonough phototype 1  - Full skin, which includes the head/face, both arms, chest, back, abdomen,both legs, genitalia and/or groin buttocks, digits and/or nails, was examined.  - No obvious evidence of recurrence at site of previous malignancy  - Flat brown macules and patches in a sun exposed areas on face and extremities  -Deep red slightly pearly papules right forearm left shin  - No other lesions of concern on areas examined.     Past Medical History:   Past Medical History:   Diagnosis Date    Congestive heart failure (H)      Past Surgical History:   Procedure Laterality Date    COLONOSCOPY N/A 11/17/2022    Procedure: COLONOSCOPY;  Surgeon: Roverto Nino MD;  Location:  GI    CV CORONARY ANGIOGRAM N/A 11/11/2022    Procedure: Coronary Angiogram;  Surgeon: Justo Bush MD;  Location:  HEART CARDIAC CATH LAB    CV CORONARY ANGIOGRAM N/A 1/23/2023    Procedure: Coronary  Angiogram - biplane;  Surgeon: Justo Bush MD;  Location:  HEART CARDIAC CATH LAB    CV CORONARY ANGIOGRAM N/A 12/13/2023    Procedure: Coronary Angiogram;  Surgeon: Wayne Xavier MD;  [facility]    CV HEART BIOPSY N/A 01/09/2023    Procedure: Heart Cath Heart Biopsy;  Surgeon: Tab Agee MD;  Location: [facility] CARDIAC CATH LAB    CV HEART BIOPSY N/A 01/02/2023    Procedure: Heart Biopsy;  Surgeon: Ghulam Mercado MD;  Location: [facility] CARDIAC CATH LAB    CV HEART BIOPSY N/A 1/23/2023    Procedure: Heart Cath Heart Biopsy;  Surgeon: Justo Bush MD;  Location: [facility] CARDIAC CATH LAB    CV HEART BIOPSY N/A 1/17/2023    Procedure: Heart Cath Heart Biopsy;  Surgeon: Wayne Xavier MD;  Location: [facility] CARDIAC CATH LAB    CV HEART BIOPSY N/A 2/9/2023    Procedure: Heart Cath Heart Biopsy;  Surgeon: Ghulam Mercado MD;  [facility]: [facility]    CV HEART BIOPSY N/A 2/21/2023    Procedure: Heart Biopsy;  Surgeon: Wayne Xavier MD;  Location:  HEART CARDIAC CATH LAB    CV HEART BIOPSY N/A 3/9/2023    Procedure: Heart Cath Heart Biopsy;  Surgeon: Tab Agee MD;  Location:  HEART CARDIAC CATH LAB    CV HEART BIOPSY N/A 3/21/2023    Procedure: Heart Cath Heart Biopsy;  Surgeon: Ghulam Mercado MD;  Location: [facility] CARDIAC CATH LAB    CV HEART BIOPSY N/A 4/27/2023    Procedure: Heart Cath Heart Biopsy;  Surgeon: Justo Bush MD;  Location: [facility] CARDIAC CATH LAB    CV HEART BIOPSY N/A 5/22/2023    Procedure: Heart Cath Heart Biopsy;  Surgeon: Wayne Xavier MD;  Location:  HEART CARDIAC CATH LAB    CV HEART BIOPSY N/A 6/29/2023    Procedure: Heart Biopsy (groin stick- requests sedation);  Surgeon: Wayne Xavier MD;  Location:  HEART CARDIAC CATH LAB    CV HEART BIOPSY N/A 12/13/2023    Procedure: Heart Biopsy;  Surgeon: Wayne Xavier  MD;  Location: [facility] CARDIAC CATH LAB    CV INTRAVASULAR ULTRASOUND N/A 1/23/2023    Procedure: Intravascular Ultrasound;  Surgeon: Justo Bush MD;  [facility]    CV RIGHT HEART CATH MEASUREMENTS RECORDED N/A 11/11/2022    Procedure: Right Heart Catheterization;  Surgeon: Justo Bush MD;  Location: [facility] CARDIAC CATH LAB    CV RIGHT HEART CATH MEASUREMENTS RECORDED N/A 12/08/2022    Procedure: Right Heart Catheterization;  Surgeon: Wayne Xavier MD;  Location: [facility]AC CATH LAB    CV RIGHT HEART CATH MEASUREMENTS RECORDED N/A 12/09/2022    Procedure: Right Heart Catheterization with leave-in [place];  Surgeon: Khari Mcneill MD;  Location: [facility] CARDIAC CATH LAB    CV RIGHT HEART CATH MEASUREMENTS RECORDED N/A 01/09/2023    Procedure: Heart Cath Right Heart Cath;  Surgeon: Tab Agee MD;  Location: [facility] CARDIAC CATH LAB    CV RIGHT HEART CATH MEASUREMENTS RECORDED N/A 01/02/2023    Procedure: Right Heart Catheterization;  Surgeon: Ghulam Mercado MD;  [facility]: [facility]    CV RIGHT HEART CATH MEASUREMENTS RECORDED N/A 1/23/2023    Procedure: Right Heart Catheterization;  Surgeon: Justo Bush MD;  Location: [facility] CARDIAC CATH LAB    CV RIGHT HEART CATH MEASUREMENTS RECORDED N/A 1/17/2023    Procedure: Right Heart Catheterization;  Surgeon: Wayne Xavier MD;  [facility]: [facility]    CV RIGHT HEART CATH MEASUREMENTS RECORDED N/A 2/9/2023    Procedure: Right Heart Catheterization;  Surgeon: Ghulam Mercado MD;  [facility]    CV RIGHT HEART CATH MEASUREMENTS RECORDED N/A 2/21/2023    Procedure: Right Heart Catheterization;  Surgeon: Wayne Xavier MD;  Location: [facility] CARDIAC CATH LAB    CV RIGHT HEART CATH MEASUREMENTS RECORDED N/A 3/9/2023    Procedure: Right Heart Catheterization;  Surgeon: Tab Agee MD;   Location:  HEART CARDIAC CATH LAB    CV RIGHT HEART CATH MEASUREMENTS RECORDED N/A 3/21/2023    Procedure: Right Heart Catheterization;  Surgeon: Ghulam Mercado MD;  Location:  HEART CARDIAC CATH LAB    CV RIGHT HEART CATH MEASUREMENTS RECORDED N/A 4/27/2023    Procedure: Right Heart Catheterization;  Surgeon: Justo Bush MD;  Location: [facility] CARDIAC CATH LAB    CV RIGHT HEART CATH MEASUREMENTS RECORDED N/A 5/22/2023    Procedure: Right Heart Catheterization;  Surgeon: Wayne Xavier MD;  Location: [facility] CARDIAC CATH LAB    CV RIGHT HEART CATH MEASUREMENTS RECORDED N/A 6/29/2023    Procedure: Right Heart Catheterization;  Surgeon: Wayne Xavier MD;  Location: [facility] CARDIAC CATH LAB    CV RIGHT HEART CATH MEASUREMENTS RECORDED N/A 12/13/2023    Procedure: Right Heart Catheterization;  Surgeon: Wayne Xavier MD;  Location: [facility] CARDIAC CATH LAB    PICC Right 01/12/2023    placed in basilic and no problem    PICC DOUBLE LUMEN PLACEMENT Right 11/10/2022    Right basilic, 43 cm, 1 cm external length    TRANSPLANT HEART RECIPIENT N/A 12/25/2022    Procedure: TRANSPLANT, HEART, RECIPIENT; Median Sternotomy, Cardiopulmonary Bypass, Removal of Pacemaker;  Surgeon: Brendan Rodriguez MD;  Location:  OR       Social History:   reports that he has never smoked. He has never used smokeless tobacco. He reports that he does not currently use alcohol. He reports that he does not use drugs.    Family History:  Family History   Problem Relation Age of Onset    Atrial fibrillation Father     Lung Cancer Brother        Medications:  Current Outpatient Medications   Medication Sig Dispense Refill     magnesium glycinate lysinate chelate (DOCTOR'S BEST) 100 MG TABS tablet Take 3 tablets (300 mg) by mouth 2 times daily Plus two additional tabs (200 mg) daily Take separate from Mycophenolate. 240 tablet 11    acetaminophen (TYLENOL) 80 MG chewable tablet Take 80 mg by  mouth every 4 hours as needed for mild pain or fever      amLODIPine (NORVASC) 5 MG tablet Take 1 tablet (5 mg) by mouth at bedtime 90 tablet 3    aspirin (ASA) 325 MG EC tablet Take 1 tablet (325 mg) by mouth daily 90 tablet 3    calcium carbonate-vitamin D (CALTRATE) 600-10 MG-MCG per tablet Take 1 tablet by mouth 2 times daily (with meals) 180 tablet 3    multivitamin (ONE-DAILY) tablet Take 1 tablet by mouth daily 90 tablet 3    mycophenolate (GENERIC EQUIVALENT) 250 MG capsule Take 4 capsules (1,000 mg) by mouth 2 times daily 240 capsule 11    polyethylene glycol (MIRALAX) 17 g packet Take 1 packet by mouth daily      rosuvastatin (CRESTOR) 10 MG tablet Take 1 tablet (10 mg) by mouth daily 90 tablet 3    tacrolimus (GENERIC EQUIVALENT) 0.5 MG capsule Take one 0.5mg capsule with two 1mg capsules every morning for a total dose of 2.5 mg in the AM and 2mg in the PM 30 capsule 11    tacrolimus (GENERIC EQUIVALENT) 1 MG capsule Take TWO caps (2 mg) with one 0.5mg capsule in the AM and take TWO caps (2mg) in the PM for a total dose of 2.5mg in the AM and 2mg in the PM. 120 capsule 11     No Known Allergies

## 2024-04-18 NOTE — PROGRESS NOTES
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 with a history of HTN, CAD (s/p PCI to LAD in 2013), hyperlipidemia, CNS vasculitis (2013, residual left-sided weakness, on Cytoxan --> CellCept with no further neuro insult), systolic heart failure secondary to NICM (ARVC, diagnosed 2021), sustained VT s/p ICD (1/2022), and CKD now s/p OHT 12/25/22 who presents to clinic for routine follow up       Transplant details:   Serostatus:  - CMV D+/R+  - EBV D+/R+  - Toxo D-/R-    No immunodepression intolerance   No rejection   No graft vasculopathy     This visit:   Overall doing well   Continues to have functional gains   Keeping up to date on health care surveillance   Does have balance problems (exist from previous stroke)   No SOB, no LE edema   No diarrhea     No other health updates       Past medical history no change from prior    Social history no change from prior      Current Outpatient Medications   Medication Sig Dispense Refill     magnesium glycinate lysinate chelate (DOCTOR'S BEST) 100 MG TABS tablet Take 3 tablets (300 mg) by mouth 2 times daily Plus two additional tabs (200 mg) daily Take separate from Mycophenolate. 240 tablet 11    acetaminophen (TYLENOL) 80 MG chewable tablet Take 80 mg by mouth every 4 hours as needed for mild pain or fever      amLODIPine (NORVASC) 5 MG tablet Take 1 tablet (5 mg) by mouth at bedtime 90 tablet 3    aspirin (ASA) 325 MG EC tablet Take 1 tablet (325 mg) by mouth daily 90 tablet 3    calcium carbonate-vitamin D (CALTRATE) 600-10 MG-MCG per tablet Take 1 tablet by mouth 2 times daily (with meals) 180 tablet 3    multivitamin (ONE-DAILY) tablet Take 1 tablet by mouth daily 90 tablet 3    mycophenolate (GENERIC EQUIVALENT) 250 MG capsule Take 4 capsules (1,000 mg) by mouth 2 times daily 240 capsule 11    polyethylene glycol (MIRALAX) 17 g packet Take 1 packet by mouth daily      rosuvastatin  (CRESTOR) 10 MG tablet Take 1 tablet (10 mg) by mouth daily 90 tablet 3    tacrolimus (GENERIC EQUIVALENT) 0.5 MG capsule Take one 0.5mg capsule with two 1mg capsules every morning for a total dose of 2.5 mg in the AM and 2mg in the PM 30 capsule 11    tacrolimus (GENERIC EQUIVALENT) 1 MG capsule Take TWO caps (2 mg) with one 0.5mg capsule in the AM and take TWO caps (2mg) in the PM for a total dose of 2.5mg in the AM and 2mg in the PM. 120 capsule 11     All relevant ROS were reviewed in the HPI.     EXAM:  /86 (BP Location: Right arm, Patient Position: Chair, Cuff Size: Adult Large)   Pulse 98   Wt 103 kg (227 lb)   SpO2 98%   BMI 29.15 kg/m    General: appears comfortable, alert and articulate  Head: normocephalic, atraumatic  Eyes: anicteric sclera, EOMI  Neck: no adenopathy  Oropharynx: moist mucosa, no lesions, dentition intact- no thrush   Heart: RRR, normal PMI, normal S1/s2, no s3.s4   Lungs: clear, no rales or wheezing  Abdomen: soft, non-tender, bowel sounds present, no hepatosplenomegaly  Extremities: no clubbing, cyanosis or edema  Neurological: speech fluency is at baseline, able to walk without assistance   Psych: stable emotional lability       Echo:   Interpretation Summary  Left ventricular size, wall motion and function are normal. The ejection  fraction is > 65%.     The right ventricle is normal size. Global right ventricular function is  normal.     No significant valvular abnormalities present.     No pericardial effusion is present.     The inferior vena cava is normal.     There is mild dilation of the aorta. Sinuses of Valsalva 4.0 cm.     Compared to prior imaging on 10/12/2023 there is now criteria for dilation of  the aortic root.    Last immuknow: 438        Latest Reference Range & Units 04/18/24 10:07   Sodium 135 - 145 mmol/L 142   Potassium 3.4 - 5.3 mmol/L 4.5   Chloride 98 - 107 mmol/L 106   Carbon Dioxide (CO2) 22 - 29 mmol/L 28   Urea Nitrogen 8.0 - 23.0 mg/dL 20.1    Creatinine 0.67 - 1.17 mg/dL 1.26 (H)   GFR Estimate >60 mL/min/1.73m2 61   Calcium 8.8 - 10.2 mg/dL 9.5   Anion Gap 7 - 15 mmol/L 8   Magnesium 1.7 - 2.3 mg/dL 1.5 (L)   Phosphorus 2.5 - 4.5 mg/dL 2.8   Albumin 3.5 - 5.2 g/dL 4.2   Protein Total 6.4 - 8.3 g/dL 6.6   Alkaline Phosphatase 40 - 150 U/L 63   ALT 0 - 70 U/L 21   AST 0 - 45 U/L 22   (H): Data is abnormally high  (L): Data is abnormally low      Assessment and recommendations:    NICM, s/p OHT 12/25/22  His post-operative course has been c/b delirium, leukocytosis, staph epi bacteremia, and prolonged CT output.  He discharged 1/13.    Graph function is normal, other than complement deposition there is been no rejection, baseline coronary angiogram with mild native coronary disease (proximal LAD 0.2 mm)    Overall doing very well with normal graft function     Serostatus:  - CMV D+/R+  - EBV D+/R+  - Toxo D-/R-     Immunosuppression:  - keep MMF 1000 BID.  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    Aortic root dilation: very slight- will monitor in serial echos     Frailty deconditioning- improved, encourage,ent provided         CC  Patient Care Team:  Quentin Odonnell as PCP - Myrtle Garrison MD as MD (Cardiovascular Disease)  Mary Lou Luke RN as Transplant Coordinator  Abram Simeon RPH as Pharmacist (Pharmacist)  Tiera Anguiano DO as MD (Infectious Diseases)  Abram Simeon RPH as Assigned MTM Pharmacist  Burt Andrews MD Fontana, Lauren, DO as Assigned Infectious Disease  Provider  Jackelin Khan MD as Assigned Neuroscience Provider  Jose Liu MD as Assigned Rheumatology Provider  Elijah Hastings MD as Assigned Surgical Provider  Rose Mary Goldstein NP as Assigned Heart and Vascular Provider  JUAN FRANCISCO CAMACHO      MENTAL HEALTH SYMPTOMS: No

## 2024-04-18 NOTE — PROGRESS NOTES
Lidocaine-epinephrine 1-1:009503 % injection   2.5mL once for one use, starting 4/18/2024 ending 4/18/2024,  2mL disp, R-0, injection  Injected by Norris Elizabeth, EMT-B

## 2024-04-18 NOTE — PROGRESS NOTES
Rheumatology Clinic Visit  [facility]  Jose Liu M.D.     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  Exam today remarkable for mild dysarthria, broad-based gait, and reduced intermittent brief crying episodes, with fine bilateral upper extremity tremor.    Data: In April 2024 comprehensive metabolic panel notable for stable elevated creatinine 1.21. CBC was normal.     Imaging:   MRI of the brain on September 18, 2023 showed extensive white matter T2 hyperintensities not significantly changed since 3-2023; new T2 signal near the left mastoid air cells raising potential for otomastoiditis.   cardiac MRI showed no late gadolinium enhancement suggestive of fibrosis or infiltrative disease.    Discussion: Primary angiitis of the CNS is inactive based on stable improvement in cognition and affect control, no progression of prexisting motor or sensory defects, and on brain imaging showing no progression of previously noted white matter changes, and no new lesions that correlate with clinical symptoms/signs. Tremor intensity has decreased, associated with reduced dose of tacrolimus since last visit.    Primary angiitis of the CNS may still recur or relapse, and may do so without dramatic or sudden neurologic worsening. We discussed that I agree with Neurology recommendations for brain MRI annually to aid surveillance.    No additional immunomodulatory therapy is warranted. Patient  "continues moderate dose mycophenolate (2000 mg daily) as a component of antirejection regimen; dose is appropriate for suppressing and reducing the likelihood of CNS vasculitis relapse.  While using mycophenolate, patient should undergo every 4-month measurement of CBC with platelets, AST, ALT, and albumin.    # Nonischemic cardiomyopathy, status post orthotopic heart transplant December 25, 2022: doing well, followed by Dr. Dominguez, SOT    RTC ~8 mos, dovetailing with planned [facility] Cardiology followup    Jose Liu MD  Staff Rheumatologist, [facility]    On the day of the encounter, a total of 31 minutes was spent in chart review, and in counseling and coordination of care, regarding the patient's complex medical problem of primary angiitis of the CNS, and orthotopic heart transplant.    Orders Placed This Encounter   Procedures    MR Brain and Orbits w/o & w Contrast              History of Present Illness:   Paco Stein follows up for CNS vasculitis.  He was last seen in October 2023.  At that visit, primary angiitis of the CNS was judged inactive.  Recommendation was for continued surveillance with intermittent MRIs of the brain.    Interval history April 19, 2024    Today, he reports doing well. His spouse Arlette endorses that ADLs, including daily walks [facility] are [facility] unimpeded.  Notes some challenges with\"transitioins\" getting up and down from chairs, changing walking environments/terrain. He goes out daily, works out at [employer] several days weekly, shops for grocery, cooks supper for family, interacts. Does take a nap midday.  Still has pseudobulbar affective symptoms (easy crying), often associated with emotionally-engaging topics of discussion.  Tremor is improved, he associates with decreased tacrolimus dose. Writing and shirt-buttoning are improved. No joint pain, skin rash, visual changes, HA/ear pain.    Interval history October 13 2023    Patient was seen by Dr." Angelica in Cardiology on October 12, 2023 in follow-up of heart transplant performed in December 2022.  Impression was of orthotopic heart transplant doing very well.  No rejection noted other than complement deposition and previous myocardial biopsies.  Recommendation was to repeat coronary angiography and right heart catheterization and consider switch to amatory intervention afterwards.    Today, he reports doing well. His spouse Arlette endorses. Complexity and intensity of ADLs has increased since last visit.  He goes out daily, works out at "[email or web address] several days weekly, shops for grocery, cooks supper for family, interacts. Does take a nap midday.  Still has pseudobulbar affective symptoms (easy crying), often associated with emotionally-engaging topics of discussion.  Tremor is improved, he associates with decreased tacrolimus dose. Writing and shirt-buttoning are improved. No joint pain, skin rash, visual changes, HA/ear pain.    Initial history May 23, 2023    At today's visit, patient is able to communicate at a high level and his wife is extremely helpful in adding details of past medical history as well as providing observations about patient's progress over many months.    Patient was seen by provider Charlie on May 22, 2023 in follow-up of heart transplant.  Impression was of orthotopic heart transplant performed in December 2022.  Transplant was performed for nonischemic cardiomyopathy with sustained ventricular tachycardia.  Comorbidities include hypertension, coronary artery disease status post percutaneous angioplasty in 2013, hyperlipidemia, and CKD.  Complement protein deposits on recent endomyocardial biopsy had been observed but no graft rejection or dysfunction was noted. Current immunosuppression included mycophenolate 1000 mg twice daily, tacrolimus 3.5 mg daily.  No prednisone with current regimen.    Patient was seen by [facility] rheumatologist Dr. Lyles in [place] on July 22, 2022.  Rheum History reviewed: Patient presented with stroke in 2016 with weakness and pseudo bulbar involvement resulting in change in affect with emotional instability. He was eventually diagnosed with CNS vasculitis and was treated with mycophenolate after a course of cyclophosphamide.     He had been initially on mycophenolate 2000 mg daily. He had been stable and this was decreased to 1500 mg but in 2021 there was concern with some radiographic worsening and he was increased back to 2000 mg. Repeat brain MRI 7-22 again revealed stability compared with the past 2 MRI studies. Dr. Lyles's impression was of CNS vasculitis, stable on moderate dose mycophenolate.  Recommendation was to continue mycophenolate 1000 mg twice daily.    Today, Dr. Stein and his spouse Arlette relate the series of events leading up to the diagnosis of CNS vasculitis. Spouse states that patient suddenly developed slurred speech and incoordination in Fall 2016.  Initial neurology evaluation revealed multifocal cerebrovascular accident as likely cause of the symptoms. however over the ensuing several months, patient had worsening neurologic symptoms, and subsequent imaging showed increasing ischemic cerebrovascular accident lesions.  He was evaluated at [facility], where a diagnosis of primary CNS angiitis was established.  I do not have primary [facility] records to review today. No brain biopsy was performed. He was treated with intravenous Cytoxan and high-dose corticosteroids.  His neurologic status apparently improved and stabilized. He states that he has had no further cerebrovascular accident either by symptoms or by MRI.  After 2 years of cycling cyclophosphamide, he was transitioned to mycophenolate in 2018.  Moderate dose mycophenolate was continued under the supervision of rheumatology and neurology in [place] until the present time.    In September 2021 he had an episode of loss of consciousness, after which he was diagnosed  with nonischemic cardiomyopathy and an ejection fraction of 18%.  Initially there was concern that previous chemotherapy with cytoxan was a contributor to the cardiomyopathy.  However a genetic form of nonischemic cardiomyopathy was ultimately suspected after comparing the patient's myocardial histopathology with that of the cardiac biopsies obtained from patient's younger a brother who underwent orthotopic heart transplant, and from a second brother who  in his mid 40s from cardiomyopathy.  Despite maximal medical treatment, patient continued with low ejection fraction and declining physical function through .  He was confined to the intensive care unit for many weeks in late  with advanced heart failure, and received orthotopic heart transplant on 2022.    Today the patient and his spouse state that he is overall much better than prior to heart transplant in 2022.  Not only are his exercise tolerance and energy level markedly improved; neurologic symptoms and cognitive function are also better.  Patient's spouse states that he had a very flat affect and muted personality in the weeks leading up to transplant.  Now he is much more lively and interactive and cognitive capacity is significantly improved.  His wife notes that formerly as a consequence of CNS vasculitis, patient experienced pseudobulbar affect of disorder, which has resulted in emotional displays that do not match the patient's stated mood or current thinking.  For example, patient may frequently have brief crying episodes at a time when he states he is happy or in a moment of laughter.  Patient and his spouse state that such emotional displays have reduced in frequency and duration since the heart transplant.  He does have continuing easy fatigability, and tasks requiring large muscle bulk and tone such as rising from a chair without the aid of his arms, are still difficult and limited.  He also has developed a  resting and intention tremor in the right greater than left hands in association with starting tacrolimus as part of his transplant antirejection regimen.  However, he and his spouse deny that new vision changes, unilateral weakness, slurred speech, or sensory symptoms have occurred or worsened since transplant.         Review of Systems:     Constitutional: negative  Skin: negative  Eyes: negative  Ears/Nose/Throat: negative  Respiratory: No shortness of breath, dyspnea on exertion, cough, or hemoptysis  Cardiovascular: negative  Gastrointestinal: negative  Genitourinary: negative  Musculoskeletal: negative  Neurologic: negative  Psychiatric: negative  Hematologic/Lymphatic/Immunologic: negative  Endocrine: negative         Active Problem List:     Patient Active Problem List    Diagnosis Date Noted    Heart replaced by transplant (H) 12/13/2023     Priority: Medium    Lipid screening 12/13/2023     Priority: Medium    Prostate cancer screening 12/13/2023     Priority: Medium    Encounter for long-term (current) use of high-risk medication 12/13/2023     Priority: Medium    Basal cell carcinoma 10/13/2023     Priority: Medium     10/13/23 Scalp and chest      Status post coronary angiogram 01/23/2023     Priority: Medium    Staphylococcus epidermidis bacteremia 01/09/2023     Priority: Medium    Using prophylactic antibiotic on daily basis 01/09/2023     Priority: Medium    Benign neoplasm of colon 12/12/2022     Priority: Medium    Troponin level elevated 12/06/2022     Priority: Medium    Anginal equivalent (H24) 12/06/2022     Priority: Medium    Heart failure, unspecified HF chronicity, unspecified heart failure type (H) 12/06/2022     Priority: Medium    Acute embolism and thrombosis of left peroneal vein (H) 11/22/2022     Priority: Medium    Acute on chronic combined systolic (congestive) and diastolic (congestive) heart failure (H) 11/14/2022     Priority: Medium    CHF (congestive heart failure) (H)  11/10/2022     Priority: Medium    Acute respiratory failure with hypoxia (H) 11/08/2022     Priority: Medium    Coronary artery disease involving native coronary artery of native heart without angina pectoris 11/08/2022     Priority: Medium    Essential hypertension 11/08/2022     Priority: Medium    NSVT (nonsustained ventricular tachycardia) (H) 11/08/2022     Priority: Medium    SOB (shortness of breath) 11/08/2022     Priority: Medium    Syncope and collapse 10/07/2021     Priority: Medium    Cardiomyopathy (H) 10/06/2021     Priority: Medium    Primary central nervous system vasculitis (H24) 11/30/2017     Priority: Medium    Vasculitis, CNS (H24) 11/30/2017     Priority: Medium    Stroke (H) 09/28/2016     Priority: Medium    Hyperlipidemia 08/08/2013     Priority: Medium            Past Medical History:     Past Medical History:   Diagnosis Date    Congestive heart failure (H)      Past Surgical History:   Procedure Laterality Date    COLONOSCOPY N/A 11/17/2022    Procedure: COLONOSCOPY;  Surgeon: Roverto Nino MD;  Location: [facility]    CV CORONARY ANGIOGRAM N/A 11/11/2022    Procedure: Coronary Angiogram;  Surgeon: Justo Bush MD;  Location: [facility] CARDIAC CATH LAB    CV CORONARY ANGIOGRAM N/A 1/23/2023    Procedure: Coronary Angiogram - biplane;  Surgeon: Justo Bush MD;  Location: [facility] CARDIAC CATH LAB    CV CORONARY ANGIOGRAM N/A 12/13/2023    Procedure: Coronary Angiogram;  Surgeon: Wayne Xavier MD;  Location:  HEART CARDIAC CATH LAB    CV HEART BIOPSY N/A 01/09/2023    Procedure: Heart Cath Heart Biopsy;  Surgeon: Tab Agee MD;  Location:  HEART CARDIAC CATH LAB    CV HEART BIOPSY N/A 01/02/2023    Procedure: Heart Biopsy;  Surgeon: Ghulam Mercado MD;  Location:  HEART CARDIAC CATH LAB    CV HEART BIOPSY N/A 1/23/2023    Procedure: Heart Cath Heart Biopsy;  Surgeon: Justo Bush MD;  Location: [facility] CARDIAC  CATH LAB    CV HEART BIOPSY N/A 1/17/2023    Procedure: Heart Cath Heart Biopsy;  Surgeon: Wayne Xavier MD;  Location: [facility] CARDIAC CATH [facility]    CV HEART BIOPSY N/A 2/9/2023    Procedure: Heart Cath Heart Biopsy;  Surgeon: Ghulam Mercado MD;  Location: [facility] CARDI[facility] CATH [facility]    CV HEART BIOPSY N/A 2/21/2023    Procedure: Heart Biopsy;  Surgeon: Wayne Xavier MD;  Location: [facility]ARD[facility] C[facility]    CV HEART BIOPSY N/A 3/9/2023    Procedure: Heart Cath Heart Biopsy;  Surgeon: Tab Agee MD;  Location: [facility] CARD[facility] C[facility]    CV HEART BIOPSY N/A 3/21/2023    Procedure: Heart Cath Heart Biopsy;  Surgeon: Ghulam Mercado MD;  Location: [facility] C[facility]    CV HEART BIOPSY N/A 4/27/2023    Procedure: Heart Cath Heart Biopsy;  Surgeon: Justo Bush MD;  Location: [facility] C[facility]    CV HEART BIOPSY N/A 5/22/2023    Procedure: Heart Cath Heart Biopsy;  Surgeon: Wayne Xavier MD;  Location: [facility]    CV HEART BIOPSY N/A 6/29/2023    Procedure: Heart Biopsy (groin stick- requests sedation);  Surgeon: Wayne Xavier MD;  [facility]    CV HEART BIOPSY N/A 12/13/2023    Procedure: Heart Biopsy;  Surgeon: Wayne Xavier MD;  Location:  HEART CARDIAC CATH LAB    CV INTRAVASULAR ULTRASOUND N/A 1/23/2023    Procedure: Intravascular Ultrasound;  Surgeon: Justo Bush MD;  Location:  HEART CARDIAC CATH LAB    CV RIGHT HEART CATH MEASUREMENTS RECORDED N/A 11/11/2022    Procedure: Right Heart Catheterization;  Surgeon: Justo Bush MD;  [facility]    CV RIGHT HEART CATH MEASUREMENTS RECORDED N/A 12/08/2022    Procedure: Right Heart Catheterization;  Surgeon: Wayne Xavier MD;  [facility]    CV RIGHT HEART CATH MEASUREMENTS RECORDED N/A 12/09/2022    Procedure: Right Heart Catheterization  with leave-in [place];  Surgeon: Khari Mcneill MD;  Location: [facility] CARD[facility]    CV RIGHT HEART CATH MEASUREMENTS RECORDED N/A 01/09/2023    Procedure: Heart Cath Right Heart Cath;  Surgeon: Tab Agee MD;  Location: [facility]    CV RIGHT HEART CATH MEASUREMENTS RECORDED N/A 01/02/2023    Procedure: Right Heart Catheterization;  Surgeon: Ghulam Mercado MD;  Location: [facility]    CV RIGHT HEART CATH MEASUREMENTS RECORDED N/A 1/23/2023    Procedure: Right Heart Catheterization;  Surgeon: Justo Bush MD;  Location: [facility]    CV RIGHT HEART CATH MEASUREMENTS RECORDED N/A 1/17/2023    Procedure: Right Heart Catheterization;  Surgeon: Wayne Xavier MD;  Location: [facility]    CV RIGHT HEART CATH MEASUREMENTS RECORDED N/A 2/9/2023    Procedure: Right Heart Catheterization;  Surgeon: Ghulam Mercado MD;  [facility]: [facility]    CV RIGHT HEART CATH MEASUREMENTS RECORDED N/A 2/21/2023    Procedure: Right Heart Catheterization;  Surgeon: Wayne Xavier MD;  [facility]    CV RIGHT HEART CATH MEASUREMENTS RECORDED N/A 3/9/2023    Procedure: Right Heart Catheterization;  Surgeon: Tab Agee MD;  [facility]: [facility]    CV RIGHT HEART CATH MEASUREMENTS RECORDED N/A 3/21/2023    Procedure: Right Heart Catheterization;  Surgeon: Ghulam Mercado MD;  [facility]    CV RIGHT HEART CATH MEASUREMENTS RECORDED N/A 4/27/2023    Procedure: Right Heart Catheterization;  Surgeon: Justo Bush MD;  [facility]: [facility]    CV RIGHT HEART CATH MEASUREMENTS RECORDED N/A 5/22/2023    Procedure: Right Heart Catheterization;  Surgeon: Wayne Xavier MD;  [facility]: [facility]    CV RIGHT HEART CATH MEASUREMENTS RECORDED N/A 6/29/2023    Procedure: Right Heart Catheterization;   Surgeon: Wayne Xavier MD;  Location:  HEART CARDIAC CATH LAB    CV RIGHT HEART CATH MEASUREMENTS RECORDED N/A 2023    Procedure: Right Heart Catheterization;  Surgeon: Wayne Xavier MD;  Location:  HEART CARDIAC CATH LAB    PICC Right 2023    placed in basilic and no problem    PICC DOUBLE LUMEN PLACEMENT Right 11/10/2022    Right basilic, 43 cm, 1 cm external length    TRANSPLANT HEART RECIPIENT N/A 2022    Procedure: TRANSPLANT, HEART, RECIPIENT; Median Sternotomy, Cardiopulmonary Bypass, Removal of Pacemaker;  Surgeon: Brendan Rodriguez MD;  Location:  OR     #1 primary angiitis of the CNS  2.  Nonischemic cardiomyopathy, heart replaced by transplant 2022       Social History:     Social History     Socioeconomic History    Marital status:      Spouse name: Not on file    Number of children: Not on file    Years of education: Not on file    Highest education level: Not on file   Occupational History    Not on file   Tobacco Use    Smoking status: Never    Smokeless tobacco: Never   Vaping Use    Vaping status: Never Used   Substance and Sexual Activity    Alcohol use: Not Currently    Drug use: Never    Sexual activity: Not on file   Other Topics Concern    Not on file   Social History Narrative    Not on file     Social Determinants of Health     Financial Resource Strain: Not on file   Food Insecurity: Not on file   Transportation Needs: Not on file   Physical Activity: Not on file   Stress: Not on file   Social Connections: Not on file   Interpersonal Safety: Not on file   Housing Stability: Not on file       Retired emergency room physician  1 child has non-Hodgkin's lymphoma  Does not smoke or drink.       Family History:     Family History   Problem Relation Age of Onset    Atrial fibrillation Father     Lung Cancer Brother      2 brothers have cardiomyopathy; 1  in his mid 40s, and the other received heart transplant.    No  "history of autoimmunity, rheumatic disease.    Mother  in mid , had osteoarthritis  Father  in mid  no rheumatic or autoimmune disease.       Allergies:   No Known Allergies         Medications:     Current Outpatient Medications   Medication Sig Dispense Refill     magnesium glycinate lysinate chelate (DOCTOR'S BEST) 100 MG TABS tablet Take 3 tablets (300 mg) by mouth 2 times daily Plus two additional tabs (200 mg) daily Take separate from Mycophenolate. 240 tablet 11    acetaminophen (TYLENOL) 80 MG chewable tablet Take 80 mg by mouth every 4 hours as needed for mild pain or fever      amLODIPine (NORVASC) 5 MG tablet Take 1 tablet (5 mg) by mouth at bedtime 90 tablet 3    aspirin (ASA) 325 MG EC tablet Take 1 tablet (325 mg) by mouth daily 90 tablet 3    calcium carbonate-vitamin D (CALTRATE) 600-10 MG-MCG per tablet Take 1 tablet by mouth 2 times daily (with meals) 180 tablet 3    multivitamin (ONE-DAILY) tablet Take 1 tablet by mouth daily 90 tablet 3    mycophenolate (GENERIC EQUIVALENT) 250 MG capsule Take 4 capsules (1,000 mg) by mouth 2 times daily 240 capsule 11    polyethylene glycol (MIRALAX) 17 g packet Take 1 packet by mouth daily      rosuvastatin (CRESTOR) 10 MG tablet Take 1 tablet (10 mg) by mouth daily 90 tablet 3    tacrolimus (GENERIC EQUIVALENT) 0.5 MG capsule Take one 0.5mg capsule with two 1mg capsules every morning for a total dose of 2.5 mg in the AM and 2mg in the PM 30 capsule 11    tacrolimus (GENERIC EQUIVALENT) 1 MG capsule Take TWO caps (2 mg) with one 0.5mg capsule in the AM and take TWO caps (2mg) in the PM for a total dose of 2.5mg in the AM and 2mg in the PM. 120 capsule 11            Physical Exam:   Blood pressure 113/81, pulse 103, height 1.88 m (6' 2\"), weight 102.1 kg (225 lb).  Wt Readings from Last 6 Encounters:   24 102.1 kg (225 lb)   24 103 kg (227 lb)   23 101.1 kg (222 lb 14.4 oz)   10/13/23 99.4 kg (219 lb 3.2 oz)   10/12/23 98.3 kg " (216 lb 12.8 oz)   08/14/23 99.1 kg (218 lb 6.4 oz)     Body mass index is 28.89 kg/m .  Constitutional: well-developed, appearing stated age; cooperative  Eyes: nl EOM, PERRLA, vision, conjunctiva, sclera  ENT: nl external ears, nose, hearing, lips, teeth, gums, throat  No mucous membrane lesions, normal saliva pool  Neck: no mass or thyroid enlargement  Resp: lungs clear to auscultation,  CV: RRR, no murmurs, rubs or gallops, no edema  Lymph: no cervical, supraclavicular, inguinal or epitrochlear nodes  MS: The TMJ, neck, shoulder, elbow, wrist, MCP/PIP/DIP, spine, hip, knee, ankle, and foot MTP/IP joints were examined and found normal. No active synovitis or altered joint anatomy. Full joint ROM. Normal  strength.  Skin: no nail pitting, alopecia, rash, nodules or lesions  Neuro: Cranial nerves are intact.  There is very mild dysarthria; no discernible memory loss in the course of conversation.  Gait is broad-based and halting.  There is reduced fine tremor in R > L hands and fingers.  Psych: Patient displays several brief tearing up episodes in the course of conversation without reference to painful or emotionally disturbing topics         Data:     @      Latest Ref Rng & Units 10/12/2023     8:56 AM 12/13/2023     7:14 AM 4/18/2024    10:07 AM   RHEUM RESULTS   Albumin 3.5 - 5.2 g/dL  4.1  4.2    ALT 0 - 70 U/L  24  21    AST 0 - 45 U/L  25  22    CK Total 39 - 308 U/L  155  165    Creatinine 0.67 - 1.17 mg/dL 1.24  1.09  1.26    GFR Estimate >60 mL/min/1.73m2 63  73  61    Hematocrit 40.0 - 53.0 % 44.3  46.9  44.9    Hemoglobin 13.3 - 17.7 g/dL 14.3  15.2  14.8    WBC 4.0 - 11.0 10e3/uL 5.9  5.8  5.5    RBC Count 4.40 - 5.90 10e6/uL 5.19  5.41  5.27    RDW 10.0 - 15.0 % 12.5  12.5  12.2    MCHC 31.5 - 36.5 g/dL 32.3  32.4  33.0    MCV 78 - 100 fL 85  87  85    Platelet Count 150 - 450 10e3/uL 251  223  218         ,  ,  ,  ,  ,  ,  ,  ,  ,  ,  ,  ,  ,  ,   Hepatitis B Core Antibody Total   Date Value Ref  Range Status   12/25/2022 Nonreactive Nonreactive Final   ,   Hepatitis B Surface Antigen   Date Value Ref Range Status   12/25/2022 Nonreactive Nonreactive Final   ,  ,  ,  ,   Quantiferon-TB Gold Plus   Date Value Ref Range Status   11/12/2022 Negative Negative Final     Comment:     No interferon gamma response to M.tuberculosis antigens was detected. Infection with M.tuberculosis is unlikely, however a single negative result does not exclude infection. In patients at high risk for infection, a second test should be considered in accordance with the 2017 ATS/IDSA/CDC Clinical Pract  ice Guidelines for Diagnosis of Tuberculosis in Adults and Children      TB1 Ag minus Nil Value   Date Value Ref Range Status   11/12/2022 0.00 IU/mL Final     TB2 Ag minus Nil Value   Date Value Ref Range Status   11/12/2022 0.00 IU/mL Final     Mitogen minus Nil Result   Date Value Ref Range Status   11/12/2022 9.99 IU/mL Final     Nil Result   Date Value Ref Range Status   11/12/2022 0.01 IU/mL Final   ,  ,  ,  ,  ,  ,  ,  ,  ,  ,  ,  ,  ,  ,  ,  ,  ,  ,   Immunoglobulin G   Date Value Ref Range Status   01/10/2023 549 (L) 610 - 1,616 mg/dL Final     Immunoglobulin A   Date Value Ref Range Status   01/10/2023 144 84 - 499 mg/dL Final     Immunoglobulin M   Date Value Ref Range Status   01/10/2023 67 35 - 242 mg/dL Final   ,  ,  ,  ,  ,  ,  ,

## 2024-04-18 NOTE — LETTER
4/18/2024      RE: Paco Stein  [address]  [place] [address]       Dear Colleague,    Thank you for the opportunity to participate in the care of your patient, Paco Stein, at the [facility] [place] [facility]. Please see a copy of my visit note below.    [facility]ANSPL[facility]IC    April 18, 2024      Follow up heart transplant     HPI:   DrRobby Stein is a 67yr old male with a history of HTN, CAD (s/p PCI to LAD in 2013), hyperlipidemia, CNS vasculitis (2013, residual left-sided weakness, on Cytoxan --> CellCept with no further neuro insult), systolic heart failure secondary to NICM (ARVC, diagnosed 2021), sustained VT s/p ICD (1/2022), and CKD now s/p OHT 12/25/22 who presents to clinic for routine follow up       Transplant details:   Serostatus:  - CMV D+/R+  - EBV D+/R+  - Toxo D-/R-    No immunodepression intolerance   No rejection   No graft vasculopathy     This visit:   Overall doing well   Continues to have functional gains   Keeping up to date on health care surveillance   Does have balance problems (exist from previous stroke)   No SOB, no LE edema   No diarrhea     No other health updates       Past medical history no change from prior    Social history no change from prior      Current Outpatient Medications   Medication Sig Dispense Refill     magnesium glycinate lysinate chelate (DOCTOR'S BEST) 100 MG TABS tablet Take 3 tablets (300 mg) by mouth 2 times daily Plus two additional tabs (200 mg) daily Take separate from Mycophenolate. 240 tablet 11    acetaminophen (TYLENOL) 80 MG chewable tablet Take 80 mg by mouth every 4 hours as needed for mild pain or fever      amLODIPine (NORVASC) 5 MG tablet Take 1 tablet (5 mg) by mouth at bedtime 90 tablet 3    aspirin (ASA) 325 MG EC tablet Take 1 tablet (325 mg) by mouth daily 90 tablet 3    calcium carbonate-vitamin D (CALTRATE) 600-10 MG-MCG per tablet Take 1  tablet by mouth 2 times daily (with meals) 180 tablet 3    multivitamin (ONE-DAILY) tablet Take 1 tablet by mouth daily 90 tablet 3    mycophenolate (GENERIC EQUIVALENT) 250 MG capsule Take 4 capsules (1,000 mg) by mouth 2 times daily 240 capsule 11    polyethylene glycol (MIRALAX) 17 g packet Take 1 packet by mouth daily      rosuvastatin (CRESTOR) 10 MG tablet Take 1 tablet (10 mg) by mouth daily 90 tablet 3    tacrolimus (GENERIC EQUIVALENT) 0.5 MG capsule Take one 0.5mg capsule with two 1mg capsules every morning for a total dose of 2.5 mg in the AM and 2mg in the PM 30 capsule 11    tacrolimus (GENERIC EQUIVALENT) 1 MG capsule Take TWO caps (2 mg) with one 0.5mg capsule in the AM and take TWO caps (2mg) in the PM for a total dose of 2.5mg in the AM and 2mg in the PM. 120 capsule 11     All relevant ROS were reviewed in the HPI.     EXAM:  /86 (BP Location: Right arm, Patient Position: Chair, Cuff Size: Adult Large)   Pulse 98   Wt 103 kg (227 lb)   SpO2 98%   BMI 29.15 kg/m    General: appears comfortable, alert and articulate  Head: normocephalic, atraumatic  Eyes: anicteric sclera, EOMI  Neck: no adenopathy  Oropharynx: moist mucosa, no lesions, dentition intact- no thrush   Heart: RRR, normal PMI, normal S1/s2, no s3.s4   Lungs: clear, no rales or wheezing  Abdomen: soft, non-tender, bowel sounds present, no hepatosplenomegaly  Extremities: no clubbing, cyanosis or edema  Neurological: speech fluency is at baseline, able to walk without assistance   Psych: stable emotional lability       Echo:   Interpretation Summary  Left ventricular size, wall motion and function are normal. The ejection  fraction is > 65%.     The right ventricle is normal size. Global right ventricular function is  normal.     No significant valvular abnormalities present.     No pericardial effusion is present.     The inferior vena cava is normal.     There is mild dilation of the aorta. Sinuses of Valsalva 4.0 cm.      Compared to prior imaging on 10/12/2023 there is now criteria for dilation of  the aortic root.    Last immuknow: 438        Latest Reference Range & Units 04/18/24 10:07   Sodium 135 - 145 mmol/L 142   Potassium 3.4 - 5.3 mmol/L 4.5   Chloride 98 - 107 mmol/L 106   Carbon Dioxide (CO2) 22 - 29 mmol/L 28   Urea Nitrogen 8.0 - 23.0 mg/dL 20.1   Creatinine 0.67 - 1.17 mg/dL 1.26 (H)   GFR Estimate >60 mL/min/1.73m2 61   Calcium 8.8 - 10.2 mg/dL 9.5   Anion Gap 7 - 15 mmol/L 8   Magnesium 1.7 - 2.3 mg/dL 1.5 (L)   Phosphorus 2.5 - 4.5 mg/dL 2.8   Albumin 3.5 - 5.2 g/dL 4.2   Protein Total 6.4 - 8.3 g/dL 6.6   Alkaline Phosphatase 40 - 150 U/L 63   ALT 0 - 70 U/L 21   AST 0 - 45 U/L 22   (H): Data is abnormally high  (L): Data is abnormally low      Assessment and recommendations:    NICM, s/p OHT 12/25/22  His post-operative course has been c/b delirium, leukocytosis, staph epi bacteremia, and prolonged CT output.  He discharged 1/13.    Graph function is normal, other than complement deposition there is been no rejection, baseline coronary angiogram with mild native coronary disease (proximal LAD 0.2 mm)    Overall doing very well with normal graft function     Serostatus:  - CMV D+/R+  - EBV D+/R+  - Toxo D-/R-     Immunosuppression:  - keep MMF 1000 BID.  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    Aortic root dilation: very slight- will monitor in serial echos     Frailty deconditioning- improved, encourage,ent provided       Please do not hesitate to contact me if you have any  questions/concerns.     Sincerely,     Myrtle Dominguez MD    CC  Patient Care Team:  Quentin Odonnell as PCP - General

## 2024-04-18 NOTE — PATIENT INSTRUCTIONS
Please start Aldara daily for 6 weeks. It comes in little packets - you only need to use a small amount from each packet to apply thin layer to skin cancer site. You can use a q-tip to get the small amount out of the packet. We expect irritation to occur but if irritation is too painful, you can stop early. We should check this spot about 2-3 months after treatment to make sure it responded.    Aldara Treatment  Today you are being prescribed Imiquimod (Aldara), a topical cream.  The medication is working to eliminate the unhealthy cells. Even though this treatment may be unattractive and somewhat uncomfortable, this is actually a GOOD sign, because it means that the medicine is successfully triggering an immune response to attack the treatment target. If the areas become uncomfortable to an intolerable degree, it is ok to pause treatment until the area is comfortable and then resume treatment.    You may experience some mild discomfort while being treated.  You will want to stop any other creams such as glycolic acid products, retin A, Tazorac, etc. to the area. You may use bland makeup/cover-up as long as it doesn't sting or cause you discomfort.  Apply the cream as instructed by your physician. Use a cotton-tipped applicator, or use gloves if applying it with your fingertips. If applied with unprotected fingertips, it is important to wash your hands well after you apply this medicine.   Keep medication away from pets.  Avoid excessive sun exposure to the treated area.  You may use moisturizing creams over bothersome areas such as Vanicream or Cetaphil cream if the reaction becomes too bothersome. Please, call the clinic if this occurs.     Potential Side Effects  Your treated areas may be unsightly during therapy.  This will improve slowly following the discontinuation of therapy.   During the first week of application, mild inflammation may occur.   During the following weeks, redness, and swelling may occur  with some crusting and burning.   Lesions resolve as the skin exfoliates.   Over 1 to 2 weeks, new skin grows into the treatment area.   Specific side effects that usually do not require medical attention (report to your doctor or health care professional if they continue or are bothersome) include:   Red or dark-colored skin   Mild erosion (loss of upper layer of skin)   Mild eye irritation including burning, itching, sensitivity, stinging, or watering   Increased sensitivity of the skin to sun and ultraviolet light   Pain and burning of the affected area   Dryness, scaling or swelling of the affected area   Skin rash, itching of the affected area   Tenderness   Who should I call with questions?  [facility]: [phone]  [facility]: [phone]  For urgent needs outside of business hours call the [facility] at [phone] and ask for the dermatology resident on call

## 2024-04-18 NOTE — NURSING NOTE
Dermatology Rooming Note    Paco Stein's goals for this visit include:   Chief Complaint   Patient presents with    Derm Problem     FBSE; post MOHs Cole Cayard, EMT-B

## 2024-04-18 NOTE — NURSING NOTE
Transplant Coordinator Note  Reason for visit 1 yr 4 month follow up post transplant      Health concerns addressed today  Pt with wife seen in clinic with Dr Dominguez. MD reviewed meds, available labs, and ECHO. Pt reports doing well. Reviewed ECHO - noted mild dilation of the aorta. No changes; cont to monitor. Wght and BP stable. Walking ~3 days per week ~ 0.5 to 1 mile. Attributes his unsteady gait to his neuro status.        Immunosuppressants  MMF 1000/1000 mg  Tac 2.5/2 mg  - goal 6-8. Level pending        No DSAs      D CMV - ; R CMV + ; D EBV +; R EBV + ; D Toxo - ; R Toxo -     Routine screenings  Derm Mohs scheduled 12/14  Dental UTD  Colonoscopy  Nov 2022 (no specimens)   Prostate 1.12  Eye UTD  VACCINES  Flu fall 2023 per pt   RSV  - pt declined  Pneumonia UTD  COVID - pt declined   Shingrix 2/2     Medication record reviewed and reconciled. Questions and concerns addressed. AVS reviewed. Pt verbalized an understanding of plan of care.      Patient Instructions  ~Please call your transplant coordinator at [phone] with any questions or concerns.  Please note: after hours, weekends and holidays, this phone number is routed to an  to page out the coordinator on call.   ~Coordinator will update you on remaining results.   ~Next lab draw: Pending today's results   ~Return Aug 21 as scheduled

## 2024-04-19 ENCOUNTER — OFFICE VISIT (OUTPATIENT)
Dept: RHEUMATOLOGY | Facility: CLINIC | Age: 69
End: 2024-04-19
Attending: INTERNAL MEDICINE
Payer: MEDICARE

## 2024-04-19 VITALS
DIASTOLIC BLOOD PRESSURE: 81 MMHG | HEART RATE: 103 BPM | SYSTOLIC BLOOD PRESSURE: 113 MMHG | WEIGHT: 225 LBS | BODY MASS INDEX: 28.88 KG/M2 | HEIGHT: 74 IN

## 2024-04-19 DIAGNOSIS — I77.6 PRIMARY ANGIITIS OF CENTRAL NERVOUS SYSTEM (H): Primary | ICD-10-CM

## 2024-04-19 PROCEDURE — G2211 COMPLEX E/M VISIT ADD ON: HCPCS | Performed by: INTERNAL MEDICINE

## 2024-04-19 PROCEDURE — G0463 HOSPITAL OUTPT CLINIC VISIT: HCPCS | Performed by: INTERNAL MEDICINE

## 2024-04-19 PROCEDURE — 99214 OFFICE O/P EST MOD 30 MIN: CPT | Performed by: INTERNAL MEDICINE

## 2024-04-19 ASSESSMENT — PAIN SCALES - GENERAL: PAINLEVEL: NO PAIN (0)

## 2024-04-19 NOTE — LETTER
4/19/2024       RE: Paco Stein  [address]  [place] [address]     Dear Colleague,    Thank you for referring your patient, Paco Stein, to the [facility] [place] at [facility]. Please see a copy of my visit note below.    Rheumatology Clinic Visit  [facility]  Jose Liu M.D.     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  Exam today remarkable for mild dysarthria, broad-based gait, and reduced intermittent brief crying episodes, with fine bilateral upper extremity tremor.    Data: In April 2024 comprehensive metabolic panel notable for stable elevated creatinine 1.21. CBC was normal.     Imaging:   MRI of the brain on September 18, 2023 showed extensive white matter T2 hyperintensities not significantly changed since 3-2023; new T2 signal near the left mastoid air cells raising potential for otomastoiditis.   cardiac MRI showed no late gadolinium enhancement suggestive of fibrosis or infiltrative disease.    Discussion: Primary angiitis of the CNS is inactive based on stable improvement in cognition and affect control, no progression of prexisting motor or sensory defects, and on brain imaging showing no progression of previously noted white matter changes, and no new lesions that correlate with clinical symptoms/signs. Tremor intensity has decreased, associated with reduced dose of  "tacrolimus since last visit.    Primary angiitis of the CNS may still recur or relapse, and may do so without dramatic or sudden neurologic worsening. We discussed that I agree with Neurology recommendations for brain MRI annually to aid surveillance.    No additional immunomodulatory therapy is warranted. Patient continues moderate dose mycophenolate (2000 mg daily) as a component of antirejection regimen; dose is appropriate for suppressing and reducing the likelihood of CNS vasculitis relapse.  While using mycophenolate, patient should undergo every 4-month measurement of CBC with platelets, AST, ALT, and albumin.    # Nonischemic cardiomyopathy, status post orthotopic heart transplant December 25, 2022: doing well, followed by Dr. Dominguez, SOT    RTC ~8 mos, dovetailing with planned [facility] Cardiology followup    Jose Liu MD  Staff Rheumatologist, [facility]    On the day of the encounter, a total of 31 minutes was spent in chart review, and in counseling and coordination of care, regarding the patient's complex medical problem of primary angiitis of the CNS, and orthotopic heart transplant.    Orders Placed This Encounter   Procedures     MR Brain and Orbits w/o & w Contrast              History of Present Illness:   Paco Stein follows up for CNS vasculitis.  He was last seen in October 2023.  At that visit, primary angiitis of the CNS was judged inactive.  Recommendation was for continued surveillance with intermittent MRIs of the brain.    Interval history April 19, 2024    Today, he reports doing well. His spouse Arlette endorses that ADLs, including daily walks [facility] are continuing unimpeded.  Notes some challenges with\"transitioins\" getting up and down from chairs, changing walking environments/terrain. He goes out daily, works out at [employer] several days weekly, shops for grocery, cooks supper for family, interacts. Does take a nap midday.  Still has pseudobulbar affective " symptoms (easy crying), often associated with emotionally-engaging topics of discussion.  Tremor is improved, he associates with decreased tacrolimus dose. Writing and shirt-buttoning are improved. No joint pain, skin rash, visual changes, HA/ear pain.    Interval history October 13 2023    Patient was seen by Dr. Dominguez in Cardiology on October 12, 2023 in follow-up of heart transplant performed in December 2022.  Impression was of orthotopic heart transplant doing very well.  No rejection noted other than complement deposition and previous myocardial biopsies.  Recommendation was to repeat coronary angiography and right heart catheterization and consider switch to amatory intervention afterwards.    Today, he reports doing well. His spouse Arlette endorses. Complexity and intensity of ADLs has increased since last visit.  He goes out daily, works out at [employer] several days weekly, shops for grocery, cooks supper for family, interacts. Does take a nap midday.  Still has pseudobulbar affective symptoms (easy crying), often associated with emotionally-engaging topics of discussion.  Tremor is improved, he associates with decreased tacrolimus dose. Writing and shirt-buttoning are improved. No joint pain, skin rash, visual changes, HA/ear pain.    Initial history May 23, 2023    At today's visit, patient is able to communicate at a high level and his wife is extremely helpful in adding details of past medical history as well as providing observations about patient's progress over many months.    Patient was seen by provider Charlie on May 22, 2023 in follow-up of heart transplant.  Impression was of orthotopic heart transplant performed in December 2022.  Transplant was performed for nonischemic cardiomyopathy with sustained ventricular tachycardia.  Comorbidities include hypertension, coronary artery disease status post percutaneous angioplasty in 2013, hyperlipidemia, and CKD.  Complement protein deposits on recent  endomyocardial biopsy had been observed but no graft rejection or dysfunction was noted. Current immunosuppression included mycophenolate 1000 mg twice daily, tacrolimus 3.5 mg daily.  No prednisone with current regimen.    Patient was seen by [facility] rheumatologist Dr. Lyles in [place] on July 22, 2022. Rheum History reviewed: Patient presented with stroke in 2016 with weakness and pseudo bulbar involvement resulting in change in affect with emotional instability. He was eventually diagnosed with CNS vasculitis and was treated with mycophenolate after a course of cyclophosphamide.     He had been initially on mycophenolate 2000 mg daily. He had been stable and this was decreased to 1500 mg but in 2021 there was concern with some radiographic worsening and he was increased back to 2000 mg. Repeat brain MRI 7-22 again revealed stability compared with the past 2 MRI studies. Dr. Lyles's impression was of CNS vasculitis, stable on moderate dose mycophenolate.  Recommendation was to continue mycophenolate 1000 mg twice daily.    Today, Dr. Stein and his spouse Arlette relate the series of events leading up to the diagnosis of CNS vasculitis. Spouse states that patient suddenly developed slurred speech and incoordination in Fall 2016.  Initial neurology evaluation revealed multifocal cerebrovascular accident as likely cause of the symptoms. however over the ensuing several months, patient had worsening neurologic symptoms, and subsequent imaging showed increasing ischemic cerebrovascular accident lesions.  He was evaluated at [facility], where a diagnosis of primary CNS angiitis was established.  I do not have primary [facility] records to review today. No brain biopsy was performed. He was treated with intravenous Cytoxan and high-dose corticosteroids.  His neurologic status apparently improved and stabilized. He states that he has had no further cerebrovascular accident either by symptoms or by MRI.   After 2 years of cycling cyclophosphamide, he was transitioned to mycophenolate in 2018.  Moderate dose mycophenolate was continued under the supervision of rheumatology and neurology in [place] until the present time.    In 2021 he had an episode of loss of consciousness, after which he was diagnosed with nonischemic cardiomyopathy and an ejection fraction of 18%.  Initially there was concern that previous chemotherapy with cytoxan was a contributor to the cardiomyopathy.  However a genetic form of nonischemic cardiomyopathy was ultimately suspected after comparing the patient's myocardial histopathology with that of the cardiac biopsies obtained from patient's younger a brother who underwent orthotopic heart transplant, and from a second brother who  in his mid 40s from cardiomyopathy.  Despite maximal medical treatment, patient continued with low ejection fraction and declining physical function through .  He was confined to the intensive care unit for many weeks in late  with advanced heart failure, and received orthotopic heart transplant on 2022.    Today the patient and his spouse state that he is overall much better than prior to heart transplant in 2022.  Not only are his exercise tolerance and energy level markedly improved; neurologic symptoms and cognitive function are also better.  Patient's spouse states that he had a very flat affect and muted personality in the weeks leading up to transplant.  Now he is much more lively and interactive and cognitive capacity is significantly improved.  His wife notes that formerly as a consequence of CNS vasculitis, patient experienced pseudobulbar affect of disorder, which has resulted in emotional displays that do not match the patient's stated mood or current thinking.  For example, patient may frequently have brief crying episodes at a time when he states he is happy or in a moment of laughter.  Patient and his  spouse state that such emotional displays have reduced in frequency and duration since the heart transplant.  He does have continuing easy fatigability, and tasks requiring large muscle bulk and tone such as rising from a chair without the aid of his arms, are still difficult and limited.  He also has developed a resting and intention tremor in the right greater than left hands in association with starting tacrolimus as part of his transplant antirejection regimen.  However, he and his spouse deny that new vision changes, unilateral weakness, slurred speech, or sensory symptoms have occurred or worsened since transplant.         Review of Systems:     Constitutional: negative  Skin: negative  Eyes: negative  Ears/Nose/Throat: negative  Respiratory: No shortness of breath, dyspnea on exertion, cough, or hemoptysis  Cardiovascular: negative  Gastrointestinal: negative  Genitourinary: negative  Musculoskeletal: negative  Neurologic: negative  Psychiatric: negative  Hematologic/Lymphatic/Immunologic: negative  Endocrine: negative         Active Problem List:     Patient Active Problem List    Diagnosis Date Noted     Heart replaced by transplant (H) 12/13/2023     Priority: Medium     Lipid screening 12/13/2023     Priority: Medium     Prostate cancer screening 12/13/2023     Priority: Medium     Encounter for long-term (current) use of high-risk medication 12/13/2023     Priority: Medium     Basal cell carcinoma 10/13/2023     Priority: Medium     10/13/23 Scalp and chest       Status post coronary angiogram 01/23/2023     Priority: Medium     Staphylococcus epidermidis bacteremia 01/09/2023     Priority: Medium     Using prophylactic antibiotic on daily basis 01/09/2023     Priority: Medium     Benign neoplasm of colon 12/12/2022     Priority: Medium     Troponin level elevated 12/06/2022     Priority: Medium     Anginal equivalent (H24) 12/06/2022     Priority: Medium     Heart failure, unspecified HF chronicity,  unspecified heart failure type (H) 12/06/2022     Priority: Medium     Acute embolism and thrombosis of left peroneal vein (H) 11/22/2022     Priority: Medium     Acute on chronic combined systolic (congestive) and diastolic (congestive) heart failure (H) 11/14/2022     Priority: Medium     CHF (congestive heart failure) (H) 11/10/2022     Priority: Medium     Acute respiratory failure with hypoxia (H) 11/08/2022     Priority: Medium     Coronary artery disease involving native coronary artery of native heart without angina pectoris 11/08/2022     Priority: Medium     Essential hypertension 11/08/2022     Priority: Medium     NSVT (nonsustained ventricular tachycardia) (H) 11/08/2022     Priority: Medium     SOB (shortness of breath) 11/08/2022     Priority: Medium     Syncope and collapse 10/07/2021     Priority: Medium     Cardiomyopathy (H) 10/06/2021     Priority: Medium     Primary central nervous system vasculitis (H24) 11/30/2017     Priority: Medium     Vasculitis, CNS (H24) 11/30/2017     Priority: Medium     Stroke (H) 09/28/2016     Priority: Medium     Hyperlipidemia 08/08/2013     Priority: Medium            Past Medical History:     Past Medical History:   Diagnosis Date     Congestive heart failure (H)      Past Surgical History:   Procedure Laterality Date     COLONOSCOPY N/A 11/17/2022    Procedure: COLONOSCOPY;  Surgeon: Roverto Nino MD;  Location:  GI     CV CORONARY ANGIOGRAM N/A 11/11/2022    Procedure: Coronary Angiogram;  Surgeon: Justo Bush MD;  Location: [facility] CARDIAC CATH LAB     CV CORONARY ANGIOGRAM N/A 1/23/2023    Procedure: Coronary Angiogram - biplane;  Surgeon: Justo Bush MD;  Location: [facility] CARDIAC CATH LAB     CV CORONARY ANGIOGRAM N/A 12/13/2023    Procedure: Coronary Angiogram;  Surgeon: Wayne Xavier MD;  Location: [facility] CARDIAC CATH LAB     CV HEART BIOPSY N/A 01/09/2023    Procedure: Heart Cath Heart Biopsy;   Surgeon: Tab Agee MD;  [facility]     CV HEART BIOPSY N/A 01/02/2023    Procedure: Heart Biopsy;  Surgeon: Ghulam Mercado MD;  [facility]     CV HEART BIOPSY N/A 1/23/2023    Procedure: Heart Cath Heart Biopsy;  Surgeon: Justo Bush MD;  [facility]     CV HEART BIOPSY N/A 1/17/2023    Procedure: Heart Cath Heart Biopsy;  Surgeon: Wayne Xavier MD;  [facility]     CV HEART BIOPSY N/A 2/9/2023    Procedure: Heart Cath Heart Biopsy;  Surgeon: Ghulam Mercado MD;  [facility]     CV HEART BIOPSY N/A 2/21/2023    Procedure: Heart Biopsy;  Surgeon: Wayne Xavier MD;  [facility]     CV HEART BIOPSY N/A 3/9/2023    Procedure: Heart Cath Heart Biopsy;  Surgeon: Tab Agee MD;  [facility]     CV HEART BIOPSY N/A 3/21/2023    Procedure: Heart Cath Heart Biopsy;  Surgeon: Ghulam Mercado MD;  Location:  HEART CARDIAC CATH LAB     CV HEART BIOPSY N/A 4/27/2023    Procedure: Heart Cath Heart Biopsy;  Surgeon: Justo Bush MD;  [facility]     CV HEART BIOPSY N/A 5/22/2023    Procedure: Heart Cath Heart Biopsy;  Surgeon: Wayne Xavier MD;  Location:  HEART CARDIAC CATH LAB     CV HEART BIOPSY N/A 6/29/2023    Procedure: Heart Biopsy (groin stick- requests sedation);  Surgeon: Wayne Xavier MD;  Location:  HEART CARDIAC CATH LAB     CV HEART BIOPSY N/A 12/13/2023    Procedure: Heart Biopsy;  Surgeon: Wayne Xavier MD;  Location:  HEART CARDIAC CATH LAB     CV INTRAVASULAR ULTRASOUND N/A 1/23/2023    Procedure: Intravascular Ultrasound;  Surgeon: Justo Bush MD;  Location:  HEART CARDIAC CATH LAB     CV RIGHT HEART CATH MEASUREMENTS RECORDED N/A 11/11/2022    Procedure: Right Heart Catheterization;   Surgeon: Justo Bush MD;  [facility]     CV RIGHT HEART CATH MEASUREMENTS RECORDED N/A 12/08/2022    Procedure: Right Heart Catheterization;  Surgeon: Wayne Xavier MD;  [facility]     CV RIGHT HEART CATH MEASUREMENTS RECORDED N/A 12/09/2022    Procedure: Right Heart Catheterization with leave-in [place];  Surgeon: Khari Mcneill MD;  [facility]     CV RIGHT HEART CATH MEASUREMENTS RECORDED N/A 01/09/2023    Procedure: Heart Cath Right Heart Cath;  Surgeon: Tab Agee MD;  [facility]     CV RIGHT HEART CATH MEASUREMENTS RECORDED N/A 01/02/2023    Procedure: Right Heart Catheterization;  Surgeon: Ghulam Mercado MD;  [facility]     CV RIGHT HEART CATH MEASUREMENTS RECORDED N/A 1/23/2023    Procedure: Right Heart Catheterization;  Surgeon: Justo Bush MD;  [facility]     CV RIGHT HEART CATH MEASUREMENTS RECORDED N/A 1/17/2023    Procedure: Right Heart Catheterization;  Surgeon: Wayne Xavier MD;  [facility]     CV RIGHT HEART CATH MEASUREMENTS RECORDED N/A 2/9/2023    Procedure: Right Heart Catheterization;  Surgeon: Ghulam Mercado MD;  [facility]     CV RIGHT HEART CATH MEASUREMENTS RECORDED N/A 2/21/2023    Procedure: Right Heart Catheterization;  Surgeon: Wayne Xavier MD;  [facility]     CV RIGHT HEART CATH MEASUREMENTS RECORDED N/A 3/9/2023    Procedure: Right Heart Catheterization;  Surgeon: Tab Agee MD;  [facility]     CV RIGHT HEART CATH MEASUREMENTS RECORDED N/A 3/21/2023    Procedure: Right Heart Catheterization;  Surgeon: Ghulam Mercado MD;  [facility]     CV RIGHT HEART CATH MEASUREMENTS RECORDED N/A 4/27/2023    Procedure: Right Heart  Catheterization;  Surgeon: Justo Bush MD;  Location:  HEART CARDIAC CATH LAB     CV RIGHT HEART CATH MEASUREMENTS RECORDED N/A 5/22/2023    Procedure: Right Heart Catheterization;  Surgeon: Wayne Xavier MD;  Location:  HEART CARDIAC CATH LAB     CV RIGHT HEART CATH MEASUREMENTS RECORDED N/A 6/29/2023    Procedure: Right Heart Catheterization;  Surgeon: Wayne Xavier MD;  Location:  HEART CARDIAC CATH LAB     CV RIGHT HEART CATH MEASUREMENTS RECORDED N/A 12/13/2023    Procedure: Right Heart Catheterization;  Surgeon: Wayne Xavier MD;  Location:  HEART CARDIAC CATH LAB     PICC Right 01/12/2023    placed in basilic and no problem     PICC DOUBLE LUMEN PLACEMENT Right 11/10/2022    Right basilic, 43 cm, 1 cm external length     TRANSPLANT HEART RECIPIENT N/A 12/25/2022    Procedure: TRANSPLANT, HEART, RECIPIENT; Median Sternotomy, Cardiopulmonary Bypass, Removal of Pacemaker;  Surgeon: Brendan Rodriguez MD;  Location:  OR     #1 primary angiitis of the CNS  2.  Nonischemic cardiomyopathy, heart replaced by transplant December 2022       Social History:     Social History     Socioeconomic History     Marital status:      Spouse name: Not on file     Number of children: Not on file     Years of education: Not on file     Highest education level: Not on file   Occupational History     Not on file   Tobacco Use     Smoking status: Never     Smokeless tobacco: Never   Vaping Use     Vaping status: Never Used   Substance and Sexual Activity     Alcohol use: Not Currently     Drug use: Never     Sexual activity: Not on file   Other Topics Concern     Not on file   Social History Narrative     Not on file     Social Determinants of Health     Financial Resource Strain: Not on file   Food Insecurity: Not on file   Transportation Needs: Not on file   Physical Activity: Not on file   Stress: Not on file   Social Connections: Not on file   Interpersonal  Safety: Not on file   Housing Stability: Not on file       Retired emergency room physician  1 child has non-Hodgkin's lymphoma  Does not smoke or drink.       Family History:     Family History   Problem Relation Age of Onset     Atrial fibrillation Father      Lung Cancer Brother      2 brothers have cardiomyopathy; 1  in his mid 40s, and the other received heart transplant.    No history of autoimmunity, rheumatic disease.    Mother  in mid 90s, had osteoarthritis  Father  in mid 90s no rheumatic or autoimmune disease.       Allergies:   No Known Allergies         Medications:     Current Outpatient Medications   Medication Sig Dispense Refill      magnesium glycinate lysinate chelate (DOCTOR'S BEST) 100 MG TABS tablet Take 3 tablets (300 mg) by mouth 2 times daily Plus two additional tabs (200 mg) daily Take separate from Mycophenolate. 240 tablet 11     acetaminophen (TYLENOL) 80 MG chewable tablet Take 80 mg by mouth every 4 hours as needed for mild pain or fever       amLODIPine (NORVASC) 5 MG tablet Take 1 tablet (5 mg) by mouth at bedtime 90 tablet 3     aspirin (ASA) 325 MG EC tablet Take 1 tablet (325 mg) by mouth daily 90 tablet 3     calcium carbonate-vitamin D (CALTRATE) 600-10 MG-MCG per tablet Take 1 tablet by mouth 2 times daily (with meals) 180 tablet 3     multivitamin (ONE-DAILY) tablet Take 1 tablet by mouth daily 90 tablet 3     mycophenolate (GENERIC EQUIVALENT) 250 MG capsule Take 4 capsules (1,000 mg) by mouth 2 times daily 240 capsule 11     polyethylene glycol (MIRALAX) 17 g packet Take 1 packet by mouth daily       rosuvastatin (CRESTOR) 10 MG tablet Take 1 tablet (10 mg) by mouth daily 90 tablet 3     tacrolimus (GENERIC EQUIVALENT) 0.5 MG capsule Take one 0.5mg capsule with two 1mg capsules every morning for a total dose of 2.5 mg in the AM and 2mg in the PM 30 capsule 11     tacrolimus (GENERIC EQUIVALENT) 1 MG capsule Take TWO caps (2 mg) with one 0.5mg capsule in  "the AM and take TWO caps (2mg) in the PM for a total dose of 2.5mg in the AM and 2mg in the PM. 120 capsule 11            Physical Exam:   Blood pressure 113/81, pulse 103, height 1.88 m (6' 2\"), weight 102.1 kg (225 lb).  Wt Readings from Last 6 Encounters:   04/19/24 102.1 kg (225 lb)   04/18/24 103 kg (227 lb)   12/13/23 101.1 kg (222 lb 14.4 oz)   10/13/23 99.4 kg (219 lb 3.2 oz)   10/12/23 98.3 kg (216 lb 12.8 oz)   08/14/23 99.1 kg (218 lb 6.4 oz)     Body mass index is 28.89 kg/m .  Constitutional: well-developed, appearing stated age; cooperative  Eyes: nl EOM, PERRLA, vision, conjunctiva, sclera  ENT: nl external ears, nose, hearing, lips, teeth, gums, throat  No mucous membrane lesions, normal saliva pool  Neck: no mass or thyroid enlargement  Resp: lungs clear to auscultation,  CV: RRR, no murmurs, rubs or gallops, no edema  Lymph: no cervical, supraclavicular, inguinal or epitrochlear nodes  MS: The TMJ, neck, shoulder, elbow, wrist, MCP/PIP/DIP, spine, hip, knee, ankle, and foot MTP/IP joints were examined and found normal. No active synovitis or altered joint anatomy. Full joint ROM. Normal  strength.  Skin: no nail pitting, alopecia, rash, nodules or lesions  Neuro: Cranial nerves are intact.  There is very mild dysarthria; no discernible memory loss in the course of conversation.  Gait is broad-based and halting.  There is reduced fine tremor in R > L hands and fingers.  Psych: Patient displays several brief tearing up episodes in the course of conversation without reference to painful or emotionally disturbing topics         Data:     @      Latest Ref Rng & Units 10/12/2023     8:56 AM 12/13/2023     7:14 AM 4/18/2024    10:07 AM   RHEUM RESULTS   Albumin 3.5 - 5.2 g/dL  4.1  4.2    ALT 0 - 70 U/L  24  21    AST 0 - 45 U/L  25  22    CK Total 39 - 308 U/L  155  165    Creatinine 0.67 - 1.17 mg/dL 1.24  1.09  1.26    GFR Estimate >60 mL/min/1.73m2 63  73  61    Hematocrit 40.0 - 53.0 % 44.3  " 46.9  44.9    Hemoglobin 13.3 - 17.7 g/dL 14.3  15.2  14.8    WBC 4.0 - 11.0 10e3/uL 5.9  5.8  5.5    RBC Count 4.40 - 5.90 10e6/uL 5.19  5.41  5.27    RDW 10.0 - 15.0 % 12.5  12.5  12.2    MCHC 31.5 - 36.5 g/dL 32.3  32.4  33.0    MCV 78 - 100 fL 85  87  85    Platelet Count 150 - 450 10e3/uL 251  223  218         ,  ,  ,  ,  ,  ,  ,  ,  ,  ,  ,  ,  ,  ,   Hepatitis B Core Antibody Total   Date Value Ref Range Status   12/25/2022 Nonreactive Nonreactive Final   ,   Hepatitis B Surface Antigen   Date Value Ref Range Status   12/25/2022 Nonreactive Nonreactive Final   ,  ,  ,  ,   Quantiferon-TB Gold Plus   Date Value Ref Range Status   11/12/2022 Negative Negative Final     Comment:     No interferon gamma response to M.tuberculosis antigens was detected. Infection with M.tuberculosis is unlikely, however a single negative result does not exclude infection. In patients at high risk for infection, a second test should be considered in accordance with the 2017 ATS/IDSA/CDC Clinical Pract  ice Guidelines for Diagnosis of Tuberculosis in Adults and Children      TB1 Ag minus Nil Value   Date Value Ref Range Status   11/12/2022 0.00 IU/mL Final     TB2 Ag minus Nil Value   Date Value Ref Range Status   11/12/2022 0.00 IU/mL Final     Mitogen minus Nil Result   Date Value Ref Range Status   11/12/2022 9.99 IU/mL Final     Nil Result   Date Value Ref Range Status   11/12/2022 0.01 IU/mL Final   ,  ,  ,  ,  ,  ,  ,  ,  ,  ,  ,  ,  ,  ,  ,  ,  ,  ,   Immunoglobulin G   Date Value Ref Range Status   01/10/2023 549 (L) 610 - 1,616 mg/dL Final     Immunoglobulin A   Date Value Ref Range Status   01/10/2023 144 84 - 499 mg/dL Final     Immunoglobulin M   Date Value Ref Range Status   01/10/2023 67 35 - 242 mg/dL Final   ,  ,  ,  ,  ,  ,  ,         Again, thank you for allowing me to participate in the care of your patient.      Sincerely,    Jose Liu MD

## 2024-04-22 LAB
ALLOMAP SCORE (EXTERNAL): 29 (ref 0–40)
NEGATIVE PREDICTIVE VALUE PERCENT (EXTERNAL): 99 %
PATH REPORT.COMMENTS IMP SPEC: ABNORMAL
PATH REPORT.COMMENTS IMP SPEC: ABNORMAL
PATH REPORT.COMMENTS IMP SPEC: YES
PATH REPORT.FINAL DX SPEC: ABNORMAL
PATH REPORT.GROSS SPEC: ABNORMAL
PATH REPORT.MICROSCOPIC SPEC OTHER STN: ABNORMAL
PATH REPORT.RELEVANT HX SPEC: ABNORMAL
POSITIVE PREDICTIVE VALUE PERCENT (EXTERNAL): 2.1 %

## 2024-04-22 RX ORDER — IMIQUIMOD 12.5 MG/.25G
CREAM TOPICAL
Qty: 24 PACKET | Refills: 3 | Status: SHIPPED | OUTPATIENT
Start: 2024-04-22

## 2024-05-09 ENCOUNTER — TELEPHONE (OUTPATIENT)
Dept: DERMATOLOGY | Facility: CLINIC | Age: 69
End: 2024-05-09
Payer: MEDICARE

## 2024-05-09 NOTE — TELEPHONE ENCOUNTER
Left Voicemail (1st Attempt) and Sent Mychart (1st Attempt) for the patient to call back and schedule the following:        Appointment type: Return  Provider: Dr. Hastings  Return date: 8/22/24  Specialty phone number: [phone]

## 2024-05-10 ENCOUNTER — TELEPHONE (OUTPATIENT)
Dept: DERMATOLOGY | Facility: CLINIC | Age: 69
End: 2024-05-10
Payer: MEDICARE

## 2024-05-10 NOTE — TELEPHONE ENCOUNTER
Patient Contacted and schedule the following:    Appointment type: Return  Provider: Dr. Lema  Return date: 8/22/24  Specialty phone number: [phone]

## 2024-05-28 DIAGNOSIS — Z94.1 HEART REPLACED BY TRANSPLANT (H): ICD-10-CM

## 2024-05-28 RX ORDER — MYCOPHENOLATE MOFETIL 250 MG/1
1000 CAPSULE ORAL 2 TIMES DAILY
Qty: 240 CAPSULE | Refills: 11 | Status: SHIPPED | OUTPATIENT
Start: 2024-05-28

## 2024-05-28 RX ORDER — MYCOPHENOLATE MOFETIL 250 MG/1
1000 CAPSULE ORAL
Qty: 240 CAPSULE | Refills: 11 | Status: CANCELLED | OUTPATIENT
Start: 2024-05-28

## 2024-05-28 RX ORDER — MYCOPHENOLATE MOFETIL 500 MG/1
1000 TABLET ORAL 2 TIMES DAILY
Qty: 360 TABLET | Refills: 3 | Status: SHIPPED | OUTPATIENT
Start: 2024-05-28 | End: 2024-05-28

## 2024-05-30 DIAGNOSIS — Z94.1 HEART REPLACED BY TRANSPLANT (H): ICD-10-CM

## 2024-05-30 RX ORDER — CALCIUM CARBONATE/VITAMIN D3 600 MG-10
1 TABLET ORAL 2 TIMES DAILY WITH MEALS
Qty: 180 TABLET | Refills: 3 | Status: SHIPPED | OUTPATIENT
Start: 2024-05-30

## 2024-06-05 DIAGNOSIS — Z94.1 TRANSPLANTED HEART (H): Primary | ICD-10-CM

## 2024-06-07 ENCOUNTER — TELEPHONE (OUTPATIENT)
Dept: RHEUMATOLOGY | Facility: CLINIC | Age: 69
End: 2024-06-07
Payer: MEDICARE

## 2024-06-07 DIAGNOSIS — I77.6 VASCULITIS, CNS (H): Primary | ICD-10-CM

## 2024-06-07 NOTE — TELEPHONE ENCOUNTER
"Per Dr Liu, \"I will order the MRI; I find it easier to place the orders from within a telephone or orders encounter.  Yes I would approve trying to schedule patient adjacent to cardiology follow-up in December 2024 either at [place] or [facility]\".    MRI is pended.  Patient scheduled in [place] 12/12/24.    Viviane Holman RN        "

## 2024-06-10 NOTE — TELEPHONE ENCOUNTER
Called and spoke with the wife.  Would like orders faxed to [facility]. Fax number [phone].    Viviane Holman RN

## 2024-07-03 ENCOUNTER — TELEPHONE (OUTPATIENT)
Dept: TRANSPLANT | Facility: CLINIC | Age: 69
End: 2024-07-03

## 2024-07-08 ENCOUNTER — TELEPHONE (OUTPATIENT)
Dept: CARDIOLOGY | Facility: CLINIC | Age: 69
End: 2024-07-08
Payer: MEDICARE

## 2024-07-08 DIAGNOSIS — Z94.1 HEART REPLACED BY TRANSPLANT (H): ICD-10-CM

## 2024-07-08 DIAGNOSIS — Z79.899 ENCOUNTER FOR LONG-TERM (CURRENT) USE OF HIGH-RISK MEDICATION: ICD-10-CM

## 2024-07-10 RX ORDER — MAGNESIUM GLYCINATE 100 MG
200 CAPSULE ORAL 2 TIMES DAILY
Qty: 360 CAPSULE | Refills: 3 | Status: SHIPPED | OUTPATIENT
Start: 2024-07-10

## 2024-07-10 NOTE — TELEPHONE ENCOUNTER
Hello pt is requesting the Magnesium gly and needs asap can we please get this sent over please and thank you

## 2024-07-31 DIAGNOSIS — Z94.1 HEART REPLACED BY TRANSPLANT (H): ICD-10-CM

## 2024-08-01 RX ORDER — ROSUVASTATIN CALCIUM 10 MG/1
10 TABLET, COATED ORAL DAILY
Qty: 90 TABLET | Refills: 3 | Status: SHIPPED | OUTPATIENT
Start: 2024-08-01

## 2024-08-05 DIAGNOSIS — Z94.1 HEART REPLACED BY TRANSPLANT (H): ICD-10-CM

## 2024-08-05 RX ORDER — TACROLIMUS 0.5 MG/1
CAPSULE ORAL
Qty: 30 CAPSULE | Refills: 11 | Status: SHIPPED | OUTPATIENT
Start: 2024-08-05

## 2024-08-16 DIAGNOSIS — Z94.1 TRANSPLANTED HEART (H): Primary | ICD-10-CM

## 2024-08-22 ENCOUNTER — OFFICE VISIT (OUTPATIENT)
Dept: CARDIOLOGY | Facility: CLINIC | Age: 69
End: 2024-08-22
Attending: INTERNAL MEDICINE
Payer: MEDICARE

## 2024-08-22 ENCOUNTER — LAB (OUTPATIENT)
Dept: LAB | Facility: CLINIC | Age: 69
End: 2024-08-22
Attending: INTERNAL MEDICINE
Payer: MEDICARE

## 2024-08-22 ENCOUNTER — OFFICE VISIT (OUTPATIENT)
Dept: DERMATOLOGY | Facility: CLINIC | Age: 69
End: 2024-08-22
Payer: MEDICARE

## 2024-08-22 VITALS
HEART RATE: 95 BPM | WEIGHT: 228 LBS | DIASTOLIC BLOOD PRESSURE: 86 MMHG | OXYGEN SATURATION: 100 % | SYSTOLIC BLOOD PRESSURE: 131 MMHG | BODY MASS INDEX: 29.27 KG/M2

## 2024-08-22 DIAGNOSIS — D48.5 NEOPLASM OF UNCERTAIN BEHAVIOR OF SKIN: ICD-10-CM

## 2024-08-22 DIAGNOSIS — Z94.1 HEART REPLACED BY TRANSPLANT (H): ICD-10-CM

## 2024-08-22 DIAGNOSIS — H01.116 CONTACT DERMATITIS OF LEFT EYELID: Primary | ICD-10-CM

## 2024-08-22 DIAGNOSIS — Z94.1 TRANSPLANTED HEART (H): ICD-10-CM

## 2024-08-22 LAB
ALBUMIN SERPL BCG-MCNC: 4.2 G/DL (ref 3.5–5.2)
ALP SERPL-CCNC: 62 U/L (ref 40–150)
ALT SERPL W P-5'-P-CCNC: 28 U/L (ref 0–70)
ANION GAP SERPL CALCULATED.3IONS-SCNC: 8 MMOL/L (ref 7–15)
AST SERPL W P-5'-P-CCNC: 26 U/L (ref 0–45)
BASOPHILS # BLD AUTO: 0 10E3/UL (ref 0–0.2)
BASOPHILS NFR BLD AUTO: 1 %
BILIRUB SERPL-MCNC: 0.8 MG/DL
BUN SERPL-MCNC: 16.4 MG/DL (ref 8–23)
CALCIUM SERPL-MCNC: 9.6 MG/DL (ref 8.8–10.4)
CHLORIDE SERPL-SCNC: 104 MMOL/L (ref 98–107)
CMV DNA SPEC NAA+PROBE-ACNC: NOT DETECTED IU/ML
CREAT SERPL-MCNC: 1.29 MG/DL (ref 0.67–1.17)
EBV DNA SERPL NAA+PROBE-ACNC: NOT DETECTED IU/ML
EGFRCR SERPLBLD CKD-EPI 2021: 59 ML/MIN/1.73M2
EOSINOPHIL # BLD AUTO: 0.1 10E3/UL (ref 0–0.7)
EOSINOPHIL NFR BLD AUTO: 2 %
ERYTHROCYTE [DISTWIDTH] IN BLOOD BY AUTOMATED COUNT: 12.2 % (ref 10–15)
GLUCOSE SERPL-MCNC: 107 MG/DL (ref 70–99)
HCO3 SERPL-SCNC: 27 MMOL/L (ref 22–29)
HCT VFR BLD AUTO: 46.6 % (ref 40–53)
HGB BLD-MCNC: 15.2 G/DL (ref 13.3–17.7)
IMM GRANULOCYTES # BLD: 0 10E3/UL
IMM GRANULOCYTES NFR BLD: 0 %
LVEF ECHO: NORMAL
LYMPHOCYTES # BLD AUTO: 1.5 10E3/UL (ref 0.8–5.3)
LYMPHOCYTES NFR BLD AUTO: 21 %
MAGNESIUM SERPL-MCNC: 1.5 MG/DL (ref 1.7–2.3)
MCH RBC QN AUTO: 28 PG (ref 26.5–33)
MCHC RBC AUTO-ENTMCNC: 32.6 G/DL (ref 31.5–36.5)
MCV RBC AUTO: 86 FL (ref 78–100)
MONOCYTES # BLD AUTO: 0.6 10E3/UL (ref 0–1.3)
MONOCYTES NFR BLD AUTO: 8 %
NEUTROPHILS # BLD AUTO: 5.1 10E3/UL (ref 1.6–8.3)
NEUTROPHILS NFR BLD AUTO: 68 %
NRBC # BLD AUTO: 0 10E3/UL
NRBC BLD AUTO-RTO: 0 /100
PHOSPHATE SERPL-MCNC: 2.9 MG/DL (ref 2.5–4.5)
PLATELET # BLD AUTO: 207 10E3/UL (ref 150–450)
POTASSIUM SERPL-SCNC: 4.2 MMOL/L (ref 3.4–5.3)
PROT SERPL-MCNC: 6.6 G/DL (ref 6.4–8.3)
RBC # BLD AUTO: 5.43 10E6/UL (ref 4.4–5.9)
SODIUM SERPL-SCNC: 139 MMOL/L (ref 135–145)
TACROLIMUS BLD-MCNC: 7.6 UG/L (ref 5–15)
TME LAST DOSE: NORMAL H
TME LAST DOSE: NORMAL H
WBC # BLD AUTO: 7.3 10E3/UL (ref 4–11)

## 2024-08-22 PROCEDURE — 84100 ASSAY OF PHOSPHORUS: CPT | Performed by: PATHOLOGY

## 2024-08-22 PROCEDURE — 80053 COMPREHEN METABOLIC PANEL: CPT | Performed by: PATHOLOGY

## 2024-08-22 PROCEDURE — 11102 TANGNTL BX SKIN SINGLE LES: CPT | Mod: GC | Performed by: DERMATOLOGY

## 2024-08-22 PROCEDURE — 99214 OFFICE O/P EST MOD 30 MIN: CPT | Mod: 25 | Performed by: INTERNAL MEDICINE

## 2024-08-22 PROCEDURE — 99000 SPECIMEN HANDLING OFFICE-LAB: CPT | Performed by: PATHOLOGY

## 2024-08-22 PROCEDURE — 87799 DETECT AGENT NOS DNA QUANT: CPT | Performed by: INTERNAL MEDICINE

## 2024-08-22 PROCEDURE — 93306 TTE W/DOPPLER COMPLETE: CPT | Performed by: INTERNAL MEDICINE

## 2024-08-22 PROCEDURE — 99214 OFFICE O/P EST MOD 30 MIN: CPT | Mod: 25 | Performed by: DERMATOLOGY

## 2024-08-22 PROCEDURE — 36415 COLL VENOUS BLD VENIPUNCTURE: CPT | Performed by: PATHOLOGY

## 2024-08-22 PROCEDURE — 88305 TISSUE EXAM BY PATHOLOGIST: CPT | Mod: 26 | Performed by: DERMATOLOGY

## 2024-08-22 PROCEDURE — 85025 COMPLETE CBC W/AUTO DIFF WBC: CPT | Performed by: PATHOLOGY

## 2024-08-22 PROCEDURE — 88305 TISSUE EXAM BY PATHOLOGIST: CPT | Mod: TC | Performed by: DERMATOLOGY

## 2024-08-22 PROCEDURE — 80197 ASSAY OF TACROLIMUS: CPT | Performed by: INTERNAL MEDICINE

## 2024-08-22 PROCEDURE — 83735 ASSAY OF MAGNESIUM: CPT | Performed by: PATHOLOGY

## 2024-08-22 PROCEDURE — 11103 TANGNTL BX SKIN EA SEP/ADDL: CPT | Mod: GC | Performed by: DERMATOLOGY

## 2024-08-22 PROCEDURE — G0463 HOSPITAL OUTPT CLINIC VISIT: HCPCS | Performed by: INTERNAL MEDICINE

## 2024-08-22 RX ORDER — HYDROCORTISONE 25 MG/G
OINTMENT TOPICAL 2 TIMES DAILY PRN
Qty: 30 G | Refills: 2 | Status: SHIPPED | OUTPATIENT
Start: 2024-08-22

## 2024-08-22 RX ORDER — TACROLIMUS 1 MG/1
CAPSULE ORAL
Qty: 120 CAPSULE | Refills: 11 | Status: SHIPPED | OUTPATIENT
Start: 2024-08-22

## 2024-08-22 ASSESSMENT — PAIN SCALES - GENERAL
PAINLEVEL: NO PAIN (0)
PAINLEVEL: NO PAIN (0)

## 2024-08-22 NOTE — LETTER
8/22/2024       RE: Paco Stein  [address]  [place] [address]     Dear Colleague,    Thank you for referring your patient, Paco Stein, to the [facility] [place] at [facility]. Please see a copy of my visit note below.    [facility] Dermatology Note  Encounter Date: Aug 22, 2024  Office Visit     Dermatology Problem List:  # FBSE 08/22/24   - Benign findings: benign melanocytic nevi, seborrheic keratoses, cherry angiomas, solar lentigines     # NUB, s/p shave 08/22/24   - right scalp, hyperkeratotic papule, rule out KA  - left chest, horseshoe shaped nevus with a darkened pole, rule out atypical nevus versus regressing MIS/melanoma     # Contact dermatitis, left eyelid   -Topical hydrocortisone ointment twice daily x 2 weeks then as needed  - If persistent will consider patch testing    # NMSC   - BCC, right forearm superficial,  s/p shave 4/18/24    - BCC, left shin, superficial, s/p shave 4/18/24   - BCC, superficial and nodular, central chest s/p shave 10/13/23  - BCC, superficial, vertex scalp s/p shave 10/13/23     Major PMHx  # Heart transplant 12/25/22  - cellcept and tacrolimus    ____________________________________________    Assessment & Plan:     # FBSE 08/22/24   - Benign findings: benign melanocytic nevi, seborrheic keratoses, cherry angiomas, solar lentigines   The nature of sun-induced photo-aging and skin cancers is discussed.  Sun avoidance, protective clothing, and the use of 30-SPF sunscreens is advised. Observe closely for skin damage/changes, and call if such occurs.   - reassured patient regarding the benign nature of these lesions.  - reviewed sun protection measures (i.e. protective clothing and broad spectrum sunscreen).   - ABCDEs of melaoma reviewed      # NUB, s/p shave 08/22/24   - right scalp, hyperkeratotic papule, rule out KA  - left chest, horseshoe shaped nevus  with a darkened pole, rule out atypical nevus versus regressing MIS/melanoma     # Contact dermatitis, left eyelid   -Topical hydrocortisone ointment twice daily x 2 weeks then as needed  - If persistent will consider patch testing    # NMSC   - BCC, right forearm superficial,  s/p shave 4/18/24, s/p topical imiquimod   - BCC, left shin, superficial, s/p shave 4/18/24, s/p topical imiquimod   - BCC, superficial and nodular, central chest s/p shave 10/13/23, s/p excision   - BCC, superficial, vertex scalp s/p shave 10/13/23, s/p MMS     Procedures Performed:   - Shave biopsy procedure note, location(s): right scalp and left chest. After discussion of benefits and risks including but not limited to bleeding, infection, scar, incomplete removal, recurrence, and non-diagnostic biopsy, verbal consent and photographs were obtained. The area was cleaned with isopropyl alcohol. 0.5mL of 1% lidocaine with epinephrine was injected to obtain adequate anesthesia of lesion(s). Shave biopsy at site(s) performed. Hemostasis was achieved with aluminium chloride. Petrolatum ointment and a sterile dressing were applied. The patient tolerated the procedure and no complications were noted. The patient was provided with verbal and written post care instructions.     Follow-up: 4-6 months virtually for photos, then 1 year(s) in-person, or earlier for new or changing lesions    Staff and Resident:   Dr. Mcmanus and Jessica Lema, DO   ____________________________________________    CC: Skin Check (Paco is here for a skin check and has spots of concern on his scalp. Would like to recheck L leg and R arm spots that were treated with imiquimod, did not complete due to side effects from cream. )    HPI:  Mr. Paco Stein is a(n) 69 year old male who presents today as a new patient for FBSE     -Concerned about a spot on his right posterior scalp that seems to have erupted in the last couple weeks, it is nontender nonbleeding  crusting or oozing  -He reports no bleeding crusting tender or oozing spots otherwise  - uses sunscreen and wears sun protective clothing: yes  - history of skin cancer: yes - as above   - family history of melanoma: no , unknown  Patient is otherwise feeling well, without additional skin concerns.    Labs Reviewed:  N/A    Physical Exam:  Vitals: There were no vitals taken for this visit.  SKIN: Full skin, which includes the head/face, both arms, chest, back, abdomen,both legs, and buttocks, digits and/or nails, was examined.  - right scalp, hyperkeratotic papule, rule out KA  - left chest, horseshoe shaped nevus with a darkened pole  - There is a skin colored fleshy papule(s) located on the trunk.  -Multiple regular brown pigmented macules and papules are identified on the trunk and extremities.   -There is no erythema, telangectasias, nodularity, or pigmentation on the prior surgical sites, previous sites of BCC treated with imiquimod appear to be .  -Scattered brown macules on sun exposed areas.  -There are waxy stuck on tan to brown papules on the trunk and extremities.  -There is xerosis of the skin diffusely.   -No concerning features with dermoscopy..   - No other lesions of concern on areas examined.     Medications:  Current Outpatient Medications   Medication Sig Dispense Refill      magnesium glycinate lysinate chelate (DOCTOR'S BEST) 100 MG TABS tablet Take 3 tablets (300 mg) by mouth 2 times daily Plus two additional tabs (200 mg) daily Take separate from Mycophenolate. 240 tablet 11     acetaminophen (TYLENOL) 80 MG chewable tablet Take 80 mg by mouth every 4 hours as needed for mild pain or fever       amLODIPine (NORVASC) 5 MG tablet Take 1 tablet (5 mg) by mouth at bedtime 90 tablet 3     aspirin (ASA) 325 MG EC tablet Take 1 tablet (325 mg) by mouth daily 90 tablet 3     calcium carbonate-vitamin D (CALTRATE) 600-10 MG-MCG per tablet Take 1 tablet by mouth 2 times daily (with meals) 180 tablet 3      hydrocortisone 2.5 % ointment Apply topically 2 times daily as needed for rash (use to eyelid rash twice daily x 2 weeks, then use as needed). 30 g 2     magnesium glycinate 100 MG CAPS capsule Take 2 capsules (200 mg) by mouth 2 times daily 360 capsule 3     multivitamin (ONE-DAILY) tablet Take 1 tablet by mouth daily 90 tablet 3     mycophenolate (GENERIC EQUIVALENT) 250 MG capsule Take 4 capsules (1,000 mg) by mouth 2 times daily 240 capsule 11     rosuvastatin (CRESTOR) 10 MG tablet Take 1 tablet (10 mg) by mouth daily 90 tablet 3     tacrolimus (GENERIC EQUIVALENT) 0.5 MG capsule Take one 0.5mg capsule with two 1mg capsules every morning for a total dose of 2.5 mg in the AM and 2mg in the PM 30 capsule 11     tacrolimus (GENERIC EQUIVALENT) 1 MG capsule Take TWO caps (2 mg) with one 0.5mg capsule in the AM and take TWO caps (2mg) in the PM for a total dose of 2.5mg in the AM and 2mg in the PM. 120 capsule 11     imiquimod (ALDARA) 5 % external cream Apply daily for 6 weeks 24 packet 3     polyethylene glycol (MIRALAX) 17 g packet Take 1 packet by mouth daily (Patient not taking: Reported on 8/22/2024)       No current facility-administered medications for this visit.      Past Medical History:   Patient Active Problem List   Diagnosis     Acute respiratory failure with hypoxia (H)     Cardiomyopathy (H)     Stroke (H)     Coronary artery disease involving native coronary artery of native heart without angina pectoris     Essential hypertension     NSVT (nonsustained ventricular tachycardia) (H)     SOB (shortness of breath)     CHF (congestive heart failure) (H)     Troponin level elevated     Anginal equivalent (H24)     Heart failure, unspecified HF chronicity, unspecified heart failure type (H)     Benign neoplasm of colon     Acute on chronic combined systolic (congestive) and diastolic (congestive) heart failure (H)     Hyperlipidemia     Syncope and collapse     Primary central nervous system  vasculitis (H24)     Vasculitis, CNS (H24)     Acute embolism and thrombosis of left peroneal vein (H)     Staphylococcus epidermidis bacteremia     Using prophylactic antibiotic on daily basis     Status post coronary angiogram     Heart replaced by transplant (H)     Lipid screening     Prostate cancer screening     Encounter for long-term (current) use of high-risk medication     Basal cell carcinoma     Past Medical History:   Diagnosis Date     Congestive heart failure (H)        CC Quentin Odonnell  [id number] [place] [address] on close of this encounter.    I talked with and examined Bethellinda Stein and I agree with the assessment and the plan. I was present for the entire procedure. KRIS Mcmanus MD.      Again, thank you for allowing me to participate in the care of your patient.      Sincerely,    Jessica Lema, DO

## 2024-08-22 NOTE — PROGRESS NOTES
[facility] Dermatology Note  Encounter Date: Aug 22, 2024  Office Visit     Dermatology Problem List:  # FBSE 08/22/24   - Benign findings: benign melanocytic nevi, seborrheic keratoses, cherry angiomas, solar lentigines     # NUB, s/p shave 08/22/24   - right scalp, hyperkeratotic papule, rule out KA  - left chest, horseshoe shaped nevus with a darkened pole, rule out atypical nevus versus regressing MIS/melanoma     # Contact dermatitis, left eyelid   -Topical hydrocortisone ointment twice daily x 2 weeks then as needed  - If persistent will consider patch testing    # NMSC   - BCC, right forearm superficial,  s/p shave 4/18/24    - BCC, left shin, superficial, s/p shave 4/18/24   - BCC, superficial and nodular, central chest s/p shave 10/13/23  - BCC, superficial, vertex scalp s/p shave 10/13/23     Major PMHx  # Heart transplant 12/25/22  - cellcept and tacrolimus    ____________________________________________    Assessment & Plan:     # FBSE 08/22/24   - Benign findings: benign melanocytic nevi, seborrheic keratoses, cherry angiomas, solar lentigines   The nature of sun-induced photo-aging and skin cancers is discussed.  Sun avoidance, protective clothing, and the use of 30-SPF sunscreens is advised. Observe closely for skin damage/changes, and call if such occurs.   - reassured patient regarding the benign nature of these lesions.  - reviewed sun protection measures (i.e. protective clothing and broad spectrum sunscreen).   - ABCDEs of melaoma reviewed      # NUB, s/p shave 08/22/24   - right scalp, hyperkeratotic papule, rule out KA  - left chest, horseshoe shaped nevus with a darkened pole, rule out atypical nevus versus regressing MIS/melanoma     # Contact dermatitis, left eyelid   -Topical hydrocortisone ointment twice daily x 2 weeks then as needed  - If persistent will consider patch testing    # NMSC   - BCC, right forearm superficial,  s/p shave 4/18/24, s/p topical imiquimod   -  BCC, left shin, superficial, s/p shave 4/18/24, s/p topical imiquimod   - BCC, superficial and nodular, central chest s/p shave 10/13/23, s/p excision   - BCC, superficial, vertex scalp s/p shave 10/13/23, s/p MMS     Procedures Performed:   - Shave biopsy procedure note, location(s): right scalp and left chest. After discussion of benefits and risks including but not limited to bleeding, infection, scar, incomplete removal, recurrence, and non-diagnostic biopsy, verbal consent and photographs were obtained. The area was cleaned with isopropyl alcohol. 0.5mL of 1% lidocaine with epinephrine was injected to obtain adequate anesthesia of lesion(s). Shave biopsy at site(s) performed. Hemostasis was achieved with aluminium chloride. Petrolatum ointment and a sterile dressing were applied. The patient tolerated the procedure and no complications were noted. The patient was provided with verbal and written post care instructions.     Follow-up: 4-6 months virtually for photos, then 1 year(s) in-person, or earlier for new or changing lesions    Staff and Resident:   Dr. Mcmanus and Jessica Lema, DO   ____________________________________________    CC: Skin Check (Paco is here for a skin check and has spots of concern on his scalp. Would like to recheck L leg and R arm spots that were treated with imiquimod, did not complete due to side effects from cream. )    HPI:  Mr. Paco Stein is a(n) 69 year old male who presents today as a new patient for FBSE     -Concerned about a spot on his right posterior scalp that seems to have erupted in the last couple weeks, it is nontender nonbleeding crusting or oozing  -He reports no bleeding crusting tender or oozing spots otherwise  - uses sunscreen and wears sun protective clothing: yes  - history of skin cancer: yes - as above   - family history of melanoma: no , unknown  Patient is otherwise feeling well, without additional skin concerns.    Labs  Reviewed:  N/A    Physical Exam:  Vitals: There were no vitals taken for this visit.  SKIN: Full skin, which includes the head/face, both arms, chest, back, abdomen,both legs, and buttocks, digits and/or nails, was examined.  - right scalp, hyperkeratotic papule, rule out KA  - left chest, horseshoe shaped nevus with a darkened pole  - There is a skin colored fleshy papule(s) located on the trunk.  -Multiple regular brown pigmented macules and papules are identified on the trunk and extremities.   -There is no erythema, telangectasias, nodularity, or pigmentation on the prior surgical sites, previous sites of BCC treated with imiquimod appear to be .  -Scattered brown macules on sun exposed areas.  -There are waxy stuck on tan to brown papules on the trunk and extremities.  -There is xerosis of the skin diffusely.   -No concerning features with dermoscopy..   - No other lesions of concern on areas examined.     Medications:  Current Outpatient Medications   Medication Sig Dispense Refill     magnesium glycinate lysinate chelate (DOCTOR'S BEST) 100 MG TABS tablet Take 3 tablets (300 mg) by mouth 2 times daily Plus two additional tabs (200 mg) daily Take separate from Mycophenolate. 240 tablet 11    acetaminophen (TYLENOL) 80 MG chewable tablet Take 80 mg by mouth every 4 hours as needed for mild pain or fever      amLODIPine (NORVASC) 5 MG tablet Take 1 tablet (5 mg) by mouth at bedtime 90 tablet 3    aspirin (ASA) 325 MG EC tablet Take 1 tablet (325 mg) by mouth daily 90 tablet 3    calcium carbonate-vitamin D (CALTRATE) 600-10 MG-MCG per tablet Take 1 tablet by mouth 2 times daily (with meals) 180 tablet 3    hydrocortisone 2.5 % ointment Apply topically 2 times daily as needed for rash (use to eyelid rash twice daily x 2 weeks, then use as needed). 30 g 2    magnesium glycinate 100 MG CAPS capsule Take 2 capsules (200 mg) by mouth 2 times daily 360 capsule 3    multivitamin (ONE-DAILY) tablet Take 1 tablet by  mouth daily 90 tablet 3    mycophenolate (GENERIC EQUIVALENT) 250 MG capsule Take 4 capsules (1,000 mg) by mouth 2 times daily 240 capsule 11    rosuvastatin (CRESTOR) 10 MG tablet Take 1 tablet (10 mg) by mouth daily 90 tablet 3    tacrolimus (GENERIC EQUIVALENT) 0.5 MG capsule Take one 0.5mg capsule with two 1mg capsules every morning for a total dose of 2.5 mg in the AM and 2mg in the PM 30 capsule 11    tacrolimus (GENERIC EQUIVALENT) 1 MG capsule Take TWO caps (2 mg) with one 0.5mg capsule in the AM and take TWO caps (2mg) in the PM for a total dose of 2.5mg in the AM and 2mg in the PM. 120 capsule 11    imiquimod (ALDARA) 5 % external cream Apply daily for 6 weeks 24 packet 3    polyethylene glycol (MIRALAX) 17 g packet Take 1 packet by mouth daily (Patient not taking: Reported on 8/22/2024)       No current facility-administered medications for this visit.      Past Medical History:   Patient Active Problem List   Diagnosis    Acute respiratory failure with hypoxia (H)    Cardiomyopathy (H)    Stroke (H)    Coronary artery disease involving native coronary artery of native heart without angina pectoris    Essential hypertension    NSVT (nonsustained ventricular tachycardia) (H)    SOB (shortness of breath)    CHF (congestive heart failure) (H)    Troponin level elevated    Anginal equivalent (H24)    Heart failure, unspecified HF chronicity, unspecified heart failure type (H)    Benign neoplasm of colon    Acute on chronic combined systolic (congestive) and diastolic (congestive) heart failure (H)    Hyperlipidemia    Syncope and collapse    Primary central nervous system vasculitis (H24)    Vasculitis, CNS (H24)    Acute embolism and thrombosis of left peroneal vein (H)    Staphylococcus epidermidis bacteremia    Using prophylactic antibiotic on daily basis    Status post coronary angiogram    Heart replaced by transplant (H)    Lipid screening    Prostate cancer screening    Encounter for long-term  (current) use of high-risk medication    Basal cell carcinoma     Past Medical History:   Diagnosis Date    Congestive heart failure (H)        CC Quentin Odonnell  [id number] [place] [address] on close of this encounter.

## 2024-08-22 NOTE — LETTER
2024    [facility] [place] lab   Fax [phone]      Patient Name: Paco Stein   :  1955   MRN: [id number]  ICD10:  z94.1 , z79.899    Subject: Request for Labs    Paco Stein is a heart transplant patient currently being followed by the [facility].  We would like to request your assistance in obtaining the following laboratory tests which are part of our routine surveillance program for heart transplant recipients.  (Items needed are checked.)    Please fax the lab results as soon as they are available to:   Thoracic Transplant Department  Fax Number:  [phone]     Item Frequency   x CBC with platelets Standing orders with all drug level changes. Qty: 20. Expires 2025   x Basic metabolic panel (including:  BUN,   Serum Creatinine, Sodium, Potassium, Chloride,   CO2, Calcium, Magnesium, Phosphorus, and   Glucose) Standing orders with all drug level changes. Qty: 20. Expires 2025   x  tacrolimus/Prograf level (12 hour trough)  (Should be mailed to the [facility] in the  provided to you by the patient.) Standing orders with all drug level changes. Qty: 20. Expires 2025     Thank you  for your continued support and the opportunity to collaborate in the care of this patient.  If you have any questions, please call the Thoracic Transplant Team at [phone] or [phone].      Myrtle Dominguez M.D.  Division of Cardiology   of Medicine

## 2024-08-22 NOTE — LETTER
[facility] lab   Fax [phone]    2023    Patient Name: Paco Stein   :  1955   MRN: [id number]  ICD10:  z94.1 , z79.899    Subject: Request for Labs    Paco Stein is a heart transplant patient currently being followed by the [facility].  We would like to request your assistance in obtaining the following laboratory tests which are part of our routine surveillance program for heart transplant recipients.  (Items needed are checked.)    Please fax the lab results as soon as they are available to:   Thoracic Transplant Department  Fax Number:  [phone]     Item Frequency   x Basic metabolic panel (including:  BUN,   Serum Creatinine, Sodium, Potassium, Chloride,   CO2, Calcium, and Glucose)  Magnesium, Phosphorus ~2023 and every two weeks if medication changes through end of 2023   x Tacrolimus/Prograf level - 12-hour trough  Pleaser run locally and send blood to [facility]   (Should be mailed to the [facility] in the  provided to you by the patient.) ~2023 and every two weeks if medication changes through end of 2023      Thank you  for your continued support and the opportunity to collaborate in the care of this patient.  If you have any questions, please call the Thoracic Transplant Team at [phone] or [phone].      Myrtle Dominguez M.D.  Division of Cardiology   of Medicine

## 2024-08-22 NOTE — NURSING NOTE
Dermatology Rooming Note    Paco Stein's goals for this visit include:   Chief Complaint   Patient presents with    Skin Check     [place] is here for a skin check and has spots of concern on his scalp. Would like to recheck L leg and R arm spots that were treated with imiquimod, did not complete due to side effects from cream.      Oneal Munguia, EMT

## 2024-08-22 NOTE — NURSING NOTE
Chief Complaint   Patient presents with    Follow Up     Return heart transplant         Vitals were taken, medications reconciled.     Nader Sanderson, Visit Facilitator    2:54 PM

## 2024-08-22 NOTE — PROGRESS NOTES
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 a history of HTN, CAD (s/p PCI to LAD in 2013), hyperlipidemia, CNS vasculitis (2013, residual left-sided weakness, on Cytoxan --> CellCept with no further neuro insult), systolic heart failure secondary to NICM (ARVC, diagnosed 2021), sustained VT s/p ICD (1/2022), and CKD now s/p OHT 12/25/22 who presents to clinic for routine follow up       Transplant details:   Serostatus:  - CMV D+/R+  - EBV D+/R+  - Toxo D-/R-    No immunodepression intolerance   No rejection   No graft vasculopathy     This visit:   Overall doing well   Continues to have functional gains   Keeping up to date on health care surveillance   Does have balance problems (exist from previous stroke)   No SOB, no LE edema   No diarrhea     No other health updates       Past medical history no change from prior    Social history no change from prior      Current Outpatient Medications   Medication Sig Dispense Refill     magnesium glycinate lysinate chelate (DOCTOR'S BEST) 100 MG TABS tablet Take 3 tablets (300 mg) by mouth 2 times daily Plus two additional tabs (200 mg) daily Take separate from Mycophenolate. 240 tablet 11    acetaminophen (TYLENOL) 80 MG chewable tablet Take 80 mg by mouth every 4 hours as needed for mild pain or fever      amLODIPine (NORVASC) 5 MG tablet Take 1 tablet (5 mg) by mouth at bedtime 90 tablet 3    aspirin (ASA) 325 MG EC tablet Take 1 tablet (325 mg) by mouth daily 90 tablet 3    calcium carbonate-vitamin D (CALTRATE) 600-10 MG-MCG per tablet Take 1 tablet by mouth 2 times daily (with meals) 180 tablet 3    imiquimod (ALDARA) 5 % external cream Apply daily for 6 weeks 24 packet 3    magnesium glycinate 100 MG CAPS capsule Take 2 capsules (200 mg) by mouth 2 times daily 360 capsule 3    multivitamin (ONE-DAILY) tablet Take 1 tablet by mouth daily 90 tablet 3    mycophenolate (GENERIC  EQUIVALENT) 250 MG capsule Take 4 capsules (1,000 mg) by mouth 2 times daily 240 capsule 11    polyethylene glycol (MIRALAX) 17 g packet Take 1 packet by mouth daily      rosuvastatin (CRESTOR) 10 MG tablet Take 1 tablet (10 mg) by mouth daily 90 tablet 3    tacrolimus (GENERIC EQUIVALENT) 0.5 MG capsule Take one 0.5mg capsule with two 1mg capsules every morning for a total dose of 2.5 mg in the AM and 2mg in the PM 30 capsule 11    tacrolimus (GENERIC EQUIVALENT) 1 MG capsule Take TWO caps (2 mg) with one 0.5mg capsule in the AM and take TWO caps (2mg) in the PM for a total dose of 2.5mg in the AM and 2mg in the PM. 120 capsule 11     All relevant ROS were reviewed in the HPI.     EXAM:  /86 (BP Location: Right arm, Patient Position: Chair, Cuff Size: Adult Regular)   Pulse 95   Wt 103.4 kg (228 lb)   SpO2 100%   BMI 29.27 kg/m    General: appears comfortable, alert and articulate  Head: normocephalic, atraumatic  Eyes: anicteric sclera, EOMI  Neck: no adenopathy  Oropharynx: moist mucosa, no lesions, dentition intact- no thrush   Heart: RRR, normal PMI, normal S1/s2, no s3.s4   Lungs: clear, no rales or wheezing  Abdomen: soft, non-tender, bowel sounds present, no hepatosplenomegaly  Extremities: no clubbing, cyanosis or edema  Neurological: speech fluency is at baseline, able to walk without assistance   Psych: stable emotional lability       Echo:   Interpretation Summary  Left ventricular size, wall motion and function are normal. The ejection  fraction is > 65%.     The right ventricle is normal size. Global right ventricular function is  normal.     No significant valvular abnormalities present.     No pericardial effusion is present.     The inferior vena cava is normal.     There is mild dilation of the aorta. Sinuses of Valsalva 4.0 cm.     Compared to prior imaging on 10/12/2023 there is now criteria for dilation of  the aortic root.    Last immuknow: 438        Latest Reference Range & Units  08/22/24 09:13   Sodium 135 - 145 mmol/L 139   Potassium 3.4 - 5.3 mmol/L 4.2   Chloride 98 - 107 mmol/L 104   Carbon Dioxide (CO2) 22 - 29 mmol/L 27   Urea Nitrogen 8.0 - 23.0 mg/dL 16.4   Creatinine 0.67 - 1.17 mg/dL 1.29 (H)   GFR Estimate >60 mL/min/1.73m2 59 (L)   Calcium 8.8 - 10.4 mg/dL 9.6   Anion Gap 7 - 15 mmol/L 8   Magnesium 1.7 - 2.3 mg/dL 1.5 (L)   Phosphorus 2.5 - 4.5 mg/dL 2.9   Albumin 3.5 - 5.2 g/dL 4.2   Protein Total 6.4 - 8.3 g/dL 6.6   Alkaline Phosphatase 40 - 150 U/L 62   ALT 0 - 70 U/L 28   AST 0 - 45 U/L 26   Bilirubin Total <=1.2 mg/dL 0.8   Glucose 70 - 99 mg/dL 107 (H)   WBC 4.0 - 11.0 10e3/uL 7.3   Hemoglobin 13.3 - 17.7 g/dL 15.2   Hematocrit 40.0 - 53.0 % 46.6   Platelet Count 150 - 450 10e3/uL 207   RBC Count 4.40 - 5.90 10e6/uL 5.43   MCV 78 - 100 fL 86   MCH 26.5 - 33.0 pg 28.0   MCHC 31.5 - 36.5 g/dL 32.6   RDW 10.0 - 15.0 % 12.2   % Neutrophils % 68   % Lymphocytes % 21   % Monocytes % 8   % Eosinophils % 2   % Basophils % 1   Absolute Basophils 0.0 - 0.2 10e3/uL 0.0   Absolute Eosinophils 0.0 - 0.7 10e3/uL 0.1   Absolute Immature Granulocytes <=0.4 10e3/uL 0.0   Absolute Lymphocytes 0.8 - 5.3 10e3/uL 1.5   Absolute Monocytes 0.0 - 1.3 10e3/uL 0.6   % Immature Granulocytes % 0   Absolute Neutrophils 1.6 - 8.3 10e3/uL 5.1   Absolute NRBCs 10e3/uL 0.0   NRBCs per 100 WBC <1 /100 0   CMV QUANTITATIVE, PCR  Rpt (IP)   MILLIE BARR VIRUS QUANTITATIVE PCR, PLASMA  Rpt (IP)   Tacrolimus Last Dose Date  8/21/2024   Tacrolimus Last Dose Time   9:00 PM   Tacrolimus by Tandem Mass Spectrometry 5.0 - 15.0 ug/L 7.6   (H): Data is abnormally high  (L): Data is abnormally low  (IP): In Process  Rpt: View report in Results Review for more information      Assessment and recommendations:    MIGUEL, s/p OHT 12/25/22  His post-operative course has been c/b delirium, leukocytosis, staph epi bacteremia, and prolonged CT output.  He discharged 1/13.    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    Aortic root dilation: very slight- will monitor in serial echos - stable     Frailty deconditioning- improved, encouragement provided     RTC per routine     CC  Patient Care Team:  Quentin Odonnell as PCP - Myrtle Garrison MD as MD (Cardiovascular Disease)  Abram Simeon [facility] as Pharmacist (Pharmacist)  Tiera Anguiano DO as MD (Infectious Diseases)  Burt Andrews MD Fontana, Lauren, DO as Assigned Infectious Disease Provider  Jackelin Khan MD as Assigned Neuroscience Provider  Jose Liu MD as Assigned Rheumatology Provider  Myrtle Dominguez MD as Assigned Heart and Vascular Provider  Jose Hastings MD as Assigned Surgical Provider  Bella Anna, RN as Transplant Coordinator (Cardiology)  JUAN FRANCISCO CAMACHO      MENTAL HEALTH SYMPTOMS: No

## 2024-08-22 NOTE — LETTER
8/22/2024       RE: aPco Stein  [address]  [place] [address]     Dear Colleague,    Thank you for referring your patient, Paco Stein, to the [facility] [place] at [facility]. Please see a copy of my visit note below.    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 a history of HTN, CAD (s/p PCI to LAD in 2013), hyperlipidemia, CNS vasculitis (2013, residual left-sided weakness, on Cytoxan --> CellCept with no further neuro insult), systolic heart failure secondary to NICM (ARVC, diagnosed 2021), sustained VT s/p ICD (1/2022), and CKD now s/p OHT 12/25/22 who presents to clinic for routine follow up       Transplant details:   Serostatus:  - CMV D+/R+  - EBV D+/R+  - Toxo D-/R-    No immunodepression intolerance   No rejection   No graft vasculopathy     This visit:   Overall doing well   Continues to have functional gains   Keeping up to date on health care surveillance   Does have balance problems (exist from previous stroke)   No SOB, no LE edema   No diarrhea     No other health updates       Past medical history no change from prior    Social history no change from prior      Current Outpatient Medications   Medication Sig Dispense Refill      magnesium glycinate lysinate chelate (DOCTOR'S BEST) 100 MG TABS tablet Take 3 tablets (300 mg) by mouth 2 times daily Plus two additional tabs (200 mg) daily Take separate from Mycophenolate. 240 tablet 11     acetaminophen (TYLENOL) 80 MG chewable tablet Take 80 mg by mouth every 4 hours as needed for mild pain or fever       amLODIPine (NORVASC) 5 MG tablet Take 1 tablet (5 mg) by mouth at bedtime 90 tablet 3     aspirin (ASA) 325 MG EC tablet Take 1 tablet (325 mg) by mouth daily 90 tablet 3     calcium carbonate-vitamin D (CALTRATE) 600-10 MG-MCG per tablet Take 1 tablet by mouth 2 times  daily (with meals) 180 tablet 3     imiquimod (ALDARA) 5 % external cream Apply daily for 6 weeks 24 packet 3     magnesium glycinate 100 MG CAPS capsule Take 2 capsules (200 mg) by mouth 2 times daily 360 capsule 3     multivitamin (ONE-DAILY) tablet Take 1 tablet by mouth daily 90 tablet 3     mycophenolate (GENERIC EQUIVALENT) 250 MG capsule Take 4 capsules (1,000 mg) by mouth 2 times daily 240 capsule 11     polyethylene glycol (MIRALAX) 17 g packet Take 1 packet by mouth daily       rosuvastatin (CRESTOR) 10 MG tablet Take 1 tablet (10 mg) by mouth daily 90 tablet 3     tacrolimus (GENERIC EQUIVALENT) 0.5 MG capsule Take one 0.5mg capsule with two 1mg capsules every morning for a total dose of 2.5 mg in the AM and 2mg in the PM 30 capsule 11     tacrolimus (GENERIC EQUIVALENT) 1 MG capsule Take TWO caps (2 mg) with one 0.5mg capsule in the AM and take TWO caps (2mg) in the PM for a total dose of 2.5mg in the AM and 2mg in the PM. 120 capsule 11     All relevant ROS were reviewed in the HPI.     EXAM:  /86 (BP Location: Right arm, Patient Position: Chair, Cuff Size: Adult Regular)   Pulse 95   Wt 103.4 kg (228 lb)   SpO2 100%   BMI 29.27 kg/m    General: appears comfortable, alert and articulate  Head: normocephalic, atraumatic  Eyes: anicteric sclera, EOMI  Neck: no adenopathy  Oropharynx: moist mucosa, no lesions, dentition intact- no thrush   Heart: RRR, normal PMI, normal S1/s2, no s3.s4   Lungs: clear, no rales or wheezing  Abdomen: soft, non-tender, bowel sounds present, no hepatosplenomegaly  Extremities: no clubbing, cyanosis or edema  Neurological: speech fluency is at baseline, able to walk without assistance   Psych: stable emotional lability       Echo:   Interpretation Summary  Left ventricular size, wall motion and function are normal. The ejection  fraction is > 65%.     The right ventricle is normal size. Global right ventricular function is  normal.     No significant valvular  abnormalities present.     No pericardial effusion is present.     The inferior vena cava is normal.     There is mild dilation of the aorta. Sinuses of Valsalva 4.0 cm.     Compared to prior imaging on 10/12/2023 there is now criteria for dilation of  the aortic root.    Last immuknow: 438        Latest Reference Range & Units 08/22/24 09:13   Sodium 135 - 145 mmol/L 139   Potassium 3.4 - 5.3 mmol/L 4.2   Chloride 98 - 107 mmol/L 104   Carbon Dioxide (CO2) 22 - 29 mmol/L 27   Urea Nitrogen 8.0 - 23.0 mg/dL 16.4   Creatinine 0.67 - 1.17 mg/dL 1.29 (H)   GFR Estimate >60 mL/min/1.73m2 59 (L)   Calcium 8.8 - 10.4 mg/dL 9.6   Anion Gap 7 - 15 mmol/L 8   Magnesium 1.7 - 2.3 mg/dL 1.5 (L)   Phosphorus 2.5 - 4.5 mg/dL 2.9   Albumin 3.5 - 5.2 g/dL 4.2   Protein Total 6.4 - 8.3 g/dL 6.6   Alkaline Phosphatase 40 - 150 U/L 62   ALT 0 - 70 U/L 28   AST 0 - 45 U/L 26   Bilirubin Total <=1.2 mg/dL 0.8   Glucose 70 - 99 mg/dL 107 (H)   WBC 4.0 - 11.0 10e3/uL 7.3   Hemoglobin 13.3 - 17.7 g/dL 15.2   Hematocrit 40.0 - 53.0 % 46.6   Platelet Count 150 - 450 10e3/uL 207   RBC Count 4.40 - 5.90 10e6/uL 5.43   MCV 78 - 100 fL 86   MCH 26.5 - 33.0 pg 28.0   MCHC 31.5 - 36.5 g/dL 32.6   RDW 10.0 - 15.0 % 12.2   % Neutrophils % 68   % Lymphocytes % 21   % Monocytes % 8   % Eosinophils % 2   % Basophils % 1   Absolute Basophils 0.0 - 0.2 10e3/uL 0.0   Absolute Eosinophils 0.0 - 0.7 10e3/uL 0.1   Absolute Immature Granulocytes <=0.4 10e3/uL 0.0   Absolute Lymphocytes 0.8 - 5.3 10e3/uL 1.5   Absolute Monocytes 0.0 - 1.3 10e3/uL 0.6   % Immature Granulocytes % 0   Absolute Neutrophils 1.6 - 8.3 10e3/uL 5.1   Absolute NRBCs 10e3/uL 0.0   NRBCs per 100 WBC <1 /100 0   CMV QUANTITATIVE, PCR  Rpt (IP)   MILLIE BARR VIRUS QUANTITATIVE PCR, PLASMA  Rpt (IP)   Tacrolimus Last Dose Date  8/21/2024   Tacrolimus Last Dose Time   9:00 PM   Tacrolimus by Tandem Mass Spectrometry 5.0 - 15.0 ug/L 7.6   (H): Data is abnormally high  (L): Data is  abnormally low  (IP): In Process  Rpt: View report in Results Review for more information      Assessment and recommendations:    MIGUEL, s/p OHT 12/25/22  His post-operative course has been c/b delirium, leukocytosis, staph epi bacteremia, and prolonged CT output.  He discharged 1/13.    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    Aortic root dilation: very slight- will monitor in serial echos - stable     Frailty deconditioning- improved, encouragement provided     RTC per routine     CC  Patient Care Team:  Quentin Odonnell as PCP - General  Myrtle Dominguez MD as MD (Cardiovascular Disease)  Abram Simeon [facility] as Pharmacist (Pharmacist)  Tiera Anguiano DO as MD (Infectious Diseases)  Burt Andrews MD Fontana, Lauren, DO as Assigned Infectious Disease Provider  Jackelin Khan MD as Assigned Neuroscience Provider  Jose Liu MD as Assigned Rheumatology Provider  Myrtle Dominguez MD as Assigned Heart and Vascular Provider  Jose Hastings MD as Assigned Surgical Provider  Bella Anna RN as Transplant Coordinator (Cardiology)  JUAN FRANCISCO CAMACHO      MENTAL HEALTH SYMPTOMS: No        Again,  thank you for allowing me to participate in the care of your patient.      Sincerely,    Myrtle Dominguez MD

## 2024-08-22 NOTE — LETTER
8/22/2024      RE: Paco Stein  [address]  [place] [address]       Dear Colleague,    Thank you for the opportunity to participate in the care of your patient, Paco Stein, at the [facility] [place] [facility]. Please see a copy of my visit note below.    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 with a history of HTN, CAD (s/p PCI to LAD in 2013), hyperlipidemia, CNS vasculitis (2013, residual left-sided weakness, on Cytoxan --> CellCept with no further neuro insult), systolic heart failure secondary to NICM (ARVC, diagnosed 2021), sustained VT s/p ICD (1/2022), and CKD now s/p OHT 12/25/22 who presents to clinic for routine follow up       Transplant details:   Serostatus:  - CMV D+/R+  - EBV D+/R+  - Toxo D-/R-    No immunodepression intolerance   No rejection   No graft vasculopathy     This visit:   Overall doing well   Continues to have functional gains   Keeping up to date on health care surveillance   Does have balance problems (exist from previous stroke)   No SOB, no LE edema   No diarrhea     No other health updates       Past medical history no change from prior    Social history no change from prior      Current Outpatient Medications   Medication Sig Dispense Refill      magnesium glycinate lysinate chelate (DOCTOR'S BEST) 100 MG TABS tablet Take 3 tablets (300 mg) by mouth 2 times daily Plus two additional tabs (200 mg) daily Take separate from Mycophenolate. 240 tablet 11     acetaminophen (TYLENOL) 80 MG chewable tablet Take 80 mg by mouth every 4 hours as needed for mild pain or fever       amLODIPine (NORVASC) 5 MG tablet Take 1 tablet (5 mg) by mouth at bedtime 90 tablet 3     aspirin (ASA) 325 MG EC tablet Take 1 tablet (325 mg) by mouth daily 90 tablet 3     calcium carbonate-vitamin D (CALTRATE) 600-10 MG-MCG per  tablet Take 1 tablet by mouth 2 times daily (with meals) 180 tablet 3     imiquimod (ALDARA) 5 % external cream Apply daily for 6 weeks 24 packet 3     magnesium glycinate 100 MG CAPS capsule Take 2 capsules (200 mg) by mouth 2 times daily 360 capsule 3     multivitamin (ONE-DAILY) tablet Take 1 tablet by mouth daily 90 tablet 3     mycophenolate (GENERIC EQUIVALENT) 250 MG capsule Take 4 capsules (1,000 mg) by mouth 2 times daily 240 capsule 11     polyethylene glycol (MIRALAX) 17 g packet Take 1 packet by mouth daily       rosuvastatin (CRESTOR) 10 MG tablet Take 1 tablet (10 mg) by mouth daily 90 tablet 3     tacrolimus (GENERIC EQUIVALENT) 0.5 MG capsule Take one 0.5mg capsule with two 1mg capsules every morning for a total dose of 2.5 mg in the AM and 2mg in the PM 30 capsule 11     tacrolimus (GENERIC EQUIVALENT) 1 MG capsule Take TWO caps (2 mg) with one 0.5mg capsule in the AM and take TWO caps (2mg) in the PM for a total dose of 2.5mg in the AM and 2mg in the PM. 120 capsule 11     All relevant ROS were reviewed in the HPI.     EXAM:  /86 (BP Location: Right arm, Patient Position: Chair, Cuff Size: Adult Regular)   Pulse 95   Wt 103.4 kg (228 lb)   SpO2 100%   BMI 29.27 kg/m    General: appears comfortable, alert and articulate  Head: normocephalic, atraumatic  Eyes: anicteric sclera, EOMI  Neck: no adenopathy  Oropharynx: moist mucosa, no lesions, dentition intact- no thrush   Heart: RRR, normal PMI, normal S1/s2, no s3.s4   Lungs: clear, no rales or wheezing  Abdomen: soft, non-tender, bowel sounds present, no hepatosplenomegaly  Extremities: no clubbing, cyanosis or edema  Neurological: speech fluency is at baseline, able to walk without assistance   Psych: stable emotional lability       Echo:   Interpretation Summary  Left ventricular size, wall motion and function are normal. The ejection  fraction is > 65%.     The right ventricle is normal size. Global right ventricular function  is  normal.     No significant valvular abnormalities present.     No pericardial effusion is present.     The inferior vena cava is normal.     There is mild dilation of the aorta. Sinuses of Valsalva 4.0 cm.     Compared to prior imaging on 10/12/2023 there is now criteria for dilation of  the aortic root.    Last immuknow: 438        Latest Reference Range & Units 08/22/24 09:13   Sodium 135 - 145 mmol/L 139   Potassium 3.4 - 5.3 mmol/L 4.2   Chloride 98 - 107 mmol/L 104   Carbon Dioxide (CO2) 22 - 29 mmol/L 27   Urea Nitrogen 8.0 - 23.0 mg/dL 16.4   Creatinine 0.67 - 1.17 mg/dL 1.29 (H)   GFR Estimate >60 mL/min/1.73m2 59 (L)   Calcium 8.8 - 10.4 mg/dL 9.6   Anion Gap 7 - 15 mmol/L 8   Magnesium 1.7 - 2.3 mg/dL 1.5 (L)   Phosphorus 2.5 - 4.5 mg/dL 2.9   Albumin 3.5 - 5.2 g/dL 4.2   Protein Total 6.4 - 8.3 g/dL 6.6   Alkaline Phosphatase 40 - 150 U/L 62   ALT 0 - 70 U/L 28   AST 0 - 45 U/L 26   Bilirubin Total <=1.2 mg/dL 0.8   Glucose 70 - 99 mg/dL 107 (H)   WBC 4.0 - 11.0 10e3/uL 7.3   Hemoglobin 13.3 - 17.7 g/dL 15.2   Hematocrit 40.0 - 53.0 % 46.6   Platelet Count 150 - 450 10e3/uL 207   RBC Count 4.40 - 5.90 10e6/uL 5.43   MCV 78 - 100 fL 86   MCH 26.5 - 33.0 pg 28.0   MCHC 31.5 - 36.5 g/dL 32.6   RDW 10.0 - 15.0 % 12.2   % Neutrophils % 68   % Lymphocytes % 21   % Monocytes % 8   % Eosinophils % 2   % Basophils % 1   Absolute Basophils 0.0 - 0.2 10e3/uL 0.0   Absolute Eosinophils 0.0 - 0.7 10e3/uL 0.1   Absolute Immature Granulocytes <=0.4 10e3/uL 0.0   Absolute Lymphocytes 0.8 - 5.3 10e3/uL 1.5   Absolute Monocytes 0.0 - 1.3 10e3/uL 0.6   % Immature Granulocytes % 0   Absolute Neutrophils 1.6 - 8.3 10e3/uL 5.1   Absolute NRBCs 10e3/uL 0.0   NRBCs per 100 WBC <1 /100 0   CMV QUANTITATIVE, PCR  Rpt (IP)   MILLIE BARR VIRUS QUANTITATIVE PCR, PLASMA  Rpt (IP)   Tacrolimus Last Dose Date  8/21/2024   Tacrolimus Last Dose Time   9:00 PM   Tacrolimus by Tandem Mass Spectrometry 5.0 - 15.0 ug/L 7.6   (H): Data is  abnormally high  (L): Data is abnormally low  (IP): In Process  Rpt: View report in Results Review for more information      Assessment and recommendations:    MIGUEL, s/p OHT 12/25/22  His post-operative course has been c/b delirium, leukocytosis, staph epi bacteremia, and prolonged CT output.  He discharged 1/13.    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    Aortic root dilation: very slight- will monitor in serial echos - stable     Frailty deconditioning- improved, encouragement provided     RTC per routine     CC  Patient Care Team:  Quentin Odonnell as PCP - General  Myrtle Dominguez MD as MD (Cardiovascular Disease)  Abram Simeon [facility] as Pharmacist (Pharmacist)  Tiera Anguiano DO as MD (Infectious Diseases)  Burt Andrews MD Fontana, Lauren, DO as Assigned Infectious Disease Provider  Jackelin Khan MD as Assigned Neuroscience Provider  Jose Liu MD as Assigned Rheumatology Provider  Myrtle Dominguez MD as Assigned Heart and Vascular Provider  Jose Hastings MD as Assigned Surgical Provider  Bella Anna RN as Transplant Coordinator (Cardiology)  JUAN FRANCISCO CAMACHO  HEALTH SYMPTOMS: No        Please do not hesitate to contact me if you have any questions/concerns.     Sincerely,     Myrtle Dominguez MD

## 2024-08-22 NOTE — PATIENT INSTRUCTIONS
Patient Instructions  1. Tacro level 7.6. No changes.  2. Standing orders sent to your local lab.   3. Please reach out if you need anything.!     Next transplant clinic appointment: 2nd annual scheduled in Dec.   Next lab draw: December.   Coordinator will call with all pending results.     Please call transplant coordinator with any questions:    Bella Anna RN BSN   Post Heart Transplant Nurse Coordinator  [facility]  Questions: [phone]

## 2024-08-22 NOTE — PATIENT INSTRUCTIONS
"Sun Protection      Sunscreen   What does \"broad spectrum mean\"?  Broad spectrum sunscreens protect against both UVA and UVB radiation. UVC is filtered out by the ozone layer.     What does SPF mean?   SPF stands for  Sun Protection Factor  and represents the ability to screen only UVB (burning) rays. UVB rays are mostly blocked in all sunscreens, but only those that contain titanium dioxide, zinc oxide, mexoryl or Parsol 1789 (avobenzone) block the UVA spectrum. Even though a sunscreen is labeled  UVA/UVB Protection  that is not entirely accurate because products that only partially protect against UVA can claim to protect against both UVA and UVB.     What SPF should I chose?   Aim to get a sunscreen that is at least sun protection factor (SPF) 30. SPF 15 provides about 92-93% coverage, SPF 30 about 95-97% coverage, and SPF 45 about 98% coverage. That is to say, SPF 30 is not twice as good as SPF 15. The reason why we recommend SPF 30 is because we are usually only putting on half the necessary amount of sunscreen to achieve the advertised protection. That means that it is very possible that your SPF 30 sunscreen is only providing you with SPF 15 coverage based on how much you are applying. SPF 15 (92-93% coverage) is the absolute minimal that we recommend. Similarly, the benefit of sunscreens with SPF higher than 50 is that even if you put on less than the required amount, you are likely still getting good protection (ex: even if you apply only half the recommended amount of , it should still provide you with an SPF of 50).     How much should I apply?  If covering your whole body, you should be using 30 grams, or one ounce, which is how much is in one shot glass! That s a THICK layer! May times, you are only applying half the recommended amount, which means that you are only getting half the SPF (for example, you may be using SPF 30 but if you're only applying half the recommended amount, you're only " getting SPF 15 protection.      When do I need to wear sunscreen?  Every day, rain or shine! Even on a rainy day or a day when you are only indoors, you are still being exposed harmful UV radiation from the sun. We usually recommend physical/mineral sunscreens (active ingredient is titanium dioxide or zinc oxide) as these ingredients have been around for many years, there is no concern of them being absorbed into the bloodstream, and they are coral reef friendly! However, the best sunscreen is the one that you will use everyday.     What about my kids?  Sunscreen is not recommended for infants under the age of 6 months. Use clothing, shade and sun avoidance for small infants. For kids older than 6 months, we recommend that you should use only mineral/physical sunscreens that have zinc oxide as the active ingredient. Sun-protective clothing and hats are also important for people of all ages.     Sun protective clothing and Resources   Coolibar (www.coolibar.g4interactive)  Runnable Inc. (wwwTenBu Technologies)  AthleMovinary (TWINLINX)  SIMPLEROBB.COM (Sensegon)  Carve Designs (Xcovery) - affordable  Skinz (WAKU WAKU ????skinz.com)    Long sleeve - Reinaldo Cool DRI UPF 50 or [place] PFG UPF 50  Hoodie - [place] PFG UPF 50  Swimshirt/Rash Guard - Michelle UPF 50 (on Amazon)  Neck - Outdoor Research Ubertubes (www.outdoorresearch.g4interactive)      Do I need tinted sunscreen?  There is more and more research showing that visible light can also lead to discoloration (such as melasma). Tinted sunscreens (which contain iron oxides) protect against visible light as an added bonus.     What brands do you recommend?  Physical/Mineral Sunscreens (in no particular order)  Elta MD UV Physical Broad-Spectrum SPF 41 Sunscreen (Tinted, $33)  Skin Ceuticals Physical Fusion UV Defense SPF 50 (Tinted, $34)  Unsun Mineral Tinted Face Sunscreen (Tinted, with 2 shade ranges, $29)  It Cosmetics CC+ Cream with SPF 50+ (Tinted, can also double as  foundation/coverage -- great range of shades, $40)  Biossance Squalane + Zinc Sheer Mineral Sunscreen SPF 30 PA +++ (goes on white then blends in, $30)  Cerave 100% Mineral Sunscreen SPF 50 Face (good for sensitive skin, $15)  La Roche Posay Anthelios Mineral Zinc Oxide Sunscreen SPF 50 ($35)  La Roche Posay Anthelios Mineral Tinted Sunscreen for Face SPF 50 ($35)  Think Sport Sunscreen (great for sports, though has more of a white cast, $20)  Think Baby Sunscreen (for kids, $21)  Color Science Sunforgettable Total Protection Brush On Shield SPF 50 (Multiple tints, $130)    Chemical Sunscreens  Rosi Rouleau Weightless Protection SPF 30 ($48)  Lavonne SPF Brightening Moisturizer ($30)  Urban Skin Complexion Protection Moisturizer SPF 30 ($20)  Total Defense + Repair Broad Spectrum SPF 34 ($68)  Clarins UV PLUS Anti-Pollution Sunscreen Multi-Protection Tint SPF 50 (Multiple tints, $45)  Neutrogena Healthy Skin Glow Sheers Tinted Moisturizer with SPF 20 (Multiple tints, $11)     Learning the ABCDEs or Melanomas / Self Skin checks    Even if you have carefully practiced sun safety all summer, it's important to continue being vigilant about your skin in fall, winter, and beyond. Throughout the year, you should examine your skin head-to-toe once a month, looking for any suspicious lesions. Self-exams can help you identify potential skin cancers early, when they can almost always be completely cured. As a general rule, to spot either melanomas or non-melanoma skin cancers (such as basal cell carcinoma and squamous cell carcinoma), take note of any new moles or growths, and any existing growths that begin to grow or change significantly in any other way.  Lesions that change, itch, bleed, or don't heal are also alarm signals. Physicians have developed two specific strategies for early recognition ofmelanoma, the deadliest form of skin cancer: the ABCDEs and the Ugly Duckling sign.      Moles, brown spots and growths on the  skin are usually harmless -- but not always. Anyone who has more than 100 moles is at greater risk for melanoma. The first signs can appear in one or more atypical moles. That's why it's so important to get to know your skin very well and to recognize any changes in the moles on your body. Look for the ABCDE signs of melanoma, and if you see one or more, make an appointment with a physician immediately.    Common, benign moles look the same over time. Be on alert when moles start to evolve or change and see a doctor. Any change -- in size, shape, color, elevation, or another trait, or any new symptom such as bleeding, itching or crusting -- points to danger.    A - Asymmetry  This benign mole is not asymmetrical. If you draw a line through the middle, the two sides will match, meaning it is symmetrical. If you draw a line through this mole, the two halves will not match, meaning it is asymmetrical, a warning sign for melanoma.    B - Border  A benign mole has smooth, even borders, unlike melanomas. The borders of an early melanoma tend to be uneven. The edges may be scalloped or notched.     C - Color  Most benign moles are all one color -- often a single shade of brown. Having a variety of colors is another warning signal. A number of different shades of brown, tan or black could appear. A melanoma may also become red, white or blue.      D - Diameter  Benign moles usually have a smaller diameter than malignant ones. Melanomas usually are larger in diameter than the eraser on your pencil tip (  inch or 6mm), but they may sometimes be smaller when first detected.      E - Evolution  Common, benign moles look the same over time. Be on the alert when a mole starts to evolve or change in any way. When a mole is evolving, see a doctor. Any change -- in size, shape, color, elevation, or another trait, or any new symptom such as bleeding, itching or crusting -- points to danger.    The Ugly Duckling Rule  This is a  "simplified method where you look for any moles that do not match the other moles you have. Even if some of your moles look a little funny we are less concerned if they match one another. We are looking for the outlier - the one that is darker, larger, etc. In A, there is one dominant mole pattern with slight variation in size. The outlier lesion is clearly darker and larger than all other moles. In B, the patient has two predominant mole patterns, one with larger nevi and one with small, darker nevi. The outlier lesion is small but lacks pigmentation. In C, the patient shows only one lesion on the back. If this lesion is changing, symptomatic, or deemed atypical, it should be removed.      Seborrheic keratoses - a mimicker of melanoma    Seborrheic keratosis (seb-o-REE-ik care-uh-TOE-sis) is a common skin growth. It may seem worrisome because it can look like a wart, pre-cancerous skin growth (actinic keratosis), or skin cancer. Despite their appearance, seborrheic keratoses are harmless.    Most people get these growths when they are middle aged or older. Because they begin at a later age and can have a flat, waxy or wart-like appearance, seborrheic keratoses are often called the  barnacles of aging  or \"wisdom spots\".    It s possible to have just one of these growths, but most people develop several. Seborrheic keratoses range in color from white to black; however, most are tan or brown. You can find these harmless growths anywhere on the skin, except the palms and soles. Most often, you ll see them on the chest, back, head, or neck. Seborrheic keratoses are not contagious.          WOUND CARE: Wound Care After a Biopsy    How should I care for my wound for the first 24 hours?  If it bleeds, hold direct pressure on the area for 10 minutes  Acetaminophen (TylenolTM) as needed for pain    How should I care for the wound after 24 hours?  Remove the bandage   You may bathe or shower as normal    How do I clean my " wound?  Use white petroleum/Vaseline  to keep sutures moist twice daily.   Replace the Bandaid /bandage to keep the wound covered until it is completely healed (not needed in the scalp)    What should I use to clean my wound?   White petroleum jelly (Vaseline )   Bandaids   as needed    How should I care for my wound after a shave biopsy?  Keep up with wound care for one week or until the area is healed     How should I care for my wound after a punch biopsy?  Complete wound care until the stitches are removed   Stitches need to be removed in 14 days. You may return to our clinic for this or you may have it done locally at your doctor s office.    When should I call my doctor?  If you have increased:   Pain or swelling    Pus or drainage  Temperature over 101? F (38.3 ? C)   Redness or warmth  around wound

## 2024-08-22 NOTE — PROGRESS NOTES
I talked with and examined Paco Stein and I agree with the assessment and the plan. I was present for the entire procedure. KRIS Mcmanus MD.

## 2024-08-22 NOTE — NURSING NOTE
Transplant Coordinator Note    Reason for visit: 20 month follow up  Coordinator: Present   Caregiver: Wife~ Arlette    Health concerns addressed today:  1. Aortic dilation~following echos. Today was 4 cm.   2. Hx vasculities~ follows with neuro and rheum  3.Saw Derm this am.   4. Full asa per Neuro.  5. Discuss IMS in Dec if we want to come down a little.     Health concerns addressed today  Pt with wife seen in clinic with Dr Dominguez. MD reviewed meds, available labs, and ECHO. Pt reports doing well. Reviewed ECHO - noted mild dilation of the aorta. No changes; cont to monitor. Wght and BP stable. Walking ~3 days per week ~ 0.5 to 1 mile. Attributes his unsteady gait to his neuro status.        Immunosuppressants  MMF 1000/1000 mg  Tac 2.5/2 mg  - goal 6-8. Today's level 7.6.        No DSAs      Serostatus:  - CMV D+/R+  - EBV D+/R+  - Toxo D-/R-     Routine screenings  Derm: 8/2024.   Dental UTD  Colonoscopy  Nov 2022 (no specimens)   Prostate 1.12  Eye UTD  VACCINES  Flu fall 2023 per pt   RSV  - pt declined  Pneumonia UTD  COVID - pt declined   Shingrix 2/2    Resulted labs and echo reviewed with patient  Medication record reviewed and reconciled  Questions and concerns addressed  Pt verbalized an understanding of plan of care.     Patient Instructions  1. Tacro level 7.6. No changes.  2. Standing orders sent to your local lab.   3. Please reach out if you need anything.!     Next transplant clinic appointment: 2nd annual scheduled in Dec.   Next lab draw: December.   Coordinator will call with all pending results.     Please call transplant coordinator with any questions:    Bella Anna, RN BSN   Post Heart Transplant Nurse Coordinator  [facility]  Questions: [phone]

## 2024-08-23 LAB
PATH REPORT.COMMENTS IMP SPEC: NORMAL
PATH REPORT.COMMENTS IMP SPEC: NORMAL
PATH REPORT.FINAL DX SPEC: NORMAL
PATH REPORT.GROSS SPEC: NORMAL
PATH REPORT.MICROSCOPIC SPEC OTHER STN: NORMAL
PATH REPORT.RELEVANT HX SPEC: NORMAL

## 2024-08-28 LAB
ALLOMAP SCORE (EXTERNAL): 23 (ref 0–40)
NEGATIVE PREDICTIVE VALUE PERCENT (EXTERNAL): 100 %
POSITIVE PREDICTIVE VALUE PERCENT (EXTERNAL): 2 %

## 2024-09-02 NOTE — PLAN OF CARE
D: s/p heart transplant 12/25.     I: Monitored vitals and assessed patient status.   PRN: robaxin x1     A: A&Ox4. On room air. SR 80s-90s, VSS, afebrile. Sternal incision and left chest incision open to air. Dressing over old chest tube sites. Right groin site with primapore in place. Urinating adequately. LBM 1/10. Assist x1 with gaitbelt and walker.     P: Continue to monitor.   OBSERVATION

## 2024-09-11 DIAGNOSIS — Z79.899 ENCOUNTER FOR LONG-TERM (CURRENT) USE OF HIGH-RISK MEDICATION: ICD-10-CM

## 2024-09-11 DIAGNOSIS — Z94.1 HEART REPLACED BY TRANSPLANT (H): ICD-10-CM

## 2024-09-11 DIAGNOSIS — Z94.1 TRANSPLANTED HEART (H): Primary | ICD-10-CM

## 2024-09-11 RX ORDER — SILDENAFIL 50 MG/1
50 TABLET, FILM COATED ORAL DAILY PRN
Qty: 30 TABLET | Refills: 0 | Status: SHIPPED | OUTPATIENT
Start: 2024-09-11

## 2024-09-29 ENCOUNTER — TELEPHONE (OUTPATIENT)
Dept: TRANSPLANT | Facility: CLINIC | Age: 69
End: 2024-09-29
Payer: MEDICARE

## 2024-12-09 ENCOUNTER — TELEPHONE (OUTPATIENT)
Dept: TRANSPLANT | Facility: CLINIC | Age: 69
End: 2024-12-09
Payer: MEDICARE

## 2024-12-09 ENCOUNTER — DOCUMENTATION ONLY (OUTPATIENT)
Dept: TRANSPLANT | Facility: CLINIC | Age: 69
End: 2024-12-09
Payer: MEDICARE

## 2024-12-09 DIAGNOSIS — Z13.220 LIPID SCREENING: ICD-10-CM

## 2024-12-09 DIAGNOSIS — E11.9 DIABETES MELLITUS (H): ICD-10-CM

## 2024-12-09 DIAGNOSIS — Z12.5 PROSTATE CANCER SCREENING: ICD-10-CM

## 2024-12-09 DIAGNOSIS — Z94.1 TRANSPLANTED HEART (H): Primary | ICD-10-CM

## 2024-12-09 DIAGNOSIS — Z13.29 THYROID DISORDER SCREENING: ICD-10-CM

## 2024-12-09 RX ORDER — ASPIRIN 325 MG
325 TABLET ORAL ONCE
Status: CANCELLED | OUTPATIENT
Start: 2024-12-09 | End: 2024-12-09

## 2024-12-09 RX ORDER — SODIUM CHLORIDE 9 MG/ML
INJECTION, SOLUTION INTRAVENOUS CONTINUOUS
Status: CANCELLED | OUTPATIENT
Start: 2024-12-09

## 2024-12-09 RX ORDER — LIDOCAINE 40 MG/G
CREAM TOPICAL
Status: CANCELLED | OUTPATIENT
Start: 2024-12-09

## 2024-12-09 RX ORDER — ASPIRIN 81 MG/1
243 TABLET, CHEWABLE ORAL ONCE
Status: CANCELLED | OUTPATIENT
Start: 2024-12-09

## 2024-12-09 NOTE — PROGRESS NOTES
Pt refuses to take asa morning of angiogram. Will discuss with doctor and request asa administration prior to angiogram if necessary.

## 2024-12-11 ENCOUNTER — HOSPITAL ENCOUNTER (OUTPATIENT)
Facility: CLINIC | Age: 69
Discharge: HOME | End: 2024-12-11
Attending: INTERNAL MEDICINE | Admitting: INTERNAL MEDICINE
Payer: MEDICARE

## 2024-12-11 ENCOUNTER — HOSPITAL ENCOUNTER (OUTPATIENT)
Dept: MRI IMAGING | Facility: CLINIC | Age: 69
Discharge: HOME | End: 2024-12-11
Attending: INTERNAL MEDICINE | Admitting: INTERNAL MEDICINE
Payer: MEDICARE

## 2024-12-11 ENCOUNTER — APPOINTMENT (OUTPATIENT)
Dept: MEDSURG UNIT | Facility: CLINIC | Age: 69
End: 2024-12-11
Attending: INTERNAL MEDICINE
Payer: MEDICARE

## 2024-12-11 ENCOUNTER — LAB (OUTPATIENT)
Dept: LAB | Facility: CLINIC | Age: 69
End: 2024-12-11
Attending: INTERNAL MEDICINE
Payer: MEDICARE

## 2024-12-11 ENCOUNTER — HOSPITAL ENCOUNTER (OUTPATIENT)
Dept: GENERAL RADIOLOGY | Facility: CLINIC | Age: 69
Discharge: HOME | End: 2024-12-11
Attending: INTERNAL MEDICINE | Admitting: INTERNAL MEDICINE
Payer: MEDICARE

## 2024-12-11 VITALS
SYSTOLIC BLOOD PRESSURE: 128 MMHG | OXYGEN SATURATION: 94 % | RESPIRATION RATE: 13 BRPM | HEART RATE: 94 BPM | TEMPERATURE: 97.8 F | HEIGHT: 74 IN | WEIGHT: 220.1 LBS | DIASTOLIC BLOOD PRESSURE: 86 MMHG | BODY MASS INDEX: 28.25 KG/M2

## 2024-12-11 VITALS — RESPIRATION RATE: 16 BRPM | SYSTOLIC BLOOD PRESSURE: 150 MMHG | DIASTOLIC BLOOD PRESSURE: 92 MMHG | HEART RATE: 99 BPM

## 2024-12-11 DIAGNOSIS — Z13.29 THYROID DISORDER SCREENING: ICD-10-CM

## 2024-12-11 DIAGNOSIS — E11.9 DIABETES MELLITUS (H): ICD-10-CM

## 2024-12-11 DIAGNOSIS — Z94.1 TRANSPLANTED HEART (H): ICD-10-CM

## 2024-12-11 DIAGNOSIS — Z12.5 PROSTATE CANCER SCREENING: ICD-10-CM

## 2024-12-11 DIAGNOSIS — Z13.220 LIPID SCREENING: ICD-10-CM

## 2024-12-11 LAB
ALBUMIN SERPL BCG-MCNC: 4.2 G/DL (ref 3.5–5.2)
ALP SERPL-CCNC: 65 U/L (ref 40–150)
ALT SERPL W P-5'-P-CCNC: 21 U/L (ref 0–70)
ANION GAP SERPL CALCULATED.3IONS-SCNC: 10 MMOL/L (ref 7–15)
AST SERPL W P-5'-P-CCNC: 23 U/L (ref 0–45)
BASOPHILS # BLD AUTO: 0 10E3/UL (ref 0–0.2)
BASOPHILS NFR BLD AUTO: 1 %
BILIRUB SERPL-MCNC: 0.6 MG/DL
BUN SERPL-MCNC: 23.1 MG/DL (ref 8–23)
CALCIUM SERPL-MCNC: 9.3 MG/DL (ref 8.8–10.4)
CHLORIDE SERPL-SCNC: 106 MMOL/L (ref 98–107)
CHOLEST SERPL-MCNC: 159 MG/DL
CK SERPL-CCNC: 141 U/L (ref 39–308)
CMV DNA SPEC NAA+PROBE-ACNC: NOT DETECTED IU/ML
CREAT SERPL-MCNC: 1.16 MG/DL (ref 0.67–1.17)
EBV DNA SERPL NAA+PROBE-ACNC: NOT DETECTED IU/ML
EGFRCR SERPLBLD CKD-EPI 2021: 67 ML/MIN/1.73M2
EOSINOPHIL # BLD AUTO: 0.2 10E3/UL (ref 0–0.7)
EOSINOPHIL NFR BLD AUTO: 3 %
ERYTHROCYTE [DISTWIDTH] IN BLOOD BY AUTOMATED COUNT: 12.7 % (ref 10–15)
EST. AVERAGE GLUCOSE BLD GHB EST-MCNC: 131 MG/DL
FASTING STATUS PATIENT QL REPORTED: YES
FASTING STATUS PATIENT QL REPORTED: YES
GLUCOSE SERPL-MCNC: 112 MG/DL (ref 70–99)
HBA1C MFR BLD: 6.2 %
HCO3 SERPL-SCNC: 24 MMOL/L (ref 22–29)
HCT VFR BLD AUTO: 48.4 % (ref 40–53)
HDLC SERPL-MCNC: 67 MG/DL
HGB BLD-MCNC: 15.1 G/DL (ref 13.3–17.7)
HGB BLD-MCNC: 15.7 G/DL (ref 13.3–17.7)
IMM GRANULOCYTES # BLD: 0 10E3/UL
IMM GRANULOCYTES NFR BLD: 0 %
LDLC SERPL CALC-MCNC: 75 MG/DL
LYMPHOCYTES # BLD AUTO: 1.7 10E3/UL (ref 0.8–5.3)
LYMPHOCYTES NFR BLD AUTO: 27 %
MAGNESIUM SERPL-MCNC: 1.5 MG/DL (ref 1.7–2.3)
MCH RBC QN AUTO: 27.9 PG (ref 26.5–33)
MCHC RBC AUTO-ENTMCNC: 32.4 G/DL (ref 31.5–36.5)
MCV RBC AUTO: 86 FL (ref 78–100)
MONOCYTES # BLD AUTO: 0.5 10E3/UL (ref 0–1.3)
MONOCYTES NFR BLD AUTO: 9 %
NEUTROPHILS # BLD AUTO: 3.7 10E3/UL (ref 1.6–8.3)
NEUTROPHILS NFR BLD AUTO: 61 %
NONHDLC SERPL-MCNC: 92 MG/DL
NRBC # BLD AUTO: 0 10E3/UL
NRBC BLD AUTO-RTO: 0 /100
OXYHGB MFR BLDV: 75 % (ref 70–75)
PHOSPHATE SERPL-MCNC: 2.8 MG/DL (ref 2.5–4.5)
PLATELET # BLD AUTO: 219 10E3/UL (ref 150–450)
POTASSIUM SERPL-SCNC: 4.3 MMOL/L (ref 3.4–5.3)
PROT SERPL-MCNC: 6.7 G/DL (ref 6.4–8.3)
PSA SERPL DL<=0.01 NG/ML-MCNC: 1.07 NG/ML (ref 0–6.5)
RBC # BLD AUTO: 5.62 10E6/UL (ref 4.4–5.9)
SODIUM SERPL-SCNC: 140 MMOL/L (ref 135–145)
SPECIMEN TYPE: NORMAL
TACROLIMUS BLD-MCNC: 8.6 UG/L (ref 5–15)
TME LAST DOSE: NORMAL H
TME LAST DOSE: NORMAL H
TRIGL SERPL-MCNC: 85 MG/DL
TSH SERPL DL<=0.005 MIU/L-ACNC: 1.62 UIU/ML (ref 0.3–4.2)
WBC # BLD AUTO: 6.1 10E3/UL (ref 4–11)

## 2024-12-11 PROCEDURE — 272N000001 HC OR GENERAL SUPPLY STERILE: Performed by: INTERNAL MEDICINE

## 2024-12-11 PROCEDURE — 71046 X-RAY EXAM CHEST 2 VIEWS: CPT | Mod: 26 | Performed by: RADIOLOGY

## 2024-12-11 PROCEDURE — A9585 GADOBUTROL INJECTION: HCPCS | Performed by: INTERNAL MEDICINE

## 2024-12-11 PROCEDURE — C1726 CATH, BAL DIL, NON-VASCULAR: HCPCS | Performed by: INTERNAL MEDICINE

## 2024-12-11 PROCEDURE — 250N000011 HC RX IP 250 OP 636: Performed by: INTERNAL MEDICINE

## 2024-12-11 PROCEDURE — 86832 HLA CLASS I HIGH DEFIN QUAL: CPT | Performed by: INTERNAL MEDICINE

## 2024-12-11 PROCEDURE — 88346 IMFLUOR 1ST 1ANTB STAIN PX: CPT | Mod: 26 | Performed by: PATHOLOGY

## 2024-12-11 PROCEDURE — G0103 PSA SCREENING: HCPCS | Performed by: INTERNAL MEDICINE

## 2024-12-11 PROCEDURE — 93010 ELECTROCARDIOGRAM REPORT: CPT | Mod: XU | Performed by: INTERNAL MEDICINE

## 2024-12-11 PROCEDURE — 85018 HEMOGLOBIN: CPT

## 2024-12-11 PROCEDURE — 93005 ELECTROCARDIOGRAM TRACING: CPT

## 2024-12-11 PROCEDURE — 75563 CARD MRI W/STRESS IMG & DYE: CPT | Mod: MG

## 2024-12-11 PROCEDURE — 86828 HLA CLASS I&II ANTIBODY QUAL: CPT | Performed by: INTERNAL MEDICINE

## 2024-12-11 PROCEDURE — 99152 MOD SED SAME PHYS/QHP 5/>YRS: CPT | Mod: GC | Performed by: INTERNAL MEDICINE

## 2024-12-11 PROCEDURE — 88307 TISSUE EXAM BY PATHOLOGIST: CPT | Mod: TC | Performed by: INTERNAL MEDICINE

## 2024-12-11 PROCEDURE — 86352 CELL FUNCTION ASSAY W/STIM: CPT | Performed by: INTERNAL MEDICINE

## 2024-12-11 PROCEDURE — 250N000013 HC RX MED GY IP 250 OP 250 PS 637: Performed by: INTERNAL MEDICINE

## 2024-12-11 PROCEDURE — 88350 IMFLUOR EA ADDL 1ANTB STN PX: CPT | Mod: 26 | Performed by: PATHOLOGY

## 2024-12-11 PROCEDURE — 83036 HEMOGLOBIN GLYCOSYLATED A1C: CPT | Performed by: INTERNAL MEDICINE

## 2024-12-11 PROCEDURE — 999N000054 HC STATISTIC EKG NON-CHARGEABLE

## 2024-12-11 PROCEDURE — 93505 ENDOMYOCARDIAL BIOPSY: CPT | Performed by: INTERNAL MEDICINE

## 2024-12-11 PROCEDURE — 85004 AUTOMATED DIFF WBC COUNT: CPT | Performed by: INTERNAL MEDICINE

## 2024-12-11 PROCEDURE — 93456 R HRT CORONARY ARTERY ANGIO: CPT | Performed by: INTERNAL MEDICINE

## 2024-12-11 PROCEDURE — 99207 PR NO CHARGE LOS: CPT

## 2024-12-11 PROCEDURE — 36415 COLL VENOUS BLD VENIPUNCTURE: CPT | Performed by: INTERNAL MEDICINE

## 2024-12-11 PROCEDURE — 250N000009 HC RX 250: Performed by: INTERNAL MEDICINE

## 2024-12-11 PROCEDURE — 87799 DETECT AGENT NOS DNA QUANT: CPT | Performed by: INTERNAL MEDICINE

## 2024-12-11 PROCEDURE — 999N000142 HC STATISTIC PROCEDURE PREP ONLY

## 2024-12-11 PROCEDURE — 85018 HEMOGLOBIN: CPT | Performed by: INTERNAL MEDICINE

## 2024-12-11 PROCEDURE — 86833 HLA CLASS II HIGH DEFIN QUAL: CPT | Performed by: INTERNAL MEDICINE

## 2024-12-11 PROCEDURE — C1894 INTRO/SHEATH, NON-LASER: HCPCS | Performed by: INTERNAL MEDICINE

## 2024-12-11 PROCEDURE — C1769 GUIDE WIRE: HCPCS | Performed by: INTERNAL MEDICINE

## 2024-12-11 PROCEDURE — 71046 X-RAY EXAM CHEST 2 VIEWS: CPT

## 2024-12-11 PROCEDURE — 84443 ASSAY THYROID STIM HORMONE: CPT | Performed by: INTERNAL MEDICINE

## 2024-12-11 PROCEDURE — 250N000011 HC RX IP 250 OP 636

## 2024-12-11 PROCEDURE — 999N000134 HC STATISTIC PP CARE STAGE 3

## 2024-12-11 PROCEDURE — 88307 TISSUE EXAM BY PATHOLOGIST: CPT | Mod: 26 | Performed by: PATHOLOGY

## 2024-12-11 PROCEDURE — 80061 LIPID PANEL: CPT | Performed by: INTERNAL MEDICINE

## 2024-12-11 PROCEDURE — 255N000002 HC RX 255 OP 636: Performed by: INTERNAL MEDICINE

## 2024-12-11 PROCEDURE — 84155 ASSAY OF PROTEIN SERUM: CPT | Performed by: INTERNAL MEDICINE

## 2024-12-11 PROCEDURE — 84100 ASSAY OF PHOSPHORUS: CPT | Performed by: INTERNAL MEDICINE

## 2024-12-11 PROCEDURE — C1751 CATH, INF, PER/CENT/MIDLINE: HCPCS | Performed by: INTERNAL MEDICINE

## 2024-12-11 PROCEDURE — 99153 MOD SED SAME PHYS/QHP EA: CPT | Performed by: INTERNAL MEDICINE

## 2024-12-11 PROCEDURE — 82810 BLOOD GASES O2 SAT ONLY: CPT

## 2024-12-11 PROCEDURE — G1010 CDSM STANSON: HCPCS

## 2024-12-11 PROCEDURE — 93454 CORONARY ARTERY ANGIO S&I: CPT | Performed by: INTERNAL MEDICINE

## 2024-12-11 PROCEDURE — 93505 ENDOMYOCARDIAL BIOPSY: CPT | Mod: 26 | Performed by: INTERNAL MEDICINE

## 2024-12-11 PROCEDURE — 83735 ASSAY OF MAGNESIUM: CPT | Performed by: INTERNAL MEDICINE

## 2024-12-11 PROCEDURE — 88346 IMFLUOR 1ST 1ANTB STAIN PX: CPT | Mod: TC | Performed by: INTERNAL MEDICINE

## 2024-12-11 PROCEDURE — 82310 ASSAY OF CALCIUM: CPT | Performed by: INTERNAL MEDICINE

## 2024-12-11 PROCEDURE — 82550 ASSAY OF CK (CPK): CPT | Performed by: INTERNAL MEDICINE

## 2024-12-11 PROCEDURE — 93454 CORONARY ARTERY ANGIO S&I: CPT | Mod: 26 | Performed by: INTERNAL MEDICINE

## 2024-12-11 PROCEDURE — 99152 MOD SED SAME PHYS/QHP 5/>YRS: CPT | Performed by: INTERNAL MEDICINE

## 2024-12-11 PROCEDURE — 80197 ASSAY OF TACROLIMUS: CPT | Performed by: INTERNAL MEDICINE

## 2024-12-11 RX ORDER — FENTANYL CITRATE 50 UG/ML
INJECTION, SOLUTION INTRAMUSCULAR; INTRAVENOUS
Status: DISCONTINUED | OUTPATIENT
Start: 2024-12-11 | End: 2024-12-11 | Stop reason: HOSPADM

## 2024-12-11 RX ORDER — GADOBUTROL 604.72 MG/ML
25 INJECTION INTRAVENOUS ONCE
Status: COMPLETED | OUTPATIENT
Start: 2024-12-11 | End: 2024-12-11

## 2024-12-11 RX ORDER — NALOXONE HYDROCHLORIDE 0.4 MG/ML
0.2 INJECTION, SOLUTION INTRAMUSCULAR; INTRAVENOUS; SUBCUTANEOUS
Status: DISCONTINUED | OUTPATIENT
Start: 2024-12-11 | End: 2024-12-11 | Stop reason: HOSPADM

## 2024-12-11 RX ORDER — SODIUM CHLORIDE 9 MG/ML
INJECTION, SOLUTION INTRAVENOUS CONTINUOUS
Status: DISCONTINUED | OUTPATIENT
Start: 2024-12-11 | End: 2024-12-11 | Stop reason: HOSPADM

## 2024-12-11 RX ORDER — REGADENOSON 0.08 MG/ML
0.4 INJECTION, SOLUTION INTRAVENOUS ONCE
Status: COMPLETED | OUTPATIENT
Start: 2024-12-11 | End: 2024-12-11

## 2024-12-11 RX ORDER — LORAZEPAM 0.5 MG/1
0.5 TABLET ORAL EVERY 30 MIN PRN
Status: DISCONTINUED | OUTPATIENT
Start: 2024-12-11 | End: 2024-12-12 | Stop reason: HOSPADM

## 2024-12-11 RX ORDER — LIDOCAINE 40 MG/G
CREAM TOPICAL
Status: DISCONTINUED | OUTPATIENT
Start: 2024-12-11 | End: 2024-12-11 | Stop reason: HOSPADM

## 2024-12-11 RX ORDER — IOPAMIDOL 755 MG/ML
INJECTION, SOLUTION INTRAVASCULAR
Status: DISCONTINUED | OUTPATIENT
Start: 2024-12-11 | End: 2024-12-11 | Stop reason: HOSPADM

## 2024-12-11 RX ORDER — AMINOPHYLLINE 25 MG/ML
50-100 INJECTION, SOLUTION INTRAVENOUS
Status: COMPLETED | OUTPATIENT
Start: 2024-12-11 | End: 2024-12-11

## 2024-12-11 RX ORDER — LIDOCAINE 40 MG/G
CREAM TOPICAL
Status: DISCONTINUED | OUTPATIENT
Start: 2024-12-11 | End: 2024-12-12 | Stop reason: HOSPADM

## 2024-12-11 RX ORDER — ALBUTEROL SULFATE 90 UG/1
2 INHALANT RESPIRATORY (INHALATION) EVERY 5 MIN PRN
Status: DISCONTINUED | OUTPATIENT
Start: 2024-12-11 | End: 2024-12-12 | Stop reason: HOSPADM

## 2024-12-11 RX ORDER — NALOXONE HYDROCHLORIDE 0.4 MG/ML
0.4 INJECTION, SOLUTION INTRAMUSCULAR; INTRAVENOUS; SUBCUTANEOUS
Status: DISCONTINUED | OUTPATIENT
Start: 2024-12-11 | End: 2024-12-11 | Stop reason: HOSPADM

## 2024-12-11 RX ORDER — MAGNESIUM SULFATE HEPTAHYDRATE 40 MG/ML
4 INJECTION, SOLUTION INTRAVENOUS ONCE
Status: COMPLETED | OUTPATIENT
Start: 2024-12-11 | End: 2024-12-11

## 2024-12-11 RX ORDER — ACETAMINOPHEN 325 MG/1
650 TABLET ORAL EVERY 4 HOURS PRN
Status: DISCONTINUED | OUTPATIENT
Start: 2024-12-11 | End: 2024-12-11 | Stop reason: HOSPADM

## 2024-12-11 RX ORDER — ASPIRIN 81 MG/1
243 TABLET, CHEWABLE ORAL ONCE
Status: COMPLETED | OUTPATIENT
Start: 2024-12-11 | End: 2024-12-11

## 2024-12-11 RX ORDER — FENTANYL CITRATE 50 UG/ML
25 INJECTION, SOLUTION INTRAMUSCULAR; INTRAVENOUS
Status: DISCONTINUED | OUTPATIENT
Start: 2024-12-11 | End: 2024-12-11 | Stop reason: HOSPADM

## 2024-12-11 RX ORDER — CAFFEINE CITRATE 20 MG/ML
60 SOLUTION INTRAVENOUS
OUTPATIENT
Start: 2024-12-11 | End: 2024-12-11

## 2024-12-11 RX ORDER — OXYCODONE HYDROCHLORIDE 10 MG/1
10 TABLET ORAL EVERY 4 HOURS PRN
Status: DISCONTINUED | OUTPATIENT
Start: 2024-12-11 | End: 2024-12-11 | Stop reason: HOSPADM

## 2024-12-11 RX ORDER — FLUMAZENIL 0.1 MG/ML
0.2 INJECTION, SOLUTION INTRAVENOUS
Status: DISCONTINUED | OUTPATIENT
Start: 2024-12-11 | End: 2024-12-11 | Stop reason: HOSPADM

## 2024-12-11 RX ORDER — OXYCODONE HYDROCHLORIDE 5 MG/1
5 TABLET ORAL EVERY 4 HOURS PRN
Status: DISCONTINUED | OUTPATIENT
Start: 2024-12-11 | End: 2024-12-11 | Stop reason: HOSPADM

## 2024-12-11 RX ORDER — ASPIRIN 325 MG
325 TABLET ORAL ONCE
Status: COMPLETED | OUTPATIENT
Start: 2024-12-11 | End: 2024-12-11

## 2024-12-11 RX ORDER — ATROPINE SULFATE 0.1 MG/ML
0.5 INJECTION INTRAVENOUS
Status: DISCONTINUED | OUTPATIENT
Start: 2024-12-11 | End: 2024-12-11 | Stop reason: HOSPADM

## 2024-12-11 RX ORDER — DIAZEPAM 5 MG/1
5 TABLET ORAL EVERY 30 MIN PRN
Status: DISCONTINUED | OUTPATIENT
Start: 2024-12-11 | End: 2024-12-12 | Stop reason: HOSPADM

## 2024-12-11 RX ADMIN — GADOBUTROL 25 ML: 604.72 INJECTION INTRAVENOUS at 08:28

## 2024-12-11 RX ADMIN — AMINOPHYLLINE 100 MG: 25 INJECTION, SOLUTION INTRAVENOUS at 08:49

## 2024-12-11 RX ADMIN — REGADENOSON 0.4 MG: 0.4 INJECTION INTRAVENOUS at 08:47

## 2024-12-11 RX ADMIN — MAGNESIUM SULFATE IN WATER 4 G: 40 INJECTION, SOLUTION INTRAVENOUS at 13:29

## 2024-12-11 RX ADMIN — ASPIRIN 325 MG: 325 TABLET, COATED ORAL at 12:57

## 2024-12-11 ASSESSMENT — ACTIVITIES OF DAILY LIVING (ADL)
ADLS_ACUITY_SCORE: 53

## 2024-12-11 NOTE — PROGRESS NOTES
Arterial sheath pulled at 14:51 and Venous sheath pulled at 14:58.  Manual pressure held for 15 minutes.  Dressing applied.  Resting flat in bed.  Will continue to monitor.  Site soft and intact.

## 2024-12-11 NOTE — H&P
History and Physical: Cardiology Cath Lab Service    Paco Stein MRN# [id number]   YOB: 1955 Age: 69 year old         Assessment and plan:   Paco Stein is a 69 year old male with a history of HTN, CAD (s/p PCI to LAD in 2013), hyperlipidemia, CNS vasculitis (2013, residual left-sided weakness, on Cytoxan --> CellCept with no further neuro insult), systolic heart failure secondary to NICM ([facility], diagnosed 2021), sustained VT s/p ICD (1/2022), and CKD now s/p OHT 12/25/22     # NICM 2/2 [facility] s/p OHT 12/25/22  # CAD s/p PCI to LAD (2013)  # Sustained VT s/p ICD (2022)  # Hypomagnesemia  Patient with history history of systolic heart failure secondary to NICM ([facility], diagnosed 2021), sustained VT s/p ICD (1/2022) now s/p OHT 12/25/22. He presents today for RHC, cardiac biopsy, and coronary angiogram as part of ongoing OHT surveillance.   - No contraindications noted, will move forward with the procedure(s).  - Patient will be admitted as OP to Obs unit post procedure, with plans to discharge home after post procedure orders have been met  - Magnesium replacement protocol ordered, pt and wife refusing PO medications      Virginia Valle PA-C  [facility] Cardiology Cath Lab Services  [phone]      HPI:   Paco Stein is a 69 year old male with a history of HTN, CAD (s/p PCI to LAD in 2013), hyperlipidemia, CNS vasculitis (2013, residual left-sided weakness, on Cytoxan --> CellCept with no further neuro insult), systolic heart failure secondary to NICM ([facility], diagnosed 2021), sustained VT s/p ICD (1/2022), and CKD now s/p OHT 12/25/22 .  He presents today for RHC, cardiac biopsy, and coronary angiogram as part of ongoing OHT surveillance.   Graph function is normal, other than complement deposition there is been no rejection, baseline coronary angiogram with mild native coronary disease (proximal LAD 0.2 mm)   Patient reports feeling well today. No chest pain, dyspnea, lower extremity edema, dizziness,  lightheadedness, palpitations, presyncope, or syncope. No recent illness, fevers, chills, nausea, vomiting, diarrhea, dysuria, or abdominal pain. No headache, visual changes, numbness, tingling, or weakness. No bleeding or clotting problems.     Past Medical History:   Diagnosis Date    Congestive heart failure (H)        Past Surgical History:   Procedure Laterality Date    COLONOSCOPY N/A 11/17/2022    Procedure: COLONOSCOPY;  Surgeon: Roverto Nino MD;  Location:  GI    CV CORONARY ANGIOGRAM N/A 11/11/2022    Procedure: Coronary Angiogram;  Surgeon: Justo Bush MD;  Location:  HEART CARDIAC CATH LAB    CV CORONARY ANGIOGRAM N/A 1/23/2023    Procedure: Coronary Angiogram - biplane;  Surgeon: Justo Bush MD;  Location:  HEART CARDIAC CATH LAB    CV CORONARY ANGIOGRAM N/A 12/13/2023    Procedure: Coronary Angiogram;  Surgeon: Wayne Xavier MD;  Location:  HEART CARDIAC CATH LAB    CV HEART BIOPSY N/A 01/09/2023    Procedure: Heart Cath Heart Biopsy;  Surgeon: Tab Agee MD;  Location:  HEART CARDIAC CATH LAB    CV HEART BIOPSY N/A 01/02/2023    Procedure: Heart Biopsy;  Surgeon: Ghulam Mercado MD;  Location: [facility] HEART CARDIAC CATH LAB    CV HEART BIOPSY N/A 1/23/2023    Procedure: Heart Cath Heart Biopsy;  Surgeon: Justo Bush MD;  Location: [facility] HEART CARDIAC CATH LAB    CV HEART BIOPSY N/A 1/17/2023    Procedure: Heart Cath Heart Biopsy;  Surgeon: Wayne Xavier MD;  Location:  HEART CARDIAC CATH LAB    CV HEART BIOPSY N/A 2/9/2023    Procedure: Heart Cath Heart Biopsy;  Surgeon: Ghulam Mercado MD;  Location: [facility] HEART CARDIAC CATH LAB    CV HEART BIOPSY N/A 2/21/2023    Procedure: Heart Biopsy;  Surgeon: Wayne Xavier MD;  Location:  HEART CARDIAC CATH LAB    CV HEART BIOPSY N/A 3/9/2023    Procedure: Heart Cath Heart Biopsy;  Surgeon: Tab Agee MD;  Location:  HEART CARDIAC CATH LAB    CV  HEART BIOPSY N/A 3/21/2023    Procedure: Heart Cath Heart Biopsy;  Surgeon: Ghulam Mercado MD;  [facility]    CV HEART BIOPSY N/A 4/27/2023    Procedure: Heart Cath Heart Biopsy;  Surgeon: Justo Bush MD;  Location: [facility] HEART CARDIAC CATH LAB    CV HEART BIOPSY N/A 5/22/2023    Procedure: Heart Cath Heart Biopsy;  Surgeon: Wayne Xavier MD;  [facility]ATH [facility]    CV HEART BIOPSY N/A 6/29/2023    Procedure: Heart Biopsy (groin stick- requests sedation);  Surgeon: Wayne Xavier MD;  Location:  HEART CARDIAC CATH LAB    CV HEART BIOPSY N/A 12/13/2023    Procedure: Heart Biopsy;  Surgeon: Wayne Xavier MD;  Location:  HEART CARDIAC CATH LAB    CV INTRAVASULAR ULTRASOUND N/A 1/23/2023    Procedure: Intravascular Ultrasound;  Surgeon: Justo Bush MD;  Location: [facility] HEART CARDIAC CATH LAB    CV RIGHT HEART CATH MEASUREMENTS RECORDED N/A 11/11/2022    Procedure: Right Heart Catheterization;  Surgeon: Justo Bush MD;  Location: [facility] HEART CARDIAC CATH LAB    CV RIGHT HEART CATH MEASUREMENTS RECORDED N/A 12/08/2022    Procedure: Right Heart Catheterization;  Surgeon: Wayne Xavier MD;  Location:  HEART CARDIAC CATH LAB    CV RIGHT HEART CATH MEASUREMENTS RECORDED N/A 12/09/2022    Procedure: Right Heart Catheterization with leave-in Coy;  Surgeon: Khari Mcneill MD;  Location:  HEART CARDIAC CATH LAB    CV RIGHT HEART CATH MEASUREMENTS RECORDED N/A 01/09/2023    Procedure: Heart Cath Right Heart Cath;  Surgeon: Tab Agee MD;  Location: [facility] HEART CARDIAC CATH LAB    CV RIGHT HEART CATH MEASUREMENTS RECORDED N/A 01/02/2023    Procedure: Right Heart Catheterization;  Surgeon: Ghulam Mercado MD;  Location:  HEART CARDIAC CATH LAB    CV RIGHT HEART CATH MEASUREMENTS RECORDED N/A 1/23/2023    Procedure: Right Heart Catheterization;  Surgeon: Justo Bush MD;   Location: [facility] CARDIAC CATH LAB    CV RIGHT HEART CATH MEASUREMENTS RECORDED N/A 1/17/2023    Procedure: Right Heart Catheterization;  Surgeon: Wayne Xavier MD;  Location: [facility] HEART CARDIAC CATH LAB    CV RIGHT HEART CATH MEASUREMENTS RECORDED N/A 2/9/2023    Procedure: Right Heart Catheterization;  Surgeon: Ghulam Mercado MD;  Location: [facility] HE[facility] CARDIAC CATH LAB    CV RIGHT HEART CATH MEASUREMENTS RECORDED N/A 2/21/2023    Procedure: Right Heart Catheterization;  Surgeon: Wayne Xavier MD;  [facility]    CV RIGHT HEART CATH MEASUREMENTS RECORDED N/A 3/9/2023    Procedure: Right Heart Catheterization;  Surgeon: Tab Agee MD;  Location: [facility] HE[facility] CARDIAC CATH LAB    CV RIGHT HEART CATH MEASUREMENTS RECORDED N/A 3/21/2023    Procedure: Right Heart Catheterization;  Surgeon: Ghulam Mercado MD;  Location: [facility] HE[facility] CARDIAC CATH LAB    CV RIGHT HEART CATH MEASUREMENTS RECORDED N/A 4/27/2023    Procedure: Right Heart Catheterization;  Surgeon: Justo Bush MD;  [facility]    CV RIGHT HEART CATH MEASUREMENTS RECORDED N/A 5/22/2023    Procedure: Right Heart Catheterization;  Surgeon: Wayne Xavier MD;  Location: [facility] HE[facility] CARDIAC CATH LAB    CV RIGHT HEART CATH MEASUREMENTS RECORDED N/A 6/29/2023    Procedure: Right Heart Catheterization;  Surgeon: Wayne Xavier MD;  Location:  HEART CARDIAC CATH LAB    CV RIGHT HEART CATH MEASUREMENTS RECORDED N/A 12/13/2023    Procedure: Right Heart Catheterization;  Surgeon: Wayne Xavier MD;  Location:  HEART CARDIAC CATH LAB    PICC Right 01/12/2023    placed in basilic and no problem    PICC DOUBLE LUMEN PLACEMENT Right 11/10/2022    Right basilic, 43 cm, 1 cm external length    TRANSPLANT HEART RECIPIENT N/A 12/25/2022    Procedure: TRANSPLANT, HEART, RECIPIENT; Median Sternotomy, Cardiopulmonary Bypass, Removal of Pacemaker;  Surgeon: Brendan Rodriguez  MD Earle;  Location: [facility]       Current Facility-Administered Medications   Medication Dose Route Frequency Provider Last Rate Last Admin    lidocaine (LMX4) cream   Topical Q1H PRN Myrtle Doimnguez MD        lidocaine 1 % 0.1-1 mL  0.1-1 mL Other Q1H PRN Myrtle Dominguez MD        sodium chloride (PF) 0.9% PF flush 3 mL  3 mL Intracatheter Q8H Myrtle Dominguez MD        sodium chloride (PF) 0.9% PF flush 3 mL  3 mL Intracatheter Q1H PRN Myrtle Dominguez MD        sodium chloride (PF) 0.9% PF flush 3 mL  3 mL Intracatheter q1 min prn Myrtle Dominguez MD        sodium chloride 0.9 % infusion   Intravenous Continuous Myrtle Dominguez MD         Facility-Administered Medications Ordered in Other Encounters   Medication Dose Route Frequency Provider Last Rate Last Admin    albuterol (PROVENTIL HFA/VENTOLIN HFA) inhaler  2 puff Inhalation Q5 Min PRN Fior Iqbal MD        caffeine citrate (CAFCIT) injection 60 mg  60 mg Intravenous Once PRN Fior Iqbal MD        diazepam (VALIUM) tablet 5 mg  5 mg Oral Q30 Min PRN Fior Iqbal MD        lidocaine (LMX4) cream   Topical Q1H PRN Fior Iqbal MD        lidocaine 1 % 1 mL  1 mL Other Q1H PRN Fior Iqbal MD        LORazepam (ATIVAN) tablet 0.5 mg  0.5 mg Oral Q30 Min PRN Fior Iqbal MD        sodium chloride (PF) 0.9% PF flush 3 mL  3 mL Intravenous Q1H PRN Fior Iqbal MD        sodium chloride (PF) 0.9% PF flush 3 mL  3 mL Intravenous Q8H Fior Iqbal MD           Family History   Problem Relation Age of Onset    Atrial fibrillation Father     Lung Cancer Brother        Social History     Tobacco Use    Smoking status: Never    Smokeless tobacco: Never   Substance Use Topics    Alcohol use: Not Currently       No Known Allergies      ROS:   All systems reviewed and negative unless documented in HPI.     Physical Examination:  Vitals: BP (!) 132/98   Pulse 98   Temp 97.8  F (36.6  C)   Resp 16   Ht  "1.88 m (6' 2\")   Wt 99.8 kg (220 lb 1.6 oz)   SpO2 100%   BMI 28.26 kg/m    BMI= Body mass index is 28.26 kg/m .    GENERAL APPEARANCE: Pleasant and well appearing elderly male. Appears comfortable and in no acute distress. Alert and interactive.   HEENT: NCAT. Sclera clear.   CHEST: Normal work of breathing on room air without use of accessory muscles, no retractions. Lungs clear to auscultation without rales, rhonchi or wheezes,  CARDIOVASCULAR: regular rate and rhythm, normal S1 and S2, no S3 or S4 and no murmur, click or rub. Radial and pedal pulses palpable bilaterally.   EXTREMITIES: warm, no lower extremity edema, no clubbing or cyanosis, warm and well perfused  NEURO: alert and oriented to person/place/time, normal speech, moves all extremities  PSYCH: mood and affect appropriate  SKIN: no ecchymoses, no rashes, warm and dry to touch      Laboratory:  CMP  Recent Labs   Lab 12/11/24  0726      POTASSIUM 4.3   CHLORIDE 106   CO2 24   ANIONGAP 10   *   BUN 23.1*   CR 1.16   GFRESTIMATED 67   TIM 9.3   MAG 1.5*   PHOS 2.8   PROTTOTAL 6.7   ALBUMIN 4.2   BILITOTAL 0.6   ALKPHOS 65   AST 23   ALT 21     CBC  Recent Labs   Lab 12/11/24  0726   WBC 6.1   RBC 5.62   HGB 15.7   HCT 48.4   MCV 86   MCH 27.9   MCHC 32.4   RDW 12.7            EKG 12/11/24:       TTE 8/22/24:  Interpretation Summary  Left ventricular size, wall motion and function are normal. The ejection  fraction is 60-65%.  Right ventricular function, chamber size, wall motion, and thickness are  normal.  The inferior vena cava was normal in size with preserved respiratory  variability. Sinuses of Valsalva 4 cm. No pericardial effusion is present.     No significant changes noted.  ______________________________________________________________________________  Left Ventricle  Left ventricular size, wall motion and function are normal. The ejection  fraction is 60-65%. Diastolic function not assessed due to heart transplant.  No " regional wall motion abnormalities are seen.     Right Ventricle  Right ventricular function, chamber size, wall motion, and thickness are  normal.     Atria  Both atria appear normal.     Mitral Valve  The mitral valve is normal. Trace mitral insufficiency is present.     Aortic Valve  Aortic valve is normal in structure and function. The aortic valve is  tricuspid. No aortic regurgitation is present.     Tricuspid Valve  The tricuspid valve is normal. The peak velocity of the tricuspid regurgitant  jet is not obtainable. Trace tricuspid insufficiency is present.     Pulmonic Valve  The pulmonic valve is normal. Trace pulmonic insufficiency is present.     Vessels  The inferior vena cava was normal in size with preserved respiratory  variability. Sinuses of Valsalva 4 cm.     Pericardium  No pericardial effusion is present.     Compared to Previous Study  No significant changes noted.  ______________________________________________________________________________  MMode/2D Measurements & Calculations  Ao root diam: 4.0 cm     asc Aorta Diam: 3.3 cm  LVOT diam: 2.3 cm  LVOT area: 4.0 cm2  Ao root diam index Ht(cm/m): 2.1  Ao root diam index BSA (cm/m2): 1.8  Asc Ao diam index BSA (cm/m2): 1.4  Asc Ao diam index Ht(cm/m): 1.8       Coronary angiogram 12/13/23:    Conclusion         Right sided filling pressures are normal.    Left sided filling pressures are normal.    Normal PA pressures.    Left ventricular filling pressures are normal.    Normal cardiac output level.    Hemodynamic data has been modified in Epic per physician review.    Successful endomyocardial biopsy. Results pending.    Fluoroscopy was used to visualize the right femoral vein.     No significant cardiac allograft vasculopathy noted  Normal right and left filling pressures  Normal Cardiac hemodynamics  Biopsy performed, results pending.          Plan     Follow bedrest per protocol   Continued medical management and lifestyle modifications for  cardiovascular risk factor optimizations.   Follow up visit with Nurse Practitioner in 1-2 weeks.   Discharge today per protocol     Coronary Findings    Diagnostic  Dominance: Right  Left Main   The vessel is large.      Left Anterior Descending   The vessel is small.      First Diagonal Branch   The vessel is large and is angiographically normal.      Left Circumflex   The vessel is moderate in size.      First Obtuse Marginal Branch   The vessel is moderate in size and is angiographically normal.      Second Obtuse Marginal Branch   The vessel is moderate in size and is angiographically normal.      Third Obtuse Marginal Branch   The vessel is small and is angiographically normal.      Right Coronary Artery   The vessel is moderate in size.      Right Posterior Descending Artery   The vessel is moderate in size and is angiographically normal.      Right Posterior Atrioventricular Artery   The vessel is moderate in size and is angiographically normal.      First Right Posterolateral Branch   The vessel is small and is angiographically normal.      Second Right Posterolateral Branch   The vessel is small and is angiographically normal.         Intervention     No interventions have been documented.     Hemodynamics    RA--/--/5  RV 31/2  PA 27/10 (17)  PCW --/--/9    Aisha CO 6.96. Aisha CI 3.06   Right sided filling pressures are normal. Left sided filling pressures are normal. Normal PA pressures. Left ventricular filling pressures are normal. Normal cardiac output level. Hemodynamic data has been modified in [facility] per physician review.     Heart Biospy    Heart Biopsy    After informed consent patient was prepped and draped in the usual fashion. Biopsy performed under fluoroscopy guidance. A 7 Fr straight sheath was inserted into the right femoral vein using 1.0% lidocaine. A 7 Fr Jawz bioptome was passed through the sheath to the right ventricular septum. 5 biopsies were obtained. The biopsy specimens were sent to  Pathology for examination. The sheath was removed and hemostasis was obtained. The patient tolerated the procedure well and there were no complications. Successful endomyocardial biopsy. Results pending.     Pressures Phase: Baseline     Time Systolic (mmHg) Diastolic (mmHg) Mean (mmHg) A Wave (mmHg) V Wave (mmHg) EDP (mmHg) Max dp/dt (mmHg/sec) HR (bpm) Content (mL/dL) SAT (%)   RA Pressures  2:10 PM   5    7    7      92        RV Pressures  1:45 PM       528           2:11 PM 31    2       8     92        PA Pressures  2:11 PM 27    10    17        92        PCW Pressures  2:11 PM   9    11    13      92        AO Pressures  2:28     88    94        92          Blood Flow Results Phase: Baseline     Time Results Indexed Values (L/min/m2)   QP  1:45 PM 6.96 L/min    3.06      QS  1:45 PM 6.96 L/min    3.06        Blood Oximetry Phase: Baseline     Time Hb SAT(%) PO2 Content (mL/dL) PA Sat (%)   PA  1:45 PM  75.3 %      75.3      Art  1:45 PM  98 %     18.79         Cardiac Output Phase: Baseline     Time TDCO (L/min) TDCI (L/min/m2) Aisha C.O. (L/min) Aisha C.I. (L/min/m2) Aisha HR (bpm)   Cardiac Output Results  1:45 PM   6.96    3.06         Resistance Results Phase: Baseline     Time PVR SVR TPR TVR PVR/SVR TPR/TVR   Resistance Results (Metric)  1:45 PM 91.92 dsc-5    1022.62 dsc-5    195.33 dsc-5    1080.07 dsc-5    0.09    0.18      Resistance Results (Wood)  1:45 PM 1.15 UREÑA    12.79 UREÑA    2.44 UREÑA    13.5 UREÑA    0.09    0.18        Stoke Volume Results Phase: Baseline     Time RVSW (gm*m) LVSW (gm*m) RVSW-I (gm*m/m2) LVSW-I (gm*m/m2)   Stroke Work Results  1:45 PM 12.35    87.46    5.43    38.45

## 2024-12-11 NOTE — PROGRESS NOTES
D/I/A:  Pt is tolerating liquids and foods, ambulating, urinating and pt has a  - per pt and family, they will be calling an uber .  A+O x4 and making needs known.  CCL access sites CDI; no bleeding or hematoma.  CMS intact.  VSS.  SR on monitor.  IV access removed.  Education completed and outlined in AVS or handout: medications reviewed with pt.  Questions answered prior to discharge.  Belongings returned to pt at discharge.    P:  Discharged to self care.  Pt to follow up with appts as per discharge instructions.  Pt brought out to front lobby via wheelchair, accompanied by nurse and family.

## 2024-12-11 NOTE — PROGRESS NOTES
Pt here for cardiac MRI with stress. Safety checklist, allergies, and meds all reviewed. Test explained and all questions answered. Lungs clear. Denied caffeine intake. Lexiscan 0.4mg given over 15 seconds followed by 5cc NS flush. Aminophylline 100mg post Ghada injection per protocol. Pt tolerated scan, meds, and contrast well; stable throughout. Pre and post EKG completed. Pt monitored post MRI and escorted back to gold waiting room.

## 2024-12-11 NOTE — PRE-PROCEDURE
"Consenting/Education for Cardiology Procedure: Right heart catheterization , Possible percutaneous intervention, Coronary angiogram, and Cardiac biopsy    Patient understands we would like to perform the listed procedure(s) due to OHT.    The patient understands the following:     The procedure was described to the patient in detail.    Moderate sedation is required for this procedure and the risks, benefits and alternatives to moderate sedation were discussed. Patient also understands risks and complications of the procedure which include but are not limited to bruising/swelling around the incision site, infection, bleeding, allergic reaction to local anesthetic, air embolism, arterial puncture, stroke, heart attack, need for emergency surgery, death.    Patient verbalized understanding of risks and benefits and has elected to proceed with the procedure or procedures listed above.    Clinically Significant Risk Factors Present on Admission            # Hypomagnesemia: Lowest Mg = 1.5 mg/dL in last 2 days, will replace as needed     # Drug Induced Platelet Defect: home medication list includes an antiplatelet medication   # Hypertension: Noted on problem list  # Chronic heart failure with preserved ejection fraction: heart failure noted on problem list and last echo with EF >50%          # Overweight: Estimated body mass index is 28.26 kg/m  as calculated from the following:    Height as of this encounter: 1.88 m (6' 2\").    Weight as of this encounter: 99.8 kg (220 lb 1.6 oz).        # ICD device present    Cardiovascular : Native vessel CAD   Cardiomyopathy    Virginia Valle PA-C  Cardiology    "

## 2024-12-11 NOTE — DISCHARGE INSTRUCTIONS
Going Home after a Coronary Angiogram and RHC  ______________________________________________    After you go home:  Have an adult stay with you for 24 hours.  Drink plenty of fluids.  You may eat your normal diet, unless your doctor tells you otherwise.  For 24 hours:  Relax and take it easy.  Do NOT smoke.  Do NOT make any important or legal decisions.  Do NOT drive or operate machines at home or at work.  Do NOT drink alcohol.  Remove the Band-Aid after 24 hours. If there is minor oozing, apply another Band-aid and remove it after 12 hours.  For 2 days, do NOT have sex or do any heavy exercise.  Do NOT take a bath, or use a hot tub or pool for at least 3 days. You may shower.    Care of groin site  It is normal to have a small bruise or lump at the site.  Do not scrub the site.  For the first 2 days: Do not stoop or squat. When you cough, sneeze or move your bowels, hold your hand over the puncture site and press gently.  Do not lift more than 10 pounds for at least 3 to 5 days.  Do not use lotion or powder near the puncture site for 3 days.    If you start bleeding from the site in your groin, lie down flat and press firmly  on the site. Call your doctor as soon as you can.    Medicines  If you have stopped any medicines, check with your nurse or provider about when to restart them.    Call 911 right away if you have bleeding that is heavy or does not stop.    Call your doctor if:  You have a large or growing hard lump around the site.  The site is red, swollen, hot or tender.  Blood or fluid is draining from the site.  You have chills or a fever greater than 101 F (38 C).  Your leg or arm feels numb or cool.  You have hives, a rash or unusual itching.  [facility] [place]:  [phone] (7 days a week)

## 2024-12-11 NOTE — PROGRESS NOTES
D/I/A:  Pt roomed on 2A room 14.  Arrived via litter and accompanied by cath lab RN off monitor.  VSS.  Rhythm upon arrival SR on monitor.  Denies pain or sob.  Reviewed activity restrictions and when to notify RN, ie-changes to breathing or increased chest pressure or chest pain.  4Fr sheath and 7Fr sheath remains in RFA and RFV respectively, no bleeding or hematoma.  Dressing CDI.  +1 pulse; CMS intact.  No heparin given, sheaths can be pulled asap per orders.  P:  Continue to monitor status.  Discharge to home once meeting criteria.

## 2024-12-11 NOTE — PROGRESS NOTES
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 Patient is limited by his balance issues from his hx of cerebral vasculitis but is not limited in his daily activities by his heart. He drives himself, runs errands, does household chores without any difficulty.   Keeping up to date on health care surveillance   Does have balance problems (exist from previous stroke)   No chset pain, SOB, no LE edema   No diarrhea      No other health updates         Past medical history no change from prior     Social history no change from prior      All relevant ROS were reviewed in the HPI.      EXAM:  /86 (BP Location: Right arm, Patient Position: Chair, Cuff Size: Adult Regular)   Pulse 95   Wt 103.4 kg (228 lb)   SpO2 100%   BMI 29.27 kg/m    General: appears comfortable, alert and articulate  Head: normocephalic, atraumatic  Eyes: anicteric sclera, EOMI  Neck: no adenopathy  Oropharynx: moist mucosa, no lesions, dentition intact- no thrush   Heart: RRR, normal PMI, normal S1/s2, no s3.s4   Lungs: clear, no rales or wheezing  Abdomen: soft, non-tender, bowel sounds present, no hepatosplenomegaly  Extremities: no clubbing, cyanosis or edema  Neurological: speech fluency is at baseline, able to walk without assistance   Psych: stable emotional lability      Echo:   TLeft ventricular size,  "wall motion and function are normal. The ejection  fraction is 60-65%.  Right ventricular function, chamber size, wall motion, and thickness are  normal.  The inferior vena cava was normal in size with preserved respiratory  variability. Sinuses of Valsalva 4 cm. No pericardial effusion is present.     No significant changes noted.TE 8/22/24    Cardiac MRI 12/11/24  CONCLUSIONS:   1. Status post heart transplantation with normal biventricular size and function (LVEF 68%, RVEF 47%).   2. No myocardial ischemia or fibrosis.   No significant changes compared to 12/13/2023 study.     Coronary angiogram/RHC 12/12    Right sided filling pressures are normal.    Left sided filling pressures are normal.    Normal PA pressures.    Normal cardiac output level.    Hemodynamic data has been modified in Epic per physician review.    Successful endomyocardial biopsy. Results pending.     Angiographically normal coronary arteries unchanged from prior study.       Last immuknow pending     Tac Level 8.6  Lab Results   Component Value Date    WBC 6.1 12/11/2024     Lab Results   Component Value Date    RBC 5.62 12/11/2024     Lab Results   Component Value Date    HGB 15.7 12/11/2024     Lab Results   Component Value Date    HCT 48.4 12/11/2024     No components found for: \"MCT\"  Lab Results   Component Value Date    MCV 86 12/11/2024     Lab Results   Component Value Date    MCH 27.9 12/11/2024     Lab Results   Component Value Date    MCHC 32.4 12/11/2024     Lab Results   Component Value Date    RDW 12.7 12/11/2024     Lab Results   Component Value Date     12/11/2024     Recent Labs   Lab Test 12/11/24  0726 08/22/24  0913    139   POTASSIUM 4.3 4.2   CHLORIDE 106 104   CO2 24 27   ANIONGAP 10 8   * 107*   BUN 23.1* 16.4   CR 1.16 1.29*   TIM 9.3 9.6          Assessment and recommendations:   NICM, s/p OHT 12/25/22  His post-operative course has been c/b delirium, leukocytosis, staph epi bacteremia, and " prolonged CT output.     Graph function is normal, other than complement deposition there is been no rejection, repeat coronary angiogram is angiographically normal. Cardiac MRI shows normal biventricular function without any evidence of ischemia or fibrosis.     Overall doing very well with normal graft function.      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     Aortic root dilation: very slight- will monitor in serial echos - stable      Frailty deconditioning- improved, encouragement provided      RTC per routine     This patient was seen and discussed with Dr. Dominguez.     Mikhail Barry MD  Cardiology Fellow, PGY-4  Pager: [phone]

## 2024-12-11 NOTE — PRE-PROCEDURE
GENERAL PRE-PROCEDURE:   Procedure:  Right heart catheterization with cardiac biopsy and coronary angiogram with possible percutaneous coronary intervention  Date/Time:  12/11/2024 12:58 PM    Verbal consent obtained?: Yes    Written consent obtained?: Yes    Risks and benefits: Risks, benefits and alternatives were discussed    DC Plan: Appropriate discharge home plan in place for patients who are going home after procedure   Consent given by:  Patient  Patient states understanding of procedure being performed: Yes    Patient's understanding of procedure matches consent: Yes    Procedure consent matches procedure scheduled: Yes    Expected level of sedation:  Moderate  Appropriately NPO:  Yes  ASA Class:  3  Mallampati  :  Grade 2- soft palate, base of uvula, tonsillar pillars, and portion of posterior pharyngeal wall visible  Lungs:  Lungs clear with good breath sounds bilaterally  Heart:  Normal heart sounds and rate  History & Physical reviewed:  History and physical reviewed and no updates needed  Statement of review:  I have reviewed the lab findings, diagnostic data, medications, and the plan for sedation

## 2024-12-11 NOTE — PROGRESS NOTES
Pt prepped for CORS/RHC with Biopsy today.  VSS.  Denies pain.  Left PIV left in from Cardiac MRI this morning.  Currently infusing NS at 10 ml/hr.  Pt requests for groin/femoral approach since he has had bad experiences with jugular approach for RHC.  Groins prepped and pulses marked.  MARGARET [place] BURTON also notified of this request.  EKG done - NSR.  Labs resulted, magnesium 1.5, provider also notified and will decide on PO replacement.  Pt took 325 mg aspirin yesterday, RN will give pt another 325 mg today per provider orders.  Awaiting consent.  H&P needs updating, provider aware.  Wife Arlette at the bedside and will transport home post procedure.       UPDATE at 1310: Per pt, would like IV replacement instead of PO.  Magnesium replacement protocol placed by MARGARET.  RN will start infusion once med received from pharmacy.

## 2024-12-12 ENCOUNTER — OFFICE VISIT (OUTPATIENT)
Dept: RHEUMATOLOGY | Facility: CLINIC | Age: 69
End: 2024-12-12
Payer: MEDICARE

## 2024-12-12 ENCOUNTER — OFFICE VISIT (OUTPATIENT)
Dept: CARDIOLOGY | Facility: CLINIC | Age: 69
End: 2024-12-12
Attending: INTERNAL MEDICINE
Payer: MEDICARE

## 2024-12-12 VITALS
BODY MASS INDEX: 28.76 KG/M2 | SYSTOLIC BLOOD PRESSURE: 115 MMHG | RESPIRATION RATE: 16 BRPM | WEIGHT: 224 LBS | OXYGEN SATURATION: 97 % | HEART RATE: 100 BPM | DIASTOLIC BLOOD PRESSURE: 80 MMHG

## 2024-12-12 VITALS
DIASTOLIC BLOOD PRESSURE: 85 MMHG | HEART RATE: 101 BPM | SYSTOLIC BLOOD PRESSURE: 123 MMHG | OXYGEN SATURATION: 100 % | BODY MASS INDEX: 28.7 KG/M2 | WEIGHT: 223.5 LBS

## 2024-12-12 DIAGNOSIS — I77.6 VASCULITIS, CNS (H): Primary | ICD-10-CM

## 2024-12-12 DIAGNOSIS — Z94.1 HEART REPLACED BY TRANSPLANT (H): ICD-10-CM

## 2024-12-12 DIAGNOSIS — Z94.1 TRANSPLANTED HEART (H): ICD-10-CM

## 2024-12-12 LAB
ATRIAL RATE - MUSE: 95 BPM
ATRIAL RATE - MUSE: 98 BPM
ATRIAL RATE - MUSE: 99 BPM
DIASTOLIC BLOOD PRESSURE - MUSE: NORMAL MMHG
INTERPRETATION ECG - MUSE: NORMAL
P AXIS - MUSE: 39 DEGREES
P AXIS - MUSE: 43 DEGREES
P AXIS - MUSE: 54 DEGREES
PATH REPORT.COMMENTS IMP SPEC: NORMAL
PATH REPORT.FINAL DX SPEC: NORMAL
PATH REPORT.GROSS SPEC: NORMAL
PATH REPORT.MICROSCOPIC SPEC OTHER STN: NORMAL
PATH REPORT.RELEVANT HX SPEC: NORMAL
PHOTO IMAGE: NORMAL
PR INTERVAL - MUSE: 136 MS
PR INTERVAL - MUSE: 144 MS
PR INTERVAL - MUSE: 158 MS
QRS DURATION - MUSE: 108 MS
QRS DURATION - MUSE: 96 MS
QRS DURATION - MUSE: 96 MS
QT - MUSE: 342 MS
QT - MUSE: 342 MS
QT - MUSE: 364 MS
QTC - MUSE: 436 MS
QTC - MUSE: 438 MS
QTC - MUSE: 457 MS
R AXIS - MUSE: 50 DEGREES
R AXIS - MUSE: 52 DEGREES
R AXIS - MUSE: 57 DEGREES
SYSTOLIC BLOOD PRESSURE - MUSE: NORMAL MMHG
T AXIS - MUSE: 25 DEGREES
T AXIS - MUSE: 27 DEGREES
T AXIS - MUSE: 40 DEGREES
VENTRICULAR RATE- MUSE: 95 BPM
VENTRICULAR RATE- MUSE: 98 BPM
VENTRICULAR RATE- MUSE: 99 BPM

## 2024-12-12 PROCEDURE — G0463 HOSPITAL OUTPT CLINIC VISIT: HCPCS | Performed by: INTERNAL MEDICINE

## 2024-12-12 RX ORDER — TACROLIMUS 0.5 MG/1
CAPSULE ORAL
Status: SHIPPED
Start: 2024-12-12

## 2024-12-12 RX ORDER — TACROLIMUS 1 MG/1
2 CAPSULE ORAL 2 TIMES DAILY
Qty: 120 CAPSULE | Refills: 11 | Status: SHIPPED | OUTPATIENT
Start: 2024-12-12

## 2024-12-12 ASSESSMENT — PAIN SCALES - GENERAL
PAINLEVEL_OUTOF10: NO PAIN (0)
PAINLEVEL_OUTOF10: NO PAIN (0)

## 2024-12-12 NOTE — NURSING NOTE
Transplant Coordinator Note     Reason for visit: 2nd annual with  angiogram/rhc/hbx, cardiac MRI, labs, CXR, and clinic.   Coordinator: Present   Caregiver:  Wife~Arlette     Health concerns addressed today:   1. Tacro dose adjustment  Per Dr Dominguez, will decrease dose  2. Next labs and follow up  Tacro level check and then at your next appointment at 30 mot  3. Pending results - allomap, DSA, biopsy, immuknow     Serostatus:   - CMV D+/R+   - EBV D+/R+   - Toxo D-/R-      No immunodepression intolerance   No rejection   No graft vasculopathy     Immunosuppressants:   MMF 1000 BID (preferred by neuro for vasculitis)   TAC goal is 6-8       Routine screenings:    Derm: 8/2024   Dental UTD   Colonoscopy: Nov 2022 (no specimens)   Prostate 1.12   Eye UTD     VACCINES   Flu fall 2023 per pt   RSV  - pt declined   Pneumonia UTD   COVID - pt declined   Shingrix 2/2       Resulted labs, angiogram, cardiac MRI reviewed with patient   Medication record reviewed and reconciled   Questions and concerns addressed   Pt verbalized an understanding of plan of care.     Patient Instructions   1. You are due for your flu vaccine. Please, consider also getting the RSV and Covid vaccine.  2. Decrease your tacro dose to 2mg twice daily. Then recheck locally in 1-2 weeks    Next transplant clinic appointment: 30 month follow up   Next lab draw: Tacro level with a BMP, Mag, and Phos in 1-2 weeks  Coordinator will call with all pending results.     Please call transplant coordinator with any questions

## 2024-12-12 NOTE — PATIENT INSTRUCTIONS
Diagnosis:  1.  CNS vasculitis with cognitive changes, gait disturbance: Symptoms suggest no recurrence of active CNS vasculitis, in line with results of September 2024 MRI.  I recommend no change in immunomodulatory regimen, which is mycophenolate and decreased dose tacrolimus employed for antirejection purposes.  I recommend repeating CNS imaging (MRI) in 9-2025, and follow-up in approximately 12 months to dovetail with follow-up in cardiology.

## 2024-12-12 NOTE — LETTER
12/12/2024      RE: Paco Stein  [address]  [place] [address]       Dear Colleague,    Thank you for the opportunity to participate in the care of your patient, Paco Stein, at the [facility] [place] [facility]. Please see a copy of my visit note below.    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 Patient is limited by his balance issues from his hx of cerebral vasculitis but is not limited in his daily activities by his heart. He drives himself, runs errands, does household chores without any difficulty.   Keeping up to date on health care surveillance   Does have balance problems (exist from previous stroke)   No chset pain, SOB, no LE edema   No diarrhea      No other health updates         Past medical history no change from prior     Social history no change from prior      All relevant ROS were reviewed in the HPI.      EXAM:  /86 (BP Location: Right arm, Patient Position: Chair, Cuff Size: Adult Regular)   Pulse 95   Wt 103.4 kg (228 lb)   SpO2 100%   BMI 29.27 kg/m    General: appears comfortable, alert and articulate  Head: normocephalic, atraumatic  Eyes: anicteric sclera, EOMI  Neck: no adenopathy  Oropharynx: moist mucosa, no lesions, dentition intact- no thrush   Heart: RRR,  "normal PMI, normal S1/s2, no s3.s4   Lungs: clear, no rales or wheezing  Abdomen: soft, non-tender, bowel sounds present, no hepatosplenomegaly  Extremities: no clubbing, cyanosis or edema  Neurological: speech fluency is at baseline, able to walk without assistance   Psych: stable emotional lability      Echo:   TLeft ventricular size, wall motion and function are normal. The ejection  fraction is 60-65%.  Right ventricular function, chamber size, wall motion, and thickness are  normal.  The inferior vena cava was normal in size with preserved respiratory  variability. Sinuses of Valsalva 4 cm. No pericardial effusion is present.     No significant changes noted.TE 8/22/24    Cardiac MRI 12/11/24  CONCLUSIONS:   1. Status post heart transplantation with normal biventricular size and function (LVEF 68%, RVEF 47%).   2. No myocardial ischemia or fibrosis.   No significant changes compared to 12/13/2023 study.     Coronary angiogram/RHC 12/12     Right sided filling pressures are normal.     Left sided filling pressures are normal.     Normal PA pressures.     Normal cardiac output level.     Hemodynamic data has been modified in [facility] per physician review.     Successful endomyocardial biopsy. Results pending.     Angiographically normal coronary arteries unchanged from prior study.       Last immuknow pending     Tac Level 8.6  Lab Results   Component Value Date    WBC 6.1 12/11/2024     Lab Results   Component Value Date    RBC 5.62 12/11/2024     Lab Results   Component Value Date    HGB 15.7 12/11/2024     Lab Results   Component Value Date    HCT 48.4 12/11/2024     No components found for: \"MCT\"  Lab Results   Component Value Date    MCV 86 12/11/2024     Lab Results   Component Value Date    MCH 27.9 12/11/2024     Lab Results   Component Value Date    MCHC 32.4 12/11/2024     Lab Results   Component Value Date    RDW 12.7 12/11/2024     Lab Results   Component Value Date     12/11/2024     Recent Labs "   Lab Test 12/11/24  0726 08/22/24  0913    139   POTASSIUM 4.3 4.2   CHLORIDE 106 104   CO2 24 27   ANIONGAP 10 8   * 107*   BUN 23.1* 16.4   CR 1.16 1.29*   TIM 9.3 9.6          Assessment and recommendations:   NICM, s/p OHT 12/25/22  His post-operative course has been c/b delirium, leukocytosis, staph epi bacteremia, and prolonged CT output.     Graph function is normal, other than complement deposition there is been no rejection, repeat coronary angiogram is angiographically normal. Cardiac MRI shows normal biventricular function without any evidence of ischemia or fibrosis.     Overall doing very well with normal graft function.      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     Aortic root dilation: very slight- will monitor in serial echos - stable      Frailty deconditioning- improved, encouragement provided      RTC per routine     This patient was seen and discussed with Dr. Dominguez.     Mikhail Barry MD  Cardiology Fellow, PGY-4  Pager: [phone]      Attestation signed by Myrtle Dominguez MD at 12/12/2024  1:49 PM:    I have personally seen and examined the patient on December 12, 2024    I saw the patient with the resident (or fellow) and agree with the findings and the plan of care as documented in the resident's (or fellow) note. I have personally reviewed vital signs, medications, laboratory results, available  imaging and hemodynamic data.      Myrtle Dominguez MD  Associate Professor of Medicine   [facility] Division of Cardiology       Please do not hesitate to contact me if you have any questions/concerns.     Sincerely,     Myrtle Dominguez MD

## 2024-12-12 NOTE — PATIENT INSTRUCTIONS
Resulted labs, angiogram, cardiac MRI reviewed with patient   Medication record reviewed and reconciled   Questions and concerns addressed   Pt verbalized an understanding of plan of care.     Patient Instructions   1. You are due for your flu vaccine. Please, consider also getting the RSV and Covid vaccine.  2. Decrease your tacro dose to 2mg twice daily. Then recheck locally in 1-2 weeks    Next transplant clinic appointment: 30 month follow up   Next lab draw: Tacro level with a BMP, Mag, and Phos in 1-2 weeks  Coordinator will call with all pending results.     Please call transplant coordinator with any questions

## 2024-12-12 NOTE — NURSING NOTE
Chief Complaint   Patient presents with    Follow Up      [place] new HF, HFrEF, on dobutamine, labs p       Vitals were taken, medications reconciled.     Nader Sanderson, Clinic Assistant    8:20 AM

## 2024-12-12 NOTE — NURSING NOTE
Chief Complaint   Patient presents with    RECHECK     Primary angiitis of central nervous system       Vitals:    12/12/24 1319   BP: 115/80   BP Location: Left arm   Patient Position: Sitting   Cuff Size: Adult Regular   Pulse: 100   Resp: 16   SpO2: 97%   Weight: 101.6 kg (224 lb)       Body mass index is 28.76 kg/m .      Ghulam Oconnor MA

## 2024-12-12 NOTE — PROGRESS NOTES
Rheumatology Clinic Visit  [facility]  Jose Liu M.D.     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 Cognition and affect and responsiveness remain improved and stable since patient underwent heart transplant.  Exam today remarkable for mild dysarthria, broad-based gait, and reduced intermittent brief crying episodes, with fine bilateral upper extremity tremor.    Data: On December 2 11th 2024, comprehensive metabolic panel, CBC, TSH were all normal.    Imaging: MRI of the brain performed on September 17, 2024 showed a new tiny area of T2 hyperintensity without any abnormal enhancing characteristics; considerable stability in the overall extent of chronic white matter T2 hyperintensities.  Overall considerable stability and prominent white matter T2 hyperintensities.  Cardiac MRI December 11, 2024 showed status post heart transplant December 2022 normal biventricular size and function; no ischemia or fibrosis    Discussion: Primary angiitis of the CNS is inactive based on stable improvement in cognition and affect control, no progression of prexisting motor or sensory defects, and on brain imaging showing no progression of previously noted white matter changes, and no new lesions that correlate with clinical symptoms/signs. Tremor intensity is stable, associated with reduced dose of tacrolimus since last visit.    Primary angiitis of the CNS may still recur or relapse, and may do so without dramatic or sudden neurologic worsening. We discussed that I agree with Neurology recommendations for  brain MRI annually to aid surveillance.    No additional immunomodulatory therapy is warranted. Patient continues moderate dose mycophenolate (2000 mg daily) as a component of antirejection regimen; dose is appropriate for suppressing and reducing the likelihood of CNS vasculitis relapse.  While using mycophenolate, patient should undergo every 4-6 month measurement of CBC with platelets, AST, ALT, and albumin.    # Nonischemic cardiomyopathy, status post orthotopic heart transplant December 25, 2022: doing well, followed by Dr. Dominguez, SOT    RTC ~12 mos, dovetailing with planned [facility] Cardiology followup    Jose Liu MD  Staff Rheumatologist, [facility]    On the day of the encounter, a total of 31 minutes was spent in chart review, and in counseling and coordination of care, regarding the patient's complex medical problem of primary angiitis of the CNS, and orthotopic heart transplant.  The longitudinal plan of care for the diagnosis(es)/condition(s) as documented were addressed during this visit. Due to the added complexity in care, I will continue to support Paco in the subsequent management and with ongoing continuity of care.      No orders of the defined types were placed in this encounter.             History of Present Illness:   Paco Stein follows up for CNS vasculitis.  He was last seen in 4-2024. at that visit, primary angiitis of the CNS was judged inactive.  Recommendation was for continued surveillance with intermittent MRIs of the brain.    Interval history December 12, 2024    He stills walks a mile daily and [facility] [employer] [facility] days per [facility] with resistance program.  He does grocery shopping and cooking.  Tremor is improved, he associates with decreased tacrolimus dose. He will reduce to 2 mg twice daily after visit with Cardiology  skin rash, visual changes (he is increasingly near-sightedness, HA/ear pain.  Continues MMF, [place].      Interval history April 19, 2024    Today, he  "reports doing well. His spouse Arlette endorses that ADLs, including daily walks and [facility] [employer], are continuing unimpeded.  Notes some challenges with\"transitioins\" getting up and down from chairs, changing walking environments/terrain. He goes out daily, works out at [employer] several days weekly, shops for grocery, cooks supper for family, interacts. Does take a nap midday.  Still has pseudobulbar affective symptoms (easy crying), often associated with emotionally-engaging topics of discussion.  Tremor is improved, he associates with decreased tacrolimus dose. Writing and shirt-buttoning are improved. No joint pain, skin rash, visual changes, HA/ear pain.    Interval history October 13 2023    Patient was seen by Dr. Dominguez in Cardiology on October 12, 2023 in follow-up of heart transplant performed in December 2022.  Impression was of orthotopic heart transplant doing very well.  No rejection noted other than complement deposition and previous myocardial biopsies.  Recommendation was to repeat coronary angiography and right heart catheterization and consider switch to amatory intervention afterwards.    Today, he reports doing well. His spouse Arlette endorses. Complexity and intensity of ADLs has increased since last visit.  He goes out daily, works out at [employer] several days weekly, shops for grocery, cooks supper for family, interacts. Does take a nap midday.  Still has pseudobulbar affective symptoms (easy crying), often associated with emotionally-engaging topics of discussion.  Tremor is improved, he associates with decreased tacrolimus dose. Writing and shirt-buttoning are improved. No joint pain, skin rash, visual changes, HA/ear pain.    Initial history May 23, 2023    At today's visit, patient is able to communicate at a high level and his wife is extremely helpful in adding details of past medical history as well as providing observations about patient's progress over many months.    Patient was " seen by provider Charlie on May 22, 2023 in follow-up of heart transplant.  Impression was of orthotopic heart transplant performed in December 2022.  Transplant was performed for nonischemic cardiomyopathy with sustained ventricular tachycardia.  Comorbidities include hypertension, coronary artery disease status post percutaneous angioplasty in 2013, hyperlipidemia, and CKD.  Complement protein deposits on recent endomyocardial biopsy had been observed but no graft rejection or dysfunction was noted. Current immunosuppression included mycophenolate 1000 mg twice daily, tacrolimus 3.5 mg daily.  No prednisone with current regimen.    Patient was seen by [facility] rheumatologist Dr. Lyles in [place] on July 22, 2022. Rheum History reviewed: Patient presented with stroke in 2016 with weakness and pseudo bulbar involvement resulting in change in affect with emotional instability. He was eventually diagnosed with CNS vasculitis and was treated with mycophenolate after a course of cyclophosphamide.     He had been initially on mycophenolate 2000 mg daily. He had been stable and this was decreased to 1500 mg but in 2021 there was concern with some radiographic worsening and he was increased back to 2000 mg. Repeat brain MRI 7-22 again revealed stability compared with the past 2 MRI studies. Dr. Lyles's impression was of CNS vasculitis, stable on moderate dose mycophenolate.  Recommendation was to continue mycophenolate 1000 mg twice daily.    Today, Dr. Stein and his spouse Arlette relate the series of events leading up to the diagnosis of CNS vasculitis. Spouse states that patient suddenly developed slurred speech and incoordination in Fall 2016.  Initial neurology evaluation revealed multifocal cerebrovascular accident as likely cause of the symptoms. however over the ensuing several months, patient had worsening neurologic symptoms, and subsequent imaging showed increasing ischemic cerebrovascular accident  lesions.  He was evaluated at [facility], where a diagnosis of primary CNS angiitis was established.  I do not have primary [facility] records to review today. No brain biopsy was performed. He was treated with intravenous Cytoxan and high-dose corticosteroids.  His neurologic status apparently improved and stabilized. He states that he has had no further cerebrovascular accident either by symptoms or by MRI.  After 2 years of cycling cyclophosphamide, he was transitioned to mycophenolate in 2018.  Moderate dose mycophenolate was continued under the supervision of rheumatology and neurology in [place] until the present time.    In 2021 he had an episode of loss of consciousness, after which he was diagnosed with nonischemic cardiomyopathy and an ejection fraction of 18%.  Initially there was concern that previous chemotherapy with cytoxan was a contributor to the cardiomyopathy.  However a genetic form of nonischemic cardiomyopathy was ultimately suspected after comparing the patient's myocardial histopathology with that of the cardiac biopsies obtained from patient's younger a brother who underwent orthotopic heart transplant, and from a second brother who  in his mid 40s from cardiomyopathy.  Despite maximal medical treatment, patient continued with low ejection fraction and declining physical function through .  He was confined to the intensive care unit for many weeks in late  with advanced heart failure, and received orthotopic heart transplant on 2022.    Today the patient and his spouse state that he is overall much better than prior to heart transplant in 2022.  Not only are his exercise tolerance and energy level markedly improved; neurologic symptoms and cognitive function are also better.  Patient's spouse states that he had a very flat affect and muted personality in the weeks leading up to transplant.  Now he is much more lively and interactive and  cognitive capacity is significantly improved.  His wife notes that formerly as a consequence of CNS vasculitis, patient experienced pseudobulbar affect of disorder, which has resulted in emotional displays that do not match the patient's stated mood or current thinking.  For example, patient may frequently have brief crying episodes at a time when he states he is happy or in a moment of laughter.  Patient and his spouse state that such emotional displays have reduced in frequency and duration since the heart transplant.  He does have continuing easy fatigability, and tasks requiring large muscle bulk and tone such as rising from a chair without the aid of his arms, are still difficult and limited.  He also has developed a resting and intention tremor in the right greater than left hands in association with starting tacrolimus as part of his transplant antirejection regimen.  However, he and his spouse deny that new vision changes, unilateral weakness, slurred speech, or sensory symptoms have occurred or worsened since transplant.         Review of Systems:     Constitutional: negative  Skin: negative  Eyes: negative  Ears/Nose/Throat: negative  Respiratory: No shortness of breath, dyspnea on exertion, cough, or hemoptysis  Cardiovascular: negative  Gastrointestinal: negative  Genitourinary: negative  Musculoskeletal: negative  Neurologic: negative  Psychiatric: negative  Hematologic/Lymphatic/Immunologic: negative  Endocrine: negative         Active Problem List:     Patient Active Problem List    Diagnosis Date Noted    Diabetes mellitus (H) 12/11/2024     Priority: Medium    Transplanted heart (H) 12/11/2024     Priority: Medium    Thyroid disorder screening 12/11/2024     Priority: Medium    Heart replaced by transplant (H) 12/13/2023     Priority: Medium    Lipid screening 12/13/2023     Priority: Medium    Prostate cancer screening 12/13/2023     Priority: Medium    Encounter for long-term (current) use of  high-risk medication 12/13/2023     Priority: Medium    Basal cell carcinoma 10/13/2023     Priority: Medium     10/13/23 Scalp and chest      Status post coronary angiogram 01/23/2023     Priority: Medium    Staphylococcus epidermidis bacteremia 01/09/2023     Priority: Medium    Using prophylactic antibiotic on daily basis 01/09/2023     Priority: Medium    Benign neoplasm of colon 12/12/2022     Priority: Medium    Troponin level elevated 12/06/2022     Priority: Medium    Anginal equivalent (H) 12/06/2022     Priority: Medium    Heart failure, unspecified HF chronicity, unspecified heart failure type (H) 12/06/2022     Priority: Medium    Acute embolism and thrombosis of left peroneal vein (H) 11/22/2022     Priority: Medium    Acute on chronic combined systolic (congestive) and diastolic (congestive) heart failure (H) 11/14/2022     Priority: Medium    CHF (congestive heart failure) (H) 11/10/2022     Priority: Medium    Acute respiratory failure with hypoxia (H) 11/08/2022     Priority: Medium    Coronary artery disease involving native coronary artery of native heart without angina pectoris 11/08/2022     Priority: Medium    Essential hypertension 11/08/2022     Priority: Medium    NSVT (nonsustained ventricular tachycardia) (H) 11/08/2022     Priority: Medium    SOB (shortness of breath) 11/08/2022     Priority: Medium    Syncope and collapse 10/07/2021     Priority: Medium    Cardiomyopathy (H) 10/06/2021     Priority: Medium    Primary central nervous system vasculitis (H) 11/30/2017     Priority: Medium    Vasculitis, CNS (H) 11/30/2017     Priority: Medium    Stroke (H) 09/28/2016     Priority: Medium    Hyperlipidemia 08/08/2013     Priority: Medium            Past Medical History:     Past Medical History:   Diagnosis Date    Congestive heart failure (H)      Past Surgical History:   Procedure Laterality Date    COLONOSCOPY N/A 11/17/2022    Procedure: COLONOSCOPY;  Surgeon: Roverto Nino MD;   Location:  GI    CV CORONARY ANGIOGRAM N/A 11/11/2022    Procedure: Coronary Angiogram;  Surgeon: Justo Bush MD;  [facility]    CV CORONARY ANGIOGRAM N/A 1/23/2023    Procedure: Coronary Angiogram - biplane;  Surgeon: Justo Bush MD;  [facility]    CV CORONARY ANGIOGRAM N/A 12/13/2023    Procedure: Coronary Angiogram;  Surgeon: Wayne Xavier MD;  [facility]    CV CORONARY ANGIOGRAM N/A 12/11/2024    Procedure: Coronary Angiogram;  Surgeon: Justo Bush MD;  [facility]    CV HEART BIOPSY N/A 01/09/2023    Procedure: Heart Cath Heart Biopsy;  Surgeon: Tab Agee MD;  [facility]    CV HEART BIOPSY N/A 01/02/2023    Procedure: Heart Biopsy;  Surgeon: Ghulam Mercado MD;  [facility]    CV HEART BIOPSY N/A 1/23/2023    Procedure: Heart Cath Heart Biopsy;  Surgeon: Justo Bush MD;  [facility]    CV HEART BIOPSY N/A 1/17/2023    Procedure: Heart Cath Heart Biopsy;  Surgeon: Wayne Xavier MD;  [facility]    CV HEART BIOPSY N/A 2/9/2023    Procedure: Heart Cath Heart Biopsy;  Surgeon: Ghulam Mercado MD;  [facility]    CV HEART BIOPSY N/A 2/21/2023    Procedure: Heart Biopsy;  Surgeon: Wayne Xavier MD;  [facility]    CV HEART BIOPSY N/A 3/9/2023    Procedure: Heart Cath Heart Biopsy;  Surgeon: Tab Agee MD;  [facility]    CV HEART BIOPSY N/A 3/21/2023    Procedure: Heart Cath Heart Biopsy;  Surgeon: Ghulam Mercado MD;  [facility]    CV HEART BIOPSY N/A 4/27/2023    Procedure: Heart Cath Heart Biopsy;  Surgeon: Justo Bush MD;  Location:  [facility]ARDI[facility] CATH LAB    CV HEART BIOPSY  N/A 5/22/2023    Procedure: Heart Cath Heart Biopsy;  Surgeon: Wayne Xavier MD;  [facility]    CV HEART BIOPSY N/A 6/29/2023    Procedure: Heart Biopsy (groin stick- requests sedation);  Surgeon: Wayne Xavier MD;  [facility]    CV HEART BIOPSY N/A 12/13/2023    Procedure: Heart Biopsy;  Surgeon: Wayne Xavier MD;  [facility]    CV HEART BIOPSY N/A 12/11/2024    Procedure: Heart Biopsy;  Surgeon: Justo Bush MD;  [facility]    CV INTRAVASULAR ULTRASOUND N/A 1/23/2023    Procedure: Intravascular Ultrasound;  Surgeon: Justo Bush MD;  [facility]    CV RIGHT HEART CATH MEASUREMENTS RECORDED N/A 11/11/2022    Procedure: Right Heart Catheterization;  Surgeon: Justo Bush MD;  [facility]    CV RIGHT HEART CATH MEASUREMENTS RECORDED N/A 12/08/2022    Procedure: Right Heart Catheterization;  Surgeon: Wayne Xavier MD;  [facility]    CV RIGHT HEART CATH MEASUREMENTS RECORDED N/A 12/09/2022    Procedure: Right Heart Catheterization with leave-in [place];  Surgeon: Khari Mcneill MD;  [facility]    CV RIGHT HEART CATH MEASUREMENTS RECORDED N/A 01/09/2023    Procedure: Heart Cath Right Heart Cath;  Surgeon: Tab Agee MD;  [facility]    CV RIGHT HEART CATH MEASUREMENTS RECORDED N/A 01/02/2023    Procedure: Right Heart Catheterization;  Surgeon: Ghulam Mercado MD;  [facility]    CV RIGHT HEART CATH MEASUREMENTS RECORDED N/A 1/23/2023    Procedure: Right Heart Catheterization;  Surgeon: Justo Bush MD;  [facility]    CV RIGHT HEART CATH MEASUREMENTS RECORDED N/A 1/17/2023    Procedure: Right Heart Catheterization;  Surgeon: Morenita  MD Wayne;  [facility]    CV RIGHT HEART CATH MEASUREMENTS RECORDED N/A 2/9/2023    Procedure: Right Heart Catheterization;  Surgeon: Ghulam Mercado MD;  [facility]    CV RIGHT HEART CATH MEASUREMENTS RECORDED N/A 2/21/2023    Procedure: Right Heart Catheterization;  Surgeon: Wayne Xavier MD;  [facility]    CV RIGHT HEART CATH MEASUREMENTS RECORDED N/A 3/9/2023    Procedure: Right Heart Catheterization;  Surgeon: Tab Agee MD;  [facility]    CV RIGHT HEART CATH MEASUREMENTS RECORDED N/A 3/21/2023    Procedure: Right Heart Catheterization;  Surgeon: Ghulam Mercado MD;  [facility]    CV RIGHT HEART CATH MEASUREMENTS RECORDED N/A 4/27/2023    Procedure: Right Heart Catheterization;  Surgeon: Justo Bush MD;  [facility]    CV RIGHT HEART CATH MEASUREMENTS RECORDED N/A 5/22/2023    Procedure: Right Heart Catheterization;  Surgeon: Wayne Xavier MD;  [facility]    CV RIGHT HEART CATH MEASUREMENTS RECORDED N/A 6/29/2023    Procedure: Right Heart Catheterization;  Surgeon: Wayne Xavier MD;  [facility]    CV RIGHT HEART CATH MEASUREMENTS RECORDED N/A 12/13/2023    Procedure: Right Heart Catheterization;  Surgeon: Wayne Xavier MD;  Location:  [facility] C[facility]    CV RIGHT HEART CATH MEASUREMENTS RECORDED N/A 12/11/2024    Procedure: Right Heart Catheterization;  Surgeon: Justo Bush MD;  Location:  [facility]ARDI[facility] CATH LAB    PICC Right 01/12/2023    placed in basilic and no problem    PICC DOUBLE LUMEN PLACEMENT Right 11/10/2022    Right basilic, 43 cm, 1 cm external length    TRANSPLANT HEART RECIPIENT N/A 12/25/2022    Procedure: TRANSPLANT, HEART, RECIPIENT; Median Sternotomy, Cardiopulmonary Bypass, Removal of Pacemaker;   Surgeon: Brendan Rodriguez MD;  Location:  OR     #1 primary angiitis of the CNS  2.  Nonischemic cardiomyopathy, heart replaced by transplant 2022       Social History:     Social History     Socioeconomic History    Marital status:      Spouse name: Not on file    Number of children: Not on file    Years of education: Not on file    Highest education level: Not on file   Occupational History    Not on file   Tobacco Use    Smoking status: Never    Smokeless tobacco: Never   Vaping Use    Vaping status: Never Used   Substance and Sexual Activity    Alcohol use: Not Currently    Drug use: Never    Sexual activity: Not on file   Other Topics Concern    Not on file   Social History Narrative    Not on file     Social Drivers of Health     Financial Resource Strain: Not on file   Food Insecurity: Not on file   Transportation Needs: Not on file   Physical Activity: Not on file   Stress: Not on file   Social Connections: Not on file   Interpersonal Safety: Unknown (2024)    Interpersonal Safety     Do you feel physically and emotionally safe where you currently live?: Patient unable to answer     Within the past 12 months, have you been hit, slapped, kicked or otherwise physically hurt by someone?: Patient unable to answer     Within the past 12 months, have you been humiliated or emotionally abused in other ways by your partner or ex-partner?: Patient unable to answer   Housing Stability: Not on file       Retired emergency room physician  1 child has non-Hodgkin's lymphoma  Does not smoke or drink.       Family History:     Family History   Problem Relation Age of Onset    Atrial fibrillation Father     Lung Cancer Brother      2 brothers have cardiomyopathy; 1  in his mid 40s, and the other received heart transplant.    No history of autoimmunity, rheumatic disease.    Mother  in mid 90s, had osteoarthritis  Father  in mid 90s no rheumatic or autoimmune disease.        Allergies:   No Known Allergies         Medications:     Current Outpatient Medications   Medication Sig Dispense Refill    acetaminophen (TYLENOL) 80 MG chewable tablet Take 80 mg by mouth every 4 hours as needed for mild pain or fever      amLODIPine (NORVASC) 5 MG tablet Take 1 tablet (5 mg) by mouth at bedtime 90 tablet 3    aspirin (ASA) 325 MG EC tablet Take 1 tablet (325 mg) by mouth daily 90 tablet 3    calcium carbonate-vitamin D (CALTRATE) 600-10 MG-MCG per tablet Take 1 tablet by mouth 2 times daily (with meals) 180 tablet 3    multivitamin (ONE-DAILY) tablet Take 1 tablet by mouth daily 90 tablet 3    mycophenolate (GENERIC EQUIVALENT) 250 MG capsule Take 4 capsules (1,000 mg) by mouth 2 times daily 240 capsule 11    rosuvastatin (CRESTOR) 10 MG tablet Take 1 tablet (10 mg) by mouth daily 90 tablet 3    tacrolimus (GENERIC EQUIVALENT) 1 MG capsule Take 2 capsules (2 mg) by mouth 2 times daily. 120 capsule 11     magnesium glycinate lysinate chelate (DOCTOR'S BEST) 100 MG TABS tablet Take 3 tablets (300 mg) by mouth 2 times daily. Take separate from Mycophenolate. 240 tablet 11    hydrocortisone 2.5 % ointment Apply topically 2 times daily as needed for rash (use to eyelid rash twice daily x 2 weeks, then use as needed). 30 g 2    imiquimod (ALDARA) 5 % external cream Apply daily for 6 weeks 24 packet 3    magnesium glycinate 100 MG CAPS capsule Take 2 capsules (200 mg) by mouth 2 times daily (Patient not taking: Reported on 8/22/2024) 360 capsule 3    polyethylene glycol (MIRALAX) 17 g packet Take 1 packet by mouth daily. (Patient not taking: Reported on 12/12/2024)      sildenafil (VIAGRA) 50 MG tablet Take 1 tablet (50 mg) by mouth daily as needed (start with half of a tablet.). (Patient not taking: Reported on 12/12/2024) 30 tablet 0    tacrolimus (GENERIC EQUIVALENT) 0.5 MG capsule On hold due to dose change              Physical Exam:   Blood pressure 115/80, pulse 100, resp. rate 16, weight 101.6  kg (224 lb), SpO2 97%.  Wt Readings from Last 6 Encounters:   12/12/24 101.6 kg (224 lb)   12/12/24 101.4 kg (223 lb 8 oz)   12/11/24 99.8 kg (220 lb 1.6 oz)   08/22/24 103.4 kg (228 lb)   04/19/24 102.1 kg (225 lb)   04/18/24 103 kg (227 lb)     Body mass index is 28.76 kg/m .  Constitutional: well-developed, appearing stated age; cooperative  Eyes: nl EOM, PERRLA, vision, conjunctiva, sclera  ENT: nl external ears, nose, hearing, lips, teeth, gums, throat  No mucous membrane lesions, normal saliva pool  Neck: no mass or thyroid enlargement  Resp: Breathing is unlabored  Skin: no nail pitting, alopecia, rash, nodules or lesions  Neuro: Cranial nerves are intact.  There is very mild dysarthria; no discernible memory loss in the course of conversation.  Gait is broad-based and halting.  There is reduced fine tremor in R > L hands and fingers.  Psych: Patient displays several brief tearing up episodes in the course of conversation without reference to painful or emotionally disturbing topics         Data:     @      Latest Ref Rng & Units 4/18/2024    10:07 AM 8/22/2024     9:13 AM 12/11/2024     7:26 AM   RHEUM RESULTS   Albumin 3.5 - 5.2 g/dL 4.2  4.2  4.2    ALT 0 - 70 U/L 21  28  21    AST 0 - 45 U/L 22  26  23    CK Total 39 - 308 U/L 165   141    Creatinine 0.67 - 1.17 mg/dL 1.26  1.29  1.16    GFR Estimate >60 mL/min/1.73m2 61  59  67    Hematocrit 40.0 - 53.0 % 44.9  46.6  48.4    Hemoglobin 13.3 - 17.7 g/dL 14.8  15.2  15.7    WBC 4.0 - 11.0 10e3/uL 5.5  7.3  6.1    RBC Count 4.40 - 5.90 10e6/uL 5.27  5.43  5.62    RDW 10.0 - 15.0 % 12.2  12.2  12.7    MCHC 31.5 - 36.5 g/dL 33.0  32.6  32.4    MCV 78 - 100 fL 85  86  86    Platelet Count 150 - 450 10e3/uL 218  207  219         ,  ,  ,  ,  ,  ,  ,  ,  ,  ,  ,  ,  ,  ,   Hepatitis B Core Antibody Total   Date Value Ref Range Status   12/25/2022 Nonreactive Nonreactive Final   ,   Hepatitis B Surface Antigen   Date Value Ref Range Status   12/25/2022 Nonreactive  Nonreactive Final   ,  ,  ,  ,   Quantiferon-TB Gold Plus   Date Value Ref Range Status   11/12/2022 Negative Negative Final     Comment:     No interferon gamma response to M.tuberculosis antigens was detected. Infection with M.tuberculosis is unlikely, however a single negative result does not exclude infection. In patients at high risk for infection, a second test should be considered in accordance with the 2017 ATS/IDSA/CDC Clinical Pract  ice Guidelines for Diagnosis of Tuberculosis in Adults and Children      TB1 Ag minus Nil Value   Date Value Ref Range Status   11/12/2022 0.00 IU/mL Final     TB2 Ag minus Nil Value   Date Value Ref Range Status   11/12/2022 0.00 IU/mL Final     Mitogen minus Nil Result   Date Value Ref Range Status   11/12/2022 9.99 IU/mL Final     Nil Result   Date Value Ref Range Status   11/12/2022 0.01 IU/mL Final   ,  ,  ,  ,  ,  ,  ,  ,  ,  ,  ,  ,  ,  ,  ,  ,  ,  ,   Immunoglobulin G   Date Value Ref Range Status   01/10/2023 549 (L) 610 - 1,616 mg/dL Final     Immunoglobulin A   Date Value Ref Range Status   01/10/2023 144 84 - 499 mg/dL Final     Immunoglobulin M   Date Value Ref Range Status   01/10/2023 67 35 - 242 mg/dL Final   ,  ,  ,  ,  ,  ,  ,

## 2024-12-16 ENCOUNTER — TELEPHONE (OUTPATIENT)
Dept: DERMATOLOGY | Facility: CLINIC | Age: 69
End: 2024-12-16
Payer: MEDICARE

## 2024-12-16 NOTE — TELEPHONE ENCOUNTER
Called to schedule follow -Up appointment - was told patient will call to schedule would like to coordinate with other appointments for one time trip.     Offer to give clinics scheduling number but was told there is no need patient has the phone number.      Mary Lou Ibrahim on 12/16/2024 at 2:51 PM

## 2024-12-27 ENCOUNTER — LAB (OUTPATIENT)
Dept: LAB | Facility: CLINIC | Age: 69
End: 2024-12-27
Payer: MEDICARE

## 2024-12-27 DIAGNOSIS — Z94.1 HEART REPLACED BY TRANSPLANT (H): ICD-10-CM

## 2024-12-27 PROCEDURE — 80197 ASSAY OF TACROLIMUS: CPT

## 2024-12-31 DIAGNOSIS — Z94.1 HEART REPLACED BY TRANSPLANT (H): Primary | ICD-10-CM

## 2024-12-31 LAB
TACROLIMUS BLD-MCNC: 6.5 UG/L (ref 5–15)
TME LAST DOSE: NORMAL H
TME LAST DOSE: NORMAL H

## 2025-01-09 LAB
DONOR IDENTIFICATION: NORMAL
DSA COMMENTS: NORMAL
DSA PRESENT: NO
DSA TEST METHOD: NORMAL
FLOWPRA1 CELL: NORMAL
FLOWPRA1 COMMENTS: NORMAL
FLOWPRA1 RESULT: NORMAL
FLOWPRA1 TEST METHOD: NORMAL
FLOWPRA2 CELL: NORMAL
FLOWPRA2 COMMENTS: NORMAL
FLOWPRA2 RESULT: NORMAL
FLOWPRA2 TEST METHOD: NORMAL
ORGAN: NORMAL
SA 1  COMMENTS: NORMAL
SA 1 CELL: NORMAL
SA 1 TEST METHOD: NORMAL
SA 2 CELL: NORMAL
SA 2 COMMENTS: NORMAL
SA 2 TEST METHOD: NORMAL
SA1 HI RISK ABY: NORMAL
SA1 MOD RISK ABY: NORMAL
SA2 HI RISK ABY: NORMAL
SA2 MOD RISK ABY: NORMAL
UNACCEPTABLE ANTIGENS: NORMAL
UNOS CPRA: 0

## 2025-01-14 ENCOUNTER — TELEPHONE (OUTPATIENT)
Dept: PHARMACY | Facility: CLINIC | Age: 70
End: 2025-01-14
Payer: MEDICARE

## 2025-01-14 NOTE — TELEPHONE ENCOUNTER
Clinical Pharmacy Consult:                                                      Transplant Specific: 24 month post transplant medication review  Date of Transplant: 12/25/2022  Type of Transplant: heart  First Transplant: yes  History of rejection: no    Immunosuppression Regimen   TAC 2.mg qAM & 2mg qPM and MMF 1000mg qAM & 1000mg qPM  Patient specific goal: 6-8  Most recent level: 6.5, date 12/27/24  Immunosuppressant Levels:  Therapeutic  Pt adherent to lab draws: yes  Scr:   Lab Results   Component Value Date    CR 0.95 02/21/2023     Side effects: none    Prophylactic Medications  Antibacterial:  Bactrim 1 daily  Scheduled Discontinue Date: 6 months- completed    Antifungal: Nystatin  Scheduled Discontinue Date: 6 months-completed    Antiviral: CrCl >/=60 mL/minute: Valcyte 900mg daily   Scheduled Discontinue Date: 3 months, complete    Acid Reducer: Protonix (pantoprazole)  Scheduled Reviewed Date: 6 months - completed    Thrombosis Prevention: Eliquis 5ng BID  Scheduled Discontinue Date:  to be determined  - completed    Blood Pressure Management  Frequency of home Blood Pressure checks: 3 times a week  Most recent home BP: 110's/70-80's  Patient Blood pressure goal: <130/80  Patient blood pressure at goal:  Yes    Hospitalizations/ER visits since last assessment: 0    Med rec/DUR performed: yes  Med Rec Discrepancies: no    Spoke to wife, Arlette via phone today. Paco is doing great. Arlette manages all medication and reported no missed dose. Denied any side effects.  Denied pain, fever, SoB, weakness or any heart issue.  Tac at goal.  BP at goal.    No other questions or concerns today. Follow up in 1 year.    Prince Morales, Pharm D  [facility]

## 2025-01-16 DIAGNOSIS — Z94.1 HEART REPLACED BY TRANSPLANT (H): ICD-10-CM

## 2025-01-16 RX ORDER — AMLODIPINE BESYLATE 5 MG/1
5 TABLET ORAL AT BEDTIME
Qty: 90 TABLET | Refills: 3 | Status: SHIPPED | OUTPATIENT
Start: 2025-01-16

## 2025-02-06 DIAGNOSIS — Z94.1 TRANSPLANTED HEART (H): Primary | ICD-10-CM

## 2025-03-16 ENCOUNTER — HEALTH MAINTENANCE LETTER (OUTPATIENT)
Age: 70
End: 2025-03-16

## 2025-04-14 ENCOUNTER — TELEPHONE (OUTPATIENT)
Dept: CARDIOLOGY | Facility: CLINIC | Age: 70
End: 2025-04-14
Payer: MEDICARE

## 2025-04-14 NOTE — TELEPHONE ENCOUNTER
Called and spoke with Arlette about change in [facility] schedule and needing to change appointment time on 6/26/25. Rescheduled to 2:30pm labs, 3:00 pm Echo, and 4:00pm MD Appointment.    Hola Waters   [facility]   [address] [place] [id number]

## 2025-04-19 ENCOUNTER — TELEPHONE (OUTPATIENT)
Dept: TRANSPLANT | Facility: CLINIC | Age: 70
End: 2025-04-19
Payer: MEDICARE

## 2025-04-19 NOTE — TELEPHONE ENCOUNTER
Patients wife calls stating patient tested positive for covid 4/19/25.    Symptoms started yesterday: runny nose, temp 99.7. VSS. Pt has pulse ox at home but has not checked oxygen saturations. Denies feeling SOB.     Current IS regimen: MMF 1,000mg BID, tac goal 6-8    Pt instructed to check oxygen saturations TID. Make sure he is hydrating well the next couple days. Instructed to call back RNCC if symptoms worsen or change. Wife also mentioned pt was incontiennt of bowel x1 and pt doesn't quite seem like himself- very quiet. Asked wife to keep a close eye on patient and if things change or worsen, pt should be evaluated in person.     Page sent to on-call tx MD with situation. Will await response for any further changes/recommendations.

## 2025-05-27 DIAGNOSIS — Z94.1 HEART REPLACED BY TRANSPLANT (H): ICD-10-CM

## 2025-05-27 RX ORDER — MYCOPHENOLATE MOFETIL 250 MG/1
1000 CAPSULE ORAL 2 TIMES DAILY
Qty: 240 CAPSULE | Refills: 11 | Status: SHIPPED | OUTPATIENT
Start: 2025-05-27

## 2025-06-18 DIAGNOSIS — Z13.220 LIPID SCREENING: ICD-10-CM

## 2025-06-18 DIAGNOSIS — Z12.5 PROSTATE CANCER SCREENING: ICD-10-CM

## 2025-06-18 DIAGNOSIS — E11.9 DIABETES MELLITUS (H): ICD-10-CM

## 2025-06-18 DIAGNOSIS — Z13.29 THYROID DISORDER SCREENING: ICD-10-CM

## 2025-06-18 DIAGNOSIS — Z94.1 TRANSPLANTED HEART (H): Primary | ICD-10-CM

## 2025-06-26 ENCOUNTER — RESULTS FOLLOW-UP (OUTPATIENT)
Dept: TRANSPLANT | Facility: CLINIC | Age: 70
End: 2025-06-26

## 2025-06-26 ENCOUNTER — LAB (OUTPATIENT)
Dept: LAB | Facility: CLINIC | Age: 70
End: 2025-06-26
Payer: MEDICARE

## 2025-06-26 ENCOUNTER — OFFICE VISIT (OUTPATIENT)
Dept: CARDIOLOGY | Facility: CLINIC | Age: 70
End: 2025-06-26
Attending: INTERNAL MEDICINE
Payer: MEDICARE

## 2025-06-26 VITALS
WEIGHT: 228.1 LBS | OXYGEN SATURATION: 96 % | SYSTOLIC BLOOD PRESSURE: 136 MMHG | BODY MASS INDEX: 29.29 KG/M2 | DIASTOLIC BLOOD PRESSURE: 91 MMHG | HEART RATE: 94 BPM

## 2025-06-26 DIAGNOSIS — Z94.1 TRANSPLANTED HEART (H): ICD-10-CM

## 2025-06-26 DIAGNOSIS — Z94.1 HEART REPLACED BY TRANSPLANT (H): ICD-10-CM

## 2025-06-26 DIAGNOSIS — Z13.29 THYROID DISORDER SCREENING: ICD-10-CM

## 2025-06-26 DIAGNOSIS — Z12.5 PROSTATE CANCER SCREENING: ICD-10-CM

## 2025-06-26 DIAGNOSIS — E11.9 DIABETES MELLITUS (H): ICD-10-CM

## 2025-06-26 DIAGNOSIS — Z13.220 LIPID SCREENING: ICD-10-CM

## 2025-06-26 LAB
ALBUMIN SERPL BCG-MCNC: 4.4 G/DL (ref 3.5–5.2)
ALP SERPL-CCNC: 59 U/L (ref 40–150)
ALT SERPL W P-5'-P-CCNC: 27 U/L (ref 0–70)
ANION GAP SERPL CALCULATED.3IONS-SCNC: 10 MMOL/L (ref 7–15)
AST SERPL W P-5'-P-CCNC: 24 U/L (ref 0–45)
BASOPHILS # BLD AUTO: 0 10E3/UL (ref 0–0.2)
BASOPHILS NFR BLD AUTO: 1 %
BILIRUB SERPL-MCNC: 0.7 MG/DL
BUN SERPL-MCNC: 20 MG/DL (ref 8–23)
CALCIUM SERPL-MCNC: 9.7 MG/DL (ref 8.8–10.4)
CHLORIDE SERPL-SCNC: 106 MMOL/L (ref 98–107)
CHOLEST SERPL-MCNC: 136 MG/DL
CK SERPL-CCNC: 241 U/L (ref 39–308)
CMV DNA SPEC NAA+PROBE-ACNC: NOT DETECTED IU/ML
CREAT SERPL-MCNC: 1.25 MG/DL (ref 0.67–1.17)
EBV DNA SERPL NAA+PROBE-ACNC: NOT DETECTED IU/ML
EGFRCR SERPLBLD CKD-EPI 2021: 61 ML/MIN/1.73M2
EOSINOPHIL # BLD AUTO: 0.2 10E3/UL (ref 0–0.7)
EOSINOPHIL NFR BLD AUTO: 3 %
ERYTHROCYTE [DISTWIDTH] IN BLOOD BY AUTOMATED COUNT: 12.5 % (ref 10–15)
EST. AVERAGE GLUCOSE BLD GHB EST-MCNC: 123 MG/DL
FASTING STATUS PATIENT QL REPORTED: YES
FASTING STATUS PATIENT QL REPORTED: YES
GLUCOSE SERPL-MCNC: 107 MG/DL (ref 70–99)
HBA1C MFR BLD: 5.9 %
HCO3 SERPL-SCNC: 25 MMOL/L (ref 22–29)
HCT VFR BLD AUTO: 46.5 % (ref 40–53)
HDLC SERPL-MCNC: 60 MG/DL
HGB BLD-MCNC: 15.1 G/DL (ref 13.3–17.7)
IMM GRANULOCYTES # BLD: 0 10E3/UL
IMM GRANULOCYTES NFR BLD: 0 %
LDLC SERPL CALC-MCNC: 59 MG/DL
LVEF ECHO: NORMAL
LYMPHOCYTES # BLD AUTO: 1.4 10E3/UL (ref 0.8–5.3)
LYMPHOCYTES NFR BLD AUTO: 27 %
MAGNESIUM SERPL-MCNC: 1.6 MG/DL (ref 1.7–2.3)
MCH RBC QN AUTO: 27.7 PG (ref 26.5–33)
MCHC RBC AUTO-ENTMCNC: 32.5 G/DL (ref 31.5–36.5)
MCV RBC AUTO: 85 FL (ref 78–100)
MONOCYTES # BLD AUTO: 0.5 10E3/UL (ref 0–1.3)
MONOCYTES NFR BLD AUTO: 9 %
NEUTROPHILS # BLD AUTO: 3.1 10E3/UL (ref 1.6–8.3)
NEUTROPHILS NFR BLD AUTO: 60 %
NONHDLC SERPL-MCNC: 76 MG/DL
NRBC # BLD AUTO: 0 10E3/UL
NRBC BLD AUTO-RTO: 0 /100
PHOSPHATE SERPL-MCNC: 2.8 MG/DL (ref 2.5–4.5)
PLATELET # BLD AUTO: 203 10E3/UL (ref 150–450)
POTASSIUM SERPL-SCNC: 4.1 MMOL/L (ref 3.4–5.3)
PROT SERPL-MCNC: 6.6 G/DL (ref 6.4–8.3)
PSA SERPL DL<=0.01 NG/ML-MCNC: 1.2 NG/ML (ref 0–6.5)
RBC # BLD AUTO: 5.45 10E6/UL (ref 4.4–5.9)
SODIUM SERPL-SCNC: 141 MMOL/L (ref 135–145)
SPECIMEN TYPE: NORMAL
TACROLIMUS BLD-MCNC: 6.4 UG/L (ref 5–15)
TME LAST DOSE: NORMAL H
TME LAST DOSE: NORMAL H
TRIGL SERPL-MCNC: 86 MG/DL
TSH SERPL DL<=0.005 MIU/L-ACNC: 1.81 UIU/ML (ref 0.3–4.2)
WBC # BLD AUTO: 5.1 10E3/UL (ref 4–11)

## 2025-06-26 PROCEDURE — 87799 DETECT AGENT NOS DNA QUANT: CPT | Performed by: INTERNAL MEDICINE

## 2025-06-26 PROCEDURE — 99000 SPECIMEN HANDLING OFFICE-LAB: CPT | Performed by: PATHOLOGY

## 2025-06-26 PROCEDURE — G0463 HOSPITAL OUTPT CLINIC VISIT: HCPCS | Performed by: INTERNAL MEDICINE

## 2025-06-26 PROCEDURE — 93306 TTE W/DOPPLER COMPLETE: CPT | Mod: GC | Performed by: INTERNAL MEDICINE

## 2025-06-26 PROCEDURE — 86352 CELL FUNCTION ASSAY W/STIM: CPT | Performed by: INTERNAL MEDICINE

## 2025-06-26 PROCEDURE — 86828 HLA CLASS I&II ANTIBODY QUAL: CPT | Performed by: INTERNAL MEDICINE

## 2025-06-26 PROCEDURE — 80197 ASSAY OF TACROLIMUS: CPT | Performed by: INTERNAL MEDICINE

## 2025-06-26 PROCEDURE — 83036 HEMOGLOBIN GLYCOSYLATED A1C: CPT | Performed by: INTERNAL MEDICINE

## 2025-06-26 ASSESSMENT — PAIN SCALES - GENERAL: PAINLEVEL_OUTOF10: NO PAIN (0)

## 2025-06-26 NOTE — PROGRESS NOTES
ADULT HEART TRANSPLANT CLINIC      August 22, 2024      Follow up heart transplant     HPI:     history of HTN, CAD (s/p PCI to LAD in 2013), hyperlipidemia, CNS vasculitis (2013, residual left-sided weakness, on Cytoxan --> CellCept with no further neuro insult), systolic heart failure secondary to NICM (ARVC, diagnosed 2021), sustained VT s/p ICD (1/2022), and CKD now s/p OHT 12/25/22 who presents to clinic for routine follow up     Transplant details:   Serostatus:  - CMV D+/R+  - EBV D+/R+  - Toxo D-/R-    No immunodepression intolerance   No rejection   No graft vasculopathy     This visit:     - Trouble with exercise tolerance, balance issues, and perceived muscle weakness over the last six months  - Decreased activity level, feeling weaker in thighs, balance feels off, not dizzy but feels slightly off  - Difficulty walking 0.7 miles, last attempted two months ago  - No significant muscle pain, but legs feel weaker than usual  - No fevers or chills  - Recent echo had difficulty obtaining images, no IV started  -  No other health updates   -Patient has had some skin cancers over the last 2 years-followed locally every 3 months    Past medical history no change from prior    Social history no change from prior      Current Outpatient Medications   Medication Sig Dispense Refill     magnesium glycinate lysinate chelate (DOCTOR'S BEST) 100 MG TABS tablet Take 3 tablets (300 mg) by mouth 2 times daily. Take separate from Mycophenolate. 240 tablet 11    acetaminophen (TYLENOL) 80 MG chewable tablet Take 80 mg by mouth every 4 hours as needed for mild pain or fever      amLODIPine (NORVASC) 5 MG tablet Take 1 tablet (5 mg) by mouth at bedtime. 90 tablet 3    aspirin (ASA) 325 MG EC tablet Take 1 tablet (325 mg) by mouth daily 90 tablet 3    calcium carbonate-vitamin D (CALTRATE) 600-10 MG-MCG per tablet Take 1 tablet by mouth 2 times daily (with meals) 180 tablet 3    multivitamin (ONE-DAILY) tablet Take 1 tablet by  mouth daily 90 tablet 3    mycophenolate (GENERIC EQUIVALENT) 250 MG capsule Take 4 capsules (1,000 mg) by mouth 2 times daily. 240 capsule 11    polyethylene glycol (MIRALAX) 17 g packet Take 1 packet by mouth daily. (Patient not taking: Reported on 12/12/2024)      rosuvastatin (CRESTOR) 10 MG tablet Take 1 tablet (10 mg) by mouth daily 90 tablet 3    sildenafil (VIAGRA) 50 MG tablet Take 1 tablet (50 mg) by mouth daily as needed (start with half of a tablet.). (Patient not taking: Reported on 12/12/2024) 30 tablet 0    tacrolimus (GENERIC EQUIVALENT) 0.5 MG capsule On hold due to dose change      tacrolimus (GENERIC EQUIVALENT) 1 MG capsule Take 2 capsules (2 mg) by mouth 2 times daily. 120 capsule 11     All relevant ROS were reviewed in the HPI.     EXAM:  BP (!) 136/91 (BP Location: Right arm, Patient Position: Chair, Cuff Size: Adult Regular)   Pulse 94   Wt 103.5 kg (228 lb 1.6 oz)   SpO2 96%   BMI 29.29 kg/m      General: appears comfortable, alert and articulate, intermittently tearful  Oropharynx: moist mucosa, no lesions, dentition intact- no thrush   Heart: RRR, normal PMI, normal S1/s2, no s3.s4   Lungs: clear, no rales or wheezing  Extremities: no clubbing, cyanosis or edema  Neurological: speech fluency is at baseline(slow and takes some time for word finding), able to walk without assistance   Psych:  emotional lability which is his baseline      Results  All lab trends, imaging, recent echos were personally reviewed and were reviewed directly with the patient.    - Sodium: 141 mmol/L  - Potassium: 4.1 mmol/L  - Creatinine: 1.25 mg/dL (fluctuates between 1.0 and 1.2 mg/dL)  - Magnesium: 1.6 mg/dL  - Liver function tests: Normal  - Hemoglobin A1c: 5.9%  - LDL cholesterol: 59 mg/dL  - PSA: 1.2 ng/mL  - TSH: 1.81  IU/mL  - Blood counts: Normal  - CK: 241 U/L  - Echocardiogram: Vigorous heart function, images suggest normal function  - Coronary arteries: Normal, unchanged from prior  - Filling  pressures: Normal      Last immuknow: 438     Assessment and recommendations:    NICM, s/p OHT 12/25/22  His post-operative course has been c/b delirium, leukocytosis, staph epi bacteremia, and prolonged CT output.  He discharged 1/13.    Graph function is normal, other than complement deposition there is been no rejection, baseline coronary angiogram with mild native coronary disease (proximal LAD 0.2 mm) which came with the donor    Overall doing very well with normal graft function     Serostatus:  - CMV D+/R+  - EBV D+/R+  - Toxo D-/R-     Immunosuppression:  - keep MMF 1000 BID (preferred by neuro for vasculitis)   - The tac goal is 6-8       2. Primary prevention:    - Continue  mg (per neuro)   - continue rosuvastatin 10mg daily    3,.  Hypertension: At goal at home  - Continue amlodipine    4. 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   History of vasculitis-controlled  - He follows with rheumatology and neurology  - He receives every 6 months MRIs  - Continue CellCept 1000 twice daily    5.  Aortic root dilation: very slight- will monitor in serial echos - stable     6.  Fatigue/muscle weakness  - CK is normal  - No muscle pain  - Encouraged him to maintain as much physical activity and strength training his past    RTC per routine     CC  Patient Care Team:  Quentin Odonnell as PCP - General  Myrtle Dominguez MD as MD (Cardiovascular Disease)  Abram Simeon [facility] as Pharmacist (Pharmacist)  Tiera Anguiano DO as MD (Infectious Diseases)  Burt Andrews MD Peterson, Erik Jon, MD as Assigned Rheumatology Provider  Myrtle Dominguez MD as Assigned Heart and Vascular Provider  Bella Anna RN as Transplant Coordinator (Cardiology)  Samia Magallanes PA-C as Physician  Assistant (Dermatology)  Jose Hastings MD as Assigned Dermatology Provider  JUAN FRANCISCO CAMACHO      MENTAL HEALTH SYMPTOMS: No

## 2025-06-26 NOTE — LETTER
6/26/2025      RE: Paco Stein  [address]  [place] [id number]       Dear Colleague,    Thank you for the opportunity to participate in the care of your patient, Paco Stein, at the [facility] [place] [facility]. Please see a copy of my visit note below.    ADULT HEART TRANSPLANT CLINIC      August 22, 2024      Follow up heart transplant     HPI:     history of HTN, CAD (s/p PCI to LAD in 2013), hyperlipidemia, CNS vasculitis (2013, residual left-sided weakness, on Cytoxan --> CellCept with no further neuro insult), systolic heart failure secondary to NICM (ARVC, diagnosed 2021), sustained VT s/p ICD (1/2022), and CKD now s/p OHT 12/25/22 who presents to clinic for routine follow up     Transplant details:   Serostatus:  - CMV D+/R+  - EBV D+/R+  - Toxo D-/R-    No immunodepression intolerance   No rejection   No graft vasculopathy     This visit:     - Trouble with exercise tolerance, balance issues, and perceived muscle weakness over the last six months  - Decreased activity level, feeling weaker in thighs, balance feels off, not dizzy but feels slightly off  - Difficulty walking 0.7 miles, last attempted two months ago  - No significant muscle pain, but legs feel weaker than usual  - No fevers or chills  - Recent echo had difficulty obtaining images, no IV started  -  No other health updates   -Patient has had some skin cancers over the last 2 years-followed locally every 3 months    Past medical history no change from prior    Social history no change from prior      Current Outpatient Medications   Medication Sig Dispense Refill      magnesium glycinate lysinate chelate (DOCTOR'S BEST) 100 MG TABS tablet Take 3 tablets (300 mg) by mouth 2 times daily. Take separate from Mycophenolate. 240 tablet 11     acetaminophen (TYLENOL) 80 MG chewable tablet Take 80 mg by mouth every 4 hours as needed for mild pain or fever        amLODIPine (NORVASC) 5 MG tablet Take 1 tablet (5 mg) by mouth at bedtime. 90 tablet 3     aspirin (ASA) 325 MG EC tablet Take 1 tablet (325 mg) by mouth daily 90 tablet 3     calcium carbonate-vitamin D (CALTRATE) 600-10 MG-MCG per tablet Take 1 tablet by mouth 2 times daily (with meals) 180 tablet 3     multivitamin (ONE-DAILY) tablet Take 1 tablet by mouth daily 90 tablet 3     mycophenolate (GENERIC EQUIVALENT) 250 MG capsule Take 4 capsules (1,000 mg) by mouth 2 times daily. 240 capsule 11     polyethylene glycol (MIRALAX) 17 g packet Take 1 packet by mouth daily. (Patient not taking: Reported on 12/12/2024)       rosuvastatin (CRESTOR) 10 MG tablet Take 1 tablet (10 mg) by mouth daily 90 tablet 3     sildenafil (VIAGRA) 50 MG tablet Take 1 tablet (50 mg) by mouth daily as needed (start with half of a tablet.). (Patient not taking: Reported on 12/12/2024) 30 tablet 0     tacrolimus (GENERIC EQUIVALENT) 0.5 MG capsule On hold due to dose change       tacrolimus (GENERIC EQUIVALENT) 1 MG capsule Take 2 capsules (2 mg) by mouth 2 times daily. 120 capsule 11     All relevant ROS were reviewed in the HPI.     EXAM:  BP (!) 136/91 (BP Location: Right arm, Patient Position: Chair, Cuff Size: Adult Regular)   Pulse 94   Wt 103.5 kg (228 lb 1.6 oz)   SpO2 96%   BMI 29.29 kg/m      General: appears comfortable, alert and articulate, intermittently tearful  Oropharynx: moist mucosa, no lesions, dentition intact- no thrush   Heart: RRR, normal PMI, normal S1/s2, no s3.s4   Lungs: clear, no rales or wheezing  Extremities: no clubbing, cyanosis or edema  Neurological: speech fluency is at baseline(slow and takes some time for word finding), able to walk without assistance   Psych:  emotional lability which is his baseline      Results  All lab trends, imaging, recent echos were personally reviewed and were reviewed directly with the patient.    - Sodium: 141 mmol/L  - Potassium: 4.1 mmol/L  - Creatinine: 1.25 mg/dL  (fluctuates between 1.0 and 1.2 mg/dL)  - Magnesium: 1.6 mg/dL  - Liver function tests: Normal  - Hemoglobin A1c: 5.9%  - LDL cholesterol: 59 mg/dL  - PSA: 1.2 ng/mL  - TSH: 1.81  IU/mL  - Blood counts: Normal  - CK: 241 U/L  - Echocardiogram: Vigorous heart function, images suggest normal function  - Coronary arteries: Normal, unchanged from prior  - Filling pressures: Normal      Last immuknow: 438     Assessment and recommendations:    NICM, s/p OHT 12/25/22  His post-operative course has been c/b delirium, leukocytosis, staph epi bacteremia, and prolonged CT output.  He discharged 1/13.    Graph function is normal, other than complement deposition there is been no rejection, baseline coronary angiogram with mild native coronary disease (proximal LAD 0.2 mm) which came with the donor    Overall doing very well with normal graft function     Serostatus:  - CMV D+/R+  - EBV D+/R+  - Toxo D-/R-     Immunosuppression:  - keep MMF 1000 BID (preferred by neuro for vasculitis)   - The tac goal is 6-8       2. Primary prevention:    - Continue  mg (per neuro)   - continue rosuvastatin 10mg daily    3,.  Hypertension: At goal at home  - Continue amlodipine    4. 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   History of vasculitis-controlled  - He follows with rheumatology and neurology  - He receives every 6 months MRIs  - Continue CellCept 1000 twice daily    5.  Aortic root dilation: very slight- will monitor in serial echos - stable     6.  Fatigue/muscle weakness  - CK is normal  - No muscle pain  - Encouraged him to maintain as much physical activity and strength training his past    RTC per routine     CC  Patient Care Team:  Quentin Odonnell as PCP - Myrtle Garrison MD as MD  (Cardiovascular Disease)  Abram Simeon, [facility] as Pharmacist (Pharmacist)  Tiera Anguiano DO as MD (Infectious Diseases)  Burt Andrews MD Peterson, Erik Jon, MD as Assigned Rheumatology Provider  Myrtle Dominguez MD as Assigned Heart and Vascular Provider  Bella Anna RN as Transplant Coordinator (Cardiology)  Samia Magallanes PA-C as Physician Assistant (Dermatology)  Jose Hastings MD as Assigned Dermatology Provider  JUAN FRANCISCO CAMACHO      MENTAL HEALTH SYMPTOMS: No        Please do not hesitate to contact me if you have any questions/concerns.     Sincerely,     Myrtle Dominguez MD

## 2025-06-26 NOTE — NURSING NOTE
"Transplant Coordinator Note     Reason for visit: 30 months post transplant  Coordinator: Present   Caregiver: Wife, Arlette     Health concerns addressed today:   1. Vasculitis    2. 3rd annual  Angio - yes, per Dr Berry, per protocol    3. Results reviewed  Tacro level 6.4   Echo images reviewed, awaiting final read, no concerns in clinic per Dr Dominguez    4. Misc  Activity tolerance: Pt reports issues with activity related to balance but not heart or breathing. Pt has reported he has felt weaker but not sure of how to explain in depth or what is causing. Reports weakness has been happening over the last 2-6 months. Per spouse, he is not longer walking around the block the whole way. Reports it has taken more effort to maintain balance and continue with activity, which is more tiring. Pt reports it \"feels like my gyroscope is off\". Pt reports he is able to continue working out, [facility] and lift weights.     5. Family screening, variant of unknown significance   Reports children have all established care with cardiology    Immunosuppressants:   MMF 1000 BID (preferred by neuro for vasculitis)   Tacro (goal 6-8)    Routine screenings:    Derm: UTD, seeing local provider  Dental: UTD   Colonoscopy: Nov 2022 (no specimens)   Prostate: PSA 1.2  Eye: UTD     Immunizations   Flu: Due Fall  RSV: pt declined   Pneumonia: UTD   COVID: pt declined   Shingrix: 2/2     Available results reviewed with patient   Medication record reviewed and reconciled   Questions and concerns addressed   Pt verbalized an understanding of plan of care.     Patient Instructions   1. Please, get your flu vaccine this fall.  2. Bella can update you on the need for a Dexa scan if you have not had one since transplant  3. Closer to December, we can coordinate with Dr Liu to get appointments scheduled around the same time/day    Next transplant clinic appointment: 3rd annual in December with an angiogram  Next lab draw: Pending the rest " of today's results  Coordinator will call with all pending results.     Please call transplant coordinator with any questions, [phone]

## 2025-06-26 NOTE — PATIENT INSTRUCTIONS
Patient Instructions   1. Please, get your flu vaccine this fall.  2. Bella can update you on the need for a Dexa scan if you have not had one since transplant.  3. Closer to December, we can coordinate with Dr Liu to get appointments scheduled around the same time/day.    Next transplant clinic appointment: 3rd annual in December with an angiogram  Next lab draw: Pending the rest of today's results  Coordinator will call with all pending results.     Please call transplant coordinator with any questions, [phone]

## 2025-06-26 NOTE — NURSING NOTE
Chief Complaint   Patient presents with    Follow Up     RETURN HEART TRANSPLANT       Vitals were taken, medications reconciled.    Elena Cruz, EMT    4:08 PM

## 2025-06-27 ENCOUNTER — RESULTS FOLLOW-UP (OUTPATIENT)
Dept: TRANSPLANT | Facility: CLINIC | Age: 70
End: 2025-06-27

## 2025-06-29 LAB
ALLOMAP SCORE (EXTERNAL): 26 (ref 0–40)
NEGATIVE PREDICTIVE VALUE PERCENT (EXTERNAL): 100 %
POSITIVE PREDICTIVE VALUE PERCENT (EXTERNAL): 2.3 %

## 2025-06-30 LAB — IMMUKNOW IMMUNE CELL FUNCTION: 424 NG/ML

## 2025-07-01 LAB
DONOR IDENTIFICATION: NORMAL
DSA COMMENTS: NORMAL
DSA PRESENT: NO
DSA TEST METHOD: NORMAL
FLOWPRA1 CELL: NORMAL
FLOWPRA1 COMMENTS: NORMAL
FLOWPRA1 RESULT: NORMAL
FLOWPRA1 TEST METHOD: NORMAL
FLOWPRA2 CELL: NORMAL
FLOWPRA2 COMMENTS: NORMAL
FLOWPRA2 RESULT: NORMAL
FLOWPRA2 TEST METHOD: NORMAL
ORGAN: NORMAL

## 2025-07-23 DIAGNOSIS — Z94.1 HEART REPLACED BY TRANSPLANT (H): ICD-10-CM

## 2025-07-28 RX ORDER — ROSUVASTATIN CALCIUM 10 MG/1
10 TABLET, COATED ORAL DAILY
Qty: 90 TABLET | Refills: 3 | Status: SHIPPED | OUTPATIENT
Start: 2025-07-28

## (undated) DEVICE — Device

## (undated) DEVICE — PACK HEART RIGHT CUSTOM SAN32RHF18

## (undated) DEVICE — CATH ANGIO INFINITI JL4 4FRX100CM 538420

## (undated) DEVICE — TUBING PRESSURE 30"

## (undated) DEVICE — SU VICRYL 0 CTX 36" J370H

## (undated) DEVICE — WIPES FOLEY CARE SURESTEP PROVON DFC100

## (undated) DEVICE — CATH GUIDING BLUE YELLOW PTFE XB3.5 6FRX100CM 67005400

## (undated) DEVICE — FORCEP ENDMYCRD BX DISP STR 6F

## (undated) DEVICE — SU PROLENE 4-0 SHDA 36" 8521H

## (undated) DEVICE — CATH ANGIO INFINITI 3DRC 4FRX100CM 538476

## (undated) DEVICE — INTRODUCER SHEATH FAST-CATH 7FRX85CM GSPC CVD 406772

## (undated) DEVICE — INTRO GLIDESHEATH SLENDER 6FR 10X45CM 60-1060

## (undated) DEVICE — WIRE GUIDE 0.035"X150CM EMERALD J TIP 502521

## (undated) DEVICE — FORCEP ENDOMYOCARDIAL BIOPSY STRAIGHT 6FRX50CM 190060

## (undated) DEVICE — GLIDEWIRE TERUMO .035X180CM 1.5,, J-TIP GR3525

## (undated) DEVICE — INTRO SHEATH AVANTI 4FRX23CM 504604T

## (undated) DEVICE — GW VASC .035IN DIA 260CML 7CML 3 MM RADIUS J CURVE 502455

## (undated) DEVICE — GUIDEWIRE TERUMO .035X180 ANG GR3508

## (undated) DEVICE — ENDO FORCEP BIOPSY STD JAW FEMORAL 5.5FRX104CM 504300

## (undated) DEVICE — SU PROLENE 3-0 SHDA 48" 8534H

## (undated) DEVICE — INTRO SHEATH MICRO PLATINUM TIP 4FRX40CM 7274

## (undated) DEVICE — MANIFOLD KIT ANGIO AUTOMATED 014613

## (undated) DEVICE — TUBING SUCTION DRAINAGE PLEURAL DUAL 8884714200

## (undated) DEVICE — PROTECTOR ARM ONE-STEP TRENDELENBURG 40418

## (undated) DEVICE — DECANTER BAG 2002S

## (undated) DEVICE — INTRO SHEATH 7FRX10CM PINNACLE RSS702

## (undated) DEVICE — INTRO SHEATH 6FRX25CM PINNACLE RSS606

## (undated) DEVICE — DRSG MEPILEX BORDER SACRUM 6.3X7.9" 282055

## (undated) DEVICE — RAD INTR SHEATH BRITE TIP 6FRX45CM 401-645M

## (undated) DEVICE — FORCEP ENDOMYOCARDIAL BIOPSY STRAIGHT 5FRX50CM 190090

## (undated) DEVICE — INTRO SHEATH 7FRX25CM PINNACLE RSS706

## (undated) DEVICE — SU PROLENE 5-0 RB-2DA 30" 8710H

## (undated) DEVICE — INTRODUCER SHEATH FAST-CATH CATH-LOCK 7FRX12CM 406702

## (undated) DEVICE — KIT RIGHT HEART CATH 60130719

## (undated) DEVICE — SU ETHIBOND 2-0 SHDA 30" X563H

## (undated) DEVICE — DRAPE FLUID WARMING 52 X 60" ORS-321

## (undated) DEVICE — WIRE GUIDE 0.025"X150CM EMERALD J TIP 502524

## (undated) DEVICE — KIT MANIFOLD NAMIC STANDARD 72IN RIGHT HEART 2 PT 613000213

## (undated) DEVICE — DRSG TELFA 3X8" 1238

## (undated) DEVICE — SU VICRYL 2-0 CT-1 27" UND J259H

## (undated) DEVICE — SU PROLENE 3-0 SHDA 36" 8522H

## (undated) DEVICE — VALVE HEMOSTASIS .096" COPILOT MECH 1003331

## (undated) DEVICE — FASTENER CATH BALLOON CLAMPX2 STATLOCK 0684-00-493

## (undated) DEVICE — PACK HEART LEFT CUSTOM

## (undated) DEVICE — NDL ANGIOCATH 14GA 1.25" 4048

## (undated) DEVICE — DRAIN CHEST TUBE 32FR STR 8032

## (undated) DEVICE — GUIDEWIRE L80CM OD.035IN 3 CM J-TIP V

## (undated) DEVICE — TUBING INSUFFLATION PNEUMOCLEAR 0620050100

## (undated) DEVICE — CATH ANGIO INFINITI 3DRC 6FRX100CM 534676T

## (undated) DEVICE — WIRE GUIDE 0.035"X145CM AMPLATZ XSTIFF J THSCF-35-145-3-AES

## (undated) DEVICE — PREP CHLORAPREP 26ML TINTED HI-LITE ORANGE 930815

## (undated) DEVICE — BONE WAX 2.5GM W31G

## (undated) DEVICE — SU VICRYL 0 CT-1 36" J346H

## (undated) DEVICE — SU STEEL MYO/WIRE II STERNOTOMY 8 BE-1 3X14" 048-217

## (undated) DEVICE — CATH TRAY FOLEY SURESTEP 16FR W/TMP PRB STLK LATEX A319416AM

## (undated) DEVICE — DRSG ABDOMINAL 07 1/2X8" 7197D

## (undated) DEVICE — BLADE SAW STERNAL 20X30MM KM-32

## (undated) DEVICE — DRSG DRAIN 4X4" 7086

## (undated) DEVICE — ESU PENCIL SMOKE EVAC W/ROCKER SWITCH 0703-047-000

## (undated) DEVICE — COVER CAMERA IN-LIGHT DISP LT-C02

## (undated) DEVICE — DRAPE IOBAN INCISE 23X17" 6650EZ

## (undated) DEVICE — INTRODUCER SHEATH FAST-CATH 7FRX40CM GSPC CVD 406772

## (undated) DEVICE — KIT HAND CONTROL ACIST 016795

## (undated) DEVICE — SLEEVE TR BAND RADIAL COMPRESSION DEVICE 24CM TRB24-REG

## (undated) DEVICE — LINEN TOWEL PACK X30 5481

## (undated) DEVICE — SURGICEL HEMOSTAT 4X8" 1952

## (undated) DEVICE — GUIDEWIRE VASC 0.014INX180CM RUNTHROUGH 25-1011

## (undated) DEVICE — SLEEVE REPOSITIONING W/CATH LOCK 60CM 406503

## (undated) DEVICE — ESU HOLSTER PLASTIC DISP E2400

## (undated) DEVICE — SU SILK 0 TIE 6X30" A306H

## (undated) DEVICE — SU PROLENE 6-0 C-1DA 30" 8706H

## (undated) DEVICE — CATH US OD 5FR OD SEC 2.9FR EAGLE EYE PLATINUM 0.014 85900P

## (undated) DEVICE — CATH JACKY 5FR 3.5 CURVE 40-5023

## (undated) DEVICE — KIT HAND CONTROL ACIST 014644 AR-P54

## (undated) DEVICE — KIT MICROINTRODUCER VASCULAR VSI 4FRX0.018INX40CM 7195X

## (undated) DEVICE — SPONGE RAY-TEC 4X8" 7318

## (undated) DEVICE — SU STEEL 6 CCS 4X18" M654G

## (undated) DEVICE — ADH SKIN CLOSURE PREMIERPRO EXOFIN 1.0ML 3470

## (undated) DEVICE — LINEN TOWEL PACK X6 WHITE 5487

## (undated) DEVICE — PACK ADULT HEART UMMC PV15CG92D

## (undated) DEVICE — SUCTION DRY CHEST DRAIN OASIS 3600-100

## (undated) DEVICE — SU ETHIBOND 0 CT-1 CR 8X18" CX21D

## (undated) DEVICE — SOL WATER IRRIG 1000ML BOTTLE 2F7114

## (undated) DEVICE — SU ETHIBOND 3-0 BBDA 36" X588H

## (undated) DEVICE — SU PLEDGET SOFT TFE 3/8"X3/26"X1/16" PCP40

## (undated) DEVICE — SOL NACL 0.9% IRRIG 1000ML BOTTLE 07138-09

## (undated) DEVICE — DRAIN CHEST TUBE RIGHT ANGLED 32FR 8132

## (undated) DEVICE — LEAD PACER MYOCARDIAL BIPOLAR TEMPORARY 53CM 6495F

## (undated) DEVICE — SUCTION MANIFOLD NEPTUNE 2 SYS 4 PORT 0702-020-000

## (undated) DEVICE — RX SURGIFLO HEMOSTATIC MATRIX W/THROMBIN 8ML 2994

## (undated) DEVICE — SU PROLENE 4-0 RB-1DA 36" 8557H

## (undated) DEVICE — SU VICRYL 3-0 FS-1 27" J442H

## (undated) DEVICE — DEVICE TISSUE STABILIZATION OCTOBASE 28707

## (undated) RX ORDER — NITROGLYCERIN 5 MG/ML
VIAL (ML) INTRAVENOUS
Status: DISPENSED
Start: 2024-12-11

## (undated) RX ORDER — HEPARIN SODIUM 1000 [USP'U]/ML
INJECTION, SOLUTION INTRAVENOUS; SUBCUTANEOUS
Status: DISPENSED
Start: 2023-03-09

## (undated) RX ORDER — ASPIRIN 81 MG/1
TABLET, CHEWABLE ORAL
Status: DISPENSED
Start: 2023-01-23

## (undated) RX ORDER — FENTANYL CITRATE 50 UG/ML
INJECTION, SOLUTION INTRAMUSCULAR; INTRAVENOUS
Status: DISPENSED
Start: 2023-01-23

## (undated) RX ORDER — PAPAVERINE HYDROCHLORIDE 30 MG/ML
INJECTION INTRAMUSCULAR; INTRAVENOUS
Status: DISPENSED
Start: 2022-12-25

## (undated) RX ORDER — REGADENOSON 0.08 MG/ML
INJECTION, SOLUTION INTRAVENOUS
Status: DISPENSED
Start: 2023-12-13

## (undated) RX ORDER — LIDOCAINE HYDROCHLORIDE 20 MG/ML
SOLUTION OROPHARYNGEAL
Status: DISPENSED
Start: 2022-11-16

## (undated) RX ORDER — FENTANYL CITRATE 50 UG/ML
INJECTION, SOLUTION INTRAMUSCULAR; INTRAVENOUS
Status: DISPENSED
Start: 2022-12-25

## (undated) RX ORDER — FENTANYL CITRATE 50 UG/ML
INJECTION, SOLUTION INTRAMUSCULAR; INTRAVENOUS
Status: DISPENSED
Start: 2022-11-16

## (undated) RX ORDER — AMINOPHYLLINE 25 MG/ML
INJECTION, SOLUTION INTRAVENOUS
Status: DISPENSED
Start: 2024-12-11

## (undated) RX ORDER — FENTANYL CITRATE 50 UG/ML
INJECTION, SOLUTION INTRAMUSCULAR; INTRAVENOUS
Status: DISPENSED
Start: 2023-03-09

## (undated) RX ORDER — NICARDIPINE HCL-0.9% SOD CHLOR 1 MG/10 ML
SYRINGE (ML) INTRAVENOUS
Status: DISPENSED
Start: 2022-11-11

## (undated) RX ORDER — SODIUM CHLORIDE 9 MG/ML
INJECTION, SOLUTION INTRAVENOUS
Status: DISPENSED
Start: 2023-01-23

## (undated) RX ORDER — LIDOCAINE 40 MG/G
CREAM TOPICAL
Status: DISPENSED
Start: 2023-01-17

## (undated) RX ORDER — MAGNESIUM OXIDE 400 MG/1
TABLET ORAL
Status: DISPENSED
Start: 2023-04-27

## (undated) RX ORDER — FENTANYL CITRATE 50 UG/ML
INJECTION, SOLUTION INTRAMUSCULAR; INTRAVENOUS
Status: DISPENSED
Start: 2023-12-13

## (undated) RX ORDER — HEPARIN SODIUM 1000 [USP'U]/ML
INJECTION, SOLUTION INTRAVENOUS; SUBCUTANEOUS
Status: DISPENSED
Start: 2022-12-25

## (undated) RX ORDER — PROPOFOL 10 MG/ML
INJECTION, EMULSION INTRAVENOUS
Status: DISPENSED
Start: 2022-12-25

## (undated) RX ORDER — ASPIRIN 325 MG
TABLET, DELAYED RELEASE (ENTERIC COATED) ORAL
Status: DISPENSED
Start: 2024-12-11

## (undated) RX ORDER — FENTANYL CITRATE-0.9 % NACL/PF 10 MCG/ML
PLASTIC BAG, INJECTION (ML) INTRAVENOUS
Status: DISPENSED
Start: 2022-12-25

## (undated) RX ORDER — NITROGLYCERIN 5 MG/ML
VIAL (ML) INTRAVENOUS
Status: DISPENSED
Start: 2023-01-23

## (undated) RX ORDER — LIDOCAINE 40 MG/G
CREAM TOPICAL
Status: DISPENSED
Start: 2023-04-27

## (undated) RX ORDER — SODIUM CHLORIDE 9 MG/ML
INJECTION, SOLUTION INTRAVENOUS
Status: DISPENSED
Start: 2023-06-29

## (undated) RX ORDER — LIDOCAINE HYDROCHLORIDE 10 MG/ML
INJECTION, SOLUTION EPIDURAL; INFILTRATION; INTRACAUDAL; PERINEURAL
Status: DISPENSED
Start: 2023-02-09

## (undated) RX ORDER — LIDOCAINE HYDROCHLORIDE 10 MG/ML
INJECTION, SOLUTION EPIDURAL; INFILTRATION; INTRACAUDAL; PERINEURAL
Status: DISPENSED
Start: 2023-03-09

## (undated) RX ORDER — FENTANYL CITRATE 50 UG/ML
INJECTION, SOLUTION INTRAMUSCULAR; INTRAVENOUS
Status: DISPENSED
Start: 2023-02-21

## (undated) RX ORDER — FENTANYL CITRATE 50 UG/ML
INJECTION, SOLUTION INTRAMUSCULAR; INTRAVENOUS
Status: DISPENSED
Start: 2022-11-11

## (undated) RX ORDER — AMINOPHYLLINE 25 MG/ML
INJECTION, SOLUTION INTRAVENOUS
Status: DISPENSED
Start: 2023-12-13

## (undated) RX ORDER — LIDOCAINE HYDROCHLORIDE 10 MG/ML
INJECTION, SOLUTION EPIDURAL; INFILTRATION; INTRACAUDAL; PERINEURAL
Status: DISPENSED
Start: 2023-01-09

## (undated) RX ORDER — HEPARIN SODIUM 200 [USP'U]/100ML
INJECTION, SOLUTION INTRAVENOUS
Status: DISPENSED
Start: 2024-12-11

## (undated) RX ORDER — LIDOCAINE 40 MG/G
CREAM TOPICAL
Status: DISPENSED
Start: 2023-12-13

## (undated) RX ORDER — FENTANYL CITRATE 50 UG/ML
INJECTION, SOLUTION INTRAMUSCULAR; INTRAVENOUS
Status: DISPENSED
Start: 2023-01-17

## (undated) RX ORDER — LIDOCAINE HYDROCHLORIDE 10 MG/ML
INJECTION, SOLUTION EPIDURAL; INFILTRATION; INTRACAUDAL; PERINEURAL
Status: DISPENSED
Start: 2023-01-17

## (undated) RX ORDER — FENTANYL CITRATE 50 UG/ML
INJECTION, SOLUTION INTRAMUSCULAR; INTRAVENOUS
Status: DISPENSED
Start: 2023-02-09

## (undated) RX ORDER — SODIUM CHLORIDE 9 MG/ML
INJECTION, SOLUTION INTRAVENOUS
Status: DISPENSED
Start: 2023-12-13

## (undated) RX ORDER — HEPARIN SODIUM 1000 [USP'U]/ML
INJECTION, SOLUTION INTRAVENOUS; SUBCUTANEOUS
Status: DISPENSED
Start: 2022-11-11

## (undated) RX ORDER — ASPIRIN 325 MG
TABLET ORAL
Status: DISPENSED
Start: 2023-01-23

## (undated) RX ORDER — HEPARIN SODIUM (PORCINE) LOCK FLUSH IV SOLN 100 UNIT/ML 100 UNIT/ML
SOLUTION INTRAVENOUS
Status: DISPENSED
Start: 2023-01-23

## (undated) RX ORDER — HEPARIN SODIUM 1000 [USP'U]/ML
INJECTION, SOLUTION INTRAVENOUS; SUBCUTANEOUS
Status: DISPENSED
Start: 2023-01-23

## (undated) RX ORDER — LIDOCAINE HYDROCHLORIDE 10 MG/ML
INJECTION, SOLUTION EPIDURAL; INFILTRATION; INTRACAUDAL; PERINEURAL
Status: DISPENSED
Start: 2022-12-08

## (undated) RX ORDER — FENTANYL CITRATE 50 UG/ML
INJECTION, SOLUTION INTRAMUSCULAR; INTRAVENOUS
Status: DISPENSED
Start: 2024-12-11

## (undated) RX ORDER — FENTANYL CITRATE 50 UG/ML
INJECTION, SOLUTION INTRAMUSCULAR; INTRAVENOUS
Status: DISPENSED
Start: 2023-03-21

## (undated) RX ORDER — VERAPAMIL HYDROCHLORIDE 2.5 MG/ML
INJECTION, SOLUTION INTRAVENOUS
Status: DISPENSED
Start: 2022-12-25

## (undated) RX ORDER — LIDOCAINE HYDROCHLORIDE 10 MG/ML
INJECTION, SOLUTION EPIDURAL; INFILTRATION; INTRACAUDAL; PERINEURAL
Status: DISPENSED
Start: 2023-01-02

## (undated) RX ORDER — LIDOCAINE HYDROCHLORIDE 10 MG/ML
INJECTION, SOLUTION EPIDURAL; INFILTRATION; INTRACAUDAL; PERINEURAL
Status: DISPENSED
Start: 2023-01-23

## (undated) RX ORDER — FENTANYL CITRATE 50 UG/ML
INJECTION, SOLUTION INTRAMUSCULAR; INTRAVENOUS
Status: DISPENSED
Start: 2022-11-17

## (undated) RX ORDER — NITROGLYCERIN 5 MG/ML
VIAL (ML) INTRAVENOUS
Status: DISPENSED
Start: 2022-11-11

## (undated) RX ORDER — FENTANYL CITRATE 50 UG/ML
INJECTION, SOLUTION INTRAMUSCULAR; INTRAVENOUS
Status: DISPENSED
Start: 2023-06-29

## (undated) RX ORDER — ASPIRIN 325 MG
TABLET ORAL
Status: DISPENSED
Start: 2023-12-13

## (undated) RX ORDER — HEPARIN SODIUM 1000 [USP'U]/ML
INJECTION, SOLUTION INTRAVENOUS; SUBCUTANEOUS
Status: DISPENSED
Start: 2023-12-13

## (undated) RX ORDER — LIDOCAINE HYDROCHLORIDE 10 MG/ML
INJECTION, SOLUTION EPIDURAL; INFILTRATION; INTRACAUDAL; PERINEURAL
Status: DISPENSED
Start: 2023-12-13

## (undated) RX ORDER — ATROPINE SULFATE 0.1 MG/ML
INJECTION INTRAVENOUS
Status: DISPENSED
Start: 2023-01-23

## (undated) RX ORDER — LIDOCAINE 40 MG/G
CREAM TOPICAL
Status: DISPENSED
Start: 2023-05-22

## (undated) RX ORDER — CEFAZOLIN SODIUM 1 G/3ML
INJECTION, POWDER, FOR SOLUTION INTRAMUSCULAR; INTRAVENOUS
Status: DISPENSED
Start: 2022-12-25

## (undated) RX ORDER — REGADENOSON 0.08 MG/ML
INJECTION, SOLUTION INTRAVENOUS
Status: DISPENSED
Start: 2024-12-11

## (undated) RX ORDER — LIDOCAINE HYDROCHLORIDE 10 MG/ML
INJECTION, SOLUTION EPIDURAL; INFILTRATION; INTRACAUDAL; PERINEURAL
Status: DISPENSED
Start: 2022-11-11